# Patient Record
Sex: FEMALE | Race: BLACK OR AFRICAN AMERICAN | NOT HISPANIC OR LATINO | Employment: OTHER | URBAN - METROPOLITAN AREA
[De-identification: names, ages, dates, MRNs, and addresses within clinical notes are randomized per-mention and may not be internally consistent; named-entity substitution may affect disease eponyms.]

---

## 2017-01-03 ENCOUNTER — TELEPHONE (OUTPATIENT)
Dept: OTOLARYNGOLOGY | Facility: CLINIC | Age: 67
End: 2017-01-03

## 2017-01-04 ENCOUNTER — TELEPHONE (OUTPATIENT)
Dept: GASTROENTEROLOGY | Facility: CLINIC | Age: 67
End: 2017-01-04

## 2017-01-05 ENCOUNTER — TELEPHONE (OUTPATIENT)
Dept: GASTROENTEROLOGY | Facility: CLINIC | Age: 67
End: 2017-01-05

## 2017-01-10 ENCOUNTER — OFFICE VISIT (OUTPATIENT)
Dept: OTOLARYNGOLOGY | Facility: CLINIC | Age: 67
End: 2017-01-10
Payer: MEDICARE

## 2017-01-10 VITALS
WEIGHT: 222.69 LBS | HEIGHT: 63 IN | DIASTOLIC BLOOD PRESSURE: 80 MMHG | SYSTOLIC BLOOD PRESSURE: 139 MMHG | BODY MASS INDEX: 39.46 KG/M2 | HEART RATE: 76 BPM

## 2017-01-10 DIAGNOSIS — H61.23 IMPACTED CERUMEN OF BOTH EARS: ICD-10-CM

## 2017-01-10 DIAGNOSIS — H93.8X3 EAR FULLNESS, BILATERAL: Primary | ICD-10-CM

## 2017-01-10 PROCEDURE — 69210 REMOVE IMPACTED EAR WAX UNI: CPT | Mod: S$PBB,,, | Performed by: OTOLARYNGOLOGY

## 2017-01-10 PROCEDURE — 99213 OFFICE O/P EST LOW 20 MIN: CPT | Mod: PBBFAC,25 | Performed by: OTOLARYNGOLOGY

## 2017-01-10 PROCEDURE — 99213 OFFICE O/P EST LOW 20 MIN: CPT | Mod: 25,S$PBB,, | Performed by: OTOLARYNGOLOGY

## 2017-01-10 PROCEDURE — 69210 REMOVE IMPACTED EAR WAX UNI: CPT | Mod: PBBFAC | Performed by: OTOLARYNGOLOGY

## 2017-01-10 PROCEDURE — 99999 PR PBB SHADOW E&M-EST. PATIENT-LVL III: CPT | Mod: PBBFAC,,, | Performed by: OTOLARYNGOLOGY

## 2017-01-10 NOTE — MR AVS SNAPSHOT
[facility] Otorhinolaryngology  [address]  [facility] [address]  Phone: [phone]  Fax: [phone]                  Patsy Morris   1/10/2017 11:15 AM   Office Visit    Description:  Female : 1950   Provider:  Stuart Qiu MD   Department:  [facility] - Otorhinolaryngology           Reason for Visit     Ear Fullness           Diagnoses this Visit        Comments    Ear fullness, bilateral    -  Primary     Impacted cerumen of both ears                To Do List           Future Appointments        Provider Department Dept Phone    3/14/2017 11:00 AM MD Jonah Carnes [facility] Otorhinolaryngology [phone]    2017 11:15 AM Stuart Qiu MD [facility] Otorhinolaryngology [phone]      Goals (5 Years of Data)     None      Follow-Up and Disposition     Return in about 3 months (around 4/10/2017).      [facility] On Call     [facility] On Call Nurse [place] [facility] Line -  Assistance  Registered nurses in the [facility] On Call Center provide clinical advisement, health education, appointment booking, and other advisory services.  Call for this free service at [phone].             Medications           Message regarding Medications     Verify the changes and/or additions to your medication regime listed below are the same as discussed with your clinician today.  If any of these changes or additions are incorrect, please notify your healthcare provider.             Verify that the below list of medications is an accurate representation of the medications you are currently taking.  If none reported, the list may be blank. If incorrect, please contact your healthcare provider. Carry this list with you in case of emergency.           Current Medications     albuterol-ipratropium 2.5mg-0.5mg/3mL (DUO-NEB) 0.5 mg-3 mg(2.5 mg base)/3 mL nebulizer solution Take 3 mLs by nebulization every 6 (six) hours as needed for Wheezing or Shortness of Breath.    atorvastatin (LIPITOR)  "80 MG tablet TAKE 1 TABLET BY MOUTH EVERY EVENING    ELIQUIS 2.5 mg Tab TAKE 1 TABLET(2.5 MG) BY MOUTH TWICE DAILY    ferrous gluconate (FERGON) 325 MG Tab Take 1 tablet (325 mg total) by mouth daily with breakfast.    fluticasone (FLONASE) 50 mcg/actuation nasal spray 1 spray by Each Nare route once daily.    furosemide (LASIX) 40 MG tablet TAKE 1 TABLET(40 MG) BY MOUTH EVERY DAY           Clinical Reference Information           Vital Signs - Last Recorded  Most recent update: 1/10/2017 11:41 AM by Harper Garland MA    BP Pulse Ht Wt LMP BMI    139/80 (BP Location: Right arm, Patient Position: Sitting, BP Method: Automatic) 76 5' 3" (1.6 m) 101 kg (222 lb 10.6 oz) (LMP Unknown) 39.44 kg/m2      Blood Pressure          Most Recent Value    BP  139/80      Allergies as of 1/10/2017     Codeine      Immunizations Administered on Date of Encounter - 1/10/2017     None      [facility] Sign-Up     Activating your [facility] account is as easy as 1-2-3!     1) Visit [email or web address], select Sign Up Now, enter this activation code and your date of birth, then select Next.  [email or web address]  Expires: 2/24/2017 11:39 AM      2) Create a username and password to use when you visit [facility] in the future and select a security question in case you lose your password and select Next.    3) Enter your e-mail address and click Sign Up!    Additional Information  If you have questions, please e-mail [email or web address] or call [phone] to talk to our [facility] staff. Remember, [facility] is NOT to be used for urgent needs. For medical emergencies, dial 911.         Smoking Cessation     If you would like to quit smoking:   You may be eligible for free services if you are a [place] resident and started smoking cigarettes before September 1, 1988.  Call the Smoking Cessation Trust (SCT) toll free at (727) 234-1046 or (221) 358-2686.   Call -800-QUIT-NOW if you do not meet the above criteria.            "

## 2017-01-10 NOTE — PROGRESS NOTES
Subjective:       Patient ID: Patsy Morris is a 66 y.o. female.    Chief Complaint: Ear Fullness    Ear Fullness    There is pain in both ears. This is a recurrent problem. The current episode started 1 to 4 weeks ago. The problem occurs constantly. The problem has been unchanged. There has been no fever. The patient is experiencing no pain. Associated symptoms include hearing loss. Pertinent negatives include no coughing, diarrhea, ear discharge, headaches, neck pain, rash, rhinorrhea or vomiting. She has tried nothing for the symptoms. Her past medical history is significant for hearing loss. There is no history of a chronic ear infection or a tympanostomy tube.     Review of Systems   Constitutional: Negative for activity change, appetite change and fever.   HENT: Positive for hearing loss. Negative for congestion, ear discharge, ear pain, nosebleeds, postnasal drip, rhinorrhea and sneezing.         Recurrent bilateral ear fullness for about 3 weeks   Eyes: Negative for redness and visual disturbance.   Respiratory: Negative for apnea, cough, shortness of breath and wheezing.    Cardiovascular: Negative for chest pain and palpitations.   Gastrointestinal: Negative for diarrhea and vomiting.   Genitourinary: Negative for difficulty urinating and frequency.   Musculoskeletal: Negative for arthralgias, back pain, gait problem and neck pain.   Skin: Negative for color change and rash.   Neurological: Negative for dizziness, speech difficulty, weakness and headaches.   Hematological: Negative for adenopathy. Does not bruise/bleed easily.   Psychiatric/Behavioral: Negative for agitation and behavioral problems.       Objective:        Constitutional:   Vital signs are normal. She appears well-developed and well-nourished. She is active. Normal speech.      Head:  Normocephalic and atraumatic. Salivary glands normal.  Facial strength is normal.      Ears:    Right Ear: Decreased hearing is noted.   Left Ear:  Decreased hearing is noted.       PROCEDURE NOTE:  Ceruminosis is noted in both EACs.  Wax was removed by manual debridement and suctioning utilizing the assistance of binocular microscopy revealing EACs and TMs WNL. The patient tolerated this procedure without difficulty. The subjective decrease noted in hearing pre-cleaning was resolved post-cleaning.        Nose:  Nose normal including turbinates, nasal mucosa, sinuses and nasal septum.     Mouth/Throat  Oropharynx clear and moist without lesions or asymmetry and normal uvula midline.     Neck:  Neck normal without thyromegaly masses, asymmetry, normal tracheal structure, crepitus, and tenderness, thyroid normal, trachea normal, phonation normal and full range of motion with neck supple.     Psychiatric:   She has a normal mood and affect. Her speech is normal and behavior is normal.     Neurological:   She has neurological normal, alert and oriented.     Skin:   No abrasions, lacerations, lesions, or rashes.       Assessment:       1. Ear fullness, bilateral    2. Impacted cerumen of both ears        Plan:       1. Hearing conservation strongly recommended.  2. Trial of amplification bilaterally also recommended.  3. Re-check of hearing in 18-24 months or sooner if subjective change noted.  4. F/U with PCP as per schedule.

## 2017-01-11 ENCOUNTER — TELEPHONE (OUTPATIENT)
Dept: ENDOSCOPY | Facility: HOSPITAL | Age: 67
End: 2017-01-11

## 2017-01-13 ENCOUNTER — TELEPHONE (OUTPATIENT)
Dept: UROLOGY | Facility: CLINIC | Age: 67
End: 2017-01-13

## 2017-01-13 ENCOUNTER — TELEPHONE (OUTPATIENT)
Dept: GASTROENTEROLOGY | Facility: CLINIC | Age: 67
End: 2017-01-13

## 2017-01-13 NOTE — TELEPHONE ENCOUNTER
Due for her 3 month follow up with a chest xray, but she stated she just had one in October.Really does not want to do another one so soon after her last.

## 2017-01-13 NOTE — TELEPHONE ENCOUNTER
----- Message from Martin Rodriguez MA sent at 1/13/2017  1:07 PM CST -----  Contact: Pt  Pt missed a call from Aiyana   ----- Message -----     From: Vanessa Burris     Sent: 1/13/2017   1:04 PM       To: [facility] Staff    Pt is calling to schedule an appt with [facility] and can be reached at [phone].    Thank you

## 2017-01-13 NOTE — TELEPHONE ENCOUNTER
----- Message from Ernestina Jackson sent at 1/13/2017  8:50 AM CST -----  Contact: [phone]/ self  Patient requesting to speak with you regarding her scope. Please advise.

## 2017-01-16 ENCOUNTER — TELEPHONE (OUTPATIENT)
Dept: GASTROENTEROLOGY | Facility: CLINIC | Age: 67
End: 2017-01-16

## 2017-01-27 DIAGNOSIS — I48.3 TYPICAL ATRIAL FLUTTER: ICD-10-CM

## 2017-01-27 RX ORDER — APIXABAN 2.5 MG/1
TABLET, FILM COATED ORAL
Qty: 60 TABLET | Refills: 0 | Status: SHIPPED | OUTPATIENT
Start: 2017-01-27 | End: 2017-03-03 | Stop reason: SDUPTHER

## 2017-01-30 DIAGNOSIS — N18.9 CHRONIC KIDNEY DISEASE, UNSPECIFIED STAGE: ICD-10-CM

## 2017-02-09 DIAGNOSIS — I50.31 ACUTE DIASTOLIC CONGESTIVE HEART FAILURE: ICD-10-CM

## 2017-02-10 ENCOUNTER — ANESTHESIA EVENT (OUTPATIENT)
Dept: ENDOSCOPY | Facility: HOSPITAL | Age: 67
End: 2017-02-10
Payer: MEDICARE

## 2017-02-10 ENCOUNTER — PROCEDURE VISIT (OUTPATIENT)
Dept: UROLOGY | Facility: CLINIC | Age: 67
End: 2017-02-10
Payer: MEDICARE

## 2017-02-10 ENCOUNTER — HOSPITAL ENCOUNTER (OUTPATIENT)
Dept: PREADMISSION TESTING | Facility: HOSPITAL | Age: 67
Discharge: HOME | End: 2017-02-10
Attending: ANESTHESIOLOGY | Admitting: UROLOGY
Payer: MEDICARE

## 2017-02-10 ENCOUNTER — HOSPITAL ENCOUNTER (OUTPATIENT)
Dept: RADIOLOGY | Facility: HOSPITAL | Age: 67
Discharge: HOME | End: 2017-02-10
Attending: UROLOGY
Payer: MEDICARE

## 2017-02-10 VITALS
BODY MASS INDEX: 37.22 KG/M2 | WEIGHT: 218 LBS | TEMPERATURE: 98 F | HEIGHT: 64 IN | SYSTOLIC BLOOD PRESSURE: 137 MMHG | DIASTOLIC BLOOD PRESSURE: 70 MMHG | RESPIRATION RATE: 20 BRPM | HEART RATE: 75 BPM

## 2017-02-10 VITALS
OXYGEN SATURATION: 88 % | BODY MASS INDEX: 37.22 KG/M2 | TEMPERATURE: 99 F | HEIGHT: 64 IN | RESPIRATION RATE: 20 BRPM | HEART RATE: 70 BPM | DIASTOLIC BLOOD PRESSURE: 61 MMHG | WEIGHT: 218 LBS | SYSTOLIC BLOOD PRESSURE: 131 MMHG

## 2017-02-10 DIAGNOSIS — C64.1 RENAL CANCER, RIGHT: ICD-10-CM

## 2017-02-10 DIAGNOSIS — C67.9 MALIGNANT NEOPLASM OF URINARY BLADDER, UNSPECIFIED SITE: Primary | ICD-10-CM

## 2017-02-10 DIAGNOSIS — C67.9 MALIGNANT NEOPLASM OF URINARY BLADDER, UNSPECIFIED SITE: ICD-10-CM

## 2017-02-10 PROCEDURE — 74176 CT ABD & PELVIS W/O CONTRAST: CPT | Mod: 26,GC,, | Performed by: RADIOLOGY

## 2017-02-10 PROCEDURE — 52000 CYSTOURETHROSCOPY: CPT | Mod: PBBFAC | Performed by: UROLOGY

## 2017-02-10 PROCEDURE — 52000 CYSTOURETHROSCOPY: CPT | Mod: S$PBB,,, | Performed by: UROLOGY

## 2017-02-10 RX ORDER — LIDOCAINE HYDROCHLORIDE 20 MG/ML
JELLY TOPICAL
Status: COMPLETED | OUTPATIENT
Start: 2017-02-10 | End: 2017-02-10

## 2017-02-10 RX ORDER — FUROSEMIDE 40 MG/1
TABLET ORAL
Qty: 30 TABLET | Refills: 6 | Status: SHIPPED | OUTPATIENT
Start: 2017-02-10 | End: 2017-03-03 | Stop reason: SDUPTHER

## 2017-02-10 RX ADMIN — LIDOCAINE HYDROCHLORIDE: 20 JELLY TOPICAL at 01:02

## 2017-02-10 NOTE — MR AVS SNAPSHOT
[facility]  [address]  [facility] [address]  Phone: [phone]                  Patsy Morris   2/10/2017 1:00 PM   Procedure visit    Description:  Female : 1950   Provider:  PROCEDURES, UROLOGY   Department:  [facility]           Reason for Visit     Other           Diagnoses this Visit        Comments    Renal cancer, right                To Do List           Future Appointments        Provider Department Dept Phone    3/3/2017 2:00 PM Ajay Lutz MD [facility] - Cardiology [phone]    2017 11:15 AM Stuart Qiu MD [facility] - Otorhinolaryngology [phone]      Goals (5 Years of Data)     None      [facility]     [facility] On Call Nurse Care Line -  Assistance  Registered nurses in the [facility] On Call Center provide clinical advisement, health education, appointment booking, and other advisory services.  Call for this free service at [phone].             Medications           Message regarding Medications     Verify the changes and/or additions to your medication regime listed below are the same as discussed with your clinician today.  If any of these changes or additions are incorrect, please notify your healthcare provider.             Verify that the below list of medications is an accurate representation of the medications you are currently taking.  If none reported, the list may be blank. If incorrect, please contact your healthcare provider. Carry this list with you in case of emergency.           Current Medications     albuterol-ipratropium 2.5mg-0.5mg/3mL (DUO-NEB) 0.5 mg-3 mg(2.5 mg base)/3 mL nebulizer solution Take 3 mLs by nebulization every 6 (six) hours as needed for Wheezing or Shortness of Breath.    atorvastatin (LIPITOR) 80 MG tablet TAKE 1 TABLET BY MOUTH EVERY EVENING    ELIQUIS 2.5 mg Tab TAKE 1 TABLET(2.5 MG) BY MOUTH TWICE DAILY    ferrous gluconate (FERGON) 325 MG Tab Take 1 tablet (325 mg  "total) by mouth daily with breakfast.    fluticasone (FLONASE) 50 mcg/actuation nasal spray 1 spray by Each Nare route once daily.    furosemide (LASIX) 40 MG tablet TAKE 1 TABLET(40 MG) BY MOUTH EVERY DAY           Clinical Reference Information           Your Vitals Were     Pulse Temp Resp Height Weight Last Period    75 98.3 °F (36.8 °C) (Oral) 20 5' 3.5" (1.613 m) 98.9 kg (218 lb) (LMP Unknown)    BMI                38.01 kg/m2          Blood Pressure          Most Recent Value    BP  (P) 137/70      Allergies as of 2/10/2017     Codeine      Immunizations Administered on Date of Encounter - 2/10/2017     None      Orders Placed During Today's Visit      Normal Orders This Visit    Cystoscopy       [facility] Sign-Up     Activating your [facility] account is as easy as 1-2-3!     1) Visit [email or web address], select Sign Up Now, enter this activation code and your date of birth, then select Next.  [email or web address]  Expires: 2/24/2017 11:39 AM      2) Create a username and password to use when you visit [facility] in the future and select a security question in case you lose your password and select Next.    3) Enter your e-mail address and click Sign Up!    Additional Information  If you have questions, please e-mail [email or web address] or call [phone] to talk to our [facility] staff. Remember, [facility] is NOT to be used for urgent needs. For medical emergencies, dial 911.         Instructions    What to Expect After a Cystoscopy  For the next 24-48 hours, you may feel a mild burning when you urinate. This burning is normal and expected. Drink plenty of water to dilute the urine to help relieve the burning sensation. You may also see a small amount of blood in your urine after the procedure.    Unless you are already taking antibiotics, you may be given an antibiotic after the test to prevent infection.    Signs and Symptoms to Report  Call the [facility] at [phone] if you develop any of the " following:  · Fever of 101 degrees or higher  · Chills or persistent bleeding  · Inability to urinate       Smoking Cessation     If you would like to quit smoking:   You may be eligible for free services if you are a [place] resident and started smoking cigarettes before September 1, 1988.  Call the Smoking Cessation Trust (SCT) toll free at (332) 017-0469 or (143) 307-7755.   Call 1-800-QUIT-NOW if you do not meet the above criteria.            Language Assistance Services     ATTENTION: Language assistance services are available, free of charge. Please call [phone].      ATENCIÓN: Si habla español, tiene a velarde disposición servicios gratuitos de asistencia lingüística. Llame al [phone].     CHÚ Ý: N?u b?n nói Ti?ng Vi?t, có các d?ch v? h? tr? ngôn ng? mi?n phí dành cho b?n. G?i s? [phone].         [facility] complies with applicable Federal civil rights laws and does not discriminate on the basis of race, color, national origin, age, disability, or sex.

## 2017-02-10 NOTE — PROCEDURES
Procedures   Procedure: Flexible cysto-uretheroscopy    Pre Procedure Diagnosis:history of bladder cancer, no recurrence for >12 years    Post Procedure Diagnosis:Normal cystoscopy    Surgeon: Devang Ambriz MD    Anesthesia: 2% uro-jet lidocaine jelly for local analgesia    Flexible cysto-urethroscopy was performed after consent was obtained.  The risks and benefits were explained.    2% lidocaine urojet was used for local analgesia.  The genitalia was prepped and draped in the sterile fashion with betadine.    The flexible scope was advanced into the urethra and into the bladder.  Bilateral ureteral orifice were evaluated and noted to be normal with clear efflux.  The bladder was completely surveyed in a systematic fashion.   No bladder tumors or lesions were seen.  No strictures were noted.    The patient tolerated the procedure well without complication.    They will follow up in 1 year

## 2017-02-10 NOTE — ANESTHESIA PREPROCEDURE EVALUATION
02/10/2017  Pre-operative evaluation for Procedure(s) (LRB):  COLONOSCOPY (N/A)    Patsy Morris is a 66 y.o. female presents for preoperative evaluation for colonoscopy. Pmhx is significant for HFpEF, Afib on eliquis, RCC s/p partial kidney resection, CKD, HTN, Chronic respiratory failure on home O2, and BHAVANI. Pt most recently admitted to the hospital this past October for fatigue and dyspnea on exertion especially when removing her oxygen. She was treated for heart failure with IV diuresis. Pt last saw her cardiologist in December.         Patient Active Problem List   Diagnosis    Impacted cerumen of both ears    URI (upper respiratory infection)    Ear fullness    Renal insufficiency    HTN (hypertension), benign    Hypercholesterolemia    Renal carcinoma    Rhinitis, allergic    Acute kidney injury    Hypokalemia    Dyslipidemia    Tobacco abuse    Non morbid obesity due to excess calories    Chronic kidney disease (CKD) stage G4/A1, severely decreased glomerular filtration rate (GFR) between 15-29 mL/min/1.73 square meter and albuminuria creatinine ratio less than 30 mg/g    Morbid obesity    Hypoxia    Chronic hypercapnic respiratory failure    Typical atrial flutter    Chronic diastolic congestive heart failure    CRI (chronic renal insufficiency)    Acute on chronic diastolic heart failure    Chronic respiratory failure with hypoxia    Paroxysmal atrial fibrillation    Suspected sleep apnea    BHAVANI (obstructive sleep apnea)    Sleep-related hypoventilation    Gastrointestinal hemorrhage associated with gastritis    Acute on chronic respiratory failure with hypoxia and hypercapnia       Review of patient's allergies indicates:   Allergen Reactions    Codeine         Current Outpatient Prescriptions on File Prior to Encounter   Medication Sig Dispense Refill     albuterol-ipratropium 2.5mg-0.5mg/3mL (DUO-NEB) 0.5 mg-3 mg(2.5 mg base)/3 mL nebulizer solution Take 3 mLs by nebulization every 6 (six) hours as needed for Wheezing or Shortness of Breath. 3 mL 2    atorvastatin (LIPITOR) 80 MG tablet TAKE 1 TABLET BY MOUTH EVERY EVENING 30 tablet 6    ELIQUIS 2.5 mg Tab TAKE 1 TABLET(2.5 MG) BY MOUTH TWICE DAILY 60 tablet 0    ferrous gluconate (FERGON) 325 MG Tab Take 1 tablet (325 mg total) by mouth daily with breakfast. 30 tablet 11    fluticasone (FLONASE) 50 mcg/actuation nasal spray 1 spray by Each Nare route once daily. 1 Bottle 11    furosemide (LASIX) 40 MG tablet TAKE 1 TABLET(40 MG) BY MOUTH EVERY DAY 30 tablet 6     No current facility-administered medications on file prior to encounter.        Past Surgical History   Procedure Laterality Date    Kidney mass resection Right      renal carcinoma    Brain surgery      Eye surgery      Hysterectomy         Social History     Social History    Marital status: Single     Spouse name: N/A    Number of children: N/A    Years of education: N/A     Occupational History    Not on file.     Social History Main Topics    Smoking status: Current Some Day Smoker     Packs/day: 1.50     Years: 25.00     Types: Cigarettes     Last attempt to quit: 2/22/2016    Smokeless tobacco: Never Used      Comment: Still occasionally smokes a cigarette 1-2 daily    Alcohol use No    Drug use: No    Sexual activity: No     Other Topics Concern    Not on file     Social History Narrative         Vital Signs Range (Last 24H):  Temp:  [37.3 °C (99.2 °F)]   Pulse:  [70]   Resp:  [20]   BP: (131)/(61)   SpO2:  [88 %]       CBC: No results for input(s): WBC, RBC, HGB, HCT, PLT, MCV, MCH, MCHC in the last 72 hours.    CMP: No results for input(s): NA, K, CL, CO2, BUN, CREATININE, GLU, MG, PHOS, CALCIUM, ALBUMIN, PROT, ALKPHOS, ALT, AST, BILITOT in the last 72 hours.    INR  No results for input(s): INR, PROTIME, APTT in the last 72  hours.    Invalid input(s): PT        Diagnostic Studies:      EKG:  Sinus rhythm with sinus arrhythmia  Normal ECG    Confirmed by Diana Meredith MD ([id number]) on 10/10/2016 9:56:58 PM      2D Echo:  Aorta: The aortic root is normal in size, measuring 2.1 cm at sinotubular junction and 3.0 cm at Sinuses of Valsalva. The proximal ascending aorta is normal in size, measuring 2.5 cm across.     Left Atrium: The left atrial volume index is normal, measuring 30.58 cc/m2.     Left Ventricle: The left ventricle is mildly enlarged, with an end-diastolic diameter of 6.2 cm, and an end-systolic diameter of 4.6 cm. LV wall thickness is normal, with the septum and the posterior wall each measuring 0.8 cm across. Relative wall   thickness was normal at 0.26, and the LV mass index was increased at 114.3 g/m2 consistent with eccentric left ventricular hypertrophy. Global left ventricular systolic function appears normal. Visually estimated ejection fraction is 55-60%. The LV   Doppler derived stroke volume equals 68.0 ccs.   The E/e'(lat) is 13, consistent with significant diastolic dysfunction.     Right Atrium: The right atrium is normal in size, measuring 6.5 cm in length and 4.3 cm in width in the apical view.     Right Ventricle: The right ventricle is normal in size measuring 3.0 cm at the base in the apical right ventricle-focused view. Global right ventricular systolic function appears normal. The estimated PA systolic pressure is greater than 15 mmHg.     Aortic Valve:  The mean gradient obtained across the aortic valve is 7.52 mmHg.     Mitral Valve:  The pressure half time is 70 msec. The calculated mitral valve area is 3.14 cm2. There is mild mitral regurgitation.     Intracavitary: There is no evidence of pericardial effusion, intracavity mass, thrombi, or vegetation.         Anesthesia Evaluation    I have reviewed the Patient Summary Reports.    I have reviewed the Nursing Notes.   I have reviewed the Medications.      Review of Systems  Anesthesia Hx:  No problems with previous Anesthesia  History of prior surgery of interest to airway management or planning: Denies Family Hx of Anesthesia complications.   Denies Personal Hx of Anesthesia complications.   Social:  Smoker, No Alcohol Use    Hematology/Oncology:         -- Denies Anemia: --  Cancer in past history:  Oncology Comments: Renal Cell Cancer 2004 s/p Partial resection of the kidney  No chemo or raditation,  BUDDY now.     EENT/Dental:EENT/Dental Normal   Cardiovascular:   Hypertension, well controlled Denies Valvular problems/Murmurs.  Denies MI.  Denies CAD.    Denies CABG/stent. Dysrhythmias atrial fibrillation CHF adnitted 2 years ago with fluid overload and anemia when CHF was diagnosed.  Got transfusion and has been much better since then. Functional Capacity low / < 4 METS  Denies Valvular Heart Disease.    Congestive Heart Failure (CHF) , LV Diastolic HF, improved, therapy has been decreased Hypertension    Pulmonary:   COPD, severe Shortness of breath Sleep Apnea, CPAP Uses oxygen intermittently  Walks 2 hours in store without difficulty but needs oxygen to walks, but not for light work at home Obstructive Sleep Apnea (BHAVANI). Does not use CPAP although prescribed to her.  Respiratory Failure, Chronic, Hypercapneic   Renal/:   Chronic Renal Disease, CRI    Hepatic/GI:   GERD, well controlled Denies Liver Disease. Denies Hepatitis.    Neurological:   Denies TIA. Denies CVA.    Endocrine:   Denies Diabetes. Denies Hypothyroidism. Denies Hyperthyroidism.        Physical Exam  General:  Morbid Obesity    Airway/Jaw/Neck:  Airway Findings: Mouth Opening: Normal Tongue: Normal  General Airway Assessment: Adult  Mallampati: II  Improves to II with phonation.  TM Distance: Normal, at least 6 cm  Jaw/Neck Findings:  Neck ROM: Normal ROM     Eyes/Ears/Nose:  EYES/EARS/NOSE FINDINGS: Normal   Dental:  Dental Findings: In tact   Chest/Lungs:  Chest/Lungs Clear     Heart/Vascular:  Heart Findings: Normal       Mental Status:  Mental Status Findings:  Cooperative, Alert and Oriented         Anesthesia Plan  Type of Anesthesia, risks & benefits discussed:  Anesthesia Type:  general  Patient's Preference:   Intra-op Monitoring Plan: standard ASA monitors  Intra-op Monitoring Plan Comments:   Post Op Pain Control Plan:   Post Op Pain Control Plan Comments:   Induction:   IV  Beta Blocker:  Patient is not currently on a Beta-Blocker (No further documentation required).       Informed Consent:    ASA Score: 3     Day of Surgery Review of History & Physical:            Ready For Surgery From Anesthesia Perspective.       Ref Range & Units 8mo ago 1yr ago    EF 55 - 65 55 71    Mitral Valve Regurgitation  MILD     Diastolic Dysfunction  Yes  Yes     Est. PA Systolic Pressure  14.75 23.04   Resulting Agency  CVIS CVIS   Narrative        Vitals - 1 value per visit 10/17/2016 10/21/2016 10/25/2016 11/28/2016   SYSTOLIC 136  120 124   DIASTOLIC 56  68 80   PULSE 78  72 80     Vitals - 1 value per visit 12/2/2016 12/8/2016 1/10/2017 2/10/2017   SYSTOLIC 160 129 139 131   DIASTOLIC 60 71 80 61   PULSE 72 81 76 70     Vitals - 1 value per visit 2/10/2017 3/3/2017 3/28/2017 4/11/2017 6/15/2017   SYSTOLIC 137 138 139 115 142   DIASTOLIC 70 52 63 62 73   PULSE 75 82 72 75 84

## 2017-02-10 NOTE — PATIENT INSTRUCTIONS
What to Expect After a Cystoscopy  For the next 24-48 hours, you may feel a mild burning when you urinate. This burning is normal and expected. Drink plenty of water to dilute the urine to help relieve the burning sensation. You may also see a small amount of blood in your urine after the procedure.    Unless you are already taking antibiotics, you may be given an antibiotic after the test to prevent infection.    Signs and Symptoms to Report  Call the [facility] at [phone] if you develop any of the following:  · Fever of 101 degrees or higher  · Chills or persistent bleeding  · Inability to urinate

## 2017-03-03 ENCOUNTER — OFFICE VISIT (OUTPATIENT)
Dept: CARDIOLOGY | Facility: CLINIC | Age: 67
End: 2017-03-03
Payer: MEDICARE

## 2017-03-03 VITALS
HEART RATE: 82 BPM | DIASTOLIC BLOOD PRESSURE: 52 MMHG | SYSTOLIC BLOOD PRESSURE: 138 MMHG | WEIGHT: 222 LBS | HEIGHT: 63 IN | BODY MASS INDEX: 39.34 KG/M2

## 2017-03-03 DIAGNOSIS — I50.31 ACUTE DIASTOLIC CONGESTIVE HEART FAILURE: ICD-10-CM

## 2017-03-03 DIAGNOSIS — E78.00 PURE HYPERCHOLESTEROLEMIA: ICD-10-CM

## 2017-03-03 DIAGNOSIS — Z79.01 LONG TERM CURRENT USE OF ANTICOAGULANT: ICD-10-CM

## 2017-03-03 DIAGNOSIS — I50.33 ACUTE ON CHRONIC DIASTOLIC HEART FAILURE: ICD-10-CM

## 2017-03-03 DIAGNOSIS — I48.3 TYPICAL ATRIAL FLUTTER: ICD-10-CM

## 2017-03-03 DIAGNOSIS — E78.5 DYSLIPIDEMIA: ICD-10-CM

## 2017-03-03 DIAGNOSIS — I10 HTN (HYPERTENSION), BENIGN: Primary | ICD-10-CM

## 2017-03-03 DIAGNOSIS — G47.33 OSA (OBSTRUCTIVE SLEEP APNEA): ICD-10-CM

## 2017-03-03 DIAGNOSIS — I50.32 CHRONIC DIASTOLIC CONGESTIVE HEART FAILURE: ICD-10-CM

## 2017-03-03 PROCEDURE — 99214 OFFICE O/P EST MOD 30 MIN: CPT | Mod: S$PBB,,, | Performed by: INTERNAL MEDICINE

## 2017-03-03 PROCEDURE — 99999 PR PBB SHADOW E&M-EST. PATIENT-LVL III: CPT | Mod: PBBFAC,,, | Performed by: INTERNAL MEDICINE

## 2017-03-03 PROCEDURE — 99213 OFFICE O/P EST LOW 20 MIN: CPT | Mod: PBBFAC,PO | Performed by: INTERNAL MEDICINE

## 2017-03-03 RX ORDER — ATORVASTATIN CALCIUM 80 MG/1
80 TABLET, FILM COATED ORAL NIGHTLY
Qty: 30 TABLET | Refills: 6 | Status: SHIPPED | OUTPATIENT
Start: 2017-03-03 | End: 2017-09-09 | Stop reason: SDUPTHER

## 2017-03-03 RX ORDER — FUROSEMIDE 40 MG/1
40 TABLET ORAL DAILY
Qty: 30 TABLET | Refills: 6 | Status: SHIPPED | OUTPATIENT
Start: 2017-03-03 | End: 2017-09-09 | Stop reason: SDUPTHER

## 2017-03-03 NOTE — MR AVS SNAPSHOT
[facility]  [address] [place] [address], [address]  [facility] [address]  Phone: [phone]                  Patsy Morris   3/3/2017 2:00 PM   Office Visit    Description:  Female : 1950   Provider:  Ajay Lutz MD   Department:  [facility]           Reason for Visit     Hypertension           Diagnoses this Visit        Comments    HTN (hypertension), benign    -  Primary     Typical atrial flutter         Chronic diastolic congestive heart failure         Acute on chronic diastolic heart failure         BHAVANI (obstructive sleep apnea)         Long term current use of anticoagulant         Pure hypercholesterolemia                To Do List           Future Appointments        Provider Department Dept Phone    2017 11:15 AM Stuart Qiu MD [facility] Otorhinolaryngology [phone]      Goals (5 Years of Data)     None      Follow-Up and Disposition     Return in about 6 months (around 9/3/2017), or if symptoms worsen or fail to improve.    Follow-up and Disposition History      [facility]     [facility] On Call Nurse [facility] Line -  Assistance  Registered nurses in the [facility] On Call Center provide clinical advisement, health education, appointment booking, and other advisory services.  Call for this free service at [phone].             Medications           Message regarding Medications     Verify the changes and/or additions to your medication regime listed below are the same as discussed with your clinician today.  If any of these changes or additions are incorrect, please notify your healthcare provider.             Verify that the below list of medications is an accurate representation of the medications you are currently taking.  If none reported, the list may be blank. If incorrect, please contact your healthcare provider. Carry this list with you in case of emergency.           Current Medications     atorvastatin (LIPITOR) 80 MG tablet  "TAKE 1 TABLET BY MOUTH EVERY EVENING    ELIQUIS 2.5 mg Tab TAKE 1 TABLET(2.5 MG) BY MOUTH TWICE DAILY    ferrous gluconate (FERGON) 325 MG Tab Take 1 tablet (325 mg total) by mouth daily with breakfast.    fluticasone (FLONASE) 50 mcg/actuation nasal spray 1 spray by Each Nare route once daily.    furosemide (LASIX) 40 MG tablet TAKE 1 TABLET(40 MG) BY MOUTH EVERY DAY    albuterol-ipratropium 2.5mg-0.5mg/3mL (DUO-NEB) 0.5 mg-3 mg(2.5 mg base)/3 mL nebulizer solution Take 3 mLs by nebulization every 6 (six) hours as needed for Wheezing or Shortness of Breath.           Clinical Reference Information           Your Vitals Were     BP Pulse Height Weight Last Period BMI    138/52 82 5' 3" (1.6 m) 100.7 kg (222 lb) (LMP Unknown) 39.33 kg/m2      Blood Pressure          Most Recent Value    Right Arm BP - Sitting  140/50    Left Arm BP - Sitting  138/52    BP  (!)  138/52      Allergies as of 3/3/2017     Codeine      Immunizations Administered on Date of Encounter - 3/3/2017     None      Orders Placed During Today's Visit     Future Labs/Procedures Expected by Expires    CBC auto differential  9/2/2017 3/3/2018    Comprehensive metabolic panel  9/2/2017 (Approximate) 3/3/2018    Lipid panel  9/2/2017 (Approximate) 3/3/2018      [facility] Sign-Up     Activating your [facility] account is as easy as 1-2-3!     1) Visit [email or web address], select Sign Up Now, enter this activation code and your date of birth, then select Next.  [email or web address]  Expires: 4/17/2017  1:59 PM      2) Create a username and password to use when you visit [facility] in the future and select a security question in case you lose your password and select Next.    3) Enter your e-mail address and click Sign Up!    Additional Information  If you have questions, please e-mail [email or web address] or call [phone] to talk to our [facility] staff. Remember, [facility] is NOT to be used for urgent needs. For medical emergencies, dial 911.         Smoking " Cessation     If you would like to quit smoking:   You may be eligible for free services if you are a [place] resident and started smoking cigarettes before September 1, 1988.  Call the Smoking Cessation Trust (SCT) toll free at (229) 888-9928 or (069) 202-9596.   Call 1-800-QUIT-NOW if you do not meet the above criteria.            Language Assistance Services     ATTENTION: Language assistance services are available, free of charge. Please call [phone].      ATENCIÓN: Si habla lesli, tiene a velarde disposición servicios gratuitos de asistencia lingüística. Llame al [phone].     CHÚ Ý: N?u b?n nói Ti?ng Vi?t, có các d?ch v? h? tr? ngôn ng? mi?n phí dành cho b?n. G?i s? [phone].         [facility] complies with applicable Federal civil rights laws and does not discriminate on the basis of race, color, national origin, age, disability, or sex.

## 2017-03-03 NOTE — PROGRESS NOTES
Subjective:    Patient ID:  Patsy Morris is a 66 y.o. female who presents for follow-up of Hypertension      HPI  66 year old female followed with HFpEF, paroxsymal AFl, CRI and post nephrectomy of renal cell ca. She is upset today due to the recent death of her mother . Her bother  in January with prostate cancer.            Ref Range & Units 4mo ago   1yr ago      EF 55 - 65 55 71    Mitral Valve Regurgitation  MILD     Diastolic Dysfunction  Yes (A) Yes (A)    Est. PA Systolic Pressure  14.75 23.04   Resulting Agency  [facility]   Narrative   Date of Procedure: 10/11/2016            Lab pending  Past Medical History:   Diagnosis Date    Arthritis     Asthma     Atrial fibrillation     Atrial flutter     Cerumen impaction     CHF (congestive heart failure)     Encounter for blood transfusion     HEARING LOSS     Herpes genitalis     Hypertension     Renal carcinoma 3/10/2015     Current Outpatient Prescriptions   Medication Sig    atorvastatin (LIPITOR) 80 MG tablet TAKE 1 TABLET BY MOUTH EVERY EVENING    ELIQUIS 2.5 mg Tab TAKE 1 TABLET(2.5 MG) BY MOUTH TWICE DAILY    ferrous gluconate (FERGON) 325 MG Tab Take 1 tablet (325 mg total) by mouth daily with breakfast.    fluticasone (FLONASE) 50 mcg/actuation nasal spray 1 spray by Each Nare route once daily.    furosemide (LASIX) 40 MG tablet TAKE 1 TABLET(40 MG) BY MOUTH EVERY DAY    albuterol-ipratropium 2.5mg-0.5mg/3mL (DUO-NEB) 0.5 mg-3 mg(2.5 mg base)/3 mL nebulizer solution Take 3 mLs by nebulization every 6 (six) hours as needed for Wheezing or Shortness of Breath.     No current facility-administered medications for this visit.            Review of Systems   Constitution: Negative for decreased appetite, diaphoresis, fever, weakness, malaise/fatigue, weight gain and weight loss.   HENT: Negative for congestion, ear discharge, ear pain, headaches and nosebleeds.    Eyes: Negative for blurred vision, double vision and visual  "disturbance.   Cardiovascular: Negative for chest pain, claudication, cyanosis, dyspnea on exertion, irregular heartbeat, leg swelling, near-syncope, orthopnea, palpitations, paroxysmal nocturnal dyspnea and syncope.   Respiratory: Negative for cough, hemoptysis, shortness of breath, sleep disturbances due to breathing, snoring, sputum production and wheezing.    Endocrine: Negative for polydipsia, polyphagia and polyuria.   Hematologic/Lymphatic: Negative for adenopathy and bleeding problem. Does not bruise/bleed easily.   Skin: Negative for color change, nail changes, poor wound healing and rash.   Musculoskeletal: Negative for muscle cramps and muscle weakness.   Gastrointestinal: Negative for abdominal pain, anorexia, change in bowel habit, hematochezia, nausea and vomiting.   Genitourinary: Negative for dysuria, frequency and hematuria.   Neurological: Negative for brief paralysis, difficulty with concentration, excessive daytime sleepiness, dizziness, focal weakness, light-headedness, seizures and vertigo.   Psychiatric/Behavioral: Negative for altered mental status and depression.   Allergic/Immunologic: Negative for persistent infections.        Objective:BP (!) 138/52  Pulse 82  Ht 5' 3" (1.6 m)  Wt 100.7 kg (222 lb)  LMP  (LMP Unknown)  BMI 39.33 kg/m2    Physical Exam   Constitutional: She is oriented to person, place, and time. She appears well-developed and well-nourished.   Morbid obese   HENT:   Head: Normocephalic.   Right Ear: External ear normal.   Left Ear: External ear normal.   Nose: Nose normal.   Inspection of lips, teeth and gums normal   Eyes: Conjunctivae and EOM are normal. Pupils are equal, round, and reactive to light. No scleral icterus.   Neck: Normal range of motion. No JVD present. No tracheal deviation present. No thyromegaly present.   Cardiovascular: Normal rate, regular rhythm, normal heart sounds and intact distal pulses.  Exam reveals no gallop and no friction rub.    No " murmur heard.  Pulmonary/Chest: Effort normal and breath sounds normal. No respiratory distress. She has no wheezes. She has no rales. She exhibits no tenderness.   Abdominal: Soft. Bowel sounds are normal. She exhibits no distension. There is no hepatosplenomegaly. There is no tenderness. There is no guarding.   Musculoskeletal: Normal range of motion. She exhibits no edema or tenderness.   Lymphadenopathy:   Palpation of lymph nodes of neck and groin normal   Neurological: She is oriented to person, place, and time. No cranial nerve deficit. She exhibits normal muscle tone. Coordination normal.   Skin: Skin is warm and dry. No rash noted. No erythema. No pallor.   Palpation of skin normal   Psychiatric: She has a normal mood and affect. Her behavior is normal. Judgment and thought content normal.         Assessment:       1. HTN (hypertension), benign    2. Typical atrial flutter    3. Chronic diastolic congestive heart failure    4. Acute on chronic diastolic heart failure    5. BHAVANI (obstructive sleep apnea)    6. Long term current use of anticoagulant    7. Pure hypercholesterolemia         Plan:       Patsy was seen today for hypertension.    Diagnoses and all orders for this visit:    HTN (hypertension), benign  -     Comprehensive metabolic panel; Future; Expected date: 9/2/17    Typical atrial flutter    Chronic diastolic congestive heart failure    Acute on chronic diastolic heart failure    BHAVANI (obstructive sleep apnea)    Long term current use of anticoagulant  -     CBC auto differential; Future; Expected date: 9/2/17    Pure hypercholesterolemia  -     Lipid panel; Future; Expected date: 9/2/17

## 2017-03-07 DIAGNOSIS — R91.8 PULMONARY NODULES: Primary | ICD-10-CM

## 2017-03-23 ENCOUNTER — TELEPHONE (OUTPATIENT)
Dept: UROLOGY | Facility: CLINIC | Age: 67
End: 2017-03-23

## 2017-03-23 NOTE — TELEPHONE ENCOUNTER
----- Message from Yuridia Cabezas sent at 3/23/2017 10:51 AM CDT -----  Contact: [phone]  Patient is returning your call

## 2017-03-28 ENCOUNTER — HOSPITAL ENCOUNTER (EMERGENCY)
Facility: OTHER | Age: 67
Discharge: HOME | End: 2017-03-28
Attending: EMERGENCY MEDICINE
Payer: MEDICARE

## 2017-03-28 VITALS
OXYGEN SATURATION: 92 % | BODY MASS INDEX: 42.41 KG/M2 | HEIGHT: 61 IN | RESPIRATION RATE: 20 BRPM | WEIGHT: 224.63 LBS | HEART RATE: 72 BPM | DIASTOLIC BLOOD PRESSURE: 63 MMHG | TEMPERATURE: 98 F | SYSTOLIC BLOOD PRESSURE: 139 MMHG

## 2017-03-28 DIAGNOSIS — N28.9 RENAL INSUFFICIENCY: ICD-10-CM

## 2017-03-28 DIAGNOSIS — B34.9 VIRAL SYNDROME: Primary | ICD-10-CM

## 2017-03-28 DIAGNOSIS — R73.9 HYPERGLYCEMIA: ICD-10-CM

## 2017-03-28 LAB
ABO + RH BLD: NORMAL
ALBUMIN SERPL BCP-MCNC: 3.8 G/DL
ALP SERPL-CCNC: 66 U/L
ALT SERPL W/O P-5'-P-CCNC: 12 U/L
ANION GAP SERPL CALC-SCNC: 12 MMOL/L
AST SERPL-CCNC: 14 U/L
BASOPHILS # BLD AUTO: 0 K/UL
BASOPHILS NFR BLD: 0 %
BILIRUB SERPL-MCNC: 0.4 MG/DL
BILIRUB UR QL STRIP: NEGATIVE
BLD GP AB SCN CELLS X3 SERPL QL: NORMAL
BNP SERPL-MCNC: 58 PG/ML
BUN SERPL-MCNC: 14 MG/DL
CALCIUM SERPL-MCNC: 9.2 MG/DL
CHLORIDE SERPL-SCNC: 100 MMOL/L
CLARITY UR: CLEAR
CO2 SERPL-SCNC: 29 MMOL/L
COLOR UR: YELLOW
CREAT SERPL-MCNC: 1.6 MG/DL
DIFFERENTIAL METHOD: ABNORMAL
EOSINOPHIL # BLD AUTO: 0.1 K/UL
EOSINOPHIL NFR BLD: 1.2 %
ERYTHROCYTE [DISTWIDTH] IN BLOOD BY AUTOMATED COUNT: 15.3 %
EST. GFR  (AFRICAN AMERICAN): 38 ML/MIN/1.73 M^2
EST. GFR  (NON AFRICAN AMERICAN): 33 ML/MIN/1.73 M^2
FLUAV AG SPEC QL IA: NEGATIVE
FLUBV AG SPEC QL IA: NEGATIVE
GLUCOSE SERPL-MCNC: 162 MG/DL
GLUCOSE UR QL STRIP: NEGATIVE
HCT VFR BLD AUTO: 37.6 %
HGB BLD-MCNC: 12 G/DL
HGB UR QL STRIP: NEGATIVE
INR PPP: 0.9
KETONES UR QL STRIP: NEGATIVE
LEUKOCYTE ESTERASE UR QL STRIP: NEGATIVE
LYMPHOCYTES # BLD AUTO: 1 K/UL
LYMPHOCYTES NFR BLD: 12.8 %
MCH RBC QN AUTO: 28 PG
MCHC RBC AUTO-ENTMCNC: 31.9 %
MCV RBC AUTO: 88 FL
MONOCYTES # BLD AUTO: 0.5 K/UL
MONOCYTES NFR BLD: 6.4 %
NEUTROPHILS # BLD AUTO: 6.5 K/UL
NEUTROPHILS NFR BLD: 79.5 %
NITRITE UR QL STRIP: NEGATIVE
PH UR STRIP: 6 [PH] (ref 5–8)
PLATELET # BLD AUTO: 191 K/UL
PMV BLD AUTO: 10.3 FL
POTASSIUM SERPL-SCNC: 3.8 MMOL/L
PROT SERPL-MCNC: 7.6 G/DL
PROT UR QL STRIP: NEGATIVE
PROTHROMBIN TIME: 10.2 SEC
RBC # BLD AUTO: 4.29 M/UL
SODIUM SERPL-SCNC: 141 MMOL/L
SP GR UR STRIP: <=1.005 (ref 1–1.03)
SPECIMEN SOURCE: NORMAL
TROPONIN I SERPL DL<=0.01 NG/ML-MCNC: 0.01 NG/ML
URN SPEC COLLECT METH UR: ABNORMAL
UROBILINOGEN UR STRIP-ACNC: NEGATIVE EU/DL
WBC # BLD AUTO: 8.14 K/UL

## 2017-03-28 PROCEDURE — 93005 ELECTROCARDIOGRAM TRACING: CPT

## 2017-03-28 PROCEDURE — 83880 ASSAY OF NATRIURETIC PEPTIDE: CPT

## 2017-03-28 PROCEDURE — 99284 EMERGENCY DEPT VISIT MOD MDM: CPT | Mod: 25

## 2017-03-28 PROCEDURE — 85610 PROTHROMBIN TIME: CPT

## 2017-03-28 PROCEDURE — 85025 COMPLETE CBC W/AUTO DIFF WBC: CPT

## 2017-03-28 PROCEDURE — 87400 INFLUENZA A/B EACH AG IA: CPT | Mod: 59

## 2017-03-28 PROCEDURE — 93010 ELECTROCARDIOGRAM REPORT: CPT | Mod: ,,, | Performed by: INTERNAL MEDICINE

## 2017-03-28 PROCEDURE — 86900 BLOOD TYPING SEROLOGIC ABO: CPT

## 2017-03-28 PROCEDURE — 80053 COMPREHEN METABOLIC PANEL: CPT

## 2017-03-28 PROCEDURE — 81003 URINALYSIS AUTO W/O SCOPE: CPT

## 2017-03-28 PROCEDURE — 86850 RBC ANTIBODY SCREEN: CPT

## 2017-03-28 PROCEDURE — 84484 ASSAY OF TROPONIN QUANT: CPT

## 2017-03-28 RX ORDER — BENZONATATE 100 MG/1
100 CAPSULE ORAL 3 TIMES DAILY PRN
Qty: 12 CAPSULE | Refills: 0 | Status: SHIPPED | OUTPATIENT
Start: 2017-03-28 | End: 2017-04-01

## 2017-03-28 NOTE — ED AVS SNAPSHOT
[facility]  [address]  [facility] [address]               Patsy Morris   3/28/2017  7:13 PM   ED    Description:  Female : 1950   Department:  [facility]           Your Care was Coordinated By:     Provider Role From To    Ceasar Cotter MD Attending Provider [phone] --      Reason for Visit     Cough     Fatigue           Diagnoses this Visit        Comments    Viral syndrome    -  Primary     Renal insufficiency         Hyperglycemia           ED Disposition     None           To Do List           Follow-up Information     Follow up with Luisana Cartagena MD In 2 days.    Specialty:  Family Medicine    Contact information:    [address] [facility]  [address]  [facility] [address]  [phone]         These Medications        Disp Refills Start End    benzonatate (TESSALON) 100 MG capsule 12 capsule 0 3/28/2017 2017    Take 1 capsule (100 mg total) by mouth 3 (three) times daily as needed for Cough. - Oral    Pharmacy: [facility] [address] [place] [address] #: [phone]         [facility]     [facility] -  Assistance  Registered nurses in the [facility] Center provide clinical advisement, health education, appointment booking, and other advisory services.  Call for this free service at [phone].             Medications           Message regarding Medications     Verify the changes and/or additions to your medication regime listed below are the same as discussed with your clinician today.  If any of these changes or additions are incorrect, please notify your healthcare provider.        START taking these NEW medications        Refills    benzonatate (TESSALON) 100 MG capsule 0    Sig: Take 1 capsule (100 mg total) by mouth 3 (three) times daily as needed for Cough.    Class: Print    Route: Oral           Verify  "that the below list of medications is an accurate representation of the medications you are currently taking.  If none reported, the list may be blank. If incorrect, please contact your healthcare provider. Carry this list with you in case of emergency.           Current Medications     apixaban (ELIQUIS) 2.5 mg Tab TAKE 1 TABLET(2.5 MG) BY MOUTH TWICE DAILY    atorvastatin (LIPITOR) 80 MG tablet Take 1 tablet (80 mg total) by mouth every evening.    ferrous gluconate (FERGON) 325 MG Tab Take 1 tablet (325 mg total) by mouth daily with breakfast.    furosemide (LASIX) 40 MG tablet Take 1 tablet (40 mg total) by mouth once daily.    albuterol-ipratropium 2.5mg-0.5mg/3mL (DUO-NEB) 0.5 mg-3 mg(2.5 mg base)/3 mL nebulizer solution Take 3 mLs by nebulization every 6 (six) hours as needed for Wheezing or Shortness of Breath.    benzonatate (TESSALON) 100 MG capsule Take 1 capsule (100 mg total) by mouth 3 (three) times daily as needed for Cough.    fluticasone (FLONASE) 50 mcg/actuation nasal spray 1 spray by Each Nare route once daily.           Clinical Reference Information           Your Vitals Were     BP Pulse Temp Resp Height Weight    139/63 68 98.2 °F (36.8 °C) (Oral) 20 5' 0.5" (1.537 m) 101.9 kg (224 lb 10.4 oz)    Last Period SpO2 BMI          (LMP Unknown) 91% 43.15 kg/m2        Allergies as of 3/28/2017        Reactions    Codeine       Immunizations Administered on Date of Encounter - 3/28/2017     None      ED Micro, Lab, POCT     Start Ordered       Status Ordering Provider    03/28/17 1957 03/28/17 1957  B-Type natriuretic peptide (BNP)  STAT      Final result     03/28/17 1957 03/28/17 1957  Troponin I  STAT      Final result     03/28/17 1957 03/28/17 1957  Protime-INR  STAT      Final result     03/28/17 1956 03/28/17 1957  CBC auto differential  STAT      Final result     03/28/17 1956 03/28/17 1957  Comprehensive metabolic panel  STAT      Final result     03/28/17 1956 03/28/17 1957  Urinalysis - " Clean Catch  STAT      Final result     03/28/17 1956 03/28/17 1957  Influenza antigen Nasopharyngeal Swab  STAT      Final result       ED Imaging Orders     Start Ordered       Status Ordering Provider    03/28/17 1957 03/28/17 1957  X-Ray Chest PA And Lateral  1 time imaging      Final result       Discharge References/Attachments     HYPERGLYCEMIA (HIGH BLOOD SUGAR) (ENGLISH)    VIRAL SYNDROME (ADULT) (ENGLISH)    RENAL INSUFFICIENCY (ENGLISH)      Your Scheduled Appointments     Apr 11, 2017 11:15 AM CDT   Established Patient Visit with MD Jonah Carnes elsie - Otorhinolaryngology ([facility] )    [address]  [place] [address]   [phone]            May 26, 2017 11:00 AM CDT   CT CHEST W/OUT CONTRAST with [facility] CT OP LIMIT 450 LBS   [facility]    [address]  [facility] [id number] [phone]              [facility] Sign-Up     Activating your [facility] account is as easy as 1-2-3!     1) Visit [email or web address], select Sign Up Now, enter this activation code and your date of birth, then select Next.  [email or web address]  Expires: 4/17/2017  2:59 PM      2) Create a username and password to use when you visit [facility] in the future and select a security question in case you lose your password and select Next.    3) Enter your e-mail address and click Sign Up!    Additional Information  If you have questions, please e-mail [email or web address] or call [phone] to talk to our [facility] staff. Remember, [facility] is NOT to be used for urgent needs. For medical emergencies, dial 911.         Smoking Cessation     If you would like to quit smoking:   You may be eligible for free services if you are a [place] resident and started smoking cigarettes before September 1, 1988.  Call the Smoking Cessation Trust (SCT) toll free at (271) 883-1027 or (932) 403-5221.   Call 3-800-QUIT-NOW if you do not meet the above criteria.             [facility] complies with applicable Federal civil rights laws and does not discriminate on the basis of race, color, national origin, age, disability, or sex.        Language Assistance Services     ATTENTION: Language assistance services are available, free of charge. Please call [phone].      ATENCIÓN: Si habla español, tiene a velarde disposición servicios gratuitos de asistencia lingüística. Llame al [phone].     CHÚ Ý: N?u b?n nói Ti?ng Vi?t, có các d?ch v? h? tr? ngôn ng? mi?n phí dành cho b?n. G?i s? [phone].

## 2017-03-29 NOTE — ED TRIAGE NOTES
Pt reports to ED c/o generalized fatigue x 1 month; pt also reporting dry/productive cough x 5 days with brownish phlegm pt admits to taking old antibiotics that helped production of cough.  PMH of CHF, pt received blood transfusion due to anemia. Pt has colonoscopy scheduled for next month for history of blood in stool; denies any recent blood in stool. Denies chest pain, SOB, fevers, N/V/D. AAO x 3.

## 2017-03-29 NOTE — ED PROVIDER NOTES
Encounter Date: 3/28/2017    SCRIBE #1 NOTE: I, Travis García, am scribing for, and in the presence of, Dr. Ramesh.       History     Chief Complaint   Patient presents with    Cough     pt reports cough with brown phlegm that started Saturday with sore throat; pt is on home oxygen 2.5L/NC intermittently    Fatigue     pt reports symptoms started a few days ago; pt reports having to have iron and a blood transfusion last year; pt reports having the same symptoms as before     Review of patient's allergies indicates:   Allergen Reactions    Codeine      HPI Comments: Time seen by provider: 7:48 PM    This is a 66 y.o. female who presents to the ED with a chief complaint of fatigue. The patient reports that her symptoms have been have been ongoing for the past 3 days along with congestion and a productive cough. She reports a hx of HTN, a flutter/a fib, and CHF. She denies any hx of COPD or emphysema. She denies any worsening of her chronic SOB. She reports that she cannot lie flat to sleep. The patient denies any blood in her stools, black, tarry stools, chest pain, chest tightness, urinary frequency, urinary urgency, or dysuria. She states that she received 2 units of blood last year due to anemia and was started on iron. She notes that she takes daily Eliquis. The patient reports that she has been an intermittent smoker for the past 25-30 years.    The history is provided by the patient.     Past Medical History:   Diagnosis Date    Arthritis     Asthma     Atrial fibrillation     Atrial flutter     Cerumen impaction     CHF (congestive heart failure)     Encounter for blood transfusion     HEARING LOSS     Herpes genitalis     Hypertension     Renal carcinoma 3/10/2015     Past Surgical History:   Procedure Laterality Date    BRAIN SURGERY      EYE SURGERY      HYSTERECTOMY      kidney mass resection Right     renal carcinoma     Family History   Problem Relation Age of Onset    Hypertension  Mother     Thyroid disease Mother     Cancer Father     Asthma Neg Hx     Emphysema Neg Hx      Social History   Substance Use Topics    Smoking status: Current Some Day Smoker     Packs/day: 1.50     Years: 25.00     Types: Cigarettes     Last attempt to quit: 2/22/2016    Smokeless tobacco: Never Used      Comment: Still occasionally smokes a cigarette 1-2 daily    Alcohol use No     Review of Systems   Constitutional: Positive for fatigue. Negative for fever.   HENT: Positive for congestion. Negative for sore throat.    Respiratory: Positive for cough. Negative for chest tightness and shortness of breath.    Cardiovascular: Negative for chest pain.   Gastrointestinal: Negative for blood in stool (or black, tarry stools) and nausea.   Genitourinary: Negative for dysuria, frequency and urgency.   Musculoskeletal: Negative for back pain.   Skin: Negative for rash.   Neurological: Negative for weakness.   Hematological: Does not bruise/bleed easily.       Physical Exam   Initial Vitals   BP Pulse Resp Temp SpO2   03/28/17 1753 03/28/17 1753 03/28/17 1753 03/28/17 1753 03/28/17 1753   132/60 72 20 98.2 °F (36.8 °C) 89 %     Physical Exam    Nursing note and vitals reviewed.  Constitutional: She appears well-developed and well-nourished. She is not diaphoretic. No distress.   HENT:   Head: Normocephalic and atraumatic.   Right Ear: External ear normal.   Left Ear: External ear normal.   Mouth/Throat: Oropharynx is clear and moist.   Eyes: Conjunctivae and EOM are normal. Pupils are equal, round, and reactive to light. Right eye exhibits no discharge. Left eye exhibits no discharge.   Neck: Normal range of motion.   Cardiovascular: Normal rate, regular rhythm and normal heart sounds. Exam reveals no gallop and no friction rub.    No murmur heard.  Pulmonary/Chest: Breath sounds normal. No respiratory distress. She has no wheezes. She has no rhonchi. She has no rales.   Abdominal: Soft. There is no tenderness.  There is no rebound and no guarding.   Musculoskeletal: Normal range of motion. She exhibits no edema or tenderness.   Neurological: She is alert and oriented to person, place, and time. She has normal strength. No cranial nerve deficit or sensory deficit.   Skin: Skin is warm and dry. No rash and no abscess noted. No erythema. No pallor.   Psychiatric: She has a normal mood and affect. Her behavior is normal. Judgment and thought content normal.         ED Course   Procedures  Labs Reviewed   CBC W/ AUTO DIFFERENTIAL - Abnormal; Notable for the following:        Result Value    MCHC 31.9 (*)     RDW 15.3 (*)     Gran% 79.5 (*)     Lymph% 12.8 (*)     All other components within normal limits   COMPREHENSIVE METABOLIC PANEL - Abnormal; Notable for the following:     Glucose 162 (*)     Creatinine 1.6 (*)     eGFR if  38 (*)     eGFR if non  33 (*)     All other components within normal limits   URINALYSIS - Abnormal; Notable for the following:     Specific Gravity, UA <=1.005 (*)     All other components within normal limits   INFLUENZA A AND B ANTIGEN   B-TYPE NATRIURETIC PEPTIDE   TROPONIN I   PROTIME-INR   TYPE & SCREEN     EKG Readings: (Independently Interpreted)   Normal sinus rhythm. Rate of 74. No ischemic changes.        X-Rays:   Independently Interpreted Readings:   Chest X-Ray: No acute abnormalities.     Imaging Results         X-Ray Chest PA And Lateral (Final result) Result time:  03/28/17 20:35:51    Final result by Reid Diego MD (03/28/17 20:35:51)    Impression:       Cardiomegaly with left-sided pleural effusion and stable pulmonary vascular congestion.  The findings are suggestive of congestive heart failure.              Electronically signed by: REID DIEGO MD  Date:     03/28/17  Time:    20:35     Narrative:    Exam: [id number]  03/28/17  20:08:54 IMG36 ([facility]) : XR CHEST PA AND LATERAL    Technique:    Frontal and lateral chest x-ray.    Comparison:     10/9/16.    Findings:      The trachea is unremarkable.  There is stable appearance of the cardiomediastinal silhouette.  The hemidiaphragms are unremarkable.  There is no evidence of free air beneath the hemidiaphragms.  There is a small left-sided pleural effusion.  There is no evidence of a pneumothorax.    No additional airspace opacities are present.  There is pulmonary vascular congestion.  There are degenerative changes in the osseous structures.                         Scribe Attestation:   Scribe #1: I performed the above scribed service and the documentation accurately describes the services I performed. I attest to the accuracy of the note.    Attending Attestation:           Physician Attestation for Scribe:  Physician Attestation Statement for Scribe #1: I, Dr. Cotter, reviewed documentation, as scribed by Travis García in my presence, and it is both accurate and complete.         Attending ED Notes:   Emergent evaluation 66-year-old female with generalized weakness, fatigue and cough.  Patient concerned that she may have low blood count.  Patient is afebrile, nontoxic-appearing with stable vital signs except for low oxygen saturations.  Patient oxygen saturation increased to 94% on 3 L of oxygen by nasal cannula.  Patient is on home oxygen.  There is no elevation of white blood cell count.  H&H within normal limits.  Influenza screen is negative.  Creatinine is 1.6.  Glucose of 162.  Chest x-ray reveals cardiomegaly.  The patient is extensively counseled on her diagnosis and treatment including all diagnostic, laboratory and physical exam findings.  The patient discharged good condition and directed follow-up with her PCP in the next 24-48 hours.           ED Course     Clinical Impression:     1. Viral syndrome    2. Renal insufficiency    3. Hyperglycemia                Ceasar Cotter MD  03/28/17 4229

## 2017-04-03 DIAGNOSIS — E11.9 TYPE 2 DIABETES MELLITUS WITHOUT COMPLICATION: ICD-10-CM

## 2017-04-07 ENCOUNTER — TELEPHONE (OUTPATIENT)
Dept: UROLOGY | Facility: CLINIC | Age: 67
End: 2017-04-07

## 2017-04-07 NOTE — TELEPHONE ENCOUNTER
----- Message from Hien Gonzalez sent at 4/7/2017  9:01 AM CDT -----  Contact: Self/[phone]  Patient said she would like to speak with you about scheduling a test. Please advise

## 2017-04-11 ENCOUNTER — OFFICE VISIT (OUTPATIENT)
Dept: OTOLARYNGOLOGY | Facility: CLINIC | Age: 67
End: 2017-04-11
Payer: MEDICARE

## 2017-04-11 VITALS
HEART RATE: 75 BPM | BODY MASS INDEX: 42.77 KG/M2 | DIASTOLIC BLOOD PRESSURE: 62 MMHG | WEIGHT: 222.69 LBS | SYSTOLIC BLOOD PRESSURE: 115 MMHG

## 2017-04-11 DIAGNOSIS — H61.23 IMPACTED CERUMEN OF BOTH EARS: ICD-10-CM

## 2017-04-11 DIAGNOSIS — H93.8X3 EAR FULLNESS, BILATERAL: Primary | ICD-10-CM

## 2017-04-11 PROCEDURE — 99999 PR PBB SHADOW E&M-EST. PATIENT-LVL III: CPT | Mod: PBBFAC,,, | Performed by: OTOLARYNGOLOGY

## 2017-04-11 PROCEDURE — 99213 OFFICE O/P EST LOW 20 MIN: CPT | Mod: 25,S$PBB,, | Performed by: OTOLARYNGOLOGY

## 2017-04-11 PROCEDURE — 99213 OFFICE O/P EST LOW 20 MIN: CPT | Mod: PBBFAC | Performed by: OTOLARYNGOLOGY

## 2017-04-11 PROCEDURE — 69210 REMOVE IMPACTED EAR WAX UNI: CPT | Mod: PBBFAC | Performed by: OTOLARYNGOLOGY

## 2017-04-11 PROCEDURE — 69210 REMOVE IMPACTED EAR WAX UNI: CPT | Mod: S$PBB,,, | Performed by: OTOLARYNGOLOGY

## 2017-04-11 RX ORDER — FERROUS SULFATE 325(65) MG
TABLET ORAL
Refills: 6 | COMMUNITY
Start: 2017-03-30 | End: 2018-05-16 | Stop reason: SDUPTHER

## 2017-04-11 RX ORDER — FERROUS GLUCONATE 324(38)MG
TABLET ORAL
Refills: 11 | COMMUNITY
Start: 2017-02-09 | End: 2018-10-04

## 2017-04-11 RX ORDER — PROMETHAZINE HYDROCHLORIDE AND CODEINE PHOSPHATE 6.25; 1 MG/5ML; MG/5ML
SOLUTION ORAL
Refills: 1 | COMMUNITY
Start: 2017-04-04 | End: 2017-09-11

## 2017-04-11 NOTE — PROGRESS NOTES
Subjective:       Patient ID: Patsy Morris is a 66 y.o. female.    Chief Complaint: Ear Fullness    Ear Fullness    There is pain in both ears. This is a recurrent problem. The current episode started 1 to 4 weeks ago. The problem occurs constantly. The problem has been unchanged. There has been no fever. The patient is experiencing no pain. Associated symptoms include hearing loss. Pertinent negatives include no coughing, diarrhea, ear discharge, headaches, neck pain, rash, rhinorrhea or vomiting. She has tried nothing for the symptoms. Her past medical history is significant for hearing loss. There is no history of a chronic ear infection.     Review of Systems   Constitutional: Negative for activity change, appetite change and fever.   HENT: Positive for hearing loss. Negative for congestion, ear discharge, ear pain, nosebleeds, postnasal drip, rhinorrhea and sneezing.         Recurrent bilateral ear fullness similar in presentation on her last visit 1/10/2017.   Eyes: Negative for redness and visual disturbance.   Respiratory: Negative for apnea, cough, shortness of breath and wheezing.    Cardiovascular: Negative for chest pain and palpitations.   Gastrointestinal: Negative for diarrhea and vomiting.   Genitourinary: Negative for difficulty urinating and frequency.   Musculoskeletal: Negative for arthralgias, back pain, gait problem and neck pain.   Skin: Negative for color change and rash.   Neurological: Negative for dizziness, speech difficulty, weakness and headaches.   Hematological: Negative for adenopathy. Does not bruise/bleed easily.   Psychiatric/Behavioral: Negative for agitation and behavioral problems.       Objective:        Constitutional:   Vital signs are normal. She appears well-developed and well-nourished. She is active. Normal speech.      Head:  Normocephalic and atraumatic. Salivary glands normal.  Facial strength is normal.      Ears:    Right Ear: Decreased hearing is noted.    Left Ear: Decreased hearing is noted.       PROCEDURE NOTE:  Ceruminosis is noted in both EACs.  Wax was removed by manual debridement and suctioning utilizing the assistance of binocular microscopy revealing EACs and TMs WNL. The patient tolerated this procedure without difficulty. The subjective decrease noted in hearing pre-cleaning was resolved post-cleaning.        Nose:  Nose normal including turbinates, nasal mucosa, sinuses and nasal septum.     Mouth/Throat  Oropharynx clear and moist without lesions or asymmetry and normal uvula midline.     Neck:  Neck normal without thyromegaly masses, asymmetry, normal tracheal structure, crepitus, and tenderness, thyroid normal, trachea normal, phonation normal and full range of motion with neck supple.     Psychiatric:   She has a normal mood and affect. Her speech is normal and behavior is normal.     Neurological:   She has neurological normal, alert and oriented.     Skin:   No abrasions, lacerations, lesions, or rashes.       Assessment:       1. Ear fullness, bilateral    2. Impacted cerumen of both ears        Plan:       1. Hearing conservation strongly recommended.  2. Trial of amplification bilaterally also recommended.  3. Re-check of hearing in 18-24 months or sooner if subjective change noted.  4. F/U with PCP as per schedule.

## 2017-04-11 NOTE — MR AVS SNAPSHOT
[facility] - Otorhinolaryngology  [address]  [facility] [address]  Phone: [phone]  Fax: [phone]                  Patsy Morris   2017 11:15 AM   Office Visit    Description:  Female : 1950   Provider:  Stuart Qiu MD   Department:  [facility]           Reason for Visit     Ear Fullness           Diagnoses this Visit        Comments    Ear fullness, bilateral    -  Primary     Impacted cerumen of both ears                To Do List           Future Appointments        Provider Department Dept Phone    2017 11:00 AM [facility] CT OP LIMIT [address] [facility] [phone]    2017 11:15 AM MD [facility] Otorhinolaryngology [phone]      Goals (5 Years of Data)     None      Follow-Up and Disposition     Return if symptoms worsen or fail to improve.      [facility]     [facility] Nurse Care Line -  Assistance  Unless otherwise directed by your provider, please contact [facility], our nurse care line that is available for  assistance.     Registered nurses in the [facility] provide: appointment scheduling, clinical advisement, health education, and other advisory services.  Call: [phone] (toll free)               Medications           Message regarding Medications     Verify the changes and/or additions to your medication regime listed below are the same as discussed with your clinician today.  If any of these changes or additions are incorrect, please notify your healthcare provider.             Verify that the below list of medications is an accurate representation of the medications you are currently taking.  If none reported, the list may be blank. If incorrect, please contact your healthcare provider. Carry this list with you in case of emergency.           Current Medications     albuterol-ipratropium 2.5mg-0.5mg/3mL (DUO-NEB) 0.5 mg-3 mg(2.5 mg base)/3 mL  nebulizer solution Take 3 mLs by nebulization every 6 (six) hours as needed for Wheezing or Shortness of Breath.    apixaban (ELIQUIS) 2.5 mg Tab TAKE 1 TABLET(2.5 MG) BY MOUTH TWICE DAILY    atorvastatin (LIPITOR) 80 MG tablet Take 1 tablet (80 mg total) by mouth every evening.    ferrous gluconate (FERGON) 324 MG tablet TK 1 T PO QD WITH BREAKFAST    ferrous gluconate (FERGON) 325 MG Tab Take 1 tablet (325 mg total) by mouth daily with breakfast.    ferrous sulfate 325 mg (65 mg iron) Tab tablet     fluticasone (FLONASE) 50 mcg/actuation nasal spray 1 spray by Each Nare route once daily.    furosemide (LASIX) 40 MG tablet Take 1 tablet (40 mg total) by mouth once daily.    promethazine-codeine 6.25-10 mg/5 ml (PHENERGAN WITH CODEINE) 6.25-10 mg/5 mL syrup TK SS TO ONE TEA PO Q 4 HOURS PRF COUGH           Clinical Reference Information           Your Vitals Were     BP Pulse Weight Last Period BMI    115/62 (BP Location: Right arm, Patient Position: Sitting, BP Method: Automatic) 75 101 kg (222 lb 10.6 oz) (LMP Unknown) 42.77 kg/m2      Blood Pressure          Most Recent Value    BP  115/62      Allergies as of 4/11/2017     Codeine      Immunizations Administered on Date of Encounter - 4/11/2017     None      [facility] Sign-Up     Activating your [facility] account is as easy as 1-2-3!     1) Visit [email or web address], select Sign Up Now, enter this activation code and your date of birth, then select Next.  [email or web address]  Expires: 4/17/2017  2:59 PM      2) Create a username and password to use when you visit [facility] in the future and select a security question in case you lose your password and select Next.    3) Enter your e-mail address and click Sign Up!    Additional Information  If you have questions, please e-mail [email or web address] or call [phone] to talk to our [facility] staff. Remember, [facility] is NOT to be used for urgent needs. For medical emergencies, dial 911.         Smoking Cessation      If you would like to quit smoking:   You may be eligible for free services if you are a [place] resident and started smoking cigarettes before September 1, 1988.  Call the Smoking Cessation Trust (SCT) toll free at (603) 639-1727 or (395) 116-6650.   Call 1-800-QUIT-NOW if you do not meet the above criteria.   Contact us via email: [email or web address]   View our website for more information: [email or web address]        Language Assistance Services     ATTENTION: Language assistance services are available, free of charge. Please call [phone].      ATENCIÓN: Si habla español, tiene a velarde disposición servicios gratuitos de asistencia lingüística. Llame al [phone].     CHÚ Ý: N?u b?n nói Ti?ng Vi?t, có các d?ch v? h? tr? ngôn ng? mi?n phí dành cho b?n. G?i s? [phone].         [facility] complies with applicable Federal civil rights laws and does not discriminate on the basis of race, color, national origin, age, disability, or sex.

## 2017-04-27 ENCOUNTER — TELEPHONE (OUTPATIENT)
Dept: CARDIOLOGY | Facility: CLINIC | Age: 67
End: 2017-04-27

## 2017-04-27 NOTE — TELEPHONE ENCOUNTER
----- Message from Nalini Townsend sent at 4/27/2017 10:15 AM CDT -----  Contact: patient  Please call pt at [phone]. Patient is currently with [employer] and wants to switch to [employer] at [phone] because she needs to get the portable tank (light weight). Please send a new order to [employer] for oxygen care. Last seen Dr Lutz 03/03/17    Thank you

## 2017-04-28 ENCOUNTER — TELEPHONE (OUTPATIENT)
Dept: CARDIOLOGY | Facility: CLINIC | Age: 67
End: 2017-04-28

## 2017-04-28 NOTE — TELEPHONE ENCOUNTER
----- Message from Leslie Arias sent at 4/28/2017  8:52 AM CDT -----  Contact: pt  Patient said she called yesterday about switching oxygen companies and she would like another call in ref to this matter. Please call her at the number listed.    Thanks

## 2017-05-08 DIAGNOSIS — N18.9 CHRONIC KIDNEY DISEASE, UNSPECIFIED STAGE: ICD-10-CM

## 2017-05-22 ENCOUNTER — TELEPHONE (OUTPATIENT)
Dept: ENDOSCOPY | Facility: HOSPITAL | Age: 67
End: 2017-05-22

## 2017-05-22 DIAGNOSIS — Z12.11 SPECIAL SCREENING FOR MALIGNANT NEOPLASMS, COLON: Primary | ICD-10-CM

## 2017-05-22 RX ORDER — SODIUM, POTASSIUM,MAG SULFATES 17.5-3.13G
1 SOLUTION, RECONSTITUTED, ORAL ORAL ONCE
Qty: 1 BOTTLE | Refills: 0 | Status: SHIPPED | OUTPATIENT
Start: 2017-05-22 | End: 2017-05-22

## 2017-05-22 NOTE — TELEPHONE ENCOUNTER
Patient contacted to schedule colonoscopy.  Reviewed patient's medical history, allergies, and medications.   Scheduled for 6/19/17 at 8:30 am on 2nd floor with Dr Sharma with an arrival time of 7:30 am.  Went over prep instructions. Verbally stated understanding.  Instructions mailed to address in chart.  No further questions at this time.

## 2017-05-22 NOTE — TELEPHONE ENCOUNTER
Patient scheduled for a colonoscopy on 6/19/17.  Is it ok to hold Eliquis 2 days prior to procedure?  Please advise. Thank you.  Jewell

## 2017-05-22 NOTE — TELEPHONE ENCOUNTER
----- Message from Mariya Peck RN sent at 5/17/2017  8:56 AM CDT -----  Regarding: FW: Colonoscopy      ----- Message -----     From: Romina Beard RN     Sent: 5/16/2017   5:46 PM       To: [facility] Endo Schedulers  Subject: Colonoscopy                                      Good afternoon,     Ms. Morris's medical history was reviewed with Dr. Leopold (Anesthesia) and she saw the Anesthesia Resident in the Pre-Op Clinic 2-10-17.  Based on her history of:  Chronic respiratory failure with oxygen use, HDEZ, CHF, A-Fib, BHAVANI, it was determined that she should have her Colonoscopy scheduled on the 2nd Floor.                       Thanks, [facility]  Pre-Op Center  Ext. [phone]

## 2017-05-25 ENCOUNTER — TELEPHONE (OUTPATIENT)
Dept: ENDOSCOPY | Facility: HOSPITAL | Age: 67
End: 2017-05-25

## 2017-05-25 NOTE — TELEPHONE ENCOUNTER
Called patient to inform of receiving approval from Dr Lutz, to hold Eliquis 2 days prior to procedure.  Went over prep instructions again.  Stated understanding.  No further questions at this time.

## 2017-05-26 ENCOUNTER — HOSPITAL ENCOUNTER (OUTPATIENT)
Dept: RADIOLOGY | Facility: OTHER | Age: 67
Discharge: HOME | End: 2017-05-26
Attending: UROLOGY
Payer: MEDICARE

## 2017-05-26 DIAGNOSIS — R91.8 PULMONARY NODULES: ICD-10-CM

## 2017-05-26 PROCEDURE — 71250 CT THORAX DX C-: CPT | Mod: 26,,, | Performed by: RADIOLOGY

## 2017-05-26 PROCEDURE — 71250 CT THORAX DX C-: CPT | Mod: TC

## 2017-06-01 ENCOUNTER — TELEPHONE (OUTPATIENT)
Dept: OTOLARYNGOLOGY | Facility: CLINIC | Age: 67
End: 2017-06-01

## 2017-06-08 ENCOUNTER — TELEPHONE (OUTPATIENT)
Dept: UROLOGY | Facility: CLINIC | Age: 67
End: 2017-06-08

## 2017-06-08 DIAGNOSIS — E04.1 THYROID NODULE: ICD-10-CM

## 2017-06-08 NOTE — TELEPHONE ENCOUNTER
Called patient.  Questions answered.  I sent a message to both Dr. aldana and Dr. Cratagena regarding her thyroid nodule and pulmonary nodules respectively.

## 2017-06-08 NOTE — TELEPHONE ENCOUNTER
----- Message from Tamy Hernandez sent at 6/8/2017  9:34 AM CDT -----  No. [phone]   Patient has questions about the tests she needs to take.   She would like to speak to Dr. Ambriz.

## 2017-06-10 DIAGNOSIS — R91.1 PULMONARY NODULE: Primary | ICD-10-CM

## 2017-06-12 ENCOUNTER — TELEPHONE (OUTPATIENT)
Dept: CARDIOLOGY | Facility: CLINIC | Age: 67
End: 2017-06-12

## 2017-06-12 ENCOUNTER — TELEPHONE (OUTPATIENT)
Dept: FAMILY MEDICINE | Facility: CLINIC | Age: 67
End: 2017-06-12

## 2017-06-12 NOTE — TELEPHONE ENCOUNTER
----- Message from Luisana Cartagena MD sent at 6/10/2017  7:01 PM CDT -----  Please call pt to make pt an appt with pulmonary   ----- Message -----  From: Devang Ambriz MD  Sent: 6/8/2017   4:31 PM  To: Luisana Cartagena MD    Just an fyi,   I think I cc'd you on a prior communication but wanted to make sure.  Mrs Morris has pulmonary nodules.  The CT scan of the chest recommended future CT f/u.  I was hoping your office could head up this investigation as this is outside my area of expertise.  If not, perhaps you could discuss with her who you would recommend from a pulmonary standpoint for surveillance moving forward?    Thanks,  Devang Ambriz  Urology

## 2017-06-12 NOTE — TELEPHONE ENCOUNTER
----- Message from Leslie Arias sent at 2017  1:17 PM CDT -----  Contact: Michael [phone] office [phone] fax with [employer]  New order is needed for patient's oxygen the old one , he says you can call or just fax the order over.    Thanks

## 2017-06-15 ENCOUNTER — HOSPITAL ENCOUNTER (OUTPATIENT)
Dept: RADIOLOGY | Facility: HOSPITAL | Age: 67
Discharge: HOME | End: 2017-06-15
Attending: OTOLARYNGOLOGY
Payer: MEDICARE

## 2017-06-15 ENCOUNTER — OFFICE VISIT (OUTPATIENT)
Dept: OTOLARYNGOLOGY | Facility: CLINIC | Age: 67
End: 2017-06-15
Payer: MEDICARE

## 2017-06-15 VITALS
HEART RATE: 84 BPM | DIASTOLIC BLOOD PRESSURE: 73 MMHG | BODY MASS INDEX: 43.53 KG/M2 | SYSTOLIC BLOOD PRESSURE: 142 MMHG | TEMPERATURE: 98 F | WEIGHT: 226.63 LBS

## 2017-06-15 DIAGNOSIS — E04.1 THYROID NODULE: ICD-10-CM

## 2017-06-15 DIAGNOSIS — E04.1 THYROID NODULE: Primary | ICD-10-CM

## 2017-06-15 PROCEDURE — 76536 US EXAM OF HEAD AND NECK: CPT | Mod: 26,GC,, | Performed by: RADIOLOGY

## 2017-06-15 PROCEDURE — 1126F AMNT PAIN NOTED NONE PRSNT: CPT | Mod: ,,, | Performed by: OTOLARYNGOLOGY

## 2017-06-15 PROCEDURE — 1159F MED LIST DOCD IN RCRD: CPT | Mod: ,,, | Performed by: OTOLARYNGOLOGY

## 2017-06-15 PROCEDURE — 99213 OFFICE O/P EST LOW 20 MIN: CPT | Mod: PBBFAC,25 | Performed by: OTOLARYNGOLOGY

## 2017-06-15 PROCEDURE — 99999 PR PBB SHADOW E&M-EST. PATIENT-LVL III: CPT | Mod: PBBFAC,,, | Performed by: OTOLARYNGOLOGY

## 2017-06-15 PROCEDURE — 99213 OFFICE O/P EST LOW 20 MIN: CPT | Mod: S$PBB,,, | Performed by: OTOLARYNGOLOGY

## 2017-06-15 NOTE — ASSESSMENT & PLAN NOTE
R thyroid nodules, 1 meets FNA criteria.   Ms. Morris explained that she is not interested in surgery.  I explained to her that observation is a reasonable option for numerous reasons: 1) Statistically speaking, this nodule is likely benign. 2) She has an extensive list of comorbid conditions and is a poor surgical candidate. 3) If this lesion is malignant, it is likely papillary thyroid carcinoma which, in most instances, is indolent and not life-threatening.     She understands that without a biopsy, we are not certain of the nature of this lesion.  She also understands that by following a course of observation, we may be missing a malignancy.    With all of these considerations in mind, she is comfortable observing.  Will repeat US in 6 mos.

## 2017-06-15 NOTE — PROGRESS NOTES
Chief Complaint   Patient presents with    thyroid test results       HPI   67 y.o. female presents from Dr. Ambriz for evaluation of thyroid nodules.  She has a family history of benign thyroid disease.  She denies a family history of thyroid cancer.  She denies compressive symptoms.  She denies a history of radiation to the head and neck.     Review of Systems   Constitutional: Negative for fatigue and unexpected weight change.   HENT: Per HPI.  Eyes: Negative for visual disturbance.   Respiratory: Negative for shortness of breath, hemoptysis   Cardiovascular: Negative for chest pain and palpitations.   Musculoskeletal: Negative for decreased ROM, back pain.   Skin: Negative for rash, sunburn, itching.   Neurological: Negative for dizziness and seizures.   Hematological: Negative for adenopathy. Does not bruise/bleed easily.   Endocrine: Negative for rapid weight loss/weight gain, heat/cold intolerance.     Past Medical History   Patient Active Problem List   Diagnosis    Impacted cerumen of both ears    URI (upper respiratory infection)    Ear fullness    Renal insufficiency    HTN (hypertension), benign    Hypercholesterolemia    Renal carcinoma    Rhinitis, allergic    Acute kidney injury    Hypokalemia    Dyslipidemia    Tobacco abuse    Non morbid obesity due to excess calories    Chronic kidney disease (CKD) stage G4/A1, severely decreased glomerular filtration rate (GFR) between 15-29 mL/min/1.73 square meter and albuminuria creatinine ratio less than 30 mg/g    Morbid obesity    Hypoxia    Chronic hypercapnic respiratory failure    Typical atrial flutter    Chronic diastolic congestive heart failure    CRI (chronic renal insufficiency)    Acute on chronic diastolic heart failure    Chronic respiratory failure with hypoxia    Paroxysmal atrial fibrillation    Suspected sleep apnea    BHAVANI (obstructive sleep apnea)    Sleep-related hypoventilation    Gastrointestinal hemorrhage  associated with gastritis    Acute on chronic respiratory failure with hypoxia and hypercapnia    Thyroid nodule           Past Surgical History   Past Surgical History:   Procedure Laterality Date    BRAIN SURGERY      EYE SURGERY      HYSTERECTOMY      kidney mass resection Right     renal carcinoma         Family History   Family History   Problem Relation Age of Onset    Hypertension Mother     Thyroid disease Mother     Cancer Father     Asthma Neg Hx     Emphysema Neg Hx            Social History   .  Social History     Social History    Marital status: Single     Spouse name: N/A    Number of children: N/A    Years of education: N/A     Occupational History    Not on file.     Social History Main Topics    Smoking status: Current Some Day Smoker     Packs/day: 1.50     Years: 25.00     Types: Cigarettes     Last attempt to quit: 2/22/2016    Smokeless tobacco: Never Used      Comment: Still occasionally smokes a cigarette 1-2 daily    Alcohol use No    Drug use: No    Sexual activity: No     Other Topics Concern    Not on file     Social History Narrative    No narrative on file         Allergies   Review of patient's allergies indicates:   Allergen Reactions    Codeine            Physical Exam     Vitals:    06/15/17 1617   BP: (!) 142/73   Pulse: 84   Temp: 97.8 °F (36.6 °C)         Body mass index is 43.53 kg/m².      General: AOx3, NAD   Respiratory:  Symmetric chest rise, normal effort  Right Ear: External Auditory Canal WNL,TM w/o masses/lesions/perforations.  Middle ear without evidence of effusion.   Left Ear: External Auditory Canal WNL,TM w/o masses/lesions/perforations.  Middle ear without evidence of effusion.   Nose: No gross nasal septal deviation. Inferior Turbinates WNL bilaterally. No septal perforation. No masses/lesions.   Oral Cavity:  Oral Tongue mobile, no lesions noted. Hard Palate WNL. No buccal or FOM lesions.  Oropharynx:  No masses/lesions of the posterior  pharyngeal wall. Tonsillar fossa without lesions. Soft palate without masses. Midline uvula.   Neck: No scars.  No cervical lymphadenopathy, thyromegaly or thyroid nodules.  Normal range of motion.    Face: House Brackmann I bilaterally.     Assessment/Plan  Problem List Items Addressed This Visit        Endocrine    Thyroid nodule - Primary     R thyroid nodules, 1 meets FNA criteria.   Ms. Morris explained that she is not interested in surgery.  I explained to her that observation is a reasonable option for numerous reasons: 1) Statistically speaking, this nodule is likely benign. 2) She has an extensive list of comorbid conditions and is a poor surgical candidate. 3) If this lesion is malignant, it is likely papillary thyroid carcinoma which, in most instances, is indolent and not life-threatening.     She understands that without a biopsy, we are not certain of the nature of this lesion.  She also understands that by following a course of observation, we may be missing a malignancy.    With all of these considerations in mind, she is comfortable observing.  Will repeat US in 6 mos.         Relevant Orders    US Soft Tissue Head Neck Thyroid      Other Visit Diagnoses    None.

## 2017-06-15 NOTE — LETTER
Huma 15, 2017      Devang Ambriz MD  [address]  [address]  [place] [address]           [facility]  [address]  [facility] [address]  Phone: [phone]  Fax: [phone]          Patient: Patsy Morris   MR Number: [id number]   YOB: 1950   Date of Visit: 6/15/2017       Dear Dr. Devang Ambriz:    Thank you for referring Patsy Morris to me for evaluation. Attached you will find relevant portions of my assessment and plan of care.    If you have questions, please do not hesitate to call me. I look forward to following Patsy Morris along with you.    Sincerely,    Elmo Olvera MD    Enclosure  CC:  No Recipients    If you would like to receive this communication electronically, please contact [email or web address] or [phone] to request more information on [employer] Link access.    For providers and/or their staff who would like to refer a patient to [facility], please contact us through our one-stop-shop provider referral line, [facility] [place], [facility], at [phone].    If you feel you have received this communication in error or would no longer like to receive these types of communications, please e-mail [email or web address]

## 2017-06-23 ENCOUNTER — SURGERY (OUTPATIENT)
Age: 67
End: 2017-06-23

## 2017-06-23 ENCOUNTER — ANESTHESIA (OUTPATIENT)
Dept: ENDOSCOPY | Facility: HOSPITAL | Age: 67
End: 2017-06-23
Payer: MEDICARE

## 2017-06-23 PROCEDURE — 63600175 PHARM REV CODE 636 W HCPCS: Performed by: NURSE ANESTHETIST, CERTIFIED REGISTERED

## 2017-06-23 PROCEDURE — 25000003 PHARM REV CODE 250: Performed by: NURSE ANESTHETIST, CERTIFIED REGISTERED

## 2017-06-23 PROCEDURE — D9220A PRA ANESTHESIA: Mod: ANES,,, | Performed by: ANESTHESIOLOGY

## 2017-06-23 PROCEDURE — D9220A PRA ANESTHESIA: Mod: CRNA,,, | Performed by: NURSE ANESTHETIST, CERTIFIED REGISTERED

## 2017-06-23 RX ORDER — SODIUM CHLORIDE 9 MG/ML
INJECTION, SOLUTION INTRAVENOUS CONTINUOUS PRN
Status: DISCONTINUED | OUTPATIENT
Start: 2017-06-23 | End: 2017-06-23

## 2017-06-23 RX ORDER — KETAMINE HCL IN 0.9 % NACL 50 MG/5 ML
SYRINGE (ML) INTRAVENOUS
Status: DISCONTINUED | OUTPATIENT
Start: 2017-06-23 | End: 2017-06-23

## 2017-06-23 RX ORDER — GLYCOPYRROLATE 0.2 MG/ML
INJECTION INTRAMUSCULAR; INTRAVENOUS
Status: DISCONTINUED | OUTPATIENT
Start: 2017-06-23 | End: 2017-06-23

## 2017-06-23 RX ORDER — PROPOFOL 10 MG/ML
VIAL (ML) INTRAVENOUS CONTINUOUS PRN
Status: DISCONTINUED | OUTPATIENT
Start: 2017-06-23 | End: 2017-06-23

## 2017-06-23 RX ORDER — MIDAZOLAM HYDROCHLORIDE 1 MG/ML
INJECTION, SOLUTION INTRAMUSCULAR; INTRAVENOUS
Status: DISCONTINUED | OUTPATIENT
Start: 2017-06-23 | End: 2017-06-23

## 2017-06-23 RX ORDER — LIDOCAINE HCL/PF 100 MG/5ML
SYRINGE (ML) INTRAVENOUS
Status: DISCONTINUED | OUTPATIENT
Start: 2017-06-23 | End: 2017-06-23

## 2017-06-23 RX ADMIN — GLYCOPYRROLATE 100 MCG: 0.2 INJECTION, SOLUTION INTRAMUSCULAR; INTRAVENOUS at 08:06

## 2017-06-23 RX ADMIN — MIDAZOLAM HYDROCHLORIDE 0.5 MG: 1 INJECTION, SOLUTION INTRAMUSCULAR; INTRAVENOUS at 08:06

## 2017-06-23 RX ADMIN — Medication 10 MG: at 08:06

## 2017-06-23 RX ADMIN — LIDOCAINE HYDROCHLORIDE 50 MG: 20 INJECTION, SOLUTION INTRAVENOUS at 08:06

## 2017-06-23 RX ADMIN — SODIUM CHLORIDE: 0.9 INJECTION, SOLUTION INTRAVENOUS at 08:06

## 2017-06-23 RX ADMIN — PROPOFOL 100 MCG/KG/MIN: 10 INJECTION, EMULSION INTRAVENOUS at 08:06

## 2017-06-23 NOTE — ANESTHESIA POSTPROCEDURE EVALUATION
"Anesthesia Post Evaluation    Patient: Patsy Morris    Procedure(s) Performed: Procedure(s) (LRB):  COLONOSCOPY (N/A)    Final Anesthesia Type: general  Patient location during evaluation: PACU  Patient participation: Yes- Able to Participate  Level of consciousness: awake and alert and oriented  Post-procedure vital signs: reviewed and stable  Pain management: adequate  Airway patency: patent  PONV status at discharge: No PONV  Anesthetic complications: no      Cardiovascular status: stable  Respiratory status: unassisted, spontaneous ventilation and room air  Hydration status: euvolemic  Follow-up not needed.        Visit Vitals  /67   Pulse 95   Temp 36.5 °C (97.7 °F) (Skin)   Resp 18   Ht 5' 3" (1.6 m)   Wt 105.2 kg (232 lb)   LMP  (LMP Unknown)   SpO2 97%   Breastfeeding? No   BMI 41.10 kg/m²       Pain/Gopal Score: Pain Assessment Performed: Yes (6/23/2017  9:20 AM)  Presence of Pain: denies (6/23/2017  9:20 AM)  Gopal Score: 10 (6/23/2017  9:20 AM)      "

## 2017-06-23 NOTE — ANESTHESIA RELEASE NOTE
"Anesthesia Release from PACU Note    Patient: Patsy Morris    Procedure(s) Performed: Procedure(s) (LRB):  COLONOSCOPY (N/A)    Anesthesia type: GEN    Post pain: Adequate analgesia reported    Post assessment: no apparent anesthetic complications, tolerated procedure well and no evidence of recall    Post vital signs: /67   Pulse 95   Temp 36.5 °C (97.7 °F) (Skin)   Resp 18   Ht 5' 3" (1.6 m)   Wt 105.2 kg (232 lb)   LMP  (LMP Unknown)   SpO2 97%   Breastfeeding? No   BMI 41.10 kg/m²     Level of consciousness: awake, alert and oriented    Nausea/Vomiting: no nausea/no vomiting    Complications: none    Airway Patency: patent    Respiratory: unassisted, spontaneous ventilation, room air    Cardiovascular: stable and blood pressure at baseline    Hydration: euvolemic    "

## 2017-06-23 NOTE — TRANSFER OF CARE
"Anesthesia Transfer of Care Note    Patient: Patsy Morris    Procedure(s) Performed: Procedure(s) (LRB):  COLONOSCOPY (N/A)    Patient location: [facility]    Anesthesia Type: general    Transport from OR: Transported from OR on 6-10 L/min O2 by face mask with adequate spontaneous ventilation    Post pain: adequate analgesia    Post assessment: no apparent anesthetic complications    Post vital signs: stable    Level of consciousness: awake, alert and oriented    Nausea/Vomiting: no nausea/vomiting    Complications: none    Transfer of care protocol was followed      Last vitals:   Visit Vitals  /72 (BP Location: Left arm)   Pulse 72   Temp 37.1 °C (98.8 °F) (Oral)   Resp 16   Ht 5' 3" (1.6 m)   Wt 105.2 kg (232 lb)   LMP  (LMP Unknown)   SpO2 (!) 89%   Breastfeeding? No   BMI 41.10 kg/m²     "

## 2017-06-26 ENCOUNTER — TELEPHONE (OUTPATIENT)
Dept: OTOLARYNGOLOGY | Facility: CLINIC | Age: 67
End: 2017-06-26

## 2017-06-26 NOTE — TELEPHONE ENCOUNTER
----- Message from Lorna Bravo sent at 6/26/2017 10:39 AM CDT -----  Contact: patient herself  Please call above patient needs a refill on medication thanks

## 2017-06-29 ENCOUNTER — TELEPHONE (OUTPATIENT)
Dept: CARDIOLOGY | Facility: CLINIC | Age: 67
End: 2017-06-29

## 2017-06-29 NOTE — TELEPHONE ENCOUNTER
----- Message from Leslie Arias sent at 6/29/2017 12:46 PM CDT -----  Contact: pt  Pt would like a call from the nurse. She says she's been trying to switch oxygen companies, but company states they have not heard back from Dr. Lutz.    Thanks

## 2017-06-30 ENCOUNTER — TELEPHONE (OUTPATIENT)
Dept: OTOLARYNGOLOGY | Facility: CLINIC | Age: 67
End: 2017-06-30

## 2017-06-30 NOTE — TELEPHONE ENCOUNTER
----- Message from Irene Wen sent at 6/30/2017 11:05 AM CDT -----  Contact: Self  X 1st Request  _  2nd Request  _  3rd Request    Please refill the medication(s) listed below. Please call the patient when the prescription(s) is ready for  at the phone number (_[phone])([phone]) .    Medication #1 Mometasone      Preferred Pharmacy: [facility] at [phone] and fax [phone]

## 2017-07-05 ENCOUNTER — TELEPHONE (OUTPATIENT)
Dept: GASTROENTEROLOGY | Facility: CLINIC | Age: 67
End: 2017-07-05

## 2017-07-05 NOTE — TELEPHONE ENCOUNTER
----- Message from Nicola Huerta MD sent at 7/5/2017 10:35 AM CDT -----  Please inform patient.  Colon polyps were benign.  Keep previously scheduled appointment.    Thanks ANDREEA

## 2017-07-06 ENCOUNTER — TELEPHONE (OUTPATIENT)
Dept: GASTROENTEROLOGY | Facility: CLINIC | Age: 67
End: 2017-07-06

## 2017-07-06 NOTE — TELEPHONE ENCOUNTER
----- Message from Polina Garcia MA sent at 7/6/2017  8:15 AM CDT -----  Contact: [phone]  Ray  Returning a call re: her colonoscopy results    [phone]

## 2017-07-08 RX ORDER — MOMETASONE FUROATE 1 MG/G
CREAM TOPICAL DAILY
Qty: 45 G | Refills: 0 | Status: ON HOLD | OUTPATIENT
Start: 2017-07-08 | End: 2020-01-01 | Stop reason: HOSPADM

## 2017-07-31 ENCOUNTER — HOSPITAL ENCOUNTER (OUTPATIENT)
Dept: PULMONOLOGY | Facility: CLINIC | Age: 67
Discharge: HOME | End: 2017-07-31
Payer: MEDICARE

## 2017-07-31 ENCOUNTER — OFFICE VISIT (OUTPATIENT)
Dept: PULMONOLOGY | Facility: CLINIC | Age: 67
End: 2017-07-31
Payer: MEDICARE

## 2017-07-31 VITALS
OXYGEN SATURATION: 89 % | HEART RATE: 75 BPM | DIASTOLIC BLOOD PRESSURE: 60 MMHG | BODY MASS INDEX: 40.4 KG/M2 | HEIGHT: 63 IN | RESPIRATION RATE: 16 BRPM | WEIGHT: 228 LBS | SYSTOLIC BLOOD PRESSURE: 128 MMHG

## 2017-07-31 DIAGNOSIS — E66.01 MORBID OBESITY DUE TO EXCESS CALORIES: ICD-10-CM

## 2017-07-31 DIAGNOSIS — J96.12 CHRONIC HYPERCAPNIC RESPIRATORY FAILURE: ICD-10-CM

## 2017-07-31 DIAGNOSIS — Z72.0 TOBACCO ABUSE: ICD-10-CM

## 2017-07-31 DIAGNOSIS — J96.12 CHRONIC HYPERCAPNIC RESPIRATORY FAILURE: Primary | ICD-10-CM

## 2017-07-31 DIAGNOSIS — G47.33 OSA (OBSTRUCTIVE SLEEP APNEA): ICD-10-CM

## 2017-07-31 DIAGNOSIS — I50.32 CHRONIC DIASTOLIC CONGESTIVE HEART FAILURE: ICD-10-CM

## 2017-07-31 PROCEDURE — 1159F MED LIST DOCD IN RCRD: CPT | Mod: ,,, | Performed by: INTERNAL MEDICINE

## 2017-07-31 PROCEDURE — 82803 BLOOD GASES ANY COMBINATION: CPT | Mod: PBBFAC | Performed by: INTERNAL MEDICINE

## 2017-07-31 PROCEDURE — 1126F AMNT PAIN NOTED NONE PRSNT: CPT | Mod: ,,, | Performed by: INTERNAL MEDICINE

## 2017-07-31 PROCEDURE — 36415 COLL VENOUS BLD VENIPUNCTURE: CPT | Mod: PBBFAC | Performed by: INTERNAL MEDICINE

## 2017-07-31 PROCEDURE — 99999 PR PBB SHADOW E&M-EST. PATIENT-LVL III: CPT | Mod: PBBFAC,,, | Performed by: INTERNAL MEDICINE

## 2017-07-31 PROCEDURE — 99214 OFFICE O/P EST MOD 30 MIN: CPT | Mod: 25,S$PBB,, | Performed by: INTERNAL MEDICINE

## 2017-08-01 NOTE — PROGRESS NOTES
Subjective:       Patient ID: Patsy Morris is a 67 y.o. female.    Chief Complaint: Shortness of Breath    HPI PRESENT ILLNESS:  Mrs. Morris is a 67-year-old smoker who comes for an interval   assessment of shortness of breath.  Her most recent previous visit here was in   October.  She has had a difficult time during the past several months due to the   deaths of her brother and also her mother.  She has had difficulty recovering   from these losses.    Mrs. Morris is not having any acute respiratory symptoms.  She remains limited in   her daily activities due to exertional dyspnea.  She previously used DuoNeb   aerosol treatments, but discontinued these because they did not seem to provide   any beneficial effect.  She has the history of obstructive sleep apnea   and was previously prescribed CPAP.  She was unable to acclimate to the use of   this device.  She also has home oxygen, but remains somewhat reluctant to use   this continuously.    Mrs. Morris followed in the [facility] for management of hypertension and   diastolic heart failure.  She has not made any recent changes in medical   therapy for these conditions.  Unfortunately, the recent deaths of family   members prompted her to resume smoking.    DATA:  I have reviewed the images from a chest x-ray done in March and a CT scan   of the chest done in May.  The cardiac silhouette is enlarged.  There are mild   streaks in the left lung base.  Also noted in the CT images are multiple   subcentimeter nodules scattered within both lungs.  None of these are greater   than 5 mm in size.      Capillary blood gas done today shows pH 7.35, pCO2 56, base excess +3, and   bicarb 30.  These values are very similar to the values seen in her capillary   blood gas from January.  Today's sample does show the carboxyhemoglobin is 6.6%,   which reflects her recent smoking.      CB/IN  dd: 07/31/2017 20:26:55 (CDT)  td: 08/01/2017 00:33:40 (CDT)  Doc ID    #[id number]  Job ID #[id number]    CC:       Review of Systems   Constitutional: Positive for fatigue.   Respiratory: Positive for cough, shortness of breath and dyspnea on extertion.    Psychiatric/Behavioral: Positive for sleep disturbance.       Objective:      Physical Exam   Constitutional: She is oriented to person, place, and time. She appears well-developed and well-nourished. She is obese.   HENT:   Head: Normocephalic.   Nose: Nose normal.   Mouth/Throat: No oropharyngeal exudate.   Neck: Normal range of motion. No JVD present. No tracheal deviation present. No thyromegaly present.   Cardiovascular: Normal rate, regular rhythm and normal heart sounds.    No murmur heard.  Pulmonary/Chest: Symmetric chest wall expansion. No stridor. She has decreased breath sounds. She has no wheezes. She has no rhonchi. She has no rales. She exhibits no tenderness.   Abdominal: Soft. There is no tenderness.   Musculoskeletal: She exhibits edema (mild LE edema).   Lymphadenopathy:     She has no cervical adenopathy.   Neurological: She is alert and oriented to person, place, and time.   Skin: Skin is warm and dry. No rash noted. No erythema. Nails show no clubbing.   Psychiatric: She has a normal mood and affect.   Vitals reviewed.    Personal Diagnostic Review    No flowsheet data found.      Assessment:       1. Chronic hypercapnic respiratory failure    2. Morbid obesity due to excess calories    3. Tobacco abuse    4. BHAVANI (obstructive sleep apnea)    5. Chronic diastolic congestive heart failure        Outpatient Encounter Prescriptions as of 7/31/2017   Medication Sig Dispense Refill    albuterol-ipratropium 2.5mg-0.5mg/3mL (DUO-NEB) 0.5 mg-3 mg(2.5 mg base)/3 mL nebulizer solution Take 3 mLs by nebulization every 6 (six) hours as needed for Wheezing or Shortness of Breath. 3 mL 2    apixaban (ELIQUIS) 2.5 mg Tab TAKE 1 TABLET(2.5 MG) BY MOUTH TWICE DAILY 60 tablet 6    atorvastatin (LIPITOR) 80 MG tablet Take 1 tablet  (80 mg total) by mouth every evening. 30 tablet 6    ferrous gluconate (FERGON) 324 MG tablet TK 1 T PO QD WITH BREAKFAST  11    ferrous gluconate (FERGON) 325 MG Tab Take 1 tablet (325 mg total) by mouth daily with breakfast. 30 tablet 6    ferrous sulfate 325 mg (65 mg iron) Tab tablet   6    fluticasone (FLONASE) 50 mcg/actuation nasal spray 1 spray by Each Nare route once daily. 1 Bottle 11    furosemide (LASIX) 40 MG tablet Take 1 tablet (40 mg total) by mouth once daily. 30 tablet 6    mometasone 0.1% (ELOCON) 0.1 % cream Apply topically once daily. 45 g 0    promethazine-codeine 6.25-10 mg/5 ml (PHENERGAN WITH CODEINE) 6.25-10 mg/5 mL syrup TK SS TO ONE TEA PO Q 4 HOURS PRF COUGH  1     No facility-administered encounter medications on file as of 7/31/2017.      Orders Placed This Encounter   Procedures    CAPILLARY BLOOD GAS - PULM     Standing Status:   Future     Number of Occurrences:   1     Standing Expiration Date:   7/31/2018     Plan:     Urged to make regular use of O2 (Goal >18 hrs/24).  Patient needs smaller portable O2 system for use outside home.  Urged to not smoke.  Return visit 6 months.

## 2017-08-01 NOTE — PATIENT INSTRUCTIONS
Urged to make regular use of O2 (Goal >18 hrs/24).  Patient needs smaller portable O2 system for use outside home.  Urged to not smoke.  Return visit 6 months.

## 2017-08-11 DIAGNOSIS — R06.02 SHORTNESS OF BREATH: Primary | ICD-10-CM

## 2017-08-28 ENCOUNTER — TELEPHONE (OUTPATIENT)
Dept: OTOLARYNGOLOGY | Facility: CLINIC | Age: 67
End: 2017-08-28

## 2017-08-28 NOTE — TELEPHONE ENCOUNTER
----- Message from Pancho Mariee sent at 8/28/2017 12:09 PM CDT -----  Contact: Pt  Pt requesting to speak to staff, please follow up at soonest convenience

## 2017-08-31 ENCOUNTER — LAB VISIT (OUTPATIENT)
Dept: LAB | Facility: HOSPITAL | Age: 67
End: 2017-08-31
Attending: INTERNAL MEDICINE
Payer: MEDICARE

## 2017-08-31 DIAGNOSIS — I10 HTN (HYPERTENSION), BENIGN: ICD-10-CM

## 2017-08-31 DIAGNOSIS — E78.00 PURE HYPERCHOLESTEROLEMIA: ICD-10-CM

## 2017-08-31 DIAGNOSIS — Z79.01 LONG TERM CURRENT USE OF ANTICOAGULANT: ICD-10-CM

## 2017-08-31 LAB
ALBUMIN SERPL BCP-MCNC: 3.5 G/DL
ALP SERPL-CCNC: 57 U/L
ALT SERPL W/O P-5'-P-CCNC: 11 U/L
ANION GAP SERPL CALC-SCNC: 6 MMOL/L
AST SERPL-CCNC: 13 U/L
BASOPHILS # BLD AUTO: 0.01 K/UL
BASOPHILS NFR BLD: 0.1 %
BILIRUB SERPL-MCNC: 0.4 MG/DL
BUN SERPL-MCNC: 17 MG/DL
CALCIUM SERPL-MCNC: 9.5 MG/DL
CHLORIDE SERPL-SCNC: 100 MMOL/L
CHOLEST SERPL-MCNC: 132 MG/DL
CHOLEST/HDLC SERPL: 3.6 {RATIO}
CO2 SERPL-SCNC: 34 MMOL/L
CREAT SERPL-MCNC: 1.7 MG/DL
DIFFERENTIAL METHOD: ABNORMAL
EOSINOPHIL # BLD AUTO: 0.1 K/UL
EOSINOPHIL NFR BLD: 1.1 %
ERYTHROCYTE [DISTWIDTH] IN BLOOD BY AUTOMATED COUNT: 15.4 %
EST. GFR  (AFRICAN AMERICAN): 35 ML/MIN/1.73 M^2
EST. GFR  (NON AFRICAN AMERICAN): 30.3 ML/MIN/1.73 M^2
GLUCOSE SERPL-MCNC: 144 MG/DL
HCT VFR BLD AUTO: 38.6 %
HDLC SERPL-MCNC: 37 MG/DL
HDLC SERPL: 28 %
HGB BLD-MCNC: 12.7 G/DL
LDLC SERPL CALC-MCNC: 73.8 MG/DL
LYMPHOCYTES # BLD AUTO: 1.3 K/UL
LYMPHOCYTES NFR BLD: 16.3 %
MCH RBC QN AUTO: 28 PG
MCHC RBC AUTO-ENTMCNC: 32.9 G/DL
MCV RBC AUTO: 85 FL
MONOCYTES # BLD AUTO: 0.7 K/UL
MONOCYTES NFR BLD: 8.1 %
NEUTROPHILS # BLD AUTO: 6 K/UL
NEUTROPHILS NFR BLD: 74.2 %
NONHDLC SERPL-MCNC: 95 MG/DL
PLATELET # BLD AUTO: 189 K/UL
PMV BLD AUTO: 10.6 FL
POTASSIUM SERPL-SCNC: 4.8 MMOL/L
PROT SERPL-MCNC: 7.2 G/DL
RBC # BLD AUTO: 4.54 M/UL
SODIUM SERPL-SCNC: 140 MMOL/L
TRIGL SERPL-MCNC: 106 MG/DL
WBC # BLD AUTO: 8.04 K/UL

## 2017-08-31 PROCEDURE — 85025 COMPLETE CBC W/AUTO DIFF WBC: CPT

## 2017-08-31 PROCEDURE — 80053 COMPREHEN METABOLIC PANEL: CPT

## 2017-08-31 PROCEDURE — 36415 COLL VENOUS BLD VENIPUNCTURE: CPT | Mod: PO

## 2017-08-31 PROCEDURE — 80061 LIPID PANEL: CPT

## 2017-09-09 DIAGNOSIS — I50.31 ACUTE DIASTOLIC CONGESTIVE HEART FAILURE: ICD-10-CM

## 2017-09-09 DIAGNOSIS — I48.3 TYPICAL ATRIAL FLUTTER: ICD-10-CM

## 2017-09-09 DIAGNOSIS — E78.5 DYSLIPIDEMIA: ICD-10-CM

## 2017-09-11 ENCOUNTER — OFFICE VISIT (OUTPATIENT)
Dept: CARDIOLOGY | Facility: CLINIC | Age: 67
End: 2017-09-11
Payer: MEDICARE

## 2017-09-11 VITALS
BODY MASS INDEX: 39.33 KG/M2 | WEIGHT: 230.38 LBS | HEIGHT: 64 IN | HEART RATE: 75 BPM | SYSTOLIC BLOOD PRESSURE: 128 MMHG | DIASTOLIC BLOOD PRESSURE: 60 MMHG

## 2017-09-11 DIAGNOSIS — N28.9 RENAL INSUFFICIENCY: ICD-10-CM

## 2017-09-11 DIAGNOSIS — M10.9 GOUT OF RIGHT FOOT, UNSPECIFIED CAUSE, UNSPECIFIED CHRONICITY: Primary | ICD-10-CM

## 2017-09-11 DIAGNOSIS — I48.0 PAROXYSMAL ATRIAL FIBRILLATION: ICD-10-CM

## 2017-09-11 DIAGNOSIS — G47.33 OSA (OBSTRUCTIVE SLEEP APNEA): ICD-10-CM

## 2017-09-11 DIAGNOSIS — E78.5 DYSLIPIDEMIA: ICD-10-CM

## 2017-09-11 DIAGNOSIS — E78.00 HYPERCHOLESTEROLEMIA: ICD-10-CM

## 2017-09-11 DIAGNOSIS — I10 HTN (HYPERTENSION), BENIGN: ICD-10-CM

## 2017-09-11 DIAGNOSIS — Z79.01 LONG TERM CURRENT USE OF ANTICOAGULANT: ICD-10-CM

## 2017-09-11 DIAGNOSIS — I50.32 CHRONIC DIASTOLIC CONGESTIVE HEART FAILURE: ICD-10-CM

## 2017-09-11 PROCEDURE — 99999 PR PBB SHADOW E&M-EST. PATIENT-LVL III: CPT | Mod: PBBFAC,,, | Performed by: INTERNAL MEDICINE

## 2017-09-11 PROCEDURE — 99213 OFFICE O/P EST LOW 20 MIN: CPT | Mod: PBBFAC | Performed by: INTERNAL MEDICINE

## 2017-09-11 PROCEDURE — 3074F SYST BP LT 130 MM HG: CPT | Mod: ,,, | Performed by: INTERNAL MEDICINE

## 2017-09-11 PROCEDURE — 1126F AMNT PAIN NOTED NONE PRSNT: CPT | Mod: ,,, | Performed by: INTERNAL MEDICINE

## 2017-09-11 PROCEDURE — 99214 OFFICE O/P EST MOD 30 MIN: CPT | Mod: S$PBB,,, | Performed by: INTERNAL MEDICINE

## 2017-09-11 PROCEDURE — 1159F MED LIST DOCD IN RCRD: CPT | Mod: ,,, | Performed by: INTERNAL MEDICINE

## 2017-09-11 PROCEDURE — 3078F DIAST BP <80 MM HG: CPT | Mod: ,,, | Performed by: INTERNAL MEDICINE

## 2017-09-11 RX ORDER — ATORVASTATIN CALCIUM 80 MG/1
80 TABLET, FILM COATED ORAL NIGHTLY
Qty: 30 TABLET | Refills: 6 | Status: SHIPPED | OUTPATIENT
Start: 2017-09-11 | End: 2018-05-15 | Stop reason: SDUPTHER

## 2017-09-11 RX ORDER — FERROUS SULFATE 325(65) MG
325 TABLET ORAL
Qty: 30 TABLET | Refills: 6 | Status: SHIPPED | OUTPATIENT
Start: 2017-09-11 | End: 2018-03-23

## 2017-09-11 RX ORDER — APIXABAN 2.5 MG/1
TABLET, FILM COATED ORAL
Qty: 60 TABLET | Refills: 6 | Status: SHIPPED | OUTPATIENT
Start: 2017-09-11 | End: 2018-05-15 | Stop reason: SDUPTHER

## 2017-09-11 RX ORDER — METHYLPREDNISOLONE 4 MG/1
TABLET ORAL
Qty: 1 PACKAGE | Refills: 1 | Status: SHIPPED | OUTPATIENT
Start: 2017-09-11 | End: 2017-10-02

## 2017-09-11 RX ORDER — FUROSEMIDE 40 MG/1
40 TABLET ORAL DAILY
Qty: 30 TABLET | Refills: 6 | Status: SHIPPED | OUTPATIENT
Start: 2017-09-11 | End: 2018-05-15 | Stop reason: SDUPTHER

## 2017-09-11 NOTE — PROGRESS NOTES
Subjective:    Patient ID:  Patsy Morris is a 67 y.o. female who presents for follow-up of painful right foot    HPI      67 year old female followed with HFpEF, paroxsymal AFl, CRI and post nephrectomy of renal cell ca. She present s with 2-3 days of painful swelling later aspect right foot. She has no increase in SOB or HDEZ. No history of gout. She recently was eating liver with smothered onions and grits.       Lab Results   Component Value Date     08/31/2017    K 4.8 08/31/2017     08/31/2017    CO2 34 (H) 08/31/2017    BUN 17 08/31/2017    CREATININE 1.7 (H) 08/31/2017     (H) 08/31/2017    HGBA1C 7.0 (H) 09/20/2016    MG 2.2 03/02/2016    AST 13 08/31/2017    ALT 11 08/31/2017    ALBUMIN 3.5 08/31/2017    PROT 7.2 08/31/2017    BILITOT 0.4 08/31/2017    WBC 8.04 08/31/2017    HGB 12.7 08/31/2017    HCT 38.6 08/31/2017    MCV 85 08/31/2017     08/31/2017    INR 0.9 03/28/2017    TSH 0.631 02/25/2016         Lab Results   Component Value Date    CHOL 132 08/31/2017    HDL 37 (L) 08/31/2017    TRIG 106 08/31/2017       Lab Results   Component Value Date    LDLCALC 73.8 08/31/2017       Past Medical History:   Diagnosis Date    Arthritis     Asthma     Atrial fibrillation     Atrial flutter     Cerumen impaction     CHF (congestive heart failure)     Encounter for blood transfusion     HEARING LOSS     Herpes genitalis     Hypertension     Renal carcinoma 3/10/2015    Thyroid nodule 6/8/2017       Current Outpatient Prescriptions:     atorvastatin (LIPITOR) 80 MG tablet, TAKE 1 TABLET (80 MG TOTAL) BY MOUTH EVERY EVENING., Disp: 30 tablet, Rfl: 6    ELIQUIS 2.5 mg Tab, TAKE 1 TABLET(2.5 MG) BY MOUTH TWICE DAILY, Disp: 60 tablet, Rfl: 6    ferrous gluconate (FERGON) 324 MG tablet, TK 1 T PO QD WITH BREAKFAST, Disp: , Rfl: 11    ferrous sulfate 325 mg (65 mg iron) Tab tablet, , Disp: , Rfl: 6    ferrous sulfate 325 mg (65 mg iron) Tab tablet, TAKE 1 TABLET (325 MG  "TOTAL) BY MOUTH DAILY WITH BREAKFAST., Disp: 30 tablet, Rfl: 6    fluticasone (FLONASE) 50 mcg/actuation nasal spray, 1 spray by Each Nare route once daily., Disp: 1 Bottle, Rfl: 11    furosemide (LASIX) 40 MG tablet, TAKE 1 TABLET (40 MG TOTAL) BY MOUTH ONCE DAILY., Disp: 30 tablet, Rfl: 6    mometasone 0.1% (ELOCON) 0.1 % cream, Apply topically once daily., Disp: 45 g, Rfl: 0    albuterol-ipratropium 2.5mg-0.5mg/3mL (DUO-NEB) 0.5 mg-3 mg(2.5 mg base)/3 mL nebulizer solution, Take 3 mLs by nebulization every 6 (six) hours as needed for Wheezing or Shortness of Breath., Disp: 3 mL, Rfl: 2    methylPREDNISolone (MEDROL DOSEPACK) 4 mg tablet, use as directed, Disp: 1 Package, Rfl: 1        Review of Systems   Musculoskeletal: Positive for gout.        Objective:/60 (BP Location: Left arm, Patient Position: Sitting, BP Method: Medium (Automatic))   Pulse 75   Ht 5' 3.5" (1.613 m)   Wt 104.5 kg (230 lb 6.1 oz)   LMP  (LMP Unknown)   BMI 40.17 kg/m²           Physical Exam   Constitutional:   Morbid obese   Pulmonary/Chest:           Skin:              Assessment:       1. Gout of right foot, unspecified cause, unspecified chronicity    2. BHAVANI (obstructive sleep apnea)    3. HTN (hypertension), benign    4. Hypercholesterolemia    5. Dyslipidemia    6. Chronic diastolic congestive heart failure    7. Paroxysmal atrial fibrillation    8. Renal insufficiency    9. Long term current use of anticoagulant         Plan:       Patsy was seen today for edema.    Diagnoses and all orders for this visit:    Gout of right foot, unspecified cause, unspecified chronicity  -     Uric acid; Future; Expected date: 09/11/2017    BHAVANI (obstructive sleep apnea)    HTN (hypertension), benign  -     Comprehensive metabolic panel; Future; Expected date: 03/13/2018    Hypercholesterolemia    Dyslipidemia  -     Lipid panel; Future; Expected date: 03/13/2018    Chronic diastolic congestive heart failure    Paroxysmal atrial " fibrillation    Renal insufficiency  -     Uric acid; Future; Expected date: 09/11/2017  -     CBC auto differential; Future; Expected date: 03/13/2018  -     Comprehensive metabolic panel; Future; Expected date: 03/13/2018    Long term current use of anticoagulant  -     CBC auto differential; Future; Expected date: 03/13/2018    Other orders  -     methylPREDNISolone (MEDROL DOSEPACK) 4 mg tablet; use as directed

## 2017-09-13 ENCOUNTER — TELEPHONE (OUTPATIENT)
Dept: CARDIOLOGY | Facility: CLINIC | Age: 67
End: 2017-09-13

## 2017-09-13 DIAGNOSIS — M10.371 ACUTE GOUT DUE TO RENAL IMPAIRMENT INVOLVING RIGHT FOOT: Primary | ICD-10-CM

## 2017-09-28 ENCOUNTER — TELEPHONE (OUTPATIENT)
Dept: FAMILY MEDICINE | Facility: CLINIC | Age: 67
End: 2017-09-28

## 2017-09-28 DIAGNOSIS — Z12.31 ENCOUNTER FOR SCREENING MAMMOGRAM FOR BREAST CANCER: Primary | ICD-10-CM

## 2017-09-28 NOTE — TELEPHONE ENCOUNTER
----- Message from Cary Sparks sent at 9/28/2017  3:25 PM CDT -----  Contact: pt  X_  1st Request  _  2nd Request  _  3rd Request    Who:SARAH DIOP [[id number]]    Why: Patient states she is returning a call     What Number to Call Back: [phone]    When to Expect a call back: (Before the end of the day)   -- if call after 3:00 call back will be tomorrow.

## 2017-09-28 NOTE — TELEPHONE ENCOUNTER
----- Message from Leonard Mayen sent at 9/28/2017  9:34 AM CDT -----  Contact: Patsy Morris  X_  1st Request  _  2nd Request  _  3rd Request        Who: Patsy Morris    Why: Patient needs an order for a mammogram. Patient received letter stating she is due. Patient would like to have it done at [facility], [place], [facility].      Please return the call at earliest convenience. Thanks!    What Number to Call Back: [phone]    When to Expect a call back: (Within 24 hours)

## 2017-10-11 ENCOUNTER — HOSPITAL ENCOUNTER (OUTPATIENT)
Dept: RADIOLOGY | Facility: OTHER | Age: 67
Discharge: HOME | End: 2017-10-11
Attending: FAMILY MEDICINE
Payer: MEDICARE

## 2017-10-11 DIAGNOSIS — Z12.31 ENCOUNTER FOR SCREENING MAMMOGRAM FOR BREAST CANCER: ICD-10-CM

## 2017-10-11 PROCEDURE — 77063 BREAST TOMOSYNTHESIS BI: CPT | Mod: 26,,, | Performed by: RADIOLOGY

## 2017-10-11 PROCEDURE — 77067 SCR MAMMO BI INCL CAD: CPT | Mod: 26,,, | Performed by: RADIOLOGY

## 2017-10-11 PROCEDURE — 77067 SCR MAMMO BI INCL CAD: CPT | Mod: TC

## 2017-12-21 ENCOUNTER — TELEPHONE (OUTPATIENT)
Dept: OTOLARYNGOLOGY | Facility: CLINIC | Age: 67
End: 2017-12-21

## 2017-12-21 NOTE — TELEPHONE ENCOUNTER
----- Message from Lorna Bravo sent at 12/20/2017  1:38 PM CST -----  Contact: patient  Please call above patient need to speak with stef about a friend of hers I told her she would call on tomorrow is out of the office today said ok

## 2018-01-04 ENCOUNTER — TELEPHONE (OUTPATIENT)
Dept: OTOLARYNGOLOGY | Facility: CLINIC | Age: 68
End: 2018-01-04

## 2018-01-04 NOTE — TELEPHONE ENCOUNTER
----- Message from Lorna Bravo sent at 1/4/2018  2:09 PM CST -----  Contact: patient  Please call above patient need to speak with you about her appointments

## 2018-01-09 ENCOUNTER — OFFICE VISIT (OUTPATIENT)
Dept: OTOLARYNGOLOGY | Facility: CLINIC | Age: 68
End: 2018-01-09
Payer: MEDICARE

## 2018-01-09 VITALS
WEIGHT: 229.25 LBS | DIASTOLIC BLOOD PRESSURE: 65 MMHG | HEART RATE: 74 BPM | BODY MASS INDEX: 40.62 KG/M2 | HEIGHT: 63 IN | SYSTOLIC BLOOD PRESSURE: 129 MMHG

## 2018-01-09 DIAGNOSIS — H93.8X3 FULLNESS IN EAR, BILATERAL: ICD-10-CM

## 2018-01-09 DIAGNOSIS — H91.93 DECREASED HEARING OF BOTH EARS: ICD-10-CM

## 2018-01-09 DIAGNOSIS — H61.23 IMPACTED CERUMEN OF BOTH EARS: Primary | ICD-10-CM

## 2018-01-09 PROCEDURE — 69210 REMOVE IMPACTED EAR WAX UNI: CPT | Mod: S$GLB,,, | Performed by: OTOLARYNGOLOGY

## 2018-01-09 PROCEDURE — 99999 PR PBB SHADOW E&M-EST. PATIENT-LVL III: CPT | Mod: PBBFAC,,, | Performed by: OTOLARYNGOLOGY

## 2018-01-09 PROCEDURE — 99213 OFFICE O/P EST LOW 20 MIN: CPT | Mod: 25,S$GLB,, | Performed by: OTOLARYNGOLOGY

## 2018-01-09 PROCEDURE — 99213 OFFICE O/P EST LOW 20 MIN: CPT | Mod: PBBFAC,25 | Performed by: OTOLARYNGOLOGY

## 2018-01-09 PROCEDURE — 69210 REMOVE IMPACTED EAR WAX UNI: CPT | Mod: PBBFAC | Performed by: OTOLARYNGOLOGY

## 2018-01-09 RX ORDER — PENICILLIN V POTASSIUM 500 MG/1
TABLET, FILM COATED ORAL
Refills: 0 | COMMUNITY
Start: 2017-11-17 | End: 2018-04-11 | Stop reason: ALTCHOICE

## 2018-01-09 RX ORDER — DICLOFENAC SODIUM 10 MG/G
GEL TOPICAL
Refills: 2 | Status: ON HOLD | COMMUNITY
Start: 2017-12-01 | End: 2020-01-01 | Stop reason: HOSPADM

## 2018-01-09 RX ORDER — AZITHROMYCIN 250 MG/1
TABLET, FILM COATED ORAL
Refills: 2 | COMMUNITY
Start: 2017-12-22 | End: 2018-04-11 | Stop reason: ALTCHOICE

## 2018-01-09 NOTE — PROGRESS NOTES
Subjective:       Patient ID: Patsy Morris is a 67 y.o. female.    Chief Complaint: Ear Fullness    Decreased subjective hearing loss bilateral with associated ear fullness for about the past 9 months. No D/C or otalgia/otorhea. No tinnitus or dizziness.      Review of Systems   Constitutional: Negative.    HENT: Positive for congestion and hearing loss.    Eyes: Negative.    Respiratory: Positive for shortness of breath.    Cardiovascular: Negative.    Gastrointestinal: Negative.    Endocrine: Negative.    Genitourinary: Negative.    Musculoskeletal: Positive for back pain.   Skin: Negative.    Allergic/Immunologic: Negative.    Neurological: Negative.    Hematological: Negative.    Psychiatric/Behavioral: The patient is nervous/anxious.        Objective:        Constitutional:   Vital signs are normal. She appears well-developed and well-nourished. She appears alert. She is active and cooperative. Normal speech.      Head:  Normocephalic and atraumatic. Salivary glands normal.  Facial strength is normal.      Ears:    Right Ear: Decreased hearing is noted.   Left Ear: Decreased hearing is noted.     Nose:  Nose normal including turbinates, nasal mucosa, sinuses and nasal septum.     Mouth/Throat  Oropharynx clear and moist without lesions or asymmetry, normal uvula midline, mirror exam normal, lips, teeth, and gums normal and oropharynx normal. Mirror exam not performed due to patient tolerance.  Mirror exam not performed due to patient tolerance.      Neck:  Neck normal without thyromegaly masses, asymmetry, normal tracheal structure, crepitus, and tenderness, thyroid normal, trachea normal, phonation normal, full range of motion with neck supple and no adenopathy.     Psychiatric:   She has a normal mood and affect. Her speech is normal and behavior is normal.     Neurological:   She is alert. She has neurological normal, alert and oriented.     Skin:   No abrasions, lacerations, lesions, or rashes.          PROCEDURE NOTE:  Both ears were cleaned of impacted cerumen using suction and curettes to remove and reveal normal EAC's and TM's. Subjectively, hearing restored to baseline levels.  Assessment:       1. Impacted cerumen of both ears    2. Decreased hearing of both ears    3. Fullness in ear, bilateral        Plan:       1. Hearing conservation strongly recommended.  2. Trial of amplification is not currently indicated.  3. Re-check of hearing after 1-2 years or sooner if subjective decrease in hearing noted.  4. F/U with PCP as per schedule.

## 2018-01-19 ENCOUNTER — HOSPITAL ENCOUNTER (OUTPATIENT)
Dept: RADIOLOGY | Facility: HOSPITAL | Age: 68
Discharge: HOME | End: 2018-01-19
Attending: OTOLARYNGOLOGY
Payer: MEDICARE

## 2018-01-19 DIAGNOSIS — E04.1 THYROID NODULE: ICD-10-CM

## 2018-01-19 PROCEDURE — 76536 US EXAM OF HEAD AND NECK: CPT | Mod: TC

## 2018-01-19 PROCEDURE — 76536 US EXAM OF HEAD AND NECK: CPT | Mod: 26,GC,, | Performed by: RADIOLOGY

## 2018-01-22 ENCOUNTER — TELEPHONE (OUTPATIENT)
Dept: OTOLARYNGOLOGY | Facility: CLINIC | Age: 68
End: 2018-01-22

## 2018-01-22 NOTE — TELEPHONE ENCOUNTER
I spoke with Ms. Alexandra regarding her US.  She has elected to proceed with observation and US in 6 mos.  Again, she understands that by observing we may be missing a malignancy.

## 2018-01-26 ENCOUNTER — TELEPHONE (OUTPATIENT)
Dept: FAMILY MEDICINE | Facility: CLINIC | Age: 68
End: 2018-01-26

## 2018-01-26 DIAGNOSIS — G89.29 CHRONIC PAIN OF BOTH KNEES: Primary | ICD-10-CM

## 2018-01-26 DIAGNOSIS — M79.641 PAIN IN BOTH HANDS: ICD-10-CM

## 2018-01-26 DIAGNOSIS — M25.562 CHRONIC PAIN OF BOTH KNEES: Primary | ICD-10-CM

## 2018-01-26 DIAGNOSIS — M79.642 PAIN IN BOTH HANDS: ICD-10-CM

## 2018-01-26 DIAGNOSIS — M25.561 CHRONIC PAIN OF BOTH KNEES: Primary | ICD-10-CM

## 2018-01-26 NOTE — TELEPHONE ENCOUNTER
----- Message from Jewell Ferrer sent at 1/26/2018  3:20 PM CST -----  _  1st Request  _  2nd Request  _  3rd Request        Who: patient     Why: Requesting a call back in regards to a referral to an ortho doctor for her knees and fingers. Pt now has [facility]    What Number to Call Back:[phone]    When to Expect a call back: (Within 24 hours)    Please return the call at earliest convenience. Thanks!

## 2018-02-02 ENCOUNTER — TELEPHONE (OUTPATIENT)
Dept: FAMILY MEDICINE | Facility: CLINIC | Age: 68
End: 2018-02-02

## 2018-02-02 NOTE — TELEPHONE ENCOUNTER
----- Message from Swapna Oakes sent at 2/2/2018 11:20 AM CST -----  Contact: pt   x 1st Request  _ 2nd Request  _ 3rd Request    Who:pt     Why:pt is calling to speak to Suzette in regards to a referral for orthopedics.     What Number to Call Back:[phone]     When to Expect a call back: (Before the end of the day)  -- if call after 3:00 call back will be tomorrow.

## 2018-02-07 ENCOUNTER — OFFICE VISIT (OUTPATIENT)
Dept: ORTHOPEDICS | Facility: CLINIC | Age: 68
End: 2018-02-07
Payer: MEDICARE

## 2018-02-07 VITALS — WEIGHT: 229.25 LBS | HEIGHT: 63 IN | BODY MASS INDEX: 40.62 KG/M2

## 2018-02-07 DIAGNOSIS — M65.331 TRIGGER MIDDLE FINGER OF RIGHT HAND: Primary | ICD-10-CM

## 2018-02-07 PROCEDURE — 99999 PR PBB SHADOW E&M-EST. PATIENT-LVL III: CPT | Mod: PBBFAC,,, | Performed by: PHYSICIAN ASSISTANT

## 2018-02-07 PROCEDURE — 1125F AMNT PAIN NOTED PAIN PRSNT: CPT | Mod: S$GLB,,, | Performed by: PHYSICIAN ASSISTANT

## 2018-02-07 PROCEDURE — 20550 NJX 1 TENDON SHEATH/LIGAMENT: CPT | Mod: F7,S$GLB,, | Performed by: PHYSICIAN ASSISTANT

## 2018-02-07 PROCEDURE — 3008F BODY MASS INDEX DOCD: CPT | Mod: S$GLB,,, | Performed by: PHYSICIAN ASSISTANT

## 2018-02-07 PROCEDURE — 99203 OFFICE O/P NEW LOW 30 MIN: CPT | Mod: 25,S$GLB,, | Performed by: PHYSICIAN ASSISTANT

## 2018-02-07 PROCEDURE — 1159F MED LIST DOCD IN RCRD: CPT | Mod: S$GLB,,, | Performed by: PHYSICIAN ASSISTANT

## 2018-02-07 RX ORDER — BETAMETHASONE SODIUM PHOSPHATE AND BETAMETHASONE ACETATE 3; 3 MG/ML; MG/ML
6 INJECTION, SUSPENSION INTRA-ARTICULAR; INTRALESIONAL; INTRAMUSCULAR; SOFT TISSUE
Status: COMPLETED | OUTPATIENT
Start: 2018-02-07 | End: 2018-02-07

## 2018-02-07 RX ADMIN — BETAMETHASONE SODIUM PHOSPHATE AND BETAMETHASONE ACETATE 6 MG: 3; 3 INJECTION, SUSPENSION INTRA-ARTICULAR; INTRALESIONAL; INTRAMUSCULAR; SOFT TISSUE at 03:02

## 2018-02-07 NOTE — PROGRESS NOTES
SUBJECTIVE:     Chief Complaint & History of Present Illness:  Patsy Morris is a New patient 67 y.o. female who is seen here today with a complaint of    Chief Complaint   Patient presents with    Right Knee - Pain    Left Knee - Pain    Left Hand - Pain    Right Hand - Pain    .  She is here today for evaluation treatment of pain and triggering of the third digit of the right hand.  She states she has a history of a trigger finger of that hand was injected proximal a one year ago has begun to have return.  She is also wishing to establish care for her bilateral osteoarthritis of the knees.  She has had a severe arthritis in the knees for many years he is a poor surgical candidate secondary to multiple medical issues.  On a scale of 1-10, with 10 being worst pain imaginable, he rates this pain as 4 on good days and 8 on bad days.  she describes the pain as sore and aching.    Review of patient's allergies indicates:   Allergen Reactions    Codeine          Current Outpatient Prescriptions   Medication Sig Dispense Refill    atorvastatin (LIPITOR) 80 MG tablet TAKE 1 TABLET (80 MG TOTAL) BY MOUTH EVERY EVENING. 30 tablet 6    azithromycin (Z-BLAKE) 250 MG tablet   2    ELIQUIS 2.5 mg Tab TAKE 1 TABLET(2.5 MG) BY MOUTH TWICE DAILY 60 tablet 6    ferrous gluconate (FERGON) 324 MG tablet TK 1 T PO QD WITH BREAKFAST  11    ferrous sulfate 325 mg (65 mg iron) Tab tablet   6    ferrous sulfate 325 mg (65 mg iron) Tab tablet TAKE 1 TABLET (325 MG TOTAL) BY MOUTH DAILY WITH BREAKFAST. 30 tablet 6    fluticasone (FLONASE) 50 mcg/actuation nasal spray 1 spray by Each Nare route once daily. 1 Bottle 11    furosemide (LASIX) 40 MG tablet TAKE 1 TABLET (40 MG TOTAL) BY MOUTH ONCE DAILY. 30 tablet 6    mometasone 0.1% (ELOCON) 0.1 % cream Apply topically once daily. 45 g 0    penicillin v potassium (VEETID) 500 MG tablet TK 2 TS PO NOW THEN TK 1 T PO   Q 6 H UNTIL ALL  TAKEN  0    VOLTAREN 1 % Gel   2     albuterol-ipratropium 2.5mg-0.5mg/3mL (DUO-NEB) 0.5 mg-3 mg(2.5 mg base)/3 mL nebulizer solution Take 3 mLs by nebulization every 6 (six) hours as needed for Wheezing or Shortness of Breath. 3 mL 2     No current facility-administered medications for this visit.        Past Medical History:   Diagnosis Date    Arthritis     Asthma     Atrial fibrillation     Atrial flutter     Cerumen impaction     CHF (congestive heart failure)     Encounter for blood transfusion     HEARING LOSS     Herpes genitalis     Hypertension     Renal carcinoma 3/10/2015    Thyroid nodule 6/8/2017       Past Surgical History:   Procedure Laterality Date    BRAIN SURGERY      COLONOSCOPY N/A 6/23/2017    Procedure: COLONOSCOPY;  Surgeon: Shane Sharma MD;  Location: [facility] ([address] [place] [address]);  Service: Endoscopy;  Laterality: N/A;  2nd floor case ; on 3L home O2       per Dr Leopold (anesthesia)-Based on her history of:  Chronic respiratory failure with oxygen use, HDEZ, CHF, A-Fib, BHAVANI, it was determined that she should have her Colonoscopy scheduled on the 2nd Floor       ok to hold Eliquis 2 days prior to    EYE SURGERY      HYSTERECTOMY      kidney mass resection Right     renal carcinoma       Vital Signs (Most Recent)  There were no vitals filed for this visit.        Review of Systems:  ROS:  Constitutional: no fever or chills  Eyes: no visual changes  ENT: no nasal congestion or sore throat  Respiratory: no cough or shortness of breath, Positive for asthma  Cardiovascular: no chest pain or palpitations, Positive for A. fib, CHF  Gastrointestinal: no nausea or vomiting, tolerating diet, Positive for GERD, GI hemorrhage  Genitourinary: no hematuria or dysuria, Positive CK D stage IV  Integument/Breast: no rash or pruritis  Hematologic/Lymphatic: no easy bruising or lymphadenopathy, Positive history of gout  Musculoskeletal: no arthralgias or myalgias  Neurological: no seizures or tremors  Behavioral/Psych: no auditory or visual  "hallucinations  Endocrine: no heat or cold intolerance                OBJECTIVE:     PHYSICAL EXAM:  Height: 5' 3" (160 cm) Weight: 104 kg (229 lb 4.5 oz), General Appearance: Well nourished, well developed, in no acute distress.  Neurological: Mood & affect are normal.  left hand and middle finger   History of injury: repetitive injury  Pain: Description: moderate and intermittent  Night pain: yes and moderate  Rest pain: yes and moderate  Quality: stabbing  Location: middle finger, CMC joint and A1 pulley  Neurological complaints: none  Mass/Swelling: Palpable mass in the flexor tendon at the A1 pulley  Condition of skin:intact  Hand Exam: radial pulse normal, sensation normal, positive Phalen and negative Tinel  ROM:fingers flex to palm, thumb flexes to palm and as of triggering of the middle finger    Wrist Exam: palmar flexion 90/90, dorsiflexion 90/90, pronation 90/90, supination 90/90, flexion contracture 0/0, extension contracture 0/0, radial deviation 25/25, ulnar deviation 25/25            ASSESSMENT/PLAN:     Plan: We discussed with the patient at length all the different treatment options available for her middle finger, including anti-inflammatories, acetaminophen, rest, ice, physical therapy to include strengthening, range of motion exercise ultrasound, splinting,  occasional cortisone injections for temporary relief,  or possible surgical interventions.  We'll proceed with the cortisone injection of the A1 pulley of the middle finger    The injection site was identified and the skin was prepared with an ETOH  solution. The A-1 pulley was injected with 1 ml of Celestone and 0.5 ml Lidocaine under sterile technique. Patsy Morris tolerated the procedure well, she was advised to rest the finger today, ice and elevation. she did receive immediate relief of the pain and triggering in and about her finger he was told this would be short lived and is secondary to the lidocaine. she may have an " increase in her discomfort tonight followed by steady improvement over the next several days. I may take 1-3 weeks following the injection to get the full benefit of the medication.  I will see her back in 3-6 months. Sooner if she has any problems or concerns.

## 2018-02-07 NOTE — LETTER
February 7, 2018      Luisana Cartagena MD  [address]  [address]  [facility] [address]           [facility]  [address]  [facility] [address]  Phone: [phone]          Patient: Patsy Morris   MR Number: [id number]   YOB: 1950   Date of Visit: 2/7/2018       Dear Dr. Luisana Cartagena:    Thank you for referring Patsy Morris to me for evaluation. Attached you will find relevant portions of my assessment and plan of care.    If you have questions, please do not hesitate to call me. I look forward to following Patsy Morris along with you.    Sincerely,    Jer Lerma PA-C    Enclosure  CC:  No Recipients    If you would like to receive this communication electronically, please contact [email or web address] or [phone] to request more information on [employer] Link access.    For providers and/or their staff who would like to refer a patient to [facility], please contact us through our one-stop-shop provider referral line, [facility], [place], at [phone].    If you feel you have received this communication in error or would no longer like to receive these types of communications, please e-mail [email or web address]

## 2018-02-22 ENCOUNTER — OFFICE VISIT (OUTPATIENT)
Dept: PODIATRY | Facility: CLINIC | Age: 68
End: 2018-02-22
Payer: MEDICARE

## 2018-02-22 VITALS — BODY MASS INDEX: 40.57 KG/M2 | HEIGHT: 63 IN | WEIGHT: 229 LBS

## 2018-02-22 DIAGNOSIS — M79.674 PAIN OF TOE OF RIGHT FOOT: Primary | ICD-10-CM

## 2018-02-22 DIAGNOSIS — L60.3 DYSTROPHIC NAIL: ICD-10-CM

## 2018-02-22 PROCEDURE — 3008F BODY MASS INDEX DOCD: CPT | Mod: S$GLB,,, | Performed by: PODIATRIST

## 2018-02-22 PROCEDURE — 99999 PR PBB SHADOW E&M-EST. PATIENT-LVL II: CPT | Mod: PBBFAC,,, | Performed by: PODIATRIST

## 2018-02-22 PROCEDURE — 1126F AMNT PAIN NOTED NONE PRSNT: CPT | Mod: S$GLB,,, | Performed by: PODIATRIST

## 2018-02-22 PROCEDURE — 1159F MED LIST DOCD IN RCRD: CPT | Mod: S$GLB,,, | Performed by: PODIATRIST

## 2018-02-22 PROCEDURE — 99202 OFFICE O/P NEW SF 15 MIN: CPT | Mod: S$GLB,,, | Performed by: PODIATRIST

## 2018-02-22 NOTE — PROGRESS NOTES
Chief Complaint   Patient presents with    Ingrown Toenail     right great toenail           HPI:   Patsy Morris is a 67 y.o. female who presents to clinic with complaints of painful right great toenail.  Patient states nails have been abnormal since a while and gradually worsening over time. Pt has tried trimming without significant relief.           Past Medical History:   Diagnosis Date    Arthritis     Asthma     Atrial fibrillation     Atrial flutter     Cerumen impaction     CHF (congestive heart failure)     Encounter for blood transfusion     HEARING LOSS     Herpes genitalis     Hypertension     Renal carcinoma 3/10/2015    Thyroid nodule 6/8/2017             Current Outpatient Prescriptions on File Prior to Visit   Medication Sig Dispense Refill    albuterol-ipratropium 2.5mg-0.5mg/3mL (DUO-NEB) 0.5 mg-3 mg(2.5 mg base)/3 mL nebulizer solution Take 3 mLs by nebulization every 6 (six) hours as needed for Wheezing or Shortness of Breath. 3 mL 2    atorvastatin (LIPITOR) 80 MG tablet TAKE 1 TABLET (80 MG TOTAL) BY MOUTH EVERY EVENING. 30 tablet 6    azithromycin (Z-BLAKE) 250 MG tablet   2    ELIQUIS 2.5 mg Tab TAKE 1 TABLET(2.5 MG) BY MOUTH TWICE DAILY 60 tablet 6    ferrous gluconate (FERGON) 324 MG tablet TK 1 T PO QD WITH BREAKFAST  11    ferrous sulfate 325 mg (65 mg iron) Tab tablet   6    ferrous sulfate 325 mg (65 mg iron) Tab tablet TAKE 1 TABLET (325 MG TOTAL) BY MOUTH DAILY WITH BREAKFAST. 30 tablet 6    fluticasone (FLONASE) 50 mcg/actuation nasal spray 1 spray by Each Nare route once daily. 1 Bottle 11    furosemide (LASIX) 40 MG tablet TAKE 1 TABLET (40 MG TOTAL) BY MOUTH ONCE DAILY. 30 tablet 6    mometasone 0.1% (ELOCON) 0.1 % cream Apply topically once daily. 45 g 0    penicillin v potassium (VEETID) 500 MG tablet TK 2 TS PO NOW THEN TK 1 T PO   Q 6 H UNTIL ALL  TAKEN  0    VOLTAREN 1 % Gel   2     No current facility-administered medications on file prior to  "visit.               ALLG:  Review of patient's allergies indicates:   Allergen Reactions    Codeine            Social History     Social History    Marital status: Single     Spouse name: N/A    Number of children: N/A    Years of education: N/A     Occupational History    Not on file.     Social History Main Topics    Smoking status: Current Some Day Smoker     Packs/day: 1.00     Years: 25.00     Types: Cigarettes     Last attempt to quit: 2/22/2016    Smokeless tobacco: Never Used    Alcohol use No    Drug use: No    Sexual activity: No     Other Topics Concern    Not on file     Social History Narrative    No narrative on file             ROS:    General ROS: negative for - chills, fatigue or fever  Cardiovascular ROS: no chest pain or dyspnea on exertion  Musculoskeletal ROS: negative for - joint pain or joint stiffness   Skin: Negative for rash, Positive for nail or hair changes.  no itching.       EXAM:   Vitals:    02/22/18 1320   Weight: 103.9 kg (229 lb)   Height: 5' 3" (1.6 m)        General:  alert, cooperative    Vasc:  Pedal pulses present 2+  Neuro Motor:  Light touch and Sharp/dull sensation:  intact to light touch  Derm: right great toenail ingrown without paronychia   No presence of pigment involving the periungual skin.   Msk:  5/5 muscle strength.   Right foot: no gross deformity   Left foot: no gross deformity      ASSESSMENT / PLAN:     I counseled the patient on the patient's conditions, their implications and medical management.       Pain of toe of right foot    Dystrophic nail - Right Foot       Trim nails.     May also consider "Kerasal Nail" for toenails.  Available over the counter.       follow up prn     "

## 2018-02-26 NOTE — PROGRESS NOTES
Patient, Patsy Morris (MRN #[id number]), presented with a recorded BMI of 40.57 kg/m^2 consistent with the definition of morbid obesity (ICD-10 E66.01). The patient's morbid obesity was monitored, evaluated, addressed and/or treated. This addendum to the medical record is made on 02/26/2018.

## 2018-03-09 ENCOUNTER — TELEPHONE (OUTPATIENT)
Dept: CARDIOLOGY | Facility: CLINIC | Age: 68
End: 2018-03-09

## 2018-03-09 NOTE — TELEPHONE ENCOUNTER
----- Message from Debi Eduardo MA sent at 3/9/2018  2:15 PM CST -----  Contact: Brigitte  Please call Brigitte for [employer] she need to talk to you about order a Rollator Walker . Please call [phone].  Her [employer] Policy is [id number]. Thank you.

## 2018-03-16 ENCOUNTER — LAB VISIT (OUTPATIENT)
Dept: LAB | Facility: HOSPITAL | Age: 68
End: 2018-03-16
Attending: INTERNAL MEDICINE
Payer: MEDICARE

## 2018-03-16 DIAGNOSIS — I10 HTN (HYPERTENSION), BENIGN: ICD-10-CM

## 2018-03-16 DIAGNOSIS — Z79.01 LONG TERM CURRENT USE OF ANTICOAGULANT: ICD-10-CM

## 2018-03-16 DIAGNOSIS — N28.9 RENAL INSUFFICIENCY: ICD-10-CM

## 2018-03-16 DIAGNOSIS — E78.5 DYSLIPIDEMIA: ICD-10-CM

## 2018-03-16 LAB
ALBUMIN SERPL BCP-MCNC: 3.9 G/DL
ALP SERPL-CCNC: 57 U/L
ALT SERPL W/O P-5'-P-CCNC: 10 U/L
ANION GAP SERPL CALC-SCNC: 10 MMOL/L
AST SERPL-CCNC: 12 U/L
BASOPHILS # BLD AUTO: 0.01 K/UL
BASOPHILS NFR BLD: 0.2 %
BILIRUB SERPL-MCNC: 0.5 MG/DL
BUN SERPL-MCNC: 13 MG/DL
CALCIUM SERPL-MCNC: 9.8 MG/DL
CHLORIDE SERPL-SCNC: 101 MMOL/L
CHOLEST SERPL-MCNC: 131 MG/DL
CHOLEST/HDLC SERPL: 4 {RATIO}
CO2 SERPL-SCNC: 30 MMOL/L
CREAT SERPL-MCNC: 1.6 MG/DL
DIFFERENTIAL METHOD: ABNORMAL
EOSINOPHIL # BLD AUTO: 0.1 K/UL
EOSINOPHIL NFR BLD: 1.1 %
ERYTHROCYTE [DISTWIDTH] IN BLOOD BY AUTOMATED COUNT: 14.5 %
EST. GFR  (AFRICAN AMERICAN): 37.6 ML/MIN/1.73 M^2
EST. GFR  (NON AFRICAN AMERICAN): 32.6 ML/MIN/1.73 M^2
GLUCOSE SERPL-MCNC: 135 MG/DL
HCT VFR BLD AUTO: 39.2 %
HDLC SERPL-MCNC: 33 MG/DL
HDLC SERPL: 25.2 %
HGB BLD-MCNC: 12.2 G/DL
IMM GRANULOCYTES # BLD AUTO: 0.01 K/UL
IMM GRANULOCYTES NFR BLD AUTO: 0.2 %
LDLC SERPL CALC-MCNC: 72.4 MG/DL
LYMPHOCYTES # BLD AUTO: 1 K/UL
LYMPHOCYTES NFR BLD: 14.5 %
MCH RBC QN AUTO: 27.7 PG
MCHC RBC AUTO-ENTMCNC: 31.1 G/DL
MCV RBC AUTO: 89 FL
MONOCYTES # BLD AUTO: 0.5 K/UL
MONOCYTES NFR BLD: 7.2 %
NEUTROPHILS # BLD AUTO: 5.1 K/UL
NEUTROPHILS NFR BLD: 76.8 %
NONHDLC SERPL-MCNC: 98 MG/DL
NRBC BLD-RTO: 0 /100 WBC
PLATELET # BLD AUTO: 193 K/UL
PMV BLD AUTO: 10.8 FL
POTASSIUM SERPL-SCNC: 4.9 MMOL/L
PROT SERPL-MCNC: 7.1 G/DL
RBC # BLD AUTO: 4.41 M/UL
SODIUM SERPL-SCNC: 141 MMOL/L
TRIGL SERPL-MCNC: 128 MG/DL
WBC # BLD AUTO: 6.57 K/UL

## 2018-03-16 PROCEDURE — 80053 COMPREHEN METABOLIC PANEL: CPT

## 2018-03-16 PROCEDURE — 36415 COLL VENOUS BLD VENIPUNCTURE: CPT | Mod: PO

## 2018-03-16 PROCEDURE — 80061 LIPID PANEL: CPT

## 2018-03-16 PROCEDURE — 85025 COMPLETE CBC W/AUTO DIFF WBC: CPT

## 2018-03-23 ENCOUNTER — OFFICE VISIT (OUTPATIENT)
Dept: CARDIOLOGY | Facility: CLINIC | Age: 68
End: 2018-03-23
Payer: MEDICARE

## 2018-03-23 VITALS
DIASTOLIC BLOOD PRESSURE: 58 MMHG | BODY MASS INDEX: 40.13 KG/M2 | HEIGHT: 63 IN | HEART RATE: 70 BPM | SYSTOLIC BLOOD PRESSURE: 120 MMHG | WEIGHT: 226.5 LBS

## 2018-03-23 DIAGNOSIS — M17.10 KNEE ARTHROPATHY: ICD-10-CM

## 2018-03-23 DIAGNOSIS — I50.32 CHRONIC DIASTOLIC CONGESTIVE HEART FAILURE: ICD-10-CM

## 2018-03-23 DIAGNOSIS — I48.0 PAROXYSMAL ATRIAL FIBRILLATION: Primary | ICD-10-CM

## 2018-03-23 DIAGNOSIS — G47.33 OSA (OBSTRUCTIVE SLEEP APNEA): ICD-10-CM

## 2018-03-23 DIAGNOSIS — Z79.01 LONG TERM CURRENT USE OF ANTICOAGULANT: ICD-10-CM

## 2018-03-23 DIAGNOSIS — E78.00 HYPERCHOLESTEROLEMIA: ICD-10-CM

## 2018-03-23 DIAGNOSIS — I10 HTN (HYPERTENSION), BENIGN: ICD-10-CM

## 2018-03-23 PROCEDURE — 99999 PR PBB SHADOW E&M-EST. PATIENT-LVL III: CPT | Mod: PBBFAC,,, | Performed by: INTERNAL MEDICINE

## 2018-03-23 PROCEDURE — 3078F DIAST BP <80 MM HG: CPT | Mod: CPTII,S$GLB,, | Performed by: INTERNAL MEDICINE

## 2018-03-23 PROCEDURE — 99214 OFFICE O/P EST MOD 30 MIN: CPT | Mod: S$GLB,,, | Performed by: INTERNAL MEDICINE

## 2018-03-23 PROCEDURE — 3074F SYST BP LT 130 MM HG: CPT | Mod: CPTII,S$GLB,, | Performed by: INTERNAL MEDICINE

## 2018-03-23 NOTE — PROGRESS NOTES
Subjective:    Patient ID:  Patsy Morris is a 67 y.o. female who presents for follow-up of Hypertension      HPI     67 year old female followed with HFpEF, paroxsymal AFl, CRI and post nephrectomy of renal cell ca. She continues to be stable since last visit.  She has severe bilateral knee pain which limits you activity. Rhythm strip today reveals sinus with PACs.  Lab Results   Component Value Date     03/16/2018    K 4.9 03/16/2018     03/16/2018    CO2 30 (H) 03/16/2018    BUN 13 03/16/2018    CREATININE 1.6 (H) 03/16/2018     (H) 03/16/2018    HGBA1C 7.0 (H) 09/20/2016    MG 2.2 03/02/2016    AST 12 03/16/2018    ALT 10 03/16/2018    ALBUMIN 3.9 03/16/2018    PROT 7.1 03/16/2018    BILITOT 0.5 03/16/2018    WBC 6.57 03/16/2018    HGB 12.2 03/16/2018    HCT 39.2 03/16/2018    MCV 89 03/16/2018     03/16/2018    INR 0.9 03/28/2017    TSH 0.631 02/25/2016         Lab Results   Component Value Date    CHOL 131 03/16/2018    HDL 33 (L) 03/16/2018    TRIG 128 03/16/2018       Lab Results   Component Value Date    LDLCALC 72.4 03/16/2018       Past Medical History:   Diagnosis Date    Arthritis     Asthma     Atrial fibrillation     Atrial flutter     Cerumen impaction     CHF (congestive heart failure)     Encounter for blood transfusion     HEARING LOSS     Herpes genitalis     Hypertension     Renal carcinoma 3/10/2015    Thyroid nodule 6/8/2017       Current Outpatient Prescriptions:     atorvastatin (LIPITOR) 80 MG tablet, TAKE 1 TABLET (80 MG TOTAL) BY MOUTH EVERY EVENING., Disp: 30 tablet, Rfl: 6    ELIQUIS 2.5 mg Tab, TAKE 1 TABLET(2.5 MG) BY MOUTH TWICE DAILY, Disp: 60 tablet, Rfl: 6    ferrous sulfate 325 mg (65 mg iron) Tab tablet, , Disp: , Rfl: 6    fluticasone (FLONASE) 50 mcg/actuation nasal spray, 1 spray by Each Nare route once daily., Disp: 1 Bottle, Rfl: 11    furosemide (LASIX) 40 MG tablet, TAKE 1 TABLET (40 MG TOTAL) BY MOUTH ONCE DAILY., Disp:  30 tablet, Rfl: 6    mometasone 0.1% (ELOCON) 0.1 % cream, Apply topically once daily., Disp: 45 g, Rfl: 0    penicillin v potassium (VEETID) 500 MG tablet, TK 2 TS PO NOW THEN TK 1 T PO   Q 6 H UNTIL ALL  TAKEN, Disp: , Rfl: 0    VOLTAREN 1 % Gel, , Disp: , Rfl: 2    albuterol-ipratropium 2.5mg-0.5mg/3mL (DUO-NEB) 0.5 mg-3 mg(2.5 mg base)/3 mL nebulizer solution, Take 3 mLs by nebulization every 6 (six) hours as needed for Wheezing or Shortness of Breath., Disp: 3 mL, Rfl: 2    azithromycin (Z-BLAKE) 250 MG tablet, , Disp: , Rfl: 2    ferrous gluconate (FERGON) 324 MG tablet, TK 1 T PO QD WITH BREAKFAST, Disp: , Rfl: 11        Review of Systems   Constitution: Negative for decreased appetite, diaphoresis, fever, weakness, malaise/fatigue, weight gain and weight loss.   HENT: Negative for congestion, ear discharge, ear pain and nosebleeds.    Eyes: Negative for blurred vision, double vision and visual disturbance.   Cardiovascular: Positive for dyspnea on exertion. Negative for chest pain, claudication, cyanosis, irregular heartbeat, leg swelling, near-syncope, orthopnea, palpitations, paroxysmal nocturnal dyspnea and syncope.   Respiratory: Positive for shortness of breath. Negative for cough, hemoptysis, sleep disturbances due to breathing, snoring, sputum production and wheezing.    Endocrine: Negative for polydipsia, polyphagia and polyuria.   Hematologic/Lymphatic: Negative for adenopathy and bleeding problem. Does not bruise/bleed easily.   Skin: Negative for color change, nail changes, poor wound healing and rash.   Musculoskeletal: Positive for joint pain (knees). Negative for muscle cramps and muscle weakness.   Gastrointestinal: Negative for abdominal pain, anorexia, change in bowel habit, hematochezia, nausea and vomiting.   Genitourinary: Negative for dysuria, frequency and hematuria.   Neurological: Negative for brief paralysis, difficulty with concentration, excessive daytime sleepiness, dizziness,  "focal weakness, headaches, light-headedness, seizures and vertigo.   Psychiatric/Behavioral: Negative for altered mental status and depression.   Allergic/Immunologic: Negative for persistent infections.        Objective:BP (!) 120/58   Pulse 70   Ht 5' 3" (1.6 m)   Wt 102.7 kg (226 lb 8 oz)   LMP  (LMP Unknown)   BMI 40.12 kg/m²           Physical Exam   Constitutional: She is oriented to person, place, and time. She appears well-developed and well-nourished.   Morbid obese   HENT:   Head: Normocephalic.   Right Ear: External ear normal.   Left Ear: External ear normal.   Nose: Nose normal.   Inspection of lips, teeth and gums normal   Eyes: Conjunctivae and EOM are normal. Pupils are equal, round, and reactive to light. No scleral icterus.   Neck: Normal range of motion. No JVD present. No tracheal deviation present. No thyromegaly present.   Cardiovascular: Normal rate, normal heart sounds and intact distal pulses.  An irregularly irregular rhythm present. Exam reveals no gallop and no friction rub.    No murmur heard.  Pulmonary/Chest: Effort normal and breath sounds normal. No respiratory distress. She has no wheezes. She has no rales.         She exhibits no tenderness.   Abdominal: Soft. Bowel sounds are normal. She exhibits no distension. There is no hepatosplenomegaly. There is no tenderness. There is no guarding.   Musculoskeletal: Normal range of motion. She exhibits no edema or tenderness.   Lymphadenopathy:   Palpation of lymph nodes of neck and groin normal   Neurological: She is oriented to person, place, and time. No cranial nerve deficit. She exhibits normal muscle tone. Coordination normal.   Skin: Skin is warm and dry. No rash noted. No erythema. No pallor.   Palpation of skin normal   Psychiatric: She has a normal mood and affect. Her behavior is normal. Judgment and thought content normal.         Assessment:       1. Paroxysmal atrial fibrillation    2. HTN (hypertension), benign    3. BHAVANI " (obstructive sleep apnea)    4. Chronic diastolic congestive heart failure    5. Hypercholesterolemia    6. Knee arthropathy    7. Long term current use of anticoagulant         Plan:       Patsy was seen today for hypertension.    Diagnoses and all orders for this visit:    Paroxysmal atrial fibrillation    HTN (hypertension), benign  -     Comprehensive metabolic panel; Future; Expected date: 09/22/2018    BHAVANI (obstructive sleep apnea)    Chronic diastolic congestive heart failure    Hypercholesterolemia  -     Lipid panel; Future; Expected date: 09/22/2018    Knee arthropathy  -     Ambulatory consult to Orthopedics    Long term current use of anticoagulant  -     CBC auto differential; Future; Expected date: 09/22/2018

## 2018-04-12 ENCOUNTER — LAB VISIT (OUTPATIENT)
Dept: LAB | Facility: HOSPITAL | Age: 68
End: 2018-04-12
Attending: FAMILY MEDICINE
Payer: MEDICARE

## 2018-04-12 ENCOUNTER — OFFICE VISIT (OUTPATIENT)
Dept: FAMILY MEDICINE | Facility: CLINIC | Age: 68
End: 2018-04-12
Attending: FAMILY MEDICINE
Payer: MEDICARE

## 2018-04-12 VITALS
BODY MASS INDEX: 38.93 KG/M2 | WEIGHT: 228 LBS | OXYGEN SATURATION: 79 % | HEART RATE: 65 BPM | DIASTOLIC BLOOD PRESSURE: 74 MMHG | SYSTOLIC BLOOD PRESSURE: 119 MMHG | HEIGHT: 64 IN

## 2018-04-12 DIAGNOSIS — F32.1 CURRENT MODERATE EPISODE OF MAJOR DEPRESSIVE DISORDER WITHOUT PRIOR EPISODE: ICD-10-CM

## 2018-04-12 DIAGNOSIS — E11.9 DIABETIC EYE EXAM: ICD-10-CM

## 2018-04-12 DIAGNOSIS — Z01.00 DIABETIC EYE EXAM: ICD-10-CM

## 2018-04-12 DIAGNOSIS — I10 HTN (HYPERTENSION), BENIGN: Primary | ICD-10-CM

## 2018-04-12 DIAGNOSIS — E78.00 HYPERCHOLESTEROLEMIA: ICD-10-CM

## 2018-04-12 DIAGNOSIS — Z78.0 POSTMENOPAUSAL: ICD-10-CM

## 2018-04-12 DIAGNOSIS — E11.9 CONTROLLED TYPE 2 DIABETES MELLITUS WITHOUT COMPLICATION, WITHOUT LONG-TERM CURRENT USE OF INSULIN: ICD-10-CM

## 2018-04-12 DIAGNOSIS — Z00.00 ENCOUNTER FOR WELLNESS EXAMINATION IN ADULT: ICD-10-CM

## 2018-04-12 DIAGNOSIS — R73.9 HYPERGLYCEMIA: ICD-10-CM

## 2018-04-12 LAB
ALBUMIN SERPL BCP-MCNC: 3.9 G/DL
ALP SERPL-CCNC: 56 U/L
ALT SERPL W/O P-5'-P-CCNC: 8 U/L
ANION GAP SERPL CALC-SCNC: 8 MMOL/L
AST SERPL-CCNC: 11 U/L
BASOPHILS # BLD AUTO: 0.01 K/UL
BASOPHILS NFR BLD: 0.1 %
BILIRUB SERPL-MCNC: 0.4 MG/DL
BUN SERPL-MCNC: 12 MG/DL
CALCIUM SERPL-MCNC: 9.7 MG/DL
CHLORIDE SERPL-SCNC: 99 MMOL/L
CHOLEST SERPL-MCNC: 126 MG/DL
CHOLEST/HDLC SERPL: 3.7 {RATIO}
CO2 SERPL-SCNC: 34 MMOL/L
CREAT SERPL-MCNC: 1.6 MG/DL
CREAT UR-MCNC: 112 MG/DL
CREAT UR-MCNC: 112 MG/DL
DIFFERENTIAL METHOD: ABNORMAL
EOSINOPHIL # BLD AUTO: 0.1 K/UL
EOSINOPHIL NFR BLD: 1.1 %
ERYTHROCYTE [DISTWIDTH] IN BLOOD BY AUTOMATED COUNT: 14.6 %
EST. GFR  (AFRICAN AMERICAN): 37.6 ML/MIN/1.73 M^2
EST. GFR  (NON AFRICAN AMERICAN): 32.6 ML/MIN/1.73 M^2
ESTIMATED AVG GLUCOSE: 131 MG/DL
GLUCOSE SERPL-MCNC: 116 MG/DL
HBA1C MFR BLD HPLC: 6.2 %
HCT VFR BLD AUTO: 40.2 %
HDLC SERPL-MCNC: 34 MG/DL
HDLC SERPL: 27 %
HGB BLD-MCNC: 12.2 G/DL
IMM GRANULOCYTES # BLD AUTO: 0.03 K/UL
IMM GRANULOCYTES NFR BLD AUTO: 0.4 %
LDLC SERPL CALC-MCNC: 66.6 MG/DL
LYMPHOCYTES # BLD AUTO: 1.1 K/UL
LYMPHOCYTES NFR BLD: 14.8 %
MCH RBC QN AUTO: 27.5 PG
MCHC RBC AUTO-ENTMCNC: 30.3 G/DL
MCV RBC AUTO: 91 FL
MICROALBUMIN UR DL<=1MG/L-MCNC: 68 UG/ML
MICROALBUMIN UR DL<=1MG/L-MCNC: 68 UG/ML
MICROALBUMIN/CREATININE RATIO: 60.7 UG/MG
MICROALBUMIN/CREATININE RATIO: 60.7 UG/MG
MONOCYTES # BLD AUTO: 0.6 K/UL
MONOCYTES NFR BLD: 8.4 %
NEUTROPHILS # BLD AUTO: 5.5 K/UL
NEUTROPHILS NFR BLD: 75.2 %
NONHDLC SERPL-MCNC: 92 MG/DL
NRBC BLD-RTO: 0 /100 WBC
PLATELET # BLD AUTO: 206 K/UL
PMV BLD AUTO: 11.1 FL
POTASSIUM SERPL-SCNC: 5.1 MMOL/L
PROT SERPL-MCNC: 7 G/DL
RBC # BLD AUTO: 4.43 M/UL
SODIUM SERPL-SCNC: 141 MMOL/L
TRIGL SERPL-MCNC: 127 MG/DL
WBC # BLD AUTO: 7.3 K/UL

## 2018-04-12 PROCEDURE — 3044F HG A1C LEVEL LT 7.0%: CPT | Mod: CPTII,S$GLB,, | Performed by: FAMILY MEDICINE

## 2018-04-12 PROCEDURE — 99499 UNLISTED E&M SERVICE: CPT | Mod: S$GLB,,, | Performed by: FAMILY MEDICINE

## 2018-04-12 PROCEDURE — 99214 OFFICE O/P EST MOD 30 MIN: CPT | Mod: 25,S$GLB,, | Performed by: FAMILY MEDICINE

## 2018-04-12 PROCEDURE — 36415 COLL VENOUS BLD VENIPUNCTURE: CPT | Mod: PO

## 2018-04-12 PROCEDURE — 3074F SYST BP LT 130 MM HG: CPT | Mod: CPTII,S$GLB,, | Performed by: FAMILY MEDICINE

## 2018-04-12 PROCEDURE — 83036 HEMOGLOBIN GLYCOSYLATED A1C: CPT

## 2018-04-12 PROCEDURE — G0009 ADMIN PNEUMOCOCCAL VACCINE: HCPCS | Mod: S$GLB,,, | Performed by: FAMILY MEDICINE

## 2018-04-12 PROCEDURE — 90732 PPSV23 VACC 2 YRS+ SUBQ/IM: CPT | Mod: S$GLB,,, | Performed by: FAMILY MEDICINE

## 2018-04-12 PROCEDURE — 99999 PR PBB SHADOW E&M-EST. PATIENT-LVL IV: CPT | Mod: PBBFAC,,, | Performed by: FAMILY MEDICINE

## 2018-04-12 PROCEDURE — 82043 UR ALBUMIN QUANTITATIVE: CPT

## 2018-04-12 PROCEDURE — 3078F DIAST BP <80 MM HG: CPT | Mod: CPTII,S$GLB,, | Performed by: FAMILY MEDICINE

## 2018-04-12 PROCEDURE — 80061 LIPID PANEL: CPT

## 2018-04-12 PROCEDURE — 85025 COMPLETE CBC W/AUTO DIFF WBC: CPT

## 2018-04-12 PROCEDURE — 80053 COMPREHEN METABOLIC PANEL: CPT

## 2018-04-12 NOTE — PROGRESS NOTES
Two patient identifiers verified. Allergies reviewed.  Pneumococcal 23 IM administered to Right deltoid per MD order. Patient tolerated injection well: no redness, bleeding, or bruising noted to injection site. Patient instructed to remain in clinic setting for 15 minutes.

## 2018-04-13 RX ORDER — ESCITALOPRAM OXALATE 10 MG/1
10 TABLET ORAL NIGHTLY
Qty: 90 TABLET | Refills: 3 | Status: SHIPPED | OUTPATIENT
Start: 2018-04-13 | End: 2019-06-05 | Stop reason: SDUPTHER

## 2018-04-13 NOTE — PROGRESS NOTES
"Subjective:       Patient ID: Patsy Morris is a 67 y.o. female.    Chief Complaint: Hypertension; Hyperlipidemia; and Diabetes    HPI   Pt is here for follow up of htn stable on lasix no muscle cramps bp fine today   Pt has hypercholesterolemia stable on statin no muscle aches  Pt has diet controlled dm no polyuria/dipsia/phagia   Pt c/o feeling down most of the time mostly regarding her medication problems she denies si/hi no panic atatcks  Review of Systems   Constitutional: Negative for chills, fatigue and fever.   Respiratory: Negative for cough, chest tightness and shortness of breath.    Cardiovascular: Negative for chest pain and palpitations.   Gastrointestinal: Negative for abdominal distention and abdominal pain.   Endocrine: Negative for polydipsia, polyphagia and polyuria.   Psychiatric/Behavioral: Positive for dysphoric mood. Negative for behavioral problems and suicidal ideas. The patient is nervous/anxious.        Objective:      Physical Exam   Constitutional: She appears well-developed and well-nourished. No distress.   Cardiovascular: Normal rate and regular rhythm.  Exam reveals no gallop.    Pulmonary/Chest: Effort normal and breath sounds normal. No respiratory distress. She has no wheezes.   Abdominal: Soft. Bowel sounds are normal. She exhibits no distension and no mass. There is no tenderness.   Psychiatric: She has a normal mood and affect. Her behavior is normal. Judgment and thought content normal.     labs discussed with pt   Assessment:       1. HTN (hypertension), benign    2. Diabetic eye exam    3. Postmenopausal    4. Current moderate episode of major depressive disorder without prior episode    5. Controlled type 2 diabetes mellitus without complication, without long-term current use of insulin    6. Hypercholesterolemia        Plan:     start lexapro   orders cmp lipid hgb a1c  Cont meds  ada diet  Graded exercise  rtc quarterly     "This note will not be shared with the " "patient."   "

## 2018-05-03 ENCOUNTER — TELEPHONE (OUTPATIENT)
Dept: FAMILY MEDICINE | Facility: CLINIC | Age: 68
End: 2018-05-03

## 2018-05-03 NOTE — TELEPHONE ENCOUNTER
----- Message from Luisana Cartagena MD sent at 5/2/2018 10:32 AM CDT -----  Stable labs nothing acute

## 2018-05-15 DIAGNOSIS — E78.5 DYSLIPIDEMIA: ICD-10-CM

## 2018-05-15 DIAGNOSIS — I50.31 ACUTE DIASTOLIC CONGESTIVE HEART FAILURE: ICD-10-CM

## 2018-05-15 DIAGNOSIS — I48.3 TYPICAL ATRIAL FLUTTER: ICD-10-CM

## 2018-05-15 NOTE — TELEPHONE ENCOUNTER
----- Message from Bertha Schulte sent at 5/15/2018  1:48 PM CDT -----  Contact: pt  Pt states that she needs refills of all her meds from Dr. Lutz.  She did not have the names of the medication's.  She uses [facility] [phone].    Dr. Lutz  LOV 3/23/18    Thank you

## 2018-05-16 RX ORDER — FUROSEMIDE 40 MG/1
40 TABLET ORAL DAILY
Qty: 30 TABLET | Refills: 4 | Status: SHIPPED | OUTPATIENT
Start: 2018-05-16 | End: 2018-09-13 | Stop reason: SDUPTHER

## 2018-05-16 RX ORDER — FERROUS SULFATE 325(65) MG
TABLET ORAL
Qty: 30 TABLET | Refills: 6 | Status: SHIPPED | OUTPATIENT
Start: 2018-05-16 | End: 2018-09-21 | Stop reason: SDUPTHER

## 2018-05-16 RX ORDER — ATORVASTATIN CALCIUM 80 MG/1
80 TABLET, FILM COATED ORAL NIGHTLY
Qty: 30 TABLET | Refills: 4 | Status: SHIPPED | OUTPATIENT
Start: 2018-05-16 | End: 2018-09-13 | Stop reason: SDUPTHER

## 2018-05-23 ENCOUNTER — TELEPHONE (OUTPATIENT)
Dept: UROLOGY | Facility: CLINIC | Age: 68
End: 2018-05-23

## 2018-05-23 DIAGNOSIS — N28.89 RENAL MASS: Primary | ICD-10-CM

## 2018-05-23 DIAGNOSIS — C67.0 MALIGNANT NEOPLASM OF TRIGONE OF URINARY BLADDER: ICD-10-CM

## 2018-05-23 NOTE — TELEPHONE ENCOUNTER
"----- Message from Tucker MIKEMichele Pizarro LPN sent at 5/22/2018 11:07 AM CDT -----  Contact: Patient [phone]  Spoke to pt. States she is over due for annual cysto and kidney test.  Informed of family emergency and will contact once you return  mike pizarro bud  ----- Message -----  From: Ferny Beth  Sent: 5/22/2018  10:43 AM  To: [facility] Staff    Patient calling stating someone usually bradley to confirm a yearly appt. With the Doctor, pertaining to "Cystoscopy Proc" Visit Type, and Notes "Cysto", patient requesting for nurse to give patient a call please, last visit was on 02/02/2017 [phone] patient contact.    Please call and advise  Thank you    "

## 2018-06-04 ENCOUNTER — HOSPITAL ENCOUNTER (OUTPATIENT)
Dept: RADIOLOGY | Facility: HOSPITAL | Age: 68
Discharge: HOME | End: 2018-06-04
Attending: PHYSICIAN ASSISTANT
Payer: MEDICARE

## 2018-06-04 ENCOUNTER — OFFICE VISIT (OUTPATIENT)
Dept: ORTHOPEDICS | Facility: CLINIC | Age: 68
End: 2018-06-04
Payer: MEDICARE

## 2018-06-04 DIAGNOSIS — M25.562 PAIN IN BOTH KNEES, UNSPECIFIED CHRONICITY: ICD-10-CM

## 2018-06-04 DIAGNOSIS — M25.522 LEFT ELBOW PAIN: ICD-10-CM

## 2018-06-04 DIAGNOSIS — M25.561 PAIN IN BOTH KNEES, UNSPECIFIED CHRONICITY: ICD-10-CM

## 2018-06-04 DIAGNOSIS — M17.0 OSTEOARTHRITIS OF BOTH KNEES, UNSPECIFIED OSTEOARTHRITIS TYPE: Primary | ICD-10-CM

## 2018-06-04 PROCEDURE — 73080 X-RAY EXAM OF ELBOW: CPT | Mod: 26,LT,, | Performed by: RADIOLOGY

## 2018-06-04 PROCEDURE — 73080 X-RAY EXAM OF ELBOW: CPT | Mod: TC,LT

## 2018-06-04 PROCEDURE — 99999 PR PBB SHADOW E&M-EST. PATIENT-LVL III: CPT | Mod: PBBFAC,,, | Performed by: PHYSICIAN ASSISTANT

## 2018-06-04 PROCEDURE — 73562 X-RAY EXAM OF KNEE 3: CPT | Mod: 26,LT,, | Performed by: RADIOLOGY

## 2018-06-04 PROCEDURE — 99214 OFFICE O/P EST MOD 30 MIN: CPT | Mod: S$GLB,,, | Performed by: PHYSICIAN ASSISTANT

## 2018-06-04 PROCEDURE — 73562 X-RAY EXAM OF KNEE 3: CPT | Mod: TC,50

## 2018-06-04 PROCEDURE — 73562 X-RAY EXAM OF KNEE 3: CPT | Mod: 26,RT,, | Performed by: RADIOLOGY

## 2018-06-04 NOTE — LETTER
June 4, 2018      Ajay Lutz MD  [address]  [facility] [address]           [facility]  [address], [address]  [facility] [address]  Phone: [phone]          Patient: Patsy Morris   MR Number: [id number]   YOB: 1950   Date of Visit: 6/4/2018       Dear Dr. Ajay Lutz:    Thank you for referring Patsy Morris to me for evaluation. Attached you will find relevant portions of my assessment and plan of care.    If you have questions, please do not hesitate to call me. I look forward to following Patsy Morris along with you.    Sincerely,    Shae Degroot PA-C    Enclosure  CC:  No Recipients    If you would like to receive this communication electronically, please contact [email or web address] or [phone] to request more information on [employer] access.    For providers and/or their staff who would like to refer a patient to [facility], please contact us through our one-stop-shop provider referral line, [facility] , at [phone].    If you feel you have received this communication in error or would no longer like to receive these types of communications, please e-mail [email or web address]

## 2018-06-04 NOTE — PROGRESS NOTES
Subjective:      Patient ID: Patsy Morris is a 68 y.o. female.    Chief Complaint: No chief complaint on file.    HPI  68 year old female presents with chief complaint of bilateral knee pain L>R x 12 years. She fell and had left knee surgery about 14 years ago. Pain is worse with certain weather. She is on eliquis and has CKD. She received synvisc in the past with no relief. She had cortisone injections last year that gave relief for 3-4 months. She ambulates with a cane.  Patient reports intermittent left elbow pain x 1 month. She is RHD and denies trauma. Pain occurs when she rests the elbow on something. She denies pain with use. Voltaren gel gives some relief but she says she cannot afford it. She denies swelling and N/T.   Review of Systems   Constitution: Negative for chills, fever and night sweats.   Cardiovascular: Negative for chest pain.   Respiratory: Negative for cough and shortness of breath.    Skin: Negative for color change.   Gastrointestinal: Negative for heartburn.   Genitourinary: Negative for dysuria.   Neurological: Negative for numbness and paresthesias.   Psychiatric/Behavioral: Negative for altered mental status.   Allergic/Immunologic: Negative for persistent infections.         Objective:                    Left Hand/Wrist Exam     Tests   Cubital Tunnel Compression Test: positive (elicits pain. no n/t)        Left Elbow Exam     Inspection   Effusion: absent  Deformity: absent    Range of Motion   The patient has normal left elbow ROM.    Tests Tinel's Sign (cubital tunnel): negative    Other   Sensation: normal        Muscle Strength   Left Upper Extremity  Elbow Extension: 5/5  Elbow Flexion: 5/5    Vascular Exam       Left Pulses      Radial:                    2+        Right Knee Exam:  ROM 0-125 degrees  +crepitus  No effusion  No TTP    Left Knee Exam:  ROM 0-115 degrees  +crepitus  No effusion  TTP medial joint line    X-ray knee: ordered and reviewed by myself. There is  moderate DJD and a varus deformity.  No fracture dislocation bone destruction or OCD seen.  X-ray elbow: ordered and reviewed by myself. There is DJD.  No fracture dislocation bone destruction seen.       Assessment:       Encounter Diagnoses   Name Primary?    Osteoarthritis of both knees, unspecified osteoarthritis type Yes    Left elbow pain           Plan:       Discussed treatment options with patient. She should avoid leaning on elbow. Discussed ordering an EMG and referring her to Dr. Jacobs, but she declined. She wants to discuss TKA with a surgeon to see if she is a candidate. Will schedule a consult with a knee surgeon.

## 2018-06-11 ENCOUNTER — HOSPITAL ENCOUNTER (OUTPATIENT)
Dept: RADIOLOGY | Facility: HOSPITAL | Age: 68
Discharge: HOME | End: 2018-06-11
Attending: UROLOGY
Payer: MEDICARE

## 2018-06-11 DIAGNOSIS — N28.89 RENAL MASS: ICD-10-CM

## 2018-06-11 PROCEDURE — 74176 CT ABD & PELVIS W/O CONTRAST: CPT | Mod: 26,,, | Performed by: RADIOLOGY

## 2018-06-11 PROCEDURE — 74176 CT ABD & PELVIS W/O CONTRAST: CPT | Mod: TC

## 2018-06-11 PROCEDURE — 71250 CT THORAX DX C-: CPT | Mod: 26,,, | Performed by: RADIOLOGY

## 2018-06-11 PROCEDURE — 71250 CT THORAX DX C-: CPT | Mod: TC

## 2018-06-14 ENCOUNTER — LAB VISIT (OUTPATIENT)
Dept: LAB | Facility: HOSPITAL | Age: 68
End: 2018-06-14
Attending: FAMILY MEDICINE
Payer: MEDICARE

## 2018-06-14 ENCOUNTER — TELEPHONE (OUTPATIENT)
Dept: FAMILY MEDICINE | Facility: CLINIC | Age: 68
End: 2018-06-14

## 2018-06-14 ENCOUNTER — OFFICE VISIT (OUTPATIENT)
Dept: FAMILY MEDICINE | Facility: CLINIC | Age: 68
End: 2018-06-14
Attending: FAMILY MEDICINE
Payer: MEDICARE

## 2018-06-14 VITALS
SYSTOLIC BLOOD PRESSURE: 124 MMHG | WEIGHT: 223.63 LBS | HEART RATE: 75 BPM | HEIGHT: 64 IN | DIASTOLIC BLOOD PRESSURE: 60 MMHG | BODY MASS INDEX: 38.18 KG/M2 | OXYGEN SATURATION: 91 %

## 2018-06-14 DIAGNOSIS — E78.00 HYPERCHOLESTEROLEMIA: ICD-10-CM

## 2018-06-14 LAB
ALBUMIN SERPL BCP-MCNC: 3.8 G/DL
ALP SERPL-CCNC: 61 U/L
ALT SERPL W/O P-5'-P-CCNC: 8 U/L
ANION GAP SERPL CALC-SCNC: 9 MMOL/L
AST SERPL-CCNC: 13 U/L
BILIRUB SERPL-MCNC: 0.6 MG/DL
BUN SERPL-MCNC: 12 MG/DL
CALCIUM SERPL-MCNC: 9.5 MG/DL
CHLORIDE SERPL-SCNC: 101 MMOL/L
CO2 SERPL-SCNC: 30 MMOL/L
CREAT SERPL-MCNC: 1.7 MG/DL
EST. GFR  (AFRICAN AMERICAN): 34.7 ML/MIN/1.73 M^2
EST. GFR  (NON AFRICAN AMERICAN): 30.1 ML/MIN/1.73 M^2
ESTIMATED AVG GLUCOSE: 131 MG/DL
GLUCOSE SERPL-MCNC: 116 MG/DL
HBA1C MFR BLD HPLC: 6.2 %
POTASSIUM SERPL-SCNC: 4.7 MMOL/L
PROT SERPL-MCNC: 7 G/DL
SODIUM SERPL-SCNC: 140 MMOL/L

## 2018-06-14 PROCEDURE — 83036 HEMOGLOBIN GLYCOSYLATED A1C: CPT

## 2018-06-14 PROCEDURE — 3078F DIAST BP <80 MM HG: CPT | Mod: CPTII,S$GLB,, | Performed by: FAMILY MEDICINE

## 2018-06-14 PROCEDURE — 3074F SYST BP LT 130 MM HG: CPT | Mod: CPTII,S$GLB,, | Performed by: FAMILY MEDICINE

## 2018-06-14 PROCEDURE — 99214 OFFICE O/P EST MOD 30 MIN: CPT | Mod: S$GLB,,, | Performed by: FAMILY MEDICINE

## 2018-06-14 PROCEDURE — 99999 PR PBB SHADOW E&M-EST. PATIENT-LVL IV: CPT | Mod: PBBFAC,,, | Performed by: FAMILY MEDICINE

## 2018-06-14 PROCEDURE — 3044F HG A1C LEVEL LT 7.0%: CPT | Mod: CPTII,S$GLB,, | Performed by: FAMILY MEDICINE

## 2018-06-14 PROCEDURE — 36415 COLL VENOUS BLD VENIPUNCTURE: CPT | Mod: PO

## 2018-06-14 PROCEDURE — 99499 UNLISTED E&M SERVICE: CPT | Mod: S$GLB,,, | Performed by: FAMILY MEDICINE

## 2018-06-14 PROCEDURE — 80053 COMPREHEN METABOLIC PANEL: CPT

## 2018-06-14 NOTE — TELEPHONE ENCOUNTER
Patient's form was attached to her health maintenance form at her office visit.Please review.Thanks.

## 2018-06-14 NOTE — TELEPHONE ENCOUNTER
----- Message from Jewell Ferrer sent at 6/14/2018  9:08 AM CDT -----            Name of Who is Calling: Vero from [employer]       What is the request in detail: they are calling concerning a PA they fax over 6/13/18,           [facility]: [phone]

## 2018-06-18 ENCOUNTER — TELEPHONE (OUTPATIENT)
Dept: OTOLARYNGOLOGY | Facility: CLINIC | Age: 68
End: 2018-06-18

## 2018-06-18 ENCOUNTER — TELEPHONE (OUTPATIENT)
Dept: FAMILY MEDICINE | Facility: CLINIC | Age: 68
End: 2018-06-18

## 2018-06-18 NOTE — TELEPHONE ENCOUNTER
Patient call back to cancel her appointment she will call back when she gets transportation   Patient came in for xolair inj  365mg  1.2ml left arm  0.6ml left lower arm  1.2ml right arm  Lot number [id number]  Exp 11/20      Patients own meds

## 2018-06-18 NOTE — TELEPHONE ENCOUNTER
----- Message from Luisana Cartagena MD sent at 6/17/2018  2:01 PM CDT -----  Labs are fine nothing acute

## 2018-06-20 NOTE — PROGRESS NOTES
"Subjective:       Patient ID: Patsy Morris is a 68 y.o. female.    Chief Complaint: Diabetes    HPI   Pt is here for follow up of dm stable diet controlled no polyuria/dipsia/phagia  Pt has htn stable on lasix no muscle cramps not on ace/arb declines med change bp fine today  Pt has hypercholesterolemia stable on statin no muscle aches   Review of Systems   Constitutional: Negative for chills, fatigue and fever.   Respiratory: Negative for cough, chest tightness and shortness of breath.    Cardiovascular: Negative for chest pain and palpitations.   Gastrointestinal: Negative for abdominal distention, abdominal pain and blood in stool.   Endocrine: Negative for polydipsia, polyphagia and polyuria.       Objective:      Physical Exam   Constitutional: She appears well-developed and well-nourished. No distress.   Cardiovascular: Normal rate and regular rhythm.  Exam reveals no gallop.    Pulmonary/Chest: Effort normal and breath sounds normal. No respiratory distress. She has no rales.   Abdominal: Soft. Bowel sounds are normal. She exhibits no distension and no mass. There is no tenderness.     labs discussed with pt   Assessment:       1. Uncontrolled type 2 diabetes mellitus without complication, without long-term current use of insulin    2. Hypercholesterolemia        Plan:     orders cmp lipid hgb a1c  Cont meds  Ada diet  rtc quarterly       "This note will not be shared with the patient."   "

## 2018-06-25 ENCOUNTER — TELEPHONE (OUTPATIENT)
Dept: FAMILY MEDICINE | Facility: CLINIC | Age: 68
End: 2018-06-25

## 2018-06-25 NOTE — TELEPHONE ENCOUNTER
----- Message from Tiarra Dale sent at 6/25/2018 11:49 AM CDT -----  Contact: SARAH DIOP GAVIN [[id number]]      Name of Who is Calling: PORTIA DIOPHLGERMAN ALONSO [[id number]]    What is the request in detail: patient would like update on walker/wheelchair. Please call    Can the clinic reply by [facility]: no    What Number to Call Back if not in [facility]: [phone]

## 2018-06-26 ENCOUNTER — TELEPHONE (OUTPATIENT)
Dept: FAMILY MEDICINE | Facility: CLINIC | Age: 68
End: 2018-06-26

## 2018-06-26 NOTE — TELEPHONE ENCOUNTER
----- Message from Suzette Aliriosaray Wolf sent at 6/26/2018 11:04 AM CDT -----  Contact: Pt  Pt would like a rolling walker due to her severe knee pain. She says it has to be approved by [facility] and [facility].

## 2018-06-27 ENCOUNTER — HOSPITAL ENCOUNTER (OUTPATIENT)
Dept: UROLOGY | Facility: HOSPITAL | Age: 68
Discharge: HOME | End: 2018-06-27
Attending: UROLOGY
Payer: MEDICARE

## 2018-06-27 VITALS
DIASTOLIC BLOOD PRESSURE: 63 MMHG | SYSTOLIC BLOOD PRESSURE: 139 MMHG | HEART RATE: 78 BPM | HEIGHT: 64 IN | BODY MASS INDEX: 37.73 KG/M2 | TEMPERATURE: 98 F | RESPIRATION RATE: 18 BRPM | WEIGHT: 221 LBS

## 2018-06-27 DIAGNOSIS — C67.0 MALIGNANT NEOPLASM OF TRIGONE OF URINARY BLADDER: ICD-10-CM

## 2018-06-27 DIAGNOSIS — J96.11 CHRONIC RESPIRATORY FAILURE WITH HYPOXIA: Primary | ICD-10-CM

## 2018-06-27 DIAGNOSIS — C64.1 CARCINOMA OF RIGHT KIDNEY: ICD-10-CM

## 2018-06-27 PROCEDURE — 52000 CYSTOURETHROSCOPY: CPT | Mod: ,,, | Performed by: UROLOGY

## 2018-06-27 PROCEDURE — 52000 CYSTOURETHROSCOPY: CPT

## 2018-06-27 RX ORDER — LIDOCAINE HYDROCHLORIDE 20 MG/ML
JELLY TOPICAL
Status: COMPLETED | OUTPATIENT
Start: 2018-06-27 | End: 2018-06-27

## 2018-06-27 RX ADMIN — LIDOCAINE HYDROCHLORIDE: 20 JELLY TOPICAL at 01:06

## 2018-06-27 NOTE — PATIENT INSTRUCTIONS
What to Expect After a Cystoscopy  For the next 24-48 hours, you may feel a mild burning when you urinate. This burning is normal and expected. Drink plenty of water to dilute the urine to help relieve the burning sensation. You may also see a small amount of blood in your urine after the procedure.    Unless you are already taking antibiotics, you may be given an antibiotic after the test to prevent infection.    Signs and Symptoms to Report  Call the [facility] at [phone] if you develop any of the following:  · Fever of 101 degrees or higher  · Chills or persistent bleeding  · Inability to urinate

## 2018-06-27 NOTE — H&P
[facility]  History & Physical     Subjective:     Chief Complaint/Reason for Admission: h/o bladder cancer    History of Present Illness:   Patient 68 y.o. female presents with a h/o bladder ca.  Here for cystoscopy.    Patient Active Problem List    Diagnosis Date Noted    Knee arthropathy 03/23/2018    Gout of right foot 09/11/2017    Thyroid nodule 06/08/2017    Acute on chronic respiratory failure with hypoxia and hypercapnia 10/11/2016    Gastrointestinal hemorrhage associated with gastritis 08/26/2016    BHAVANI (obstructive sleep apnea)     Sleep-related hypoventilation     Suspected sleep apnea     Acute on chronic diastolic heart failure 08/03/2016    Chronic respiratory failure with hypoxia 08/03/2016    Paroxysmal atrial fibrillation 08/03/2016    Chronic diastolic congestive heart failure 05/20/2016    CRI (chronic renal insufficiency) 05/20/2016    Typical atrial flutter 04/08/2016    Chronic hypercapnic respiratory failure 03/11/2016    Hypoxia 02/27/2016    Morbid obesity 02/25/2016    Tobacco abuse 02/23/2016    Chronic kidney disease (CKD) stage G4/A1, severely decreased glomerular filtration rate (GFR) between 15-29 mL/min/1.73 square meter and albuminuria creatinine ratio less than 30 mg/g     Acute kidney injury 02/22/2016    Hypokalemia 02/22/2016    Dyslipidemia 02/22/2016    Rhinitis, allergic 03/31/2015    Renal carcinoma 03/10/2015    Renal insufficiency 10/15/2014    HTN (hypertension), benign 10/15/2014    Hypercholesterolemia 10/15/2014    Ear fullness 08/05/2014    URI (upper respiratory infection) 10/22/2013    Impacted cerumen of both ears 10/22/2012          (Not in a hospital admission)  Review of patient's allergies indicates:  No Known Allergies     Past Medical History:   Diagnosis Date    Arthritis     Asthma     Atrial fibrillation     Atrial flutter     Cerumen impaction     CHF (congestive heart failure)     Encounter for blood  transfusion     HEARING LOSS     Herpes genitalis     Hypertension     Renal carcinoma 3/10/2015    Thyroid nodule 6/8/2017      Past Surgical History:   Procedure Laterality Date    BRAIN SURGERY      COLONOSCOPY N/A 6/23/2017    Procedure: COLONOSCOPY;  Surgeon: Shane Sharma MD;  Location: [facility] ([address] [place] [address]);  Service: Endoscopy;  Laterality: N/A;  2nd floor case ; on 3L home O2       per Dr Leopold (anesthesia)-Based on her history of:  Chronic respiratory failure with oxygen use, HDEZ, CHF, A-Fib, BHAVANI, it was determined that she should have her Colonoscopy scheduled on the 2nd Floor       ok to hold Eliquis 2 days prior to    EYE SURGERY      HYSTERECTOMY      kidney mass resection Right     renal carcinoma      Family History   Problem Relation Age of Onset    Hypertension Mother     Thyroid disease Mother     Cancer Father     Asthma Neg Hx     Emphysema Neg Hx       Social History   Substance Use Topics    Smoking status: Current Every Day Smoker     Packs/day: 1.00     Years: 25.00     Types: Cigarettes    Smokeless tobacco: Never Used    Alcohol use No        Review of Systems  All other systems reviewed and negative except pertinent positives noted in HPI.      Physical Exam   Constitutional: She is oriented to person, place, and time. She appears well-developed and well-nourished.   HENT:   Head: Normocephalic.   Eyes: EOM are normal.   Cardiovascular: Normal rate, intact distal pulses and normal pulses.    Pulmonary/Chest: No accessory muscle usage. No tachypnea. No respiratory distress.   Abdominal: Soft. Normal appearance. She exhibits no distension, no fluid wave, no ascites and no mass. There is no tenderness. There is no rebound, no guarding and no CVA tenderness. No hernia.   Musculoskeletal: Normal range of motion.   Neurological: She is alert and oriented to person, place, and time.   Skin: No bruising and no rash noted.   Psychiatric: She has a normal mood and affect. Her speech  "is normal and behavior is normal. Thought content normal.         OBJECTIVE:     Patient Vitals for the past 8 hrs:   BP Temp Temp src Pulse Resp Height Weight   06/27/18 1254 (!) 142/67 98 °F (36.7 °C) Oral 68 18 5' 3.5" (1.613 m) 100.2 kg (221 lb)         Data Review:  Microbiology Results (last 7 days)     ** No results found for the last 168 hours. **        No results for input(s): COLORU, CLARITYU, SPECGRAV, PHUR, PROTEINUA, GLUCOSEU, BILIRUBINCON, BLOODU, WBCU, RBCU, BACTERIA, MUCUS, NITRITE, LEUKOCYTESUR, UROBILINOGEN, HYALINECASTS in the last 168 hours.    ASSESSMENT/PLAN:     There are no hospital problems to display for this patient.    Patient Active Problem List    Diagnosis Date Noted    Knee arthropathy 03/23/2018    Gout of right foot 09/11/2017    Thyroid nodule 06/08/2017    Acute on chronic respiratory failure with hypoxia and hypercapnia 10/11/2016    Gastrointestinal hemorrhage associated with gastritis 08/26/2016    BHAVANI (obstructive sleep apnea)     Sleep-related hypoventilation     Suspected sleep apnea     Acute on chronic diastolic heart failure 08/03/2016    Chronic respiratory failure with hypoxia 08/03/2016    Paroxysmal atrial fibrillation 08/03/2016    Chronic diastolic congestive heart failure 05/20/2016    CRI (chronic renal insufficiency) 05/20/2016    Typical atrial flutter 04/08/2016    Chronic hypercapnic respiratory failure 03/11/2016    Hypoxia 02/27/2016    Morbid obesity 02/25/2016    Tobacco abuse 02/23/2016    Chronic kidney disease (CKD) stage G4/A1, severely decreased glomerular filtration rate (GFR) between 15-29 mL/min/1.73 square meter and albuminuria creatinine ratio less than 30 mg/g     Acute kidney injury 02/22/2016    Hypokalemia 02/22/2016    Dyslipidemia 02/22/2016    Rhinitis, allergic 03/31/2015    Renal carcinoma 03/10/2015    Renal insufficiency 10/15/2014    HTN (hypertension), benign 10/15/2014    Hypercholesterolemia 10/15/2014 "    Ear fullness 08/05/2014    URI (upper respiratory infection) 10/22/2013    Impacted cerumen of both ears 10/22/2012         Plan:  I have explained the indication, risks, benefits, and alternatives of the procedure in detail.  The patient voices understanding and all questions have been answered.  The patient agrees to proceed as planned with cystoscopy.

## 2018-06-27 NOTE — PROCEDURES
Procedure: Flexible cysto-uretheroscopy    Pre Procedure Diagnosis: history of bladder cancer    Post Procedure Diagnosis:Normal cystoscopy    Surgeon: Devang Ambriz MD    Anesthesia: 2% uro-jet lidocaine jelly for local analgesia    Flexible cysto-urethroscopy was performed after consent was obtained.  The risks and benefits were explained.    2% lidocaine urojet was used for local analgesia.  The genitalia was prepped and draped in the sterile fashion with betadine.    The flexible scope was advanced into the urethra and into the bladder.  Bilateral ureteral orifice were evaluated and noted to be normal with clear efflux.  The bladder was completely surveyed in a systematic fashion.   No bladder tumors or lesions were seen.  No strictures were noted.    The patient tolerated the procedure well without complication.    They will follow up in 1 year(s) with a repeat cystoscopy as well as CT chest and abdomen.

## 2018-06-29 ENCOUNTER — TELEPHONE (OUTPATIENT)
Dept: FAMILY MEDICINE | Facility: CLINIC | Age: 68
End: 2018-06-29

## 2018-06-29 NOTE — TELEPHONE ENCOUNTER
Patient stated she received a roller walker today, but actually needed a rollator with a seat.She has returned roller walker.Please write out order and I will fax to [facility].Thanks.

## 2018-07-03 ENCOUNTER — TELEPHONE (OUTPATIENT)
Dept: FAMILY MEDICINE | Facility: CLINIC | Age: 68
End: 2018-07-03

## 2018-07-03 NOTE — TELEPHONE ENCOUNTER
Patient was informed her order for rollator with chair has been faxed for the second time. Patient stated she will contact [facility] to make sure they have received order.Thanks.

## 2018-07-03 NOTE — TELEPHONE ENCOUNTER
----- Message from Peg Gentile sent at 7/3/2018  1:11 PM CDT -----  Contact: self            Name of Who is Calling: SARAH DIOP GAVIN [[id number]]      What is the request in detail: pt calling in regards to wheel chair.... Please advise      Can the clinic reply by [facility]: no    [facility] [phone]

## 2018-07-06 DIAGNOSIS — N18.9 CHRONIC KIDNEY DISEASE, UNSPECIFIED CKD STAGE: ICD-10-CM

## 2018-07-12 ENCOUNTER — TELEPHONE (OUTPATIENT)
Dept: OTOLARYNGOLOGY | Facility: CLINIC | Age: 68
End: 2018-07-12

## 2018-07-12 NOTE — TELEPHONE ENCOUNTER
Patient rescheduled her appointment due to transportation , she will see DR FITZGERALD 7/19/18 at 11am

## 2018-07-19 ENCOUNTER — OFFICE VISIT (OUTPATIENT)
Dept: OTOLARYNGOLOGY | Facility: CLINIC | Age: 68
End: 2018-07-19
Payer: MEDICARE

## 2018-07-19 VITALS
WEIGHT: 220.44 LBS | SYSTOLIC BLOOD PRESSURE: 120 MMHG | HEART RATE: 73 BPM | BODY MASS INDEX: 38.44 KG/M2 | DIASTOLIC BLOOD PRESSURE: 59 MMHG

## 2018-07-19 DIAGNOSIS — H93.8X3 SENSATION OF FULLNESS IN BOTH EARS: Primary | ICD-10-CM

## 2018-07-19 PROCEDURE — 99213 OFFICE O/P EST LOW 20 MIN: CPT | Mod: 25,S$GLB,, | Performed by: OTOLARYNGOLOGY

## 2018-07-19 PROCEDURE — 3074F SYST BP LT 130 MM HG: CPT | Mod: CPTII,S$GLB,, | Performed by: OTOLARYNGOLOGY

## 2018-07-19 PROCEDURE — 99999 PR PBB SHADOW E&M-EST. PATIENT-LVL III: CPT | Mod: PBBFAC,,, | Performed by: OTOLARYNGOLOGY

## 2018-07-19 PROCEDURE — 69210 REMOVE IMPACTED EAR WAX UNI: CPT | Mod: S$GLB,,, | Performed by: OTOLARYNGOLOGY

## 2018-07-19 PROCEDURE — 3078F DIAST BP <80 MM HG: CPT | Mod: CPTII,S$GLB,, | Performed by: OTOLARYNGOLOGY

## 2018-07-19 RX ORDER — CEPHALEXIN 500 MG/1
CAPSULE ORAL
Refills: 0 | COMMUNITY
Start: 2018-06-23 | End: 2018-12-04

## 2018-07-19 RX ORDER — SILVER SULFADIAZINE 10 G/1000G
CREAM TOPICAL
Refills: 1 | Status: ON HOLD | COMMUNITY
Start: 2018-06-23 | End: 2020-01-01 | Stop reason: HOSPADM

## 2018-07-19 NOTE — PROGRESS NOTES
Subjective:       Patient ID: Patsy Morris is a 68 y.o. female.    Chief Complaint: Ear Fullness    Ear Fullness    There is pain in both ears. This is a chronic problem. The current episode started 1 to 4 weeks ago. The problem occurs constantly. The problem has been waxing and waning. There has been no fever. The patient is experiencing no pain. Associated symptoms include hearing loss. Pertinent negatives include no coughing, diarrhea, ear discharge, headaches, neck pain, rash, rhinorrhea or vomiting. She has tried nothing for the symptoms. Her past medical history is significant for hearing loss.     Review of Systems   Constitutional: Negative for activity change, appetite change and fever.   HENT: Positive for hearing loss. Negative for congestion, ear discharge, ear pain, nosebleeds, postnasal drip, rhinorrhea and sneezing.    Eyes: Negative for redness and visual disturbance.   Respiratory: Negative for apnea, cough, shortness of breath and wheezing.    Cardiovascular: Negative for chest pain and palpitations.   Gastrointestinal: Negative for diarrhea and vomiting.   Genitourinary: Negative for difficulty urinating and frequency.   Musculoskeletal: Negative for arthralgias, back pain, gait problem and neck pain.   Skin: Negative for color change and rash.   Neurological: Negative for dizziness, speech difficulty, weakness and headaches.   Hematological: Negative for adenopathy. Does not bruise/bleed easily.   Psychiatric/Behavioral: Negative for agitation and behavioral problems.       Objective:        Constitutional:   Vital signs are normal. She appears well-developed and well-nourished. She is active. Normal speech.      Head:  Normocephalic and atraumatic. Salivary glands normal.  Facial strength is normal.      Nose:  Nose normal including turbinates, nasal mucosa, sinuses and nasal septum.     Mouth/Throat  Oropharynx clear and moist without lesions or asymmetry and normal uvula midline.      Neck:  Neck normal without thyromegaly masses, asymmetry, normal tracheal structure, crepitus, and tenderness, thyroid normal, trachea normal, phonation normal and full range of motion with neck supple.     Psychiatric:   She has a normal mood and affect. Her speech is normal and behavior is normal.     Neurological:   She has neurological normal, alert and oriented.     Skin:   No abrasions, lacerations, lesions, or rashes.       PROCEDURE NOTE:  Ceruminosis is noted in both EACs.  Wax was removed by manual debridement and suctioning utilizing the assistance of binocular microscopy revealing EACs and TMs WNL. The patient tolerated this procedure without difficulty. The subjective decrease noted in hearing pre-cleaning was resolved post-cleaning.      Assessment:       1. Sensation of fullness in both ears        Plan:       1. Hearing conservation strongly recommended.  2. Trial of amplification bilaterally also recommended (patient not interested).  3. Re-check of hearing in 18-24 months or sooner if subjective change noted.  4. F/U with PCP as per schedule.

## 2018-07-31 ENCOUNTER — TELEPHONE (OUTPATIENT)
Dept: FAMILY MEDICINE | Facility: CLINIC | Age: 68
End: 2018-07-31

## 2018-07-31 NOTE — TELEPHONE ENCOUNTER
----- Message from Jewell Ferrer sent at 7/31/2018  1:01 PM CDT -----            Name of Who is Calling: SARAH DIOP [[id number]]    What is the request in detail: please call pt concerning recommending a Podiatry    Can the clinic reply by [facility]:no    What Number to Call Back if not in [facility]: [phone]

## 2018-08-01 NOTE — TELEPHONE ENCOUNTER
Called pt to schedule and appt with podiatry.  Pt stated she sees Dr Phillips outside of [facility].  She stated she will keep him as her podiatry.

## 2018-08-16 ENCOUNTER — TELEPHONE (OUTPATIENT)
Dept: INTERNAL MEDICINE | Facility: CLINIC | Age: 68
End: 2018-08-16

## 2018-08-16 ENCOUNTER — TELEPHONE (OUTPATIENT)
Dept: ORTHOPEDICS | Facility: CLINIC | Age: 68
End: 2018-08-16

## 2018-08-16 NOTE — TELEPHONE ENCOUNTER
----- Message from Vicky Lacy sent at 8/16/2018 10:21 AM CDT -----  Contact: self   Pt is calling to schedule an appointment,for pain in both knees needs a call back @Home#      We spoke  appt booked for August 23  2 pm  She was pleased

## 2018-08-20 DIAGNOSIS — E11.9 TYPE 2 DIABETES MELLITUS WITHOUT COMPLICATION, UNSPECIFIED WHETHER LONG TERM INSULIN USE: ICD-10-CM

## 2018-08-21 ENCOUNTER — TELEPHONE (OUTPATIENT)
Dept: ORTHOPEDICS | Facility: CLINIC | Age: 68
End: 2018-08-21

## 2018-08-21 NOTE — TELEPHONE ENCOUNTER
----- Message from Zackary Phillips MA sent at 8/21/2018 12:18 PM CDT -----  Contact: Pt  Pt called and would like a call back from Dr [facility] nurse.      Pt can be reached at [phone].    Thanks     We spoke    She had to move her appt to late Sept.   She is now on oxygen dur to the excessive heat

## 2018-09-05 ENCOUNTER — TELEPHONE (OUTPATIENT)
Dept: DERMATOLOGY | Facility: CLINIC | Age: 68
End: 2018-09-05

## 2018-09-07 ENCOUNTER — LAB VISIT (OUTPATIENT)
Dept: LAB | Facility: HOSPITAL | Age: 68
End: 2018-09-07
Attending: INTERNAL MEDICINE
Payer: MEDICARE

## 2018-09-07 ENCOUNTER — OFFICE VISIT (OUTPATIENT)
Dept: DERMATOLOGY | Facility: CLINIC | Age: 68
End: 2018-09-07
Payer: MEDICARE

## 2018-09-07 DIAGNOSIS — Z79.01 LONG TERM CURRENT USE OF ANTICOAGULANT: ICD-10-CM

## 2018-09-07 DIAGNOSIS — B37.2 CANDIDAL INTERTRIGO: ICD-10-CM

## 2018-09-07 DIAGNOSIS — I10 HTN (HYPERTENSION), BENIGN: ICD-10-CM

## 2018-09-07 DIAGNOSIS — L29.9 PRURITUS: ICD-10-CM

## 2018-09-07 DIAGNOSIS — E78.00 HYPERCHOLESTEROLEMIA: ICD-10-CM

## 2018-09-07 DIAGNOSIS — L82.0 INFLAMED SEBORRHEIC KERATOSIS: Primary | ICD-10-CM

## 2018-09-07 DIAGNOSIS — L82.1 SEBORRHEIC KERATOSIS: ICD-10-CM

## 2018-09-07 LAB
ALBUMIN SERPL BCP-MCNC: 3.9 G/DL
ALP SERPL-CCNC: 54 U/L
ALT SERPL W/O P-5'-P-CCNC: 11 U/L
ANION GAP SERPL CALC-SCNC: 6 MMOL/L
AST SERPL-CCNC: 12 U/L
BASOPHILS # BLD AUTO: 0.02 K/UL
BASOPHILS NFR BLD: 0.3 %
BILIRUB SERPL-MCNC: 0.5 MG/DL
BUN SERPL-MCNC: 16 MG/DL
CALCIUM SERPL-MCNC: 9.5 MG/DL
CHLORIDE SERPL-SCNC: 99 MMOL/L
CHOLEST SERPL-MCNC: 134 MG/DL
CHOLEST/HDLC SERPL: 3.8 {RATIO}
CO2 SERPL-SCNC: 33 MMOL/L
CREAT SERPL-MCNC: 1.8 MG/DL
DIFFERENTIAL METHOD: ABNORMAL
EOSINOPHIL # BLD AUTO: 0.1 K/UL
EOSINOPHIL NFR BLD: 1 %
ERYTHROCYTE [DISTWIDTH] IN BLOOD BY AUTOMATED COUNT: 14.7 %
EST. GFR  (AFRICAN AMERICAN): 32.4 ML/MIN/1.73 M^2
EST. GFR  (NON AFRICAN AMERICAN): 28.1 ML/MIN/1.73 M^2
GLUCOSE SERPL-MCNC: 113 MG/DL
HCT VFR BLD AUTO: 36.4 %
HDLC SERPL-MCNC: 35 MG/DL
HDLC SERPL: 26.1 %
HGB BLD-MCNC: 11.4 G/DL
IMM GRANULOCYTES # BLD AUTO: 0.01 K/UL
IMM GRANULOCYTES NFR BLD AUTO: 0.1 %
LDLC SERPL CALC-MCNC: 79.4 MG/DL
LYMPHOCYTES # BLD AUTO: 1.1 K/UL
LYMPHOCYTES NFR BLD: 15.3 %
MCH RBC QN AUTO: 28.4 PG
MCHC RBC AUTO-ENTMCNC: 31.3 G/DL
MCV RBC AUTO: 91 FL
MONOCYTES # BLD AUTO: 0.7 K/UL
MONOCYTES NFR BLD: 9.2 %
NEUTROPHILS # BLD AUTO: 5.2 K/UL
NEUTROPHILS NFR BLD: 74.1 %
NONHDLC SERPL-MCNC: 99 MG/DL
NRBC BLD-RTO: 0 /100 WBC
PLATELET # BLD AUTO: 182 K/UL
PMV BLD AUTO: 10.8 FL
POTASSIUM SERPL-SCNC: 5 MMOL/L
PROT SERPL-MCNC: 7 G/DL
RBC # BLD AUTO: 4.02 M/UL
SODIUM SERPL-SCNC: 138 MMOL/L
TRIGL SERPL-MCNC: 98 MG/DL
WBC # BLD AUTO: 7.04 K/UL

## 2018-09-07 PROCEDURE — 17110 DESTRUCTION B9 LES UP TO 14: CPT | Mod: S$GLB,,, | Performed by: DERMATOLOGY

## 2018-09-07 PROCEDURE — 80053 COMPREHEN METABOLIC PANEL: CPT

## 2018-09-07 PROCEDURE — 85025 COMPLETE CBC W/AUTO DIFF WBC: CPT

## 2018-09-07 PROCEDURE — 99203 OFFICE O/P NEW LOW 30 MIN: CPT | Mod: 25,S$GLB,, | Performed by: DERMATOLOGY

## 2018-09-07 PROCEDURE — 80061 LIPID PANEL: CPT

## 2018-09-07 PROCEDURE — 36415 COLL VENOUS BLD VENIPUNCTURE: CPT | Mod: PO

## 2018-09-07 PROCEDURE — 1101F PT FALLS ASSESS-DOCD LE1/YR: CPT | Mod: CPTII,S$GLB,, | Performed by: DERMATOLOGY

## 2018-09-07 RX ORDER — TRIAMCINOLONE ACETONIDE 0.25 MG/G
CREAM TOPICAL
Qty: 30 G | Refills: 3 | Status: SHIPPED | OUTPATIENT
Start: 2018-09-07 | End: 2019-08-13 | Stop reason: SDUPTHER

## 2018-09-07 RX ORDER — KETOCONAZOLE 20 MG/G
CREAM TOPICAL
Qty: 30 G | Refills: 3 | Status: ON HOLD | OUTPATIENT
Start: 2018-09-07 | End: 2020-01-01 | Stop reason: HOSPADM

## 2018-09-07 NOTE — LETTER
September 7, 2018      Luisana Cartagena MD  [address] [facility]  [address]  [facility] [address]           [facility] - Dermatology  [address] [place] [id number]  Phone: [phone]  Fax: [phone]          Patient: Patsy Morris   MR Number: [id number]   YOB: 1950   Date of Visit: 9/7/2018       Dear Dr. Luisana Cartagena:    Thank you for referring Patsy Morris to me for evaluation. Attached you will find relevant portions of my assessment and plan of care.    If you have questions, please do not hesitate to call me. I look forward to following Patsy Morris along with you.    Sincerely,    Naomy Allan MD    Enclosure  CC:  No Recipients    If you would like to receive this communication electronically, please contact [email or web address] or [phone] to request more information on [facility] access.    For providers and/or their staff who would like to refer a patient to [facility], please contact us through our one-stop-shop provider referral line, [facility], at [phone].    If you feel you have received this communication in error or would no longer like to receive these types of communications, please e-mail [email or web address]

## 2018-09-13 DIAGNOSIS — I50.31 ACUTE DIASTOLIC CONGESTIVE HEART FAILURE: ICD-10-CM

## 2018-09-13 DIAGNOSIS — E78.5 DYSLIPIDEMIA: ICD-10-CM

## 2018-09-13 RX ORDER — ATORVASTATIN CALCIUM 80 MG/1
80 TABLET, FILM COATED ORAL NIGHTLY
Qty: 90 TABLET | Refills: 2 | Status: SHIPPED | OUTPATIENT
Start: 2018-09-13 | End: 2018-09-21 | Stop reason: SDUPTHER

## 2018-09-13 RX ORDER — FUROSEMIDE 40 MG/1
40 TABLET ORAL DAILY
Qty: 90 TABLET | Refills: 2 | Status: SHIPPED | OUTPATIENT
Start: 2018-09-13 | End: 2018-09-21 | Stop reason: SDUPTHER

## 2018-09-14 ENCOUNTER — OFFICE VISIT (OUTPATIENT)
Dept: FAMILY MEDICINE | Facility: CLINIC | Age: 68
End: 2018-09-14
Attending: FAMILY MEDICINE
Payer: MEDICARE

## 2018-09-14 VITALS
DIASTOLIC BLOOD PRESSURE: 60 MMHG | HEART RATE: 63 BPM | WEIGHT: 225 LBS | SYSTOLIC BLOOD PRESSURE: 110 MMHG | BODY MASS INDEX: 38.41 KG/M2 | OXYGEN SATURATION: 92 % | HEIGHT: 64 IN

## 2018-09-14 DIAGNOSIS — I10 HTN (HYPERTENSION), BENIGN: ICD-10-CM

## 2018-09-14 DIAGNOSIS — E11.8 TYPE 2 DIABETES MELLITUS WITH COMPLICATION, WITHOUT LONG-TERM CURRENT USE OF INSULIN: Primary | ICD-10-CM

## 2018-09-14 DIAGNOSIS — Z01.00 DIABETIC EYE EXAM: ICD-10-CM

## 2018-09-14 DIAGNOSIS — E11.9 DIABETIC EYE EXAM: ICD-10-CM

## 2018-09-14 PROCEDURE — 99213 OFFICE O/P EST LOW 20 MIN: CPT | Mod: PBBFAC,PO | Performed by: FAMILY MEDICINE

## 2018-09-14 PROCEDURE — 1101F PT FALLS ASSESS-DOCD LE1/YR: CPT | Mod: CPTII,,, | Performed by: FAMILY MEDICINE

## 2018-09-14 PROCEDURE — 3044F HG A1C LEVEL LT 7.0%: CPT | Mod: CPTII,,, | Performed by: FAMILY MEDICINE

## 2018-09-14 PROCEDURE — 99999 PR PBB SHADOW E&M-EST. PATIENT-LVL III: CPT | Mod: PBBFAC,,, | Performed by: FAMILY MEDICINE

## 2018-09-14 PROCEDURE — 3078F DIAST BP <80 MM HG: CPT | Mod: CPTII,,, | Performed by: FAMILY MEDICINE

## 2018-09-14 PROCEDURE — 99214 OFFICE O/P EST MOD 30 MIN: CPT | Mod: S$PBB,,, | Performed by: FAMILY MEDICINE

## 2018-09-14 PROCEDURE — 3074F SYST BP LT 130 MM HG: CPT | Mod: CPTII,,, | Performed by: FAMILY MEDICINE

## 2018-09-14 RX ORDER — METHYLPREDNISOLONE 4 MG/1
TABLET ORAL
Qty: 1 PACKAGE | Refills: 0 | Status: SHIPPED | OUTPATIENT
Start: 2018-09-14 | End: 2018-10-05

## 2018-09-14 NOTE — PATIENT INSTRUCTIONS
Your test results will be communicated to you via : [facility], Telephone or Letter.   If you have not received your test results in one week, please contact the clinic at [phone].

## 2018-09-16 NOTE — PROGRESS NOTES
"Subjective:       Patient ID: Patsy Morris is a 68 y.o. female.    Chief Complaint: Diabetes    HPI   Pt is here for follow up of dm stable diet controlled with hgb a1c in the low 6's she has renal insfcy stable creatinine pt tries to drink plenty of water   Pt has htn she is on lasix for her bp no ace/arb declined bp fine today  Pt has hypercholesterolemia stable on statin no muscle aches   Review of Systems   Constitutional: Negative for chills, fatigue and fever.   Respiratory: Negative for cough, chest tightness and shortness of breath.    Cardiovascular: Negative for chest pain and palpitations.   Gastrointestinal: Negative for abdominal pain.   Endocrine: Negative for polydipsia, polyphagia and polyuria.       Objective:      Physical Exam   Constitutional: She appears well-developed and well-nourished. No distress.   Cardiovascular: Normal rate and regular rhythm. Exam reveals no gallop.   Pulmonary/Chest: Effort normal and breath sounds normal. No respiratory distress. She has no wheezes.   Abdominal: Soft. Bowel sounds are normal. She exhibits no distension. There is no tenderness.     labs discussed with pt   Assessment:       1. Type 2 diabetes mellitus with complication, without long-term current use of insulin    2. Diabetic eye exam    3. HTN (hypertension), benign      4. Hypercholesterolemia   Plan:     orders renal function panel in the future pt just had labs with cards declined today  Cont meds  Consider ace/arb in small dose  F/u nephrology declined   rtc quarterly and prn    "This note will not be shared with the patient."   "

## 2018-09-20 ENCOUNTER — TELEPHONE (OUTPATIENT)
Dept: FAMILY MEDICINE | Facility: CLINIC | Age: 68
End: 2018-09-20

## 2018-09-21 ENCOUNTER — LAB VISIT (OUTPATIENT)
Dept: LAB | Facility: HOSPITAL | Age: 68
End: 2018-09-21
Attending: INTERNAL MEDICINE
Payer: MEDICARE

## 2018-09-21 ENCOUNTER — OFFICE VISIT (OUTPATIENT)
Dept: CARDIOLOGY | Facility: CLINIC | Age: 68
End: 2018-09-21
Payer: MEDICARE

## 2018-09-21 VITALS
BODY MASS INDEX: 39.51 KG/M2 | DIASTOLIC BLOOD PRESSURE: 60 MMHG | SYSTOLIC BLOOD PRESSURE: 110 MMHG | HEIGHT: 63 IN | WEIGHT: 223 LBS | HEART RATE: 80 BPM

## 2018-09-21 DIAGNOSIS — J96.11 CHRONIC RESPIRATORY FAILURE WITH HYPOXIA: ICD-10-CM

## 2018-09-21 DIAGNOSIS — G47.33 OSA (OBSTRUCTIVE SLEEP APNEA): ICD-10-CM

## 2018-09-21 DIAGNOSIS — E78.00 HYPERCHOLESTEROLEMIA: ICD-10-CM

## 2018-09-21 DIAGNOSIS — I50.32 CHRONIC DIASTOLIC CONGESTIVE HEART FAILURE: ICD-10-CM

## 2018-09-21 DIAGNOSIS — E78.5 DYSLIPIDEMIA: ICD-10-CM

## 2018-09-21 DIAGNOSIS — I10 HTN (HYPERTENSION), BENIGN: ICD-10-CM

## 2018-09-21 DIAGNOSIS — I48.0 PAROXYSMAL ATRIAL FIBRILLATION: Primary | ICD-10-CM

## 2018-09-21 DIAGNOSIS — J96.12 CHRONIC HYPERCAPNIC RESPIRATORY FAILURE: ICD-10-CM

## 2018-09-21 DIAGNOSIS — D50.9 IRON DEFICIENCY ANEMIA, UNSPECIFIED IRON DEFICIENCY ANEMIA TYPE: ICD-10-CM

## 2018-09-21 DIAGNOSIS — I50.31 ACUTE DIASTOLIC CONGESTIVE HEART FAILURE: ICD-10-CM

## 2018-09-21 DIAGNOSIS — Z79.01 LONG TERM CURRENT USE OF ANTICOAGULANT: ICD-10-CM

## 2018-09-21 LAB
IRON SERPL-MCNC: 44 UG/DL
SATURATED IRON: 13 %
TOTAL IRON BINDING CAPACITY: 329 UG/DL
TRANSFERRIN SERPL-MCNC: 222 MG/DL

## 2018-09-21 PROCEDURE — 36415 COLL VENOUS BLD VENIPUNCTURE: CPT | Mod: PO

## 2018-09-21 PROCEDURE — 99213 OFFICE O/P EST LOW 20 MIN: CPT | Mod: PBBFAC,PO | Performed by: INTERNAL MEDICINE

## 2018-09-21 PROCEDURE — 3074F SYST BP LT 130 MM HG: CPT | Mod: CPTII,,, | Performed by: INTERNAL MEDICINE

## 2018-09-21 PROCEDURE — 99999 PR PBB SHADOW E&M-EST. PATIENT-LVL III: CPT | Mod: PBBFAC,,, | Performed by: INTERNAL MEDICINE

## 2018-09-21 PROCEDURE — 1101F PT FALLS ASSESS-DOCD LE1/YR: CPT | Mod: CPTII,,, | Performed by: INTERNAL MEDICINE

## 2018-09-21 PROCEDURE — 99214 OFFICE O/P EST MOD 30 MIN: CPT | Mod: S$PBB,,, | Performed by: INTERNAL MEDICINE

## 2018-09-21 PROCEDURE — 3078F DIAST BP <80 MM HG: CPT | Mod: CPTII,,, | Performed by: INTERNAL MEDICINE

## 2018-09-21 PROCEDURE — 83540 ASSAY OF IRON: CPT

## 2018-09-21 PROCEDURE — 99499 UNLISTED E&M SERVICE: CPT | Mod: S$GLB,,, | Performed by: INTERNAL MEDICINE

## 2018-09-21 RX ORDER — FUROSEMIDE 40 MG/1
40 TABLET ORAL DAILY
Qty: 90 TABLET | Refills: 2 | Status: SHIPPED | OUTPATIENT
Start: 2018-09-21 | End: 2019-08-05 | Stop reason: SDUPTHER

## 2018-09-21 RX ORDER — ATORVASTATIN CALCIUM 80 MG/1
80 TABLET, FILM COATED ORAL NIGHTLY
Qty: 90 TABLET | Refills: 2 | Status: SHIPPED | OUTPATIENT
Start: 2018-09-21 | End: 2019-07-09 | Stop reason: SDUPTHER

## 2018-09-21 RX ORDER — FERROUS SULFATE 325(65) MG
1 TABLET ORAL
Qty: 30 TABLET | Refills: 6 | Status: SHIPPED | OUTPATIENT
Start: 2018-09-21 | End: 2019-03-16 | Stop reason: SDUPTHER

## 2018-09-21 NOTE — PROGRESS NOTES
Patient, Patsy Morris (MRN #[id number]), presented with a recorded BMI of 39.5 kg/m^2 and a documented comorbidity(s):  - Obstructive Sleep Apnea  - Hypertension  - Hyperlipidemia  - Atrial Fibrillation  - Atrial Fibrillation  to which the severe obesity is a contributing factor. This is consistent with the definition of severe obesity (BMI 35.0-35.9) with comorbidity (ICD-10 E66.01, Z68.35). The patient's severe obesity was monitored, evaluated, addressed and/or treated. This addendum to the medical record is made on 09/21/2018.

## 2018-09-21 NOTE — PROGRESS NOTES
Subjective:    Patient ID:  Patsy Morris is a 68 y.o. female who presents for follow-up of Atrial Fibrillation      HPI     68 year old female followed with HFpEF, paroxsymal AFl, CRI, COPD on supplemental O2, and post nephrectomy of renal cell ca. She is doing well and denies palpitaions or increase in HDEZ.  She denies chest pain, HDEZ, or palpitations.    Lab Results   Component Value Date     09/07/2018    K 5.0 09/07/2018    CL 99 09/07/2018    CO2 33 (H) 09/07/2018    BUN 16 09/07/2018    CREATININE 1.8 (H) 09/07/2018     (H) 09/07/2018    HGBA1C 6.2 (H) 06/14/2018    MG 2.2 03/02/2016    AST 12 09/07/2018    ALT 11 09/07/2018    ALBUMIN 3.9 09/07/2018    PROT 7.0 09/07/2018    BILITOT 0.5 09/07/2018    WBC 7.04 09/07/2018    HGB 11.4 (L) 09/07/2018    HCT 36.4 (L) 09/07/2018    MCV 91 09/07/2018     09/07/2018    INR 0.9 03/28/2017    TSH 0.631 02/25/2016         Lab Results   Component Value Date    CHOL 134 09/07/2018    HDL 35 (L) 09/07/2018    TRIG 98 09/07/2018       Lab Results   Component Value Date    LDLCALC 79.4 09/07/2018       Past Medical History:   Diagnosis Date    Arthritis     Asthma     Atrial fibrillation     Atrial flutter     Cerumen impaction     CHF (congestive heart failure)     Encounter for blood transfusion     HEARING LOSS     Herpes genitalis     Hypertension     Renal carcinoma 3/10/2015    Thyroid nodule 6/8/2017       Current Outpatient Medications:     apixaban (ELIQUIS) 2.5 mg Tab, TAKE 1 TABLET(2.5 MG) BY MOUTH TWICE DAILY, Disp: 60 tablet, Rfl: 4    atorvastatin (LIPITOR) 80 MG tablet, Take 1 tablet (80 mg total) by mouth every evening., Disp: 90 tablet, Rfl: 2    cephALEXin (KEFLEX) 500 MG capsule, TK ONE C PO  TID FOR 8 DAYS, Disp: , Rfl: 0    escitalopram oxalate (LEXAPRO) 10 MG tablet, Take 1 tablet (10 mg total) by mouth every evening., Disp: 90 tablet, Rfl: 3    ferrous gluconate (FERGON) 324 MG tablet, TK 1 T PO QD WITH  BREAKFAST, Disp: , Rfl: 11    ferrous sulfate (FEOSOL) 325 mg (65 mg iron) Tab tablet, Take 1 tablet (325 mg total) by mouth daily with breakfast., Disp: 30 tablet, Rfl: 6    furosemide (LASIX) 40 MG tablet, Take 1 tablet (40 mg total) by mouth once daily., Disp: 90 tablet, Rfl: 2    ketoconazole (NIZORAL) 2 % cream, Apply to affected areas of body BID., Disp: 30 g, Rfl: 3    methylPREDNISolone (MEDROL DOSEPACK) 4 mg tablet, use as directed, Disp: 1 Package, Rfl: 0    mometasone 0.1% (ELOCON) 0.1 % cream, Apply topically once daily., Disp: 45 g, Rfl: 0    silver sulfADIAZINE 1% (SILVADENE) 1 % cream, LEOLA AA LIGHTLY WITH DRESSING BID, Disp: , Rfl: 1    triamcinolone acetonide 0.025% (KENALOG) 0.025 % cream, AAA of body bid, Disp: 30 g, Rfl: 3    valacyclovir HCl (VALACYCLOVIR ORAL), Take by mouth., Disp: , Rfl:     VOLTAREN 1 % Gel, , Disp: , Rfl: 2    albuterol-ipratropium 2.5mg-0.5mg/3mL (DUO-NEB) 0.5 mg-3 mg(2.5 mg base)/3 mL nebulizer solution, Take 3 mLs by nebulization every 6 (six) hours as needed for Wheezing or Shortness of Breath., Disp: 3 mL, Rfl: 2    fluticasone (FLONASE) 50 mcg/actuation nasal spray, 1 spray by Each Nare route once daily., Disp: 1 Bottle, Rfl: 11    walker Misc, Prn usage dx E66.01, Disp: 1 each, Rfl: 0        Review of Systems   Constitution: Negative for decreased appetite, diaphoresis, fever, weakness, malaise/fatigue, weight gain and weight loss.   HENT: Negative for congestion, ear discharge, ear pain and nosebleeds.    Eyes: Negative for blurred vision, double vision and visual disturbance.   Cardiovascular: Negative for chest pain, claudication, cyanosis, dyspnea on exertion, irregular heartbeat, leg swelling, near-syncope, orthopnea, palpitations, paroxysmal nocturnal dyspnea and syncope.   Respiratory: Positive for shortness of breath. Negative for cough, hemoptysis, sleep disturbances due to breathing, snoring, sputum production and wheezing.    Endocrine: Negative  "for polydipsia, polyphagia and polyuria.   Hematologic/Lymphatic: Negative for adenopathy and bleeding problem. Does not bruise/bleed easily.   Skin: Negative for color change, nail changes, poor wound healing and rash.   Musculoskeletal: Positive for joint pain (left elbow). Negative for muscle cramps and muscle weakness.   Gastrointestinal: Negative for abdominal pain, anorexia, change in bowel habit, hematochezia, nausea and vomiting.   Genitourinary: Negative for dysuria, frequency and hematuria.   Neurological: Negative for brief paralysis, difficulty with concentration, excessive daytime sleepiness, dizziness, focal weakness, headaches, light-headedness, seizures and vertigo.   Psychiatric/Behavioral: Negative for altered mental status and depression.   Allergic/Immunologic: Negative for persistent infections.        Objective:/60   Pulse 80   Ht 5' 3" (1.6 m)   Wt 101.2 kg (223 lb)   LMP  (LMP Unknown)   BMI 39.50 kg/m²             Physical Exam   Constitutional: She is oriented to person, place, and time. She appears well-developed and well-nourished.   morbid obese   HENT:   Head: Normocephalic.   Right Ear: External ear normal.   Left Ear: External ear normal.   Nose: Nose normal.   Inspection of lips, teeth and gums normal   Eyes: Conjunctivae and EOM are normal. Pupils are equal, round, and reactive to light. No scleral icterus.   Neck: Normal range of motion. No JVD present. No tracheal deviation present. No thyromegaly present.   Cardiovascular: Normal rate, regular rhythm, normal heart sounds and intact distal pulses. Exam reveals no gallop and no friction rub.   No murmur heard.  Pulmonary/Chest: Effort normal. No respiratory distress. She has decreased breath sounds. She has no wheezes. She has no rales. She exhibits no tenderness.   Abdominal: Soft. Bowel sounds are normal. She exhibits no distension. There is no hepatosplenomegaly. There is no tenderness. There is no guarding. "   Musculoskeletal: Normal range of motion. She exhibits no edema or tenderness.   Lymphadenopathy:   Palpation of lymph nodes of neck and groin normal   Neurological: She is oriented to person, place, and time. No cranial nerve deficit. She exhibits normal muscle tone. Coordination normal.   Skin: Skin is warm and dry. No rash noted. No erythema. No pallor.   Palpation of skin normal   Psychiatric: She has a normal mood and affect. Her behavior is normal. Judgment and thought content normal.         Assessment:       1. Paroxysmal atrial fibrillation    2. HTN (hypertension), benign    3. BHAVANI (obstructive sleep apnea)    4. Chronic diastolic congestive heart failure    5. Hypercholesterolemia    6. Long term current use of anticoagulant    7. Chronic hypercapnic respiratory failure    8. Chronic respiratory failure with hypoxia    9. Iron deficiency anemia, unspecified iron deficiency anemia type    10. Acute diastolic congestive heart failure    11. Dyslipidemia         Plan:       Patsy was seen today for atrial fibrillation.    Diagnoses and all orders for this visit:    Paroxysmal atrial fibrillation    HTN (hypertension), benign  -     Comprehensive metabolic panel; Future; Expected date: 03/23/2019    BHAVANI (obstructive sleep apnea)    Chronic diastolic congestive heart failure    Hypercholesterolemia  -     Lipid panel; Future; Expected date: 03/23/2019    Long term current use of anticoagulant  -     CBC auto differential; Future; Expected date: 03/23/2019    Chronic hypercapnic respiratory failure    Chronic respiratory failure with hypoxia    Iron deficiency anemia, unspecified iron deficiency anemia type  -     CBC auto differential; Future; Expected date: 03/23/2019  -     Iron and TIBC; Future; Expected date: 09/21/2018    Acute diastolic congestive heart failure  -     furosemide (LASIX) 40 MG tablet; Take 1 tablet (40 mg total) by mouth once daily.    Dyslipidemia  -     atorvastatin (LIPITOR) 80 MG  tablet; Take 1 tablet (80 mg total) by mouth every evening.    Other orders  -     ferrous sulfate (FEOSOL) 325 mg (65 mg iron) Tab tablet; Take 1 tablet (325 mg total) by mouth daily with breakfast.

## 2018-09-25 ENCOUNTER — TELEPHONE (OUTPATIENT)
Dept: ORTHOPEDICS | Facility: CLINIC | Age: 68
End: 2018-09-25

## 2018-09-25 NOTE — TELEPHONE ENCOUNTER
----- Message from Vincenzo Dorman sent at 9/25/2018 10:22 AM CDT -----  Contact: Self/ [phone]  Patient would like a call back from the doctor's nurse to ask some questions.                We spoke  Her oxygen tank was out  She had to cx  Wants Oct 4  Can't  Way overbooked  Will offer 10/16

## 2018-10-04 ENCOUNTER — OFFICE VISIT (OUTPATIENT)
Dept: ORTHOPEDICS | Facility: CLINIC | Age: 68
End: 2018-10-04
Payer: MEDICARE

## 2018-10-04 VITALS
WEIGHT: 223.13 LBS | DIASTOLIC BLOOD PRESSURE: 68 MMHG | HEART RATE: 89 BPM | HEIGHT: 63 IN | BODY MASS INDEX: 39.54 KG/M2 | SYSTOLIC BLOOD PRESSURE: 117 MMHG

## 2018-10-04 DIAGNOSIS — M17.0 PRIMARY OSTEOARTHRITIS OF BOTH KNEES: Primary | ICD-10-CM

## 2018-10-04 PROCEDURE — 99213 OFFICE O/P EST LOW 20 MIN: CPT | Mod: PBBFAC,25 | Performed by: ORTHOPAEDIC SURGERY

## 2018-10-04 PROCEDURE — 99214 OFFICE O/P EST MOD 30 MIN: CPT | Mod: 25,S$PBB,, | Performed by: ORTHOPAEDIC SURGERY

## 2018-10-04 PROCEDURE — 1101F PT FALLS ASSESS-DOCD LE1/YR: CPT | Mod: CPTII,,, | Performed by: ORTHOPAEDIC SURGERY

## 2018-10-04 PROCEDURE — 99999 PR PBB SHADOW E&M-EST. PATIENT-LVL III: CPT | Mod: PBBFAC,,, | Performed by: ORTHOPAEDIC SURGERY

## 2018-10-04 PROCEDURE — 3078F DIAST BP <80 MM HG: CPT | Mod: CPTII,,, | Performed by: ORTHOPAEDIC SURGERY

## 2018-10-04 PROCEDURE — 20610 DRAIN/INJ JOINT/BURSA W/O US: CPT | Mod: PBBFAC | Performed by: ORTHOPAEDIC SURGERY

## 2018-10-04 PROCEDURE — 3074F SYST BP LT 130 MM HG: CPT | Mod: CPTII,,, | Performed by: ORTHOPAEDIC SURGERY

## 2018-10-04 RX ORDER — TRIAMCINOLONE ACETONIDE 40 MG/ML
40 INJECTION, SUSPENSION INTRA-ARTICULAR; INTRAMUSCULAR
Status: DISCONTINUED | OUTPATIENT
Start: 2018-10-04 | End: 2018-10-04 | Stop reason: HOSPADM

## 2018-10-04 RX ADMIN — TRIAMCINOLONE ACETONIDE 40 MG: 40 INJECTION, SUSPENSION INTRA-ARTICULAR; INTRAMUSCULAR at 01:10

## 2018-10-04 NOTE — PROCEDURES
Large Joint Aspiration/Injection: L knee  Date/Time: 10/4/2018 1:18 PM  Performed by: [facility] MD  Authorized by: [facility] MD     Consent Done?:  Yes (Verbal)  Indications:  Pain  Procedure site marked: Yes    Timeout: Prior to procedure the correct patient, procedure, and site was verified      Location:  Knee  Site:  L knee  Prep: Patient was prepped and draped in usual sterile fashion    Ultrasonic Guidance for needle placement: No  Needle size:  20 G  Approach:  Anterolateral  Medications:  40 mg triamcinolone acetonide 40 mg/mL  Patient tolerance:  Patient tolerated the procedure well with no immediate complications

## 2018-10-04 NOTE — PROGRESS NOTES
HPI:    Patsy Morris is a 68 y.o. female who is here today for   Chief Complaint   Patient presents with    Follow-up   .  Patient is not a surgical candidate.    Duration: 12 months  Intensity: moderate  Character of pain: sharp  Location: She reports that the pain is predominately  medial    Past Medical History:   Diagnosis Date    Arthritis     Asthma     Atrial fibrillation     Atrial flutter     Cerumen impaction     CHF (congestive heart failure)     Encounter for blood transfusion     HEARING LOSS     Herpes genitalis     Hypertension     Renal carcinoma 3/10/2015    Thyroid nodule 6/8/2017          Current Outpatient Medications:     apixaban (ELIQUIS) 2.5 mg Tab, TAKE 1 TABLET(2.5 MG) BY MOUTH TWICE DAILY, Disp: 60 tablet, Rfl: 4    atorvastatin (LIPITOR) 80 MG tablet, Take 1 tablet (80 mg total) by mouth every evening., Disp: 90 tablet, Rfl: 2    escitalopram oxalate (LEXAPRO) 10 MG tablet, Take 1 tablet (10 mg total) by mouth every evening., Disp: 90 tablet, Rfl: 3    ferrous sulfate (FEOSOL) 325 mg (65 mg iron) Tab tablet, Take 1 tablet (325 mg total) by mouth daily with breakfast., Disp: 30 tablet, Rfl: 6    furosemide (LASIX) 40 MG tablet, Take 1 tablet (40 mg total) by mouth once daily., Disp: 90 tablet, Rfl: 2    methylPREDNISolone (MEDROL DOSEPACK) 4 mg tablet, use as directed, Disp: 1 Package, Rfl: 0    mometasone 0.1% (ELOCON) 0.1 % cream, Apply topically once daily., Disp: 45 g, Rfl: 0    silver sulfADIAZINE 1% (SILVADENE) 1 % cream, LEOLA AA LIGHTLY WITH DRESSING BID, Disp: , Rfl: 1    triamcinolone acetonide 0.025% (KENALOG) 0.025 % cream, AAA of body bid, Disp: 30 g, Rfl: 3    VOLTAREN 1 % Gel, , Disp: , Rfl: 2    albuterol-ipratropium 2.5mg-0.5mg/3mL (DUO-NEB) 0.5 mg-3 mg(2.5 mg base)/3 mL nebulizer solution, Take 3 mLs by nebulization every 6 (six) hours as needed for Wheezing or Shortness of Breath., Disp: 3 mL, Rfl: 2    cephALEXin (KEFLEX) 500 MG capsule,  "TK ONE C PO  TID FOR 8 DAYS, Disp: , Rfl: 0    fluticasone (FLONASE) 50 mcg/actuation nasal spray, 1 spray by Each Nare route once daily., Disp: 1 Bottle, Rfl: 11    ketoconazole (NIZORAL) 2 % cream, Apply to affected areas of body BID., Disp: 30 g, Rfl: 3    valacyclovir HCl (VALACYCLOVIR ORAL), Take by mouth., Disp: , Rfl:     walker Misc, Prn usage dx E66.01, Disp: 1 each, Rfl: 0     Review of patient's allergies indicates:  No Known Allergies     ROS  Constitutional: Negative for fever, Negative for weight loss  HENT Negative for congestion  Cardiovascular: Negative chest pain  Respiratory: Positive Shortness of breath  Heme: Negative excessive bleeding  Skin:NegativeItching, Negative breakdown  Musculoskeletal:Positive for back pain, Positive for joint pain, Negative muscle pain, Negative muscle weakness  Neurological: Negative for numbness and paresthesias   Psychiatric/Behavioral: Negative altered mental status, Negative for depression    Physical Exam:   /68   Pulse 89   Ht 5' 3" (1.6 m)   Wt 101.2 kg (223 lb 1.7 oz)   LMP  (LMP Unknown)   BMI 39.52 kg/m²   General appearance: This is a well-developed, Well nourished female No obvious acute distress.  Psychiatric: normal mood and affect;  pleasant and conversant; judgment and thought content normal  Gait is coordinated. Patient has antalgic gait to the bilateral  Cardiovascular: There are no swelling or varicosities present.   Respiratory: normal respiratory effort   Lymphatic: no adenopathy   Neurologic: alert and oriented to person, place, and time   Deep Tendon Reflexes are normal;  Coordination and Balance: Normal   Musculoskeletal   Neck    ROM shows normal flexion and extension and lateral rotation    Palpation: Non-tender    Stability is normal    Strength is normal    Skin is normal without masses and lesions    Sensation is intact to light touch   Back    ROM showsabnormal flexion, extension    and  rotation    Palpation shows no " masses    Stability is normal    Strength to flexion and extension well maintained    Core strength is diminished    Skin shows no rashes or cafe au lait spots;     Sensation is intact to light touch    Right Knee  Swelling Mild  TendernessMedial joint line  Range of Motion: 120    Left Knee: Swelling Mild  TendernessMedial joint line  Range of Motion:     Radiograph   Osteoarthritis: severe  Angle: mild varus    Assessment:  Osteoarthritis left knee.    Plan:  Inject left knee.

## 2018-10-13 DIAGNOSIS — I48.3 TYPICAL ATRIAL FLUTTER: ICD-10-CM

## 2018-10-13 RX ORDER — APIXABAN 2.5 MG/1
TABLET, FILM COATED ORAL
Qty: 60 TABLET | Refills: 4 | Status: SHIPPED | OUTPATIENT
Start: 2018-10-13 | End: 2019-03-07 | Stop reason: SDUPTHER

## 2018-10-16 ENCOUNTER — TELEPHONE (OUTPATIENT)
Dept: FAMILY MEDICINE | Facility: CLINIC | Age: 68
End: 2018-10-16

## 2018-10-16 DIAGNOSIS — I50.32 CHRONIC DIASTOLIC CHF (CONGESTIVE HEART FAILURE): ICD-10-CM

## 2018-10-16 DIAGNOSIS — E66.01 MORBID OBESITY: Primary | ICD-10-CM

## 2018-10-16 NOTE — TELEPHONE ENCOUNTER
----- Message from Michelle Rodriguez sent at 10/16/2018  1:24 PM CDT -----  Pt called and stated she's having a hard time getting in an out the bathtub.  Pt stated she would like an order for a horizontal chair

## 2018-10-25 ENCOUNTER — TELEPHONE (OUTPATIENT)
Dept: DERMATOLOGY | Facility: CLINIC | Age: 68
End: 2018-10-25

## 2018-10-25 NOTE — TELEPHONE ENCOUNTER
Spoke to pt., she wanted to know what cream to use for her dry skin, informed her any moisturizer or vaseline is fine. Notified her she was to come back for follow up at the end of Sept., she said she would call back and schedule.      ----- Message from Leonard Mayen sent at 10/25/2018  2:57 PM CDT -----  Contact: Patsy Morris            Name of Who is Calling: Patsy Morris      What is the request in detail: Patient called in regards to an ointment and her skin condition      Can the clinic reply by [facility]: No      [facility] [phone]

## 2018-11-30 ENCOUNTER — TELEPHONE (OUTPATIENT)
Dept: FAMILY MEDICINE | Facility: CLINIC | Age: 68
End: 2018-11-30

## 2018-11-30 NOTE — TELEPHONE ENCOUNTER
Patient stated she has a red bump on her leg, it has a little head on the tip, she also has spotted one on her arm.She wants to know if you can prescribe something for her or give her some advice.She tried to see her dermatologist today but there is no appointment until January.Please advise.Thanks.

## 2018-11-30 NOTE — TELEPHONE ENCOUNTER
----- Message from Ana Rosa Casillas sent at 11/30/2018 10:38 AM CST -----  Contact: pt  Name of Who is Calling:SARAH DIOP [[id number]]    What is the request in detail: patient would like a call back , patient has a medical question Please contact to further discuss and advise     Can the clinic reply by [facility]: No    [facility] [phone]

## 2018-12-04 ENCOUNTER — LAB VISIT (OUTPATIENT)
Dept: LAB | Facility: HOSPITAL | Age: 68
End: 2018-12-04
Attending: FAMILY MEDICINE
Payer: MEDICARE

## 2018-12-04 ENCOUNTER — OFFICE VISIT (OUTPATIENT)
Dept: FAMILY MEDICINE | Facility: CLINIC | Age: 68
End: 2018-12-04
Attending: FAMILY MEDICINE
Payer: MEDICARE

## 2018-12-04 VITALS
HEART RATE: 69 BPM | RESPIRATION RATE: 16 BRPM | WEIGHT: 224.88 LBS | BODY MASS INDEX: 39.84 KG/M2 | OXYGEN SATURATION: 91 % | SYSTOLIC BLOOD PRESSURE: 124 MMHG | DIASTOLIC BLOOD PRESSURE: 80 MMHG | HEIGHT: 63 IN

## 2018-12-04 DIAGNOSIS — D64.9 ANEMIA, NORMOCYTIC NORMOCHROMIC: ICD-10-CM

## 2018-12-04 DIAGNOSIS — D64.9 ANEMIA, NORMOCYTIC NORMOCHROMIC: Primary | ICD-10-CM

## 2018-12-04 DIAGNOSIS — Z12.31 ENCOUNTER FOR SCREENING MAMMOGRAM FOR BREAST CANCER: ICD-10-CM

## 2018-12-04 LAB
ALBUMIN SERPL BCP-MCNC: 3.5 G/DL
ALP SERPL-CCNC: 57 U/L
ALT SERPL W/O P-5'-P-CCNC: 12 U/L
ANION GAP SERPL CALC-SCNC: 6 MMOL/L
AST SERPL-CCNC: 16 U/L
BASOPHILS # BLD AUTO: 0.02 K/UL
BASOPHILS NFR BLD: 0.2 %
BILIRUB SERPL-MCNC: 0.4 MG/DL
BUN SERPL-MCNC: 14 MG/DL
CALCIUM SERPL-MCNC: 9.4 MG/DL
CHLORIDE SERPL-SCNC: 103 MMOL/L
CO2 SERPL-SCNC: 34 MMOL/L
CREAT SERPL-MCNC: 1.6 MG/DL
DIFFERENTIAL METHOD: ABNORMAL
EOSINOPHIL # BLD AUTO: 0 K/UL
EOSINOPHIL NFR BLD: 0.5 %
ERYTHROCYTE [DISTWIDTH] IN BLOOD BY AUTOMATED COUNT: 13.8 %
EST. GFR  (AFRICAN AMERICAN): 37.4 ML/MIN/1.73 M^2
EST. GFR  (NON AFRICAN AMERICAN): 32.4 ML/MIN/1.73 M^2
FERRITIN SERPL-MCNC: 25 NG/ML
GLUCOSE SERPL-MCNC: 112 MG/DL
HCT VFR BLD AUTO: 36.2 %
HGB BLD-MCNC: 11.1 G/DL
IMM GRANULOCYTES # BLD AUTO: 0.03 K/UL
IMM GRANULOCYTES NFR BLD AUTO: 0.4 %
IRON SERPL-MCNC: 43 UG/DL
LYMPHOCYTES # BLD AUTO: 1 K/UL
LYMPHOCYTES NFR BLD: 11.5 %
MCH RBC QN AUTO: 27.3 PG
MCHC RBC AUTO-ENTMCNC: 30.7 G/DL
MCV RBC AUTO: 89 FL
MONOCYTES # BLD AUTO: 0.7 K/UL
MONOCYTES NFR BLD: 8.2 %
NEUTROPHILS # BLD AUTO: 6.5 K/UL
NEUTROPHILS NFR BLD: 79.2 %
NRBC BLD-RTO: 0 /100 WBC
PLATELET # BLD AUTO: 184 K/UL
PMV BLD AUTO: 11.4 FL
POTASSIUM SERPL-SCNC: 5.4 MMOL/L
PROT SERPL-MCNC: 6.8 G/DL
RBC # BLD AUTO: 4.06 M/UL
SATURATED IRON: 14 %
SODIUM SERPL-SCNC: 143 MMOL/L
TOTAL IRON BINDING CAPACITY: 302 UG/DL
TRANSFERRIN SERPL-MCNC: 204 MG/DL
WBC # BLD AUTO: 8.25 K/UL

## 2018-12-04 PROCEDURE — 3074F SYST BP LT 130 MM HG: CPT | Mod: CPTII,S$GLB,, | Performed by: FAMILY MEDICINE

## 2018-12-04 PROCEDURE — 82728 ASSAY OF FERRITIN: CPT

## 2018-12-04 PROCEDURE — 83540 ASSAY OF IRON: CPT

## 2018-12-04 PROCEDURE — 85025 COMPLETE CBC W/AUTO DIFF WBC: CPT

## 2018-12-04 PROCEDURE — 99214 OFFICE O/P EST MOD 30 MIN: CPT | Mod: S$GLB,,, | Performed by: FAMILY MEDICINE

## 2018-12-04 PROCEDURE — 80053 COMPREHEN METABOLIC PANEL: CPT

## 2018-12-04 PROCEDURE — 1101F PT FALLS ASSESS-DOCD LE1/YR: CPT | Mod: CPTII,S$GLB,, | Performed by: FAMILY MEDICINE

## 2018-12-04 PROCEDURE — 36415 COLL VENOUS BLD VENIPUNCTURE: CPT | Mod: PO

## 2018-12-04 PROCEDURE — 99999 PR PBB SHADOW E&M-EST. PATIENT-LVL IV: CPT | Mod: PBBFAC,,, | Performed by: FAMILY MEDICINE

## 2018-12-04 PROCEDURE — 3079F DIAST BP 80-89 MM HG: CPT | Mod: CPTII,S$GLB,, | Performed by: FAMILY MEDICINE

## 2018-12-04 RX ORDER — TRIAMCINOLONE ACETONIDE 1 MG/G
CREAM TOPICAL 2 TIMES DAILY
Qty: 45 G | Refills: 0 | Status: SHIPPED | OUTPATIENT
Start: 2018-12-04 | End: 2019-03-16 | Stop reason: SDUPTHER

## 2018-12-04 RX ORDER — AMMONIUM LACTATE 12 G/100G
LOTION TOPICAL
Qty: 225 G | Refills: 1 | Status: ON HOLD | OUTPATIENT
Start: 2018-12-04 | End: 2020-01-01 | Stop reason: HOSPADM

## 2018-12-10 ENCOUNTER — TELEPHONE (OUTPATIENT)
Dept: FAMILY MEDICINE | Facility: CLINIC | Age: 68
End: 2018-12-10

## 2018-12-10 NOTE — PROGRESS NOTES
"Subjective:       Patient ID: Patsy Morris is a 68 y.o. female.    Chief Complaint: Anemia    HPI   Pt is here for follow up of anemia no vaginal bleeding no brbpr pt is not taking iron consistently no sob/cp no light headedness pos ftigue  Review of Systems   Constitutional: Negative for chills, fatigue and fever.   Respiratory: Negative for cough, chest tightness and shortness of breath.    Cardiovascular: Negative for chest pain and palpitations.   Gastrointestinal: Negative for abdominal distention.   Hematological: Negative for adenopathy. Does not bruise/bleed easily.       Objective:      Physical Exam   Constitutional: She appears well-developed and well-nourished. No distress.   Cardiovascular: Normal rate and regular rhythm. Exam reveals no gallop.   Pulmonary/Chest: Effort normal and breath sounds normal. No respiratory distress.   Abdominal: Soft. Bowel sounds are normal. She exhibits no distension. There is no tenderness.     labs discussed with pt   Assessment:       1. Anemia, normocytic normochromic    2. Encounter for screening mammogram for breast cancer        Plan:     orders cmp lcbc iron studies  Cont meds  High iron diet  Increase ater intake  Rtc quarterly        "This note will not be shared with the patient."   "

## 2018-12-13 RX ORDER — FERROUS SULFATE 325(65) MG
TABLET ORAL
Qty: 30 TABLET | Refills: 6 | Status: SHIPPED | OUTPATIENT
Start: 2018-12-13 | End: 2019-08-15 | Stop reason: SDUPTHER

## 2018-12-15 ENCOUNTER — OFFICE VISIT (OUTPATIENT)
Dept: URGENT CARE | Facility: CLINIC | Age: 68
End: 2018-12-15
Payer: MEDICARE

## 2018-12-15 VITALS
DIASTOLIC BLOOD PRESSURE: 50 MMHG | RESPIRATION RATE: 18 BRPM | HEIGHT: 63 IN | HEART RATE: 85 BPM | BODY MASS INDEX: 39.69 KG/M2 | WEIGHT: 224 LBS | SYSTOLIC BLOOD PRESSURE: 130 MMHG | TEMPERATURE: 99 F

## 2018-12-15 DIAGNOSIS — L01.00 IMPETIGO: Primary | ICD-10-CM

## 2018-12-15 PROCEDURE — 99214 OFFICE O/P EST MOD 30 MIN: CPT | Mod: S$GLB,,, | Performed by: EMERGENCY MEDICINE

## 2018-12-15 PROCEDURE — 3075F SYST BP GE 130 - 139MM HG: CPT | Mod: CPTII,S$GLB,, | Performed by: EMERGENCY MEDICINE

## 2018-12-15 PROCEDURE — 1101F PT FALLS ASSESS-DOCD LE1/YR: CPT | Mod: CPTII,S$GLB,, | Performed by: EMERGENCY MEDICINE

## 2018-12-15 PROCEDURE — 3078F DIAST BP <80 MM HG: CPT | Mod: CPTII,S$GLB,, | Performed by: EMERGENCY MEDICINE

## 2018-12-15 RX ORDER — MUPIROCIN 20 MG/G
OINTMENT TOPICAL
Qty: 22 G | Refills: 1 | Status: ON HOLD | OUTPATIENT
Start: 2018-12-15 | End: 2020-01-01 | Stop reason: HOSPADM

## 2018-12-15 NOTE — PROGRESS NOTES
"Subjective:       Patient ID: Patsy Morris is a 68 y.o. female.    Vitals:  height is 5' 3" (1.6 m) and weight is 101.6 kg (224 lb). Her temperature is 99.4 °F (37.4 °C). Her blood pressure is 130/50 (abnormal) and her pulse is 85. Her respiration is 18.     Chief Complaint: Insect Bite    Pt c/o poor wound healing on right lower leg . Pt was seen by primary for this, and given  Lac hytrin.  Tetanus UTD.      Insect Bite   This is a new problem. The current episode started in the past 7 days. The problem occurs constantly. The problem has been unchanged. Pertinent negatives include no arthralgias, chest pain, chills, congestion, coughing, fatigue, fever, headaches, joint swelling, myalgias, nausea, rash, sore throat, vertigo, vomiting or weakness. Nothing aggravates the symptoms. Treatments tried: Triamcinolone cream  The treatment provided no relief.       Constitution: Negative for chills, fatigue and fever.   HENT: Negative for congestion and sore throat.    Neck: Negative for painful lymph nodes.   Cardiovascular: Negative for chest pain and leg swelling.   Eyes: Negative for double vision and blurred vision.   Respiratory: Negative for cough and shortness of breath.    Gastrointestinal: Negative for nausea, vomiting and diarrhea.   Genitourinary: Negative for dysuria, frequency, urgency and history of kidney stones.   Musculoskeletal: Negative for joint pain, joint swelling, muscle cramps and muscle ache.   Skin: Positive for lesion and erythema. Negative for color change, pale, rash and bruising.   Allergic/Immunologic: Negative for seasonal allergies.   Neurological: Negative for dizziness, history of vertigo, light-headedness, passing out and headaches.   Hematologic/Lymphatic: Negative for swollen lymph nodes.   Psychiatric/Behavioral: Negative for nervous/anxious, sleep disturbance and depression. The patient is not nervous/anxious.        Objective:      Physical Exam   Constitutional: She is " oriented to person, place, and time. She appears well-developed and well-nourished. She does not appear ill. No distress.   HENT:   Head: Normocephalic.   Cardiovascular: Normal rate.   Pulses:       Dorsalis pedis pulses are 2+ on the right side.        Posterior tibial pulses are 2+ on the right side.   Pulmonary/Chest: Effort normal. No stridor. No respiratory distress.   Musculoskeletal: Normal range of motion. She exhibits no edema.        Right lower leg: She exhibits no tenderness.        Legs:  Neurological: She is alert and oriented to person, place, and time.   Skin: Skin is warm and dry. There is erythema.   Vitals reviewed.      Assessment:       1. Impetigo        Plan:         Impetigo  -     mupirocin (BACTROBAN) 2 % ointment; Apply to affected area 3 times daily  Dispense: 22 g; Refill: 1  No red flags, f/u with PCP if not improved    Follow up with primary care provider if not improved  Go to ER for new, worse or concerning symptoms

## 2018-12-15 NOTE — PATIENT INSTRUCTIONS
"Follow up with primary care provider if not improved  Go to ER for new, worse or concerning symptoms    Impetigo  Impetigo is a common bacterial infection of the skin that can appear on many parts of the body. It can happen to anyone, of any age, but is more common in children. For this reason, it used to be called "school sores."  Causes  Its normal to get scrapes on your body from activity or from scratching your skin. The skin normally has bacteria on it. Sometimes an impetigo infection can start on healthy skin. But it usually starts when there is an injury to the skin, or break in the skin. Although nothing usually happens, the bacteria normally on the skin can cause infection. This is the most common way people get impetigo.  Impetigo is very contagious. So once there is an infection, it needs to be treated so it doesn't get worse, spread to other areas, or to other people. Impetigo can easily be passed to other family members, friends, schoolmates, or co-workers, through scratching, rubbing, or touching an infected area. Common causes include:  · After a cold  · Bites  · From another infected person  · Injury to skin  · Insect bites  · Other skin problems that are infected, such as eczema  · Scratches  Symptoms  There is often a skin injury like a scratch, scrape, or insect bite that may have gone unnoticed or been ignored before the infection began. Symptoms of impetigo include:  · Red, inflamed area or rash  · One or many red bumps  · Bumps that turn into blisters filled with yellow fluid or pus  · Blisters break or leak causing honey-colored crusting or scabbing over the area  · Skin sores that spread to other surrounding areas  Home care  The following guidelines will help you care for your infection at home.  Wound care  · Trim fingernails and cover sores with an adhesive bandage, if needed, to prevent scratching. Picking at the sores may leave a scar.  · If the infection is on or around your lips, " don't lick or chew on the sores. This will make the infection worse.  · If a bandage or dressing is used, you can put a nonstick dressing over it.  · Wash your hands and your childs hands often. This will avoid spreading the infection to other parts of the body and to other people. Do not share the infected persons washcloths, towels, pillows, sheets, or clothes with others. Wash these items in hot water before using again.  · Clean the area several times a day. You dont want to scrub the area. The best way to do this is to soak the sores in warm, soapy water until they get soft enough to be wiped away. This will help remove the crust that forms from the dried liquid. In areas that you cant soak, like the mouth or face, you can put a clean, warm washcloth over the infected are for 5 to 10 minutes at a time, until the scabs soften enough to remove.  Medicines  · You can use over-the-counter medicine as directed based on age and weight for pain, fever, fussiness, or discomfort, unless another medicine was prescribed. In infants ages 6 months and older, you may use ibuprofen as well as acetaminophen. You can alternate them, or use both together. They work differently and are a different class of medicines, so taking them together is not an overdose. If you or your child has chronic liver or kidney disease or ever had a stomach ulcer or gastrointestinal bleeding, talk with your healthcare provider before using these medicines. Also talk with your healthcare provider if your child is taking blood-thinner medicines.  · Do not give aspirin to your child. Aspirin should never be used in children ages 18 and younger who is ill with a fever. It may cause severe disease or death.   · Impetigo can often be cured with topical creams. Apply these as directed by your healthcare provider.  · If you were given oral antibiotics, take them until they are used up. It is important to finish the antibiotics even if the wound looks  better to make sure the infection has cleared.  Follow-up care  Follow up with your healthcare provider if the sores continue to spread after 3 days of treatment. It will take about 7 to 10 days to heal completely.  Your child should stay out of school until completing 2 full days of antibiotic treatment.  When to seek medical advice  Call your healthcare provider right away if any of the following occur:  · Fever of 100.4°F (38°C) or higher, or as directed  · Increased amounts of fluid or pus coming from the sores  · Increasing number of sores or spreading areas of redness after 2 days of treatment with antibiotics  · Increasing swelling or pain  · Loss of appetite or vomiting  · Unusual drowsiness, weakness, or change in behavior  Date Last Reviewed: 8/1/2016 © 2000-2017 [employer]. [address] [place] [address], [place] [address]. All rights reserved. This information is not intended as a substitute for professional medical care. Always follow your healthcare professional's instructions.

## 2018-12-18 ENCOUNTER — OFFICE VISIT (OUTPATIENT)
Dept: OTOLARYNGOLOGY | Facility: CLINIC | Age: 68
End: 2018-12-18
Payer: MEDICARE

## 2018-12-18 VITALS
BODY MASS INDEX: 39.44 KG/M2 | WEIGHT: 222.69 LBS | DIASTOLIC BLOOD PRESSURE: 78 MMHG | SYSTOLIC BLOOD PRESSURE: 122 MMHG | HEART RATE: 68 BPM

## 2018-12-18 DIAGNOSIS — H93.8X3 SENSATION OF FULLNESS IN BOTH EARS: Primary | ICD-10-CM

## 2018-12-18 PROCEDURE — 69210 REMOVE IMPACTED EAR WAX UNI: CPT | Mod: S$GLB,,, | Performed by: OTOLARYNGOLOGY

## 2018-12-18 PROCEDURE — 1101F PT FALLS ASSESS-DOCD LE1/YR: CPT | Mod: CPTII,S$GLB,, | Performed by: OTOLARYNGOLOGY

## 2018-12-18 PROCEDURE — 99999 PR PBB SHADOW E&M-EST. PATIENT-LVL II: CPT | Mod: PBBFAC,,, | Performed by: OTOLARYNGOLOGY

## 2018-12-18 PROCEDURE — 99213 OFFICE O/P EST LOW 20 MIN: CPT | Mod: 25,S$GLB,, | Performed by: OTOLARYNGOLOGY

## 2018-12-18 NOTE — PROGRESS NOTES
Subjective:       Patient ID: Patsy Morris is a 68 y.o. female.    Chief Complaint: Ear Fullness    HPI  Review of Systems    Objective:      Physical Exam    Assessment:       1. Sensation of fullness in both ears        Plan:

## 2018-12-18 NOTE — PROGRESS NOTES
Subjective:       Patient ID: Patsy Morris is a 68 y.o. female.    Chief Complaint: Ear Fullness    Ear Fullness    There is pain in both ears. This is a recurrent problem. The current episode started 1 to 4 weeks ago. The problem occurs constantly. The problem has been unchanged. There has been no fever. The patient is experiencing no pain. Associated symptoms include hearing loss. Pertinent negatives include no coughing, diarrhea, ear discharge, headaches, neck pain, rash, rhinorrhea or vomiting. Her past medical history is significant for hearing loss.     Review of Systems   Constitutional: Negative for activity change, appetite change and fever.   HENT: Positive for hearing loss. Negative for congestion, ear discharge, ear pain, nosebleeds, postnasal drip, rhinorrhea and sneezing.    Eyes: Negative for redness and visual disturbance.   Respiratory: Negative for apnea, cough, shortness of breath and wheezing.    Cardiovascular: Negative for chest pain and palpitations.   Gastrointestinal: Negative for diarrhea and vomiting.   Genitourinary: Negative for difficulty urinating and frequency.   Musculoskeletal: Negative for arthralgias, back pain, gait problem and neck pain.   Skin: Negative for color change and rash.   Neurological: Negative for dizziness, speech difficulty, weakness and headaches.   Hematological: Negative for adenopathy. Does not bruise/bleed easily.   Psychiatric/Behavioral: Negative for agitation and behavioral problems.       Objective:        Constitutional:   Vital signs are normal. She appears well-developed and well-nourished. She is active. Normal speech.      Head:  Normocephalic and atraumatic. Salivary glands normal.  Facial strength is normal.      Nose:  Nose normal including turbinates, nasal mucosa, sinuses and nasal septum.     Mouth/Throat  Oropharynx clear and moist without lesions or asymmetry and normal uvula midline.     Neck:  Neck normal without thyromegaly masses,  asymmetry, normal tracheal structure, crepitus, and tenderness, thyroid normal, trachea normal, phonation normal and full range of motion with neck supple.     Psychiatric:   She has a normal mood and affect. Her speech is normal and behavior is normal.     Neurological:   She has neurological normal, alert and oriented.     Skin:   No abrasions, lacerations, lesions, or rashes.         PROCEDURE NOTE:  Ceruminosis is noted in both EACs.  Wax was removed by manual debridement and suctioning utilizing the assistance of binocular microscopy revealing EACs and TMs WNL. The patient tolerated this procedure without difficulty. The subjective decrease noted in hearing pre-cleaning was resolved post-cleaning.       Assessment:       1. Sensation of fullness in both ears        Plan:       1. Hearing conservation strongly recommended.  2. Trial of amplification is not currently indicated.  3. Re-check of hearing after 73 years of age.  4. F/U with PCP as per schedule.

## 2018-12-24 ENCOUNTER — TELEPHONE (OUTPATIENT)
Dept: DERMATOLOGY | Facility: CLINIC | Age: 68
End: 2018-12-24

## 2018-12-24 DIAGNOSIS — E11.9 TYPE 2 DIABETES MELLITUS WITHOUT COMPLICATION: ICD-10-CM

## 2018-12-24 NOTE — TELEPHONE ENCOUNTER
Called PT and informed her that Dr. CID was not in clinic till Jan 2nd. Offered to book PT in that day. Pt accepted and appointment was booked.      ----- Message from Patria Cabrera sent at 12/24/2018  8:55 AM CST -----  Contact: pt            Name of Who is Calling: giovanni      What is the request in detail: requesting a call back to be seen for a bite on right leg. Pt has gone to her PCP as well as urgent care. Has been treating it with an ointmtent  And its not helping at all the bite is getting bigger.Please call and advise      Can the clinic reply by [facility]: no      What Number to Call Back if not in [facility]: [phone]

## 2018-12-28 DIAGNOSIS — N18.9 CHRONIC KIDNEY DISEASE, UNSPECIFIED CKD STAGE: ICD-10-CM

## 2019-01-02 ENCOUNTER — OFFICE VISIT (OUTPATIENT)
Dept: DERMATOLOGY | Facility: CLINIC | Age: 69
End: 2019-01-02
Payer: MEDICARE

## 2019-01-02 DIAGNOSIS — L82.0 INFLAMED SEBORRHEIC KERATOSIS: Primary | ICD-10-CM

## 2019-01-02 DIAGNOSIS — I78.8 CAPILLARITIS: ICD-10-CM

## 2019-01-02 DIAGNOSIS — L82.1 SEBORRHEIC KERATOSIS: ICD-10-CM

## 2019-01-02 PROCEDURE — 1101F PT FALLS ASSESS-DOCD LE1/YR: CPT | Mod: CPTII,S$GLB,, | Performed by: DERMATOLOGY

## 2019-01-02 PROCEDURE — 99214 PR OFFICE/OUTPT VISIT, EST, LEVL IV, 30-39 MIN: ICD-10-PCS | Mod: S$GLB,,, | Performed by: DERMATOLOGY

## 2019-01-02 PROCEDURE — 99214 OFFICE O/P EST MOD 30 MIN: CPT | Mod: S$GLB,,, | Performed by: DERMATOLOGY

## 2019-01-02 PROCEDURE — 1101F PR PT FALLS ASSESS DOC 0-1 FALLS W/OUT INJ PAST YR: ICD-10-PCS | Mod: CPTII,S$GLB,, | Performed by: DERMATOLOGY

## 2019-01-02 NOTE — PROGRESS NOTES
"  Subjective:       Patient ID:  Patsy Morris is a 68 y.o. female who presents for   Chief Complaint   Patient presents with    Lesion     right lower leg, 6 weeks,      Pt is here today with a c/o of lesion to her right lower leg that has been present for about 6 weeks. Pt states that her PCP gave her silvadene cream and that did not help. Pt went to urgent care and was prescribed an antibiotic ointment. Pt states that she used antibiotic ointment for about 2 weeks. Pt states that redness around lesion appeared about 2 weeks ago. Patient denies any itching, bleeding, pain, or rapid growth.        Lesion  - Initial  Affected locations: right lower leg  Duration: 6 weeks  Signs / symptoms: scabs and redness (only symptomatic when legs are elevated--feels "stretched")  Severity: mild  Timing: constant  Aggravated by: nothing  Treatments tried: bactroban ointment, silvadene cream.  Improvement on treatment: no relief      Review of Systems   Constitutional: Negative for fever and chills.   Cardiovascular: Negative for pedal edema.   Skin: Negative for itching and rash.        Objective:    Physical Exam   Constitutional: She appears well-developed and well-nourished. No distress.   HENT:   Mouth/Throat: Lips normal.    Eyes: Lids are normal.  No conjunctival no injection.   Cardiovascular: There is dependent edema ( 2+ pitting edema bilaterally ).     Neurological: She is alert and oriented to person, place, and time. She is not disoriented.   Psychiatric: She has a normal mood and affect.   Skin:   Areas Examined (abnormalities noted in diagram):   Head / Face Inspection Performed  Neck Inspection Performed  Chest / Axilla Inspection Performed  Back Inspection Performed  RUE Inspected  LUE Inspection Performed  RLE Inspected  LLE Inspection Performed                       Diagram Legend     Erythematous scaling macule/papule c/w actinic keratosis       Vascular papule c/w angioma      Pigmented verrucoid " papule/plaque c/w seborrheic keratosis      Yellow umbilicated papule c/w sebaceous hyperplasia      Irregularly shaped tan macule c/w lentigo     1-2 mm smooth white papules consistent with Milia      Movable subcutaneous cyst with punctum c/w epidermal inclusion cyst      Subcutaneous movable cyst c/w pilar cyst      Firm pink to brown papule c/w dermatofibroma      Pedunculated fleshy papule(s) c/w skin tag(s)      Evenly pigmented macule c/w junctional nevus     Mildly variegated pigmented, slightly irregular-bordered macule c/w mildly atypical nevus      Flesh colored to evenly pigmented papule c/w intradermal nevus       Pink pearly papule/plaque c/w basal cell carcinoma      Erythematous hyperkeratotic cursted plaque c/w SCC      Surgical scar with no sign of skin cancer recurrence      Open and closed comedones      Inflammatory papules and pustules      Verrucoid papule consistent consistent with wart     Erythematous eczematous patches and plaques     Dystrophic onycholytic nail with subungual debris c/w onychomycosis     Umbilicated papule    Erythematous-base heme-crusted tan verrucoid plaque consistent with inflamed seborrheic keratosis     Erythematous Silvery Scaling Plaque c/w Psoriasis     See annotation      Assessment / Plan:        Inflamed seborrheic keratosis, suspected  Capillaritis  Likely purpura worsened due to underlying venous stasis/capillaritis.  Discussed biopsy vs. treatment with cryo vs. monitoring.  Prefer not to perform procedure on lower leg if not necessary, so will monitor to ensure that lesion is not changing/progressing.  Recommended compression socks/hose (at least 18 mm Hg).    Seborrheic keratosis  These are benign, inherited growths without a malignant potential. Reassurance given to patient. No treatment is necessary. Handout was provided.    Follow-up in about 2 weeks (around 1/16/2019) for follow up, or sooner if symptoms worsening or not improving.

## 2019-02-13 ENCOUNTER — OFFICE VISIT (OUTPATIENT)
Dept: SPORTS MEDICINE | Facility: CLINIC | Age: 69
End: 2019-02-13
Payer: MEDICARE

## 2019-02-13 VITALS — BODY MASS INDEX: 39.34 KG/M2 | WEIGHT: 222 LBS | TEMPERATURE: 98 F | HEIGHT: 63 IN

## 2019-02-13 DIAGNOSIS — R53.81 PHYSICAL DECONDITIONING: ICD-10-CM

## 2019-02-13 DIAGNOSIS — M17.0 PRIMARY OSTEOARTHRITIS OF KNEES, BILATERAL: Primary | ICD-10-CM

## 2019-02-13 PROCEDURE — 1101F PT FALLS ASSESS-DOCD LE1/YR: CPT | Mod: CPTII,S$GLB,, | Performed by: FAMILY MEDICINE

## 2019-02-13 PROCEDURE — 20611 LARGE JOINT ASPIRATION/INJECTION: R KNEE, L KNEE: ICD-10-PCS | Mod: 50,S$GLB,, | Performed by: FAMILY MEDICINE

## 2019-02-13 PROCEDURE — 99999 PR PBB SHADOW E&M-EST. PATIENT-LVL III: CPT | Mod: PBBFAC,,, | Performed by: FAMILY MEDICINE

## 2019-02-13 PROCEDURE — 99204 OFFICE O/P NEW MOD 45 MIN: CPT | Mod: 25,S$GLB,, | Performed by: FAMILY MEDICINE

## 2019-02-13 PROCEDURE — 99204 PR OFFICE/OUTPT VISIT, NEW, LEVL IV, 45-59 MIN: ICD-10-PCS | Mod: 25,S$GLB,, | Performed by: FAMILY MEDICINE

## 2019-02-13 PROCEDURE — 1101F PR PT FALLS ASSESS DOC 0-1 FALLS W/OUT INJ PAST YR: ICD-10-PCS | Mod: CPTII,S$GLB,, | Performed by: FAMILY MEDICINE

## 2019-02-13 PROCEDURE — 20611 DRAIN/INJ JOINT/BURSA W/US: CPT | Mod: 50,S$GLB,, | Performed by: FAMILY MEDICINE

## 2019-02-13 PROCEDURE — 99999 PR PBB SHADOW E&M-EST. PATIENT-LVL III: ICD-10-PCS | Mod: PBBFAC,,, | Performed by: FAMILY MEDICINE

## 2019-02-13 RX ORDER — TRIAMCINOLONE ACETONIDE 40 MG/ML
40 INJECTION, SUSPENSION INTRA-ARTICULAR; INTRAMUSCULAR
Status: DISCONTINUED | OUTPATIENT
Start: 2019-02-13 | End: 2019-02-13 | Stop reason: HOSPADM

## 2019-02-13 RX ADMIN — TRIAMCINOLONE ACETONIDE 40 MG: 40 INJECTION, SUSPENSION INTRA-ARTICULAR; INTRAMUSCULAR at 01:02

## 2019-02-13 NOTE — PROGRESS NOTES
Patsy Morris, a 68 y.o. female, presents today for evaluation of her left and right knee. Patient reports that she has previously received CSI per Dr. [facility] on 18.10.04 of both her left and right knee. Patient reports that she has had other injections in the past that last about 2 months and has 60% improvement.      History of Present Illness (HPI)  Location: anterior knee, L>R  Onset: Chronic, 8 years  Palliative:    Relative rest   Oral analgesics   CSI, walter, 18.10.04, 60% improvement for 2 months    Provocative:    ADLS   Ambulation    Prior: Saw Dr. [facility] for Cl knee  Progression: Worsening discomfort  Quality:    sharp pain  Radiation: none  Severity: per nursing documentation  Timing: intermittent w/ use  Trauma: none recently    Review of Systems (ROS)  A 10+ review of systems was performed with pertinent positives and negatives noted above in the history of present illness. Other systems were negative unless otherwise specified.    Physical Examination (PE)  General:  The patient is alert and oriented x 3. Mood is pleasant. Observation of ears, eyes and nose reveal no gross abnormalities. HEENT: NCAT, sclera anicteric.   Lungs: Respirations are equal and unlabored.  Gait is coordinated. Patient can toe walk and heel walk without difficulty.    CL KNEE EXAMINATION    Observation/Inspection  Gait:   antalgic, in wheelchair at visit  Alignment:  Neutral   Scars:   None   Muscle atrophy: Mild  Effusion:  None   Warmth:  None   Discoloration:   none     Tenderness / Crepitus (T / C):         T / C      T / C  Patella   - / -   Lateral joint line   - / -     Peripatellar medial  -  Medial joint line    + / -  Peripatellar lateral -  Medial plica   - / -  Patellar tendon -   Popliteal fossa   - / -  Quad tendon   -   Gastrocnemius   -  Prepatellar Bursa - / -   Quadricep   -  Tibial tubercle  -  Thigh/hamstring  -  Pes anserine/HS -  Fibula    -  ITB   - / -  Tibia     -  Tib/fib joint  - /  -  LCL    -    MFC   + / -   MCL: Proximal  -    LFC   - / -   Distal    -          ROM: (* = pain)  PASSIVE   ACTIVE    Left :   *5 / 0 / 120*   5 / 0 / 110*     Right :    *5 / 0 / 120*   5 / 0 / 110*    Patellofemoral examination:  See above noted areas of tenderness.   Patella position    Subluxation / dislocation: Centered        Sup. / Inf;   Normal   Crepitus (PF):    Absent   Patellar Mobility:       Medial-lateral:   Normal    Superior-inferior:  Normal    Inferior tilt   Normal    Patellar tendon:  Normal   Lateral tilt:    Normal   J-sign:     None   Patellofemoral grind:   No pain     Meniscal Signs:     Pain on terminal extension:  +  Pain on terminal flexion:  +  Areliss maneuver:  +*  Squat     NT    Ligament Examination:  ACL / Lachman:  WNL  PCL-Post.  drawer: normal 0 to 2mm  MCL- Valgus:  normal 0 to 2mm  LCL- Varus:    normal 0 to 2mm  Pivot shift:  guarding   Dial Test:   difference c/w other side   At 30° flexion: normal (< 5°)    At 90° flexion: normal (< 5°)   Reverse Pivot Shift:   normal (Equal)     Strength: (* = with pain) Painful Side  Quadriceps   4/5  Hamstrin/5    Extremity Neuro-vascular Examination:   Sensation:  Grossly intact to light touch all dermatomal regions.   Motor Function:  Fully intact motor function at hip, knee, foot and ankle    DTRs;  quadriceps and  achilles 2+.  No clonus and downgoing Babinski.    Vascular status:  DP and PT pulses 2+, brisk capillary refill, symmetric.     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, bilat    No evidence of neurologic pathology  No evidence of vascular pathology    Imaging studies reviewed:   X-ray knee, bilateral 18.06    Plan:    We discussed the importance of appropriate diet, weight, and regular exercise including quadriceps strengthening     We discussed options including:  #1 watchful waiting  #2 physical therapy aimed  at:   Core stability   RoM knee   Strengthening quadriceps   Gait training   #3 injection therapy:   CSI iaknee     Right,     Left,    VSI iaknee    Right,     Left,    Orthobiologics   #4 consultation re: TKA   Not healthy enough for surgery     The patient chooses #2 and #3 csi iaknee bilat    Pain management: handout given  Bracing:   Physical [facility]: [facility], begin as above   Activity (e.g. sports, work) restrictions: as tolerated   school/vocation:     Follow up in 12 w  [facility]  A/e fPT, a/e csi  Ineffective-->consider vsi iaknee  Should symptoms worsen or fail to resolve, consider:  Revisiting the above options

## 2019-02-13 NOTE — PROCEDURES
"Large Joint Aspiration/Injection: R knee, L knee  Date/Time: 2/13/2019 1:18 PM  Performed by: Arie Stewart MD  Authorized by: Arie Stewart MD     Consent Done?:  Yes (Verbal)  Indications:  Pain  Procedure site marked: Yes    Timeout: Prior to procedure the correct patient, procedure, and site was verified      Location:  Knee  Site:  R knee and L knee  Prep: Patient was prepped and draped in usual sterile fashion    Ultrasonic Guidance for needle placement: Yes  Images are saved and documented.  Needle size:  20 G  Approach:  Lateral  Medications:  40 mg triamcinolone acetonide 40 mg/mL; 40 mg triamcinolone acetonide 40 mg/mL  Patient tolerance:  Patient tolerated the procedure well with no immediate complications    Additional Comments: Description of ultrasound utilization for needle guidance:   Ultrasound guidance used for needle localization. Images saved and stored for documentation. The knee joint was visualized. Dynamic visualization of the 20g x 3.5" needle was continuous throughout the procedure.      "

## 2019-02-14 ENCOUNTER — TELEPHONE (OUTPATIENT)
Dept: SPORTS MEDICINE | Facility: CLINIC | Age: 69
End: 2019-02-14

## 2019-02-14 DIAGNOSIS — M17.0 PRIMARY OSTEOARTHRITIS OF KNEES, BILATERAL: Primary | ICD-10-CM

## 2019-02-14 NOTE — TELEPHONE ENCOUNTER
----- Message from Rommel Zamora MA sent at 2/13/2019  4:01 PM CST -----  Contact: Patsy Morris      ----- Message -----  From: Rcahael Mcqueen  Sent: 2/13/2019   3:50 PM  To: [facility] Staff    Good Afternoon,    This pt wants Home Hlth. She says its too difficult to find transportation for OP PT. Is there a way we can set her up for this?    Thanks,    Rachael

## 2019-03-07 DIAGNOSIS — I48.3 TYPICAL ATRIAL FLUTTER: ICD-10-CM

## 2019-03-16 ENCOUNTER — OFFICE VISIT (OUTPATIENT)
Dept: FAMILY MEDICINE | Facility: CLINIC | Age: 69
End: 2019-03-16
Attending: FAMILY MEDICINE
Payer: MEDICARE

## 2019-03-16 VITALS
DIASTOLIC BLOOD PRESSURE: 60 MMHG | HEIGHT: 63 IN | BODY MASS INDEX: 39.8 KG/M2 | WEIGHT: 224.63 LBS | HEART RATE: 59 BPM | SYSTOLIC BLOOD PRESSURE: 118 MMHG | OXYGEN SATURATION: 99 %

## 2019-03-16 DIAGNOSIS — Z53.21 PATIENT LEFT AFTER TRIAGE: Primary | ICD-10-CM

## 2019-03-16 PROCEDURE — 99499 UNLISTED E&M SERVICE: CPT | Mod: S$GLB,,, | Performed by: FAMILY MEDICINE

## 2019-03-16 PROCEDURE — 99999 PR PBB SHADOW E&M-EST. PATIENT-LVL III: CPT | Mod: PBBFAC,,, | Performed by: FAMILY MEDICINE

## 2019-03-16 PROCEDURE — 99999 PR PBB SHADOW E&M-EST. PATIENT-LVL III: ICD-10-PCS | Mod: PBBFAC,,, | Performed by: FAMILY MEDICINE

## 2019-03-16 PROCEDURE — 99499 NO LOS: ICD-10-PCS | Mod: S$GLB,,, | Performed by: FAMILY MEDICINE

## 2019-03-16 NOTE — PATIENT INSTRUCTIONS
Your test results will be communicated to you via : [facility], Telephone or Letter.   If you have not received your test results in one week, please contact the clinic at [phone].

## 2019-03-19 ENCOUNTER — LAB VISIT (OUTPATIENT)
Dept: LAB | Facility: HOSPITAL | Age: 69
End: 2019-03-19
Attending: FAMILY MEDICINE
Payer: MEDICARE

## 2019-03-19 DIAGNOSIS — E11.9 TYPE 2 DIABETES MELLITUS WITHOUT COMPLICATION: ICD-10-CM

## 2019-03-19 DIAGNOSIS — E78.00 HYPERCHOLESTEROLEMIA: ICD-10-CM

## 2019-03-19 DIAGNOSIS — D50.9 IRON DEFICIENCY ANEMIA, UNSPECIFIED IRON DEFICIENCY ANEMIA TYPE: ICD-10-CM

## 2019-03-19 DIAGNOSIS — Z79.01 LONG TERM CURRENT USE OF ANTICOAGULANT: ICD-10-CM

## 2019-03-19 DIAGNOSIS — I10 HTN (HYPERTENSION), BENIGN: ICD-10-CM

## 2019-03-19 LAB
ALBUMIN SERPL BCP-MCNC: 3.8 G/DL
ALBUMIN SERPL BCP-MCNC: 3.8 G/DL
ALP SERPL-CCNC: 55 U/L
ALT SERPL W/O P-5'-P-CCNC: 9 U/L
ANION GAP SERPL CALC-SCNC: 10 MMOL/L
ANION GAP SERPL CALC-SCNC: 10 MMOL/L
AST SERPL-CCNC: 12 U/L
BASOPHILS # BLD AUTO: 0.01 K/UL
BASOPHILS NFR BLD: 0.1 %
BILIRUB SERPL-MCNC: 0.6 MG/DL
BUN SERPL-MCNC: 14 MG/DL
BUN SERPL-MCNC: 14 MG/DL
CALCIUM SERPL-MCNC: 9.4 MG/DL
CALCIUM SERPL-MCNC: 9.4 MG/DL
CHLORIDE SERPL-SCNC: 97 MMOL/L
CHLORIDE SERPL-SCNC: 97 MMOL/L
CHOLEST SERPL-MCNC: 125 MG/DL
CHOLEST/HDLC SERPL: 3 {RATIO}
CO2 SERPL-SCNC: 33 MMOL/L
CO2 SERPL-SCNC: 33 MMOL/L
CREAT SERPL-MCNC: 1.6 MG/DL
CREAT SERPL-MCNC: 1.6 MG/DL
DIFFERENTIAL METHOD: ABNORMAL
EOSINOPHIL # BLD AUTO: 0.1 K/UL
EOSINOPHIL NFR BLD: 0.8 %
ERYTHROCYTE [DISTWIDTH] IN BLOOD BY AUTOMATED COUNT: 14.7 %
EST. GFR  (AFRICAN AMERICAN): 37.4 ML/MIN/1.73 M^2
EST. GFR  (AFRICAN AMERICAN): 37.4 ML/MIN/1.73 M^2
EST. GFR  (NON AFRICAN AMERICAN): 32.4 ML/MIN/1.73 M^2
EST. GFR  (NON AFRICAN AMERICAN): 32.4 ML/MIN/1.73 M^2
ESTIMATED AVG GLUCOSE: 120 MG/DL
GLUCOSE SERPL-MCNC: 100 MG/DL
GLUCOSE SERPL-MCNC: 100 MG/DL
HBA1C MFR BLD HPLC: 5.8 %
HCT VFR BLD AUTO: 37.6 %
HDLC SERPL-MCNC: 42 MG/DL
HDLC SERPL: 33.6 %
HGB BLD-MCNC: 11.7 G/DL
IMM GRANULOCYTES # BLD AUTO: 0.03 K/UL
IMM GRANULOCYTES NFR BLD AUTO: 0.4 %
LDLC SERPL CALC-MCNC: 65.6 MG/DL
LYMPHOCYTES # BLD AUTO: 0.9 K/UL
LYMPHOCYTES NFR BLD: 12.1 %
MCH RBC QN AUTO: 27.1 PG
MCHC RBC AUTO-ENTMCNC: 31.1 G/DL
MCV RBC AUTO: 87 FL
MONOCYTES # BLD AUTO: 0.6 K/UL
MONOCYTES NFR BLD: 7.3 %
NEUTROPHILS # BLD AUTO: 6.1 K/UL
NEUTROPHILS NFR BLD: 79.3 %
NONHDLC SERPL-MCNC: 83 MG/DL
NRBC BLD-RTO: 0 /100 WBC
PHOSPHATE SERPL-MCNC: 3.8 MG/DL
PLATELET # BLD AUTO: 187 K/UL
PMV BLD AUTO: 10.4 FL
POTASSIUM SERPL-SCNC: 4.5 MMOL/L
POTASSIUM SERPL-SCNC: 4.5 MMOL/L
PROT SERPL-MCNC: 7 G/DL
RBC # BLD AUTO: 4.32 M/UL
SODIUM SERPL-SCNC: 140 MMOL/L
SODIUM SERPL-SCNC: 140 MMOL/L
TRIGL SERPL-MCNC: 87 MG/DL
WBC # BLD AUTO: 7.68 K/UL

## 2019-03-19 PROCEDURE — 80069 RENAL FUNCTION PANEL: CPT

## 2019-03-19 PROCEDURE — 80053 COMPREHEN METABOLIC PANEL: CPT

## 2019-03-19 PROCEDURE — 85025 COMPLETE CBC W/AUTO DIFF WBC: CPT

## 2019-03-19 PROCEDURE — 80061 LIPID PANEL: CPT

## 2019-03-19 PROCEDURE — 36415 COLL VENOUS BLD VENIPUNCTURE: CPT | Mod: PO

## 2019-03-19 PROCEDURE — 83036 HEMOGLOBIN GLYCOSYLATED A1C: CPT

## 2019-03-20 ENCOUNTER — TELEPHONE (OUTPATIENT)
Dept: FAMILY MEDICINE | Facility: CLINIC | Age: 69
End: 2019-03-20

## 2019-03-20 NOTE — TELEPHONE ENCOUNTER
Called and spoken with patient.  Informed her per Dr Cartagena that Labs are fine nothing acute/threatning were shown.    Reminded patient that she needs to repeat labs in 6 months now.    Pt verbalized understanding.

## 2019-03-20 NOTE — TELEPHONE ENCOUNTER
----- Message from Luisana Cartagena MD sent at 3/20/2019  9:54 AM CDT -----  Labs are fine andrew   acute

## 2019-03-21 ENCOUNTER — PATIENT OUTREACH (OUTPATIENT)
Dept: ADMINISTRATIVE | Facility: HOSPITAL | Age: 69
End: 2019-03-21

## 2019-03-21 DIAGNOSIS — Z12.39 SCREENING FOR BREAST CANCER: Primary | ICD-10-CM

## 2019-03-21 NOTE — PROGRESS NOTES
[facility] is committed to your overall health.  To help you get the most out of each of your visits, we will review your information to make sure you are up to date on all of your recommended tests and/or procedures.       Your PCP  Luisana Cartagena MD   found that you may be due for:       Health Maintenance Due   Topic Date Due    Influenza Vaccine  08/01/2018    Mammogram  10/11/2018    Foot Exam  02/22/2019    Urine Microalbumin  04/12/2019             If you have had any of the above done at another facility, please bring the records or information with you so that your record at [facility] will be complete.  If you would like to schedule any of these, please contact me.     If you are currently taking medication, please bring it with you to your appointment for review.     Also, if you have any type of Advanced Directives, please bring them with you to your office visit so we may scan them into your chart.       Thank you for Choosing [facility] for your healthcare needs.        Additional Information  If you have questions, you can email [email or web address] or call [phone]  to talk to our [facility] staff. Remember, [facility] is NOT to be used for urgent needs. For medical emergencies, dial 911.

## 2019-03-22 ENCOUNTER — OFFICE VISIT (OUTPATIENT)
Dept: CARDIOLOGY | Facility: CLINIC | Age: 69
End: 2019-03-22
Payer: MEDICARE

## 2019-03-22 VITALS
SYSTOLIC BLOOD PRESSURE: 120 MMHG | WEIGHT: 225 LBS | DIASTOLIC BLOOD PRESSURE: 50 MMHG | BODY MASS INDEX: 39.87 KG/M2 | HEIGHT: 63 IN | OXYGEN SATURATION: 97 % | HEART RATE: 62 BPM

## 2019-03-22 DIAGNOSIS — G47.33 OSA (OBSTRUCTIVE SLEEP APNEA): ICD-10-CM

## 2019-03-22 DIAGNOSIS — Z72.0 TOBACCO ABUSE: ICD-10-CM

## 2019-03-22 DIAGNOSIS — I48.3 TYPICAL ATRIAL FLUTTER: ICD-10-CM

## 2019-03-22 DIAGNOSIS — Z79.01 LONG TERM CURRENT USE OF ANTICOAGULANT: ICD-10-CM

## 2019-03-22 DIAGNOSIS — N28.9 RENAL INSUFFICIENCY: ICD-10-CM

## 2019-03-22 DIAGNOSIS — R91.8 PULMONARY NODULES/LESIONS, MULTIPLE: ICD-10-CM

## 2019-03-22 DIAGNOSIS — I10 HTN (HYPERTENSION), BENIGN: ICD-10-CM

## 2019-03-22 DIAGNOSIS — E78.5 DYSLIPIDEMIA: ICD-10-CM

## 2019-03-22 DIAGNOSIS — I50.32 CHRONIC DIASTOLIC CONGESTIVE HEART FAILURE: Primary | ICD-10-CM

## 2019-03-22 PROCEDURE — 3074F SYST BP LT 130 MM HG: CPT | Mod: CPTII,S$GLB,, | Performed by: INTERNAL MEDICINE

## 2019-03-22 PROCEDURE — 99214 PR OFFICE/OUTPT VISIT, EST, LEVL IV, 30-39 MIN: ICD-10-PCS | Mod: S$GLB,,, | Performed by: INTERNAL MEDICINE

## 2019-03-22 PROCEDURE — 99999 PR PBB SHADOW E&M-EST. PATIENT-LVL IV: CPT | Mod: PBBFAC,,, | Performed by: INTERNAL MEDICINE

## 2019-03-22 PROCEDURE — 3078F PR MOST RECENT DIASTOLIC BLOOD PRESSURE < 80 MM HG: ICD-10-PCS | Mod: CPTII,S$GLB,, | Performed by: INTERNAL MEDICINE

## 2019-03-22 PROCEDURE — 99214 OFFICE O/P EST MOD 30 MIN: CPT | Mod: S$GLB,,, | Performed by: INTERNAL MEDICINE

## 2019-03-22 PROCEDURE — 1101F PT FALLS ASSESS-DOCD LE1/YR: CPT | Mod: CPTII,S$GLB,, | Performed by: INTERNAL MEDICINE

## 2019-03-22 PROCEDURE — 3074F PR MOST RECENT SYSTOLIC BLOOD PRESSURE < 130 MM HG: ICD-10-PCS | Mod: CPTII,S$GLB,, | Performed by: INTERNAL MEDICINE

## 2019-03-22 PROCEDURE — 3078F DIAST BP <80 MM HG: CPT | Mod: CPTII,S$GLB,, | Performed by: INTERNAL MEDICINE

## 2019-03-22 PROCEDURE — 99499 RISK ADDL DX/OHS AUDIT: ICD-10-PCS | Mod: S$GLB,,, | Performed by: INTERNAL MEDICINE

## 2019-03-22 PROCEDURE — 99499 UNLISTED E&M SERVICE: CPT | Mod: S$GLB,,, | Performed by: INTERNAL MEDICINE

## 2019-03-22 PROCEDURE — 99999 PR PBB SHADOW E&M-EST. PATIENT-LVL IV: ICD-10-PCS | Mod: PBBFAC,,, | Performed by: INTERNAL MEDICINE

## 2019-03-22 PROCEDURE — 1101F PR PT FALLS ASSESS DOC 0-1 FALLS W/OUT INJ PAST YR: ICD-10-PCS | Mod: CPTII,S$GLB,, | Performed by: INTERNAL MEDICINE

## 2019-03-22 NOTE — PROGRESS NOTES
Subjective:    Patient ID:  Patsy Morris is a 68 y.o. female who presents for follow-up of Atrial Fibrillation      HPI     68 year old female followed with HFpEF, paroxsymal AFl, CRI, COPD on supplemental O2, and post nephrectomy of renal cell ca. She has been stable. She is depress due to a brother recently  of chronic renal didease.      Lab Results   Component Value Date     2019     2019    K 4.5 2019    K 4.5 2019    CL 97 2019    CL 97 2019    CO2 33 (H) 2019    CO2 33 (H) 2019    BUN 14 2019    BUN 14 2019    CREATININE 1.6 (H) 2019    CREATININE 1.6 (H) 2019     2019     2019    HGBA1C 5.8 (H) 2019    MG 2.2 2016    AST 12 2019    ALT 9 (L) 2019    ALBUMIN 3.8 2019    ALBUMIN 3.8 2019    PROT 7.0 2019    BILITOT 0.6 2019    WBC 7.68 2019    HGB 11.7 (L) 2019    HCT 37.6 2019    MCV 87 2019     2019    INR 0.9 2017    TSH 0.631 2016         Lab Results   Component Value Date    CHOL 125 2019    HDL 42 2019    TRIG 87 2019       Lab Results   Component Value Date    LDLCALC 65.6 2019       Past Medical History:   Diagnosis Date    Arthritis     Asthma     Atrial fibrillation     Atrial flutter     Cerumen impaction     CHF (congestive heart failure)     Encounter for blood transfusion     HEARING LOSS     Herpes genitalis     Hypertension     Renal carcinoma 3/10/2015    Thyroid nodule 2017       Current Outpatient Medications:     ammonium lactate (LAC-HYDRIN) 12 % lotion, Apply topically as needed for Dry Skin., Disp: 225 g, Rfl: 1    apixaban (ELIQUIS) 2.5 mg Tab, TAKE 1 TABLET(2.5 MG) BY MOUTH TWICE DAILY, Disp: 60 tablet, Rfl: 6    atorvastatin (LIPITOR) 80 MG tablet, Take 1 tablet (80 mg total) by mouth every evening., Disp: 90 tablet, Rfl: 2     escitalopram oxalate (LEXAPRO) 10 MG tablet, Take 1 tablet (10 mg total) by mouth every evening., Disp: 90 tablet, Rfl: 3    ferrous sulfate (FEOSOL) 325 mg (65 mg iron) Tab tablet, TAKE ONE TABLET BY MOUTH DAILY WITH BREAKFAST, Disp: 30 tablet, Rfl: 6    fluticasone (FLONASE) 50 mcg/actuation nasal spray, 1 spray by Each Nare route once daily., Disp: 1 Bottle, Rfl: 11    furosemide (LASIX) 40 MG tablet, Take 1 tablet (40 mg total) by mouth once daily., Disp: 90 tablet, Rfl: 2    ketoconazole (NIZORAL) 2 % cream, Apply to affected areas of body BID., Disp: 30 g, Rfl: 3    mometasone 0.1% (ELOCON) 0.1 % cream, Apply topically once daily., Disp: 45 g, Rfl: 0    mupirocin (BACTROBAN) 2 % ointment, Apply to affected area 3 times daily, Disp: 22 g, Rfl: 1    silver sulfADIAZINE 1% (SILVADENE) 1 % cream, LEOLA AA LIGHTLY WITH DRESSING BID, Disp: , Rfl: 1    triamcinolone acetonide 0.025% (KENALOG) 0.025 % cream, AAA of body bid, Disp: 30 g, Rfl: 3    valacyclovir HCl (VALACYCLOVIR ORAL), Take by mouth., Disp: , Rfl:     VOLTAREN 1 % Gel, , Disp: , Rfl: 2    walker Misc, Prn usage dx E66.01, Disp: 1 each, Rfl: 0    albuterol-ipratropium 2.5mg-0.5mg/3mL (DUO-NEB) 0.5 mg-3 mg(2.5 mg base)/3 mL nebulizer solution, Take 3 mLs by nebulization every 6 (six) hours as needed for Wheezing or Shortness of Breath., Disp: 3 mL, Rfl: 2          Review of Systems   Constitution: Negative for decreased appetite, diaphoresis, fever, weakness, malaise/fatigue, weight gain and weight loss.   HENT: Negative for congestion, ear discharge, ear pain and nosebleeds.    Eyes: Negative for blurred vision, double vision and visual disturbance.   Cardiovascular: Positive for dyspnea on exertion. Negative for chest pain, claudication, cyanosis, irregular heartbeat, leg swelling, near-syncope, orthopnea, palpitations, paroxysmal nocturnal dyspnea and syncope.   Respiratory: Positive for shortness of breath. Negative for cough, hemoptysis,  "sleep disturbances due to breathing, snoring, sputum production and wheezing.    Endocrine: Negative for polydipsia, polyphagia and polyuria.   Hematologic/Lymphatic: Negative for adenopathy and bleeding problem. Does not bruise/bleed easily.   Skin: Negative for color change, nail changes, poor wound healing and rash.   Musculoskeletal: Negative for muscle cramps and muscle weakness.   Gastrointestinal: Negative for abdominal pain, anorexia, change in bowel habit, hematochezia, nausea and vomiting.   Genitourinary: Negative for dysuria, frequency and hematuria.   Neurological: Negative for brief paralysis, difficulty with concentration, excessive daytime sleepiness, dizziness, focal weakness, headaches, light-headedness, seizures and vertigo.   Psychiatric/Behavioral: Negative for altered mental status and depression.   Allergic/Immunologic: Negative for persistent infections.        Objective:BP (!) 120/50   Pulse 62   Ht 5' 3" (1.6 m)   Wt 102.1 kg (225 lb)   LMP  (LMP Unknown)   SpO2 97%   BMI 39.86 kg/m²             Physical Exam   Constitutional: She is oriented to person, place, and time. She appears well-developed and well-nourished.   Morbid obese   HENT:   Head: Normocephalic.   Right Ear: External ear normal.   Left Ear: External ear normal.   Nose: Nose normal.   Inspection of lips, teeth and gums normal   Eyes: Conjunctivae and EOM are normal. Pupils are equal, round, and reactive to light. No scleral icterus.   Neck: Normal range of motion. No JVD present. No tracheal deviation present. No thyromegaly present.   Cardiovascular: Normal rate, regular rhythm, normal heart sounds and intact distal pulses. Exam reveals no gallop and no friction rub.   No murmur heard.  Pulmonary/Chest: Effort normal and breath sounds normal. No respiratory distress. She has no wheezes. She has no rales.         She exhibits no tenderness.   Abdominal: Soft. Bowel sounds are normal. She exhibits no distension. There is " no hepatosplenomegaly. There is no tenderness. There is no guarding.   Musculoskeletal: Normal range of motion. She exhibits no edema or tenderness.   Lymphadenopathy:   Palpation of lymph nodes of neck and groin normal   Neurological: She is oriented to person, place, and time. No cranial nerve deficit. She exhibits normal muscle tone. Coordination normal.   Skin: Skin is warm and dry. No rash noted. No erythema. No pallor.   Palpation of skin normal   Psychiatric: She has a normal mood and affect. Her behavior is normal. Judgment and thought content normal.         Assessment:       No diagnosis found.     Plan:       There are no diagnoses linked to this encounter.

## 2019-03-26 NOTE — PROGRESS NOTES
Patient, Patsy Morris (MRN #[id number]), presented with a recorded BMI of 39.86 kg/m^2 and a documented comorbidity(s):  - Obstructive Sleep Apnea  - Hypertension  - Hyperlipidemia  - Atrial Fibrillation  to which the severe obesity is a contributing factor. This is consistent with the definition of severe obesity (BMI 35.0-39.9) with comorbidity (ICD-10 E66.01, Z68.35). The patient's severe obesity was monitored, evaluated, addressed and/or treated. This addendum to the medical record is made on 03/26/2019.

## 2019-03-29 DIAGNOSIS — N18.9 CHRONIC KIDNEY DISEASE, UNSPECIFIED CKD STAGE: ICD-10-CM

## 2019-04-06 ENCOUNTER — OFFICE VISIT (OUTPATIENT)
Dept: URGENT CARE | Facility: CLINIC | Age: 69
End: 2019-04-06
Payer: MEDICARE

## 2019-04-06 VITALS
WEIGHT: 225 LBS | HEART RATE: 75 BPM | RESPIRATION RATE: 18 BRPM | OXYGEN SATURATION: 94 % | TEMPERATURE: 100 F | DIASTOLIC BLOOD PRESSURE: 44 MMHG | HEIGHT: 63 IN | BODY MASS INDEX: 39.87 KG/M2 | SYSTOLIC BLOOD PRESSURE: 112 MMHG

## 2019-04-06 DIAGNOSIS — R05.9 COUGH: ICD-10-CM

## 2019-04-06 DIAGNOSIS — I50.33 ACUTE ON CHRONIC DIASTOLIC HEART FAILURE: ICD-10-CM

## 2019-04-06 DIAGNOSIS — J98.01 ACUTE BRONCHOSPASM: ICD-10-CM

## 2019-04-06 DIAGNOSIS — R50.9 FEVER, UNSPECIFIED FEVER CAUSE: Primary | ICD-10-CM

## 2019-04-06 DIAGNOSIS — J41.1 PURULENT BRONCHITIS: ICD-10-CM

## 2019-04-06 DIAGNOSIS — J96.11 CHRONIC RESPIRATORY FAILURE WITH HYPOXIA: ICD-10-CM

## 2019-04-06 DIAGNOSIS — J96.21 ACUTE ON CHRONIC RESPIRATORY FAILURE WITH HYPOXIA AND HYPERCAPNIA: ICD-10-CM

## 2019-04-06 DIAGNOSIS — R91.8 PULMONARY NODULES/LESIONS, MULTIPLE: ICD-10-CM

## 2019-04-06 DIAGNOSIS — J96.22 ACUTE ON CHRONIC RESPIRATORY FAILURE WITH HYPOXIA AND HYPERCAPNIA: ICD-10-CM

## 2019-04-06 DIAGNOSIS — R09.02 HYPOXIA: ICD-10-CM

## 2019-04-06 LAB
CTP QC/QA: YES
FLUAV AG NPH QL: NEGATIVE
FLUBV AG NPH QL: NEGATIVE

## 2019-04-06 PROCEDURE — 3074F PR MOST RECENT SYSTOLIC BLOOD PRESSURE < 130 MM HG: ICD-10-PCS | Mod: CPTII,S$GLB,, | Performed by: NURSE PRACTITIONER

## 2019-04-06 PROCEDURE — 99499 UNLISTED E&M SERVICE: CPT | Mod: S$GLB,,, | Performed by: NURSE PRACTITIONER

## 2019-04-06 PROCEDURE — 71046 X-RAY EXAM CHEST 2 VIEWS: CPT | Mod: S$GLB,,, | Performed by: RADIOLOGY

## 2019-04-06 PROCEDURE — 99214 OFFICE O/P EST MOD 30 MIN: CPT | Mod: S$GLB,,, | Performed by: NURSE PRACTITIONER

## 2019-04-06 PROCEDURE — 87804 INFLUENZA ASSAY W/OPTIC: CPT | Mod: QW,S$GLB,, | Performed by: NURSE PRACTITIONER

## 2019-04-06 PROCEDURE — 96372 PR INJECTION,THERAP/PROPH/DIAG2ST, IM OR SUBCUT: ICD-10-PCS | Mod: 59,S$GLB,, | Performed by: NURSE PRACTITIONER

## 2019-04-06 PROCEDURE — 3074F SYST BP LT 130 MM HG: CPT | Mod: CPTII,S$GLB,, | Performed by: NURSE PRACTITIONER

## 2019-04-06 PROCEDURE — 99214 PR OFFICE/OUTPT VISIT, EST, LEVL IV, 30-39 MIN: ICD-10-PCS | Mod: S$GLB,,, | Performed by: NURSE PRACTITIONER

## 2019-04-06 PROCEDURE — 71046 XR CHEST PA AND LATERAL: ICD-10-PCS | Mod: S$GLB,,, | Performed by: RADIOLOGY

## 2019-04-06 PROCEDURE — 99499 RISK ADDL DX/OHS AUDIT: ICD-10-PCS | Mod: S$GLB,,, | Performed by: NURSE PRACTITIONER

## 2019-04-06 PROCEDURE — 3078F PR MOST RECENT DIASTOLIC BLOOD PRESSURE < 80 MM HG: ICD-10-PCS | Mod: CPTII,S$GLB,, | Performed by: NURSE PRACTITIONER

## 2019-04-06 PROCEDURE — 87804 POCT INFLUENZA A/B: ICD-10-PCS | Mod: QW,S$GLB,, | Performed by: NURSE PRACTITIONER

## 2019-04-06 PROCEDURE — 1101F PR PT FALLS ASSESS DOC 0-1 FALLS W/OUT INJ PAST YR: ICD-10-PCS | Mod: CPTII,S$GLB,, | Performed by: NURSE PRACTITIONER

## 2019-04-06 PROCEDURE — 1101F PT FALLS ASSESS-DOCD LE1/YR: CPT | Mod: CPTII,S$GLB,, | Performed by: NURSE PRACTITIONER

## 2019-04-06 PROCEDURE — 96372 THER/PROPH/DIAG INJ SC/IM: CPT | Mod: 59,S$GLB,, | Performed by: NURSE PRACTITIONER

## 2019-04-06 PROCEDURE — 94640 AIRWAY INHALATION TREATMENT: CPT | Mod: S$GLB,,, | Performed by: NURSE PRACTITIONER

## 2019-04-06 PROCEDURE — 94640 PR INHAL RX, AIRWAY OBST/DX SPUTUM INDUCT: ICD-10-PCS | Mod: S$GLB,,, | Performed by: NURSE PRACTITIONER

## 2019-04-06 PROCEDURE — 3078F DIAST BP <80 MM HG: CPT | Mod: CPTII,S$GLB,, | Performed by: NURSE PRACTITIONER

## 2019-04-06 RX ORDER — BENZONATATE 200 MG/1
200 CAPSULE ORAL 3 TIMES DAILY PRN
Qty: 30 CAPSULE | Refills: 0 | Status: SHIPPED | OUTPATIENT
Start: 2019-04-06 | End: 2019-04-06

## 2019-04-06 RX ORDER — DOXYCYCLINE 100 MG/1
100 CAPSULE ORAL 2 TIMES DAILY
Qty: 20 CAPSULE | Refills: 0 | Status: SHIPPED | OUTPATIENT
Start: 2019-04-06 | End: 2019-04-16

## 2019-04-06 RX ORDER — BENZONATATE 200 MG/1
200 CAPSULE ORAL 3 TIMES DAILY PRN
Qty: 30 CAPSULE | Refills: 0 | Status: SHIPPED | OUTPATIENT
Start: 2019-04-06 | End: 2019-04-16

## 2019-04-06 RX ORDER — PREDNISONE 20 MG/1
40 TABLET ORAL DAILY
Qty: 6 TABLET | Refills: 0 | Status: SHIPPED | OUTPATIENT
Start: 2019-04-06 | End: 2019-04-09

## 2019-04-06 RX ORDER — IPRATROPIUM BROMIDE 0.5 MG/2.5ML
0.5 SOLUTION RESPIRATORY (INHALATION)
Status: COMPLETED | OUTPATIENT
Start: 2019-04-06 | End: 2019-04-06

## 2019-04-06 RX ORDER — BETAMETHASONE SODIUM PHOSPHATE AND BETAMETHASONE ACETATE 3; 3 MG/ML; MG/ML
6 INJECTION, SUSPENSION INTRA-ARTICULAR; INTRALESIONAL; INTRAMUSCULAR; SOFT TISSUE
Status: COMPLETED | OUTPATIENT
Start: 2019-04-06 | End: 2019-04-06

## 2019-04-06 RX ORDER — DOXYCYCLINE 100 MG/1
100 CAPSULE ORAL 2 TIMES DAILY
Qty: 20 CAPSULE | Refills: 0 | Status: SHIPPED | OUTPATIENT
Start: 2019-04-06 | End: 2019-04-06

## 2019-04-06 RX ORDER — PREDNISONE 20 MG/1
40 TABLET ORAL DAILY
Qty: 6 TABLET | Refills: 0 | Status: SHIPPED | OUTPATIENT
Start: 2019-04-06 | End: 2019-04-06

## 2019-04-06 RX ORDER — ALBUTEROL SULFATE 0.83 MG/ML
2.5 SOLUTION RESPIRATORY (INHALATION)
Status: COMPLETED | OUTPATIENT
Start: 2019-04-06 | End: 2019-04-06

## 2019-04-06 RX ADMIN — BETAMETHASONE SODIUM PHOSPHATE AND BETAMETHASONE ACETATE 6 MG: 3; 3 INJECTION, SUSPENSION INTRA-ARTICULAR; INTRALESIONAL; INTRAMUSCULAR; SOFT TISSUE at 02:04

## 2019-04-06 RX ADMIN — IPRATROPIUM BROMIDE 0.5 MG: 0.5 SOLUTION RESPIRATORY (INHALATION) at 01:04

## 2019-04-06 RX ADMIN — ALBUTEROL SULFATE 2.5 MG: 0.83 SOLUTION RESPIRATORY (INHALATION) at 01:04

## 2019-04-06 NOTE — PROGRESS NOTES
"Subjective:       Patient ID: Patsy Morris is a 68 y.o. female.    Vitals:  height is 5' 3" (1.6 m) and weight is 102.1 kg (225 lb). Her temperature is 99.8 °F (37.7 °C). Her blood pressure is 112/44 (abnormal) and her pulse is 75. Her respiration is 18 and oxygen saturation is 90% (abnormal).  Dictation #1  MRN:[id number]  [facility]:[id number]      Chief Complaint: URI (started wednesday)    Patient presents with a cough and sputum production of greenish/brownin color since Wednesday. She takes she just started the Mucinex yesterday. Presents with sitter and travel o2 tank-she has it set at "6" initially with o2 sats 76%, after adjustment to "4" on tank, reading 90%. She states her 02 varies with movement and is often low at home-disagreeing with findings of 97-99% 02 during recent doctor visits. She is not willing to go to hospital if necessary stating "I just want a steroid shot and then go back home".   68 year old female followed with HFpEF, paroxsymal AFl, CRI, COPD on supplemental O2, and post nephrectomy of renal cell c  Last xray-1year ago showing cardiomegaly with left sided effusion-suggestive of CHF. Recent visits to cardiology and primary care show o2 sats 97-99%.    URI    This is a new problem. The current episode started in the past 7 days. The problem has been unchanged. There has been no fever. Associated symptoms include coughing and wheezing. Pertinent negatives include no congestion, ear pain, nausea, rash, sinus pain, sore throat or vomiting. She has tried NSAIDs (mucinex and cough syrup ) for the symptoms. The treatment provided mild relief.       Constitution: Negative for chills, sweating, fatigue and fever.   HENT: Negative for ear pain, congestion, sinus pain, sinus pressure, sore throat and voice change.    Neck: Negative for painful lymph nodes.   Eyes: Negative for eye redness.   Respiratory: Positive for chest tightness, cough, sputum production, shortness of breath and wheezing. " Negative for bloody sputum, COPD, stridor and asthma.    Gastrointestinal: Negative for nausea and vomiting.   Musculoskeletal: Negative for muscle ache.   Skin: Negative for rash.   Allergic/Immunologic: Negative for seasonal allergies and asthma.   Hematologic/Lymphatic: Negative for swollen lymph nodes.       Objective:      Physical Exam   Constitutional: She is oriented to person, place, and time. She appears well-developed and well-nourished. She is cooperative.  Non-toxic appearance. She does not appear ill. No distress.   HENT:   Head: Normocephalic and atraumatic.   Right Ear: Hearing, tympanic membrane, external ear and ear canal normal.   Left Ear: Hearing, tympanic membrane, external ear and ear canal normal.   Nose: Nose normal. No mucosal edema, rhinorrhea or nasal deformity. No epistaxis. Right sinus exhibits no maxillary sinus tenderness and no frontal sinus tenderness. Left sinus exhibits no maxillary sinus tenderness and no frontal sinus tenderness.   Mouth/Throat: Uvula is midline, oropharynx is clear and moist and mucous membranes are normal. No trismus in the jaw. Normal dentition. No uvula swelling. No posterior oropharyngeal erythema.   Eyes: Conjunctivae and lids are normal. No scleral icterus.   Sclera clear bilat   Neck: Trachea normal, full passive range of motion without pain and phonation normal. Neck supple.   Cardiovascular: Normal rate, regular rhythm, normal heart sounds, intact distal pulses and normal pulses.   Pulses:       Radial pulses are 2+ on the right side, and 2+ on the left side.   Pulmonary/Chest: Effort normal. No respiratory distress. She has decreased breath sounds in the right upper field, the left upper field and the left lower field. She has wheezes in the right upper field and the left upper field. She has rhonchi in the right upper field and the left upper field. She has rales in the right lower field and the left lower field.   Abdominal: Soft. Normal appearance  and bowel sounds are normal. She exhibits no distension. There is no tenderness.   Musculoskeletal: Normal range of motion. She exhibits no edema or deformity.   Neurological: She is alert and oriented to person, place, and time. She exhibits normal muscle tone. Coordination normal.   Skin: Skin is warm, dry and intact. She is not diaphoretic. No pallor.   Psychiatric: She has a normal mood and affect. Her speech is normal and behavior is normal. Judgment and thought content normal. Cognition and memory are normal.   Nursing note and vitals reviewed.    X-ray Chest Pa And Lateral    Result Date: 4/6/2019  EXAMINATION: XR CHEST PA AND LATERAL CLINICAL HISTORY: cough, hypoxia; Cough TECHNIQUE: PA and lateral views of the chest were performed. COMPARISON: No 06/11/2018 CT scan of the chest ne FINDINGS: Cardiomegaly, pulmonary vascular congestion and bibasilar edema.  No significant pleural effusion identified.     See above Electronically signed by: Julien Davies MD Date:    04/06/2019 Time:    13:50    POST DUO NEB: IMPROVED AIR MOVEMENT, O2 SATS 94% ON 3l VIA NC.  MENTATION APPROPRIATE.     Assessment:       1. Fever, unspecified fever cause    2. Cough    3. Acute bronchospasm    4. Hypoxia    5. Chronic respiratory failure with hypoxia    6. Acute on chronic respiratory failure with hypoxia and hypercapnia    7. Pulmonary nodules/lesions, multiple    8. Acute on chronic diastolic heart failure        Plan:       ER PRECAUTIONS DISCUSSED, F/U WITH PRIMARY CARE ON Monday.    Fever, unspecified fever cause  -     POCT Influenza A/B    Cough  -     POCT Influenza A/B  -     X-Ray Chest PA And Lateral; Future; Expected date: 04/06/2019    Acute bronchospasm  -     albuterol nebulizer solution 2.5 mg  -     ipratropium 0.02 % nebulizer solution 0.5 mg  -     betamethasone acetate-betamethasone sodium phosphate injection 6 mg    Hypoxia  -     X-Ray Chest PA And Lateral; Future; Expected date: 04/06/2019    Chronic  respiratory failure with hypoxia    Acute on chronic respiratory failure with hypoxia and hypercapnia    Pulmonary nodules/lesions, multiple    Acute on chronic diastolic heart failure      Patient Instructions   Follow up with your doctor in a few days.  Return to the urgent care or go to the ER if symptoms get worse.    DOXYCYLINE ANTIBIOTIC AS DIRECTED  START ORAL STEROID TOMORROW AS DIRECTED.  DAYTIME COUGH: TESSALON  MUCINEX DM FOR CHEST CONGESTION.  FOLLOW UP WITH PRIMARY CARE ON Monday  ER PRECAUTIONS: SHORTNESS OF BREATH, LOW OXYGEN LEVELS          Bronchitis, Antibiotic Treatment (Adult)    Bronchitis is an infection of the air passages (bronchial tubes) in your lungs. It often occurs when you have a cold. This illness is contagious during the first few days and is spread through the air by coughing and sneezing, or by direct contact (touching the sick person and then touching your own eyes, nose, or mouth).  Symptoms of bronchitis include cough with mucus (phlegm) and low-grade fever. Bronchitis usually lasts 7 to 14 days. Mild cases can be treated with simple home remedies. More severe infection is treated with an antibiotic.  Home care  Follow these guidelines when caring for yourself at home:  · If your symptoms are severe, rest at home for the first 2 to 3 days. When you go back to your usual activities, don't let yourself get too tired.  · Do not smoke. Also avoid being exposed to secondhand smoke.  · You may use over-the-counter medicines to control fever or pain, unless another medicine was prescribed. (Note: If you have chronic liver or kidney disease or have ever had a stomach ulcer or gastrointestinal bleeding, talk with your healthcare provider before using these medicines. Also talk to your provider if you are taking medicine to prevent blood clots.) Aspirin should never be given to anyone younger than 18 years of age who is ill with a viral infection or fever. It may cause severe liver or  brain damage.  · Your appetite may be poor, so a light diet is fine. Avoid dehydration by drinking 6 to 8 glasses of fluids per day (such as water, soft drinks, sports drinks, juices, tea, or soup). Extra fluids will help loosen secretions in the nose and lungs.  · Over-the-counter cough, cold, and sore-throat medicines will not shorten the length of the illness, but they may be helpful to reduce symptoms. (Note: Do not use decongestants if you have high blood pressure.)  · Finish all antibiotic medicine. Do this even if you are feeling better after only a few days.  Follow-up care  Follow up with your healthcare provider, or as advised. If you had an X-ray or ECG (electrocardiogram), a specialist will review it. You will be notified of any new findings that may affect your care.  Note: If you are age 65 or older, or if you have a chronic lung disease or condition that affects your immune system, or you smoke, talk to your healthcare provider about having pneumococcal vaccinations and a yearly influenza vaccination (flu shot).  When to seek medical advice  Call your healthcare provider right away if any of these occur:  · Fever of 100.4°F (38°C) or higher  · Coughing up increased amounts of colored sputum  · Weakness, drowsiness, headache, facial pain, ear pain, or a stiff neck  Call 911, or get immediate medical care  Contact emergency services right away if any of these occur.  · Coughing up blood  · Worsening weakness, drowsiness, headache, or stiff neck  · Trouble breathing, wheezing, or pain with breathing  Date Last Reviewed: 9/13/2015  © 7245-3805 [employer]. [address] [place] [address], [place] [address]. All rights reserved. This information is not intended as a substitute for professional medical care. Always follow your healthcare professional's instructions.

## 2019-04-06 NOTE — PATIENT INSTRUCTIONS
Follow up with your doctor in a few days.  Return to the urgent care or go to the ER if symptoms get worse.    DOXYCYLINE ANTIBIOTIC AS DIRECTED  START ORAL STEROID TOMORROW AS DIRECTED.  DAYTIME COUGH: TESSALON  MUCINEX DM FOR CHEST CONGESTION.  FOLLOW UP WITH PRIMARY CARE ON Monday  ER PRECAUTIONS: SHORTNESS OF BREATH, LOW OXYGEN LEVELS          Bronchitis, Antibiotic Treatment (Adult)    Bronchitis is an infection of the air passages (bronchial tubes) in your lungs. It often occurs when you have a cold. This illness is contagious during the first few days and is spread through the air by coughing and sneezing, or by direct contact (touching the sick person and then touching your own eyes, nose, or mouth).  Symptoms of bronchitis include cough with mucus (phlegm) and low-grade fever. Bronchitis usually lasts 7 to 14 days. Mild cases can be treated with simple home remedies. More severe infection is treated with an antibiotic.  Home care  Follow these guidelines when caring for yourself at home:  · If your symptoms are severe, rest at home for the first 2 to 3 days. When you go back to your usual activities, don't let yourself get too tired.  · Do not smoke. Also avoid being exposed to secondhand smoke.  · You may use over-the-counter medicines to control fever or pain, unless another medicine was prescribed. (Note: If you have chronic liver or kidney disease or have ever had a stomach ulcer or gastrointestinal bleeding, talk with your healthcare provider before using these medicines. Also talk to your provider if you are taking medicine to prevent blood clots.) Aspirin should never be given to anyone younger than 18 years of age who is ill with a viral infection or fever. It may cause severe liver or brain damage.  · Your appetite may be poor, so a light diet is fine. Avoid dehydration by drinking 6 to 8 glasses of fluids per day (such as water, soft drinks, sports drinks, juices, tea, or soup). Extra fluids will  help loosen secretions in the nose and lungs.  · Over-the-counter cough, cold, and sore-throat medicines will not shorten the length of the illness, but they may be helpful to reduce symptoms. (Note: Do not use decongestants if you have high blood pressure.)  · Finish all antibiotic medicine. Do this even if you are feeling better after only a few days.  Follow-up care  Follow up with your healthcare provider, or as advised. If you had an X-ray or ECG (electrocardiogram), a specialist will review it. You will be notified of any new findings that may affect your care.  Note: If you are age 65 or older, or if you have a chronic lung disease or condition that affects your immune system, or you smoke, talk to your healthcare provider about having pneumococcal vaccinations and a yearly influenza vaccination (flu shot).  When to seek medical advice  Call your healthcare provider right away if any of these occur:  · Fever of 100.4°F (38°C) or higher  · Coughing up increased amounts of colored sputum  · Weakness, drowsiness, headache, facial pain, ear pain, or a stiff neck  Call 911, or get immediate medical care  Contact emergency services right away if any of these occur.  · Coughing up blood  · Worsening weakness, drowsiness, headache, or stiff neck  · Trouble breathing, wheezing, or pain with breathing  Date Last Reviewed: 9/13/2015  © 9616-3306 [employer]. [address] [place] [address]. All rights reserved. This information is not intended as a substitute for professional medical care. Always follow your healthcare professional's instructions.

## 2019-04-11 ENCOUNTER — PES CALL (OUTPATIENT)
Dept: ADMINISTRATIVE | Facility: CLINIC | Age: 69
End: 2019-04-11

## 2019-04-12 ENCOUNTER — TELEPHONE (OUTPATIENT)
Dept: FAMILY MEDICINE | Facility: CLINIC | Age: 69
End: 2019-04-12

## 2019-04-12 NOTE — TELEPHONE ENCOUNTER
----- Message from Ana Rosa Casillas sent at 4/12/2019  1:04 PM CDT -----  Contact: Pt  Name of Who is Calling:SARAH DIOP GAVIN [[id number]]    What is the request in detail: Patient would like a call back regarding her previous urgent care visit  Please contact to further discuss and advise    Can the clinic reply by [facility]: No    [facility] [phone]

## 2019-04-12 NOTE — TELEPHONE ENCOUNTER
Patient stated she was in urgent care on last week, she was told to come in to discuss with her PCP.Patient states that she is feeling fine and would like to know if it is even necessary that she comes in for an appointment.Please advise.Thanks.

## 2019-04-18 DIAGNOSIS — I48.0 PAROXYSMAL ATRIAL FIBRILLATION: Primary | ICD-10-CM

## 2019-04-22 ENCOUNTER — HOSPITAL ENCOUNTER (OUTPATIENT)
Dept: CARDIOLOGY | Facility: CLINIC | Age: 69
Discharge: HOME | End: 2019-04-22
Payer: MEDICARE

## 2019-04-22 ENCOUNTER — OFFICE VISIT (OUTPATIENT)
Dept: CARDIOLOGY | Facility: CLINIC | Age: 69
End: 2019-04-22
Payer: MEDICARE

## 2019-04-22 VITALS
BODY MASS INDEX: 38.39 KG/M2 | HEIGHT: 64 IN | WEIGHT: 224.88 LBS | HEART RATE: 60 BPM | SYSTOLIC BLOOD PRESSURE: 115 MMHG | DIASTOLIC BLOOD PRESSURE: 64 MMHG | OXYGEN SATURATION: 86 %

## 2019-04-22 DIAGNOSIS — R60.9 DEPENDENT EDEMA: Primary | ICD-10-CM

## 2019-04-22 DIAGNOSIS — I10 HTN (HYPERTENSION), BENIGN: ICD-10-CM

## 2019-04-22 DIAGNOSIS — G47.33 OSA (OBSTRUCTIVE SLEEP APNEA): ICD-10-CM

## 2019-04-22 DIAGNOSIS — E78.5 DYSLIPIDEMIA: ICD-10-CM

## 2019-04-22 DIAGNOSIS — I48.0 PAROXYSMAL ATRIAL FIBRILLATION: ICD-10-CM

## 2019-04-22 DIAGNOSIS — E66.01 MORBID OBESITY: ICD-10-CM

## 2019-04-22 DIAGNOSIS — Z72.0 TOBACCO ABUSE: ICD-10-CM

## 2019-04-22 DIAGNOSIS — Z79.01 LONG TERM CURRENT USE OF ANTICOAGULANT: ICD-10-CM

## 2019-04-22 DIAGNOSIS — I50.32 CHRONIC DIASTOLIC CONGESTIVE HEART FAILURE: ICD-10-CM

## 2019-04-22 DIAGNOSIS — N18.4 CHRONIC KIDNEY DISEASE (CKD) STAGE G4/A1, SEVERELY DECREASED GLOMERULAR FILTRATION RATE (GFR) BETWEEN 15-29 ML/MIN/1.73 SQUARE METER AND ALBUMINURIA CREATININE RATIO LESS THAN 30 MG/G: ICD-10-CM

## 2019-04-22 DIAGNOSIS — J96.11 CHRONIC RESPIRATORY FAILURE WITH HYPOXIA: ICD-10-CM

## 2019-04-22 PROCEDURE — 99499 UNLISTED E&M SERVICE: CPT | Mod: S$GLB,,, | Performed by: INTERNAL MEDICINE

## 2019-04-22 PROCEDURE — 99214 PR OFFICE/OUTPT VISIT, EST, LEVL IV, 30-39 MIN: ICD-10-PCS | Mod: S$GLB,,, | Performed by: INTERNAL MEDICINE

## 2019-04-22 PROCEDURE — 93000 EKG 12-LEAD: ICD-10-PCS | Mod: S$GLB,,, | Performed by: INTERNAL MEDICINE

## 2019-04-22 PROCEDURE — 99499 RISK ADDL DX/OHS AUDIT: ICD-10-PCS | Mod: S$GLB,,, | Performed by: INTERNAL MEDICINE

## 2019-04-22 PROCEDURE — 3074F SYST BP LT 130 MM HG: CPT | Mod: CPTII,S$GLB,, | Performed by: INTERNAL MEDICINE

## 2019-04-22 PROCEDURE — 3074F PR MOST RECENT SYSTOLIC BLOOD PRESSURE < 130 MM HG: ICD-10-PCS | Mod: CPTII,S$GLB,, | Performed by: INTERNAL MEDICINE

## 2019-04-22 PROCEDURE — 1101F PR PT FALLS ASSESS DOC 0-1 FALLS W/OUT INJ PAST YR: ICD-10-PCS | Mod: CPTII,S$GLB,, | Performed by: INTERNAL MEDICINE

## 2019-04-22 PROCEDURE — 3078F PR MOST RECENT DIASTOLIC BLOOD PRESSURE < 80 MM HG: ICD-10-PCS | Mod: CPTII,S$GLB,, | Performed by: INTERNAL MEDICINE

## 2019-04-22 PROCEDURE — 99214 OFFICE O/P EST MOD 30 MIN: CPT | Mod: S$GLB,,, | Performed by: INTERNAL MEDICINE

## 2019-04-22 PROCEDURE — 99999 PR PBB SHADOW E&M-EST. PATIENT-LVL III: CPT | Mod: PBBFAC,,, | Performed by: INTERNAL MEDICINE

## 2019-04-22 PROCEDURE — 3078F DIAST BP <80 MM HG: CPT | Mod: CPTII,S$GLB,, | Performed by: INTERNAL MEDICINE

## 2019-04-22 PROCEDURE — 99999 PR PBB SHADOW E&M-EST. PATIENT-LVL III: ICD-10-PCS | Mod: PBBFAC,,, | Performed by: INTERNAL MEDICINE

## 2019-04-22 PROCEDURE — 1101F PT FALLS ASSESS-DOCD LE1/YR: CPT | Mod: CPTII,S$GLB,, | Performed by: INTERNAL MEDICINE

## 2019-04-22 PROCEDURE — 93000 ELECTROCARDIOGRAM COMPLETE: CPT | Mod: S$GLB,,, | Performed by: INTERNAL MEDICINE

## 2019-04-22 NOTE — PROGRESS NOTES
Subjective:    Patient ID:  Patsy Morris is a 69 y.o. female who presents for follow-up of Palpitations; Shortness of Breath; and Joint Swelling      HPI   69 year old female followed with HFpEF, paroxsymal AFl, CRI, COPD on supplemental O2, and post nephrectomy of renal cell ca. She presents today with increased leg edema with out increase in SOB, and palpitations. She has been non compliant to low salt diet. EKG revealed PAC.      Lab Results   Component Value Date     03/19/2019     03/19/2019    K 4.5 03/19/2019    K 4.5 03/19/2019    CL 97 03/19/2019    CL 97 03/19/2019    CO2 33 (H) 03/19/2019    CO2 33 (H) 03/19/2019    BUN 14 03/19/2019    BUN 14 03/19/2019    CREATININE 1.6 (H) 03/19/2019    CREATININE 1.6 (H) 03/19/2019     03/19/2019     03/19/2019    HGBA1C 5.8 (H) 03/19/2019    MG 2.2 03/02/2016    AST 12 03/19/2019    ALT 9 (L) 03/19/2019    ALBUMIN 3.8 03/19/2019    ALBUMIN 3.8 03/19/2019    PROT 7.0 03/19/2019    BILITOT 0.6 03/19/2019    WBC 7.68 03/19/2019    HGB 11.7 (L) 03/19/2019    HCT 37.6 03/19/2019    MCV 87 03/19/2019     03/19/2019    INR 0.9 03/28/2017    TSH 0.631 02/25/2016         Lab Results   Component Value Date    CHOL 125 03/19/2019    HDL 42 03/19/2019    TRIG 87 03/19/2019       Lab Results   Component Value Date    LDLCALC 65.6 03/19/2019       Past Medical History:   Diagnosis Date    Arthritis     Asthma     Atrial fibrillation     Atrial flutter     Cerumen impaction     CHF (congestive heart failure)     Encounter for blood transfusion     HEARING LOSS     Herpes genitalis     Hypertension     Renal carcinoma 3/10/2015    Thyroid nodule 6/8/2017       Current Outpatient Medications:     ammonium lactate (LAC-HYDRIN) 12 % lotion, Apply topically as needed for Dry Skin., Disp: 225 g, Rfl: 1    apixaban (ELIQUIS) 2.5 mg Tab, TAKE 1 TABLET(2.5 MG) BY MOUTH TWICE DAILY, Disp: 60 tablet, Rfl: 6    atorvastatin (LIPITOR) 80  MG tablet, Take 1 tablet (80 mg total) by mouth every evening., Disp: 90 tablet, Rfl: 2    ferrous sulfate (FEOSOL) 325 mg (65 mg iron) Tab tablet, TAKE ONE TABLET BY MOUTH DAILY WITH BREAKFAST, Disp: 30 tablet, Rfl: 6    fluticasone (FLONASE) 50 mcg/actuation nasal spray, 1 spray by Each Nare route once daily., Disp: 1 Bottle, Rfl: 11    furosemide (LASIX) 40 MG tablet, Take 1 tablet (40 mg total) by mouth once daily., Disp: 90 tablet, Rfl: 2    ketoconazole (NIZORAL) 2 % cream, Apply to affected areas of body BID., Disp: 30 g, Rfl: 3    mometasone 0.1% (ELOCON) 0.1 % cream, Apply topically once daily., Disp: 45 g, Rfl: 0    mupirocin (BACTROBAN) 2 % ointment, Apply to affected area 3 times daily, Disp: 22 g, Rfl: 1    triamcinolone acetonide 0.025% (KENALOG) 0.025 % cream, AAA of body bid, Disp: 30 g, Rfl: 3    valacyclovir HCl (VALACYCLOVIR ORAL), Take by mouth., Disp: , Rfl:     VOLTAREN 1 % Gel, , Disp: , Rfl: 2    walker Misc, Prn usage dx E66.01, Disp: 1 each, Rfl: 0    albuterol-ipratropium 2.5mg-0.5mg/3mL (DUO-NEB) 0.5 mg-3 mg(2.5 mg base)/3 mL nebulizer solution, Take 3 mLs by nebulization every 6 (six) hours as needed for Wheezing or Shortness of Breath., Disp: 3 mL, Rfl: 2    escitalopram oxalate (LEXAPRO) 10 MG tablet, Take 1 tablet (10 mg total) by mouth every evening., Disp: 90 tablet, Rfl: 3    silver sulfADIAZINE 1% (SILVADENE) 1 % cream, LEOLA AA LIGHTLY WITH DRESSING BID, Disp: , Rfl: 1          Review of Systems   Constitution: Negative for decreased appetite, diaphoresis, fever, malaise/fatigue, weight gain and weight loss.   HENT: Negative for congestion, ear discharge, ear pain and nosebleeds.    Eyes: Negative for blurred vision, double vision and visual disturbance.   Cardiovascular: Positive for dyspnea on exertion, leg swelling and palpitations. Negative for chest pain, claudication, cyanosis, irregular heartbeat, near-syncope, orthopnea, paroxysmal nocturnal dyspnea and  "syncope.   Respiratory: Positive for shortness of breath. Negative for cough, hemoptysis, sleep disturbances due to breathing, snoring, sputum production and wheezing.    Endocrine: Negative for polydipsia, polyphagia and polyuria.   Hematologic/Lymphatic: Negative for adenopathy and bleeding problem. Does not bruise/bleed easily.   Skin: Negative for color change, nail changes, poor wound healing and rash.   Musculoskeletal: Negative for muscle cramps and muscle weakness.   Gastrointestinal: Negative for abdominal pain, anorexia, change in bowel habit, hematochezia, nausea and vomiting.   Genitourinary: Negative for dysuria, frequency and hematuria.   Neurological: Negative for brief paralysis, difficulty with concentration, excessive daytime sleepiness, dizziness, focal weakness, headaches, light-headedness, seizures, vertigo and weakness.   Psychiatric/Behavioral: Negative for altered mental status and depression.   Allergic/Immunologic: Negative for persistent infections.        Objective:/64   Pulse 60   Ht 5' 3.5" (1.613 m)   Wt 102 kg (224 lb 13.9 oz)   LMP  (LMP Unknown)   SpO2 (!) 86%   BMI 39.21 kg/m²             Physical Exam   Constitutional: She is oriented to person, place, and time. She appears well-developed and well-nourished.   HENT:   Head: Normocephalic.   Right Ear: External ear normal.   Left Ear: External ear normal.   Nose: Nose normal.   Inspection of lips, teeth and gums normal   Eyes: Pupils are equal, round, and reactive to light. Conjunctivae and EOM are normal. No scleral icterus.   Neck: Normal range of motion. No JVD present. No tracheal deviation present. No thyromegaly present.   Cardiovascular: Normal rate, regular rhythm, normal heart sounds and intact distal pulses. Exam reveals no gallop and no friction rub.   No murmur heard.  Pulmonary/Chest: Effort normal. No respiratory distress. She has decreased breath sounds. She has no wheezes. She has no rales.         She " exhibits no tenderness.   Abdominal: Soft. Bowel sounds are normal. She exhibits no distension. There is no hepatosplenomegaly. There is no tenderness. There is no guarding.   Musculoskeletal: Normal range of motion. She exhibits edema (plus one bilateral lower leg edema). She exhibits no tenderness.   Lymphadenopathy:   Palpation of lymph nodes of neck and groin normal   Neurological: She is oriented to person, place, and time. No cranial nerve deficit. She exhibits normal muscle tone. Coordination normal.   Skin: Skin is warm and dry. No rash noted. No erythema. No pallor.   Palpation of skin normal   Psychiatric: She has a normal mood and affect. Her behavior is normal. Judgment and thought content normal.         Assessment:       1. Dependent edema    2. HTN (hypertension), benign    3. Dyslipidemia    4. Paroxysmal atrial fibrillation    5. Chronic diastolic congestive heart failure    6. Chronic kidney disease (CKD) stage G4/A1, severely decreased glomerular filtration rate (GFR) between 15-29 mL/min/1.73 square meter and albuminuria creatinine ratio less than 30 mg/g    7. Long term current use of anticoagulant    8. Morbid obesity    9. BHAVANI (obstructive sleep apnea)    10. Tobacco abuse    11. Chronic respiratory failure with hypoxia         Plan:       Patsy was seen today for palpitations, shortness of breath and joint swelling.    Diagnoses and all orders for this visit:    Dependent edema  Increase lasix to twice daily until edema resolves the return to once a day  HTN (hypertension), benign    Dyslipidemia    Paroxysmal atrial fibrillation    Chronic diastolic congestive heart failure    Chronic kidney disease (CKD) stage G4/A1, severely decreased glomerular filtration rate (GFR) between 15-29 mL/min/1.73 square meter and albuminuria creatinine ratio less than 30 mg/g    Long term current use of anticoagulant    Morbid obesity    BHAVANI (obstructive sleep apnea)    Tobacco abuse    Chronic respiratory  failure with hypoxia

## 2019-04-26 ENCOUNTER — PES CALL (OUTPATIENT)
Dept: ADMINISTRATIVE | Facility: CLINIC | Age: 69
End: 2019-04-26

## 2019-06-04 ENCOUNTER — PATIENT OUTREACH (OUTPATIENT)
Dept: ADMINISTRATIVE | Facility: HOSPITAL | Age: 69
End: 2019-06-04

## 2019-06-04 NOTE — PROGRESS NOTES
"Patient was called to schedule her mammo, colonoscopy, foot exam and labs.   Mammo- patient states that she wants to decline due to a "heat rash on her breast"  Colonoscopy- patient wants to decline until further notice  Foot exam- patient states that she went to Podiatry for a ingrown toe nail and no longer has the need to see them anymore.   Labs- patient states that she will get her labs drawn at Dr. Cartagena office.   "

## 2019-06-05 RX ORDER — ESCITALOPRAM OXALATE 10 MG/1
TABLET ORAL
Qty: 90 TABLET | Refills: 2 | Status: SHIPPED | OUTPATIENT
Start: 2019-06-05 | End: 2020-01-01

## 2019-06-18 ENCOUNTER — OFFICE VISIT (OUTPATIENT)
Dept: FAMILY MEDICINE | Facility: CLINIC | Age: 69
End: 2019-06-18
Attending: FAMILY MEDICINE
Payer: MEDICARE

## 2019-06-18 ENCOUNTER — LAB VISIT (OUTPATIENT)
Dept: LAB | Facility: HOSPITAL | Age: 69
End: 2019-06-18
Attending: FAMILY MEDICINE
Payer: MEDICARE

## 2019-06-18 VITALS
SYSTOLIC BLOOD PRESSURE: 110 MMHG | HEIGHT: 64 IN | WEIGHT: 226.81 LBS | BODY MASS INDEX: 38.72 KG/M2 | OXYGEN SATURATION: 89 % | DIASTOLIC BLOOD PRESSURE: 56 MMHG | HEART RATE: 63 BPM

## 2019-06-18 DIAGNOSIS — E78.00 HYPERCHOLESTEROLEMIA: ICD-10-CM

## 2019-06-18 DIAGNOSIS — D50.9 HYPOCHROMIC MICROCYTIC ANEMIA: ICD-10-CM

## 2019-06-18 DIAGNOSIS — I10 HTN (HYPERTENSION), BENIGN: ICD-10-CM

## 2019-06-18 DIAGNOSIS — E78.00 HYPERCHOLESTEROLEMIA: Primary | ICD-10-CM

## 2019-06-18 LAB
ALBUMIN SERPL BCP-MCNC: 3.6 G/DL (ref 3.5–5.2)
ALP SERPL-CCNC: 51 U/L (ref 55–135)
ALT SERPL W/O P-5'-P-CCNC: 9 U/L (ref 10–44)
ANION GAP SERPL CALC-SCNC: 7 MMOL/L (ref 8–16)
AST SERPL-CCNC: 12 U/L (ref 10–40)
BASOPHILS # BLD AUTO: 0.01 K/UL (ref 0–0.2)
BASOPHILS NFR BLD: 0.1 % (ref 0–1.9)
BILIRUB SERPL-MCNC: 0.7 MG/DL (ref 0.1–1)
BUN SERPL-MCNC: 15 MG/DL (ref 8–23)
CALCIUM SERPL-MCNC: 9.7 MG/DL (ref 8.7–10.5)
CHLORIDE SERPL-SCNC: 100 MMOL/L (ref 95–110)
CHOLEST SERPL-MCNC: 120 MG/DL (ref 120–199)
CHOLEST/HDLC SERPL: 3.8 {RATIO} (ref 2–5)
CO2 SERPL-SCNC: 34 MMOL/L (ref 23–29)
CREAT SERPL-MCNC: 1.7 MG/DL (ref 0.5–1.4)
DIFFERENTIAL METHOD: ABNORMAL
EOSINOPHIL # BLD AUTO: 0.1 K/UL (ref 0–0.5)
EOSINOPHIL NFR BLD: 0.9 % (ref 0–8)
ERYTHROCYTE [DISTWIDTH] IN BLOOD BY AUTOMATED COUNT: 14.8 % (ref 11.5–14.5)
EST. GFR  (AFRICAN AMERICAN): 34.5 ML/MIN/1.73 M^2
EST. GFR  (NON AFRICAN AMERICAN): 29.9 ML/MIN/1.73 M^2
GLUCOSE SERPL-MCNC: 115 MG/DL (ref 70–110)
HCT VFR BLD AUTO: 37.6 % (ref 37–48.5)
HDLC SERPL-MCNC: 32 MG/DL (ref 40–75)
HDLC SERPL: 26.7 % (ref 20–50)
HGB BLD-MCNC: 11.5 G/DL (ref 12–16)
IMM GRANULOCYTES # BLD AUTO: 0.02 K/UL (ref 0–0.04)
IMM GRANULOCYTES NFR BLD AUTO: 0.3 % (ref 0–0.5)
LDLC SERPL CALC-MCNC: 66 MG/DL (ref 63–159)
LYMPHOCYTES # BLD AUTO: 0.8 K/UL (ref 1–4.8)
LYMPHOCYTES NFR BLD: 12.6 % (ref 18–48)
MCH RBC QN AUTO: 27.3 PG (ref 27–31)
MCHC RBC AUTO-ENTMCNC: 30.6 G/DL (ref 32–36)
MCV RBC AUTO: 89 FL (ref 82–98)
MONOCYTES # BLD AUTO: 0.4 K/UL (ref 0.3–1)
MONOCYTES NFR BLD: 6.6 % (ref 4–15)
NEUTROPHILS # BLD AUTO: 5.3 K/UL (ref 1.8–7.7)
NEUTROPHILS NFR BLD: 79.5 % (ref 38–73)
NONHDLC SERPL-MCNC: 88 MG/DL
NRBC BLD-RTO: 0 /100 WBC
PLATELET # BLD AUTO: 184 K/UL (ref 150–350)
PMV BLD AUTO: 11 FL (ref 9.2–12.9)
POTASSIUM SERPL-SCNC: 5.1 MMOL/L (ref 3.5–5.1)
PROT SERPL-MCNC: 6.7 G/DL (ref 6–8.4)
RBC # BLD AUTO: 4.22 M/UL (ref 4–5.4)
SODIUM SERPL-SCNC: 141 MMOL/L (ref 136–145)
TRIGL SERPL-MCNC: 110 MG/DL (ref 30–150)
WBC # BLD AUTO: 6.69 K/UL (ref 3.9–12.7)

## 2019-06-18 PROCEDURE — 99214 PR OFFICE/OUTPT VISIT, EST, LEVL IV, 30-39 MIN: ICD-10-PCS | Mod: S$GLB,,, | Performed by: FAMILY MEDICINE

## 2019-06-18 PROCEDURE — 99214 OFFICE O/P EST MOD 30 MIN: CPT | Mod: S$GLB,,, | Performed by: FAMILY MEDICINE

## 2019-06-18 PROCEDURE — 3074F SYST BP LT 130 MM HG: CPT | Mod: CPTII,S$GLB,, | Performed by: FAMILY MEDICINE

## 2019-06-18 PROCEDURE — 99999 PR PBB SHADOW E&M-EST. PATIENT-LVL III: ICD-10-PCS | Mod: PBBFAC,,, | Performed by: FAMILY MEDICINE

## 2019-06-18 PROCEDURE — 36415 COLL VENOUS BLD VENIPUNCTURE: CPT | Mod: PO

## 2019-06-18 PROCEDURE — 99999 PR PBB SHADOW E&M-EST. PATIENT-LVL III: CPT | Mod: PBBFAC,,, | Performed by: FAMILY MEDICINE

## 2019-06-18 PROCEDURE — 99499 UNLISTED E&M SERVICE: CPT | Mod: S$GLB,,, | Performed by: FAMILY MEDICINE

## 2019-06-18 PROCEDURE — 3078F PR MOST RECENT DIASTOLIC BLOOD PRESSURE < 80 MM HG: ICD-10-PCS | Mod: CPTII,S$GLB,, | Performed by: FAMILY MEDICINE

## 2019-06-18 PROCEDURE — 1101F PT FALLS ASSESS-DOCD LE1/YR: CPT | Mod: CPTII,S$GLB,, | Performed by: FAMILY MEDICINE

## 2019-06-18 PROCEDURE — 85025 COMPLETE CBC W/AUTO DIFF WBC: CPT

## 2019-06-18 PROCEDURE — 99499 RISK ADDL DX/OHS AUDIT: ICD-10-PCS | Mod: S$GLB,,, | Performed by: FAMILY MEDICINE

## 2019-06-18 PROCEDURE — 80053 COMPREHEN METABOLIC PANEL: CPT

## 2019-06-18 PROCEDURE — 3074F PR MOST RECENT SYSTOLIC BLOOD PRESSURE < 130 MM HG: ICD-10-PCS | Mod: CPTII,S$GLB,, | Performed by: FAMILY MEDICINE

## 2019-06-18 PROCEDURE — 80061 LIPID PANEL: CPT

## 2019-06-18 PROCEDURE — 1101F PR PT FALLS ASSESS DOC 0-1 FALLS W/OUT INJ PAST YR: ICD-10-PCS | Mod: CPTII,S$GLB,, | Performed by: FAMILY MEDICINE

## 2019-06-18 PROCEDURE — 3078F DIAST BP <80 MM HG: CPT | Mod: CPTII,S$GLB,, | Performed by: FAMILY MEDICINE

## 2019-06-18 RX ORDER — VALACYCLOVIR HYDROCHLORIDE 1 G/1
TABLET, FILM COATED ORAL
Status: CANCELLED | OUTPATIENT
Start: 2019-06-18 | End: 2020-01-01

## 2019-06-18 RX ORDER — VALACYCLOVIR HYDROCHLORIDE 500 MG/1
500 TABLET, FILM COATED ORAL DAILY
Qty: 90 TABLET | Refills: 3 | Status: ON HOLD | OUTPATIENT
Start: 2019-06-18 | End: 2020-01-01 | Stop reason: HOSPADM

## 2019-06-18 NOTE — PATIENT INSTRUCTIONS
Patsy,     We are always striving for excellence. Should you receive a patient experience survey in the mail, we would appreciate if you would take a few moments to give us your feedback. These surveys let us know our strengths as well as areas of opportunity for improvement to better serve you.    Thank you for your time,  Alberot Del Rio MA

## 2019-06-21 ENCOUNTER — TELEPHONE (OUTPATIENT)
Dept: FAMILY MEDICINE | Facility: CLINIC | Age: 69
End: 2019-06-21

## 2019-06-21 DIAGNOSIS — N39.42 URINARY INCONTINENCE WITHOUT SENSORY AWARENESS: ICD-10-CM

## 2019-06-21 NOTE — TELEPHONE ENCOUNTER
----- Message from Suzette Wolf sent at 6/21/2019  9:40 AM CDT -----  Contact: Pt  Please call pt with test results.

## 2019-06-24 NOTE — TELEPHONE ENCOUNTER
Discuss lab results and Dr. Cartagena recommendation. Patient is inquiring about paper work from [id number] in regards to her test strip.     Patient states she gave Dr. Cartagena staff the information that is needed and fax number. Patient would like to know if the form has been completed and faxed?

## 2019-06-25 NOTE — TELEPHONE ENCOUNTER
Please write out a prescription for the patient to receive disposable pads and also diapers and I will fax to [employer] [employer].Thanks.

## 2019-06-26 ENCOUNTER — HOSPITAL ENCOUNTER (OUTPATIENT)
Dept: UROLOGY | Facility: HOSPITAL | Age: 69
Discharge: HOME | End: 2019-06-26
Attending: UROLOGY
Payer: MEDICARE

## 2019-06-26 ENCOUNTER — HOSPITAL ENCOUNTER (OUTPATIENT)
Dept: RADIOLOGY | Facility: HOSPITAL | Age: 69
Discharge: HOME | End: 2019-06-26
Attending: UROLOGY
Payer: MEDICARE

## 2019-06-26 VITALS
HEIGHT: 63 IN | SYSTOLIC BLOOD PRESSURE: 153 MMHG | BODY MASS INDEX: 40.2 KG/M2 | HEART RATE: 66 BPM | RESPIRATION RATE: 18 BRPM | DIASTOLIC BLOOD PRESSURE: 71 MMHG | WEIGHT: 226.88 LBS | TEMPERATURE: 98 F

## 2019-06-26 DIAGNOSIS — C64.1 CARCINOMA OF RIGHT KIDNEY: ICD-10-CM

## 2019-06-26 DIAGNOSIS — C67.0 MALIGNANT NEOPLASM OF TRIGONE OF URINARY BLADDER: Primary | ICD-10-CM

## 2019-06-26 PROBLEM — N39.42 URINARY INCONTINENCE WITHOUT SENSORY AWARENESS: Status: ACTIVE | Noted: 2019-06-26

## 2019-06-26 PROCEDURE — 74176 CT ABD & PELVIS W/O CONTRAST: CPT | Mod: 26,,, | Performed by: RADIOLOGY

## 2019-06-26 PROCEDURE — 74176 CT RENAL STONE STUDY ABD PELVIS WO: ICD-10-PCS | Mod: 26,,, | Performed by: RADIOLOGY

## 2019-06-26 PROCEDURE — 52000 CYSTOURETHROSCOPY: CPT | Mod: ,,, | Performed by: UROLOGY

## 2019-06-26 PROCEDURE — 74176 CT ABD & PELVIS W/O CONTRAST: CPT | Mod: TC

## 2019-06-26 PROCEDURE — 52000 PR CYSTOURETHROSCOPY: ICD-10-PCS | Mod: ,,, | Performed by: UROLOGY

## 2019-06-26 PROCEDURE — 52000 CYSTOURETHROSCOPY: CPT

## 2019-06-26 RX ORDER — LIDOCAINE HYDROCHLORIDE 20 MG/ML
JELLY TOPICAL
Status: COMPLETED | OUTPATIENT
Start: 2019-06-26 | End: 2019-06-26

## 2019-06-26 RX ADMIN — LIDOCAINE HYDROCHLORIDE: 20 JELLY TOPICAL at 01:06

## 2019-06-26 NOTE — H&P
[facility]  History & Physical     Subjective:     Chief Complaint/Reason for Admission: h/o bladder cancer    History of Present Illness:   Patient 69 y.o. female presents with a h/o bladder ca.  Here for cystoscopy.    Patient Active Problem List    Diagnosis Date Noted    Pulmonary nodules/lesions, multiple 03/22/2019    Long term current use of anticoagulant 09/21/2018    Knee arthropathy 03/23/2018    Gout of right foot 09/11/2017    Thyroid nodule 06/08/2017    Acute on chronic respiratory failure with hypoxia and hypercapnia 10/11/2016    Gastrointestinal hemorrhage associated with gastritis 08/26/2016    BHAVANI (obstructive sleep apnea)     Sleep-related hypoventilation     Suspected sleep apnea     Acute on chronic diastolic heart failure 08/03/2016    Chronic respiratory failure with hypoxia 08/03/2016    Paroxysmal atrial fibrillation 08/03/2016    Chronic diastolic congestive heart failure 05/20/2016    CRI (chronic renal insufficiency) 05/20/2016    Typical atrial flutter 04/08/2016    Chronic hypercapnic respiratory failure 03/11/2016    Hypoxia 02/27/2016    Morbid obesity 02/25/2016    Tobacco abuse 02/23/2016    Chronic kidney disease (CKD) stage G4/A1, severely decreased glomerular filtration rate (GFR) between 15-29 mL/min/1.73 square meter and albuminuria creatinine ratio less than 30 mg/g     Acute kidney injury 02/22/2016    Hypokalemia 02/22/2016    Dyslipidemia 02/22/2016    Rhinitis, allergic 03/31/2015    Renal carcinoma 03/10/2015    Renal insufficiency 10/15/2014    HTN (hypertension), benign 10/15/2014    Hypercholesterolemia 10/15/2014    Ear fullness 08/05/2014    URI (upper respiratory infection) 10/22/2013    Impacted cerumen of both ears 10/22/2012          (Not in a hospital admission)  Review of patient's allergies indicates:  No Known Allergies     Past Medical History:   Diagnosis Date    Arthritis     Asthma     Atrial fibrillation      Atrial flutter     Cerumen impaction     CHF (congestive heart failure)     Encounter for blood transfusion     HEARING LOSS     Herpes genitalis     Hypertension     Renal carcinoma 3/10/2015    Thyroid nodule 6/8/2017      Past Surgical History:   Procedure Laterality Date    BRAIN SURGERY      COLONOSCOPY N/A 6/23/2017    Performed by [facility] at [facility]O (2ND FLR)    ESOPHAGOGASTRODUODENOSCOPY (EGD) N/A 8/5/2016    [facility]    EYE SURGERY      HYSTERECTOMY      kidney mass resection Right     renal carcinoma      Family History   Problem Relation Age of Onset    Hypertension Mother     Thyroid disease Mother     Cancer Father     Asthma Neg Hx     Emphysema Neg Hx     Melanoma Neg Hx       Social History     Tobacco Use    Smoking status: Current Every Day Smoker     Packs/day: 1.00     Years: 25.00     Pack years: 25.00     Types: Cigarettes    Smokeless tobacco: Never Used   Substance Use Topics    Alcohol use: No     Alcohol/week: 0.0 oz        Review of Systems  All other systems reviewed and negative except pertinent positives noted in HPI.      Physical Exam   Constitutional: She is oriented to person, place, and time. She appears well-developed and well-nourished.   HENT:   Head: Normocephalic.   Eyes: EOM are normal.   Cardiovascular: Normal rate, intact distal pulses and normal pulses.   Pulmonary/Chest: No accessory muscle usage. No tachypnea. No respiratory distress.   Abdominal: Soft. Normal appearance. She exhibits no distension, no fluid wave, no ascites and no mass. There is no tenderness. There is no rebound, no guarding and no CVA tenderness. No hernia.   Musculoskeletal: Normal range of motion.   Neurological: She is alert and oriented to person, place, and time.   Skin: No bruising and no rash noted.   Psychiatric: She has a normal mood and affect. Her speech is normal and behavior is normal. Thought content normal.  "        OBJECTIVE:     Patient Vitals for the past 8 hrs:   BP Temp Temp src Pulse Resp Height Weight   06/26/19 1303 138/63 98.1 °F (36.7 °C) Skin 63 18 5' 3.25" (1.607 m) 102.9 kg (226 lb 13.7 oz)         Data Review:  Microbiology Results (last 7 days)     ** No results found for the last 168 hours. **        No results for input(s): COLORU, CLARITYU, SPECGRAV, PHUR, PROTEINUA, GLUCOSEU, BILIRUBINCON, BLOODU, WBCU, RBCU, BACTERIA, MUCUS, NITRITE, LEUKOCYTESUR, UROBILINOGEN, HYALINECASTS in the last 168 hours.    ASSESSMENT/PLAN:     There are no hospital problems to display for this patient.    Patient Active Problem List    Diagnosis Date Noted    Pulmonary nodules/lesions, multiple 03/22/2019    Long term current use of anticoagulant 09/21/2018    Knee arthropathy 03/23/2018    Gout of right foot 09/11/2017    Thyroid nodule 06/08/2017    Acute on chronic respiratory failure with hypoxia and hypercapnia 10/11/2016    Gastrointestinal hemorrhage associated with gastritis 08/26/2016    BHAVANI (obstructive sleep apnea)     Sleep-related hypoventilation     Suspected sleep apnea     Acute on chronic diastolic heart failure 08/03/2016    Chronic respiratory failure with hypoxia 08/03/2016    Paroxysmal atrial fibrillation 08/03/2016    Chronic diastolic congestive heart failure 05/20/2016    CRI (chronic renal insufficiency) 05/20/2016    Typical atrial flutter 04/08/2016    Chronic hypercapnic respiratory failure 03/11/2016    Hypoxia 02/27/2016    Morbid obesity 02/25/2016    Tobacco abuse 02/23/2016    Chronic kidney disease (CKD) stage G4/A1, severely decreased glomerular filtration rate (GFR) between 15-29 mL/min/1.73 square meter and albuminuria creatinine ratio less than 30 mg/g     Acute kidney injury 02/22/2016    Hypokalemia 02/22/2016    Dyslipidemia 02/22/2016    Rhinitis, allergic 03/31/2015    Renal carcinoma 03/10/2015    Renal insufficiency 10/15/2014    HTN (hypertension), " benign 10/15/2014    Hypercholesterolemia 10/15/2014    Ear fullness 08/05/2014    URI (upper respiratory infection) 10/22/2013    Impacted cerumen of both ears 10/22/2012         Plan:  I have explained the indication, risks, benefits, and alternatives of the procedure in detail.  The patient voices understanding and all questions have been answered.  The patient agrees to proceed as planned with cystoscopy.

## 2019-06-26 NOTE — PATIENT INSTRUCTIONS
What to Expect After a Cystoscopy  For the next 24-48 hours, you may feel a mild burning when you urinate. This burning is normal and expected. Drink plenty of water to dilute the urine to help relieve the burning sensation. You may also see a small amount of blood in your urine after the procedure.    Unless you are already taking antibiotics, you may be given an antibiotic after the test to prevent infection.    Signs and Symptoms to Report  Call the [facility] at [phone] if you develop any of the following:  · Fever of 101 degrees or higher  · Chills or persistent bleeding  · Inability to urinate

## 2019-07-05 NOTE — PROGRESS NOTES
"Subjective:       Patient ID: Patsy Morris is a 69 y.o. female.    Chief Complaint: Hyperlipidemia    HPI   Pt is here for follow up of hypercholesterolemia stable on statin no muscle aches  Pt has htn stable on lasix no muscle cramps bp fine today  Pt has anemia no palpitations no light headedness last blood count fine   Review of Systems   Constitutional: Negative for chills, fatigue and fever.   Respiratory: Negative for cough, chest tightness and shortness of breath.    Cardiovascular: Negative for chest pain and palpitations.   Gastrointestinal: Negative for abdominal distention, abdominal pain and blood in stool.   Hematological: Negative for adenopathy. Does not bruise/bleed easily.       Objective:      Physical Exam   Constitutional: She appears well-developed and well-nourished. No distress.   Eyes:   No conjunctival palor   Cardiovascular: Normal rate and regular rhythm. Exam reveals no gallop.   Pulmonary/Chest: Effort normal and breath sounds normal. No respiratory distress. She has no rales.   Abdominal: Soft. Bowel sounds are normal. She exhibits no distension. There is no tenderness.     labs discussed with pt  Assessment:       1. Hypercholesterolemia    2. Hypochromic microcytic anemia    3. HTN (hypertension), benign        Plan:     orders cmp lipid cmp  Low fat low salt diet  Cont meds  Graded exercise  rtc 3-6 months       "This note will not be shared with the patient."   "

## 2019-07-09 DIAGNOSIS — E78.5 DYSLIPIDEMIA: ICD-10-CM

## 2019-07-09 RX ORDER — ATORVASTATIN CALCIUM 80 MG/1
TABLET, FILM COATED ORAL
Qty: 90 TABLET | Refills: 2 | Status: SHIPPED | OUTPATIENT
Start: 2019-07-09 | End: 2020-01-01

## 2019-07-22 ENCOUNTER — TELEPHONE (OUTPATIENT)
Dept: OTOLARYNGOLOGY | Facility: CLINIC | Age: 69
End: 2019-07-22

## 2019-07-22 NOTE — TELEPHONE ENCOUNTER
----- Message from Danuta Henry sent at 7/22/2019 11:28 AM CDT -----  Contact: pt  Pt called to speak to the nurse regarding her care and would like a call back today at [phone]

## 2019-07-31 ENCOUNTER — TELEPHONE (OUTPATIENT)
Dept: OTOLARYNGOLOGY | Facility: CLINIC | Age: 69
End: 2019-07-31

## 2019-07-31 NOTE — TELEPHONE ENCOUNTER
----- Message from Adiaclaudia Rojas sent at 7/31/2019  9:41 AM CDT -----  Contact: Patient   Needs Advice    Reason for call: Patient is calling to speak to nurse regarding her upcoming appt on 08/13. Please contact pt to advise.         Communication Preference: [phone]

## 2019-07-31 NOTE — TELEPHONE ENCOUNTER
Spoke with patient re guarding early apt patient cancel last appointment rebooked new appointment.

## 2019-08-05 DIAGNOSIS — I50.31 ACUTE DIASTOLIC CONGESTIVE HEART FAILURE: ICD-10-CM

## 2019-08-05 RX ORDER — FUROSEMIDE 40 MG/1
TABLET ORAL
Qty: 90 TABLET | Refills: 3 | Status: SHIPPED | OUTPATIENT
Start: 2019-08-05 | End: 2020-01-01

## 2019-08-13 ENCOUNTER — OFFICE VISIT (OUTPATIENT)
Dept: OTOLARYNGOLOGY | Facility: CLINIC | Age: 69
End: 2019-08-13
Payer: MEDICARE

## 2019-08-13 VITALS
SYSTOLIC BLOOD PRESSURE: 142 MMHG | BODY MASS INDEX: 39.52 KG/M2 | HEART RATE: 67 BPM | WEIGHT: 224.88 LBS | DIASTOLIC BLOOD PRESSURE: 65 MMHG

## 2019-08-13 DIAGNOSIS — H93.8X3 SENSATION OF FULLNESS IN BOTH EARS: Primary | ICD-10-CM

## 2019-08-13 DIAGNOSIS — B37.2 CANDIDAL INTERTRIGO: ICD-10-CM

## 2019-08-13 PROCEDURE — 1101F PR PT FALLS ASSESS DOC 0-1 FALLS W/OUT INJ PAST YR: ICD-10-PCS | Mod: CPTII,S$GLB,, | Performed by: OTOLARYNGOLOGY

## 2019-08-13 PROCEDURE — 99213 PR OFFICE/OUTPT VISIT, EST, LEVL III, 20-29 MIN: ICD-10-PCS | Mod: 25,S$GLB,, | Performed by: OTOLARYNGOLOGY

## 2019-08-13 PROCEDURE — 69210 REMOVE IMPACTED EAR WAX UNI: CPT | Mod: S$GLB,,, | Performed by: OTOLARYNGOLOGY

## 2019-08-13 PROCEDURE — 99999 PR PBB SHADOW E&M-EST. PATIENT-LVL II: CPT | Mod: PBBFAC,,, | Performed by: OTOLARYNGOLOGY

## 2019-08-13 PROCEDURE — 69210 PR REMOVAL IMPACTED CERUMEN REQUIRING INSTRUMENTATION, UNILATERAL: ICD-10-PCS | Mod: S$GLB,,, | Performed by: OTOLARYNGOLOGY

## 2019-08-13 PROCEDURE — 99213 OFFICE O/P EST LOW 20 MIN: CPT | Mod: 25,S$GLB,, | Performed by: OTOLARYNGOLOGY

## 2019-08-13 PROCEDURE — 99999 PR PBB SHADOW E&M-EST. PATIENT-LVL II: ICD-10-PCS | Mod: PBBFAC,,, | Performed by: OTOLARYNGOLOGY

## 2019-08-13 PROCEDURE — 1101F PT FALLS ASSESS-DOCD LE1/YR: CPT | Mod: CPTII,S$GLB,, | Performed by: OTOLARYNGOLOGY

## 2019-08-13 RX ORDER — TRIAMCINOLONE ACETONIDE 0.25 MG/G
CREAM TOPICAL
Qty: 30 G | Refills: 3 | Status: ON HOLD | OUTPATIENT
Start: 2019-08-13 | End: 2020-01-01 | Stop reason: HOSPADM

## 2019-08-13 NOTE — PROGRESS NOTES
Subjective:       Patient ID: Patsy Morris is a 69 y.o. female.    Chief Complaint: Ear Fullness    Ear Fullness    There is pain in both ears. This is a recurrent problem. The current episode started 1 to 4 weeks ago. The problem occurs constantly. The problem has been unchanged. There has been no fever. The patient is experiencing no pain. Associated symptoms include hearing loss. Pertinent negatives include no coughing, diarrhea, ear discharge, headaches, neck pain, rash, rhinorrhea or vomiting. She has tried nothing for the symptoms. Her past medical history is significant for hearing loss.     Review of Systems   Constitutional: Negative for activity change, appetite change and fever.   HENT: Positive for hearing loss. Negative for congestion, ear discharge, ear pain, nosebleeds, postnasal drip, rhinorrhea and sneezing.    Eyes: Negative for redness and visual disturbance.   Respiratory: Negative for apnea, cough, shortness of breath and wheezing.    Cardiovascular: Negative for chest pain and palpitations.   Gastrointestinal: Negative for diarrhea and vomiting.   Genitourinary: Negative for difficulty urinating and frequency.   Musculoskeletal: Negative for arthralgias, back pain, gait problem and neck pain.   Skin: Negative for color change and rash.   Neurological: Negative for dizziness, speech difficulty, weakness and headaches.   Hematological: Negative for adenopathy. Does not bruise/bleed easily.   Psychiatric/Behavioral: Negative for agitation and behavioral problems.       Objective:        Constitutional:   Vital signs are normal. She appears well-developed and well-nourished. She is active. Normal speech.      Head:  Normocephalic and atraumatic. Salivary glands normal.  Facial strength is normal.      Nose:  Nose normal including turbinates, nasal mucosa, sinuses and nasal septum.     Mouth/Throat  Oropharynx clear and moist without lesions or asymmetry and normal uvula midline.      Neck:  Neck normal without thyromegaly masses, asymmetry, normal tracheal structure, crepitus, and tenderness, thyroid normal, trachea normal, phonation normal and full range of motion with neck supple.     Psychiatric:   She has a normal mood and affect. Her speech is normal and behavior is normal.     Neurological:   She has neurological normal, alert and oriented.     Skin:   No abrasions, lacerations, lesions, or rashes.         PROCEDURE NOTE:  Ceruminosis is noted in both EACs.  Wax was removed by manual debridement and suctioning utilizing the assistance of binocular microscopy revealing EACs and TMs WNL. The patient tolerated this procedure without difficulty. The subjective decrease noted in hearing pre-cleaning was resolved post-cleaning.       Assessment:       1. Sensation of fullness in both ears        Plan:       1. Hearing conservation strongly recommended.  2. Trial of amplification bilaterally also recommended (patient not interested).  3. Re-check of hearing in 18-24 months or sooner if subjective change noted.  4. F/U with PCP as per schedule.

## 2019-08-16 RX ORDER — FERROUS SULFATE 325(65) MG
TABLET ORAL
Qty: 30 TABLET | Refills: 6 | Status: SHIPPED | OUTPATIENT
Start: 2019-08-16 | End: 2020-01-01

## 2019-09-09 ENCOUNTER — TELEPHONE (OUTPATIENT)
Dept: FAMILY MEDICINE | Facility: CLINIC | Age: 69
End: 2019-09-09

## 2019-09-09 DIAGNOSIS — Z91.81 AT RISK FOR FALLS: Primary | ICD-10-CM

## 2019-09-09 NOTE — TELEPHONE ENCOUNTER
----- Message from Suzette Wolf sent at 9/9/2019 12:14 PM CDT -----  Contact: Pt  Pt would like a commode to place in her bedroom to use at night. She said she almost fell twice trying to walk with her oxygen and tubing.

## 2019-09-10 NOTE — TELEPHONE ENCOUNTER
The patient she also needs a prescription for a walker, she was using her mother's walker and it is currently broke at the moment. Also she would like an order for a bench that goes in the tub with the legs that go outside of the tub. She is unaware of the name.

## 2019-09-12 ENCOUNTER — TELEPHONE (OUTPATIENT)
Dept: CARDIOLOGY | Facility: CLINIC | Age: 69
End: 2019-09-12

## 2019-09-12 NOTE — TELEPHONE ENCOUNTER
----- Message from Olivia Lynn sent at 9/12/2019  3:01 PM CDT -----  Contact: pt  Pt would like to be called back regarding  Lab orders     Pt can be reached at [phone]

## 2019-09-13 DIAGNOSIS — N18.9 CHRONIC KIDNEY DISEASE, UNSPECIFIED CKD STAGE: ICD-10-CM

## 2019-09-17 ENCOUNTER — TELEPHONE (OUTPATIENT)
Dept: CARDIOLOGY | Facility: CLINIC | Age: 69
End: 2019-09-17

## 2019-09-18 NOTE — TELEPHONE ENCOUNTER
Patient has been informed of both of her orders being faxed to [facility].

## 2019-09-25 ENCOUNTER — TELEPHONE (OUTPATIENT)
Dept: FAMILY MEDICINE | Facility: CLINIC | Age: 69
End: 2019-09-25

## 2019-09-25 NOTE — TELEPHONE ENCOUNTER
I have spoken to the patient and have informed her that her supplies have been faxed to [employer] [employer].Thanks.

## 2019-09-27 ENCOUNTER — LAB VISIT (OUTPATIENT)
Dept: LAB | Facility: HOSPITAL | Age: 69
End: 2019-09-27
Attending: INTERNAL MEDICINE
Payer: MEDICARE

## 2019-09-27 ENCOUNTER — TELEPHONE (OUTPATIENT)
Dept: FAMILY MEDICINE | Facility: CLINIC | Age: 69
End: 2019-09-27

## 2019-09-27 DIAGNOSIS — I10 HTN (HYPERTENSION), BENIGN: ICD-10-CM

## 2019-09-27 DIAGNOSIS — Z79.01 LONG TERM CURRENT USE OF ANTICOAGULANT: ICD-10-CM

## 2019-09-27 DIAGNOSIS — I50.32 CHRONIC DIASTOLIC CONGESTIVE HEART FAILURE: ICD-10-CM

## 2019-09-27 DIAGNOSIS — Z91.81 AT MODERATE RISK FOR FALL: Primary | ICD-10-CM

## 2019-09-27 LAB
ALBUMIN SERPL BCP-MCNC: 3.6 G/DL (ref 3.5–5.2)
ALP SERPL-CCNC: 58 U/L (ref 55–135)
ALT SERPL W/O P-5'-P-CCNC: 7 U/L (ref 10–44)
ANION GAP SERPL CALC-SCNC: 7 MMOL/L (ref 8–16)
AST SERPL-CCNC: 12 U/L (ref 10–40)
BASOPHILS # BLD AUTO: 0.01 K/UL (ref 0–0.2)
BASOPHILS NFR BLD: 0.1 % (ref 0–1.9)
BILIRUB SERPL-MCNC: 0.5 MG/DL (ref 0.1–1)
BUN SERPL-MCNC: 17 MG/DL (ref 8–23)
CALCIUM SERPL-MCNC: 9.2 MG/DL (ref 8.7–10.5)
CHLORIDE SERPL-SCNC: 98 MMOL/L (ref 95–110)
CHOLEST SERPL-MCNC: 129 MG/DL (ref 120–199)
CHOLEST/HDLC SERPL: 3.7 {RATIO} (ref 2–5)
CO2 SERPL-SCNC: 34 MMOL/L (ref 23–29)
CREAT SERPL-MCNC: 1.8 MG/DL (ref 0.5–1.4)
DIFFERENTIAL METHOD: ABNORMAL
EOSINOPHIL # BLD AUTO: 0.1 K/UL (ref 0–0.5)
EOSINOPHIL NFR BLD: 0.8 % (ref 0–8)
ERYTHROCYTE [DISTWIDTH] IN BLOOD BY AUTOMATED COUNT: 14.4 % (ref 11.5–14.5)
EST. GFR  (AFRICAN AMERICAN): 32.2 ML/MIN/1.73 M^2
EST. GFR  (NON AFRICAN AMERICAN): 27.9 ML/MIN/1.73 M^2
GLUCOSE SERPL-MCNC: 118 MG/DL (ref 70–110)
HCT VFR BLD AUTO: 36.2 % (ref 37–48.5)
HDLC SERPL-MCNC: 35 MG/DL (ref 40–75)
HDLC SERPL: 27.1 % (ref 20–50)
HGB BLD-MCNC: 11.3 G/DL (ref 12–16)
IMM GRANULOCYTES # BLD AUTO: 0.02 K/UL (ref 0–0.04)
IMM GRANULOCYTES NFR BLD AUTO: 0.3 % (ref 0–0.5)
LDLC SERPL CALC-MCNC: 75.8 MG/DL (ref 63–159)
LYMPHOCYTES # BLD AUTO: 1 K/UL (ref 1–4.8)
LYMPHOCYTES NFR BLD: 12.4 % (ref 18–48)
MCH RBC QN AUTO: 27.1 PG (ref 27–31)
MCHC RBC AUTO-ENTMCNC: 31.2 G/DL (ref 32–36)
MCV RBC AUTO: 87 FL (ref 82–98)
MONOCYTES # BLD AUTO: 0.4 K/UL (ref 0.3–1)
MONOCYTES NFR BLD: 5.8 % (ref 4–15)
NEUTROPHILS # BLD AUTO: 6.2 K/UL (ref 1.8–7.7)
NEUTROPHILS NFR BLD: 80.6 % (ref 38–73)
NONHDLC SERPL-MCNC: 94 MG/DL
NRBC BLD-RTO: 0 /100 WBC
PLATELET # BLD AUTO: 182 K/UL (ref 150–350)
PMV BLD AUTO: 10.4 FL (ref 9.2–12.9)
POTASSIUM SERPL-SCNC: 4.5 MMOL/L (ref 3.5–5.1)
PROT SERPL-MCNC: 7.1 G/DL (ref 6–8.4)
RBC # BLD AUTO: 4.17 M/UL (ref 4–5.4)
SODIUM SERPL-SCNC: 139 MMOL/L (ref 136–145)
TRIGL SERPL-MCNC: 91 MG/DL (ref 30–150)
WBC # BLD AUTO: 7.64 K/UL (ref 3.9–12.7)

## 2019-09-27 PROCEDURE — 80053 COMPREHEN METABOLIC PANEL: CPT

## 2019-09-27 PROCEDURE — 80061 LIPID PANEL: CPT

## 2019-09-27 PROCEDURE — 85025 COMPLETE CBC W/AUTO DIFF WBC: CPT

## 2019-09-27 PROCEDURE — 36415 COLL VENOUS BLD VENIPUNCTURE: CPT | Mod: PO

## 2019-09-27 NOTE — TELEPHONE ENCOUNTER
Patient came into office today to check the status of her DME supplies.   Patient states she also needs an order for a walker. Please enter and I will fax to the appropriate DME company.

## 2019-10-02 ENCOUNTER — PATIENT OUTREACH (OUTPATIENT)
Dept: ADMINISTRATIVE | Facility: OTHER | Age: 69
End: 2019-10-02

## 2019-10-02 DIAGNOSIS — E11.9 TYPE 2 DIABETES MELLITUS WITHOUT COMPLICATION, UNSPECIFIED WHETHER LONG TERM INSULIN USE: Primary | ICD-10-CM

## 2019-10-04 ENCOUNTER — OFFICE VISIT (OUTPATIENT)
Dept: CARDIOLOGY | Facility: CLINIC | Age: 69
End: 2019-10-04
Payer: MEDICARE

## 2019-10-04 VITALS
OXYGEN SATURATION: 95 % | DIASTOLIC BLOOD PRESSURE: 58 MMHG | WEIGHT: 230.38 LBS | BODY MASS INDEX: 40.82 KG/M2 | HEART RATE: 95 BPM | HEIGHT: 63 IN | SYSTOLIC BLOOD PRESSURE: 117 MMHG

## 2019-10-04 DIAGNOSIS — E78.5 DYSLIPIDEMIA: ICD-10-CM

## 2019-10-04 DIAGNOSIS — N39.42 URINARY INCONTINENCE WITHOUT SENSORY AWARENESS: ICD-10-CM

## 2019-10-04 DIAGNOSIS — I50.32 CHRONIC DIASTOLIC CONGESTIVE HEART FAILURE: ICD-10-CM

## 2019-10-04 DIAGNOSIS — Z72.0 TOBACCO ABUSE: ICD-10-CM

## 2019-10-04 DIAGNOSIS — J96.12 CHRONIC HYPERCAPNIC RESPIRATORY FAILURE: Primary | ICD-10-CM

## 2019-10-04 DIAGNOSIS — J96.11 CHRONIC RESPIRATORY FAILURE WITH HYPOXIA: ICD-10-CM

## 2019-10-04 DIAGNOSIS — G47.33 OSA (OBSTRUCTIVE SLEEP APNEA): ICD-10-CM

## 2019-10-04 DIAGNOSIS — I48.0 PAROXYSMAL ATRIAL FIBRILLATION: ICD-10-CM

## 2019-10-04 DIAGNOSIS — E11.9 TYPE 2 DIABETES MELLITUS WITHOUT COMPLICATION, WITHOUT LONG-TERM CURRENT USE OF INSULIN: ICD-10-CM

## 2019-10-04 DIAGNOSIS — N18.4 CHRONIC KIDNEY DISEASE (CKD) STAGE G4/A1, SEVERELY DECREASED GLOMERULAR FILTRATION RATE (GFR) BETWEEN 15-29 ML/MIN/1.73 SQUARE METER AND ALBUMINURIA CREATININE RATIO LESS THAN 30 MG/G: ICD-10-CM

## 2019-10-04 PROCEDURE — 1101F PT FALLS ASSESS-DOCD LE1/YR: CPT | Mod: CPTII,S$GLB,, | Performed by: INTERNAL MEDICINE

## 2019-10-04 PROCEDURE — 3074F SYST BP LT 130 MM HG: CPT | Mod: CPTII,S$GLB,, | Performed by: INTERNAL MEDICINE

## 2019-10-04 PROCEDURE — 99214 PR OFFICE/OUTPT VISIT, EST, LEVL IV, 30-39 MIN: ICD-10-PCS | Mod: S$GLB,,, | Performed by: INTERNAL MEDICINE

## 2019-10-04 PROCEDURE — 3044F HG A1C LEVEL LT 7.0%: CPT | Mod: CPTII,S$GLB,, | Performed by: INTERNAL MEDICINE

## 2019-10-04 PROCEDURE — 3078F DIAST BP <80 MM HG: CPT | Mod: CPTII,S$GLB,, | Performed by: INTERNAL MEDICINE

## 2019-10-04 PROCEDURE — 99999 PR PBB SHADOW E&M-EST. PATIENT-LVL V: ICD-10-PCS | Mod: PBBFAC,,, | Performed by: INTERNAL MEDICINE

## 2019-10-04 PROCEDURE — 99214 OFFICE O/P EST MOD 30 MIN: CPT | Mod: S$GLB,,, | Performed by: INTERNAL MEDICINE

## 2019-10-04 PROCEDURE — 99499 RISK ADDL DX/OHS AUDIT: ICD-10-PCS | Mod: S$GLB,,, | Performed by: INTERNAL MEDICINE

## 2019-10-04 PROCEDURE — 1101F PR PT FALLS ASSESS DOC 0-1 FALLS W/OUT INJ PAST YR: ICD-10-PCS | Mod: CPTII,S$GLB,, | Performed by: INTERNAL MEDICINE

## 2019-10-04 PROCEDURE — 3074F PR MOST RECENT SYSTOLIC BLOOD PRESSURE < 130 MM HG: ICD-10-PCS | Mod: CPTII,S$GLB,, | Performed by: INTERNAL MEDICINE

## 2019-10-04 PROCEDURE — 99499 UNLISTED E&M SERVICE: CPT | Mod: S$GLB,,, | Performed by: INTERNAL MEDICINE

## 2019-10-04 PROCEDURE — 3044F PR MOST RECENT HEMOGLOBIN A1C LEVEL <7.0%: ICD-10-PCS | Mod: CPTII,S$GLB,, | Performed by: INTERNAL MEDICINE

## 2019-10-04 PROCEDURE — 3078F PR MOST RECENT DIASTOLIC BLOOD PRESSURE < 80 MM HG: ICD-10-PCS | Mod: CPTII,S$GLB,, | Performed by: INTERNAL MEDICINE

## 2019-10-04 PROCEDURE — 99999 PR PBB SHADOW E&M-EST. PATIENT-LVL V: CPT | Mod: PBBFAC,,, | Performed by: INTERNAL MEDICINE

## 2019-10-04 NOTE — PROGRESS NOTES
Subjective:    Patient ID:  Patsy Morris is a 69 y.o. female who presents for follow-up of Congestive Heart Failure      HPI     69 year old female followed with HFpEF, paroxsymal AFl, CRI, COPD on supplemental O2, and post nephrectomy of renal cell ca. She presented to clinic 4/22/19 with increased leg edema with out increase in SOB, and palpitations. She had been non compliant to low salt diet. Her edema has improved and has been more compliant with low salt. She reports urinary frequency and nocturia while taking lasix in the morning.  Lab Results   Component Value Date     09/27/2019    K 4.5 09/27/2019    CL 98 09/27/2019    CO2 34 (H) 09/27/2019    BUN 17 09/27/2019    CREATININE 1.8 (H) 09/27/2019     (H) 09/27/2019    HGBA1C 5.8 (H) 03/19/2019    MG 2.2 03/02/2016    AST 12 09/27/2019    ALT 7 (L) 09/27/2019    ALBUMIN 3.6 09/27/2019    PROT 7.1 09/27/2019    BILITOT 0.5 09/27/2019    WBC 7.64 09/27/2019    HGB 11.3 (L) 09/27/2019    HCT 36.2 (L) 09/27/2019    MCV 87 09/27/2019     09/27/2019    INR 0.9 03/28/2017    TSH 0.631 02/25/2016         Lab Results   Component Value Date    CHOL 129 09/27/2019    HDL 35 (L) 09/27/2019    TRIG 91 09/27/2019       Lab Results   Component Value Date    LDLCALC 75.8 09/27/2019       Past Medical History:   Diagnosis Date    Arthritis     Asthma     Atrial fibrillation     Atrial flutter     Cerumen impaction     CHF (congestive heart failure)     Colon polyps 2017    Encounter for blood transfusion     HEARING LOSS     Herpes genitalis     Hypertension     Renal carcinoma 3/10/2015    Thyroid nodule 6/8/2017       Current Outpatient Medications:     ammonium lactate (LAC-HYDRIN) 12 % lotion, Apply topically as needed for Dry Skin., Disp: 225 g, Rfl: 1    apixaban (ELIQUIS) 2.5 mg Tab, TAKE 1 TABLET(2.5 MG) BY MOUTH TWICE DAILY, Disp: 60 tablet, Rfl: 6    atorvastatin (LIPITOR) 80 MG tablet, TAKE 1 TABLET BY MOUTH EVERY  "EVENING., Disp: 90 tablet, Rfl: 2    blood sugar diagnostic (BLOOD GLUCOSE TEST) Strp, Qd testing, Disp: 100 strip, Rfl: 3    escitalopram oxalate (LEXAPRO) 10 MG tablet, TAKE ONE TABLET BY MOUTH EVERY DAY IN THE EVENING, Disp: 90 tablet, Rfl: 2    ferrous sulfate (FEOSOL) 325 mg (65 mg iron) Tab tablet, TAKE ONE TABLET BY MOUTH DAILY WITH BREAKFAST, Disp: 30 tablet, Rfl: 6    furosemide (LASIX) 40 MG tablet, TAKE 1 TABLET BY MOUTH ONCE DAILY., Disp: 90 tablet, Rfl: 3    ketoconazole (NIZORAL) 2 % cream, Apply to affected areas of body BID., Disp: 30 g, Rfl: 3    mometasone 0.1% (ELOCON) 0.1 % cream, Apply topically once daily., Disp: 45 g, Rfl: 0    mupirocin (BACTROBAN) 2 % ointment, Apply to affected area 3 times daily, Disp: 22 g, Rfl: 1    silver sulfADIAZINE 1% (SILVADENE) 1 % cream, LEOLA AA LIGHTLY WITH DRESSING BID, Disp: , Rfl: 1    triamcinolone acetonide 0.025% (KENALOG) 0.025 % cream, AAA of body bid, Disp: 30 g, Rfl: 3    valACYclovir (VALTREX) 500 MG tablet, Take 1 tablet (500 mg total) by mouth once daily., Disp: 90 tablet, Rfl: 3    VOLTAREN 1 % Gel, , Disp: , Rfl: 2    walker Misc, Prn usage dx E66.01, Disp: 1 each, Rfl: 0    walker Misc, Prn use please fit pt, Disp: 1 each, Rfl: 0    albuterol-ipratropium 2.5mg-0.5mg/3mL (DUO-NEB) 0.5 mg-3 mg(2.5 mg base)/3 mL nebulizer solution, Take 3 mLs by nebulization every 6 (six) hours as needed for Wheezing or Shortness of Breath., Disp: 3 mL, Rfl: 2          Review of Systems   Cardiovascular: Positive for dyspnea on exertion.   Genitourinary: Positive for bladder incontinence, frequency, nocturia and urgency.        Objective:BP (!) 117/58   Pulse 95   Ht 5' 3" (1.6 m)   Wt 104.5 kg (230 lb 6.4 oz)   LMP  (LMP Unknown)   SpO2 95%   BMI 40.81 kg/m²             Physical Exam   Constitutional: She is oriented to person, place, and time. She appears well-developed and well-nourished.   Morbid obese   HENT:   Head: Normocephalic.   Right " Ear: External ear normal.   Left Ear: External ear normal.   Nose: Nose normal.   Inspection of lips, teeth and gums normal   Eyes: Pupils are equal, round, and reactive to light. Conjunctivae and EOM are normal. No scleral icterus.   Neck: Normal range of motion. No JVD present. No tracheal deviation present. No thyromegaly present.   Cardiovascular: Normal rate, regular rhythm, normal heart sounds and intact distal pulses. Exam reveals no gallop and no friction rub.   No murmur heard.  Pulmonary/Chest: Effort normal. No respiratory distress. She has decreased breath sounds. She has no wheezes. She has no rales. She exhibits no tenderness.   Abdominal: Soft. Bowel sounds are normal. She exhibits no distension. There is no hepatosplenomegaly. There is no tenderness. There is no guarding.   Musculoskeletal: Normal range of motion. She exhibits no edema or tenderness.   Lymphadenopathy:   Palpation of lymph nodes of neck and groin normal   Neurological: She is oriented to person, place, and time. No cranial nerve deficit. She exhibits normal muscle tone. Coordination normal.   Skin: Skin is warm and dry. No rash noted. No erythema. No pallor.   Palpation of skin normal   Psychiatric: She has a normal mood and affect. Her behavior is normal. Judgment and thought content normal.         Assessment:       1. Chronic hypercapnic respiratory failure    2. Chronic respiratory failure with hypoxia    3. Chronic diastolic congestive heart failure    4. Dyslipidemia    5. Paroxysmal atrial fibrillation    6. Chronic kidney disease (CKD) stage G4/A1, severely decreased glomerular filtration rate (GFR) between 15-29 mL/min/1.73 square meter and albuminuria creatinine ratio less than 30 mg/g    7. Urinary incontinence without sensory awareness    8. BHAVANI (obstructive sleep apnea)    9. Tobacco abuse    10. Type 2 diabetes mellitus without complication, without long-term current use of insulin         Plan:       Patsy was seen  today for congestive heart failure.    Diagnoses and all orders for this visit:    Chronic hypercapnic respiratory failure    Chronic respiratory failure with hypoxia    Chronic diastolic congestive heart failure    Dyslipidemia  -     Lipid panel; Future; Expected date: 11/15/2019    Paroxysmal atrial fibrillation    Chronic kidney disease (CKD) stage G4/A1, severely decreased glomerular filtration rate (GFR) between 15-29 mL/min/1.73 square meter and albuminuria creatinine ratio less than 30 mg/g  -     CBC auto differential; Future; Expected date: 04/04/2020  -     Comprehensive metabolic panel; Future; Expected date: 04/04/2020    Urinary incontinence without sensory awareness  -     Ambulatory consult to Urogynecology    BHAVANI (obstructive sleep apnea)    Tobacco abuse    Type 2 diabetes mellitus without complication, without long-term current use of insulin  -     Hemoglobin A1c; Future; Expected date: 04/04/2020

## 2019-10-18 DIAGNOSIS — I48.3 TYPICAL ATRIAL FLUTTER: ICD-10-CM

## 2019-10-18 RX ORDER — APIXABAN 2.5 MG/1
TABLET, FILM COATED ORAL
Qty: 60 TABLET | Refills: 6 | Status: SHIPPED | OUTPATIENT
Start: 2019-10-18 | End: 2020-01-01

## 2019-10-22 ENCOUNTER — TELEPHONE (OUTPATIENT)
Dept: UROGYNECOLOGY | Facility: CLINIC | Age: 69
End: 2019-10-22

## 2019-10-24 ENCOUNTER — TELEPHONE (OUTPATIENT)
Dept: UROGYNECOLOGY | Facility: CLINIC | Age: 69
End: 2019-10-24

## 2019-11-29 ENCOUNTER — PATIENT OUTREACH (OUTPATIENT)
Dept: ADMINISTRATIVE | Facility: OTHER | Age: 69
End: 2019-11-29

## 2019-12-03 ENCOUNTER — OFFICE VISIT (OUTPATIENT)
Dept: OTOLARYNGOLOGY | Facility: CLINIC | Age: 69
End: 2019-12-03
Payer: MEDICARE

## 2019-12-03 VITALS
DIASTOLIC BLOOD PRESSURE: 56 MMHG | BODY MASS INDEX: 40.61 KG/M2 | WEIGHT: 229.25 LBS | SYSTOLIC BLOOD PRESSURE: 111 MMHG | HEART RATE: 66 BPM

## 2019-12-03 DIAGNOSIS — H93.8X3 SENSATION OF FULLNESS IN BOTH EARS: Primary | ICD-10-CM

## 2019-12-03 PROCEDURE — 99999 PR PBB SHADOW E&M-EST. PATIENT-LVL II: CPT | Mod: PBBFAC,,, | Performed by: OTOLARYNGOLOGY

## 2019-12-03 PROCEDURE — 99999 PR PBB SHADOW E&M-EST. PATIENT-LVL II: ICD-10-PCS | Mod: PBBFAC,,, | Performed by: OTOLARYNGOLOGY

## 2019-12-03 PROCEDURE — 99213 PR OFFICE/OUTPT VISIT, EST, LEVL III, 20-29 MIN: ICD-10-PCS | Mod: 25,S$GLB,, | Performed by: OTOLARYNGOLOGY

## 2019-12-03 PROCEDURE — 69210 REMOVE IMPACTED EAR WAX UNI: CPT | Mod: S$GLB,,, | Performed by: OTOLARYNGOLOGY

## 2019-12-03 PROCEDURE — 69210 PR REMOVAL IMPACTED CERUMEN REQUIRING INSTRUMENTATION, UNILATERAL: ICD-10-PCS | Mod: S$GLB,,, | Performed by: OTOLARYNGOLOGY

## 2019-12-03 PROCEDURE — 1159F PR MEDICATION LIST DOCUMENTED IN MEDICAL RECORD: ICD-10-PCS | Mod: S$GLB,,, | Performed by: OTOLARYNGOLOGY

## 2019-12-03 PROCEDURE — 1101F PT FALLS ASSESS-DOCD LE1/YR: CPT | Mod: CPTII,S$GLB,, | Performed by: OTOLARYNGOLOGY

## 2019-12-03 PROCEDURE — 99213 OFFICE O/P EST LOW 20 MIN: CPT | Mod: 25,S$GLB,, | Performed by: OTOLARYNGOLOGY

## 2019-12-03 PROCEDURE — 1101F PR PT FALLS ASSESS DOC 0-1 FALLS W/OUT INJ PAST YR: ICD-10-PCS | Mod: CPTII,S$GLB,, | Performed by: OTOLARYNGOLOGY

## 2019-12-03 PROCEDURE — 1159F MED LIST DOCD IN RCRD: CPT | Mod: S$GLB,,, | Performed by: OTOLARYNGOLOGY

## 2019-12-27 NOTE — TELEPHONE ENCOUNTER
December 27, 2019      To Whom It May Concern:      Deborah Borden was seen in our Emergency Department today, 12/27/19.  She missed her flight due to a medical emergency, requiring treatment.  Please provide her with an accommodation to change her ticket due to this medical necessity.    Sincerely,        Chad A. Trierweiler, MD         Patient's order for a rolling walker has been faxed to [facility].Patient has been notified.Thanks.

## 2020-01-01 ENCOUNTER — TELEPHONE (OUTPATIENT)
Dept: OTOLARYNGOLOGY | Facility: CLINIC | Age: 70
End: 2020-01-01

## 2020-01-01 ENCOUNTER — PATIENT OUTREACH (OUTPATIENT)
Dept: ADMINISTRATIVE | Facility: OTHER | Age: 70
End: 2020-01-01

## 2020-01-01 ENCOUNTER — TELEPHONE (OUTPATIENT)
Dept: FAMILY MEDICINE | Facility: CLINIC | Age: 70
End: 2020-01-01

## 2020-01-01 ENCOUNTER — PATIENT MESSAGE (OUTPATIENT)
Dept: CARDIOLOGY | Facility: CLINIC | Age: 70
End: 2020-01-01

## 2020-01-01 ENCOUNTER — OFFICE VISIT (OUTPATIENT)
Dept: CARDIOLOGY | Facility: CLINIC | Age: 70
End: 2020-01-01
Payer: MEDICARE

## 2020-01-01 ENCOUNTER — TELEPHONE (OUTPATIENT)
Dept: INTERNAL MEDICINE | Facility: CLINIC | Age: 70
End: 2020-01-01

## 2020-01-01 ENCOUNTER — HOSPITAL ENCOUNTER (INPATIENT)
Facility: OTHER | Age: 70
LOS: 10 days | Discharge: HOME | DRG: 308 | End: 2020-09-11
Attending: EMERGENCY MEDICINE | Admitting: INTERNAL MEDICINE
Payer: MEDICARE

## 2020-01-01 ENCOUNTER — DOCUMENT SCAN (OUTPATIENT)
Dept: HOME HEALTH SERVICES | Facility: HOSPITAL | Age: 70
End: 2020-01-01
Payer: MEDICARE

## 2020-01-01 ENCOUNTER — SSC ENCOUNTER (OUTPATIENT)
Dept: ADMINISTRATIVE | Facility: OTHER | Age: 70
End: 2020-01-01

## 2020-01-01 ENCOUNTER — HOSPITAL ENCOUNTER (OUTPATIENT)
Dept: RADIOLOGY | Facility: HOSPITAL | Age: 70
Discharge: HOME | End: 2020-07-14
Attending: UROLOGY
Payer: MEDICARE

## 2020-01-01 ENCOUNTER — PATIENT OUTREACH (OUTPATIENT)
Dept: ADMINISTRATIVE | Facility: HOSPITAL | Age: 70
End: 2020-01-01

## 2020-01-01 ENCOUNTER — OFFICE VISIT (OUTPATIENT)
Dept: OTOLARYNGOLOGY | Facility: CLINIC | Age: 70
End: 2020-01-01
Payer: MEDICARE

## 2020-01-01 ENCOUNTER — EXTERNAL HOSPITAL ADMISSION (OUTPATIENT)
Dept: SKILLED NURSING FACILITY | Facility: HOSPITAL | Age: 70
End: 2020-01-01
Payer: MEDICARE

## 2020-01-01 ENCOUNTER — TELEPHONE (OUTPATIENT)
Dept: CARDIOLOGY | Facility: CLINIC | Age: 70
End: 2020-01-01

## 2020-01-01 ENCOUNTER — OFFICE VISIT (OUTPATIENT)
Dept: FAMILY MEDICINE | Facility: CLINIC | Age: 70
End: 2020-01-01
Attending: FAMILY MEDICINE
Payer: MEDICARE

## 2020-01-01 ENCOUNTER — PROCEDURE VISIT (OUTPATIENT)
Dept: UROLOGY | Facility: CLINIC | Age: 70
End: 2020-01-01
Payer: MEDICARE

## 2020-01-01 ENCOUNTER — EXTERNAL HOME HEALTH (OUTPATIENT)
Dept: HOME HEALTH SERVICES | Facility: HOSPITAL | Age: 70
End: 2020-01-01
Payer: MEDICARE

## 2020-01-01 ENCOUNTER — LAB VISIT (OUTPATIENT)
Dept: LAB | Facility: HOSPITAL | Age: 70
End: 2020-01-01
Attending: INTERNAL MEDICINE
Payer: MEDICARE

## 2020-01-01 ENCOUNTER — HOSPITAL ENCOUNTER (INPATIENT)
Facility: OTHER | Age: 70
LOS: 9 days | Discharge: NURSING, REHAB OR LONG-TERM CARE | DRG: 291 | End: 2020-08-31
Attending: EMERGENCY MEDICINE | Admitting: INTERNAL MEDICINE
Payer: MEDICARE

## 2020-01-01 ENCOUNTER — HOSPITAL ENCOUNTER (INPATIENT)
Facility: OTHER | Age: 70
LOS: 30 days | Discharge: HOSPICE | DRG: 207 | End: 2020-11-12
Attending: EMERGENCY MEDICINE | Admitting: EMERGENCY MEDICINE
Payer: MEDICARE

## 2020-01-01 ENCOUNTER — ANESTHESIA EVENT (OUTPATIENT)
Dept: INTENSIVE CARE | Facility: OTHER | Age: 70
DRG: 207 | End: 2020-01-01
Payer: MEDICARE

## 2020-01-01 ENCOUNTER — TELEPHONE (OUTPATIENT)
Dept: NEPHROLOGY | Facility: CLINIC | Age: 70
End: 2020-01-01

## 2020-01-01 ENCOUNTER — TELEPHONE (OUTPATIENT)
Dept: PULMONOLOGY | Facility: CLINIC | Age: 70
End: 2020-01-01

## 2020-01-01 ENCOUNTER — OFFICE VISIT (OUTPATIENT)
Dept: FAMILY MEDICINE | Facility: CLINIC | Age: 70
DRG: 291 | End: 2020-01-01
Attending: FAMILY MEDICINE
Payer: MEDICARE

## 2020-01-01 ENCOUNTER — OFFICE VISIT (OUTPATIENT)
Dept: URGENT CARE | Facility: CLINIC | Age: 70
End: 2020-01-01
Payer: MEDICARE

## 2020-01-01 ENCOUNTER — ANESTHESIA (OUTPATIENT)
Dept: INTENSIVE CARE | Facility: OTHER | Age: 70
DRG: 207 | End: 2020-01-01
Payer: MEDICARE

## 2020-01-01 VITALS
TEMPERATURE: 99 F | DIASTOLIC BLOOD PRESSURE: 62 MMHG | WEIGHT: 219 LBS | SYSTOLIC BLOOD PRESSURE: 127 MMHG | HEART RATE: 92 BPM | RESPIRATION RATE: 16 BRPM | OXYGEN SATURATION: 94 % | HEIGHT: 63 IN | BODY MASS INDEX: 38.8 KG/M2

## 2020-01-01 VITALS
HEIGHT: 65 IN | SYSTOLIC BLOOD PRESSURE: 120 MMHG | WEIGHT: 226 LBS | OXYGEN SATURATION: 92 % | BODY MASS INDEX: 37.65 KG/M2 | DIASTOLIC BLOOD PRESSURE: 64 MMHG | HEART RATE: 92 BPM

## 2020-01-01 VITALS
TEMPERATURE: 99 F | OXYGEN SATURATION: 97 % | BODY MASS INDEX: 40.46 KG/M2 | SYSTOLIC BLOOD PRESSURE: 120 MMHG | WEIGHT: 228.38 LBS | HEART RATE: 88 BPM | HEIGHT: 63 IN | DIASTOLIC BLOOD PRESSURE: 65 MMHG | RESPIRATION RATE: 20 BRPM

## 2020-01-01 VITALS
WEIGHT: 227.5 LBS | DIASTOLIC BLOOD PRESSURE: 61 MMHG | HEART RATE: 66 BPM | SYSTOLIC BLOOD PRESSURE: 124 MMHG | HEIGHT: 63 IN | BODY MASS INDEX: 40.31 KG/M2 | OXYGEN SATURATION: 94 %

## 2020-01-01 VITALS
RESPIRATION RATE: 18 BRPM | TEMPERATURE: 97 F | WEIGHT: 218.94 LBS | BODY MASS INDEX: 38.79 KG/M2 | DIASTOLIC BLOOD PRESSURE: 66 MMHG | HEART RATE: 64 BPM | HEIGHT: 63 IN | SYSTOLIC BLOOD PRESSURE: 142 MMHG

## 2020-01-01 VITALS
BODY MASS INDEX: 40.57 KG/M2 | OXYGEN SATURATION: 94 % | HEIGHT: 63 IN | HEART RATE: 86 BPM | WEIGHT: 229 LBS | SYSTOLIC BLOOD PRESSURE: 112 MMHG | DIASTOLIC BLOOD PRESSURE: 49 MMHG | TEMPERATURE: 98 F

## 2020-01-01 VITALS
DIASTOLIC BLOOD PRESSURE: 60 MMHG | OXYGEN SATURATION: 89 % | HEART RATE: 62 BPM | WEIGHT: 229.06 LBS | HEIGHT: 63 IN | SYSTOLIC BLOOD PRESSURE: 100 MMHG | BODY MASS INDEX: 40.59 KG/M2

## 2020-01-01 VITALS
RESPIRATION RATE: 20 BRPM | DIASTOLIC BLOOD PRESSURE: 58 MMHG | OXYGEN SATURATION: 94 % | HEART RATE: 69 BPM | TEMPERATURE: 98 F | SYSTOLIC BLOOD PRESSURE: 106 MMHG

## 2020-01-01 VITALS
DIASTOLIC BLOOD PRESSURE: 76 MMHG | OXYGEN SATURATION: 93 % | SYSTOLIC BLOOD PRESSURE: 120 MMHG | TEMPERATURE: 98 F | HEART RATE: 114 BPM | HEIGHT: 63 IN | BODY MASS INDEX: 33.6 KG/M2 | RESPIRATION RATE: 30 BRPM | WEIGHT: 189.63 LBS

## 2020-01-01 VITALS
SYSTOLIC BLOOD PRESSURE: 118 MMHG | HEART RATE: 96 BPM | WEIGHT: 222 LBS | BODY MASS INDEX: 39.34 KG/M2 | RESPIRATION RATE: 18 BRPM | OXYGEN SATURATION: 95 % | DIASTOLIC BLOOD PRESSURE: 60 MMHG | TEMPERATURE: 99 F | HEIGHT: 63 IN

## 2020-01-01 VITALS
BODY MASS INDEX: 38.79 KG/M2 | DIASTOLIC BLOOD PRESSURE: 68 MMHG | WEIGHT: 219 LBS | SYSTOLIC BLOOD PRESSURE: 141 MMHG | HEART RATE: 66 BPM

## 2020-01-01 DIAGNOSIS — Z51.5 ENCOUNTER FOR PALLIATIVE CARE: ICD-10-CM

## 2020-01-01 DIAGNOSIS — J96.12 CHRONIC HYPERCAPNIC RESPIRATORY FAILURE: ICD-10-CM

## 2020-01-01 DIAGNOSIS — E66.01 MORBID OBESITY: ICD-10-CM

## 2020-01-01 DIAGNOSIS — Z99.81 SUPPLEMENTAL OXYGEN DEPENDENT: ICD-10-CM

## 2020-01-01 DIAGNOSIS — I48.0 PAROXYSMAL ATRIAL FIBRILLATION: ICD-10-CM

## 2020-01-01 DIAGNOSIS — I50.9 ACUTE ON CHRONIC CONGESTIVE HEART FAILURE, UNSPECIFIED HEART FAILURE TYPE: ICD-10-CM

## 2020-01-01 DIAGNOSIS — J96.12 CHRONIC HYPERCAPNIC RESPIRATORY FAILURE: Primary | ICD-10-CM

## 2020-01-01 DIAGNOSIS — I49.9 ARRHYTHMIA: ICD-10-CM

## 2020-01-01 DIAGNOSIS — Z99.11 ON MECHANICALLY ASSISTED VENTILATION: ICD-10-CM

## 2020-01-01 DIAGNOSIS — J96.02 ACUTE RESPIRATORY FAILURE WITH HYPOXIA AND HYPERCAPNIA: Primary | ICD-10-CM

## 2020-01-01 DIAGNOSIS — J96.22 ACUTE ON CHRONIC RESPIRATORY FAILURE WITH HYPOXIA AND HYPERCAPNIA: Primary | ICD-10-CM

## 2020-01-01 DIAGNOSIS — I48.11 LONGSTANDING PERSISTENT ATRIAL FIBRILLATION: ICD-10-CM

## 2020-01-01 DIAGNOSIS — N18.4 CHRONIC KIDNEY DISEASE (CKD) STAGE G4/A1, SEVERELY DECREASED GLOMERULAR FILTRATION RATE (GFR) BETWEEN 15-29 ML/MIN/1.73 SQUARE METER AND ALBUMINURIA CREATININE RATIO LESS THAN 30 MG/G: ICD-10-CM

## 2020-01-01 DIAGNOSIS — J96.01 ACUTE RESPIRATORY FAILURE WITH HYPOXIA AND HYPERCAPNIA: Primary | ICD-10-CM

## 2020-01-01 DIAGNOSIS — R53.81 DEBILITY: ICD-10-CM

## 2020-01-01 DIAGNOSIS — E66.2 OBESITY HYPOVENTILATION SYNDROME: ICD-10-CM

## 2020-01-01 DIAGNOSIS — R09.02 HYPOXIA: ICD-10-CM

## 2020-01-01 DIAGNOSIS — R00.0 TACHYCARDIA: ICD-10-CM

## 2020-01-01 DIAGNOSIS — R73.03 PREDIABETES: ICD-10-CM

## 2020-01-01 DIAGNOSIS — I10 ESSENTIAL HYPERTENSION: ICD-10-CM

## 2020-01-01 DIAGNOSIS — J96.21 ACUTE ON CHRONIC RESPIRATORY FAILURE WITH HYPOXIA AND HYPERCAPNIA: Primary | ICD-10-CM

## 2020-01-01 DIAGNOSIS — J96.11 CHRONIC RESPIRATORY FAILURE WITH HYPOXIA, ON HOME O2 THERAPY: ICD-10-CM

## 2020-01-01 DIAGNOSIS — I48.91 ATRIAL FIBRILLATION: ICD-10-CM

## 2020-01-01 DIAGNOSIS — I50.32 CHRONIC DIASTOLIC CONGESTIVE HEART FAILURE: ICD-10-CM

## 2020-01-01 DIAGNOSIS — Z91.81 AT HIGH RISK FOR INJURY RELATED TO FALL: ICD-10-CM

## 2020-01-01 DIAGNOSIS — J96.22 ACUTE ON CHRONIC RESPIRATORY FAILURE WITH HYPOXIA AND HYPERCAPNIA: ICD-10-CM

## 2020-01-01 DIAGNOSIS — F32.1 CURRENT MODERATE EPISODE OF MAJOR DEPRESSIVE DISORDER WITHOUT PRIOR EPISODE: ICD-10-CM

## 2020-01-01 DIAGNOSIS — J90 PLEURAL EFFUSION: ICD-10-CM

## 2020-01-01 DIAGNOSIS — I50.32 CHRONIC DIASTOLIC CONGESTIVE HEART FAILURE: Primary | ICD-10-CM

## 2020-01-01 DIAGNOSIS — E66.01 CLASS 2 SEVERE OBESITY DUE TO EXCESS CALORIES WITH SERIOUS COMORBIDITY AND BODY MASS INDEX (BMI) OF 35.0 TO 35.9 IN ADULT: ICD-10-CM

## 2020-01-01 DIAGNOSIS — R06.02 SHORTNESS OF BREATH: ICD-10-CM

## 2020-01-01 DIAGNOSIS — H53.8 BLURRY VISION: ICD-10-CM

## 2020-01-01 DIAGNOSIS — I48.91 ATRIAL FIBRILLATION WITH RAPID VENTRICULAR RESPONSE: ICD-10-CM

## 2020-01-01 DIAGNOSIS — Z79.01 LONG TERM CURRENT USE OF ANTICOAGULANT: ICD-10-CM

## 2020-01-01 DIAGNOSIS — Z12.31 ENCOUNTER FOR SCREENING MAMMOGRAM FOR BREAST CANCER: Primary | ICD-10-CM

## 2020-01-01 DIAGNOSIS — C67.0 MALIGNANT NEOPLASM OF TRIGONE OF URINARY BLADDER: Primary | ICD-10-CM

## 2020-01-01 DIAGNOSIS — G47.33 ACUTE HYPERCAPNIC RESPIRATORY FAILURE DUE TO OBSTRUCTIVE SLEEP APNEA: ICD-10-CM

## 2020-01-01 DIAGNOSIS — J96.21 ACUTE ON CHRONIC RESPIRATORY FAILURE WITH HYPOXIA AND HYPERCAPNIA: ICD-10-CM

## 2020-01-01 DIAGNOSIS — I48.91 ATRIAL FIBRILLATION WITH RVR: ICD-10-CM

## 2020-01-01 DIAGNOSIS — I50.43 ACUTE ON CHRONIC COMBINED SYSTOLIC AND DIASTOLIC CONGESTIVE HEART FAILURE: ICD-10-CM

## 2020-01-01 DIAGNOSIS — G47.33 OSA (OBSTRUCTIVE SLEEP APNEA): ICD-10-CM

## 2020-01-01 DIAGNOSIS — I10 HTN (HYPERTENSION), BENIGN: ICD-10-CM

## 2020-01-01 DIAGNOSIS — I48.92 ATRIAL FLUTTER BY ELECTROCARDIOGRAM: ICD-10-CM

## 2020-01-01 DIAGNOSIS — J44.1 COPD EXACERBATION: ICD-10-CM

## 2020-01-01 DIAGNOSIS — I50.9 CONGESTIVE HEART FAILURE, UNSPECIFIED HF CHRONICITY, UNSPECIFIED HEART FAILURE TYPE: ICD-10-CM

## 2020-01-01 DIAGNOSIS — I50.33 ACUTE ON CHRONIC DIASTOLIC HEART FAILURE: ICD-10-CM

## 2020-01-01 DIAGNOSIS — Z12.12 SCREENING FOR COLORECTAL CANCER: ICD-10-CM

## 2020-01-01 DIAGNOSIS — R10.9 ABDOMINAL PAIN, UNSPECIFIED ABDOMINAL LOCATION: ICD-10-CM

## 2020-01-01 DIAGNOSIS — J98.4 CAVITARY LESION OF LUNG: ICD-10-CM

## 2020-01-01 DIAGNOSIS — C67.0 MALIGNANT NEOPLASM OF TRIGONE OF URINARY BLADDER: ICD-10-CM

## 2020-01-01 DIAGNOSIS — E78.5 DYSLIPIDEMIA: ICD-10-CM

## 2020-01-01 DIAGNOSIS — E11.9 TYPE 2 DIABETES MELLITUS WITHOUT COMPLICATION, WITHOUT LONG-TERM CURRENT USE OF INSULIN: ICD-10-CM

## 2020-01-01 DIAGNOSIS — E78.2 MIXED HYPERLIPIDEMIA: ICD-10-CM

## 2020-01-01 DIAGNOSIS — R52 PAIN: ICD-10-CM

## 2020-01-01 DIAGNOSIS — H93.8X3 SENSATION OF FULLNESS IN BOTH EARS: Primary | ICD-10-CM

## 2020-01-01 DIAGNOSIS — M54.50 ACUTE LEFT-SIDED LOW BACK PAIN WITHOUT SCIATICA: Primary | ICD-10-CM

## 2020-01-01 DIAGNOSIS — G47.33 OBSTRUCTIVE SLEEP APNEA: ICD-10-CM

## 2020-01-01 DIAGNOSIS — R06.02 SOB (SHORTNESS OF BREATH): ICD-10-CM

## 2020-01-01 DIAGNOSIS — I50.33 ACUTE ON CHRONIC DIASTOLIC CONGESTIVE HEART FAILURE: ICD-10-CM

## 2020-01-01 DIAGNOSIS — J96.02 ACUTE HYPERCAPNIC RESPIRATORY FAILURE DUE TO OBSTRUCTIVE SLEEP APNEA: ICD-10-CM

## 2020-01-01 DIAGNOSIS — Z12.11 SCREENING FOR COLORECTAL CANCER: ICD-10-CM

## 2020-01-01 DIAGNOSIS — R29.818 SUSPECTED SLEEP APNEA: ICD-10-CM

## 2020-01-01 DIAGNOSIS — I48.91 A-FIB: ICD-10-CM

## 2020-01-01 DIAGNOSIS — I48.3 TYPICAL ATRIAL FLUTTER: ICD-10-CM

## 2020-01-01 DIAGNOSIS — I48.91 ATRIAL FIBRILLATION WITH RAPID VENTRICULAR RESPONSE: Primary | ICD-10-CM

## 2020-01-01 DIAGNOSIS — J96.11 CHRONIC RESPIRATORY FAILURE WITH HYPOXIA: ICD-10-CM

## 2020-01-01 DIAGNOSIS — E11.9 TYPE 2 DIABETES MELLITUS WITHOUT COMPLICATION: ICD-10-CM

## 2020-01-01 DIAGNOSIS — Z99.81 CHRONIC RESPIRATORY FAILURE WITH HYPOXIA, ON HOME O2 THERAPY: ICD-10-CM

## 2020-01-01 LAB
ACID FAST MOD KINY STN SPEC: NORMAL
ALBUMIN FLD-MCNC: 1.9 G/DL
ALBUMIN SERPL BCP-MCNC: 2.6 G/DL (ref 3.5–5.2)
ALBUMIN SERPL BCP-MCNC: 3 G/DL (ref 3.5–5.2)
ALBUMIN SERPL BCP-MCNC: 3.1 G/DL (ref 3.5–5.2)
ALBUMIN SERPL BCP-MCNC: 3.2 G/DL (ref 3.5–5.2)
ALBUMIN SERPL BCP-MCNC: 3.3 G/DL (ref 3.5–5.2)
ALBUMIN SERPL BCP-MCNC: 3.4 G/DL (ref 3.5–5.2)
ALBUMIN SERPL BCP-MCNC: 3.5 G/DL (ref 3.5–5.2)
ALBUMIN SERPL BCP-MCNC: 3.7 G/DL (ref 3.5–5.2)
ALBUMIN SERPL BCP-MCNC: 3.8 G/DL (ref 3.5–5.2)
ALBUMIN SERPL BCP-MCNC: 3.9 G/DL (ref 3.5–5.2)
ALLENS TEST: ABNORMAL
ALP SERPL-CCNC: 32 U/L (ref 55–135)
ALP SERPL-CCNC: 36 U/L (ref 55–135)
ALP SERPL-CCNC: 36 U/L (ref 55–135)
ALP SERPL-CCNC: 37 U/L (ref 55–135)
ALP SERPL-CCNC: 37 U/L (ref 55–135)
ALP SERPL-CCNC: 43 U/L (ref 55–135)
ALP SERPL-CCNC: 43 U/L (ref 55–135)
ALP SERPL-CCNC: 44 U/L (ref 55–135)
ALP SERPL-CCNC: 49 U/L (ref 55–135)
ALT SERPL W/O P-5'-P-CCNC: 12 U/L (ref 10–44)
ALT SERPL W/O P-5'-P-CCNC: 12 U/L (ref 10–44)
ALT SERPL W/O P-5'-P-CCNC: 14 U/L (ref 10–44)
ALT SERPL W/O P-5'-P-CCNC: 14 U/L (ref 10–44)
ALT SERPL W/O P-5'-P-CCNC: 15 U/L (ref 10–44)
ALT SERPL W/O P-5'-P-CCNC: 16 U/L (ref 10–44)
ALT SERPL W/O P-5'-P-CCNC: 17 U/L (ref 10–44)
ALT SERPL W/O P-5'-P-CCNC: 19 U/L (ref 10–44)
ALT SERPL W/O P-5'-P-CCNC: 21 U/L (ref 10–44)
ANION GAP SERPL CALC-SCNC: 10 MMOL/L (ref 8–16)
ANION GAP SERPL CALC-SCNC: 11 MMOL/L (ref 8–16)
ANION GAP SERPL CALC-SCNC: 12 MMOL/L (ref 8–16)
ANION GAP SERPL CALC-SCNC: 13 MMOL/L (ref 8–16)
ANION GAP SERPL CALC-SCNC: 14 MMOL/L (ref 8–16)
ANION GAP SERPL CALC-SCNC: 15 MMOL/L (ref 8–16)
ANION GAP SERPL CALC-SCNC: 7 MMOL/L (ref 8–16)
ANION GAP SERPL CALC-SCNC: 9 MMOL/L (ref 8–16)
ANISOCYTOSIS BLD QL SMEAR: SLIGHT
APPEARANCE FLD: NORMAL
APTT BLDCRRT: 38.3 SEC (ref 21–32)
ASCENDING AORTA: 1.57 CM
AST SERPL-CCNC: 12 U/L (ref 10–40)
AST SERPL-CCNC: 13 U/L (ref 10–40)
AST SERPL-CCNC: 14 U/L (ref 10–40)
AST SERPL-CCNC: 16 U/L (ref 10–40)
AST SERPL-CCNC: 27 U/L (ref 10–40)
AV INDEX (PROSTH): 0.57
AV INDEX (PROSTH): 0.63
AV MEAN GRADIENT: 4 MMHG
AV MEAN GRADIENT: 5 MMHG
AV PEAK GRADIENT: 7 MMHG
AV PEAK GRADIENT: 9 MMHG
AV VALVE AREA: 1.73 CM2
AV VALVE AREA: 1.74 CM2
AV VELOCITY RATIO: 0.55
AV VELOCITY RATIO: 0.6
BACTERIA BLD CULT: NORMAL
BACTERIA BLD CULT: NORMAL
BACTERIA SPEC AEROBE CULT: NO GROWTH
BACTERIA SPEC AEROBE CULT: NORMAL
BASO STIPL BLD QL SMEAR: ABNORMAL
BASOPHILS # BLD AUTO: 0 K/UL (ref 0–0.2)
BASOPHILS # BLD AUTO: 0 K/UL (ref 0–0.2)
BASOPHILS # BLD AUTO: 0.01 K/UL (ref 0–0.2)
BASOPHILS # BLD AUTO: 0.02 K/UL (ref 0–0.2)
BASOPHILS # BLD AUTO: 0.03 K/UL (ref 0–0.2)
BASOPHILS # BLD AUTO: 0.03 K/UL (ref 0–0.2)
BASOPHILS # BLD AUTO: 0.04 K/UL (ref 0–0.2)
BASOPHILS # BLD AUTO: ABNORMAL K/UL (ref 0–0.2)
BASOPHILS NFR BLD: 0 % (ref 0–1.9)
BASOPHILS NFR BLD: 0.1 % (ref 0–1.9)
BASOPHILS NFR BLD: 0.2 % (ref 0–1.9)
BASOPHILS NFR BLD: 0.3 % (ref 0–1.9)
BASOPHILS NFR BLD: 0.4 % (ref 0–1.9)
BILIRUB DIRECT SERPL-MCNC: 0.4 MG/DL (ref 0.1–0.3)
BILIRUB SERPL-MCNC: 0.4 MG/DL (ref 0.1–1)
BILIRUB SERPL-MCNC: 0.4 MG/DL (ref 0.1–1)
BILIRUB SERPL-MCNC: 0.6 MG/DL (ref 0.1–1)
BILIRUB SERPL-MCNC: 0.7 MG/DL (ref 0.1–1)
BILIRUB SERPL-MCNC: 0.8 MG/DL (ref 0.1–1)
BILIRUB UR QL STRIP: NEGATIVE
BILIRUB UR QL STRIP: NEGATIVE
BNP SERPL-MCNC: 272 PG/ML (ref 0–99)
BNP SERPL-MCNC: 272 PG/ML (ref 0–99)
BNP SERPL-MCNC: 274 PG/ML (ref 0–99)
BNP SERPL-MCNC: 334 PG/ML (ref 0–99)
BNP SERPL-MCNC: 346 PG/ML (ref 0–99)
BNP SERPL-MCNC: 369 PG/ML (ref 0–99)
BNP SERPL-MCNC: 443 PG/ML (ref 0–99)
BNP SERPL-MCNC: 456 PG/ML (ref 0–99)
BNP SERPL-MCNC: 577 PG/ML (ref 0–99)
BNP SERPL-MCNC: 968 PG/ML (ref 0–99)
BODY FLD TYPE: NORMAL
BODY FLUID SOURCE, LDH: NORMAL
BSA FOR ECHO PROCEDURE: 2.12 M2
BSA FOR ECHO PROCEDURE: 2.14 M2
BUN SERPL-MCNC: 17 MG/DL (ref 8–23)
BUN SERPL-MCNC: 22 MG/DL (ref 8–23)
BUN SERPL-MCNC: 26 MG/DL (ref 8–23)
BUN SERPL-MCNC: 27 MG/DL (ref 8–23)
BUN SERPL-MCNC: 31 MG/DL (ref 8–23)
BUN SERPL-MCNC: 31 MG/DL (ref 8–23)
BUN SERPL-MCNC: 36 MG/DL (ref 8–23)
BUN SERPL-MCNC: 37 MG/DL (ref 8–23)
BUN SERPL-MCNC: 37 MG/DL (ref 8–23)
BUN SERPL-MCNC: 38 MG/DL (ref 8–23)
BUN SERPL-MCNC: 40 MG/DL (ref 8–23)
BUN SERPL-MCNC: 40 MG/DL (ref 8–23)
BUN SERPL-MCNC: 41 MG/DL (ref 8–23)
BUN SERPL-MCNC: 42 MG/DL (ref 8–23)
BUN SERPL-MCNC: 46 MG/DL (ref 8–23)
BUN SERPL-MCNC: 47 MG/DL (ref 8–23)
BUN SERPL-MCNC: 47 MG/DL (ref 8–23)
BUN SERPL-MCNC: 48 MG/DL (ref 8–23)
BUN SERPL-MCNC: 49 MG/DL (ref 8–23)
BUN SERPL-MCNC: 51 MG/DL (ref 8–23)
BUN SERPL-MCNC: 52 MG/DL (ref 8–23)
BUN SERPL-MCNC: 53 MG/DL (ref 8–23)
BUN SERPL-MCNC: 54 MG/DL (ref 8–23)
BUN SERPL-MCNC: 55 MG/DL (ref 8–23)
BUN SERPL-MCNC: 56 MG/DL (ref 8–23)
BUN SERPL-MCNC: 57 MG/DL (ref 8–23)
BUN SERPL-MCNC: 58 MG/DL (ref 8–23)
BUN SERPL-MCNC: 58 MG/DL (ref 8–23)
BUN SERPL-MCNC: 59 MG/DL (ref 8–23)
BUN SERPL-MCNC: 60 MG/DL (ref 8–23)
BUN SERPL-MCNC: 60 MG/DL (ref 8–23)
BUN SERPL-MCNC: 61 MG/DL (ref 8–23)
BUN SERPL-MCNC: 62 MG/DL (ref 8–23)
BUN SERPL-MCNC: 63 MG/DL (ref 8–23)
BUN SERPL-MCNC: 64 MG/DL (ref 8–23)
CALCIUM SERPL-MCNC: 8.2 MG/DL (ref 8.7–10.5)
CALCIUM SERPL-MCNC: 8.4 MG/DL (ref 8.7–10.5)
CALCIUM SERPL-MCNC: 8.4 MG/DL (ref 8.7–10.5)
CALCIUM SERPL-MCNC: 8.5 MG/DL (ref 8.7–10.5)
CALCIUM SERPL-MCNC: 8.5 MG/DL (ref 8.7–10.5)
CALCIUM SERPL-MCNC: 8.6 MG/DL (ref 8.7–10.5)
CALCIUM SERPL-MCNC: 8.7 MG/DL (ref 8.7–10.5)
CALCIUM SERPL-MCNC: 8.8 MG/DL (ref 8.7–10.5)
CALCIUM SERPL-MCNC: 8.9 MG/DL (ref 8.7–10.5)
CALCIUM SERPL-MCNC: 9 MG/DL (ref 8.7–10.5)
CALCIUM SERPL-MCNC: 9.1 MG/DL (ref 8.7–10.5)
CALCIUM SERPL-MCNC: 9.2 MG/DL (ref 8.7–10.5)
CALCIUM SERPL-MCNC: 9.3 MG/DL (ref 8.7–10.5)
CALCIUM SERPL-MCNC: 9.4 MG/DL (ref 8.7–10.5)
CALCIUM SERPL-MCNC: 9.5 MG/DL (ref 8.7–10.5)
CALCIUM SERPL-MCNC: 9.5 MG/DL (ref 8.7–10.5)
CALCIUM SERPL-MCNC: 9.6 MG/DL (ref 8.7–10.5)
CALCIUM SERPL-MCNC: 9.6 MG/DL (ref 8.7–10.5)
CALCIUM SERPL-MCNC: 9.7 MG/DL (ref 8.7–10.5)
CALCIUM SERPL-MCNC: 9.8 MG/DL (ref 8.7–10.5)
CHLORIDE SERPL-SCNC: 100 MMOL/L (ref 95–110)
CHLORIDE SERPL-SCNC: 101 MMOL/L (ref 95–110)
CHLORIDE SERPL-SCNC: 102 MMOL/L (ref 95–110)
CHLORIDE SERPL-SCNC: 102 MMOL/L (ref 95–110)
CHLORIDE SERPL-SCNC: 104 MMOL/L (ref 95–110)
CHLORIDE SERPL-SCNC: 85 MMOL/L (ref 95–110)
CHLORIDE SERPL-SCNC: 86 MMOL/L (ref 95–110)
CHLORIDE SERPL-SCNC: 87 MMOL/L (ref 95–110)
CHLORIDE SERPL-SCNC: 87 MMOL/L (ref 95–110)
CHLORIDE SERPL-SCNC: 88 MMOL/L (ref 95–110)
CHLORIDE SERPL-SCNC: 89 MMOL/L (ref 95–110)
CHLORIDE SERPL-SCNC: 90 MMOL/L (ref 95–110)
CHLORIDE SERPL-SCNC: 91 MMOL/L (ref 95–110)
CHLORIDE SERPL-SCNC: 92 MMOL/L (ref 95–110)
CHLORIDE SERPL-SCNC: 93 MMOL/L (ref 95–110)
CHLORIDE SERPL-SCNC: 94 MMOL/L (ref 95–110)
CHLORIDE SERPL-SCNC: 95 MMOL/L (ref 95–110)
CHLORIDE SERPL-SCNC: 96 MMOL/L (ref 95–110)
CHLORIDE SERPL-SCNC: 97 MMOL/L (ref 95–110)
CHLORIDE SERPL-SCNC: 98 MMOL/L (ref 95–110)
CHLORIDE SERPL-SCNC: 98 MMOL/L (ref 95–110)
CHLORIDE SERPL-SCNC: 99 MMOL/L (ref 95–110)
CHLORIDE SERPL-SCNC: 99 MMOL/L (ref 95–110)
CHOLEST SERPL-MCNC: 119 MG/DL (ref 120–199)
CHOLEST/HDLC SERPL: 3.1 {RATIO} (ref 2–5)
CLARITY UR: CLEAR
CO2 SERPL-SCNC: 28 MMOL/L (ref 23–29)
CO2 SERPL-SCNC: 28 MMOL/L (ref 23–29)
CO2 SERPL-SCNC: 29 MMOL/L (ref 23–29)
CO2 SERPL-SCNC: 29 MMOL/L (ref 23–29)
CO2 SERPL-SCNC: 30 MMOL/L (ref 23–29)
CO2 SERPL-SCNC: 31 MMOL/L (ref 23–29)
CO2 SERPL-SCNC: 33 MMOL/L (ref 23–29)
CO2 SERPL-SCNC: 34 MMOL/L (ref 23–29)
CO2 SERPL-SCNC: 35 MMOL/L (ref 23–29)
CO2 SERPL-SCNC: 36 MMOL/L (ref 23–29)
CO2 SERPL-SCNC: 37 MMOL/L (ref 23–29)
CO2 SERPL-SCNC: 38 MMOL/L (ref 23–29)
CO2 SERPL-SCNC: 39 MMOL/L (ref 23–29)
CO2 SERPL-SCNC: 40 MMOL/L (ref 23–29)
CO2 SERPL-SCNC: 41 MMOL/L (ref 23–29)
CO2 SERPL-SCNC: 41 MMOL/L (ref 23–29)
CO2 SERPL-SCNC: 42 MMOL/L (ref 23–29)
CO2 SERPL-SCNC: 43 MMOL/L (ref 23–29)
CO2 SERPL-SCNC: 44 MMOL/L (ref 23–29)
CO2 SERPL-SCNC: 44 MMOL/L (ref 23–29)
COLOR FLD: YELLOW
COLOR UR: YELLOW
CREAT SERPL-MCNC: 0.8 MG/DL (ref 0.5–1.4)
CREAT SERPL-MCNC: 1.5 MG/DL (ref 0.5–1.4)
CREAT SERPL-MCNC: 1.6 MG/DL (ref 0.5–1.4)
CREAT SERPL-MCNC: 1.7 MG/DL (ref 0.5–1.4)
CREAT SERPL-MCNC: 1.8 MG/DL (ref 0.5–1.4)
CREAT SERPL-MCNC: 1.9 MG/DL (ref 0.5–1.4)
CREAT SERPL-MCNC: 2 MG/DL (ref 0.5–1.4)
CREAT SERPL-MCNC: 2.1 MG/DL (ref 0.5–1.4)
CREAT SERPL-MCNC: 2.2 MG/DL (ref 0.5–1.4)
CREAT SERPL-MCNC: 2.2 MG/DL (ref 0.5–1.4)
CREAT SERPL-MCNC: 2.3 MG/DL (ref 0.5–1.4)
CREAT SERPL-MCNC: 2.4 MG/DL (ref 0.5–1.4)
CREAT SERPL-MCNC: 2.4 MG/DL (ref 0.5–1.4)
CREAT SERPL-MCNC: 2.5 MG/DL (ref 0.5–1.4)
CTP QC/QA: YES
CV ECHO LV RWT: 0.3 CM
CV ECHO LV RWT: 0.32 CM
DELSYS: ABNORMAL
DIFFERENTIAL METHOD: ABNORMAL
DOP CALC AO PEAK VEL: 1.29 M/S
DOP CALC AO PEAK VEL: 1.51 M/S
DOP CALC AO VTI: 19.09 CM
DOP CALC AO VTI: 25.85 CM
DOP CALC LVOT AREA: 2.8 CM2
DOP CALC LVOT AREA: 3 CM2
DOP CALC LVOT DIAMETER: 1.88 CM
DOP CALC LVOT DIAMETER: 1.96 CM
DOP CALC LVOT PEAK VEL: 0.77 M/S
DOP CALC LVOT PEAK VEL: 0.83 M/S
DOP CALC LVOT STROKE VOLUME: 33.13 CM3
DOP CALC LVOT STROKE VOLUME: 44.72 CM3
DOP CALCLVOT PEAK VEL VTI: 11.94 CM
DOP CALCLVOT PEAK VEL VTI: 14.83 CM
E WAVE DECELERATION TIME: 207.4 MSEC
E/A RATIO: 4.7
E/E' RATIO: 11.75 M/S
ECHO LV POSTERIOR WALL: 0.66 CM (ref 0.6–1.1)
ECHO LV POSTERIOR WALL: 0.76 CM (ref 0.6–1.1)
EOSINOPHIL # BLD AUTO: 0 K/UL (ref 0–0.5)
EOSINOPHIL # BLD AUTO: 0.1 K/UL (ref 0–0.5)
EOSINOPHIL # BLD AUTO: 0.2 K/UL (ref 0–0.5)
EOSINOPHIL # BLD AUTO: 0.3 K/UL (ref 0–0.5)
EOSINOPHIL # BLD AUTO: 0.3 K/UL (ref 0–0.5)
EOSINOPHIL # BLD AUTO: ABNORMAL K/UL (ref 0–0.5)
EOSINOPHIL NFR BLD: 0 % (ref 0–8)
EOSINOPHIL NFR BLD: 0.1 % (ref 0–8)
EOSINOPHIL NFR BLD: 0.2 % (ref 0–8)
EOSINOPHIL NFR BLD: 0.2 % (ref 0–8)
EOSINOPHIL NFR BLD: 0.3 % (ref 0–8)
EOSINOPHIL NFR BLD: 0.4 % (ref 0–8)
EOSINOPHIL NFR BLD: 0.4 % (ref 0–8)
EOSINOPHIL NFR BLD: 0.5 % (ref 0–8)
EOSINOPHIL NFR BLD: 0.6 % (ref 0–8)
EOSINOPHIL NFR BLD: 0.9 % (ref 0–8)
EOSINOPHIL NFR BLD: 1 % (ref 0–8)
EOSINOPHIL NFR BLD: 1.1 % (ref 0–8)
EOSINOPHIL NFR BLD: 1.1 % (ref 0–8)
EOSINOPHIL NFR BLD: 1.2 % (ref 0–8)
EOSINOPHIL NFR BLD: 1.2 % (ref 0–8)
EOSINOPHIL NFR BLD: 1.3 % (ref 0–8)
EOSINOPHIL NFR BLD: 1.3 % (ref 0–8)
EOSINOPHIL NFR BLD: 1.4 % (ref 0–8)
EOSINOPHIL NFR BLD: 1.5 % (ref 0–8)
EOSINOPHIL NFR BLD: 1.6 % (ref 0–8)
EOSINOPHIL NFR BLD: 1.8 % (ref 0–8)
EOSINOPHIL NFR BLD: 2 % (ref 0–8)
EOSINOPHIL NFR BLD: 2.1 % (ref 0–8)
EOSINOPHIL NFR BLD: 2.1 % (ref 0–8)
EOSINOPHIL NFR BLD: 2.2 % (ref 0–8)
EOSINOPHIL NFR BLD: 2.3 % (ref 0–8)
EOSINOPHIL NFR BLD: 2.4 % (ref 0–8)
EOSINOPHIL NFR BLD: 2.5 % (ref 0–8)
EOSINOPHIL NFR BLD: 2.6 % (ref 0–8)
EOSINOPHIL NFR BLD: 3.8 % (ref 0–8)
EP: 10
EP: 10
EP: 5
EP: 8
ERYTHROCYTE [DISTWIDTH] IN BLOOD BY AUTOMATED COUNT: 14.5 % (ref 11.5–14.5)
ERYTHROCYTE [DISTWIDTH] IN BLOOD BY AUTOMATED COUNT: 14.6 % (ref 11.5–14.5)
ERYTHROCYTE [DISTWIDTH] IN BLOOD BY AUTOMATED COUNT: 14.7 % (ref 11.5–14.5)
ERYTHROCYTE [DISTWIDTH] IN BLOOD BY AUTOMATED COUNT: 14.7 % (ref 11.5–14.5)
ERYTHROCYTE [DISTWIDTH] IN BLOOD BY AUTOMATED COUNT: 14.8 % (ref 11.5–14.5)
ERYTHROCYTE [DISTWIDTH] IN BLOOD BY AUTOMATED COUNT: 14.9 % (ref 11.5–14.5)
ERYTHROCYTE [DISTWIDTH] IN BLOOD BY AUTOMATED COUNT: 15 % (ref 11.5–14.5)
ERYTHROCYTE [DISTWIDTH] IN BLOOD BY AUTOMATED COUNT: 15.1 % (ref 11.5–14.5)
ERYTHROCYTE [DISTWIDTH] IN BLOOD BY AUTOMATED COUNT: 15.2 % (ref 11.5–14.5)
ERYTHROCYTE [DISTWIDTH] IN BLOOD BY AUTOMATED COUNT: 15.3 % (ref 11.5–14.5)
ERYTHROCYTE [DISTWIDTH] IN BLOOD BY AUTOMATED COUNT: 15.3 % (ref 11.5–14.5)
ERYTHROCYTE [DISTWIDTH] IN BLOOD BY AUTOMATED COUNT: 15.4 % (ref 11.5–14.5)
ERYTHROCYTE [DISTWIDTH] IN BLOOD BY AUTOMATED COUNT: 15.6 % (ref 11.5–14.5)
ERYTHROCYTE [DISTWIDTH] IN BLOOD BY AUTOMATED COUNT: 15.8 % (ref 11.5–14.5)
ERYTHROCYTE [DISTWIDTH] IN BLOOD BY AUTOMATED COUNT: 21.8 % (ref 11.5–14.5)
ERYTHROCYTE [SEDIMENTATION RATE] IN BLOOD BY WESTERGREN METHOD: 18 MM/H
ERYTHROCYTE [SEDIMENTATION RATE] IN BLOOD BY WESTERGREN METHOD: 20 MM/H
ERYTHROCYTE [SEDIMENTATION RATE] IN BLOOD BY WESTERGREN METHOD: 22 MM/H
ERYTHROCYTE [SEDIMENTATION RATE] IN BLOOD BY WESTERGREN METHOD: 24 MM/H
ERYTHROCYTE [SEDIMENTATION RATE] IN BLOOD BY WESTERGREN METHOD: 26 MM/H
ERYTHROCYTE [SEDIMENTATION RATE] IN BLOOD BY WESTERGREN METHOD: 26 MM/H
EST. GFR  (AFRICAN AMERICAN): 21 ML/MIN/1.73 M^2
EST. GFR  (AFRICAN AMERICAN): 23 ML/MIN/1.73 M^2
EST. GFR  (AFRICAN AMERICAN): 23 ML/MIN/1.73 M^2
EST. GFR  (AFRICAN AMERICAN): 24 ML/MIN/1.73 M^2
EST. GFR  (AFRICAN AMERICAN): 25 ML/MIN/1.73 M^2
EST. GFR  (AFRICAN AMERICAN): 25 ML/MIN/1.73 M^2
EST. GFR  (AFRICAN AMERICAN): 26.5 ML/MIN/1.73 M^2
EST. GFR  (AFRICAN AMERICAN): 26.5 ML/MIN/1.73 M^2
EST. GFR  (AFRICAN AMERICAN): 27 ML/MIN/1.73 M^2
EST. GFR  (AFRICAN AMERICAN): 28 ML/MIN/1.73 M^2
EST. GFR  (AFRICAN AMERICAN): 30 ML/MIN/1.73 M^2
EST. GFR  (AFRICAN AMERICAN): 32 ML/MIN/1.73 M^2
EST. GFR  (AFRICAN AMERICAN): 34 ML/MIN/1.73 M^2
EST. GFR  (AFRICAN AMERICAN): 37 ML/MIN/1.73 M^2
EST. GFR  (AFRICAN AMERICAN): 40 ML/MIN/1.73 M^2
EST. GFR  (AFRICAN AMERICAN): >60 ML/MIN/1.73 M^2
EST. GFR  (NON AFRICAN AMERICAN): 19 ML/MIN/1.73 M^2
EST. GFR  (NON AFRICAN AMERICAN): 20 ML/MIN/1.73 M^2
EST. GFR  (NON AFRICAN AMERICAN): 20 ML/MIN/1.73 M^2
EST. GFR  (NON AFRICAN AMERICAN): 21 ML/MIN/1.73 M^2
EST. GFR  (NON AFRICAN AMERICAN): 22 ML/MIN/1.73 M^2
EST. GFR  (NON AFRICAN AMERICAN): 22 ML/MIN/1.73 M^2
EST. GFR  (NON AFRICAN AMERICAN): 23 ML/MIN/1.73 M^2
EST. GFR  (NON AFRICAN AMERICAN): 24 ML/MIN/1.73 M^2
EST. GFR  (NON AFRICAN AMERICAN): 26 ML/MIN/1.73 M^2
EST. GFR  (NON AFRICAN AMERICAN): 27.7 ML/MIN/1.73 M^2
EST. GFR  (NON AFRICAN AMERICAN): 28 ML/MIN/1.73 M^2
EST. GFR  (NON AFRICAN AMERICAN): 30 ML/MIN/1.73 M^2
EST. GFR  (NON AFRICAN AMERICAN): 32 ML/MIN/1.73 M^2
EST. GFR  (NON AFRICAN AMERICAN): 35 ML/MIN/1.73 M^2
EST. GFR  (NON AFRICAN AMERICAN): >60 ML/MIN/1.73 M^2
ESTIMATED AVG GLUCOSE: 114 MG/DL (ref 68–131)
ESTIMATED AVG GLUCOSE: 114 MG/DL (ref 68–131)
FACT X PPP CHRO-ACNC: 0.35 IU/ML (ref 0.3–0.7)
FACT X PPP CHRO-ACNC: 0.52 IU/ML (ref 0.3–0.7)
FACT X PPP CHRO-ACNC: 0.72 IU/ML (ref 0.3–0.7)
FACT X PPP CHRO-ACNC: 1.04 IU/ML (ref 0.3–0.7)
FACT X PPP CHRO-ACNC: 1.47 IU/ML (ref 0.3–0.7)
FINAL PATHOLOGIC DIAGNOSIS: NORMAL
FINAL PATHOLOGIC DIAGNOSIS: NORMAL
FIO2: 30
FIO2: 35
FIO2: 40
FIO2: 45
FIO2: 45
FIO2: 50
FIO2: 60
FIO2: 98
FLOW: 4
FLOW: 5
FOLATE SERPL-MCNC: 4.6 NG/ML (ref 4–24)
FRACTIONAL SHORTENING: 20 % (ref 28–44)
FRACTIONAL SHORTENING: 31 % (ref 28–44)
FUNGUS SPEC CULT: NORMAL
FUNGUS SPEC CULT: NORMAL
GAMMA INTERFERON BACKGROUND BLD IA-ACNC: 0.02 IU/ML
GAMMA INTERFERON BACKGROUND BLD IA-ACNC: 0.03 IU/ML
GLUCOSE FLD-MCNC: 141 MG/DL
GLUCOSE SERPL-MCNC: 100 MG/DL (ref 70–110)
GLUCOSE SERPL-MCNC: 100 MG/DL (ref 70–110)
GLUCOSE SERPL-MCNC: 101 MG/DL (ref 70–110)
GLUCOSE SERPL-MCNC: 102 MG/DL (ref 70–110)
GLUCOSE SERPL-MCNC: 103 MG/DL (ref 70–110)
GLUCOSE SERPL-MCNC: 104 MG/DL (ref 70–110)
GLUCOSE SERPL-MCNC: 105 MG/DL (ref 70–110)
GLUCOSE SERPL-MCNC: 108 MG/DL (ref 70–110)
GLUCOSE SERPL-MCNC: 108 MG/DL (ref 70–110)
GLUCOSE SERPL-MCNC: 109 MG/DL (ref 70–110)
GLUCOSE SERPL-MCNC: 110 MG/DL (ref 70–110)
GLUCOSE SERPL-MCNC: 111 MG/DL (ref 70–110)
GLUCOSE SERPL-MCNC: 111 MG/DL (ref 70–110)
GLUCOSE SERPL-MCNC: 112 MG/DL (ref 70–110)
GLUCOSE SERPL-MCNC: 115 MG/DL (ref 70–110)
GLUCOSE SERPL-MCNC: 117 MG/DL (ref 70–110)
GLUCOSE SERPL-MCNC: 120 MG/DL (ref 70–110)
GLUCOSE SERPL-MCNC: 120 MG/DL (ref 70–110)
GLUCOSE SERPL-MCNC: 126 MG/DL (ref 70–110)
GLUCOSE SERPL-MCNC: 128 MG/DL (ref 70–110)
GLUCOSE SERPL-MCNC: 134 MG/DL (ref 70–110)
GLUCOSE SERPL-MCNC: 136 MG/DL (ref 70–110)
GLUCOSE SERPL-MCNC: 140 MG/DL (ref 70–110)
GLUCOSE SERPL-MCNC: 141 MG/DL (ref 70–110)
GLUCOSE SERPL-MCNC: 141 MG/DL (ref 70–110)
GLUCOSE SERPL-MCNC: 143 MG/DL (ref 70–110)
GLUCOSE SERPL-MCNC: 145 MG/DL (ref 70–110)
GLUCOSE SERPL-MCNC: 146 MG/DL (ref 70–110)
GLUCOSE SERPL-MCNC: 85 MG/DL (ref 70–110)
GLUCOSE SERPL-MCNC: 89 MG/DL (ref 70–110)
GLUCOSE SERPL-MCNC: 89 MG/DL (ref 70–110)
GLUCOSE SERPL-MCNC: 91 MG/DL (ref 70–110)
GLUCOSE SERPL-MCNC: 91 MG/DL (ref 70–110)
GLUCOSE SERPL-MCNC: 92 MG/DL (ref 70–110)
GLUCOSE SERPL-MCNC: 94 MG/DL (ref 70–110)
GLUCOSE SERPL-MCNC: 96 MG/DL (ref 70–110)
GLUCOSE SERPL-MCNC: 97 MG/DL (ref 70–110)
GLUCOSE SERPL-MCNC: 97 MG/DL (ref 70–110)
GLUCOSE SERPL-MCNC: 98 MG/DL (ref 70–110)
GLUCOSE SERPL-MCNC: 99 MG/DL (ref 70–110)
GLUCOSE UR QL STRIP: NEGATIVE
GLUCOSE UR QL STRIP: NEGATIVE
GRAM STN SPEC: NORMAL
HBA1C MFR BLD HPLC: 5.6 % (ref 4–5.6)
HBA1C MFR BLD HPLC: 5.6 % (ref 4–5.6)
HCO3 UR-SCNC: 30.9 MMOL/L (ref 24–28)
HCO3 UR-SCNC: 31.7 MMOL/L (ref 24–28)
HCO3 UR-SCNC: 31.9 MMOL/L (ref 24–28)
HCO3 UR-SCNC: 39.2 MMOL/L (ref 24–28)
HCO3 UR-SCNC: 40.2 MMOL/L (ref 24–28)
HCO3 UR-SCNC: 40.6 MMOL/L (ref 24–28)
HCO3 UR-SCNC: 40.9 MMOL/L (ref 24–28)
HCO3 UR-SCNC: 42 MMOL/L (ref 24–28)
HCO3 UR-SCNC: 42 MMOL/L (ref 24–28)
HCO3 UR-SCNC: 42.4 MMOL/L (ref 24–28)
HCO3 UR-SCNC: 42.9 MMOL/L (ref 24–28)
HCO3 UR-SCNC: 43.4 MMOL/L (ref 24–28)
HCO3 UR-SCNC: 44.2 MMOL/L (ref 24–28)
HCO3 UR-SCNC: 45.6 MMOL/L (ref 24–28)
HCO3 UR-SCNC: 45.7 MMOL/L (ref 24–28)
HCO3 UR-SCNC: 47 MMOL/L (ref 24–28)
HCO3 UR-SCNC: 49.4 MMOL/L (ref 24–28)
HCO3 UR-SCNC: 50.2 MMOL/L (ref 24–28)
HCO3 UR-SCNC: 53.4 MMOL/L (ref 24–28)
HCT VFR BLD AUTO: 23.2 % (ref 37–48.5)
HCT VFR BLD AUTO: 27.8 % (ref 37–48.5)
HCT VFR BLD AUTO: 28 % (ref 37–48.5)
HCT VFR BLD AUTO: 29 % (ref 37–48.5)
HCT VFR BLD AUTO: 29.1 % (ref 37–48.5)
HCT VFR BLD AUTO: 31.2 % (ref 37–48.5)
HCT VFR BLD AUTO: 31.5 % (ref 37–48.5)
HCT VFR BLD AUTO: 31.6 % (ref 37–48.5)
HCT VFR BLD AUTO: 31.7 % (ref 37–48.5)
HCT VFR BLD AUTO: 31.8 % (ref 37–48.5)
HCT VFR BLD AUTO: 31.9 % (ref 37–48.5)
HCT VFR BLD AUTO: 32.2 % (ref 37–48.5)
HCT VFR BLD AUTO: 32.3 % (ref 37–48.5)
HCT VFR BLD AUTO: 32.3 % (ref 37–48.5)
HCT VFR BLD AUTO: 32.5 % (ref 37–48.5)
HCT VFR BLD AUTO: 32.7 % (ref 37–48.5)
HCT VFR BLD AUTO: 32.9 % (ref 37–48.5)
HCT VFR BLD AUTO: 33.1 % (ref 37–48.5)
HCT VFR BLD AUTO: 33.1 % (ref 37–48.5)
HCT VFR BLD AUTO: 33.2 % (ref 37–48.5)
HCT VFR BLD AUTO: 33.2 % (ref 37–48.5)
HCT VFR BLD AUTO: 33.3 % (ref 37–48.5)
HCT VFR BLD AUTO: 33.3 % (ref 37–48.5)
HCT VFR BLD AUTO: 33.4 % (ref 37–48.5)
HCT VFR BLD AUTO: 33.5 % (ref 37–48.5)
HCT VFR BLD AUTO: 33.5 % (ref 37–48.5)
HCT VFR BLD AUTO: 33.7 % (ref 37–48.5)
HCT VFR BLD AUTO: 34.2 % (ref 37–48.5)
HCT VFR BLD AUTO: 34.3 % (ref 37–48.5)
HCT VFR BLD AUTO: 34.5 % (ref 37–48.5)
HCT VFR BLD AUTO: 34.5 % (ref 37–48.5)
HCT VFR BLD AUTO: 34.6 % (ref 37–48.5)
HCT VFR BLD AUTO: 34.7 % (ref 37–48.5)
HCT VFR BLD AUTO: 35 % (ref 37–48.5)
HCT VFR BLD AUTO: 35.3 % (ref 37–48.5)
HCT VFR BLD AUTO: 35.4 % (ref 37–48.5)
HCT VFR BLD AUTO: 36.2 % (ref 37–48.5)
HCT VFR BLD AUTO: 36.5 % (ref 37–48.5)
HCT VFR BLD AUTO: 38.1 % (ref 37–48.5)
HDLC SERPL-MCNC: 39 MG/DL (ref 40–75)
HDLC SERPL: 32.8 % (ref 20–50)
HGB BLD-MCNC: 10 G/DL (ref 12–16)
HGB BLD-MCNC: 10.1 G/DL (ref 12–16)
HGB BLD-MCNC: 10.1 G/DL (ref 12–16)
HGB BLD-MCNC: 10.2 G/DL (ref 12–16)
HGB BLD-MCNC: 10.3 G/DL (ref 12–16)
HGB BLD-MCNC: 10.3 G/DL (ref 12–16)
HGB BLD-MCNC: 10.4 G/DL (ref 12–16)
HGB BLD-MCNC: 10.4 G/DL (ref 12–16)
HGB BLD-MCNC: 10.5 G/DL (ref 12–16)
HGB BLD-MCNC: 10.5 G/DL (ref 12–16)
HGB BLD-MCNC: 10.6 G/DL (ref 12–16)
HGB BLD-MCNC: 10.7 G/DL (ref 12–16)
HGB BLD-MCNC: 10.8 G/DL (ref 12–16)
HGB BLD-MCNC: 11 G/DL (ref 12–16)
HGB BLD-MCNC: 11.2 G/DL (ref 12–16)
HGB BLD-MCNC: 11.3 G/DL (ref 12–16)
HGB BLD-MCNC: 8.1 G/DL (ref 12–16)
HGB BLD-MCNC: 8.6 G/DL (ref 12–16)
HGB BLD-MCNC: 8.8 G/DL (ref 12–16)
HGB BLD-MCNC: 9 G/DL (ref 12–16)
HGB BLD-MCNC: 9.4 G/DL (ref 12–16)
HGB BLD-MCNC: 9.5 G/DL (ref 12–16)
HGB BLD-MCNC: 9.6 G/DL (ref 12–16)
HGB BLD-MCNC: 9.7 G/DL (ref 12–16)
HGB BLD-MCNC: 9.7 G/DL (ref 12–16)
HGB BLD-MCNC: 9.8 G/DL (ref 12–16)
HGB BLD-MCNC: 9.9 G/DL (ref 12–16)
HGB UR QL STRIP: NEGATIVE
HIV 1+2 AB+HIV1 P24 AG SERPL QL IA: NEGATIVE
HYPOCHROMIA BLD QL SMEAR: ABNORMAL
IMM GRANULOCYTES # BLD AUTO: 0.01 K/UL (ref 0–0.04)
IMM GRANULOCYTES # BLD AUTO: 0.02 K/UL (ref 0–0.04)
IMM GRANULOCYTES # BLD AUTO: 0.03 K/UL (ref 0–0.04)
IMM GRANULOCYTES # BLD AUTO: 0.04 K/UL (ref 0–0.04)
IMM GRANULOCYTES # BLD AUTO: 0.05 K/UL (ref 0–0.04)
IMM GRANULOCYTES # BLD AUTO: 0.05 K/UL (ref 0–0.04)
IMM GRANULOCYTES # BLD AUTO: 0.06 K/UL (ref 0–0.04)
IMM GRANULOCYTES # BLD AUTO: 0.06 K/UL (ref 0–0.04)
IMM GRANULOCYTES # BLD AUTO: 0.19 K/UL (ref 0–0.04)
IMM GRANULOCYTES # BLD AUTO: ABNORMAL K/UL (ref 0–0.04)
IMM GRANULOCYTES NFR BLD AUTO: 0.1 % (ref 0–0.5)
IMM GRANULOCYTES NFR BLD AUTO: 0.2 % (ref 0–0.5)
IMM GRANULOCYTES NFR BLD AUTO: 0.3 % (ref 0–0.5)
IMM GRANULOCYTES NFR BLD AUTO: 0.4 % (ref 0–0.5)
IMM GRANULOCYTES NFR BLD AUTO: 0.5 % (ref 0–0.5)
IMM GRANULOCYTES NFR BLD AUTO: 0.5 % (ref 0–0.5)
IMM GRANULOCYTES NFR BLD AUTO: 2.1 % (ref 0–0.5)
IMM GRANULOCYTES NFR BLD AUTO: ABNORMAL % (ref 0–0.5)
INR PPP: 1 (ref 0.8–1.2)
INR PPP: 1.1 (ref 0.8–1.2)
INR PPP: 1.1 (ref 0.8–1.2)
INTERVENTRICULAR SEPTUM: 0.69 CM (ref 0.6–1.1)
INTERVENTRICULAR SEPTUM: 0.82 CM (ref 0.6–1.1)
IP: 10
IP: 12
IP: 12
IP: 14
IP: 14
IP: 15
IP: 16
IP: 20
IP: 25
IRON SERPL-MCNC: 44 UG/DL (ref 30–160)
IVRT: 49.48 MSEC
IVRT: 72.31 MSEC
KETONES UR QL STRIP: NEGATIVE
KETONES UR QL STRIP: NEGATIVE
LA MAJOR: 6.6 CM
LA MAJOR: 7.08 CM
LA MINOR: 6.23 CM
LA MINOR: 7.19 CM
LA WIDTH: 3.72 CM
LA WIDTH: 3.88 CM
LACTATE SERPL-SCNC: 0.6 MMOL/L (ref 0.5–2.2)
LDH FLD L TO P-CCNC: 175 U/L
LDH SERPL L TO P-CCNC: 428 U/L (ref 110–260)
LDLC SERPL CALC-MCNC: 61.2 MG/DL (ref 63–159)
LEFT ATRIUM SIZE: 2.92 CM
LEFT ATRIUM SIZE: 3.48 CM
LEFT ATRIUM VOLUME INDEX MOD: 40.1 ML/M2
LEFT ATRIUM VOLUME INDEX MOD: 51.5 ML/M2
LEFT ATRIUM VOLUME INDEX: 32.3 ML/M2
LEFT ATRIUM VOLUME INDEX: 36.4 ML/M2
LEFT ATRIUM VOLUME MOD: 105 CM3
LEFT ATRIUM VOLUME MOD: 81 CM3
LEFT ATRIUM VOLUME: 65.87 CM3
LEFT ATRIUM VOLUME: 73.56 CM3
LEFT INTERNAL DIMENSION IN SYSTOLE: 3.32 CM (ref 2.1–4)
LEFT INTERNAL DIMENSION IN SYSTOLE: 3.5 CM (ref 2.1–4)
LEFT VENTRICLE DIASTOLIC VOLUME INDEX: 42.51 ML/M2
LEFT VENTRICLE DIASTOLIC VOLUME INDEX: 53.25 ML/M2
LEFT VENTRICLE DIASTOLIC VOLUME: 107.65 ML
LEFT VENTRICLE DIASTOLIC VOLUME: 86.76 ML
LEFT VENTRICLE MASS INDEX: 43 G/M2
LEFT VENTRICLE MASS INDEX: 62 G/M2
LEFT VENTRICLE SYSTOLIC VOLUME INDEX: 22.2 ML/M2
LEFT VENTRICLE SYSTOLIC VOLUME INDEX: 25 ML/M2
LEFT VENTRICLE SYSTOLIC VOLUME: 44.84 ML
LEFT VENTRICLE SYSTOLIC VOLUME: 51.02 ML
LEFT VENTRICULAR INTERNAL DIMENSION IN DIASTOLE: 4.38 CM (ref 3.5–6)
LEFT VENTRICULAR INTERNAL DIMENSION IN DIASTOLE: 4.8 CM (ref 3.5–6)
LEFT VENTRICULAR MASS: 124.65 G
LEFT VENTRICULAR MASS: 87.22 G
LEUKOCYTE ESTERASE UR QL STRIP: NEGATIVE
LEUKOCYTE ESTERASE UR QL STRIP: POSITIVE
LV LATERAL E/E' RATIO: 8.55 M/S
LV SEPTAL E/E' RATIO: 18.8 M/S
LYMPHOCYTES # BLD AUTO: 0.4 K/UL (ref 1–4.8)
LYMPHOCYTES # BLD AUTO: 0.5 K/UL (ref 1–4.8)
LYMPHOCYTES # BLD AUTO: 0.6 K/UL (ref 1–4.8)
LYMPHOCYTES # BLD AUTO: 0.7 K/UL (ref 1–4.8)
LYMPHOCYTES # BLD AUTO: 0.8 K/UL (ref 1–4.8)
LYMPHOCYTES # BLD AUTO: 0.9 K/UL (ref 1–4.8)
LYMPHOCYTES # BLD AUTO: 1 K/UL (ref 1–4.8)
LYMPHOCYTES # BLD AUTO: 1 K/UL (ref 1–4.8)
LYMPHOCYTES # BLD AUTO: 1.1 K/UL (ref 1–4.8)
LYMPHOCYTES # BLD AUTO: 1.1 K/UL (ref 1–4.8)
LYMPHOCYTES # BLD AUTO: 1.5 K/UL (ref 1–4.8)
LYMPHOCYTES # BLD AUTO: ABNORMAL K/UL (ref 1–4.8)
LYMPHOCYTES NFR BLD: 10.1 % (ref 18–48)
LYMPHOCYTES NFR BLD: 10.4 % (ref 18–48)
LYMPHOCYTES NFR BLD: 10.6 % (ref 18–48)
LYMPHOCYTES NFR BLD: 10.9 % (ref 18–48)
LYMPHOCYTES NFR BLD: 11.1 % (ref 18–48)
LYMPHOCYTES NFR BLD: 11.3 % (ref 18–48)
LYMPHOCYTES NFR BLD: 11.5 % (ref 18–48)
LYMPHOCYTES NFR BLD: 11.7 % (ref 18–48)
LYMPHOCYTES NFR BLD: 11.8 % (ref 18–48)
LYMPHOCYTES NFR BLD: 12 % (ref 18–48)
LYMPHOCYTES NFR BLD: 12.8 % (ref 18–48)
LYMPHOCYTES NFR BLD: 13.1 % (ref 18–48)
LYMPHOCYTES NFR BLD: 14.9 % (ref 18–48)
LYMPHOCYTES NFR BLD: 4.3 % (ref 18–48)
LYMPHOCYTES NFR BLD: 4.5 % (ref 18–48)
LYMPHOCYTES NFR BLD: 4.6 % (ref 18–48)
LYMPHOCYTES NFR BLD: 4.7 % (ref 18–48)
LYMPHOCYTES NFR BLD: 4.7 % (ref 18–48)
LYMPHOCYTES NFR BLD: 5.1 % (ref 18–48)
LYMPHOCYTES NFR BLD: 5.6 % (ref 18–48)
LYMPHOCYTES NFR BLD: 6 % (ref 18–48)
LYMPHOCYTES NFR BLD: 6 % (ref 18–48)
LYMPHOCYTES NFR BLD: 6.1 % (ref 18–48)
LYMPHOCYTES NFR BLD: 6.7 % (ref 18–48)
LYMPHOCYTES NFR BLD: 6.9 % (ref 18–48)
LYMPHOCYTES NFR BLD: 7.1 % (ref 18–48)
LYMPHOCYTES NFR BLD: 7.1 % (ref 18–48)
LYMPHOCYTES NFR BLD: 7.4 % (ref 18–48)
LYMPHOCYTES NFR BLD: 7.6 % (ref 18–48)
LYMPHOCYTES NFR BLD: 7.7 % (ref 18–48)
LYMPHOCYTES NFR BLD: 7.8 % (ref 18–48)
LYMPHOCYTES NFR BLD: 8.2 % (ref 18–48)
LYMPHOCYTES NFR BLD: 8.3 % (ref 18–48)
LYMPHOCYTES NFR BLD: 8.9 % (ref 18–48)
LYMPHOCYTES NFR BLD: 8.9 % (ref 18–48)
LYMPHOCYTES NFR BLD: 9.2 % (ref 18–48)
LYMPHOCYTES NFR BLD: 9.5 % (ref 18–48)
LYMPHOCYTES NFR BLD: 9.5 % (ref 18–48)
LYMPHOCYTES NFR BLD: 9.8 % (ref 18–48)
LYMPHOCYTES NFR BLD: 9.8 % (ref 18–48)
LYMPHOCYTES NFR BLD: 9.9 % (ref 18–48)
LYMPHOCYTES NFR FLD MANUAL: 50 %
M TB IFN-G CD4+ BCKGRND COR BLD-ACNC: 0 IU/ML
M TB IFN-G CD4+ BCKGRND COR BLD-ACNC: 0 IU/ML
MAGNESIUM SERPL-MCNC: 1.9 MG/DL (ref 1.6–2.6)
MAGNESIUM SERPL-MCNC: 2 MG/DL (ref 1.6–2.6)
MAGNESIUM SERPL-MCNC: 2.1 MG/DL (ref 1.6–2.6)
MAGNESIUM SERPL-MCNC: 2.1 MG/DL (ref 1.6–2.6)
MAGNESIUM SERPL-MCNC: 2.2 MG/DL (ref 1.6–2.6)
MAGNESIUM SERPL-MCNC: 2.3 MG/DL (ref 1.6–2.6)
MAGNESIUM SERPL-MCNC: 2.4 MG/DL (ref 1.6–2.6)
MAGNESIUM SERPL-MCNC: 2.5 MG/DL (ref 1.6–2.6)
MAGNESIUM SERPL-MCNC: 2.6 MG/DL (ref 1.6–2.6)
MAGNESIUM SERPL-MCNC: 2.7 MG/DL (ref 1.6–2.6)
MAGNESIUM SERPL-MCNC: 2.7 MG/DL (ref 1.6–2.6)
MAGNESIUM SERPL-MCNC: 2.8 MG/DL (ref 1.6–2.6)
MAGNESIUM SERPL-MCNC: 2.9 MG/DL (ref 1.6–2.6)
MCH RBC QN AUTO: 26.8 PG (ref 27–31)
MCH RBC QN AUTO: 26.9 PG (ref 27–31)
MCH RBC QN AUTO: 26.9 PG (ref 27–31)
MCH RBC QN AUTO: 27 PG (ref 27–31)
MCH RBC QN AUTO: 27.1 PG (ref 27–31)
MCH RBC QN AUTO: 27.1 PG (ref 27–31)
MCH RBC QN AUTO: 27.2 PG (ref 27–31)
MCH RBC QN AUTO: 27.3 PG (ref 27–31)
MCH RBC QN AUTO: 27.4 PG (ref 27–31)
MCH RBC QN AUTO: 27.5 PG (ref 27–31)
MCH RBC QN AUTO: 27.6 PG (ref 27–31)
MCH RBC QN AUTO: 27.6 PG (ref 27–31)
MCH RBC QN AUTO: 27.7 PG (ref 27–31)
MCH RBC QN AUTO: 27.7 PG (ref 27–31)
MCH RBC QN AUTO: 27.9 PG (ref 27–31)
MCH RBC QN AUTO: 28 PG (ref 27–31)
MCH RBC QN AUTO: 28.1 PG (ref 27–31)
MCH RBC QN AUTO: 28.1 PG (ref 27–31)
MCH RBC QN AUTO: 28.2 PG (ref 27–31)
MCH RBC QN AUTO: 28.3 PG (ref 27–31)
MCH RBC QN AUTO: 28.3 PG (ref 27–31)
MCH RBC QN AUTO: 28.4 PG (ref 27–31)
MCH RBC QN AUTO: 33.5 PG (ref 27–31)
MCHC RBC AUTO-ENTMCNC: 29 G/DL (ref 32–36)
MCHC RBC AUTO-ENTMCNC: 29.3 G/DL (ref 32–36)
MCHC RBC AUTO-ENTMCNC: 29.4 G/DL (ref 32–36)
MCHC RBC AUTO-ENTMCNC: 29.5 G/DL (ref 32–36)
MCHC RBC AUTO-ENTMCNC: 29.5 G/DL (ref 32–36)
MCHC RBC AUTO-ENTMCNC: 29.7 G/DL (ref 32–36)
MCHC RBC AUTO-ENTMCNC: 29.7 G/DL (ref 32–36)
MCHC RBC AUTO-ENTMCNC: 29.8 G/DL (ref 32–36)
MCHC RBC AUTO-ENTMCNC: 29.9 G/DL (ref 32–36)
MCHC RBC AUTO-ENTMCNC: 29.9 G/DL (ref 32–36)
MCHC RBC AUTO-ENTMCNC: 30 G/DL (ref 32–36)
MCHC RBC AUTO-ENTMCNC: 30 G/DL (ref 32–36)
MCHC RBC AUTO-ENTMCNC: 30.1 G/DL (ref 32–36)
MCHC RBC AUTO-ENTMCNC: 30.2 G/DL (ref 32–36)
MCHC RBC AUTO-ENTMCNC: 30.3 G/DL (ref 32–36)
MCHC RBC AUTO-ENTMCNC: 30.3 G/DL (ref 32–36)
MCHC RBC AUTO-ENTMCNC: 30.4 G/DL (ref 32–36)
MCHC RBC AUTO-ENTMCNC: 30.5 G/DL (ref 32–36)
MCHC RBC AUTO-ENTMCNC: 30.6 G/DL (ref 32–36)
MCHC RBC AUTO-ENTMCNC: 30.7 G/DL (ref 32–36)
MCHC RBC AUTO-ENTMCNC: 30.8 G/DL (ref 32–36)
MCHC RBC AUTO-ENTMCNC: 30.9 G/DL (ref 32–36)
MCHC RBC AUTO-ENTMCNC: 31 G/DL (ref 32–36)
MCHC RBC AUTO-ENTMCNC: 31.1 G/DL (ref 32–36)
MCHC RBC AUTO-ENTMCNC: 31.4 G/DL (ref 32–36)
MCHC RBC AUTO-ENTMCNC: 31.5 G/DL (ref 32–36)
MCHC RBC AUTO-ENTMCNC: 31.6 G/DL (ref 32–36)
MCHC RBC AUTO-ENTMCNC: 31.6 G/DL (ref 32–36)
MCHC RBC AUTO-ENTMCNC: 31.8 G/DL (ref 32–36)
MCHC RBC AUTO-ENTMCNC: 32 G/DL (ref 32–36)
MCHC RBC AUTO-ENTMCNC: 32.4 G/DL (ref 32–36)
MCHC RBC AUTO-ENTMCNC: 32.4 G/DL (ref 32–36)
MCHC RBC AUTO-ENTMCNC: 34.9 G/DL (ref 32–36)
MCV RBC AUTO: 85 FL (ref 82–98)
MCV RBC AUTO: 85 FL (ref 82–98)
MCV RBC AUTO: 87 FL (ref 82–98)
MCV RBC AUTO: 88 FL (ref 82–98)
MCV RBC AUTO: 89 FL (ref 82–98)
MCV RBC AUTO: 90 FL (ref 82–98)
MCV RBC AUTO: 91 FL (ref 82–98)
MCV RBC AUTO: 92 FL (ref 82–98)
MCV RBC AUTO: 93 FL (ref 82–98)
MCV RBC AUTO: 94 FL (ref 82–98)
MCV RBC AUTO: 95 FL (ref 82–98)
MCV RBC AUTO: 96 FL (ref 82–98)
MESOTHL CELL NFR FLD MANUAL: 3 %
MIN VOL: 10.1
MIN VOL: 11
MIN VOL: 11.8
MIN VOL: 12
MIN VOL: 12.9
MIN VOL: 13.6
MIN VOL: 20
MIN VOL: 6.8
MIN VOL: 8.8
MIN VOL: 8.9
MIN VOL: 8.9
MIN VOL: 9.4
MITOGEN IGNF BCKGRD COR BLD-ACNC: 0.2 IU/ML
MITOGEN IGNF BCKGRD COR BLD-ACNC: 0.25 IU/ML
MODE: ABNORMAL
MONOCYTES # BLD AUTO: 0.6 K/UL (ref 0.3–1)
MONOCYTES # BLD AUTO: 0.7 K/UL (ref 0.3–1)
MONOCYTES # BLD AUTO: 0.7 K/UL (ref 0.3–1)
MONOCYTES # BLD AUTO: 0.8 K/UL (ref 0.3–1)
MONOCYTES # BLD AUTO: 0.9 K/UL (ref 0.3–1)
MONOCYTES # BLD AUTO: 1 K/UL (ref 0.3–1)
MONOCYTES # BLD AUTO: 1.1 K/UL (ref 0.3–1)
MONOCYTES # BLD AUTO: 1.3 K/UL (ref 0.3–1)
MONOCYTES # BLD AUTO: 1.5 K/UL (ref 0.3–1)
MONOCYTES # BLD AUTO: 1.6 K/UL (ref 0.3–1)
MONOCYTES # BLD AUTO: ABNORMAL K/UL (ref 0.3–1)
MONOCYTES NFR BLD: 10 % (ref 4–15)
MONOCYTES NFR BLD: 10.1 % (ref 4–15)
MONOCYTES NFR BLD: 10.1 % (ref 4–15)
MONOCYTES NFR BLD: 10.2 % (ref 4–15)
MONOCYTES NFR BLD: 10.4 % (ref 4–15)
MONOCYTES NFR BLD: 10.6 % (ref 4–15)
MONOCYTES NFR BLD: 10.7 % (ref 4–15)
MONOCYTES NFR BLD: 10.8 % (ref 4–15)
MONOCYTES NFR BLD: 10.8 % (ref 4–15)
MONOCYTES NFR BLD: 10.9 % (ref 4–15)
MONOCYTES NFR BLD: 11.5 % (ref 4–15)
MONOCYTES NFR BLD: 11.5 % (ref 4–15)
MONOCYTES NFR BLD: 11.8 % (ref 4–15)
MONOCYTES NFR BLD: 11.8 % (ref 4–15)
MONOCYTES NFR BLD: 11.9 % (ref 4–15)
MONOCYTES NFR BLD: 12.1 % (ref 4–15)
MONOCYTES NFR BLD: 13 % (ref 4–15)
MONOCYTES NFR BLD: 2 % (ref 4–15)
MONOCYTES NFR BLD: 6.1 % (ref 4–15)
MONOCYTES NFR BLD: 6.4 % (ref 4–15)
MONOCYTES NFR BLD: 6.6 % (ref 4–15)
MONOCYTES NFR BLD: 6.7 % (ref 4–15)
MONOCYTES NFR BLD: 7.1 % (ref 4–15)
MONOCYTES NFR BLD: 7.2 % (ref 4–15)
MONOCYTES NFR BLD: 8.1 % (ref 4–15)
MONOCYTES NFR BLD: 8.2 % (ref 4–15)
MONOCYTES NFR BLD: 8.5 % (ref 4–15)
MONOCYTES NFR BLD: 8.5 % (ref 4–15)
MONOCYTES NFR BLD: 8.6 % (ref 4–15)
MONOCYTES NFR BLD: 8.6 % (ref 4–15)
MONOCYTES NFR BLD: 9 % (ref 4–15)
MONOCYTES NFR BLD: 9.1 % (ref 4–15)
MONOCYTES NFR BLD: 9.2 % (ref 4–15)
MONOCYTES NFR BLD: 9.2 % (ref 4–15)
MONOCYTES NFR BLD: 9.5 % (ref 4–15)
MONOCYTES NFR BLD: 9.6 % (ref 4–15)
MONOCYTES NFR BLD: 9.7 % (ref 4–15)
MONOCYTES NFR BLD: 9.8 % (ref 4–15)
MONOCYTES NFR BLD: 9.9 % (ref 4–15)
MONOS+MACROS NFR FLD MANUAL: 25 %
MV PEAK A VEL: 0.2 M/S
MV PEAK E VEL: 0.94 M/S
MV STENOSIS PRESSURE HALF TIME: 60.15 MS
MV VALVE AREA P 1/2 METHOD: 3.66 CM2
NEUTROPHILS # BLD AUTO: 10.3 K/UL (ref 1.8–7.7)
NEUTROPHILS # BLD AUTO: 11.3 K/UL (ref 1.8–7.7)
NEUTROPHILS # BLD AUTO: 4.9 K/UL (ref 1.8–7.7)
NEUTROPHILS # BLD AUTO: 5.4 K/UL (ref 1.8–7.7)
NEUTROPHILS # BLD AUTO: 5.4 K/UL (ref 1.8–7.7)
NEUTROPHILS # BLD AUTO: 5.5 K/UL (ref 1.8–7.7)
NEUTROPHILS # BLD AUTO: 5.6 K/UL (ref 1.8–7.7)
NEUTROPHILS # BLD AUTO: 5.8 K/UL (ref 1.8–7.7)
NEUTROPHILS # BLD AUTO: 5.8 K/UL (ref 1.8–7.7)
NEUTROPHILS # BLD AUTO: 5.9 K/UL (ref 1.8–7.7)
NEUTROPHILS # BLD AUTO: 5.9 K/UL (ref 1.8–7.7)
NEUTROPHILS # BLD AUTO: 6 K/UL (ref 1.8–7.7)
NEUTROPHILS # BLD AUTO: 6 K/UL (ref 1.8–7.7)
NEUTROPHILS # BLD AUTO: 6.1 K/UL (ref 1.8–7.7)
NEUTROPHILS # BLD AUTO: 6.2 K/UL (ref 1.8–7.7)
NEUTROPHILS # BLD AUTO: 6.2 K/UL (ref 1.8–7.7)
NEUTROPHILS # BLD AUTO: 6.4 K/UL (ref 1.8–7.7)
NEUTROPHILS # BLD AUTO: 6.5 K/UL (ref 1.8–7.7)
NEUTROPHILS # BLD AUTO: 6.5 K/UL (ref 1.8–7.7)
NEUTROPHILS # BLD AUTO: 6.9 K/UL (ref 1.8–7.7)
NEUTROPHILS # BLD AUTO: 7.2 K/UL (ref 1.8–7.7)
NEUTROPHILS # BLD AUTO: 7.3 K/UL (ref 1.8–7.7)
NEUTROPHILS # BLD AUTO: 7.5 K/UL (ref 1.8–7.7)
NEUTROPHILS # BLD AUTO: 7.6 K/UL (ref 1.8–7.7)
NEUTROPHILS # BLD AUTO: 7.7 K/UL (ref 1.8–7.7)
NEUTROPHILS # BLD AUTO: 7.7 K/UL (ref 1.8–7.7)
NEUTROPHILS # BLD AUTO: 7.9 K/UL (ref 1.8–7.7)
NEUTROPHILS # BLD AUTO: 8.1 K/UL (ref 1.8–7.7)
NEUTROPHILS # BLD AUTO: 8.2 K/UL (ref 1.8–7.7)
NEUTROPHILS # BLD AUTO: 8.4 K/UL (ref 1.8–7.7)
NEUTROPHILS # BLD AUTO: 8.6 K/UL (ref 1.8–7.7)
NEUTROPHILS # BLD AUTO: 9.1 K/UL (ref 1.8–7.7)
NEUTROPHILS # BLD AUTO: 9.5 K/UL (ref 1.8–7.7)
NEUTROPHILS # BLD AUTO: 9.8 K/UL (ref 1.8–7.7)
NEUTROPHILS NFR BLD: 71.2 % (ref 38–73)
NEUTROPHILS NFR BLD: 72.3 % (ref 38–73)
NEUTROPHILS NFR BLD: 72.4 % (ref 38–73)
NEUTROPHILS NFR BLD: 74 % (ref 38–73)
NEUTROPHILS NFR BLD: 74.1 % (ref 38–73)
NEUTROPHILS NFR BLD: 75.1 % (ref 38–73)
NEUTROPHILS NFR BLD: 75.2 % (ref 38–73)
NEUTROPHILS NFR BLD: 76 % (ref 38–73)
NEUTROPHILS NFR BLD: 77 % (ref 38–73)
NEUTROPHILS NFR BLD: 77 % (ref 38–73)
NEUTROPHILS NFR BLD: 77.5 % (ref 38–73)
NEUTROPHILS NFR BLD: 77.9 % (ref 38–73)
NEUTROPHILS NFR BLD: 78 % (ref 38–73)
NEUTROPHILS NFR BLD: 78.1 % (ref 38–73)
NEUTROPHILS NFR BLD: 78.2 % (ref 38–73)
NEUTROPHILS NFR BLD: 78.4 % (ref 38–73)
NEUTROPHILS NFR BLD: 78.7 % (ref 38–73)
NEUTROPHILS NFR BLD: 79.1 % (ref 38–73)
NEUTROPHILS NFR BLD: 79.3 % (ref 38–73)
NEUTROPHILS NFR BLD: 79.9 % (ref 38–73)
NEUTROPHILS NFR BLD: 80.1 % (ref 38–73)
NEUTROPHILS NFR BLD: 80.1 % (ref 38–73)
NEUTROPHILS NFR BLD: 80.2 % (ref 38–73)
NEUTROPHILS NFR BLD: 80.2 % (ref 38–73)
NEUTROPHILS NFR BLD: 81.1 % (ref 38–73)
NEUTROPHILS NFR BLD: 81.9 % (ref 38–73)
NEUTROPHILS NFR BLD: 82 % (ref 38–73)
NEUTROPHILS NFR BLD: 82 % (ref 38–73)
NEUTROPHILS NFR BLD: 82.4 % (ref 38–73)
NEUTROPHILS NFR BLD: 82.4 % (ref 38–73)
NEUTROPHILS NFR BLD: 83.6 % (ref 38–73)
NEUTROPHILS NFR BLD: 83.7 % (ref 38–73)
NEUTROPHILS NFR BLD: 83.7 % (ref 38–73)
NEUTROPHILS NFR BLD: 84.2 % (ref 38–73)
NEUTROPHILS NFR BLD: 84.7 % (ref 38–73)
NEUTROPHILS NFR BLD: 84.7 % (ref 38–73)
NEUTROPHILS NFR BLD: 86.5 % (ref 38–73)
NEUTROPHILS NFR BLD: 87.7 % (ref 38–73)
NEUTROPHILS NFR BLD: 88.3 % (ref 38–73)
NEUTROPHILS NFR BLD: 88.4 % (ref 38–73)
NEUTROPHILS NFR BLD: 92 % (ref 38–73)
NEUTROPHILS NFR FLD MANUAL: 22 %
NITRITE UR QL STRIP: NEGATIVE
NONHDLC SERPL-MCNC: 80 MG/DL
NRBC BLD-RTO: 0 /100 WBC
PCO2 BLDA: 104.2 MMHG (ref 35–45)
PCO2 BLDA: 106.1 MMHG (ref 35–45)
PCO2 BLDA: 108.4 MMHG (ref 35–45)
PCO2 BLDA: 108.8 MMHG (ref 35–45)
PCO2 BLDA: 125.4 MMHG (ref 35–45)
PCO2 BLDA: 51.2 MMHG (ref 35–45)
PCO2 BLDA: 56.1 MMHG (ref 35–45)
PCO2 BLDA: 61.5 MMHG (ref 35–45)
PCO2 BLDA: 74.3 MMHG (ref 35–45)
PCO2 BLDA: 76.5 MMHG (ref 35–45)
PCO2 BLDA: 85.1 MMHG (ref 35–45)
PCO2 BLDA: 88.9 MMHG (ref 35–45)
PCO2 BLDA: 89 MMHG (ref 35–45)
PCO2 BLDA: 90.4 MMHG (ref 35–45)
PCO2 BLDA: 90.5 MMHG (ref 35–45)
PCO2 BLDA: 91.8 MMHG (ref 35–45)
PCO2 BLDA: 94.3 MMHG (ref 35–45)
PCO2 BLDA: 94.4 MMHG (ref 35–45)
PCO2 BLDA: 97.1 MMHG (ref 35–45)
PEEP: 5
PEEP: 7
PH SMN: 7.19 [PH] (ref 7.35–7.45)
PH SMN: 7.19 [PH] (ref 7.35–7.45)
PH SMN: 7.21 [PH] (ref 7.35–7.45)
PH SMN: 7.22 [PH] (ref 7.35–7.45)
PH SMN: 7.23 [PH] (ref 7.35–7.45)
PH SMN: 7.25 [PH] (ref 7.35–7.45)
PH SMN: 7.25 [PH] (ref 7.35–7.45)
PH SMN: 7.28 [PH] (ref 7.35–7.45)
PH SMN: 7.29 [PH] (ref 7.35–7.45)
PH SMN: 7.29 [PH] (ref 7.35–7.45)
PH SMN: 7.31 [PH] (ref 7.35–7.45)
PH SMN: 7.34 [PH] (ref 7.35–7.45)
PH SMN: 7.36 [PH] (ref 7.35–7.45)
PH SMN: 7.36 [PH] (ref 7.35–7.45)
PH SMN: 7.39 [PH] (ref 7.35–7.45)
PH SMN: 7.4 [PH] (ref 7.35–7.45)
PH SMN: 7.41 [PH] (ref 7.35–7.45)
PH UR STRIP: 6 [PH] (ref 5–8)
PH, POC UA: 6
PHOSPHATE SERPL-MCNC: 2.8 MG/DL (ref 2.7–4.5)
PHOSPHATE SERPL-MCNC: 3.6 MG/DL (ref 2.7–4.5)
PHOSPHATE SERPL-MCNC: 3.8 MG/DL (ref 2.7–4.5)
PHOSPHATE SERPL-MCNC: 3.9 MG/DL (ref 2.7–4.5)
PHOSPHATE SERPL-MCNC: 4 MG/DL (ref 2.7–4.5)
PHOSPHATE SERPL-MCNC: 4.1 MG/DL (ref 2.7–4.5)
PHOSPHATE SERPL-MCNC: 4.2 MG/DL (ref 2.7–4.5)
PHOSPHATE SERPL-MCNC: 4.5 MG/DL (ref 2.7–4.5)
PHOSPHATE SERPL-MCNC: 4.6 MG/DL (ref 2.7–4.5)
PHOSPHATE SERPL-MCNC: 4.7 MG/DL (ref 2.7–4.5)
PHOSPHATE SERPL-MCNC: 4.7 MG/DL (ref 2.7–4.5)
PHOSPHATE SERPL-MCNC: 4.8 MG/DL (ref 2.7–4.5)
PHOSPHATE SERPL-MCNC: 4.9 MG/DL (ref 2.7–4.5)
PHOSPHATE SERPL-MCNC: 5.3 MG/DL (ref 2.7–4.5)
PHOSPHATE SERPL-MCNC: 5.3 MG/DL (ref 2.7–4.5)
PHOSPHATE SERPL-MCNC: 5.7 MG/DL (ref 2.7–4.5)
PIP: 17
PIP: 23
PIP: 25
PIP: 25
PIP: 34
PISA MRMAX VEL: 0.02 M/S
PISA MRMAX VEL: 0.03 M/S
PISA TR MAX VEL: 2.35 M/S
PISA TR MAX VEL: 3.21 M/S
PLATELET # BLD AUTO: 120 K/UL (ref 150–350)
PLATELET # BLD AUTO: 125 K/UL (ref 150–350)
PLATELET # BLD AUTO: 126 K/UL (ref 150–350)
PLATELET # BLD AUTO: 133 K/UL (ref 150–350)
PLATELET # BLD AUTO: 136 K/UL (ref 150–350)
PLATELET # BLD AUTO: 137 K/UL (ref 150–350)
PLATELET # BLD AUTO: 143 K/UL (ref 150–350)
PLATELET # BLD AUTO: 145 K/UL (ref 150–350)
PLATELET # BLD AUTO: 146 K/UL (ref 150–350)
PLATELET # BLD AUTO: 147 K/UL (ref 150–350)
PLATELET # BLD AUTO: 148 K/UL (ref 150–350)
PLATELET # BLD AUTO: 151 K/UL (ref 150–350)
PLATELET # BLD AUTO: 151 K/UL (ref 150–350)
PLATELET # BLD AUTO: 152 K/UL (ref 150–350)
PLATELET # BLD AUTO: 152 K/UL (ref 150–350)
PLATELET # BLD AUTO: 153 K/UL (ref 150–350)
PLATELET # BLD AUTO: 154 K/UL (ref 150–350)
PLATELET # BLD AUTO: 156 K/UL (ref 150–350)
PLATELET # BLD AUTO: 156 K/UL (ref 150–350)
PLATELET # BLD AUTO: 157 K/UL (ref 150–350)
PLATELET # BLD AUTO: 160 K/UL (ref 150–350)
PLATELET # BLD AUTO: 161 K/UL (ref 150–350)
PLATELET # BLD AUTO: 163 K/UL (ref 150–350)
PLATELET # BLD AUTO: 163 K/UL (ref 150–350)
PLATELET # BLD AUTO: 168 K/UL (ref 150–350)
PLATELET # BLD AUTO: 169 K/UL (ref 150–350)
PLATELET # BLD AUTO: 171 K/UL (ref 150–350)
PLATELET # BLD AUTO: 173 K/UL (ref 150–350)
PLATELET # BLD AUTO: 173 K/UL (ref 150–350)
PLATELET # BLD AUTO: 174 K/UL (ref 150–350)
PLATELET # BLD AUTO: 175 K/UL (ref 150–350)
PLATELET # BLD AUTO: 175 K/UL (ref 150–350)
PLATELET # BLD AUTO: 177 K/UL (ref 150–350)
PLATELET # BLD AUTO: 180 K/UL (ref 150–350)
PLATELET # BLD AUTO: 182 K/UL (ref 150–350)
PLATELET # BLD AUTO: 184 K/UL (ref 150–350)
PLATELET # BLD AUTO: 188 K/UL (ref 150–350)
PLATELET # BLD AUTO: 188 K/UL (ref 150–350)
PLATELET # BLD AUTO: 191 K/UL (ref 150–350)
PLATELET # BLD AUTO: 197 K/UL (ref 150–350)
PLATELET # BLD AUTO: 199 K/UL (ref 150–350)
PLATELET # BLD AUTO: 217 K/UL (ref 150–350)
PLATELET # BLD AUTO: 418 K/UL (ref 150–350)
PLATELET BLD QL SMEAR: ABNORMAL
PLATELET BLD QL SMEAR: NORMAL
PMV BLD AUTO: 10 FL (ref 9.2–12.9)
PMV BLD AUTO: 10.2 FL (ref 9.2–12.9)
PMV BLD AUTO: 10.3 FL (ref 9.2–12.9)
PMV BLD AUTO: 10.5 FL (ref 9.2–12.9)
PMV BLD AUTO: 10.6 FL (ref 9.2–12.9)
PMV BLD AUTO: 10.7 FL (ref 9.2–12.9)
PMV BLD AUTO: 10.8 FL (ref 9.2–12.9)
PMV BLD AUTO: 10.8 FL (ref 9.2–12.9)
PMV BLD AUTO: 11 FL (ref 9.2–12.9)
PMV BLD AUTO: 11.1 FL (ref 9.2–12.9)
PMV BLD AUTO: 11.2 FL (ref 9.2–12.9)
PMV BLD AUTO: 11.2 FL (ref 9.2–12.9)
PMV BLD AUTO: 11.4 FL (ref 9.2–12.9)
PMV BLD AUTO: 11.4 FL (ref 9.2–12.9)
PMV BLD AUTO: 11.5 FL (ref 9.2–12.9)
PMV BLD AUTO: 11.6 FL (ref 9.2–12.9)
PMV BLD AUTO: 11.7 FL (ref 9.2–12.9)
PMV BLD AUTO: 11.7 FL (ref 9.2–12.9)
PMV BLD AUTO: 11.8 FL (ref 9.2–12.9)
PMV BLD AUTO: 11.9 FL (ref 9.2–12.9)
PMV BLD AUTO: 11.9 FL (ref 9.2–12.9)
PMV BLD AUTO: 12 FL (ref 9.2–12.9)
PMV BLD AUTO: 12.1 FL (ref 9.2–12.9)
PMV BLD AUTO: 12.2 FL (ref 9.2–12.9)
PMV BLD AUTO: 12.3 FL (ref 9.2–12.9)
PMV BLD AUTO: 12.3 FL (ref 9.2–12.9)
PMV BLD AUTO: 12.5 FL (ref 9.2–12.9)
PO2 BLDA: 19 MMHG (ref 80–100)
PO2 BLDA: 53 MMHG (ref 80–100)
PO2 BLDA: 59 MMHG (ref 40–60)
PO2 BLDA: 61 MMHG (ref 80–100)
PO2 BLDA: 67 MMHG (ref 80–100)
PO2 BLDA: 67 MMHG (ref 80–100)
PO2 BLDA: 68 MMHG (ref 80–100)
PO2 BLDA: 69 MMHG (ref 80–100)
PO2 BLDA: 71 MMHG (ref 80–100)
PO2 BLDA: 73 MMHG (ref 80–100)
PO2 BLDA: 76 MMHG (ref 80–100)
PO2 BLDA: 78 MMHG (ref 80–100)
PO2 BLDA: 84 MMHG (ref 80–100)
PO2 BLDA: 88 MMHG (ref 80–100)
PO2 BLDA: 91 MMHG (ref 80–100)
PO2 BLDA: 97 MMHG (ref 40–60)
POC BE: 12 MMOL/L
POC BE: 12 MMOL/L
POC BE: 13 MMOL/L
POC BE: 14 MMOL/L
POC BE: 14 MMOL/L
POC BE: 15 MMOL/L
POC BE: 16 MMOL/L
POC BE: 16 MMOL/L
POC BE: 17 MMOL/L
POC BE: 17 MMOL/L
POC BE: 19 MMOL/L
POC BE: 20 MMOL/L
POC BE: 22 MMOL/L
POC BE: 22 MMOL/L
POC BE: 23 MMOL/L
POC BE: 28 MMOL/L
POC BE: 5 MMOL/L
POC BE: 6 MMOL/L
POC BE: 7 MMOL/L
POC BLOOD, URINE: NEGATIVE
POC NITRATES, URINE: NEGATIVE
POC SATURATED O2: 19 % (ref 95–100)
POC SATURATED O2: 79 % (ref 95–100)
POC SATURATED O2: 81 % (ref 95–100)
POC SATURATED O2: 84 % (ref 95–100)
POC SATURATED O2: 88 % (ref 95–100)
POC SATURATED O2: 89 % (ref 95–100)
POC SATURATED O2: 90 % (ref 95–100)
POC SATURATED O2: 91 % (ref 95–100)
POC SATURATED O2: 92 % (ref 95–100)
POC SATURATED O2: 92 % (ref 95–100)
POC SATURATED O2: 95 % (ref 95–100)
POC SATURATED O2: 96 % (ref 95–100)
POCT GLUCOSE: 102 MG/DL (ref 70–110)
POCT GLUCOSE: 103 MG/DL (ref 70–110)
POCT GLUCOSE: 105 MG/DL (ref 70–110)
POCT GLUCOSE: 107 MG/DL (ref 70–110)
POCT GLUCOSE: 108 MG/DL (ref 70–110)
POCT GLUCOSE: 111 MG/DL (ref 70–110)
POCT GLUCOSE: 116 MG/DL (ref 70–110)
POCT GLUCOSE: 119 MG/DL (ref 70–110)
POCT GLUCOSE: 125 MG/DL (ref 70–110)
POCT GLUCOSE: 126 MG/DL (ref 70–110)
POCT GLUCOSE: 126 MG/DL (ref 70–110)
POCT GLUCOSE: 127 MG/DL (ref 70–110)
POCT GLUCOSE: 129 MG/DL (ref 70–110)
POCT GLUCOSE: 131 MG/DL (ref 70–110)
POCT GLUCOSE: 134 MG/DL (ref 70–110)
POCT GLUCOSE: 136 MG/DL (ref 70–110)
POCT GLUCOSE: 136 MG/DL (ref 70–110)
POCT GLUCOSE: 143 MG/DL (ref 70–110)
POCT GLUCOSE: 150 MG/DL (ref 70–110)
POCT GLUCOSE: 153 MG/DL (ref 70–110)
POCT GLUCOSE: 153 MG/DL (ref 70–110)
POCT GLUCOSE: 160 MG/DL (ref 70–110)
POCT GLUCOSE: 161 MG/DL (ref 70–110)
POCT GLUCOSE: 165 MG/DL (ref 70–110)
POCT GLUCOSE: 166 MG/DL (ref 70–110)
POCT GLUCOSE: 168 MG/DL (ref 70–110)
POCT GLUCOSE: 168 MG/DL (ref 70–110)
POCT GLUCOSE: 177 MG/DL (ref 70–110)
POCT GLUCOSE: 178 MG/DL (ref 70–110)
POCT GLUCOSE: 183 MG/DL (ref 70–110)
POCT GLUCOSE: 228 MG/DL (ref 70–110)
POCT GLUCOSE: 247 MG/DL (ref 70–110)
POCT GLUCOSE: 93 MG/DL (ref 70–110)
POCT GLUCOSE: 94 MG/DL (ref 70–110)
POLYCHROMASIA BLD QL SMEAR: ABNORMAL
POTASSIUM SERPL-SCNC: 3 MMOL/L (ref 3.5–5.1)
POTASSIUM SERPL-SCNC: 3.2 MMOL/L (ref 3.5–5.1)
POTASSIUM SERPL-SCNC: 3.4 MMOL/L (ref 3.5–5.1)
POTASSIUM SERPL-SCNC: 3.5 MMOL/L (ref 3.5–5.1)
POTASSIUM SERPL-SCNC: 3.5 MMOL/L (ref 3.5–5.1)
POTASSIUM SERPL-SCNC: 3.7 MMOL/L (ref 3.5–5.1)
POTASSIUM SERPL-SCNC: 4 MMOL/L (ref 3.5–5.1)
POTASSIUM SERPL-SCNC: 4.1 MMOL/L (ref 3.5–5.1)
POTASSIUM SERPL-SCNC: 4.2 MMOL/L (ref 3.5–5.1)
POTASSIUM SERPL-SCNC: 4.3 MMOL/L (ref 3.5–5.1)
POTASSIUM SERPL-SCNC: 4.4 MMOL/L (ref 3.5–5.1)
POTASSIUM SERPL-SCNC: 4.4 MMOL/L (ref 3.5–5.1)
POTASSIUM SERPL-SCNC: 4.5 MMOL/L (ref 3.5–5.1)
POTASSIUM SERPL-SCNC: 4.6 MMOL/L (ref 3.5–5.1)
POTASSIUM SERPL-SCNC: 4.7 MMOL/L (ref 3.5–5.1)
POTASSIUM SERPL-SCNC: 4.8 MMOL/L (ref 3.5–5.1)
POTASSIUM SERPL-SCNC: 4.9 MMOL/L (ref 3.5–5.1)
POTASSIUM SERPL-SCNC: 5 MMOL/L (ref 3.5–5.1)
POTASSIUM SERPL-SCNC: 5.1 MMOL/L (ref 3.5–5.1)
POTASSIUM SERPL-SCNC: 5.2 MMOL/L (ref 3.5–5.1)
POTASSIUM SERPL-SCNC: 5.3 MMOL/L (ref 3.5–5.1)
PROCALCITONIN SERPL IA-MCNC: 0.14 NG/ML
PROCALCITONIN SERPL IA-MCNC: 0.26 NG/ML
PROCALCITONIN SERPL IA-MCNC: 0.28 NG/ML
PROCALCITONIN SERPL IA-MCNC: 0.3 NG/ML
PROT FLD-MCNC: 2.8 G/DL
PROT SERPL-MCNC: 5.7 G/DL (ref 6–8.4)
PROT SERPL-MCNC: 6 G/DL (ref 6–8.4)
PROT SERPL-MCNC: 6.1 G/DL (ref 6–8.4)
PROT SERPL-MCNC: 6.1 G/DL (ref 6–8.4)
PROT SERPL-MCNC: 6.4 G/DL (ref 6–8.4)
PROT SERPL-MCNC: 6.5 G/DL (ref 6–8.4)
PROT SERPL-MCNC: 6.7 G/DL (ref 6–8.4)
PROT SERPL-MCNC: 7 G/DL (ref 6–8.4)
PROT SERPL-MCNC: 7.1 G/DL (ref 6–8.4)
PROT UR QL STRIP: NEGATIVE
PROT UR QL STRIP: NEGATIVE
PROTHROMBIN TIME: 11.3 SEC (ref 9–12.5)
PROTHROMBIN TIME: 11.6 SEC (ref 9–12.5)
PROTHROMBIN TIME: 11.8 SEC (ref 9–12.5)
PS: 12
PTH-INTACT SERPL-MCNC: 164.4 PG/ML (ref 9–77)
PV PEAK S VEL: 0.35 M/S
PV PEAK S VEL: 0.48 M/S
PV PEAK VELOCITY: 0.59 CM/S
PV PEAK VELOCITY: 0.88 CM/S
RA MAJOR: 6.52 CM
RA MAJOR: 6.75 CM
RA PRESSURE: 8 MMHG
RA WIDTH: 3.97 CM
RA WIDTH: 4.11 CM
RBC # BLD AUTO: 2.42 M/UL (ref 4–5.4)
RBC # BLD AUTO: 3.2 M/UL (ref 4–5.4)
RBC # BLD AUTO: 3.21 M/UL (ref 4–5.4)
RBC # BLD AUTO: 3.27 M/UL (ref 4–5.4)
RBC # BLD AUTO: 3.37 M/UL (ref 4–5.4)
RBC # BLD AUTO: 3.41 M/UL (ref 4–5.4)
RBC # BLD AUTO: 3.43 M/UL (ref 4–5.4)
RBC # BLD AUTO: 3.44 M/UL (ref 4–5.4)
RBC # BLD AUTO: 3.45 M/UL (ref 4–5.4)
RBC # BLD AUTO: 3.45 M/UL (ref 4–5.4)
RBC # BLD AUTO: 3.46 M/UL (ref 4–5.4)
RBC # BLD AUTO: 3.47 M/UL (ref 4–5.4)
RBC # BLD AUTO: 3.54 M/UL (ref 4–5.4)
RBC # BLD AUTO: 3.56 M/UL (ref 4–5.4)
RBC # BLD AUTO: 3.57 M/UL (ref 4–5.4)
RBC # BLD AUTO: 3.6 M/UL (ref 4–5.4)
RBC # BLD AUTO: 3.61 M/UL (ref 4–5.4)
RBC # BLD AUTO: 3.62 M/UL (ref 4–5.4)
RBC # BLD AUTO: 3.62 M/UL (ref 4–5.4)
RBC # BLD AUTO: 3.68 M/UL (ref 4–5.4)
RBC # BLD AUTO: 3.7 M/UL (ref 4–5.4)
RBC # BLD AUTO: 3.7 M/UL (ref 4–5.4)
RBC # BLD AUTO: 3.71 M/UL (ref 4–5.4)
RBC # BLD AUTO: 3.72 M/UL (ref 4–5.4)
RBC # BLD AUTO: 3.73 M/UL (ref 4–5.4)
RBC # BLD AUTO: 3.73 M/UL (ref 4–5.4)
RBC # BLD AUTO: 3.74 M/UL (ref 4–5.4)
RBC # BLD AUTO: 3.75 M/UL (ref 4–5.4)
RBC # BLD AUTO: 3.75 M/UL (ref 4–5.4)
RBC # BLD AUTO: 3.78 M/UL (ref 4–5.4)
RBC # BLD AUTO: 3.78 M/UL (ref 4–5.4)
RBC # BLD AUTO: 3.8 M/UL (ref 4–5.4)
RBC # BLD AUTO: 3.8 M/UL (ref 4–5.4)
RBC # BLD AUTO: 3.82 M/UL (ref 4–5.4)
RBC # BLD AUTO: 3.83 M/UL (ref 4–5.4)
RBC # BLD AUTO: 3.85 M/UL (ref 4–5.4)
RBC # BLD AUTO: 3.86 M/UL (ref 4–5.4)
RBC # BLD AUTO: 3.86 M/UL (ref 4–5.4)
RBC # BLD AUTO: 3.9 M/UL (ref 4–5.4)
RBC # BLD AUTO: 3.95 M/UL (ref 4–5.4)
RBC # BLD AUTO: 3.97 M/UL (ref 4–5.4)
RBC # BLD AUTO: 4.02 M/UL (ref 4–5.4)
RBC # BLD AUTO: 4.13 M/UL (ref 4–5.4)
SAMPLE: ABNORMAL
SARS-COV-2 RDRP RESP QL NAA+PROBE: NEGATIVE
SATURATED IRON: 18 % (ref 20–50)
SINUS: 2.86 CM
SINUS: 2.9 CM
SITE: ABNORMAL
SMUDGE CELLS BLD QL SMEAR: PRESENT
SODIUM SERPL-SCNC: 138 MMOL/L (ref 136–145)
SODIUM SERPL-SCNC: 139 MMOL/L (ref 136–145)
SODIUM SERPL-SCNC: 140 MMOL/L (ref 136–145)
SODIUM SERPL-SCNC: 141 MMOL/L (ref 136–145)
SODIUM SERPL-SCNC: 142 MMOL/L (ref 136–145)
SODIUM SERPL-SCNC: 143 MMOL/L (ref 136–145)
SODIUM SERPL-SCNC: 144 MMOL/L (ref 136–145)
SODIUM UR-SCNC: 46 MMOL/L (ref 20–250)
SP GR UR STRIP: 1.01 (ref 1–1.03)
SP GR UR STRIP: 1.02 (ref 1–1.03)
SP02: 100
SP02: 30
SP02: 88
SP02: 89
SP02: 90
SP02: 91
SP02: 92
SP02: 92
SP02: 93
SP02: 93
SP02: 95
SP02: 96
SP02: 96
SPECIMEN SOURCE: NORMAL
SPONT RATE: 21
SPONT RATE: 21
SPONT RATE: 22
SPONT RATE: 24
SPONT RATE: 25
SPONT RATE: 25
SPONT RATE: 26
SPONT RATE: 27
SPONT RATE: 27
SPONT RATE: 29
SPONT RATE: 30
STJ: 1.84 CM
STJ: 2.47 CM
TB GOLD PLUS: ABNORMAL
TB GOLD PLUS: ABNORMAL
TB2 - NIL: -0.01 IU/ML
TB2 - NIL: 0 IU/ML
TDI LATERAL: 0.03 M/S
TDI LATERAL: 0.11 M/S
TDI SEPTAL: 0.03 M/S
TDI SEPTAL: 0.05 M/S
TDI: 0.03 M/S
TDI: 0.08 M/S
TOTAL IRON BINDING CAPACITY: 246 UG/DL (ref 250–450)
TR MAX PG: 22 MMHG
TR MAX PG: 41 MMHG
TRANSFERRIN SERPL-MCNC: 166 MG/DL (ref 200–375)
TRICUSPID ANNULAR PLANE SYSTOLIC EXCURSION: 1.74 CM
TRIGL SERPL-MCNC: 94 MG/DL (ref 30–150)
TROPONIN I SERPL DL<=0.01 NG/ML-MCNC: 0.01 NG/ML (ref 0–0.03)
TROPONIN I SERPL DL<=0.01 NG/ML-MCNC: 0.02 NG/ML (ref 0–0.03)
TROPONIN I SERPL DL<=0.01 NG/ML-MCNC: <0.006 NG/ML (ref 0–0.03)
TROPONIN I SERPL DL<=0.01 NG/ML-MCNC: <0.006 NG/ML (ref 0–0.03)
TSH SERPL DL<=0.005 MIU/L-ACNC: 1.28 UIU/ML (ref 0.4–4)
TV REST PULMONARY ARTERY PRESSURE: 49 MMHG
URATE SERPL-MCNC: 9.3 MG/DL (ref 2.4–5.7)
URN SPEC COLLECT METH UR: NORMAL
UROBILINOGEN UR STRIP-ACNC: NEGATIVE EU/DL
UROBILINOGEN UR STRIP-ACNC: NORMAL (ref 0.1–1.1)
VANCOMYCIN SERPL-MCNC: 14.3 UG/ML
VANCOMYCIN SERPL-MCNC: 17.2 UG/ML
VANCOMYCIN SERPL-MCNC: 18.9 UG/ML
VANCOMYCIN SERPL-MCNC: 19.5 UG/ML
VANCOMYCIN SERPL-MCNC: 20.4 UG/ML
VANCOMYCIN SERPL-MCNC: 23.1 UG/ML
VANCOMYCIN SERPL-MCNC: 24.8 UG/ML
VANCOMYCIN SERPL-MCNC: 31.2 UG/ML
VIT B12 SERPL-MCNC: 301 PG/ML (ref 210–950)
VOL: 300
VT: 400
VT: 400
VT: 420
VT: 450
WBC # BLD AUTO: 10.17 K/UL (ref 3.9–12.7)
WBC # BLD AUTO: 11.08 K/UL (ref 3.9–12.7)
WBC # BLD AUTO: 11.25 K/UL (ref 3.9–12.7)
WBC # BLD AUTO: 11.31 K/UL (ref 3.9–12.7)
WBC # BLD AUTO: 11.66 K/UL (ref 3.9–12.7)
WBC # BLD AUTO: 13.48 K/UL (ref 3.9–12.7)
WBC # BLD AUTO: 13.7 K/UL (ref 3.9–12.7)
WBC # BLD AUTO: 16.43 K/UL (ref 3.9–12.7)
WBC # BLD AUTO: 6.85 K/UL (ref 3.9–12.7)
WBC # BLD AUTO: 7.14 K/UL (ref 3.9–12.7)
WBC # BLD AUTO: 7.16 K/UL (ref 3.9–12.7)
WBC # BLD AUTO: 7.29 K/UL (ref 3.9–12.7)
WBC # BLD AUTO: 7.3 K/UL (ref 3.9–12.7)
WBC # BLD AUTO: 7.31 K/UL (ref 3.9–12.7)
WBC # BLD AUTO: 7.46 K/UL (ref 3.9–12.7)
WBC # BLD AUTO: 7.62 K/UL (ref 3.9–12.7)
WBC # BLD AUTO: 7.63 K/UL (ref 3.9–12.7)
WBC # BLD AUTO: 7.63 K/UL (ref 3.9–12.7)
WBC # BLD AUTO: 7.68 K/UL (ref 3.9–12.7)
WBC # BLD AUTO: 7.73 K/UL (ref 3.9–12.7)
WBC # BLD AUTO: 7.97 K/UL (ref 3.9–12.7)
WBC # BLD AUTO: 7.97 K/UL (ref 3.9–12.7)
WBC # BLD AUTO: 8.1 K/UL (ref 3.9–12.7)
WBC # BLD AUTO: 8.1 K/UL (ref 3.9–12.7)
WBC # BLD AUTO: 8.13 K/UL (ref 3.9–12.7)
WBC # BLD AUTO: 8.22 K/UL (ref 3.9–12.7)
WBC # BLD AUTO: 8.71 K/UL (ref 3.9–12.7)
WBC # BLD AUTO: 8.79 K/UL (ref 3.9–12.7)
WBC # BLD AUTO: 8.9 K/UL (ref 3.9–12.7)
WBC # BLD AUTO: 8.95 K/UL (ref 3.9–12.7)
WBC # BLD AUTO: 8.96 K/UL (ref 3.9–12.7)
WBC # BLD AUTO: 8.97 K/UL (ref 3.9–12.7)
WBC # BLD AUTO: 9.04 K/UL (ref 3.9–12.7)
WBC # BLD AUTO: 9.14 K/UL (ref 3.9–12.7)
WBC # BLD AUTO: 9.21 K/UL (ref 3.9–12.7)
WBC # BLD AUTO: 9.22 K/UL (ref 3.9–12.7)
WBC # BLD AUTO: 9.36 K/UL (ref 3.9–12.7)
WBC # BLD AUTO: 9.39 K/UL (ref 3.9–12.7)
WBC # BLD AUTO: 9.52 K/UL (ref 3.9–12.7)
WBC # BLD AUTO: 9.59 K/UL (ref 3.9–12.7)
WBC # BLD AUTO: 9.78 K/UL (ref 3.9–12.7)
WBC # BLD AUTO: 9.88 K/UL (ref 3.9–12.7)
WBC # BLD AUTO: 9.91 K/UL (ref 3.9–12.7)
WBC # FLD: 349 /CU MM

## 2020-01-01 PROCEDURE — 99900035 HC TECH TIME PER 15 MIN (STAT)

## 2020-01-01 PROCEDURE — 1159F MED LIST DOCD IN RCRD: CPT | Mod: S$GLB,,, | Performed by: FAMILY MEDICINE

## 2020-01-01 PROCEDURE — 99233 SBSQ HOSP IP/OBS HIGH 50: CPT | Mod: ,,, | Performed by: INTERNAL MEDICINE

## 2020-01-01 PROCEDURE — 94640 AIRWAY INHALATION TREATMENT: CPT

## 2020-01-01 PROCEDURE — 99239 HOSP IP/OBS DSCHRG MGMT >30: CPT | Mod: ,,, | Performed by: HOSPITALIST

## 2020-01-01 PROCEDURE — 80048 BASIC METABOLIC PNL TOTAL CA: CPT

## 2020-01-01 PROCEDURE — 36415 COLL VENOUS BLD VENIPUNCTURE: CPT

## 2020-01-01 PROCEDURE — 20000000 HC ICU ROOM

## 2020-01-01 PROCEDURE — 97535 SELF CARE MNGMENT TRAINING: CPT

## 2020-01-01 PROCEDURE — 99499 UNLISTED E&M SERVICE: CPT | Mod: S$GLB,,, | Performed by: INTERNAL MEDICINE

## 2020-01-01 PROCEDURE — 99233 SBSQ HOSP IP/OBS HIGH 50: CPT | Mod: ,,, | Performed by: HOSPITALIST

## 2020-01-01 PROCEDURE — 94761 N-INVAS EAR/PLS OXIMETRY MLT: CPT

## 2020-01-01 PROCEDURE — 99233 PR SUBSEQUENT HOSPITAL CARE,LEVL III: ICD-10-PCS | Mod: ,,, | Performed by: INTERNAL MEDICINE

## 2020-01-01 PROCEDURE — 99223 1ST HOSP IP/OBS HIGH 75: CPT | Mod: ,,, | Performed by: INTERNAL MEDICINE

## 2020-01-01 PROCEDURE — 25000003 PHARM REV CODE 250: Performed by: HOSPITALIST

## 2020-01-01 PROCEDURE — 85025 COMPLETE CBC W/AUTO DIFF WBC: CPT

## 2020-01-01 PROCEDURE — 3074F SYST BP LT 130 MM HG: CPT | Mod: CPTII,95,, | Performed by: FAMILY MEDICINE

## 2020-01-01 PROCEDURE — 84100 ASSAY OF PHOSPHORUS: CPT

## 2020-01-01 PROCEDURE — 1159F MED LIST DOCD IN RCRD: CPT | Mod: S$GLB,,, | Performed by: INTERNAL MEDICINE

## 2020-01-01 PROCEDURE — 25000003 PHARM REV CODE 250: Performed by: INTERNAL MEDICINE

## 2020-01-01 PROCEDURE — 63600175 PHARM REV CODE 636 W HCPCS: Performed by: STUDENT IN AN ORGANIZED HEALTH CARE EDUCATION/TRAINING PROGRAM

## 2020-01-01 PROCEDURE — 94660 CPAP INITIATION&MGMT: CPT

## 2020-01-01 PROCEDURE — 82803 BLOOD GASES ANY COMBINATION: CPT

## 2020-01-01 PROCEDURE — 3074F PR MOST RECENT SYSTOLIC BLOOD PRESSURE < 130 MM HG: ICD-10-PCS | Mod: CPTII,S$GLB,, | Performed by: FAMILY MEDICINE

## 2020-01-01 PROCEDURE — 63600175 PHARM REV CODE 636 W HCPCS: Performed by: NURSE PRACTITIONER

## 2020-01-01 PROCEDURE — 27000221 HC OXYGEN, UP TO 24 HOURS

## 2020-01-01 PROCEDURE — G0180 PR HOME HEALTH MD CERTIFICATION: ICD-10-PCS | Mod: ,,, | Performed by: FAMILY MEDICINE

## 2020-01-01 PROCEDURE — 97116 GAIT TRAINING THERAPY: CPT

## 2020-01-01 PROCEDURE — 25000242 PHARM REV CODE 250 ALT 637 W/ HCPCS: Performed by: STUDENT IN AN ORGANIZED HEALTH CARE EDUCATION/TRAINING PROGRAM

## 2020-01-01 PROCEDURE — 83605 ASSAY OF LACTIC ACID: CPT

## 2020-01-01 PROCEDURE — 83880 ASSAY OF NATRIURETIC PEPTIDE: CPT

## 2020-01-01 PROCEDURE — 25000003 PHARM REV CODE 250: Performed by: STUDENT IN AN ORGANIZED HEALTH CARE EDUCATION/TRAINING PROGRAM

## 2020-01-01 PROCEDURE — 97110 THERAPEUTIC EXERCISES: CPT | Mod: CQ

## 2020-01-01 PROCEDURE — 11000001 HC ACUTE MED/SURG PRIVATE ROOM

## 2020-01-01 PROCEDURE — 36415 COLL VENOUS BLD VENIPUNCTURE: CPT | Mod: PO

## 2020-01-01 PROCEDURE — 85025 COMPLETE CBC W/AUTO DIFF WBC: CPT | Mod: 91

## 2020-01-01 PROCEDURE — 99233 PR SUBSEQUENT HOSPITAL CARE,LEVL III: ICD-10-PCS | Mod: ,,, | Performed by: HOSPITALIST

## 2020-01-01 PROCEDURE — 63600175 PHARM REV CODE 636 W HCPCS: Performed by: INTERNAL MEDICINE

## 2020-01-01 PROCEDURE — 36600 WITHDRAWAL OF ARTERIAL BLOOD: CPT

## 2020-01-01 PROCEDURE — 74176 CT RENAL STONE STUDY ABD PELVIS WO: ICD-10-PCS | Mod: 26,,, | Performed by: RADIOLOGY

## 2020-01-01 PROCEDURE — 80069 RENAL FUNCTION PANEL: CPT

## 2020-01-01 PROCEDURE — 1101F PT FALLS ASSESS-DOCD LE1/YR: CPT | Mod: CPTII,S$GLB,, | Performed by: OTOLARYNGOLOGY

## 2020-01-01 PROCEDURE — 99232 SBSQ HOSP IP/OBS MODERATE 35: CPT | Mod: GT,,, | Performed by: FAMILY MEDICINE

## 2020-01-01 PROCEDURE — 83735 ASSAY OF MAGNESIUM: CPT

## 2020-01-01 PROCEDURE — 99233 PR SUBSEQUENT HOSPITAL CARE,LEVL III: ICD-10-PCS | Mod: 25,GC,, | Performed by: INTERNAL MEDICINE

## 2020-01-01 PROCEDURE — 27200966 HC CLOSED SUCTION SYSTEM

## 2020-01-01 PROCEDURE — 80076 HEPATIC FUNCTION PANEL: CPT

## 2020-01-01 PROCEDURE — 81003 URINALYSIS AUTO W/O SCOPE: CPT | Mod: QW,S$GLB,, | Performed by: NURSE PRACTITIONER

## 2020-01-01 PROCEDURE — 93010 EKG 12-LEAD: ICD-10-PCS | Mod: ,,, | Performed by: INTERNAL MEDICINE

## 2020-01-01 PROCEDURE — 84484 ASSAY OF TROPONIN QUANT: CPT | Mod: 91

## 2020-01-01 PROCEDURE — A4216 STERILE WATER/SALINE, 10 ML: HCPCS | Performed by: EMERGENCY MEDICINE

## 2020-01-01 PROCEDURE — 1101F PR PT FALLS ASSESS DOC 0-1 FALLS W/OUT INJ PAST YR: ICD-10-PCS | Mod: CPTII,95,, | Performed by: FAMILY MEDICINE

## 2020-01-01 PROCEDURE — 97110 THERAPEUTIC EXERCISES: CPT

## 2020-01-01 PROCEDURE — 80053 COMPREHEN METABOLIC PANEL: CPT

## 2020-01-01 PROCEDURE — 80048 BASIC METABOLIC PNL TOTAL CA: CPT | Mod: 91

## 2020-01-01 PROCEDURE — 85520 HEPARIN ASSAY: CPT | Mod: 91

## 2020-01-01 PROCEDURE — 97530 THERAPEUTIC ACTIVITIES: CPT

## 2020-01-01 PROCEDURE — 85730 THROMBOPLASTIN TIME PARTIAL: CPT

## 2020-01-01 PROCEDURE — 93005 ELECTROCARDIOGRAM TRACING: CPT

## 2020-01-01 PROCEDURE — 87070 CULTURE OTHR SPECIMN AEROBIC: CPT

## 2020-01-01 PROCEDURE — 93010 ELECTROCARDIOGRAM REPORT: CPT | Mod: ,,, | Performed by: INTERNAL MEDICINE

## 2020-01-01 PROCEDURE — 99220 PR INITIAL OBSERVATION CARE,LEVL III: CPT | Mod: ,,, | Performed by: NURSE PRACTITIONER

## 2020-01-01 PROCEDURE — 1159F PR MEDICATION LIST DOCUMENTED IN MEDICAL RECORD: ICD-10-PCS | Mod: S$GLB,,, | Performed by: OTOLARYNGOLOGY

## 2020-01-01 PROCEDURE — 25000003 PHARM REV CODE 250: Performed by: EMERGENCY MEDICINE

## 2020-01-01 PROCEDURE — 3074F PR MOST RECENT SYSTOLIC BLOOD PRESSURE < 130 MM HG: ICD-10-PCS | Mod: CPTII,S$GLB,, | Performed by: INTERNAL MEDICINE

## 2020-01-01 PROCEDURE — 27000190 HC CPAP FULL FACE MASK W/VALVE

## 2020-01-01 PROCEDURE — 21400001 HC TELEMETRY ROOM

## 2020-01-01 PROCEDURE — 85007 BL SMEAR W/DIFF WBC COUNT: CPT

## 2020-01-01 PROCEDURE — 31624 DX BRONCHOSCOPE/LAVAGE: CPT | Mod: RT,GC,, | Performed by: INTERNAL MEDICINE

## 2020-01-01 PROCEDURE — 99214 PR OFFICE/OUTPT VISIT, EST, LEVL IV, 30-39 MIN: ICD-10-PCS | Mod: 95,,, | Performed by: FAMILY MEDICINE

## 2020-01-01 PROCEDURE — 25000242 PHARM REV CODE 250 ALT 637 W/ HCPCS: Performed by: INTERNAL MEDICINE

## 2020-01-01 PROCEDURE — 99233 PR SUBSEQUENT HOSPITAL CARE,LEVL III: ICD-10-PCS | Mod: GC,,, | Performed by: INTERNAL MEDICINE

## 2020-01-01 PROCEDURE — 3078F PR MOST RECENT DIASTOLIC BLOOD PRESSURE < 80 MM HG: ICD-10-PCS | Mod: CPTII,S$GLB,, | Performed by: NURSE PRACTITIONER

## 2020-01-01 PROCEDURE — 86480 TB TEST CELL IMMUN MEASURE: CPT

## 2020-01-01 PROCEDURE — 88112 CYTOPATH CELL ENHANCE TECH: CPT | Performed by: PATHOLOGY

## 2020-01-01 PROCEDURE — 99232 PR SUBSEQUENT HOSPITAL CARE,LEVL II: ICD-10-PCS | Mod: ,,, | Performed by: INTERNAL MEDICINE

## 2020-01-01 PROCEDURE — 25000003 PHARM REV CODE 250: Performed by: NURSE PRACTITIONER

## 2020-01-01 PROCEDURE — 1159F PR MEDICATION LIST DOCUMENTED IN MEDICAL RECORD: ICD-10-PCS | Mod: S$GLB,,, | Performed by: NURSE PRACTITIONER

## 2020-01-01 PROCEDURE — 88112 PR  CYTOPATH, CELL ENHANCE TECH: ICD-10-PCS | Mod: 26,,, | Performed by: PATHOLOGY

## 2020-01-01 PROCEDURE — 99222 1ST HOSP IP/OBS MODERATE 55: CPT | Mod: ,,, | Performed by: FAMILY MEDICINE

## 2020-01-01 PROCEDURE — 52000 CYSTOURETHROSCOPY: CPT | Mod: S$GLB,,, | Performed by: UROLOGY

## 2020-01-01 PROCEDURE — 94799 UNLISTED PULMONARY SVC/PX: CPT

## 2020-01-01 PROCEDURE — 36415 COLL VENOUS BLD VENIPUNCTURE: CPT | Mod: PN

## 2020-01-01 PROCEDURE — 3008F PR BODY MASS INDEX (BMI) DOCUMENTED: ICD-10-PCS | Mod: CPTII,S$GLB,, | Performed by: OTOLARYNGOLOGY

## 2020-01-01 PROCEDURE — G0378 HOSPITAL OBSERVATION PER HR: HCPCS

## 2020-01-01 PROCEDURE — 85027 COMPLETE CBC AUTOMATED: CPT

## 2020-01-01 PROCEDURE — 25000003 PHARM REV CODE 250

## 2020-01-01 PROCEDURE — 97163 PT EVAL HIGH COMPLEX 45 MIN: CPT

## 2020-01-01 PROCEDURE — U0002 COVID-19 LAB TEST NON-CDC: HCPCS

## 2020-01-01 PROCEDURE — G0180 MD CERTIFICATION HHA PATIENT: HCPCS | Mod: ,,, | Performed by: FAMILY MEDICINE

## 2020-01-01 PROCEDURE — 99239 PR HOSPITAL DISCHARGE DAY,>30 MIN: ICD-10-PCS | Mod: ,,, | Performed by: INTERNAL MEDICINE

## 2020-01-01 PROCEDURE — 99213 PR OFFICE/OUTPT VISIT, EST, LEVL III, 20-29 MIN: ICD-10-PCS | Mod: 25,S$GLB,, | Performed by: OTOLARYNGOLOGY

## 2020-01-01 PROCEDURE — 99239 PR HOSPITAL DISCHARGE DAY,>30 MIN: ICD-10-PCS | Mod: ,,, | Performed by: HOSPITALIST

## 2020-01-01 PROCEDURE — 1101F PT FALLS ASSESS-DOCD LE1/YR: CPT | Mod: CPTII,95,, | Performed by: FAMILY MEDICINE

## 2020-01-01 PROCEDURE — 99499 UNLISTED E&M SERVICE: CPT | Mod: S$GLB,,, | Performed by: NURSE PRACTITIONER

## 2020-01-01 PROCEDURE — 3074F SYST BP LT 130 MM HG: CPT | Mod: CPTII,S$GLB,, | Performed by: INTERNAL MEDICINE

## 2020-01-01 PROCEDURE — 99232 SBSQ HOSP IP/OBS MODERATE 35: CPT | Mod: GC,,, | Performed by: INTERNAL MEDICINE

## 2020-01-01 PROCEDURE — 99233 SBSQ HOSP IP/OBS HIGH 50: CPT | Mod: 25,GC,, | Performed by: INTERNAL MEDICINE

## 2020-01-01 PROCEDURE — 85610 PROTHROMBIN TIME: CPT

## 2020-01-01 PROCEDURE — 99239 HOSP IP/OBS DSCHRG MGMT >30: CPT | Mod: ,,, | Performed by: INTERNAL MEDICINE

## 2020-01-01 PROCEDURE — 99223 1ST HOSP IP/OBS HIGH 75: CPT | Mod: ,,, | Performed by: FAMILY MEDICINE

## 2020-01-01 PROCEDURE — 1159F PR MEDICATION LIST DOCUMENTED IN MEDICAL RECORD: ICD-10-PCS | Mod: S$GLB,,, | Performed by: FAMILY MEDICINE

## 2020-01-01 PROCEDURE — 99214 OFFICE O/P EST MOD 30 MIN: CPT | Mod: 95,,, | Performed by: FAMILY MEDICINE

## 2020-01-01 PROCEDURE — 94003 VENT MGMT INPAT SUBQ DAY: CPT

## 2020-01-01 PROCEDURE — 3078F PR MOST RECENT DIASTOLIC BLOOD PRESSURE < 80 MM HG: ICD-10-PCS | Mod: CPTII,S$GLB,, | Performed by: FAMILY MEDICINE

## 2020-01-01 PROCEDURE — 84157 ASSAY OF PROTEIN OTHER: CPT

## 2020-01-01 PROCEDURE — 3288F PR FALLS RISK ASSESSMENT DOCUMENTED: ICD-10-PCS | Mod: CPTII,S$GLB,, | Performed by: FAMILY MEDICINE

## 2020-01-01 PROCEDURE — 63600175 PHARM REV CODE 636 W HCPCS: Performed by: EMERGENCY MEDICINE

## 2020-01-01 PROCEDURE — 3288F FALL RISK ASSESSMENT DOCD: CPT | Mod: CPTII,S$GLB,, | Performed by: FAMILY MEDICINE

## 2020-01-01 PROCEDURE — 82042 OTHER SOURCE ALBUMIN QUAN EA: CPT

## 2020-01-01 PROCEDURE — 99232 PR SUBSEQUENT HOSPITAL CARE,LEVL II: ICD-10-PCS | Mod: GT,,, | Performed by: FAMILY MEDICINE

## 2020-01-01 PROCEDURE — 88305 TISSUE EXAM BY PATHOLOGIST: CPT | Performed by: PATHOLOGY

## 2020-01-01 PROCEDURE — 97166 OT EVAL MOD COMPLEX 45 MIN: CPT

## 2020-01-01 PROCEDURE — 99999 PR PBB SHADOW E&M-EST. PATIENT-LVL III: CPT | Mod: PBBFAC,,, | Performed by: INTERNAL MEDICINE

## 2020-01-01 PROCEDURE — 82746 ASSAY OF FOLIC ACID SERUM: CPT

## 2020-01-01 PROCEDURE — 99213 OFFICE O/P EST LOW 20 MIN: CPT | Mod: S$GLB,,, | Performed by: INTERNAL MEDICINE

## 2020-01-01 PROCEDURE — 99900026 HC AIRWAY MAINTENANCE (STAT)

## 2020-01-01 PROCEDURE — 99233 SBSQ HOSP IP/OBS HIGH 50: CPT | Mod: GC,,, | Performed by: INTERNAL MEDICINE

## 2020-01-01 PROCEDURE — 3008F BODY MASS INDEX DOCD: CPT | Mod: CPTII,S$GLB,, | Performed by: INTERNAL MEDICINE

## 2020-01-01 PROCEDURE — 97116 GAIT TRAINING THERAPY: CPT | Mod: CQ

## 2020-01-01 PROCEDURE — 3077F PR MOST RECENT SYSTOLIC BLOOD PRESSURE >= 140 MM HG: ICD-10-PCS | Mod: CPTII,S$GLB,, | Performed by: OTOLARYNGOLOGY

## 2020-01-01 PROCEDURE — 81003 URINALYSIS AUTO W/O SCOPE: CPT

## 2020-01-01 PROCEDURE — 84484 ASSAY OF TROPONIN QUANT: CPT

## 2020-01-01 PROCEDURE — 99999 PR PBB SHADOW E&M-EST. PATIENT-LVL V: ICD-10-PCS | Mod: PBBFAC,,, | Performed by: INTERNAL MEDICINE

## 2020-01-01 PROCEDURE — 1159F MED LIST DOCD IN RCRD: CPT | Mod: 95,,, | Performed by: FAMILY MEDICINE

## 2020-01-01 PROCEDURE — 1159F MED LIST DOCD IN RCRD: CPT | Mod: S$GLB,,, | Performed by: OTOLARYNGOLOGY

## 2020-01-01 PROCEDURE — 1159F PR MEDICATION LIST DOCUMENTED IN MEDICAL RECORD: ICD-10-PCS | Mod: 95,,, | Performed by: FAMILY MEDICINE

## 2020-01-01 PROCEDURE — 80202 ASSAY OF VANCOMYCIN: CPT

## 2020-01-01 PROCEDURE — 52000 PR CYSTOURETHROSCOPY: ICD-10-PCS | Mod: S$GLB,,, | Performed by: UROLOGY

## 2020-01-01 PROCEDURE — 99232 PR SUBSEQUENT HOSPITAL CARE,LEVL II: ICD-10-PCS | Mod: GC,,, | Performed by: INTERNAL MEDICINE

## 2020-01-01 PROCEDURE — 31624 DX BRONCHOSCOPE/LAVAGE: CPT

## 2020-01-01 PROCEDURE — 3077F SYST BP >= 140 MM HG: CPT | Mod: CPTII,S$GLB,, | Performed by: OTOLARYNGOLOGY

## 2020-01-01 PROCEDURE — 1101F PT FALLS ASSESS-DOCD LE1/YR: CPT | Mod: CPTII,S$GLB,, | Performed by: NURSE PRACTITIONER

## 2020-01-01 PROCEDURE — 87040 BLOOD CULTURE FOR BACTERIA: CPT | Mod: 59

## 2020-01-01 PROCEDURE — 99215 PR OFFICE/OUTPT VISIT, EST, LEVL V, 40-54 MIN: ICD-10-PCS | Mod: S$GLB,,, | Performed by: FAMILY MEDICINE

## 2020-01-01 PROCEDURE — 85520 HEPARIN ASSAY: CPT

## 2020-01-01 PROCEDURE — 3078F PR MOST RECENT DIASTOLIC BLOOD PRESSURE < 80 MM HG: ICD-10-PCS | Mod: CPTII,95,, | Performed by: FAMILY MEDICINE

## 2020-01-01 PROCEDURE — 25000003 PHARM REV CODE 250: Performed by: PHYSICIAN ASSISTANT

## 2020-01-01 PROCEDURE — 99499 RISK ADDL DX/OHS AUDIT: ICD-10-PCS | Mod: S$GLB,,, | Performed by: NURSE PRACTITIONER

## 2020-01-01 PROCEDURE — 84145 PROCALCITONIN (PCT): CPT

## 2020-01-01 PROCEDURE — 87206 SMEAR FLUORESCENT/ACID STAI: CPT

## 2020-01-01 PROCEDURE — 99232 SBSQ HOSP IP/OBS MODERATE 35: CPT | Mod: ,,, | Performed by: INTERNAL MEDICINE

## 2020-01-01 PROCEDURE — C1751 CATH, INF, PER/CENT/MIDLINE: HCPCS

## 2020-01-01 PROCEDURE — 99220 PR INITIAL OBSERVATION CARE,LEVL III: ICD-10-PCS | Mod: ,,, | Performed by: NURSE PRACTITIONER

## 2020-01-01 PROCEDURE — 76937 US GUIDE VASCULAR ACCESS: CPT

## 2020-01-01 PROCEDURE — 3074F SYST BP LT 130 MM HG: CPT | Mod: CPTII,S$GLB,, | Performed by: FAMILY MEDICINE

## 2020-01-01 PROCEDURE — 99222 PR INITIAL HOSPITAL CARE,LEVL II: ICD-10-PCS | Mod: ,,, | Performed by: FAMILY MEDICINE

## 2020-01-01 PROCEDURE — 97165 OT EVAL LOW COMPLEX 30 MIN: CPT

## 2020-01-01 PROCEDURE — 97802 MEDICAL NUTRITION INDIV IN: CPT

## 2020-01-01 PROCEDURE — 99233 SBSQ HOSP IP/OBS HIGH 50: CPT | Mod: GT,,, | Performed by: FAMILY MEDICINE

## 2020-01-01 PROCEDURE — 1159F PR MEDICATION LIST DOCUMENTED IN MEDICAL RECORD: ICD-10-PCS | Mod: S$GLB,,, | Performed by: INTERNAL MEDICINE

## 2020-01-01 PROCEDURE — 82945 GLUCOSE OTHER FLUID: CPT

## 2020-01-01 PROCEDURE — 99291 CRITICAL CARE FIRST HOUR: CPT | Mod: GC,,, | Performed by: INTERNAL MEDICINE

## 2020-01-01 PROCEDURE — S0030 INJECTION, METRONIDAZOLE: HCPCS | Performed by: INTERNAL MEDICINE

## 2020-01-01 PROCEDURE — 96375 TX/PRO/DX INJ NEW DRUG ADDON: CPT

## 2020-01-01 PROCEDURE — 99497 ADVNCD CARE PLAN 30 MIN: CPT | Mod: ,,, | Performed by: FAMILY MEDICINE

## 2020-01-01 PROCEDURE — 99213 OFFICE O/P EST LOW 20 MIN: CPT | Mod: 25,S$GLB,, | Performed by: OTOLARYNGOLOGY

## 2020-01-01 PROCEDURE — 87116 MYCOBACTERIA CULTURE: CPT

## 2020-01-01 PROCEDURE — 63600175 PHARM REV CODE 636 W HCPCS

## 2020-01-01 PROCEDURE — 63600175 PHARM REV CODE 636 W HCPCS: Performed by: HOSPITALIST

## 2020-01-01 PROCEDURE — 94760 N-INVAS EAR/PLS OXIMETRY 1: CPT

## 2020-01-01 PROCEDURE — 32556 CHEST TUBE INSERTION: ICD-10-PCS | Mod: 59,RT,GC, | Performed by: INTERNAL MEDICINE

## 2020-01-01 PROCEDURE — 99291 CRITICAL CARE FIRST HOUR: CPT | Mod: 25

## 2020-01-01 PROCEDURE — 99999 PR PBB SHADOW E&M-EST. PATIENT-LVL IV: CPT | Mod: PBBFAC,,, | Performed by: FAMILY MEDICINE

## 2020-01-01 PROCEDURE — 3074F SYST BP LT 130 MM HG: CPT | Mod: CPTII,S$GLB,, | Performed by: NURSE PRACTITIONER

## 2020-01-01 PROCEDURE — 88112 CYTOPATH CELL ENHANCE TECH: CPT | Mod: 26,,, | Performed by: PATHOLOGY

## 2020-01-01 PROCEDURE — 3074F PR MOST RECENT SYSTOLIC BLOOD PRESSURE < 130 MM HG: ICD-10-PCS | Mod: CPTII,S$GLB,, | Performed by: NURSE PRACTITIONER

## 2020-01-01 PROCEDURE — 89051 BODY FLUID CELL COUNT: CPT

## 2020-01-01 PROCEDURE — 3078F PR MOST RECENT DIASTOLIC BLOOD PRESSURE < 80 MM HG: ICD-10-PCS | Mod: CPTII,S$GLB,, | Performed by: INTERNAL MEDICINE

## 2020-01-01 PROCEDURE — 87102 FUNGUS ISOLATION CULTURE: CPT

## 2020-01-01 PROCEDURE — 83540 ASSAY OF IRON: CPT

## 2020-01-01 PROCEDURE — 99233 PR SUBSEQUENT HOSPITAL CARE,LEVL III: ICD-10-PCS | Mod: GT,,, | Performed by: FAMILY MEDICINE

## 2020-01-01 PROCEDURE — 74176 CT ABD & PELVIS W/O CONTRAST: CPT | Mod: TC

## 2020-01-01 PROCEDURE — 1159F MED LIST DOCD IN RCRD: CPT | Mod: S$GLB,,, | Performed by: NURSE PRACTITIONER

## 2020-01-01 PROCEDURE — 99999 PR PBB SHADOW E&M-EST. PATIENT-LVL III: ICD-10-PCS | Mod: PBBFAC,,, | Performed by: INTERNAL MEDICINE

## 2020-01-01 PROCEDURE — 3008F BODY MASS INDEX DOCD: CPT | Mod: CPTII,S$GLB,, | Performed by: FAMILY MEDICINE

## 2020-01-01 PROCEDURE — 99999 PR PBB SHADOW E&M-EST. PATIENT-LVL IV: ICD-10-PCS | Mod: PBBFAC,,, | Performed by: FAMILY MEDICINE

## 2020-01-01 PROCEDURE — 99291 CRITICAL CARE FIRST HOUR: CPT

## 2020-01-01 PROCEDURE — 3074F PR MOST RECENT SYSTOLIC BLOOD PRESSURE < 130 MM HG: ICD-10-PCS | Mod: CPTII,95,, | Performed by: FAMILY MEDICINE

## 2020-01-01 PROCEDURE — 99213 PR OFFICE/OUTPT VISIT, EST, LEVL III, 20-29 MIN: ICD-10-PCS | Mod: S$GLB,,, | Performed by: INTERNAL MEDICINE

## 2020-01-01 PROCEDURE — 36410 VNPNXR 3YR/> PHY/QHP DX/THER: CPT

## 2020-01-01 PROCEDURE — 87015 SPECIMEN INFECT AGNT CONCNTJ: CPT

## 2020-01-01 PROCEDURE — 1126F AMNT PAIN NOTED NONE PRSNT: CPT | Mod: S$GLB,,, | Performed by: NURSE PRACTITIONER

## 2020-01-01 PROCEDURE — 99291 PR CRITICAL CARE, E/M 30-74 MINUTES: ICD-10-PCS | Mod: GC,,, | Performed by: INTERNAL MEDICINE

## 2020-01-01 PROCEDURE — 99233 SBSQ HOSP IP/OBS HIGH 50: CPT | Mod: GC,,, | Performed by: EMERGENCY MEDICINE

## 2020-01-01 PROCEDURE — 99499 UNLISTED E&M SERVICE: CPT | Mod: 95,,, | Performed by: FAMILY MEDICINE

## 2020-01-01 PROCEDURE — 99223 PR INITIAL HOSPITAL CARE,LEVL III: ICD-10-PCS | Mod: ,,, | Performed by: INTERNAL MEDICINE

## 2020-01-01 PROCEDURE — 1126F PR PAIN SEVERITY QUANTIFIED, NO PAIN PRESENT: ICD-10-PCS | Mod: S$GLB,,, | Performed by: NURSE PRACTITIONER

## 2020-01-01 PROCEDURE — 1101F PT FALLS ASSESS-DOCD LE1/YR: CPT | Mod: CPTII,S$GLB,, | Performed by: INTERNAL MEDICINE

## 2020-01-01 PROCEDURE — 3008F BODY MASS INDEX DOCD: CPT | Mod: CPTII,S$GLB,, | Performed by: OTOLARYNGOLOGY

## 2020-01-01 PROCEDURE — 99497 ADVNCD CARE PLAN 30 MIN: CPT | Mod: 25,,, | Performed by: FAMILY MEDICINE

## 2020-01-01 PROCEDURE — 3078F DIAST BP <80 MM HG: CPT | Mod: CPTII,S$GLB,, | Performed by: FAMILY MEDICINE

## 2020-01-01 PROCEDURE — 96374 THER/PROPH/DIAG INJ IV PUSH: CPT

## 2020-01-01 PROCEDURE — 84300 ASSAY OF URINE SODIUM: CPT

## 2020-01-01 PROCEDURE — 97164 PT RE-EVAL EST PLAN CARE: CPT

## 2020-01-01 PROCEDURE — 1101F PR PT FALLS ASSESS DOC 0-1 FALLS W/OUT INJ PAST YR: ICD-10-PCS | Mod: CPTII,S$GLB,, | Performed by: INTERNAL MEDICINE

## 2020-01-01 PROCEDURE — 99232 SBSQ HOSP IP/OBS MODERATE 35: CPT | Mod: ,,, | Performed by: FAMILY MEDICINE

## 2020-01-01 PROCEDURE — 99499 RISK ADDL DX/OHS AUDIT: ICD-10-PCS | Mod: S$GLB,,, | Performed by: INTERNAL MEDICINE

## 2020-01-01 PROCEDURE — 97162 PT EVAL MOD COMPLEX 30 MIN: CPT

## 2020-01-01 PROCEDURE — 82607 VITAMIN B-12: CPT

## 2020-01-01 PROCEDURE — 3078F DIAST BP <80 MM HG: CPT | Mod: CPTII,95,, | Performed by: FAMILY MEDICINE

## 2020-01-01 PROCEDURE — 99214 OFFICE O/P EST MOD 30 MIN: CPT | Mod: S$GLB,,, | Performed by: INTERNAL MEDICINE

## 2020-01-01 PROCEDURE — 1100F PR PT FALLS ASSESS DOC 2+ FALLS/FALL W/INJURY/YR: ICD-10-PCS | Mod: CPTII,S$GLB,, | Performed by: FAMILY MEDICINE

## 2020-01-01 PROCEDURE — 25000242 PHARM REV CODE 250 ALT 637 W/ HCPCS: Performed by: EMERGENCY MEDICINE

## 2020-01-01 PROCEDURE — 1100F PTFALLS ASSESS-DOCD GE2>/YR: CPT | Mod: CPTII,S$GLB,, | Performed by: FAMILY MEDICINE

## 2020-01-01 PROCEDURE — 99214 PR OFFICE/OUTPT VISIT, EST, LEVL IV, 30-39 MIN: ICD-10-PCS | Mod: S$GLB,,, | Performed by: NURSE PRACTITIONER

## 2020-01-01 PROCEDURE — 94002 VENT MGMT INPAT INIT DAY: CPT

## 2020-01-01 PROCEDURE — 99999 PR PBB SHADOW E&M-EST. PATIENT-LVL III: ICD-10-PCS | Mod: PBBFAC,,, | Performed by: OTOLARYNGOLOGY

## 2020-01-01 PROCEDURE — 97168 OT RE-EVAL EST PLAN CARE: CPT

## 2020-01-01 PROCEDURE — 1126F AMNT PAIN NOTED NONE PRSNT: CPT | Mod: S$GLB,,, | Performed by: FAMILY MEDICINE

## 2020-01-01 PROCEDURE — 99999 PR PBB SHADOW E&M-EST. PATIENT-LVL V: CPT | Mod: PBBFAC,,, | Performed by: INTERNAL MEDICINE

## 2020-01-01 PROCEDURE — 87205 SMEAR GRAM STAIN: CPT

## 2020-01-01 PROCEDURE — 3008F PR BODY MASS INDEX (BMI) DOCUMENTED: ICD-10-PCS | Mod: CPTII,S$GLB,, | Performed by: FAMILY MEDICINE

## 2020-01-01 PROCEDURE — 99214 OFFICE O/P EST MOD 30 MIN: CPT | Mod: 25,S$GLB,, | Performed by: NURSE PRACTITIONER

## 2020-01-01 PROCEDURE — 80061 LIPID PANEL: CPT

## 2020-01-01 PROCEDURE — 1101F PR PT FALLS ASSESS DOC 0-1 FALLS W/OUT INJ PAST YR: ICD-10-PCS | Mod: CPTII,S$GLB,, | Performed by: NURSE PRACTITIONER

## 2020-01-01 PROCEDURE — 99900025 HC BRONCHOSCOPY-ASST (STAT)

## 2020-01-01 PROCEDURE — 99214 PR OFFICE/OUTPT VISIT, EST, LEVL IV, 30-39 MIN: ICD-10-PCS | Mod: S$GLB,,, | Performed by: INTERNAL MEDICINE

## 2020-01-01 PROCEDURE — 86703 HIV-1/HIV-2 1 RESULT ANTBDY: CPT

## 2020-01-01 PROCEDURE — 69210 REMOVE IMPACTED EAR WAX UNI: CPT | Mod: S$GLB,,, | Performed by: OTOLARYNGOLOGY

## 2020-01-01 PROCEDURE — 88305 TISSUE EXAM BY PATHOLOGIST: CPT | Mod: 26,,, | Performed by: PATHOLOGY

## 2020-01-01 PROCEDURE — 1126F PR PAIN SEVERITY QUANTIFIED, NO PAIN PRESENT: ICD-10-PCS | Mod: S$GLB,,, | Performed by: FAMILY MEDICINE

## 2020-01-01 PROCEDURE — 83970 ASSAY OF PARATHORMONE: CPT

## 2020-01-01 PROCEDURE — 99999 PR PBB SHADOW E&M-EST. PATIENT-LVL III: CPT | Mod: PBBFAC,,, | Performed by: OTOLARYNGOLOGY

## 2020-01-01 PROCEDURE — 99232 PR SUBSEQUENT HOSPITAL CARE,LEVL II: ICD-10-PCS | Mod: ,,, | Performed by: HOSPITALIST

## 2020-01-01 PROCEDURE — 99232 SBSQ HOSP IP/OBS MODERATE 35: CPT | Mod: ,,, | Performed by: HOSPITALIST

## 2020-01-01 PROCEDURE — 99497 PR ADVNCD CARE PLAN 30 MIN: ICD-10-PCS | Mod: ,,, | Performed by: FAMILY MEDICINE

## 2020-01-01 PROCEDURE — 88305 TISSUE EXAM BY PATHOLOGIST: ICD-10-PCS | Mod: 26,,, | Performed by: PATHOLOGY

## 2020-01-01 PROCEDURE — 84550 ASSAY OF BLOOD/URIC ACID: CPT

## 2020-01-01 PROCEDURE — 3078F DIAST BP <80 MM HG: CPT | Mod: CPTII,S$GLB,, | Performed by: OTOLARYNGOLOGY

## 2020-01-01 PROCEDURE — 74176 CT ABD & PELVIS W/O CONTRAST: CPT | Mod: 26,,, | Performed by: RADIOLOGY

## 2020-01-01 PROCEDURE — 99999 PR PBB SHADOW E&M-EST. PATIENT-LVL III: ICD-10-PCS | Mod: PBBFAC,,, | Performed by: NURSE PRACTITIONER

## 2020-01-01 PROCEDURE — 97112 NEUROMUSCULAR REEDUCATION: CPT

## 2020-01-01 PROCEDURE — 83036 HEMOGLOBIN GLYCOSYLATED A1C: CPT

## 2020-01-01 PROCEDURE — 99999 PR PBB SHADOW E&M-EST. PATIENT-LVL III: CPT | Mod: PBBFAC,,, | Performed by: NURSE PRACTITIONER

## 2020-01-01 PROCEDURE — 3078F PR MOST RECENT DIASTOLIC BLOOD PRESSURE < 80 MM HG: ICD-10-PCS | Mod: CPTII,S$GLB,, | Performed by: OTOLARYNGOLOGY

## 2020-01-01 PROCEDURE — 1125F PR PAIN SEVERITY QUANTIFIED, PAIN PRESENT: ICD-10-PCS | Mod: S$GLB,,, | Performed by: INTERNAL MEDICINE

## 2020-01-01 PROCEDURE — 99232 PR SUBSEQUENT HOSPITAL CARE,LEVL II: ICD-10-PCS | Mod: ,,, | Performed by: FAMILY MEDICINE

## 2020-01-01 PROCEDURE — 3008F PR BODY MASS INDEX (BMI) DOCUMENTED: ICD-10-PCS | Mod: CPTII,S$GLB,, | Performed by: INTERNAL MEDICINE

## 2020-01-01 PROCEDURE — 32556 INSERT CATH PLEURA W/O IMAGE: CPT | Mod: 59,RT,GC, | Performed by: INTERNAL MEDICINE

## 2020-01-01 PROCEDURE — 99214 PR OFFICE/OUTPT VISIT, EST, LEVL IV, 30-39 MIN: ICD-10-PCS | Mod: 25,S$GLB,, | Performed by: NURSE PRACTITIONER

## 2020-01-01 PROCEDURE — 83615 LACTATE (LD) (LDH) ENZYME: CPT

## 2020-01-01 PROCEDURE — 99214 OFFICE O/P EST MOD 30 MIN: CPT | Mod: S$GLB,,, | Performed by: NURSE PRACTITIONER

## 2020-01-01 PROCEDURE — 1101F PR PT FALLS ASSESS DOC 0-1 FALLS W/OUT INJ PAST YR: ICD-10-PCS | Mod: CPTII,S$GLB,, | Performed by: OTOLARYNGOLOGY

## 2020-01-01 PROCEDURE — 27202055 HC BRONCHOSCOPE, DISP

## 2020-01-01 PROCEDURE — 87206 SMEAR FLUORESCENT/ACID STAI: CPT | Mod: 91

## 2020-01-01 PROCEDURE — 69210 PR REMOVAL IMPACTED CERUMEN REQUIRING INSTRUMENTATION, UNILATERAL: ICD-10-PCS | Mod: S$GLB,,, | Performed by: OTOLARYNGOLOGY

## 2020-01-01 PROCEDURE — 3078F DIAST BP <80 MM HG: CPT | Mod: CPTII,S$GLB,, | Performed by: INTERNAL MEDICINE

## 2020-01-01 PROCEDURE — 1125F AMNT PAIN NOTED PAIN PRSNT: CPT | Mod: S$GLB,,, | Performed by: INTERNAL MEDICINE

## 2020-01-01 PROCEDURE — 37799 UNLISTED PX VASCULAR SURGERY: CPT

## 2020-01-01 PROCEDURE — 99233 PR SUBSEQUENT HOSPITAL CARE,LEVL III: ICD-10-PCS | Mod: GC,,, | Performed by: EMERGENCY MEDICINE

## 2020-01-01 PROCEDURE — 31624 PR BRONCHOSCOPY,DIAG2STIC W LAVAGE: ICD-10-PCS | Mod: RT,GC,, | Performed by: INTERNAL MEDICINE

## 2020-01-01 PROCEDURE — 99215 OFFICE O/P EST HI 40 MIN: CPT | Mod: S$GLB,,, | Performed by: FAMILY MEDICINE

## 2020-01-01 PROCEDURE — 99223 PR INITIAL HOSPITAL CARE,LEVL III: ICD-10-PCS | Mod: ,,, | Performed by: FAMILY MEDICINE

## 2020-01-01 PROCEDURE — 80202 ASSAY OF VANCOMYCIN: CPT | Mod: 91

## 2020-01-01 PROCEDURE — 99497 PR ADVNCD CARE PLAN 30 MIN: ICD-10-PCS | Mod: 25,,, | Performed by: FAMILY MEDICINE

## 2020-01-01 PROCEDURE — 99499 RISK ADDL DX/OHS AUDIT: ICD-10-PCS | Mod: 95,,, | Performed by: FAMILY MEDICINE

## 2020-01-01 PROCEDURE — U0002 COVID-19 LAB TEST NON-CDC: HCPCS | Performed by: EMERGENCY MEDICINE

## 2020-01-01 PROCEDURE — 84443 ASSAY THYROID STIM HORMONE: CPT

## 2020-01-01 PROCEDURE — 81003 POCT URINALYSIS, DIPSTICK, AUTOMATED, W/O SCOPE: ICD-10-PCS | Mod: QW,S$GLB,, | Performed by: NURSE PRACTITIONER

## 2020-01-01 PROCEDURE — 3078F DIAST BP <80 MM HG: CPT | Mod: CPTII,S$GLB,, | Performed by: NURSE PRACTITIONER

## 2020-01-01 RX ORDER — FENTANYL CITRATE 50 UG/ML
25 INJECTION, SOLUTION INTRAMUSCULAR; INTRAVENOUS
Status: DISCONTINUED | OUTPATIENT
Start: 2020-01-01 | End: 2020-01-01

## 2020-01-01 RX ORDER — FUROSEMIDE 40 MG/1
40 TABLET ORAL DAILY
Status: DISCONTINUED | OUTPATIENT
Start: 2020-01-01 | End: 2020-01-01

## 2020-01-01 RX ORDER — PROPOFOL 10 MG/ML
0-50 INJECTION, EMULSION INTRAVENOUS CONTINUOUS
Status: DISCONTINUED | OUTPATIENT
Start: 2020-01-01 | End: 2020-01-01

## 2020-01-01 RX ORDER — DOCUSATE SODIUM 100 MG/1
100 CAPSULE, LIQUID FILLED ORAL 2 TIMES DAILY
Status: DISCONTINUED | OUTPATIENT
Start: 2020-01-01 | End: 2020-01-01

## 2020-01-01 RX ORDER — FENTANYL CITRATE 50 UG/ML
INJECTION, SOLUTION INTRAMUSCULAR; INTRAVENOUS
Status: DISPENSED
Start: 2020-01-01 | End: 2020-01-01

## 2020-01-01 RX ORDER — GLUCAGON 1 MG
1 KIT INJECTION
Status: DISCONTINUED | OUTPATIENT
Start: 2020-01-01 | End: 2020-01-01 | Stop reason: HOSPADM

## 2020-01-01 RX ORDER — FUROSEMIDE 10 MG/ML
40 INJECTION INTRAMUSCULAR; INTRAVENOUS
Status: DISCONTINUED | OUTPATIENT
Start: 2020-01-01 | End: 2020-01-01

## 2020-01-01 RX ORDER — METOPROLOL TARTRATE 1 MG/ML
5 INJECTION, SOLUTION INTRAVENOUS EVERY 8 HOURS
Status: DISCONTINUED | OUTPATIENT
Start: 2020-01-01 | End: 2020-01-01

## 2020-01-01 RX ORDER — PREDNISONE 20 MG/1
40 TABLET ORAL DAILY
Status: DISCONTINUED | OUTPATIENT
Start: 2020-01-01 | End: 2020-01-01

## 2020-01-01 RX ORDER — METRONIDAZOLE 500 MG/100ML
500 INJECTION, SOLUTION INTRAVENOUS
Status: DISCONTINUED | OUTPATIENT
Start: 2020-01-01 | End: 2020-01-01

## 2020-01-01 RX ORDER — DOCUSATE SODIUM 100 MG/1
100 CAPSULE, LIQUID FILLED ORAL 2 TIMES DAILY
Qty: 60 CAPSULE | Refills: 1
Start: 2020-01-01

## 2020-01-01 RX ORDER — ESCITALOPRAM OXALATE 10 MG/1
10 TABLET ORAL NIGHTLY
Qty: 30 TABLET | Refills: 0 | Status: SHIPPED | OUTPATIENT
Start: 2020-01-01 | End: 2020-01-01 | Stop reason: SDUPTHER

## 2020-01-01 RX ORDER — METOPROLOL TARTRATE 25 MG/1
25 TABLET, FILM COATED ORAL 2 TIMES DAILY
Status: DISCONTINUED | OUTPATIENT
Start: 2020-01-01 | End: 2020-01-01

## 2020-01-01 RX ORDER — MICONAZOLE NITRATE 2 %
POWDER (GRAM) TOPICAL 2 TIMES DAILY
Refills: 0 | Status: ON HOLD
Start: 2020-01-01 | End: 2020-01-01 | Stop reason: HOSPADM

## 2020-01-01 RX ORDER — ACETAMINOPHEN 500 MG
1000 TABLET ORAL EVERY 8 HOURS PRN
Refills: 0 | COMMUNITY
Start: 2020-01-01

## 2020-01-01 RX ORDER — ESCITALOPRAM OXALATE 10 MG/1
10 TABLET ORAL NIGHTLY
Status: DISCONTINUED | OUTPATIENT
Start: 2020-01-01 | End: 2020-01-01 | Stop reason: HOSPADM

## 2020-01-01 RX ORDER — METOPROLOL TARTRATE 1 MG/ML
5 INJECTION, SOLUTION INTRAVENOUS EVERY 5 MIN PRN
Status: DISCONTINUED | OUTPATIENT
Start: 2020-01-01 | End: 2020-01-01

## 2020-01-01 RX ORDER — MEROPENEM AND SODIUM CHLORIDE 500 MG/50ML
500 INJECTION, SOLUTION INTRAVENOUS
Status: DISCONTINUED | OUTPATIENT
Start: 2020-01-01 | End: 2020-01-01

## 2020-01-01 RX ORDER — ERGOCALCIFEROL 1.25 MG/1
50000 CAPSULE ORAL
Status: ON HOLD
Start: 2020-01-01 | End: 2020-01-01 | Stop reason: SDUPTHER

## 2020-01-01 RX ORDER — ACETAMINOPHEN 325 MG/1
650 TABLET ORAL EVERY 4 HOURS PRN
Status: DISCONTINUED | OUTPATIENT
Start: 2020-01-01 | End: 2020-01-01

## 2020-01-01 RX ORDER — ACETAMINOPHEN 325 MG/1
650 TABLET ORAL EVERY 8 HOURS PRN
Status: DISCONTINUED | OUTPATIENT
Start: 2020-01-01 | End: 2020-01-01

## 2020-01-01 RX ORDER — METOPROLOL TARTRATE 1 MG/ML
5 INJECTION, SOLUTION INTRAVENOUS ONCE
Status: COMPLETED | OUTPATIENT
Start: 2020-01-01 | End: 2020-01-01

## 2020-01-01 RX ORDER — FUROSEMIDE 40 MG/1
40 TABLET ORAL 2 TIMES DAILY
Status: DISCONTINUED | OUTPATIENT
Start: 2020-01-01 | End: 2020-01-01 | Stop reason: HOSPADM

## 2020-01-01 RX ORDER — FERROUS SULFATE 325(65) MG
325 TABLET, DELAYED RELEASE (ENTERIC COATED) ORAL DAILY
Qty: 30 TABLET | Refills: 0 | Status: ON HOLD
Start: 2020-01-01 | End: 2020-01-01 | Stop reason: HOSPADM

## 2020-01-01 RX ORDER — FUROSEMIDE 10 MG/ML
20 INJECTION INTRAMUSCULAR; INTRAVENOUS
Status: DISCONTINUED | OUTPATIENT
Start: 2020-01-01 | End: 2020-01-01

## 2020-01-01 RX ORDER — INSULIN ASPART 100 [IU]/ML
0-5 INJECTION, SOLUTION INTRAVENOUS; SUBCUTANEOUS
Status: DISCONTINUED | OUTPATIENT
Start: 2020-01-01 | End: 2020-01-01

## 2020-01-01 RX ORDER — GABAPENTIN 100 MG/1
100 CAPSULE ORAL 2 TIMES DAILY
Qty: 90 CAPSULE | Refills: 11 | Status: ON HOLD
Start: 2020-01-01 | End: 2020-01-01 | Stop reason: SDUPTHER

## 2020-01-01 RX ORDER — SODIUM CHLORIDE FOR INHALATION 10 %
7 VIAL, NEBULIZER (ML) INHALATION EVERY 8 HOURS
Status: DISCONTINUED | OUTPATIENT
Start: 2020-01-01 | End: 2020-01-01

## 2020-01-01 RX ORDER — FUROSEMIDE 10 MG/ML
40 INJECTION INTRAMUSCULAR; INTRAVENOUS
Status: COMPLETED | OUTPATIENT
Start: 2020-01-01 | End: 2020-01-01

## 2020-01-01 RX ORDER — METOPROLOL TARTRATE 1 MG/ML
5 INJECTION, SOLUTION INTRAVENOUS EVERY 8 HOURS PRN
Status: DISCONTINUED | OUTPATIENT
Start: 2020-01-01 | End: 2020-01-01

## 2020-01-01 RX ORDER — FENTANYL CITRATE 50 UG/ML
50 INJECTION, SOLUTION INTRAMUSCULAR; INTRAVENOUS ONCE
Status: COMPLETED | OUTPATIENT
Start: 2020-01-01 | End: 2020-01-01

## 2020-01-01 RX ORDER — DILTIAZEM HYDROCHLORIDE 5 MG/ML
10 INJECTION INTRAVENOUS
Status: COMPLETED | OUTPATIENT
Start: 2020-01-01 | End: 2020-01-01

## 2020-01-01 RX ORDER — IPRATROPIUM BROMIDE AND ALBUTEROL SULFATE 2.5; .5 MG/3ML; MG/3ML
3 SOLUTION RESPIRATORY (INHALATION) EVERY 6 HOURS
Status: DISCONTINUED | OUTPATIENT
Start: 2020-01-01 | End: 2020-01-01

## 2020-01-01 RX ORDER — FUROSEMIDE 40 MG/1
40 TABLET ORAL 2 TIMES DAILY
Status: DISCONTINUED | OUTPATIENT
Start: 2020-01-01 | End: 2020-01-01

## 2020-01-01 RX ORDER — GABAPENTIN 100 MG/1
100 CAPSULE ORAL 2 TIMES DAILY
Qty: 60 CAPSULE | Refills: 1
Start: 2020-01-01

## 2020-01-01 RX ORDER — CEFEPIME HYDROCHLORIDE 1 G/50ML
1 INJECTION, SOLUTION INTRAVENOUS
Status: DISCONTINUED | OUTPATIENT
Start: 2020-01-01 | End: 2020-01-01

## 2020-01-01 RX ORDER — ACETAMINOPHEN 325 MG/1
650 TABLET ORAL EVERY 4 HOURS PRN
Status: DISCONTINUED | OUTPATIENT
Start: 2020-01-01 | End: 2020-01-01 | Stop reason: HOSPADM

## 2020-01-01 RX ORDER — MICONAZOLE NITRATE 2 %
POWDER (GRAM) TOPICAL 2 TIMES DAILY
Status: DISCONTINUED | OUTPATIENT
Start: 2020-01-01 | End: 2020-01-01 | Stop reason: HOSPADM

## 2020-01-01 RX ORDER — METHOCARBAMOL 750 MG/1
750 TABLET, FILM COATED ORAL 3 TIMES DAILY
Qty: 6 TABLET | Refills: 0 | Status: SHIPPED | OUTPATIENT
Start: 2020-01-01 | End: 2020-01-01

## 2020-01-01 RX ORDER — FUROSEMIDE 10 MG/ML
60 INJECTION INTRAMUSCULAR; INTRAVENOUS EVERY 8 HOURS
Status: DISCONTINUED | OUTPATIENT
Start: 2020-01-01 | End: 2020-01-01

## 2020-01-01 RX ORDER — ONDANSETRON 8 MG/1
8 TABLET, ORALLY DISINTEGRATING ORAL EVERY 8 HOURS PRN
Status: DISCONTINUED | OUTPATIENT
Start: 2020-01-01 | End: 2020-01-01 | Stop reason: HOSPADM

## 2020-01-01 RX ORDER — SODIUM CHLORIDE 0.9 % (FLUSH) 0.9 %
10 SYRINGE (ML) INJECTION
Status: DISCONTINUED | OUTPATIENT
Start: 2020-01-01 | End: 2020-01-01 | Stop reason: HOSPADM

## 2020-01-01 RX ORDER — GABAPENTIN 100 MG/1
100 CAPSULE ORAL 2 TIMES DAILY
Qty: 60 CAPSULE | Refills: 0 | Status: SHIPPED | OUTPATIENT
Start: 2020-01-01 | End: 2020-01-01 | Stop reason: SDUPTHER

## 2020-01-01 RX ORDER — FERROUS SULFATE 325(65) MG
325 TABLET, DELAYED RELEASE (ENTERIC COATED) ORAL DAILY
Qty: 30 TABLET | Refills: 0 | Status: SHIPPED | OUTPATIENT
Start: 2020-01-01

## 2020-01-01 RX ORDER — POLYETHYLENE GLYCOL 3350 17 G/17G
17 POWDER, FOR SOLUTION ORAL DAILY
Qty: 510 G | Refills: 0 | Status: SHIPPED | OUTPATIENT
Start: 2020-01-01

## 2020-01-01 RX ORDER — ONDANSETRON 2 MG/ML
4 INJECTION INTRAMUSCULAR; INTRAVENOUS EVERY 8 HOURS PRN
Status: DISCONTINUED | OUTPATIENT
Start: 2020-01-01 | End: 2020-01-01 | Stop reason: HOSPADM

## 2020-01-01 RX ORDER — ATORVASTATIN CALCIUM 80 MG/1
TABLET, FILM COATED ORAL
Qty: 90 TABLET | Refills: 3 | Status: SHIPPED | OUTPATIENT
Start: 2020-01-01 | End: 2020-01-01 | Stop reason: SDUPTHER

## 2020-01-01 RX ORDER — METOPROLOL TARTRATE 1 MG/ML
5 INJECTION, SOLUTION INTRAVENOUS EVERY 4 HOURS PRN
Status: DISCONTINUED | OUTPATIENT
Start: 2020-01-01 | End: 2020-01-01 | Stop reason: HOSPADM

## 2020-01-01 RX ORDER — GABAPENTIN 100 MG/1
100 CAPSULE ORAL 2 TIMES DAILY
Status: DISCONTINUED | OUTPATIENT
Start: 2020-01-01 | End: 2020-01-01 | Stop reason: HOSPADM

## 2020-01-01 RX ORDER — SENNOSIDES 8.6 MG/1
8.6 TABLET ORAL 2 TIMES DAILY
Status: DISCONTINUED | OUTPATIENT
Start: 2020-01-01 | End: 2020-01-01 | Stop reason: HOSPADM

## 2020-01-01 RX ORDER — ESCITALOPRAM OXALATE 10 MG/1
10 TABLET ORAL NIGHTLY
Qty: 30 TABLET | Refills: 1
Start: 2020-01-01

## 2020-01-01 RX ORDER — LEVOFLOXACIN 250 MG/1
250 TABLET ORAL DAILY
Status: COMPLETED | OUTPATIENT
Start: 2020-01-01 | End: 2020-01-01

## 2020-01-01 RX ORDER — IPRATROPIUM BROMIDE 0.5 MG/2.5ML
0.5 SOLUTION RESPIRATORY (INHALATION) EVERY 6 HOURS
Status: DISCONTINUED | OUTPATIENT
Start: 2020-01-01 | End: 2020-01-01 | Stop reason: HOSPADM

## 2020-01-01 RX ORDER — DEXMEDETOMIDINE HYDROCHLORIDE 4 UG/ML
0.2 INJECTION, SOLUTION INTRAVENOUS CONTINUOUS
Status: DISCONTINUED | OUTPATIENT
Start: 2020-01-01 | End: 2020-01-01

## 2020-01-01 RX ORDER — FUROSEMIDE 10 MG/ML
60 INJECTION INTRAMUSCULAR; INTRAVENOUS
Status: COMPLETED | OUTPATIENT
Start: 2020-01-01 | End: 2020-01-01

## 2020-01-01 RX ORDER — POLYETHYLENE GLYCOL 3350 17 G/17G
17 POWDER, FOR SOLUTION ORAL DAILY PRN
Status: DISCONTINUED | OUTPATIENT
Start: 2020-01-01 | End: 2020-01-01 | Stop reason: HOSPADM

## 2020-01-01 RX ORDER — METOPROLOL TARTRATE 50 MG/1
50 TABLET ORAL 2 TIMES DAILY
Qty: 60 TABLET | Refills: 0
Start: 2020-01-01 | End: 2020-01-01 | Stop reason: SDUPTHER

## 2020-01-01 RX ORDER — IBUPROFEN 200 MG
24 TABLET ORAL
Status: DISCONTINUED | OUTPATIENT
Start: 2020-01-01 | End: 2020-01-01 | Stop reason: HOSPADM

## 2020-01-01 RX ORDER — CYANOCOBALAMIN (VITAMIN B-12) 250 MCG
250 TABLET ORAL DAILY
Status: DISCONTINUED | OUTPATIENT
Start: 2020-01-01 | End: 2020-01-01

## 2020-01-01 RX ORDER — LIDOCAINE HYDROCHLORIDE 10 MG/ML
10 INJECTION INFILTRATION; PERINEURAL ONCE
Status: COMPLETED | OUTPATIENT
Start: 2020-01-01 | End: 2020-01-01

## 2020-01-01 RX ORDER — IPRATROPIUM BROMIDE AND ALBUTEROL SULFATE 2.5; .5 MG/3ML; MG/3ML
3 SOLUTION RESPIRATORY (INHALATION) EVERY 6 HOURS PRN
Status: DISCONTINUED | OUTPATIENT
Start: 2020-01-01 | End: 2020-01-01

## 2020-01-01 RX ORDER — ATORVASTATIN CALCIUM 80 MG/1
80 TABLET, FILM COATED ORAL NIGHTLY
Qty: 30 TABLET | Refills: 1 | Status: SHIPPED | OUTPATIENT
Start: 2020-01-01

## 2020-01-01 RX ORDER — SODIUM CHLORIDE FOR INHALATION 7 %
4 VIAL, NEBULIZER (ML) INHALATION EVERY 8 HOURS
Status: COMPLETED | OUTPATIENT
Start: 2020-01-01 | End: 2020-01-01

## 2020-01-01 RX ORDER — IPRATROPIUM BROMIDE 0.5 MG/2.5ML
500 SOLUTION RESPIRATORY (INHALATION) EVERY 6 HOURS
Qty: 1 BOX | Refills: 0 | Status: ON HOLD
Start: 2020-01-01 | End: 2020-01-01 | Stop reason: HOSPADM

## 2020-01-01 RX ORDER — DOCUSATE SODIUM 100 MG/1
100 CAPSULE, LIQUID FILLED ORAL 2 TIMES DAILY
Status: DISCONTINUED | OUTPATIENT
Start: 2020-01-01 | End: 2020-01-01 | Stop reason: HOSPADM

## 2020-01-01 RX ORDER — DIPHENHYDRAMINE HCL 25 MG
25 CAPSULE ORAL EVERY 6 HOURS PRN
Status: DISCONTINUED | OUTPATIENT
Start: 2020-01-01 | End: 2020-01-01 | Stop reason: HOSPADM

## 2020-01-01 RX ORDER — DILTIAZEM HYDROCHLORIDE 5 MG/ML
15 INJECTION INTRAVENOUS
Status: COMPLETED | OUTPATIENT
Start: 2020-01-01 | End: 2020-01-01

## 2020-01-01 RX ORDER — FUROSEMIDE 10 MG/ML
80 INJECTION INTRAMUSCULAR; INTRAVENOUS
Status: COMPLETED | OUTPATIENT
Start: 2020-01-01 | End: 2020-01-01

## 2020-01-01 RX ORDER — FERROUS SULFATE 325(65) MG
TABLET ORAL
Qty: 30 TABLET | Refills: 5 | Status: ON HOLD | OUTPATIENT
Start: 2020-01-01 | End: 2020-01-01 | Stop reason: HOSPADM

## 2020-01-01 RX ORDER — VALACYCLOVIR HYDROCHLORIDE 500 MG/1
500 TABLET, FILM COATED ORAL DAILY
Status: DISCONTINUED | OUTPATIENT
Start: 2020-01-01 | End: 2020-01-01 | Stop reason: HOSPADM

## 2020-01-01 RX ORDER — METOPROLOL TARTRATE 1 MG/ML
5 INJECTION, SOLUTION INTRAVENOUS
Status: DISCONTINUED | OUTPATIENT
Start: 2020-01-01 | End: 2020-01-01

## 2020-01-01 RX ORDER — TALC
6 POWDER (GRAM) TOPICAL NIGHTLY PRN
Status: DISCONTINUED | OUTPATIENT
Start: 2020-01-01 | End: 2020-01-01 | Stop reason: HOSPADM

## 2020-01-01 RX ORDER — VARENICLINE TARTRATE 0.5 MG/1
0.5 TABLET, FILM COATED ORAL DAILY
Status: DISCONTINUED | OUTPATIENT
Start: 2020-01-01 | End: 2020-01-01

## 2020-01-01 RX ORDER — ACETAMINOPHEN 325 MG/1
650 TABLET ORAL EVERY 8 HOURS PRN
Status: DISCONTINUED | OUTPATIENT
Start: 2020-01-01 | End: 2020-01-01 | Stop reason: HOSPADM

## 2020-01-01 RX ORDER — FUROSEMIDE 40 MG/1
40 TABLET ORAL DAILY
Qty: 60 TABLET | Refills: 0 | Status: ON HOLD
Start: 2020-01-01 | End: 2020-01-01 | Stop reason: HOSPADM

## 2020-01-01 RX ORDER — FERROUS SULFATE 325(65) MG
325 TABLET, DELAYED RELEASE (ENTERIC COATED) ORAL DAILY
Status: DISCONTINUED | OUTPATIENT
Start: 2020-01-01 | End: 2020-01-01 | Stop reason: HOSPADM

## 2020-01-01 RX ORDER — FUROSEMIDE 40 MG/1
40 TABLET ORAL 2 TIMES DAILY
Qty: 60 TABLET | Refills: 1 | Status: ON HOLD
Start: 2020-01-01 | End: 2020-01-01 | Stop reason: SDUPTHER

## 2020-01-01 RX ORDER — FOLIC ACID 1 MG/1
1 TABLET ORAL DAILY
Status: DISCONTINUED | OUTPATIENT
Start: 2020-01-01 | End: 2020-01-01

## 2020-01-01 RX ORDER — ERGOCALCIFEROL 1.25 MG/1
50000 CAPSULE ORAL
Qty: 4 CAPSULE | Refills: 1
Start: 2020-01-01

## 2020-01-01 RX ORDER — METOPROLOL TARTRATE 50 MG/1
50 TABLET ORAL 2 TIMES DAILY
Status: DISCONTINUED | OUTPATIENT
Start: 2020-01-01 | End: 2020-01-01

## 2020-01-01 RX ORDER — ATORVASTATIN CALCIUM 80 MG/1
80 TABLET, FILM COATED ORAL NIGHTLY
Qty: 30 TABLET | Refills: 0 | Status: SHIPPED | OUTPATIENT
Start: 2020-01-01 | End: 2020-01-01 | Stop reason: SDUPTHER

## 2020-01-01 RX ORDER — ERGOCALCIFEROL 1.25 MG/1
50000 CAPSULE ORAL
Qty: 4 CAPSULE | Refills: 0 | Status: SHIPPED | OUTPATIENT
Start: 2020-01-01 | End: 2020-01-01 | Stop reason: SDUPTHER

## 2020-01-01 RX ORDER — LEVOFLOXACIN 500 MG/1
500 TABLET, FILM COATED ORAL DAILY
Status: DISCONTINUED | OUTPATIENT
Start: 2020-01-01 | End: 2020-01-01

## 2020-01-01 RX ORDER — FUROSEMIDE 40 MG/1
40 TABLET ORAL 2 TIMES DAILY
Qty: 60 TABLET | Refills: 0 | Status: SHIPPED | OUTPATIENT
Start: 2020-01-01 | End: 2020-01-01 | Stop reason: SDUPTHER

## 2020-01-01 RX ORDER — HYDRALAZINE HYDROCHLORIDE 10 MG/1
10 TABLET, FILM COATED ORAL EVERY 8 HOURS PRN
Status: DISPENSED | OUTPATIENT
Start: 2020-01-01 | End: 2020-01-01

## 2020-01-01 RX ORDER — METOPROLOL SUCCINATE 50 MG/1
50 TABLET, EXTENDED RELEASE ORAL DAILY
Status: DISCONTINUED | OUTPATIENT
Start: 2020-01-01 | End: 2020-01-01 | Stop reason: HOSPADM

## 2020-01-01 RX ORDER — LIDOCAINE HYDROCHLORIDE 20 MG/ML
JELLY TOPICAL
Status: COMPLETED | OUTPATIENT
Start: 2020-01-01 | End: 2020-01-01

## 2020-01-01 RX ORDER — POTASSIUM CHLORIDE 20 MEQ/1
20 TABLET, EXTENDED RELEASE ORAL 2 TIMES DAILY
Status: ON HOLD
Start: 2020-01-01 | End: 2020-01-01 | Stop reason: HOSPADM

## 2020-01-01 RX ORDER — POLYETHYLENE GLYCOL 3350 17 G/17G
17 POWDER, FOR SOLUTION ORAL DAILY
Status: DISCONTINUED | OUTPATIENT
Start: 2020-01-01 | End: 2020-01-01 | Stop reason: HOSPADM

## 2020-01-01 RX ORDER — POTASSIUM CHLORIDE 7.45 MG/ML
10 INJECTION INTRAVENOUS
Status: COMPLETED | OUTPATIENT
Start: 2020-01-01 | End: 2020-01-01

## 2020-01-01 RX ORDER — HEPARIN SODIUM 5000 [USP'U]/ML
5000 INJECTION, SOLUTION INTRAVENOUS; SUBCUTANEOUS EVERY 12 HOURS
Status: COMPLETED | OUTPATIENT
Start: 2020-01-01 | End: 2020-01-01

## 2020-01-01 RX ORDER — ATORVASTATIN CALCIUM 20 MG/1
80 TABLET, FILM COATED ORAL NIGHTLY
Status: DISCONTINUED | OUTPATIENT
Start: 2020-01-01 | End: 2020-01-01 | Stop reason: HOSPADM

## 2020-01-01 RX ORDER — LEVALBUTEROL 1.25 MG/.5ML
1.25 SOLUTION, CONCENTRATE RESPIRATORY (INHALATION) EVERY 6 HOURS
Status: DISCONTINUED | OUTPATIENT
Start: 2020-01-01 | End: 2020-01-01 | Stop reason: HOSPADM

## 2020-01-01 RX ORDER — ONDANSETRON 2 MG/ML
4 INJECTION INTRAMUSCULAR; INTRAVENOUS EVERY 8 HOURS PRN
Status: DISCONTINUED | OUTPATIENT
Start: 2020-01-01 | End: 2020-01-01

## 2020-01-01 RX ORDER — ACETAMINOPHEN 500 MG
1000 TABLET ORAL EVERY 8 HOURS PRN
Status: DISCONTINUED | OUTPATIENT
Start: 2020-01-01 | End: 2020-01-01 | Stop reason: HOSPADM

## 2020-01-01 RX ORDER — IPRATROPIUM BROMIDE AND ALBUTEROL SULFATE 2.5; .5 MG/3ML; MG/3ML
3 SOLUTION RESPIRATORY (INHALATION) EVERY 6 HOURS
Status: DISPENSED | OUTPATIENT
Start: 2020-01-01 | End: 2020-01-01

## 2020-01-01 RX ORDER — CEFEPIME HYDROCHLORIDE 1 G/50ML
1 INJECTION, SOLUTION INTRAVENOUS
Status: COMPLETED | OUTPATIENT
Start: 2020-01-01 | End: 2020-01-01

## 2020-01-01 RX ORDER — FERROUS SULFATE 325(65) MG
TABLET ORAL
Qty: 30 TABLET | Refills: 4 | Status: ON HOLD | OUTPATIENT
Start: 2020-01-01 | End: 2020-01-01 | Stop reason: HOSPADM

## 2020-01-01 RX ORDER — DOCUSATE SODIUM 100 MG/1
100 CAPSULE, LIQUID FILLED ORAL 2 TIMES DAILY
Qty: 60 CAPSULE | Refills: 0 | Status: ON HOLD | OUTPATIENT
Start: 2020-01-01 | End: 2020-01-01 | Stop reason: SDUPTHER

## 2020-01-01 RX ORDER — ACETAMINOPHEN 500 MG
1000 TABLET ORAL EVERY 6 HOURS PRN
Status: ON HOLD
Start: 2020-01-01 | End: 2020-01-01 | Stop reason: HOSPADM

## 2020-01-01 RX ORDER — POLYETHYLENE GLYCOL 3350 17 G/17G
17 POWDER, FOR SOLUTION ORAL DAILY PRN
Refills: 0 | Status: ON HOLD
Start: 2020-01-01 | End: 2020-01-01 | Stop reason: SDUPTHER

## 2020-01-01 RX ORDER — TRAMADOL HYDROCHLORIDE 50 MG/1
TABLET ORAL
COMMUNITY
Start: 2020-01-01 | End: 2020-01-01

## 2020-01-01 RX ORDER — POTASSIUM CHLORIDE 20 MEQ/1
40 TABLET, EXTENDED RELEASE ORAL ONCE
Status: COMPLETED | OUTPATIENT
Start: 2020-01-01 | End: 2020-01-01

## 2020-01-01 RX ORDER — FUROSEMIDE 40 MG/1
40 TABLET ORAL 2 TIMES DAILY
Qty: 60 TABLET | Refills: 1
Start: 2020-01-01

## 2020-01-01 RX ORDER — METOPROLOL TARTRATE 50 MG/1
50 TABLET ORAL 2 TIMES DAILY
Status: DISCONTINUED | OUTPATIENT
Start: 2020-01-01 | End: 2020-01-01 | Stop reason: HOSPADM

## 2020-01-01 RX ORDER — FUROSEMIDE 10 MG/ML
80 INJECTION INTRAMUSCULAR; INTRAVENOUS
Status: DISCONTINUED | OUTPATIENT
Start: 2020-01-01 | End: 2020-01-01

## 2020-01-01 RX ORDER — METOPROLOL SUCCINATE 25 MG/1
25 TABLET, EXTENDED RELEASE ORAL 2 TIMES DAILY
Status: COMPLETED | OUTPATIENT
Start: 2020-01-01 | End: 2020-01-01

## 2020-01-01 RX ORDER — FAMOTIDINE 20 MG/1
20 TABLET, FILM COATED ORAL DAILY
Status: DISCONTINUED | OUTPATIENT
Start: 2020-01-01 | End: 2020-01-01

## 2020-01-01 RX ORDER — FENTANYL CITRATE-0.9 % NACL/PF 10 MCG/ML
PLASTIC BAG, INJECTION (ML) INTRAVENOUS CONTINUOUS
Status: DISCONTINUED | OUTPATIENT
Start: 2020-01-01 | End: 2020-01-01

## 2020-01-01 RX ORDER — SODIUM CHLORIDE 0.9 % (FLUSH) 0.9 %
10 SYRINGE (ML) INJECTION
Status: DISCONTINUED | OUTPATIENT
Start: 2020-01-01 | End: 2020-01-01

## 2020-01-01 RX ORDER — HEPARIN SODIUM,PORCINE/D5W 25000/250
12 INTRAVENOUS SOLUTION INTRAVENOUS CONTINUOUS
Status: DISCONTINUED | OUTPATIENT
Start: 2020-01-01 | End: 2020-01-01

## 2020-01-01 RX ORDER — FAMOTIDINE 40 MG/5ML
20 POWDER, FOR SUSPENSION ORAL DAILY
Status: DISCONTINUED | OUTPATIENT
Start: 2020-01-01 | End: 2020-01-01

## 2020-01-01 RX ORDER — BISACODYL 5 MG
10 TABLET, DELAYED RELEASE (ENTERIC COATED) ORAL ONCE
Status: DISCONTINUED | OUTPATIENT
Start: 2020-01-01 | End: 2020-01-01

## 2020-01-01 RX ORDER — MUPIROCIN 20 MG/G
OINTMENT TOPICAL 2 TIMES DAILY
Status: COMPLETED | OUTPATIENT
Start: 2020-01-01 | End: 2020-01-01

## 2020-01-01 RX ORDER — INSULIN ASPART 100 [IU]/ML
0-5 INJECTION, SOLUTION INTRAVENOUS; SUBCUTANEOUS
Status: DISCONTINUED | OUTPATIENT
Start: 2020-01-01 | End: 2020-01-01 | Stop reason: HOSPADM

## 2020-01-01 RX ORDER — ACETAZOLAMIDE 250 MG/1
250 TABLET ORAL 2 TIMES DAILY
Status: DISCONTINUED | OUTPATIENT
Start: 2020-01-01 | End: 2020-01-01

## 2020-01-01 RX ORDER — ATORVASTATIN CALCIUM 80 MG/1
TABLET, FILM COATED ORAL
Qty: 90 TABLET | Refills: 1 | Status: ON HOLD | OUTPATIENT
Start: 2020-01-01 | End: 2020-01-01

## 2020-01-01 RX ORDER — IPRATROPIUM BROMIDE AND ALBUTEROL SULFATE 2.5; .5 MG/3ML; MG/3ML
3 SOLUTION RESPIRATORY (INHALATION) EVERY 8 HOURS
Status: DISCONTINUED | OUTPATIENT
Start: 2020-01-01 | End: 2020-01-01 | Stop reason: HOSPADM

## 2020-01-01 RX ORDER — DOCUSATE SODIUM 100 MG/1
100 CAPSULE, LIQUID FILLED ORAL 2 TIMES DAILY
Qty: 60 CAPSULE | Refills: 0 | Status: SHIPPED | OUTPATIENT
Start: 2020-01-01 | End: 2020-01-01 | Stop reason: SDUPTHER

## 2020-01-01 RX ORDER — FUROSEMIDE 10 MG/ML
40 INJECTION INTRAMUSCULAR; INTRAVENOUS ONCE
Status: COMPLETED | OUTPATIENT
Start: 2020-01-01 | End: 2020-01-01

## 2020-01-01 RX ORDER — LEVALBUTEROL 1.25 MG/.5ML
1.25 SOLUTION, CONCENTRATE RESPIRATORY (INHALATION) EVERY 6 HOURS
Refills: 0 | Status: ON HOLD
Start: 2020-01-01 | End: 2020-01-01 | Stop reason: HOSPADM

## 2020-01-01 RX ORDER — IBUPROFEN 200 MG
16 TABLET ORAL
Status: DISCONTINUED | OUTPATIENT
Start: 2020-01-01 | End: 2020-01-01 | Stop reason: HOSPADM

## 2020-01-01 RX ORDER — IPRATROPIUM BROMIDE AND ALBUTEROL SULFATE 2.5; .5 MG/3ML; MG/3ML
3 SOLUTION RESPIRATORY (INHALATION) EVERY 6 HOURS PRN
Qty: 90 ML | Refills: 0 | Status: SHIPPED | OUTPATIENT
Start: 2020-01-01 | End: 2021-10-26

## 2020-01-01 RX ORDER — MICONAZOLE NITRATE 2 %
POWDER (GRAM) TOPICAL 2 TIMES DAILY
Status: DISCONTINUED | OUTPATIENT
Start: 2020-01-01 | End: 2020-01-01

## 2020-01-01 RX ORDER — ESCITALOPRAM OXALATE 10 MG/1
TABLET ORAL
Qty: 90 TABLET | Refills: 1 | Status: ON HOLD | OUTPATIENT
Start: 2020-01-01 | End: 2020-01-01

## 2020-01-01 RX ORDER — FUROSEMIDE 10 MG/ML
60 INJECTION INTRAMUSCULAR; INTRAVENOUS DAILY
Status: DISCONTINUED | OUTPATIENT
Start: 2020-01-01 | End: 2020-01-01

## 2020-01-01 RX ORDER — LORAZEPAM 2 MG/ML
INJECTION INTRAMUSCULAR
Status: COMPLETED
Start: 2020-01-01 | End: 2020-01-01

## 2020-01-01 RX ORDER — FUROSEMIDE 10 MG/ML
40 INJECTION INTRAMUSCULAR; INTRAVENOUS 2 TIMES DAILY WITH MEALS
Status: DISCONTINUED | OUTPATIENT
Start: 2020-01-01 | End: 2020-01-01

## 2020-01-01 RX ORDER — METOPROLOL TARTRATE 1 MG/ML
INJECTION, SOLUTION INTRAVENOUS
Status: COMPLETED
Start: 2020-01-01 | End: 2020-01-01

## 2020-01-01 RX ORDER — CEFEPIME HYDROCHLORIDE 1 G/50ML
2 INJECTION, SOLUTION INTRAVENOUS
Status: DISCONTINUED | OUTPATIENT
Start: 2020-01-01 | End: 2020-01-01 | Stop reason: DRUGHIGH

## 2020-01-01 RX ORDER — PREDNISONE 50 MG/1
50 TABLET ORAL DAILY
Status: COMPLETED | OUTPATIENT
Start: 2020-01-01 | End: 2020-01-01

## 2020-01-01 RX ORDER — ERGOCALCIFEROL 1.25 MG/1
50000 CAPSULE ORAL
Status: ON HOLD | COMMUNITY
End: 2020-01-01 | Stop reason: HOSPADM

## 2020-01-01 RX ORDER — SODIUM CHLORIDE FOR INHALATION 3 %
4 VIAL, NEBULIZER (ML) INHALATION EVERY 8 HOURS
Status: DISPENSED | OUTPATIENT
Start: 2020-01-01 | End: 2020-01-01

## 2020-01-01 RX ORDER — NITROGLYCERIN 0.4 MG/1
0.4 TABLET SUBLINGUAL EVERY 5 MIN PRN
Status: COMPLETED | OUTPATIENT
Start: 2020-01-01 | End: 2020-01-01

## 2020-01-01 RX ORDER — DOCUSATE SODIUM 50 MG/5ML
100 LIQUID ORAL 2 TIMES DAILY
Status: DISCONTINUED | OUTPATIENT
Start: 2020-01-01 | End: 2020-01-01

## 2020-01-01 RX ORDER — MICONAZOLE NITRATE 2 %
POWDER (GRAM) TOPICAL 2 TIMES DAILY
Qty: 43 G | Refills: 0 | Status: SHIPPED | OUTPATIENT
Start: 2020-01-01

## 2020-01-01 RX ORDER — METOPROLOL SUCCINATE 50 MG/1
50 TABLET, EXTENDED RELEASE ORAL DAILY
Qty: 30 TABLET | Refills: 1
Start: 2020-01-01 | End: 2021-11-11

## 2020-01-01 RX ORDER — METOPROLOL TARTRATE 50 MG/1
50 TABLET ORAL 2 TIMES DAILY
Qty: 60 TABLET | Refills: 0 | Status: SHIPPED | OUTPATIENT
Start: 2020-01-01 | End: 2020-01-01 | Stop reason: HOSPADM

## 2020-01-01 RX ADMIN — ATORVASTATIN CALCIUM 80 MG: 20 TABLET, FILM COATED ORAL at 08:09

## 2020-01-01 RX ADMIN — METOPROLOL TARTRATE 50 MG: 50 TABLET, FILM COATED ORAL at 09:10

## 2020-01-01 RX ADMIN — ESCITALOPRAM OXALATE 10 MG: 10 TABLET ORAL at 08:11

## 2020-01-01 RX ADMIN — DOCUSATE SODIUM 100 MG: 100 CAPSULE, LIQUID FILLED ORAL at 08:10

## 2020-01-01 RX ADMIN — ATORVASTATIN CALCIUM 80 MG: 20 TABLET, FILM COATED ORAL at 08:10

## 2020-01-01 RX ADMIN — MICONAZOLE NITRATE: 20 POWDER TOPICAL at 08:09

## 2020-01-01 RX ADMIN — IPRATROPIUM BROMIDE 0.5 MG: 0.5 SOLUTION RESPIRATORY (INHALATION) at 12:08

## 2020-01-01 RX ADMIN — ACETAMINOPHEN 650 MG: 325 TABLET, FILM COATED ORAL at 08:09

## 2020-01-01 RX ADMIN — LEVALBUTEROL 1.25 MG: 1.25 SOLUTION, CONCENTRATE RESPIRATORY (INHALATION) at 07:08

## 2020-01-01 RX ADMIN — GABAPENTIN 100 MG: 100 CAPSULE ORAL at 08:10

## 2020-01-01 RX ADMIN — GABAPENTIN 100 MG: 100 CAPSULE ORAL at 09:10

## 2020-01-01 RX ADMIN — APIXABAN 5 MG: 2.5 TABLET, FILM COATED ORAL at 09:10

## 2020-01-01 RX ADMIN — VANCOMYCIN HYDROCHLORIDE 750 MG: 750 INJECTION, POWDER, LYOPHILIZED, FOR SOLUTION INTRAVENOUS at 07:11

## 2020-01-01 RX ADMIN — IPRATROPIUM BROMIDE 0.5 MG: 0.5 SOLUTION RESPIRATORY (INHALATION) at 07:08

## 2020-01-01 RX ADMIN — DEXMEDETOMIDINE HYDROCHLORIDE 1.4 MCG/KG/HR: 4 INJECTION, SOLUTION INTRAVENOUS at 05:11

## 2020-01-01 RX ADMIN — PROPOFOL 30 MCG/KG/MIN: 10 INJECTION, EMULSION INTRAVENOUS at 04:11

## 2020-01-01 RX ADMIN — CEFEPIME HYDROCHLORIDE 1 G: 1 INJECTION, SOLUTION INTRAVENOUS at 03:10

## 2020-01-01 RX ADMIN — ACETAMINOPHEN 650 MG: 325 TABLET, FILM COATED ORAL at 09:10

## 2020-01-01 RX ADMIN — NEPHROCAP 1 CAPSULE: 1 CAP ORAL at 08:11

## 2020-01-01 RX ADMIN — HEPARIN SODIUM 5000 UNITS: 5000 INJECTION INTRAVENOUS; SUBCUTANEOUS at 09:11

## 2020-01-01 RX ADMIN — DOCUSATE SODIUM 100 MG: 100 CAPSULE, LIQUID FILLED ORAL at 08:08

## 2020-01-01 RX ADMIN — APIXABAN 5 MG: 2.5 TABLET, FILM COATED ORAL at 09:11

## 2020-01-01 RX ADMIN — ESCITALOPRAM OXALATE 10 MG: 10 TABLET ORAL at 07:08

## 2020-01-01 RX ADMIN — ACETAMINOPHEN 650 MG: 325 TABLET, FILM COATED ORAL at 10:09

## 2020-01-01 RX ADMIN — METRONIDAZOLE 500 MG: 500 INJECTION, SOLUTION INTRAVENOUS at 06:10

## 2020-01-01 RX ADMIN — METOPROLOL TARTRATE 50 MG: 50 TABLET, FILM COATED ORAL at 10:08

## 2020-01-01 RX ADMIN — ESCITALOPRAM OXALATE 10 MG: 10 TABLET ORAL at 08:10

## 2020-01-01 RX ADMIN — LEVALBUTEROL 1.25 MG: 1.25 SOLUTION, CONCENTRATE RESPIRATORY (INHALATION) at 12:08

## 2020-01-01 RX ADMIN — SODIUM CHLORIDE 250 ML: 0.9 INJECTION, SOLUTION INTRAVENOUS at 10:08

## 2020-01-01 RX ADMIN — Medication 6 MG: at 09:10

## 2020-01-01 RX ADMIN — DOCUSATE SODIUM 100 MG: 100 CAPSULE, LIQUID FILLED ORAL at 10:08

## 2020-01-01 RX ADMIN — METOPROLOL TARTRATE 5 MG: 5 INJECTION, SOLUTION INTRAVENOUS at 01:11

## 2020-01-01 RX ADMIN — APIXABAN 5 MG: 2.5 TABLET, FILM COATED ORAL at 08:10

## 2020-01-01 RX ADMIN — FERROUS SULFATE TAB EC 325 MG (65 MG FE EQUIVALENT) 325 MG: 325 (65 FE) TABLET DELAYED RESPONSE at 08:10

## 2020-01-01 RX ADMIN — IPRATROPIUM BROMIDE AND ALBUTEROL SULFATE 3 ML: .5; 3 SOLUTION RESPIRATORY (INHALATION) at 11:09

## 2020-01-01 RX ADMIN — FUROSEMIDE 60 MG: 10 INJECTION, SOLUTION INTRAMUSCULAR; INTRAVENOUS at 03:08

## 2020-01-01 RX ADMIN — ESCITALOPRAM OXALATE 10 MG: 10 TABLET ORAL at 10:10

## 2020-01-01 RX ADMIN — ACETAMINOPHEN 1000 MG: 500 TABLET, FILM COATED ORAL at 09:10

## 2020-01-01 RX ADMIN — LEVALBUTEROL 1.25 MG: 1.25 SOLUTION, CONCENTRATE RESPIRATORY (INHALATION) at 08:08

## 2020-01-01 RX ADMIN — DOCUSATE SODIUM 100 MG: 100 CAPSULE, LIQUID FILLED ORAL at 08:09

## 2020-01-01 RX ADMIN — MICONAZOLE NITRATE: 20 POWDER TOPICAL at 08:10

## 2020-01-01 RX ADMIN — MICONAZOLE NITRATE: 20 POWDER TOPICAL at 08:11

## 2020-01-01 RX ADMIN — MICONAZOLE NITRATE: 20 POWDER TOPICAL at 09:11

## 2020-01-01 RX ADMIN — IPRATROPIUM BROMIDE AND ALBUTEROL SULFATE 3 ML: .5; 3 SOLUTION RESPIRATORY (INHALATION) at 07:09

## 2020-01-01 RX ADMIN — PHENYLEPHRINE HYDROCHLORIDE 0.5 MCG/KG/MIN: 10 INJECTION INTRAVENOUS at 09:11

## 2020-01-01 RX ADMIN — MICONAZOLE NITRATE: 20 POWDER TOPICAL at 09:10

## 2020-01-01 RX ADMIN — FUROSEMIDE 40 MG: 10 INJECTION, SOLUTION INTRAMUSCULAR; INTRAVENOUS at 09:09

## 2020-01-01 RX ADMIN — DOCUSATE SODIUM 100 MG: 100 CAPSULE, LIQUID FILLED ORAL at 08:11

## 2020-01-01 RX ADMIN — SENNOSIDES 8.6 MG: 8.6 TABLET, FILM COATED ORAL at 08:09

## 2020-01-01 RX ADMIN — FUROSEMIDE 40 MG: 40 TABLET ORAL at 09:09

## 2020-01-01 RX ADMIN — ESCITALOPRAM OXALATE 10 MG: 10 TABLET ORAL at 09:10

## 2020-01-01 RX ADMIN — PROPOFOL 40 MCG/KG/MIN: 10 INJECTION, EMULSION INTRAVENOUS at 11:11

## 2020-01-01 RX ADMIN — ESCITALOPRAM OXALATE 10 MG: 10 TABLET ORAL at 08:09

## 2020-01-01 RX ADMIN — PREDNISONE 50 MG: 50 TABLET ORAL at 09:08

## 2020-01-01 RX ADMIN — ACETAMINOPHEN 1000 MG: 500 TABLET, FILM COATED ORAL at 08:10

## 2020-01-01 RX ADMIN — IPRATROPIUM BROMIDE 0.5 MG: 0.5 SOLUTION RESPIRATORY (INHALATION) at 01:08

## 2020-01-01 RX ADMIN — FUROSEMIDE 80 MG: 10 INJECTION, SOLUTION INTRAMUSCULAR; INTRAVENOUS at 01:10

## 2020-01-01 RX ADMIN — MEROPENEM AND SODIUM CHLORIDE 500 MG: 500 INJECTION, SOLUTION INTRAVENOUS at 02:11

## 2020-01-01 RX ADMIN — FUROSEMIDE 40 MG: 40 TABLET ORAL at 05:10

## 2020-01-01 RX ADMIN — IPRATROPIUM BROMIDE 0.5 MG: 0.5 SOLUTION RESPIRATORY (INHALATION) at 06:08

## 2020-01-01 RX ADMIN — Medication 6 MG: at 08:09

## 2020-01-01 RX ADMIN — DOCUSATE SODIUM 100 MG: 50 LIQUID ORAL at 10:11

## 2020-01-01 RX ADMIN — IPRATROPIUM BROMIDE AND ALBUTEROL SULFATE 3 ML: .5; 3 SOLUTION RESPIRATORY (INHALATION) at 08:09

## 2020-01-01 RX ADMIN — METOPROLOL TARTRATE 50 MG: 50 TABLET, FILM COATED ORAL at 08:08

## 2020-01-01 RX ADMIN — FUROSEMIDE 40 MG: 40 TABLET ORAL at 08:11

## 2020-01-01 RX ADMIN — CEFEPIME HYDROCHLORIDE 1 G: 1 INJECTION, SOLUTION INTRAVENOUS at 05:11

## 2020-01-01 RX ADMIN — MICONAZOLE NITRATE: 20 POWDER TOPICAL at 10:11

## 2020-01-01 RX ADMIN — FUROSEMIDE 40 MG: 40 TABLET ORAL at 08:10

## 2020-01-01 RX ADMIN — METOPROLOL TARTRATE 5 MG: 5 INJECTION, SOLUTION INTRAVENOUS at 06:08

## 2020-01-01 RX ADMIN — MICONAZOLE NITRATE: 20 POWDER TOPICAL at 09:08

## 2020-01-01 RX ADMIN — FUROSEMIDE 40 MG: 40 TABLET ORAL at 09:10

## 2020-01-01 RX ADMIN — ATORVASTATIN CALCIUM 80 MG: 20 TABLET, FILM COATED ORAL at 08:08

## 2020-01-01 RX ADMIN — METOPROLOL TARTRATE 50 MG: 50 TABLET, FILM COATED ORAL at 09:09

## 2020-01-01 RX ADMIN — Medication 10 ML: at 08:10

## 2020-01-01 RX ADMIN — Medication 6 MG: at 08:10

## 2020-01-01 RX ADMIN — DOCUSATE SODIUM 100 MG: 100 CAPSULE, LIQUID FILLED ORAL at 09:10

## 2020-01-01 RX ADMIN — NEPHROCAP 1 CAPSULE: 1 CAP ORAL at 08:10

## 2020-01-01 RX ADMIN — APIXABAN 5 MG: 2.5 TABLET, FILM COATED ORAL at 08:11

## 2020-01-01 RX ADMIN — NEPHROCAP 1 CAPSULE: 1 CAP ORAL at 10:09

## 2020-01-01 RX ADMIN — HEPARIN SODIUM AND DEXTROSE 12 UNITS/KG/HR: 10000; 5 INJECTION INTRAVENOUS at 01:10

## 2020-01-01 RX ADMIN — ATORVASTATIN CALCIUM 80 MG: 20 TABLET, FILM COATED ORAL at 07:08

## 2020-01-01 RX ADMIN — METOPROLOL TARTRATE 50 MG: 50 TABLET, FILM COATED ORAL at 10:09

## 2020-01-01 RX ADMIN — ESCITALOPRAM OXALATE 10 MG: 10 TABLET ORAL at 08:08

## 2020-01-01 RX ADMIN — METOPROLOL TARTRATE 25 MG: 25 TABLET ORAL at 02:08

## 2020-01-01 RX ADMIN — METOPROLOL TARTRATE 50 MG: 50 TABLET, FILM COATED ORAL at 08:10

## 2020-01-01 RX ADMIN — MICONAZOLE NITRATE: 20 POWDER TOPICAL at 10:08

## 2020-01-01 RX ADMIN — FUROSEMIDE 60 MG: 10 INJECTION, SOLUTION INTRAMUSCULAR; INTRAVENOUS at 09:08

## 2020-01-01 RX ADMIN — CEFEPIME HYDROCHLORIDE 1 G: 1 INJECTION, SOLUTION INTRAVENOUS at 06:11

## 2020-01-01 RX ADMIN — VANCOMYCIN HYDROCHLORIDE 1500 MG: 1.5 INJECTION, POWDER, LYOPHILIZED, FOR SOLUTION INTRAVENOUS at 06:10

## 2020-01-01 RX ADMIN — Medication 6 MG: at 08:11

## 2020-01-01 RX ADMIN — ESCITALOPRAM OXALATE 10 MG: 10 TABLET ORAL at 09:11

## 2020-01-01 RX ADMIN — GABAPENTIN 100 MG: 100 CAPSULE ORAL at 08:11

## 2020-01-01 RX ADMIN — IPRATROPIUM BROMIDE AND ALBUTEROL SULFATE 3 ML: .5; 3 SOLUTION RESPIRATORY (INHALATION) at 08:10

## 2020-01-01 RX ADMIN — ESCITALOPRAM OXALATE 10 MG: 10 TABLET ORAL at 09:09

## 2020-01-01 RX ADMIN — ATORVASTATIN CALCIUM 80 MG: 20 TABLET, FILM COATED ORAL at 09:10

## 2020-01-01 RX ADMIN — FENTANYL CITRATE 50 MCG: 50 INJECTION, SOLUTION INTRAMUSCULAR; INTRAVENOUS at 10:11

## 2020-01-01 RX ADMIN — APIXABAN 2.5 MG: 2.5 TABLET, FILM COATED ORAL at 09:09

## 2020-01-01 RX ADMIN — APIXABAN 2.5 MG: 2.5 TABLET, FILM COATED ORAL at 10:09

## 2020-01-01 RX ADMIN — ATORVASTATIN CALCIUM 80 MG: 20 TABLET, FILM COATED ORAL at 08:11

## 2020-01-01 RX ADMIN — NEPHROCAP 1 CAPSULE: 1 CAP ORAL at 09:09

## 2020-01-01 RX ADMIN — METOPROLOL TARTRATE 50 MG: 50 TABLET, FILM COATED ORAL at 10:10

## 2020-01-01 RX ADMIN — METOPROLOL SUCCINATE 25 MG: 25 TABLET, EXTENDED RELEASE ORAL at 08:11

## 2020-01-01 RX ADMIN — FAMOTIDINE 20 MG: 40 POWDER, FOR SUSPENSION ORAL at 08:11

## 2020-01-01 RX ADMIN — NITROGLYCERIN 0.4 MG: 0.4 TABLET, ORALLY DISINTEGRATING SUBLINGUAL at 09:08

## 2020-01-01 RX ADMIN — VALACYCLOVIR HYDROCHLORIDE 500 MG: 500 TABLET, FILM COATED ORAL at 08:08

## 2020-01-01 RX ADMIN — FERROUS SULFATE TAB EC 325 MG (65 MG FE EQUIVALENT) 325 MG: 325 (65 FE) TABLET DELAYED RESPONSE at 09:09

## 2020-01-01 RX ADMIN — VANCOMYCIN HYDROCHLORIDE 1500 MG: 1.5 INJECTION, POWDER, LYOPHILIZED, FOR SOLUTION INTRAVENOUS at 04:10

## 2020-01-01 RX ADMIN — FUROSEMIDE 40 MG: 10 INJECTION, SOLUTION INTRAMUSCULAR; INTRAVENOUS at 08:11

## 2020-01-01 RX ADMIN — FUROSEMIDE 40 MG: 10 INJECTION, SOLUTION INTRAMUSCULAR; INTRAVENOUS at 03:10

## 2020-01-01 RX ADMIN — CEFEPIME HYDROCHLORIDE 1 G: 1 INJECTION, SOLUTION INTRAVENOUS at 02:10

## 2020-01-01 RX ADMIN — SODIUM CHLORIDE SOLN NEBU 3% 4 ML: 3 NEBU SOLN at 11:10

## 2020-01-01 RX ADMIN — MICONAZOLE NITRATE: 20 POWDER TOPICAL at 08:08

## 2020-01-01 RX ADMIN — ACETAMINOPHEN 650 MG: 325 TABLET, FILM COATED ORAL at 09:09

## 2020-01-01 RX ADMIN — GABAPENTIN 100 MG: 100 CAPSULE ORAL at 09:11

## 2020-01-01 RX ADMIN — POLYETHYLENE GLYCOL 3350 17 G: 17 POWDER, FOR SOLUTION ORAL at 08:11

## 2020-01-01 RX ADMIN — FERROUS SULFATE TAB EC 325 MG (65 MG FE EQUIVALENT) 325 MG: 325 (65 FE) TABLET DELAYED RESPONSE at 09:10

## 2020-01-01 RX ADMIN — FUROSEMIDE 40 MG: 10 INJECTION, SOLUTION INTRAMUSCULAR; INTRAVENOUS at 08:09

## 2020-01-01 RX ADMIN — IPRATROPIUM BROMIDE 0.5 MG: 0.5 SOLUTION RESPIRATORY (INHALATION) at 11:08

## 2020-01-01 RX ADMIN — ACETAMINOPHEN 650 MG: 325 TABLET, FILM COATED ORAL at 12:08

## 2020-01-01 RX ADMIN — PROPOFOL 30 MCG/KG/MIN: 10 INJECTION, EMULSION INTRAVENOUS at 06:11

## 2020-01-01 RX ADMIN — METHYLPREDNISOLONE SODIUM SUCCINATE 80 MG: 40 INJECTION, POWDER, FOR SOLUTION INTRAMUSCULAR; INTRAVENOUS at 08:11

## 2020-01-01 RX ADMIN — MICONAZOLE NITRATE: 20 POWDER TOPICAL at 09:09

## 2020-01-01 RX ADMIN — APIXABAN 5 MG: 2.5 TABLET, FILM COATED ORAL at 10:10

## 2020-01-01 RX ADMIN — VARENICLINE TARTRATE 0.5 MG: 0.5 TABLET, FILM COATED ORAL at 03:08

## 2020-01-01 RX ADMIN — NEPHROCAP 1 CAPSULE: 1 CAP ORAL at 09:11

## 2020-01-01 RX ADMIN — PREDNISONE 50 MG: 50 TABLET ORAL at 05:08

## 2020-01-01 RX ADMIN — ACETAMINOPHEN 650 MG: 325 TABLET, FILM COATED ORAL at 08:10

## 2020-01-01 RX ADMIN — APIXABAN 2.5 MG: 2.5 TABLET, FILM COATED ORAL at 08:09

## 2020-01-01 RX ADMIN — FUROSEMIDE 80 MG: 10 INJECTION, SOLUTION INTRAMUSCULAR; INTRAVENOUS at 02:10

## 2020-01-01 RX ADMIN — METRONIDAZOLE 500 MG: 500 INJECTION, SOLUTION INTRAVENOUS at 02:10

## 2020-01-01 RX ADMIN — NEPHROCAP 1 CAPSULE: 1 CAP ORAL at 09:10

## 2020-01-01 RX ADMIN — ONDANSETRON 4 MG: 2 INJECTION INTRAMUSCULAR; INTRAVENOUS at 06:11

## 2020-01-01 RX ADMIN — ACETAMINOPHEN 1000 MG: 500 TABLET, FILM COATED ORAL at 08:11

## 2020-01-01 RX ADMIN — APIXABAN 5 MG: 2.5 TABLET, FILM COATED ORAL at 10:11

## 2020-01-01 RX ADMIN — FUROSEMIDE 80 MG: 40 TABLET ORAL at 05:11

## 2020-01-01 RX ADMIN — METRONIDAZOLE 500 MG: 500 INJECTION, SOLUTION INTRAVENOUS at 03:10

## 2020-01-01 RX ADMIN — METOROPROLOL TARTRATE 5 MG: 5 INJECTION, SOLUTION INTRAVENOUS at 11:11

## 2020-01-01 RX ADMIN — LEVALBUTEROL 1.25 MG: 1.25 SOLUTION, CONCENTRATE RESPIRATORY (INHALATION) at 06:08

## 2020-01-01 RX ADMIN — FUROSEMIDE 40 MG: 40 TABLET ORAL at 06:09

## 2020-01-01 RX ADMIN — IPRATROPIUM BROMIDE AND ALBUTEROL SULFATE 3 ML: .5; 3 SOLUTION RESPIRATORY (INHALATION) at 12:09

## 2020-01-01 RX ADMIN — APIXABAN 2.5 MG: 2.5 TABLET, FILM COATED ORAL at 09:08

## 2020-01-01 RX ADMIN — METOPROLOL TARTRATE 50 MG: 50 TABLET, FILM COATED ORAL at 09:08

## 2020-01-01 RX ADMIN — FERROUS SULFATE TAB EC 325 MG (65 MG FE EQUIVALENT) 325 MG: 325 (65 FE) TABLET DELAYED RESPONSE at 10:11

## 2020-01-01 RX ADMIN — APIXABAN 2.5 MG: 2.5 TABLET, FILM COATED ORAL at 08:08

## 2020-01-01 RX ADMIN — HEPARIN SODIUM 5000 UNITS: 5000 INJECTION INTRAVENOUS; SUBCUTANEOUS at 08:11

## 2020-01-01 RX ADMIN — ATORVASTATIN CALCIUM 80 MG: 20 TABLET, FILM COATED ORAL at 09:08

## 2020-01-01 RX ADMIN — METOPROLOL TARTRATE 25 MG: 25 TABLET, FILM COATED ORAL at 09:10

## 2020-01-01 RX ADMIN — Medication 6 MG: at 09:09

## 2020-01-01 RX ADMIN — POLYETHYLENE GLYCOL 3350 17 G: 17 POWDER, FOR SOLUTION ORAL at 09:08

## 2020-01-01 RX ADMIN — FERROUS SULFATE TAB EC 325 MG (65 MG FE EQUIVALENT) 325 MG: 325 (65 FE) TABLET DELAYED RESPONSE at 08:11

## 2020-01-01 RX ADMIN — LEVOFLOXACIN 250 MG: 250 TABLET, FILM COATED ORAL at 09:08

## 2020-01-01 RX ADMIN — LEVALBUTEROL 1.25 MG: 1.25 SOLUTION, CONCENTRATE RESPIRATORY (INHALATION) at 01:08

## 2020-01-01 RX ADMIN — ACETAMINOPHEN 1000 MG: 500 TABLET ORAL at 10:09

## 2020-01-01 RX ADMIN — METOPROLOL TARTRATE 25 MG: 25 TABLET, FILM COATED ORAL at 10:11

## 2020-01-01 RX ADMIN — DOCUSATE SODIUM 100 MG: 100 CAPSULE, LIQUID FILLED ORAL at 10:10

## 2020-01-01 RX ADMIN — LORAZEPAM 2 MG: 2 INJECTION INTRAMUSCULAR; INTRAVENOUS at 01:11

## 2020-01-01 RX ADMIN — PROPOFOL 10 MCG/KG/MIN: 10 INJECTION, EMULSION INTRAVENOUS at 09:11

## 2020-01-01 RX ADMIN — MUPIROCIN: 20 OINTMENT TOPICAL at 08:11

## 2020-01-01 RX ADMIN — MELATONIN TAB 3 MG 6 MG: 3 TAB at 08:08

## 2020-01-01 RX ADMIN — FUROSEMIDE 40 MG: 10 INJECTION, SOLUTION INTRAMUSCULAR; INTRAVENOUS at 02:10

## 2020-01-01 RX ADMIN — METOPROLOL TARTRATE 50 MG: 50 TABLET, FILM COATED ORAL at 08:09

## 2020-01-01 RX ADMIN — FUROSEMIDE 40 MG: 40 TABLET ORAL at 06:10

## 2020-01-01 RX ADMIN — DOCUSATE SODIUM 100 MG: 100 CAPSULE, LIQUID FILLED ORAL at 09:09

## 2020-01-01 RX ADMIN — NEPHROCAP 1 CAPSULE: 1 CAP ORAL at 10:11

## 2020-01-01 RX ADMIN — PHENYLEPHRINE HYDROCHLORIDE 1 MCG/KG/MIN: 10 INJECTION INTRAVENOUS at 02:11

## 2020-01-01 RX ADMIN — METOPROLOL TARTRATE 50 MG: 50 TABLET, FILM COATED ORAL at 12:08

## 2020-01-01 RX ADMIN — DOCUSATE SODIUM 100 MG: 100 CAPSULE, LIQUID FILLED ORAL at 09:08

## 2020-01-01 RX ADMIN — METOPROLOL SUCCINATE 50 MG: 50 TABLET, EXTENDED RELEASE ORAL at 08:11

## 2020-01-01 RX ADMIN — FERROUS SULFATE TAB EC 325 MG (65 MG FE EQUIVALENT) 325 MG: 325 (65 FE) TABLET DELAYED RESPONSE at 11:09

## 2020-01-01 RX ADMIN — NEPHROCAP 1 CAPSULE: 1 CAP ORAL at 10:10

## 2020-01-01 RX ADMIN — DEXMEDETOMIDINE HYDROCHLORIDE 0.2 MCG/KG/HR: 4 INJECTION, SOLUTION INTRAVENOUS at 10:11

## 2020-01-01 RX ADMIN — MICONAZOLE NITRATE: 20 POWDER TOPICAL at 10:09

## 2020-01-01 RX ADMIN — FUROSEMIDE 40 MG: 40 TABLET ORAL at 05:09

## 2020-01-01 RX ADMIN — LIDOCAINE HYDROCHLORIDE: 20 JELLY TOPICAL at 01:07

## 2020-01-01 RX ADMIN — DIPHENHYDRAMINE HYDROCHLORIDE 25 MG: 25 CAPSULE ORAL at 08:11

## 2020-01-01 RX ADMIN — FUROSEMIDE 40 MG: 40 TABLET ORAL at 05:11

## 2020-01-01 RX ADMIN — MICONAZOLE NITRATE: 20 POWDER TOPICAL at 01:09

## 2020-01-01 RX ADMIN — FUROSEMIDE 40 MG: 40 TABLET ORAL at 07:10

## 2020-01-01 RX ADMIN — VALACYCLOVIR HYDROCHLORIDE 500 MG: 500 TABLET, FILM COATED ORAL at 09:08

## 2020-01-01 RX ADMIN — ESCITALOPRAM OXALATE 10 MG: 10 TABLET ORAL at 09:08

## 2020-01-01 RX ADMIN — MICONAZOLE NITRATE: 20 POWDER TOPICAL at 02:08

## 2020-01-01 RX ADMIN — FUROSEMIDE 40 MG: 10 INJECTION, SOLUTION INTRAMUSCULAR; INTRAVENOUS at 02:09

## 2020-01-01 RX ADMIN — LEVALBUTEROL 1.25 MG: 1.25 SOLUTION, CONCENTRATE RESPIRATORY (INHALATION) at 11:08

## 2020-01-01 RX ADMIN — IPRATROPIUM BROMIDE AND ALBUTEROL SULFATE 3 ML: .5; 3 SOLUTION RESPIRATORY (INHALATION) at 02:09

## 2020-01-01 RX ADMIN — METOPROLOL TARTRATE 25 MG: 25 TABLET ORAL at 09:08

## 2020-01-01 RX ADMIN — ESCITALOPRAM OXALATE 10 MG: 10 TABLET ORAL at 04:09

## 2020-01-01 RX ADMIN — METOPROLOL TARTRATE 25 MG: 25 TABLET, FILM COATED ORAL at 08:11

## 2020-01-01 RX ADMIN — VALACYCLOVIR HYDROCHLORIDE 500 MG: 500 TABLET, FILM COATED ORAL at 05:08

## 2020-01-01 RX ADMIN — ESCITALOPRAM OXALATE 10 MG: 10 TABLET ORAL at 10:09

## 2020-01-01 RX ADMIN — Medication 100 MCG: at 03:11

## 2020-01-01 RX ADMIN — FAMOTIDINE 20 MG: 20 TABLET ORAL at 08:11

## 2020-01-01 RX ADMIN — FUROSEMIDE 40 MG: 10 INJECTION, SOLUTION INTRAMUSCULAR; INTRAVENOUS at 06:09

## 2020-01-01 RX ADMIN — FUROSEMIDE 40 MG: 10 INJECTION, SOLUTION INTRAMUSCULAR; INTRAVENOUS at 04:10

## 2020-01-01 RX ADMIN — ATORVASTATIN CALCIUM 80 MG: 20 TABLET, FILM COATED ORAL at 10:10

## 2020-01-01 RX ADMIN — IPRATROPIUM BROMIDE AND ALBUTEROL SULFATE 3 ML: .5; 3 SOLUTION RESPIRATORY (INHALATION) at 03:09

## 2020-01-01 RX ADMIN — VANCOMYCIN HYDROCHLORIDE 2000 MG: 100 INJECTION, POWDER, LYOPHILIZED, FOR SOLUTION INTRAVENOUS at 03:10

## 2020-01-01 RX ADMIN — POTASSIUM CHLORIDE 40 MEQ: 1500 TABLET, EXTENDED RELEASE ORAL at 08:11

## 2020-01-01 RX ADMIN — IPRATROPIUM BROMIDE AND ALBUTEROL SULFATE 3 ML: .5; 3 SOLUTION RESPIRATORY (INHALATION) at 07:11

## 2020-01-01 RX ADMIN — APIXABAN 2.5 MG: 2.5 TABLET, FILM COATED ORAL at 10:08

## 2020-01-01 RX ADMIN — FENTANYL CITRATE 25 MCG: 50 INJECTION, SOLUTION INTRAMUSCULAR; INTRAVENOUS at 08:11

## 2020-01-01 RX ADMIN — FENTANYL CITRATE 25 MCG: 50 INJECTION, SOLUTION INTRAMUSCULAR; INTRAVENOUS at 07:11

## 2020-01-01 RX ADMIN — Medication 6 MG: at 10:11

## 2020-01-01 RX ADMIN — MELATONIN TAB 3 MG 6 MG: 3 TAB at 09:08

## 2020-01-01 RX ADMIN — METRONIDAZOLE 500 MG: 500 INJECTION, SOLUTION INTRAVENOUS at 10:10

## 2020-01-01 RX ADMIN — MICONAZOLE NITRATE: 20 POWDER TOPICAL at 10:10

## 2020-01-01 RX ADMIN — NEPHROCAP 1 CAPSULE: 1 CAP ORAL at 11:10

## 2020-01-01 RX ADMIN — ACETAMINOPHEN 650 MG: 325 TABLET, FILM COATED ORAL at 10:08

## 2020-01-01 RX ADMIN — FUROSEMIDE 80 MG: 10 INJECTION, SOLUTION INTRAMUSCULAR; INTRAVENOUS at 09:10

## 2020-01-01 RX ADMIN — FUROSEMIDE 40 MG: 10 INJECTION, SOLUTION INTRAMUSCULAR; INTRAVENOUS at 10:09

## 2020-01-01 RX ADMIN — LEVOFLOXACIN 500 MG: 500 TABLET, FILM COATED ORAL at 05:08

## 2020-01-01 RX ADMIN — PROPOFOL 35 MCG/KG/MIN: 10 INJECTION, EMULSION INTRAVENOUS at 02:11

## 2020-01-01 RX ADMIN — MELATONIN TAB 3 MG 6 MG: 3 TAB at 10:08

## 2020-01-01 RX ADMIN — IPRATROPIUM BROMIDE 0.5 MG: 0.5 SOLUTION RESPIRATORY (INHALATION) at 08:08

## 2020-01-01 RX ADMIN — METOPROLOL TARTRATE 5 MG: 5 INJECTION, SOLUTION INTRAVENOUS at 05:11

## 2020-01-01 RX ADMIN — VANCOMYCIN HYDROCHLORIDE 2000 MG: 10 INJECTION, POWDER, LYOPHILIZED, FOR SOLUTION INTRAVENOUS at 08:11

## 2020-01-01 RX ADMIN — ACETAMINOPHEN 1000 MG: 500 TABLET, FILM COATED ORAL at 10:11

## 2020-01-01 RX ADMIN — DOCUSATE SODIUM 100 MG: 50 LIQUID ORAL at 08:11

## 2020-01-01 RX ADMIN — DILTIAZEM HYDROCHLORIDE 10 MG: 5 INJECTION INTRAVENOUS at 02:09

## 2020-01-01 RX ADMIN — PROPOFOL 35 MCG/KG/MIN: 10 INJECTION, EMULSION INTRAVENOUS at 09:11

## 2020-01-01 RX ADMIN — SODIUM CHLORIDE, SODIUM LACTATE, POTASSIUM CHLORIDE, AND CALCIUM CHLORIDE 500 ML: .6; .31; .03; .02 INJECTION, SOLUTION INTRAVENOUS at 01:11

## 2020-01-01 RX ADMIN — HEPARIN SODIUM AND DEXTROSE 12 UNITS/KG/HR: 10000; 5 INJECTION INTRAVENOUS at 04:10

## 2020-01-01 RX ADMIN — PROPOFOL 30 MCG/KG/MIN: 10 INJECTION, EMULSION INTRAVENOUS at 01:11

## 2020-01-01 RX ADMIN — FUROSEMIDE 80 MG: 10 INJECTION, SOLUTION INTRAMUSCULAR; INTRAVENOUS at 03:10

## 2020-01-01 RX ADMIN — MELATONIN TAB 3 MG 6 MG: 3 TAB at 12:08

## 2020-01-01 RX ADMIN — PROPOFOL 25 MCG/KG/MIN: 10 INJECTION, EMULSION INTRAVENOUS at 03:11

## 2020-01-01 RX ADMIN — INSULIN ASPART 2 UNITS: 100 INJECTION, SOLUTION INTRAVENOUS; SUBCUTANEOUS at 06:08

## 2020-01-01 RX ADMIN — MUPIROCIN: 20 OINTMENT TOPICAL at 09:11

## 2020-01-01 RX ADMIN — METHYLPREDNISOLONE SODIUM SUCCINATE 80 MG: 40 INJECTION, POWDER, FOR SOLUTION INTRAMUSCULAR; INTRAVENOUS at 01:11

## 2020-01-01 RX ADMIN — FENTANYL CITRATE 25 MCG: 50 INJECTION, SOLUTION INTRAMUSCULAR; INTRAVENOUS at 03:11

## 2020-01-01 RX ADMIN — IPRATROPIUM BROMIDE AND ALBUTEROL SULFATE 3 ML: .5; 3 SOLUTION RESPIRATORY (INHALATION) at 03:10

## 2020-01-01 RX ADMIN — GLYCOPYRROLATE 0.1 MG: 0.2 INJECTION, SOLUTION INTRAMUSCULAR; INTRAVITREAL at 11:11

## 2020-01-01 RX ADMIN — FERROUS SULFATE TAB EC 325 MG (65 MG FE EQUIVALENT) 325 MG: 325 (65 FE) TABLET DELAYED RESPONSE at 10:09

## 2020-01-01 RX ADMIN — VARENICLINE TARTRATE 0.5 MG: 0.5 TABLET, FILM COATED ORAL at 09:08

## 2020-01-01 RX ADMIN — ACETAMINOPHEN 650 MG: 325 TABLET, FILM COATED ORAL at 09:08

## 2020-01-01 RX ADMIN — SODIUM CHLORIDE SOLN NEBU 3% 4 ML: 3 NEBU SOLN at 03:10

## 2020-01-01 RX ADMIN — POTASSIUM CHLORIDE 10 MEQ: 7.46 INJECTION, SOLUTION INTRAVENOUS at 08:11

## 2020-01-01 RX ADMIN — IPRATROPIUM BROMIDE AND ALBUTEROL SULFATE 3 ML: .5; 3 SOLUTION RESPIRATORY (INHALATION) at 04:09

## 2020-01-01 RX ADMIN — LEVOFLOXACIN 500 MG: 500 TABLET, FILM COATED ORAL at 09:08

## 2020-01-01 RX ADMIN — APIXABAN 2.5 MG: 2.5 TABLET, FILM COATED ORAL at 07:08

## 2020-01-01 RX ADMIN — LIDOCAINE HYDROCHLORIDE 2 ML: 10 INJECTION, SOLUTION INFILTRATION; PERINEURAL at 10:11

## 2020-01-01 RX ADMIN — PROPOFOL 35 MCG/KG/MIN: 10 INJECTION, EMULSION INTRAVENOUS at 07:11

## 2020-01-01 RX ADMIN — DILTIAZEM HYDROCHLORIDE 15 MG: 5 INJECTION INTRAVENOUS at 08:08

## 2020-01-01 RX ADMIN — VALACYCLOVIR HYDROCHLORIDE 500 MG: 500 TABLET, FILM COATED ORAL at 10:08

## 2020-01-01 RX ADMIN — FUROSEMIDE 40 MG: 40 TABLET ORAL at 10:11

## 2020-01-01 RX ADMIN — PHENYLEPHRINE HYDROCHLORIDE 0.5 MCG/KG/MIN: 10 INJECTION INTRAVENOUS at 03:11

## 2020-01-01 RX ADMIN — FERROUS SULFATE TAB EC 325 MG (65 MG FE EQUIVALENT) 325 MG: 325 (65 FE) TABLET DELAYED RESPONSE at 09:11

## 2020-01-01 RX ADMIN — SODIUM CHLORIDE 4 ML: 7 NEBU SOLN,3 % NEBU at 03:10

## 2020-01-01 RX ADMIN — PHENYLEPHRINE HYDROCHLORIDE 0.5 MCG/KG/MIN: 10 INJECTION INTRAVENOUS at 08:11

## 2020-01-01 RX ADMIN — PROPOFOL 25 MCG/KG/MIN: 10 INJECTION, EMULSION INTRAVENOUS at 01:11

## 2020-01-01 RX ADMIN — APIXABAN 2.5 MG: 2.5 TABLET, FILM COATED ORAL at 08:11

## 2020-01-01 RX ADMIN — GABAPENTIN 100 MG: 100 CAPSULE ORAL at 10:10

## 2020-01-01 RX ADMIN — FUROSEMIDE 40 MG: 10 INJECTION, SOLUTION INTRAMUSCULAR; INTRAVENOUS at 10:11

## 2020-01-01 RX ADMIN — ATORVASTATIN CALCIUM 80 MG: 20 TABLET, FILM COATED ORAL at 04:09

## 2020-01-01 RX ADMIN — IPRATROPIUM BROMIDE AND ALBUTEROL SULFATE 3 ML: .5; 3 SOLUTION RESPIRATORY (INHALATION) at 02:11

## 2020-01-01 RX ADMIN — NEPHROCAP 1 CAPSULE: 1 CAP ORAL at 11:09

## 2020-01-01 RX ADMIN — ATORVASTATIN CALCIUM 80 MG: 20 TABLET, FILM COATED ORAL at 10:09

## 2020-01-01 RX ADMIN — APIXABAN 2.5 MG: 2.5 TABLET, FILM COATED ORAL at 03:08

## 2020-01-01 RX ADMIN — MUPIROCIN: 20 OINTMENT TOPICAL at 02:11

## 2020-01-01 RX ADMIN — IPRATROPIUM BROMIDE AND ALBUTEROL SULFATE 3 ML: .5; 3 SOLUTION RESPIRATORY (INHALATION) at 07:10

## 2020-01-01 RX ADMIN — FERROUS SULFATE TAB EC 325 MG (65 MG FE EQUIVALENT) 325 MG: 325 (65 FE) TABLET DELAYED RESPONSE at 10:10

## 2020-01-01 RX ADMIN — IPRATROPIUM BROMIDE AND ALBUTEROL SULFATE 3 ML: .5; 3 SOLUTION RESPIRATORY (INHALATION) at 01:08

## 2020-01-01 RX ADMIN — PROPOFOL 5 MCG/KG/MIN: 10 INJECTION, EMULSION INTRAVENOUS at 10:11

## 2020-01-01 RX ADMIN — DEXMEDETOMIDINE HYDROCHLORIDE 1.2 MCG/KG/HR: 4 INJECTION, SOLUTION INTRAVENOUS at 02:11

## 2020-01-01 RX ADMIN — IPRATROPIUM BROMIDE AND ALBUTEROL SULFATE 3 ML: .5; 3 SOLUTION RESPIRATORY (INHALATION) at 11:10

## 2020-01-01 RX ADMIN — HEPARIN SODIUM 5000 UNITS: 5000 INJECTION INTRAVENOUS; SUBCUTANEOUS at 02:11

## 2020-01-01 RX ADMIN — DOCUSATE SODIUM 100 MG: 100 CAPSULE, LIQUID FILLED ORAL at 10:09

## 2020-01-01 RX ADMIN — FENTANYL CITRATE 25 MCG: 50 INJECTION, SOLUTION INTRAMUSCULAR; INTRAVENOUS at 12:11

## 2020-01-01 RX ADMIN — FUROSEMIDE 40 MG: 10 INJECTION, SOLUTION INTRAMUSCULAR; INTRAVENOUS at 01:10

## 2020-01-01 RX ADMIN — Medication 25 MCG/HR: at 02:11

## 2020-01-01 RX ADMIN — PREDNISONE 50 MG: 50 TABLET ORAL at 08:08

## 2020-01-01 RX ADMIN — Medication 6 MG: at 10:10

## 2020-01-01 RX ADMIN — Medication 6 MG: at 10:09

## 2020-01-01 RX ADMIN — PROPOFOL 50 MCG/KG/MIN: 10 INJECTION, EMULSION INTRAVENOUS at 12:11

## 2020-01-01 RX ADMIN — MELATONIN TAB 3 MG 6 MG: 3 TAB at 07:08

## 2020-01-01 RX ADMIN — DOCUSATE SODIUM 100 MG: 100 CAPSULE, LIQUID FILLED ORAL at 09:11

## 2020-01-01 RX ADMIN — FUROSEMIDE 60 MG: 10 INJECTION, SOLUTION INTRAMUSCULAR; INTRAVENOUS at 06:08

## 2020-01-01 RX ADMIN — FAMOTIDINE 20 MG: 20 TABLET ORAL at 09:11

## 2020-01-01 RX ADMIN — FUROSEMIDE 60 MG: 10 INJECTION, SOLUTION INTRAMUSCULAR; INTRAVENOUS at 10:08

## 2020-01-01 RX ADMIN — METOPROLOL TARTRATE 5 MG: 5 INJECTION, SOLUTION INTRAVENOUS at 09:11

## 2020-01-01 RX ADMIN — GLYCOPYRROLATE 0.1 MG: 0.2 INJECTION, SOLUTION INTRAMUSCULAR; INTRAVITREAL at 09:11

## 2020-01-01 RX ADMIN — FUROSEMIDE 40 MG: 40 TABLET ORAL at 10:10

## 2020-01-01 RX ADMIN — GABAPENTIN 100 MG: 100 CAPSULE ORAL at 10:11

## 2020-01-01 RX ADMIN — FENTANYL CITRATE 25 MCG: 50 INJECTION, SOLUTION INTRAMUSCULAR; INTRAVENOUS at 06:11

## 2020-01-01 RX ADMIN — POTASSIUM CHLORIDE 10 MEQ: 7.46 INJECTION, SOLUTION INTRAVENOUS at 05:11

## 2020-01-01 RX ADMIN — METOROPROLOL TARTRATE 5 MG: 5 INJECTION, SOLUTION INTRAVENOUS at 09:11

## 2020-01-01 RX ADMIN — METOPROLOL TARTRATE 5 MG: 5 INJECTION, SOLUTION INTRAVENOUS at 08:08

## 2020-01-01 RX ADMIN — LEVOFLOXACIN 500 MG: 500 TABLET, FILM COATED ORAL at 08:08

## 2020-01-01 RX ADMIN — ATORVASTATIN CALCIUM 80 MG: 20 TABLET, FILM COATED ORAL at 09:11

## 2020-01-01 RX ADMIN — SODIUM CHLORIDE 4 ML: 7 NEBU SOLN,3 % NEBU at 09:10

## 2020-01-01 RX ADMIN — METRONIDAZOLE 500 MG: 500 INJECTION, SOLUTION INTRAVENOUS at 11:10

## 2020-01-01 RX ADMIN — ATORVASTATIN CALCIUM 80 MG: 20 TABLET, FILM COATED ORAL at 09:09

## 2020-01-01 RX ADMIN — PROPOFOL 50 MCG/KG/MIN: 10 INJECTION, EMULSION INTRAVENOUS at 02:11

## 2020-01-01 RX ADMIN — FERROUS SULFATE TAB EC 325 MG (65 MG FE EQUIVALENT) 325 MG: 325 (65 FE) TABLET DELAYED RESPONSE at 11:10

## 2020-01-01 RX ADMIN — FUROSEMIDE 40 MG: 10 INJECTION, SOLUTION INTRAMUSCULAR; INTRAVENOUS at 09:10

## 2020-01-01 RX ADMIN — INSULIN ASPART 1 UNITS: 100 INJECTION, SOLUTION INTRAVENOUS; SUBCUTANEOUS at 09:08

## 2020-01-01 RX ADMIN — ACETAMINOPHEN 1000 MG: 500 TABLET ORAL at 09:09

## 2020-01-01 RX ADMIN — METOPROLOL TARTRATE 25 MG: 25 TABLET, FILM COATED ORAL at 02:11

## 2020-01-01 RX ADMIN — ACETAMINOPHEN 1000 MG: 500 TABLET ORAL at 08:09

## 2020-01-01 RX ADMIN — METOPROLOL TARTRATE 25 MG: 25 TABLET, FILM COATED ORAL at 09:11

## 2020-01-01 RX ADMIN — NITROGLYCERIN 0.4 MG: 0.4 TABLET, ORALLY DISINTEGRATING SUBLINGUAL at 08:08

## 2020-01-01 RX ADMIN — METOROPROLOL TARTRATE 5 MG: 5 INJECTION, SOLUTION INTRAVENOUS at 10:11

## 2020-01-01 RX ADMIN — METOPROLOL TARTRATE 50 MG: 50 TABLET, FILM COATED ORAL at 07:08

## 2020-01-12 NOTE — PATIENT INSTRUCTIONS
Patient take Tylenol every 4-6 hours for pain.    Use over-the-counter lidocaine patches or heating pad as needed for comfort.    If symptoms persist or worsen patient follow-up with PCP or go to the emergency room if symptoms persist or worsen.          Back Sprain or Strain    Injury to the muscles (strain) or ligaments (sprain) around the spine can be troubling. Injury may occur after a sudden forceful twisting or bending force such as in a car accident, after a simple awkward movement, or after lifting something heavy with poor body positioning. In any case, muscle spasm is often present and adds to the pain.  Thankfully, most people feel better in 1 to 2 weeks, and most of the rest in 1 to 2 months. Most people can remain active. Unless you had a forceful or traumatic physical injury such as a car accident or fall, X-rays may not be ordered for the first evaluation of a back sprain or strain. If pain continues and does not respond to medical treatment, your healthcare provider may then order X-rays and other tests.  Home care  The following guidelines will help you care for your injury at home:  · When in bed, try to find a comfortable position. A firm mattress is best. Try lying flat on your back with pillows under your knees. You can also try lying on your side with your knees bent up toward your chest and a pillow between your knees.  · Don't sit for long periods. Try not to take long car rides or take other trips that have you sitting for a long time. This puts more stress on the lower back than standing or walking.  · During the first 24 to 72 hours after an injury or flare-up, apply an ice pack to the painful area for 20 minutes. Then remove it for 20 minutes. Do this for 60 to 90 minutes, or several times a day. This will reduce swelling and pain. Be sure to wrap the ice pack in a thin towel or plastic to protect your skin.  · You can start with ice, then switch to heat. Heat from a hot shower, hot bath,  or heating pad reduces pain and works well for muscle spasms. Put heat on the painful area for 20 minutes, then remove for 20 minutes. Do this for 60 to 90 minutes, or several times a day. Do not use a heating pad while sleeping. It can burn the skin.  · You can alternate the ice and heat. Talk with your healthcare provider to find out the best treatment or therapy for your back pain.  · Therapeutic massage will help relax the back muscles without stretching them.  · Be aware of safe lifting methods. Do not lift anything over 15 pounds until all of the pain is gone.  Medicines  Talk to your healthcare provider before using medicines, especially if you have other health problems or are taking other medicines.  · You may use acetaminophen or ibuprofen to control pain, unless another pain medicine was prescribed. If you have chronic conditions like diabetes, liver or kidney disease, stomach ulcers, or gastrointestinal bleeding, or are taking blood-thinner medicines, talk with your doctor before taking any medicines.  · Be careful if you are given prescription medicines, narcotics, or medicine for muscle spasm. They can cause drowsiness, and affect your coordination, reflexes, and judgment. Do not drive or operate heavy machinery when taking these types of medicines. Only take pain medicine as prescribed by your healthcare provider.  Follow-up care  Follow up with your healthcare provider, or as advised. You may need physical therapy or more tests if your symptoms get worse.  If you had X-rays your healthcare provider may be checking for any broken bones, breaks, or fractures. Bruises and sprains can sometimes hurt as much as a fracture. These injuries can take time to heal completely. If your symptoms dont improve or they get worse, talk with your healthcare provider. You may need a repeat X-ray or other tests.  Call 911  Call for emergency care if any of the following occur:  · Trouble breathing  · Confused  · Very  drowsy or trouble awakening  · Fainting or loss of consciousness  · Rapid or very slow heart rate  · Loss of bowel or bladder control  When to seek medical advice  Call your healthcare provider right away if any of the following occur:  · Pain gets worse or spreads to your arms or legs  · Weakness or numbness in one or both arms or legs  · Numbness in the groin or genital area  Date Last Reviewed: 6/1/2016  © 6385-7812 [employer]. [address] [place] [address], [place] [address]. All rights reserved. This information is not intended as a substitute for professional medical care. Always follow your healthcare professional's instructions.

## 2020-01-12 NOTE — PROGRESS NOTES
"Subjective:       Patient ID: Patsy Morris is a 69 y.o. female.    Vitals:  height is 5' 3" (1.6 m) and weight is 103.9 kg (229 lb). Her temperature is 98.3 °F (36.8 °C). Her blood pressure is 112/49 (abnormal) and her pulse is 86. Her oxygen saturation is 94% (abnormal).     Chief Complaint: Abdominal Pain (LLQ started Friday )    Abdominal Pain   This is a new problem. The current episode started in the past 7 days (Started Friday ). The onset quality is gradual. The problem occurs intermittently. The problem has been waxing and waning. The pain is located in the LLQ. The abdominal pain does not radiate. Pertinent negatives include no constipation, diarrhea, dysuria, fever, nausea or vomiting. The pain is relieved by sitting up and standing (Walking ). Treatments tried: Ibuprofen. There is no history of abdominal surgery.       Constitution: Negative for appetite change, chills, sweating and fever.   HENT: Negative for trouble swallowing.    Cardiovascular: Negative for chest pain.   Respiratory: Negative for shortness of breath.    Gastrointestinal: Positive for abdominal pain. Negative for abdominal trauma, abdominal bloating, history of abdominal surgery, nausea, vomiting, constipation, diarrhea, dark colored stools and heartburn.   Genitourinary: Negative for dysuria, missed menses and pelvic pain.   Musculoskeletal: Negative for back pain.       Objective:      Physical Exam   Constitutional: She is oriented to person, place, and time. She appears well-developed and well-nourished. She is cooperative.  Non-toxic appearance. She does not appear ill. No distress.   HENT:   Head: Normocephalic and atraumatic.   Right Ear: Hearing, tympanic membrane, external ear and ear canal normal.   Left Ear: Hearing, tympanic membrane, external ear and ear canal normal.   Nose: Nose normal. No mucosal edema, rhinorrhea or nasal deformity. No epistaxis. Right sinus exhibits no maxillary sinus tenderness and no frontal " sinus tenderness. Left sinus exhibits no maxillary sinus tenderness and no frontal sinus tenderness.   Mouth/Throat: Uvula is midline, oropharynx is clear and moist and mucous membranes are normal. No trismus in the jaw. Normal dentition. No uvula swelling. No posterior oropharyngeal erythema.   Eyes: Conjunctivae and lids are normal. Right eye exhibits no discharge. Left eye exhibits no discharge. No scleral icterus.   Neck: Trachea normal, normal range of motion, full passive range of motion without pain and phonation normal. Neck supple.   Cardiovascular: Normal rate, regular rhythm, normal heart sounds, intact distal pulses and normal pulses.   Pulmonary/Chest: Effort normal and breath sounds normal. No respiratory distress.   Abdominal: Soft. Normal appearance and bowel sounds are normal. She exhibits no distension, no pulsatile midline mass and no mass. There is no tenderness.   Musculoskeletal: She exhibits no edema or deformity.        Lumbar back: She exhibits tenderness, pain and spasm. She exhibits normal range of motion, no bony tenderness, no swelling, no edema, no deformity and no laceration.        Back:    Neurological: She is alert and oriented to person, place, and time. She exhibits normal muscle tone. Coordination normal.   Skin: Skin is warm, dry, intact, not diaphoretic and not pale.   Psychiatric: She has a normal mood and affect. Her speech is normal and behavior is normal. Judgment and thought content normal. Cognition and memory are normal.   Nursing note and vitals reviewed.        Results for orders placed or performed in visit on 01/12/20   POCT Urinalysis, Dipstick, Automated, W/O Scope   Result Value Ref Range    POC Blood, Urine Negative Negative    POC Bilirubin, Urine Negative Negative    POC Urobilinogen, Urine normal 0.1 - 1.1    POC Ketones, Urine Negative Negative    POC Protein, Urine Negative Negative    POC Nitrates, Urine Negative Negative    POC Glucose, Urine Negative  Negative    pH, UA 6.0     POC Specific Gravity, Urine 1.025 1.003 - 1.029    POC Leukocytes, Urine Positive (A) Negative     Assessment:       1. Acute left-sided low back pain without sciatica    2. Abdominal pain, unspecified abdominal location        Plan:       Patient denies UTI symptoms  Acute left-sided low back pain without sciatica  -     Discontinue: methocarbamol (ROBAXIN) 750 MG Tab; Take 1 tablet (750 mg total) by mouth 3 (three) times daily. for 2 days  Dispense: 6 tablet; Refill: 0    Abdominal pain, unspecified abdominal location  -     POCT Urinalysis, Dipstick, Automated, W/O Scope      Patient Instructions   Patient take Tylenol every 4-6 hours for pain.    Use over-the-counter lidocaine patches or heating pad as needed for comfort.    If symptoms persist or worsen patient follow-up with PCP or go to the emergency room if symptoms persist or worsen.          Back Sprain or Strain    Injury to the muscles (strain) or ligaments (sprain) around the spine can be troubling. Injury may occur after a sudden forceful twisting or bending force such as in a car accident, after a simple awkward movement, or after lifting something heavy with poor body positioning. In any case, muscle spasm is often present and adds to the pain.  Thankfully, most people feel better in 1 to 2 weeks, and most of the rest in 1 to 2 months. Most people can remain active. Unless you had a forceful or traumatic physical injury such as a car accident or fall, X-rays may not be ordered for the first evaluation of a back sprain or strain. If pain continues and does not respond to medical treatment, your healthcare provider may then order X-rays and other tests.  Home care  The following guidelines will help you care for your injury at home:  · When in bed, try to find a comfortable position. A firm mattress is best. Try lying flat on your back with pillows under your knees. You can also try lying on your side with your knees bent up  toward your chest and a pillow between your knees.  · Don't sit for long periods. Try not to take long car rides or take other trips that have you sitting for a long time. This puts more stress on the lower back than standing or walking.  · During the first 24 to 72 hours after an injury or flare-up, apply an ice pack to the painful area for 20 minutes. Then remove it for 20 minutes. Do this for 60 to 90 minutes, or several times a day. This will reduce swelling and pain. Be sure to wrap the ice pack in a thin towel or plastic to protect your skin.  · You can start with ice, then switch to heat. Heat from a hot shower, hot bath, or heating pad reduces pain and works well for muscle spasms. Put heat on the painful area for 20 minutes, then remove for 20 minutes. Do this for 60 to 90 minutes, or several times a day. Do not use a heating pad while sleeping. It can burn the skin.  · You can alternate the ice and heat. Talk with your healthcare provider to find out the best treatment or therapy for your back pain.  · Therapeutic massage will help relax the back muscles without stretching them.  · Be aware of safe lifting methods. Do not lift anything over 15 pounds until all of the pain is gone.  Medicines  Talk to your healthcare provider before using medicines, especially if you have other health problems or are taking other medicines.  · You may use acetaminophen or ibuprofen to control pain, unless another pain medicine was prescribed. If you have chronic conditions like diabetes, liver or kidney disease, stomach ulcers, or gastrointestinal bleeding, or are taking blood-thinner medicines, talk with your doctor before taking any medicines.  · Be careful if you are given prescription medicines, narcotics, or medicine for muscle spasm. They can cause drowsiness, and affect your coordination, reflexes, and judgment. Do not drive or operate heavy machinery when taking these types of medicines. Only take pain medicine as  prescribed by your healthcare provider.  Follow-up care  Follow up with your healthcare provider, or as advised. You may need physical therapy or more tests if your symptoms get worse.  If you had X-rays your healthcare provider may be checking for any broken bones, breaks, or fractures. Bruises and sprains can sometimes hurt as much as a fracture. These injuries can take time to heal completely. If your symptoms dont improve or they get worse, talk with your healthcare provider. You may need a repeat X-ray or other tests.  Call 911  Call for emergency care if any of the following occur:  · Trouble breathing  · Confused  · Very drowsy or trouble awakening  · Fainting or loss of consciousness  · Rapid or very slow heart rate  · Loss of bowel or bladder control  When to seek medical advice  Call your healthcare provider right away if any of the following occur:  · Pain gets worse or spreads to your arms or legs  · Weakness or numbness in one or both arms or legs  · Numbness in the groin or genital area  Date Last Reviewed: 6/1/2016 © 2000-2017 [employer]. [address] [place] [address], [place] [address]. All rights reserved. This information is not intended as a substitute for professional medical care. Always follow your healthcare professional's instructions.

## 2020-01-16 NOTE — TELEPHONE ENCOUNTER
Spoke with patient, increased lasix 40mg to twice daily per Dr Lutz. Appointment with Tania on Monday.

## 2020-01-20 PROBLEM — Z99.81 SUPPLEMENTAL OXYGEN DEPENDENT: Status: ACTIVE | Noted: 2020-01-01

## 2020-01-20 NOTE — PROGRESS NOTES
Ms. Morris is a patient of Dr. Lutz and was last seen in [facility] Cardiology Visit 4/22/19.      Subjective:   Patient ID:  Patsy Morris is a 69 y.o. female who presents for follow-up of Leg Swelling    Problems:  HFpEF, EF 55%  HTN  HLD  Paroxysmal atrial fibrillation  Typical atrial flutter  Current smoker    HPI  Ms. Morris is in clinic today for LE edema that started about a week to 10 days ago.  Reports increased periods of sitting d/t a musculoskeletal injury.  She spoke with Dr. Lutz on Monday last week and he had her double her furosemide to twice a day.  Since then her weight has not come down.  She has not noted a worsening of her SOB.  Patient denies chest pain with exertion or at rest, palpitations, dizziness, syncope,  orthopnea, PND, or claudication.  Reports no routine exercise.     Review of Systems   Constitution: Negative for decreased appetite, diaphoresis, malaise/fatigue, weight gain and weight loss.   Eyes: Negative for visual disturbance.   Cardiovascular: Positive for dyspnea on exertion. Negative for chest pain, claudication, irregular heartbeat, leg swelling, near-syncope, orthopnea, palpitations, paroxysmal nocturnal dyspnea and syncope.        Denies chest pressure   Respiratory: Positive for shortness of breath. Negative for cough, hemoptysis, sleep disturbances due to breathing and snoring.    Endocrine: Negative for cold intolerance and heat intolerance.   Hematologic/Lymphatic: Negative for bleeding problem. Does not bruise/bleed easily.   Musculoskeletal: Negative for myalgias.   Gastrointestinal: Negative for bloating, abdominal pain, anorexia, change in bowel habit, constipation, diarrhea, nausea and vomiting.   Neurological: Negative for difficulty with concentration, disturbances in coordination, excessive daytime sleepiness, dizziness, headaches, light-headedness, loss of balance, numbness and weakness.   Psychiatric/Behavioral: The patient does not have insomnia.         Allergies and current medications updated and reviewed:  Review of patient's allergies indicates:  No Known Allergies  Current Outpatient Medications   Medication Sig    albuterol-ipratropium 2.5mg-0.5mg/3mL (DUO-NEB) 0.5 mg-3 mg(2.5 mg base)/3 mL nebulizer solution Take 3 mLs by nebulization every 6 (six) hours as needed for Wheezing or Shortness of Breath.    ammonium lactate (LAC-HYDRIN) 12 % lotion Apply topically as needed for Dry Skin. (Patient not taking: Reported on 1/12/2020)    apixaban (ELIQUIS) 2.5 mg Tab TAKE 1 TABLET(2.5 MG) BY MOUTH TWICE DAILY    atorvastatin (LIPITOR) 80 MG tablet TAKE 1 TABLET BY MOUTH EVERY EVENING.    blood sugar diagnostic (BLOOD GLUCOSE TEST) Strp Qd testing    ELIQUIS 2.5 mg Tab TAKE 1 TABLET(2.5 MG) BY MOUTH TWICE DAILY    escitalopram oxalate (LEXAPRO) 10 MG tablet TAKE ONE TABLET BY MOUTH EVERY DAY IN THE EVENING    ferrous sulfate (FEOSOL) 325 mg (65 mg iron) Tab tablet TAKE ONE TABLET BY MOUTH DAILY WITH BREAKFAST    furosemide (LASIX) 40 MG tablet TAKE 1 TABLET BY MOUTH ONCE DAILY.    ketoconazole (NIZORAL) 2 % cream Apply to affected areas of body BID. (Patient not taking: Reported on 1/12/2020)    mometasone 0.1% (ELOCON) 0.1 % cream Apply topically once daily. (Patient not taking: Reported on 1/12/2020)    mupirocin (BACTROBAN) 2 % ointment Apply to affected area 3 times daily (Patient not taking: Reported on 1/12/2020)    silver sulfADIAZINE 1% (SILVADENE) 1 % cream LEOLA AA LIGHTLY WITH DRESSING BID    triamcinolone acetonide 0.025% (KENALOG) 0.025 % cream AAA of body bid (Patient not taking: Reported on 1/12/2020)    valACYclovir (VALTREX) 500 MG tablet Take 1 tablet (500 mg total) by mouth once daily. (Patient not taking: Reported on 1/12/2020)    VOLTAREN 1 % Gel     walker Misc Prn usage dx E66.01 (Patient not taking: Reported on 1/12/2020)    walker Misc Prn use please fit pt (Patient not taking: Reported on 1/12/2020)     No current  "facility-administered medications for this visit.        Objective:     Right Arm BP - Sittin/55 (20 1607)  Left Arm BP - Sittin/61 (20 1607)    /61 (BP Location: Left arm, Patient Position: Sitting, BP Method: Large (Automatic))   Pulse 66   Ht 5' 3" (1.6 m)   Wt 103.2 kg (227 lb 8.2 oz)   LMP  (LMP Unknown)   SpO2 (!) 94%   BMI 40.30 kg/m²       Physical Exam   Constitutional: She is oriented to person, place, and time. Vital signs are normal. She appears well-developed and well-nourished. She is active. No distress.   HENT:   Head: Normocephalic and atraumatic.   Eyes: Conjunctivae and lids are normal. No scleral icterus.   Neck: Neck supple. Normal carotid pulses, no hepatojugular reflux and no JVD present. Carotid bruit is not present.   Cardiovascular: Normal rate, regular rhythm, S1 normal, S2 normal and intact distal pulses. PMI is not displaced. Exam reveals no gallop and no friction rub.   No murmur heard.  Pulses:       Carotid pulses are 2+ on the right side, and 2+ on the left side.       Radial pulses are 2+ on the right side, and 2+ on the left side.        Dorsalis pedis pulses are 2+ on the right side, and 2+ on the left side.        Posterior tibial pulses are 1+ on the right side, and 1+ on the left side.   Pulmonary/Chest: Effort normal. No respiratory distress. She has decreased breath sounds (throughout). She has no wheezes. She has no rhonchi. She has no rales. She exhibits no tenderness.   Abdominal: Soft. Normal appearance and bowel sounds are normal. She exhibits no distension, no fluid wave, no abdominal bruit, no ascites and no pulsatile midline mass. There is no hepatosplenomegaly. There is no tenderness.   Musculoskeletal: She exhibits no edema.   Neurological: She is alert and oriented to person, place, and time. Gait normal.   Skin: Skin is warm, dry and intact. No rash noted. She is not diaphoretic. Nails show no clubbing.   Psychiatric: She has a " normal mood and affect. Her speech is normal and behavior is normal. Judgment and thought content normal. Cognition and memory are normal.   Nursing note and vitals reviewed.      Chemistry        Component Value Date/Time     09/27/2019 1007    K 4.5 09/27/2019 1007    CL 98 09/27/2019 1007    CO2 34 (H) 09/27/2019 1007    BUN 17 09/27/2019 1007    CREATININE 1.8 (H) 09/27/2019 1007     (H) 09/27/2019 1007        Component Value Date/Time    CALCIUM 9.2 09/27/2019 1007    ALKPHOS 58 09/27/2019 1007    AST 12 09/27/2019 1007    ALT 7 (L) 09/27/2019 1007    BILITOT 0.5 09/27/2019 1007    ESTGFRAFRICA 32.2 (A) 09/27/2019 1007    EGFRNONAA 27.9 (A) 09/27/2019 1007        Lab Results   Component Value Date    HGBA1C 5.8 (H) 03/19/2019     Recent Labs   Lab 03/28/17 2023 09/27/19  [facility]   WBC 8.14   < > 7.64   Hemoglobin 12.0   < > 11.3 L   Hematocrit 37.6   < > 36.2 L   Mean Corpuscular Volume 88   < > 87   Platelets 191   < > 182   BNP 58  --   --    Cholesterol  --    < > 129   HDL  --    < > 35 L   LDL Cholesterol  --    < > 75.8   Triglycerides  --    < > 91   Hdl/Cholesterol Ratio  --    < > 27.1    < > = values in this interval not displayed.       Recent Labs   Lab 03/28/17 2023   INR 0.9        Test(s) Reviewed  I have reviewed the following in detail:  [] Stress test   [] Angiography   [x] Echocardiogram   [x] Labs   [] Other:         Assessment/Plan:   1. Chronic diastolic congestive heart failure  Euvolemic on exam with only trace pitting edema in ankles. Weight is stable based on clinic weights. She would benefit from increased CV exercise and smoking cessation.  Continue current regimen.     2. HTN (hypertension), benign  BP at goal <130/80. Continue current regimen.      3. Dyslipidemia  LDL at goal <100. No changes      4. Paroxysmal atrial fibrillation      5. Typical atrial flutter      6. Chronic kidney disease (CKD) stage G4/A1, severely decreased glomerular filtration rate (GFR)  between 15-29 mL/min/1.73 square meter and albuminuria creatinine ratio less than 30 mg/g      7. Long term current use of anticoagulant  Denies bleeding.  Discussed when to seek immediate medical attention.       8. Prediabetes  A1C 5.8    9. Supplemental oxygen dependence       The patient was discussed and examined by Dr. nUger    Keep scheduled follow up with Dr. Lutz in April.

## 2020-01-20 NOTE — TELEPHONE ENCOUNTER
----- Message from Luisana Cartagena MD sent at 1/18/2020  2:40 PM CST -----  Contact: Pt  Please triage this for need to go to ED  ----- Message -----  From: Suzette Alirio Wolf  Sent: 1/16/2020  11:01 AM CST  To: Luisana Cartagena MD    Pt says she's retaining a lot of fluid in her feet and ankles. She's asking to see you but there's nothing available. She's asking how serious this is will she have to go to ED or UC. Please call to advise.

## 2020-01-22 NOTE — PROGRESS NOTES
Patient, Patsy Morris (MRN #[id number]), presented with a recorded BMI of 40.3 kg/m^2 consistent with the definition of morbid obesity (ICD-10 E66.01). The patient's morbid obesity was monitored, evaluated, addressed and/or treated. This addendum to the medical record is made on 01/22/2020.

## 2020-01-29 NOTE — TELEPHONE ENCOUNTER
----- Message from Padmini Rogers sent at 1/29/2020 12:23 PM CST -----  Contact: [phone]  Calling to speak with nurse regarding appointment. Please call

## 2020-04-21 NOTE — TELEPHONE ENCOUNTER
----- Message from Ana Rosa Casillas sent at 4/21/2020  1:05 PM CDT -----  Contact: pt    Name of Who is Calling:SARAH DIOP [[id number]]    What is the request in detail: Pt would like a call back regarding a recent fall . Pt would like to speak with the staff Please contact to further discuss and advise    Can the clinic reply by [facility]: No    What Number to [facility] Back [facility]: [phone]

## 2020-04-21 NOTE — TELEPHONE ENCOUNTER
Talk with she stated she is not able to come into the office she will get someone to help her download the [facility] appt so she can do a virtual visit. Inform pt to call me back I can walk her through it if she can't get anyone to help her. Pt is wanting Dr Cartagena to referral for home health.

## 2020-04-24 NOTE — PROGRESS NOTES
Please note the following patient's information was forwarded to [facility] (N) for case management and/or  on 4/24/2020.    Please contact Ext. [phone] with any questions.    Thank you,    Shelli Soto, [facility]  Outpatient Case Mgmnt  [phone]

## 2020-04-30 PROBLEM — F32.1 CURRENT MODERATE EPISODE OF MAJOR DEPRESSIVE DISORDER WITHOUT PRIOR EPISODE: Status: ACTIVE | Noted: 2020-01-01

## 2020-04-30 NOTE — PROGRESS NOTES
"Subjective:       Patient ID: Patsy Morris is a 70 y.o. female.    Chief Complaint: Diabetes    HPI   Pt in virtual visit for follow up of dm with obesity she is not on a weight loss ada diet   she is diet controlled had recent fall with concern for future falls not on meds for dm no hypoglycemia no diaphoresis no sob/cp   denies uri symptoms no n/v/f/c/d/c no cough chest congestion  Pt has htn renal / heart disease on lasix supplemental O2 declines ace/arb as her bp is fine no sob/cp  Pt has hypercholesterolemia on statin no muscle aches   Pt has depression anxiety oon lexapro no si/hi no panic attacks but concerned due to covid 19   Review of Systems   Constitutional: Positive for fatigue. Negative for chills and fever.   Respiratory: Negative for cough, chest tightness and shortness of breath.    Cardiovascular: Negative for chest pain and palpitations.   Gastrointestinal: Negative for abdominal distention, abdominal pain and blood in stool.   Psychiatric/Behavioral: Negative for behavioral problems, dysphoric mood and sleep disturbance. The patient is not nervous/anxious.        Objective:     /62   Pulse 92   Temp 98.5 °F (36.9 °C)   Resp 16   Ht 5' 3" (1.6 m)   Wt 99.3 kg (219 lb)   LMP  (LMP Unknown)   SpO2 (!) 94%   BMI 38.79 kg/m²   Physical Exam   Constitutional: She appears well-developed. No distress.   obese   HENT:   Head: Normocephalic and atraumatic.   Pt is able to breath through her nose   Pulmonary/Chest: Effort normal. No respiratory distress.   Pt is speaking effortlessly   Psychiatric: She has a normal mood and affect. Her behavior is normal. Judgment and thought content normal.     hgb a1c 5.8 3/2019  Bun/creat 17/1.8 9/2019   Assessment:       1. Diabetes mellitus type 2, uncontrolled, without complications    2. At high risk for injury related to fall    3. Essential hypertension    4. Mixed hyperlipidemia    5. Chronic respiratory failure with hypoxia, on home O2 " "therapy    6. Class 2 severe obesity due to excess calories with serious comorbidity and body mass index (BMI) of 35.0 to 35.9 in adult        Plan:     orders cmp lipid hgb a1c   Cont meds  Case management ordered  Low fat low salt low sugar weight loss diet  Graded exercise  rtc 3-6 months and prn    The patient location is: home  The chief complaint leading to consultation is: falls  Visit type: televisual  Total time spent with patient: 20 minutes   Each patient to whom he or she provides medical services by telemedicine is:  (1) informed of the relationship between the physician and patient and the respective role of any other health care provider with respect to management of the patient; and (2) notified that he or she may decline to receive medical services by telemedicine and may withdraw from such care at any time.         "This note will not be shared with the patient."   "

## 2020-05-15 NOTE — TELEPHONE ENCOUNTER
----- Message from Cary Bauer sent at 5/15/2020 10:15 AM CDT -----  Contact: pt   Pt is calling to speak with the nurse pt would like to discuss something with her can you please call pt at [phone].    BETSY

## 2020-06-12 NOTE — TELEPHONE ENCOUNTER
----- Message from Steffi Reyes sent at 6/12/2020  3:29 PM CDT -----  Contact: SARAH DIOP [[id number]]    Type:  Patient Returning Call    Who Called: SARAH DIOP [[id number]]    Who Left Message for Patient: unknown     Does the patient know what this is regarding?: N    Can the clinic reply in [facility]: No    Best Call Back Number: [phone]    Additional Information: N/A

## 2020-06-15 NOTE — TELEPHONE ENCOUNTER
Called and spoke with pt to schedule a mammogram appt.  Pt stated she's not having a mammogram at this time

## 2020-06-22 NOTE — TELEPHONE ENCOUNTER
----- Message from Sol Martinez sent at 6/22/2020 12:45 PM CDT -----  Regarding: Ears  Contact: pt at [phone]  Uyen  ptAmaury-Needs to talk to you about an appt.

## 2020-06-22 NOTE — PROGRESS NOTES
Patient will come in on the 14 th of July for ear cleaning only she has a upcoming appointment with cyto the same day

## 2020-06-25 NOTE — PROGRESS NOTES
Care Everywhere:   Immunization: updated   Health Maintenance: updated  Media Review: reviewed for outside mammogram report  Legacy Review:   Order placed:   Upcoming appts:colonoscopy 6.26.2020

## 2020-06-26 NOTE — PROGRESS NOTES
Subjective:    Patient ID:  Patsy Morris is a 70 y.o. female who presents for follow-up of HFpEF    HPI     70 year old female followed with HFpEF, paroxsymal AFl, CRI, COPD on supplemental O2, and post nephrectomy of renal cell ca. She is depressed of the COVID isolation from family. She has no increase in SOB or edema.  Lab Results   Component Value Date     09/11/2020    K 4.4 09/11/2020    CL 96 09/11/2020    CO2 34 (H) 09/11/2020    BUN 36 (H) 09/11/2020    CREATININE 1.9 (H) 09/11/2020     09/11/2020    HGBA1C 5.6 08/23/2020    MG 2.2 09/11/2020    AST 12 09/05/2020    ALT 14 09/05/2020    ALBUMIN 3.0 (L) 09/05/2020    PROT 5.7 (L) 09/05/2020    BILITOT 0.6 09/05/2020    WBC 7.29 09/11/2020    HGB 10.0 (L) 09/11/2020    HCT 33.3 (L) 09/11/2020    MCV 89 09/11/2020     (L) 09/11/2020    INR 1.0 08/22/2020    TSH 1.279 08/22/2020         Lab Results   Component Value Date    CHOL 119 (L) 06/19/2020    HDL 39 (L) 06/19/2020    TRIG 94 06/19/2020       Lab Results   Component Value Date    LDLCALC 61.2 (L) 06/19/2020       Past Medical History:   Diagnosis Date    Arthritis     Asthma     Atrial fibrillation     Atrial flutter     Cerumen impaction     CHF (congestive heart failure)     CKD (chronic kidney disease), stage III     Colon polyps 2017    COPD exacerbation     Encounter for blood transfusion     HEARING LOSS     Herpes genitalis     Hypertension     Renal carcinoma 3/10/2015    Thyroid nodule 6/8/2017       Current Outpatient Medications:     apixaban (ELIQUIS) 2.5 mg Tab, TAKE 1 TABLET(2.5 MG) BY MOUTH TWICE DAILY, Disp: 60 tablet, Rfl: 6    atorvastatin (LIPITOR) 80 MG tablet, TAKE 1 TABLET BY MOUTH EVERY EVENING., Disp: 90 tablet, Rfl: 1    blood sugar diagnostic (BLOOD GLUCOSE TEST) Strp, Qd testing, Disp: 100 strip, Rfl: 3    walker Misc, Prn use please fit pt, Disp: 1 each, Rfl: 0    acetaminophen (TYLENOL) 500 MG tablet, Take 2 tablets (1,000 mg  total) by mouth every 6 (six) hours as needed for Pain., Disp: , Rfl:     albuterol-ipratropium 2.5mg-0.5mg/3mL (DUO-NEB) 0.5 mg-3 mg(2.5 mg base)/3 mL nebulizer solution, Take 3 mLs by nebulization every 6 (six) hours as needed for Wheezing or Shortness of Breath., Disp: 3 mL, Rfl: 2    docusate sodium (COLACE) 100 MG capsule, Take 1 capsule (100 mg total) by mouth 2 (two) times daily., Disp: 60 capsule, Rfl: 0    ergocalciferol (ERGOCALCIFEROL) 50,000 unit Cap, Take 1 capsule (50,000 Units total) by mouth every 7 days., Disp:  , Rfl:     escitalopram oxalate (LEXAPRO) 10 MG tablet, TAKE ONE TABLET BY MOUTH EVERY DAY IN THE EVENING, Disp: 90 tablet, Rfl: 0    ferrous sulfate (FEOSOL) 325 mg (65 mg iron) Tab tablet, TAKE ONE TABLET BY MOUTH DAILY WITH BREAKFAST, Disp: 30 tablet, Rfl: 4    ferrous sulfate 325 (65 FE) MG EC tablet, Take 1 tablet (325 mg total) by mouth once daily., Disp: 30 tablet, Rfl: 0    furosemide (LASIX) 40 MG tablet, Take 1 tablet (40 mg total) by mouth 2 (two) times daily., Disp: 60 tablet, Rfl: 1    gabapentin (NEURONTIN) 100 MG capsule, Take 1 capsule (100 mg total) by mouth 2 (two) times daily., Disp: 90 capsule, Rfl: 11    metoprolol tartrate (LOPRESSOR) 50 MG tablet, Take 1 tablet (50 mg total) by mouth 2 (two) times daily., Disp: 60 tablet, Rfl: 0    miconazole NITRATE 2 % (MICOTIN) 2 % top powder, Apply topically 2 (two) times daily., Disp: , Rfl: 0    polyethylene glycol (GLYCOLAX) 17 gram PwPk, Take 17 g by mouth daily as needed., Disp:  , Rfl: 0    potassium chloride SA (K-DUR,KLOR-CON) 20 MEQ tablet, Take 1 tablet (20 mEq total) by mouth 2 (two) times daily., Disp:  , Rfl:     valACYclovir (VALTREX) 500 MG tablet, Take 1 tablet (500 mg total) by mouth once daily., Disp: 90 tablet, Rfl: 3    vitamin renal formula, B-complex-vitamin c-folic acid, (NEPHROCAP) 1 mg Cap, Take 1 capsule by mouth once daily., Disp: 30 each, Rfl: 0          Review of Systems   Constitution:  Negative for decreased appetite, diaphoresis, fever, malaise/fatigue, weight gain and weight loss.   HENT: Negative for congestion, ear discharge, ear pain and nosebleeds.    Eyes: Negative for blurred vision, double vision and visual disturbance.   Cardiovascular: Positive for dyspnea on exertion. Negative for chest pain, claudication, cyanosis, irregular heartbeat, leg swelling, near-syncope, orthopnea, palpitations, paroxysmal nocturnal dyspnea and syncope.   Respiratory: Positive for shortness of breath. Negative for cough, hemoptysis, sleep disturbances due to breathing, snoring, sputum production and wheezing.    Endocrine: Negative for polydipsia, polyphagia and polyuria.   Hematologic/Lymphatic: Negative for adenopathy and bleeding problem. Does not bruise/bleed easily.   Skin: Negative for color change, nail changes, poor wound healing and rash.   Musculoskeletal: Negative for muscle cramps and muscle weakness.   Gastrointestinal: Negative for abdominal pain, anorexia, change in bowel habit, hematochezia, nausea and vomiting.   Genitourinary: Negative for dysuria, frequency and hematuria.   Neurological: Negative for brief paralysis, difficulty with concentration, excessive daytime sleepiness, dizziness, focal weakness, headaches, light-headedness, seizures, vertigo and weakness.   Psychiatric/Behavioral: Negative for altered mental status and depression.   Allergic/Immunologic: Negative for persistent infections.        Objective:LMP  (LMP Unknown)             Physical Exam   Constitutional: She is oriented to person, place, and time. She appears well-developed and well-nourished.   Morbid obese   HENT:   Head: Normocephalic.   Right Ear: External ear normal.   Left Ear: External ear normal.   Nose: Nose normal.   Inspection of lips, teeth and gums normal   Eyes: Pupils are equal, round, and reactive to light. Conjunctivae and EOM are normal. No scleral icterus.   Neck: Normal range of motion. No JVD  present. No tracheal deviation present. No thyromegaly present.   Cardiovascular: Normal rate, regular rhythm, normal heart sounds and intact distal pulses. Exam reveals no gallop and no friction rub.   No murmur heard.  Pulmonary/Chest: Effort normal and breath sounds normal. No respiratory distress. She has no wheezes. She has no rales.         She exhibits no tenderness.   Abdominal: Soft. Bowel sounds are normal. She exhibits no distension. There is no hepatosplenomegaly. There is no abdominal tenderness. There is no guarding.   Musculoskeletal: Normal range of motion.         General: No tenderness or edema.   Lymphadenopathy:   Palpation of lymph nodes of neck and groin normal   Neurological: She is oriented to person, place, and time. No cranial nerve deficit. She exhibits normal muscle tone. Coordination normal.   Skin: Skin is warm and dry. No rash noted. No erythema. No pallor.   Palpation of skin normal   Psychiatric: She has a normal mood and affect. Her behavior is normal. Judgment and thought content normal.         Assessment:       1. Chronic diastolic congestive heart failure    2. HTN (hypertension), benign    3. Dyslipidemia    4. Paroxysmal atrial fibrillation    5. Chronic kidney disease (CKD) stage G4/A1, severely decreased glomerular filtration rate (GFR) between 15-29 mL/min/1.73 square meter and albuminuria creatinine ratio less than 30 mg/g    6. Chronic hypercapnic respiratory failure    7. Supplemental oxygen dependent    8. Chronic respiratory failure with hypoxia    9. Long term current use of anticoagulant         Plan:       Patsy was seen today for congestive heart failure.    Diagnoses and all orders for this visit:    Chronic diastolic congestive heart failure    HTN (hypertension), benign    Dyslipidemia  -     Lipid Panel; Future; Expected date: 12/26/2020    Paroxysmal atrial fibrillation    Chronic kidney disease (CKD) stage G4/A1, severely decreased glomerular filtration  rate (GFR) between 15-29 mL/min/1.73 square meter and albuminuria creatinine ratio less than 30 mg/g  -     Comprehensive metabolic panel; Future; Expected date: 12/26/2020    Chronic hypercapnic respiratory failure    Supplemental oxygen dependent    Chronic respiratory failure with hypoxia    Long term current use of anticoagulant  -     CBC auto differential; Future; Expected date: 12/26/2020    Other orders  -     Discontinue: ergocalciferol (VITAMIN D2) 50,000 unit Cap; Take 50,000 Units by mouth every 7 days.

## 2020-07-13 NOTE — TELEPHONE ENCOUNTER
----- Message from Sol Martinez sent at 7/13/2020  9:54 AM CDT -----  Regarding: Missed a call from [facility] a few minutes ago  Contact: [phone]  Harper pt just missed your call.  Please try again.

## 2020-07-13 NOTE — PROGRESS NOTES
LINKS immunization registry triggered  Care Everywhere updated  Health Maintenance updated  Chart reviewed for overdue Proactive [facility] Encounters health maintenance testing  Patient has open case request for colonoscopy.

## 2020-07-14 NOTE — PROGRESS NOTES
Subjective:       Patient ID: Patsy Morris is a 70 y.o. female.    Chief Complaint: Ear Fullness    Ear Fullness   There is pain in both ears. This is a recurrent problem. The current episode started more than 1 month ago. The problem occurs constantly. The problem has been waxing and waning. There has been no fever. The patient is experiencing no pain. Associated symptoms include hearing loss. Pertinent negatives include no coughing, diarrhea, ear discharge, headaches, neck pain, rash, rhinorrhea or vomiting. She has tried nothing for the symptoms. Her past medical history is significant for hearing loss.     Review of Systems   Constitutional: Negative for activity change, appetite change and fever.   HENT: Positive for hearing loss. Negative for congestion, ear discharge, ear pain, nosebleeds, postnasal drip, rhinorrhea and sneezing.    Eyes: Negative for redness and visual disturbance.   Respiratory: Negative for apnea, cough, shortness of breath and wheezing.    Cardiovascular: Negative for chest pain and palpitations.   Gastrointestinal: Negative for diarrhea and vomiting.   Genitourinary: Negative for difficulty urinating and frequency.   Musculoskeletal: Negative for arthralgias, back pain, gait problem and neck pain.   Skin: Negative for color change and rash.   Neurological: Negative for dizziness, speech difficulty, weakness and headaches.   Hematological: Negative for adenopathy. Does not bruise/bleed easily.   Psychiatric/Behavioral: Negative for agitation and behavioral problems.       Objective:        Constitutional:   Vital signs are normal. She appears well-developed and well-nourished. She is active. Normal speech.      Head:  Normocephalic and atraumatic. Salivary glands normal.  Facial strength is normal.      Nose:  Nose normal including turbinates, nasal mucosa, sinuses and nasal septum.     Mouth/Throat  Oropharynx clear and moist without lesions or asymmetry and normal uvula midline.      Neck:  Neck normal without thyromegaly masses, asymmetry, normal tracheal structure, crepitus, and tenderness, thyroid normal, trachea normal, phonation normal and full range of motion with neck supple.     Psychiatric:   She has a normal mood and affect. Her speech is normal and behavior is normal.     Neurological:   She has neurological normal, alert and oriented.     Skin:   No abrasions, lacerations, lesions, or rashes.         PROCEDURE NOTE:  Ceruminosis is noted in both EACs.  Wax was removed by manual debridement and suctioning utilizing the assistance of binocular microscopy revealing EACs and TMs WNL. The patient tolerated this procedure without difficulty. The subjective decrease noted in hearing pre-cleaning was resolved post-cleaning.       Assessment:       1. Sensation of fullness in both ears        Plan:       1. Hearing conservation strongly recommended.  2. Trial of amplification bilaterally also recommended (patient not interested).  3. Re-check of hearing in 18-24 months or sooner if subjective change noted.  4. F/U with PCP as per schedule.

## 2020-07-15 NOTE — PROCEDURES
ProceduresProcedure: Flexible cysto-uretheroscopy    Pre Procedure Diagnosis:h/o bladder CA    Post Procedure Diagnosis:Normal cystoscopy    Surgeon: Devang Ambriz MD    Anesthesia: 2% uro-jet lidocaine jelly for local analgesia    Flexible cysto-urethroscopy was performed after consent was obtained.  The risks and benefits were explained.    2% lidocaine urojet was used for local analgesia.  The genitalia was prepped and draped in the sterile fashion with betadine.    The flexible scope was advanced into the urethra and into the bladder.  Bilateral ureteral orifice were evaluated and noted to be normal with clear efflux.  The bladder was completely surveyed in a systematic fashion.   No bladder tumors or lesions were seen.  No strictures were noted.    The patient tolerated the procedure well without complication.    They will follow up in 1 year(s) with cystoscopy and CT RSS and CXR

## 2020-07-15 NOTE — PATIENT INSTRUCTIONS
What to Expect After a Cystoscopy  For the next 24-48 hours, you may feel a mild burning when you urinate. This burning is normal and expected. Drink plenty of water to dilute the urine to help relieve the burning sensation. You may also see a small amount of blood in your urine after the procedure.    Unless you are already taking antibiotics, you may be given an antibiotic after the test to prevent infection.    Signs and Symptoms to Report  Call the [facility] at [phone] if you develop any of the following:  · Fever of 101 degrees or higher  · Chills or persistent bleeding  · Inability to urinate

## 2020-08-19 NOTE — TELEPHONE ENCOUNTER
----- Message from Liz Alcala sent at 8/19/2020  1:36 PM CDT -----  Regarding: speak w/ Nurse  Contact: Pt  Type: Requesting to speak with nurse    Who Called: PT  Regarding: would like to speak w/ Jeanie   Would the patient rather a call back or a response via [facility]? Call back  Best Call Back Number: [phone]  Additional Information: n/a

## 2020-08-19 NOTE — PROGRESS NOTES
"Subjective:       Patient ID: Patsy Morris is a 70 y.o. female  patient of Dr. Cartagena, who was advised by her cardiologist (Dr. Lutz) to see her for complaints of diarrhea and fatigue. However, the patient was mistakenly scheduled with me instead of Dr. Cartagena.  She is very confused as to why she is seeing me today, and not one of her "regular physicians."    Chief Complaint: Diarrhea    Diarrhea   This is a chronic problem. The current episode started in the past 7 days (2 watery stools 3 days apart). The problem occurs less than 2 times per day (once each episode, 3 days apart). The problem has been waxing and waning. The stool consistency is described as watery. The patient states that diarrhea does not awaken her from sleep. Associated symptoms include increased flatus. Pertinent negatives include no bloating, chills, fever, myalgias or vomiting. Exacerbated by: caffeine. Risk factors: "lots of coffee I drank" She has tried nothing for the symptoms.       Patient Active Problem List   Diagnosis    Impacted cerumen of both ears    URI (upper respiratory infection)    Ear fullness    Renal insufficiency    HTN (hypertension), benign    Renal carcinoma    Rhinitis, allergic    Acute kidney injury    Hypokalemia    Dyslipidemia    Tobacco abuse    Chronic kidney disease (CKD) stage G4/A1, severely decreased glomerular filtration rate (GFR) between 15-29 mL/min/1.73 square meter and albuminuria creatinine ratio less than 30 mg/g    Morbid obesity    Hypoxia    Chronic hypercapnic respiratory failure    Typical atrial flutter    Chronic diastolic congestive heart failure    CRI (chronic renal insufficiency)    Acute on chronic diastolic heart failure    Chronic respiratory failure with hypoxia    Paroxysmal atrial fibrillation    Suspected sleep apnea    BHAVANI (obstructive sleep apnea)    Sleep-related hypoventilation    Gastrointestinal hemorrhage associated with gastritis    " Acute on chronic respiratory failure with hypoxia and hypercapnia    Thyroid nodule    Gout of right foot    Knee arthropathy    Long term current use of anticoagulant    Pulmonary nodules/lesions, multiple    Urinary incontinence without sensory awareness    Supplemental oxygen dependent    Current moderate episode of major depressive disorder without prior episode         Current Outpatient Medications:     albuterol-ipratropium 2.5mg-0.5mg/3mL (DUO-NEB) 0.5 mg-3 mg(2.5 mg base)/3 mL nebulizer solution, Take 3 mLs by nebulization every 6 (six) hours as needed for Wheezing or Shortness of Breath., Disp: 3 mL, Rfl: 2    apixaban (ELIQUIS) 2.5 mg Tab, TAKE 1 TABLET(2.5 MG) BY MOUTH TWICE DAILY, Disp: 60 tablet, Rfl: 6    atorvastatin (LIPITOR) 80 MG tablet, TAKE 1 TABLET BY MOUTH EVERY EVENING., Disp: 90 tablet, Rfl: 1    blood sugar diagnostic (BLOOD GLUCOSE TEST) Strp, Qd testing, Disp: 100 strip, Rfl: 3    ergocalciferol (VITAMIN D2) 50,000 unit Cap, Take 50,000 Units by mouth every 7 days., Disp: , Rfl:     escitalopram oxalate (LEXAPRO) 10 MG tablet, TAKE ONE TABLET BY MOUTH EVERY DAY IN THE EVENING, Disp: 90 tablet, Rfl: 1    ferrous sulfate (FEOSOL) 325 mg (65 mg iron) Tab tablet, TAKE ONE TABLET BY MOUTH DAILY WITH BREAKFAST, Disp: 30 tablet, Rfl: 5    furosemide (LASIX) 40 MG tablet, TAKE 1 TABLET BY MOUTH ONCE DAILY., Disp: 90 tablet, Rfl: 2    ketoconazole (NIZORAL) 2 % cream, Apply to affected areas of body BID., Disp: 30 g, Rfl: 3    mometasone 0.1% (ELOCON) 0.1 % cream, Apply topically once daily., Disp: 45 g, Rfl: 0    mupirocin (BACTROBAN) 2 % ointment, Apply to affected area 3 times daily, Disp: 22 g, Rfl: 1    silver sulfADIAZINE 1% (SILVADENE) 1 % cream, LEOLA AA LIGHTLY WITH DRESSING BID, Disp: , Rfl: 1    triamcinolone acetonide 0.025% (KENALOG) 0.025 % cream, AAA of body bid, Disp: 30 g, Rfl: 3    valACYclovir (VALTREX) 500 MG tablet, Take 1 tablet (500 mg total) by mouth  "once daily., Disp: 90 tablet, Rfl: 3    VOLTAREN 1 % Gel, , Disp: , Rfl: 2    walker Misc, Prn use please fit pt, Disp: 1 each, Rfl: 0    ammonium lactate (LAC-HYDRIN) 12 % lotion, Apply topically as needed for Dry Skin. (Patient not taking: Reported on 8/20/2020), Disp: 225 g, Rfl: 1    The following portions of the patient's history were reviewed and updated as appropriate: allergies, past family history, past medical history, past social history and past surgical history.    Review of Systems   Constitutional: Negative for chills and fever.   Gastrointestinal: Positive for diarrhea and flatus. Negative for bloating and vomiting.   Musculoskeletal: Negative for myalgias.       Objective:      /64   Pulse 92   Ht 5' 5" (1.651 m)   Wt 102.5 kg (226 lb)   LMP  (LMP Unknown)   SpO2 (!) 92%   BMI 37.61 kg/m²     Physical Exam  Vitals signs reviewed.   Constitutional:       Appearance: She is well-developed.   HENT:      Head: Normocephalic and atraumatic.      Nose: Nose normal.   Eyes:      General: No scleral icterus.     Conjunctiva/sclera: Conjunctivae normal.   Neck:      Musculoskeletal: Neck supple.      Thyroid: No thyromegaly.      Vascular: No carotid bruit or JVD.   Cardiovascular:      Rate and Rhythm: Normal rate and regular rhythm.      Pulses: Normal pulses.      Heart sounds: Normal heart sounds. No murmur. No friction rub. No gallop.    Pulmonary:      Breath sounds: Decreased air movement present. Examination of the right-middle field reveals rales. Examination of the left-middle field reveals rales. Examination of the right-lower field reveals rales. Examination of the left-lower field reveals rales. Decreased breath sounds and rales present. No wheezing or rhonchi.   Abdominal:      General: Bowel sounds are normal. There is no distension.      Palpations: Abdomen is soft. There is no hepatomegaly, splenomegaly or mass.      Tenderness: There is no abdominal tenderness. " "  Musculoskeletal: Normal range of motion.         General: No tenderness.      Right lower leg: Edema present.      Left lower leg: Edema present.   Lymphadenopathy:      Cervical: No cervical adenopathy.   Skin:     General: Skin is warm and dry.   Neurological:      Mental Status: She is alert and oriented to person, place, and time.      Cranial Nerves: No cranial nerve deficit.      Sensory: No sensory deficit.      Deep Tendon Reflexes: Reflexes are normal and symmetric.   Psychiatric:         Speech: Speech normal.         Behavior: Behavior is cooperative.         Assessment:       1. Chronic diastolic congestive heart failure    2. Chronic kidney disease (CKD) stage G4/A1, severely decreased glomerular filtration rate (GFR) between 15-29 mL/min/1.73 square meter and albuminuria creatinine ratio less than 30 mg/g    3. Chronic hypercapnic respiratory failure    4. Paroxysmal atrial fibrillation    5. Long term current use of anticoagulant    6. Morbid obesity    7. Supplemental oxygen dependent        Plan:       Patient most likely in heart failure.  Increase furosemide to 40mg BID.  BMP, BNP.  Will consult home health.  We will call the patient with results & make further recommendations at that time.      "This note will not be shared with the patient."    "

## 2020-08-21 NOTE — TELEPHONE ENCOUNTER
Pt inform that her kidney functions are stable and it is consistent with CHF.Pt agrees and verbalize and states that Lasix did help a lil she only had to take a extra one last night.Please advise.

## 2020-08-21 NOTE — TELEPHONE ENCOUNTER
Please inform the patient that kidney function is stable; however, these results are consistent with some congestive heart failure.  Did increasing her furosemide dosage improve her symptoms?

## 2020-08-21 NOTE — TELEPHONE ENCOUNTER
----- Message from Ligia Li sent at 8/21/2020  1:08 PM CDT -----  Name of Who is Calling: SARAH DIOP [[id number]]      What is the request in detail: Pt is calling to speak to staff in regards to a missed call.... Please call to further assist .       Can the clinic reply by [facility]: N      What [facility] Back [facility]: [phone]

## 2020-08-21 NOTE — TELEPHONE ENCOUNTER
----- Message from Cary Cornejo sent at 8/21/2020 10:22 AM CDT -----  Contact: SARAH DIOP [[id number]]  Type: Patient Call Back    Who called:SARAH DIOP [[id number]]    What is the request in detail: Patient is requesting a call back from Suzette. She states that she was here on yesterday and she has a few questions.   Please advise.    Can the clinic reply by [facility]? No    Best call back number: [phone]    Additional Information: N/A

## 2020-08-22 PROBLEM — R73.09 ELEVATED GLUCOSE: Status: ACTIVE | Noted: 2020-01-01

## 2020-08-22 PROBLEM — R06.02 SHORTNESS OF BREATH: Status: RESOLVED | Noted: 2020-01-01 | Resolved: 2020-01-01

## 2020-08-22 PROBLEM — R06.02 SHORTNESS OF BREATH: Status: ACTIVE | Noted: 2020-01-01

## 2020-08-22 NOTE — NURSING
Dr. Alan notified of ABG results, she came to pt bedside as pt is more lethargic than earlier. HR elevated, order given for ABG. Dr. Alan adjusted BIPAP settings. Will continue to monitor.

## 2020-08-22 NOTE — ED NOTES
Pt to ED with c/o worsening SOB x 3 days. Pt reports history of CHF. Pt reports 3L [place] home oxygen continuous. Pt reports increase need of home oxygen x 3 days. Pt lethargic. Pt responsive to verbal stimulation. Oriented x4. Pt reports unable to sleep lastnight d/t SOB. +2 edema BLE. Tachypnea noted. Tachycardia noted, pt in a.fib. Pt reports history of a.fib. Pt reports taking eliquis. Pt able to speak in clear, complete sentences. Pt denies CP, n/v/d, fever, chills. Pt attached to cardiac monitor, pulse ox and BP cycled. Will continue to monitor.

## 2020-08-22 NOTE — ED NOTES
Patient rounding complete. Comfort and positioning addressed. Toileting needs addressed. Pain (0/10). Pt remains attached to pulse ox, cardiac monitor, BP cycled. Bed rails up x2, bed locked and at lowest position, call remains at beside within reach of patient, instructed on use. Pt lethargic. responsive to verbal stimulation. Oriented x4. RR even and unlabored. Pt updated on plan of care. Pt verbalized understanding. Will continue to monitor.

## 2020-08-22 NOTE — ASSESSMENT & PLAN NOTE
Not on BP meds at home per Epic.  Will review more with patient when she is more lucid.  BP stable

## 2020-08-22 NOTE — H&P
"[facility] Medicine  History & Physical    Patient Name: Patsy Morris  MRN: [id number]  Admission Date: 8/22/2020  Attending Physician: Ravi Soria MD   Primary Care Provider: Luisana Cartagena MD         Patient information was obtained from past medical records and ER records.     Subjective:     Principal Problem:Acute on chronic respiratory failure with hypoxia and hypercapnia    Chief Complaint:   Chief Complaint   Patient presents with    Shortness of Breath     [facility], pt w/ hx of CHF, worsening shortness of breath over last week        HPI: Per ED:  "This is a 70 y.o. female with hx of HTN, asthma, A-fib, and CHF who presents via EMS with complaint of shortness of breath for the past week, worsening last night. She has orthopnea and sleeps in a recliner every night. She is on 3 L home Oxygen. She denies fever or chest pain. She denies hx of DVT or PE. She is on Eliquis".    Patient somnolent on BiPAP and unable to give much of a verbal history at this time.  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  Patient was seen by PCP on 8/20/2020 for increasing SOB and Lasix was increased to 40mg bid from qday.  Patient last seen by Cardiology (Dr. Lutz) on 6/26/2020 - not incomplete.  She arrived to ED on 100%NRB and in Afib with RVR.  She was placed on BiPAP and given Diltiazem x 1 with good response.  She was also given Lasix 60mg IV x 1 in ED.      Past Medical History:   Diagnosis Date    Arthritis     Asthma     Atrial fibrillation     Atrial flutter     Cerumen impaction     CHF (congestive heart failure)     Colon polyps 2017    Encounter for blood transfusion     HEARING LOSS     Herpes genitalis     Hypertension     Renal carcinoma 3/10/2015    Thyroid nodule 6/8/2017       Past Surgical History:   Procedure Laterality Date    BRAIN SURGERY      COLONOSCOPY " N/A 6/23/2017    Procedure: COLONOSCOPY;  Surgeon: Shane Sharma MD;  Location: [facility] ([address] [place] [address]);  Service: Endoscopy;  Laterality: N/A;  2nd floor case ; on 3L home O2       per Dr Leopold (anesthesia)-Based on her history of:  Chronic respiratory failure with oxygen use, HDEZ, CHF, A-Fib, BHAVANI, it was determined that she should have her Colonoscopy scheduled on the 2nd Floor       ok to hold Eliquis 2 days prior to    EYE SURGERY      HYSTERECTOMY      kidney mass resection Right     renal carcinoma       Review of patient's allergies indicates:  No Known Allergies    No current facility-administered medications on file prior to encounter.      Current Outpatient Medications on File Prior to Encounter   Medication Sig    apixaban (ELIQUIS) 2.5 mg Tab TAKE 1 TABLET(2.5 MG) BY MOUTH TWICE DAILY    atorvastatin (LIPITOR) 80 MG tablet TAKE 1 TABLET BY MOUTH EVERY EVENING.    escitalopram oxalate (LEXAPRO) 10 MG tablet TAKE ONE TABLET BY MOUTH EVERY DAY IN THE EVENING    ferrous sulfate (FEOSOL) 325 mg (65 mg iron) Tab tablet TAKE ONE TABLET BY MOUTH DAILY WITH BREAKFAST    furosemide (LASIX) 40 MG tablet TAKE 1 TABLET BY MOUTH ONCE DAILY.    albuterol-ipratropium 2.5mg-0.5mg/3mL (DUO-NEB) 0.5 mg-3 mg(2.5 mg base)/3 mL nebulizer solution Take 3 mLs by nebulization every 6 (six) hours as needed for Wheezing or Shortness of Breath.    ammonium lactate (LAC-HYDRIN) 12 % lotion Apply topically as needed for Dry Skin. (Patient not taking: Reported on 8/20/2020)    blood sugar diagnostic (BLOOD GLUCOSE TEST) Strp Qd testing    ergocalciferol (VITAMIN D2) 50,000 unit Cap Take 50,000 Units by mouth every 7 days.    ketoconazole (NIZORAL) 2 % cream Apply to affected areas of body BID.    mometasone 0.1% (ELOCON) 0.1 % cream Apply topically once daily.    mupirocin (BACTROBAN) 2 % ointment Apply to affected area 3 times daily    silver sulfADIAZINE 1% (SILVADENE) 1 % cream LEOLA AA LIGHTLY WITH DRESSING BID     triamcinolone acetonide 0.025% (KENALOG) 0.025 % cream AAA of body bid    valACYclovir (VALTREX) 500 MG tablet Take 1 tablet (500 mg total) by mouth once daily.    VOLTAREN 1 % Gel     walker Misc Prn use please fit pt     Family History     Problem Relation (Age of Onset)    Cancer Father    Hypertension Mother    Thyroid disease Mother        Tobacco Use    Smoking status: Current Every Day Smoker     Packs/day: 1.00     Years: 25.00     Pack years: 25.00     Types: Cigarettes    Smokeless tobacco: Never Used   Substance and Sexual Activity    Alcohol use: No     Alcohol/week: 0.0 standard drinks    Drug use: No    Sexual activity: Never     Birth control/protection: None     Review of Systems   Constitutional: Positive for activity change and fatigue. Negative for chills and fever.   HENT: Negative for ear pain.    Eyes: Negative for pain.   Respiratory: Positive for shortness of breath.    Cardiovascular: Positive for palpitations and leg swelling. Negative for chest pain.   Gastrointestinal: Negative for abdominal pain.   Endocrine: Negative for polydipsia, polyphagia and polyuria.   Genitourinary: Negative for difficulty urinating.   Musculoskeletal: Negative for neck pain.   Neurological: Negative for dizziness and headaches.   Hematological: Does not bruise/bleed easily.   Psychiatric/Behavioral: Negative for agitation and behavioral problems.     Objective:     Vital Signs (Most Recent):  Temp: 97.7 °F (36.5 °C) (08/22/20 0851)  Pulse: (!) 126 (08/22/20 1023)  Resp: (!) 22 (08/22/20 1026)  BP: 131/66 (08/22/20 1026)  SpO2: 96 % (08/22/20 1026) Vital Signs (24h Range):  Temp:  [97.7 °F (36.5 °C)] 97.7 °F (36.5 °C)  Pulse:  [] 126  Resp:  [17-23] 22  SpO2:  [87 %-98 %] 96 %  BP: (106-140)/(58-88) 131/66        There is no height or weight on file to calculate BMI.    Physical Exam  Constitutional:       Appearance: She is ill-appearing. She is not toxic-appearing.   HENT:      Head:  Normocephalic and atraumatic.      Right Ear: External ear normal.      Left Ear: External ear normal.      Nose: Nose normal.      Mouth/Throat:      Mouth: Mucous membranes are moist.      Pharynx: Oropharynx is clear.   Eyes:      General: Scleral icterus present.      Extraocular Movements: Extraocular movements intact.      Pupils: Pupils are equal, round, and reactive to light.   Neck:      Musculoskeletal: Normal range of motion and neck supple.      Vascular: No JVD.   Cardiovascular:      Rate and Rhythm: Tachycardia present. Rhythm irregular.      Heart sounds: No murmur.   Pulmonary:      Comments: Patient supine on BiPAP on evaluation.  Difficult to auscultate given body habitus and position.  No definite crackles appreciated anterior/lateral.   Abdominal:      General: There is no distension.      Palpations: Abdomen is soft.   Musculoskeletal:      Right lower leg: Edema present.      Left lower leg: Edema present.      Comments: 2+ to knees bilateral   Skin:     General: Skin is warm and dry.   Neurological:      Mental Status: She is lethargic.      Comments: Somnolent and unable to fully evaluate           CRANIAL NERVES     CN III, IV, VI   Pupils are equal, round, and reactive to light.       Significant Labs: All pertinent labs within the past 24 hours have been reviewed.    Significant Imaging: I have reviewed all pertinent imaging results/findings within the past 24 hours.    Assessment/Plan:     * Acute on chronic respiratory failure with hypoxia and hypercapnia  Patient with Chronic Respiratory Failure on 3L Home O2 and thought secondary to COPD.  Also with HFpEF (TTE in 10/2016 with EF 55% and DD).  Has had 1-2 weeks of progressive SOB.  CXR on Admission with evidence of pulmonary edema.  ABG (venous) initially with pH of 7.191 and pCO2 of 106.1.  Repeat on BiPAP (Arterial) with pH of 7.250 and pCO2 of 91.8.  Now on BiPAP 14/5 and FiO2 of 80%.  Treated with IV Lasix 60mg x 1 in ED and Afib  with RVR treated with IV Diltiazem 15mg x 1.    CXR on Admission - Cardiomegaly, increased pulmonary vascularity, bilateral pulmonary edema, and bilateral pleural effusions.  The findings are consistent with the provided clinical history of congestive heart failure.    Labs on Admission:    Trop 0.016  LFTs normal    Plan:  -Continue Bipap for now  -DuoNebs q6  -Lasix 60mg IV q6  -Fluid Restriction and Na restriction  -Cardiology and Pulmonary-CC consults  -Strict I&Os  -TTE  -Trend Trops  -EKG  -Procalcitonin      Paroxysmal atrial fibrillation  With RVR to 140s on admission.  S/p Diltiazem 15mg IV x 1 in ED  Home Meds:  Apixaban 2.5mg bid.  Not on a rate controlling agent.    Plan:  -Check TSH  -Monitor on Telemetry  -Cardiology consult  -Rate vs Rhythm controlling agent (Metoprolol vs Amiodarone)      Chronic kidney disease (CKD) stage G4/A1, severely decreased glomerular filtration rate (GFR) between 15-29 mL/min/1.73 square meter and albuminuria creatinine ratio less than 30 mg/g  History of Renal Cell Ca in 3/2016.  Creatinine baseline around 1.7-1.8 over past year  Stable on admission at 1.8.    Plan:  -Follow closely  -Avoid Nephrotic agents if possible  -Renal dose medications      Elevated glucose  No history of diabetes per records.  A1C in 6/2020 was 5.6  Glucose 145 on admission    Plan:  -Follow POCT Glucose qAC and HS for now and cover with SSI if needed      BHAVANI (obstructive sleep apnea)  Unclear if this was a formal work-up.    No mention of CPAP in chart.        Morbid obesity  noted      Dyslipidemia  Continue statin      HTN (hypertension), benign  Not on BP meds at home per Epic.  Will review more with patient when she is more lucid.  BP stable        VTE Risk Mitigation (From admission, onward)         Ordered     apixaban tablet 2.5 mg  2 times daily      08/22/20 1040     IP VTE HIGH RISK PATIENT  Once      08/22/20 1040     Place sequential compression device  Until discontinued       08/22/20 1040                   Ravi Soria MD  Department of Hospital Medicine   [facility]

## 2020-08-22 NOTE — CONSULTS
[facility]  Critical Care - Medicine  Consult Note    Inpatient consult to Pulmonary Critical Care  Consult performed by: Elizabeth Alan MD  Consult ordered by: Ravi Soria MD        Subjective:     HPI: Ms. Morris is a 69 yo F with PMH of HFpEF, AF on elequis, HTN, BHAVANI not on CPAP, CKD 3-4A, and COPD (40py smoking hx, current smoker) on home 02 at 3L who presented to the ED with 1 week of SOB associated with mildly productive cough, bilateral leg pain and swelling but no fever or sick contacts.    In the ED she was noted to be somnolent and in AF with RVR. She was placed on Bipap with good resolution of somnolence, received diltiazem for AF, and was given lasix for diuresis. CXR with bilateral airspace opacities and pleural effusions. Exam once in the ICU with crackles and wheezing bilaterally but good air movement on bipap.       Past Medical History:   Diagnosis Date    Arthritis     Asthma     Atrial fibrillation     Atrial flutter     Cerumen impaction     CHF (congestive heart failure)     Colon polyps 2017    Encounter for blood transfusion     HEARING LOSS     Herpes genitalis     Hypertension     Renal carcinoma 3/10/2015    Thyroid nodule 6/8/2017       Past Surgical History:   Procedure Laterality Date    BRAIN SURGERY      COLONOSCOPY N/A 6/23/2017    Procedure: COLONOSCOPY;  Surgeon: Shane Sharma MD;  Location: [facility] ([address] [place] [address]);  Service: Endoscopy;  Laterality: N/A;  2nd floor case ; on 3L home O2       per Dr Leopold (anesthesia)-Based on her history of:  Chronic respiratory failure with oxygen use, HDEZ, CHF, A-Fib, BHAVANI, it was determined that she should have her Colonoscopy scheduled on the 2nd Floor       ok to hold Eliquis 2 days prior to    EYE SURGERY      HYSTERECTOMY      kidney mass resection Right     renal carcinoma       Review of patient's allergies indicates:  No Known Allergies    Family History     Problem Relation (Age of Onset)     Cancer Father    Hypertension Mother    Thyroid disease Mother        Tobacco Use    Smoking status: Current Every Day Smoker     Packs/day: 1.00     Years: 25.00     Pack years: 25.00     Types: Cigarettes    Smokeless tobacco: Never Used   Substance and Sexual Activity    Alcohol use: No     Alcohol/week: 0.0 standard drinks    Drug use: No    Sexual activity: Never     Birth control/protection: None      Review of Systems   Constitutional: Positive for activity change. Negative for chills and fever.   HENT: Negative for congestion.    Eyes: Negative.    Respiratory: Positive for cough, shortness of breath and wheezing.    Cardiovascular: Positive for leg swelling.   Gastrointestinal: Negative.    Endocrine: Negative.    Genitourinary: Negative.    Musculoskeletal: Positive for arthralgias.   Skin: Negative.    Allergic/Immunologic: Negative.    Neurological: Negative.    Hematological: Negative.    Psychiatric/Behavioral: Positive for confusion.     Objective:     Vital Signs (Most Recent):  Temp: 97.7 °F (36.5 °C) (08/22/20 1220)  Pulse: 96 (08/22/20 1600)  Resp: (!) 24 (08/22/20 1600)  BP: 113/84 (08/22/20 1600)  SpO2: 98 % (08/22/20 1600) Vital Signs (24h Range):  Temp:  [97.7 °F (36.5 °C)] 97.7 °F (36.5 °C)  Pulse:  [] 96  Resp:  [16-28] 24  SpO2:  [87 %-100 %] 98 %  BP: (106-140)/(58-88) 113/84     Weight: 102 kg (224 lb 13.9 oz)  Body mass index is 39.83 kg/m².    No intake or output data in the 24 hours ending 08/22/20 1633    Physical Exam  Constitutional:       General: She is not in acute distress.     Appearance: She is obese.   HENT:      Head: Atraumatic.      Nose: Nose normal.   Eyes:      Extraocular Movements: Extraocular movements intact.      Conjunctiva/sclera: Conjunctivae normal.      Pupils: Pupils are equal, round, and reactive to light.   Neck:      Musculoskeletal: Normal range of motion.   Cardiovascular:      Rate and Rhythm: Tachycardia present. Rhythm irregular.       Pulses: Normal pulses.      Heart sounds: No murmur.   Pulmonary:      Effort: Respiratory distress present.      Breath sounds: Wheezing and rales present.   Abdominal:      General: Bowel sounds are normal. There is no distension.      Palpations: Abdomen is soft.   Musculoskeletal: Normal range of motion.         General: Swelling present.      Right lower leg: Edema present.      Left lower leg: Edema present.   Skin:     General: Skin is warm and dry.   Neurological:      General: No focal deficit present.      Mental Status: She is alert and oriented to person, place, and time.   Psychiatric:         Mood and Affect: Mood normal.         Behavior: Behavior normal.         Vents:  Oxygen Concentration (%): 60 (08/22/20 1338)    Lines/Drains/Airways     Drain            Female External Urinary Catheter 08/22/20 1220 less than 1 day          Peripheral Intravenous Line                 Peripheral IV - Single Lumen 08/22/20 0748 18 G Left;Posterior Wrist less than 1 day         Peripheral IV - Single Lumen 08/22/20 0818 20 G Right Antecubital less than 1 day                Significant Labs:    CBC/Anemia Profile:  Recent Labs   Lab 08/22/20  0820   WBC 9.78   HGB 11.0*   HCT 36.2*      MCV 91   RDW 15.2*        Chemistries:  Recent Labs   Lab 08/22/20  0820      K 4.7   CL 96   CO2 33*   BUN 22   CREATININE 1.8*   CALCIUM 9.0   ALBUMIN 3.8   PROT 7.1   BILITOT 0.4   ALKPHOS 44*   ALT 12   AST 12       Significant Imaging: I have reviewed all pertinent imaging results/findings within the past 24 hours.    Assessment/Plan:     Ms Morris is a 71 yo F with complex PMH who presents to the ED in multifactorial hypercapneic respiratory failure as well as AF with RVR. She is improved already on bipap and her AF with RVR seems to be resolving.     1) Hypercapneic resp failure- BHAVANI/OHS, not on bipap  2) HFpEF, possible exacerbation, appears overloaded on CXR  3) AF with RVR  4) COPD on home 02; presumed COPD  exacerbation  5) CKD 3-4 without CLIFFORD, RCC s/p nephrectomy  6) b/l leg pain and swelling, r/o DVT    Plan:  -continue Bipap until improved, then can use prn and while asleep; repeat ABG now  -ipratropium nebs q6 for wheezing, hold JANE until RVR resolves  -short PO steroid and abx course for COPD exacerbation  -metoprolol for rate control, continue home eliquis  -bilateral US doppler to r/o DVT, pt with limited mobility and symptomatic, low likelihood as on home AC  -diurese with lasix in addition to bipap, BNP mildly increased (with hx CKD, but also with obesity, BNP was lower 2 days ago)  -recommend sleep study to be done outpatient, pt will likely need Bipap/Cpap at home    Thank you for your consult. We will follow along with you     Elizabeth Alan MD   Pager [phone]  Critical Care - Medicine  [facility]

## 2020-08-22 NOTE — ASSESSMENT & PLAN NOTE
History of Renal Cell Ca in 3/2016.  Creatinine baseline around 1.7-1.8 over past year  Stable on admission at 1.8.    Plan:  -Follow closely  -Avoid Nephrotic agents if possible  -Renal dose medications

## 2020-08-22 NOTE — CONSULTS
[facility]  Consult    History of Present Illness: Mrs Morris is a 70-year-old lady who presents to the emergency room here with complaints of progressively increasing shortness of breath for the last 3 days.  She was somnolent in the emergency room, is now beginning to wake up.  She says she has had a history of high blood pressure, and heart failure, and she smokes a pack of cigarettes a day, having smoker last cigarette earlier this morning.  She has a longstanding history of hypertension, chronic obstructive lung disease, chronic atrial fibrillation at least for the last 6 years, has had a renal cell carcinoma and has had a nephrectomy, and chronic renal failure.  She has had heart failure with a preserved left ventricular ejection fraction, diastolic left ventricular dysfunction.  She is followed by Dr. Lutz, who she last saw on 06/26/2020.    PTA Medications   Medication Sig    apixaban (ELIQUIS) 2.5 mg Tab TAKE 1 TABLET(2.5 MG) BY MOUTH TWICE DAILY    atorvastatin (LIPITOR) 80 MG tablet TAKE 1 TABLET BY MOUTH EVERY EVENING.    escitalopram oxalate (LEXAPRO) 10 MG tablet TAKE ONE TABLET BY MOUTH EVERY DAY IN THE EVENING    ferrous sulfate (FEOSOL) 325 mg (65 mg iron) Tab tablet TAKE ONE TABLET BY MOUTH DAILY WITH BREAKFAST    furosemide (LASIX) 40 MG tablet TAKE 1 TABLET BY MOUTH ONCE DAILY.    albuterol-ipratropium 2.5mg-0.5mg/3mL (DUO-NEB) 0.5 mg-3 mg(2.5 mg base)/3 mL nebulizer solution Take 3 mLs by nebulization every 6 (six) hours as needed for Wheezing or Shortness of Breath.    ammonium lactate (LAC-HYDRIN) 12 % lotion Apply topically as needed for Dry Skin. (Patient not taking: Reported on 8/20/2020)    blood sugar diagnostic (BLOOD GLUCOSE TEST) Strp Qd testing    ergocalciferol (VITAMIN D2) 50,000 unit Cap Take 50,000 Units by mouth every 7 days.    ketoconazole (NIZORAL) 2 % cream Apply to affected areas of body BID.    mometasone 0.1% (ELOCON) 0.1 % cream Apply  topically once daily.    mupirocin (BACTROBAN) 2 % ointment Apply to affected area 3 times daily    silver sulfADIAZINE 1% (SILVADENE) 1 % cream LEOLA AA LIGHTLY WITH DRESSING BID    triamcinolone acetonide 0.025% (KENALOG) 0.025 % cream AAA of body bid    valACYclovir (VALTREX) 500 MG tablet Take 1 tablet (500 mg total) by mouth once daily.    VOLTAREN 1 % Gel     walker Misc Prn use please fit pt       Review of patient's allergies indicates:  No Known Allergies    Past Medical History:   Diagnosis Date    Arthritis     Asthma     Atrial fibrillation     Atrial flutter     Cerumen impaction     CHF (congestive heart failure)     Colon polyps 2017    Encounter for blood transfusion     HEARING LOSS     Herpes genitalis     Hypertension     Renal carcinoma 3/10/2015    Thyroid nodule 6/8/2017     Past Surgical History:   Procedure Laterality Date    BRAIN SURGERY      COLONOSCOPY N/A 6/23/2017    Procedure: COLONOSCOPY;  Surgeon: Shane Sharma MD;  Location: [address]);  Service: Endoscopy;  Laterality: N/A;  2nd floor case ; on 3L home O2       per Dr Leopold (anesthesia)-Based on her history of:  Chronic respiratory failure with oxygen use, HDEZ, CHF, A-Fib, BHAVANI, it was determined that she should have her Colonoscopy scheduled on the 2nd Floor       ok to hold Eliquis 2 days prior to    EYE SURGERY      HYSTERECTOMY      kidney mass resection Right     renal carcinoma     Family History   Problem Relation Age of Onset    Hypertension Mother     Thyroid disease Mother     Cancer Father     Asthma Neg Hx     Emphysema Neg Hx     Melanoma Neg Hx      Social History     Tobacco Use    Smoking status: Current Every Day Smoker     Packs/day: 1.00     Years: 25.00     Pack years: 25.00     Types: Cigarettes    Smokeless tobacco: Never Used   Substance Use Topics    Alcohol use: No     Alcohol/week: 0.0 standard drinks    Drug use: No        Physical Exam:  Obese.  Appear short of  breath.  Chest rare rhonchi.  Heart is irregularly irregular.  No murmur or gallop.  The abdomen is soft  And nontender.  There is 2+ ankle edema.  Neurologically intact.    Impression:  Hypercapnic and hypoxemic respiratory failure.  Advanced obstructive lung disease  Cigarette smoker, last cigarette this morning.  Chronic atrial fibrillation  Heart failure with preserved ejection fraction  Diastolic left ventricular dysfunction  Obstructive sleep apnea  Obesity  Chronic renal failure, status post nephrectomy for hypernephroma    Discussed with Pulmonary Critical Care Team.  Place on BiPAP, diuresis.  Will check an echocardiogram.      Thank you for the opportunity of seeing Patsy Morris in consultation

## 2020-08-22 NOTE — HOSPITAL COURSE
Patient is 70-year-old woman with history of hypertension, chronic hypoxic respiratory failure on home oxygen secondary to chronic obstructive pulmonary disease, chronic diastolic heart failure, chronic atrial fibrillation, and morbid obesity admitted to the hospital for treatment of acute on chronic hypercapnic and hypoxic respiratory failure due to acute exacerbation of chronic obstructive pulmonary disease and also due to decompensated heart failure.  Chest radiograph showed evidence of pulmonary edema.  Patient was admitted to the intensive care unit treated with noninvasive ventilation along with intravenous diuretics.  She was treated with steroids, bronchodilator breathing treatment, and antibiotics.  Patient clinically improved and wean off noninvasive ventilation was transferred to the floor.  Patient with significant upward trend of her serum creatinine.  Nephrology service consult recommending holding diuretic therapy for couple days until her serum creatinine stabilized.  Serum creatinine stabilized.  Physical and Occupational therapy consulted to treat her debility and recommended further therapy at a skilled nursing facility.  Patient with advanced lung disease but has been stable for multiple days now on 4 liters/minute of supplemental oxygen along with CPAP at night with 12 cm of water pressure at night.  Patient discharged to skilled nursing facility in stable condition for ongoing therapy.

## 2020-08-22 NOTE — ASSESSMENT & PLAN NOTE
"Acute bronchitis to COPD exacerbation to decompensated BHAVANI / OHS to RVR to pulmonary edema.  Patient with Chronic Respiratory Failure on 3L Home O2.  Also with HFpEF (TTE in 10/2016 with EF 55% and DD).  Has had 1-2 weeks of progressive SOB.    Placed on BiPAP.  Treated with IV Lasix 60mg x 1 in ED and Afib with RVR treated with IV Diltiazem 15mg x 1.  Now on Levaquin/Prednisone, Lasix, Nebs.    CXR on Admission - Cardiomegaly, increased pulmonary vascularity, bilateral pulmonary edema, and bilateral pleural effusions.  The findings are consistent with the provided clinical history of congestive heart failure.    Labs on Admission:  ABG (venous) initially with pH of 7.191 and pCO2 of 106.1.    Repeat on BiPAP (Arterial) with pH of 7.250 and pCO2 of 91.8.    Trop negative x 3  LFTs normal  TSH normal  Procalcitonin 0.26    Followed by Pulmonary-[facility] - "BHAVANI / OHS not using home PPV. Active smoker with 7 days acute bronchitis but no fever. A fub with RVR. Pulmonary infiltrates - look like edema. Wheezes on exam. Acute on Chronic hypercapnic / Hypoxic respiratory failure.  Improved with BiPAP & Ca++ Channel bockade.  Suspect pathogenesis follows the line from acute bronchitis to COPD exacerbation to decompensated BHAVANI / OHS to RVR to pulmonary edema.  Treat w ipratropium, prednisone, abx, BiPAP and maybe a shot of lasix".    Followed by Cardiology - "Place on BiPAP, diuresis.  Will check an echocardiogram".    Plan:  -Continue Bipap for now  -Continue Prednisone/Levaquin as per Pulmonary  -Ipratropium Nebs q6  -Lasix 60mg IV qday  -Fluid Restriction and Na restriction  -Strict I&Os      "

## 2020-08-22 NOTE — ED PROVIDER NOTES
Encounter Date: 8/22/2020    SCRIBE #1 NOTE: Mulu RAMOS am scribing for, and in the presence of, Dr. Major.       History     Chief Complaint   Patient presents with    Shortness of Breath     [facility], pt w/ hx of CHF, worsening shortness of breath over last week     Time seen by provider: 7:58 AM    This is a 70 y.o. female with hx of HTN, asthma, A-fib, and CHF who presents via EMS with complaint of shortness of breath for the past week, worsening last night. She has orthopnea and sleeps in a recliner every night. She is on 3 L home Oxygen. She denies fever or chest pain. She denies hx of DVT or PE. She is on Eliquis.    The history is provided by the patient.     Review of patient's allergies indicates:  No Known Allergies  Past Medical History:   Diagnosis Date    Arthritis     Asthma     Atrial fibrillation     Atrial flutter     Cerumen impaction     CHF (congestive heart failure)     Colon polyps 2017    Encounter for blood transfusion     HEARING LOSS     Herpes genitalis     Hypertension     Renal carcinoma 3/10/2015    Thyroid nodule 6/8/2017     Past Surgical History:   Procedure Laterality Date    BRAIN SURGERY      COLONOSCOPY N/A 6/23/2017    Procedure: COLONOSCOPY;  Surgeon: Shane Sharma MD;  Location: [facility] ([address] [place] [address]);  Service: Endoscopy;  Laterality: N/A;  2nd floor case ; on 3L home O2       per Dr Leopold (anesthesia)-Based on her history of:  Chronic respiratory failure with oxygen use, HDEZ, CHF, A-Fib, BHAVANI, it was determined that she should have her Colonoscopy scheduled on the 2nd Floor       ok to hold Eliquis 2 days prior to    EYE SURGERY      HYSTERECTOMY      kidney mass resection Right     renal carcinoma     Family History   Problem Relation Age of Onset    Hypertension Mother     Thyroid disease Mother     Cancer Father     Asthma Neg Hx     Emphysema Neg Hx     Melanoma Neg Hx      Social History     Tobacco Use    Smoking status: Current Every  Day Smoker     Packs/day: 1.00     Years: 25.00     Pack years: 25.00     Types: Cigarettes    Smokeless tobacco: Never Used   Substance Use Topics    Alcohol use: No     Alcohol/week: 0.0 standard drinks    Drug use: No     Review of Systems   Constitutional: Negative for chills and diaphoresis.   HENT: Negative for congestion.    Eyes: Negative for discharge.   Respiratory: Positive for shortness of breath.    Cardiovascular: Negative for chest pain.   Genitourinary: Negative for dysuria.   Musculoskeletal: Negative for back pain.   Skin: Negative for rash.   Neurological: Negative for dizziness.   Hematological: Does not bruise/bleed easily.   All other systems reviewed and are negative.      Physical Exam     Initial Vitals   BP Pulse Resp Temp SpO2   08/22/20 0821 08/22/20 0847 08/22/20 0836 08/22/20 0851 08/22/20 0929   (!) 140/88 (!) 143 (!) 23 97.7 °F (36.5 °C) (!) 87 %      MAP       --                Physical Exam    Nursing note and vitals reviewed.  Constitutional: She appears well-developed and well-nourished. She is not diaphoretic. No distress.   HENT:   Head: Normocephalic and atraumatic.   Eyes: EOM are normal. Pupils are equal, round, and reactive to light.   Neck: Normal range of motion. Neck supple.   Cardiovascular: Normal heart sounds. An irregularly irregular rhythm present.  Tachycardia present.  Exam reveals no gallop and no friction rub.    No murmur heard.  Pulmonary/Chest: Tachypnea noted. No respiratory distress. She has no wheezes. She has no rhonchi. She exhibits no tenderness.   Diffuse crackles bilaterally.   Abdominal: Soft. There is no abdominal tenderness.   Musculoskeletal: Normal range of motion. Edema (3+ bilateral lower extremity) present. No tenderness.   Neurological: She is alert and oriented to person, place, and time.   Skin: Skin is warm and dry.   Psychiatric: She has a normal mood and affect.         ED Course   Critical Care    Date/Time: 8/22/2020 8:10  AM  Performed by: Darius Major DO  Authorized by: Darius Major DO   Direct patient critical care time: 15 minutes  Additional history critical care time: 8 minutes  Ordering / reviewing critical care time: 8 minutes  Documentation critical care time: 7 minutes  Consulting other physicians critical care time: 7 minutes  Total critical care time (exclusive of procedural time) : 45 minutes  Critical care time was exclusive of separately billable procedures and treating other patients and teaching time.  Critical care was necessary to treat or prevent imminent or life-threatening deterioration of the following conditions: respiratory failure.  Critical care was time spent personally by me on the following activities: examination of patient, review of old charts, obtaining history from patient or surrogate, development of treatment plan with patient or surrogate, ordering and performing treatments and interventions, ordering and review of laboratory studies, ordering and review of radiographic studies, evaluation of patient's response to treatment, re-evaluation of patient's condition and interpretation of cardiac output measurements.        Labs Reviewed   CBC W/ AUTO DIFFERENTIAL - Abnormal; Notable for the following components:       Result Value    RBC 3.97 (*)     Hemoglobin 11.0 (*)     Hematocrit 36.2 (*)     Mean Corpuscular Hemoglobin Conc 30.4 (*)     RDW 15.2 (*)     Gran # (ANC) 8.2 (*)     Lymph # 0.7 (*)     Gran% 84.2 (*)     Lymph% 7.1 (*)     All other components within normal limits   COMPREHENSIVE METABOLIC PANEL - Abnormal; Notable for the following components:    CO2 33 (*)     Glucose 145 (*)     Creatinine 1.8 (*)     Alkaline Phosphatase 44 (*)     eGFR if  32 (*)     eGFR if non  28 (*)     All other components within normal limits   B-TYPE NATRIURETIC PEPTIDE - Abnormal; Notable for the following components:     (*)     All other components  within normal limits   ISTAT PROCEDURE - Abnormal; Notable for the following components:    POC PH 7.191 (*)     POC PCO2 106.1 (*)     POC PO2 97 (*)     POC HCO3 40.6 (*)     All other components within normal limits   ISTAT PROCEDURE - Abnormal; Notable for the following components:    POC PH 7.250 (*)     POC PCO2 91.8 (*)     POC PO2 53 (*)     POC HCO3 40.2 (*)     POC SATURATED O2 79 (*)     All other components within normal limits   TROPONIN I   PROTIME-INR   SARS-COV-2 RNA AMPLIFICATION, QUAL     EKG Readings: (Independently Interpreted)   Sinus tachycardia at rate of 131. Irregularly irregular. Consistent with A-fib. No ST/T wave abnormalities.       Imaging Results           X-Ray Chest AP Portable (Final result)  Result time 08/22/20 09:00:58    Final result by Cammy Zaldivar MD (08/22/20 09:00:58)                 Impression:      Cardiomegaly, increased pulmonary vascularity, bilateral pulmonary edema, and bilateral pleural effusions.  The findings are consistent with the provided clinical history of congestive heart failure.    This report was flagged in Epic as abnormal.      Electronically signed by: Cammy Zaldivar MD  Date:    08/22/2020  Time:    09:00             Narrative:    EXAMINATION:  XR CHEST AP PORTABLE    CLINICAL HISTORY:  CHF;    TECHNIQUE:  Single frontal view of the chest was performed.    COMPARISON:  April 6, 2019    FINDINGS:  Cardiac silhouette is enlarged.  Pulmonary vascularity is increased.  There is bilateral increased pulmonary parenchymal opacification with a mixed interstitial and alveolar pattern, suggesting edema; this has progressed since the previous examination..  There are bilateral pleural effusions.  No pneumothorax.  Visualized osseous structures are stable.                              X-Rays:   Independently Interpreted Readings:   Chest X-Ray: Cardiomegaly. Acute pulmonary edema. Mediastinum normal. No pneumothorax.     Medical Decision Making:    History:   Old Medical Records: I decided to obtain old medical records.  Independently Interpreted Test(s):   I have ordered and independently interpreted X-rays - see prior notes.  I have ordered and independently interpreted EKG Reading(s) - see prior notes  Clinical Tests:   Lab Tests: Ordered and Reviewed  Radiological Study: Ordered and Reviewed  Medical Tests: Ordered and Reviewed            Scribe Attestation:   Scribe #1: I performed the above scribed service and the documentation accurately describes the services I performed. I attest to the accuracy of the note.    Attending Attestation:           Physician Attestation for Scribe:  Physician Attestation Statement for Scribe #1: I, Dr. Major, reviewed documentation, as scribed by Mulu Jose in my presence, and it is both accurate and complete.                 ED Course as of Aug 22 0941   Sat Aug 22, 2020   0830 CBC shows no acute abnormalities.    [SM]   0836 A 70-year-old female with shortness of breath.  She is tachy but AFib and RVR.  She is in CHF exacerbation.  Her pH is 7.19.  Will start on BiPAP as well as give nitroglycerin and observed.  I have considered but do not suspect acute coronary syndrome, pulmonary embolus, pneumothorax or COPD.    [SM]   0836 POC PH(!): 7.191 [SM]   0836 POC PCO2(!): 106.1 [SM]   0846 On re-evaluationPatient is answering questions.  Currently on BiPAP.  O2 sats around 90%.  Heart rate is 141.  Blood pressure is stable 120/65.  Will give Cardizem to try to improve the heart rate which could improve CHF.    [SM]   0903 On re-evaluation patient states she is tired but is breathing easier.  States she did not sleep much last night.  Oxygen saturations around 90%.  Will get a blood gas on her to see if we are improving on her hypercapnia.    [SM]   0904 Troponin is negative.  COVID is negative.  Chest x-ray is consistent with acute pulmonary edema.  Will repeat blood gas and  plan for hospital admission to the  ICU.   BNP(!): 346 [SM]   0929 This is the patient's baseline.   Creatinine(!): 1.8 [SM]   0931 PH is 7.25 which is improving.  PCO2 is 92 which is decreasing.  O2 sat is 79.  Increased oxygenation from 35% to 100% and her O2 sats followed up to 92%.   POC PH(!!): 7.250 [SM]   0935 Patient states she is feeling better.  Will admit to the ICU with Hospital Medicine.    This is the extent to the patients complaints today here in the emergency department.    [SM]   0939 Discussed case with Dr. Valentine, and will admit patient to Dr. Soria.    [AC]      ED Course User Index  [AC] Mulu Jose  [SM] Darius Major DO                Clinical Impression:     1. Acute respiratory failure with hypoxia and hypercapnia    2. Shortness of breath    3. Acute on chronic congestive heart failure, unspecified heart failure type              ED Disposition Condition    Admit                           Darius Major DO  08/22/20 0923

## 2020-08-22 NOTE — ASSESSMENT & PLAN NOTE
Patient with Chronic Respiratory Failure on 3L Home O2 and thought secondary to COPD.  Also with HFpEF (TTE in 10/2016 with EF 55% and DD).  Has had 1-2 weeks of progressive SOB.  CXR on Admission with evidence of pulmonary edema.  ABG (venous) initially with pH of 7.191 and pCO2 of 106.1.  Repeat on BiPAP (Arterial) with pH of 7.250 and pCO2 of 91.8.  Now on BiPAP 14/5 and FiO2 of 80%.  Treated with IV Lasix 60mg x 1 in ED and Afib with RVR treated with IV Diltiazem 15mg x 1.    CXR on Admission - Cardiomegaly, increased pulmonary vascularity, bilateral pulmonary edema, and bilateral pleural effusions.  The findings are consistent with the provided clinical history of congestive heart failure.    Labs on Admission:    Trop 0.016  LFTs normal    Plan:  -Continue Bipap for now  -DuoNebs q6  -Lasix 60mg IV q6  -Fluid Restriction and Na restriction  -Cardiology and Pulmonary-CC consults  -Strict I&Os  -TTE  -Trend Trops  -EKG  -Procalcitonin

## 2020-08-22 NOTE — SUBJECTIVE & OBJECTIVE
Past Medical History:   Diagnosis Date    Arthritis     Asthma     Atrial fibrillation     Atrial flutter     Cerumen impaction     CHF (congestive heart failure)     Colon polyps 2017    Encounter for blood transfusion     HEARING LOSS     Herpes genitalis     Hypertension     Renal carcinoma 3/10/2015    Thyroid nodule 6/8/2017       Past Surgical History:   Procedure Laterality Date    BRAIN SURGERY      COLONOSCOPY N/A 6/23/2017    Procedure: COLONOSCOPY;  Surgeon: Shane Sharma MD;  Location: [facility] ([address] [place] [address]);  Service: Endoscopy;  Laterality: N/A;  2nd floor case ; on 3L home O2       per Dr Leopold (anesthesia)-Based on her history of:  Chronic respiratory failure with oxygen use, HDEZ, CHF, A-Fib, BHAVANI, it was determined that she should have her Colonoscopy scheduled on the 2nd Floor       ok to hold Eliquis 2 days prior to    EYE SURGERY      HYSTERECTOMY      kidney mass resection Right     renal carcinoma       Review of patient's allergies indicates:  No Known Allergies    No current facility-administered medications on file prior to encounter.      Current Outpatient Medications on File Prior to Encounter   Medication Sig    apixaban (ELIQUIS) 2.5 mg Tab TAKE 1 TABLET(2.5 MG) BY MOUTH TWICE DAILY    atorvastatin (LIPITOR) 80 MG tablet TAKE 1 TABLET BY MOUTH EVERY EVENING.    escitalopram oxalate (LEXAPRO) 10 MG tablet TAKE ONE TABLET BY MOUTH EVERY DAY IN THE EVENING    ferrous sulfate (FEOSOL) 325 mg (65 mg iron) Tab tablet TAKE ONE TABLET BY MOUTH DAILY WITH BREAKFAST    furosemide (LASIX) 40 MG tablet TAKE 1 TABLET BY MOUTH ONCE DAILY.    albuterol-ipratropium 2.5mg-0.5mg/3mL (DUO-NEB) 0.5 mg-3 mg(2.5 mg base)/3 mL nebulizer solution Take 3 mLs by nebulization every 6 (six) hours as needed for Wheezing or Shortness of Breath.    ammonium lactate (LAC-HYDRIN) 12 % lotion Apply topically as needed for Dry Skin. (Patient not taking: Reported on 8/20/2020)    blood sugar  diagnostic (BLOOD GLUCOSE TEST) Strp Qd testing    ergocalciferol (VITAMIN D2) 50,000 unit Cap Take 50,000 Units by mouth every 7 days.    ketoconazole (NIZORAL) 2 % cream Apply to affected areas of body BID.    mometasone 0.1% (ELOCON) 0.1 % cream Apply topically once daily.    mupirocin (BACTROBAN) 2 % ointment Apply to affected area 3 times daily    silver sulfADIAZINE 1% (SILVADENE) 1 % cream LEOLA AA LIGHTLY WITH DRESSING BID    triamcinolone acetonide 0.025% (KENALOG) 0.025 % cream AAA of body bid    valACYclovir (VALTREX) 500 MG tablet Take 1 tablet (500 mg total) by mouth once daily.    VOLTAREN 1 % Gel     walker Misc Prn use please fit pt     Family History     Problem Relation (Age of Onset)    Cancer Father    Hypertension Mother    Thyroid disease Mother        Tobacco Use    Smoking status: Current Every Day Smoker     Packs/day: 1.00     Years: 25.00     Pack years: 25.00     Types: Cigarettes    Smokeless tobacco: Never Used   Substance and Sexual Activity    Alcohol use: No     Alcohol/week: 0.0 standard drinks    Drug use: No    Sexual activity: Never     Birth control/protection: None     Review of Systems   Constitutional: Positive for activity change and fatigue. Negative for chills and fever.   HENT: Negative for ear pain.    Eyes: Negative for pain.   Respiratory: Positive for shortness of breath.    Cardiovascular: Positive for palpitations and leg swelling. Negative for chest pain.   Gastrointestinal: Negative for abdominal pain.   Endocrine: Negative for polydipsia, polyphagia and polyuria.   Genitourinary: Negative for difficulty urinating.   Musculoskeletal: Negative for neck pain.   Neurological: Negative for dizziness and headaches.   Hematological: Does not bruise/bleed easily.   Psychiatric/Behavioral: Negative for agitation and behavioral problems.     Objective:     Vital Signs (Most Recent):  Temp: 97.7 °F (36.5 °C) (08/22/20 0851)  Pulse: (!) 126 (08/22/20 1023)  Resp:  (!) 22 (08/22/20 1026)  BP: 131/66 (08/22/20 1026)  SpO2: 96 % (08/22/20 1026) Vital Signs (24h Range):  Temp:  [97.7 °F (36.5 °C)] 97.7 °F (36.5 °C)  Pulse:  [] 126  Resp:  [17-23] 22  SpO2:  [87 %-98 %] 96 %  BP: (106-140)/(58-88) 131/66        There is no height or weight on file to calculate BMI.    Physical Exam  Constitutional:       Appearance: She is ill-appearing. She is not toxic-appearing.   HENT:      Head: Normocephalic and atraumatic.      Right Ear: External ear normal.      Left Ear: External ear normal.      Nose: Nose normal.      Mouth/Throat:      Mouth: Mucous membranes are moist.      Pharynx: Oropharynx is clear.   Eyes:      General: Scleral icterus present.      Extraocular Movements: Extraocular movements intact.      Pupils: Pupils are equal, round, and reactive to light.   Neck:      Musculoskeletal: Normal range of motion and neck supple.      Vascular: No JVD.   Cardiovascular:      Rate and Rhythm: Tachycardia present. Rhythm irregular.      Heart sounds: No murmur.   Pulmonary:      Comments: Patient supine on BiPAP on evaluation.  Difficult to auscultate given body habitus and position.  No definite crackles appreciated anterior/lateral.   Abdominal:      General: There is no distension.      Palpations: Abdomen is soft.   Musculoskeletal:      Right lower leg: Edema present.      Left lower leg: Edema present.      Comments: 2+ to knees bilateral   Skin:     General: Skin is warm and dry.   Neurological:      Mental Status: She is lethargic.      Comments: Somnolent and unable to fully evaluate           CRANIAL NERVES     CN III, IV, VI   Pupils are equal, round, and reactive to light.       Significant Labs: All pertinent labs within the past 24 hours have been reviewed.    Significant Imaging: I have reviewed all pertinent imaging results/findings within the past 24 hours.

## 2020-08-22 NOTE — HPI
"Per Dr. Ravi Soria:    "Per ED:  "This is a 70 y.o. female with hx of HTN, asthma, A-fib, and CHF who presents via EMS with complaint of shortness of breath for the past week, worsening last night. She has orthopnea and sleeps in a recliner every night. She is on 3 L home Oxygen. She denies fever or chest pain. She denies hx of DVT or PE. She is on Eliquis".    Patient somnolent on BiPAP and unable to give much of a verbal history at this time.  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  Patient was seen by PCP on 8/20/2020 for increasing SOB and Lasix was increased to 40mg bid from qday.    She arrived to ED on 100%NRB and in Afib with RVR.  She was placed on BiPAP and given Diltiazem x 1 with good response.  She was also given Lasix 60mg IV x 1 in ED."  "

## 2020-08-22 NOTE — ASSESSMENT & PLAN NOTE
With RVR to 140s on admission.  S/p Diltiazem 15mg IV x 1 in ED  Home Meds:  Apixaban 2.5mg bid.  Not on a rate controlling agent.    Plan:  -Check TSH  -Monitor on Telemetry  -Cardiology consult  -Rate vs Rhythm controlling agent (Metoprolol vs Amiodarone)

## 2020-08-23 NOTE — ASSESSMENT & PLAN NOTE
No history of diabetes per records.  A1C in 6/2020 was 5.6  Glucose 145 on admission    Plan:  -Repeat A1C  -Follow POCT Glucose qAC and HS for now and cover with SSI if needed

## 2020-08-23 NOTE — ASSESSMENT & PLAN NOTE
"Acute bronchitis to COPD exacerbation to decompensated BHAVANI / OHS to RVR to pulmonary edema.  Patient with Chronic Respiratory Failure on 3L Home O2.  Also with HFpEF (TTE in 10/2016 with EF 55% and DD).    Placed on BiPAP.  Treated with IV Lasix 60mg x 1 in ED and Afib with RVR treated with IV Diltiazem 15mg x 1.  Now on Levaquin/Prednisone, Lasix, Nebs.  Urine output of 950ml    CXR on Admission - Cardiomegaly, increased pulmonary vascularity, bilateral pulmonary edema, and bilateral pleural effusions.  The findings are consistent with the provided clinical history of congestive heart failure.    Labs on Admission:  ABG (venous) initially with pH of 7.191 and pCO2 of 106.1.    Repeat on BiPAP (Arterial) with pH of 7.250 and pCO2 of 91.8.    Trop negative x 3  LFTs normal  TSH normal  Procalcitonin 0.26    Followed by Pulmonary-CC - "BHAVANI / OHS not using home PPV. Active smoker with 7 days acute bronchitis but no fever. A fub with RVR. Pulmonary infiltrates - look like edema. Wheezes on exam. Acute on Chronic hypercapnic / Hypoxic respiratory failure.  Improved with BiPAP & Ca++ Channel bockade.  Suspect pathogenesis follows the line from acute bronchitis to COPD exacerbation to decompensated BHAVANI / OHS to RVR to pulmonary edema.  Treat w ipratropium, prednisone, abx, BiPAP and maybe a shot of lasix".    Plan:  -Continue Bipap prn for now  -Continue Prednisone/Levaquin as per Pulmonary  -Ipratropium Nebs q6  -Lasix 60mg IV qday  -Fluid Restriction and Na restriction  -Strict I&Os  -TTE      "

## 2020-08-23 NOTE — PLAN OF CARE
No acute events. VSS, AOx4.  Tolerating normal home O2 requirements. POC d/w patient. Will continue to monitor.

## 2020-08-23 NOTE — PROGRESS NOTES
"[facility] Hospital Medicine  Progress Note    Patient Name: Patsy Morris  MRN: [id number]  Patient Class: IP- Inpatient   Admission Date: 8/22/2020  Length of Stay: 1 days  Attending Physician: Ravi Soria MD  Primary Care Provider: Luisana Cartagena MD        Subjective:     Principal Problem:Acute on chronic respiratory failure with hypoxia and hypercapnia        HPI:  Per ED:  "This is a 70 y.o. female with hx of HTN, asthma, A-fib, and CHF who presents via EMS with complaint of shortness of breath for the past week, worsening last night. She has orthopnea and sleeps in a recliner every night. She is on 3 L home Oxygen. She denies fever or chest pain. She denies hx of DVT or PE. She is on Eliquis".    Patient somnolent on BiPAP and unable to give much of a verbal history at this time.  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  Patient was seen by PCP on 8/20/2020 for increasing SOB and Lasix was increased to 40mg bid from qday.  Patient last seen by Cardiology (Dr. Lutz) on 6/26/2020 - not incomplete.  She arrived to ED on 100%NRB and in Afib with RVR.  She was placed on BiPAP and given Diltiazem x 1 with good response.  She was also given Lasix 60mg IV x 1 in ED.      Overview/Hospital Course:  Patient Admitted to ICU.  Patient with Chronic Respiratory Failure on 3L Home O2 and thought secondary to COPD.  Also with HFpEF (TTE in 10/2016 with EF 55% and DD).  Has had 1-2 weeks of progressive SOB.  CXR on Admission with evidence of pulmonary edema.  ABG (venous) initially with pH of 7.191 and pCO2 of 106.1.  Repeat on BiPAP (Arterial) with pH of 7.250 and pCO2 of 91.8.  Now on BiPAP 14/5 and FiO2 of 80%.  Treated with IV Lasix 60mg x 1 in ED and Afib with RVR treated with IV Diltiazem 15mg x 1.    Interval History: Patient on BiPAP this morning.  Sleeping, but easily aroused.  Feeling " better.  Able to answer questions.    Review of Systems   Constitutional: Positive for activity change and fatigue. Negative for chills and fever.   HENT: Negative for ear pain.    Eyes: Negative for pain.   Respiratory: Positive for shortness of breath.    Cardiovascular: Positive for leg swelling. Negative for chest pain and palpitations.   Gastrointestinal: Negative for abdominal pain.   Endocrine: Negative for polydipsia, polyphagia and polyuria.   Genitourinary: Negative for difficulty urinating.   Musculoskeletal: Negative for neck pain.   Neurological: Negative for dizziness and headaches.   Hematological: Does not bruise/bleed easily.   Psychiatric/Behavioral: Negative for agitation and behavioral problems.     Objective:     Vital Signs (Most Recent):  Temp: 98.6 °F (37 °C) (08/23/20 0705)  Pulse: (!) 126 (08/23/20 0945)  Resp: (!) 22 (08/23/20 0945)  BP: (!) 123/56 (08/23/20 0945)  SpO2: (!) 90 % (08/23/20 0945) Vital Signs (24h Range):  Temp:  [97.5 °F (36.4 °C)-98.6 °F (37 °C)] 98.6 °F (37 °C)  Pulse:  [] 126  Resp:  [16-34] 22  SpO2:  [88 %-100 %] 90 %  BP: (101-171)/(56-93) 123/56     Weight: 100.8 kg (222 lb 3.6 oz)  Body mass index is 39.37 kg/m².    Intake/Output Summary (Last 24 hours) at 8/23/2020 1038  Last data filed at 8/23/2020 0845  Gross per 24 hour   Intake 300 ml   Output 1350 ml   Net -1050 ml      Physical Exam  Constitutional:       Appearance: She is not ill-appearing or toxic-appearing.   HENT:      Head: Normocephalic and atraumatic.      Right Ear: External ear normal.      Left Ear: External ear normal.      Nose: Nose normal.      Mouth/Throat:      Mouth: Mucous membranes are moist.      Pharynx: Oropharynx is clear.   Eyes:      General: No scleral icterus.     Extraocular Movements: Extraocular movements intact.      Pupils: Pupils are equal, round, and reactive to light.   Neck:      Musculoskeletal: Normal range of motion and neck supple.      Vascular: No JVD.  "  Cardiovascular:      Rate and Rhythm: Normal rate. Rhythm irregular.      Heart sounds: No murmur.   Pulmonary:      Comments: Patient supine on BiPAP on evaluation.  Difficult to auscultate given body habitus and position.  No definite crackles appreciated anterior/lateral.   Abdominal:      General: There is no distension.      Palpations: Abdomen is soft.   Musculoskeletal:      Right lower leg: Edema present.      Left lower leg: Edema present.      Comments: 2+ to knees bilateral   Skin:     General: Skin is warm and dry.   Neurological:      General: No focal deficit present.      Mental Status: She is oriented to person, place, and time. She is lethargic.   Psychiatric:         Mood and Affect: Mood normal.         Behavior: Behavior normal.         Significant Labs: All pertinent labs within the past 24 hours have been reviewed.    Significant Imaging: I have reviewed all pertinent imaging results/findings within the past 24 hours.      Assessment/Plan:      * Acute on chronic respiratory failure with hypoxia and hypercapnia  Acute bronchitis to COPD exacerbation to decompensated BHAVANI / OHS to RVR to pulmonary edema.  Patient with Chronic Respiratory Failure on 3L Home O2.  Also with HFpEF (TTE in 10/2016 with EF 55% and DD).  Has had 1-2 weeks of progressive SOB.    Placed on BiPAP.  Treated with IV Lasix 60mg x 1 in ED and Afib with RVR treated with IV Diltiazem 15mg x 1.  Now on Levaquin/Prednisone, Lasix, Nebs.    CXR on Admission - Cardiomegaly, increased pulmonary vascularity, bilateral pulmonary edema, and bilateral pleural effusions.  The findings are consistent with the provided clinical history of congestive heart failure.    Labs on Admission:  ABG (venous) initially with pH of 7.191 and pCO2 of 106.1.    Repeat on BiPAP (Arterial) with pH of 7.250 and pCO2 of 91.8.    Trop negative x 3  LFTs normal  TSH normal  Procalcitonin 0.26    Followed by Pulmonary-[facility] - "BHAVANI / OHS not using home " "PPV. Active smoker with 7 days acute bronchitis but no fever. A fub with RVR. Pulmonary infiltrates - look like edema. Wheezes on exam. Acute on Chronic hypercapnic / Hypoxic respiratory failure.  Improved with BiPAP & Ca++ Channel bockade.  Suspect pathogenesis follows the line from acute bronchitis to COPD exacerbation to decompensated BHAVANI / OHS to RVR to pulmonary edema.  Treat w ipratropium, prednisone, abx, BiPAP and maybe a shot of lasix".    Followed by Cardiology - "Place on BiPAP, diuresis.  Will check an echocardiogram".    Plan:  -Continue Bipap for now  -Continue Prednisone/Levaquin as per Pulmonary  -Ipratropium Nebs q6  -Lasix 60mg IV qday  -Fluid Restriction and Na restriction  -Strict I&Os        Paroxysmal atrial fibrillation  With RVR to 140s on admission.  S/p Diltiazem 15mg IV x 1 in ED  Home Meds:  Apixaban 2.5mg bid.  Not on a rate controlling agent.  Followed by Cardiology.  TSH normal.    Plan:  -Continue IV Metoprolol for now      Chronic kidney disease (CKD) stage G4/A1, severely decreased glomerular filtration rate (GFR) between 15-29 mL/min/1.73 square meter and albuminuria creatinine ratio less than 30 mg/g  History of Renal Cell Ca in 3/2016 - had right kidney tumor resection only.  Creatinine baseline around 1.7-1.8 over past year  Stable on admission at 1.8.    Plan:  -Follow closely  -Avoid Nephrotic agents if possible  -Renal dose medications      Elevated glucose  No history of diabetes per records.  A1C in 6/2020 was 5.6  Glucose 145 on admission    Plan:  -Repeat A1C  -Follow POCT Glucose qAC and HS for now and cover with SSI if needed      BHAVANI (obstructive sleep apnea)  Worked up with sleep study 2 years ago.  She was unable to tolerate CPAP.  Needs re-evaluation on discharge        Morbid obesity  noted      Dyslipidemia  Continue statin      HTN (hypertension), benign  Not on BP meds at home per Epic.   Continue IV Metoprolol as above.        VTE Risk Mitigation (From " admission, onward)         Ordered     apixaban tablet 2.5 mg  2 times daily      08/22/20 1040     IP VTE HIGH RISK PATIENT  Once      08/22/20 1040     Place sequential compression device  Until discontinued      08/22/20 1040                Discharge Planning   YUDELKA:      Code Status: Full Code   Is the patient medically ready for discharge?:     Reason for patient still in hospital (select all that apply): Patient trending condition, Treatment and Consult recommendations                     Ravi Soria MD  Department of Hospital Medicine   [facility]

## 2020-08-23 NOTE — ASSESSMENT & PLAN NOTE
History of Renal Cell Ca in 3/2016 - had right kidney tumor resection only.  Creatinine baseline around 1.7-1.8 over past year  Stable on admission at 1.8.    Plan:  -Follow closely  -Avoid Nephrotic agents if possible  -Renal dose medications

## 2020-08-23 NOTE — PLAN OF CARE
Patient received on BIPAP with settings as documented. Dr. Alan at bedside. Settings changed to AVAPS. Will repeat ABG in 1 hour. 1 st blood draw was venous. Second attempt arterial. ABG done and reported to Dr. Alan. Will repeat ABG in AM. Aerosol treatments given Q 6. ABG done in AM. Results reported to Dr. Sibley, E-ICU. No changes at this time. Will continue to monitor.

## 2020-08-23 NOTE — ASSESSMENT & PLAN NOTE
Worked up with sleep study 2 years ago.  She was unable to tolerate CPAP.  Needs re-evaluation on discharge

## 2020-08-23 NOTE — PROGRESS NOTES
Cardiology  Progress Notes            Subjective:  Feels better, breathing is much improved.  Does not know details of events of yesterday.    Mrs Morris is a 70-year-old lady who presents to the emergency room here with complaints of progressively increasing shortness of breath for the last 3 days.  She was somnolent in the emergency room, is now beginning to wake up.  She says she has had a history of high blood pressure, and heart failure, and she smokes a pack of cigarettes a day, having smoker last cigarette earlier this morning.  She has a longstanding history of hypertension, chronic obstructive lung disease, chronic atrial fibrillation at least for the last 6 years, has had a renal cell carcinoma and has had a nephrectomy, and chronic renal failure.  She has had heart failure with a preserved left ventricular ejection fraction, diastolic left ventricular dysfunction.  She is followed by Dr. Lutz, who she last saw on 06/26/2020.     Vital Signs:  Vitals:    08/23/20 1124 08/23/20 1129 08/23/20 1145 08/23/20 1246   BP:  131/66 110/66 113/68   Pulse: 106 (!) 116 (!) 146 (!) 120   Resp: (!) 26 20 20 20   Temp:       TempSrc:       SpO2: (!) 90% (!) 90% (!) 83% (!) 90%   Weight:       Height:           Physical Exam:  Chest rare rhonchi.  Heart irregularly irregular.  No murmur or gallop.  No ankle edema.  Laboratory:  CBC:   Recent Labs   Lab 08/23/20  0355   WBC 9.04   RBC 3.82*   HGB 10.7*   HCT 33.7*      MCV 88   MCH 28.0   MCHC 31.8*     BMP:   Recent Labs   Lab 08/23/20  0355   GLU 98      K 5.0   CL 95   CO2 30*   BUN 26*   CREATININE 1.8*   CALCIUM 8.8   MG 2.1     CMP:   Recent Labs   Lab 08/22/20  0820 08/23/20  0355   * 98   CALCIUM 9.0 8.8   ALBUMIN 3.8  --    PROT 7.1  --     140   K 4.7 5.0   CO2 33* 30*   CL 96 95   BUN 22 26*   CREATININE 1.8* 1.8*   ALKPHOS 44*  --    ALT 12  --    AST 12  --    BILITOT 0.4  --      LFTs:   Recent Labs   Lab 08/22/20  0820   ALT 12    AST 12   ALKPHOS 44*   BILITOT 0.4   PROT 7.1   ALBUMIN 3.8     Coagulation:   Recent Labs   Lab 08/22/20  0820   INR 1.0     Cardiac markers:   Recent Labs   Lab 08/22/20  1938   TROPONINI 0.011       Imaging:  X-Ray Chest AP Portable   Final Result      1. Today's findings are compatible with volume overload with alveolar pulmonary edema and bilateral layering pleural effusions.   2. The findings in the right upper lobe are becoming more consolidative and concerning for pneumonia.  There is a small lucency within the center of this developing consolidation.  In this may simply represent intervening lung parenchyma however, follow-up is recommended to ensure this is not a cavitary process.         Electronically signed by: Sully Leslie MD   Date:    08/23/2020   Time:    09:49      US Lower Extremity Veins Bilateral   Final Result      No evidence of deep venous thrombosis in either lower extremity.         Electronically signed by: Adair John MD   Date:    08/22/2020   Time:    19:35      X-Ray Chest AP Portable   Final Result   Abnormal      Cardiomegaly, increased pulmonary vascularity, bilateral pulmonary edema, and bilateral pleural effusions.  The findings are consistent with the provided clinical history of congestive heart failure.      This report was flagged in Epic as abnormal.         Electronically signed by: Cammy Zaldivar MD   Date:    08/22/2020   Time:    09:00            Problems:   Hypercapnic and hypoxemic respiratory failure.  Advanced obstructive lung disease  Cigarette smoker, last cigarette this morning.  Chronic atrial fibrillation,  presently with rapid ventricular response  Heart failure with preserved ejection fraction  Diastolic left ventricular dysfunction  Obstructive sleep apnea  Obesity  Chronic renal failure, status post nephrectomy for hypernephroma           Plan of Care:  Add metoprolol tartrate for control of ventricular rate.  Await  echocardiogram.        Edis Ramirez

## 2020-08-23 NOTE — EICU
eICU Note :    Called by the [facility]:    Problem: ABG :7.35/70 6.5/60 7/17/40 2.9 on DAVE PS mode    Pertinent History and labs reviewed : 69 y/o     Acute on chronic respiratory failure with hypoxia and hypercapnia    HTN (hypertension), benign    Dyslipidemia    Chronic kidney disease (CKD) stage G4/A1, severely decreased glomerular filtration rate (GFR) between 15-29 mL/min/1.73 square meter and albuminuria creatinine ratio less than 30 mg/g    Morbid obesity    Paroxysmal atrial fibrillation    BHAVANI (obstructive sleep apnea)    Elevated glucose    Acute on chronic congestive heart failure    Longstanding persistent atrial fibrillation    COPD exacerbation    Atrial fibrillation with RVR        Treatment /Intervention given: Continue current treatment , AVAPS 22, , FiO2 35%        Marnie Morton M.D  eICU Physician

## 2020-08-23 NOTE — SUBJECTIVE & OBJECTIVE
Interval History: Patient on BiPAP this morning.  Sleeping, but easily aroused.  Feeling better.  Able to answer questions.    Review of Systems   Constitutional: Positive for activity change and fatigue. Negative for chills and fever.   HENT: Negative for ear pain.    Eyes: Negative for pain.   Respiratory: Positive for shortness of breath.    Cardiovascular: Positive for leg swelling. Negative for chest pain and palpitations.   Gastrointestinal: Negative for abdominal pain.   Endocrine: Negative for polydipsia, polyphagia and polyuria.   Genitourinary: Negative for difficulty urinating.   Musculoskeletal: Negative for neck pain.   Neurological: Negative for dizziness and headaches.   Hematological: Does not bruise/bleed easily.   Psychiatric/Behavioral: Negative for agitation and behavioral problems.     Objective:     Vital Signs (Most Recent):  Temp: 98.6 °F (37 °C) (08/23/20 0705)  Pulse: (!) 126 (08/23/20 0945)  Resp: (!) 22 (08/23/20 0945)  BP: (!) 123/56 (08/23/20 0945)  SpO2: (!) 90 % (08/23/20 0945) Vital Signs (24h Range):  Temp:  [97.5 °F (36.4 °C)-98.6 °F (37 °C)] 98.6 °F (37 °C)  Pulse:  [] 126  Resp:  [16-34] 22  SpO2:  [88 %-100 %] 90 %  BP: (101-171)/(56-93) 123/56     Weight: 100.8 kg (222 lb 3.6 oz)  Body mass index is 39.37 kg/m².    Intake/Output Summary (Last 24 hours) at 8/23/2020 1038  Last data filed at 8/23/2020 0845  Gross per 24 hour   Intake 300 ml   Output 1350 ml   Net -1050 ml      Physical Exam  Constitutional:       Appearance: She is not ill-appearing or toxic-appearing.   HENT:      Head: Normocephalic and atraumatic.      Right Ear: External ear normal.      Left Ear: External ear normal.      Nose: Nose normal.      Mouth/Throat:      Mouth: Mucous membranes are moist.      Pharynx: Oropharynx is clear.   Eyes:      General: No scleral icterus.     Extraocular Movements: Extraocular movements intact.      Pupils: Pupils are equal, round, and reactive to light.   Neck:       Musculoskeletal: Normal range of motion and neck supple.      Vascular: No JVD.   Cardiovascular:      Rate and Rhythm: Normal rate. Rhythm irregular.      Heart sounds: No murmur.   Pulmonary:      Comments: Patient supine on BiPAP on evaluation.  Difficult to auscultate given body habitus and position.  No definite crackles appreciated anterior/lateral.   Abdominal:      General: There is no distension.      Palpations: Abdomen is soft.   Musculoskeletal:      Right lower leg: Edema present.      Left lower leg: Edema present.      Comments: 2+ to knees bilateral   Skin:     General: Skin is warm and dry.   Neurological:      General: No focal deficit present.      Mental Status: She is oriented to person, place, and time. She is lethargic.   Psychiatric:         Mood and Affect: Mood normal.         Behavior: Behavior normal.         Significant Labs: All pertinent labs within the past 24 hours have been reviewed.    Significant Imaging: I have reviewed all pertinent imaging results/findings within the past 24 hours.

## 2020-08-23 NOTE — PROGRESS NOTES
[facility]  Critical Care - Medicine  Progress Note    Patient Name: Patsy Morris  MRN: [id number]  Admission Date: 8/22/2020  Hospital Length of Stay: 1 days  Code Status: Full Code  Attending Provider: Ravi Soria MD  Primary Care Provider: Luisana Cartagena MD   Principal Problem: Acute on chronic respiratory failure with hypoxia and hypercapnia    Subjective:       Interval History/Significant Events: Much improved on BIPAP with AVAPS overnight, A+O x3. Still in RVR but lung exam improved.     Objective:     Vital Signs (Most Recent):  Temp: 99.1 °F (37.3 °C) (08/23/20 1105)  Pulse: (!) 120 (08/23/20 1246)  Resp: 20 (08/23/20 1246)  BP: 113/68 (08/23/20 1246)  SpO2: (!) 90 % (08/23/20 1246) Vital Signs (24h Range):  Temp:  [97.5 °F (36.4 °C)-99.1 °F (37.3 °C)] 99.1 °F (37.3 °C)  Pulse:  [] 120  Resp:  [17-34] 20  SpO2:  [83 %-100 %] 90 %  BP: (100-171)/(56-93) 113/68     Weight: 100.8 kg (222 lb 3.6 oz)  Body mass index is 39.37 kg/m².      Intake/Output Summary (Last 24 hours) at 8/23/2020 1349  Last data filed at 8/23/2020 0845  Gross per 24 hour   Intake 300 ml   Output 1350 ml   Net -1050 ml       Physical Exam  Constitutional:       Appearance: Well appearing, sitting up at the side of the bed eating lunch  HENT:      Head: Normocephalic and atraumatic.      Right Ear: External ear normal.      Left Ear: External ear normal.      Nose: Nose normal.      Mouth/Throat:      Mouth: Mucous membranes are moist.      Pharynx: Oropharynx is clear.   Eyes:      General: No scleral icterus.     Extraocular Movements: Extraocular movements intact.      Pupils: Pupils are equal, round, and reactive to light.   Neck:      Musculoskeletal: Normal range of motion and neck supple.      Vascular: No JVD.   Cardiovascular:      Rate and Rhythm: tachycardic. Rhythm irregular.      Heart sounds: No murmur.   Pulmonary:      Comments: On NC, still with scattered wheezes and crackles but  improved  Abdominal:      General: There is no distension.      Palpations: Abdomen is soft.   Musculoskeletal:      Right lower leg: Edema present.      Left lower leg: Edema present.      Comments: 2+ to knees bilateral   Skin:     General: Skin is warm and dry.   Neurological:      General: No focal deficit present.      Mental Status: She is oriented to person, place, and time. She is energetic  Psychiatric:         Mood and Affect: Mood normal.         Behavior: Behavior normal.   Vents:  Oxygen Concentration (%): 35 (08/23/20 0415)    Lines/Drains/Airways     Drain            Female External Urinary Catheter 08/22/20 1220 1 day          Peripheral Intravenous Line                 Peripheral IV - Single Lumen 08/22/20 0748 18 G Left;Posterior Wrist 1 day         Peripheral IV - Single Lumen 08/22/20 0818 20 G Right Antecubital 1 day                Significant Labs:    CBC/Anemia Profile:  Recent Labs   Lab 08/22/20  0820 08/23/20  0355   WBC 9.78 9.04   HGB 11.0* 10.7*   HCT 36.2* 33.7*    175   MCV 91 88   RDW 15.2* 15.1*        Chemistries:  Recent Labs   Lab 08/22/20  0820 08/23/20  0355    140   K 4.7 5.0   CL 96 95   CO2 33* 30*   BUN 22 26*   CREATININE 1.8* 1.8*   CALCIUM 9.0 8.8   ALBUMIN 3.8  --    PROT 7.1  --    BILITOT 0.4  --    ALKPHOS 44*  --    ALT 12  --    AST 12  --    MG  --  2.1   PHOS  --  4.2       Significant Imaging:  I have reviewed all pertinent imaging results/findings within the past 24 hours.    Assessment/Plan:     Ms Morris is a 69 yo F with complex PMH who presents to the ED in multifactorial hypercapneic respiratory failure as well as AF with RVR. She is improved already on bipap and her AF with RVR seems to be resolving. She is able to spend time off bipap today, and now stable for floor management, with Bipap at night and for naps during the day.      1) Hypercapneic resp failure- BHAVANI/OHS, not on bipap at home  2) HFpEF, possible exacerbation  3) AF with RVR  4)  COPD on home 02; presumed COPD exacerbation  5) CKD 3-4 without CLIFFORD, RCC s/p nephrectomy  6) b/l leg pain and swelling, r/o DVT  7) tobacco use disorder     Plan:  -continue Bipap while asleep and PRN, patient will need SW consult for assistance obtaining home CPAP, otherwise she is very high risk for morbidity and readmission  -ipratropium nebs q6 for wheezing, OK to give levalbuterol but watch HR  -short PO steroid and abx course for COPD exacerbation  -metoprolol for rate control, continue home eliquis  -diurese with lasix in addition to bipap, BNP mildly increased (with hx CKD, but also with obesity, BNP was lower 2 days ago)  -recommend sleep study to be done outpatient, pt will need Bipap/Cpap at home  -discussed tobacco cessation extensively, we will start patient on varenecline at this time (OK to use with current psychiatric issues) as she has been unsuccessful quitting in the past  -abnormal consolidation with questionable cavity/bullae on CXR, patient will need repeat CT for further evaluation once stable or as an outpatient.   -recommend referral to outpatient Pulm for continuity of care for her multiple pulmonary issues     Thank you for your consult. We will sign off at this time as patient is stable for floor management. Please reconsult with any additional questions/concerns.         Elizabeth Alan MD  Critical Care - Medicine  [facility]

## 2020-08-23 NOTE — ASSESSMENT & PLAN NOTE
With RVR to 140s on admission.  S/p Diltiazem 15mg IV x 1 in ED  Home Meds:  Apixaban 2.5mg bid.  Not on a rate controlling agent.  Followed by Cardiology.  TSH normal.    Plan:  -Continue IV Metoprolol for now

## 2020-08-24 PROBLEM — J96.01 ACUTE RESPIRATORY FAILURE WITH HYPOXIA AND HYPERCAPNIA: Status: ACTIVE | Noted: 2020-01-01

## 2020-08-24 PROBLEM — J96.02 ACUTE RESPIRATORY FAILURE WITH HYPOXIA AND HYPERCAPNIA: Status: ACTIVE | Noted: 2020-01-01

## 2020-08-24 NOTE — PLAN OF CARE
Patient is AAOx4. VSS, O2 sats 90% on 4L oxygen. Patient tolerating diet well. Blood glucose monitoring in place while patient is taking steroid medications, no insulin coverage needed today. PIVs are patent and saline locked. Purwick in place. No complaints of pain. Palliative care consulted today. New order for PT put in today. New DNR signed today, MD to put in chart. Patient resting in bed with wheels locked, in lowest position, call light in reach. Will continue to monitor.

## 2020-08-24 NOTE — NURSING
Patient transferred from ICU. 02 sat 90% on 4L, and tachycardic. All other v/s WNL. No complaints of pain. Purwick in place. Patient resting in bed, will continue to monitor closely.

## 2020-08-24 NOTE — PLAN OF CARE
Pt received on BIPAP. No distress noted. Placed on 3L. Aerosol tx given. BIPAP on standby. Will continue to monitor.

## 2020-08-24 NOTE — TELEPHONE ENCOUNTER
Anne Marie Lynch Jr., MD; LINDA DIOR Jr. Staff   Phone Number: [phone]             Thank you for choosing [facility]. [facility] The referral will need to be sent to [facility] for pre-auth.      Referral, labs, and progress notes from last office visit [facility]

## 2020-08-24 NOTE — NURSING
Report given to the floor  All belongings sent with the patient  Nurse at the bedside to accept the patient

## 2020-08-24 NOTE — PLAN OF CARE
Pt received resting in bed, on 3L nasal cannula, maintaining appropriate SpO2. Pt AAO x4, NAD. Later as pt started falling asleep pt noted to desaturate in mid 80's. Pt placed back on Bipap at that time. Pt tolerated BiPap well over night while sleeping. HR controlled on PO metoprolol. VSS. Safety measures in place, will continue to monitor.

## 2020-08-24 NOTE — ASSESSMENT & PLAN NOTE
No history of diabetes per records.  A1C in 6/2020 was 5.6   Glucose 145 on admission  Repeat A1C this admission still 5.6.    Plan:  -Follow POCT Glucose qAC and HS for now and cover with SSI if needed, as she is now on prednisone

## 2020-08-24 NOTE — PLAN OF CARE
Patient in no apparent distress. Sat's 91-92  % on 3 lpm. Aerosol treatments given Q 6. Patient placed on BIPAP with settings as documented for night . Will continue to monitor.

## 2020-08-24 NOTE — ASSESSMENT & PLAN NOTE
Hgb 11 on admission and trended down to 10.2 today.  Baseline around 11.3 a year ago.  Follow for now.

## 2020-08-24 NOTE — SUBJECTIVE & OBJECTIVE
Interval History: Patient on BiPAP this morning.  Sleeping, but easily aroused.  Feeling better.  Able to answer questions.    Review of Systems   Constitutional: Positive for activity change and fatigue. Negative for chills and fever.   HENT: Negative for ear pain.    Eyes: Negative for pain.   Respiratory: Positive for shortness of breath.    Cardiovascular: Positive for leg swelling. Negative for chest pain and palpitations.   Gastrointestinal: Negative for abdominal pain.   Endocrine: Negative for polydipsia, polyphagia and polyuria.   Genitourinary: Negative for difficulty urinating.   Musculoskeletal: Negative for neck pain.   Neurological: Negative for dizziness and headaches.   Hematological: Does not bruise/bleed easily.   Psychiatric/Behavioral: Negative for agitation and behavioral problems.     Objective:     Vital Signs (Most Recent):  Temp: 98.1 °F (36.7 °C) (08/24/20 0705)  Pulse: 74 (08/24/20 0545)  Resp: 17 (08/24/20 0545)  BP: 110/61 (08/24/20 0545)  SpO2: (!) 92 % (08/24/20 0545) Vital Signs (24h Range):  Temp:  [98.1 °F (36.7 °C)-99.3 °F (37.4 °C)] 98.1 °F (36.7 °C)  Pulse:  [] 74  Resp:  [10-34] 17  SpO2:  [83 %-94 %] 92 %  BP: ()/(56-93) 110/61     Weight: 100.8 kg (222 lb 3.6 oz)  Body mass index is 39.37 kg/m².    Intake/Output Summary (Last 24 hours) at 8/24/2020 0746  Last data filed at 8/24/2020 0600  Gross per 24 hour   Intake --   Output 825 ml   Net -825 ml      Physical Exam  Constitutional:       Appearance: She is not ill-appearing or toxic-appearing.   HENT:      Head: Normocephalic and atraumatic.      Right Ear: External ear normal.      Left Ear: External ear normal.      Nose: Nose normal.      Mouth/Throat:      Mouth: Mucous membranes are moist.      Pharynx: Oropharynx is clear.   Eyes:      General: No scleral icterus.     Extraocular Movements: Extraocular movements intact.      Pupils: Pupils are equal, round, and reactive to light.   Neck:       Musculoskeletal: Normal range of motion and neck supple.      Vascular: No JVD.   Cardiovascular:      Rate and Rhythm: Normal rate. Rhythm irregular.      Heart sounds: No murmur.   Pulmonary:      Comments: Patient supine on BiPAP on evaluation.  Difficult to auscultate given body habitus and position.  No definite crackles appreciated anterior/lateral.   Abdominal:      General: There is no distension.      Palpations: Abdomen is soft.   Musculoskeletal:      Right lower leg: Edema present.      Left lower leg: Edema present.      Comments: 2+ to knees bilateral   Skin:     General: Skin is warm and dry.   Neurological:      General: No focal deficit present.      Mental Status: She is oriented to person, place, and time. She is lethargic.   Psychiatric:         Mood and Affect: Mood normal.         Behavior: Behavior normal.         Significant Labs: All pertinent labs within the past 24 hours have been reviewed.    Significant Imaging: I have reviewed all pertinent imaging results/findings within the past 24 hours.

## 2020-08-24 NOTE — ASSESSMENT & PLAN NOTE
Hgb 11 on admission and trended down to 10.7 today.  Baseline around 11.3 a year ago.  Follow for now.

## 2020-08-24 NOTE — NURSING
Dr Soria notified of heart rate being elevated and Lopressor increased from 25mg to 50 mg  Lopressor iv ordered for heart rate of greater than 180

## 2020-08-24 NOTE — ASSESSMENT & PLAN NOTE
With RVR to 140s on admission.  S/p Diltiazem 15mg IV x 1 in ED  Home Meds:  Apixaban 2.5mg bid.  Not on a rate controlling agent.  Followed by Cardiology.  TSH normal.  Now on Metoprolol 25mg bid and IV 5mg q8 prn HR >180    Plan:  -Continue PO/IV Metoprolol for now

## 2020-08-24 NOTE — PROGRESS NOTES
"[facility] Hospital Medicine  Progress Note    Patient Name: Patsy Morris  MRN: [id number]  Patient Class: IP- Inpatient   Admission Date: 8/22/2020  Length of Stay: 2 days  Attending Physician: Ravi Soria MD  Primary Care Provider: Lusiana Cartagena MD        Subjective:     Principal Problem:Acute on chronic respiratory failure with hypoxia and hypercapnia        HPI:  Per ED:  "This is a 70 y.o. female with hx of HTN, asthma, A-fib, and CHF who presents via EMS with complaint of shortness of breath for the past week, worsening last night. She has orthopnea and sleeps in a recliner every night. She is on 3 L home Oxygen. She denies fever or chest pain. She denies hx of DVT or PE. She is on Eliquis".    Patient somnolent on BiPAP and unable to give much of a verbal history at this time.  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  Patient was seen by PCP on 8/20/2020 for increasing SOB and Lasix was increased to 40mg bid from qday.  Patient last seen by Cardiology (Dr. Lutz) on 6/26/2020 - not incomplete.  She arrived to ED on 100%NRB and in Afib with RVR.  She was placed on BiPAP and given Diltiazem x 1 with good response.  She was also given Lasix 60mg IV x 1 in ED.      Overview/Hospital Course:  Patient Admitted to ICU.  Patient with Chronic Respiratory Failure on 3L Home O2 and thought secondary to COPD.  Also with HFpEF (TTE in 10/2016 with EF 55% and DD).  Has had 1-2 weeks of progressive SOB.  CXR on Admission with evidence of pulmonary edema.  ABG (venous) initially with pH of 7.191 and pCO2 of 106.1.  Repeat on BiPAP (Arterial) with pH of 7.250 and pCO2 of 91.8.  Now on BiPAP 14/5 and FiO2 of 80%.  Treated with IV Lasix 60mg x 1 in ED and Afib with RVR treated with IV Diltiazem 15mg x 1.  Admitted to ICU.  Suspect Acute bronchitis to COPD exacerbation to decompensated BHAVANI / OHS to " "RVR to pulmonary edema.  Consulted Pulmonary-CC and was started on Prednisone and Levaquin for 5 days.  Also gently diuresed with Lasix.  Started on Metoprolol for rate control.    No new subjective & objective note has been filed under this hospital service since the last note was generated.      Assessment/Plan:      * Acute on chronic respiratory failure with hypoxia and hypercapnia  Acute bronchitis to COPD exacerbation to decompensated BHAVANI / OHS to RVR to pulmonary edema.  Patient with Chronic Respiratory Failure on 3L Home O2.  Also with HFpEF (TTE in 10/2016 with EF 55% and DD).    Placed on BiPAP.  Treated with IV Lasix 60mg x 1 in ED and Afib with RVR treated with IV Diltiazem 15mg x 1.  Now on Levaquin/Prednisone, Lasix, Nebs.  Urine output of 950ml    CXR on Admission - Cardiomegaly, increased pulmonary vascularity, bilateral pulmonary edema, and bilateral pleural effusions.  The findings are consistent with the provided clinical history of congestive heart failure.    Labs on Admission:  ABG (venous) initially with pH of 7.191 and pCO2 of 106.1.    Repeat on BiPAP (Arterial) with pH of 7.250 and pCO2 of 91.8.    Trop negative x 3  LFTs normal  TSH normal  Procalcitonin 0.26    Followed by Pulmonary-CC - "BHAVANI / OHS not using home PPV. Active smoker with 7 days acute bronchitis but no fever. A fub with RVR. Pulmonary infiltrates - look like edema. Wheezes on exam. Acute on Chronic hypercapnic / Hypoxic respiratory failure.  Improved with BiPAP & Ca++ Channel bockade.  Suspect pathogenesis follows the line from acute bronchitis to COPD exacerbation to decompensated BHAVANI / OHS to RVR to pulmonary edema.  Treat w ipratropium, prednisone, abx, BiPAP and maybe a shot of lasix".    Plan:  -Continue Bipap prn for now  -Continue Prednisone/Levaquin as per Pulmonary  -Ipratropium Nebs q6  -Lasix 60mg IV qday  -Fluid Restriction and Na restriction  -Strict I&Os  -TTE        Paroxysmal atrial fibrillation  With " RVR to 140s on admission.  S/p Diltiazem 15mg IV x 1 in ED  Home Meds:  Apixaban 2.5mg bid.  Not on a rate controlling agent.  Followed by Cardiology.  TSH normal.  Now on Metoprolol 25mg bid and IV 5mg q8 prn HR >180    Plan:  -Continue PO/IV Metoprolol for now      Chronic kidney disease (CKD) stage G4/A1, severely decreased glomerular filtration rate (GFR) between 15-29 mL/min/1.73 square meter and albuminuria creatinine ratio less than 30 mg/g  History of Renal Cell Ca in 3/2016 - had right kidney tumor resection only.  Creatinine baseline around 1.7-1.8 over past year  Stable on admission at 1.8.  Increased to 2.2 this morning.  On Lasix.    Plan:  -Follow closely  -Avoid Nephrotic agents if possible  -Renal dose medications  -Consider Renal consult if worsens      Elevated glucose  No history of diabetes per records.  A1C in 6/2020 was 5.6   Glucose 145 on admission  Repeat A1C this admission still 5.6.    Plan:  -Follow POCT Glucose qAC and HS for now and cover with SSI if needed, as she is now on prednisone      BHAVANI (obstructive sleep apnea)  Worked up with sleep study 2 years ago.  She was unable to tolerate CPAP.  Needs re-evaluation on discharge        Normocytic anemia  Hgb 11 on admission and trended down to 10.2 today.  Baseline around 11.3 a year ago.  Follow for now.      Morbid obesity  noted      Dyslipidemia  Continue statin      HTN (hypertension), benign  Not on BP meds at home per Epic.   Continue Metoprolol as above.  BP stable      VTE Risk Mitigation (From admission, onward)         Ordered     apixaban tablet 2.5 mg  2 times daily      08/22/20 1040     IP VTE HIGH RISK PATIENT  Once      08/22/20 1040     Place sequential compression device  Until discontinued      08/22/20 1040                Discharge Planning   YUDELKA:      Code Status: Full Code   Is the patient medically ready for discharge?:     Reason for patient still in hospital (select all that apply): Patient trending condition and  Treatment                     Ravi Soria MD  Department of Hospital Medicine   [facility]

## 2020-08-24 NOTE — HPI
Ms. Morris is a 70 y.o. female with PMH of HTN, asthma, afib, CHF, obesity, BHAVANI, CKD 3-4, COPD, who presented to [facility] ED with SOB x 1 week.  Patient reports she is on oxygen at home (3L NC) and has to sleep in a recliner chair.  Patient reports she is SOB with exertion.  She reports worsening SOB with simple activities such as cleaning, showering, and cooking.  Patient denies fever, chest pain, or wheezing.     Patient does not have any children, she lives with her younger brother who has schizophrenia.  She is his primary caregiver.  Patient has 9 children (nieces and nephews) who she helped raise who she is close to.

## 2020-08-24 NOTE — PROGRESS NOTES
"Cardiology  Progress Notes            Subjective:  Complains of feeling tired.  Says her breathing is good.  Denies cough or sputum production.    Vital Signs:  Vitals:    08/24/20 0855 08/24/20 0902 08/24/20 0920 08/24/20 0949   BP: 110/65  107/79 101/84   Pulse: (!) 122 107 (!) 114 (!) 112   Resp: (!) 22  (!) 22 20   Temp:       TempSrc:       SpO2: (!) 89%  (!) 89% (!) 88%   Weight:    100.7 kg (222 lb)   Height:    5' 3" (1.6 m)       Physical Exam:  Chest clear.  Heart irregularly irregular.  No murmur or gallop.  No ankle edema.  Laboratory:  CBC:   Recent Labs   Lab 08/24/20 0307   WBC 11.66   RBC 3.72*   HGB 10.2*   HCT 32.5*      MCV 87   MCH 27.4   MCHC 31.4*     BMP:   Recent Labs   Lab 08/24/20 0307   GLU 96      K 4.2   CL 92*   CO2 33*   BUN 41*   CREATININE 2.2*   CALCIUM 8.5*   MG 2.1     CMP:   Recent Labs   Lab 08/22/20 0820 08/24/20 0307   *   < > 96   CALCIUM 9.0   < > 8.5*   ALBUMIN 3.8  --   --    PROT 7.1  --   --       < > 138   K 4.7   < > 4.2   CO2 33*   < > 33*   CL 96   < > 92*   BUN 22   < > 41*   CREATININE 1.8*   < > 2.2*   ALKPHOS 44*  --   --    ALT 12  --   --    AST 12  --   --    BILITOT 0.4  --   --     < > = values in this interval not displayed.     LFTs:   Recent Labs   Lab 08/22/20 0820   ALT 12   AST 12   ALKPHOS 44*   BILITOT 0.4   PROT 7.1   ALBUMIN 3.8     Coagulation:   Recent Labs   Lab 08/22/20 0820   INR 1.0     Cardiac markers:   Recent Labs   Lab 08/22/20 1938   TROPONINI 0.011       Imaging:  X-Ray Chest AP Portable   Final Result      1. Today's findings are compatible with volume overload with alveolar pulmonary edema and bilateral layering pleural effusions.   2. The findings in the right upper lobe are becoming more consolidative and concerning for pneumonia.  There is a small lucency within the center of this developing consolidation.  In this may simply represent intervening lung parenchyma however, follow-up is recommended " to ensure this is not a cavitary process.         Electronically signed by: Sully Leslie MD   Date:    08/23/2020   Time:    09:49      US Lower Extremity Veins Bilateral   Final Result      No evidence of deep venous thrombosis in either lower extremity.         Electronically signed by: Adair John MD   Date:    08/22/2020   Time:    19:35      X-Ray Chest AP Portable   Final Result   Abnormal      Cardiomegaly, increased pulmonary vascularity, bilateral pulmonary edema, and bilateral pleural effusions.  The findings are consistent with the provided clinical history of congestive heart failure.      This report was flagged in Epic as abnormal.         Electronically signed by: Cammy Zaldivar MD   Date:    08/22/2020   Time:    09:00            Problems:   Hypercapnic and hypoxemic respiratory failure.  Advanced obstructive lung disease  Cigarette smoker, last cigarette morning of admission..  Chronic atrial fibrillation,  presently with rapid ventricular response  Heart failure with preserved ejection fraction  Diastolic left ventricular dysfunction  Obstructive sleep apnea  Obesity  Chronic renal failure, status post nephrectomy for hypernephroma           Plan of Care: Will increase Metoprolol to control ventricular rate.        Edis Ramirez

## 2020-08-25 PROBLEM — R73.09 ELEVATED GLUCOSE: Status: RESOLVED | Noted: 2020-01-01 | Resolved: 2020-01-01

## 2020-08-25 PROBLEM — Z51.5 ENCOUNTER FOR PALLIATIVE CARE: Status: ACTIVE | Noted: 2020-01-01

## 2020-08-25 NOTE — PLAN OF CARE
Patient is AAOx4. DNR order placed today. O2 sat 92% on 4L. Patient worked with PT/OT today, patient was up to bedside commode today. Patient transports to recliner and bedside commode with minimal assist. Purwick remains in place. No BM today. 1500mL fluid restriction in place. Tolerating diet well. Patient on tele monitoring, afib remains rate controlled. Patient resting in recliner with wheels locked, call light in place. Will continue to monitor.

## 2020-08-25 NOTE — CONSULTS
Consult Note  Nephrology    Consult Requested By: Loi Quiles MD  Reason for Consult: CLIFFORD    SUBJECTIVE:     History of Present Illness:  Patient is a 70 y.o. female presents with worsening shortness of breath and found to have acute hypercapnic respiratory failure.  Placed on BiPAP and admitted to ICU for evaluation.  Extensive medical history as outlined below including CKD stage IIIB-IV with baseline creatinine of around 1.8 secondary to solitary kidney after renal carcinoma.  Followed by Urology and Cardiology at [facility].  Over last few days patient had worsening atrial fibrillation with rapid ventricular response leading to hypotension and pulmonary edema.  Was diuresed aggressively.  Creatinine trends noted with initial presentation of 1.7 and then mckenna to 2.2 and now this morning of 2.4.      Consulted for evaluation.  Patient seen and examined.  Discussed with treatment team.  Patient states that she is feeling somewhat better and less short of breath.  Epic reviewed in detail.  Currently denies any chest pain, nausea, vomiting, diarrhea, fever, chills.    Past Medical History:   Diagnosis Date    Arthritis     Asthma     Atrial fibrillation     Atrial flutter     Cerumen impaction     CHF (congestive heart failure)     CKD (chronic kidney disease), stage III     Colon polyps 2017    COPD exacerbation     Encounter for blood transfusion     HEARING LOSS     Herpes genitalis     Hypertension     Renal carcinoma 3/10/2015    Thyroid nodule 6/8/2017     Past Surgical History:   Procedure Laterality Date    BRAIN SURGERY      COLONOSCOPY N/A 6/23/2017    Procedure: COLONOSCOPY;  Surgeon: Shane Sharma MD;  Location: [address]);  Service: Endoscopy;  Laterality: N/A;  2nd floor case ; on 3L home O2       per Dr Leopold (anesthesia)-Based on her history of:  Chronic respiratory failure with oxygen use, HDEZ, CHF, A-Fib, BHAVANI, it was determined that she should have her  Colonoscopy scheduled on the 2nd Floor       ok to hold Eliquis 2 days prior to    EYE SURGERY      HYSTERECTOMY      kidney mass resection Right     renal carcinoma     Family History   Problem Relation Age of Onset    Hypertension Mother     Thyroid disease Mother     Cancer Father     Asthma Neg Hx     Emphysema Neg Hx     Melanoma Neg Hx      Social History     Tobacco Use    Smoking status: Current Every Day Smoker     Packs/day: 1.00     Years: 25.00     Pack years: 25.00     Types: Cigarettes    Smokeless tobacco: Never Used   Substance Use Topics    Alcohol use: No     Alcohol/week: 0.0 standard drinks    Drug use: No       Review of patient's allergies indicates:  No Known Allergies     Review of Systems:  Constitutional: No fever or chills  Respiratory:  shortness of breath  Cardiovascular: No chest pain or palpitations  Gastrointestinal: No nausea or vomiting  Neurological: No confusion or weakness    OBJECTIVE:     Vital Signs (Most Recent)  Temp: 97.6 °F (36.4 °C) (08/25/20 0749)  Pulse: 101 (08/25/20 0800)  Resp: 18 (08/25/20 0749)  BP: (!) 110/59 (08/25/20 0749)  SpO2: (!) 91 % (08/25/20 0749)    Vital Signs Range (Last 24H):  Temp:  [97.4 °F (36.3 °C)-98.2 °F (36.8 °C)]   Pulse:  []   Resp:  [18-33]   BP: (104-112)/(55-78)   SpO2:  [90 %-95 %]       Intake/Output Summary (Last 24 hours) at 8/25/2020 1027  Last data filed at 8/25/2020 0600  Gross per 24 hour   Intake 360 ml   Output 1150 ml   Net -790 ml       Physical Exam:  General appearance:  Chronically ill-appearing  Eyes:  Conjunctivae/corneas clear. PERRL.  Lungs:  Labored at times.  Mostly diminished.    Heart:  Tachy irregular  Abdomen: Soft, non-tender non-distended; bowel sounds normal; no masses,  no organomegaly, obese  Extremities: No cyanosis or clubbing.  Trace edema.    Skin: Skin color, texture, turgor normal. No rashes or lesions  Neurologic: Normal strength and tone. No focal numbness or weakness        Laboratory:  Recent Labs   Lab 08/25/20 0423   WBC 10.17   RBC 3.83*   HGB 10.5*   HCT 34.2*      MCV 89   MCH 27.4   MCHC 30.7*     BMP:   Recent Labs   Lab 08/25/20 0423         K 4.8   CL 91*   CO2 35*   BUN 52*   CREATININE 2.4*   CALCIUM 8.7   MG 2.4     Lab Results   Component Value Date    CALCIUM 8.7 08/25/2020    PHOS 5.3 (H) 08/25/2020     BNP  Recent Labs   Lab 08/22/20  0820   *     Lab Results   Component Value Date    URICACID 7.5 (H) 09/11/2017     Lab Results   Component Value Date    IRON 43 12/04/2018    TIBC 302 12/04/2018    FERRITIN 25 12/04/2018     Lab Results   Component Value Date    CALCIUM 8.7 08/25/2020    PHOS 5.3 (H) 08/25/2020       Diagnostic Results:  X-Ray Chest AP Portable   Final Result      1. Today's findings are compatible with volume overload with alveolar pulmonary edema and bilateral layering pleural effusions.   2. The findings in the right upper lobe are becoming more consolidative and concerning for pneumonia.  There is a small lucency within the center of this developing consolidation.  In this may simply represent intervening lung parenchyma however, follow-up is recommended to ensure this is not a cavitary process.         Electronically signed by: Sully Leslie MD   Date:    08/23/2020   Time:    09:49      US Lower Extremity Veins Bilateral   Final Result      No evidence of deep venous thrombosis in either lower extremity.         Electronically signed by: Adair John MD   Date:    08/22/2020   Time:    19:35      X-Ray Chest AP Portable   Final Result   Abnormal      Cardiomegaly, increased pulmonary vascularity, bilateral pulmonary edema, and bilateral pleural effusions.  The findings are consistent with the provided clinical history of congestive heart failure.      This report was flagged in Epic as abnormal.         Electronically signed by: Cammy Zaldivar MD   Date:    08/22/2020   Time:    09:00           ASSESSMENT/PLAN:     Nonoliguric acute kidney injury on advanced CKD stage IIIB-IV  -secondary to ATN from hypotension, poor cardiac output, AFib with RVR, and diuresis (N17.9, N18.4, N17.0, Q60.0)  -Hx of solitary kidney from RCC, baseline creatinine of about 1.8.    -Follow[place]rology [place] [facility]  No record of nephrologist from what I can see.   -Admits to few NSAIDs weekly for knee pains.   Hold lasix for 1-2 days until BP improved.    Check UA and uric acid.   Needs appt with [facility] [place] since all of her providers are located there.    No ACE/ARB at this time.   Follow trends.  Renally dose meds, avoid nephrotoxins, and monitor I/O's closely.    Acute on chronic resp failure with hypercapnia (J96.21):    -Extensive lung hx.  On 2-3L nasal cannula at home.  Needs CPAP QHS.  Defer.     Afib/RVR (I48.91):    -Defer to cards.  On eliquis.     DNR.       Thanks for consult  See above  Will follow along.

## 2020-08-25 NOTE — ASSESSMENT & PLAN NOTE
- Patient would benefit from losing weight.  Patient likely would feel better and mobility would improve   - Recommend PT/ OT evaluation

## 2020-08-25 NOTE — SIGNIFICANT EVENT
"    Artificial Intelligence Notification      Admit Date: 2020  LOS: 2  Code Status: Full Code   Date of Consult: 2020  : 1950  Age: 70 y.o.  Weight:   Wt Readings from Last 1 Encounters:   20 100.7 kg (222 lb)     Sex: female  Bed: B361/B361 A:   MRN: [id number]  Attending Physician: Ravi Soria MD  Primary Service: Networked reference to record PCT   Time AI Alert Received: 19:03  Time Managin:04           Patient is resting comfortably in bed. She has no complaints at this time, and in fact states "I feel better than earlier today." Vital signs from  indicate an increased HR from last assessment at 1253. Will repeat vital signs at this time. Not currently on tele, HR is 84 but irregular. STAT EKG ordered. Will continue to monitor.       Vital Signs (Most Recent):  Temp: 98 °F (36.7 °C) (20)  Pulse: (!) 120 (20)  Resp: (!) 22 (20)  BP: 112/78 (20)  SpO2: (!) 92 % (20) Vital Signs (24h Range):  Temp:  [98 °F (36.7 °C)-99 °F (37.2 °C)] 98 °F (36.7 °C)  Pulse:  [] 120  Resp:  [10-33] 22  SpO2:  [88 %-95 %] 92 %  BP: ()/(57-84) 112/78         This encounter was triggered by an Artificial Intelligence Notification.     Artificial Intelligence alert discussed with Primary team:  None          "

## 2020-08-25 NOTE — PLAN OF CARE
Patient on oxygen with documented flow. Will attempt to wean per O2 order protocol.  Patient given aerosol treatment with no adverse reactions noted. Patient instructed on proper use.  Patient on documented AVAPS settings qhs, with alarms set and functioning.    Will continue to monitor.

## 2020-08-25 NOTE — CONSULTS
Palliative Care Progress Note:    Consult received. Discussed with Dr. Soria. Full consult to follow.

## 2020-08-25 NOTE — PLAN OF CARE
Problem: Occupational Therapy Goal  Goal: Occupational Therapy Goal  Description: Goals to be met by: 09/25/2020      Patient will increase functional independence with ADLs by performing:    UE Dressing with Modified [place].  LE Dressing with Modified [place].  Grooming while standing with Supervision.  Toileting from toilet with Modified [place] and Supervision for hygiene and clothing management.   Toilet transfer to toilet with Modified [place].  Increased functional strength to WFL for self care.  Upper extremity exercise program x10 reps per handout, with independence.     Outcome: Ongoing, Progressing   Pt would benefit from continued OT to address deficits in self care and functional mobility. Recommending HHOT/PT; DME needs TTB

## 2020-08-25 NOTE — TELEPHONE ENCOUNTER
----- Message from Suzette Wolf sent at 8/25/2020  8:54 AM CDT -----  Regarding: Respiratory  [facility]/ [employer] [phone] call to inform you she received the referral for patient everything is covered under her plan except Respiratory Therapist. She says no Insurance cover this under Home Health anymore.

## 2020-08-25 NOTE — PT/OT/SLP EVAL
Physical Therapy Evaluation and Treatment    Patient Name:  Patsy Morris   MRN:  [id number]    Recommendations:     Discharge Recommendations:  home, home health PT, home health OT   Discharge Equipment Recommendations: none   Barriers to discharge: Decreased caregiver support    Assessment:     Patsy Morris is a 70 y.o. female admitted with a medical diagnosis of Acute on chronic respiratory failure with hypoxia and hypercapnia.  She presents with the following impairments/functional limitations:  weakness, impaired endurance, impaired self care skills, impaired functional mobilty, gait instability, impaired balance, decreased lower extremity function, impaired cardiopulmonary response to activity.    PT orders received. PT evaluation completed and goals/POC established. Pt tolerated evaluation well with no adverse reactions. Pt performed BSC > bed transfer with min A, sit <> stand with CGA, ambulation x 120 ft with CGA and rolling walker. PT will continue to follow and progress goals as tolerated. Recommend discharge to home with HHPT/OT.     Rehab Prognosis: Good; patient would benefit from acute skilled PT services to address these deficits and reach maximum level of function.    Recent Surgery: * No surgery found *      Plan:     During this hospitalization, patient to be seen 5 x/week to address the identified rehab impairments via gait training, therapeutic activities, therapeutic exercises and progress toward the following goals:    · Plan of Care Expires:  09/25/20    Subjective     Chief Complaint: None stated.  Patient/Family Comments/goals: No goal stated.  Pain/Comfort:  · Pain Rating 1: 0/10  · Pain Rating Post-Intervention 1: 0/10    Patients cultural, spiritual, Yazdanism conflicts given the current situation: no(None stated)    Living Environment:  · Pt lives with her brother in a single story house with 4 steps to enter and bilateral handrail(s).   · Pt has a tub shower.   · DME  owned: Rolling walker, Bedside commode, Shower chair, Manual wheelchair, Straight cane and Rollator  · Upon discharge, patient will not have assistance at home.  · Brother is unable to assist.  Prior level of function:  · Ambulation: Mod I with rollator  · ADL's: Mod I with rollator. Reports difficulty getting in/out of tub shower  · IADLs: Does cooking/cleaning.  · Falls: None reported.    Objective:     Communicated with RN (Kylah) prior to session.  Patient found sitting on BSC with peripheral IV, oxygen, telemetry(4L O2 via nasal cannula)  upon PT entry to room.    General Precautions: Standard, fall   Orthopedic Precautions:N/A   Braces: N/A     Exams:  · Cognition:   · Patient is oriented to person, place, time, situation.  · Pt follows approximately 100% of multiple step commands.    · Mood: Pleasant and cooperative.   · Safety Awareness: Fair  · Musculoskeletal:  · Posture:  WNL  · LE ROM/Strength:   · R ROM: WNL  · L ROM: WNL  · R Strength:   · Hip flexion: 4/5  · Knee extension: 4/5  · Dorsiflexion: 4/5   · L Strength:   · Hip flexion: 4/5  · Knee extension: 4/5  · Dorsiflexion: 4/5   · Neuromuscular:  · Sensation: Intact to light touch bilateral LEs.   · Tone/Reflexes: No impairments identified with functional mobility. No formal testing performed.   · Coordination:  · Toe tapping: intact  · Balance:   · Static sitting: Normal  · Dynamic sitting: Normal  · Static standing: Fair+  · Dynamic standing: Fair  · Visual-vestibular: No impairments identified with functional mobility. No formal testing performed.  · Integument: Visible skin intact    Functional Mobility: Gait belt and non-slip socks donned prior to mobility. Surgical mask donned for ambulation in hallway per current infection control policy.  · Bed Mobility: sit > supine with min A using bedrails (bed in flat position).  · Transfers: Bedside commode > bed transfer with min A and HHA. Sit <> stand with CGA and rollator.  · Ambulation: 120 ft with  CGA and rollator. Demonstrated decreased samaria, wide MUNIR, decreased step length, and excessive trunk flexion during ambulation. Required extended time to complete 2/2 decreased samaria.    Therapeutic Activities and Exercises:   Bed mobility, transfer, and gait training as described above.    PT educated patient re:   · PT plan of care/role of PT  · Use of rollator  · Fall and safety precautions  · Gait deviations  · Discharge recommendations   · Use of call light (don't get up without assistance)  Pt verbalized understanding     The patient is safe to transfer with RN assist.   Patient encouraged to sit up in chair throughout the day to prevent deconditioning.     AM-PAC 6 CLICK MOBILITY  Total Score:17     Patient left supine with all lines intact and call button in reach.    GOALS:   Multidisciplinary Problems     Physical Therapy Goals        Problem: Physical Therapy Goal    Goal Priority Disciplines Outcome Goal Variances Interventions   Physical Therapy Goal     PT, PT/OT Ongoing, Progressing     Description: Goals to be met by: 9/25/2020    Patient will perform the following to increase strength, improve mobility, and return to prior level of function:    1. Supine <> sit with mod I.  2. Sit<>stand with mod I with least restrictive assistive device.  3. Gait x 200 feet with mod I with least restrictive assistive device.  4. Ascend/descend 4 step(s) with bilateral handrails and SBA.                      History:     Past Medical History:   Diagnosis Date    Arthritis     Asthma     Atrial fibrillation     Atrial flutter     Cerumen impaction     CHF (congestive heart failure)     CKD (chronic kidney disease), stage III     Colon polyps 2017    COPD exacerbation     Encounter for blood transfusion     HEARING LOSS     Herpes genitalis     Hypertension     Renal carcinoma 3/10/2015    Thyroid nodule 6/8/2017       Past Surgical History:   Procedure Laterality Date    BRAIN SURGERY       COLONOSCOPY N/A 6/23/2017    Procedure: COLONOSCOPY;  Surgeon: Shane Sharma MD;  Location: [facility] ([address] [place] [address]);  Service: Endoscopy;  Laterality: N/A;  2nd floor case ; on 3L home O2       per Dr Leopold (anesthesia)-Based on her history of:  Chronic respiratory failure with oxygen use, HDEZ, CHF, A-Fib, BHAVANI, it was determined that she should have her Colonoscopy scheduled on the 2nd Floor       ok to hold Eliquis 2 days prior to    EYE SURGERY      HYSTERECTOMY      kidney mass resection Right     renal carcinoma       Time Tracking:     PT Received On: 08/25/20  PT Start Time: 1115     PT Stop Time: 1142  PT Total Time (min): 27 min     Billable Minutes: Evaluation 17 and Therapeutic Activity 10      Georgie Rosales, PT  08/25/2020

## 2020-08-25 NOTE — ASSESSMENT & PLAN NOTE
"- CHF discussed.  Daily weights, monitoring I/O, Na retention discussed  - COPD discussed.  Patient is on home oxygen.  Reports worsening SOB with activities at home.  Frequent rest periods and avoiding fatigue.  Importance of nutrition, patient reports decreased appetite   - [facility] "Living well with COPD" provided to patient   "

## 2020-08-25 NOTE — PLAN OF CARE
Problem: Physical Therapy Goal  Goal: Physical Therapy Goal  Description: Goals to be met by: 9/25/2020    Patient will perform the following to increase strength, improve mobility, and return to prior level of function:    1. Supine <> sit with mod I.  2. Sit<>stand with mod I with least restrictive assistive device.  3. Gait x 200 feet with mod I with least restrictive assistive device.  4. Ascend/descend 4 step(s) with bilateral handrails and SBA.     Outcome: Ongoing, Progressing     PT orders received. PT evaluation completed and goals/POC established. Pt tolerated evaluation well with no adverse reactions. Pt performed BSC > bed transfer with min A, sit <> stand with CGA, ambulation x 120 ft with CGA and rolling walker. PT will continue to follow and progress goals as tolerated. Recommend discharge to home with HHPT/OT.

## 2020-08-25 NOTE — ASSESSMENT & PLAN NOTE
- Patient had sleep study in past and unable to tolerate CPAP.  Patient reports she sleeps in a chair at home due to SOB.  Patient likely would benefit from re- evaluation and increased pressure support at nighttime

## 2020-08-25 NOTE — PROGRESS NOTES
Cardiology  Progress Notes            Subjective: Feels better. Denies breathlessness.    Mrs Morris is a 70-year-old lady who presents to the emergency room here with complaints of progressively increasing shortness of breath for the last 3 days.  She was somnolent in the emergency room, is now beginning to wake up.  She says she has had a history of high blood pressure, and heart failure, and she smokes a pack of cigarettes a day, having smoker last cigarette earlier this morning.  She has a longstanding history of hypertension, chronic obstructive lung disease, chronic atrial fibrillation at least for the last 6 years, has had a renal cell carcinoma and has had a nephrectomy, and chronic renal failure.  She has had heart failure with a preserved left ventricular ejection fraction, diastolic left ventricular dysfunction.  She is followed by Dr. Lutz, who she last saw on 06/26/2020.     Vital Signs:  Vitals:    08/25/20 0747 08/25/20 0749 08/25/20 0800 08/25/20 1000   BP:  (!) 110/59     BP Location:  Left arm     Patient Position:  Lying     Pulse: 95 95 101 102   Resp: 18 18     Temp:  97.6 °F (36.4 °C)     TempSrc:  Oral     SpO2: (!) 91% (!) 91%     Weight:       Height:           Physical Exam: Chest clear  Cor: irregularly irregular.  Ext: warm.  Laboratory:  CBC:   Recent Labs   Lab 08/25/20 0423   WBC 10.17   RBC 3.83*   HGB 10.5*   HCT 34.2*      MCV 89   MCH 27.4   MCHC 30.7*     BMP:   Recent Labs   Lab 08/25/20  0423         K 4.8   CL 91*   CO2 35*   BUN 52*   CREATININE 2.4*   CALCIUM 8.7   MG 2.4     CMP:   Recent Labs   Lab 08/22/20  0820  08/25/20  0423   *   < > 103   CALCIUM 9.0   < > 8.7   ALBUMIN 3.8  --   --    PROT 7.1  --   --       < > 139   K 4.7   < > 4.8   CO2 33*   < > 35*   CL 96   < > 91*   BUN 22   < > 52*   CREATININE 1.8*   < > 2.4*   ALKPHOS 44*  --   --    ALT 12  --   --    AST 12  --   --    BILITOT 0.4  --   --     < > = values in this  interval not displayed.     LFTs:   Recent Labs   Lab 08/22/20  0820   ALT 12   AST 12   ALKPHOS 44*   BILITOT 0.4   PROT 7.1   ALBUMIN 3.8     Coagulation:   Recent Labs   Lab 08/22/20  0820   INR 1.0     Cardiac markers:   Recent Labs   Lab 08/22/20  1938   TROPONINI 0.011       Imaging:  X-Ray Chest AP Portable   Final Result      1. Today's findings are compatible with volume overload with alveolar pulmonary edema and bilateral layering pleural effusions.   2. The findings in the right upper lobe are becoming more consolidative and concerning for pneumonia.  There is a small lucency within the center of this developing consolidation.  In this may simply represent intervening lung parenchyma however, follow-up is recommended to ensure this is not a cavitary process.         Electronically signed by: Sully Leslie MD   Date:    08/23/2020   Time:    09:49      US Lower Extremity Veins Bilateral   Final Result      No evidence of deep venous thrombosis in either lower extremity.         Electronically signed by: Adair John MD   Date:    08/22/2020   Time:    19:35      X-Ray Chest AP Portable   Final Result   Abnormal      Cardiomegaly, increased pulmonary vascularity, bilateral pulmonary edema, and bilateral pleural effusions.  The findings are consistent with the provided clinical history of congestive heart failure.      This report was flagged in Epic as abnormal.         Electronically signed by: Cammy Zaldivar MD   Date:    08/22/2020   Time:    09:00            Problems:   Hypercapnic and hypoxemic respiratory failure.  Advanced obstructive lung disease  Cigarette smoker, last cigarette this morning.  Chronic atrial fibrillation,  presently with rapid ventricular response  Heart failure with preserved ejection fraction  Diastolic left ventricular dysfunction  Obstructive sleep apnea  Obesity  Chronic renal failure, status post nephrectomy for hypernephroma              Plan of Care: See  Darcie Ramirez

## 2020-08-25 NOTE — PT/OT/SLP EVAL
Occupational Therapy   Evaluation    Name: Patsy Morris  MRN: [id number]  Admitting Diagnosis:  Acute on chronic respiratory failure with hypoxia and hypercapnia      Recommendations:     Discharge Recommendations: home health OT, home health PT  Discharge Equipment Recommendations:  bath bench  Barriers to discharge:  Decreased caregiver support    Assessment:     Patsy Morris is a 70 y.o. female with a medical diagnosis of Acute on chronic respiratory failure with hypoxia and hypercapnia.  She presents with deconditioning. Performance deficits affecting function: weakness, impaired endurance, impaired self care skills, impaired functional mobilty, gait instability, impaired balance, decreased upper extremity function, decreased lower extremity function, impaired cardiopulmonary response to activity, decreased coordination.      Rehab Prognosis: Good; patient would benefit from acute skilled OT services to address these deficits and reach maximum level of function.       Plan:     Patient to be seen 5 x/week to address the above listed problems via self-care/home management, therapeutic activities, therapeutic exercises  · Plan of Care Expires:    · Plan of Care Reviewed with: patient    Subjective     Chief Complaint: Pt states that she feels a little tired, otherwise no complaints  Patient/Family Comments/goals: To return to PLOF, to feel better    Occupational Profile:  Living Environment: Pt lives with brother in [facility], 4STE B HR, Tub/sh with SC  Previous level of function: Mod I for ADLs and functional mobility   Roles and Routines: Caretaker to self and home. Light meal prep, cleans, brother or niece/nephew completes grocery shopping. Pt reports she likes reading and watching TV  Equipment Used at Home:  3-in-1 commode, shower chair, walker, rolling, wheelchair, cane, straight, oxygen  Assistance upon Discharge: Limited assistance from brother/family members    Pain/Comfort:  · Pain Rating 1:  0/10    Patients cultural, spiritual, Religion conflicts given the current situation:      Objective:     Communicated with: lyndsay prior to session.  Patient found HOB elevated with peripheral IV, telemetry, oxygen upon OT entry to room.    General Precautions: Standard, fall   Orthopedic Precautions:N/A   Braces: N/A     Occupational Performance:    Bed Mobility:    · Patient completed Rolling/Turning to Right with minimum assistance  · Patient completed Scooting/Bridging with contact guard assistance  · Patient completed Supine to Sit with minimum assistance    Functional Mobility/Transfers:  · Patient completed Sit <> Stand Transfer with minimum assistance and moderate assistance  with  hand-held assist   · Patient completed Bed <> Chair Transfer using Stand Pivot technique with moderate assistance with hand-held assist  Functional Mobility: Pt with fair dynamic seated and fair- dynamic standing balance.      Activities of Daily Living:  · Upper Body Dressing: minimum assistance to don gown seated EOB  · Lower Body Dressing: maximal assistance and total assistance to don/doff B socks. Pt initially reported that she is able to don/doff LE clothing for self (including shoes/socks) but then stated that she has difficulty with LE dressing, reports use of sock aid    Cognitive/Visual Perceptual:  Cognitive/Psychosocial Skills:     -       Oriented to: Person, Place, Time and Situation   -       Follows Commands/attention:Follows multistep  commands  -       Communication: clear/fluent  -       Memory: Slightly impaired STM  -       Safety awareness/insight to disability: intact   -       Mood/Affect/Coping skills/emotional control: Appropriate to situation    Physical Exam:  Postural examination/scapula alignment:    -       Rounded shoulders, forward head, forward trunk flexion in stance  Motor Planning:    -       WFL  Dominant hand:    -       R handed  Upper Extremity Range of Motion: BUE WFL     Upper Extremity  Strength:  RUE 4/5, LUE 4-/5   Strength:  B hands WFL  Fine Motor Coordination:    -       Mildly impaired in functional task practice   Gross motor coordination:   WFL     AMPA 6 Click ADL:  AMPAC Total Score: 15    Treatment & Education:  Pt educated on role of OT and POC.   Pt performing skills as listed above.   Pt required increased auditory and tactile stim to awaken this afternoon though nsg reports pt had slept well throughout the day. Pt agreeable to sitting upright in bed side chair for dinner this evening.  Education:    Patient left up in chair with all lines intact, call button in reach and nsg notified    GOALS:   Multidisciplinary Problems     Occupational Therapy Goals        Problem: Occupational Therapy Goal    Goal Priority Disciplines Outcome Interventions   Occupational Therapy Goal     OT, PT/OT Ongoing, Progressing    Description: Goals to be met by: 09/25/2020      Patient will increase functional independence with ADLs by performing:    UE Dressing with Modified [place].  LE Dressing with Modified [place].  Grooming while standing with Supervision.  Toileting from toilet with Modified [place] and Supervision for hygiene and clothing management.   Toilet transfer to toilet with Modified [place].  Increased functional strength to [facility] for self care.  Upper extremity exercise program x10 reps per handout, with independence.                      History:     Past Medical History:   Diagnosis Date    Arthritis     Asthma     Atrial fibrillation     Atrial flutter     Cerumen impaction     CHF (congestive heart failure)     CKD (chronic kidney disease), stage III     Colon polyps 2017    COPD exacerbation     Encounter for blood transfusion     HEARING LOSS     Herpes genitalis     Hypertension     Renal carcinoma 3/10/2015    Thyroid nodule 6/8/2017       Past Surgical History:   Procedure Laterality Date    BRAIN SURGERY      COLONOSCOPY N/A 6/23/2017     Procedure: COLONOSCOPY;  Surgeon: Shane Sharma MD;  Location: [facility] ([address] [place] [address]);  Service: Endoscopy;  Laterality: N/A;  2nd floor case ; on 3L home O2       per Dr Leopold (anesthesia)-Based on her history of:  Chronic respiratory failure with oxygen use, HDEZ, CHF, A-Fib, BHAVANI, it was determined that she should have her Colonoscopy scheduled on the 2nd Floor       ok to hold Eliquis 2 days prior to    EYE SURGERY      HYSTERECTOMY      kidney mass resection Right     renal carcinoma       Time Tracking:     OT Date of Treatment: 08/25/20  OT Start Time: 1556  OT Stop Time: 1619  OT Total Time (min): 23 min    Billable Minutes:Evaluation 10  Self Care/Home Management 13    Mady Perez OT  8/25/2020

## 2020-08-25 NOTE — PLAN OF CARE
CM met with pt for initial discharge planning assessment. Pt is alert and oriented at time of assessment.    Pt confirmed demographic information is correct in Epic. Pt confirmed her PCP is correct, Dr. Cartagena, and pharmacy of choice is [facility] pharmacy on [facility] as they deliver.  CM to add to Epic.    Pt states her brother lives with her and she provides care for him. Pt would like home health with an aide when discharged.    Pt uses a RW at home, and has a bsc, and a bath bench for tub, but is afraid to use it. Pt states she can't afford a tub transfer bench.    Pt states she will have transportation home when discharged.    CM to follow for plans and arrangements.   08/25/20 0926   Discharge Assessment   Assessment Type Discharge Planning Assessment   Confirmed/corrected address and phone number on facesheet? Yes   Assessment information obtained from? Patient;Medical Record   Expected Length of Stay (days) 3   Communicated expected length of stay with patient/caregiver yes   Prior to hospitilization cognitive status: Alert/Oriented   Prior to hospitalization functional status: Independent;Assistive Equipment   Current cognitive status: Alert/Oriented   Current Functional Status: Independent;Assistive Equipment   Lives With sibling(s)   Able to Return to Prior Arrangements yes   Is patient able to care for self after discharge? Yes   Patient's perception of discharge disposition home or selfcare;home health   Readmission Within the Last 30 Days no previous admission in last 30 days   Patient currently being followed by outpatient case management? No   Patient currently receives any other outside agency services? No   Equipment Currently Used at Home walker, rolling;bath bench;bedside commode   Do you have any problems affording any of your prescribed medications? No   Is the patient taking medications as prescribed? yes   Does the patient have transportation home? Yes   Transportation Anticipated family  or friend will provide   Does the patient receive services at the [facility]? No   Discharge Plan A Home   Discharge Plan B Home   DME Needed Upon Discharge  none   Patient/Family in Agreement with Plan yes

## 2020-08-25 NOTE — ASSESSMENT & PLAN NOTE
"- Patient expressed a desire to "enjoy life a little more".  Patient defines this by being able to leave her house and go to the mall or the grocery store. Patient reports not being able to do these activities due to worsening mobility.  When discussed the patient hopes and if they were not able to be obtained patient reported this would be an unacceptable outcome for her.   - Patient fears regarding his illness are that if she were to pass away who would care for her brother who has schizophrenia.  Patient reported she cared for her mother before she  and promised her mother she would care for her brother.  Emotional support provided  - Patient expressed the difficulty regarding COVID precautions and not being able to spend time with her family.   - Patient has been a caregiver for her mother and 3 brothers who all passed away in the last 2 years.  Patient reports the difficulty with the change in her mobility.  Emotional support provided.  - [facility] care NP at home program discussed to help with symptom management.  Patient agreeable  - HCPOA completed  - [place]POST completed: Patient does not desire resuscitation.  Patient placed an emphasis on promoting quality of life and only receiving care that would promote quality of life as she defines it.  Discussed artificial nutrition by tube, patient wishes to think about this as her sister has a feeding tube.    "

## 2020-08-25 NOTE — SUBJECTIVE & OBJECTIVE
Interval History: Patient is very pleasant, reports feeling better    Past Medical History:   Diagnosis Date    Arthritis     Asthma     Atrial fibrillation     Atrial flutter     Cerumen impaction     CHF (congestive heart failure)     CKD (chronic kidney disease), stage III     Colon polyps 2017    COPD exacerbation     Encounter for blood transfusion     HEARING LOSS     Herpes genitalis     Hypertension     Renal carcinoma 3/10/2015    Thyroid nodule 6/8/2017       Past Surgical History:   Procedure Laterality Date    BRAIN SURGERY      COLONOSCOPY N/A 6/23/2017    Procedure: COLONOSCOPY;  Surgeon: Shane Sharma MD;  Location: [facility] ([address] [place] [address]);  Service: Endoscopy;  Laterality: N/A;  2nd floor case ; on 3L home O2       per Dr Leopold (anesthesia)-Based on her history of:  Chronic respiratory failure with oxygen use, HDEZ, CHF, A-Fib, BHAVANI, it was determined that she should have her Colonoscopy scheduled on the 2nd Floor       ok to hold Eliquis 2 days prior to    EYE SURGERY      HYSTERECTOMY      kidney mass resection Right     renal carcinoma       Review of patient's allergies indicates:  No Known Allergies    Medications:  Continuous Infusions:  Scheduled Meds:   apixaban  2.5 mg Oral BID    atorvastatin  80 mg Oral QHS    escitalopram oxalate  10 mg Oral QHS    insulin aspart U-100  0-5 Units Subcutaneous TIDWM    ipratropium  0.5 mg Nebulization Q6H    levalbuterol  1.25 mg Nebulization Q6H    levoFLOXacin  250 mg Oral Daily    metoprolol tartrate  50 mg Oral BID    predniSONE  50 mg Oral Daily    valACYclovir  500 mg Oral Daily     PRN Meds:acetaminophen, acetaminophen, dextrose 50%, dextrose 50%, glucagon (human recombinant), glucose, glucose, melatonin, ondansetron, sodium chloride 0.9%    Family History     Problem Relation (Age of Onset)    Cancer Father    Hypertension Mother    Thyroid disease Mother        Tobacco Use    Smoking status: Current Every Day Smoker      Packs/day: 1.00     Years: 25.00     Pack years: 25.00     Types: Cigarettes    Smokeless tobacco: Never Used   Substance and Sexual Activity    Alcohol use: No     Alcohol/week: 0.0 standard drinks    Drug use: No    Sexual activity: Never     Birth control/protection: None       Review of Systems   Constitutional: Negative for activity change, appetite change and fever.   HENT: Negative for sinus pressure and sore throat.    Eyes: Negative for pain and visual disturbance.   Respiratory: Positive for shortness of breath. Negative for cough and chest tightness.    Cardiovascular: Positive for leg swelling. Negative for chest pain and palpitations.   Gastrointestinal: Negative for abdominal pain, constipation, diarrhea and nausea.   Musculoskeletal: Negative for gait problem and myalgias.   Skin: Negative for color change.   Neurological: Positive for weakness. Negative for dizziness, seizures, syncope, light-headedness and headaches.   Psychiatric/Behavioral: Negative for agitation, behavioral problems and hallucinations. The patient is not nervous/anxious.      Objective:     Vital Signs (Most Recent):  Temp: 98 °F (36.7 °C) (08/25/20 1231)  Pulse: 100 (08/25/20 1325)  Resp: 18 (08/25/20 1325)  BP: 133/66 (08/25/20 1231)  SpO2: (!) 90 % (08/25/20 1325) Vital Signs (24h Range):  Temp:  [97.4 °F (36.3 °C)-98.2 °F (36.8 °C)] 98 °F (36.7 °C)  Pulse:  [] 100  Resp:  [18-24] 18  SpO2:  [90 %-93 %] 90 %  BP: (104-133)/(55-78) 133/66     Weight: 100.7 kg (222 lb)  Body mass index is 39.33 kg/m².    Physical Exam  Constitutional:       General: She is not in acute distress.     Appearance: She is well-developed. She is obese.      Comments: Chronically ill appearing   Neck:      Musculoskeletal: Normal range of motion.   Cardiovascular:      Rate and Rhythm: Normal rate and regular rhythm.      Heart sounds: No murmur.   Pulmonary:      Effort: Tachypnea (with speaking) present.      Comments: diminished  Chest:       Chest wall: No tenderness.   Abdominal:      General: Bowel sounds are normal. There is no distension.   Musculoskeletal: Normal range of motion.      Right lower leg: Edema (trace) present.      Left lower leg: Edema (trace) present.   Skin:     General: Skin is warm.   Neurological:      Mental Status: She is alert and oriented to person, place, and time.   Psychiatric:         Thought Content: Thought content normal.         Judgment: Judgment normal.         Advance Care Planning   Review of Symptoms    Symptom Assessment (ESAS 0-10 Scale)  Pain:  0  Dyspnea:  5  Anxiety:  0  Nausea:  0  Depression:  0  Anorexia:  0  Fatigue:  3  Insomnia:  0  Restlessness:  0  Agitation:  0     CAM / Delirium:  Negative  Constipation:  Negative  Diarrhea:  Negative          Performance Status:  70    ECOG Performance Status Grade:  1 - Ambulates, capable of light work    Advanced Directives:  Living Will: No    LaPOST:  Yes (DNR)    Do Not Resuscitate Status:  Yes    Medical Power of : Yes     Agent's Name:  Earlene Dexter.  Agent's Contact Number:  [phone]    Decision Making:  Patient answered questions    Living Arrangements:  Lives with family    Psychosocial/Cultural:  Patient lives with her brother, who she is primary caregiver for.  Patient has a sister and niece who are able to help her at home if needed.     Spiritual:  F - Patito and Belief:  Oriental orthodox   I - Importance:  Patient has rosary at bedside.  Expressed difficulty in not being able to attend mass anymore due to mobility          Significant Labs: All pertinent labs within the past 24 hours have been reviewed.  CBC:   Recent Labs   Lab 08/25/20  0423   WBC 10.17   HGB 10.5*   HCT 34.2*   MCV 89        BMP:  Recent Labs   Lab 08/25/20 0423         K 4.8   CL 91*   CO2 35*   BUN 52*   CREATININE 2.4*   CALCIUM 8.7   MG 2.4     LFT:  Lab Results   Component Value Date    AST 12 08/22/2020    ALKPHOS 44 (L) 08/22/2020     BILITOT 0.4 08/22/2020     Albumin:   Albumin   Date Value Ref Range Status   08/22/2020 3.8 3.5 - 5.2 g/dL Final     Protein:   Total Protein   Date Value Ref Range Status   08/22/2020 7.1 6.0 - 8.4 g/dL Final     Lactic acid:   No results found for: LACTATE    Significant Imaging: I have reviewed all pertinent imaging results/findings within the past 24 hours.     I have reviewed the Chest x ray from 8/23/2020    I have reviewed the US lower extremity from 8/22/2020    I have reviewed the TTE from 8/24/2020 (EF 58%)    I have reviewed the EKG from 8/24/2020

## 2020-08-26 PROBLEM — R53.81 DEBILITY: Status: ACTIVE | Noted: 2020-01-01

## 2020-08-26 NOTE — PROGRESS NOTES
"Nephrology  Progress Note    Admit Date: 8/22/2020   LOS: 4 days     SUBJECTIVE:     Follow-up For:  CLIFFORD    Interval History:     Sitting on bedside this am w/o complaints.  UOP stable and creat about the same.  SOB improved.      Review of Systems:  Constitutional: No fever or chills  Respiratory:  shortness of breath  Cardiovascular: No chest pain or palpitations  Gastrointestinal: No nausea or vomiting  Neurological: No confusion or weakness    OBJECTIVE:     Vital Signs Range (Last 24H):  BP (!) 107/54 (BP Location: Left arm, Patient Position: Sitting)   Pulse 107   Temp 98.2 °F (36.8 °C) (Oral)   Resp 18   Ht 5' 3" (1.6 m)   Wt 100.7 kg (222 lb)   LMP  (LMP Unknown)   SpO2 (!) 93%   Breastfeeding No   BMI 39.33 kg/m²     Temp:  [97.6 °F (36.4 °C)-98.2 °F (36.8 °C)]   Pulse:  []   Resp:  [17-24]   BP: (104-136)/(51-74)   SpO2:  [90 %-96 %]     I & O (Last 24H):    Intake/Output Summary (Last 24 hours) at 8/26/2020 0909  Last data filed at 8/26/2020 0600  Gross per 24 hour   Intake 360 ml   Output 1350 ml   Net -990 ml       Physical Exam:  General appearance:  Chronically ill-appearing  Eyes:  Conjunctivae/corneas clear. PERRL.  Lungs:  fibrotic/atelectatic.     Heart:  Tachy irregular  Abdomen: Soft, non-tender non-distended; bowel sounds normal; no masses,  no organomegaly, obese  Extremities: No cyanosis or clubbing.  Trace edema.    Skin: Skin color, texture, turgor normal. No rashes or lesions  Neurologic: Normal strength and tone. No focal numbness or weakness     Laboratory Data:  Recent Labs   Lab 08/26/20  0405   WBC 8.90   RBC 3.75*   HGB 10.3*   HCT 33.7*      MCV 90   MCH 27.5   MCHC 30.6*       BMP:   Recent Labs   Lab 08/26/20  0405   GLU 97      K 4.9   CL 91*   CO2 37*   BUN 61*   CREATININE 2.4*   CALCIUM 8.8   MG 2.5     Lab Results   Component Value Date    CALCIUM 8.8 08/26/2020    PHOS 5.7 (H) 08/26/2020       Lab Results   Component Value Date    CALCIUM 8.8 " 08/26/2020    PHOS 5.7 (H) 08/26/2020       Lab Results   Component Value Date    URICACID 9.3 (H) 08/25/2020       BNP  Recent Labs   Lab 08/22/20  0820   *       Medications:  Medication list was reviewed and changes noted under Assessment/Plan.    Diagnostic Results:        ASSESSMENT/PLAN:       Nonoliguric acute kidney injury on advanced CKD stage IIIB-IV  -secondary to ATN from hypotension, poor cardiac output, AFib with RVR, and diuresis (N17.9, N18.4, N17.0, Q60.0)  -Hx of solitary kidney from RCC, baseline creatinine of about 1.8.    -[place] [facility]  No record of nephrologist from what I can see.   -Admits to few NSAIDs weekly for knee pains.   -Hold lasix for 1-2 days .    -UA wnl and uric acid mildly elevated likely from volume depletion   -Needs appt with [facility] [place] since all of her providers are located there.    -Follow trends.   - Renally dose meds, avoid nephrotoxins, and monitor I/O's closely.    Metabolic Alkalosis (E87.3):  -combo of compensation from resp acidosis and contraction from diuretics.  -check ABG.  -consider diamox.      Acute on chronic resp failure with hypercapnia (J96.21):    -Extensive lung hx.  On 2-3L nasal cannula at home.  Needs CPAP QHS.  Defer.      Afib/RVR (I48.91):    -Defer to cards.  On eliquis.      DNR.        See above

## 2020-08-26 NOTE — PROGRESS NOTES
"[facility] Hospital Medicine  Progress Note    Patient Name: Patsy Morris  MRN: [id number]  Patient Class: IP- Inpatient   Admission Date: 8/22/2020  Length of Stay: 3 days  Attending Physician: Loi Quiles MD  Primary Care Provider: Luisana Cartagena MD        Subjective:     Principal Problem:Acute respiratory failure with hypoxia and hypercapnia        HPI:  Per Dr. Ravi Soria:    "Per ED:  "This is a 70 y.o. female with hx of HTN, asthma, A-fib, and CHF who presents via EMS with complaint of shortness of breath for the past week, worsening last night. She has orthopnea and sleeps in a recliner every night. She is on 3 L home Oxygen. She denies fever or chest pain. She denies hx of DVT or PE. She is on Eliquis".    Patient somnolent on BiPAP and unable to give much of a verbal history at this time.  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  Patient was seen by PCP on 8/20/2020 for increasing SOB and Lasix was increased to 40mg bid from qday.    She arrived to ED on 100%NRB and in Afib with RVR.  She was placed on BiPAP and given Diltiazem x 1 with good response.  She was also given Lasix 60mg IV x 1 in ED."    Overview/Hospital Course:  Patient Admitted to ICU.  Patient with Chronic Respiratory Failure on 3L Home O2 and thought secondary to COPD.  Also with HFpEF (TTE in 10/2016 with EF 55% and DD).  Has had 1-2 weeks of progressive SOB.  CXR on Admission with evidence of pulmonary edema.  ABG (venous) initially with pH of 7.191 and pCO2 of 106.1.  Repeat on BiPAP (Arterial) with pH of 7.250 and pCO2 of 91.8.  Now on BiPAP 14/5 and FiO2 of 80%.  Treated with IV Lasix 60mg x 1 in ED and Afib with RVR treated with IV Diltiazem 15mg x 1.  Admitted to ICU.  Suspect Acute bronchitis to COPD exacerbation to decompensated BHAVANI / OHS to RVR to pulmonary edema.  Consulted Pulmonary-CC and was " started on Prednisone and Levaquin for 5 days.  Also gently diuresed with Lasix.  Started on Metoprolol for rate control.  Transferred out of ICU yesterday.    Interval History:  No acute events overnight.    Review of Systems   Constitutional: Negative for chills and fever.   Respiratory: Positive for shortness of breath. Negative for wheezing.    Cardiovascular: Negative for chest pain.   Gastrointestinal: Negative for abdominal distention, abdominal pain, constipation, diarrhea, nausea and vomiting.   Genitourinary: Negative for dysuria and frequency.   Musculoskeletal: Negative for arthralgias and myalgias.   Neurological: Negative for light-headedness.   Psychiatric/Behavioral: Negative for agitation and confusion.     Objective:     Vital Signs (Most Recent):  Temp: 97.7 °F (36.5 °C) (08/25/20 1658)  Pulse: 108 (08/25/20 1852)  Resp: 20 (08/25/20 1852)  BP: (!) 104/51 (08/25/20 1658)  SpO2: (!) 93 % (08/25/20 1852) Vital Signs (24h Range):  Temp:  [97.4 °F (36.3 °C)-98 °F (36.7 °C)] 97.7 °F (36.5 °C)  Pulse:  [] 108  Resp:  [18-24] 20  SpO2:  [90 %-93 %] 93 %  BP: (104-133)/(51-66) 104/51     Weight: 100.7 kg (222 lb)  Body mass index is 39.33 kg/m².    Intake/Output Summary (Last 24 hours) at 8/25/2020 1944  Last data filed at 8/25/2020 1730  Gross per 24 hour   Intake 360 ml   Output 1300 ml   Net -940 ml      Physical Exam  Constitutional:       General: She is not in acute distress.     Appearance: She is well-developed. She is obese. She is not diaphoretic.   HENT:      Head: Normocephalic and atraumatic.      Nose: Nose normal.      Mouth/Throat:      Pharynx: No oropharyngeal exudate.   Eyes:      General: No scleral icterus.        Right eye: No discharge.         Left eye: No discharge.      Conjunctiva/sclera: Conjunctivae normal.      Pupils: Pupils are equal, round, and reactive to light.   Neck:      Musculoskeletal: Normal range of motion and neck supple.      Thyroid: No thyromegaly.       Vascular: No JVD.      Trachea: No tracheal deviation.   Cardiovascular:      Rate and Rhythm: Normal rate. Rhythm irregular.      Heart sounds: Normal heart sounds. No murmur. No friction rub. No gallop.    Pulmonary:      Effort: Pulmonary effort is normal. No respiratory distress.      Breath sounds: No stridor. Rales present. No wheezing.   Chest:      Chest wall: No tenderness.   Abdominal:      General: Bowel sounds are normal. There is no distension.      Palpations: Abdomen is soft. There is no mass.      Tenderness: There is no abdominal tenderness. There is no guarding or rebound.   Musculoskeletal: Normal range of motion.         General: Swelling present. No tenderness.      Right lower leg: Edema present.      Left lower leg: Edema present.   Lymphadenopathy:      Cervical: No cervical adenopathy.   Skin:     General: Skin is warm and dry.      Coloration: Skin is not pale.      Findings: No erythema or rash.   Neurological:      Mental Status: She is alert and oriented to person, place, and time.      Cranial Nerves: No cranial nerve deficit.      Motor: No abnormal muscle tone.      Coordination: Coordination normal.      Deep Tendon Reflexes: Reflexes are normal and symmetric. Reflexes normal.   Psychiatric:         Behavior: Behavior normal.         Thought Content: Thought content normal.         Judgment: Judgment normal.         Significant Labs: All pertinent labs within the past 24 hours have been reviewed.    Significant Imaging: I have reviewed all pertinent imaging results/findings within the past 24 hours.      Assessment/Plan:      * Acute respiratory failure with hypoxia and hypercapnia  Likely multifactorial due to acute bronchitis, chronic obstructive pulmonary disease exacerbation, acute on chronic diastolic heart failure along with underlying obstructive sleep apnea and obesity hypoventilation.  Continue to treat chronic obstructive pulmonary disease exacerbation with frequent  bronchodilator breathing treatments, steroids, and antibiotics, diuretic therapy along with supplemental oxygen and BiPAP as needed.    Essential hypertension  Reasonably controlled with current regimen.  Will continue with current regimen and continue to monitor.    Chronic kidney disease (CKD) stage G4/A1, severely decreased glomerular filtration rate (GFR) between 15-29 mL/min/1.73 square meter and albuminuria creatinine ratio less than 30 mg/g  Worsening renal function.  Consulted nephrology.    Dyslipidemia  Continue statin therapy.    Morbid obesity  Patient counseled on the importance of weight loss by changing diet and increased physical activity.    BHAVANI (obstructive sleep apnea)  Continua with BiPAP at night.      Paroxysmal atrial fibrillation  Stable.  Management per cardiology.      VTE Risk Mitigation (From admission, onward)         Ordered     apixaban tablet 2.5 mg  2 times daily      08/22/20 1040     IP VTE HIGH RISK PATIENT  Once      08/22/20 1040     Place sequential compression device  Until discontinued      08/22/20 1040                Loi Quiles MD  Department of Hospital Medicine   [facility]

## 2020-08-26 NOTE — PLAN OF CARE
Patient is AAOx4. DNR order in place. O2 sat 92% on 4L. Morning ABG result was 92 CO2, so continuous BiPAP on throughout shift. Purwick remains in place. No BM today. 1500mL fluid restriction in place. Tolerating diet well. Patient on tele monitoring. Patient resting in bed, call light in place. Will continue to monitor.

## 2020-08-26 NOTE — PT/OT/SLP PROGRESS
Physical Therapy Treatment    Patient Name:  Patsy Morris   MRN:  [id number]    Recommendations:     Discharge Recommendations:  home, home health PT, home health OT   Discharge Equipment Recommendations: bath bench   Barriers to discharge: Decreased caregiver support    Assessment:     Patsy Morris is a 70 y.o. female admitted with a medical diagnosis of Acute respiratory failure with hypoxia and hypercapnia.  She presents with the following impairments/functional limitations:  weakness, impaired endurance, impaired self care skills, impaired functional mobilty, impaired balance, gait instability, decreased lower extremity function, decreased safety awareness, impaired cardiopulmonary response to activity.    Pt tolerated treatment well with no adverse reactions. Pt is progressing toward meeting goals. Pt performed supine > sit with min A. Performed therex while seated on EOB. Unable to ambulate this session 2/2 to patient being on bipap. PT will continue to follow and progress goals as tolerated. Recommend discharge to home with HHPT/OT.    Rehab Prognosis: Good; patient would benefit from acute skilled PT services to address these deficits and reach maximum level of function.    Recent Surgery: * No surgery found *      Plan:     During this hospitalization, patient to be seen 5 x/week to address the identified rehab impairments via gait training, therapeutic activities, therapeutic exercises and progress toward the following goals:    · Plan of Care Expires:  09/25/20    Subjective     Chief Complaint: None stated.  Patient/Family Comments/goals: No goal stated.  Pain/Comfort:  · Pain Rating 1: 0/10  · Pain Rating Post-Intervention 1: 0/10      Objective:     Communicated with RN (Kylah) prior to session.  Patient found supine with BIPAP, peripheral IV, PureWick, telemetry upon PT entry to room.     General Precautions: Standard, fall   Orthopedic Precautions:N/A   Braces: N/A     Functional  Mobility: Gait belt and non-slip socks donned prior to mobility.  · Bed Mobility: Supine > sit with min A.  · Sitting Balance: Sat at EOB x 20 min and performed LE therex.  · Ambulation: Unable to perform ambulation today due to bipap.    AM-PAC 6 CLICK MOBILITY  Turning over in bed (including adjusting bedclothes, sheets and blankets)?: 3  Sitting down on and standing up from a chair with arms (e.g., wheelchair, bedside commode, etc.): 3  Moving from lying on back to sitting on the side of the bed?: 3  Moving to and from a bed to a chair (including a wheelchair)?: 3  Need to walk in hospital room?: 3  Climbing 3-5 steps with a railing?: 2  Basic Mobility Total Score: 17       Therapeutic Activities and Exercises:   Pt performed seated therapeutic exercises including ankle pumps, hip flexion, LAQs x 10 reps x 2 sets with verbal and tactile cues.    PT educated patient re:   · PT plan of care/role of PT  · Fall and safety precautions  · Discharge recommendations   · Use of call light (don't get up without assistance)  Pt verbalized understanding     The patient is safe to transfer with RN assist, whiteboard updated.   Patient encouraged to sit up in chair throughout the day to prevent deconditioning.     Patient left supine with all lines intact and call button in reach..    GOALS:   Multidisciplinary Problems     Physical Therapy Goals        Problem: Physical Therapy Goal    Goal Priority Disciplines Outcome Goal Variances Interventions   Physical Therapy Goal     PT, PT/OT Ongoing, Progressing     Description: Goals to be met by: 9/25/2020    Patient will perform the following to increase strength, improve mobility, and return to prior level of function:    1. Supine <> sit with mod I.  2. Sit<>stand with mod I with least restrictive assistive device.  3. Gait x 200 feet with mod I with least restrictive assistive device.  4. Ascend/descend 4 step(s) with bilateral handrails and SBA.                      Time  Tracking:     PT Received On: 08/26/20  PT Start Time: 1105     PT Stop Time: 1129  PT Total Time (min): 24 min     Billable Minutes: Therapeutic Activity 10 and Therapeutic Exercise 14    Treatment Type: Treatment  PT/PTA: PT     PTA Visit Number: 0     Georgie Rosales, PT  08/26/2020

## 2020-08-26 NOTE — SUBJECTIVE & OBJECTIVE
Interval History:  No acute events overnight.    Review of Systems   Constitutional: Negative for chills and fever.   Respiratory: Positive for shortness of breath. Negative for wheezing.    Cardiovascular: Negative for chest pain.   Gastrointestinal: Negative for abdominal distention, abdominal pain, constipation, diarrhea, nausea and vomiting.   Genitourinary: Negative for dysuria and frequency.   Musculoskeletal: Negative for arthralgias and myalgias.   Neurological: Negative for light-headedness.   Psychiatric/Behavioral: Negative for agitation and confusion.     Objective:     Vital Signs (Most Recent):  Temp: 97.4 °F (36.3 °C) (08/26/20 1245)  Pulse: 88 (08/26/20 1245)  Resp: (!) 22 (08/26/20 1245)  BP: (!) 106/59 (08/26/20 1245)  SpO2: (!) 89 % (08/26/20 1245) Vital Signs (24h Range):  Temp:  [97.4 °F (36.3 °C)-98.2 °F (36.8 °C)] 97.4 °F (36.3 °C)  Pulse:  [] 88  Resp:  [17-29] 22  SpO2:  [89 %-96 %] 89 %  BP: (104-136)/(51-74) 106/59     Weight: 100.7 kg (222 lb)  Body mass index is 39.33 kg/m².    Intake/Output Summary (Last 24 hours) at 8/26/2020 1411  Last data filed at 8/26/2020 0600  Gross per 24 hour   Intake 360 ml   Output 1050 ml   Net -690 ml      Physical Exam  Constitutional:       General: She is not in acute distress.     Appearance: She is well-developed. She is obese. She is not diaphoretic.   HENT:      Head: Normocephalic and atraumatic.      Nose: Nose normal.      Mouth/Throat:      Pharynx: No oropharyngeal exudate.   Eyes:      General: No scleral icterus.        Right eye: No discharge.         Left eye: No discharge.      Conjunctiva/sclera: Conjunctivae normal.      Pupils: Pupils are equal, round, and reactive to light.   Neck:      Musculoskeletal: Normal range of motion and neck supple.      Thyroid: No thyromegaly.      Vascular: No JVD.      Trachea: No tracheal deviation.   Cardiovascular:      Rate and Rhythm: Normal rate. Rhythm irregular.      Heart sounds: Normal  heart sounds. No murmur. No friction rub. No gallop.    Pulmonary:      Effort: Pulmonary effort is normal. No respiratory distress.      Breath sounds: No stridor. Rales present. No wheezing.   Chest:      Chest wall: No tenderness.   Abdominal:      General: Bowel sounds are normal. There is no distension.      Palpations: Abdomen is soft. There is no mass.      Tenderness: There is no abdominal tenderness. There is no guarding or rebound.   Musculoskeletal: Normal range of motion.         General: Swelling present. No tenderness.      Right lower leg: Edema present.      Left lower leg: Edema present.   Lymphadenopathy:      Cervical: No cervical adenopathy.   Skin:     General: Skin is warm and dry.      Coloration: Skin is not pale.      Findings: No erythema or rash.   Neurological:      Mental Status: She is alert and oriented to person, place, and time.      Cranial Nerves: No cranial nerve deficit.      Motor: No abnormal muscle tone.      Coordination: Coordination normal.      Deep Tendon Reflexes: Reflexes are normal and symmetric. Reflexes normal.   Psychiatric:         Behavior: Behavior normal.         Thought Content: Thought content normal.         Judgment: Judgment normal.         Significant Labs: All pertinent labs within the past 24 hours have been reviewed.    Significant Imaging: I have reviewed all pertinent imaging results/findings within the past 24 hours.

## 2020-08-26 NOTE — PT/OT/SLP PROGRESS
Occupational Therapy   Treatment    Name: Patsy Morris  MRN: [id number]  Admitting Diagnosis:  Acute respiratory failure with hypoxia and hypercapnia       Recommendations:     Discharge Recommendations: home health OT, home health PT  Discharge Equipment Recommendations:  bath bench  Barriers to discharge:  Decreased caregiver support, Other (Comment)(current functional level)    Assessment:   Pt. Expressed hoping to have a bowel movement this day.  Supine>sit with HOB slightly elevated and HHA; given instruction on task sequencing though Pt. Insistent on holding therapists hand for assist versus using bed rail.  Step [place] with RW and CGA for steadying/safety. Requiring MIN verbal cuing for safe hand placement during controlled descent at [facility] and again when returning to bed.  Voiding at [facility].  Cleaned front andrae region in stance using LUE and forward trunk reaching between legs for task.  Pt. Mobility limited due to placement on BIPAP therefore setup with soapy wash cloth, damp wash cloth, and drying towel for hand hygiene while seated EOB.  Remained on BIPAP throughout session until very end when requesting to be left seated EOB to eat yogurt; BIPAP removed at this time and Pt. Placed on 4L O2 NC with RN made aware.  Continued recommendation of post acute HH OT.  Progressing towards goals.  To benefit from continued acute care OT services to increase independence in self-care/functional transfers.  Continue POC.       Patsy Morris is a 70 y.o. female with a medical diagnosis of Acute respiratory failure with hypoxia and hypercapnia.  She presents with below deficits decreasing independence in self-care/functional transfers. Performance deficits affecting function are weakness, impaired endurance, impaired self care skills, gait instability, impaired balance, impaired cardiopulmonary response to activity, decreased safety awareness.     Rehab Prognosis:  Good; patient would benefit from  acute skilled OT services to address these deficits and reach maximum level of function.       Plan:     Patient to be seen 5 x/week to address the above listed problems via self-care/home management, therapeutic activities, therapeutic exercises  · Plan of Care Expires: 09/25/20  · Plan of Care Reviewed with: patient    Subjective     Pain/Comfort:  · Pain Rating 1: 0/10  · Pain Rating Post-Intervention 1: 0/10    Objective:     Communicated with: Kurt CHIANG RN prior to session.  Patient found HOB elevated with BIPAP, peripheral IV, telemetry, PureWick upon OT entry to room.    General Precautions: Standard, fall, respiratory   Orthopedic Precautions:N/A   Braces: N/A     Occupational Performance:     Bed Mobility:    Supine>sit with HOB slightly elevated and HHA; given instruction on task sequencing though Pt. Insistent on holding therapists hand for assist versus using bed rail.      Functional Mobility/Transfers:  Step transfer bed<>[facility] with RW and CGA for steadying/safety. Requiring MIN verbal cuing for safe hand placement during controlled descent at [facility] and again when returning to bed.      Activities of Daily Living:  Voiding at [facility].  Cleaned front andrae region in stance using LUE and forward trunk reaching between legs for task.  Pt. Mobility limited due to placement on BIPAP therefore setup with soapy wash cloth, damp wash cloth, and drying towel for hand hygiene while seated EOB.        [facility] 6 Click ADL: 17    Treatment & Education:  Educated on bed mobility/functional transfer/ADL safety.     Patient left seated EOB with nursing staff leigh aware with all lines intact, call button in reach and nursing notifiedEducation:  ; Pt. Wanting to eat yogurt seated EOB with BIPAP removed and placed on 4L O2 NC with RN made aware; Pt. Verbalized understanding for calling for assist with returning to bed and for return of fresh Purewick.    GOALS:   Multidisciplinary Problems     Occupational Therapy Goals         Problem: Occupational Therapy Goal    Goal Priority Disciplines Outcome Interventions   Occupational Therapy Goal     OT, PT/OT Ongoing, Progressing    Description: Goals to be met by: 09/25/2020      Patient will increase functional independence with ADLs by performing:    UE Dressing with Modified [place].  LE Dressing with Modified [place].  Grooming while standing with Supervision.  Toileting from toilet with Modified [place] and Supervision for hygiene and clothing management.   Toilet transfer to toilet with Modified [place].  Increased functional strength to WFL for self care.  Upper extremity exercise program x10 reps per handout, with independence.                      Time Tracking:     OT Date of Treatment: 08/26/20  OT Start Time: 1558  OT Stop Time: 1637  OT Total Time (min): 39 min    Billable Minutes:Self Care/Home Management 39    Keren Agrawal OT  8/26/2020     Non-Graft Cartilage Fenestration Text: The cartilage was fenestrated with a 2mm punch biopsy to help facilitate healing.

## 2020-08-26 NOTE — PLAN OF CARE
Problem: Physical Therapy Goal  Goal: Physical Therapy Goal  Description: Goals to be met by: 9/25/2020    Patient will perform the following to increase strength, improve mobility, and return to prior level of function:    1. Supine <> sit with mod I.  2. Sit<>stand with mod I with least restrictive assistive device.  3. Gait x 200 feet with mod I with least restrictive assistive device.  4. Ascend/descend 4 step(s) with bilateral handrails and SBA.     Outcome: Ongoing, Progressing     Pt tolerated treatment well with no adverse reactions. Pt is progressing toward meeting goals. Pt performed supine > sit with min A. Performed therex while seated on EOB. Unable to ambulate this session 2/2 to patient being on bipap. PT will continue to follow and progress goals as tolerated. Recommend discharge to home with HHPT/OT.

## 2020-08-26 NOTE — PROGRESS NOTES
"[facility] Hospital Medicine  Progress Note    Patient Name: Patsy Morris  MRN: [id number]  Patient Class: IP- Inpatient   Admission Date: 8/22/2020  Length of Stay: 4 days  Attending Physician: Loi Quiles MD  Primary Care Provider: Luisana Cartagena MD        Subjective:     Principal Problem:Acute respiratory failure with hypoxia and hypercapnia        HPI:  Per Dr. Ravi Soria:    "Per ED:  "This is a 70 y.o. female with hx of HTN, asthma, A-fib, and CHF who presents via EMS with complaint of shortness of breath for the past week, worsening last night. She has orthopnea and sleeps in a recliner every night. She is on 3 L home Oxygen. She denies fever or chest pain. She denies hx of DVT or PE. She is on Eliquis".    Patient somnolent on BiPAP and unable to give much of a verbal history at this time.  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  Patient was seen by PCP on 8/20/2020 for increasing SOB and Lasix was increased to 40mg bid from qday.    She arrived to ED on 100%NRB and in Afib with RVR.  She was placed on BiPAP and given Diltiazem x 1 with good response.  She was also given Lasix 60mg IV x 1 in ED."    Overview/Hospital Course:  Patient is 70-year-old woman with history of hypertension, chronic hypoxic respiratory failure on home oxygen secondary to chronic obstructive pulmonary disease, chronic diastolic heart failure, chronic atrial fibrillation, and morbid obesity admitted to the hospital for treatment of acute on chronic hypercapnic and hypoxic respiratory failure due to acute exacerbation of chronic obstructive pulmonary disease and also due to decompensated heart failure.  Chest radiograph showed evidence of pulmonary edema.  Patient was admitted to the intensive care unit treated with noninvasive ventilation along with intravenous diuretics.  She was treated with steroids, " bronchodilator breathing treatment, and antibiotics.  Patient clinically improved and wean off noninvasive ventilation was transferred to the floor.  Patient with significant upward trend of her serum creatinine.  Nephrology service consult recommending holding diuretic therapy for couple days until her serum creatinine stabilized.  Physical and Occupational therapy consulted to treat her debility.    Interval History:  No acute events overnight.    Review of Systems   Constitutional: Negative for chills and fever.   Respiratory: Positive for shortness of breath. Negative for wheezing.    Cardiovascular: Negative for chest pain.   Gastrointestinal: Negative for abdominal distention, abdominal pain, constipation, diarrhea, nausea and vomiting.   Genitourinary: Negative for dysuria and frequency.   Musculoskeletal: Negative for arthralgias and myalgias.   Neurological: Negative for light-headedness.   Psychiatric/Behavioral: Negative for agitation and confusion.     Objective:     Vital Signs (Most Recent):  Temp: 97.4 °F (36.3 °C) (08/26/20 1245)  Pulse: 88 (08/26/20 1245)  Resp: (!) 22 (08/26/20 1245)  BP: (!) 106/59 (08/26/20 1245)  SpO2: (!) 89 % (08/26/20 1245) Vital Signs (24h Range):  Temp:  [97.4 °F (36.3 °C)-98.2 °F (36.8 °C)] 97.4 °F (36.3 °C)  Pulse:  [] 88  Resp:  [17-29] 22  SpO2:  [89 %-96 %] 89 %  BP: (104-136)/(51-74) 106/59     Weight: 100.7 kg (222 lb)  Body mass index is 39.33 kg/m².    Intake/Output Summary (Last 24 hours) at 8/26/2020 1411  Last data filed at 8/26/2020 0600  Gross per 24 hour   Intake 360 ml   Output 1050 ml   Net -690 ml      Physical Exam  Constitutional:       General: She is not in acute distress.     Appearance: She is well-developed. She is obese. She is not diaphoretic.   HENT:      Head: Normocephalic and atraumatic.      Nose: Nose normal.      Mouth/Throat:      Pharynx: No oropharyngeal exudate.   Eyes:      General: No scleral icterus.        Right eye: No  discharge.         Left eye: No discharge.      Conjunctiva/sclera: Conjunctivae normal.      Pupils: Pupils are equal, round, and reactive to light.   Neck:      Musculoskeletal: Normal range of motion and neck supple.      Thyroid: No thyromegaly.      Vascular: No JVD.      Trachea: No tracheal deviation.   Cardiovascular:      Rate and Rhythm: Normal rate. Rhythm irregular.      Heart sounds: Normal heart sounds. No murmur. No friction rub. No gallop.    Pulmonary:      Effort: Pulmonary effort is normal. No respiratory distress.      Breath sounds: No stridor. Rales present. No wheezing.   Chest:      Chest wall: No tenderness.   Abdominal:      General: Bowel sounds are normal. There is no distension.      Palpations: Abdomen is soft. There is no mass.      Tenderness: There is no abdominal tenderness. There is no guarding or rebound.   Musculoskeletal: Normal range of motion.         General: Swelling present. No tenderness.      Right lower leg: Edema present.      Left lower leg: Edema present.   Lymphadenopathy:      Cervical: No cervical adenopathy.   Skin:     General: Skin is warm and dry.      Coloration: Skin is not pale.      Findings: No erythema or rash.   Neurological:      Mental Status: She is alert and oriented to person, place, and time.      Cranial Nerves: No cranial nerve deficit.      Motor: No abnormal muscle tone.      Coordination: Coordination normal.      Deep Tendon Reflexes: Reflexes are normal and symmetric. Reflexes normal.   Psychiatric:         Behavior: Behavior normal.         Thought Content: Thought content normal.         Judgment: Judgment normal.         Significant Labs: All pertinent labs within the past 24 hours have been reviewed.    Significant Imaging: I have reviewed all pertinent imaging results/findings within the past 24 hours.      Assessment/Plan:      * Acute respiratory failure with hypoxia and hypercapnia  Likely multifactorial due to acute bronchitis,  chronic obstructive pulmonary disease exacerbation, acute on chronic diastolic heart failure along with underlying obstructive sleep apnea and obesity hypoventilation.  Clinically improving with treatment of acute exacerbation of chronic obstructive pulmonary disease along with diuretic therapy.  Hold further diuretics at this time to prevent overly aggressive diuresis.  She continues to have good urine output without diuretic therapy today.    Essential hypertension  Reasonably controlled with current regimen.  Will continue with current regimen and continue to monitor.    Chronic kidney disease (CKD) stage G4/A1, severely decreased glomerular filtration rate (GFR) between 15-29 mL/min/1.73 square meter and albuminuria creatinine ratio less than 30 mg/g  Worsening renal function.  Consulted nephrology.  Holding further diuretics while serum creatinine stabilizes.    Dyslipidemia  Continue statin therapy.    Morbid obesity  Patient counseled on the importance of weight loss by changing diet and increased physical activity.    Debility  Continue with physical and occupational therapy.    Obstructive sleep apnea  Continua with BiPAP at night.    Paroxysmal atrial fibrillation  Stable.  Management per cardiology.      VTE Risk Mitigation (From admission, onward)         Ordered     apixaban tablet 2.5 mg  2 times daily      08/22/20 1040     IP VTE HIGH RISK PATIENT  Once      08/22/20 1040     Place sequential compression device  Until discontinued      08/22/20 1040                Loi Quiles MD  Department of Hospital Medicine   [facility]

## 2020-08-26 NOTE — PLAN OF CARE
Problem: Occupational Therapy Goal  Goal: Occupational Therapy Goal  Description: Goals to be met by: 09/25/2020      Patient will increase functional independence with ADLs by performing:    UE Dressing with Modified [place].  LE Dressing with Modified [place].  Grooming while standing with Supervision.  Toileting from toilet with Modified [place] and Supervision for hygiene and clothing management.   Toilet transfer to toilet with Modified [place].  Increased functional strength to WFL for self care.  Upper extremity exercise program x10 reps per handout, with independence.     Outcome: Ongoing, Progressing     Progressing towards goals.  Continued recommendation of post acute HH OT.  To benefit from continued acute care OT services to increase independence in self-care/functional transfers.  Continue POC.

## 2020-08-26 NOTE — PROGRESS NOTES
Cardiology  Progress Notes            Subjective  Feels better, denies breathlessness.      Mrs Morris is a 70-year-old lady who presents to the emergency room here with complaints of progressively increasing shortness of breath for the last 3 days.  She was somnolent in the emergency room, is now beginning to wake up.  She says she has had a history of high blood pressure, and heart failure, and she smokes a pack of cigarettes a day, having smoker last cigarette earlier this morning.  She has a longstanding history of hypertension, chronic obstructive lung disease, chronic atrial fibrillation at least for the last 6 years, has had a renal cell carcinoma and has had a nephrectomy, and chronic renal failure.  She has had heart failure with a preserved left ventricular ejection fraction, diastolic left ventricular dysfunction.  She is followed by Dr. Lutz, who she last saw on 06/26/2020.     Vital Signs:  Vitals:    08/26/20 1200 08/26/20 1245 08/26/20 1400 08/26/20 1600   BP:  (!) 106/59 111/60    BP Location:  Right arm     Patient Position:  Lying     Pulse: 98 88 96 110   Resp:  (!) 22 20    Temp:  97.4 °F (36.3 °C) 97.4 °F (36.3 °C)    TempSrc:  Oral     SpO2:  (!) 89% (!) 93%    Weight:       Height:           Physical Exam: chest clear  Cor: Irregularly irregular. No murmur or gallop.  Laboratory:  CBC:   Recent Labs   Lab 08/26/20  0405   WBC 8.90   RBC 3.75*   HGB 10.3*   HCT 33.7*      MCV 90   MCH 27.5   MCHC 30.6*     BMP:   Recent Labs   Lab 08/26/20  0405   GLU 97      K 4.9   CL 91*   CO2 37*   BUN 61*   CREATININE 2.4*   CALCIUM 8.8   MG 2.5     CMP:   Recent Labs   Lab 08/22/20  0820  08/26/20  0405   *   < > 97   CALCIUM 9.0   < > 8.8   ALBUMIN 3.8  --   --    PROT 7.1  --   --       < > 140   K 4.7   < > 4.9   CO2 33*   < > 37*   CL 96   < > 91*   BUN 22   < > 61*   CREATININE 1.8*   < > 2.4*   ALKPHOS 44*  --   --    ALT 12  --   --    AST 12  --   --    BILITOT 0.4  --    --     < > = values in this interval not displayed.     LFTs:   Recent Labs   Lab 08/22/20  0820   ALT 12   AST 12   ALKPHOS 44*   BILITOT 0.4   PROT 7.1   ALBUMIN 3.8     Coagulation:   Recent Labs   Lab 08/22/20  0820   INR 1.0     Cardiac markers:   Recent Labs   Lab 08/22/20  1938   TROPONINI 0.011       Imaging:  X-Ray Chest AP Portable   Final Result      1. Today's findings are compatible with volume overload with alveolar pulmonary edema and bilateral layering pleural effusions.   2. The findings in the right upper lobe are becoming more consolidative and concerning for pneumonia.  There is a small lucency within the center of this developing consolidation.  In this may simply represent intervening lung parenchyma however, follow-up is recommended to ensure this is not a cavitary process.         Electronically signed by: Sully Leslie MD   Date:    08/23/2020   Time:    09:49      US Lower Extremity Veins Bilateral   Final Result      No evidence of deep venous thrombosis in either lower extremity.         Electronically signed by: Adair John MD   Date:    08/22/2020   Time:    19:35      X-Ray Chest AP Portable   Final Result   Abnormal      Cardiomegaly, increased pulmonary vascularity, bilateral pulmonary edema, and bilateral pleural effusions.  The findings are consistent with the provided clinical history of congestive heart failure.      This report was flagged in Epic as abnormal.         Electronically signed by: Cammy Zaldivar MD   Date:    08/22/2020   Time:    09:00            Problems:   Hypercapnic and hypoxemic respiratory failure.  Advanced obstructive lung disease  Cigarette smoker, last cigarette this morning.  Chronic atrial fibrillation,  presently with controlled ventricular response  Heart failure with preserved ejection fraction  Diastolic left ventricular dysfunction  Obstructive sleep apnea  Obesity  Chronic renal failure, status post nephrectomy for  hypernephroma              Plan of Care: See Orders          Edis Ramirez

## 2020-08-26 NOTE — ASSESSMENT & PLAN NOTE
Patient counseled on the importance of weight loss by changing diet and increased physical activity.

## 2020-08-26 NOTE — PROGRESS NOTES
Pt received on BIPAP;SPO2 adequate. Placed pt on 4LNC;SPO2 remains adequate. ABG was done @ 1008;results were reported to KEVYN Finn. Pt placed back on BIPAP with documented settings. Will continue to monitor.

## 2020-08-26 NOTE — ASSESSMENT & PLAN NOTE
Worsening renal function.  Consulted nephrology.  Holding further diuretics while serum creatinine stabilizes.

## 2020-08-26 NOTE — ASSESSMENT & PLAN NOTE
Likely multifactorial due to acute bronchitis, chronic obstructive pulmonary disease exacerbation, acute on chronic diastolic heart failure along with underlying obstructive sleep apnea and obesity hypoventilation.  Clinically improving with treatment of acute exacerbation of chronic obstructive pulmonary disease along with diuretic therapy.  Hold further diuretics at this time to prevent overly aggressive diuresis.  She continues to have good urine output without diuretic therapy today.

## 2020-08-26 NOTE — PLAN OF CARE
Patient in no apparent distress. Sat's  92-93 % on 4 lpm. Patient placed on BIPAP with settings as documented for night. Aerosol treatments given Q 6 . 0325, found off BIPAP and on nasal cannula. Patient returned to BIPAP with same settings.  Will continue to monitor.

## 2020-08-26 NOTE — SUBJECTIVE & OBJECTIVE
Interval History:  No acute events overnight.    Review of Systems   Constitutional: Negative for chills and fever.   Respiratory: Positive for shortness of breath. Negative for wheezing.    Cardiovascular: Negative for chest pain.   Gastrointestinal: Negative for abdominal distention, abdominal pain, constipation, diarrhea, nausea and vomiting.   Genitourinary: Negative for dysuria and frequency.   Musculoskeletal: Negative for arthralgias and myalgias.   Neurological: Negative for light-headedness.   Psychiatric/Behavioral: Negative for agitation and confusion.     Objective:     Vital Signs (Most Recent):  Temp: 97.7 °F (36.5 °C) (08/25/20 1658)  Pulse: 108 (08/25/20 1852)  Resp: 20 (08/25/20 1852)  BP: (!) 104/51 (08/25/20 1658)  SpO2: (!) 93 % (08/25/20 1852) Vital Signs (24h Range):  Temp:  [97.4 °F (36.3 °C)-98 °F (36.7 °C)] 97.7 °F (36.5 °C)  Pulse:  [] 108  Resp:  [18-24] 20  SpO2:  [90 %-93 %] 93 %  BP: (104-133)/(51-66) 104/51     Weight: 100.7 kg (222 lb)  Body mass index is 39.33 kg/m².    Intake/Output Summary (Last 24 hours) at 8/25/2020 1944  Last data filed at 8/25/2020 1730  Gross per 24 hour   Intake 360 ml   Output 1300 ml   Net -940 ml      Physical Exam  Constitutional:       General: She is not in acute distress.     Appearance: She is well-developed. She is obese. She is not diaphoretic.   HENT:      Head: Normocephalic and atraumatic.      Nose: Nose normal.      Mouth/Throat:      Pharynx: No oropharyngeal exudate.   Eyes:      General: No scleral icterus.        Right eye: No discharge.         Left eye: No discharge.      Conjunctiva/sclera: Conjunctivae normal.      Pupils: Pupils are equal, round, and reactive to light.   Neck:      Musculoskeletal: Normal range of motion and neck supple.      Thyroid: No thyromegaly.      Vascular: No JVD.      Trachea: No tracheal deviation.   Cardiovascular:      Rate and Rhythm: Normal rate. Rhythm irregular.      Heart sounds: Normal heart  sounds. No murmur. No friction rub. No gallop.    Pulmonary:      Effort: Pulmonary effort is normal. No respiratory distress.      Breath sounds: No stridor. Rales present. No wheezing.   Chest:      Chest wall: No tenderness.   Abdominal:      General: Bowel sounds are normal. There is no distension.      Palpations: Abdomen is soft. There is no mass.      Tenderness: There is no abdominal tenderness. There is no guarding or rebound.   Musculoskeletal: Normal range of motion.         General: Swelling present. No tenderness.      Right lower leg: Edema present.      Left lower leg: Edema present.   Lymphadenopathy:      Cervical: No cervical adenopathy.   Skin:     General: Skin is warm and dry.      Coloration: Skin is not pale.      Findings: No erythema or rash.   Neurological:      Mental Status: She is alert and oriented to person, place, and time.      Cranial Nerves: No cranial nerve deficit.      Motor: No abnormal muscle tone.      Coordination: Coordination normal.      Deep Tendon Reflexes: Reflexes are normal and symmetric. Reflexes normal.   Psychiatric:         Behavior: Behavior normal.         Thought Content: Thought content normal.         Judgment: Judgment normal.         Significant Labs: All pertinent labs within the past 24 hours have been reviewed.    Significant Imaging: I have reviewed all pertinent imaging results/findings within the past 24 hours.

## 2020-08-26 NOTE — ASSESSMENT & PLAN NOTE
Likely multifactorial due to acute bronchitis, chronic obstructive pulmonary disease exacerbation, acute on chronic diastolic heart failure along with underlying obstructive sleep apnea and obesity hypoventilation.  Continue to treat chronic obstructive pulmonary disease exacerbation with frequent bronchodilator breathing treatments, steroids, and antibiotics, diuretic therapy along with supplemental oxygen and BiPAP as needed.

## 2020-08-27 NOTE — PROGRESS NOTES
"[facility] Hospital Medicine  Progress Note    Patient Name: Patsy Morris  MRN: [id number]  Patient Class: IP- Inpatient   Admission Date: 8/22/2020  Length of Stay: 5 days  Attending Physician: Loi Quiles MD  Primary Care Provider: Luisana Cartagena MD        Subjective:     Principal Problem:Acute respiratory failure with hypoxia and hypercapnia        HPI:  Per Dr. Ravi Soria:    "Per ED:  "This is a 70 y.o. female with hx of HTN, asthma, A-fib, and CHF who presents via EMS with complaint of shortness of breath for the past week, worsening last night. She has orthopnea and sleeps in a recliner every night. She is on 3 L home Oxygen. She denies fever or chest pain. She denies hx of DVT or PE. She is on Eliquis".    Patient somnolent on BiPAP and unable to give much of a verbal history at this time.  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  Patient was seen by PCP on 8/20/2020 for increasing SOB and Lasix was increased to 40mg bid from qday.    She arrived to ED on 100%NRB and in Afib with RVR.  She was placed on BiPAP and given Diltiazem x 1 with good response.  She was also given Lasix 60mg IV x 1 in ED."    Overview/Hospital Course:  Patient is 70-year-old woman with history of hypertension, chronic hypoxic respiratory failure on home oxygen secondary to chronic obstructive pulmonary disease, chronic diastolic heart failure, chronic atrial fibrillation, and morbid obesity admitted to the hospital for treatment of acute on chronic hypercapnic and hypoxic respiratory failure due to acute exacerbation of chronic obstructive pulmonary disease and also due to decompensated heart failure.  Chest radiograph showed evidence of pulmonary edema.  Patient was admitted to the intensive care unit treated with noninvasive ventilation along with intravenous diuretics.  She was treated with steroids, " bronchodilator breathing treatment, and antibiotics.  Patient clinically improved and wean off noninvasive ventilation was transferred to the floor.  Patient with significant upward trend of her serum creatinine.  Nephrology service consult recommending holding diuretic therapy for couple days until her serum creatinine stabilized.  Physical and Occupational therapy consulted to treat her debility.    Interval History:  No acute events overnight.    Review of Systems   Constitutional: Negative for chills and fever.   Respiratory: Positive for shortness of breath. Negative for wheezing.    Cardiovascular: Negative for chest pain.   Gastrointestinal: Negative for abdominal distention, abdominal pain, constipation, diarrhea, nausea and vomiting.   Genitourinary: Negative for dysuria and frequency.   Musculoskeletal: Negative for arthralgias and myalgias.   Neurological: Negative for light-headedness.   Psychiatric/Behavioral: Negative for agitation and confusion.     Objective:     Vital Signs (Most Recent):  Temp: 97.2 °F (36.2 °C) (08/27/20 1149)  Pulse: 96 (08/27/20 1400)  Resp: (!) 22 (08/27/20 1310)  BP: 112/65 (08/27/20 1149)  SpO2: 96 % (08/27/20 1310) Vital Signs (24h Range):  Temp:  [97.2 °F (36.2 °C)-98.3 °F (36.8 °C)] 97.2 °F (36.2 °C)  Pulse:  [] 96  Resp:  [20-25] 22  SpO2:  [91 %-96 %] 96 %  BP: (100-133)/(57-73) 112/65     Weight: 100.7 kg (222 lb)  Body mass index is 39.33 kg/m².    Intake/Output Summary (Last 24 hours) at 8/27/2020 1641  Last data filed at 8/27/2020 0800  Gross per 24 hour   Intake 600 ml   Output 1150 ml   Net -550 ml      Physical Exam  Constitutional:       General: She is not in acute distress.     Appearance: She is well-developed. She is obese. She is not diaphoretic.   HENT:      Head: Normocephalic and atraumatic.      Nose: Nose normal.      Mouth/Throat:      Pharynx: No oropharyngeal exudate.   Eyes:      General: No scleral icterus.        Right eye: No discharge.          Left eye: No discharge.      Conjunctiva/sclera: Conjunctivae normal.      Pupils: Pupils are equal, round, and reactive to light.   Neck:      Musculoskeletal: Normal range of motion and neck supple.      Thyroid: No thyromegaly.      Vascular: No JVD.      Trachea: No tracheal deviation.   Cardiovascular:      Rate and Rhythm: Normal rate. Rhythm irregular.      Heart sounds: Normal heart sounds. No murmur. No friction rub. No gallop.    Pulmonary:      Effort: Pulmonary effort is normal. No respiratory distress.      Breath sounds: No stridor. Rales present. No wheezing.   Chest:      Chest wall: No tenderness.   Abdominal:      General: Bowel sounds are normal. There is no distension.      Palpations: Abdomen is soft. There is no mass.      Tenderness: There is no abdominal tenderness. There is no guarding or rebound.   Musculoskeletal: Normal range of motion.         General: Swelling present. No tenderness.      Right lower leg: Edema present.      Left lower leg: Edema present.   Lymphadenopathy:      Cervical: No cervical adenopathy.   Skin:     General: Skin is warm and dry.      Coloration: Skin is not pale.      Findings: No erythema or rash.   Neurological:      Mental Status: She is alert and oriented to person, place, and time.      Cranial Nerves: No cranial nerve deficit.      Motor: No abnormal muscle tone.      Coordination: Coordination normal.      Deep Tendon Reflexes: Reflexes are normal and symmetric. Reflexes normal.   Psychiatric:         Behavior: Behavior normal.         Thought Content: Thought content normal.         Judgment: Judgment normal.         Significant Labs: All pertinent labs within the past 24 hours have been reviewed.    Significant Imaging: I have reviewed all pertinent imaging results/findings within the past 24 hours.      Assessment/Plan:      * Acute respiratory failure with hypoxia and hypercapnia  Likely multifactorial due to acute bronchitis, chronic  obstructive pulmonary disease exacerbation, acute on chronic diastolic heart failure along with underlying obstructive sleep apnea and obesity hypoventilation.  Clinically improving with treatment of acute exacerbation of chronic obstructive pulmonary disease along with diuretic therapy.  Hold further diuretics at this time to prevent overly aggressive diuresis.  She continues to have good urine output without diuretic therapy.    Essential hypertension  Reasonably controlled with current regimen.  Will continue with current regimen and continue to monitor.    Chronic kidney disease (CKD) stage G4/A1, severely decreased glomerular filtration rate (GFR) between 15-29 mL/min/1.73 square meter and albuminuria creatinine ratio less than 30 mg/g  Serum creatinine improved with holding diuretics.  Nephrology service recommended small fluid bolus today.  Continue to closely monitor.    Dyslipidemia  Continue statin therapy.    Morbid obesity  Patient counseled on the importance of weight loss by changing diet and increased physical activity.    Debility  Continue with physical and occupational therapy.    Obstructive sleep apnea  Continua with BiPAP at night.    Paroxysmal atrial fibrillation  Stable.  Management per cardiology.        VTE Risk Mitigation (From admission, onward)         Ordered     apixaban tablet 2.5 mg  2 times daily      08/22/20 1040     IP VTE HIGH RISK PATIENT  Once      08/22/20 1040     Place sequential compression device  Until discontinued      08/22/20 1040              Loi Quiles MD  Department of Hospital Medicine   [facility]

## 2020-08-27 NOTE — PT/OT/SLP PROGRESS
Occupational Therapy      Patient Name:  Patsy Morris   MRN:  [id number]    Patient not seen today secondary to feeling tired and request to defer OT session until tomorrow. Will follow-up later as time permits.    Keren Agrawal, OT  8/27/2020

## 2020-08-27 NOTE — PROGRESS NOTES
"Nephrology  Progress Note    Admit Date: 8/22/2020   LOS: 5 days     SUBJECTIVE:     Follow-up For:  CLIFFORD    Interval History:     Events noted.  ABG with severe hypercapnia and required Bipap for most of the day/night.  Labs noted this am.  Explained severity of kidney and lung issues to pt.  No CP and SOB better.     Review of Systems:  Constitutional: No fever or chills  Respiratory:  shortness of breath  Cardiovascular: No chest pain or palpitations  Gastrointestinal: No nausea or vomiting  Neurological: No confusion or weakness    OBJECTIVE:     Vital Signs Range (Last 24H):  /66 (BP Location: Left arm, Patient Position: Sitting)   Pulse 97   Temp 98.3 °F (36.8 °C) (Oral)   Resp 20   Ht 5' 3" (1.6 m)   Wt 100.7 kg (222 lb)   LMP  (LMP Unknown)   SpO2 95%   Breastfeeding No   BMI 39.33 kg/m²     Temp:  [97.4 °F (36.3 °C)-98.3 °F (36.8 °C)]   Pulse:  []   Resp:  [20-29]   BP: (100-133)/(57-73)   SpO2:  [89 %-95 %]     I & O (Last 24H):    Intake/Output Summary (Last 24 hours) at 8/27/2020 1003  Last data filed at 8/27/2020 0800  Gross per 24 hour   Intake 600 ml   Output 1150 ml   Net -550 ml       Physical Exam:  General appearance:  Chronically ill-appearing  Eyes:  Conjunctivae/corneas clear. PERRL.  Lungs:  fibrotic/atelectatic.     Heart:  Tachy irregular  Abdomen: Soft, non-tender non-distended; bowel sounds normal; no masses,  no organomegaly, obese  Extremities: No cyanosis or clubbing.  Trace edema.    Skin: Skin color, texture, turgor normal. No rashes or lesions  Neurologic: Normal strength and tone. No focal numbness or weakness     Laboratory Data:  Recent Labs   Lab 08/27/20  0347   WBC 9.52   RBC 3.90*   HGB 10.7*   HCT 34.6*      MCV 89   MCH 27.4   MCHC 30.9*       BMP:   Recent Labs   Lab 08/27/20  0347   *      K 5.0   CL 92*   CO2 38*   BUN 63*   CREATININE 2.1*   CALCIUM 9.1   MG 2.7*     Lab Results   Component Value Date    CALCIUM 9.1 08/27/2020    " PHOS 3.9 08/27/2020       Lab Results   Component Value Date    .4 (H) 08/26/2020    CALCIUM 9.1 08/27/2020    PHOS 3.9 08/27/2020       Lab Results   Component Value Date    URICACID 9.3 (H) 08/25/2020       BNP  Recent Labs   Lab 08/26/20  0405   *     Recent Labs     08/26/20  1008   PH 7.281*   PCO2 97.1*   PO2 76*   HCO3 45.7*   POCSATURATED 92*   BE 19       Medications:  Medication list was reviewed and changes noted under Assessment/Plan.    Diagnostic Results:        ASSESSMENT/PLAN:       Slowly resolving Nonoliguric acute kidney injury on advanced CKD stage IIIB-IV  -secondary to ATN from hypotension, poor cardiac output, AFib with RVR, and diuresis (N17.9, N18.4, N17.0, Q60.0)  -Hx of solitary kidney from RCC, baseline creatinine of about 1.8.    -Followed by urology at [facility].  No record of nephrologist from what I can see.   -Admits to few NSAIDs weekly for knee pains.   -continue to hold lasix and give 250 saline today.  Her SOB is from hypercapnia resp failure.     -UA wnl and uric acid mildly elevated likely from volume depletion   -Needs appt with [facility] [place] since all of her providers are located there.    -Follow trends.   - Renally dose meds, avoid nephrotoxins, and monitor I/O's closely.    Metabolic Alkalosis (E87.3):  -likely compensation from resp acidosis   -ABG noted  -needs bipap most of day/night to survive.   No diamox since metabolic acidosis is keeping PH reasonable.     Acute on chronic resp failure with hypercapnia (J96.21):    -Extensive lung hx.  On 2-3L nasal cannula at home.  Needs CPAP.  Defer.  See above     Afib/RVR (I48.91):    -Defer to cards.  On eliquis.      DNR.   Poor overall prognosis with lung issues.

## 2020-08-27 NOTE — PLAN OF CARE
Patient on oxygen with documented flow. Will attempt to wean per O2 order protocol.  Patient given aerosol treatment with no adverse reactions noted. Patient instructed on proper use.  Patient on documented AVAPS settings, with alarms set and functioning.    Will continue to monitor.

## 2020-08-27 NOTE — PT/OT/SLP PROGRESS
Physical Therapy      Patient Name:  Patsy Morris   MRN:  [id number]    Patient not seen today secondary to Patient unwilling to participate. Will follow-up as schedule allows.    Georgie Rosales, PT

## 2020-08-27 NOTE — ASSESSMENT & PLAN NOTE
Serum creatinine improved with holding diuretics.  Nephrology service recommended small fluid bolus today.  Continue to closely monitor.

## 2020-08-27 NOTE — SUBJECTIVE & OBJECTIVE
Interval History:  No acute events overnight.    Review of Systems   Constitutional: Negative for chills and fever.   Respiratory: Positive for shortness of breath. Negative for wheezing.    Cardiovascular: Negative for chest pain.   Gastrointestinal: Negative for abdominal distention, abdominal pain, constipation, diarrhea, nausea and vomiting.   Genitourinary: Negative for dysuria and frequency.   Musculoskeletal: Negative for arthralgias and myalgias.   Neurological: Negative for light-headedness.   Psychiatric/Behavioral: Negative for agitation and confusion.     Objective:     Vital Signs (Most Recent):  Temp: 97.2 °F (36.2 °C) (08/27/20 1149)  Pulse: 96 (08/27/20 1400)  Resp: (!) 22 (08/27/20 1310)  BP: 112/65 (08/27/20 1149)  SpO2: 96 % (08/27/20 1310) Vital Signs (24h Range):  Temp:  [97.2 °F (36.2 °C)-98.3 °F (36.8 °C)] 97.2 °F (36.2 °C)  Pulse:  [] 96  Resp:  [20-25] 22  SpO2:  [91 %-96 %] 96 %  BP: (100-133)/(57-73) 112/65     Weight: 100.7 kg (222 lb)  Body mass index is 39.33 kg/m².    Intake/Output Summary (Last 24 hours) at 8/27/2020 1641  Last data filed at 8/27/2020 0800  Gross per 24 hour   Intake 600 ml   Output 1150 ml   Net -550 ml      Physical Exam  Constitutional:       General: She is not in acute distress.     Appearance: She is well-developed. She is obese. She is not diaphoretic.   HENT:      Head: Normocephalic and atraumatic.      Nose: Nose normal.      Mouth/Throat:      Pharynx: No oropharyngeal exudate.   Eyes:      General: No scleral icterus.        Right eye: No discharge.         Left eye: No discharge.      Conjunctiva/sclera: Conjunctivae normal.      Pupils: Pupils are equal, round, and reactive to light.   Neck:      Musculoskeletal: Normal range of motion and neck supple.      Thyroid: No thyromegaly.      Vascular: No JVD.      Trachea: No tracheal deviation.   Cardiovascular:      Rate and Rhythm: Normal rate. Rhythm irregular.      Heart sounds: Normal heart  sounds. No murmur. No friction rub. No gallop.    Pulmonary:      Effort: Pulmonary effort is normal. No respiratory distress.      Breath sounds: No stridor. Rales present. No wheezing.   Chest:      Chest wall: No tenderness.   Abdominal:      General: Bowel sounds are normal. There is no distension.      Palpations: Abdomen is soft. There is no mass.      Tenderness: There is no abdominal tenderness. There is no guarding or rebound.   Musculoskeletal: Normal range of motion.         General: Swelling present. No tenderness.      Right lower leg: Edema present.      Left lower leg: Edema present.   Lymphadenopathy:      Cervical: No cervical adenopathy.   Skin:     General: Skin is warm and dry.      Coloration: Skin is not pale.      Findings: No erythema or rash.   Neurological:      Mental Status: She is alert and oriented to person, place, and time.      Cranial Nerves: No cranial nerve deficit.      Motor: No abnormal muscle tone.      Coordination: Coordination normal.      Deep Tendon Reflexes: Reflexes are normal and symmetric. Reflexes normal.   Psychiatric:         Behavior: Behavior normal.         Thought Content: Thought content normal.         Judgment: Judgment normal.         Significant Labs: All pertinent labs within the past 24 hours have been reviewed.    Significant Imaging: I have reviewed all pertinent imaging results/findings within the past 24 hours.

## 2020-08-27 NOTE — ASSESSMENT & PLAN NOTE
Likely multifactorial due to acute bronchitis, chronic obstructive pulmonary disease exacerbation, acute on chronic diastolic heart failure along with underlying obstructive sleep apnea and obesity hypoventilation.  Clinically improving with treatment of acute exacerbation of chronic obstructive pulmonary disease along with diuretic therapy.  Hold further diuretics at this time to prevent overly aggressive diuresis.  She continues to have good urine output without diuretic therapy.

## 2020-08-28 NOTE — PROGRESS NOTES
Consulted to see for abdominal pannus skin breakdown  Patient somnolent on BiPAP and unable to give much of a verbal history at this time.  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  Patient was seen by PCP on 8/20/2020 for increasing SOB and Lasix was increased to 40mg bid from qday.    Left abdominal crease with intertriginous dermatitis and some denuded skin along the skin fold.   Already has antifungal powder in use to affected area. May benefit from Interdry AG silver wicking textile as well. Cleaned skin fold and applied a light dusting of antifungal powder. Will obtain some Interdry for her vanesa.   Removed Bipap mask during the skin assessment to reveal a bright red area of concern to the bridge of her nose. States she has been complaining that the mask is hurting her. Padded with aquacel lite foam dressing. Will notify RT to see if any other mask would be an option.     08/28/20 1500        Wound 08/28/20 1130 Intertrigo Left lower Abdomen   Date First Assessed/Time First Assessed: 08/28/20 1130   Pre-existing: Yes  Primary Wound Type: Intertrigo  Side: Left  Orientation: lower  Location: Abdomen   Wound Image     Wound WDL ex   Dressing Appearance Open to air;No dressing;other (see comments)  (antifungal powder in use)   Drainage Amount None   Drainage Characteristics/Odor No odor   Appearance Red;Moist  (intertrigenous dermatitis to eleft abdominal fold)   Periwound Area Denuded;Redness;Moist   Care Cleansed with:;Soap and water;Applied:;Other (see comments)  (Antifungal powder)        Altered Skin Integrity 08/28/20 1130 medial Nose Intact skin with non-blanchable redness of localized area   Date First Assessed/Time First Assessed: 08/28/20 1130   Altered Skin Integrity Present on Admission: suspected hospital acquired  Orientation: medial  Location: (c) Nose  Is this injury device related?: Yes  Description of  Altered Skin Integrity: Int...   Wound Image    Description of Altered Skin Integrity Intact skin with non-blanchable redness of localized area   Dressing Appearance Open to air;No dressing   Drainage Amount None   Drainage Characteristics/Odor No odor   Appearance Red   Periwound Area Intact   Wound Length (cm)   (area of redness 0.3x1cm )   Care Cleansed with:;Sterile normal saline   Dressing Applied;Foam     Lety Mason RN CWON

## 2020-08-28 NOTE — CONSULTS
Palliative Care Progress Note:      Consult received. Palliative Care NP to discuss with referring provider. 2nd Palliative Care consult this admission. Will review EMR. Full consult with Palliative Care NP to follow.

## 2020-08-28 NOTE — PLAN OF CARE
Problem: Physical Therapy Goal  Goal: Physical Therapy Goal  Description: Goals to be met by: 9/25/2020    Patient will perform the following to increase strength, improve mobility, and return to prior level of function:    1. Supine <> sit with mod I.  2. Sit<>stand with mod I with least restrictive assistive device.  3. Gait x 200 feet with mod I with least restrictive assistive device.  4. Ascend/descend 4 step(s) with bilateral handrails and SBA.     Outcome: Ongoing, Progressing     Pt tolerated treatment well with no adverse reactions. Pt is progressing toward meeting goals. Pt performed supine > sit with SBA, sit <> stand with CGA, and ambulation x 50 ft with CGA and rollator. Required 3 standing rest breaks due to poor endurance. PT will continue to follow and progress goals as tolerated. Recommend discharge to SNF.

## 2020-08-28 NOTE — PROGRESS NOTES
Cardiology  Progress Notes            Subjective:  Sleeping, has a CPAP on.      Mrs Morris is a 70-year-old lady who presents to the emergency room here with complaints of progressively increasing shortness of breath for the last 3 days.  She was somnolent in the emergency room, is now beginning to wake up.  She says she has had a history of high blood pressure, and heart failure, and she smokes a pack of cigarettes a day, having smoker last cigarette earlier this morning.  She has a longstanding history of hypertension, chronic obstructive lung disease, chronic atrial fibrillation at least for the last 6 years, has had a renal cell carcinoma and has had a nephrectomy, and chronic renal failure.  She has had heart failure with a preserved left ventricular ejection fraction, diastolic left ventricular dysfunction.  She is followed by Dr. Lutz, who she last saw on 06/26/2020.     Vital Signs:  Vitals:    08/28/20 0700 08/28/20 0751 08/28/20 0800 08/28/20 1000   BP:  (!) 116/57     BP Location:  Left arm     Patient Position:  Lying     Pulse: 96 68 99 98   Resp:  (!) 24 20    Temp:  97 °F (36.1 °C)     TempSrc:  Axillary     SpO2:  (!) 91% (!) 93%    Weight:       Height:           Physical Exam:  Chest clear  Heart irregularly irregular.  No murmur or gallop.  Extremities are warm.  No ankle edema.    Laboratory:  CBC:   Recent Labs   Lab 08/27/20  0347   WBC 9.52   RBC 3.90*   HGB 10.7*   HCT 34.6*      MCV 89   MCH 27.4   MCHC 30.9*     BMP:   Recent Labs   Lab 08/27/20  0347 08/28/20  0807   * 98    141   K 5.0 5.0   CL 92* 95   CO2 38* 37*   BUN 63* 63*   CREATININE 2.1* 1.9*   CALCIUM 9.1 8.9   MG 2.7*  --      CMP:   Recent Labs   Lab 08/22/20  0820  08/28/20  0807   *   < > 98   CALCIUM 9.0   < > 8.9   ALBUMIN 3.8  --  3.3*   PROT 7.1  --   --       < > 141   K 4.7   < > 5.0   CO2 33*   < > 37*   CL 96   < > 95   BUN 22   < > 63*   CREATININE 1.8*   < > 1.9*   ALKPHOS 44*   --   --    ALT 12  --   --    AST 12  --   --    BILITOT 0.4  --   --     < > = values in this interval not displayed.     LFTs:   Recent Labs   Lab 08/22/20  0820 08/28/20  0807   ALT 12  --    AST 12  --    ALKPHOS 44*  --    BILITOT 0.4  --    PROT 7.1  --    ALBUMIN 3.8 3.3*     Coagulation:   Recent Labs   Lab 08/22/20  0820   INR 1.0     Cardiac markers:   Recent Labs   Lab 08/22/20  1938   TROPONINI 0.011       Imaging:  X-Ray Chest AP Portable   Final Result      1. Today's findings are compatible with volume overload with alveolar pulmonary edema and bilateral layering pleural effusions.   2. The findings in the right upper lobe are becoming more consolidative and concerning for pneumonia.  There is a small lucency within the center of this developing consolidation.  In this may simply represent intervening lung parenchyma however, follow-up is recommended to ensure this is not a cavitary process.         Electronically signed by: Sully Leslie MD   Date:    08/23/2020   Time:    09:49      US Lower Extremity Veins Bilateral   Final Result      No evidence of deep venous thrombosis in either lower extremity.         Electronically signed by: Adair John MD   Date:    08/22/2020   Time:    19:35      X-Ray Chest AP Portable   Final Result   Abnormal      Cardiomegaly, increased pulmonary vascularity, bilateral pulmonary edema, and bilateral pleural effusions.  The findings are consistent with the provided clinical history of congestive heart failure.      This report was flagged in Epic as abnormal.         Electronically signed by: Cammy Zaldivar MD   Date:    08/22/2020   Time:    09:00            Problems:   Hypercapnic and hypoxemic respiratory failure.  Advanced obstructive lung disease  Cigarette smoker, last cigarette this morning.  Chronic atrial fibrillation,  presently with controlled ventricular response  Heart failure with preserved ejection fraction  Diastolic left ventricular  dysfunction  Obstructive sleep apnea  Obesity  Chronic renal failure, status post nephrectomy for hypernephroma           Plan of Care: Discussed with Dr Quiles  Ready for discharge.        Edis Ramirez

## 2020-08-28 NOTE — PLAN OF CARE
Therapy recommends SNF, pt may agree or may want to go home with HH as she assists with care for her brother.    Pt has orders for [facility] for home if she declines SNF.    Referrals out to [facility] facilities and to [facility] for auth.    CM to follow for plans and arrangements.   08/28/20 7212   Discharge Reassessment   Assessment Type Discharge Planning Reassessment   Discharge Plan A Skilled Nursing Facility   Discharge Plan B Home Health   DME Needed Upon Discharge  other (see comments)  (pt pending trilolgy with hh vs SNF, referrals in progress)   Patient choice form signed by patient/caregiver Yes

## 2020-08-28 NOTE — PLAN OF CARE
Pt received on CPAP. No distress noted. Placed on 3L NC. Aerosol tx given. Will continue to monitor.

## 2020-08-28 NOTE — PT/OT/SLP PROGRESS
Physical Therapy Treatment    Patient Name:  Patsy Morris   MRN:  [id number]    Recommendations:     Discharge Recommendations:  nursing facility, skilled   Discharge Equipment Recommendations: bath bench   Barriers to discharge: Decreased caregiver support    Assessment:     Patsy Morirs is a 70 y.o. female admitted with a medical diagnosis of Acute respiratory failure with hypoxia and hypercapnia.  She presents with the following impairments/functional limitations:  weakness, impaired endurance, impaired self care skills, impaired functional mobilty, gait instability, impaired balance, decreased lower extremity function, impaired cardiopulmonary response to activity.    Pt tolerated treatment well with no adverse reactions. Pt is progressing toward meeting goals. Pt performed supine > sit with SBA, sit <> stand with CGA, and ambulation x 50 ft with CGA and rollator. Required 3 standing rest breaks due to poor endurance. PT will continue to follow and progress goals as tolerated. Recommend discharge to SNF.    Rehab Prognosis: Good; patient would benefit from acute skilled PT services to address these deficits and reach maximum level of function.    Recent Surgery: * No surgery found *      Plan:     During this hospitalization, patient to be seen 5 x/week to address the identified rehab impairments via gait training, therapeutic activities, therapeutic exercises and progress toward the following goals:    · Plan of Care Expires:  09/25/20    Subjective     Chief Complaint: Weakness  Patient/Family Comments/goals: No goal stated.  Pain/Comfort:  · Pain Rating 1: 0/10  · Pain Rating Post-Intervention 1: 0/10      Objective:     Communicated with RN (Can) prior to session.  Patient found supine with peripheral IV, oxygen, PureWick upon PT entry to room. Blue pads under patient found to be saturated with urine upon entering room.    General Precautions: Standard, fall, respiratory   Orthopedic  Precautions:N/A   Braces: N/A     Functional Mobility: Gait belt and non-slip socks donned prior to mobility. Surgical mask donned for ambulation in hallway per current infection control policy.  · Bed Mobility: Supine > sit with SBA with head of bed elevated and using bedrails.  · Transfers: Sit <> stand performed 2x during session with CGA. Bed <> BSC transfer with CGA.  · (+) void on BSC. Pt performed andrae-care with set-up assistance.  · Ambulation: 50 ft with rollator and contact guard assistance. Pt required 3 standing rest breaks due to poor endurance. Demonstrated wide MUNIR, decreased samaria, and excessive trunk flexion. Required frequent verbal cues for rollator proximity during ambulation.    AM-PAC 6 CLICK MOBILITY  Turning over in bed (including adjusting bedclothes, sheets and blankets)?: 3  Sitting down on and standing up from a chair with arms (e.g., wheelchair, bedside commode, etc.): 3  Moving from lying on back to sitting on the side of the bed?: 3  Moving to and from a bed to a chair (including a wheelchair)?: 3  Need to walk in hospital room?: 3  Climbing 3-5 steps with a railing?: 2  Basic Mobility Total Score: 17       Therapeutic Activities and Exercises:   Bed mobility, transfer, and gait training as described above.    PT educated patient re:   · PT plan of care/role of PT  · Use of rollator  · Fall and safety precautions  · Gait deviations  · Use of call light (don't get up without assistance)  Pt verbalized understanding     The patient is safe to transfer with RN assist.   Patient encouraged to sit up in chair throughout the day to prevent deconditioning.     Patient left sitting EOB with all lines intact, call button in reach and RN (Can) notified. Blue pads/linens under patient in positioned correctly, clean, and free of wrinkles at end of session. New purwick not place due to patient wanting to remain sitting on EOB. Place blue pad between patient's legs in case of incontinence. RN  notified that rebecca was not replaced.    GOALS:   Multidisciplinary Problems     Physical Therapy Goals        Problem: Physical Therapy Goal    Goal Priority Disciplines Outcome Goal Variances Interventions   Physical Therapy Goal     PT, PT/OT Ongoing, Progressing     Description: Goals to be met by: 9/25/2020    Patient will perform the following to increase strength, improve mobility, and return to prior level of function:    1. Supine <> sit with mod I.  2. Sit<>stand with mod I with least restrictive assistive device.  3. Gait x 200 feet with mod I with least restrictive assistive device.  4. Ascend/descend 4 step(s) with bilateral handrails and SBA.                      Time Tracking:     PT Received On: 08/28/20  PT Start Time: 1010     PT Stop Time: 1036  PT Total Time (min): 26 min     Billable Minutes: Gait Training 26    Treatment Type: Treatment  PT/PTA: PT     PTA Visit Number: 0     Georgiedonaldo Angelodor, PT  08/28/2020

## 2020-08-28 NOTE — PROGRESS NOTES
"Nephrology  Progress Note    Admit Date: 8/22/2020   LOS: 6 days     SUBJECTIVE:     Follow-up For:  CLIFFORD    Interval History:     Looks better today.  Sitting on bedside.  Renal fxn improved.  SOB better and wearing CPAP more.      Review of Systems:  Constitutional: No fever or chills  Respiratory:  shortness of breath  Cardiovascular: No chest pain or palpitations  Gastrointestinal: No nausea or vomiting  Neurological: No confusion or weakness    OBJECTIVE:     Vital Signs Range (Last 24H):  BP (!) 116/57 (BP Location: Left arm, Patient Position: Lying)   Pulse 99   Temp 97 °F (36.1 °C) (Axillary)   Resp 20   Ht 5' 3" (1.6 m)   Wt 100.7 kg (222 lb)   LMP  (LMP Unknown)   SpO2 (!) 93%   Breastfeeding No   BMI 39.33 kg/m²     Temp:  [97 °F (36.1 °C)-97.8 °F (36.6 °C)]   Pulse:  [48-99]   Resp:  [20-29]   BP: (109-122)/(57-65)   SpO2:  [91 %-96 %]     I & O (Last 24H):    Intake/Output Summary (Last 24 hours) at 8/28/2020 0941  Last data filed at 8/27/2020 1700  Gross per 24 hour   Intake 120 ml   Output --   Net 120 ml       Physical Exam:  General appearance:  Chronically ill-appearing  Eyes:  Conjunctivae/corneas clear. PERRL.  Lungs:  fibrotic/atelectatic.     Heart:  Tachy irregular  Abdomen: Soft, non-tender non-distended; bowel sounds normal; no masses,  no organomegaly, obese  Extremities: No cyanosis or clubbing.  Trace edema.    Skin: Skin color, texture, turgor normal. No rashes or lesions  Neurologic: Normal strength and tone. No focal numbness or weakness     Laboratory Data:  No results for input(s): WBC, RBC, HGB, HCT, PLT, MCV, MCH, MCHC in the last 24 hours.    BMP:   Recent Labs   Lab 08/27/20  0347 08/28/20  0807   * 98    141   K 5.0 5.0   CL 92* 95   CO2 38* 37*   BUN 63* 63*   CREATININE 2.1* 1.9*   CALCIUM 9.1 8.9   MG 2.7*  --      Lab Results   Component Value Date    CALCIUM 8.9 08/28/2020    PHOS 3.6 08/28/2020       Lab Results   Component Value Date    .4 (H) " 08/26/2020    CALCIUM 8.9 08/28/2020    PHOS 3.6 08/28/2020       Lab Results   Component Value Date    URICACID 9.3 (H) 08/25/2020       BNP  Recent Labs   Lab 08/26/20  0405   *     Recent Labs     08/26/20  1008   PH 7.281*   PCO2 97.1*   PO2 76*   HCO3 45.7*   POCSATURATED 92*   BE 19       Medications:  Medication list was reviewed and changes noted under Assessment/Plan.    Diagnostic Results:        ASSESSMENT/PLAN:       Slowly resolving Nonoliguric acute kidney injury on advanced CKD stage IIIB-IV  -secondary to ATN from hypotension, poor cardiac output, AFib with RVR, and diuresis (N17.9, N18.4, N17.0, Q60.0)  -Hx of solitary kidney from RCC, baseline creatinine of about 1.8 and essentially back there now after 250 saline.    -Followed by urology at [facility].  No record of nephrologist from what I can see.   -Admits to few NSAIDs weekly for knee pains.   -Her SOB is from hypercapnia resp failure.     -UA wnl and uric acid mildly elevated likely from volume depletion   -Needs appt with [facility] at [place] since all of her providers are located there.    -Follow trends.   - Renally dose meds, avoid nephrotoxins, and monitor I/O's closely.    Metabolic Alkalosis (E87.3):  -likely compensation from resp acidosis   -ABG noted  -needs bipap most of day/night to survive.        Acute on chronic resp failure with hypercapnia (J96.21):    -Extensive lung hx.  On 2-3L nasal cannula at home.  Needs CPAP.  Defer.  See above     Afib/RVR (I48.91):    -Defer to cards.  On eliquis.      DNR.   Poor overall prognosis with lung issues.        Renal fxn stable.  Ok to DC.  Needs appt with [facility]al team 1-2 weeks.

## 2020-08-28 NOTE — PLAN OF CARE
Referral for SNF sent to [facility] and also sent out to [facility] facilities   08/28/20 7789   Post-Acute Status   Post-Acute Authorization Placement   Post-Acute Placement Status Pending Payor Review

## 2020-08-28 NOTE — PLAN OF CARE
Pt lying in bed in supine position, HOB elevated, BiPap in place, skin warm and dry to touch, resp even and unlabored.  Pt free of trauma, falls, and injury. Pt VSS and afebrile throughout shift. Pt free of skin breakdown. Pt denies pain throughout shift.   Pt has been tolerating fluid restriction diet of 1500 mL.  Plan of care reviewed.  Questions answered.  Purposeful rounding performed.  Pt has call light in reach, bed alarm on, bed brakes on, side rails up x2, bed in low position, and nonskid socks on. Pt lying in bed in no distress. Will continue to monitor.

## 2020-08-29 NOTE — PROGRESS NOTES
"[facility] Hospital Medicine  Progress Note    Patient Name: Patsy Morris  MRN: [id number]  Patient Class: IP- Inpatient   Admission Date: 8/22/2020  Length of Stay: 6 days  Attending Physician: Loi Quiles MD  Primary Care Provider: Luisana Cartagena MD        Subjective:     Principal Problem:Acute respiratory failure with hypoxia and hypercapnia        HPI:  Per Dr. Ravi Soria:    "Per ED:  "This is a 70 y.o. female with hx of HTN, asthma, A-fib, and CHF who presents via EMS with complaint of shortness of breath for the past week, worsening last night. She has orthopnea and sleeps in a recliner every night. She is on 3 L home Oxygen. She denies fever or chest pain. She denies hx of DVT or PE. She is on Eliquis".    Patient somnolent on BiPAP and unable to give much of a verbal history at this time.  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  Patient was seen by PCP on 8/20/2020 for increasing SOB and Lasix was increased to 40mg bid from qday.    She arrived to ED on 100%NRB and in Afib with RVR.  She was placed on BiPAP and given Diltiazem x 1 with good response.  She was also given Lasix 60mg IV x 1 in ED."    Overview/Hospital Course:  Patient is 70-year-old woman with history of hypertension, chronic hypoxic respiratory failure on home oxygen secondary to chronic obstructive pulmonary disease, chronic diastolic heart failure, chronic atrial fibrillation, and morbid obesity admitted to the hospital for treatment of acute on chronic hypercapnic and hypoxic respiratory failure due to acute exacerbation of chronic obstructive pulmonary disease and also due to decompensated heart failure.  Chest radiograph showed evidence of pulmonary edema.  Patient was admitted to the intensive care unit treated with noninvasive ventilation along with intravenous diuretics.  She was treated with steroids, " bronchodilator breathing treatment, and antibiotics.  Patient clinically improved and wean off noninvasive ventilation was transferred to the floor.  Patient with significant upward trend of her serum creatinine.  Nephrology service consult recommending holding diuretic therapy for couple days until her serum creatinine stabilized.  Physical and Occupational therapy consulted to treat her debility.    Interval History:  No acute events overnight.    Review of Systems   Constitutional: Negative for chills and fever.   Respiratory: Positive for shortness of breath. Negative for wheezing.    Cardiovascular: Negative for chest pain.   Gastrointestinal: Negative for abdominal distention, abdominal pain, constipation, diarrhea, nausea and vomiting.   Genitourinary: Negative for dysuria and frequency.   Musculoskeletal: Negative for arthralgias and myalgias.   Neurological: Negative for light-headedness.   Psychiatric/Behavioral: Negative for agitation and confusion.     Objective:     Vital Signs (Most Recent):  Temp: 98.2 °F (36.8 °C) (08/28/20 1616)  Pulse: (!) 49 (08/28/20 2018)  Resp: (!) 27 (08/28/20 2018)  BP: 135/61 (08/28/20 1616)  SpO2: (!) 94 % (08/28/20 2018) Vital Signs (24h Range):  Temp:  [97 °F (36.1 °C)-99.4 °F (37.4 °C)] 98.2 °F (36.8 °C)  Pulse:  [] 49  Resp:  [18-27] 27  SpO2:  [91 %-98 %] 94 %  BP: (116-135)/(57-62) 135/61     Weight: 100.7 kg (222 lb)  Body mass index is 39.33 kg/m².    Intake/Output Summary (Last 24 hours) at 8/28/2020 2031  Last data filed at 8/28/2020 1900  Gross per 24 hour   Intake 600 ml   Output 1000 ml   Net -400 ml      Physical Exam  Constitutional:       General: She is not in acute distress.     Appearance: She is well-developed. She is obese. She is not diaphoretic.   HENT:      Head: Normocephalic and atraumatic.      Nose: Nose normal.      Mouth/Throat:      Pharynx: No oropharyngeal exudate.   Eyes:      General: No scleral icterus.        Right eye: No  discharge.         Left eye: No discharge.      Conjunctiva/sclera: Conjunctivae normal.      Pupils: Pupils are equal, round, and reactive to light.   Neck:      Musculoskeletal: Normal range of motion and neck supple.      Thyroid: No thyromegaly.      Vascular: No JVD.      Trachea: No tracheal deviation.   Cardiovascular:      Rate and Rhythm: Normal rate. Rhythm irregular.      Heart sounds: Normal heart sounds. No murmur. No friction rub. No gallop.    Pulmonary:      Effort: Pulmonary effort is normal. No respiratory distress.      Breath sounds: No stridor. Rales present. No wheezing.   Chest:      Chest wall: No tenderness.   Abdominal:      General: Bowel sounds are normal. There is no distension.      Palpations: Abdomen is soft. There is no mass.      Tenderness: There is no abdominal tenderness. There is no guarding or rebound.   Musculoskeletal: Normal range of motion.         General: Swelling present. No tenderness.      Right lower leg: Edema present.      Left lower leg: Edema present.   Lymphadenopathy:      Cervical: No cervical adenopathy.   Skin:     General: Skin is warm and dry.      Coloration: Skin is not pale.      Findings: No erythema or rash.   Neurological:      Mental Status: She is alert and oriented to person, place, and time.      Cranial Nerves: No cranial nerve deficit.      Motor: No abnormal muscle tone.      Coordination: Coordination normal.      Deep Tendon Reflexes: Reflexes are normal and symmetric. Reflexes normal.   Psychiatric:         Behavior: Behavior normal.         Thought Content: Thought content normal.         Judgment: Judgment normal.         Significant Labs: All pertinent labs within the past 24 hours have been reviewed.    Significant Imaging: I have reviewed all pertinent imaging results/findings within the past 24 hours.      Assessment/Plan:      * Acute respiratory failure with hypoxia and hypercapnia  Likely multifactorial due to acute bronchitis,  chronic obstructive pulmonary disease exacerbation, acute on chronic diastolic heart failure along with underlying obstructive sleep apnea and obesity hypoventilation.  Clinically improving with treatment of acute exacerbation of chronic obstructive pulmonary disease along with diuretic therapy.  Hold further diuretics at this time to prevent overly aggressive diuresis.  She continues to have good urine output without diuretic therapy.    Essential hypertension  Reasonably controlled with current regimen.  Will continue with current regimen and continue to monitor.    Chronic kidney disease (CKD) stage G4/A1, severely decreased glomerular filtration rate (GFR) between 15-29 mL/min/1.73 square meter and albuminuria creatinine ratio less than 30 mg/g  Serum creatinine improved with holding diuretics.  Nephrology service recommended small fluid bolus today.  Continue to closely monitor.    Dyslipidemia  Continue statin therapy.    Morbid obesity  Patient counseled on the importance of weight loss by changing diet and increased physical activity.    Debility  Continue with physical and occupational therapy.    Obstructive sleep apnea  Continua with BiPAP at night.    Paroxysmal atrial fibrillation  Stable.  Management per cardiology.        VTE Risk Mitigation (From admission, onward)         Ordered     apixaban tablet 2.5 mg  2 times daily      08/22/20 1040     IP VTE HIGH RISK PATIENT  Once      08/22/20 1040     Place sequential compression device  Until discontinued      08/22/20 1040                Loi Quiles MD  Department of Hospital Medicine   [facility]

## 2020-08-29 NOTE — SUBJECTIVE & OBJECTIVE
Interval History:  No acute events overnight.    Review of Systems   Constitutional: Negative for chills and fever.   Respiratory: Positive for shortness of breath. Negative for wheezing.    Cardiovascular: Negative for chest pain.   Gastrointestinal: Negative for abdominal distention, abdominal pain, constipation, diarrhea, nausea and vomiting.   Genitourinary: Negative for dysuria and frequency.   Musculoskeletal: Negative for arthralgias and myalgias.   Neurological: Negative for light-headedness.   Psychiatric/Behavioral: Negative for agitation and confusion.     Objective:     Vital Signs (Most Recent):  Temp: 97.8 °F (36.6 °C) (08/29/20 1451)  Pulse: (OFF ) (08/29/20 1800)  Resp: 18 (08/29/20 1451)  BP: (!) 120/59 (08/29/20 1451)  SpO2: (!) 93 % (08/29/20 1451) Vital Signs (24h Range):  Temp:  [97.5 °F (36.4 °C)-98.9 °F (37.2 °C)] 97.8 °F (36.6 °C)  Pulse:  [] 96  Resp:  [16-32] 18  SpO2:  [91 %-98 %] 93 %  BP: (110-122)/(56-63) 120/59     Weight: 100.7 kg (222 lb)  Body mass index is 39.33 kg/m².    Intake/Output Summary (Last 24 hours) at 8/29/2020 1846  Last data filed at 8/29/2020 1800  Gross per 24 hour   Intake 1560 ml   Output 1200 ml   Net 360 ml      Physical Exam  Constitutional:       General: She is not in acute distress.     Appearance: She is well-developed. She is obese. She is not diaphoretic.   HENT:      Head: Normocephalic and atraumatic.      Nose: Nose normal.      Mouth/Throat:      Pharynx: No oropharyngeal exudate.   Eyes:      General: No scleral icterus.        Right eye: No discharge.         Left eye: No discharge.      Conjunctiva/sclera: Conjunctivae normal.      Pupils: Pupils are equal, round, and reactive to light.   Neck:      Musculoskeletal: Normal range of motion and neck supple.      Thyroid: No thyromegaly.      Vascular: No JVD.      Trachea: No tracheal deviation.   Cardiovascular:      Rate and Rhythm: Normal rate. Rhythm irregular.      Heart sounds: Normal  heart sounds. No murmur. No friction rub. No gallop.    Pulmonary:      Effort: Pulmonary effort is normal. No respiratory distress.      Breath sounds: No stridor. Rales present. No wheezing.   Chest:      Chest wall: No tenderness.   Abdominal:      General: Bowel sounds are normal. There is no distension.      Palpations: Abdomen is soft. There is no mass.      Tenderness: There is no abdominal tenderness. There is no guarding or rebound.   Musculoskeletal: Normal range of motion.         General: Swelling present. No tenderness.      Right lower leg: Edema present.      Left lower leg: Edema present.   Lymphadenopathy:      Cervical: No cervical adenopathy.   Skin:     General: Skin is warm and dry.      Coloration: Skin is not pale.      Findings: No erythema or rash.   Neurological:      Mental Status: She is alert and oriented to person, place, and time.      Cranial Nerves: No cranial nerve deficit.      Motor: No abnormal muscle tone.      Coordination: Coordination normal.      Deep Tendon Reflexes: Reflexes are normal and symmetric. Reflexes normal.   Psychiatric:         Behavior: Behavior normal.         Thought Content: Thought content normal.         Judgment: Judgment normal.         Significant Labs: All pertinent labs within the past 24 hours have been reviewed.    Significant Imaging: I have reviewed all pertinent imaging results/findings within the past 24 hours.

## 2020-08-29 NOTE — PT/OT/SLP PROGRESS
Physical Therapy Treatment    Patient Name:  Patsy Morris   MRN:  [id number]    Recommendations:     Discharge Recommendations:  nursing facility, skilled   Discharge Equipment Recommendations: bath bench   Barriers to discharge: Decreased caregiver support    Assessment:     Patsy Morris is a 70 y.o. female admitted with a medical diagnosis of Acute respiratory failure with hypoxia and hypercapnia.  She presents with the following impairments/functional limitations:  weakness, impaired endurance, impaired self care skills, impaired functional mobilty, gait instability, impaired balance, decreased lower extremity function, impaired cardiopulmonary response to activity ,  Pt presented sitting EOB upon arrival of PT. Pt agreeable to PT. Pt sit <> stand w / Rollator and CGA. Pt amb'd 120' w/ Rollator and CGA. 4LO2 NC.    Rehab Prognosis: Good; patient would benefit from acute skilled PT services to address these deficits and reach maximum level of function.    Recent Surgery: * No surgery found *      Plan:     During this hospitalization, patient to be seen 5 x/week to address the identified rehab impairments via gait training, therapeutic activities, therapeutic exercises and progress toward the following goals:    · Plan of Care Expires:  09/25/20    Subjective     Chief Complaint: none stated  Patient/Family Comments/goals: go home  Pain/Comfort:  · Pain Rating 1: 0/10      Objective:     Communicated with ns(Can) prior to session.  Patient found sitting EOB with oxygen, PureWick, peripheral IV upon PT entry to room.     General Precautions: Standard, fall, respiratory   Orthopedic Precautions:N/A   Braces: N/A     Functional Mobility:  · Transfers:     · Sit to Stand:  contact guard assistance with 4 wheeled walker  · Gait: 120' w/ RW and CGA. 4LO2 NC      AM-PAC 6 CLICK MOBILITY  Turning over in bed (including adjusting bedclothes, sheets and blankets)?: 3  Sitting down on and standing up from a  chair with arms (e.g., wheelchair, bedside commode, etc.): 3  Moving from lying on back to sitting on the side of the bed?: 3  Moving to and from a bed to a chair (including a wheelchair)?: 3  Need to walk in hospital room?: 3  Climbing 3-5 steps with a railing?: 2  Basic Mobility Total Score: 17       Patient left sitting EOB with all lines intact, call button in reach and ns notified..    GOALS:   Multidisciplinary Problems     Physical Therapy Goals        Problem: Physical Therapy Goal    Goal Priority Disciplines Outcome Goal Variances Interventions   Physical Therapy Goal     PT, PT/OT Ongoing, Progressing     Description: Goals to be met by: 9/25/2020    Patient will perform the following to increase strength, improve mobility, and return to prior level of function:    1. Supine <> sit with mod I.  2. Sit<>stand with mod I with least restrictive assistive device.  3. Gait x 200 feet with mod I with least restrictive assistive device.  4. Ascend/descend 4 step(s) with bilateral handrails and SBA.                      Time Tracking:     PT Received On: 08/29/20  PT Start Time: 1249     PT Stop Time: 1318  PT Total Time (min): 29 min     Billable Minutes: Gait Training 29    Treatment Type: Treatment  PT/PTA: PTA     PTA Visit Number: 1     Aj Blackburn PTA  08/29/2020

## 2020-08-29 NOTE — SUBJECTIVE & OBJECTIVE
Interval History:  No acute events overnight.    Review of Systems   Constitutional: Negative for chills and fever.   Respiratory: Positive for shortness of breath. Negative for wheezing.    Cardiovascular: Negative for chest pain.   Gastrointestinal: Negative for abdominal distention, abdominal pain, constipation, diarrhea, nausea and vomiting.   Genitourinary: Negative for dysuria and frequency.   Musculoskeletal: Negative for arthralgias and myalgias.   Neurological: Negative for light-headedness.   Psychiatric/Behavioral: Negative for agitation and confusion.     Objective:     Vital Signs (Most Recent):  Temp: 98.2 °F (36.8 °C) (08/28/20 1616)  Pulse: (!) 49 (08/28/20 2018)  Resp: (!) 27 (08/28/20 2018)  BP: 135/61 (08/28/20 1616)  SpO2: (!) 94 % (08/28/20 2018) Vital Signs (24h Range):  Temp:  [97 °F (36.1 °C)-99.4 °F (37.4 °C)] 98.2 °F (36.8 °C)  Pulse:  [] 49  Resp:  [18-27] 27  SpO2:  [91 %-98 %] 94 %  BP: (116-135)/(57-62) 135/61     Weight: 100.7 kg (222 lb)  Body mass index is 39.33 kg/m².    Intake/Output Summary (Last 24 hours) at 8/28/2020 2031  Last data filed at 8/28/2020 1900  Gross per 24 hour   Intake 600 ml   Output 1000 ml   Net -400 ml      Physical Exam  Constitutional:       General: She is not in acute distress.     Appearance: She is well-developed. She is obese. She is not diaphoretic.   HENT:      Head: Normocephalic and atraumatic.      Nose: Nose normal.      Mouth/Throat:      Pharynx: No oropharyngeal exudate.   Eyes:      General: No scleral icterus.        Right eye: No discharge.         Left eye: No discharge.      Conjunctiva/sclera: Conjunctivae normal.      Pupils: Pupils are equal, round, and reactive to light.   Neck:      Musculoskeletal: Normal range of motion and neck supple.      Thyroid: No thyromegaly.      Vascular: No JVD.      Trachea: No tracheal deviation.   Cardiovascular:      Rate and Rhythm: Normal rate. Rhythm irregular.      Heart sounds: Normal heart  sounds. No murmur. No friction rub. No gallop.    Pulmonary:      Effort: Pulmonary effort is normal. No respiratory distress.      Breath sounds: No stridor. Rales present. No wheezing.   Chest:      Chest wall: No tenderness.   Abdominal:      General: Bowel sounds are normal. There is no distension.      Palpations: Abdomen is soft. There is no mass.      Tenderness: There is no abdominal tenderness. There is no guarding or rebound.   Musculoskeletal: Normal range of motion.         General: Swelling present. No tenderness.      Right lower leg: Edema present.      Left lower leg: Edema present.   Lymphadenopathy:      Cervical: No cervical adenopathy.   Skin:     General: Skin is warm and dry.      Coloration: Skin is not pale.      Findings: No erythema or rash.   Neurological:      Mental Status: She is alert and oriented to person, place, and time.      Cranial Nerves: No cranial nerve deficit.      Motor: No abnormal muscle tone.      Coordination: Coordination normal.      Deep Tendon Reflexes: Reflexes are normal and symmetric. Reflexes normal.   Psychiatric:         Behavior: Behavior normal.         Thought Content: Thought content normal.         Judgment: Judgment normal.         Significant Labs: All pertinent labs within the past 24 hours have been reviewed.    Significant Imaging: I have reviewed all pertinent imaging results/findings within the past 24 hours.

## 2020-08-29 NOTE — NURSING
Pt remained free from fall and injury throughout the night. Pt VSS; on BiPap at 35% O2. Pt is oxygen dependent and desats when takes BiPap off, even for a short period of time. POC reviewed with pt. Questions answered accordingly. Pt c/o itching in abdominal folds; anti-fungal powder administered, per order. Pt on purewick; perineal care provided and site assessed. No evidence of skin breakdown in perineal area noted. Bed locked and in lowest position. Call light within reach. Purposeful rounding maintained. Will continue to monitor.

## 2020-08-29 NOTE — PROGRESS NOTES
"Nephrology  Progress Note    Admit Date: 8/22/2020   LOS: 7 days     SUBJECTIVE:     Follow-up For:  CLIFFORD    Interval History:     No issues overnight    Review of Systems:  Constitutional: No fever or chills  Respiratory:  shortness of breath  Cardiovascular: No chest pain or palpitations  Gastrointestinal: No nausea or vomiting  Neurological: No confusion or weakness    OBJECTIVE:     Vital Signs Range (Last 24H):  /63 (BP Location: Left arm, Patient Position: Lying)   Pulse 93   Temp 98.1 °F (36.7 °C) (Oral)   Resp (!) 24   Ht 5' 3" (1.6 m)   Wt 100.7 kg (222 lb)   LMP  (LMP Unknown)   SpO2 (!) 94%   Breastfeeding No   BMI 39.33 kg/m²     Temp:  [97.5 °F (36.4 °C)-99.4 °F (37.4 °C)]   Pulse:  []   Resp:  [18-32]   BP: (110-135)/(56-63)   SpO2:  [91 %-98 %]     I & O (Last 24H):    Intake/Output Summary (Last 24 hours) at 8/29/2020 1218  Last data filed at 8/29/2020 0535  Gross per 24 hour   Intake 840 ml   Output 900 ml   Net -60 ml       Physical Exam:  General appearance:  Chronically ill-appearing  Eyes:  Conjunctivae/corneas clear. PERRL.  Lungs:  fibrotic/atelectatic.     Heart:  Tachy irregular  Abdomen: Soft, non-tender non-distended; bowel sounds normal; no masses,  no organomegaly, obese  Extremities: No cyanosis or clubbing.  Trace edema.    Skin: Skin color, texture, turgor normal. No rashes or lesions  Neurologic: Normal strength and tone. No focal numbness or weakness     Laboratory Data:  Reviewed    Medications:  Medication list was reviewed and changes noted under Assessment/Plan.    Diagnostic Results:        ASSESSMENT/PLAN:       Slowly resolving Nonoliguric acute kidney injury on advanced CKD stage IIIB-IV  -secondary to ATN from hypotension, poor cardiac output, AFib with RVR, and diuresis (N17.9, N18.4, N17.0, Q60.0)  -Hx of solitary kidney from RCC, baseline creatinine of about 1.8 and essentially back there now after 250 saline.    -[place] [facility]  No " record of nephrologist from what I can see.   -Admits to few NSAIDs weekly for knee pains.   -Her SOB is from hypercapnia resp failure.     -UA wnl and uric acid mildly elevated likely from volume depletion   -Needs appt with [facility] [place] since all of her providers are located there.    -OK for discharge.Hold diuretics until seen by outpatient PCP  - Renally dose meds, avoid nephrotoxins, and monitor I/O's closely.    Metabolic Alkalosis (E87.3):  -likely compensation from resp acidosis   -ABG noted  -needs bipap most of day/night to survive.        Acute on chronic resp failure with hypercapnia (J96.21):    -Extensive lung hx.  On 2-3L nasal cannula at home.  Needs CPAP.  Defer.  See above     Afib/RVR (I48.91):    -Defer to cards.  On eliquis.      DNR.   Poor overall prognosis with lung issues.

## 2020-08-29 NOTE — PLAN OF CARE
Patient on AVAPS, settings as documented.  Sats 93%. Txs given. Pt. in no distress, will continue to monitor.

## 2020-08-29 NOTE — PLAN OF CARE
Problem: Physical Therapy Goal  Goal: Physical Therapy Goal  Description: Goals to be met by: 9/25/2020    Patient will perform the following to increase strength, improve mobility, and return to prior level of function:    1. Supine <> sit with mod I.  2. Sit<>stand with mod I with least restrictive assistive device.  3. Gait x 200 feet with mod I with least restrictive assistive device.  4. Ascend/descend 4 step(s) with bilateral handrails and SBA.     Outcome: Ongoing, Progressing   Pt presented sitting EOB upon arrival of PT. Pt agreeable to PT. Pt sit <> stand w / Rollator and CGA. Pt amb'd 120' w/ Rollator and CGA. 4LO2 NC

## 2020-08-29 NOTE — PROGRESS NOTES
"[facility] Hospital Medicine  Progress Note    Patient Name: Patsy Morris  MRN: [id number]  Patient Class: IP- Inpatient   Admission Date: 8/22/2020  Length of Stay: 7 days  Attending Physician: Loi Qulies MD  Primary Care Provider: Luisana Cartagena MD        Subjective:     Principal Problem:Acute respiratory failure with hypoxia and hypercapnia        HPI:  Per Dr. Ravi Soria:    "Per ED:  "This is a 70 y.o. female with hx of HTN, asthma, A-fib, and CHF who presents via EMS with complaint of shortness of breath for the past week, worsening last night. She has orthopnea and sleeps in a recliner every night. She is on 3 L home Oxygen. She denies fever or chest pain. She denies hx of DVT or PE. She is on Eliquis".    Patient somnolent on BiPAP and unable to give much of a verbal history at this time.  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  Patient was seen by PCP on 8/20/2020 for increasing SOB and Lasix was increased to 40mg bid from qday.    She arrived to ED on 100%NRB and in Afib with RVR.  She was placed on BiPAP and given Diltiazem x 1 with good response.  She was also given Lasix 60mg IV x 1 in ED."    Overview/Hospital Course:  Patient is 70-year-old woman with history of hypertension, chronic hypoxic respiratory failure on home oxygen secondary to chronic obstructive pulmonary disease, chronic diastolic heart failure, chronic atrial fibrillation, and morbid obesity admitted to the hospital for treatment of acute on chronic hypercapnic and hypoxic respiratory failure due to acute exacerbation of chronic obstructive pulmonary disease and also due to decompensated heart failure.  Chest radiograph showed evidence of pulmonary edema.  Patient was admitted to the intensive care unit treated with noninvasive ventilation along with intravenous diuretics.  She was treated with steroids, " bronchodilator breathing treatment, and antibiotics.  Patient clinically improved and wean off noninvasive ventilation was transferred to the floor.  Patient with significant upward trend of her serum creatinine.  Nephrology service consult recommending holding diuretic therapy for couple days until her serum creatinine stabilized.  Physical and Occupational therapy consulted to treat her debility.    Interval History:  No acute events overnight.    Review of Systems   Constitutional: Negative for chills and fever.   Respiratory: Positive for shortness of breath. Negative for wheezing.    Cardiovascular: Negative for chest pain.   Gastrointestinal: Negative for abdominal distention, abdominal pain, constipation, diarrhea, nausea and vomiting.   Genitourinary: Negative for dysuria and frequency.   Musculoskeletal: Negative for arthralgias and myalgias.   Neurological: Negative for light-headedness.   Psychiatric/Behavioral: Negative for agitation and confusion.     Objective:     Vital Signs (Most Recent):  Temp: 97.8 °F (36.6 °C) (08/29/20 1451)  Pulse: (OFF ) (08/29/20 1800)  Resp: 18 (08/29/20 1451)  BP: (!) 120/59 (08/29/20 1451)  SpO2: (!) 93 % (08/29/20 1451) Vital Signs (24h Range):  Temp:  [97.5 °F (36.4 °C)-98.9 °F (37.2 °C)] 97.8 °F (36.6 °C)  Pulse:  [] 96  Resp:  [16-32] 18  SpO2:  [91 %-98 %] 93 %  BP: (110-122)/(56-63) 120/59     Weight: 100.7 kg (222 lb)  Body mass index is 39.33 kg/m².    Intake/Output Summary (Last 24 hours) at 8/29/2020 1846  Last data filed at 8/29/2020 1800  Gross per 24 hour   Intake 1560 ml   Output 1200 ml   Net 360 ml      Physical Exam  Constitutional:       General: She is not in acute distress.     Appearance: She is well-developed. She is obese. She is not diaphoretic.   HENT:      Head: Normocephalic and atraumatic.      Nose: Nose normal.      Mouth/Throat:      Pharynx: No oropharyngeal exudate.   Eyes:      General: No scleral icterus.        Right eye: No  discharge.         Left eye: No discharge.      Conjunctiva/sclera: Conjunctivae normal.      Pupils: Pupils are equal, round, and reactive to light.   Neck:      Musculoskeletal: Normal range of motion and neck supple.      Thyroid: No thyromegaly.      Vascular: No JVD.      Trachea: No tracheal deviation.   Cardiovascular:      Rate and Rhythm: Normal rate. Rhythm irregular.      Heart sounds: Normal heart sounds. No murmur. No friction rub. No gallop.    Pulmonary:      Effort: Pulmonary effort is normal. No respiratory distress.      Breath sounds: No stridor. Rales present. No wheezing.   Chest:      Chest wall: No tenderness.   Abdominal:      General: Bowel sounds are normal. There is no distension.      Palpations: Abdomen is soft. There is no mass.      Tenderness: There is no abdominal tenderness. There is no guarding or rebound.   Musculoskeletal: Normal range of motion.         General: Swelling present. No tenderness.      Right lower leg: Edema present.      Left lower leg: Edema present.   Lymphadenopathy:      Cervical: No cervical adenopathy.   Skin:     General: Skin is warm and dry.      Coloration: Skin is not pale.      Findings: No erythema or rash.   Neurological:      Mental Status: She is alert and oriented to person, place, and time.      Cranial Nerves: No cranial nerve deficit.      Motor: No abnormal muscle tone.      Coordination: Coordination normal.      Deep Tendon Reflexes: Reflexes are normal and symmetric. Reflexes normal.   Psychiatric:         Behavior: Behavior normal.         Thought Content: Thought content normal.         Judgment: Judgment normal.         Significant Labs: All pertinent labs within the past 24 hours have been reviewed.    Significant Imaging: I have reviewed all pertinent imaging results/findings within the past 24 hours.      Assessment/Plan:      * Acute respiratory failure with hypoxia and hypercapnia  Likely multifactorial due to acute bronchitis,  chronic obstructive pulmonary disease exacerbation, acute on chronic diastolic heart failure along with underlying obstructive sleep apnea and obesity hypoventilation.  Clinically improving with treatment of acute exacerbation of chronic obstructive pulmonary disease along with diuretic therapy.  Hold further diuretics at this time to prevent overly aggressive diuresis.  She continues to have good urine output without diuretic therapy.    Essential hypertension  Reasonably controlled with current regimen.  Will continue with current regimen and continue to monitor.    Chronic kidney disease (CKD) stage G4/A1, severely decreased glomerular filtration rate (GFR) between 15-29 mL/min/1.73 square meter and albuminuria creatinine ratio less than 30 mg/g  Serum creatinine improved with holding diuretics.  Nephrology service recommended small fluid bolus today.  Continue to closely monitor.    Dyslipidemia  Continue statin therapy.    Morbid obesity  Patient counseled on the importance of weight loss by changing diet and increased physical activity.    Debility  Continue with physical and occupational therapy.    Obstructive sleep apnea  Continua with BiPAP at night.    Paroxysmal atrial fibrillation  Stable.  Management per cardiology.        VTE Risk Mitigation (From admission, onward)         Ordered     apixaban tablet 2.5 mg  2 times daily      08/22/20 1040     IP VTE HIGH RISK PATIENT  Once      08/22/20 1040     Place sequential compression device  Until discontinued      08/22/20 1040                Loi Quiles MD  Department of Hospital Medicine   [facility]

## 2020-08-30 NOTE — PROGRESS NOTES
"Nephrology  Progress Note    Admit Date: 8/22/2020   LOS: 8 days     SUBJECTIVE:     Follow-up For:  CLIFFORD    Interval History:     No issues overnight    Review of Systems:  Constitutional: No fever or chills  Respiratory:  shortness of breath  Cardiovascular: No chest pain or palpitations  Gastrointestinal: No nausea or vomiting  Neurological: No confusion or weakness    OBJECTIVE:     Vital Signs Range (Last 24H):  BP (!) 112/57 (BP Location: Left arm, Patient Position: Sitting)   Pulse 90   Temp 98.3 °F (36.8 °C) (Oral)   Resp 18   Ht 5' 3" (1.6 m)   Wt 100.7 kg (222 lb)   LMP  (LMP Unknown)   SpO2 (!) 90%   Breastfeeding No   BMI 39.33 kg/m²     Temp:  [97.5 °F (36.4 °C)-98.9 °F (37.2 °C)]   Pulse:  []   Resp:  [16-30]   BP: (102-122)/(57-73)   SpO2:  [90 %-98 %]     I & O (Last 24H):    Intake/Output Summary (Last 24 hours) at 8/30/2020 1108  Last data filed at 8/30/2020 0400  Gross per 24 hour   Intake 720 ml   Output 700 ml   Net 20 ml       Physical Exam:  General appearance:  Chronically ill-appearing  Eyes:  Conjunctivae/corneas clear. PERRL.  Lungs:  fibrotic/atelectatic.     Heart:  Tachy irregular  Abdomen: Soft, non-tender non-distended; bowel sounds normal; no masses,  no organomegaly, obese  Extremities: No cyanosis or clubbing.  Trace edema.    Skin: Skin color, texture, turgor normal. No rashes or lesions  Neurologic: Normal strength and tone. No focal numbness or weakness     Laboratory Data:  Reviewed    Medications:  Medication list was reviewed and changes noted under Assessment/Plan.    Diagnostic Results:        ASSESSMENT/PLAN:       Slowly resolving Nonoliguric acute kidney injury on advanced CKD stage IIIB-IV  -secondary to ATN from hypotension, poor cardiac output, AFib with RVR, and diuresis (N17.9, N18.4, N17.0, Q60.0)  -Hx of solitary kidney from RCC, baseline creatinine of about 1.8 and essentially back there now after 250 saline.    -Followed by urology at [facility].  No " record of nephrologist from what I can see.   -Admits to few NSAIDs weekly for knee pains.   -Her SOB is from hypercapnia resp failure.     -UA wnl and uric acid mildly elevated likely from volume depletion   -Needs appt with [facility] [place] since all of her providers are located there.    -OK for discharge.Hold diuretics until seen by outpatient PCP  - Renally dose meds, avoid nephrotoxins, and monitor I/O's closely.    Metabolic Alkalosis (E87.3):  -likely compensation from resp acidosis   -ABG noted  -needs bipap most of day/night to survive.      Acute on chronic resp failure with hypercapnia (J96.21):    -Extensive lung hx.  On 2-3L nasal cannula at home.  Needs CPAP.  Defer.  See above     Afib/RVR (I48.91):    -Defer to cards.  On eliquis.      DNR.   Poor overall prognosis with lung issues.

## 2020-08-30 NOTE — PROGRESS NOTES
Pt received on 3LNC;SPO2 adequate. No distress noted nor does pt reports SOB. No changes were made. Will continue to monitor.

## 2020-08-30 NOTE — NURSING
Pt remained free from fall and injury throughout the night. Pt has remained tachypneic at around 24 resp/min, and spO2 has remained around 93% on BiPap at 35% O2. Pt has no complaints at this time. POC reviewed with pt with verbal acknowledgement of understanding. Pt continues to use external catheter with no difficulties. Perineal care provided. Bed locked and in lowest position. Call light within reach. Purposeful rounding maintained. Will continue to monitor.

## 2020-08-30 NOTE — PROGRESS NOTES
Cardiology  Progress Notes            Subjective:  Denies breathlessness, seems to be doing better.    Mrs Morris is a 70-year-old lady who presents to the emergency room here with complaints of progressively increasing shortness of breath for the last 3 days.  She was somnolent in the emergency room, is now beginning to wake up.  She says she has had a history of high blood pressure, and heart failure, and she smokes a pack of cigarettes a day, having smoker last cigarette earlier this morning.  She has a longstanding history of hypertension, chronic obstructive lung disease, chronic atrial fibrillation at least for the last 6 years, has had a renal cell carcinoma and has had a nephrectomy, and chronic renal failure.  She has had heart failure with a preserved left ventricular ejection fraction, diastolic left ventricular dysfunction.  She is followed by Dr. Lutz, who she last saw on 06/26/2020.     Vital Signs:  Vitals:    08/30/20 0838 08/30/20 1200 08/30/20 1221 08/30/20 1228   BP: (!) 112/57  (!) 108/56    BP Location: Left arm  Left arm    Patient Position: Sitting  Sitting    Pulse: 90 81 69 70   Resp: 18  18 18   Temp: 98.3 °F (36.8 °C)  98.6 °F (37 °C)    TempSrc: Oral  Oral    SpO2: (!) 90%  (!) 93% (!) 93%   Weight:       Height:           Physical Exam:  Chest clear  Heart irregularly irregular.  No murmur or gallop.  Extremities are warm.  Laboratory:  CBC:   Recent Labs   Lab 08/27/20 0347   WBC 9.52   RBC 3.90*   HGB 10.7*   HCT 34.6*      MCV 89   MCH 27.4   MCHC 30.9*     BMP:   Recent Labs   Lab 08/27/20  0347  08/30/20  0422   *   < > 117*      < > 140   K 5.0   < > 4.8   CL 92*   < > 95   CO2 38*   < > 36*   BUN 63*   < > 62*   CREATININE 2.1*   < > 1.8*   CALCIUM 9.1   < > 8.8   MG 2.7*  --   --     < > = values in this interval not displayed.     CMP:   Recent Labs   Lab 08/30/20  0422   *   CALCIUM 8.8   ALBUMIN 3.2*      K 4.8   CO2 36*   CL 95   BUN 62*    CREATININE 1.8*     LFTs:   Recent Labs   Lab 08/30/20  0422   ALBUMIN 3.2*     Coagulation: No results for input(s): PT, INR, APTT in the last 168 hours.  Cardiac markers: No results for input(s): CKMB, CPKMB, TROPONINT, TROPONINI, MYOGLOBIN in the last 168 hours.    Imaging:  X-Ray Chest AP Portable   Final Result      1. Today's findings are compatible with volume overload with alveolar pulmonary edema and bilateral layering pleural effusions.   2. The findings in the right upper lobe are becoming more consolidative and concerning for pneumonia.  There is a small lucency within the center of this developing consolidation.  In this may simply represent intervening lung parenchyma however, follow-up is recommended to ensure this is not a cavitary process.         Electronically signed by: Sully Leslie MD   Date:    08/23/2020   Time:    09:49      US Lower Extremity Veins Bilateral   Final Result      No evidence of deep venous thrombosis in either lower extremity.         Electronically signed by: Adair John MD   Date:    08/22/2020   Time:    19:35      X-Ray Chest AP Portable   Final Result   Abnormal      Cardiomegaly, increased pulmonary vascularity, bilateral pulmonary edema, and bilateral pleural effusions.  The findings are consistent with the provided clinical history of congestive heart failure.      This report was flagged in Epic as abnormal.         Electronically signed by: Cammy Zaldivra MD   Date:    08/22/2020   Time:    09:00            Problems:   Hypercapnic and hypoxemic respiratory failure.  Advanced obstructive lung disease  Cigarette smoker, last cigarette this morning.  Chronic atrial fibrillation,  presently with controlled ventricular response  Heart failure with preserved ejection fraction  Diastolic left ventricular dysfunction  Obstructive sleep apnea  Obesity  Chronic renal failure, status post nephrectomy for hypernephroma              Plan of Care: See  Darcie Ramirez

## 2020-08-30 NOTE — SUBJECTIVE & OBJECTIVE
Interval History:  No acute events overnight.    Review of Systems   Constitutional: Negative for chills and fever.   Respiratory: Positive for shortness of breath. Negative for wheezing.    Cardiovascular: Negative for chest pain.   Gastrointestinal: Negative for abdominal distention, abdominal pain, constipation, diarrhea, nausea and vomiting.   Genitourinary: Negative for dysuria and frequency.   Musculoskeletal: Negative for arthralgias and myalgias.   Neurological: Negative for light-headedness.   Psychiatric/Behavioral: Negative for agitation and confusion.     Objective:     Vital Signs (Most Recent):  Temp: 98.6 °F (37 °C) (08/30/20 1221)  Pulse: 78 (08/30/20 1400)  Resp: 18 (08/30/20 1228)  BP: (!) 108/56 (08/30/20 1221)  SpO2: (!) 93 % (08/30/20 1228) Vital Signs (24h Range):  Temp:  [97.5 °F (36.4 °C)-98.6 °F (37 °C)] 98.6 °F (37 °C)  Pulse:  [] 78  Resp:  [18-30] 18  SpO2:  [90 %-95 %] 93 %  BP: (102-113)/(56-73) 108/56     Weight: 100.7 kg (222 lb)  Body mass index is 39.33 kg/m².    Intake/Output Summary (Last 24 hours) at 8/30/2020 1504  Last data filed at 8/30/2020 1400  Gross per 24 hour   Intake 720 ml   Output 900 ml   Net -180 ml      Physical Exam  Constitutional:       General: She is not in acute distress.     Appearance: She is well-developed. She is obese. She is not diaphoretic.   HENT:      Head: Normocephalic and atraumatic.      Nose: Nose normal.      Mouth/Throat:      Pharynx: No oropharyngeal exudate.   Eyes:      General: No scleral icterus.        Right eye: No discharge.         Left eye: No discharge.      Conjunctiva/sclera: Conjunctivae normal.      Pupils: Pupils are equal, round, and reactive to light.   Neck:      Musculoskeletal: Normal range of motion and neck supple.      Thyroid: No thyromegaly.      Vascular: No JVD.      Trachea: No tracheal deviation.   Cardiovascular:      Rate and Rhythm: Normal rate. Rhythm irregular.      Heart sounds: Normal heart sounds.  No murmur. No friction rub. No gallop.    Pulmonary:      Effort: Pulmonary effort is normal. No respiratory distress.      Breath sounds: No stridor. Rales present. No wheezing.   Chest:      Chest wall: No tenderness.   Abdominal:      General: Bowel sounds are normal. There is no distension.      Palpations: Abdomen is soft. There is no mass.      Tenderness: There is no abdominal tenderness. There is no guarding or rebound.   Musculoskeletal: Normal range of motion.         General: Swelling present. No tenderness.      Right lower leg: Edema present.      Left lower leg: Edema present.   Lymphadenopathy:      Cervical: No cervical adenopathy.   Skin:     General: Skin is warm and dry.      Coloration: Skin is not pale.      Findings: No erythema or rash.   Neurological:      Mental Status: She is alert and oriented to person, place, and time.      Cranial Nerves: No cranial nerve deficit.      Motor: No abnormal muscle tone.      Coordination: Coordination normal.      Deep Tendon Reflexes: Reflexes are normal and symmetric. Reflexes normal.   Psychiatric:         Behavior: Behavior normal.         Thought Content: Thought content normal.         Judgment: Judgment normal.         Significant Labs: All pertinent labs within the past 24 hours have been reviewed.    Significant Imaging: I have reviewed all pertinent imaging results/findings within the past 24 hours.

## 2020-08-30 NOTE — NURSING
AAOx4 denies pain the present time; spO2 remain at 93% on 3L O2 nasal cannula.Patient up in the chair ambulated with walker from bed to recliner with a couple of steps, one person assist. Up to bedside commode with one person assist. External catheter in place and perineal care performed.  Plan of care reviewed with patient; patient verbalized understanding. Call light within reach bed in low position and locked, side rails up x2, and bed alarm set. Patient instructed  to call for assist when needing to get OOB.

## 2020-08-30 NOTE — PROGRESS NOTES
"[facility] Hospital Medicine  Progress Note    Patient Name: Patsy Morris  MRN: [id number]  Patient Class: IP- Inpatient   Admission Date: 8/22/2020  Length of Stay: 8 days  Attending Physician: Loi Quiles MD  Primary Care Provider: Luisana Cartagena MD        Subjective:     Principal Problem:Acute respiratory failure with hypoxia and hypercapnia        HPI:  Per Dr. Ravi Soria:    "Per ED:  "This is a 70 y.o. female with hx of HTN, asthma, A-fib, and CHF who presents via EMS with complaint of shortness of breath for the past week, worsening last night. She has orthopnea and sleeps in a recliner every night. She is on 3 L home Oxygen. She denies fever or chest pain. She denies hx of DVT or PE. She is on Eliquis".    Patient somnolent on BiPAP and unable to give much of a verbal history at this time.  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  Patient was seen by PCP on 8/20/2020 for increasing SOB and Lasix was increased to 40mg bid from qday.    She arrived to ED on 100%NRB and in Afib with RVR.  She was placed on BiPAP and given Diltiazem x 1 with good response.  She was also given Lasix 60mg IV x 1 in ED."    Overview/Hospital Course:  Patient is 70-year-old woman with history of hypertension, chronic hypoxic respiratory failure on home oxygen secondary to chronic obstructive pulmonary disease, chronic diastolic heart failure, chronic atrial fibrillation, and morbid obesity admitted to the hospital for treatment of acute on chronic hypercapnic and hypoxic respiratory failure due to acute exacerbation of chronic obstructive pulmonary disease and also due to decompensated heart failure.  Chest radiograph showed evidence of pulmonary edema.  Patient was admitted to the intensive care unit treated with noninvasive ventilation along with intravenous diuretics.  She was treated with steroids, " bronchodilator breathing treatment, and antibiotics.  Patient clinically improved and wean off noninvasive ventilation was transferred to the floor.  Patient with significant upward trend of her serum creatinine.  Nephrology service consult recommending holding diuretic therapy for couple days until her serum creatinine stabilized.  Physical and Occupational therapy consulted to treat her debility.    Interval History:  No acute events overnight.    Review of Systems   Constitutional: Negative for chills and fever.   Respiratory: Positive for shortness of breath. Negative for wheezing.    Cardiovascular: Negative for chest pain.   Gastrointestinal: Negative for abdominal distention, abdominal pain, constipation, diarrhea, nausea and vomiting.   Genitourinary: Negative for dysuria and frequency.   Musculoskeletal: Negative for arthralgias and myalgias.   Neurological: Negative for light-headedness.   Psychiatric/Behavioral: Negative for agitation and confusion.     Objective:     Vital Signs (Most Recent):  Temp: 98.6 °F (37 °C) (08/30/20 1221)  Pulse: 78 (08/30/20 1400)  Resp: 18 (08/30/20 1228)  BP: (!) 108/56 (08/30/20 1221)  SpO2: (!) 93 % (08/30/20 1228) Vital Signs (24h Range):  Temp:  [97.5 °F (36.4 °C)-98.6 °F (37 °C)] 98.6 °F (37 °C)  Pulse:  [] 78  Resp:  [18-30] 18  SpO2:  [90 %-95 %] 93 %  BP: (102-113)/(56-73) 108/56     Weight: 100.7 kg (222 lb)  Body mass index is 39.33 kg/m².    Intake/Output Summary (Last 24 hours) at 8/30/2020 1504  Last data filed at 8/30/2020 1400  Gross per 24 hour   Intake 720 ml   Output 900 ml   Net -180 ml      Physical Exam  Constitutional:       General: She is not in acute distress.     Appearance: She is well-developed. She is obese. She is not diaphoretic.   HENT:      Head: Normocephalic and atraumatic.      Nose: Nose normal.      Mouth/Throat:      Pharynx: No oropharyngeal exudate.   Eyes:      General: No scleral icterus.        Right eye: No discharge.          Left eye: No discharge.      Conjunctiva/sclera: Conjunctivae normal.      Pupils: Pupils are equal, round, and reactive to light.   Neck:      Musculoskeletal: Normal range of motion and neck supple.      Thyroid: No thyromegaly.      Vascular: No JVD.      Trachea: No tracheal deviation.   Cardiovascular:      Rate and Rhythm: Normal rate. Rhythm irregular.      Heart sounds: Normal heart sounds. No murmur. No friction rub. No gallop.    Pulmonary:      Effort: Pulmonary effort is normal. No respiratory distress.      Breath sounds: No stridor. Rales present. No wheezing.   Chest:      Chest wall: No tenderness.   Abdominal:      General: Bowel sounds are normal. There is no distension.      Palpations: Abdomen is soft. There is no mass.      Tenderness: There is no abdominal tenderness. There is no guarding or rebound.   Musculoskeletal: Normal range of motion.         General: Swelling present. No tenderness.      Right lower leg: Edema present.      Left lower leg: Edema present.   Lymphadenopathy:      Cervical: No cervical adenopathy.   Skin:     General: Skin is warm and dry.      Coloration: Skin is not pale.      Findings: No erythema or rash.   Neurological:      Mental Status: She is alert and oriented to person, place, and time.      Cranial Nerves: No cranial nerve deficit.      Motor: No abnormal muscle tone.      Coordination: Coordination normal.      Deep Tendon Reflexes: Reflexes are normal and symmetric. Reflexes normal.   Psychiatric:         Behavior: Behavior normal.         Thought Content: Thought content normal.         Judgment: Judgment normal.         Significant Labs: All pertinent labs within the past 24 hours have been reviewed.    Significant Imaging: I have reviewed all pertinent imaging results/findings within the past 24 hours.      Assessment/Plan:      * Acute respiratory failure with hypoxia and hypercapnia  Likely multifactorial due to acute bronchitis, chronic obstructive  pulmonary disease exacerbation, acute on chronic diastolic heart failure along with underlying obstructive sleep apnea and obesity hypoventilation.  Clinically improving with treatment of acute exacerbation of chronic obstructive pulmonary disease along with diuretic therapy.  Hold further diuretics at this time to prevent overly aggressive diuresis.  She continues to have good urine output without diuretic therapy.    Essential hypertension  Reasonably controlled with current regimen.  Will continue with current regimen and continue to monitor.    Chronic kidney disease (CKD) stage G4/A1, severely decreased glomerular filtration rate (GFR) between 15-29 mL/min/1.73 square meter and albuminuria creatinine ratio less than 30 mg/g  Serum creatinine improved with holding diuretics.  Nephrology service recommended small fluid bolus today.  Continue to closely monitor.    Dyslipidemia  Continue statin therapy.    Morbid obesity  Patient counseled on the importance of weight loss by changing diet and increased physical activity.    Debility  Continue with physical and occupational therapy.    Obstructive sleep apnea  Continua with BiPAP at night.    Paroxysmal atrial fibrillation  Stable.  Management per cardiology.        VTE Risk Mitigation (From admission, onward)         Ordered     apixaban tablet 2.5 mg  2 times daily      08/22/20 1040     IP VTE HIGH RISK PATIENT  Once      08/22/20 1040     Place sequential compression device  Until discontinued      08/22/20 1040                Loi Quiles MD  Department of Hospital Medicine   [facility]

## 2020-08-31 NOTE — PLAN OF CARE
CM met with pt for final discharge planning assessment.    Pt will transfer to [facility] today. Pt informed she would go pending covid resuste. Pt states she would rather wait instead of being rushed. CM informed her we would plan for discharge when accepted.    Transport arrangements complete in , for 1700.     RN informed to call report to [phone], pt going to room P 205.    No additional CM needs or barriers for discharge.   08/31/20 1621   Final Note   Assessment Type Final Discharge Note   Anticipated Discharge Disposition SNF   What phone number can be called within the next 1-3 days to see how you are doing after discharge? [phone]   Hospital Follow Up  Appt(s) scheduled? Yes   Discharge plans and expectations educations in teach back method with documentation complete? Yes   Right Care Referral Info   Post Acute Recommendation SNF / Sub-Acute Rehab

## 2020-08-31 NOTE — PROGRESS NOTES
Cardiology  Progress Notes            Subjective:  Feels better.  Had a good night.  Tolerated limited ambulation.    Mrs Morris is a 70-year-old lady who presents to the emergency room here with complaints of progressively increasing shortness of breath for the last 3 days.  She was somnolent in the emergency room, is now beginning to wake up.  She says she has had a history of high blood pressure, and heart failure, and she smokes a pack of cigarettes a day, having smoker last cigarette earlier this morning.  She has a longstanding history of hypertension, chronic obstructive lung disease, chronic atrial fibrillation at least for the last 6 years, has had a renal cell carcinoma and has had a nephrectomy, and chronic renal failure.  She has had heart failure with a preserved left ventricular ejection fraction, diastolic left ventricular dysfunction.  She is followed by Dr. Lutz, who she last saw on 06/26/2020.       Vital Signs:  Vitals:    08/31/20 0600 08/31/20 0700 08/31/20 0742 08/31/20 0849   BP:    (!) 112/59   BP Location:       Patient Position:       Pulse: 78 83 68 (!) 46   Resp:   18 18   Temp:    98.6 °F (37 °C)   TempSrc:       SpO2:   (!) 94% (!) 92%   Weight:       Height:           Physical Exam:  Chest clear  Heart irregularly irregular  No murmur or gallop  Extremities are warm.  Neurologically intact.    Laboratory:  CBC:   Recent Labs   Lab 08/31/20  0446   WBC 11.31   RBC 3.70*   HGB 10.4*   HCT 33.4*   *   MCV 90   MCH 28.1   MCHC 31.1*     BMP:   Recent Labs   Lab 08/27/20 0347  08/31/20  0446   *   < > 120*      < > 140   K 5.0   < > 4.7   CL 92*   < > 96   CO2 38*   < > 34*   BUN 63*   < > 57*   CREATININE 2.1*   < > 1.8*   CALCIUM 9.1   < > 8.7   MG 2.7*  --   --     < > = values in this interval not displayed.     CMP:   Recent Labs   Lab 08/31/20  0446   *   CALCIUM 8.7   ALBUMIN 3.2*      K 4.7   CO2 34*   CL 96   BUN 57*   CREATININE 1.8*     LFTs:    Recent Labs   Lab 08/31/20  0446   ALBUMIN 3.2*     Coagulation: No results for input(s): PT, INR, APTT in the last 168 hours.  Cardiac markers: No results for input(s): CKMB, CPKMB, TROPONINT, TROPONINI, MYOGLOBIN in the last 168 hours.    Imaging:  X-Ray Chest AP Portable   Final Result      1. Today's findings are compatible with volume overload with alveolar pulmonary edema and bilateral layering pleural effusions.   2. The findings in the right upper lobe are becoming more consolidative and concerning for pneumonia.  There is a small lucency within the center of this developing consolidation.  In this may simply represent intervening lung parenchyma however, follow-up is recommended to ensure this is not a cavitary process.         Electronically signed by: Sully Leslie MD   Date:    08/23/2020   Time:    09:49      US Lower Extremity Veins Bilateral   Final Result      No evidence of deep venous thrombosis in either lower extremity.         Electronically signed by: Adair John MD   Date:    08/22/2020   Time:    19:35      X-Ray Chest AP Portable   Final Result   Abnormal      Cardiomegaly, increased pulmonary vascularity, bilateral pulmonary edema, and bilateral pleural effusions.  The findings are consistent with the provided clinical history of congestive heart failure.      This report was flagged in Epic as abnormal.         Electronically signed by: Cammy Zaldivar MD   Date:    08/22/2020   Time:    09:00            Problems:   Hypercapnic and hypoxemic respiratory failure.  Advanced obstructive lung disease  Cigarette smoker, last cigarette this morning.  Chronic atrial fibrillation,  presently with controlled ventricular response  Heart failure with preserved ejection fraction  Diastolic left ventricular dysfunction  Obstructive sleep apnea  Obesity  Chronic renal failure, status post nephrectomy for hypernephroma              Plan of Care: See Orders          Edis Ramirez

## 2020-08-31 NOTE — PT/OT/SLP PROGRESS
Physical Therapy Treatment    Patient Name:  Patsy Morris   MRN:  [id number]    Recommendations:     Discharge Recommendations:  nursing facility, skilled   Discharge Equipment Recommendations: bath bench   Barriers to discharge: Decreased caregiver support    Assessment:     Patsy Morris is a 70 y.o. female admitted with a medical diagnosis of Acute respiratory failure with hypoxia and hypercapnia.  She presents with the following impairments/functional limitations:  weakness, impaired endurance, impaired self care skills, impaired functional mobilty, gait instability, impaired balance, decreased lower extremity function, impaired cardiopulmonary response to activity.    Pt tolerated treatment well with no adverse reactions. Pt is progressing toward meeting goals. Pt performed sit <> stand with CGA, BSC transfer with CGA, and gait x 50 ft with CGA and rollator. Required 3 standing rest breaks. Pt continues to be limited by poor endurance. PT will continue to follow and progress goals as tolerated. Recommend discharge to SNF.    Rehab Prognosis: Good; patient would benefit from acute skilled PT services to address these deficits and reach maximum level of function.    Recent Surgery: * No surgery found *      Plan:     During this hospitalization, patient to be seen 5 x/week to address the identified rehab impairments via gait training, therapeutic activities, therapeutic exercises and progress toward the following goals:    · Plan of Care Expires:  09/25/20    Subjective     Chief Complaint: None stated.  Patient/Family Comments/goals: No goal stated.  Pain/Comfort:  · Pain Rating 1: 0/10  · Pain Rating Post-Intervention 1: 0/10      Objective:     Communicated with RN (Maria Luisa) prior to session.  Patient found up in chair with peripheral IV, PureWick, oxygen upon PT entry to room.     General Precautions: Standard, fall, respiratory   Orthopedic Precautions:N/A   Braces: N/A     Functional Mobility:  Gait belt and non-slip socks donned prior to mobility. Surgical mask donned for ambulation in hallway per current infection control policy  · Transfers: sit <> stand with CGA and rollator. BSC transfer with CGA and rollator.  · (+) void. Performed clothing management and andrae-care with set-up assist.  · Ambulation: 50 ft with CGA and rollator. Required 3 standing rest breaks. Required frequent verbal cues for rolling walker proximity during ambulation.    AM-PAC 6 CLICK MOBILITY  Turning over in bed (including adjusting bedclothes, sheets and blankets)?: 3  Sitting down on and standing up from a chair with arms (e.g., wheelchair, bedside commode, etc.): 3  Moving from lying on back to sitting on the side of the bed?: 3  Moving to and from a bed to a chair (including a wheelchair)?: 3  Need to walk in hospital room?: 3  Climbing 3-5 steps with a railing?: 2  Basic Mobility Total Score: 17       Therapeutic Activities and Exercises:   Bed mobility, transfer, and gait training as described above.    Patient left up in chair with all lines intact and call button in reach. New purwick placed at end of session.    GOALS:   Multidisciplinary Problems     Physical Therapy Goals        Problem: Physical Therapy Goal    Goal Priority Disciplines Outcome Goal Variances Interventions   Physical Therapy Goal     PT, PT/OT Ongoing, Progressing     Description: Goals to be met by: 9/25/2020    Patient will perform the following to increase strength, improve mobility, and return to prior level of function:    1. Supine <> sit with mod I.  2. Sit<>stand with mod I with least restrictive assistive device.  3. Gait x 200 feet with mod I with least restrictive assistive device.  4. Ascend/descend 4 step(s) with bilateral handrails and SBA.                      Time Tracking:     PT Received On: 08/31/20  PT Start Time: 1100     PT Stop Time: 1123  PT Total Time (min): 23 min     Billable Minutes: Gait Training 23    Treatment Type:  Treatment  PT/PTA: PT     PTA Visit Number: 0     Georgie Rosales, PT  08/31/2020

## 2020-08-31 NOTE — PLAN OF CARE
[facility]     Department of Hospital Medicine     [address] [place] [address]     [phone] after hours  [phone] fax       NURSING HOME ORDERS    08/31/2020    Admit to Nursing Home:  Skilled Bed      Diagnoses:  Active Hospital Problems    Diagnosis  POA    *Acute respiratory failure with hypoxia and hypercapnia [J96.01, J96.02]  Yes     Priority: 1 - High    Essential hypertension [I10]  Yes     Priority: 10     Chronic kidney disease (CKD) stage G4/A1, severely decreased glomerular filtration rate (GFR) between 15-29 mL/min/1.73 square meter and albuminuria creatinine ratio less than 30 mg/g [N18.4]  Yes     Priority: 11     Dyslipidemia [E78.5]  Yes     Priority: 12     Morbid obesity [E66.01]  Yes     Priority: 20     Debility [R53.81]  Yes     Priority: 22     Encounter for palliative care [Z51.5]  Not Applicable    Obstructive sleep apnea [G47.33]  Yes    Paroxysmal atrial fibrillation [I48.0]  Yes      Resolved Hospital Problems    Diagnosis Date Resolved POA    Shortness of breath [R06.02] 08/22/2020 Yes    Elevated glucose [R73.09] 08/25/2020 Yes    Acute on chronic congestive heart failure [I50.9] 08/24/2020 Yes    Longstanding persistent atrial fibrillation [I48.11] 08/24/2020 Yes    COPD exacerbation [J44.1] 08/24/2020 Yes    Atrial fibrillation with RVR [I48.91] 08/24/2020 Yes    Acute on chronic respiratory failure with hypoxia and hypercapnia [J96.21, J96.22] 08/25/2020 Yes    Acute on chronic diastolic heart failure [I50.33] 08/22/2020 Yes    Normocytic anemia [D64.9] 08/25/2020 Yes       Patient is homebound due to:  Acute respiratory failure with hypoxia and hypercapnia    Allergies:Review of patient's allergies indicates:  No Known Allergies    Vitals:   Every shift (Skilled Nursing patients)    Diet: Cardiac diet, low potassium    Acitivities:    - as tolerated with therapy    LABS:  Per facility  protocol    Nursing Precautions:   - Aspiration precautions:             -  Upright 90 degrees befor during and after meals    - Fall precautions per nursing home protocol   - Decubitus precautions:        -  for positioning   - Pressure reducing foam mattress   - Turn patient every two hours. Use wedge pillows to anchor patient    CONSULTS:    Physical Therapy to evaluate and treat     Occupational Therapy to evaluate and treat     Routine Skin for Patients with Limited Mobility:  Apply moisture barrier cream to all    skin folds and wet areas in perineal area daily and after baths and                           all bowel movements.    MISCELLANEOUS CARE:  Home Oxygen:  Oxygen at 4 L/min nasal canula to be used:  Continuously.  CPAP Expiratory pressure 12 cmH20 at night and as needed.    WOUND CARE ORDERS  Apply aquacel lite foam dressing to bridge of the nose when BiPAP is in use for protection. Please notify wound care for any change in skin condition.    Apply Interdry AG silver wicking textile to affected abdominal folds- nursing to cleanse skin folds to abdomen with cleansing wipes and dry thoroughly.  Place Interdry to folds as a single layer leaving 2 inches of product exposed to air.  Change every 5 days.      Medications: Discontinue all previous medication orders, if any. See new list below.     Patsy Morris   Home Medication Instructions CHEO:[id number]    Printed on:08/31/20 9459   Medication Information                      acetaminophen (TYLENOL) 500 MG tablet  Take 2 tablets (1,000 mg total) by mouth every 6 (six) hours as needed for Pain.             albuterol-ipratropium 2.5mg-0.5mg/3mL (DUO-NEB) 0.5 mg-3 mg(2.5 mg base)/3 mL nebulizer solution  Take 3 mLs by nebulization every 6 (six) hours as needed for Wheezing or Shortness of Breath.             apixaban (ELIQUIS) 2.5 mg Tab  TAKE 1 TABLET(2.5 MG) BY MOUTH TWICE DAILY             atorvastatin (LIPITOR) 80 MG tablet  TAKE 1 TABLET  BY MOUTH EVERY EVENING.             blood sugar diagnostic (BLOOD GLUCOSE TEST) Strp  Qd testing             docusate sodium (COLACE) 100 MG capsule  Take 1 capsule (100 mg total) by mouth 2 (two) times daily.             escitalopram oxalate (LEXAPRO) 10 MG tablet  TAKE ONE TABLET BY MOUTH EVERY DAY IN THE EVENING             furosemide (LASIX) 40 MG tablet  Take 1 tablet (40 mg total) by mouth once daily.             ipratropium (ATROVENT) 0.02 % nebulizer solution  Take 2.5 mLs (500 mcg total) by nebulization every 6 (six) hours. Rescue             levalbuterol (XOPENEX) 1.25 mg/0.5 mL nebulizer solution  Take 0.5 mLs (1.25 mg total) by nebulization every 6 (six) hours. Rescue             metoprolol tartrate (LOPRESSOR) 50 MG tablet  Take 1 tablet (50 mg total) by mouth 2 (two) times daily.             miconazole NITRATE 2 % (MICOTIN) 2 % top powder  Apply topically 2 (two) times daily.             polyethylene glycol (GLYCOLAX) 17 gram PwPk  Take 17 g by mouth daily as needed.             valACYclovir (VALTREX) 500 MG tablet  Take 1 tablet (500 mg total) by mouth once daily.             walker Misc  Prn use please fit pt                       _________________________________  Loi Quiles MD

## 2020-08-31 NOTE — PLAN OF CARE
Patient remained free from falls/injury throughout shift.  No acute changes in status.  No complaints of pain.  Dressing change completed.  Bed locked in lowest position.  Side rails up x2.  Call bell within reach.  Purposeful rounding maintained throughout shift.

## 2020-08-31 NOTE — NURSING
Pt being transferred to rehab via wheelchair van per [facility]. Pt in stable condition with no complaints at this time. Pt connected to [facility]. Report given to receiving nurse at [facility].

## 2020-08-31 NOTE — PT/OT/SLP PROGRESS
Occupational Therapy   Treatment    Name: Patsy Morris  MRN: [id number]  Admitting Diagnosis:  Acute respiratory failure with hypoxia and hypercapnia       Recommendations:     Discharge Recommendations: nursing facility, skilled  Discharge Equipment Recommendations:  other (see comments)(currently needs TTB though will defer needs to next level of care)  Barriers to discharge:  Decreased caregiver support, Other (Comment)(current functional level)    Assessment:   Remained on 4L O2 NC throughout session.  Ambulating short household distance bedside chair<>sink with MIN A, forward flexed posture, and widened MUNIR; verbal cuing for proximity to RW as well as upright trunk with initial follow through though eventual return to forward flexed postuing.  Seated rest break required once seated at bedside chair prior to BSC transfer.  Step transfer to bedside chair with RW and increased cuing for proximity RW during turn completion.  Pt. Requesting to use BSC as she reports feeling the sensation to have a bowel movement.  Step transfer bedside chair>BSC with RW and CGA for steadying/safety.  Required verbal cues for RW positioning at sink as Pt. Initially attempting to discard RW at sink prior to grooming task.  Oral hygiene, hand hygiene, and washing face in stance at sink with CGA for steadying/safety; demos forward flexed posture and BUE forearm supported at sink during task. Progressing towards goals.  Recommend post acute OT in SNF.  To benefit from continued acute care OT services to increase independence in self-care/functional transfers.  Continue POC.       Patsy Morris is a 70 y.o. female with a medical diagnosis of Acute respiratory failure with hypoxia and hypercapnia.  She presents with below deficits decreasing independence in self-care/functional transfers. Performance deficits affecting function are weakness, impaired endurance, impaired self care skills, impaired functional mobilty, gait  instability, impaired balance, decreased upper extremity function, decreased lower extremity function, decreased safety awareness, impaired cardiopulmonary response to activity.     Rehab Prognosis:  Good; patient would benefit from acute skilled OT services to address these deficits and reach maximum level of function.       Plan:     Patient to be seen 5 x/week to address the above listed problems via self-care/home management, therapeutic activities, therapeutic exercises  · Plan of Care Expires: 09/25/20  · Plan of Care Reviewed with: patient    Subjective     Pain/Comfort:  · Pain Rating 1: 0/10  · Pain Rating Post-Intervention 1: 0/10    Objective:     Communicated with: Maria Luisa URRUTIA LPN prior to session.  Patient found up in chair with peripheral IV, oxygen, PureWick(4L O2 NC) upon OT entry to room.    General Precautions: Standard, fall, other (see comments), respiratory(cardiac diet)   Orthopedic Precautions:N/A   Braces: N/A     Occupational Performance:     Functional Mobility/Transfers:  Ambulating short household distance bedside chair<>sink with MIN A, forward flexed posture, and widened MUNIR; verbal cuing for proximity to RW as well as upright trunk with initial follow through though eventual return to forward flexed postuing.  Seated rest break required once seated at bedside chair prior to BSC transfer.  Step transfer to bedside chair with RW and increased cuing for proximity RW during turn completion.  Step transfer bedside chair>BSC with RW and CGA for steadying/safety.        Activities of Daily Living:  Required verbal cues for RW positioning at sink as Pt. Initially attempting to discard RW at sink prior to grooming task.  Oral hygiene, hand hygiene, and washing face in stance at sink with CGA for steadying/safety; demos forward flexed posture and BUE forearm supported at sink during task.        [facility] 6 Click ADL: 17    Treatment & Education:  Educated on functional transfer/ADL safety and energy  conservation including self-pacing.     Patient left seated at [facility] with all lines intact, call button in reach and nursing notifiedEducation:      GOALS:   Multidisciplinary Problems     Occupational Therapy Goals        Problem: Occupational Therapy Goal    Goal Priority Disciplines Outcome Interventions   Occupational Therapy Goal     OT, PT/OT Ongoing, Progressing    Description: Goals to be met by: 09/25/2020      Patient will increase functional independence with ADLs by performing:    UE Dressing with Modified [place].  LE Dressing with Modified [place].  Grooming while standing with Supervision.  Toileting from toilet with Modified [place] and Supervision for hygiene and clothing management.   Toilet transfer to toilet with Modified [place].  Increased functional strength to WFL for self care.  Upper extremity exercise program x10 reps per handout, with independence.                      Time Tracking:     OT Date of Treatment: 08/31/20  OT Start Time: 1147  OT Stop Time: 1214  OT Total Time (min): 27 min    Billable Minutes:Self Care/Home Management 17  Therapeutic Activity 10    Keren Agrawal OT  8/31/2020

## 2020-08-31 NOTE — PLAN OF CARE
Problem: Physical Therapy Goal  Goal: Physical Therapy Goal  Description: Goals to be met by: 9/25/2020    Patient will perform the following to increase strength, improve mobility, and return to prior level of function:    1. Supine <> sit with mod I.  2. Sit<>stand with mod I with least restrictive assistive device.  3. Gait x 200 feet with mod I with least restrictive assistive device.  4. Ascend/descend 4 step(s) with bilateral handrails and SBA.     Outcome: Ongoing, Progressing     Pt tolerated treatment well with no adverse reactions. Pt is progressing toward meeting goals. Pt performed sit <> stand with CGA, BSC transfer with CGA, and gait x 50 ft with CGA and rollator. Required 3 standing rest breaks. Pt continues to be limited by poor endurance. PT will continue to follow and progress goals as tolerated. Recommend discharge to SNF.

## 2020-08-31 NOTE — PLAN OF CARE
Patient on 4LPM NC sat's 94-95% .Q6 aerosol tx given tolerated well. BIPAP on standby for QHS, monitoring ongoing.

## 2020-08-31 NOTE — PROGRESS NOTES
"Nephrology  Progress Note    Admit Date: 8/22/2020   LOS: 9 days     SUBJECTIVE:     Follow-up For:  CLIFFORD    Interval History:     Awaiting SNF placement.  Labs and UOP stable.  No CP/SOB this am. Discussed with team.     Review of Systems:  Constitutional: No fever or chills  Respiratory:  shortness of breath better  Cardiovascular: No chest pain or palpitations  Gastrointestinal: No nausea or vomiting  Neurological: No confusion or weakness    OBJECTIVE:     Vital Signs Range (Last 24H):  BP (!) 112/59   Pulse (!) 46   Temp 98.6 °F (37 °C)   Resp 18   Ht 5' 3" (1.6 m)   Wt 100.7 kg (222 lb)   LMP  (LMP Unknown)   SpO2 (!) 92%   Breastfeeding No   BMI 39.33 kg/m²     Temp:  [96.4 °F (35.8 °C)-98.6 °F (37 °C)]   Pulse:  []   Resp:  [18-26]   BP: (108-124)/(56-73)   SpO2:  [88 %-94 %]     I & O (Last 24H):    Intake/Output Summary (Last 24 hours) at 8/31/2020 1007  Last data filed at 8/31/2020 0700  Gross per 24 hour   Intake 1200 ml   Output 1000 ml   Net 200 ml       Physical Exam:  General appearance:  Chronically ill-appearing  Eyes:  Conjunctivae/corneas clear. PERRL.  Lungs:  fibrotic/atelectatic.     Heart:  Tachy irregular  Abdomen: Soft, non-tender non-distended; bowel sounds normal; no masses,  no organomegaly, obese  Extremities: No cyanosis or clubbing.  Trace edema.    Skin: Skin color, texture, turgor normal. No rashes or lesions  Neurologic: Normal strength and tone. No focal numbness or weakness     Laboratory Data:  Reviewed    Medications:  Medication list was reviewed and changes noted under Assessment/Plan.    Diagnostic Results:        ASSESSMENT/PLAN:       Resolved Nonoliguric acute kidney injury on advanced CKD stage IIIB-IV  -secondary to ATN from hypotension, poor cardiac output, AFib with RVR, and diuresis (N17.9, N18.4, N17.0, Q60.0)  -Hx of solitary kidney from RCC, baseline creatinine of about 1.8 and essentially back there now.    -Followed by urology at [facility].  No record of " nephrologist from what I can see.   -Admits to few NSAIDs weekly for knee pains.   -Her SOB is from hypercapnia resp failure.     -UA wnl and uric acid mildly elevated likely from volume depletion   -Needs appt with [facility] [place] since all of her providers are located there.    - Renally dose meds, avoid nephrotoxins, and monitor I/O's closely.    Metabolic Alkalosis (E87.3):  -likely compensation from resp acidosis   -ABG noted  -needs bipap most of day/night to survive.      Acute on chronic resp failure with hypercapnia (J96.21):    -Extensive lung hx.  On 2-3L nasal cannula at home.  Needs CPAP.  Defer.  See above     Afib/RVR (I48.91):    -Defer to cards.  On eliquis.      DNR.   Poor overall prognosis with lung issues.      Ok to DC from [facility]  Will follow remotely.

## 2020-08-31 NOTE — PLAN OF CARE
Problem: Occupational Therapy Goal  Goal: Occupational Therapy Goal  Description: Goals to be met by: 09/25/2020      Patient will increase functional independence with ADLs by performing:    UE Dressing with Modified [place].  LE Dressing with Modified [place].  Grooming while standing with Supervision.  Toileting from toilet with Modified [place] and Supervision for hygiene and clothing management.   Toilet transfer to toilet with Modified [place].  Increased functional strength to WFL for self care.  Upper extremity exercise program x10 reps per handout, with independence.     Outcome: Ongoing, Progressing     Progressing towards goals.  Recommend post acute OT in SNF.  To benefit from continued acute care OT services to increase independence in self-care/functional transfers.  Continue POC.

## 2020-09-01 PROBLEM — I48.91 ATRIAL FIBRILLATION WITH RAPID VENTRICULAR RESPONSE: Status: ACTIVE | Noted: 2020-01-01

## 2020-09-01 NOTE — HPI
"Per Darwin Cunningham, KEVYN:    "The patient is a 70 y.o. female with history of HTN, CHF, and COPD who presents from nursing facility with complaint of shortness of breath. Patient reports experiencing SOB when trying to go to sleep.  Nursing home reported O2 sats in the 60s on room air. Patient states she is typically on 3 liters of O2 at home. She was seen here in the ED on 8/22, admitted to the hospital and was discharged 6 hours ago. She denies chest pain, palpitations, fever, chills, nausea or vomiting.  Of note, when the patient arrived, she was in atrial fibrillation with RVR.  She will be admitted for management of her atrial fibrillation with rapid ventricular response."  "

## 2020-09-01 NOTE — RESPIRATORY THERAPY
0645:  Patient placed on portable BIPAP at 15 LPM for transfer to floor.  RN made aware.  Patient's BIPAP set up and on standby in room 310 awaiting patient

## 2020-09-01 NOTE — PLAN OF CARE
Initial Discharge Planning Assessment:  Patient admitted on 8-31-20  Chart reviewed, Care plan discussed with treatment team,  attending Dr Soria   PCP updated in Epic: at [facility]  Pharmacy, updated in Epic: at [facility]    DME at home: n/a  Current dispo: pending    cardiology consulted and following  PT , OT ?  Will need SNF again when stable  Reports she wants to go to a different SNF, was not pleased with the care  Case management  to follow       09/01/20 1221   Discharge Assessment   Assessment Type Discharge Planning Assessment   Confirmed/corrected address and phone number on facesheet? Yes   Assessment information obtained from? Patient;Caregiver;Medical Record   Communicated expected length of stay with patient/caregiver yes   Prior to hospitilization cognitive status: Alert/Oriented   Prior to hospitalization functional status: Assistive Equipment   Current cognitive status: Alert/Oriented   Current Functional Status: Assistive Equipment   Lives With facility resident   Able to Return to Prior Arrangements other (see comments)  (CDND- does not want to return)   Is patient able to care for self after discharge? No   Patient currently being followed by outpatient case management? No   Patient currently receives any other outside agency services? No   Do you have any problems affording any of your prescribed medications? No   Is the patient taking medications as prescribed? yes   Does the patient have transportation home? Yes   Transportation Anticipated health plan transportation   Discharge Plan A Skilled Nursing Facility   DME Needed Upon Discharge  CPAP   Patient/Family in Agreement with Plan yes   Readmission Questionnaire   At the time of your discharge, did someone talk to you about what your health problems were? Yes   At the time of discharge, did someone talk to you about what to watch out for regarding worsening of your health problem? Yes   At the time of discharge, did someone talk to you about what  to do if you experienced worsening of your health problem? Yes   At the time of discharge, did someone talk to you about which medication to take when you left the hospital and which ones to stop taking? Yes   At the time of discharge, did someone talk to you about when and where to follow up with a doctor after you left the hospital? Yes

## 2020-09-01 NOTE — ASSESSMENT & PLAN NOTE
Patient converted in ER with IV Cardizem    Continue Lopressor  Continue Apixaban  Cardiac monitoring

## 2020-09-01 NOTE — ASSESSMENT & PLAN NOTE
Patient requires 3 liters of oxygen at home.  On 5 liters currently. Believe due to atrial fib with RVR. Patient is CO2 retainer, pH 7.2, CO2 94. Bipap for now

## 2020-09-01 NOTE — ED NOTES
Patient rounding complete. Pt resting in stretcher, chest rise and fall noted. Restroom and comfort needs addressed. Pt updated on POC. Call light in reach. Will continue to monitor.

## 2020-09-01 NOTE — ASSESSMENT & PLAN NOTE
BNP slightly elevated. Believe due to episode of atrial fib with RVR    Monitor for acute decompensation

## 2020-09-01 NOTE — PLAN OF CARE
09/01/20 1219   Readmission Questionnaire   At the time of your discharge, did someone talk to you about what your health problems were? Yes   At the time of discharge, did someone talk to you about what to watch out for regarding worsening of your health problem? Yes   At the time of discharge, did someone talk to you about what to do if you experienced worsening of your health problem? Yes   At the time of discharge, did someone talk to you about which medication to take when you left the hospital and which ones to stop taking? Yes   At the time of discharge, did someone talk to you about when and where to follow up with a doctor after you left the hospital? Yes

## 2020-09-01 NOTE — ED NOTES
Assumed care of pt, received report from Michelle VEE for change of shift, in room to see pt, resp leaving room w/ bipap items and will meet pt in room, advised to put pt on 15L for transport to floor. Lab called and advised on way to draw pt before sending to floor. Will cont to monitor and request transport once lab is finished.

## 2020-09-01 NOTE — SUBJECTIVE & OBJECTIVE
Past Medical History:   Diagnosis Date    Arthritis     Asthma     Atrial fibrillation     Atrial flutter     Cerumen impaction     CHF (congestive heart failure)     CKD (chronic kidney disease), stage III     Colon polyps 2017    COPD exacerbation     Encounter for blood transfusion     HEARING LOSS     Herpes genitalis     Hypertension     Renal carcinoma 3/10/2015    Thyroid nodule 6/8/2017       Past Surgical History:   Procedure Laterality Date    BRAIN SURGERY      COLONOSCOPY N/A 6/23/2017    Procedure: COLONOSCOPY;  Surgeon: Shane Sharma MD;  Location: [facility] ([address] [place] [address]);  Service: Endoscopy;  Laterality: N/A;  2nd floor case ; on 3L home O2       per Dr [place] (anesthesia)-Based on her history of:  Chronic respiratory failure with oxygen use, HDEZ, CHF, A-Fib, BHAVANI, it was determined that she should have her Colonoscopy scheduled on the 2nd Floor       ok to hold Eliquis 2 days prior to    EYE SURGERY      HYSTERECTOMY      kidney mass resection Right     renal carcinoma       Review of patient's allergies indicates:  No Known Allergies    Current Facility-Administered Medications on File Prior to Encounter   Medication    [DISCONTINUED] acetaminophen tablet 650 mg    [DISCONTINUED] acetaminophen tablet 650 mg    [DISCONTINUED] apixaban tablet 2.5 mg    [DISCONTINUED] atorvastatin tablet 80 mg    [DISCONTINUED] dextrose 50% injection 12.5 g    [DISCONTINUED] dextrose 50% injection 25 g    [DISCONTINUED] docusate sodium capsule 100 mg    [DISCONTINUED] escitalopram oxalate tablet 10 mg    [DISCONTINUED] glucagon (human recombinant) injection 1 mg    [DISCONTINUED] glucose chewable tablet 16 g    [DISCONTINUED] glucose chewable tablet 24 g    [DISCONTINUED] insulin aspart U-100 pen 0-5 Units    [DISCONTINUED] ipratropium 0.02 % nebulizer solution 0.5 mg    [DISCONTINUED] levalbuterol nebulizer solution 1.25 mg    [DISCONTINUED] melatonin tablet 6 mg    [DISCONTINUED]  metoprolol tartrate (LOPRESSOR) tablet 50 mg    [DISCONTINUED] miconazole NITRATE 2 % top powder    [DISCONTINUED] ondansetron injection 4 mg    [DISCONTINUED] polyethylene glycol packet 17 g    [DISCONTINUED] sodium chloride 0.9% flush 10 mL    [DISCONTINUED] valACYclovir tablet 500 mg     Current Outpatient Medications on File Prior to Encounter   Medication Sig    acetaminophen (TYLENOL) 500 MG tablet Take 2 tablets (1,000 mg total) by mouth every 6 (six) hours as needed for Pain.    apixaban (ELIQUIS) 2.5 mg Tab TAKE 1 TABLET(2.5 MG) BY MOUTH TWICE DAILY    atorvastatin (LIPITOR) 80 MG tablet TAKE 1 TABLET BY MOUTH EVERY EVENING.    docusate sodium (COLACE) 100 MG capsule Take 1 capsule (100 mg total) by mouth 2 (two) times daily.    escitalopram oxalate (LEXAPRO) 10 MG tablet TAKE ONE TABLET BY MOUTH EVERY DAY IN THE EVENING    furosemide (LASIX) 40 MG tablet Take 1 tablet (40 mg total) by mouth once daily.    miconazole NITRATE 2 % (MICOTIN) 2 % top powder Apply topically 2 (two) times daily.    albuterol-ipratropium 2.5mg-0.5mg/3mL (DUO-NEB) 0.5 mg-3 mg(2.5 mg base)/3 mL nebulizer solution Take 3 mLs by nebulization every 6 (six) hours as needed for Wheezing or Shortness of Breath.    blood sugar diagnostic (BLOOD GLUCOSE TEST) Strp Qd testing    ipratropium (ATROVENT) 0.02 % nebulizer solution Take 2.5 mLs (500 mcg total) by nebulization every 6 (six) hours. Rescue    levalbuterol (XOPENEX) 1.25 mg/0.5 mL nebulizer solution Take 0.5 mLs (1.25 mg total) by nebulization every 6 (six) hours. Rescue    metoprolol tartrate (LOPRESSOR) 50 MG tablet Take 1 tablet (50 mg total) by mouth 2 (two) times daily.    polyethylene glycol (GLYCOLAX) 17 gram PwPk Take 17 g by mouth daily as needed.    valACYclovir (VALTREX) 500 MG tablet Take 1 tablet (500 mg total) by mouth once daily.    walker Misc Prn use please fit pt     Family History     Problem Relation (Age of Onset)    Cancer Father     Hypertension Mother    Thyroid disease Mother        Tobacco Use    Smoking status: Current Every Day Smoker     Packs/day: 1.00     Years: 25.00     Pack years: 25.00     Types: Cigarettes    Smokeless tobacco: Never Used   Substance and Sexual Activity    Alcohol use: No     Alcohol/week: 0.0 standard drinks    Drug use: No    Sexual activity: Never     Birth control/protection: None     Review of Systems   Constitutional: Positive for activity change. Negative for appetite change and fever.   HENT: Negative for congestion, ear pain, rhinorrhea and sinus pressure.    Eyes: Negative for pain and discharge.   Respiratory: Positive for shortness of breath. Negative for cough, chest tightness and wheezing.    Cardiovascular: Negative for chest pain and leg swelling.   Gastrointestinal: Negative for abdominal distention, abdominal pain, diarrhea, nausea and vomiting.   Endocrine: Negative for cold intolerance and heat intolerance.   Genitourinary: Negative for difficulty urinating, flank pain, frequency, hematuria and urgency.   Musculoskeletal: Positive for myalgias. Negative for arthralgias and joint swelling.   Allergic/Immunologic: Negative for environmental allergies and food allergies.   Neurological: Negative for dizziness, weakness, light-headedness and headaches.   Hematological: Does not bruise/bleed easily.   Psychiatric/Behavioral: Negative for agitation, behavioral problems and decreased concentration.     Objective:     Vital Signs (Most Recent):  Temp: 98.2 °F (36.8 °C) (09/01/20 0039)  Pulse: 99 (09/01/20 0427)  Resp: (!) 23 (09/01/20 0039)  BP: 112/77 (09/01/20 0401)  SpO2: (!) 90 % (09/01/20 0427) Vital Signs (24h Range):  Temp:  [98.2 °F (36.8 °C)-98.6 °F (37 °C)] 98.2 °F (36.8 °C)  Pulse:  [] 99  Resp:  [17-23] 23  SpO2:  [88 %-95 %] 90 %  BP: (105-118)/(59-77) 112/77     Weight: 99.8 kg (220 lb)  Body mass index is 38.97 kg/m².    Physical Exam  Constitutional:       Appearance: She is  well-developed.   HENT:      Head: Normocephalic.   Eyes:      General:         Right eye: No discharge.         Left eye: No discharge.      Conjunctiva/sclera: Conjunctivae normal.   Neck:      Musculoskeletal: Normal range of motion and neck supple.   Cardiovascular:      Rate and Rhythm: Tachycardia present. Rhythm irregularly irregular.      Pulses:           Radial pulses are 1+ on the right side and 1+ on the left side.      Heart sounds: Normal heart sounds.   Pulmonary:      Effort: Pulmonary effort is normal. Tachypnea present. No respiratory distress.      Breath sounds: Examination of the right-lower field reveals decreased breath sounds. Examination of the left-lower field reveals decreased breath sounds. Decreased breath sounds present.   Abdominal:      General: Bowel sounds are normal. There is no distension.      Palpations: Abdomen is soft.      Tenderness: There is no abdominal tenderness.   Musculoskeletal: Normal range of motion.   Skin:     General: Skin is warm and dry.   Neurological:      Mental Status: She is alert and oriented to person, place, and time.      GCS: GCS eye subscore is 4. GCS verbal subscore is 5. GCS motor subscore is 6.   Psychiatric:         Mood and Affect: Mood normal.         Speech: Speech normal.         Behavior: Behavior normal.             Significant Labs:   CBC:   Recent Labs   Lab 08/31/20 0446 09/01/20 0214   WBC 11.31 13.48*   HGB 10.4* 10.8*   HCT 33.4* 34.3*   * 143*     CMP:   Recent Labs   Lab 08/31/20 0446 09/01/20 0214    140   K 4.7 5.0   CL 96 97   CO2 34* 30*   * 140*   BUN 57* 56*   CREATININE 1.8* 1.9*   CALCIUM 8.7 8.7   ALBUMIN 3.2*  --    ANIONGAP 10 13   EGFRNONAA 28* 26*       Significant Imaging: I have reviewed all pertinent imaging results/findings within the past 24 hours.

## 2020-09-01 NOTE — CONSULTS
[facility]  Consult    History of Present Illness: Ms Morris is a 70-year-old lady that was discharged from here to a skilled nursing facility yesterday.  She says that a CPAP was placed last evening.  She does not feel like this was helping her, she feels that her mask was uncomfortable.  She progressively became more short of breath, and sought further medical attention in the emergency room here.    She has a longstanding history of hypertension, chronic obstructive lung disease, chronic atrial fibrillation, history of renal cell carcinoma having had a nephrectomy, chronic renal failure.  She has been cigarette smoker, smoked as recently as 2 weeks ago.  She has had heart failure with a preserved left ventricular ejection fraction, diastolic left ventricular dysfunction.  She has a history of hypercapnic respiratory failure, obstructive sleep apnea.    PTA Medications   Medication Sig    acetaminophen (TYLENOL) 500 MG tablet Take 2 tablets (1,000 mg total) by mouth every 6 (six) hours as needed for Pain.    apixaban (ELIQUIS) 2.5 mg Tab TAKE 1 TABLET(2.5 MG) BY MOUTH TWICE DAILY    atorvastatin (LIPITOR) 80 MG tablet TAKE 1 TABLET BY MOUTH EVERY EVENING.    docusate sodium (COLACE) 100 MG capsule Take 1 capsule (100 mg total) by mouth 2 (two) times daily.    escitalopram oxalate (LEXAPRO) 10 MG tablet TAKE ONE TABLET BY MOUTH EVERY DAY IN THE EVENING    furosemide (LASIX) 40 MG tablet Take 1 tablet (40 mg total) by mouth once daily.    miconazole NITRATE 2 % (MICOTIN) 2 % top powder Apply topically 2 (two) times daily.    albuterol-ipratropium 2.5mg-0.5mg/3mL (DUO-NEB) 0.5 mg-3 mg(2.5 mg base)/3 mL nebulizer solution Take 3 mLs by nebulization every 6 (six) hours as needed for Wheezing or Shortness of Breath.    blood sugar diagnostic (BLOOD GLUCOSE TEST) Strp Qd testing    ipratropium (ATROVENT) 0.02 % nebulizer solution Take 2.5 mLs (500 mcg total) by nebulization every 6 (six)  hours. Rescue    levalbuterol (XOPENEX) 1.25 mg/0.5 mL nebulizer solution Take 0.5 mLs (1.25 mg total) by nebulization every 6 (six) hours. Rescue    metoprolol tartrate (LOPRESSOR) 50 MG tablet Take 1 tablet (50 mg total) by mouth 2 (two) times daily.    polyethylene glycol (GLYCOLAX) 17 gram PwPk Take 17 g by mouth daily as needed.    valACYclovir (VALTREX) 500 MG tablet Take 1 tablet (500 mg total) by mouth once daily.    walker Misc Prn use please fit pt       Review of patient's allergies indicates:  No Known Allergies    Past Medical History:   Diagnosis Date    Arthritis     Asthma     Atrial fibrillation     Atrial flutter     Cerumen impaction     CHF (congestive heart failure)     CKD (chronic kidney disease), stage III     Colon polyps 2017    COPD exacerbation     Encounter for blood transfusion     HEARING LOSS     Herpes genitalis     Hypertension     Renal carcinoma 3/10/2015    Thyroid nodule 6/8/2017     Past Surgical History:   Procedure Laterality Date    BRAIN SURGERY      COLONOSCOPY N/A 6/23/2017    Procedure: COLONOSCOPY;  Surgeon: Shane Sharma MD;  [facility]: [facility] ([address] [place] [address]);  Service: Endoscopy;  Laterality: N/A;  2nd floor case ; on 3L home O2       per Dr [place] (anesthesia)-Based on her history of:  Chronic respiratory failure with oxygen use, HDEZ, CHF, A-Fib, BHAVANI, it was determined that she should have her Colonoscopy scheduled on the 2nd Floor       ok to hold Eliquis 2 days prior to    EYE SURGERY      HYSTERECTOMY      kidney mass resection Right     renal carcinoma     Family History   Problem Relation Age of Onset    Hypertension Mother     Thyroid disease Mother     Cancer Father     Asthma Neg Hx     Emphysema Neg Hx     Melanoma Neg Hx      Social History     Tobacco Use    Smoking status: Current Every Day Smoker     Packs/day: 1.00     Years: 25.00     Pack years: 25.00     Types: Cigarettes    Smokeless tobacco: Never Used   Substance Use  Topics    Alcohol use: No     Alcohol/week: 0.0 standard drinks    Drug use: No        Physical Exam:  Awake and alert.  Chest clear  Heart irregularly irregular  Extremities are warm    Impression:   Hypercapnic respiratory failure  Advanced obstructive lung disease  Obstructive sleep apnea  Chronic atrial fibrillation  Hypertensive heart disease  Heart failure with preserved ejection fraction  Diastolic left ventricular dysfunction  Chronic renal failure, status post nephrectomy for hypernephroma    Continue metoprolol tartrate,  Continue CPAP      Thank you for the opportunity of seeing Patsy Morris in consultation

## 2020-09-01 NOTE — ED NOTES
Patient rounding complete. Pt reports pain 0/10. Restroom and comfort needs addressed. Pt updated on POC. Call light in reach. Will continue to monitor.    '

## 2020-09-01 NOTE — ED NOTES
Patient rounding complete. Pt sleeping in stretcher, chest rise and fall noted. Pt not c/o pain at this time. Restroom and comfort needs addressed. Pt updated on POC. Call light in reach. Will continue to monitor.

## 2020-09-01 NOTE — ED NOTES
Pt brought to room 310 via stretcher by transporter. Pt remains on 15L O2 for transfer. Pt stable.

## 2020-09-01 NOTE — PLAN OF CARE
Patient stable. VSS. BiPAP on. Mattress pump on. Rounding completed. Denied needs. Call bell in reach. Side rails up X2. Bed locked, lowered. Safety maintained.

## 2020-09-01 NOTE — ED NOTES
Patient rounding complete.Pt sleeping in stretcher, chest rise and fall noted. Pt remains on bi-pap Restroom and comfort needs addressed. Pt up to date on POC. Call light in reach. Will continue to monitor.

## 2020-09-01 NOTE — ED PROVIDER NOTES
Encounter Date: 9/1/2020    SCRIBE #1 NOTE: Olivia RAMOS am scribing for, and in the presence of, Dr. Mcneal.       History     Chief Complaint   Patient presents with    Shortness of Breath     recently DC, informed EMS pt was in the 60s on roomair. 94% at 15 non rebreather.      Time seen by provider: 12:19 AM    This is a 70 y.o. female with history of HTN, CHF, and COPD who presents from nursing facility via EMS with complaint of shortness of breath. Patient reports experiencing SOB when trying to go to sleep. Per EMS, nursing home reported O2 sats in the 60s on room air. Patient states she is typically on 3 liters of O2 at home. She was seen here in the ED on 8/22, admitted to the hospital and was discharged 6 hours ago. She denies chest pain, palpitations, fever, chills, nausea or vomiting. Patient is unsure if the nursing staff gave her all of her home medications. She has a history of afib. She has not smoked a cigarette since 9 days ago.     The history is provided by the patient.     Review of patient's allergies indicates:  No Known Allergies  Past Medical History:   Diagnosis Date    Arthritis     Asthma     Atrial fibrillation     Atrial flutter     Cerumen impaction     CHF (congestive heart failure)     CKD (chronic kidney disease), stage III     Colon polyps 2017    COPD exacerbation     Encounter for blood transfusion     HEARING LOSS     Herpes genitalis     Hypertension     Renal carcinoma 3/10/2015    Thyroid nodule 6/8/2017     Past Surgical History:   Procedure Laterality Date    BRAIN SURGERY      COLONOSCOPY N/A 6/23/2017    Procedure: COLONOSCOPY;  Surgeon: Shane Sharma MD;  Location: [facility] ([address] [place] [address]);  Service: Endoscopy;  Laterality: N/A;  2nd floor case ; on 3L home O2       per Dr Leopold (anesthesia)-Based on her history of:  Chronic respiratory failure with oxygen use, HDEZ, CHF, A-Fib, BHAVANI, it was determined that she should have her Colonoscopy scheduled on the  2nd Floor       ok to hold Eliquis 2 days prior to    EYE SURGERY      HYSTERECTOMY      kidney mass resection Right     renal carcinoma     Family History   Problem Relation Age of Onset    Hypertension Mother     Thyroid disease Mother     Cancer Father     Asthma Neg Hx     Emphysema Neg Hx     Melanoma Neg Hx      Social History     Tobacco Use    Smoking status: Current Every Day Smoker     Packs/day: 1.00     Years: 25.00     Pack years: 25.00     Types: Cigarettes    Smokeless tobacco: Never Used   Substance Use Topics    Alcohol use: No     Alcohol/week: 0.0 standard drinks    Drug use: No     Review of Systems   Constitutional: Negative for chills and fever.   HENT: Negative for congestion, sore throat and trouble swallowing.    Eyes: Negative for photophobia and visual disturbance.   Respiratory: Positive for shortness of breath. Negative for cough.    Cardiovascular: Negative for chest pain and palpitations.   Gastrointestinal: Negative for abdominal pain, nausea and vomiting.   Genitourinary: Negative for dysuria and hematuria.   Musculoskeletal: Negative for back pain and neck pain.   Skin: Negative for rash and wound.   Neurological: Negative for weakness and headaches.   Psychiatric/Behavioral: Negative.        Physical Exam     Initial Vitals   BP Pulse Resp Temp SpO2   09/01/20 0037 09/01/20 0039 09/01/20 0033 09/01/20 0039 09/01/20 0031   116/73 (!) 132 (!) 21 98.2 °F (36.8 °C) (!) 88 %      MAP       --                Physical Exam    Nursing note and vitals reviewed.  Constitutional: She appears well-developed and well-nourished. No distress.   Obese.   HENT:   Head: Normocephalic and atraumatic.   Mouth/Throat: Oropharynx is clear and moist.   Mucous membranes are moist.   Eyes: Conjunctivae and EOM are normal. Pupils are equal, round, and reactive to light.   No pallor or icterus.   Neck: Normal range of motion. Neck supple.   No JVD.   Cardiovascular: Normal heart sounds. An  irregularly irregular rhythm present.  Tachycardia present.  Exam reveals no gallop and no friction rub.    No murmur heard.  Pulmonary/Chest: No respiratory distress. She has no wheezes. She has no rhonchi. She has rales.   No accessory muscle use or retractions. Speaking in full sentences. No expiratory wheezing. Good air exchange. Bibasilar rales.   Abdominal: Soft. There is no abdominal tenderness. There is no rebound and no guarding.   Musculoskeletal: Normal range of motion. Edema present. No tenderness.      Comments: 1+ pitting edema bilateral pretibial area.   Neurological: She is alert and oriented to person, place, and time. She has normal strength. No sensory deficit.   Skin: Skin is warm and dry. No rash noted.   Psychiatric: She has a normal mood and affect. Her behavior is normal. Judgment and thought content normal.         ED Course   Critical Care    Date/Time: 9/1/2020 5:49 AM  Performed by: Boubacar Mcneal II, MD  Authorized by: Ravi Soria MD   Direct patient critical care time: 20 minutes  Additional history critical care time: 5 minutes  Ordering / reviewing critical care time: 10 minutes  Documentation critical care time: 10 minutes  Consulting other physicians critical care time: 6 minutes  Total critical care time (exclusive of procedural time) : 51 minutes  Critical care was necessary to treat or prevent imminent or life-threatening deterioration of the following conditions: cardiac failure, circulatory failure and respiratory failure.        Labs Reviewed   CBC W/ AUTO DIFFERENTIAL - Abnormal; Notable for the following components:       Result Value    WBC 13.48 (*)     RBC 3.86 (*)     Hemoglobin 10.8 (*)     Hematocrit 34.3 (*)     Mean Corpuscular Hemoglobin Conc 31.5 (*)     RDW 15.4 (*)     Platelets 143 (*)     Gran # (ANC) 11.3 (*)     Immature Grans (Abs) 0.05 (*)     Lymph # 0.6 (*)     Mono # 1.5 (*)     Gran% 83.7 (*)     Lymph% 4.6 (*)     All other components within  normal limits   BASIC METABOLIC PANEL - Abnormal; Notable for the following components:    CO2 30 (*)     Glucose 140 (*)     BUN, Bld 56 (*)     Creatinine 1.9 (*)     eGFR if  30 (*)     eGFR if non  26 (*)     All other components within normal limits   B-TYPE NATRIURETIC PEPTIDE - Abnormal; Notable for the following components:     (*)     All other components within normal limits   TROPONIN I     EKG Readings: (Independently Interpreted)   Initial Reading: No STEMI.   Atrial fibrillation. Ventricular rate 115 bpm. Non specific ST changes. No acute ischemia.        Imaging Results          X-Ray Chest AP Portable (Final result)  Result time 09/01/20 01:24:35    Final result by Mateus Rao MD (09/01/20 01:24:35)                 Impression:      Abnormal chest radiograph as above.      Electronically signed by: Mateus Rao MD  Date:    09/01/2020  Time:    01:24             Narrative:    EXAMINATION:  XR CHEST AP PORTABLE    CLINICAL HISTORY:  Chest Pain;    TECHNIQUE:  Single frontal view of the chest was performed.    COMPARISON:  08/23/2020.    FINDINGS:  Heart is enlarged but stable in size.  There is diffuse bilateral mixed airspace and interstitial opacification.  Consolidative changes are seen within the right mid lung zone and bilateral lower lung zones.  Questionable cavitation is seen involving region of consolidation within the right mid lung zone, similar to previous exam.  Overall findings may represent severe pulmonary edema versus diffuse infectious process/atypical pneumonia in the right clinical setting.  Similar findings were seen on most recent radiograph.  Possible small bilateral pleural effusions.  No evidence of pneumothorax or large pleural effusion.                              X-Rays:   Independently Interpreted Readings:   Chest X-Ray: Patchy bilateral infiltrates suggestive of CHF, improving from prior CXR.      Medical Decision Making:    History:   Old Medical Records: I decided to obtain old medical records.  Independently Interpreted Test(s):   I have ordered and independently interpreted X-rays - see prior notes.  I have ordered and independently interpreted EKG Reading(s) - see prior notes  Clinical Tests:   Lab Tests: Ordered and Reviewed  Radiological Study: Ordered and Reviewed  Medical Tests: Ordered and Reviewed            Scribe Attestation:   Scribe #1: I performed the above scribed service and the documentation accurately describes the services I performed. I attest to the accuracy of the note.    Attending Attestation:           Physician Attestation for Scribe:  Physician Attestation Statement for Scribe #1: I, Dr. Mcneal, reviewed documentation, as scribed by Oliviaerika Davies in my presence, and it is both accurate and complete.                 ED Course as of Sep 01 0320   Tue Sep 01, 2020   0247 Discussed case with Jonah Cunningham NP, will admit to Dr Soria.           [LT]      ED Course User Index  [LT] Olivia Davies          Patient presents by EMS from rehab facility, after being discharged from our facility earlier today.  She states that she began to feel very short winded as they were trying to place her on CPAP tonight felt like it was not working nursing home staff noted pulse ox to be in the 60s.  Patient was placed on 15 L of O2 by EMS with normalization of pulse ox and upon arrival she is feeling much better.  She does not appear dyspneic.  Does not have x-ray wheeze.  She was placed on nasal cannula, titrated down to 5 L maintaining oxygen saturation of approximately 90%.  Lab workup shows mild increase in her BNP from prior and chest x-ray does suggest pulmonary edema so she was given dose of Lasix.  Mild elevation of white count, hemoglobin stable.  Creatinine 1.9, essentially at baseline.  Patient will be admitted to the hospitalist service for further observation, diuresis as needed.  Subsequent ABG showed CO2 retention  patient will be placed on BiPAP and continued to be monitored.      Clinical Impression:     1. Atrial fibrillation with rapid ventricular response    2. SOB (shortness of breath)    3. Congestive heart failure, unspecified HF chronicity, unspecified heart failure type    4. Hypoxia              ED Disposition Condition    Observation                           Boubacar Mcneal II, MD  09/01/20 0551

## 2020-09-01 NOTE — ED TRIAGE NOTES
"Pt reports to ED via EMS from rehabilitation center with c/o SOB. Pt was discharged from our facility yesterday with acute respiratory failure. Pt states that she was on cpap and it felt like "hot air." Pt was 60% RA upon arrival and placed on 15 L nonrebreather, sat 94%. Pt has hx of CHF and COPD, HTN. Pt reports increased SOB when trying to sleep. Pt wears 3L NC at home. Pt has hx of AFIB, currently in AFIB. Pt denies chest pain, N,V,D, fever.   "

## 2020-09-01 NOTE — PROGRESS NOTES
Identified on Midnight batch report for pressure injury  70 y.o. female with history of HTN, CHF, and COPD who presents from nursing facility with complaint of shortness of breath. Patient reports experiencing SOB when trying to go to sleep.  Nursing home reported O2 sats in the 60s on room air. Patient states she is typically on 3 liters of O2 at home. She was seen here in the ED on 8/22, admitted to the hospital and was discharged 6 hours ago. She denies chest pain, palpitations, fever, chills, nausea or vomiting.  Of note, when the patient arrived, she was in atrial fibrillation with RVR.  She will be admitted for management of her atrial fibrillation with rapid ventricular response    Abdominal pannus intertriginous dermatitis has resolved. No rednes sor partial thickness skin breakdown noted. Will continue the antifungal powder as ordered before.   Unblanchable erythema to bridge of nose is now pink and blanches easily. Recommend use of foam during cpap/bipap application     09/01/20 1400        Wound 08/28/20 1130 Intertrigo Left lower Abdomen   Date First Assessed/Time First Assessed: 08/28/20 1130   Pre-existing: Yes  Primary Wound Type: Intertrigo  Side: Left  Orientation: lower  Location: Abdomen   Wound Image     Wound WDL WDL   Dressing Appearance Open to air;No dressing   Drainage Amount None   Drainage Characteristics/Odor No odor   Appearance Pink;Moist  (no skin breakdown or rash observed)   Periwound Area Intact   Care Cleansed with:;Soap and water  (will continue antifungal powder )        Altered Skin Integrity 08/28/20 1130 medial Nose Intact skin with non-blanchable redness of localized area   Date First Assessed/Time First Assessed: 08/28/20 1130   Altered Skin Integrity Present on Admission: suspected hospital acquired  Orientation: medial  Location: (c) Nose  Is this injury device related?: Yes  Description of Altered Skin Integrity: Int...   Wound Image    Description of Altered Skin  Integrity   (pink discoloration is now blanchable)   Dressing Appearance Open to air;No dressing   Drainage Amount None   Drainage Characteristics/Odor No odor   Appearance Pink   Tissue loss description Not applicable   Periwound Area Intact     Lety Mason RN CWON

## 2020-09-01 NOTE — H&P
[facility] Hospital Medicine  History & Physical    Patient Name: Patsy Morris  MRN: [id number]  Admission Date: 9/1/2020  Attending Physician: Ravi Soria MD   Primary Care Provider: Luisana Cartagena MD         Patient information was obtained from patient, past medical records and ER records.     Subjective:     Principal Problem:Atrial fibrillation with rapid ventricular response    Chief Complaint:   Chief Complaint   Patient presents with    Shortness of Breath     recently DC, informed EMS pt was in the 60s on roomair. 94% at 15 non rebreather.         HPI: The patient is a 70 y.o. female with history of HTN, CHF, and COPD who presents from nursing facility with complaint of shortness of breath. Patient reports experiencing SOB when trying to go to sleep.  Nursing home reported O2 sats in the 60s on room air. Patient states she is typically on 3 liters of O2 at home. She was seen here in the ED on 8/22, admitted to the hospital and was discharged 6 hours ago. She denies chest pain, palpitations, fever, chills, nausea or vomiting.  Of note, when the patient arrived, she was in atrial fibrillation with RVR.  She will be admitted for management of her atrial fibrillation with rapid ventricular response.    Past Medical History:   Diagnosis Date    Arthritis     Asthma     Atrial fibrillation     Atrial flutter     Cerumen impaction     CHF (congestive heart failure)     CKD (chronic kidney disease), stage III     Colon polyps 2017    COPD exacerbation     Encounter for blood transfusion     HEARING LOSS     Herpes genitalis     Hypertension     Renal carcinoma 3/10/2015    Thyroid nodule 6/8/2017       Past Surgical History:   Procedure Laterality Date    BRAIN SURGERY      COLONOSCOPY N/A 6/23/2017    Procedure: COLONOSCOPY;  Surgeon: Shane Sharma MD;  Location: [facility] ([address] [place] [address]);  Service: Endoscopy;  Laterality: N/A;  2nd floor case ; on 3L home O2       per  Dr Leopold (anesthesia)-Based on her history of:  Chronic respiratory failure with oxygen use, HDEZ, CHF, A-Fib, BHAVANI, it was determined that she should have her Colonoscopy scheduled on the 2nd Floor       ok to hold Eliquis 2 days prior to    EYE SURGERY      HYSTERECTOMY      kidney mass resection Right     renal carcinoma       Review of patient's allergies indicates:  No Known Allergies    Current Facility-Administered Medications on File Prior to Encounter   Medication    [DISCONTINUED] acetaminophen tablet 650 mg    [DISCONTINUED] acetaminophen tablet 650 mg    [DISCONTINUED] apixaban tablet 2.5 mg    [DISCONTINUED] atorvastatin tablet 80 mg    [DISCONTINUED] dextrose 50% injection 12.5 g    [DISCONTINUED] dextrose 50% injection 25 g    [DISCONTINUED] docusate sodium capsule 100 mg    [DISCONTINUED] escitalopram oxalate tablet 10 mg    [DISCONTINUED] glucagon (human recombinant) injection 1 mg    [DISCONTINUED] glucose chewable tablet 16 g    [DISCONTINUED] glucose chewable tablet 24 g    [DISCONTINUED] insulin aspart U-100 pen 0-5 Units    [DISCONTINUED] ipratropium 0.02 % nebulizer solution 0.5 mg    [DISCONTINUED] levalbuterol nebulizer solution 1.25 mg    [DISCONTINUED] melatonin tablet 6 mg    [DISCONTINUED] metoprolol tartrate (LOPRESSOR) tablet 50 mg    [DISCONTINUED] miconazole NITRATE 2 % top powder    [DISCONTINUED] ondansetron injection 4 mg    [DISCONTINUED] polyethylene glycol packet 17 g    [DISCONTINUED] sodium chloride 0.9% flush 10 mL    [DISCONTINUED] valACYclovir tablet 500 mg     Current Outpatient Medications on File Prior to Encounter   Medication Sig    acetaminophen (TYLENOL) 500 MG tablet Take 2 tablets (1,000 mg total) by mouth every 6 (six) hours as needed for Pain.    apixaban (ELIQUIS) 2.5 mg Tab TAKE 1 TABLET(2.5 MG) BY MOUTH TWICE DAILY    atorvastatin (LIPITOR) 80 MG tablet TAKE 1 TABLET BY MOUTH EVERY EVENING.    docusate sodium (COLACE) 100 MG  capsule Take 1 capsule (100 mg total) by mouth 2 (two) times daily.    escitalopram oxalate (LEXAPRO) 10 MG tablet TAKE ONE TABLET BY MOUTH EVERY DAY IN THE EVENING    furosemide (LASIX) 40 MG tablet Take 1 tablet (40 mg total) by mouth once daily.    miconazole NITRATE 2 % (MICOTIN) 2 % top powder Apply topically 2 (two) times daily.    albuterol-ipratropium 2.5mg-0.5mg/3mL (DUO-NEB) 0.5 mg-3 mg(2.5 mg base)/3 mL nebulizer solution Take 3 mLs by nebulization every 6 (six) hours as needed for Wheezing or Shortness of Breath.    blood sugar diagnostic (BLOOD GLUCOSE TEST) Strp Qd testing    ipratropium (ATROVENT) 0.02 % nebulizer solution Take 2.5 mLs (500 mcg total) by nebulization every 6 (six) hours. Rescue    levalbuterol (XOPENEX) 1.25 mg/0.5 mL nebulizer solution Take 0.5 mLs (1.25 mg total) by nebulization every 6 (six) hours. Rescue    metoprolol tartrate (LOPRESSOR) 50 MG tablet Take 1 tablet (50 mg total) by mouth 2 (two) times daily.    polyethylene glycol (GLYCOLAX) 17 gram PwPk Take 17 g by mouth daily as needed.    valACYclovir (VALTREX) 500 MG tablet Take 1 tablet (500 mg total) by mouth once daily.    walker Misc Prn use please fit pt     Family History     Problem Relation (Age of Onset)    Cancer Father    Hypertension Mother    Thyroid disease Mother        Tobacco Use    Smoking status: Current Every Day Smoker     Packs/day: 1.00     Years: 25.00     Pack years: 25.00     Types: Cigarettes    Smokeless tobacco: Never Used   Substance and Sexual Activity    Alcohol use: No     Alcohol/week: 0.0 standard drinks    Drug use: No    Sexual activity: Never     Birth control/protection: None     Review of Systems   Constitutional: Positive for activity change. Negative for appetite change and fever.   HENT: Negative for congestion, ear pain, rhinorrhea and sinus pressure.    Eyes: Negative for pain and discharge.   Respiratory: Positive for shortness of breath. Negative for cough, chest  tightness and wheezing.    Cardiovascular: Negative for chest pain and leg swelling.   Gastrointestinal: Negative for abdominal distention, abdominal pain, diarrhea, nausea and vomiting.   Endocrine: Negative for cold intolerance and heat intolerance.   Genitourinary: Negative for difficulty urinating, flank pain, frequency, hematuria and urgency.   Musculoskeletal: Positive for myalgias. Negative for arthralgias and joint swelling.   Allergic/Immunologic: Negative for environmental allergies and food allergies.   Neurological: Negative for dizziness, weakness, light-headedness and headaches.   Hematological: Does not bruise/bleed easily.   Psychiatric/Behavioral: Negative for agitation, behavioral problems and decreased concentration.     Objective:     Vital Signs (Most Recent):  Temp: 98.2 °F (36.8 °C) (09/01/20 0039)  Pulse: 99 (09/01/20 0427)  Resp: (!) 23 (09/01/20 0039)  BP: 112/77 (09/01/20 0401)  SpO2: (!) 90 % (09/01/20 0427) Vital Signs (24h Range):  Temp:  [98.2 °F (36.8 °C)-98.6 °F (37 °C)] 98.2 °F (36.8 °C)  Pulse:  [] 99  Resp:  [17-23] 23  SpO2:  [88 %-95 %] 90 %  BP: (105-118)/(59-77) 112/77     Weight: 99.8 kg (220 lb)  Body mass index is 38.97 kg/m².    Physical Exam  Constitutional:       Appearance: She is well-developed.   HENT:      Head: Normocephalic.   Eyes:      General:         Right eye: No discharge.         Left eye: No discharge.      Conjunctiva/sclera: Conjunctivae normal.   Neck:      Musculoskeletal: Normal range of motion and neck supple.   Cardiovascular:      Rate and Rhythm: Tachycardia present. Rhythm irregularly irregular.      Pulses:           Radial pulses are 1+ on the right side and 1+ on the left side.      Heart sounds: Normal heart sounds.   Pulmonary:      Effort: Pulmonary effort is normal. Tachypnea present. No respiratory distress.      Breath sounds: Examination of the right-lower field reveals decreased breath sounds. Examination of the left-lower field  reveals decreased breath sounds. Decreased breath sounds present.   Abdominal:      General: Bowel sounds are normal. There is no distension.      Palpations: Abdomen is soft.      Tenderness: There is no abdominal tenderness.   Musculoskeletal: Normal range of motion.   Skin:     General: Skin is warm and dry.   Neurological:      Mental Status: She is alert and oriented to person, place, and time.      GCS: GCS eye subscore is 4. GCS verbal subscore is 5. GCS motor subscore is 6.   Psychiatric:         Mood and Affect: Mood normal.         Speech: Speech normal.         Behavior: Behavior normal.             Significant Labs:   CBC:   Recent Labs   Lab 08/31/20 0446 09/01/20 0214   WBC 11.31 13.48*   HGB 10.4* 10.8*   HCT 33.4* 34.3*   * 143*     CMP:   Recent Labs   Lab 08/31/20 0446 09/01/20 0214    140   K 4.7 5.0   CL 96 97   CO2 34* 30*   * 140*   BUN 57* 56*   CREATININE 1.8* 1.9*   CALCIUM 8.7 8.7   ALBUMIN 3.2*  --    ANIONGAP 10 13   EGFRNONAA 28* 26*       Significant Imaging: I have reviewed all pertinent imaging results/findings within the past 24 hours.    Assessment/Plan:     * Atrial fibrillation with rapid ventricular response  Patient converted in ER with IV Cardizem    Continue Lopressor  Continue Apixaban  Cardiac monitoring    Acute on chronic respiratory failure with hypoxia and hypercapnia  Patient requires 3 liters of oxygen at home.  On 5 liters currently. Believe due to atrial fib with RVR. Patient is CO2 retainer, pH 7.2, CO2 94. Bipap for now            Chronic diastolic congestive heart failure  BNP slightly elevated. Believe due to episode of atrial fib with RVR    Monitor for acute decompensation      Dyslipidemia  Continue Lipitor      Essential hypertension  Hypotensive currently    Continue lopressor        VTE Risk Mitigation (From admission, onward)         Ordered     apixaban tablet 2.5 mg  2 times daily      09/01/20 0332     Reason for No  Pharmacological VTE Prophylaxis  Once     Question:  Reasons:  Answer:  Already adequately anticoagulated on oral Anticoagulants    09/01/20 0332     IP VTE HIGH RISK PATIENT  Once      09/01/20 0332     Place sequential compression device  Until discontinued      09/01/20 0332                   Darwin Cunningham NP  Department of Hospital Medicine   [facility]

## 2020-09-02 NOTE — ASSESSMENT & PLAN NOTE
Chronic Respiratory Failure on 3 liters of Oxygen at home - COPD, BHAVANI/ [facility].  Recent admission with re-admission - treated at that time with Afib-RVR with CHF and COPD exacerbation (s/p antibiotics and steroids).  Re-admitted with Afib and RVR.  pH 7.246, CO2 94.3, and O2 61 on admission.  WBC 13.48 on admission with LS.  COVID-19 rapid negative  Trop negative and   CXR on re-admission with Overall findings that may represent severe pulmonary edema versus diffuse infectious process/atypical pneumonia in the right clinical setting.    Placed on BiPAP on admission with slow improvement    Plan:  Continue BiPAP as needed  Wean O2 to keep Pox >90  Consult Pulmonary for evaluation and recommendations regarding need for repeat steroids/antibiotics  Check Procalcitonin

## 2020-09-02 NOTE — HOSPITAL COURSE
Patient is 70-year-old woman with history of hypertension, chronic hypoxic respiratory failure on home oxygen secondary to chronic obstructive pulmonary disease, chronic diastolic heart failure, chronic atrial fibrillation, and morbid obesity re-admitted to the hospital for treatment of acute on chronic hypoxic respiratory failure and atrial fibrillation with rapid ventricular response.  Patient was rate controlled with intravenous diltiazem and treated with noninvasive ventilation.  Pulmonary/critical care and cardiology services consulted and suspect her worsening respiratory failure due to combination of underlying lung disease and heart failure.  Patient clinically improved and she was transferred out of the intensive care unit on 9/6/2020.  Patient also met with the palliative care service and she decided to rescind her prior do not resuscitate order.  Patient clinically improved with better rate control of atrial fibrillation and also with further diuresis.  Patient with advanced kidney disease which remained stable with diuretic therapy.  Patient discharged to skilled nursing facility for further medical therapy, noninvasive ventilation at night and as needed, and for further physical and occupational therapy.  Close outpatient follow-up advised.

## 2020-09-02 NOTE — ASSESSMENT & PLAN NOTE
"- Patient previously expressed a desire to "enjoy life a little more".  Patient defines this by being able to leave her house and go to the mall or the grocery store. Patient reports not being able to do these activities due to worsening mobility.  When discussed the patient hopes and if they were not able to be obtained patient reported this would be an unacceptable outcome for her.   - Patient fears  if something were to happen to her who would care for her brother who has schizophrenia.  Patient reported she cared for her mother before she  and promised her mother she would care for her brother.  Emotional support provided.  Patient reports she has family/ friends who have been providing care for her brother  - Revisited goals of care.  Patient again reiterated her desire to regain her strength.  Spoke about patients increased shortness of breath.  Patient expressed the difficulty in being dependent on others.  Emotional support provided.  - Patient also expressed the difficulty in not being able to be the caregiver for others.  Patient reminisced about cooking for Thanksgiving dinners which she reports she will no longer be able to do.    - Patient previously completed LaPOST- DNR.  Will further discuss treatment desires and if she would desire artificial nutrition to complete LaPOST.    - Will follow along to further discus GOC and what patients wishes would be moving forward if she is unable to regain her strength due to SOB.  Patient appears profoundly more SOB from our encounter last week.     "

## 2020-09-02 NOTE — ASSESSMENT & PLAN NOTE
Had CLIFFORD on last admission and improved.  Now at baseline of 1.8.  K+ a little high at 5.2.  -Renally dose medication for CrCl of <30  -Avoid Nephrotoxic medications if able  -Monitor urine output  -Low Potassium diet  -Follow closely

## 2020-09-02 NOTE — HPI
Ms. Morris is a 70 y.o. female with PMH of HTN, asthma, afib, CHF, obesity, BHAVANI, CKD 3/4, COPD, who presented to [facility] ED after being discharged to SNF and was found to be SOB with sats in the 60s 6 hours after discharge.  Patient reports she is on oxygen at home (3L NC) and has to sleep in a recliner chair.  She reports worsening SOB with simple activities such as cleaning, showering, and cooking.       Patient does not have any children, she lives with her younger brother who has schizophrenia.  She is his primary caregiver.  Patient has 9 nieces and nephews who she helped raise who she is close to.     I saw the patient on 8/25/2020 prior to being discharged to SNF.

## 2020-09-02 NOTE — PLAN OF CARE
Received pt from the floor this afternoon. Currently on BIPAP, resting comfortably. Per Dr Dennison, OK to be on 5L NC when patient is eating or wide awake. Right after eating, pt reported SOB and asked to be placed back on BIPAP. Maintain O2 sats 88-92% per Dr Dennison. BP stable. AF and a flutter, rate controlled noted on tele. BP stable. Afebrile. Oriented x4. Purewick in place. Safety maintained throughout shift. Up to date with POC. Will continue to monitor.

## 2020-09-02 NOTE — CONSULTS
Consult Note  [facility]    Attending: Harjeet Casillas  Fellow: Tsering Dennison  Admit Date: 9/1/2020  Today's Date: 09/02/2020  Reason for Consult:  Acute Hypercapnic Respiratory Failure     SUBJECTIVE:     HPI:  Ms. Morris is a 71yo F with PMHx significant for HTN, HFpEF, Atrial Fibrillation, BHAVANI, COPD re-admitted to [facility] for shortness of breath. Pulmonary consulted for acute hypoxic and hypercapnic respiratory failure requiring NIVV. Of note Ms. Morris was admitted to [facility] 8/22/2020 for progressive shortness of breath. On admission she was somnolent and in atrial fibrillation with RVR. During her admission treated for COPD exacerbation with short course of steroids and bronchodilators, atrial fibrillation with metoprolol. Patient was discharged 8/31/2020 to SNF for rehab and was readmitted with above complaints. Patient has been on continuous BiPAP without significant improvement in her mentation or dyspnea.    Discussed baseline with niece -- patient lives at home brother and her sister and niece lives across. At baseline she toilets, bathes, and cooks for herself and administers her own medications. Niece reports limited mobility. She doesn't do a lot of standing can for only a limited amount. Uses a cane and a rolling walker at baseline with reported fall at home from standing. Has no HHA/HPT/HOT. Niece reports that she is supposed to wear oxygen and CPAP at night but she found it uncomfortable so does not wear it. Over the last few months she has been more lethargic and tired without complaints of shortness of breath. Oxygen at baseline 3L NC, current smoker 1pack/day. Denies A/D. No inhalers, injections.     Niece: Vicky Dexter [phone]     Review of patient's allergies indicates:  No Known Allergies    Past Medical History:   Diagnosis Date    Arthritis     Asthma     Atrial fibrillation     Atrial flutter     Cerumen impaction     CHF (congestive  heart failure)     CKD (chronic kidney disease), stage III     Colon polyps 2017    COPD exacerbation     Encounter for blood transfusion     HEARING LOSS     Herpes genitalis     Hypertension     Renal carcinoma 3/10/2015    Thyroid nodule 6/8/2017     Past Surgical History:   Procedure Laterality Date    BRAIN SURGERY      COLONOSCOPY N/A 6/23/2017    Procedure: COLONOSCOPY;  Surgeon: Shane Sharma MD;  Location: [facility] ([address] [place] [address]);  Service: Endoscopy;  Laterality: N/A;  2nd floor case ; on 3L home O2       per Dr Leopold (anesthesia)-Based on her history of:  Chronic respiratory failure with oxygen use, HDEZ, CHF, A-Fib, BHAVANI, it was determined that she should have her Colonoscopy scheduled on the 2nd Floor       ok to hold Eliquis 2 days prior to    EYE SURGERY      HYSTERECTOMY      kidney mass resection Right     renal carcinoma     Family History   Problem Relation Age of Onset    Hypertension Mother     Thyroid disease Mother     Cancer Father     Asthma Neg Hx     Emphysema Neg Hx     Melanoma Neg Hx      Social History     Tobacco Use    Smoking status: Current Every Day Smoker     Packs/day: 1.00     Years: 25.00     Pack years: 25.00     Types: Cigarettes    Smokeless tobacco: Never Used   Substance Use Topics    Alcohol use: No     Alcohol/week: 0.0 standard drinks    Drug use: No       All medications reviewed.    Review of Systems   Constitutional: Positive for malaise/fatigue. Negative for fever.   Respiratory: Positive for shortness of breath. Negative for wheezing.    Cardiovascular: Positive for orthopnea and leg swelling. Negative for chest pain and palpitations.   Gastrointestinal: Negative for nausea and vomiting.   All other systems reviewed and are negative.      OBJECTIVE:     Vital Signs Trends/Hx Reviewed  Vitals:    09/02/20 0749 09/02/20 0753 09/02/20 0800 09/02/20 1000   BP: 105/75      BP Location: Right arm      Patient Position: Lying      Pulse: (!) 112 110 (!)  116 90   Resp: 18 18     Temp: 98.1 °F (36.7 °C)      TempSrc: Oral      SpO2: (!) 93% (!) 92%     Weight:       Height:           Ventilator settings: BiPAP 10/5  Physical Exam:  General: Drowsy but easily rousable.  HEENT: AT/NC, PERRL, EOMI, oral and nasal mucosa moist.   Neck: Supple without JVD or palpable LAD.   Cardiac: normal rate, irregularly irregular rhythm, with no MRG with brisk cap refill and symmetric pulses in distal extremities.  Respiratory: Diminished breath sounds, no wheezing   Abdomen: Soft, NT/ND. +BS. No hepatosplenomegaly.   Extremities: +1 pitting edema  Neuro: Grossly intact to brief exam. Oriented x3 with appropriate mood/affect to situation.       Laboratory:  No results for input(s): PH, PCO2, PO2, HCO3, POCSATURATED, BE in the last 24 hours.  Recent Labs   Lab 09/02/20  0510   WBC 11.08   RBC 3.80*   HGB 10.5*   HCT 34.5*   *   MCV 91   MCH 27.6   MCHC 30.4*     Recent Labs   Lab 09/02/20  0510      K 5.2*   CL 96   CO2 35*   BUN 58*   CREATININE 1.8*   MG 2.9*       Microbiology Data:   Microbiology Results (last 7 days)     ** No results found for the last 168 hours. **           Chest Imaging:   No new imaging.     Infusions:        Scheduled Medications:    apixaban  2.5 mg Oral BID    atorvastatin  80 mg Oral QHS    docusate sodium  100 mg Oral BID    escitalopram oxalate  10 mg Oral QHS    furosemide  40 mg Oral Daily    metoprolol tartrate  50 mg Oral BID    miconazole NITRATE 2 %   Topical (Top) BID       PRN Medications:   acetaminophen, ondansetron, sodium chloride 0.9%    Assessment & Plan:   Patient Active Problem List   Diagnosis    Impacted cerumen of both ears    URI (upper respiratory infection)    Ear fullness    Renal insufficiency    Essential hypertension    Renal carcinoma    Rhinitis, allergic    Acute kidney injury    Hypokalemia    Dyslipidemia    Tobacco abuse    Chronic kidney disease (CKD) stage G4/A1, severely decreased  glomerular filtration rate (GFR) between 15-29 mL/min/1.73 square meter and albuminuria creatinine ratio less than 30 mg/g    Morbid obesity    Hypoxia    Chronic hypercapnic respiratory failure    Typical atrial flutter    Chronic diastolic congestive heart failure    CRI (chronic renal insufficiency)    Acute on chronic respiratory failure with hypoxia and hypercapnia    Paroxysmal atrial fibrillation    Suspected sleep apnea    Obstructive sleep apnea    Sleep-related hypoventilation    Gastrointestinal hemorrhage associated with gastritis    Thyroid nodule    Gout of right foot    Knee arthropathy    Long term current use of anticoagulant    Pulmonary nodules/lesions, multiple    Urinary incontinence without sensory awareness    Supplemental oxygen dependent    Current moderate episode of major depressive disorder without prior episode    Acute respiratory failure with hypoxia and hypercapnia    Encounter for palliative care    Debility    Atrial fibrillation with rapid ventricular response       ASSESSMENT & RECOMMENDATIONS     Neurologic   MDD? On escitalopram qHS. Would avoid benzodiazepines given increased mortality in elderly patients.    No indication for sedation however if dyssynchrony with NIVV can consider Precedex noting that it has the potential to potentate tachyarrhythmias given her underlying atrial fibrillation    No chronic opioid use, would use prn acetaminophen for pain control as indicated    Current smoker, would resume varenicline to assist with smoking cessation    Cardiovascular   Hx of HFpEF, Atrial Fibrillation on Eliquis. Admitted with dyspnea and Afib with RVR    Continue with metoprolol BID for rate control and apixaban for anticoagulation    BNP this admission 577 with normal troponins; EKG with Afib with RVR but no new ST changes concerning for ACS   Baseline BNP 200S-300s and was 369 during recent admission. Of note patient also had home Lasix  increased prior to 1st admission due to dyspnea, orthopnea and peripheral edema   Would diurese with furosemide 40gm IV BID with strict I&Os, daily weights, and fluid restriction to 1800cc/daily     Respiratory   Patient has a history of reported COPD and chronic hypoxic respiratory failure on 3L NC at baseline and BHAVANI non-compliant with home CPAP 2/2 intolerance of mask.    Although patient is a current smoker, most recent PFTs in EMR 2016 with no obstruction, restriction and decreased DLCO   Likely that patient has BHAVANI/OHS given her BMI 41 and noted apnea   Continue BiPAP as her current respiratory failure is likely due to both HFpEF and BHAVANI/OHS. Obtain ABG and change BiPAP to 15/10   CXR with increased interstitial and airspace opacities with probably small bilateral pleural effusions. Formal read with ?RLL cavitation, consider CT Chest once patient is no longer requiring continuous BiPAP     Gastrointestinal   NPO for now; will reassess for the need of enteral feeding if weaning from NIVV is not possible.    No indication for stress ulcer prophylaxis    Renal/Genitourinary   CKD III at baseline, Cr currently at baseline    Continue with strict I&Os, avoid mIVF and nephrotoxic agents    Hematologic   Normocytic anemia. H/H stable today at 10.5/34.5  Transfuse for Hgb <7 or <8 in setting of ACS.    DVT prophylaxis: Apixaban     Endocrine   Glucose control with blood glucose target of 140 to 180 mg/dL    Infectious Disease   No suspicion for active infection. Noted procal is 0.3 which is not elevated as compared to previous .26 during this admission    Defer antibiotics at this time      Thank you for allowing us to participate in the care of this patient. We will follow along. Please call with questions.    Tsering Dennison M.D., PGY-V  [facility]/Critical Care Fellow

## 2020-09-02 NOTE — PROGRESS NOTES
[facility] Hospital Medicine  Progress Note    Patient Name: Patsy Morris  MRN: [id number]  Patient Class: OP- Observation   Admission Date: 9/1/2020  Length of Stay: 0 days  Attending Physician: Ravi Soria MD  Primary Care Provider: Luisana Cartagena MD        Subjective:     Principal Problem:Atrial fibrillation with rapid ventricular response        HPI:  The patient is a 70 y.o. female with history of HTN, CHF, and COPD who presents from nursing facility with complaint of shortness of breath. Patient reports experiencing SOB when trying to go to sleep.  Nursing home reported O2 sats in the 60s on room air. Patient states she is typically on 3 liters of O2 at home. She was seen here in the ED on 8/22, admitted to the hospital and was discharged 6 hours ago. She denies chest pain, palpitations, fever, chills, nausea or vomiting.  Of note, when the patient arrived, she was in atrial fibrillation with RVR.  She will be admitted for management of her atrial fibrillation with rapid ventricular response.    Overview/Hospital Course:  See below    Interval History: Patient feeling a little better since admission.  Was able to come off BiPAP some yesterday, but feels her breathing is better on it.  No chest pain.  No significant cough.      Review of Systems   Constitutional: Positive for activity change. Negative for appetite change and fever.   HENT: Negative for congestion, ear pain, rhinorrhea and sinus pressure.    Eyes: Negative for pain and discharge.   Respiratory: Positive for shortness of breath. Negative for cough, chest tightness and wheezing.    Cardiovascular: Negative for chest pain and leg swelling.   Gastrointestinal: Negative for abdominal distention, abdominal pain, diarrhea, nausea and vomiting.   Endocrine: Negative for cold intolerance and heat intolerance.   Genitourinary: Negative for difficulty urinating, flank pain, frequency, hematuria and urgency.    Musculoskeletal: Negative for arthralgias, joint swelling and myalgias.   Allergic/Immunologic: Negative for environmental allergies and food allergies.   Neurological: Negative for dizziness, weakness, light-headedness and headaches.   Hematological: Does not bruise/bleed easily.   Psychiatric/Behavioral: Negative for agitation, behavioral problems and decreased concentration.     Objective:     Vital Signs (Most Recent):  Temp: 97.6 °F (36.4 °C) (09/02/20 0328)  Pulse: 103 (09/02/20 0600)  Resp: (!) 23 (09/02/20 0328)  BP: 123/64 (09/02/20 0328)  SpO2: (!) 94 % (09/02/20 0328) Vital Signs (24h Range):  Temp:  [96.9 °F (36.1 °C)-98.2 °F (36.8 °C)] 97.6 °F (36.4 °C)  Pulse:  [] 103  Resp:  [16-25] 23  SpO2:  [92 %-95 %] 94 %  BP: (110-129)/(62-70) 123/64     Weight: 105.5 kg (232 lb 9.4 oz)  Body mass index is 41.2 kg/m².    Intake/Output Summary (Last 24 hours) at 9/2/2020 0746  Last data filed at 9/2/2020 0200  Gross per 24 hour   Intake 180 ml   Output 100 ml   Net 80 ml      Physical Exam  Constitutional:       Appearance: She is well-developed.   HENT:      Head: Normocephalic.   Eyes:      General:         Right eye: No discharge.         Left eye: No discharge.      Conjunctiva/sclera: Conjunctivae normal.   Neck:      Musculoskeletal: Normal range of motion and neck supple.   Cardiovascular:      Rate and Rhythm: Normal rate. Rhythm irregularly irregular.      Pulses:           Radial pulses are 1+ on the right side and 1+ on the left side.      Heart sounds: Normal heart sounds.   Pulmonary:      Effort: Pulmonary effort is normal. Tachypnea present. No respiratory distress.      Breath sounds: Decreased breath sounds present.   Abdominal:      General: Bowel sounds are normal. There is no distension.      Palpations: Abdomen is soft.      Tenderness: There is no abdominal tenderness.   Musculoskeletal: Normal range of motion.   Skin:     General: Skin is warm and dry.   Neurological:      Mental  Status: She is alert and oriented to person, place, and time.      GCS: GCS eye subscore is 4. GCS verbal subscore is 5. GCS motor subscore is 6.   Psychiatric:         Mood and Affect: Mood normal.         Speech: Speech normal.         Behavior: Behavior normal.         Significant Labs: All pertinent labs within the past 24 hours have been reviewed.    Significant Imaging: I have reviewed all pertinent imaging results/findings within the past 24 hours.      Assessment/Plan:      * Atrial fibrillation with rapid ventricular response   in ED and rate controlled with IV Cardizem  Home Meds:  Metoprolol 50mg bid and Apixaban 2.5mg bid  Followed by Cardiology, Dr. Ramirez.  -Continue current medications  -Telemetry    Acute on chronic respiratory failure with hypoxia and hypercapnia  Chronic Respiratory Failure on 3 liters of Oxygen at home - COPD, BHAVANI/ OHS.  Recent admission with re-admission - treated at that time with Afib-RVR with CHF and COPD exacerbation (s/p antibiotics and steroids).  Re-admitted with Afib and RVR.  pH 7.246, CO2 94.3, and O2 61 on admission.  WBC 13.48 on admission with LS.  COVID-19 rapid negative  Trop negative and   CXR on re-admission with Overall findings that may represent severe pulmonary edema versus diffuse infectious process/atypical pneumonia in the right clinical setting.    Placed on BiPAP on admission with slow improvement    Plan:  Continue BiPAP as needed  Wean O2 to keep Pox >90  Consult Pulmonary for evaluation and recommendations regarding need for repeat steroids/antibiotics  Check Procalcitonin                Chronic kidney disease (CKD) stage G4/A1, severely decreased glomerular filtration rate (GFR) between 15-29 mL/min/1.73 square meter and albuminuria creatinine ratio less than 30 mg/g  Had CLIFFORD on last admission and improved.  Now at baseline of 1.8.  K+ a little high at 5.2.  -Renally dose medication for CrCl of <30  -Avoid Nephrotoxic medications if  able  -Monitor urine output  -Low Potassium diet  -Follow closely      Chronic diastolic congestive heart failure  Home Meds:  Lasix 40mg qday    TTE on 8/24/2020 - Mild left atrial enlargement.  · Diastolic pattern consistent with atrial fibrillation observed.  · Normal left ventricular systolic function. The estimated ejection fraction is 58%.  · No wall motion abnormalities.  · Normal right ventricular systolic function.  · Mild right atrial enlargement.  Small posterior pericardial effusion.    BNP elevated at 577 on admission most likely due to episode of Atrial fib with RVR  Followed by Cardiology  -Continue Lasix  -Continue Bipap as above        Morbid obesity  Patient counseled on the importance of weight loss by changing diet and increased physical activity.      Dyslipidemia  Continue Lipitor      Essential hypertension  Continue Metoprolol and Lasix.  BP stable.      VTE Risk Mitigation (From admission, onward)         Ordered     apixaban tablet 2.5 mg  2 times daily      09/01/20 0332     Reason for No Pharmacological VTE Prophylaxis  Once     Question:  Reasons:  Answer:  Already adequately anticoagulated on oral Anticoagulants    09/01/20 0332     IP VTE HIGH RISK PATIENT  Once      09/01/20 0332     Place sequential compression device  Until discontinued      09/01/20 0332                Discharge Planning   YUDELKA:      Code Status: DNR   Is the patient medically ready for discharge?:     Reason for patient still in hospital (select all that apply): Patient unstable, Patient trending condition and Treatment  Discharge Plan A: Skilled Nursing Facility                  Ravi Sorai MD  Department of Hospital Medicine   [facility]

## 2020-09-02 NOTE — ASSESSMENT & PLAN NOTE
- Patient was discharged to SNF.  PT/OT ordered  - Patient desires to regain independence and return to SNF

## 2020-09-02 NOTE — SUBJECTIVE & OBJECTIVE
Interval History: Patient feeling a little better since admission.  Was able to come off BiPAP some yesterday, but feels her breathing is better on it.  No chest pain.  No significant cough.      Review of Systems   Constitutional: Positive for activity change. Negative for appetite change and fever.   HENT: Negative for congestion, ear pain, rhinorrhea and sinus pressure.    Eyes: Negative for pain and discharge.   Respiratory: Positive for shortness of breath. Negative for cough, chest tightness and wheezing.    Cardiovascular: Negative for chest pain and leg swelling.   Gastrointestinal: Negative for abdominal distention, abdominal pain, diarrhea, nausea and vomiting.   Endocrine: Negative for cold intolerance and heat intolerance.   Genitourinary: Negative for difficulty urinating, flank pain, frequency, hematuria and urgency.   Musculoskeletal: Negative for arthralgias, joint swelling and myalgias.   Allergic/Immunologic: Negative for environmental allergies and food allergies.   Neurological: Negative for dizziness, weakness, light-headedness and headaches.   Hematological: Does not bruise/bleed easily.   Psychiatric/Behavioral: Negative for agitation, behavioral problems and decreased concentration.     Objective:     Vital Signs (Most Recent):  Temp: 97.6 °F (36.4 °C) (09/02/20 0328)  Pulse: 103 (09/02/20 0600)  Resp: (!) 23 (09/02/20 0328)  BP: 123/64 (09/02/20 0328)  SpO2: (!) 94 % (09/02/20 0328) Vital Signs (24h Range):  Temp:  [96.9 °F (36.1 °C)-98.2 °F (36.8 °C)] 97.6 °F (36.4 °C)  Pulse:  [] 103  Resp:  [16-25] 23  SpO2:  [92 %-95 %] 94 %  BP: (110-129)/(62-70) 123/64     Weight: 105.5 kg (232 lb 9.4 oz)  Body mass index is 41.2 kg/m².    Intake/Output Summary (Last 24 hours) at 9/2/2020 0746  Last data filed at 9/2/2020 0200  Gross per 24 hour   Intake 180 ml   Output 100 ml   Net 80 ml      Physical Exam  Constitutional:       Appearance: She is well-developed.   HENT:      Head:  Normocephalic.   Eyes:      General:         Right eye: No discharge.         Left eye: No discharge.      Conjunctiva/sclera: Conjunctivae normal.   Neck:      Musculoskeletal: Normal range of motion and neck supple.   Cardiovascular:      Rate and Rhythm: Normal rate. Rhythm irregularly irregular.      Pulses:           Radial pulses are 1+ on the right side and 1+ on the left side.      Heart sounds: Normal heart sounds.   Pulmonary:      Effort: Pulmonary effort is normal. Tachypnea present. No respiratory distress.      Breath sounds: Decreased breath sounds present.   Abdominal:      General: Bowel sounds are normal. There is no distension.      Palpations: Abdomen is soft.      Tenderness: There is no abdominal tenderness.   Musculoskeletal: Normal range of motion.   Skin:     General: Skin is warm and dry.   Neurological:      Mental Status: She is alert and oriented to person, place, and time.      GCS: GCS eye subscore is 4. GCS verbal subscore is 5. GCS motor subscore is 6.   Psychiatric:         Mood and Affect: Mood normal.         Speech: Speech normal.         Behavior: Behavior normal.         Significant Labs: All pertinent labs within the past 24 hours have been reviewed.    Significant Imaging: I have reviewed all pertinent imaging results/findings within the past 24 hours.

## 2020-09-02 NOTE — NURSING
"Palliative Care Daily Progress Note:  Chief Complaint   Patient presents with    Shortness of Breath       recently DC, informed EMS pt was in the 60s on roomair. 94% at 15 non rebreather.    This is a 70 y.o. female with history of HTN, CHF, and COPD who presents from nursing facility via EMS with complaint of shortness of breath. Patient reports experiencing SOB when trying to go to sleep. Per EMS, nursing home reported O2 sats in the 60s on room air. Patient states she is typically on 3 liters of O2 at home. She was seen here in the ED on 8/22, admitted to the hospital and was discharged 6 hours ago. She denies chest pain, palpitations, fever, chills, nausea or vomiting. Patient is unsure if the nursing staff gave her all of her home medications. She has a history of afib. She has not smoked a cigarette since 9 days ago.   The history is provided by the patient.   Initial Vitals   BP Pulse Resp Temp SpO2   09/01/20 0037 09/01/20 0039 09/01/20 0033 09/01/20 0039 09/01/20 0031   116/73 (!) 132 (!) 21 98.2 °F (36.8 °C) (!) 88 %        Admission Weight/BMI & Noted Weight/BMI in EMR:    105.5 kg (232 lb 9.4 oz)/ 41.20 kg/m²Abnormal      Date: Weight in kg (lbs): BMI: Height:   8/20/2020 102.5 kg (226 lb) 37.61 kg/m²    5' 3" (160 cm)   6/27/2018 100.2 kg (221 lb) 39.1 kg/m²    5' 3" (160 cm)   2/23/2016 108.665 kg (239 lb 9 oz)  43.43 kg/m²    5' 3" (160 cm)     Family History      Problem Relation (Age of Onset)     Cancer Father     Hypertension Mother     Thyroid disease Mother         Per Dr. Soria dated 9/2/20 at 0814:  HPI:  The patient is a 70 y.o. female with history of HTN, CHF, and COPD who presents from nursing facility with complaint of shortness of breath. Patient reports experiencing SOB when trying to go to sleep.  Nursing home reported O2 sats in the 60s on room air. Patient states she is typically on 3 liters of O2 at home. She was seen here in the ED on 8/22, admitted to the hospital and was " discharged 6 hours ago. She denies chest pain, palpitations, fever, chills, nausea or vomiting.  Of note, when the patient arrived, she was in atrial fibrillation with RVR.  She will be admitted for management of her atrial fibrillation with rapid ventricular response.     Overview/Hospital Course:  See below     Interval History: Patient feeling a little better since admission.  Was able to come off BiPAP some yesterday, but feels her breathing is better on it.  No chest pain.  No significant cough.        S:   Medical record reviewed, followed up with Dr. Soria regarding patient's condition. Physician's Plan of Care Goal is GOC and AD.     B:   Code Status: DNR   Advanced Directives:      Yes/No/Unknown Date Completed: In EMR:   HCPOA Yes 8/24/20 Yes   Living Will Unknown N/A No   LaPOST Yes 8/24/20 Yes   LaPOST completed on 8/24/20 states Pt is a DNR. Section B and C of original LaPOST not completed. Pt had requested more time to think about decisions.    Family/Support:  1st HCPOA: Earlene Sotelo ([phone])  2nd HCPOA: Vicky Sotelo ([phone])  Marital: Single  Spiritual Designation:  Yazidi    Labs:   Ref Range & Units 8/22/20 8/31/20   SARS-CoV-2 RNA, Amplification, Qual Negative Negative  Negative        CBC W/ AUTO DIFFERENTIAL - Abnormal; Notable for the following components:       Result Value      WBC 13.48 (*)       RBC 3.86 (*)       Hemoglobin 10.8 (*)       Hematocrit 34.3 (*)       Mean Corpuscular Hemoglobin Conc 31.5 (*)       RDW 15.4 (*)       Platelets 143 (*)       Gran # (ANC) 11.3 (*)       Immature Grans (Abs) 0.05 (*)       Lymph # 0.6 (*)       Mono # 1.5 (*)       Gran% 83.7 (*)       Lymph% 4.6 (*)       All other components within normal limits   BASIC METABOLIC PANEL - Abnormal; Notable for the following components:     CO2 30 (*)       Glucose 140 (*)       BUN, Bld 56 (*)       Creatinine 1.9 (*)       eGFR if  30 (*)       eGFR if non   26 (*)       All other components within normal limits   B-TYPE NATRIURETIC PEPTIDE - Abnormal; Notable for the following components:      (*)        Diagnostics:   20 XR CHEST AP PORTABLE:   Heart is enlarged but stable in size.  There is diffuse bilateral mixed airspace and interstitial opacification.  Consolidative changes are seen within the right mid lung zone and bilateral lower lung zones.  Questionable cavitation is seen involving region of consolidation within the right mid lung zone, similar to previous exam.  Overall findings may represent severe pulmonary edema versus diffuse infectious process/atypical pneumonia in the right clinical setting.  Similar findings were seen on most recent radiograph.  Possible small bilateral pleural effusions.  No evidence of pneumothorax or large pleural effusion.    20 EK bpm.Atrial fibrillation with rapid ventricular response with premature ventricular or aberrantly conducted complexes.Nonspecific ST and T wave abnormality. Abnormal ECG    20 TTE:   · Mild left atrial enlargement.  · Diastolic pattern consistent with atrial fibrillation observed.  · Normal left ventricular systolic function. The estimated ejection fraction is 58%.  · No wall motion abnormalities.  · Normal right ventricular systolic function.  · Mild right atrial enlargement.  · Small posterior pericardial effusion.     A:   Patient Symptom Assessment Flowsheet to be completed by Palliative Care NP.  Last noted Bm in EMR on 20.    Discharge Planning:   Per  note dated 20 at 1222  Discharge Plan A Skilled Nursing Facility     Current cognitive status: Alert/Oriented   Current Functional Status: Assistive Equipment   Lives With facility resident     DME Needed Upon Discharge  CPAP   Initial Discharge Planning Assessment:  Patient admitted on 20  Chart reviewed, Care plan discussed with treatment team,  attending Dr Soria   PCP updated in Epic: at  [facility]  Pharmacy, updated in Epic: at [facility]     DME at home: n/a  Current dispo: pending    cardiology consulted and following  PT , OT ?  Will need SNF again when stable  Reports she wants to go to a different SNF, was not pleased with the care  Case management  to follow    R:   Support offered at this time.   Palliative Care Team will continue to follow patient.     Cha Deluna

## 2020-09-02 NOTE — CONSULTS
"[facility]  Palliative Medicine  Consult Note    Patient Name: Patsy Morris  MRN: [id number]  Admission Date: 9/1/2020  Hospital Length of Stay: 0 days  Code Status: DNR   Attending Provider: Ravi Soria MD  Consulting Provider: Elsa Bran DNP  Primary Care Physician: Luisana Cartagena MD  Principal Problem:Atrial fibrillation with rapid ventricular response    Patient information was obtained from patient, past medical records and ER records.      Consults   Reason for consult: goals of care/ advance care planning    Assessment/Plan:     Debility  - Patient was discharged to SNF.  PT/OT ordered  - Patient desires to regain independence and return to SNF    Encounter for palliative care  - Patient previously expressed a desire to "enjoy life a little more".  Patient defines this by being able to leave her house and go to the mall or the grocery store. Patient reports not being able to do these activities due to weakness/ debility.  When discussing the patients hopes and if they were not able to be obtained patient reported this would be an unacceptable outcome for her. Patient reports being bed ridden and dependent on others would be unacceptable.   - Patient fears  if something were to happen to her who would care for her brother who has schizophrenia.  Emotional support provided. Patient expressed the difficulty in caring for her brother.  Patient reports she has family/ friends who have been providing care for her brother since she has been hospitalized.    - Revisited goals of care.  Patient again reiterated her desire to regain her strength.  Spoke about patients increased shortness of breath.  Patient expressed the difficulty in being dependent on others now.  Emotional support provided.  - Patient also expressed the difficulty in not being able to be the caregiver for her family.  Patient reminisced about cooking for Thanksgiving dinners.   - Patient previously " completed LaPOST- DNR.  Will further discuss treatment desires and if she would desire artificial nutrition   - Will follow along to further discus GOC and what patients wishes would be moving forward if she is unable to regain her strength due to SOB.  Patient appears profoundly more SOB with talking from my encounter last week.       Acute on chronic respiratory failure with hypoxia and hypercapnia  - Patient appears comfortable on bipap.  Very SOB with speaking on Nasal cannula        Thank you for your consult. I will follow-up with patient. Please contact us if you have any additional questions.    Subjective:     HPI:   Ms. Morris is a 70 y.o. female with PMH of HTN, asthma, afib, CHF, obesity, BHAVANI, CKD 3/4, COPD, who presented to [facility] ED after being discharged to SNF and was found to be SOB with sats in the 60s 6 hours after discharge.  Patient reports she is on oxygen at home (3L NC) and has to sleep in a recliner chair.  She reports worsening SOB with simple activities such as cleaning, showering, and cooking.       Patient does not have any children, she lives with her younger brother who has schizophrenia.  She is his primary caregiver.  Patient has 9 nieces and nephews who she helped raise who she is close to.     I saw the patient on 8/25/2020 prior to being discharged to SNF.      Hospital Course:  No notes on file    Interval History: Patient found on bipap, placed on nasal cannula.  Very SOB with talking.     Past Medical History:   Diagnosis Date    Arthritis     Asthma     Atrial fibrillation     Atrial flutter     Cerumen impaction     CHF (congestive heart failure)     CKD (chronic kidney disease), stage III     Colon polyps 2017    COPD exacerbation     Encounter for blood transfusion     HEARING LOSS     Herpes genitalis     Hypertension     Renal carcinoma 3/10/2015    Thyroid nodule 6/8/2017       Past Surgical History:   Procedure Laterality Date    BRAIN SURGERY       COLONOSCOPY N/A 6/23/2017    Procedure: COLONOSCOPY;  Surgeon: Shane Sharma MD;  Location: [facility] ([address] [place] [address]);  Service: Endoscopy;  Laterality: N/A;  2nd floor case ; on 3L home O2       per Dr Leopold (anesthesia)-Based on her history of:  Chronic respiratory failure with oxygen use, HDEZ, CHF, A-Fib, BHAVANI, it was determined that she should have her Colonoscopy scheduled on the 2nd Floor       ok to hold Eliquis 2 days prior to    EYE SURGERY      HYSTERECTOMY      kidney mass resection Right     renal carcinoma       Review of patient's allergies indicates:  No Known Allergies    Medications:  Continuous Infusions:  Scheduled Meds:   apixaban  2.5 mg Oral BID    atorvastatin  80 mg Oral QHS    docusate sodium  100 mg Oral BID    escitalopram oxalate  10 mg Oral QHS    furosemide (LASIX) IV  40 mg Intravenous Q12H    metoprolol tartrate  50 mg Oral BID    miconazole NITRATE 2 %   Topical (Top) BID     PRN Meds:acetaminophen, ondansetron, sodium chloride 0.9%    Family History     Problem Relation (Age of Onset)    Cancer Father    Hypertension Mother    Thyroid disease Mother        Tobacco Use    Smoking status: Current Every Day Smoker     Packs/day: 1.00     Years: 25.00     Pack years: 25.00     Types: Cigarettes    Smokeless tobacco: Never Used   Substance and Sexual Activity    Alcohol use: No     Alcohol/week: 0.0 standard drinks    Drug use: No    Sexual activity: Never     Birth control/protection: None       Review of Systems   Constitutional: Positive for activity change and fatigue. Negative for appetite change and fever.   HENT: Negative for sinus pressure and sore throat.    Eyes: Negative for pain and visual disturbance.   Respiratory: Positive for shortness of breath. Negative for cough and chest tightness.    Cardiovascular: Negative for chest pain and palpitations.   Gastrointestinal: Negative for abdominal pain, constipation, diarrhea and nausea.   Musculoskeletal: Negative for gait  "problem and myalgias.   Neurological: Negative for dizziness, seizures, syncope, weakness, light-headedness and headaches.   Psychiatric/Behavioral: Negative for agitation, behavioral problems and hallucinations. The patient is not nervous/anxious.      Objective:     Vital Signs (Most Recent):  Temp: 98.3 °F (36.8 °C) (09/02/20 1235)  Pulse: 96 (09/02/20 1450)  Resp: (!) 33 (09/02/20 1450)  BP: 114/66 (09/02/20 1440)  SpO2: (!) 88 % (09/02/20 1450) Vital Signs (24h Range):  Temp:  [97.4 °F (36.3 °C)-98.3 °F (36.8 °C)] 98.3 °F (36.8 °C)  Pulse:  [] 96  Resp:  [16-33] 33  SpO2:  [88 %-95 %] 88 %  BP: (105-129)/(62-79) 114/66     Weight: 105.5 kg (232 lb 9.4 oz)  Body mass index is 41.2 kg/m².    Admission Weight/BMI & Noted Weight/BMI in EMR:     105.5 kg (232 lb 9.4 oz)/ 41.20 kg/m²Abnormal       Date: Weight in kg (lbs): BMI: Height:   8/20/2020 102.5 kg (226 lb) 37.61 kg/m²    5' 3" (160 cm)   6/27/2018 100.2 kg (221 lb) 39.1 kg/m²    5' 3" (160 cm)   2/23/2016 108.665 kg (239 lb 9 oz)  43.43 kg/m²    5' 3" (160 cm)       Physical Exam  Constitutional:       General: She is not in acute distress.     Appearance: She is well-developed. She is ill-appearing (chronically ill appearing).   Neck:      Musculoskeletal: Normal range of motion.   Cardiovascular:      Rate and Rhythm: Tachycardia present. Rhythm irregular.      Heart sounds: No murmur.   Pulmonary:      Effort: Tachypnea present.      Breath sounds: Decreased breath sounds present.   Chest:      Chest wall: No tenderness.   Abdominal:      General: Bowel sounds are normal.   Musculoskeletal: Normal range of motion.      Right lower leg: No edema.      Left lower leg: No edema.   Skin:     General: Skin is warm.   Neurological:      Mental Status: She is alert and oriented to person, place, and time.   Psychiatric:         Thought Content: Thought content normal.         Judgment: Judgment normal.         Advance Care Planning   Review of " Symptoms    Symptom Assessment (ESAS 0-10 Scale)  Pain:  0  Dyspnea:  5  Anxiety:  2  Nausea:  0  Depression:  0  Anorexia:  0  Fatigue:  3  Insomnia:  0  Restlessness:  0  Agitation:  0     CAM / Delirium:  Negative  Constipation:  Negative  Diarrhea:  Negative          Performance Status:  70    ECOG Performance Status Grade:  1 - Ambulates, capable of light work    Advanced Directives:  LaPOST:  Yes (DNR)    Do Not Resuscitate Status:  Yes    Medical Power of : Yes     Agent's Name:  Earlene Dexter.  Agent's Contact Number:  [phone]    Decision Making:  Patient answered questions    Living Arrangements:  Lives with family    Psychosocial/Cultural:  Patient lives with her brother, who she is primary caregiver for.  Patient has a sister and niece who are able to help her at home if needed.     Family/Support:  1st HCPOA: Earlene Sotelo ([phone])  2nd HCPOA: Vicky Sotelo ([phone])       Significant Labs: All pertinent labs within the past 24 hours have been reviewed.  CBC:   Recent Labs   Lab 09/02/20  0510   WBC 11.08   HGB 10.5*   HCT 34.5*   MCV 91   *     BMP:  Recent Labs   Lab 09/02/20  0510   GLU 99      K 5.2*   CL 96   CO2 35*   BUN 58*   CREATININE 1.8*   CALCIUM 8.9   MG 2.9*     LFT:  Lab Results   Component Value Date    AST 14 09/02/2020    ALKPHOS 36 (L) 09/02/2020    BILITOT 0.8 09/02/2020     Albumin:   Albumin   Date Value Ref Range Status   09/02/2020 3.5 3.5 - 5.2 g/dL Final     Protein:   Total Protein   Date Value Ref Range Status   09/02/2020 6.5 6.0 - 8.4 g/dL Final     Lactic acid:   No results found for: LACTATE    Ref Range & Units 8/22/20 8/31/20    SARS-CoV-2 RNA, Amplification, Qual Negative Negative  Negative      Significant Imaging: I have reviewed all pertinent imaging results/findings within the past 24 hours.     I have reviewed the Chest x ray from 9/1/2020    I have reviewed the EKG from 9/1/2020    I have reviewed the TTE from  8/24/2020 (EF 58%)    Discussed with Dr. Soria and case management.  Will follow up with patient and further discuss GOC pending her status.     > 50% of 60 min visit spent in chart review, face to face discussion of goals of care,  symptom assessment, coordination of care and emotional support.    Elsa Bran, HOLLIE  Palliative Medicine  [facility]

## 2020-09-02 NOTE — SUBJECTIVE & OBJECTIVE
Interval History: Patient found on bipap, placed on nasal cannula.  Very SOB with talking.     Past Medical History:   Diagnosis Date    Arthritis     Asthma     Atrial fibrillation     Atrial flutter     Cerumen impaction     CHF (congestive heart failure)     CKD (chronic kidney disease), stage III     Colon polyps 2017    COPD exacerbation     Encounter for blood transfusion     HEARING LOSS     Herpes genitalis     Hypertension     Renal carcinoma 3/10/2015    Thyroid nodule 6/8/2017       Past Surgical History:   Procedure Laterality Date    BRAIN SURGERY      COLONOSCOPY N/A 6/23/2017    Procedure: COLONOSCOPY;  Surgeon: Shane Sharma MD;  Location: [facility] ([address] [place] [address]);  Service: Endoscopy;  Laterality: N/A;  2nd floor case ; on 3L home O2       per Dr Leopold (anesthesia)-Based on her history of:  Chronic respiratory failure with oxygen use, HDEZ, CHF, A-Fib, BHAVANI, it was determined that she should have her Colonoscopy scheduled on the 2nd Floor       ok to hold Eliquis 2 days prior to    EYE SURGERY      HYSTERECTOMY      kidney mass resection Right     renal carcinoma       Review of patient's allergies indicates:  No Known Allergies    Medications:  Continuous Infusions:  Scheduled Meds:   apixaban  2.5 mg Oral BID    atorvastatin  80 mg Oral QHS    docusate sodium  100 mg Oral BID    escitalopram oxalate  10 mg Oral QHS    furosemide (LASIX) IV  40 mg Intravenous Q12H    metoprolol tartrate  50 mg Oral BID    miconazole NITRATE 2 %   Topical (Top) BID     PRN Meds:acetaminophen, ondansetron, sodium chloride 0.9%    Family History     Problem Relation (Age of Onset)    Cancer Father    Hypertension Mother    Thyroid disease Mother        Tobacco Use    Smoking status: Current Every Day Smoker     Packs/day: 1.00     Years: 25.00     Pack years: 25.00     Types: Cigarettes    Smokeless tobacco: Never Used   Substance and Sexual Activity    Alcohol use: No     Alcohol/week: 0.0  "standard drinks    Drug use: No    Sexual activity: Never     Birth control/protection: None       Review of Systems   Constitutional: Positive for activity change and fatigue. Negative for appetite change and fever.   HENT: Negative for sinus pressure and sore throat.    Eyes: Negative for pain and visual disturbance.   Respiratory: Positive for shortness of breath. Negative for cough and chest tightness.    Cardiovascular: Negative for chest pain and palpitations.   Gastrointestinal: Negative for abdominal pain, constipation, diarrhea and nausea.   Musculoskeletal: Negative for gait problem and myalgias.   Neurological: Negative for dizziness, seizures, syncope, weakness, light-headedness and headaches.   Psychiatric/Behavioral: Negative for agitation, behavioral problems and hallucinations. The patient is not nervous/anxious.      Objective:     Vital Signs (Most Recent):  Temp: 98.3 °F (36.8 °C) (09/02/20 1235)  Pulse: 96 (09/02/20 1450)  Resp: (!) 33 (09/02/20 1450)  BP: 114/66 (09/02/20 1440)  SpO2: (!) 88 % (09/02/20 1450) Vital Signs (24h Range):  Temp:  [97.4 °F (36.3 °C)-98.3 °F (36.8 °C)] 98.3 °F (36.8 °C)  Pulse:  [] 96  Resp:  [16-33] 33  SpO2:  [88 %-95 %] 88 %  BP: (105-129)/(62-79) 114/66     Weight: 105.5 kg (232 lb 9.4 oz)  Body mass index is 41.2 kg/m².    Admission Weight/BMI & Noted Weight/BMI in EMR:     105.5 kg (232 lb 9.4 oz)/ 41.20 kg/m²Abnormal       Date: Weight in kg (lbs): BMI: Height:   8/20/2020 102.5 kg (226 lb) 37.61 kg/m²    5' 3" (160 cm)   6/27/2018 100.2 kg (221 lb) 39.1 kg/m²    5' 3" (160 cm)   2/23/2016 108.665 kg (239 lb 9 oz)  43.43 kg/m²    5' 3" (160 cm)       Physical Exam  Constitutional:       General: She is not in acute distress.     Appearance: She is well-developed. She is ill-appearing (chronically ill appearing).   Neck:      Musculoskeletal: Normal range of motion.   Cardiovascular:      Rate and Rhythm: Tachycardia present. Rhythm irregular.      Heart " sounds: No murmur.   Pulmonary:      Effort: Tachypnea present.      Breath sounds: Decreased breath sounds present.   Chest:      Chest wall: No tenderness.   Abdominal:      General: Bowel sounds are normal.   Musculoskeletal: Normal range of motion.      Right lower leg: No edema.      Left lower leg: No edema.   Skin:     General: Skin is warm.   Neurological:      Mental Status: She is alert and oriented to person, place, and time.   Psychiatric:         Thought Content: Thought content normal.         Judgment: Judgment normal.         Advance Care Planning   Review of Symptoms    Symptom Assessment (ESAS 0-10 Scale)  Pain:  0  Dyspnea:  5  Anxiety:  2  Nausea:  0  Depression:  0  Anorexia:  0  Fatigue:  3  Insomnia:  0  Restlessness:  0  Agitation:  0     CAM / Delirium:  Negative  Constipation:  Negative  Diarrhea:  Negative          Performance Status:  70    ECOG Performance Status Grade:  1 - Ambulates, capable of light work    Advanced Directives:  LaPOST:  Yes (DNR)    Do Not Resuscitate Status:  Yes    Medical Power of : Yes     Agent's Name:  Earlene Dexter.  Agent's Contact Number:  [phone]    Decision Making:  Patient answered questions    Living Arrangements:  Lives with family    Psychosocial/Cultural:  Patient lives with her brother, who she is primary caregiver for.  Patient has a sister and niece who are able to help her at home if needed.     Family/Support:  1st HCPOA: Earlene Sotelo ([phone])  2nd HCPOA: Vicky Hughesin ([phone])       Significant Labs: All pertinent labs within the past 24 hours have been reviewed.  CBC:   Recent Labs   Lab 09/02/20  0510   WBC 11.08   HGB 10.5*   HCT 34.5*   MCV 91   *     BMP:  Recent Labs   Lab 09/02/20  0510   GLU 99      K 5.2*   CL 96   CO2 35*   BUN 58*   CREATININE 1.8*   CALCIUM 8.9   MG 2.9*     LFT:  Lab Results   Component Value Date    AST 14 09/02/2020    ALKPHOS 36 (L) 09/02/2020    BILITOT 0.8  09/02/2020     Albumin:   Albumin   Date Value Ref Range Status   09/02/2020 3.5 3.5 - 5.2 g/dL Final     Protein:   Total Protein   Date Value Ref Range Status   09/02/2020 6.5 6.0 - 8.4 g/dL Final     Lactic acid:   No results found for: LACTATE    Ref Range & Units 8/22/20 8/31/20    SARS-CoV-2 RNA, Amplification, Qual Negative Negative  Negative      Significant Imaging: I have reviewed all pertinent imaging results/findings within the past 24 hours.     I have reviewed the Chest x ray from 9/1/2020    I have reviewed the EKG from 9/1/2020    I have reviewed the TTE from 8/24/2020 (EF 58%)

## 2020-09-02 NOTE — ASSESSMENT & PLAN NOTE
Home Meds:  Lasix 40mg qday    TTE on 8/24/2020 - Mild left atrial enlargement.  · Diastolic pattern consistent with atrial fibrillation observed.  · Normal left ventricular systolic function. The estimated ejection fraction is 58%.  · No wall motion abnormalities.  · Normal right ventricular systolic function.  · Mild right atrial enlargement.  Small posterior pericardial effusion.    BNP elevated at 577 on admission most likely due to episode of Atrial fib with RVR  Followed by Cardiology  -Continue Lasix  -Continue Bipap as above

## 2020-09-02 NOTE — PLAN OF CARE
Pt remained free of falls and injuries throughout shift. AAOx4. Pt calm and cooperative. Purposeful rounding performed. Pt swallows meds whole. IV flushed and saline locked. Managed c/o 6/10 headache pain with PRN Tylenol. Patient ambulates with x1 assistance to BSC. Pure wick in place to suction draining clear yellow urine. VSS on 4L O2 per NC. Bed low and locked, call light in reach. Side rails up x2. Will continue to monitor.

## 2020-09-02 NOTE — CONSULTS
Palliative Care Progress Note:      Consult received. Discussed with referring provider. Will review EMR. Full consult with Palliative Care NP to follow.

## 2020-09-02 NOTE — ASSESSMENT & PLAN NOTE
in ED and rate controlled with IV Cardizem  Home Meds:  Metoprolol 50mg bid and Apixaban 2.5mg bid  Followed by Cardiology, Dr. Ramirez.  -Continue current medications  -Telemetry

## 2020-09-02 NOTE — PLAN OF CARE
Discharge Planning Assessment:  Re assessment note:    Patient admitted on 8-31-20  Chart reviewed, Care plan discussed with treatment team,  attending Dr Soria   PCP updated in Epic: at [facility]  Pharmacy, updated in Epic: at [facility]     DME at home: n/a  Current dispo: moved to ICU bed 4  cardiology consulted and following  PT , OT to follow also  Will need SNF again when stable  Reports she wants to go to a different SNF, was not pleased with the care at [facility]  Case management  to follow       09/02/20 5938   Discharge Reassessment   Assessment Type Discharge Planning Reassessment   Provided patient/caregiver education on the expected discharge date and the discharge plan Yes   Do you have any problems affording any of your prescribed medications? No   Discharge Plan A Skilled Nursing Facility

## 2020-09-02 NOTE — NURSING
Report given to MARIUSZ Dumont. Transferred to ICU. Phone, phone , and glasses given to ICU nurse. Patient stable.

## 2020-09-03 PROBLEM — Z51.5 ENCOUNTER FOR PALLIATIVE CARE: Status: RESOLVED | Noted: 2020-01-01 | Resolved: 2020-01-01

## 2020-09-03 PROBLEM — R53.81 DEBILITY: Status: RESOLVED | Noted: 2020-01-01 | Resolved: 2020-01-01

## 2020-09-03 NOTE — PROGRESS NOTES
[facility] Hospital Medicine  Progress Note    Patient Name: Patsy Morris  MRN: [id number]  Patient Class: IP- Inpatient   Admission Date: 9/1/2020  Length of Stay: 1 days  Attending Physician: Ravi Soria MD  Primary Care Provider: Luisana Cartagena MD        Subjective:     Principal Problem:Atrial fibrillation with rapid ventricular response        HPI:  The patient is a 70 y.o. female with history of HTN, CHF, and COPD who presents from nursing facility with complaint of shortness of breath. Patient reports experiencing SOB when trying to go to sleep.  Nursing home reported O2 sats in the 60s on room air. Patient states she is typically on 3 liters of O2 at home. She was seen here in the ED on 8/22, admitted to the hospital and was discharged 6 hours ago. She denies chest pain, palpitations, fever, chills, nausea or vomiting.  Of note, when the patient arrived, she was in atrial fibrillation with RVR.  She will be admitted for management of her atrial fibrillation with rapid ventricular response.    Overview/Hospital Course:  The patient is a 70 y.o. female with history of HTN, CHF, and COPD who presents from nursing facility with complaint of shortness of breath. Patient reports experiencing SOB when trying to go to sleep.  Nursing home reported O2 sats in the 60s on room air. Patient states she is typically on 3 liters of O2 at home. She was seen here in the ED on 8/22, admitted to the hospital and treated for COPD exacerbation and CHF.  She was discharged 6 hours prior to re-admission. She denies chest pain, palpitations, fever, chills, nausea or vomiting.  Of note, when the patient arrived, she was in atrial fibrillation with RVR.  She will be admitted for management of her atrial fibrillation with rapid ventricular response.  She was rate controlled with IV Diltiazem in the ED and placed on BiPAP.  She was transferred to ICU on 9/3/2020 given inability to wean off BiPAP.   Pulmonary-CC and Cardiology consulted.      Interval History: Patient on BiPAP this morning in ICU.  Overall stable and feeling a little better.      Review of Systems   Constitutional: Positive for activity change. Negative for appetite change and fever.   HENT: Negative for congestion, ear pain, rhinorrhea and sinus pressure.    Eyes: Negative for pain and discharge.   Respiratory: Positive for shortness of breath. Negative for cough, chest tightness and wheezing.    Cardiovascular: Negative for chest pain and leg swelling.   Gastrointestinal: Negative for abdominal distention, abdominal pain, diarrhea, nausea and vomiting.   Endocrine: Negative for cold intolerance and heat intolerance.   Genitourinary: Negative for difficulty urinating, flank pain, frequency, hematuria and urgency.   Musculoskeletal: Negative for arthralgias, joint swelling and myalgias.   Allergic/Immunologic: Negative for environmental allergies and food allergies.   Neurological: Negative for dizziness, weakness, light-headedness and headaches.   Hematological: Does not bruise/bleed easily.   Psychiatric/Behavioral: Negative for agitation, behavioral problems and decreased concentration.     Objective:     Vital Signs (Most Recent):  Temp: 97.8 °F (36.6 °C) (09/03/20 1110)  Pulse: 72 (09/03/20 1110)  Resp: (!) 22 (09/03/20 1110)  BP: (!) 112/58 (09/03/20 1040)  SpO2: (!) 91 % (09/03/20 1110) Vital Signs (24h Range):  Temp:  [97.3 °F (36.3 °C)-98.4 °F (36.9 °C)] 97.8 °F (36.6 °C)  Pulse:  [] 72  Resp:  [22-34] 22  SpO2:  [88 %-98 %] 91 %  BP: (105-135)/(58-74) 112/58     Weight: 103.6 kg (228 lb 6.3 oz)  Body mass index is 40.46 kg/m².    Intake/Output Summary (Last 24 hours) at 9/3/2020 1150  Last data filed at 9/3/2020 0900  Gross per 24 hour   Intake 360 ml   Output 625 ml   Net -265 ml      Physical Exam  Constitutional:       Appearance: She is well-developed.   HENT:      Head: Normocephalic.   Eyes:      General:         Right  eye: No discharge.         Left eye: No discharge.      Conjunctiva/sclera: Conjunctivae normal.   Neck:      Musculoskeletal: Normal range of motion and neck supple.   Cardiovascular:      Rate and Rhythm: Normal rate. Rhythm irregularly irregular.      Pulses:           Radial pulses are 1+ on the right side and 1+ on the left side.      Heart sounds: Normal heart sounds.   Pulmonary:      Effort: Pulmonary effort is normal. Tachypnea present. No respiratory distress.      Breath sounds: Decreased breath sounds present.   Abdominal:      General: Bowel sounds are normal. There is no distension.      Palpations: Abdomen is soft.      Tenderness: There is no abdominal tenderness.   Musculoskeletal: Normal range of motion.   Skin:     General: Skin is warm and dry.   Neurological:      Mental Status: She is alert and oriented to person, place, and time.      GCS: GCS eye subscore is 4. GCS verbal subscore is 5. GCS motor subscore is 6.   Psychiatric:         Mood and Affect: Mood normal.         Speech: Speech normal.         Behavior: Behavior normal.         Significant Labs: All pertinent labs within the past 24 hours have been reviewed.    Significant Imaging: I have reviewed all pertinent imaging results/findings within the past 24 hours.      Assessment/Plan:      * Atrial fibrillation with rapid ventricular response   in ED and rate controlled with IV Cardizem  Home Meds:  Metoprolol 50mg bid and Apixaban 2.5mg bid  Followed by Cardiology, Dr. Ramirez.  -Continue current medications  -Telemetry    Acute on chronic respiratory failure with hypoxia and hypercapnia  Chronic Respiratory Failure on 3 liters of Oxygen at home - COPD, BHAVANI/ OHS.  Recent admission/discharge with - treated at that time with Afib-RVR with CHF and COPD exacerbation (s/p antibiotics and steroids).  Re-admitted with Afib and RVR and SOB.  Placed on BiPAP on admission with slow improvement and transferred to ICU on  "9/2/2020.  Appreciate Pulmonary-CC care.  Appreciate Palliative Care consult    CXR on re-admission with Overall findings that may represent severe pulmonary edema versus diffuse infectious process/atypical pneumonia in the right clinical setting.      Labs on Admission:  pH 7.246, CO2 94.3, and O2 61   WBC 13.48 on admission with LS.  COVID-19 rapid negative  Trop negative     Procalcitonin 0.30    Seen by Pulmonary-CC - "Ms. Morris was transferred to ICU today secondary to acute on chronic hypercapnic respiratory failure necessitating continuous NIPPV.  This is her second hospitalization for decompensated CHF (diastolic + likely cor pulmonale) with atrial fibrillation/rapid ventricular rate.  She has known chronic hypercapnia with baseline PaCO2 in the 70s.  Past work up has included PFTs showing severe restriction with normal DLCO; chest CT with mosaic pattern, multiple ground glass nodules, and no subpleural honeycombing or bronchiectasis; and sleep study with severe BHAVANI (AHI around 40) and severe desaturation (often below 80%) despite high CPAP/BiPAP.  It is not clear how compliant she is with her home positive pressure use.  I'm also not sure whether she has CPAP or BiPAP.  Given the degree of hypoventilation, I think that BiPAP (or even Trilogy) would be better for her.  She is Do Not Intubate code status based on prior conversations with other team members".     Plan:  Continue BiPAP as needed  Wean O2 to keep Pox >90  Lasix increased to 40mg IV q12  Erich                Chronic kidney disease (CKD) stage G4/A1, severely decreased glomerular filtration rate (GFR) between 15-29 mL/min/1.73 square meter and albuminuria creatinine ratio less than 30 mg/g  Had CLIFFORD on last admission and improved.  Now at baseline of 1.8.  K+ was a little high at 5.2, but better today at 4.9.  -Renally dose medication for CrCl of <30  -Avoid Nephrotoxic medications if able  -Monitor urine output  -Low Potassium " diet  -Follow closely      Chronic diastolic congestive heart failure  Home Meds:  Lasix 40mg qday    TTE on 8/24/2020 - Mild left atrial enlargement.  · Diastolic pattern consistent with atrial fibrillation observed.  · Normal left ventricular systolic function. The estimated ejection fraction is 58%.  · No wall motion abnormalities.  · Normal right ventricular systolic function.  · Mild right atrial enlargement.  Small posterior pericardial effusion.    BNP elevated at 577 on admission most likely due to episode of Atrial fib with RVR  Followed by Cardiology  -Continue Lasix - increased to 40mg q12   -Continue Bipap as above        Morbid obesity  Patient counseled on the importance of weight loss by changing diet and increased physical activity.      Dyslipidemia  Continue Lipitor      Essential hypertension  Continue Metoprolol and Lasix.  BP stable.        VTE Risk Mitigation (From admission, onward)         Ordered     apixaban tablet 2.5 mg  2 times daily      09/01/20 0332     Reason for No Pharmacological VTE Prophylaxis  Once     Question:  Reasons:  Answer:  Already adequately anticoagulated on oral Anticoagulants    09/01/20 0332     IP VTE HIGH RISK PATIENT  Once      09/01/20 0332     Place sequential compression device  Until discontinued      09/01/20 0332                Discharge Planning   YUDELKA:      Code Status: DNR   Is the patient medically ready for discharge?:     Reason for patient still in hospital (select all that apply): Patient unstable, Patient trending condition, Treatment and Consult recommendations  Discharge Plan A: Skilled Nursing Facility                  Ravi Soria MD  Department of Hospital Medicine   [facility]

## 2020-09-03 NOTE — ASSESSMENT & PLAN NOTE
"Chronic Respiratory Failure on 3 liters of Oxygen at home - COPD, BHAVANI/ OHS.  Recent admission/discharge with - treated at that time with Afib-RVR with CHF and COPD exacerbation (s/p antibiotics and steroids).  Re-admitted with Afib and RVR and SOB.  Placed on BiPAP on admission with slow improvement and transferred to ICU on 9/2/2020.  Appreciate Pulmonary-CC care.  Appreciate Palliative Care consult    CXR on re-admission with Overall findings that may represent severe pulmonary edema versus diffuse infectious process/atypical pneumonia in the right clinical setting.      Labs on Admission:  pH 7.246, CO2 94.3, and O2 61   WBC 13.48 on admission with LS.  COVID-19 rapid negative  Trop negative     Procalcitonin 0.30    Seen by Pulmonary-CC - "Ms. Morris was transferred to ICU today secondary to acute on chronic hypercapnic respiratory failure necessitating continuous NIPPV.  This is her second hospitalization for decompensated CHF (diastolic + likely cor pulmonale) with atrial fibrillation/rapid ventricular rate.  She has known chronic hypercapnia with baseline PaCO2 in the 70s.  Past work up has included PFTs showing severe restriction with normal DLCO; chest CT with mosaic pattern, multiple ground glass nodules, and no subpleural honeycombing or bronchiectasis; and sleep study with severe BHAVANI (AHI around 40) and severe desaturation (often below 80%) despite high CPAP/BiPAP.  It is not clear how compliant she is with her home positive pressure use.  I'm also not sure whether she has CPAP or BiPAP.  Given the degree of hypoventilation, I think that BiPAP (or even Trilogy) would be better for her.  She is Do Not Intubate code status based on prior conversations with other team members".     Plan:  Continue BiPAP as needed  Wean O2 to keep Pox >90  Lasix increased to 40mg IV q12  DuoNebs              "

## 2020-09-03 NOTE — SUBJECTIVE & OBJECTIVE
Interval History: Patient on BiPAP this morning in ICU.  Overall stable and feeling a little better.      Review of Systems   Constitutional: Positive for activity change. Negative for appetite change and fever.   HENT: Negative for congestion, ear pain, rhinorrhea and sinus pressure.    Eyes: Negative for pain and discharge.   Respiratory: Positive for shortness of breath. Negative for cough, chest tightness and wheezing.    Cardiovascular: Negative for chest pain and leg swelling.   Gastrointestinal: Negative for abdominal distention, abdominal pain, diarrhea, nausea and vomiting.   Endocrine: Negative for cold intolerance and heat intolerance.   Genitourinary: Negative for difficulty urinating, flank pain, frequency, hematuria and urgency.   Musculoskeletal: Negative for arthralgias, joint swelling and myalgias.   Allergic/Immunologic: Negative for environmental allergies and food allergies.   Neurological: Negative for dizziness, weakness, light-headedness and headaches.   Hematological: Does not bruise/bleed easily.   Psychiatric/Behavioral: Negative for agitation, behavioral problems and decreased concentration.     Objective:     Vital Signs (Most Recent):  Temp: 97.8 °F (36.6 °C) (09/03/20 1110)  Pulse: 72 (09/03/20 1110)  Resp: (!) 22 (09/03/20 1110)  BP: (!) 112/58 (09/03/20 1040)  SpO2: (!) 91 % (09/03/20 1110) Vital Signs (24h Range):  Temp:  [97.3 °F (36.3 °C)-98.4 °F (36.9 °C)] 97.8 °F (36.6 °C)  Pulse:  [] 72  Resp:  [22-34] 22  SpO2:  [88 %-98 %] 91 %  BP: (105-135)/(58-74) 112/58     Weight: 103.6 kg (228 lb 6.3 oz)  Body mass index is 40.46 kg/m².    Intake/Output Summary (Last 24 hours) at 9/3/2020 1150  Last data filed at 9/3/2020 0900  Gross per 24 hour   Intake 360 ml   Output 625 ml   Net -265 ml      Physical Exam  Constitutional:       Appearance: She is well-developed.   HENT:      Head: Normocephalic.   Eyes:      General:         Right eye: No discharge.         Left eye: No  discharge.      Conjunctiva/sclera: Conjunctivae normal.   Neck:      Musculoskeletal: Normal range of motion and neck supple.   Cardiovascular:      Rate and Rhythm: Normal rate. Rhythm irregularly irregular.      Pulses:           Radial pulses are 1+ on the right side and 1+ on the left side.      Heart sounds: Normal heart sounds.   Pulmonary:      Effort: Pulmonary effort is normal. Tachypnea present. No respiratory distress.      Breath sounds: Decreased breath sounds present.   Abdominal:      General: Bowel sounds are normal. There is no distension.      Palpations: Abdomen is soft.      Tenderness: There is no abdominal tenderness.   Musculoskeletal: Normal range of motion.   Skin:     General: Skin is warm and dry.   Neurological:      Mental Status: She is alert and oriented to person, place, and time.      GCS: GCS eye subscore is 4. GCS verbal subscore is 5. GCS motor subscore is 6.   Psychiatric:         Mood and Affect: Mood normal.         Speech: Speech normal.         Behavior: Behavior normal.         Significant Labs: All pertinent labs within the past 24 hours have been reviewed.    Significant Imaging: I have reviewed all pertinent imaging results/findings within the past 24 hours.

## 2020-09-03 NOTE — PROGRESS NOTES
Pt received on BIPAP 15/10 35%. 0728 ABG was done;results were shown to Pulm CC Dr. Dennison. POC discussed about getting CO2 down; informed Dr. Dennison that pt was previously on AVAPS 450 Vt, RR 22, Max P 28, Min P 10 and EPAP +5. Dr. Dennison informed RT to continue with AVAPS. Pt BIPAP settings were changed. Pt placed back on BIPAP with documented settings. Q8 treatments were ordered. No other changes were made. Will continue to monitor.

## 2020-09-03 NOTE — PROGRESS NOTES
Cardiology  Progress Notes            Subjective: Feels better.  Denies breathlessness or palpitations.      She has a longstanding history of hypertension, chronic obstructive lung disease, chronic atrial fibrillation, history of renal cell carcinoma having had a nephrectomy, chronic renal failure.  She has been cigarette smoker, smoked as recently as 2 weeks ago.  She has had heart failure with a preserved left ventricular ejection fraction, diastolic left ventricular dysfunction.  She has a history of hypercapnic respiratory failure, obstructive sleep apnea.     Vital Signs:  Vitals:    09/03/20 1110 09/03/20 1140 09/03/20 1226 09/03/20 1240   BP:  132/63  117/65   Pulse: 72 70 72 72   Resp: (!) 22 (!) 21 (!) 21 (!) 22   Temp: 97.8 °F (36.6 °C)      TempSrc: Axillary      SpO2: (!) 91% (!) 92% (!) 90% (!) 94%   Weight:       Height:           Physical Exam: Chest clear  Cor: Irregularly irregular.  No gallop  Ext: warm.    Laboratory:  CBC:   Recent Labs   Lab 09/03/20  0316   WBC 9.36   RBC 3.62*   HGB 10.2*   HCT 33.1*   *   MCV 91   MCH 28.2   MCHC 30.8*     BMP:   Recent Labs   Lab 09/03/20  0316         K 4.9   CL 97   CO2 36*   BUN 55*   CREATININE 1.8*   CALCIUM 8.7   MG 2.8*     CMP:   Recent Labs   Lab 09/03/20  0316      CALCIUM 8.7   ALBUMIN 3.2*   PROT 6.1      K 4.9   CO2 36*   CL 97   BUN 55*   CREATININE 1.8*   ALKPHOS 37*   ALT 14   AST 12   BILITOT 0.7     LFTs:   Recent Labs   Lab 09/03/20  0316   ALT 14   AST 12   ALKPHOS 37*   BILITOT 0.7   PROT 6.1   ALBUMIN 3.2*     Coagulation: No results for input(s): PT, INR, APTT in the last 168 hours.  Cardiac markers:   Recent Labs   Lab 09/01/20  1354   TROPONINI 0.021       Imaging:  X-Ray Chest AP Portable   Final Result      Abnormal chest radiograph as above.         Electronically signed by: Mateus Rao MD   Date:    09/01/2020   Time:    01:24            Problems:      Hypercapnic respiratory failure  Advanced  obstructive lung disease  Obstructive sleep apnea  Chronic atrial fibrillation  Hypertensive heart disease  Heart failure with preserved ejection fraction  Diastolic left ventricular dysfunction  Chronic renal failure, status post nephrectomy for hypernephroma         wonder if she is going to be able to take adequate care of herself at home, and that she would not benefit from going to a SNF?      Plan of Care: See Orders          Edis Ramirez

## 2020-09-03 NOTE — PLAN OF CARE
VSS. Mostly on BIPAP throughout the day, on 4L NC while eating and when sitting up in the chair wide awake. Sat in the chair for a few hours today, tolerated well. However, SOB noted with mild exertion. Oriented x4. Purewick in place, although does not catch all the urine. Pt adequately voiding. Up to date with POC. Safety maintained throughout shift. Will continue to monitor.

## 2020-09-03 NOTE — NURSING
Patient on BiPap during the night except to eat late dinner.  Becomes short of breath with mild exertion, turning over and repositioning. Incontinent of bowel and bladder, one brown creamy bowel movement this a.m. All VSS. Free from falls and injury.

## 2020-09-03 NOTE — PROGRESS NOTES
Asleep with Bi pap mask in place.   Bridge of nose is padded with Aquacel lite foam dressing to prevent medical device related skin injury.  Lety Mason RN CWON

## 2020-09-03 NOTE — ASSESSMENT & PLAN NOTE
Home Meds:  Lasix 40mg qday    TTE on 8/24/2020 - Mild left atrial enlargement.  · Diastolic pattern consistent with atrial fibrillation observed.  · Normal left ventricular systolic function. The estimated ejection fraction is 58%.  · No wall motion abnormalities.  · Normal right ventricular systolic function.  · Mild right atrial enlargement.  Small posterior pericardial effusion.    BNP elevated at 577 on admission most likely due to episode of Atrial fib with RVR  Followed by Cardiology  -Continue Lasix - increased to 40mg q12   -Continue Bipap as above

## 2020-09-03 NOTE — PLAN OF CARE
Patient in no apparent distress. Patient received on BIPAP with settings as documented. FIO2 weaned to 35%. Sat's  90-94 % on 35%. Patient given short break for food. On 4 lpm while eating. Returned to BIPAP . Will continue to monitor.

## 2020-09-03 NOTE — PROGRESS NOTES
Progress Note    Attending: Harjeet Casillas MD  Fellow: Sharyn Morejon  Admit Date: 9/1/2020  Today's Date: 09/03/2020      SUBJECTIVE:   Patient seen and examined. Alert and awake. Reports improvement of shortness of breath.      OBJECTIVE:     Vital Signs Trends/Hx Reviewed  Vitals:    09/03/20 0640 09/03/20 0705 09/03/20 0736 09/03/20 0740   BP: 118/72   123/67   Pulse: 70 70 70 70   Resp: (!) 22 (!) 22 (!) 23 (!) 23   Temp:  97.3 °F (36.3 °C)     TempSrc:  Axillary     SpO2: (!) 92% (!) 93% (!) 94% (!) 93%   Weight:       Height:             Physical Exam:  General: NAD  HEENT: AT/NC, PERRL, EOMI, oral and nasal mucosa moist.   Neck: Supple without JVD or palpable LAD.   Cardiac: normal rate, regular rhythm  Respiratory:decreased breath sounds, no wheezing. No increased work of breathing on Bipap  Abdomen: Soft, NT/ND. +BS. No hepatosplenomegaly.   Neuro: Grossly intact to brief exam. Oriented x3 with appropriate mood/affect to situation.       Laboratory:  Recent Labs   Lab 09/02/20  1246   PH 7.275*   PCO2 90.4*   PO2 67*   HCO3 42.0*   POCSATURATED 88*   BE 15     Recent Labs   Lab 09/03/20  0316   WBC 9.36   RBC 3.62*   HGB 10.2*   HCT 33.1*   *   MCV 91   MCH 28.2   MCHC 30.8*     Recent Labs   Lab 09/03/20  0316      K 4.9   CL 97   CO2 36*   BUN 55*   CREATININE 1.8*   MG 2.8*       Microbiology Data:   Microbiology Results (last 7 days)     ** No results found for the last 168 hours. **                   Scheduled Medications:    albuterol-ipratropium  3 mL Nebulization Q8H    apixaban  2.5 mg Oral BID    atorvastatin  80 mg Oral QHS    docusate sodium  100 mg Oral BID    escitalopram oxalate  10 mg Oral QHS    furosemide (LASIX) IV  40 mg Intravenous Q12H    metoprolol tartrate  50 mg Oral BID    miconazole NITRATE 2 %   Topical (Top) BID       PRN Medications:   acetaminophen, melatonin, ondansetron, sodium chloride 0.9%    Problem  List:   Patient Active Problem List   Diagnosis    Impacted cerumen of both ears    URI (upper respiratory infection)    Ear fullness    Renal insufficiency    Essential hypertension    Renal carcinoma    Rhinitis, allergic    Acute kidney injury    Hypokalemia    Dyslipidemia    Tobacco abuse    Chronic kidney disease (CKD) stage G4/A1, severely decreased glomerular filtration rate (GFR) between 15-29 mL/min/1.73 square meter and albuminuria creatinine ratio less than 30 mg/g    Morbid obesity    Hypoxia    Chronic hypercapnic respiratory failure    Typical atrial flutter    Chronic diastolic congestive heart failure    CRI (chronic renal insufficiency)    Acute on chronic respiratory failure with hypoxia and hypercapnia    Paroxysmal atrial fibrillation    Suspected sleep apnea    Obstructive sleep apnea    Sleep-related hypoventilation    Gastrointestinal hemorrhage associated with gastritis    Thyroid nodule    Gout of right foot    Knee arthropathy    Long term current use of anticoagulant    Pulmonary nodules/lesions, multiple    Urinary incontinence without sensory awareness    Supplemental oxygen dependent    Current moderate episode of major depressive disorder without prior episode    Acute respiratory failure with hypoxia and hypercapnia    Encounter for palliative care    Debility    Atrial fibrillation with rapid ventricular response    Acute hypercapnic respiratory failure due to obstructive sleep apnea       ASSESSMENT & RECOMMENDATIONS   Neurologic  ·  On escitalopram qHS for possible MDD      Cardiovascular  · Heart Failure with preserved ejection fraction. On Furosemide 40 BID  Atrial Fibrillation. Continue Metoprolol and Apixaban.  - on Eliquis at home.     Respiratory  - Chronic hypercapnic respiratory failure.  - AM ABG unchanged- Uncompensated respiratory acidosis  -  Previously on BIPAP 15/10. Will adjust to AVAPS   -  Continue Duonebs q8 hours  - plan to  consult case management and discuss home CPAP settings and arrange for equipment     Gastrointestinal  · Tolerating regular diet      Renal/Genitourinary  · Stage III CKD. Creatinine at baseline levels at baseline  · Continue with strict I&Os     Hematologic  · Normocytic anemia.  - DVT prophylaxis: Apixaban            Sharyn Morejon MD  PGY 5  [facility]

## 2020-09-03 NOTE — ASSESSMENT & PLAN NOTE
Had CLIFFORD on last admission and improved.  Now at baseline of 1.8.  K+ was a little high at 5.2, but better today at 4.9.  -Renally dose medication for CrCl of <30  -Avoid Nephrotoxic medications if able  -Monitor urine output  -Low Potassium diet  -Follow closely

## 2020-09-04 NOTE — SUBJECTIVE & OBJECTIVE
Interval History: Patient on BiPAP this morning in ICU.  Overall feeling better, but no change from yesterday.      Review of Systems   Constitutional: Positive for activity change. Negative for appetite change and fever.   HENT: Negative for congestion, ear pain, rhinorrhea and sinus pressure.    Eyes: Negative for pain and discharge.   Respiratory: Positive for shortness of breath. Negative for cough, chest tightness and wheezing.    Cardiovascular: Negative for chest pain and leg swelling.   Gastrointestinal: Negative for abdominal distention, abdominal pain, diarrhea, nausea and vomiting.   Endocrine: Negative for cold intolerance and heat intolerance.   Genitourinary: Negative for difficulty urinating, flank pain, frequency, hematuria and urgency.   Musculoskeletal: Negative for arthralgias, joint swelling and myalgias.   Allergic/Immunologic: Negative for environmental allergies and food allergies.   Neurological: Negative for dizziness, weakness, light-headedness and headaches.   Hematological: Does not bruise/bleed easily.   Psychiatric/Behavioral: Negative for agitation, behavioral problems and decreased concentration.     Objective:     Vital Signs (Most Recent):  Temp: 98.2 °F (36.8 °C) (09/04/20 1115)  Pulse: 70 (09/04/20 1115)  Resp: (!) 27 (09/04/20 1115)  BP: 123/62 (09/04/20 1115)  SpO2: (!) 93 % (09/04/20 1115) Vital Signs (24h Range):  Temp:  [98.1 °F (36.7 °C)-98.6 °F (37 °C)] 98.2 °F (36.8 °C)  Pulse:  [] 70  Resp:  [20-35] 27  SpO2:  [88 %-98 %] 93 %  BP: (104-163)/(58-83) 123/62     Weight: 103.6 kg (228 lb 6.3 oz)  Body mass index is 40.46 kg/m².    Intake/Output Summary (Last 24 hours) at 9/4/2020 1235  Last data filed at 9/4/2020 1134  Gross per 24 hour   Intake 480 ml   Output 3100 ml   Net -2620 ml      Physical Exam  Constitutional:       Appearance: She is well-developed.   HENT:      Head: Normocephalic.   Eyes:      General:         Right eye: No discharge.         Left eye: No  discharge.      Conjunctiva/sclera: Conjunctivae normal.   Neck:      Musculoskeletal: Normal range of motion and neck supple.   Cardiovascular:      Rate and Rhythm: Normal rate. Rhythm irregularly irregular.      Heart sounds: Normal heart sounds.   Pulmonary:      Effort: Pulmonary effort is normal. Tachypnea present. No respiratory distress.      Breath sounds: Decreased breath sounds present.   Abdominal:      General: Bowel sounds are normal. There is no distension.      Palpations: Abdomen is soft.      Tenderness: There is no abdominal tenderness.   Musculoskeletal: Normal range of motion.   Skin:     General: Skin is warm and dry.   Neurological:      Mental Status: She is alert and oriented to person, place, and time.   Psychiatric:         Mood and Affect: Mood normal.         Speech: Speech normal.         Behavior: Behavior normal.         Significant Labs: All pertinent labs within the past 24 hours have been reviewed.    Significant Imaging: I have reviewed all pertinent imaging results/findings within the past 24 hours.

## 2020-09-04 NOTE — PROGRESS NOTES
Pt received on 4LNC;SPO2 normal. Treatments were given and tolerated well. 0944 ABG was done;results were shown to Dr. Dennison. No changes were made at this time. Will continue to monitor.

## 2020-09-04 NOTE — PROGRESS NOTES
"[facility]  Palliative Medicine  Progress Note    Patient Name: Patsy Morris  MRN: [id number]  Admission Date: 9/1/2020  Hospital Length of Stay: 2 days  Code Status: DNR   Attending Provider: Ravi Soria MD  Consulting Provider: Elsa Bran DNP  Primary Care Physician: Luisana Cartagena MD  Principal Problem:Atrial fibrillation with rapid ventricular response    Patient information was obtained from patient, past medical records and ER records.      Assessment/Plan:     Encounter for palliative care  - Patient seen on 8/24 and expressed a desire "enjoy life a little more".  Patient defines this by being able to leave her house and go to the mall or the grocery store. Patient reports not being able to do these activities due to worsening mobility.  When discussed the patient hopes and if they were not able to be obtained patient reported this would be an unacceptable outcome for her. Patient expressed her desire to be able to cook again.  Patient expressed her love for cooking.  - Patient fears  if something were to happen to her who would care for her brother who has schizophrenia.    - Patient previously completed LaPOST- DNR.  Today discussed LaPOST again and she states she thought about it and does want to attempt resuscitation.  Patient reports she is feeling better and does want the chance to live longer if her heart/ breathing were to stop.  Discussed that CPR may restart her heart, also discussed adverse outcomes associated with CPR.  New LaPOST completed- PATIENT TO BE FULL CODE  - Plan is for patient to discharge to SNF.  Patient would benefit from pall care at home to assist in symptom management.        Acute on chronic respiratory failure with hypoxia and hypercapnia  - Patient appears less SOB today.  On nasal cannula, reports feeling less SOB.         I will sign off. Please contact us if you have any additional questions.    Subjective:     Chief Complaint: "   Chief Complaint   Patient presents with    Shortness of Breath     recently DC, informed EMS pt was in the 60s on roomair. 94% at 15 non rebreather.        HPI:   Ms. Morris is a 70 y.o. female with PMH of HTN, asthma, afib, CHF, obesity, BHAVANI, CKD 3/4, COPD, who presented to [facility] ED after being discharged to SNF and was found to be SOB with sats in the 60s 6 hours after discharge.  Patient reports she is on oxygen at home (3L NC) and has to sleep in a recliner chair.  She reports worsening SOB with simple activities such as cleaning, showering, and cooking.       Patient does not have any children, she lives with her younger brother who has schizophrenia.  She is his primary caregiver.  Patient has 9 nieces and nephews who she helped raise who she is close to.     I saw the patient on 8/25/2020 prior to being discharged to SNF.      Hospital Course:  No notes on file    Interval History: Appears less SOB today.  Sitting in bedside chair, on nasal cannula.     Medications:  Continuous Infusions:  Scheduled Meds:   albuterol-ipratropium  3 mL Nebulization Q8H    apixaban  2.5 mg Oral BID    atorvastatin  80 mg Oral QHS    docusate sodium  100 mg Oral BID    escitalopram oxalate  10 mg Oral QHS    furosemide (LASIX) IV  40 mg Intravenous Q12H    metoprolol tartrate  50 mg Oral BID    miconazole NITRATE 2 %   Topical (Top) BID     PRN Meds:acetaminophen, melatonin, ondansetron, sodium chloride 0.9%    Objective:     Vital Signs (Most Recent):  Temp: 98.2 °F (36.8 °C) (09/04/20 1115)  Pulse: 74 (09/04/20 1315)  Resp: (!) 27 (09/04/20 1315)  BP: 135/64 (09/04/20 1315)  SpO2: (!) 93 % (09/04/20 1315) Vital Signs (24h Range):  Temp:  [98.1 °F (36.7 °C)-98.6 °F (37 °C)] 98.2 °F (36.8 °C)  Pulse:  [] 74  Resp:  [20-35] 27  SpO2:  [90 %-98 %] 93 %  BP: (104-163)/(58-85) 135/64     Weight: 103.6 kg (228 lb 6.3 oz)  Body mass index is 40.46 kg/m².    Admission Weight/BMI & Noted Weight/BMI in EMR:     105.5 kg  "(232 lb 9.4 oz)/ 41.20 kg/m²Abnormal       Date: Weight in kg (lbs): BMI: Height:   8/20/2020 102.5 kg (226 lb) 37.61 kg/m²    5' 3" (160 cm)   6/27/2018 100.2 kg (221 lb) 39.1 kg/m²    5' 3" (160 cm)   2/23/2016 108.665 kg (239 lb 9 oz)  43.43 kg/m²    5' 3" (160 cm)       Physical Exam  Constitutional:       General: She is not in acute distress.     Appearance: She is well-developed. She is ill-appearing (Chronically).   Neck:      Musculoskeletal: Normal range of motion.   Cardiovascular:      Rate and Rhythm: Normal rate. Rhythm irregular.      Pulses: Normal pulses.      Heart sounds: Normal heart sounds. No murmur.   Pulmonary:      Effort: Tachypnea present.      Comments: Diminished  Chest:      Chest wall: No tenderness.   Abdominal:      General: Bowel sounds are normal.   Musculoskeletal: Normal range of motion.   Skin:     General: Skin is warm.   Neurological:      Mental Status: She is alert and oriented to person, place, and time.   Psychiatric:         Thought Content: Thought content normal.         Judgment: Judgment normal.         Review of Symptoms    Symptom Assessment (ESAS 0-10 Scale)  Pain:  0  Dyspnea:  3  Anorexia:  0  Fatigue:  3               Performance Status:  70    ECOG Performance Status Grade:  1 - Ambulates, capable of light work    Living Arrangements:  Lives with family    Psychosocial/Cultural: Patient lives with her brother, who she is primary caregiver for.  Patient has a sister and niece who are able to help her at home if needed.       Advance Care Planning   Advance Directives:   LaPOST: Yes (new one completed 9/4: Full code, full treatment)    Do Not Resuscitate Status: No    Medical Power of : Yes    Agent's Name:  Earlene Dexter   Agent's Contact Number:  [phone]    Decision Making:  Patient answered questions    Family/Support:  1st HCPOA: Earlene Sotelo ([phone])  2nd HCPOA: Vicky Sotelo ([phone])       Significant Labs: All pertinent " labs within the past 24 hours have been reviewed.  CBC:   Recent Labs   Lab 09/04/20 0318   WBC 9.59   HGB 10.3*   HCT 34.2*   MCV 91        BMP:  Recent Labs   Lab 09/04/20 0318         K 4.6   CL 94*   CO2 40*   BUN 56*   CREATININE 1.8*   CALCIUM 8.8   MG 2.5     LFT:  Lab Results   Component Value Date    AST 14 09/04/2020    ALKPHOS 36 (L) 09/04/2020    BILITOT 0.8 09/04/2020     Albumin:   Albumin   Date Value Ref Range Status   09/04/2020 3.2 (L) 3.5 - 5.2 g/dL Final     Protein:   Total Protein   Date Value Ref Range Status   09/04/2020 6.1 6.0 - 8.4 g/dL Final     Lactic acid:   No results found for: LACTATE    Ref Range & Units 8/22/20 8/31/20     SARS-CoV-2 RNA, Amplification, Qual Negative Negative  Negative        Significant Imaging: I have reviewed all pertinent imaging results/findings within the past 24 hours.     have reviewed the Chest x ray from 9/1/2020     I have reviewed the EKG from 9/1/2020     I have reviewed the TTE from 8/24/2020 (EF 58%)     Advance Care Planning       Code Status  In light of the patients advanced and life limiting illness,I engaged the the patient in a conversation about the patient's preferences for care  at the very end of life. Along those lines, the patient DOES wish to have CPR or other invasive treatments performed when her heart and/or breathing stops. I communicated to the patient that a LaPOST form was completed to reflect other EOL preferences of the patient such as Full code, full treatment, long term artificial nutrition by tube. . Patient will be changed to FULL code per her wishes.           Discussed with Dr. Soria, we will sign off.  Please contact us if you have any questions/ concerns or we can assist.     > 50% of 25 min visit spent in chart review, face to face discussion of goals of care,  symptom assessment, coordination of care and emotional support.  18 minutes spent in advance care planning    Elsa Bran,  HOLLIE  Palliative Medicine  [facility]

## 2020-09-04 NOTE — ASSESSMENT & PLAN NOTE
"- Patient previously expressed a desire to "enjoy life a little more".  Patient defines this by being able to leave her house and go to the mall or the grocery store. Patient reports not being able to do these activities due to worsening mobility.  When discussed the patient hopes and if they were not able to be obtained patient reported this would be an unacceptable outcome for her.   - Patient fears  if something were to happen to her who would care for her brother who has schizophrenia.  Patient reported she cared for her mother before she  and promised her mother she would care for her brother.  Emotional support provided.  Patient reports she has family/ friends who have been providing care for her brother   - Patient previously completed LaPOST- DNR.  Today discussed LaPOST again and she states she thought about it and does want to attempt resuscitation.  Patient reports she is feeling better and does want the chance to live longer.  New LaPOST completed- PATIENT TO BE FULL CODE  - Plan is for patient to discharge to SNF.  Patient would benefit from pall care at home to assist in symptom management.      "

## 2020-09-04 NOTE — PROGRESS NOTES
[facility] Hospital Medicine  Progress Note    Patient Name: Patsy Morris  MRN: [id number]  Patient Class: IP- Inpatient   Admission Date: 9/1/2020  Length of Stay: 2 days  Attending Physician: Ravi Soria MD  Primary Care Provider: Luisana Cartagena MD        Subjective:     Principal Problem:Atrial fibrillation with rapid ventricular response        HPI:  The patient is a 70 y.o. female with history of HTN, CHF, and COPD who presents from nursing facility with complaint of shortness of breath. Patient reports experiencing SOB when trying to go to sleep.  Nursing home reported O2 sats in the 60s on room air. Patient states she is typically on 3 liters of O2 at home. She was seen here in the ED on 8/22, admitted to the hospital and was discharged 6 hours ago. She denies chest pain, palpitations, fever, chills, nausea or vomiting.  Of note, when the patient arrived, she was in atrial fibrillation with RVR.  She will be admitted for management of her atrial fibrillation with rapid ventricular response.    Overview/Hospital Course:  The patient is a 70 y.o. female with history of HTN, CHF, and COPD who presents from nursing facility with complaint of shortness of breath. Patient reports experiencing SOB when trying to go to sleep.  Nursing home reported O2 sats in the 60s on room air. Patient states she is typically on 3 liters of O2 at home. She was seen here in the ED on 8/22, admitted to the hospital and treated for COPD exacerbation and CHF.  She was discharged 6 hours prior to re-admission. She denies chest pain, palpitations, fever, chills, nausea or vomiting.  Of note, when the patient arrived, she was in atrial fibrillation with RVR.  She will be admitted for management of her atrial fibrillation with rapid ventricular response.  She was rate controlled with IV Diltiazem in the ED and placed on BiPAP.  She was transferred to ICU on 9/3/2020 given inability to wean off BiPAP.   Pulmonary-CC and Cardiology consulted.      Interval History: Patient on BiPAP this morning in ICU.  Overall feeling better, but no change from yesterday.      Review of Systems   Constitutional: Positive for activity change. Negative for appetite change and fever.   HENT: Negative for congestion, ear pain, rhinorrhea and sinus pressure.    Eyes: Negative for pain and discharge.   Respiratory: Positive for shortness of breath. Negative for cough, chest tightness and wheezing.    Cardiovascular: Negative for chest pain and leg swelling.   Gastrointestinal: Negative for abdominal distention, abdominal pain, diarrhea, nausea and vomiting.   Endocrine: Negative for cold intolerance and heat intolerance.   Genitourinary: Negative for difficulty urinating, flank pain, frequency, hematuria and urgency.   Musculoskeletal: Negative for arthralgias, joint swelling and myalgias.   Allergic/Immunologic: Negative for environmental allergies and food allergies.   Neurological: Negative for dizziness, weakness, light-headedness and headaches.   Hematological: Does not bruise/bleed easily.   Psychiatric/Behavioral: Negative for agitation, behavioral problems and decreased concentration.     Objective:     Vital Signs (Most Recent):  Temp: 98.2 °F (36.8 °C) (09/04/20 1115)  Pulse: 70 (09/04/20 1115)  Resp: (!) 27 (09/04/20 1115)  BP: 123/62 (09/04/20 1115)  SpO2: (!) 93 % (09/04/20 1115) Vital Signs (24h Range):  Temp:  [98.1 °F (36.7 °C)-98.6 °F (37 °C)] 98.2 °F (36.8 °C)  Pulse:  [] 70  Resp:  [20-35] 27  SpO2:  [88 %-98 %] 93 %  BP: (104-163)/(58-83) 123/62     Weight: 103.6 kg (228 lb 6.3 oz)  Body mass index is 40.46 kg/m².    Intake/Output Summary (Last 24 hours) at 9/4/2020 1235  Last data filed at 9/4/2020 1134  Gross per 24 hour   Intake 480 ml   Output 3100 ml   Net -2620 ml      Physical Exam  Constitutional:       Appearance: She is well-developed.   HENT:      Head: Normocephalic.   Eyes:      General:          Right eye: No discharge.         Left eye: No discharge.      Conjunctiva/sclera: Conjunctivae normal.   Neck:      Musculoskeletal: Normal range of motion and neck supple.   Cardiovascular:      Rate and Rhythm: Normal rate. Rhythm irregularly irregular.      Heart sounds: Normal heart sounds.   Pulmonary:      Effort: Pulmonary effort is normal. Tachypnea present. No respiratory distress.      Breath sounds: Decreased breath sounds present.   Abdominal:      General: Bowel sounds are normal. There is no distension.      Palpations: Abdomen is soft.      Tenderness: There is no abdominal tenderness.   Musculoskeletal: Normal range of motion.   Skin:     General: Skin is warm and dry.   Neurological:      Mental Status: She is alert and oriented to person, place, and time.   Psychiatric:         Mood and Affect: Mood normal.         Speech: Speech normal.         Behavior: Behavior normal.         Significant Labs: All pertinent labs within the past 24 hours have been reviewed.    Significant Imaging: I have reviewed all pertinent imaging results/findings within the past 24 hours.      Assessment/Plan:      * Atrial fibrillation with rapid ventricular response   in ED and rate controlled with IV Cardizem  Home Meds:  Metoprolol 50mg bid and Apixaban 2.5mg bid  Followed by Cardiology, Dr. Ramirez.  -Continue current medications  -Telemetry    Acute on chronic respiratory failure with hypoxia and hypercapnia  Chronic Respiratory Failure on 3 liters of Oxygen at home - COPD, BHAVANI/ OHS.    Recent admission from 8/22-8/31 - treated at that time with Afib-RVR with CHF and COPD exacerbation (s/p antibiotics and steroids).  Discharged to SNF.  Re-admitted 9/1/2020 with Afib and RVR and SOB.  Placed on BiPAP on admission with slow improvement and transferred to ICU on 9/2/2020.  Appreciate Pulmonary-CC care.  Appreciate Palliative Care consult    CXR on re-admission with Overall findings that may represent severe  "pulmonary edema versus diffuse infectious process/atypical pneumonia in the right clinical setting.      Labs on Admission:  pH 7.246, CO2 94.3, and O2 61   WBC 13.48 on admission with LS.  COVID-19 rapid negative  Trop negative     Procalcitonin 0.30    Followed by Pulmonary-CC - "Ms. Morris remained on BiPAP as per our adjustments overnight.  POCT arterial blood gas is unchanged but she appears to be more alert and interactive.  We have switched her from BiPAP to AVAPS to see if that is more effective in clearing CO2.  During her previous hospitalization, it seemed to help.    From record review, it seems like she is only on CPAP 14 at home for sleep apnea.  This may be due to the fact that BiPAP and CPAP were equivalent in reversing obstructive sleep apnea.  However, given the presence of chronic hypercapnic respiratory failure => she needs ventilatory support rather than just CPAP.  We will ask Case Management to confirm her home respiratory equipment.  She will likely benefit from a Trilogy at hospital discharge. ".     Plan:  Follow up with Pulmonary recommendations  Continue BiPAP as needed  Wean O2 to keep Pox >90  Lasix increased to 40mg IV q12  Erich                    Chronic kidney disease (CKD) stage G4/A1, severely decreased glomerular filtration rate (GFR) between 15-29 mL/min/1.73 square meter and albuminuria creatinine ratio less than 30 mg/g  Had CLIFFORD on last admission and improved.  Now at baseline of 1.8.    -Renally dose medication for CrCl of <30  -Avoid Nephrotoxic medications if able  -Monitor urine output  -Low Potassium diet  -Follow closely      Chronic diastolic congestive heart failure  Home Meds:  Lasix 40mg qday  BNP elevated at 577 on admission most likely due to episode of Atrial fib with RVR  Followed by Cardiology    TTE on 8/24/2020 - Mild left atrial enlargement.  · Diastolic pattern consistent with atrial fibrillation observed.  · Normal left ventricular systolic " function. The estimated ejection fraction is 58%.  · No wall motion abnormalities.  · Normal right ventricular systolic function.  · Mild right atrial enlargement.  Small posterior pericardial effusion.    Plan:  -Continue Lasix  40mg q12   -Continue Bipap as above        Morbid obesity  Patient counseled on the importance of weight loss by changing diet and increased physical activity.      Dyslipidemia  Continue Lipitor      Essential hypertension  Continue Metoprolol and Lasix.  BP stable.        VTE Risk Mitigation (From admission, onward)         Ordered     apixaban tablet 2.5 mg  2 times daily      09/01/20 0332     Reason for No Pharmacological VTE Prophylaxis  Once     Question:  Reasons:  Answer:  Already adequately anticoagulated on oral Anticoagulants    09/01/20 0332     IP VTE HIGH RISK PATIENT  Once      09/01/20 0332     Place sequential compression device  Until discontinued      09/01/20 0332                Discharge Planning   YUDELKA:      Code Status: DNR   Is the patient medically ready for discharge?:     Reason for patient still in hospital (select all that apply): Patient trending condition, Treatment and Consult recommendations  Discharge Plan A: Skilled Nursing Facility                  Ravi Soria MD  Department of Hospital Medicine   [facility]

## 2020-09-04 NOTE — PLAN OF CARE
Patient on Bipap qhs, settings as documented.  Sats 93%. Tx given, no prn tx required. Pt. in no distress, will continue to monitor.

## 2020-09-04 NOTE — PROGRESS NOTES
Cardiology  Progress Notes            Subjective:  Moved to the intensive care unit when her pCO2 was in excess of 100.  She says she feels well, denies breathlessness.    She has a longstanding history of hypertension, chronic obstructive lung disease, chronic atrial fibrillation, history of renal cell carcinoma having had a nephrectomy, chronic renal failure.  She has been cigarette smoker, smoked as recently as 2 weeks ago.  She has had heart failure with a preserved left ventricular ejection fraction, diastolic left ventricular dysfunction.  She has a history of hypercapnic respiratory failure, obstructive sleep apnea.     Vital Signs:  Vitals:    09/04/20 0800 09/04/20 0915 09/04/20 0955 09/04/20 1015   BP:  (!) 127/58  129/83   Pulse: 74 82 76 76   Resp: (!) 26 (!) 24 (!) 23 (!) 23   Temp:       TempSrc:       SpO2: (!) 91% 95% (!) 94% (!) 94%   Weight:       Height:           Physical Exam:  Alert, oriented.  Chest clear  Heart irregularly irregular.  No murmur or gallop.  Extremities are warm.  Neurologically intact.  Laboratory:  CBC:   Recent Labs   Lab 09/04/20 0318   WBC 9.59   RBC 3.78*   HGB 10.3*   HCT 34.2*      MCV 91   MCH 27.2   MCHC 30.1*     BMP:   Recent Labs   Lab 09/04/20 0318         K 4.6   CL 94*   CO2 40*   BUN 56*   CREATININE 1.8*   CALCIUM 8.8   MG 2.5     CMP:   Recent Labs   Lab 09/04/20 0318      CALCIUM 8.8   ALBUMIN 3.2*   PROT 6.1      K 4.6   CO2 40*   CL 94*   BUN 56*   CREATININE 1.8*   ALKPHOS 36*   ALT 15   AST 14   BILITOT 0.8     LFTs:   Recent Labs   Lab 09/04/20 0318   ALT 15   AST 14   ALKPHOS 36*   BILITOT 0.8   PROT 6.1   ALBUMIN 3.2*     Coagulation: No results for input(s): PT, INR, APTT in the last 168 hours.  Cardiac markers:   Recent Labs   Lab 09/01/20  1354   TROPONINI 0.021       Imaging:  X-Ray Chest AP Portable   Final Result      Abnormal chest radiograph as above.         Electronically signed by: Mateus Rao MD    Date:    09/01/2020   Time:    01:24            Problems:    Hypercapnic respiratory failure  Advanced obstructive lung disease  Obstructive sleep apnea  Chronic atrial fibrillation  Hypertensive heart disease  Heart failure with preserved ejection fraction  Diastolic left ventricular dysfunction  Chronic renal failure, status post nephrectomy for hypernephroma           Plan of Care:  Would continue present medical regimen.  Will need to transfer to skilled nursing unit          Edis Ramirez

## 2020-09-04 NOTE — SUBJECTIVE & OBJECTIVE
"Interval History: Appears less SOB today.  Sitting in bedside chair, on nasal cannula.     Medications:  Continuous Infusions:  Scheduled Meds:   albuterol-ipratropium  3 mL Nebulization Q8H    apixaban  2.5 mg Oral BID    atorvastatin  80 mg Oral QHS    docusate sodium  100 mg Oral BID    escitalopram oxalate  10 mg Oral QHS    furosemide (LASIX) IV  40 mg Intravenous Q12H    metoprolol tartrate  50 mg Oral BID    miconazole NITRATE 2 %   Topical (Top) BID     PRN Meds:acetaminophen, melatonin, ondansetron, sodium chloride 0.9%    Objective:     Vital Signs (Most Recent):  Temp: 98.2 °F (36.8 °C) (09/04/20 1115)  Pulse: 74 (09/04/20 1315)  Resp: (!) 27 (09/04/20 1315)  BP: 135/64 (09/04/20 1315)  SpO2: (!) 93 % (09/04/20 1315) Vital Signs (24h Range):  Temp:  [98.1 °F (36.7 °C)-98.6 °F (37 °C)] 98.2 °F (36.8 °C)  Pulse:  [] 74  Resp:  [20-35] 27  SpO2:  [90 %-98 %] 93 %  BP: (104-163)/(58-85) 135/64     Weight: 103.6 kg (228 lb 6.3 oz)  Body mass index is 40.46 kg/m².    Admission Weight/BMI & Noted Weight/BMI in EMR:     105.5 kg (232 lb 9.4 oz)/ 41.20 kg/m²Abnormal       Date: Weight in kg (lbs): BMI: Height:   8/20/2020 102.5 kg (226 lb) 37.61 kg/m²    5' 3" (160 cm)   6/27/2018 100.2 kg (221 lb) 39.1 kg/m²    5' 3" (160 cm)   2/23/2016 108.665 kg (239 lb 9 oz)  43.43 kg/m²    5' 3" (160 cm)       Physical Exam  Constitutional:       General: She is not in acute distress.     Appearance: She is well-developed. She is ill-appearing (Chronically).   Neck:      Musculoskeletal: Normal range of motion.   Cardiovascular:      Rate and Rhythm: Normal rate. Rhythm irregular.      Pulses: Normal pulses.      Heart sounds: Normal heart sounds. No murmur.   Pulmonary:      Effort: Tachypnea present.      Comments: Diminished  Chest:      Chest wall: No tenderness.   Abdominal:      General: Bowel sounds are normal.   Musculoskeletal: Normal range of motion.   Skin:     General: Skin is warm.   Neurological: "      Mental Status: She is alert and oriented to person, place, and time.   Psychiatric:         Thought Content: Thought content normal.         Judgment: Judgment normal.         Review of Symptoms    Symptom Assessment (ESAS 0-10 Scale)  Pain:  0  Dyspnea:  3  Anorexia:  0  Fatigue:  3               Performance Status:  70    ECOG Performance Status Grade:  1 - Ambulates, capable of light work    Living Arrangements:  Lives with family    Psychosocial/Cultural: Patient lives with her brother, who she is primary caregiver for.  Patient has a sister and niece who are able to help her at home if needed.       Advance Care Planning   Advance Directives:   [place]: Yes (new one completed 9/4: Full code, full treatment)    Do Not Resuscitate Status: No    Medical Power of : Yes    Agent's Name:  Earlene Dexter   Agent's Contact Number:  [phone]    Decision Making:  Patient answered questions    Family/Support:  1st HCPOA: Earlene Sotelo ([phone])  2nd HCPOA: Vickykindra Sotelo ([phone])       Significant Labs: All pertinent labs within the past 24 hours have been reviewed.  CBC:   Recent Labs   Lab 09/04/20 0318   WBC 9.59   HGB 10.3*   HCT 34.2*   MCV 91        BMP:  Recent Labs   Lab 09/04/20 0318         K 4.6   CL 94*   CO2 40*   BUN 56*   CREATININE 1.8*   CALCIUM 8.8   MG 2.5     LFT:  Lab Results   Component Value Date    AST 14 09/04/2020    ALKPHOS 36 (L) 09/04/2020    BILITOT 0.8 09/04/2020     Albumin:   Albumin   Date Value Ref Range Status   09/04/2020 3.2 (L) 3.5 - 5.2 g/dL Final     Protein:   Total Protein   Date Value Ref Range Status   09/04/2020 6.1 6.0 - 8.4 g/dL Final     Lactic acid:   No results found for: LACTATE    Ref Range & Units 8/22/20 8/31/20     SARS-CoV-2 RNA, Amplification, Qual Negative Negative  Negative        Significant Imaging: I have reviewed all pertinent imaging results/findings within the past 24 hours.     have reviewed the Chest  x ray from 9/1/2020     I have reviewed the EKG from 9/1/2020     I have reviewed the TTE from 8/24/2020 (EF 58%)

## 2020-09-04 NOTE — PLAN OF CARE
Pt remains in ICU, since return from SNF. She was discharged to SNFand returned after 6 hours.    Pt is on Bipap in ICU, is able to tolerate NC 3L for periods thru day, max length so far is 1 1/2 hrs.    Pt is aware she needs to return to a SNF but is adamant about [facility]. She does not want to return there. CM discusses other facilities. Pt is in agreement to send to [facility] affiliated SNF's.    CM to send referrals out today in advance of discharge. Pt will need bipap at SNF and will need either trilogy or bipap  for home when she is discharged from facility.    Pulm CC called [facility] and inquired about Bipap settings on her home unit. Pt states she nolonger has a Bipap at home, it was picked up because she did not use it. She does have O2 at home. Additionally, Akhil ROAS wanted CM to prepare pt for home with bipap or trilogy.    CM informed Pulm CC that pt could not get bipap or trilogy and go to SNF, that SNF would arrange at time of discharge.   09/04/20 1147   Discharge Reassessment   Assessment Type Discharge Planning Reassessment   Provided patient/caregiver education on the expected discharge date and the discharge plan Yes   Do you have any problems affording any of your prescribed medications? No   Discharge Plan A Skilled Nursing Facility   Discharge Plan B Skilled Nursing Facility   DME Needed Upon Discharge  ventilator  (Pt will need trilogy or bipap for home when discharged from SNF. Pt was approved by [facility] for trilogy at time of last admission .)   Anticipated Discharge Disposition SNF   Can the patient/caregiver answer the patient profile reliably? Yes, cognitively intact   How does the patient rate their overall health at the present time? Fair   Describe the patient's ability to walk at the present time. Major restrictions/daily assistance from another person   Number of comorbid conditions (as recorded on the chart) Three       CM to follow for plans and arrangements.

## 2020-09-04 NOTE — ASSESSMENT & PLAN NOTE
"Chronic Respiratory Failure on 3 liters of Oxygen at home - COPD, BHAVANI/ OHS.    Recent admission from 8/22-8/31 - treated at that time with Afib-RVR with CHF and COPD exacerbation (s/p antibiotics and steroids).  Discharged to SNF.  Re-admitted 9/1/2020 with Afib and RVR and SOB.  Placed on BiPAP on admission with slow improvement and transferred to ICU on 9/2/2020.  Appreciate Pulmonary-CC care.  Appreciate Palliative Care consult    CXR on re-admission with Overall findings that may represent severe pulmonary edema versus diffuse infectious process/atypical pneumonia in the right clinical setting.      Labs on Admission:  pH 7.246, CO2 94.3, and O2 61   WBC 13.48 on admission with LS.  COVID-19 rapid negative  Trop negative     Procalcitonin 0.30    Followed by Pulmonary-CC - "Ms. Morris remained on BiPAP as per our adjustments overnight.  POCT arterial blood gas is unchanged but she appears to be more alert and interactive.  We have switched her from BiPAP to AVAPS to see if that is more effective in clearing CO2.  During her previous hospitalization, it seemed to help.    From record review, it seems like she is only on CPAP 14 at home for sleep apnea.  This may be due to the fact that BiPAP and CPAP were equivalent in reversing obstructive sleep apnea.  However, given the presence of chronic hypercapnic respiratory failure => she needs ventilatory support rather than just CPAP.  We will ask Case Management to confirm her home respiratory equipment.  She will likely benefit from a Trilogy at hospital discharge. ".     Plan:  Follow up with Pulmonary recommendations  Continue BiPAP as needed  Wean O2 to keep Pox >90  Lasix increased to 40mg IV q12  DuoNebs                  "

## 2020-09-04 NOTE — PROGRESS NOTES
Progress Note    Attending: Harjeet Casillas MD  Fellow: Sharyn Morejon  Admit Date: 9/1/2020  Today's Date: 09/04/2020      SUBJECTIVE:     Patient sitting up in bed eating. Awake, alert, and oriented. Overall feeling improved.    OBJECTIVE:     Vital Signs Trends/Hx Reviewed  Vitals:    09/04/20 0915 09/04/20 0955 09/04/20 1015 09/04/20 1115   BP: (!) 127/58  129/83 123/62   Pulse: 82 76 76 70   Resp: (!) 24 (!) 23 (!) 23 (!) 27   Temp:    98.2 °F (36.8 °C)   TempSrc:       SpO2: 95% (!) 94% (!) 94% (!) 93%   Weight:       Height:             Physical Exam:  General: NAD, on nasal cannula  HEENT: AT/NC, PERRL, EOMI, oral and nasal mucosa moist.   Neck: Supple without JVD or palpable LAD.   Cardiac: normal rate, regular rhythm  Respiratory:decreased breath sounds, no wheezing. No increased work of breathing on Bipap  Abdomen: Soft, NT/ND. +BS. No hepatosplenomegaly.   Neuro: Grossly intact to brief exam. Oriented x3 with appropriate mood/affect to situation.      Laboratory:  Recent Labs   Lab 09/04/20  0944   PH 7.337*   PCO2 85.1*   PO2 69*   HCO3 45.6*   POCSATURATED 91*   BE 20     Recent Labs   Lab 09/04/20  0318   WBC 9.59   RBC 3.78*   HGB 10.3*   HCT 34.2*      MCV 91   MCH 27.2   MCHC 30.1*     Recent Labs   Lab 09/04/20  0318      K 4.6   CL 94*   CO2 40*   BUN 56*   CREATININE 1.8*   MG 2.5       Microbiology Data:   Microbiology Results (last 7 days)     ** No results found for the last 168 hours. **                   Scheduled Medications:    albuterol-ipratropium  3 mL Nebulization Q8H    apixaban  2.5 mg Oral BID    atorvastatin  80 mg Oral QHS    docusate sodium  100 mg Oral BID    escitalopram oxalate  10 mg Oral QHS    furosemide (LASIX) IV  40 mg Intravenous Q12H    metoprolol tartrate  50 mg Oral BID    miconazole NITRATE 2 %   Topical (Top) BID       PRN Medications:   acetaminophen, melatonin, ondansetron, sodium chloride 0.9%    Problem List:   Patient Active Problem List    Diagnosis    Impacted cerumen of both ears    URI (upper respiratory infection)    Ear fullness    Renal insufficiency    Essential hypertension    Renal carcinoma    Rhinitis, allergic    Acute kidney injury    Hypokalemia    Dyslipidemia    Tobacco abuse    Chronic kidney disease (CKD) stage G4/A1, severely decreased glomerular filtration rate (GFR) between 15-29 mL/min/1.73 square meter and albuminuria creatinine ratio less than 30 mg/g    Morbid obesity    Hypoxia    Chronic hypercapnic respiratory failure    Typical atrial flutter    Chronic diastolic congestive heart failure    CRI (chronic renal insufficiency)    Acute on chronic respiratory failure with hypoxia and hypercapnia    Paroxysmal atrial fibrillation    Suspected sleep apnea    Obstructive sleep apnea    Sleep-related hypoventilation    Gastrointestinal hemorrhage associated with gastritis    Thyroid nodule    Gout of right foot    Knee arthropathy    Long term current use of anticoagulant    Pulmonary nodules/lesions, multiple    Urinary incontinence without sensory awareness    Supplemental oxygen dependent    Current moderate episode of major depressive disorder without prior episode    Acute respiratory failure with hypoxia and hypercapnia    Atrial fibrillation with rapid ventricular response       ASSESSMENT & RECOMMENDATIONS   Neurologic  ·  On escitalopram qHS for possible MDD      Cardiovascular  · Heart Failure with preserved ejection fraction.   · On Furosemide 40 IV BID, recommend transitioning to PO Furosemide.  · Atrial Fibrillation. Continue Metoprolol and Apixaban.  · on Eliquis at home.      Respiratory  - Chronic hypercapnic respiratory failure.  -  AM ABG improved on new BIPAP settings.   -  Case Management following for trilogy and SNF     Gastrointestinal  · Tolerating regular diet      Renal/Genitourinary  · Stage III CKD. Creatinine at baseline levels at baseline  · Continue with strict  I&Os     Hematologic  · Normocytic anemia.  · DVT prophylaxis: Apixaban        Dispo: okay to transfer out of ICU. As patient improved on current BIPAP settings, will likely need to be discharged to SNF on these settings        Sharyn Morejon MD  PGY 5  [facility]

## 2020-09-04 NOTE — ASSESSMENT & PLAN NOTE
Had CLIFFORD on last admission and improved.  Now at baseline of 1.8.    -Renally dose medication for CrCl of <30  -Avoid Nephrotoxic medications if able  -Monitor urine output  -Low Potassium diet  -Follow closely

## 2020-09-04 NOTE — PLAN OF CARE
Pt aaox4, gcs 15, VS stable currently sitting up in chair eating dinner tray w/ nasal cannula on 3L in NAD. Pt tolerated 3L N/C throughout majority of shift when eating or ambulating w/ assistance to chair or commode. Pt denies SOB nor chest pain. Adequate urine output noted during shift. For more information view MAR and flowsheet. Pt stable for shift change at this time.

## 2020-09-04 NOTE — ASSESSMENT & PLAN NOTE
Home Meds:  Lasix 40mg qday  BNP elevated at 577 on admission most likely due to episode of Atrial fib with RVR  Followed by Cardiology    TTE on 8/24/2020 - Mild left atrial enlargement.  · Diastolic pattern consistent with atrial fibrillation observed.  · Normal left ventricular systolic function. The estimated ejection fraction is 58%.  · No wall motion abnormalities.  · Normal right ventricular systolic function.  · Mild right atrial enlargement.  Small posterior pericardial effusion.    Plan:  -Continue Lasix  40mg q12   -Continue Bipap as above

## 2020-09-05 NOTE — SUBJECTIVE & OBJECTIVE
Interval History: Patient on BiPAP this morning in ICU.  Was on 4L most of the day yesterday and placed back on BiPAP in evening.    Review of Systems   Constitutional: Positive for activity change. Negative for appetite change and fever.   HENT: Negative for congestion, ear pain, rhinorrhea and sinus pressure.    Eyes: Negative for pain and discharge.   Respiratory: Positive for shortness of breath. Negative for cough, chest tightness and wheezing.    Cardiovascular: Negative for chest pain and leg swelling.   Gastrointestinal: Negative for abdominal distention, abdominal pain, diarrhea, nausea and vomiting.   Endocrine: Negative for cold intolerance and heat intolerance.   Genitourinary: Negative for difficulty urinating, flank pain, frequency, hematuria and urgency.   Musculoskeletal: Negative for arthralgias, joint swelling and myalgias.   Allergic/Immunologic: Negative for environmental allergies and food allergies.   Neurological: Negative for dizziness, weakness, light-headedness and headaches.   Hematological: Does not bruise/bleed easily.   Psychiatric/Behavioral: Negative for agitation, behavioral problems and decreased concentration.     Objective:     Vital Signs (Most Recent):  Temp: 98.5 °F (36.9 °C) (09/05/20 0315)  Pulse: 94 (09/05/20 1024)  Resp: (!) 30 (09/05/20 1024)  BP: (!) 118/56 (09/05/20 1024)  SpO2: (!) 94 % (09/05/20 1024) Vital Signs (24h Range):  Temp:  [98.2 °F (36.8 °C)-98.5 °F (36.9 °C)] 98.5 °F (36.9 °C)  Pulse:  [68-94] 94  Resp:  [0-35] 30  SpO2:  [91 %-100 %] 94 %  BP: (101-167)/(56-85) 118/56     Weight: 103.6 kg (228 lb 6.3 oz)  Body mass index is 40.46 kg/m².    Intake/Output Summary (Last 24 hours) at 9/5/2020 1042  Last data filed at 9/5/2020 0743  Gross per 24 hour   Intake 560 ml   Output 1750 ml   Net -1190 ml      Physical Exam  Constitutional:       Appearance: She is well-developed.   HENT:      Head: Normocephalic.   Eyes:      General:         Right eye: No discharge.          Left eye: No discharge.      Conjunctiva/sclera: Conjunctivae normal.   Neck:      Musculoskeletal: Normal range of motion and neck supple.   Cardiovascular:      Rate and Rhythm: Normal rate. Rhythm irregularly irregular.      Heart sounds: Normal heart sounds.   Pulmonary:      Effort: Pulmonary effort is normal. Tachypnea present. No respiratory distress.      Breath sounds: Decreased breath sounds present.   Abdominal:      General: Bowel sounds are normal. There is no distension.      Palpations: Abdomen is soft.      Tenderness: There is no abdominal tenderness.   Musculoskeletal: Normal range of motion.   Skin:     General: Skin is warm and dry.   Neurological:      Mental Status: She is alert and oriented to person, place, and time.   Psychiatric:         Mood and Affect: Mood normal.         Speech: Speech normal.         Behavior: Behavior normal.         Significant Labs: All pertinent labs within the past 24 hours have been reviewed.    Significant Imaging: I have reviewed all pertinent imaging results/findings within the past 24 hours.

## 2020-09-05 NOTE — PROGRESS NOTES
Pt received on 4LNC;SPO2 adequate. Treatments were given and tolerated well. No changes were made. Will continue to monitor.

## 2020-09-05 NOTE — ASSESSMENT & PLAN NOTE
Hgb 10.6 on admission and trended down to 10.2 today.  Baseline around 11.3 a year ago.  Ferritin 25 on 8/25/2020  Check Fe sat, B12/Folate  -Follow for now.

## 2020-09-05 NOTE — PLAN OF CARE
Pt aaox4, gcs 15, VS currently stable. Tolerated 3-4L nasal cannula well during shift. Pt denies SOB nor chest pain, Adequate urine output noted during shift. See Mar/flowsheetfor additional charting

## 2020-09-05 NOTE — PROGRESS NOTES
[facility]  Pulmonary/Critical Care - Medicine  History & Physical    Patient Name: Patsy Morris  MRN: [id number]  Admission Date: 9/1/2020  Hospital Length of Stay: 3 days  Code Status: Full Code  Attending Physician: Ravi Soria MD   Primary Care Provider: Luisana Cartagena MD   Principal Problem: Atrial fibrillation with rapid ventricular response    Subjective:     Hospital/ICU Course: Patient improved dramatically with overnight positive pressure ventilation. She is alert and appropriate this AM and seems to be near her baseline mental status.  She desires to have a vent at home as she feels this dramatically helped her.     Past Medical History:   Diagnosis Date    Arthritis     Asthma     Atrial fibrillation     Atrial flutter     Cerumen impaction     CHF (congestive heart failure)     CKD (chronic kidney disease), stage III     Colon polyps 2017    COPD exacerbation     Encounter for blood transfusion     HEARING LOSS     Herpes genitalis     Hypertension     Renal carcinoma 3/10/2015    Thyroid nodule 6/8/2017       Past Surgical History:   Procedure Laterality Date    BRAIN SURGERY      COLONOSCOPY N/A 6/23/2017    Procedure: COLONOSCOPY;  Surgeon: Shane Shamra MD;  Location: [facility] ([address] [place] [address]);  Service: Endoscopy;  Laterality: N/A;  2nd floor case ; on 3L home O2       per Dr Leopold (anesthesia)-Based on her history of:  Chronic respiratory failure with oxygen use, HDEZ, CHF, A-Fib, BHAVANI, it was determined that she should have her Colonoscopy scheduled on the 2nd Floor       ok to hold Eliquis 2 days prior to    EYE SURGERY      HYSTERECTOMY      kidney mass resection Right     renal carcinoma       Review of patient's allergies indicates:  No Known Allergies    Family History     Problem Relation (Age of Onset)    Cancer Father    Hypertension Mother    Thyroid disease Mother        Tobacco Use    Smoking status: Current Every Day Smoker     Packs/day:  1.00     Years: 25.00     Pack years: 25.00     Types: Cigarettes    Smokeless tobacco: Never Used   Substance and Sexual Activity    Alcohol use: No     Alcohol/week: 0.0 standard drinks    Drug use: No    Sexual activity: Never     Birth control/protection: None      Review of Systems   Constitutional: Positive for activity change and fatigue. Negative for appetite change, chills and diaphoresis.   HENT: Negative for postnasal drip.    Respiratory: Negative for cough, choking, chest tightness, wheezing and stridor.    Cardiovascular: Negative for chest pain, palpitations and leg swelling.   Gastrointestinal: Negative for abdominal distention and abdominal pain.   Skin: Negative for color change.   Psychiatric/Behavioral: Negative for behavioral problems and confusion.     Objective:     Vital Signs (Most Recent):  Temp: 98.5 °F (36.9 °C) (09/05/20 0315)  Pulse: 94 (09/05/20 1024)  Resp: (!) 30 (09/05/20 1024)  BP: (!) 118/56 (09/05/20 1024)  SpO2: (!) 94 % (09/05/20 1024) Vital Signs (24h Range):  Temp:  [98.2 °F (36.8 °C)-98.5 °F (36.9 °C)] 98.5 °F (36.9 °C)  Pulse:  [68-94] 94  Resp:  [0-35] 30  SpO2:  [91 %-100 %] 94 %  BP: (101-167)/(56-85) 118/56     Weight: 103.6 kg (228 lb 6.3 oz)  Body mass index is 40.46 kg/m².      Intake/Output Summary (Last 24 hours) at 9/5/2020 1043  Last data filed at 9/5/2020 0743  Gross per 24 hour   Intake 560 ml   Output 1750 ml   Net -1190 ml       Physical Exam  Constitutional:       General: She is not in acute distress.     Appearance: Normal appearance. She is obese. She is not ill-appearing or toxic-appearing.   HENT:      Head: Normocephalic and atraumatic.      Nose: Nose normal.      Mouth/Throat:      Mouth: Mucous membranes are moist.   Eyes:      Extraocular Movements: Extraocular movements intact.   Neck:      Musculoskeletal: Normal range of motion.   Cardiovascular:      Rate and Rhythm: Normal rate. Rhythm irregular.   Pulmonary:      Effort: Pulmonary effort  is normal. No respiratory distress.   Abdominal:      General: Abdomen is flat. There is no distension.      Palpations: Abdomen is soft.   Skin:     General: Skin is warm and dry.   Neurological:      General: No focal deficit present.      Mental Status: She is alert.   Psychiatric:         Mood and Affect: Mood normal.         Behavior: Behavior normal.         Thought Content: Thought content normal.         Judgment: Judgment normal.         Vents:  Oxygen Concentration (%): 35 (09/05/20 0228)    Lines/Drains/Airways     Drain            Female External Urinary Catheter 09/01/20 1158 3 days          Peripheral Intravenous Line                 Peripheral IV - Single Lumen 09/01/20 0206 20 G Left Hand 4 days         Peripheral IV - Single Lumen 09/02/20 1342 22 G Left Shoulder 2 days                Significant Labs:    CBC/Anemia Profile:  Recent Labs   Lab 09/04/20 0318 09/05/20 0310   WBC 9.59 8.13   HGB 10.3* 10.2*   HCT 34.2* 32.3*    153   MCV 91 90   RDW 15.2* 15.3*        Chemistries:  Recent Labs   Lab 09/04/20 0318 09/05/20 0310    144   K 4.6 4.3   CL 94* 95   CO2 40* 39*   BUN 56* 49*   CREATININE 1.8* 1.8*   CALCIUM 8.8 8.8   ALBUMIN 3.2* 3.0*   PROT 6.1 5.7*   BILITOT 0.8 0.6   ALKPHOS 36* 32*   ALT 15 14   AST 14 12   MG 2.5 2.3   PHOS 4.0 3.6       All pertinent labs within the past 24 hours have been reviewed.    Significant Imaging: I have reviewed all pertinent imaging results/findings within the past 24 hours.    Assessment/Plan:     Active Diagnoses:    Diagnosis Date Noted POA    PRINCIPAL PROBLEM:  Atrial fibrillation with rapid ventricular response [I48.91] 09/01/2020 Yes    Encounter for palliative care [Z51.5] 08/25/2020 Not Applicable    Acute on chronic respiratory failure with hypoxia and hypercapnia [J96.21, J96.22] 08/03/2016 Yes    Chronic diastolic congestive heart failure [I50.32] 05/20/2016 Yes    Morbid obesity [E66.01] 02/25/2016 Yes    Chronic kidney  disease (CKD) stage G4/A1, severely decreased glomerular filtration rate (GFR) between 15-29 mL/min/1.73 square meter and albuminuria creatinine ratio less than 30 mg/g [N18.4]  Yes    Dyslipidemia [E78.5] 02/22/2016 Yes    Essential hypertension [I10] 10/15/2014 Yes      Problems Resolved During this Admission:    Diagnosis Date Noted Date Resolved POA    Acute hypercapnic respiratory failure due to obstructive sleep apnea [J96.02, G47.33]  09/03/2020 Yes    Debility [R53.81] 08/26/2020 09/03/2020 Yes       1) acute on chronic hypercarbic/hypoxemic respiratory failure  2) BHAVANI/OHS  3) severe restrictive disease (per PFTs 2016), with mild small airways disease.  4) HFpEF   5) a. Fib    - continue NIV while sleeping (even during naps).  May have NIV QHS and PRN ordered.  - ensure she is able to obtain a trilogy vent before discharge.  - continue to pursue euvolemia, as excessive volume will increase her WOB and worsen her hypercarbia/hypoxemia.  - ensure rate control and anticoagulation for a. Fib   - continue nebs as PRN.  Would provide these at discharge.    Thanks for the consultation, we will sign off at this time.  Please re-consult with questions.    Ambrose Brown MD  Critical Care - Medicine  [facility]

## 2020-09-05 NOTE — PLAN OF CARE
Patient in no apparent distress. Sat's  91-96 % on 35% BIPAP. Patient received on AVAPS with settings as documented. Aerosol treatments given Q 8. At 2300 patient not reaching desired tidal volume. Max pressure increased to 30 . Will continue to monitor.

## 2020-09-05 NOTE — ASSESSMENT & PLAN NOTE
"Chronic Respiratory Failure on 3 liters of Oxygen at home - COPD, BHAVANI/OHS.    Recent admission from 8/22-8/31 - treated at that time with Afib-RVR with CHF and COPD exacerbation (s/p antibiotics and steroids).  Discharged to SNF.  Re-admitted 9/1/2020 with Afib and RVR and SOB.  Placed on BiPAP on admission with slow improvement and transferred to ICU on 9/2/2020.  Appreciate Pulmonary-CC care.  Appreciate Palliative Care consult    CXR on re-admission with Overall findings that may represent severe pulmonary edema versus diffuse infectious process/atypical pneumonia in the right clinical setting.      Labs on Admission:  pH 7.246, CO2 94.3, and O2 61   WBC 13.48 on admission with LS.  COVID-19 rapid negative  Trop negative     Procalcitonin 0.30    Followed by Pulmonary-CC - "From record review, it seems like she is only on CPAP 14 at home for sleep apnea. This may be due to the fact that BiPAP and CPAP were equivalent in reversing obstructive sleep apnea.  However, given the presence of chronic hypercapnic respiratory failure => she needs ventilatory support rather than just CPAP. Once we confirm her home respiratory equipment, I think she will benefit from a Trilogy for use at home".     Plan:  Follow up with Pulmonary recommendations  Continue BiPAP as needed  Wean O2 to keep Pox >90  Lasix 40mg IV q12  DuoNebs                  "

## 2020-09-05 NOTE — PROGRESS NOTES
[facility] Hospital Medicine  Progress Note    Patient Name: Patsy Morris  MRN: [id number]  Patient Class: IP- Inpatient   Admission Date: 9/1/2020  Length of Stay: 3 days  Attending Physician: Ravi Soria MD  Primary Care Provider: Luisana Cartagena MD        Subjective:     Principal Problem:Atrial fibrillation with rapid ventricular response        HPI:  The patient is a 70 y.o. female with history of HTN, CHF, and COPD who presents from nursing facility with complaint of shortness of breath. Patient reports experiencing SOB when trying to go to sleep.  Nursing home reported O2 sats in the 60s on room air. Patient states she is typically on 3 liters of O2 at home. She was seen here in the ED on 8/22, admitted to the hospital and was discharged 6 hours ago. She denies chest pain, palpitations, fever, chills, nausea or vomiting.  Of note, when the patient arrived, she was in atrial fibrillation with RVR.  She will be admitted for management of her atrial fibrillation with rapid ventricular response.    Overview/Hospital Course:  The patient is a 70 y.o. female with history of HTN, CHF, and COPD who presents from nursing facility with complaint of shortness of breath. Patient reports experiencing SOB when trying to go to sleep.  Nursing home reported O2 sats in the 60s on room air. Patient states she is typically on 3 liters of O2 at home. She was seen here in the ED on 8/22, admitted to the hospital and treated for COPD exacerbation and CHF.  She was discharged 6 hours prior to re-admission. She denies chest pain, palpitations, fever, chills, nausea or vomiting.  Of note, when the patient arrived, she was in atrial fibrillation with RVR.  She will be admitted for management of her atrial fibrillation with rapid ventricular response.  She was rate controlled with IV Diltiazem in the ED and placed on BiPAP.  She was transferred to ICU on 9/3/2020 given inability to wean off BiPAP.   Pulmonary-CC and Cardiology consulted.  Still felt mostly Cardiogenic, but slow response.  Given her underlying lung disease, Pulmonary feels she may benefit from Trilogy on discharge.  Patient previously DNR, but she rescinded this order this admission.      Interval History: Patient on BiPAP this morning in ICU.  Was on 4L most of the day yesterday and placed back on BiPAP in evening.    Review of Systems   Constitutional: Positive for activity change. Negative for appetite change and fever.   HENT: Negative for congestion, ear pain, rhinorrhea and sinus pressure.    Eyes: Negative for pain and discharge.   Respiratory: Positive for shortness of breath. Negative for cough, chest tightness and wheezing.    Cardiovascular: Negative for chest pain and leg swelling.   Gastrointestinal: Negative for abdominal distention, abdominal pain, diarrhea, nausea and vomiting.   Endocrine: Negative for cold intolerance and heat intolerance.   Genitourinary: Negative for difficulty urinating, flank pain, frequency, hematuria and urgency.   Musculoskeletal: Negative for arthralgias, joint swelling and myalgias.   Allergic/Immunologic: Negative for environmental allergies and food allergies.   Neurological: Negative for dizziness, weakness, light-headedness and headaches.   Hematological: Does not bruise/bleed easily.   Psychiatric/Behavioral: Negative for agitation, behavioral problems and decreased concentration.     Objective:     Vital Signs (Most Recent):  Temp: 98.5 °F (36.9 °C) (09/05/20 0315)  Pulse: 94 (09/05/20 1024)  Resp: (!) 30 (09/05/20 1024)  BP: (!) 118/56 (09/05/20 1024)  SpO2: (!) 94 % (09/05/20 1024) Vital Signs (24h Range):  Temp:  [98.2 °F (36.8 °C)-98.5 °F (36.9 °C)] 98.5 °F (36.9 °C)  Pulse:  [68-94] 94  Resp:  [0-35] 30  SpO2:  [91 %-100 %] 94 %  BP: (101-167)/(56-85) 118/56     Weight: 103.6 kg (228 lb 6.3 oz)  Body mass index is 40.46 kg/m².    Intake/Output Summary (Last 24 hours) at 9/5/2020 1042  Last  data filed at 9/5/2020 0743  Gross per 24 hour   Intake 560 ml   Output 1750 ml   Net -1190 ml      Physical Exam  Constitutional:       Appearance: She is well-developed.   HENT:      Head: Normocephalic.   Eyes:      General:         Right eye: No discharge.         Left eye: No discharge.      Conjunctiva/sclera: Conjunctivae normal.   Neck:      Musculoskeletal: Normal range of motion and neck supple.   Cardiovascular:      Rate and Rhythm: Normal rate. Rhythm irregularly irregular.      Heart sounds: Normal heart sounds.   Pulmonary:      Effort: Pulmonary effort is normal. Tachypnea present. No respiratory distress.      Breath sounds: Decreased breath sounds present.   Abdominal:      General: Bowel sounds are normal. There is no distension.      Palpations: Abdomen is soft.      Tenderness: There is no abdominal tenderness.   Musculoskeletal: Normal range of motion.   Skin:     General: Skin is warm and dry.   Neurological:      Mental Status: She is alert and oriented to person, place, and time.   Psychiatric:         Mood and Affect: Mood normal.         Speech: Speech normal.         Behavior: Behavior normal.         Significant Labs: All pertinent labs within the past 24 hours have been reviewed.    Significant Imaging: I have reviewed all pertinent imaging results/findings within the past 24 hours.      Assessment/Plan:      * Atrial fibrillation with rapid ventricular response   in ED and rate controlled with IV Cardizem  Home Meds:  Metoprolol 50mg bid and Apixaban 2.5mg bid  Followed by Cardiology, Dr. Ramirez.  -Continue current medications  -Telemetry    Acute on chronic respiratory failure with hypoxia and hypercapnia  Chronic Respiratory Failure on 3 liters of Oxygen at home - COPD, BHAVANI/OHS.    Recent admission from 8/22-8/31 - treated at that time with Afib-RVR with CHF and COPD exacerbation (s/p antibiotics and steroids).  Discharged to SNF.  Re-admitted 9/1/2020 with Afib and RVR and  "SOB.  Placed on BiPAP on admission with slow improvement and transferred to ICU on 9/2/2020.  Appreciate Pulmonary-CC care.  Appreciate Palliative Care consult    CXR on re-admission with Overall findings that may represent severe pulmonary edema versus diffuse infectious process/atypical pneumonia in the right clinical setting.      Labs on Admission:  pH 7.246, CO2 94.3, and O2 61   WBC 13.48 on admission with LS.  COVID-19 rapid negative  Trop negative     Procalcitonin 0.30    Followed by Pulmonary-CC - "From record review, it seems like she is only on CPAP 14 at home for sleep apnea. This may be due to the fact that BiPAP and CPAP were equivalent in reversing obstructive sleep apnea.  However, given the presence of chronic hypercapnic respiratory failure => she needs ventilatory support rather than just CPAP. Once we confirm her home respiratory equipment, I think she will benefit from a Trilogy for use at home".     Plan:  Follow up with Pulmonary recommendations  Continue BiPAP as needed  Wean O2 to keep Pox >90  Lasix 40mg IV q12  DuoNebs                    Chronic kidney disease (CKD) stage G4/A1, severely decreased glomerular filtration rate (GFR) between 15-29 mL/min/1.73 square meter and albuminuria creatinine ratio less than 30 mg/g  Had CLIFFORD on last admission and improved.  Now at baseline of 1.8.    -Renally dose medication for CrCl of <30  -Avoid Nephrotoxic medications if able  -Monitor urine output  -Low Potassium diet  -Follow closely      Chronic diastolic congestive heart failure  Home Meds:  Lasix 40mg qday  BNP elevated at 577 on admission most likely due to episode of Atrial fib with RVR  Followed by Cardiology    TTE on 8/24/2020 - Mild left atrial enlargement.  · Diastolic pattern consistent with atrial fibrillation observed.  · Normal left ventricular systolic function. The estimated ejection fraction is 58%.  · No wall motion abnormalities.  · Normal right ventricular systolic " function.  · Mild right atrial enlargement.  Small posterior pericardial effusion.    Plan:  -Continue Lasix  40mg q12   -Continue Bipap as above        Encounter for palliative care  Patient rescinded her previous DNR request and is now Full Code  Palliative Care following      Normocytic anemia  Hgb 10.6 on admission and trended down to 10.2 today.  Baseline around 11.3 a year ago.  Ferritin 25 on 8/25/2020  Check Fe sat, B12/Folate  -Follow for now.      Morbid obesity  Patient counseled on the importance of weight loss by changing diet and increased physical activity.      Dyslipidemia  Continue Lipitor      Essential hypertension  Continue Metoprolol and Lasix.  BP stable.        VTE Risk Mitigation (From admission, onward)         Ordered     apixaban tablet 2.5 mg  2 times daily      09/01/20 0332     Reason for No Pharmacological VTE Prophylaxis  Once     Question:  Reasons:  Answer:  Already adequately anticoagulated on oral Anticoagulants    09/01/20 0332     IP VTE HIGH RISK PATIENT  Once      09/01/20 0332     Place sequential compression device  Until discontinued      09/01/20 0332                Discharge Planning   YUDELKA:      Code Status: Full Code   Is the patient medically ready for discharge?:     Reason for patient still in hospital (select all that apply): Patient trending condition, Treatment and Consult recommendations  Discharge Plan A: Skilled Nursing Facility                  Ravi Soria MD  Department of Hospital Medicine   [facility]

## 2020-09-06 NOTE — ASSESSMENT & PLAN NOTE
Home Meds:  Lasix 40mg qday  BNP elevated at 577 on admission most likely due to episode of Atrial fib with RVR  Followed by Cardiology    TTE on 8/24/2020 - Mild left atrial enlargement.  · Diastolic pattern consistent with atrial fibrillation observed.  · Normal left ventricular systolic function. The estimated ejection fraction is 58%.  · No wall motion abnormalities.  · Normal right ventricular systolic function.  · Mild right atrial enlargement.  Small posterior pericardial effusion.    Plan:  -Change Lasix to 40mg po q12  -Continue Bipap as above

## 2020-09-06 NOTE — SUBJECTIVE & OBJECTIVE
Interval History: Patient on BiPAP this morning in ICU.  Was on 4L most of the day yesterday and placed back on BiPAP in evening.  Overall symptomatically improved.    Review of Systems   Constitutional: Positive for activity change. Negative for appetite change and fever.   HENT: Negative for congestion, ear pain, rhinorrhea and sinus pressure.    Eyes: Negative for pain and discharge.   Respiratory: Positive for shortness of breath. Negative for cough, chest tightness and wheezing.    Cardiovascular: Negative for chest pain and leg swelling.   Gastrointestinal: Negative for abdominal distention, abdominal pain, diarrhea, nausea and vomiting.   Endocrine: Negative for cold intolerance and heat intolerance.   Genitourinary: Negative for difficulty urinating, flank pain, frequency, hematuria and urgency.   Musculoskeletal: Negative for arthralgias, joint swelling and myalgias.   Allergic/Immunologic: Negative for environmental allergies and food allergies.   Neurological: Negative for dizziness, weakness, light-headedness and headaches.   Hematological: Does not bruise/bleed easily.   Psychiatric/Behavioral: Negative for agitation, behavioral problems and decreased concentration.     Objective:     Vital Signs (Most Recent):  Temp: 98.4 °F (36.9 °C) (09/06/20 0705)  Pulse: 72 (09/06/20 0900)  Resp: (!) 22 (09/06/20 0900)  BP: 132/68 (09/06/20 0900)  SpO2: (!) 91 % (09/06/20 0900) Vital Signs (24h Range):  Temp:  [97.2 °F (36.2 °C)-98.5 °F (36.9 °C)] 98.4 °F (36.9 °C)  Pulse:  [] 72  Resp:  [6-36] 22  SpO2:  [91 %-97 %] 91 %  BP: (107-159)/(56-90) 132/68     Weight: 103.6 kg (228 lb 6.3 oz)  Body mass index is 40.46 kg/m².    Intake/Output Summary (Last 24 hours) at 9/6/2020 0930  Last data filed at 9/6/2020 0300  Gross per 24 hour   Intake 1080 ml   Output 550 ml   Net 530 ml      Physical Exam  Constitutional:       Appearance: She is well-developed.   HENT:      Head: Normocephalic.   Eyes:      General:          Right eye: No discharge.         Left eye: No discharge.      Conjunctiva/sclera: Conjunctivae normal.   Neck:      Musculoskeletal: Normal range of motion and neck supple.   Cardiovascular:      Rate and Rhythm: Normal rate. Rhythm irregularly irregular.      Heart sounds: Normal heart sounds.   Pulmonary:      Effort: Pulmonary effort is normal. Tachypnea present. No respiratory distress.      Breath sounds: Decreased breath sounds present.   Abdominal:      General: Bowel sounds are normal. There is no distension.      Palpations: Abdomen is soft.      Tenderness: There is no abdominal tenderness.   Musculoskeletal: Normal range of motion.   Skin:     General: Skin is warm and dry.   Neurological:      Mental Status: She is alert and oriented to person, place, and time.   Psychiatric:         Mood and Affect: Mood normal.         Speech: Speech normal.         Behavior: Behavior normal.         Significant Labs: All pertinent labs within the past 24 hours have been reviewed.    Significant Imaging: I have reviewed all pertinent imaging results/findings within the past 24 hours.

## 2020-09-06 NOTE — PROGRESS NOTES
0820: Pt received on 5LNC  Will titrate liter flow per pt saturation and as tolerated. Bipap on S/B at bedside. Nebulizer treatment given per order. No adverse reactions noted at this time. Will continue to monitor.

## 2020-09-06 NOTE — PT/OT/SLP PROGRESS
Physical Therapy  Not Seen    Patient Name:  Patsy Morris   MRN:  [id number]    Patient not seen today secondary to Unavailable (Comment)(Pt eating dinner.). Offered to assist patient to bedside chair or to reposition in bed, patient defers at this time.     Discussed parts of patient's history including d/c to SNF ([facility]) with return to hospital within 2 hours pt stating due to nursing improperly donning O2 despite pt's attempts to correct it, patient now worried about d/c as [facility] was highly recommended to her.     Will follow-up tomorrow, 9/7. Left  RW in room.     Sanjana Burgos, PT, DPT

## 2020-09-06 NOTE — ASSESSMENT & PLAN NOTE
Hgb 10.6 on admission and trended down to 10.2 yesterday and stable.  Baseline around 11.3 a year ago.  Ferritin 25 on 8/25/2020 and Fe sat 18% on 9/5/2020 - start FeSO4 325mg daily  B12 301 and Folate 4.6 - both borderline low - start Nephrocaps daily

## 2020-09-06 NOTE — PROGRESS NOTES
Cardiology  Progress Notes            Subjective:  Feels better she says.  Sitting up in bed, daughter by her bedside.  She says she had a good night till 4:00 a.m. when she was awakened by nursing staff.  Had her BiPAP on till about 9:00 a.m.  Denies cough, wheezing, chest pains, or palpitations.         She has a longstanding history of hypertension, chronic obstructive lung disease, chronic atrial fibrillation, history of renal cell carcinoma having had a nephrectomy, chronic renal failure.  She has been cigarette smoker, smoked as recently as 2 weeks ago.  She has had heart failure with a preserved left ventricular ejection fraction, diastolic left ventricular dysfunction.  She has a history of hypercapnic respiratory failure, obstructive sleep apnea.     Vital Signs:  Vitals:    09/06/20 1200 09/06/20 1300 09/06/20 1457 09/06/20 1533   BP: 126/61 (!) 115/94  108/70   Pulse: 72 (!) 50 60 72   Resp: 20 (!) 22 (!) 22 20   Temp:    97.6 °F (36.4 °C)   TempSrc:       SpO2: (!) 93% (!) 94%  (!) 93%   Weight:       Height:           Physical Exam:  Chest clear  Heart irregularly irregular.  No murmur or gallop.  The extremities are warm.  Neurological exam is intact.  Laboratory:  CBC:   Recent Labs   Lab 09/05/20 0310   WBC 8.13   RBC 3.60*   HGB 10.2*   HCT 32.3*      MCV 90   MCH 28.3   MCHC 31.6*     BMP:   Recent Labs   Lab 09/05/20 0310         K 4.3   CL 95   CO2 39*   BUN 49*   CREATININE 1.8*   CALCIUM 8.8   MG 2.3     CMP:   Recent Labs   Lab 09/05/20 0310      CALCIUM 8.8   ALBUMIN 3.0*   PROT 5.7*      K 4.3   CO2 39*   CL 95   BUN 49*   CREATININE 1.8*   ALKPHOS 32*   ALT 14   AST 12   BILITOT 0.6     LFTs:   Recent Labs   Lab 09/05/20 0310   ALT 14   AST 12   ALKPHOS 32*   BILITOT 0.6   PROT 5.7*   ALBUMIN 3.0*     Coagulation: No results for input(s): PT, INR, APTT in the last 168 hours.  Cardiac markers:   Recent Labs   Lab 09/01/20  1354   TROPONINI 0.021        Imaging:  X-Ray Chest AP Portable   Final Result      Abnormal chest radiograph as above.         Electronically signed by: Mateus Rao MD   Date:    09/01/2020   Time:    01:24            Problems:   Hypercapnic respiratory failure  Advanced obstructive lung disease  Obstructive sleep apnea  Chronic atrial fibrillation  Hypertensive heart disease  Heart failure with preserved ejection fraction  Diastolic left ventricular dysfunction  Chronic renal failure, status post nephrectomy for hypernephroma      Discussed at length with patient and her daughter.        Plan of Care: See Orders          Edis Ramirez

## 2020-09-06 NOTE — NURSING
Patient had uneventful night.  Vital signs stable.  BIPAP on throughout night.  No acute distress noted.  Incontinent of urine.  Linen change and bed bath provided.  Pure wick changed.  Monitoring continues.

## 2020-09-06 NOTE — PROGRESS NOTES
[facility] Hospital Medicine  Progress Note    Patient Name: Patsy Morris  MRN: [id number]  Patient Class: IP- Inpatient   Admission Date: 9/1/2020  Length of Stay: 4 days  Attending Physician: Ravi Soria MD  Primary Care Provider: Luisana Cartagena MD        Subjective:     Principal Problem:Atrial fibrillation with rapid ventricular response        HPI:  The patient is a 70 y.o. female with history of HTN, CHF, and COPD who presents from nursing facility with complaint of shortness of breath. Patient reports experiencing SOB when trying to go to sleep.  Nursing home reported O2 sats in the 60s on room air. Patient states she is typically on 3 liters of O2 at home. She was seen here in the ED on 8/22, admitted to the hospital and was discharged 6 hours ago. She denies chest pain, palpitations, fever, chills, nausea or vomiting.  Of note, when the patient arrived, she was in atrial fibrillation with RVR.  She will be admitted for management of her atrial fibrillation with rapid ventricular response.    Overview/Hospital Course:  The patient is a 70 y.o. female with history of HTN, CHF, and COPD who presents from nursing facility with complaint of shortness of breath. Patient reports experiencing SOB when trying to go to sleep.  Nursing home reported O2 sats in the 60s on room air. Patient states she is typically on 3 liters of O2 at home. She was seen here in the ED on 8/22, admitted to the hospital and treated for COPD exacerbation and CHF.  She was discharged 6 hours prior to re-admission. She denies chest pain, palpitations, fever, chills, nausea or vomiting.  Of note, when the patient arrived, she was in atrial fibrillation with RVR.  She will be admitted for management of her atrial fibrillation with rapid ventricular response.  She was rate controlled with IV Diltiazem in the ED and placed on BiPAP.  She was transferred to ICU on 9/3/2020 given inability to wean off BiPAP.   Pulmonary-CC and Cardiology consulted.  [place] a combination of her underlying lung disease and CHF - Pulmonary feels she may benefit from Trilogy on discharge.  Patient previously DNR, but she rescinded this order this admission.  Transferred out of ICU on 9/6/2020.    Interval History: Patient on BiPAP this morning in ICU.  Was on 4L most of the day yesterday and placed back on BiPAP in evening.  Overall symptomatically improved.    Review of Systems   Constitutional: Positive for activity change. Negative for appetite change and fever.   HENT: Negative for congestion, ear pain, rhinorrhea and sinus pressure.    Eyes: Negative for pain and discharge.   Respiratory: Positive for shortness of breath. Negative for cough, chest tightness and wheezing.    Cardiovascular: Negative for chest pain and leg swelling.   Gastrointestinal: Negative for abdominal distention, abdominal pain, diarrhea, nausea and vomiting.   Endocrine: Negative for cold intolerance and heat intolerance.   Genitourinary: Negative for difficulty urinating, flank pain, frequency, hematuria and urgency.   Musculoskeletal: Negative for arthralgias, joint swelling and myalgias.   Allergic/Immunologic: Negative for environmental allergies and food allergies.   Neurological: Negative for dizziness, weakness, light-headedness and headaches.   Hematological: Does not bruise/bleed easily.   Psychiatric/Behavioral: Negative for agitation, behavioral problems and decreased concentration.     Objective:     Vital Signs (Most Recent):  Temp: 98.4 °F (36.9 °C) (09/06/20 0705)  Pulse: 72 (09/06/20 0900)  Resp: (!) 22 (09/06/20 0900)  BP: 132/68 (09/06/20 0900)  SpO2: (!) 91 % (09/06/20 0900) Vital Signs (24h Range):  Temp:  [97.2 °F (36.2 °C)-98.5 °F (36.9 °C)] 98.4 °F (36.9 °C)  Pulse:  [] 72  Resp:  [6-36] 22  SpO2:  [91 %-97 %] 91 %  BP: (107-159)/(56-90) 132/68     Weight: 103.6 kg (228 lb 6.3 oz)  Body mass index is 40.46 kg/m².    Intake/Output Summary  (Last 24 hours) at 9/6/2020 0930  Last data filed at 9/6/2020 0300  Gross per 24 hour   Intake 1080 ml   Output 550 ml   Net 530 ml      Physical Exam  Constitutional:       Appearance: She is well-developed.   HENT:      Head: Normocephalic.   Eyes:      General:         Right eye: No discharge.         Left eye: No discharge.      Conjunctiva/sclera: Conjunctivae normal.   Neck:      Musculoskeletal: Normal range of motion and neck supple.   Cardiovascular:      Rate and Rhythm: Normal rate. Rhythm irregularly irregular.      Heart sounds: Normal heart sounds.   Pulmonary:      Effort: Pulmonary effort is normal. Tachypnea present. No respiratory distress.      Breath sounds: Decreased breath sounds present.   Abdominal:      General: Bowel sounds are normal. There is no distension.      Palpations: Abdomen is soft.      Tenderness: There is no abdominal tenderness.   Musculoskeletal: Normal range of motion.   Skin:     General: Skin is warm and dry.   Neurological:      Mental Status: She is alert and oriented to person, place, and time.   Psychiatric:         Mood and Affect: Mood normal.         Speech: Speech normal.         Behavior: Behavior normal.         Significant Labs: All pertinent labs within the past 24 hours have been reviewed.    Significant Imaging: I have reviewed all pertinent imaging results/findings within the past 24 hours.      Assessment/Plan:      * Atrial fibrillation with rapid ventricular response   in ED and rate controlled with IV Cardizem  Home Meds:  Metoprolol 50mg bid and Apixaban 2.5mg bid  Followed by Cardiology, Dr. Ramirez.  -Continue current medications  -Telemetry    Acute on chronic respiratory failure with hypoxia and hypercapnia  Chronic Respiratory Failure on 3 liters of Oxygen at home - COPD, BHAVANI/OHS.    Recent admission from 8/22-8/31 - treated at that time with Afib-RVR with CHF and COPD exacerbation (s/p antibiotics and steroids).  Discharged to  "SNF.  Re-admitted 9/1/2020 with Afib and RVR and SOB.  Placed on BiPAP on admission and transferred to ICU on 9/2/2020.  Improved and transferred out of ICU on 9/6/2020 with continued BiPAP in evenings.    CXR on re-admission with Overall findings that may represent severe pulmonary edema versus diffuse infectious process/atypical pneumonia in the right clinical setting.      Labs on Admission:  pH 7.246, CO2 94.3, and O2 61   WBC 13.48 on admission with LS.  COVID-19 rapid negative  Trop negative     Procalcitonin 0.30    Followed by Pulmonary-CC - "Continues to improve clinically. Acute on chronic hypoxemic and hypercapnic respiratory failure multifactorial in nature related to BHAVANI/OHS + DD and chronotropic related decompensation in setting of RVR. Likely some small airway disease, but not clearly COPD by prior PFTs in 2016 (FEV1/FVC 81). No quick fix and optimization will focus on individual components.. Will need to obtain BIPAP (trilogy) for use at home as this will be key to long term management".     Plan:  Transfer to Floor  Continue BiPAP qHS  Trilogy for outpatient use  Lasix 40mg po q12  DuoNebs  PT evaluation                    Chronic kidney disease (CKD) stage G4/A1, severely decreased glomerular filtration rate (GFR) between 15-29 mL/min/1.73 square meter and albuminuria creatinine ratio less than 30 mg/g  Had CLIFFORD on last admission and improved.  Now at baseline of 1.8.    -Renally dose medication for CrCl of <30  -Avoid Nephrotoxic medications if able  -Monitor urine output  -Low Potassium diet  -Follow closely      Chronic diastolic congestive heart failure  Home Meds:  Lasix 40mg qday  BNP elevated at 577 on admission most likely due to episode of Atrial fib with RVR  Followed by Cardiology    TTE on 8/24/2020 - Mild left atrial enlargement.  · Diastolic pattern consistent with atrial fibrillation observed.  · Normal left ventricular systolic function. The estimated ejection fraction is " 58%.  · No wall motion abnormalities.  · Normal right ventricular systolic function.  · Mild right atrial enlargement.  Small posterior pericardial effusion.    Plan:  -Change Lasix to 40mg po q12  -Continue Bipap as above        Encounter for palliative care  Patient rescinded her previous DNR request and is now Full Code  Palliative Care following      Normocytic anemia  Hgb 10.6 on admission and trended down to 10.2 yesterday and stable.  Baseline around 11.3 a year ago.  Ferritin 25 on 8/25/2020 and Fe sat 18% on 9/5/2020 - start FeSO4 325mg daily  B12 301 and Folate 4.6 - both borderline low - start Nephrocaps daily        Morbid obesity  Patient counseled on the importance of weight loss by changing diet and increased physical activity.      Dyslipidemia  Continue Lipitor      Essential hypertension  Continue Metoprolol and Lasix.  BP stable.        VTE Risk Mitigation (From admission, onward)         Ordered     apixaban tablet 2.5 mg  2 times daily      09/01/20 0332     Reason for No Pharmacological VTE Prophylaxis  Once     Question:  Reasons:  Answer:  Already adequately anticoagulated on oral Anticoagulants    09/01/20 0332     IP VTE HIGH RISK PATIENT  Once      09/01/20 0332     Place sequential compression device  Until discontinued      09/01/20 0332                Discharge Planning   YUDELKA:      Code Status: Full Code   Is the patient medically ready for discharge?:     Reason for patient still in hospital (select all that apply): Patient trending condition and Treatment  Discharge Plan A: Skilled Nursing Facility                  Ravi Soria MD  Department of Hospital Medicine   [facility]

## 2020-09-06 NOTE — PLAN OF CARE
[facility]     Department of Hospital Medicine     [address] [place] [address]     [phone] after hours  [phone] fax       SNF ORDERS    09/11/2020    Admit to Skilled Nursing Home:   Skilled Bed                                                 Diagnoses:  Active Hospital Problems    Diagnosis  POA    *Atrial fibrillation with rapid ventricular response [I48.91]  Yes     Priority: 2     Acute on chronic respiratory failure with hypoxia and hypercapnia [J96.21, J96.22]  Yes     Priority: 1 - High    Chronic kidney disease (CKD) stage G4/A1, severely decreased glomerular filtration rate (GFR) between 15-29 mL/min/1.73 square meter and albuminuria creatinine ratio less than 30 mg/g [N18.4]  Yes     Priority: 3     Chronic diastolic congestive heart failure [I50.32]  Yes     Priority: 4     Encounter for palliative care [Z51.5]  Not Applicable    Normocytic anemia [D64.9]  Yes    Morbid obesity [E66.01]  Yes    Dyslipidemia [E78.5]  Yes    Essential hypertension [I10]  Yes      Resolved Hospital Problems    Diagnosis Date Resolved POA    Acute hypercapnic respiratory failure due to obstructive sleep apnea [J96.02, G47.33] 09/03/2020 Yes    Debility [R53.81] 09/03/2020 Yes     Allergies:Review of patient's allergies indicates:  No Known Allergies    Vitals:       Every shift (Skilled Nursing patients)    Diet: Renal and Cardiac    Acitivities:   As tolerated    LABS:  Per facility protocol    Nursing Precautions:     - Aspiration precautions:             -  Upright 90 degrees befor during and after meals    - Fall precautions per nursing home protocol   - Decubitus precautions:        -  for positioning   - Pressure reducing foam mattress   - Turn patient every two hours. Use wedge pillows to anchor patient    CONSULTS:        Physical Therapy to evaluate and treat     Occupational Therapy to evaluate and treat    MISCELLANEOUS CARE:    Nasal Cannula 4 liters/minute of supplemental oxygen.    BiPAP at night and while sleeping during the day with inspiratory pressure of 15 cm of water pressure and expiratory pressure of 10 cm of kay with FiO2 of 40 percent.  Please pad bridge of the nose with a Aquacel lite dressing or Mepilex lite prior to mask application.      Medications: Discontinue all previous medication orders, if any. See new list below.     Patsy Morris   Home Medication Instructions CHEO:[id number]    Printed on:09/06/20 5512   Medication Information                      acetaminophen (TYLENOL) 500 MG tablet  Take 2 tablets (1,000 mg total) by mouth every 6 (six) hours as needed for Pain.             albuterol-ipratropium 2.5mg-0.5mg/3mL (DUO-NEB) 0.5 mg-3 mg(2.5 mg base)/3 mL nebulizer solution  Take 3 mLs by nebulization every 6 (six) hours as needed for Wheezing or Shortness of Breath.             apixaban (ELIQUIS) 2.5 mg Tab  TAKE 1 TABLET(2.5 MG) BY MOUTH TWICE DAILY             atorvastatin (LIPITOR) 80 MG tablet  TAKE 1 TABLET BY MOUTH EVERY EVENING.             blood sugar diagnostic (BLOOD GLUCOSE TEST) Strp  Qd testing             docusate sodium (COLACE) 100 MG capsule  Take 1 capsule (100 mg total) by mouth 2 (two) times daily.             escitalopram oxalate (LEXAPRO) 10 MG tablet  TAKE ONE TABLET BY MOUTH EVERY DAY IN THE EVENING             ferrous sulfate 325 (65 FE) MG EC tablet  Take 1 tablet (325 mg total) by mouth once daily.             furosemide (LASIX) 40 MG tablet  Take 1 tablet (40 mg total) by mouth 2 (two) times daily.             metoprolol tartrate (LOPRESSOR) 50 MG tablet  Take 1 tablet (50 mg total) by mouth 2 (two) times daily.             miconazole NITRATE 2 % (MICOTIN) 2 % top powder  Apply topically 2 (two) times daily.             polyethylene glycol (GLYCOLAX) 17 gram PwPk  Take 17 g by mouth daily as needed.             valACYclovir (VALTREX) 500 MG tablet  Take 1 tablet (500 mg  total) by mouth once daily.             vitamin renal formula, B-complex-vitamin c-folic acid, (NEPHROCAP) 1 mg Cap  Take 1 capsule by mouth once daily.             walker Misc  Prn use please fit pt                       _________________________________  Loi Quiles MD  09/10/2020

## 2020-09-06 NOTE — ASSESSMENT & PLAN NOTE
"Chronic Respiratory Failure on 3 liters of Oxygen at home - COPD, BHAVANI/OHS.    Recent admission from 8/22-8/31 - treated at that time with Afib-RVR with CHF and COPD exacerbation (s/p antibiotics and steroids).  Discharged to SNF.  Re-admitted 9/1/2020 with Afib and RVR and SOB.  Placed on BiPAP on admission and transferred to ICU on 9/2/2020.  Improved and transferred out of ICU on 9/6/2020 with continued BiPAP in evenings.    CXR on re-admission with Overall findings that may represent severe pulmonary edema versus diffuse infectious process/atypical pneumonia in the right clinical setting.      Labs on Admission:  pH 7.246, CO2 94.3, and O2 61   WBC 13.48 on admission with LS.  COVID-19 rapid negative  Trop negative     Procalcitonin 0.30    Followed by Pulmonary-CC - "Continues to improve clinically. Acute on chronic hypoxemic and hypercapnic respiratory failure multifactorial in nature related to BHAVANI/OHS + DD and chronotropic related decompensation in setting of RVR. Likely some small airway disease, but not clearly COPD by prior PFTs in 2016 (FEV1/FVC 81). No quick fix and optimization will focus on individual components.. Will need to obtain BIPAP (trilogy) for use at home as this will be key to long term management".     Plan:  Transfer to Floor  Continue BiPAP qHS  Trilogy for outpatient use  Lasix 40mg po q12  DuoNebs  PT evaluation                  "

## 2020-09-06 NOTE — PLAN OF CARE
Pt is AAOx4.  PIV remains saline locked, no redness or swelling at site.  Telemetry monitored, aflutter.  PureWick in place.  Pt has no needs stated at this time.  Pt remains on oxygen at 4.5 LPM.  O2 sats remain above 92.  VSS, in NAD, pt remains afebrile.  Pt remains free from injury.  Bed in low position, wheels locked, call light within reach.  Pt verbalized understanding of POC.  Open communication facilitated.

## 2020-09-07 NOTE — SUBJECTIVE & OBJECTIVE
Interval History: Patient on BiPAP this morning.  She was transferred out of ICU yesterday.  Was on 4L most of the day yesterday and placed back on BiPAP in evening.  Overall symptomatically improved.  Discussed Dispo planning.    Review of Systems   Constitutional: Positive for activity change. Negative for appetite change and fever.   HENT: Negative for congestion, ear pain, rhinorrhea and sinus pressure.    Eyes: Negative for pain and discharge.   Respiratory: Positive for shortness of breath. Negative for cough, chest tightness and wheezing.    Cardiovascular: Negative for chest pain and leg swelling.   Gastrointestinal: Negative for abdominal distention, abdominal pain, diarrhea, nausea and vomiting.   Endocrine: Negative for cold intolerance and heat intolerance.   Genitourinary: Negative for difficulty urinating, flank pain, frequency, hematuria and urgency.   Musculoskeletal: Negative for arthralgias, joint swelling and myalgias.   Allergic/Immunologic: Negative for environmental allergies and food allergies.   Neurological: Negative for dizziness, weakness, light-headedness and headaches.   Hematological: Does not bruise/bleed easily.   Psychiatric/Behavioral: Negative for agitation, behavioral problems and decreased concentration.     Objective:     Vital Signs (Most Recent):  Temp: 99 °F (37.2 °C) (09/07/20 0408)  Pulse: (!) 46 (09/07/20 0727)  Resp: 20 (09/07/20 0727)  BP: (!) 107/56 (09/07/20 0408)  SpO2: (!) 94 % (09/07/20 0727) Vital Signs (24h Range):  Temp:  [97.5 °F (36.4 °C)-99 °F (37.2 °C)] 99 °F (37.2 °C)  Pulse:  [] 46  Resp:  [18-30] 20  SpO2:  [90 %-95 %] 94 %  BP: ()/(53-94) 107/56     Weight: 103.6 kg (228 lb 6.3 oz)  Body mass index is 40.46 kg/m².    Intake/Output Summary (Last 24 hours) at 9/7/2020 0749  Last data filed at 9/7/2020 0600  Gross per 24 hour   Intake 540 ml   Output 1100 ml   Net -560 ml      Physical Exam  Constitutional:       Appearance: She is  well-developed.   HENT:      Head: Normocephalic.   Eyes:      General:         Right eye: No discharge.         Left eye: No discharge.      Conjunctiva/sclera: Conjunctivae normal.   Neck:      Musculoskeletal: Normal range of motion and neck supple.   Cardiovascular:      Rate and Rhythm: Normal rate. Rhythm irregularly irregular.      Heart sounds: Normal heart sounds.   Pulmonary:      Effort: Pulmonary effort is normal. Tachypnea present. No respiratory distress.      Breath sounds: Decreased breath sounds present.   Abdominal:      General: Bowel sounds are normal. There is no distension.      Palpations: Abdomen is soft.      Tenderness: There is no abdominal tenderness.   Musculoskeletal: Normal range of motion.   Skin:     General: Skin is warm and dry.   Neurological:      Mental Status: She is alert and oriented to person, place, and time.   Psychiatric:         Mood and Affect: Mood normal.         Speech: Speech normal.         Behavior: Behavior normal.         Significant Labs: All pertinent labs within the past 24 hours have been reviewed.    Significant Imaging: I have reviewed all pertinent imaging results/findings within the past 24 hours.

## 2020-09-07 NOTE — PLAN OF CARE
Voicemail left for admit RN at [facility] to discuss possibility of patient returning today - awaiting response

## 2020-09-07 NOTE — ASSESSMENT & PLAN NOTE
"Chronic Respiratory Failure on 3 liters of Oxygen at home - COPD (?), BHAVANI/OHS with severe restrictive lung disease.    Recent admission from 8/22-8/31 - treated at that time with Afib-RVR with CHF (acute diastolic) and COPD exacerbation (s/p antibiotics and steroids).  Discharged to SNF.  Re-admitted 9/1/2020 with Afib and RVR and SOB.  Placed on BiPAP on admission and transferred to ICU on 9/2/2020.  Improved and transferred out of ICU on 9/6/2020 with continued BiPAP in evenings.    CXR on re-admission with Overall findings that may represent severe pulmonary edema versus diffuse infectious process/atypical pneumonia in the right clinical setting.      Labs on Admission:  pH 7.246, CO2 94.3, and O2 61   WBC 13.48 on admission with LS.  COVID-19 rapid negative  Trop negative     Procalcitonin 0.30    Followed by Pulmonary-CC - "Continues to improve clinically. Acute on chronic hypoxemic and hypercapnic respiratory failure multifactorial in nature related to BHAVANI/OHS + DD and chronotropic related decompensation in setting of RVR. Likely some small airway disease, but not clearly COPD by prior PFTs in 2016 (FEV1/FVC 81). No quick fix and optimization will focus on individual components.. Will need to obtain BIPAP (trilogy) for use at home as this will be key to long term management".     Plan:  Now on O2NC at 4L during day  Continue BiPAP qHS - 15/10 with FIO2 of 40%  Trilogy for outpatient use  Lasix 40mg po q12  DuoNebs  PT evaluation  Possible d/c today (if able given Holiday) or tomorrow                  "

## 2020-09-07 NOTE — PT/OT/SLP EVAL
Physical Therapy Evaluation    Patient Name:  Patsy Morris   MRN:  [id number]    Recommendations:     Discharge Recommendations:  nursing facility, skilled   Discharge Equipment Recommendations: (tub transfer bench)   Barriers to discharge: None    Assessment:     Patsy Morris is a 70 y.o. female admitted with a medical diagnosis of Atrial fibrillation with rapid ventricular response.  She presents with the following impairments/functional limitations:  weakness, impaired endurance, impaired self care skills, impaired functional mobilty, gait instability, impaired balance, decreased upper extremity function, decreased lower extremity function, impaired cardiopulmonary response to activity . Pt  with functional mobility deficits and should benefit from  PT  to maximize I and safety decrease risk of further decline of injury..    Rehab Prognosis: Good; patient would benefit from acute skilled PT services to address these deficits and reach maximum level of function.    Recent Surgery: * No surgery found *      Plan:     During this hospitalization, patient to be seen 5 x/week to address the identified rehab impairments via gait training, therapeutic activities, therapeutic exercises and progress toward the following goals:    · Plan of Care Expires:  10/07/20    Subjective     Chief Complaint: fear of showering  Patient/Family Comments/goals: return to PLOF  Pain/Comfort:  Pain Rating 1: 0/10  Pain Rating Post-Intervention 1: 0/10    Patients cultural, spiritual, Quaker conflicts given the current situation: no    Living Environment:  · Pt lives with her brother in a single story house with 4 steps to enter and bilateral handrail(s).   · Pt has a tub shower.   · DME owned: Rolling walker, Bedside commode, Shower chair, Manual wheelchair, Straight cane and Rollator  · Upon discharge, patient will not have assistance at home.  ? Brother is unable to assist.  Prior level of function:  · Ambulation: Mod I  "with rollator  · ADL's: Mod I with rollator. Reports difficulty getting in/out of tub shower  · IADLs: Does cooking/cleaning.  · Falls: None reported.       Objective:     Communicated with nurseSwapna prior to session.  Patient found up in chair with peripheral IV, oxygen  upon PT entry to room.    General Precautions: Standard, fall, respiratory   Orthopedic Precautions:N/A   Braces: N/A     Exams:  · Cognition:   ? Patient is oriented to person, place, time, situation.  ? Pt follows approximately 100% of multiple step commands.    ? Mood: Pleasant and cooperative.   · Musculoskeletal:  ? Posture:  WNL  ? LE ROM/Strength:   § R ROM: WNL  § L ROM: WNL  § R Strength:   § Hip flexion: 4/5  § Knee extension: 4/5  § Dorsiflexion: 4/5   § L Strength:   § Hip flexion: 4/5  § Knee extension: 4/5  § Dorsiflexion: 4/5   · Neuromuscular:  ? Sensation: Intact to light touch bilateral LEs.   ? Tone/Reflexes: No impairments identified with functional mobility. No formal testing performed.   ? Coordination: WFL  ? Balance:   § Static sitting: Normal  § Dynamic sitting: Normal  § Static standing: Fair+  § Dynamic standing: requires B UE support during gait.  Fair  ? Visual-vestibular: No impairments identified with functional mobility. No formal testing performed.  · Integument: Visible skin intact    Functional Mobility:  · Bed Mobility:     · Supine to Sit: nor observed, pt reports needing a "little", that she can not do it alone.  · Transfers:     · Sit to Stand:  stand by assistance with rolling walker  · Gait: x 120ft with RW and CGA, pt limited by SOB, slow samaria cueing for posture and breathing technique. Pt return to BSChair with SBA.        Therapeutic Activities and Exercises:  Pt presented sitting in BSC, agreeable to PT.  Pt reports supine>sit with requiring a little help .  Pt transfer sit>stand with SBA to RW.  Gait x 120ft with RW and CGA, pt limited by SOB,slow samaria, cueing for posture and breathing " technique. Pt return to BSChair with SBA.      AM-PAC 6 CLICK MOBILITY  Total Score:18     Patient left up in chair with all lines intact, call button in reach, nurse notified and visitor present.    GOALS:   Multidisciplinary Problems     Physical Therapy Goals        Problem: Physical Therapy Goal    Goal Priority Disciplines Outcome Goal Variances Interventions   Physical Therapy Goal     PT, PT/OT Ongoing, Progressing     Description: Goals to be met by: 2020    Patient will increase functional independence with mobility by performin. Sit<>stand with SPV with LRAD  2. Gait x 200 feet with RW with SBA.  3. Ascend/descend 3 step(s) with  HR least restrictive assistive device and SBA  4. Pt to transfer supine<>sit with SBA                      History:     Past Medical History:   Diagnosis Date    Arthritis     Asthma     Atrial fibrillation     Atrial flutter     Cerumen impaction     CHF (congestive heart failure)     CKD (chronic kidney disease), stage III     Colon polyps     COPD exacerbation     Encounter for blood transfusion     HEARING LOSS     Herpes genitalis     Hypertension     Renal carcinoma 3/10/2015    Thyroid nodule 2017       Past Surgical History:   Procedure Laterality Date    BRAIN SURGERY      COLONOSCOPY N/A 2017    Procedure: COLONOSCOPY;  Surgeon: Shane Sharma MD;  Location: [facility] ([address] [place] [address]);  Service: Endoscopy;  Laterality: N/A;  2nd floor case ; on 3L home O2       per Dr Leopold (anesthesia)-Based on her history of:  Chronic respiratory failure with oxygen use, HDEZ, CHF, A-Fib, BHAVANI, it was determined that she should have her Colonoscopy scheduled on the 2nd Floor       ok to hold Eliquis 2 days prior to    EYE SURGERY      HYSTERECTOMY      kidney mass resection Right     renal carcinoma       Time Tracking:     PT Received On: 20  PT Start Time: 1445     PT Stop Time: 1506  PT Total Time (min): 21 min     Billable Minutes: Evaluation  21      Ramon Buenrostro, PT  09/07/2020

## 2020-09-07 NOTE — PLAN OF CARE
Problem: Physical Therapy Goal  Goal: Physical Therapy Goal  Description: Goals to be met by: 2020    Patient will increase functional independence with mobility by performin. Sit<>stand with SPV with LRAD  2. Gait x 200 feet with RW with SBA.  3. Ascend/descend 3 step(s) with  HR least restrictive assistive device and SBA  4. Pt to transfer supine<>sit with SBA     Outcome: Ongoing, Progressing  Pt maintained on 5L O2 entire session  Pt presented sitting in BSC, agreeable to PT.  Pt reports supine>sit with requiring a little help .  Pt transfer sit>stand with SBA to RW.  Gait x 120ft with RW and CGA, pt limited by SOB, cueing for posture and breathing technique. Pt return to BSChair with SBA.

## 2020-09-07 NOTE — PROGRESS NOTES
Patient received on nasal cannula with documented settings. Bipap on S/B at bedside. Nebulizer treatment given, no adverse reactions noted. Will continue to monitor.

## 2020-09-07 NOTE — PROGRESS NOTES
[facility] Hospital Medicine  Progress Note    Patient Name: Patsy Morris  MRN: [id number]  Patient Class: IP- Inpatient   Admission Date: 9/1/2020  Length of Stay: 5 days  Attending Physician: Ravi Soria MD  Primary Care Provider: Luisana Cartagena MD        Subjective:     Principal Problem:Atrial fibrillation with rapid ventricular response        HPI:  The patient is a 70 y.o. female with history of HTN, CHF, and COPD who presents from nursing facility with complaint of shortness of breath. Patient reports experiencing SOB when trying to go to sleep.  Nursing home reported O2 sats in the 60s on room air. Patient states she is typically on 3 liters of O2 at home. She was seen here in the ED on 8/22, admitted to the hospital and was discharged 6 hours ago. She denies chest pain, palpitations, fever, chills, nausea or vomiting.  Of note, when the patient arrived, she was in atrial fibrillation with RVR.  She will be admitted for management of her atrial fibrillation with rapid ventricular response.    Overview/Hospital Course:  The patient is a 70 y.o. female with history of HTN, CHF, and COPD who presents from nursing facility with complaint of shortness of breath. Patient reports experiencing SOB when trying to go to sleep.  Nursing home reported O2 sats in the 60s on room air. Patient states she is typically on 3 liters of O2 at home. She was seen here in the ED on 8/22, admitted to the hospital and treated for COPD exacerbation and CHF.  She was discharged 6 hours prior to re-admission. She denies chest pain, palpitations, fever, chills, nausea or vomiting.  Of note, when the patient arrived, she was in atrial fibrillation with RVR.  She will be admitted for management of her atrial fibrillation with rapid ventricular response.  She was rate controlled with IV Diltiazem in the ED and placed on BiPAP.  She was transferred to ICU on 9/3/2020 given inability to wean off BiPAP.   Pulmonary-CC and Cardiology consulted.  [place] a combination of her underlying lung disease and CHF - Pulmonary feels she may benefit from Trilogy on discharge.  Patient previously DNR, but she rescinded this order this admission.  Transferred out of ICU on 9/6/2020.    Interval History: Patient on BiPAP this morning.  She was transferred out of ICU yesterday.  Was on 4L most of the day yesterday and placed back on BiPAP in evening.  Overall symptomatically improved.  Discussed Dispo planning.    Review of Systems   Constitutional: Positive for activity change. Negative for appetite change and fever.   HENT: Negative for congestion, ear pain, rhinorrhea and sinus pressure.    Eyes: Negative for pain and discharge.   Respiratory: Positive for shortness of breath. Negative for cough, chest tightness and wheezing.    Cardiovascular: Negative for chest pain and leg swelling.   Gastrointestinal: Negative for abdominal distention, abdominal pain, diarrhea, nausea and vomiting.   Endocrine: Negative for cold intolerance and heat intolerance.   Genitourinary: Negative for difficulty urinating, flank pain, frequency, hematuria and urgency.   Musculoskeletal: Negative for arthralgias, joint swelling and myalgias.   Allergic/Immunologic: Negative for environmental allergies and food allergies.   Neurological: Negative for dizziness, weakness, light-headedness and headaches.   Hematological: Does not bruise/bleed easily.   Psychiatric/Behavioral: Negative for agitation, behavioral problems and decreased concentration.     Objective:     Vital Signs (Most Recent):  Temp: 99 °F (37.2 °C) (09/07/20 0408)  Pulse: (!) 46 (09/07/20 0727)  Resp: 20 (09/07/20 0727)  BP: (!) 107/56 (09/07/20 0408)  SpO2: (!) 94 % (09/07/20 0727) Vital Signs (24h Range):  Temp:  [97.5 °F (36.4 °C)-99 °F (37.2 °C)] 99 °F (37.2 °C)  Pulse:  [] 46  Resp:  [18-30] 20  SpO2:  [90 %-95 %] 94 %  BP: ()/(53-94) 107/56     Weight: 103.6 kg (228 lb 6.3  oz)  Body mass index is 40.46 kg/m².    Intake/Output Summary (Last 24 hours) at 9/7/2020 0749  Last data filed at 9/7/2020 0600  Gross per 24 hour   Intake 540 ml   Output 1100 ml   Net -560 ml      Physical Exam  Constitutional:       Appearance: She is well-developed.   HENT:      Head: Normocephalic.   Eyes:      General:         Right eye: No discharge.         Left eye: No discharge.      Conjunctiva/sclera: Conjunctivae normal.   Neck:      Musculoskeletal: Normal range of motion and neck supple.   Cardiovascular:      Rate and Rhythm: Normal rate. Rhythm irregularly irregular.      Heart sounds: Normal heart sounds.   Pulmonary:      Effort: Pulmonary effort is normal. Tachypnea present. No respiratory distress.      Breath sounds: Decreased breath sounds present.   Abdominal:      General: Bowel sounds are normal. There is no distension.      Palpations: Abdomen is soft.      Tenderness: There is no abdominal tenderness.   Musculoskeletal: Normal range of motion.   Skin:     General: Skin is warm and dry.   Neurological:      Mental Status: She is alert and oriented to person, place, and time.   Psychiatric:         Mood and Affect: Mood normal.         Speech: Speech normal.         Behavior: Behavior normal.         Significant Labs: All pertinent labs within the past 24 hours have been reviewed.    Significant Imaging: I have reviewed all pertinent imaging results/findings within the past 24 hours.      Assessment/Plan:      * Atrial fibrillation with rapid ventricular response   in ED and rate controlled with IV Cardizem  Home Meds:  Metoprolol 50mg bid and Apixaban 2.5mg bid  Followed by Cardiology, Dr. Ramirez.  -Continue current medications  -Telemetry    Acute on chronic respiratory failure with hypoxia and hypercapnia  Chronic Respiratory Failure on 3 liters of Oxygen at home - COPD (?), BHAVANI/OHS with severe restrictive lung disease.    Recent admission from 8/22-8/31 - treated at that time  "with Afib-RVR with CHF (acute diastolic) and COPD exacerbation (s/p antibiotics and steroids).  Discharged to SNF.  Re-admitted 9/1/2020 with Afib and RVR and SOB.  Placed on BiPAP on admission and transferred to ICU on 9/2/2020.  Improved and transferred out of ICU on 9/6/2020 with continued BiPAP in evenings.    CXR on re-admission with Overall findings that may represent severe pulmonary edema versus diffuse infectious process/atypical pneumonia in the right clinical setting.      Labs on Admission:  pH 7.246, CO2 94.3, and O2 61   WBC 13.48 on admission with LS.  COVID-19 rapid negative  Trop negative     Procalcitonin 0.30    Followed by Pulmonary-CC - "Continues to improve clinically. Acute on chronic hypoxemic and hypercapnic respiratory failure multifactorial in nature related to BHAVANI/OHS + DD and chronotropic related decompensation in setting of RVR. Likely some small airway disease, but not clearly COPD by prior PFTs in 2016 (FEV1/FVC 81). No quick fix and optimization will focus on individual components.. Will need to obtain BIPAP (trilogy) for use at home as this will be key to long term management".     Plan:  Now on O2NC at 4L during day  Continue BiPAP qHS - 15/10 with FIO2 of 40%  Trilogy for outpatient use  Lasix 40mg po q12  DuoNebs  PT evaluation  Possible d/c today (if able given Holiday) or tomorrow                    Chronic kidney disease (CKD) stage G4/A1, severely decreased glomerular filtration rate (GFR) between 15-29 mL/min/1.73 square meter and albuminuria creatinine ratio less than 30 mg/g  Had CLIFFORD on last admission and improved.  Now at baseline of 1.8.    -Renally dose medication for CrCl of <30  -Avoid Nephrotoxic medications if able  -Monitor urine output  -Low Potassium diet  -Follow closely      Chronic diastolic congestive heart failure  Home Meds:  Lasix 40mg qday  BNP elevated at 577 on admission most likely due to episode of Atrial fib with RVR  Followed by " Cardiology    TTE on 8/24/2020 - Mild left atrial enlargement.  · Diastolic pattern consistent with atrial fibrillation observed.  · Normal left ventricular systolic function. The estimated ejection fraction is 58%.  · No wall motion abnormalities.  · Normal right ventricular systolic function.  · Mild right atrial enlargement.  Small posterior pericardial effusion.    Plan:  -Change Lasix to 40mg po q12  -Continue Bipap as above        Encounter for palliative care  Patient rescinded her previous DNR request and is now Full Code  Palliative Care following      Normocytic anemia  Hgb 10.6 on admission and trended down to 10.2 on 9/5 and stable.  Baseline around 11.3 a year ago.  Ferritin 25 on 8/25/2020 and Fe sat 18% on 9/5/2020 - start FeSO4 325mg daily  B12 301 and Folate 4.6 - both borderline low - started Nephrocaps daily        Morbid obesity  Patient counseled on the importance of weight loss by changing diet and increased physical activity.      Dyslipidemia  Continue Lipitor      Essential hypertension  Continue Metoprolol and Lasix.  BP stable.        VTE Risk Mitigation (From admission, onward)         Ordered     apixaban tablet 2.5 mg  2 times daily      09/01/20 0332     Reason for No Pharmacological VTE Prophylaxis  Once     Question:  Reasons:  Answer:  Already adequately anticoagulated on oral Anticoagulants    09/01/20 0332     IP VTE HIGH RISK PATIENT  Once      09/01/20 0332     Place sequential compression device  Until discontinued      09/01/20 0332                Discharge Planning   [place]:      Code Status: Full Code   Is the patient medically ready for discharge?:     Reason for patient still in hospital (select all that apply): Patient trending condition and Treatment  Discharge Plan A: Skilled Nursing Facility                  Raiv Soria MD  Department of Hospital Medicine   [facility]

## 2020-09-07 NOTE — DISCHARGE SUMMARY
"[facility]  Hospital Medicine  Discharge Summary      Patient Name: Patsy Morris  MRN: [id number]  Admission Date: 8/22/2020  Hospital Length of Stay: 9 days  Discharge Date and Time: 8/31/2020  6:29 PM  Attending Physician: Loi Quiles MD  Discharging Provider: Loi Quiles MD  Primary Care Provider: Luisana Cartagena MD      HPI:   Per Dr. Ravi Soria:    "Per ED:  "This is a 70 y.o. female with hx of HTN, asthma, A-fib, and CHF who presents via EMS with complaint of shortness of breath for the past week, worsening last night. She has orthopnea and sleeps in a recliner every night. She is on 3 L home Oxygen. She denies fever or chest pain. She denies hx of DVT or PE. She is on Eliquis".    Patient somnolent on BiPAP and unable to give much of a verbal history at this time.  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  Patient was seen by PCP on 8/20/2020 for increasing SOB and Lasix was increased to 40mg bid from qday.    She arrived to ED on 100%NRB and in Afib with RVR.  She was placed on BiPAP and given Diltiazem x 1 with good response.  She was also given Lasix 60mg IV x 1 in ED."      Hospital Course:   Patient is 70-year-old woman with history of hypertension, chronic hypoxic respiratory failure on home oxygen secondary to chronic obstructive pulmonary disease, chronic diastolic heart failure, chronic atrial fibrillation, and morbid obesity admitted to the hospital for treatment of acute on chronic hypercapnic and hypoxic respiratory failure due to acute exacerbation of chronic obstructive pulmonary disease and also due to decompensated heart failure.  Chest radiograph showed evidence of pulmonary edema.  Patient was admitted to the intensive care unit treated with noninvasive ventilation along with intravenous diuretics.  She was treated with steroids, bronchodilator breathing " treatment, and antibiotics.  Patient clinically improved and wean off noninvasive ventilation was transferred to the floor.  Patient with significant upward trend of her serum creatinine.  Nephrology service consult recommending holding diuretic therapy for couple days until her serum creatinine stabilized.  Serum creatinine stabilized.  Physical and Occupational therapy consulted to treat her debility and recommended further therapy at a skilled nursing facility.  Patient with advanced lung disease but has been stable for multiple days now on 4 liters/minute of supplemental oxygen along with CPAP at night with 12 cm of water pressure at night.  Patient discharged to skilled nursing facility in stable condition for ongoing therapy.     Consults:   Consults (From admission, onward)        Status Ordering Provider     Inpatient consult to Cardiology  Once     Provider:  Edis Ramirez MD    Completed JIA TAPIA     Inpatient consult to Nephrology-[facility]  Once     Provider:  Aakash Reyes NP    Completed BRODIE MERRITT     Inpatient consult to Palliative Care  Once     Provider:  Elsa Bran DNP    Completed JIA TAPIA     Inpatient consult to Palliative Care  Once     Provider:  Elsa Bran DNP    Completed BRODIE MERRITT     Inpatient consult to Pulmonary Critical Care  Once     Provider:  (Not yet assigned)    Completed JIA TAPIA     Inpatient consult to Social Work  Once     Provider:  (Not yet assigned)    Completed JIA TAPIA          Final Active Diagnoses:    Diagnosis Date Noted POA    PRINCIPAL PROBLEM:  Acute respiratory failure with hypoxia and hypercapnia [J96.01, J96.02] 08/24/2020 Yes    Essential hypertension [I10] 10/15/2014 Yes    Chronic kidney disease (CKD) stage G4/A1, severely decreased glomerular filtration rate (GFR) between 15-29 mL/min/1.73 square meter and albuminuria creatinine ratio less than 30 mg/g [N18.4]  Yes     Dyslipidemia [E78.5] 02/22/2016 Yes    Morbid obesity [E66.01] 02/25/2016 Yes    Encounter for palliative care [Z51.5] 08/25/2020 Not Applicable    Obstructive sleep apnea [G47.33]  Yes    Paroxysmal atrial fibrillation [I48.0] 08/03/2016 Yes    Normocytic anemia [D64.9] 08/03/2016 Yes      Problems Resolved During this Admission:    Diagnosis Date Noted Date Resolved POA    Debility [R53.81] 08/26/2020 09/03/2020 Yes    Shortness of breath [R06.02] 08/22/2020 08/22/2020 Yes    Elevated glucose [R73.09] 08/22/2020 08/25/2020 Yes    Acute on chronic congestive heart failure [I50.9]  08/24/2020 Yes    Longstanding persistent atrial fibrillation [I48.11]  08/24/2020 Yes    COPD exacerbation [J44.1]  08/24/2020 Yes    Atrial fibrillation with RVR [I48.91]  08/24/2020 Yes    Acute on chronic respiratory failure with hypoxia and hypercapnia [J96.21, J96.22] 10/11/2016 08/25/2020 Yes    Acute on chronic diastolic heart failure [I50.33] 08/03/2016 08/22/2020 Yes       Discharged Condition: Stable    Disposition: Skilled Nursing Facility    Follow Up:  Follow-up Information     Luisana Cartagena MD. Schedule an appointment as soon as possible for a visit in 1 week.    Specialty: Family Medicine  Why: Re-establish outpatient care for ongonig management of advanced disease  Contact information:  [address]  [address]  [facility] [address]  [phone]             [facility].    Specialties: Nursing Home Agency, SNF Agency  Why: SNF  Contact information:  [address] [facility] [address] [phone]                 Patient Instructions:      BIPAP FOR HOME USE     Order Specific Question Answer Comments   Type: Auto BiPap    Auto BiPap setting (cmH20) Inspiratory Range: 35    Auto BiPap setting (cmH20) Expiratory Range: 4    Length of need (1-99 months): 99    Humidification: Heated    Type of mask: FFM    BiPap supply refills: 12      VENTILATOR FOR HOME USE     Order Specific  "Question Answer Comments   Height: 5' 3" (1.6 m)    Weight: 100.7 kg (222 lb)    Does patient have medical equipment at home? 3-in-1 commode    Does patient have medical equipment at home? shower chair    Does patient have medical equipment at home? wheelchair    Does patient have medical equipment at home? rollator    Does patient have medical equipment at home? cane, straight    Does patient have medical equipment at home? oxygen    Length of need (1-99 months): 99    Interface needed: Full face mask    Mode: AVAPS    Rate: 22    Tidal Volume 450    PEEP - EPAP 14.0 CM/H2O    Pressure support - IPAP 25    Humidifier needed? No      Ambulatory referral/consult to Nephrology   Standing Status: Future   Referral Priority: Routine Referral Type: Consultation   Referral Reason: Specialty Services Required   Requested Specialty: Nephrology   Number of Visits Requested: 1     Notify your health care provider if you experience any of the following:  temperature >100.4     Notify your health care provider if you experience any of the following:  persistent nausea and vomiting or diarrhea     Notify your health care provider if you experience any of the following:  severe uncontrolled pain     Notify your health care provider if you experience any of the following:  difficulty breathing or increased cough     Notify your health care provider if you experience any of the following:  severe persistent headache     Notify your health care provider if you experience any of the following:  worsening rash     Notify your health care provider if you experience any of the following:  persistent dizziness, light-headedness, or visual disturbances     Notify your health care provider if you experience any of the following:  increased confusion or weakness     Polysomnogram (CPAP will be added if patient meets diagnostic criteria.)   Standing Status: Future Standing Exp. Date: 08/26/21     Activity as tolerated      "   Medications:  Reconciled Home Medications:      Medication List      START taking these medications    acetaminophen 500 MG tablet  Commonly known as: TYLENOL  Take 2 tablets (1,000 mg total) by mouth every 6 (six) hours as needed for Pain.     docusate sodium 100 MG capsule  Commonly known as: COLACE  Take 1 capsule (100 mg total) by mouth 2 (two) times daily.     metoprolol tartrate 50 MG tablet  Commonly known as: LOPRESSOR  Take 1 tablet (50 mg total) by mouth 2 (two) times daily.     miconazole NITRATE 2 % 2 % top powder  Commonly known as: MICOTIN  Apply topically 2 (two) times daily.     polyethylene glycol 17 gram Pwpk  Commonly known as: GLYCOLAX  Take 17 g by mouth daily as needed.        CONTINUE taking these medications    albuterol-ipratropium 2.5 mg-0.5 mg/3 mL nebulizer solution  Commonly known as: DUO-NEB  Take 3 mLs by nebulization every 6 (six) hours as needed for Wheezing or Shortness of Breath.     apixaban 2.5 mg Tab  Commonly known as: ELIQUIS  TAKE 1 TABLET(2.5 MG) BY MOUTH TWICE DAILY     atorvastatin 80 MG tablet  Commonly known as: LIPITOR  TAKE 1 TABLET BY MOUTH EVERY EVENING.     blood sugar diagnostic Strp  Commonly known as: BLOOD GLUCOSE TEST  Qd testing     escitalopram oxalate 10 MG tablet  Commonly known as: LEXAPRO  TAKE ONE TABLET BY MOUTH EVERY DAY IN THE EVENING     valACYclovir 500 MG tablet  Commonly known as: VALTREX  Take 1 tablet (500 mg total) by mouth once daily.     walker Misc  Prn use please fit pt        STOP taking these medications    ammonium lactate 12 % lotion  Commonly known as: LAC-HYDRIN     ferrous sulfate 325 mg (65 mg iron) Tab tablet  Commonly known as: FEOSOL     furosemide 40 MG tablet  Commonly known as: LASIX     ketoconazole 2 % cream  Commonly known as: NIZORAL     mometasone 0.1% 0.1 % cream  Commonly known as: ELOCON     mupirocin 2 % ointment  Commonly known as: BACTROBAN     silver sulfADIAZINE 1% 1 % cream  Commonly known as: SILVADENE      triamcinolone acetonide 0.025% 0.025 % cream  Commonly known as: KENALOG     VITAMIN D2 50,000 unit Cap  Generic drug: ergocalciferol     VOLTAREN 1 % Gel  Generic drug: diclofenac sodium            Indwelling Lines/Drains at time of discharge:   Lines/Drains/Airways     None                 Time spent on the discharge of patient: 35 minutes  Patient was seen and examined on the date of discharge and determined to be suitable for discharge.         Loi Quiles MD  Department of Hospital Medicine  [facility]

## 2020-09-07 NOTE — PLAN OF CARE
Patient resting in NAD. VSS on BiPAP. A-flutter rhythm on telemetry. Purewick in place to wall suction. Purposeful rounding performed. No needs expressed at this time. Safety measures maintained. Pt instructed to use call light for assistance. Will continue to monitor.

## 2020-09-07 NOTE — PROGRESS NOTES
Cardiology  Progress Notes            Subjective:  Sitting up out of bed, says she feels well.    She has a longstanding history of hypertension, chronic obstructive lung disease, chronic atrial fibrillation, history of renal cell carcinoma having had a nephrectomy, chronic renal failure.  She has been cigarette smoker, smoked as recently as 2 weeks ago.  She has had heart failure with a preserved left ventricular ejection fraction, diastolic left ventricular dysfunction.  She has a history of hypercapnic respiratory failure, obstructive sleep apnea.     Vital Signs:  Vitals:    09/07/20 0805 09/07/20 0911 09/07/20 1000 09/07/20 1150   BP: (!) 129/59   (!) 107/56   BP Location:    Right arm   Patient Position:    Lying   Pulse: (!) 47 75 82 75   Resp:    20   Temp: 96.5 °F (35.8 °C)   98.8 °F (37.1 °C)   TempSrc:    Oral   SpO2: (!) 92%   96%   Weight:       Height:           Physical Exam:  Chest clear  Heart irregularly irregular.  No murmur or gallop  No ankle edema  Neurologically intact  Laboratory:  CBC:   Recent Labs   Lab 09/07/20  0826   WBC 7.63   RBC 3.73*   HGB 10.6*   HCT 33.5*   *   MCV 90   MCH 28.4   MCHC 31.6*     BMP:   Recent Labs   Lab 09/07/20  0826   *      K 4.0   CL 94*   CO2 38*   BUN 38*   CREATININE 1.9*   CALCIUM 8.8   MG 2.3     CMP:   Recent Labs   Lab 09/05/20  0310 09/07/20  0826    136*   CALCIUM 8.8 8.8   ALBUMIN 3.0*  --    PROT 5.7*  --     143   K 4.3 4.0   CO2 39* 38*   CL 95 94*   BUN 49* 38*   CREATININE 1.8* 1.9*   ALKPHOS 32*  --    ALT 14  --    AST 12  --    BILITOT 0.6  --      LFTs:   Recent Labs   Lab 09/05/20  0310   ALT 14   AST 12   ALKPHOS 32*   BILITOT 0.6   PROT 5.7*   ALBUMIN 3.0*     Coagulation: No results for input(s): PT, INR, APTT in the last 168 hours.  Cardiac markers:   Recent Labs   Lab 09/01/20  1354   TROPONINI 0.021       Imaging:  X-Ray Chest AP Portable   Final Result      Abnormal chest radiograph as above.          Electronically signed by: Mateus Rao MD   Date:    09/01/2020   Time:    01:24            Problems:   Hypercapnic respiratory failure  Advanced obstructive lung disease  Obstructive sleep apnea  Chronic atrial fibrillation  Hypertensive heart disease  Heart failure with preserved ejection fraction  Diastolic left ventricular dysfunction  Chronic renal failure, status post nephrectomy for hypernephroma           Plan of Care: PT  Continue present meds.          Edis Ramirez

## 2020-09-07 NOTE — PLAN OF CARE
Pt is AAOx4.  PIV remains saline locked, no redness or swelling at site.  Pt sat in chair most of shift, no distress noted.  Ambulated in combs with PT.  Pt remains on O2 at 4.5 LPM, no acute deficits with oxygen saturation.  Telemetry monitored, aflutter.  Pt remains incontinent, changed as needed.  VSS, in NAD, pt remains afebrile.  Pt remains free from injury.  Bed in low position, wheels locked, call light within reach.  Pt verbalized understanding of POC.  Open communication facilitated.

## 2020-09-07 NOTE — ASSESSMENT & PLAN NOTE
Hgb 10.6 on admission and trended down to 10.2 on 9/5 and stable.  Baseline around 11.3 a year ago.  Ferritin 25 on 8/25/2020 and Fe sat 18% on 9/5/2020 - start FeSO4 325mg daily  B12 301 and Folate 4.6 - both borderline low - started Nephrocaps daily

## 2020-09-08 PROBLEM — Z51.5 ENCOUNTER FOR PALLIATIVE CARE: Status: RESOLVED | Noted: 2020-01-01 | Resolved: 2020-01-01

## 2020-09-08 NOTE — PLAN OF CARE
Pt on BIPAP this morning resting comfortably. Neb tx given with jeremiah.  Will continue to monitor pt.

## 2020-09-08 NOTE — PROGRESS NOTES
Cardiology  Progress Notes            Subjective:  Were short of breath earlier, now feels better.  Tolerated limited ambulation with physical therapy.    She has a longstanding history of hypertension, chronic obstructive lung disease, chronic atrial fibrillation, history of renal cell carcinoma having had a nephrectomy, chronic renal failure.  She has been cigarette smoker, smoked as recently as 2 weeks ago.  She has had heart failure with a preserved left ventricular ejection fraction, diastolic left ventricular dysfunction.  She has a history of hypercapnic respiratory failure, obstructive sleep apnea.     Vital Signs:  Vitals:    09/08/20 0724 09/08/20 0725 09/08/20 0742 09/08/20 0800   BP:   124/60    BP Location:   Right arm    Patient Position:   Lying    Pulse: 70  75 87   Resp: (!) 22  (!) 23    Temp:       TempSrc:       SpO2:  (!) 94% (!) 92%    Weight:       Height:           Physical Exam:  Chest clear  Heart irregularly irregular.  No murmur or gallop.  Extremities are warm.  Neurological intact  Laboratory:  CBC:   Recent Labs   Lab 09/07/20  0826   WBC 7.63   RBC 3.73*   HGB 10.6*   HCT 33.5*   *   MCV 90   MCH 28.4   MCHC 31.6*     BMP:   Recent Labs   Lab 09/07/20  0826   *      K 4.0   CL 94*   CO2 38*   BUN 38*   CREATININE 1.9*   CALCIUM 8.8   MG 2.3     CMP:   Recent Labs   Lab 09/05/20  0310 09/07/20  0826    136*   CALCIUM 8.8 8.8   ALBUMIN 3.0*  --    PROT 5.7*  --     143   K 4.3 4.0   CO2 39* 38*   CL 95 94*   BUN 49* 38*   CREATININE 1.8* 1.9*   ALKPHOS 32*  --    ALT 14  --    AST 12  --    BILITOT 0.6  --      LFTs:   Recent Labs   Lab 09/05/20  0310   ALT 14   AST 12   ALKPHOS 32*   BILITOT 0.6   PROT 5.7*   ALBUMIN 3.0*     Coagulation: No results for input(s): PT, INR, APTT in the last 168 hours.  Cardiac markers:   Recent Labs   Lab 09/01/20  1354   TROPONINI 0.021       Imaging:  X-Ray Chest AP Portable   Final Result      Abnormal chest radiograph  as above.         Electronically signed by: Mateus Rao MD   Date:    09/01/2020   Time:    01:24            Problems:   Hypercapnic respiratory failure  Advanced obstructive lung disease  Obstructive sleep apnea  Chronic atrial fibrillation  Hypertensive heart disease  Heart failure with preserved ejection fraction  Diastolic left ventricular dysfunction  Chronic renal failure, status post nephrectomy for hypernephroma           Plan of Care: See Orders          Edis Ramirez

## 2020-09-08 NOTE — PT/OT/SLP PROGRESS
Physical Therapy Treatment    Patient Name:  Patsy Morris   MRN:  [id number]    Recommendations:     Discharge Recommendations:  nursing facility, skilled   Discharge Equipment Recommendations: (tub transfer bench)   Barriers to discharge: None    Assessment:     Patsy Morris is a 70 y.o. female admitted with a medical diagnosis of Atrial fibrillation with rapid ventricular response.  She presents with the following impairments/functional limitations:  weakness, impaired endurance, impaired self care skills, impaired functional mobilty, gait instability, impaired balance, decreased lower extremity function, decreased upper extremity function, impaired cardiopulmonary response to activity ,  Pt presented sitting EOB upon arrival of PT. Pt agreeable to PT. Pt sit <> stand w/ RW and SBA. Pt amb'd 220' w/ rollator and SBA. 4LO2 NC.    Rehab Prognosis: Good; patient would benefit from acute skilled PT services to address these deficits and reach maximum level of function.    Recent Surgery: * No surgery found *      Plan:     During this hospitalization, patient to be seen 5 x/week to address the identified rehab impairments via gait training, therapeutic activities, therapeutic exercises and progress toward the following goals:    · Plan of Care Expires:  10/07/20    Subjective     Chief Complaint: none stated  Patient/Family Comments/goals: none stated  Pain/Comfort:  · Pain Rating 1: 0/10  · Pain Rating Post-Intervention 1: 0/10      Objective:     Communicated with Aren) prior to session.  Patient found sitting EOB with peripheral IV, PureWick, oxygen upon PT entry to room.     General Precautions: Standard, fall, respiratory   Orthopedic Precautions:N/A   Braces: N/A     Functional Mobility:  · Transfers:     · Sit to Stand:  stand by assistance with rolling walker  · Gait: 220' w/ rollator and SBA. 4LO2 NC. multiple 1 min rest breaks      AM-PAC 6 CLICK MOBILITY  Turning over in bed (including  adjusting bedclothes, sheets and blankets)?: 3  Sitting down on and standing up from a chair with arms (e.g., wheelchair, bedside commode, etc.): 3  Moving from lying on back to sitting on the side of the bed?: 3  Moving to and from a bed to a chair (including a wheelchair)?: 3  Need to walk in hospital room?: 3  Climbing 3-5 steps with a railing?: 3  Basic Mobility Total Score: 18       Therapeutic Activities and Exercises:   Pt performed seated therapeutic exercises including AP and LAQs x 10 reps with verbal and tactile cues.      Patient left sitting EOB with all lines intact, call button in reach and ns notified..    GOALS:   Multidisciplinary Problems     Physical Therapy Goals        Problem: Physical Therapy Goal    Goal Priority Disciplines Outcome Goal Variances Interventions   Physical Therapy Goal     PT, PT/OT Ongoing, Progressing     Description: Goals to be met by: 2020    Patient will increase functional independence with mobility by performin. Sit<>stand with SPV with LRAD  2. Gait x 200 feet with RW with SBA.  3. Ascend/descend 3 step(s) with  HR least restrictive assistive device and SBA  4. Pt to transfer supine<>sit with SBA                      Time Tracking:     PT Received On: 20  PT Start Time: 1150     PT Stop Time: 1230  PT Total Time (min): 40 min     Billable Minutes: Gait Training 25 and Therapeutic Exercise 15    Treatment Type: Treatment  PT/PTA: PTA     PTA Visit Number: Jake     Aj Blackburn, PTA  2020

## 2020-09-08 NOTE — PLAN OF CARE
Patient stable. VSS. Worked with therapy. BiPAP when sleeping. Rounding completed. Denied needs. Call bell in reach. Side rails up X2. Bed locked, lowered. Safety maintained.

## 2020-09-08 NOTE — PLAN OF CARE
POC reviewed w pt at the bedside. VSS on Bipap, AAOX4. Pt did not report pain during the shift. Pt had an incontinent episode, linens, gown, and pure wick catheter were changed. Safety precautions in place, pt instructed to call for assistance.

## 2020-09-08 NOTE — PLAN OF CARE
Problem: Physical Therapy Goal  Goal: Physical Therapy Goal  Description: Goals to be met by: 2020    Patient will increase functional independence with mobility by performin. Sit<>stand with SPV with LRAD  2. Gait x 200 feet with RW with SBA.  3. Ascend/descend 3 step(s) with  HR least restrictive assistive device and SBA  4. Pt to transfer supine<>sit with SBA     Outcome: Ongoing, Progressing   Pt presented sitting EOB upon arrival of PT. Pt agreeable to PT. Pt sit <> stand w/ RW and SBA. Pt amb'd 220' w/ rollator and SBA. 4LO2 NC

## 2020-09-09 NOTE — SUBJECTIVE & OBJECTIVE
Interval History:  Patient reports breathing worse today.    Review of Systems   Constitutional: Negative for chills and fever.   Respiratory: Positive for shortness of breath. Negative for wheezing.    Cardiovascular: Negative for chest pain.   Gastrointestinal: Negative for abdominal distention, abdominal pain, constipation, diarrhea, nausea and vomiting.   Genitourinary: Negative for dysuria and frequency.   Musculoskeletal: Negative for arthralgias and myalgias.   Neurological: Negative for light-headedness.   Psychiatric/Behavioral: Negative for agitation and confusion.     Objective:     Vital Signs (Most Recent):  Temp: 97.7 °F (36.5 °C) (09/08/20 1922)  Pulse: 62 (09/08/20 2000)  Resp: 20 (09/08/20 1922)  BP: 122/60 (09/08/20 1922)  SpO2: 95 % (09/08/20 1922) Vital Signs (24h Range):  Temp:  [97.7 °F (36.5 °C)-98.9 °F (37.2 °C)] 97.7 °F (36.5 °C)  Pulse:  [51-96] 62  Resp:  [17-25] 20  SpO2:  [92 %-97 %] 95 %  BP: ()/(53-65) 122/60     Weight: 103.6 kg (228 lb 6.3 oz)  Body mass index is 40.46 kg/m².    Intake/Output Summary (Last 24 hours) at 9/8/2020 2111  Last data filed at 9/8/2020 1824  Gross per 24 hour   Intake 180 ml   Output 800 ml   Net -620 ml      Physical Exam  Constitutional:       Appearance: She is well-developed.   HENT:      Head: Normocephalic.   Eyes:      General:         Right eye: No discharge.         Left eye: No discharge.      Conjunctiva/sclera: Conjunctivae normal.   Neck:      Musculoskeletal: Normal range of motion and neck supple.   Cardiovascular:      Rate and Rhythm: Normal rate. Rhythm irregularly irregular.      Heart sounds: Normal heart sounds.   Pulmonary:      Effort: Pulmonary effort is normal. Tachypnea present. No respiratory distress.      Breath sounds: Decreased breath sounds present.   Abdominal:      General: Bowel sounds are normal. There is no distension.      Palpations: Abdomen is soft.      Tenderness: There is no abdominal tenderness.    Musculoskeletal: Normal range of motion.   Skin:     General: Skin is warm and dry.   Neurological:      Mental Status: She is alert and oriented to person, place, and time.   Psychiatric:         Mood and Affect: Mood normal.         Speech: Speech normal.         Behavior: Behavior normal.         Significant Labs: All pertinent labs within the past 24 hours have been reviewed.    Significant Imaging: I have reviewed all pertinent imaging results/findings within the past 24 hours.

## 2020-09-09 NOTE — PT/OT/SLP PROGRESS
Physical Therapy Treatment    Patient Name:  Patsy Morris   MRN:  [id number]    Recommendations:     Discharge Recommendations:  nursing facility, skilled   Discharge Equipment Recommendations: (Defer to SNF)   Barriers to discharge: Decreased caregiver support and current functional level    Assessment:     Patsy Morris is a 70 y.o. female admitted with a medical diagnosis of Atrial fibrillation with rapid ventricular response.  She presents with the following impairments/functional limitations:  weakness, impaired endurance, impaired functional mobilty, gait instability, impaired balance, impaired self care skills, decreased lower extremity function, impaired cardiopulmonary response to activity.    Pt tolerated treatment well with no adverse reactions. Pt is progressing toward meeting goals. Pt performed supine > sit with SBA, sit <> stand with SBA, and ambulation x 120 ft x 2 trials with SBA and rollator. Demonstrated improved endurance. Pt continues to be limited by weakness. PT will continue to follow and progress goals as tolerated. Recommend discharge to SNF.     Rehab Prognosis: Good; patient would benefit from acute skilled PT services to address these deficits and reach maximum level of function.    Recent Surgery: * No surgery found *      Plan:     During this hospitalization, patient to be seen 5 x/week to address the identified rehab impairments via gait training, therapeutic activities, therapeutic exercises and progress toward the following goals:    · Plan of Care Expires:  10/07/20    Subjective     Chief Complaint: None stated. Pt waiting on breakfast.  Patient/Family Comments/goals: No goal stated.  Pain/Comfort:  · Pain Rating 1: 0/10  · Pain Rating Post-Intervention 1: 0/10      Objective:     Communicated with RN (Jayla) prior to session.  Patient found supine with peripheral IV, PureWick, oxygen(4L O2 via nasal cannula) upon PT entry to room.     General Precautions:  Standard, fall, respiratory   Orthopedic Precautions:N/A   Braces: N/A     Functional Mobility: Gait belt and non-slip socks donned prior to mobility. Surgical mask donned for ambulation in hallway per current infection control policy.  · Bed Mobility: Supine > sit with SBA with HOB elevated and using bedrails.  · Transfers: Sit <> stand with SBA. Performed 3x during session. Toilet transfer with SBA using grab bars.  · (+) void and BM. Required total A for andrae-care after toileting.  · Ambulation: 120 ft x 2 trials with rollator and CGA. Seated rest break between trials. Demonstrated increased trunk flexion, decreased samaria, and wide MUNIR.      AM-PAC 6 CLICK MOBILITY  Turning over in bed (including adjusting bedclothes, sheets and blankets)?: 3  Sitting down on and standing up from a chair with arms (e.g., wheelchair, bedside commode, etc.): 3  Moving from lying on back to sitting on the side of the bed?: 3  Moving to and from a bed to a chair (including a wheelchair)?: 3  Need to walk in hospital room?: 3  Climbing 3-5 steps with a railing?: 3  Basic Mobility Total Score: 18       Therapeutic Activities and Exercises:   Bed mobility, transfer, and gait training as described above.    PT educated patient re:   · PT plan of care/role of PT  · Use of rollator  · Fall and safety precautions  · Gait deviations  · Use of call light (don't get up without assistance)  Pt verbalized understanding     The patient is safe to transfer with RN assist.   Patient encouraged to sit up in chair throughout the day to prevent deconditioning.     Patient left sitting EOB with all lines intact and call button in reach. Purwick not replaced at end of session as patient wanted to remain sitting EOB to eat breakfast. Notified RN.    GOALS:   Multidisciplinary Problems     Physical Therapy Goals        Problem: Physical Therapy Goal    Goal Priority Disciplines Outcome Goal Variances Interventions   Physical Therapy Goal     PT, PT/OT  Ongoing, Progressing     Description: Goals to be met by: 2020    Patient will increase functional independence with mobility by performin. Sit<>stand with SPV with LRAD GOAL MET 2020  2. Gait x 200 feet with RW with SBA.  3. Ascend/descend 3 step(s) with  HR least restrictive assistive device and SBA  4. Pt to transfer supine<>sit with SBA GOAL MET 2020                      Time Tracking:     PT Received On: 20  PT Start Time: 940     PT Stop Time: 1018  PT Total Time (min): 38 min     Billable Minutes: Gait Training 23 and Therapeutic Activity 15    Treatment Type: Treatment  PT/PTA: PT     PTA Visit Number: 0     Georgie Rosales PT  2020

## 2020-09-09 NOTE — PROGRESS NOTES
"[facility] Hospital Medicine  Progress Note    Patient Name: Patsy Morris  MRN: [id number]  Patient Class: IP- Inpatient   Admission Date: 9/1/2020  Length of Stay: 7 days  Attending Physician: Loi Quiles MD  Primary Care Provider: Luisana Cartagena MD        Subjective:     Principal Problem:Atrial fibrillation with rapid ventricular response        HPI:  Per Darwin Cunningham, NP:    "The patient is a 70 y.o. female with history of HTN, CHF, and COPD who presents from nursing facility with complaint of shortness of breath. Patient reports experiencing SOB when trying to go to sleep.  Nursing home reported O2 sats in the 60s on room air. Patient states she is typically on 3 liters of O2 at home. She was seen here in the ED on 8/22, admitted to the hospital and was discharged 6 hours ago. She denies chest pain, palpitations, fever, chills, nausea or vomiting.  Of note, when the patient arrived, she was in atrial fibrillation with RVR.  She will be admitted for management of her atrial fibrillation with rapid ventricular response."    Overview/Hospital Course:  Patient is 70-year-old woman with history of hypertension, chronic hypoxic respiratory failure on home oxygen secondary to chronic obstructive pulmonary disease, chronic diastolic heart failure, chronic atrial fibrillation, and morbid obesity re-admitted to the hospital for treatment of acute on chronic hypoxic respiratory failure and atrial fibrillation with rapid ventricular response.  Patient was rate controlled with intravenous diltiazem and treated with noninvasive ventilation.  Pulmonary/critical care and cardiology services consulted and suspect her worsening respiratory failure due to combination of underlying lung disease and heart failure.  Patient clinically improved and she was transferred out of the intensive care unit on 9/6/2020.  Patient also met with palliative care service and decided to rescind her prior do " not resuscitate order.    Interval History:  Patient reports breathing improving today.    Review of Systems   Constitutional: Negative for chills and fever.   Respiratory: Positive for shortness of breath. Negative for wheezing.    Cardiovascular: Negative for chest pain.   Gastrointestinal: Negative for abdominal distention, abdominal pain, constipation, diarrhea, nausea and vomiting.   Genitourinary: Negative for dysuria and frequency.   Musculoskeletal: Negative for arthralgias and myalgias.   Neurological: Negative for light-headedness.   Psychiatric/Behavioral: Negative for agitation and confusion.     Objective:     Vital Signs (Most Recent):  Temp: 99.3 °F (37.4 °C) (09/09/20 1552)  Pulse: 90 (09/09/20 1605)  Resp: 18 (09/09/20 1605)  BP: (!) 110/55 (09/09/20 1552)  SpO2: 96 % (09/09/20 1605) Vital Signs (24h Range):  Temp:  [97.7 °F (36.5 °C)-99.3 °F (37.4 °C)] 99.3 °F (37.4 °C)  Pulse:  [] 90  Resp:  [17-25] 18  SpO2:  [92 %-97 %] 96 %  BP: (110-146)/(55-68) 110/55     Weight: 103.6 kg (228 lb 6.3 oz)  Body mass index is 40.46 kg/m².    Intake/Output Summary (Last 24 hours) at 9/9/2020 1709  Last data filed at 9/9/2020 1055  Gross per 24 hour   Intake 654 ml   Output 1100 ml   Net -446 ml      Physical Exam  Constitutional:       Appearance: She is well-developed.   HENT:      Head: Normocephalic.   Eyes:      General:         Right eye: No discharge.         Left eye: No discharge.      Conjunctiva/sclera: Conjunctivae normal.   Neck:      Musculoskeletal: Normal range of motion and neck supple.   Cardiovascular:      Rate and Rhythm: Normal rate. Rhythm irregularly irregular.      Heart sounds: Normal heart sounds.   Pulmonary:      Effort: Pulmonary effort is normal. Tachypnea present. No respiratory distress.      Breath sounds: Decreased breath sounds present.   Abdominal:      General: Bowel sounds are normal. There is no distension.      Palpations: Abdomen is soft.      Tenderness: There is  no abdominal tenderness.   Musculoskeletal: Normal range of motion.   Skin:     General: Skin is warm and dry.   Neurological:      Mental Status: She is alert and oriented to person, place, and time.   Psychiatric:         Mood and Affect: Mood normal.         Speech: Speech normal.         Behavior: Behavior normal.         Significant Labs: All pertinent labs within the past 24 hours have been reviewed.    Significant Imaging: I have reviewed all pertinent imaging results/findings within the past 24 hours.      Assessment/Plan:      * Atrial fibrillation with rapid ventricular response  Treated with intravenous diltiazem and now rate controlled with metoprolol 50 mg twice daily.  Patient on apixaban 2.5 mg twice daily for stroke prevention.    Acute on chronic respiratory failure with hypoxia and hypercapnia  Continue with current treatment.  Chest radiograph shows evidence of persistent pulmonary edema.  Patient's which intravenous furosemide for more aggressive diuresis.  Continue to monitor kidney function on diuretic therapy.    Chronic diastolic congestive heart failure  Decompensation due to poor rate control.  Improved overall.  Continue with medical therapy.    Essential hypertension  Reasonably controlled with current regimen.  Will continue with current regimen and continue to monitor.    Chronic kidney disease (CKD) stage G4/A1, severely decreased glomerular filtration rate (GFR) between 15-29 mL/min/1.73 square meter and albuminuria creatinine ratio less than 30 mg/g  Stable.  Continue to monitor.    Dyslipidemia  Continue statin therapy.    Morbid obesity  Patient counseled on the importance of weight loss by changing diet and increased physical activity.        VTE Risk Mitigation (From admission, onward)         Ordered     apixaban tablet 2.5 mg  2 times daily      09/01/20 0332     Reason for No Pharmacological VTE Prophylaxis  Once     Question:  Reasons:  Answer:  Already adequately  anticoagulated on oral Anticoagulants    09/01/20 0332     IP VTE HIGH RISK PATIENT  Once      09/01/20 0332     Place sequential compression device  Until discontinued      09/01/20 0332                Loi Quiles MD  Department of Hospital Medicine   [facility]

## 2020-09-09 NOTE — PROGRESS NOTES
"[facility] Hospital Medicine  Progress Note    Patient Name: Patsy Morris  MRN: [id number]  Patient Class: IP- Inpatient   Admission Date: 9/1/2020  Length of Stay: 6 days  Attending Physician: Loi Quiles MD  Primary Care Provider: Luisana Cartagena MD        Subjective:     Principal Problem:Atrial fibrillation with rapid ventricular response        HPI:  Per Darwin Cunningham, NP:    "The patient is a 70 y.o. female with history of HTN, CHF, and COPD who presents from nursing facility with complaint of shortness of breath. Patient reports experiencing SOB when trying to go to sleep.  Nursing home reported O2 sats in the 60s on room air. Patient states she is typically on 3 liters of O2 at home. She was seen here in the ED on 8/22, admitted to the hospital and was discharged 6 hours ago. She denies chest pain, palpitations, fever, chills, nausea or vomiting.  Of note, when the patient arrived, she was in atrial fibrillation with RVR.  She will be admitted for management of her atrial fibrillation with rapid ventricular response."    Overview/Hospital Course:  Patient is 70-year-old woman with history of hypertension, chronic hypoxic respiratory failure on home oxygen secondary to chronic obstructive pulmonary disease, chronic diastolic heart failure, chronic atrial fibrillation, and morbid obesity re-admitted to the hospital for treatment of acute on chronic hypoxic respiratory failure and atrial fibrillation with rapid ventricular response.  Patient was rate controlled with intravenous diltiazem and treated with noninvasive ventilation.  Pulmonary/critical care and cardiology services consulted and suspect her worsening respiratory failure due to combination of underlying lung disease and heart failure.  Patient clinically improved and she was transferred out of the intensive care unit on 9/6/2020.  Patient also met with palliative care service and decided to rescind her prior do " not resuscitate order.    Interval History:  Patient reports breathing worse today.    Review of Systems   Constitutional: Negative for chills and fever.   Respiratory: Positive for shortness of breath. Negative for wheezing.    Cardiovascular: Negative for chest pain.   Gastrointestinal: Negative for abdominal distention, abdominal pain, constipation, diarrhea, nausea and vomiting.   Genitourinary: Negative for dysuria and frequency.   Musculoskeletal: Negative for arthralgias and myalgias.   Neurological: Negative for light-headedness.   Psychiatric/Behavioral: Negative for agitation and confusion.     Objective:     Vital Signs (Most Recent):  Temp: 97.7 °F (36.5 °C) (09/08/20 1922)  Pulse: 62 (09/08/20 2000)  Resp: 20 (09/08/20 1922)  BP: 122/60 (09/08/20 1922)  SpO2: 95 % (09/08/20 1922) Vital Signs (24h Range):  Temp:  [97.7 °F (36.5 °C)-98.9 °F (37.2 °C)] 97.7 °F (36.5 °C)  Pulse:  [51-96] 62  Resp:  [17-25] 20  SpO2:  [92 %-97 %] 95 %  BP: ()/(53-65) 122/60     Weight: 103.6 kg (228 lb 6.3 oz)  Body mass index is 40.46 kg/m².    Intake/Output Summary (Last 24 hours) at 9/8/2020 2111  Last data filed at 9/8/2020 1824  Gross per 24 hour   Intake 180 ml   Output 800 ml   Net -620 ml      Physical Exam  Constitutional:       Appearance: She is well-developed.   HENT:      Head: Normocephalic.   Eyes:      General:         Right eye: No discharge.         Left eye: No discharge.      Conjunctiva/sclera: Conjunctivae normal.   Neck:      Musculoskeletal: Normal range of motion and neck supple.   Cardiovascular:      Rate and Rhythm: Normal rate. Rhythm irregularly irregular.      Heart sounds: Normal heart sounds.   Pulmonary:      Effort: Pulmonary effort is normal. Tachypnea present. No respiratory distress.      Breath sounds: Decreased breath sounds present.   Abdominal:      General: Bowel sounds are normal. There is no distension.      Palpations: Abdomen is soft.      Tenderness: There is no abdominal  tenderness.   Musculoskeletal: Normal range of motion.   Skin:     General: Skin is warm and dry.   Neurological:      Mental Status: She is alert and oriented to person, place, and time.   Psychiatric:         Mood and Affect: Mood normal.         Speech: Speech normal.         Behavior: Behavior normal.         Significant Labs: All pertinent labs within the past 24 hours have been reviewed.    Significant Imaging: I have reviewed all pertinent imaging results/findings within the past 24 hours.      Assessment/Plan:      * Atrial fibrillation with rapid ventricular response  Treated with intravenous diltiazem and now rate controlled with metoprolol 50 mg twice daily.  Patient on apixaban 2.5 mg twice daily for stroke prevention.    Acute on chronic respiratory failure with hypoxia and hypercapnia  Continue with current treatment.  Repeat chest radiograph if no improvement.    Chronic diastolic congestive heart failure  Decompensation due to poor rate control.  Improved overall.  Continue with medical therapy.    Essential hypertension  Reasonably controlled with current regimen.  Will continue with current regimen and continue to monitor.    Chronic kidney disease (CKD) stage G4/A1, severely decreased glomerular filtration rate (GFR) between 15-29 mL/min/1.73 square meter and albuminuria creatinine ratio less than 30 mg/g  Stable.  Continue to monitor.    Dyslipidemia  Continue statin therapy.    Morbid obesity  Patient counseled on the importance of weight loss by changing diet and increased physical activity.      VTE Risk Mitigation (From admission, onward)         Ordered     apixaban tablet 2.5 mg  2 times daily      09/01/20 0332     Reason for No Pharmacological VTE Prophylaxis  Once     Question:  Reasons:  Answer:  Already adequately anticoagulated on oral Anticoagulants    09/01/20 0332     IP VTE HIGH RISK PATIENT  Once      09/01/20 0332     Place sequential compression device  Until discontinued       09/01/20 0332                Loi Quiles MD  Department of Hospital Medicine   [facility]

## 2020-09-09 NOTE — ASSESSMENT & PLAN NOTE
Treated with intravenous diltiazem and now rate controlled with metoprolol 50 mg twice daily.  Patient on apixaban 2.5 mg twice daily for stroke prevention.

## 2020-09-09 NOTE — SUBJECTIVE & OBJECTIVE
Interval History:  Patient reports breathing improving today.    Review of Systems   Constitutional: Negative for chills and fever.   Respiratory: Positive for shortness of breath. Negative for wheezing.    Cardiovascular: Negative for chest pain.   Gastrointestinal: Negative for abdominal distention, abdominal pain, constipation, diarrhea, nausea and vomiting.   Genitourinary: Negative for dysuria and frequency.   Musculoskeletal: Negative for arthralgias and myalgias.   Neurological: Negative for light-headedness.   Psychiatric/Behavioral: Negative for agitation and confusion.     Objective:     Vital Signs (Most Recent):  Temp: 99.3 °F (37.4 °C) (09/09/20 1552)  Pulse: 90 (09/09/20 1605)  Resp: 18 (09/09/20 1605)  BP: (!) 110/55 (09/09/20 1552)  SpO2: 96 % (09/09/20 1605) Vital Signs (24h Range):  Temp:  [97.7 °F (36.5 °C)-99.3 °F (37.4 °C)] 99.3 °F (37.4 °C)  Pulse:  [] 90  Resp:  [17-25] 18  SpO2:  [92 %-97 %] 96 %  BP: (110-146)/(55-68) 110/55     Weight: 103.6 kg (228 lb 6.3 oz)  Body mass index is 40.46 kg/m².    Intake/Output Summary (Last 24 hours) at 9/9/2020 1709  Last data filed at 9/9/2020 1055  Gross per 24 hour   Intake 654 ml   Output 1100 ml   Net -446 ml      Physical Exam  Constitutional:       Appearance: She is well-developed.   HENT:      Head: Normocephalic.   Eyes:      General:         Right eye: No discharge.         Left eye: No discharge.      Conjunctiva/sclera: Conjunctivae normal.   Neck:      Musculoskeletal: Normal range of motion and neck supple.   Cardiovascular:      Rate and Rhythm: Normal rate. Rhythm irregularly irregular.      Heart sounds: Normal heart sounds.   Pulmonary:      Effort: Pulmonary effort is normal. Tachypnea present. No respiratory distress.      Breath sounds: Decreased breath sounds present.   Abdominal:      General: Bowel sounds are normal. There is no distension.      Palpations: Abdomen is soft.      Tenderness: There is no abdominal tenderness.    Musculoskeletal: Normal range of motion.   Skin:     General: Skin is warm and dry.   Neurological:      Mental Status: She is alert and oriented to person, place, and time.   Psychiatric:         Mood and Affect: Mood normal.         Speech: Speech normal.         Behavior: Behavior normal.         Significant Labs: All pertinent labs within the past 24 hours have been reviewed.    Significant Imaging: I have reviewed all pertinent imaging results/findings within the past 24 hours.

## 2020-09-09 NOTE — PLAN OF CARE
Problem: Physical Therapy Goal  Goal: Physical Therapy Goal  Description: Goals to be met by: 2020    Patient will increase functional independence with mobility by performin. Sit<>stand with SPV with LRAD GOAL MET 2020  2. Gait x 200 feet with RW with SBA.  3. Ascend/descend 3 step(s) with  HR least restrictive assistive device and SBA  4. Pt to transfer supine<>sit with SBA GOAL MET 2020     Outcome: Ongoing, Progressing     Pt tolerated treatment well with no adverse reactions. Pt is progressing toward meeting goals. Pt performed supine > sit with SBA, sit <> stand with SBA, and ambulation x 120 ft x 2 trials with SBA and rollator. Demonstrated improved endurance. Pt continues to be limited by weakness. PT will continue to follow and progress goals as tolerated. Recommend discharge to SNF.

## 2020-09-09 NOTE — PLAN OF CARE
Pt received on BIPAP. No distress noted. Placed pt on 4L NC. Aerosol tx given. Will continue to monitor.

## 2020-09-09 NOTE — PLAN OF CARE
Patient stable. VSS. Worked with therapy. Dressing changed to IV. Rounding completed. Denied needs. Call bell in reach. Side rails up X2. Bed locked, lowered. Safety maintained.

## 2020-09-09 NOTE — PLAN OF CARE
Patient resting in NAD. VSS on BiPAP. Purewick in place to wall suction. PRN medications (tylenol and melatonin) given effectively for sleep. No needs expressed at this time. Safety measures maintained. Pt using call light for assistance. Will continue to monitor.

## 2020-09-09 NOTE — ASSESSMENT & PLAN NOTE
Continue with current treatment.  Chest radiograph shows evidence of persistent pulmonary edema.  Patient's which intravenous furosemide for more aggressive diuresis.  Continue to monitor kidney function on diuretic therapy.

## 2020-09-10 NOTE — ASSESSMENT & PLAN NOTE
Improved.  Continue with current treatment.  Switch back to oral furosemide today.  Continue to monitor kidney function on diuretic therapy.

## 2020-09-10 NOTE — PLAN OF CARE
Pt remained free of falls and injuries throughout shift. AAOx4. Pt calm and cooperative. Purposeful rounding performed. Pt swallows meds whole. IV flushed and saline locked. Managed c/o BLE pain with PRN Tylenol and insomnia with PRN Melatonin. Pure wick in place to suction, urine clear yellow. Telemetry maintained, pt in A.Flutter with PVCs. VSS on BiPap. Pt resting comfortably in bed, denies SOB, denies pain, in no acute distress at this time. Bed low and locked, call light in reach. Side rails up x2. Will continue to monitor.

## 2020-09-10 NOTE — PROGRESS NOTES
Cardiology  Progress Notes            Subjective: Says her breathing feels better.    She has a longstanding history of hypertension, chronic obstructive lung disease, chronic atrial fibrillation, history of renal cell carcinoma having had a nephrectomy, chronic renal failure.  She has been cigarette smoker, smoked as recently as 2 weeks ago.  She has had heart failure with a preserved left ventricular ejection fraction, diastolic left ventricular dysfunction.  She has a history of hypercapnic respiratory failure, obstructive sleep apnea.     Vital Signs:  Vitals:    09/10/20 0700 09/10/20 0800 09/10/20 0827 09/10/20 0831   BP:   (!) 94/53    BP Location:   Right arm    Patient Position:   Lying    Pulse: 67 69 69 69   Resp:   20 18   Temp:   97.7 °F (36.5 °C)    TempSrc:   Oral    SpO2:   (!) 93% (!) 93%   Weight:       Height:           Physical Exam: chest clear  Cor: Irregularly irregular  No murmur or gallop.  Ext: warm    Laboratory:  CBC:   Recent Labs   Lab 09/10/20  0426   WBC 7.14   RBC 3.54*   HGB 9.7*   HCT 31.6*   *   MCV 89   MCH 27.4   MCHC 30.7*     BMP:   Recent Labs   Lab 09/10/20  0426   *      K 4.0   CL 95   CO2 35*   BUN 37*   CREATININE 2.0*   CALCIUM 8.8   MG 2.3     CMP:   Recent Labs   Lab 09/05/20 0310  09/10/20  0426      < > 141*   CALCIUM 8.8   < > 8.8   ALBUMIN 3.0*  --   --    PROT 5.7*  --   --       < > 142   K 4.3   < > 4.0   CO2 39*   < > 35*   CL 95   < > 95   BUN 49*   < > 37*   CREATININE 1.8*   < > 2.0*   ALKPHOS 32*  --   --    ALT 14  --   --    AST 12  --   --    BILITOT 0.6  --   --     < > = values in this interval not displayed.     LFTs:   Recent Labs   Lab 09/05/20 0310   ALT 14   AST 12   ALKPHOS 32*   BILITOT 0.6   PROT 5.7*   ALBUMIN 3.0*     Coagulation: No results for input(s): PT, INR, APTT in the last 168 hours.  Cardiac markers: No results for input(s): CKMB, CPKMB, TROPONINT, TROPONINI, MYOGLOBIN in the last 168  hours.    Imaging:  X-Ray Chest 1 View   Final Result      Pulmonary edema pneumonia aspiration or sepsis.         Electronically signed by: Valentino Godinez MD   Date:    09/09/2020   Time:    09:43      X-Ray Chest AP Portable   Final Result      Abnormal chest radiograph as above.         Electronically signed by: Mateus Rao MD   Date:    09/01/2020   Time:    01:24            Problems:     Hypercapnic respiratory failure  Advanced obstructive lung disease  Obstructive sleep apnea  Chronic atrial fibrillation  Hypertensive heart disease  Heart failure with preserved ejection fraction  Diastolic left ventricular dysfunction  Chronic renal failure, status post nephrectomy for hypernephroma         Plan of Care: See Orders          Edis Ramirez

## 2020-09-10 NOTE — PLAN OF CARE
Pt is now in agreement to go to [facility].     [facility] will accept her in the morning early, she needs to have a BM. MD notified.    CM to follow for plans and arrangment.s

## 2020-09-10 NOTE — PLAN OF CARE
Patient on oxygen with documented flow. Will attempt to wean per O2 order protocol.  Patient given aerosol treatment with no adverse reactions noted.   Patient on documented AVAPS settings, with alarms set and functioning.    Will continue to monitor.

## 2020-09-10 NOTE — PLAN OF CARE
IMM explained to pt and pt signed.   09/10/20 0933   Medicare Message   Important Message from Medicare regarding Discharge Appeal Rights Given to patient/caregiver;Explained to patient/caregiver;Signed/date by patient/caregiver   Date IMM was signed 09/10/20   Time IMM was signed 0800

## 2020-09-10 NOTE — PROGRESS NOTES
Cardiology  Progress Notes            Subjective:  My breathing feels better.    She has a longstanding history of hypertension, chronic obstructive lung disease, chronic atrial fibrillation, history of renal cell carcinoma having had a nephrectomy, chronic renal failure.  She has been cigarette smoker, smoked as recently as 2 weeks ago.  She has had heart failure with a preserved left ventricular ejection fraction, diastolic left ventricular dysfunction.  She has a history of hypercapnic respiratory failure, obstructive sleep apnea.     Vital Signs:  Vitals:    09/09/20 1600 09/09/20 1605 09/09/20 1936 09/09/20 1957   BP:   126/60    BP Location:   Right arm    Patient Position:   Lying    Pulse: 72 90 72 73   Resp:  18 16 (!) 22   Temp:   98.1 °F (36.7 °C)    TempSrc:   Oral    SpO2:  96% 95% 95%   Weight:       Height:           Physical Exam:  Chest clear  Heart irregularly irregular.  No murmur  Or gallop.  Extremities are warm.  Neurologically intact  Laboratory:  CBC:   Recent Labs   Lab 09/09/20 0422   WBC 7.63   RBC 3.74*   HGB 10.2*   HCT 33.3*      MCV 89   MCH 27.3   MCHC 30.6*     BMP:   Recent Labs   Lab 09/09/20 0421         K 4.3   CL 97   CO2 34*   BUN 36*   CREATININE 1.8*   CALCIUM 9.1   MG 2.3     CMP:   Recent Labs   Lab 09/05/20 0310 09/09/20 0421      < > 101   CALCIUM 8.8   < > 9.1   ALBUMIN 3.0*  --   --    PROT 5.7*  --   --       < > 142   K 4.3   < > 4.3   CO2 39*   < > 34*   CL 95   < > 97   BUN 49*   < > 36*   CREATININE 1.8*   < > 1.8*   ALKPHOS 32*  --   --    ALT 14  --   --    AST 12  --   --    BILITOT 0.6  --   --     < > = values in this interval not displayed.     LFTs:   Recent Labs   Lab 09/05/20 0310   ALT 14   AST 12   ALKPHOS 32*   BILITOT 0.6   PROT 5.7*   ALBUMIN 3.0*     Coagulation: No results for input(s): PT, INR, APTT in the last 168 hours.  Cardiac markers: No results for input(s): CKMB, CPKMB, TROPONINT, TROPONINI, MYOGLOBIN  in the last 168 hours.    Imaging:  X-Ray Chest 1 View   Final Result      Pulmonary edema pneumonia aspiration or sepsis.         Electronically signed by: Valentino Godinez MD   Date:    09/09/2020   Time:    09:43      X-Ray Chest AP Portable   Final Result      Abnormal chest radiograph as above.         Electronically signed by: Mateus Rao MD   Date:    09/01/2020   Time:    01:24            Problems:   Hypercapnic respiratory failure  Advanced obstructive lung disease  Obstructive sleep apnea  Chronic atrial fibrillation  Hypertensive heart disease  Heart failure with preserved ejection fraction  Diastolic left ventricular dysfunction  Chronic renal failure, status post nephrectomy for hypernephroma            Plan of Care: See Orders          Edis Ramirez

## 2020-09-10 NOTE — PROGRESS NOTES
"[facility] Hospital Medicine  Progress Note    Patient Name: Patsy Morris  MRN: [id number]  Patient Class: IP- Inpatient   Admission Date: 9/1/2020  Length of Stay: 8 days  Attending Physician: Loi Quiles MD  Primary Care Provider: Luisana Cartagena MD        Subjective:     Principal Problem:Atrial fibrillation with rapid ventricular response        HPI:  Per Darwin Cunningham, NP:    "The patient is a 70 y.o. female with history of HTN, CHF, and COPD who presents from nursing facility with complaint of shortness of breath. Patient reports experiencing SOB when trying to go to sleep.  Nursing home reported O2 sats in the 60s on room air. Patient states she is typically on 3 liters of O2 at home. She was seen here in the ED on 8/22, admitted to the hospital and was discharged 6 hours ago. She denies chest pain, palpitations, fever, chills, nausea or vomiting.  Of note, when the patient arrived, she was in atrial fibrillation with RVR.  She will be admitted for management of her atrial fibrillation with rapid ventricular response."    Overview/Hospital Course:  Patient is 70-year-old woman with history of hypertension, chronic hypoxic respiratory failure on home oxygen secondary to chronic obstructive pulmonary disease, chronic diastolic heart failure, chronic atrial fibrillation, and morbid obesity re-admitted to the hospital for treatment of acute on chronic hypoxic respiratory failure and atrial fibrillation with rapid ventricular response.  Patient was rate controlled with intravenous diltiazem and treated with noninvasive ventilation.  Pulmonary/critical care and cardiology services consulted and suspect her worsening respiratory failure due to combination of underlying lung disease and heart failure.  Patient clinically improved and she was transferred out of the intensive care unit on 9/6/2020.  Patient also met with palliative care service and decided to rescind her prior do " not resuscitate order.    Interval History:  No acute events overnight.    Review of Systems   Constitutional: Negative for chills and fever.   Respiratory: Positive for shortness of breath. Negative for wheezing.    Cardiovascular: Negative for chest pain.   Gastrointestinal: Negative for abdominal distention, abdominal pain, constipation, diarrhea, nausea and vomiting.   Genitourinary: Negative for dysuria and frequency.   Musculoskeletal: Negative for arthralgias and myalgias.   Neurological: Negative for light-headedness.   Psychiatric/Behavioral: Negative for agitation and confusion.     Objective:     Vital Signs (Most Recent):  Temp: 96.6 °F (35.9 °C) (09/10/20 1112)  Pulse: 86 (09/10/20 1112)  Resp: 20 (09/10/20 1112)  BP: (!) 105/59 (09/10/20 1112)  SpO2: 98 % (09/10/20 1112) Vital Signs (24h Range):  Temp:  [96.6 °F (35.9 °C)-99.3 °F (37.4 °C)] 96.6 °F (35.9 °C)  Pulse:  [67-90] 86  Resp:  [16-24] 20  SpO2:  [92 %-98 %] 98 %  BP: ()/(53-60) 105/59     Weight: 103.6 kg (228 lb 6.3 oz)  Body mass index is 40.46 kg/m².    Intake/Output Summary (Last 24 hours) at 9/10/2020 1353  Last data filed at 9/10/2020 0300  Gross per 24 hour   Intake 405 ml   Output 1800 ml   Net -1395 ml      Physical Exam  Constitutional:       Appearance: She is well-developed.   HENT:      Head: Normocephalic.   Eyes:      General:         Right eye: No discharge.         Left eye: No discharge.      Conjunctiva/sclera: Conjunctivae normal.   Neck:      Musculoskeletal: Normal range of motion and neck supple.   Cardiovascular:      Rate and Rhythm: Normal rate. Rhythm irregularly irregular.      Heart sounds: Normal heart sounds.   Pulmonary:      Effort: Pulmonary effort is normal. No tachypnea or respiratory distress.   Abdominal:      General: Bowel sounds are normal. There is no distension.      Palpations: Abdomen is soft.      Tenderness: There is no abdominal tenderness.   Musculoskeletal: Normal range of motion.    Skin:     General: Skin is warm and dry.   Neurological:      Mental Status: She is alert and oriented to person, place, and time.   Psychiatric:         Mood and Affect: Mood normal.         Speech: Speech normal.         Behavior: Behavior normal.         Significant Labs: All pertinent labs within the past 24 hours have been reviewed.    Significant Imaging: I have reviewed all pertinent imaging results/findings within the past 24 hours.      Assessment/Plan:      * Atrial fibrillation with rapid ventricular response  Treated with intravenous diltiazem and now rate controlled with metoprolol 50 mg twice daily.  Patient on apixaban 2.5 mg twice daily for stroke prevention.    Acute on chronic respiratory failure with hypoxia and hypercapnia  Improved.  Continue with current treatment.  Switch back to oral furosemide today.  Continue to monitor kidney function on diuretic therapy.    Chronic diastolic congestive heart failure  Decompensation due to poor rate control.  Improved overall.  Continue with medical therapy.    Essential hypertension  Reasonably controlled with current regimen.  Will continue with current regimen and continue to monitor.    Chronic kidney disease (CKD) stage G4/A1, severely decreased glomerular filtration rate (GFR) between 15-29 mL/min/1.73 square meter and albuminuria creatinine ratio less than 30 mg/g  Stable.  Continue to monitor.    Dyslipidemia  Continue statin therapy.    Morbid obesity  Patient counseled on the importance of weight loss by changing diet and increased physical activity.        VTE Risk Mitigation (From admission, onward)         Ordered     apixaban tablet 2.5 mg  2 times daily      09/01/20 0332     Reason for No Pharmacological VTE Prophylaxis  Once     Question:  Reasons:  Answer:  Already adequately anticoagulated on oral Anticoagulants    09/01/20 0332     IP VTE HIGH RISK PATIENT  Once      09/01/20 0332     Place sequential compression device  Until  discontinued      09/01/20 0332                Loi Quiles MD  Department of Hospital Medicine   [facility]

## 2020-09-10 NOTE — PT/OT/SLP PROGRESS
Physical Therapy      Patient Name:  Patsy Morris   MRN:  [id number]    Patient not seen today secondary to Patient unwilling to participate. Will follow-up as schedule allows.    Georgie Rosales, PT

## 2020-09-10 NOTE — PLAN OF CARE
Pt received on BIPAP. BIPAP QHS. Placed on 4L. No distress noted. Aerosol tx given. Will continue to monitor.

## 2020-09-10 NOTE — PLAN OF CARE
CM sent message thru [facility] to [facility], as pt now states she would like to go there. Mrs Morris can also return to [facility] if [facility] does not accept.

## 2020-09-10 NOTE — SUBJECTIVE & OBJECTIVE
Interval History:  No acute events overnight.    Review of Systems   Constitutional: Negative for chills and fever.   Respiratory: Positive for shortness of breath. Negative for wheezing.    Cardiovascular: Negative for chest pain.   Gastrointestinal: Negative for abdominal distention, abdominal pain, constipation, diarrhea, nausea and vomiting.   Genitourinary: Negative for dysuria and frequency.   Musculoskeletal: Negative for arthralgias and myalgias.   Neurological: Negative for light-headedness.   Psychiatric/Behavioral: Negative for agitation and confusion.     Objective:     Vital Signs (Most Recent):  Temp: 96.6 °F (35.9 °C) (09/10/20 1112)  Pulse: 86 (09/10/20 1112)  Resp: 20 (09/10/20 1112)  BP: (!) 105/59 (09/10/20 1112)  SpO2: 98 % (09/10/20 1112) Vital Signs (24h Range):  Temp:  [96.6 °F (35.9 °C)-99.3 °F (37.4 °C)] 96.6 °F (35.9 °C)  Pulse:  [67-90] 86  Resp:  [16-24] 20  SpO2:  [92 %-98 %] 98 %  BP: ()/(53-60) 105/59     Weight: 103.6 kg (228 lb 6.3 oz)  Body mass index is 40.46 kg/m².    Intake/Output Summary (Last 24 hours) at 9/10/2020 1353  Last data filed at 9/10/2020 0300  Gross per 24 hour   Intake 405 ml   Output 1800 ml   Net -1395 ml      Physical Exam  Constitutional:       Appearance: She is well-developed.   HENT:      Head: Normocephalic.   Eyes:      General:         Right eye: No discharge.         Left eye: No discharge.      Conjunctiva/sclera: Conjunctivae normal.   Neck:      Musculoskeletal: Normal range of motion and neck supple.   Cardiovascular:      Rate and Rhythm: Normal rate. Rhythm irregularly irregular.      Heart sounds: Normal heart sounds.   Pulmonary:      Effort: Pulmonary effort is normal. No tachypnea or respiratory distress.   Abdominal:      General: Bowel sounds are normal. There is no distension.      Palpations: Abdomen is soft.      Tenderness: There is no abdominal tenderness.   Musculoskeletal: Normal range of motion.   Skin:     General: Skin is warm  and dry.   Neurological:      Mental Status: She is alert and oriented to person, place, and time.   Psychiatric:         Mood and Affect: Mood normal.         Speech: Speech normal.         Behavior: Behavior normal.         Significant Labs: All pertinent labs within the past 24 hours have been reviewed.    Significant Imaging: I have reviewed all pertinent imaging results/findings within the past 24 hours.

## 2020-09-10 NOTE — PROGRESS NOTES
Follow up with Medical device related pressure injury to bridge of nose  Dressing was intact to bridge of nose upon arrival to the room. Removed dressing and cleansed the wound. Partial thickness skin loss noted measuring 0.5x0.5x0.1cm pink ulceration. Initially area was a s-DTI and appears not to be a full thickness skin injury.Foam applied to the bridge of the nose.  Fungal type rash to abdominal folds has totally resolved. Using microgaurd antifungal powder to area for prevention.      09/10/20 1100        Altered Skin Integrity 09/10/20 1000 medial Nose Partial thickness tissue loss. Shallow open ulcer with a red or pink wound bed, without slough. Intact or Open/Ruptured Serum-filled blister.   Date First Assessed/Time First Assessed: 09/10/20 1000   Altered Skin Integrity Present on Admission: suspected hospital acquired  Orientation: medial  Location: Nose  Is this injury device related?: (c) Yes  Description of Altered Skin Integrity: Par...   Description of Altered Skin Integrity Partial thickness tissue loss. Shallow open ulcer with a red or pink wound bed, without slough. Intact or Open/Ruptured Serum-filled blister.   Dressing Appearance Clean;Intact;Dried drainage   Drainage Amount Scant   Drainage Characteristics/Odor Serous;No odor   Appearance Pink;Moist   Tissue loss description Partial thickness   Periwound Area Intact   Wound Edges Open   Wound Length (cm) 0.5 cm   Wound Width (cm) 0.5 cm   Wound Depth (cm) 0.1 cm   Wound Volume (cm^3) 0.02 cm^3   Wound Surface Area (cm^2) 0.25 cm^2   Care Cleansed with:;Sterile normal saline   Dressing Applied;Foam     Lety Mason RN CWON

## 2020-09-11 NOTE — PLAN OF CARE
D/c today  Await for PHN auth and repeat Covid results.       09/11/20 1042   Discharge Reassessment   Assessment Type Discharge Planning Reassessment   Provided patient/caregiver education on the expected discharge date and the discharge plan Yes   Do you have any problems affording any of your prescribed medications? No   Discharge Plan A Skilled Nursing Facility   DME Needed Upon Discharge  none   Patient choice form signed by patient/caregiver Yes   Anticipated Discharge Disposition SNF   Can the patient/caregiver answer the patient profile reliably? Yes, cognitively intact   How does the patient rate their overall health at the present time? Good   Post-Acute Status   Discharge Delays None known at this time

## 2020-09-11 NOTE — PLAN OF CARE
Pt received on 4LNC SpO2 92-95% with no reported distress.Q8 aerosol tx given tolerated well. Wean FIO2 3L will continue to monitor.

## 2020-09-11 NOTE — PLAN OF CARE
Progress Note/Final Note     Patient Name: Patsy Morris  MRN: [id number]  Patient Class: IP- Inpatient     Admission Date: 9/1/2020  Length of Stay: 9 days  Attending Physician: Loi Quiles MD  Primary Care Provider: Luisana Cartagena MD        D/c Plan:  SNF today  PHN auth provided to [facility]   5 P today  Report- [phone] or [phone]  Bed,  P-[id number]       09/11/20 1543   Final Note   Assessment Type Final Discharge Note   Anticipated Discharge Disposition SNF   Hospital Follow Up  Appt(s) scheduled? Yes   Discharge plans and expectations educations in teach back method with documentation complete? Yes   Right Care Referral Info   Post Acute Recommendation SNF / Sub-Acute Rehab   Referral Type SNF   Facility Name [facility]   Post-Acute Status   Post-Acute Authorization Placement   Post-Acute Placement Status Set-up Complete   Discharge Delays None known at this time

## 2020-09-11 NOTE — PLAN OF CARE
Patient AOx4, VSS on 4L O2. She wears BiPap at night. Patient refused any laxatives or stool softeners saying that she had a BM yesterday and is not constipated. A PureWick remains in place. She was disappointed about her placement options and refused to work with PT this am. Wound Care did a follow up and noted that the rash to her abdominal folds was resolved. Patient sits up on the side of bed to feed herself for meals. She had no pain complaints and remained free of falls or injury during shift. Bed lowered and locked. Call light in reach. Purposeful rounding completed. Will continue to monitor.

## 2020-09-11 NOTE — PLAN OF CARE
09/11/20 1549   Post-Acute Status   Post-Acute Authorization Placement   Post-Acute Placement Status Set-up Complete   Discharge Delays None known at this time   Discharge Plan   Discharge Plan A Skilled Nursing Facility

## 2020-09-11 NOTE — PLAN OF CARE
Progress Note     Patient Name: Patsy Morris  MRN: [id number]  Patient Class: IP- Inpatient     Admission Date: 9/1/2020  Length of Stay: 9 days  Attending Physician: Loi Quiles MD  Primary Care Provider: Luisana Cartagena MD      D/c Plan:  Last B/M was documented on 9-9-20  SNF return today

## 2020-09-11 NOTE — PLAN OF CARE
Patient lying quietly in bed with television on. Currently wearing 4 liters humidified oxygen via nasal cannula. Patient wore BiPAP for 8 hours during shift. Unable to get accurate output due to patient movement of pure wick. Left hand 20 gauge IV access intake. Call light within reach. Encouraged patient to call for any and all needs. Patient verbalized understanding of instructions. Will continue to monitor patient.

## 2020-09-11 NOTE — PLAN OF CARE
This  Interim Director sent a message to [employer] via [employer] to check the status of the referral.  Awaiting response.

## 2020-09-11 NOTE — PROGRESS NOTES
Routine Covid for Facility placement taken and brought down to lab. Specimen given to Nicola in lab.

## 2020-09-11 NOTE — PROGRESS NOTES
[facility] not able to accept pt on today as facility had reached their maximum admissions this afternoon.   Pt assured bed on Fri am, 9/11/20.

## 2020-09-11 NOTE — PROGRESS NOTES
Cardiology  Progress Notes            Subjective:  Says she feels well.  Denies breathlessness.      She has a longstanding history of hypertension, chronic obstructive lung disease, chronic atrial fibrillation, history of renal cell carcinoma having had a nephrectomy, chronic renal failure.  She has been cigarette smoker, smoked as recently as 2 weeks ago.  She has had heart failure with a preserved left ventricular ejection fraction, diastolic left ventricular dysfunction.  She has a history of hypercapnic respiratory failure, obstructive sleep apnea.     Vital Signs:  Vitals:    09/10/20 2329 09/11/20 0411 09/11/20 0735 09/11/20 0842   BP: 108/64 112/66  (!) 112/58   BP Location: Right arm Right arm  Right arm   Patient Position: Lying Lying  Lying   Pulse: 68 65 72 72   Resp: 20 20 19 18   Temp: 98 °F (36.7 °C) 97.9 °F (36.6 °C)  97.7 °F (36.5 °C)   TempSrc: Oral Oral  Oral   SpO2: (!) 94% (!) 94% (!) 92% 96%   Weight:       Height:           Physical Exam: chest clear  Cor: Irregularly irregular  No murmur or gallop.  Ext: warm     Laboratory:  CBC:   Recent Labs   Lab 09/11/20 0322   WBC 7.29   RBC 3.73*   HGB 10.0*   HCT 33.3*   *   MCV 89   MCH 26.8*   MCHC 30.0*     BMP:   Recent Labs   Lab 09/11/20 0322         K 4.4   CL 96   CO2 34*   BUN 36*   CREATININE 1.9*   CALCIUM 8.9   MG 2.2     CMP:   Recent Labs   Lab 09/05/20 0310 09/11/20 0322      < > 103   CALCIUM 8.8   < > 8.9   ALBUMIN 3.0*  --   --    PROT 5.7*  --   --       < > 141   K 4.3   < > 4.4   CO2 39*   < > 34*   CL 95   < > 96   BUN 49*   < > 36*   CREATININE 1.8*   < > 1.9*   ALKPHOS 32*  --   --    ALT 14  --   --    AST 12  --   --    BILITOT 0.6  --   --     < > = values in this interval not displayed.     LFTs:   Recent Labs   Lab 09/05/20 0310   ALT 14   AST 12   ALKPHOS 32*   BILITOT 0.6   PROT 5.7*   ALBUMIN 3.0*     Coagulation: No results for input(s): PT, INR, APTT in the last 168  hours.  Cardiac markers: No results for input(s): CKMB, CPKMB, TROPONINT, TROPONINI, MYOGLOBIN in the last 168 hours.    Imaging:  X-Ray Chest 1 View   Final Result      Pulmonary edema pneumonia aspiration or sepsis.         Electronically signed by: Valentino Godinez MD   Date:    09/09/2020   Time:    09:43      X-Ray Chest AP Portable   Final Result      Abnormal chest radiograph as above.         Electronically signed by: Mateus Rao MD   Date:    09/01/2020   Time:    01:24            Problems:   Hypercapnic respiratory failure  Advanced obstructive lung disease  Obstructive sleep apnea  Chronic atrial fibrillation  Hypertensive heart disease  Heart failure with preserved ejection fraction  Diastolic left ventricular dysfunction  Chronic renal failure, status post nephrectomy for hypernephroma           Plan of Care:  Awaiting transfer to [facility] possibly this morning        Edis Ramirez

## 2020-09-12 NOTE — PROGRESS NOTES
Wheelchair Van here to transport patient to [place]. IV removed without complications. O2 set at 4L on portable tank. All belongings given to patient.

## 2020-09-17 NOTE — PROGRESS NOTES
[facility]   New Visit Progress Note   Recent Hospital Discharge  9/16/2020     PRESENTING HISTORY     Chief Complaint/Reason for Admission:  Follow up Hospital Discharge   PCP: Luisana Cartagena MD   Admission Date: 9/1/2020  Discharge Date: 9/11/2020    History of Present Illness:  Ms. Patsy Morris is a 70 y.o. female who was recently re-admitted to the hospital for treatment of acute on chronic hypoxic respiratory failure and atrial fibrillation with rapid ventricular response.  Patient was rate controlled with intravenous diltiazem and treated with noninvasive ventilation.  Pulmonary/critical care and cardiology services consulted and suspect her worsening respiratory failure due to combination of underlying lung disease and heart failure.  Patient clinically improved and she was transferred out of the intensive care unit on 9/6/2020.  Patient also met with palliative care service and decided to rescind her prior do not resuscitate order.  She was transferred to [facility]F for PT/OT.     ________________________________________________________    Today:  The resident was seen for initial hospital follow up. She is currently sitting up in the chair with no acute distress. AAOx4. Answering questions appropriately. No complaints.  Breathing even and unlabored on 4L NC. Rales to bilateral lower lobes with +2 edema to BLE. Irregular rhythm.   Progressing well with therapy. Ambulates 100 feet with rolling walker. Transfer with contact guard.       Review of Systems  General ROS: negative for chills, fever or weight loss  Psychological ROS: negative for hallucination, depression or suicidal ideation  Ophthalmic ROS: negative for blurry vision, photophobia or eye pain  ENT ROS: negative for epistaxis, sore throat or rhinorrhea  Respiratory ROS: + SOB on supplemental oxygen  Cardiovascular ROS: no chest pain or dyspnea on exertion  Gastrointestinal ROS: no abdominal pain, change  in bowel habits, or black/ bloody stools  Genito-Urinary ROS: no dysuria, trouble voiding, or hematuria  Musculoskeletal ROS: negative for gait disturbance or muscular weakness  Neurological ROS: no syncope or seizures; no ataxia  Dermatological ROS: negative for pruritis, rash and jaundice          PAST HISTORY:     Past Medical History:   Diagnosis Date    Arthritis     Asthma     Atrial fibrillation     Atrial flutter     Cerumen impaction     CHF (congestive heart failure)     CKD (chronic kidney disease), stage III     Colon polyps 2017    COPD exacerbation     Encounter for blood transfusion     HEARING LOSS     Herpes genitalis     Hypertension     Renal carcinoma 3/10/2015    Thyroid nodule 6/8/2017       Past Surgical History:   Procedure Laterality Date    BRAIN SURGERY      COLONOSCOPY N/A 6/23/2017    Procedure: COLONOSCOPY;  Surgeon: Shane Sharma MD;  Location: [facility] ([address] [place] [address]);  Service: Endoscopy;  Laterality: N/A;  2nd floor case ; on 3L home O2       per Dr Leopold (anesthesia)-Based on her history of:  Chronic respiratory failure with oxygen use, HDEZ, CHF, A-Fib, BHAVANI, it was determined that she should have her Colonoscopy scheduled on the 2nd Floor       ok to hold Eliquis 2 days prior to    EYE SURGERY      HYSTERECTOMY      kidney mass resection Right     renal carcinoma       Family History   Problem Relation Age of Onset    Hypertension Mother     Thyroid disease Mother     Cancer Father     Asthma Neg Hx     Emphysema Neg Hx     Melanoma Neg Hx          MEDICATIONS & ALLERGIES:     Current Outpatient Medications on File Prior to Visit   Medication Sig Dispense Refill    acetaminophen (TYLENOL) 500 MG tablet Take 2 tablets (1,000 mg total) by mouth every 6 (six) hours as needed for Pain.      albuterol-ipratropium 2.5mg-0.5mg/3mL (DUO-NEB) 0.5 mg-3 mg(2.5 mg base)/3 mL nebulizer solution Take 3 mLs by nebulization every 6 (six) hours as needed for Wheezing or  Shortness of Breath. 3 mL 2    apixaban (ELIQUIS) 2.5 mg Tab TAKE 1 TABLET(2.5 MG) BY MOUTH TWICE DAILY 60 tablet 6    atorvastatin (LIPITOR) 80 MG tablet TAKE 1 TABLET BY MOUTH EVERY EVENING. 90 tablet 1    blood sugar diagnostic (BLOOD GLUCOSE TEST) Strp Qd testing 100 strip 3    docusate sodium (COLACE) 100 MG capsule Take 1 capsule (100 mg total) by mouth 2 (two) times daily. 60 capsule 0    escitalopram oxalate (LEXAPRO) 10 MG tablet TAKE ONE TABLET BY MOUTH EVERY DAY IN THE EVENING 90 tablet 0    ferrous sulfate 325 (65 FE) MG EC tablet Take 1 tablet (325 mg total) by mouth once daily. 30 tablet 0    furosemide (LASIX) 40 MG tablet Take 1 tablet (40 mg total) by mouth 2 (two) times daily. 60 tablet 1    metoprolol tartrate (LOPRESSOR) 50 MG tablet Take 1 tablet (50 mg total) by mouth 2 (two) times daily. 60 tablet 0    miconazole NITRATE 2 % (MICOTIN) 2 % top powder Apply topically 2 (two) times daily.  0    polyethylene glycol (GLYCOLAX) 17 gram PwPk Take 17 g by mouth daily as needed.  0    valACYclovir (VALTREX) 500 MG tablet Take 1 tablet (500 mg total) by mouth once daily. 90 tablet 3    vitamin renal formula, B-complex-vitamin c-folic acid, (NEPHROCAP) 1 mg Cap Take 1 capsule by mouth once daily. 30 each 0    walker Misc Prn use please fit pt 1 each 0     No current facility-administered medications on file prior to visit.         Review of patient's allergies indicates:  No Known Allergies    OBJECTIVE:     Vital Signs:  /58, pulse 69, resp 20, sp02 94% 4L NC, temp 97.9  Wt Readings from Last 1 Encounters:   09/02/20 1235 103.6 kg (228 lb 6.3 oz)   09/01/20 1700 105.5 kg (232 lb 9.4 oz)   09/01/20 0020 99.8 kg (220 lb)     There is no height or weight on file to calculate BMI.        Physical Exam:  General appearance: alert, cooperative, no distress  Constitutional:Oriented to person, place, and time  +obese   HEENT: Normocephalic, atraumatic  Eyes: conjunctivae/corneas  clear,  Lungs: rales to bilateral lower lobes  Heart: irregular rhythm  Abdomen: soft, non-tender; bowel sounds normoactive; no organomegaly  Extremities: extremities symmetric; + 2 edema to BLE  Integument: Skin color, texture, turgor normal; no rashes; hair distrubution normal  Neurologic: Alert and oriented X 3, normal strength, normal coordination and gait  Psychiatric: no pressured speech; normal affect; no evidence of impaired cognition     Laboratory  Lab Results   Component Value Date    WBC 7.29 09/11/2020    HGB 10.0 (L) 09/11/2020    HCT 33.3 (L) 09/11/2020    MCV 89 09/11/2020     (L) 09/11/2020     BMP  Lab Results   Component Value Date     09/11/2020    K 4.4 09/11/2020    CL 96 09/11/2020    CO2 34 (H) 09/11/2020    BUN 36 (H) 09/11/2020    CREATININE 1.9 (H) 09/11/2020    CALCIUM 8.9 09/11/2020    ANIONGAP 11 09/11/2020    ESTGFRAFRICA 30 (A) 09/11/2020    EGFRNONAA 26 (A) 09/11/2020     Lab Results   Component Value Date    ALT 14 09/05/2020    AST 12 09/05/2020    ALKPHOS 32 (L) 09/05/2020    BILITOT 0.6 09/05/2020     Lab Results   Component Value Date    INR 1.0 08/22/2020    INR 0.9 03/28/2017    INR 1.0 10/09/2016     Lab Results   Component Value Date    HGBA1C 5.6 08/23/2020       ASSESSMENT & PLAN:     Atrial fibrillation with rapid ventricular response  - rate controlled with metoprolol 50 mg twice daily.  Patient on apixaban 2.5 mg twice daily for stroke prevention.     Acute on chronic respiratory failure with hypoxia and hypercapnia  BHAVANI  Improved.  Continue with current treatment.    - BiPAP at night     Vitamin D deficiency   - 9/16 start on Vitamin D 50,000 units weekly    Chronic diastolic congestive heart failure  Decompensation due to poor rate control.  Improved overall.  Continue with Lasix 40 mg BID and metoprolol 50 mg BID     Essential hypertension  - on Lasix, metoprolol     Chronic kidney disease (CKD) stage G4/A1, severely decreased glomerular filtration rate  (GFR) between 15-29 mL/min/1.73 square meter and albuminuria creatinine ratio less than 30 mg/g  Stable.  Continue to monitor.     Dyslipidemia  Continue statin therapy.     Morbid obesity  Patient counseled on the importance of weight loss by changing diet and increased physical activity    Recurrent major depression  -on Lexapro 10 mg daily    Anemia  - on Ferrous sulfate 325 mg daily    Scheduled Follow-up :  Future Appointments   Date Time Provider Department Center   9/22/2020  9:30 AM Allen Barajas MD [facility]       Post Visit Medication List:     Medication List          Accurate as of September 16, 2020  7:02 PM. If you have any questions, ask your nurse or doctor.            CONTINUE taking these medications    acetaminophen 500 MG tablet  Commonly known as: TYLENOL  Take 2 tablets (1,000 mg total) by mouth every 6 (six) hours as needed for Pain.     albuterol-ipratropium 2.5 mg-0.5 mg/3 mL nebulizer solution  Commonly known as: DUO-NEB  Take 3 mLs by nebulization every 6 (six) hours as needed for Wheezing or Shortness of Breath.     apixaban 2.5 mg Tab  Commonly known as: ELIQUIS  TAKE 1 TABLET(2.5 MG) BY MOUTH TWICE DAILY     atorvastatin 80 MG tablet  Commonly known as: LIPITOR  TAKE 1 TABLET BY MOUTH EVERY EVENING.     blood sugar diagnostic Strp  Commonly known as: BLOOD GLUCOSE TEST  Qd testing     docusate sodium 100 MG capsule  Commonly known as: COLACE  Take 1 capsule (100 mg total) by mouth 2 (two) times daily.     escitalopram oxalate 10 MG tablet  Commonly known as: LEXAPRO  TAKE ONE TABLET BY MOUTH EVERY DAY IN THE EVENING     ferrous sulfate 325 (65 FE) MG EC tablet  Take 1 tablet (325 mg total) by mouth once daily.     furosemide 40 MG tablet  Commonly known as: LASIX  Take 1 tablet (40 mg total) by mouth 2 (two) times daily.     metoprolol tartrate 50 MG tablet  Commonly known as: LOPRESSOR  Take 1 tablet (50 mg total) by mouth 2 (two) times daily.     miconazole NITRATE 2  % 2 % top powder  Commonly known as: MICOTIN  Apply topically 2 (two) times daily.     polyethylene glycol 17 gram Pwpk  Commonly known as: GLYCOLAX  Take 17 g by mouth daily as needed.     valACYclovir 500 MG tablet  Commonly known as: VALTREX  Take 1 tablet (500 mg total) by mouth once daily.     vitamin renal formula (B-complex-vitamin c-folic acid) 1 mg Cap  Commonly known as: NEPHROCAP  Take 1 capsule by mouth once daily.     walker Misc  Prn use please fit pt          Total time of the visit 67 min 10:10 am -11:17 am  Non physical exam/ non charting time: 50 minutes   Description of non physical exam/non charting time: Extensive chart review completed including all consultation notes.  All pertinent laboratory and radiographical images reviewed.    Signing Physician:  Jenni Mcguire NP

## 2020-09-18 NOTE — DISCHARGE SUMMARY
"[facility] Hospital Medicine  Discharge Summary      Patient Name: Patsy Morris  MRN: [id number]  Admission Date: 9/1/2020  Hospital Length of Stay: 10 days  Discharge Date and Time: 9/11/2020  6:06 PM  Attending Physician: Loi Quiles MD  Discharging Provider: Loi Quiles MD  Primary Care Provider: Luisana Cartagena MD      HPI:   Per Darwin Cunningham, NP:    "The patient is a 70 y.o. female with history of HTN, CHF, and COPD who presents from nursing facility with complaint of shortness of breath. Patient reports experiencing SOB when trying to go to sleep.  Nursing home reported O2 sats in the 60s on room air. Patient states she is typically on 3 liters of O2 at home. She was seen here in the ED on 8/22, admitted to the hospital and was discharged 6 hours ago. She denies chest pain, palpitations, fever, chills, nausea or vomiting.  Of note, when the patient arrived, she was in atrial fibrillation with RVR.  She will be admitted for management of her atrial fibrillation with rapid ventricular response."    Hospital Course:   Patient is 70-year-old woman with history of hypertension, chronic hypoxic respiratory failure on home oxygen secondary to chronic obstructive pulmonary disease, chronic diastolic heart failure, chronic atrial fibrillation, and morbid obesity re-admitted to the hospital for treatment of acute on chronic hypoxic respiratory failure and atrial fibrillation with rapid ventricular response.  Patient was rate controlled with intravenous diltiazem and treated with noninvasive ventilation.  Pulmonary/critical care and cardiology services consulted and suspect her worsening respiratory failure due to combination of underlying lung disease and heart failure.  Patient clinically improved and she was transferred out of the intensive care unit on 9/6/2020.  Patient also met with the palliative care service and she decided to rescind her prior do not resuscitate order. "  Patient clinically improved with better rate control of atrial fibrillation and also with further diuresis.  Patient with advanced kidney disease which remained stable with diuretic therapy.  Patient discharged to skilled nursing facility for further medical therapy, noninvasive ventilation at night and as needed, and for further physical and occupational therapy.  Close outpatient follow-up advised.     Consults:   Consults (From admission, onward)        Status Ordering Provider     Inpatient consult to Cardiology  Once     Provider:  Edis Ramirez MD    Completed JIA TAPIA.     Inpatient consult to Palliative Care  Once     Provider:  Elsa Bran DNP    Completed JIA TAPIA.     Inpatient consult to Pulmonary Critical Care  Once     Provider:  Tsering Dennison MD    Completed JIA TAPIA     Inpatient consult to Social Work  Once     Provider:  (Not yet assigned)    Completed TILA GONZALEZ          Final Active Diagnoses:    Diagnosis Date Noted POA    PRINCIPAL PROBLEM:  Atrial fibrillation with rapid ventricular response [I48.91] 09/01/2020 Yes    Acute on chronic respiratory failure with hypoxia and hypercapnia [J96.21, J96.22] 08/03/2016 Yes    Chronic diastolic congestive heart failure [I50.32] 05/20/2016 Yes    Essential hypertension [I10] 10/15/2014 Yes    Chronic kidney disease (CKD) stage G4/A1, severely decreased glomerular filtration rate (GFR) between 15-29 mL/min/1.73 square meter and albuminuria creatinine ratio less than 30 mg/g [N18.4]  Yes    Dyslipidemia [E78.5] 02/22/2016 Yes    Morbid obesity [E66.01] 02/25/2016 Yes      Problems Resolved During this Admission:    Diagnosis Date Noted Date Resolved POA    Debility [R53.81] 08/26/2020 09/03/2020 Yes    Acute hypercapnic respiratory failure due to obstructive sleep apnea [J96.02, G47.33]  09/03/2020 Yes    Encounter for palliative care [Z51.5] 08/25/2020 09/08/2020 Not Applicable    Normocytic anemia  "[D64.9] 08/03/2016 09/08/2020 Yes       Discharged Condition: Stable    Disposition: Skilled Nursing Facility    Follow Up:  Follow-up Information     Luisana Cartagena MD. Schedule an appointment as soon as possible for a visit in 2 weeks.    Specialty: Family Medicine  Why: Re-establish outpatient care for patient with advanced chronic lung disease after your SNF stay.  Contact information:  [address]  [address]  [facility] [address]  [phone]             [facility] [place]. Go today.    Specialties: Nursing Home Agency, SNF Agency  Why: SNF  Contact information:  [address] [facility] [address] [phone]                 Patient Instructions:     VENTILATOR FOR HOME USE   Order Comments: Trilogy - At Bedtime and prn     Order Specific Question Answer Comments   Height: 5' 3" (1.6 m)    Weight: 103.6 kg (228 lb 6.3 oz)    Length of need (1-99 months): 99    Interface needed: Full face mask    Mode: BIPAP Trilogy   PEEP - EPAP 10.0 CM/H20    Pressure support - IPAP 15    FiO2% 40    Humidifier needed? No      Diet Cardiac     Notify your health care provider if you experience any of the following:  temperature >100.4     Notify your health care provider if you experience any of the following:  persistent nausea and vomiting or diarrhea     Notify your health care provider if you experience any of the following:  severe uncontrolled pain     Notify your health care provider if you experience any of the following:  difficulty breathing or increased cough     Notify your health care provider if you experience any of the following:  severe persistent headache     Notify your health care provider if you experience any of the following:  worsening rash     Notify your health care provider if you experience any of the following:  persistent dizziness, light-headedness, or visual disturbances     Notify your health care provider if you experience any of the following:  increased confusion or " weakness     Activity as tolerated        Medications:  Reconciled Home Medications:      Medication List      START taking these medications    ferrous sulfate 325 (65 FE) MG EC tablet  Take 1 tablet (325 mg total) by mouth once daily.     vitamin renal formula (B-complex-vitamin c-folic acid) 1 mg Cap  Commonly known as: NEPHROCAP  Take 1 capsule by mouth once daily.        CHANGE how you take these medications    furosemide 40 MG tablet  Commonly known as: LASIX  Take 1 tablet (40 mg total) by mouth 2 (two) times daily.  What changed: when to take this        CONTINUE taking these medications    acetaminophen 500 MG tablet  Commonly known as: TYLENOL  Take 2 tablets (1,000 mg total) by mouth every 6 (six) hours as needed for Pain.     albuterol-ipratropium 2.5 mg-0.5 mg/3 mL nebulizer solution  Commonly known as: DUO-NEB  Take 3 mLs by nebulization every 6 (six) hours as needed for Wheezing or Shortness of Breath.     apixaban 2.5 mg Tab  Commonly known as: ELIQUIS  TAKE 1 TABLET(2.5 MG) BY MOUTH TWICE DAILY     atorvastatin 80 MG tablet  Commonly known as: LIPITOR  TAKE 1 TABLET BY MOUTH EVERY EVENING.     blood sugar diagnostic Strp  Commonly known as: BLOOD GLUCOSE TEST  Qd testing     docusate sodium 100 MG capsule  Commonly known as: COLACE  Take 1 capsule (100 mg total) by mouth 2 (two) times daily.     escitalopram oxalate 10 MG tablet  Commonly known as: LEXAPRO  TAKE ONE TABLET BY MOUTH EVERY DAY IN THE EVENING     metoprolol tartrate 50 MG tablet  Commonly known as: LOPRESSOR  Take 1 tablet (50 mg total) by mouth 2 (two) times daily.     miconazole NITRATE 2 % 2 % top powder  Commonly known as: MICOTIN  Apply topically 2 (two) times daily.     polyethylene glycol 17 gram Pwpk  Commonly known as: GLYCOLAX  Take 17 g by mouth daily as needed.     valACYclovir 500 MG tablet  Commonly known as: VALTREX  Take 1 tablet (500 mg total) by mouth once daily.     walker Misc  Prn use please fit pt        STOP  taking these medications    ipratropium 0.02 % nebulizer solution  Commonly known as: ATROVENT     levalbuterol 1.25 mg/0.5 mL nebulizer solution  Commonly known as: XOPENEX            Indwelling Lines/Drains at time of discharge:   Lines/Drains/Airways     Drain            Female External Urinary Catheter 09/01/20 1158 17 days                Time spent on the discharge of patient: 35 minutes  Patient was seen and examined on the date of discharge and determined to be suitable for discharge.         Loi Quiles MD  Department of Hospital Medicine  [facility]

## 2020-09-20 NOTE — PROGRESS NOTES
[facility]   Re-evaluate Visit  DOS 9/19/2020     PRESENTING HISTORY     Chief Complaint/Reason for Admission:  Evaluate for edema, neuropathy, respiratory failure and chronic diseaes    PCP: Luisana Cartagena MD   Admission Date: 9/1/2020  Discharge Date: 9/11/2020    History of Present Illness:  Ms. Patsy Morris is a 70 y.o. female who was recently re-admitted to the hospital for treatment of acute on chronic hypoxic respiratory failure and atrial fibrillation with rapid ventricular response.  Patient was rate controlled with intravenous diltiazem and treated with noninvasive ventilation.  Pulmonary/critical care and cardiology services consulted and suspect her worsening respiratory failure due to combination of underlying lung disease and heart failure.  Patient clinically improved and she was transferred out of the intensive care unit on 9/6/2020.  Patient also met with palliative care service and decided to rescind her prior do not resuscitate order.  She was transferred to [facility] for PT/OT.     ________________________________________________________    Today:  The resident is currently sitting up in the chair with no acute distress. Breathing even and unlabored on 4L NC. Rales to bilateral lower lobes with +4 edema to BLE. Irregular rhythm.   She is currently on Lasix 40 mg BID. Resident also reports of burning pain to bilateral feet.       Review of Systems  General ROS: negative for chills, fever or weight loss  Psychological ROS: negative for hallucination, depression or suicidal ideation  Ophthalmic ROS: negative for blurry vision, photophobia or eye pain  ENT ROS: negative for epistaxis, sore throat or rhinorrhea  Respiratory ROS: + SOB on supplemental oxygen  Cardiovascular ROS: no chest pain or dyspnea on exertion  Gastrointestinal ROS: no abdominal pain, change in bowel habits, or black/ bloody stools  Genito-Urinary ROS: no dysuria, trouble voiding, or  hematuria  Musculoskeletal ROS: +leg swelling  Neurological ROS: no syncope or seizures; + neuropathy  Dermatological ROS: negative for pruritis, rash and jaundice          PAST HISTORY:     Past Medical History:   Diagnosis Date    Arthritis     Asthma     Atrial fibrillation     Atrial flutter     Cerumen impaction     CHF (congestive heart failure)     CKD (chronic kidney disease), stage III     Colon polyps 2017    COPD exacerbation     Encounter for blood transfusion     HEARING LOSS     Herpes genitalis     Hypertension     Renal carcinoma 3/10/2015    Thyroid nodule 6/8/2017       Past Surgical History:   Procedure Laterality Date    BRAIN SURGERY      COLONOSCOPY N/A 6/23/2017    Procedure: COLONOSCOPY;  Surgeon: Shane Sharma MD;  [facility]  Service: Endoscopy;  Laterality: N/A;  2nd floor case ; on 3L home O2       per Dr Leopold (anesthesia)-Based on her history of:  Chronic respiratory failure with oxygen use, HDEZ, CHF, A-Fib, BHAVANI, it was determined that she should have her Colonoscopy scheduled on the 2nd Floor       ok to hold Eliquis 2 days prior to    EYE SURGERY      HYSTERECTOMY      kidney mass resection Right     renal carcinoma       Family History   Problem Relation Age of Onset    Hypertension Mother     Thyroid disease Mother     Cancer Father     Asthma Neg Hx     Emphysema Neg Hx     Melanoma Neg Hx          MEDICATIONS & ALLERGIES:     Current Outpatient Medications on File Prior to Visit   Medication Sig Dispense Refill    acetaminophen (TYLENOL) 500 MG tablet Take 2 tablets (1,000 mg total) by mouth every 6 (six) hours as needed for Pain.      albuterol-ipratropium 2.5mg-0.5mg/3mL (DUO-NEB) 0.5 mg-3 mg(2.5 mg base)/3 mL nebulizer solution Take 3 mLs by nebulization every 6 (six) hours as needed for Wheezing or Shortness of Breath. 3 mL 2    apixaban (ELIQUIS) 2.5 mg Tab TAKE 1 TABLET(2.5 MG) BY MOUTH TWICE DAILY 60 tablet 6    atorvastatin  (LIPITOR) 80 MG tablet TAKE 1 TABLET BY MOUTH EVERY EVENING. 90 tablet 1    blood sugar diagnostic (BLOOD GLUCOSE TEST) Strp Qd testing 100 strip 3    docusate sodium (COLACE) 100 MG capsule Take 1 capsule (100 mg total) by mouth 2 (two) times daily. 60 capsule 0    ergocalciferol (ERGOCALCIFEROL) 50,000 unit Cap Take 1 capsule (50,000 Units total) by mouth every 7 days.      escitalopram oxalate (LEXAPRO) 10 MG tablet TAKE ONE TABLET BY MOUTH EVERY DAY IN THE EVENING 90 tablet 0    ferrous sulfate (FEOSOL) 325 mg (65 mg iron) Tab tablet TAKE ONE TABLET BY MOUTH DAILY WITH BREAKFAST 30 tablet 4    ferrous sulfate 325 (65 FE) MG EC tablet Take 1 tablet (325 mg total) by mouth once daily. 30 tablet 0    furosemide (LASIX) 40 MG tablet Take 1 tablet (40 mg total) by mouth 2 (two) times daily. 60 tablet 1    metoprolol tartrate (LOPRESSOR) 50 MG tablet Take 1 tablet (50 mg total) by mouth 2 (two) times daily. 60 tablet 0    miconazole NITRATE 2 % (MICOTIN) 2 % top powder Apply topically 2 (two) times daily.  0    polyethylene glycol (GLYCOLAX) 17 gram PwPk Take 17 g by mouth daily as needed.  0    valACYclovir (VALTREX) 500 MG tablet Take 1 tablet (500 mg total) by mouth once daily. 90 tablet 3    vitamin renal formula, B-complex-vitamin c-folic acid, (NEPHROCAP) 1 mg Cap Take 1 capsule by mouth once daily. 30 each 0    walker Misc Prn use please fit pt 1 each 0     No current facility-administered medications on file prior to visit.         Review of patient's allergies indicates:  No Known Allergies    OBJECTIVE:     Vital Signs:  /46, pulse 55, resp 19, sp02 95% 4L NC, temp 98.5  Wt Readings from Last 1 Encounters:   09/02/20 1235 103.6 kg (228 lb 6.3 oz)   09/01/20 1700 105.5 kg (232 lb 9.4 oz)   09/01/20 0020 99.8 kg (220 lb)     There is no height or weight on file to calculate BMI.        Physical Exam:  General appearance: alert, cooperative, no distress  Constitutional:Oriented to person,  place, and time  +obese   HEENT: Normocephalic, atraumatic  Eyes: conjunctivae/corneas clear,  Lungs: rales to bilateral lower lobes  Heart: irregular rhythm  Abdomen: soft, non-tender; bowel sounds normoactive; no organomegaly  Extremities: extremities symmetric; + 4 edema to BLE  Integument: Skin color, texture, turgor normal; no rashes; hair distrubution normal  Neurologic: Alert and oriented X 3, normal strength, normal coordination and gait  Psychiatric: no pressured speech; normal affect; no evidence of impaired cognition       ASSESSMENT & PLAN:     Chronic diastolic congestive heart failure    Continue metoprolol 50 mg BID  - 9/19 increase Lasix from 40 mg BID to 80 mg BID x 3 days, then continue Lasix 40 mg BID.  - order BMP and CBC next lab draw    Hypokalemia   - 9/19 start on KCL 20 mEq BID    Neuropathy  - 9/19 start on gabapentin 100 mg BID     Atrial fibrillation with rapid ventricular response  - rate controlled with metoprolol 50 mg twice daily.  Patient on apixaban 2.5 mg twice daily for stroke prevention.     Acute on chronic respiratory failure with hypoxia and hypercapnia  BHAVANI  Improved.  Continue with current treatment.    - BiPAP at night     Vitamin D deficiency   - 9/16 start on Vitamin D 50,000 units weekly     Essential hypertension  - on Lasix, metoprolol     Chronic kidney disease (CKD) stage G4/A1, severely decreased glomerular filtration rate (GFR) between 15-29 mL/min/1.73 square meter and albuminuria creatinine ratio less than 30 mg/g  Stable.  Continue to monitor.     Dyslipidemia  Continue statin therapy.     Morbid obesity  Patient counseled on the importance of weight loss by changing diet and increased physical activity    Recurrent major depression  -on Lexapro 10 mg daily    Anemia  - on Ferrous sulfate 325 mg daily    Scheduled Follow-up :  Future Appointments   Date Time Provider Department Center   9/22/2020  9:30 AM Allen Barajas MD [facility] Jonah Mendez       Post  Visit Medication List:     Medication List          Accurate as of September 19, 2020  8:28 PM. If you have any questions, ask your nurse or doctor.            CONTINUE taking these medications    acetaminophen 500 MG tablet  Commonly known as: TYLENOL  Take 2 tablets (1,000 mg total) by mouth every 6 (six) hours as needed for Pain.     albuterol-ipratropium 2.5 mg-0.5 mg/3 mL nebulizer solution  Commonly known as: DUO-NEB  Take 3 mLs by nebulization every 6 (six) hours as needed for Wheezing or Shortness of Breath.     apixaban 2.5 mg Tab  Commonly known as: ELIQUIS  TAKE 1 TABLET(2.5 MG) BY MOUTH TWICE DAILY     atorvastatin 80 MG tablet  Commonly known as: LIPITOR  TAKE 1 TABLET BY MOUTH EVERY EVENING.     blood sugar diagnostic Strp  Commonly known as: BLOOD GLUCOSE TEST  Qd testing     docusate sodium 100 MG capsule  Commonly known as: COLACE  Take 1 capsule (100 mg total) by mouth 2 (two) times daily.     ergocalciferol 50,000 unit Cap  Commonly known as: ERGOCALCIFEROL  Take 1 capsule (50,000 Units total) by mouth every 7 days.     escitalopram oxalate 10 MG tablet  Commonly known as: LEXAPRO  TAKE ONE TABLET BY MOUTH EVERY DAY IN THE EVENING     * ferrous sulfate 325 (65 FE) MG EC tablet  Take 1 tablet (325 mg total) by mouth once daily.     * ferrous sulfate 325 mg (65 mg iron) Tab tablet  Commonly known as: FEOSOL  TAKE ONE TABLET BY MOUTH DAILY WITH BREAKFAST     furosemide 40 MG tablet  Commonly known as: LASIX  Take 1 tablet (40 mg total) by mouth 2 (two) times daily.     metoprolol tartrate 50 MG tablet  Commonly known as: LOPRESSOR  Take 1 tablet (50 mg total) by mouth 2 (two) times daily.     miconazole NITRATE 2 % 2 % top powder  Commonly known as: MICOTIN  Apply topically 2 (two) times daily.     polyethylene glycol 17 gram Pwpk  Commonly known as: GLYCOLAX  Take 17 g by mouth daily as needed.     valACYclovir 500 MG tablet  Commonly known as: VALTREX  Take 1 tablet (500 mg total) by mouth once  daily.     vitamin renal formula (B-complex-vitamin c-folic acid) 1 mg Cap  Commonly known as: NEPHROCAP  Take 1 capsule by mouth once daily.     walker Misc  Prn use please fit pt         * This list has 2 medication(s) that are the same as other medications prescribed for you. Read the directions carefully, and ask your doctor or other care provider to review them with you.                Signing Physician:  Jenni Mcguire NP

## 2020-09-23 NOTE — PROGRESS NOTES
[facility]   Re-evaluate Visit  DOS 9/23/2020     PRESENTING HISTORY     Chief Complaint/Reason for Admission:  Re-evaluate for edema, neuropathy, debility, respiratory failure and chronic diseaes    PCP: Luisana Cartagena MD   Admission Date: 9/1/2020  Discharge Date: 9/11/2020    History of Present Illness:  Ms. Patsy Morris is a 70 y.o. female who was recently re-admitted to the hospital for treatment of acute on chronic hypoxic respiratory failure and atrial fibrillation with rapid ventricular response.  Patient was rate controlled with intravenous diltiazem and treated with noninvasive ventilation.  Pulmonary/critical care and cardiology services consulted and suspect her worsening respiratory failure due to combination of underlying lung disease and heart failure.  Patient clinically improved and she was transferred out of the intensive care unit on 9/6/2020.  Patient also met with palliative care service and decided to rescind her prior do not resuscitate order.  She was transferred to [facility] for PT/OT.     ________________________________________________________    Today:  The resident is currently sitting up in the chair with no acute distress. Legs elevated on stool. Breathing even and unlabored on 4L NC. Rales to bilateral lower lobes with +3 edema to BLE. Irregular rhythm. On 9/19, Lasix increased from 40 mg BID to 80 mg BID. Edema slightly improved. No complaints today. Denies SOB, CP, pain. Report neuropathy better with gabapentin.   Per PT, resident is progression well. Ambulates 100 feet with rolling walker.     Review of Systems  General ROS: negative for chills, fever or weight loss  Psychological ROS: negative for hallucination, depression or suicidal ideation  Ophthalmic ROS: negative for blurry vision, photophobia or eye pain  ENT ROS: negative for epistaxis, sore throat or rhinorrhea  Respiratory ROS: + on supplemental oxygen  Cardiovascular ROS: no  chest pain or dyspnea on exertion  Gastrointestinal ROS: no abdominal pain, change in bowel habits, or black/ bloody stools  Genito-Urinary ROS: no dysuria, trouble voiding, or hematuria  Musculoskeletal ROS: +leg swelling  Neurological ROS: no syncope or seizures; + neuropathy improved  Dermatological ROS: negative for pruritis, rash and jaundice          PAST HISTORY:     Past Medical History:   Diagnosis Date    Arthritis     Asthma     Atrial fibrillation     Atrial flutter     Cerumen impaction     CHF (congestive heart failure)     CKD (chronic kidney disease), stage III     Colon polyps 2017    COPD exacerbation     Encounter for blood transfusion     HEARING LOSS     Herpes genitalis     Hypertension     Renal carcinoma 3/10/2015    Thyroid nodule 6/8/2017       Past Surgical History:   Procedure Laterality Date    BRAIN SURGERY      COLONOSCOPY N/A 6/23/2017    Procedure: COLONOSCOPY;  Surgeon: Shane Sharma MD;  Location: [facility] ([address] [place] [address]);  Service: Endoscopy;  Laterality: N/A;  2nd floor case ; on 3L home O2       per Dr Leopold (anesthesia)-Based on her history of:  Chronic respiratory failure with oxygen use, HDEZ, CHF, A-Fib, BHAVANI, it was determined that she should have her Colonoscopy scheduled on the 2nd Floor       ok to hold Eliquis 2 days prior to    EYE SURGERY      HYSTERECTOMY      kidney mass resection Right     renal carcinoma       Family History   Problem Relation Age of Onset    Hypertension Mother     Thyroid disease Mother     Cancer Father     Asthma Neg Hx     Emphysema Neg Hx     Melanoma Neg Hx          MEDICATIONS & ALLERGIES:     Current Outpatient Medications on File Prior to Visit   Medication Sig Dispense Refill    acetaminophen (TYLENOL) 500 MG tablet Take 2 tablets (1,000 mg total) by mouth every 6 (six) hours as needed for Pain.      albuterol-ipratropium 2.5mg-0.5mg/3mL (DUO-NEB) 0.5 mg-3 mg(2.5 mg base)/3 mL nebulizer solution Take 3 mLs by  nebulization every 6 (six) hours as needed for Wheezing or Shortness of Breath. 3 mL 2    apixaban (ELIQUIS) 2.5 mg Tab TAKE 1 TABLET(2.5 MG) BY MOUTH TWICE DAILY 60 tablet 6    atorvastatin (LIPITOR) 80 MG tablet TAKE 1 TABLET BY MOUTH EVERY EVENING. 90 tablet 1    blood sugar diagnostic (BLOOD GLUCOSE TEST) Strp Qd testing 100 strip 3    docusate sodium (COLACE) 100 MG capsule Take 1 capsule (100 mg total) by mouth 2 (two) times daily. 60 capsule 0    ergocalciferol (ERGOCALCIFEROL) 50,000 unit Cap Take 1 capsule (50,000 Units total) by mouth every 7 days.      escitalopram oxalate (LEXAPRO) 10 MG tablet TAKE ONE TABLET BY MOUTH EVERY DAY IN THE EVENING 90 tablet 0    ferrous sulfate (FEOSOL) 325 mg (65 mg iron) Tab tablet TAKE ONE TABLET BY MOUTH DAILY WITH BREAKFAST 30 tablet 4    ferrous sulfate 325 (65 FE) MG EC tablet Take 1 tablet (325 mg total) by mouth once daily. 30 tablet 0    furosemide (LASIX) 40 MG tablet Take 1 tablet (40 mg total) by mouth 2 (two) times daily. 60 tablet 1    gabapentin (NEURONTIN) 100 MG capsule Take 1 capsule (100 mg total) by mouth 2 (two) times daily. 90 capsule 11    metoprolol tartrate (LOPRESSOR) 50 MG tablet Take 1 tablet (50 mg total) by mouth 2 (two) times daily. 60 tablet 0    miconazole NITRATE 2 % (MICOTIN) 2 % top powder Apply topically 2 (two) times daily.  0    polyethylene glycol (GLYCOLAX) 17 gram PwPk Take 17 g by mouth daily as needed.  0    potassium chloride SA (K-DUR,KLOR-CON) 20 MEQ tablet Take 1 tablet (20 mEq total) by mouth 2 (two) times daily.      valACYclovir (VALTREX) 500 MG tablet Take 1 tablet (500 mg total) by mouth once daily. 90 tablet 3    vitamin renal formula, B-complex-vitamin c-folic acid, (NEPHROCAP) 1 mg Cap Take 1 capsule by mouth once daily. 30 each 0    walker Misc Prn use please fit pt 1 each 0     No current facility-administered medications on file prior to visit.         Review of patient's allergies indicates:  No  Known Allergies    OBJECTIVE:     Vital Signs:  /56, pulse 91, resp 20, sp02 95%, temp 97.2  Wt Readings from Last 1 Encounters:   09/02/20 1235 103.6 kg (228 lb 6.3 oz)   09/01/20 1700 105.5 kg (232 lb 9.4 oz)   09/01/20 0020 99.8 kg (220 lb)     There is no height or weight on file to calculate BMI.        Physical Exam:  General appearance: alert, cooperative, no distress  Constitutional:Oriented to person, place, and time  +obese   HEENT: Normocephalic, atraumatic  Eyes: conjunctivae/corneas clear,  Lungs: rales to bilateral lower lobes  Heart: irregular rhythm  Abdomen: soft, non-tender; bowel sounds normoactive; no organomegaly  Extremities: extremities symmetric; + 3 edema to BLE  Integument: Skin color, texture, turgor normal; no rashes; hair distrubution normal  Neurologic: Alert and oriented X 3, normal strength, normal coordination and gait  Psychiatric: no pressured speech; normal affect; no evidence of impaired cognition       ASSESSMENT & PLAN:     Chronic diastolic congestive heart failure    Continue metoprolol 50 mg BID  - 9/19 increase Lasix from 40 mg BID to 80 mg BID x 3 days, then continue Lasix 40 mg BID.  - 9/23 on Lasix 40 mg BID. Order BMP and CBC on 9/25    Hypokalemia   - on KCL 20 mEq BID    Debility  - continue with PT/OT SNF    Neuropathy  - 9/19 start on gabapentin 100 mg BID   - 9/23 improved. Continue on gabapentin    Atrial fibrillation with rapid ventricular response  - rate controlled with metoprolol 50 mg twice daily.  Patient on apixaban 2.5 mg twice daily for stroke prevention.     Acute on chronic respiratory failure with hypoxia and hypercapnia  BHAVANI  Improved.  Continue with current treatment.    - BiPAP at night     Vitamin D deficiency   - 9/16 start on Vitamin D 50,000 units weekly     Essential hypertension  - on Lasix, metoprolol     Chronic kidney disease (CKD) stage G4/A1, severely decreased glomerular filtration rate (GFR) between 15-29 mL/min/1.73 square meter  and albuminuria creatinine ratio less than 30 mg/g  Stable.  Continue to monitor.     Dyslipidemia  Continue statin therapy.     Morbid obesity  Patient counseled on the importance of weight loss by changing diet and increased physical activity    Recurrent major depression  -on Lexapro 10 mg daily    Anemia  - on Ferrous sulfate 325 mg daily    Scheduled Follow-up :  Future Appointments   Date Time Provider Department Center   10/19/2020 11:00 AM Rach Casillas MD [facility] Jonah Mendez       Post Visit Medication List:     Medication List          Accurate as of September 23, 2020 12:50 PM. If you have any questions, ask your nurse or doctor.            CONTINUE taking these medications    acetaminophen 500 MG tablet  Commonly known as: TYLENOL  Take 2 tablets (1,000 mg total) by mouth every 6 (six) hours as needed for Pain.     albuterol-ipratropium 2.5 mg-0.5 mg/3 mL nebulizer solution  Commonly known as: DUO-NEB  Take 3 mLs by nebulization every 6 (six) hours as needed for Wheezing or Shortness of Breath.     apixaban 2.5 mg Tab  Commonly known as: ELIQUIS  TAKE 1 TABLET(2.5 MG) BY MOUTH TWICE DAILY     atorvastatin 80 MG tablet  Commonly known as: LIPITOR  TAKE 1 TABLET BY MOUTH EVERY EVENING.     blood sugar diagnostic Strp  Commonly known as: BLOOD GLUCOSE TEST  Qd testing     docusate sodium 100 MG capsule  Commonly known as: COLACE  Take 1 capsule (100 mg total) by mouth 2 (two) times daily.     ergocalciferol 50,000 unit Cap  Commonly known as: ERGOCALCIFEROL  Take 1 capsule (50,000 Units total) by mouth every 7 days.     escitalopram oxalate 10 MG tablet  Commonly known as: LEXAPRO  TAKE ONE TABLET BY MOUTH EVERY DAY IN THE EVENING     * ferrous sulfate 325 (65 FE) MG EC tablet  Take 1 tablet (325 mg total) by mouth once daily.     * ferrous sulfate 325 mg (65 mg iron) Tab tablet  Commonly known as: FEOSOL  TAKE ONE TABLET BY MOUTH DAILY WITH BREAKFAST     furosemide 40 MG tablet  Commonly known as:  LASIX  Take 1 tablet (40 mg total) by mouth 2 (two) times daily.     gabapentin 100 MG capsule  Commonly known as: NEURONTIN  Take 1 capsule (100 mg total) by mouth 2 (two) times daily.     metoprolol tartrate 50 MG tablet  Commonly known as: LOPRESSOR  Take 1 tablet (50 mg total) by mouth 2 (two) times daily.     miconazole NITRATE 2 % 2 % top powder  Commonly known as: MICOTIN  Apply topically 2 (two) times daily.     polyethylene glycol 17 gram Pwpk  Commonly known as: GLYCOLAX  Take 17 g by mouth daily as needed.     potassium chloride SA 20 MEQ tablet  Commonly known as: K-DUR,KLOR-CON  Take 1 tablet (20 mEq total) by mouth 2 (two) times daily.     valACYclovir 500 MG tablet  Commonly known as: VALTREX  Take 1 tablet (500 mg total) by mouth once daily.     vitamin renal formula (B-complex-vitamin c-folic acid) 1 mg Cap  Commonly known as: NEPHROCAP  Take 1 capsule by mouth once daily.     walker Misc  Prn use please fit pt         * This list has 2 medication(s) that are the same as other medications prescribed for you. Read the directions carefully, and ask your doctor or other care provider to review them with you.                Signing Physician:  Jenni Mcguire NP

## 2020-09-23 NOTE — TELEPHONE ENCOUNTER
----- Message from Jelly Carlisle sent at 9/23/2020  1:10 PM CDT -----  Contact: PATSY DIOP [[id number]]  Type:  Patient Requesting Call    Who Called: Patsy Zavala Call Back Number: [phone]  Additional Information:  Pt was in the hospital and would like a call back

## 2020-09-24 NOTE — TELEPHONE ENCOUNTER
----- Message from Jennifer rBavo sent at 9/24/2020  3:46 PM CDT -----  Regarding: rt a missed call   Type:  Patient Returning Call    Who Called: SARAH DIOP [[id number]]    Who Left Message for Patient:kina    Does the patient know what this is regarding?yes    Best Call Back Number:[phone]    Additional Information:

## 2020-09-26 NOTE — PROGRESS NOTES
[facility]   Re-evaluate Visit  DOS 9/25/2020     PRESENTING HISTORY     Chief Complaint/Reason for Admission:  Labs reviewed and Re-evaluate for edema, respiratory failure and chronic diseaes    PCP: Luisana Cartagena MD   Admission Date: 9/1/2020  Discharge Date: 9/11/2020    History of Present Illness:  Ms. Patsy Morris is a 70 y.o. female who was recently re-admitted to the hospital for treatment of acute on chronic hypoxic respiratory failure and atrial fibrillation with rapid ventricular response.  Patient was rate controlled with intravenous diltiazem and treated with noninvasive ventilation.  Pulmonary/critical care and cardiology services consulted and suspect her worsening respiratory failure due to combination of underlying lung disease and heart failure.  Patient clinically improved and she was transferred out of the intensive care unit on 9/6/2020.  Patient also met with palliative care service and decided to rescind her prior do not resuscitate order.  She was transferred to [facility] for PT/OT.     ________________________________________________________    Today:  The resident is currently sitting up in the chair with no acute distress. She is working with PT/OT for ROM and muscle strengthening exercises.  Breathing even and unlabored on 4L NC. LCTAB +3 edema to BLE. Irregular rhythm.  Denies SOB, CP, pain.   Per PT, resident is progression well. Ambulates 100 feet with rolling walker.   Labs 9/25: glucose 129, BUN 38, creat 2.21, GFR 23, Na 139, K 4.3, H&H 10.4 & 31.3.    Review of Systems  General ROS: negative for chills, fever or weight loss  Psychological ROS: negative for hallucination, depression or suicidal ideation  Ophthalmic ROS: negative for blurry vision, photophobia or eye pain  ENT ROS: negative for epistaxis, sore throat or rhinorrhea  Respiratory ROS: + on supplemental oxygen  Cardiovascular ROS: no chest pain or dyspnea on  exertion  Gastrointestinal ROS: no abdominal pain, change in bowel habits, or black/ bloody stools  Genito-Urinary ROS: no dysuria, trouble voiding, or hematuria  Musculoskeletal ROS: +leg swelling  Neurological ROS: no syncope or seizures  Dermatological ROS: negative for pruritis, rash and jaundice          PAST HISTORY:     Past Medical History:   Diagnosis Date    Arthritis     Asthma     Atrial fibrillation     Atrial flutter     Cerumen impaction     CHF (congestive heart failure)     CKD (chronic kidney disease), stage III     Colon polyps 2017    COPD exacerbation     Encounter for blood transfusion     HEARING LOSS     Herpes genitalis     Hypertension     Renal carcinoma 3/10/2015    Thyroid nodule 6/8/2017       Past Surgical History:   Procedure Laterality Date    BRAIN SURGERY      COLONOSCOPY N/A 6/23/2017    Procedure: COLONOSCOPY;  Surgeon: Shane Sharma MD;  Location: [facility] ([address] [place] [address]);  Service: Endoscopy;  Laterality: N/A;  2nd floor case ; on 3L home O2       per Dr Leopold (anesthesia)-Based on her history of:  Chronic respiratory failure with oxygen use, HDEZ, CHF, A-Fib, BHAVANI, it was determined that she should have her Colonoscopy scheduled on the 2nd Floor       ok to hold Eliquis 2 days prior to    EYE SURGERY      HYSTERECTOMY      kidney mass resection Right     renal carcinoma       Family History   Problem Relation Age of Onset    Hypertension Mother     Thyroid disease Mother     Cancer Father     Asthma Neg Hx     Emphysema Neg Hx     Melanoma Neg Hx          MEDICATIONS & ALLERGIES:     Current Outpatient Medications on File Prior to Visit   Medication Sig Dispense Refill    acetaminophen (TYLENOL) 500 MG tablet Take 2 tablets (1,000 mg total) by mouth every 6 (six) hours as needed for Pain.      albuterol-ipratropium 2.5mg-0.5mg/3mL (DUO-NEB) 0.5 mg-3 mg(2.5 mg base)/3 mL nebulizer solution Take 3 mLs by nebulization every 6 (six) hours as needed for Wheezing  or Shortness of Breath. 3 mL 2    apixaban (ELIQUIS) 2.5 mg Tab TAKE 1 TABLET(2.5 MG) BY MOUTH TWICE DAILY 60 tablet 6    atorvastatin (LIPITOR) 80 MG tablet TAKE 1 TABLET BY MOUTH EVERY EVENING. 90 tablet 1    blood sugar diagnostic (BLOOD GLUCOSE TEST) Strp Qd testing 100 strip 3    docusate sodium (COLACE) 100 MG capsule Take 1 capsule (100 mg total) by mouth 2 (two) times daily. 60 capsule 0    ergocalciferol (ERGOCALCIFEROL) 50,000 unit Cap Take 1 capsule (50,000 Units total) by mouth every 7 days.      escitalopram oxalate (LEXAPRO) 10 MG tablet TAKE ONE TABLET BY MOUTH EVERY DAY IN THE EVENING 90 tablet 0    ferrous sulfate (FEOSOL) 325 mg (65 mg iron) Tab tablet TAKE ONE TABLET BY MOUTH DAILY WITH BREAKFAST 30 tablet 4    ferrous sulfate 325 (65 FE) MG EC tablet Take 1 tablet (325 mg total) by mouth once daily. 30 tablet 0    furosemide (LASIX) 40 MG tablet Take 1 tablet (40 mg total) by mouth 2 (two) times daily. 60 tablet 1    gabapentin (NEURONTIN) 100 MG capsule Take 1 capsule (100 mg total) by mouth 2 (two) times daily. 90 capsule 11    metoprolol tartrate (LOPRESSOR) 50 MG tablet Take 1 tablet (50 mg total) by mouth 2 (two) times daily. 60 tablet 0    miconazole NITRATE 2 % (MICOTIN) 2 % top powder Apply topically 2 (two) times daily.  0    polyethylene glycol (GLYCOLAX) 17 gram PwPk Take 17 g by mouth daily as needed.  0    potassium chloride SA (K-DUR,KLOR-CON) 20 MEQ tablet Take 1 tablet (20 mEq total) by mouth 2 (two) times daily.      valACYclovir (VALTREX) 500 MG tablet Take 1 tablet (500 mg total) by mouth once daily. 90 tablet 3    vitamin renal formula, B-complex-vitamin c-folic acid, (NEPHROCAP) 1 mg Cap Take 1 capsule by mouth once daily. 30 each 0    walker Misc Prn use please fit pt 1 each 0     No current facility-administered medications on file prior to visit.         Review of patient's allergies indicates:  No Known Allergies    OBJECTIVE:       Physical  Exam:  General appearance: alert, cooperative, no distress  Constitutional:Oriented to person, place, and time  +obese   HEENT: Normocephalic, atraumatic  Eyes: conjunctivae/corneas clear,  Lungs: LCTAB   Heart: irregular rhythm  Abdomen: soft, non-tender; bowel sounds normoactive; no organomegaly  Extremities: extremities symmetric; + 3 edema to BLE  Integument: Skin color, texture, turgor normal; no rashes; hair distrubution normal  Neurologic: Alert and oriented X 3, normal strength, normal coordination and gait  Psychiatric: no pressured speech; normal affect; no evidence of impaired cognition       ASSESSMENT & PLAN:     Chronic diastolic congestive heart failure    Continue metoprolol 50 mg BID  - 9/19 increase Lasix from 40 mg BID to 80 mg BID x 3 days, then continue Lasix 40 mg BID.  - 9/23 on Lasix 40 mg BID. Order BMP and CBC on 9/25  - 9/25 continue on Lasix 40 mg BID    Chronic kidney disease (CKD) stage G4/A1, severely decreased glomerular filtration rate (GFR) between 15-29 mL/min/1.73 square meter and albuminuria creatinine ratio less than 30 mg/g  Stable.  Continue to monitor.  - 9/25 BUN 38 and creat 2.21, Baseline. Continue to monitor    Hypokalemia   - on KCL 20 mEq BID  - 9/25 K 4.3 WNL    Debility  - continue with PT/OT SNF    Neuropathy  - 9/19 start on gabapentin 100 mg BID   - 9/23 improved. Continue on gabapentin    Atrial fibrillation with rapid ventricular response  - rate controlled with metoprolol 50 mg twice daily.  Patient on apixaban 2.5 mg twice daily for stroke prevention.     Acute on chronic respiratory failure with hypoxia and hypercapnia  BHAVANI  Improved.  Continue with current treatment.    - BiPAP at night     Vitamin D deficiency   - 9/16 start on Vitamin D 50,000 units weekly     Essential hypertension  - on Lasix, metoprolol        Dyslipidemia  Continue statin therapy.     Morbid obesity  Patient counseled on the importance of weight loss by changing diet and increased  physical activity    Recurrent major depression  -on Lexapro 10 mg daily    Anemia  - on Ferrous sulfate 325 mg daily    Scheduled Follow-up :  Future Appointments   Date Time Provider Department Center   10/20/2020 11:00 AM Rach Casillas MD [facility]       Post Visit Medication List:     Medication List          Accurate as of September 25, 2020 11:59 PM. If you have any questions, ask your nurse or doctor.            CONTINUE taking these medications    acetaminophen 500 MG tablet  Commonly known as: TYLENOL  Take 2 tablets (1,000 mg total) by mouth every 6 (six) hours as needed for Pain.     apixaban 2.5 mg Tab  Commonly known as: ELIQUIS  TAKE 1 TABLET(2.5 MG) BY MOUTH TWICE DAILY     atorvastatin 80 MG tablet  Commonly known as: LIPITOR  TAKE 1 TABLET BY MOUTH EVERY EVENING.     blood sugar diagnostic Strp  Commonly known as: BLOOD GLUCOSE TEST  Qd testing     docusate sodium 100 MG capsule  Commonly known as: COLACE  Take 1 capsule (100 mg total) by mouth 2 (two) times daily.     ergocalciferol 50,000 unit Cap  Commonly known as: ERGOCALCIFEROL  Take 1 capsule (50,000 Units total) by mouth every 7 days.     escitalopram oxalate 10 MG tablet  Commonly known as: LEXAPRO  TAKE ONE TABLET BY MOUTH EVERY DAY IN THE EVENING     * ferrous sulfate 325 (65 FE) MG EC tablet  Take 1 tablet (325 mg total) by mouth once daily.     * ferrous sulfate 325 mg (65 mg iron) Tab tablet  Commonly known as: FEOSOL  TAKE ONE TABLET BY MOUTH DAILY WITH BREAKFAST     furosemide 40 MG tablet  Commonly known as: LASIX  Take 1 tablet (40 mg total) by mouth 2 (two) times daily.     gabapentin 100 MG capsule  Commonly known as: NEURONTIN  Take 1 capsule (100 mg total) by mouth 2 (two) times daily.     metoprolol tartrate 50 MG tablet  Commonly known as: LOPRESSOR  Take 1 tablet (50 mg total) by mouth 2 (two) times daily.     miconazole NITRATE 2 % 2 % top powder  Commonly known as: MICOTIN  Apply topically 2 (two) times  daily.     polyethylene glycol 17 gram Pwpk  Commonly known as: GLYCOLAX  Take 17 g by mouth daily as needed.     potassium chloride SA 20 MEQ tablet  Commonly known as: K-DUR,KLOR-CON  Take 1 tablet (20 mEq total) by mouth 2 (two) times daily.     vitamin renal formula (B-complex-vitamin c-folic acid) 1 mg Cap  Commonly known as: NEPHROCAP  Take 1 capsule by mouth once daily.     walker Misc  Prn use please fit pt         * This list has 2 medication(s) that are the same as other medications prescribed for you. Read the directions carefully, and ask your doctor or other care provider to review them with you.                Signing Physician:  Jenni Mcguire NP

## 2020-09-30 NOTE — PROGRESS NOTES
[facility]   Discharge Summary  DOS 9/30/2020     PRESENTING HISTORY     Chief Complaint/Reason for Admission:  Discharge Note  PCP: Luisana Cartagena MD   Admission Date: 9/1/2020  Discharge Date: 9/11/2020    History of Present Illness:  Ms. Patsy Morris is a 70 y.o. female who was recently re-admitted to the hospital for treatment of acute on chronic hypoxic respiratory failure and atrial fibrillation with rapid ventricular response.  Patient was rate controlled with intravenous diltiazem and treated with noninvasive ventilation.  Pulmonary/critical care and cardiology services consulted and suspect her worsening respiratory failure due to combination of underlying lung disease and heart failure.  Patient clinically improved and she was transferred out of the intensive care unit on 9/6/2020.  Patient also met with palliative care service and decided to rescind her prior do not resuscitate order.  She was transferred to [facility] for PT/OT.     ________________________________________________________    Today:  The resident has been progressing well with therapy. She is discharging home today with home health for PT/OT, CNA, nurse, and DME: [employer].   Resident is ready to go home. States cousin is picking her up today. She will have people at home to help her. No complaints today.       Review of Systems  General ROS: negative for chills, fever or weight loss  Psychological ROS: negative for hallucination, depression or suicidal ideation  Ophthalmic ROS: negative for blurry vision, photophobia or eye pain  ENT ROS: negative for epistaxis, sore throat or rhinorrhea  Respiratory ROS: + on supplemental oxygen  Cardiovascular ROS: no chest pain or dyspnea on exertion  Gastrointestinal ROS: no abdominal pain, change in bowel habits, or black/ bloody stools  Genito-Urinary ROS: no dysuria, trouble voiding, or hematuria  Musculoskeletal ROS: +leg swelling  Neurological ROS:  no syncope or seizures  Dermatological ROS: negative for pruritis, rash and jaundice          PAST HISTORY:     Past Medical History:   Diagnosis Date    Arthritis     Asthma     Atrial fibrillation     Atrial flutter     Cerumen impaction     CHF (congestive heart failure)     CKD (chronic kidney disease), stage III     Colon polyps 2017    COPD exacerbation     Encounter for blood transfusion     HEARING LOSS     Herpes genitalis     Hypertension     Renal carcinoma 3/10/2015    Thyroid nodule 6/8/2017       Past Surgical History:   Procedure Laterality Date    BRAIN SURGERY      COLONOSCOPY N/A 6/23/2017    Procedure: COLONOSCOPY;  Surgeon: Shane Sharma MD;  Location: [facility] ([address] [place] [address]);  Service: Endoscopy;  Laterality: N/A;  2nd floor case ; on 3L home O2       per Dr Leopold (anesthesia)-Based on her history of:  Chronic respiratory failure with oxygen use, HDEZ, CHF, A-Fib, BHAVANI, it was determined that she should have her Colonoscopy scheduled on the 2nd Floor       ok to hold Eliquis 2 days prior to    EYE SURGERY      HYSTERECTOMY      kidney mass resection Right     renal carcinoma       Family History   Problem Relation Age of Onset    Hypertension Mother     Thyroid disease Mother     Cancer Father     Asthma Neg Hx     Emphysema Neg Hx     Melanoma Neg Hx          MEDICATIONS & ALLERGIES:     Current Outpatient Medications on File Prior to Visit   Medication Sig Dispense Refill    acetaminophen (TYLENOL) 500 MG tablet Take 2 tablets (1,000 mg total) by mouth every 6 (six) hours as needed for Pain.      albuterol-ipratropium 2.5mg-0.5mg/3mL (DUO-NEB) 0.5 mg-3 mg(2.5 mg base)/3 mL nebulizer solution Take 3 mLs by nebulization every 6 (six) hours as needed for Wheezing or Shortness of Breath. 3 mL 2    apixaban (ELIQUIS) 2.5 mg Tab TAKE 1 TABLET(2.5 MG) BY MOUTH TWICE DAILY 60 tablet 6    atorvastatin (LIPITOR) 80 MG tablet TAKE 1 TABLET BY MOUTH EVERY EVENING. 90 tablet 1     blood sugar diagnostic (BLOOD GLUCOSE TEST) Strp Qd testing 100 strip 3    docusate sodium (COLACE) 100 MG capsule Take 1 capsule (100 mg total) by mouth 2 (two) times daily. 60 capsule 0    ergocalciferol (ERGOCALCIFEROL) 50,000 unit Cap Take 1 capsule (50,000 Units total) by mouth every 7 days.      escitalopram oxalate (LEXAPRO) 10 MG tablet TAKE ONE TABLET BY MOUTH EVERY DAY IN THE EVENING 90 tablet 0    ferrous sulfate (FEOSOL) 325 mg (65 mg iron) Tab tablet TAKE ONE TABLET BY MOUTH DAILY WITH BREAKFAST 30 tablet 4    ferrous sulfate 325 (65 FE) MG EC tablet Take 1 tablet (325 mg total) by mouth once daily. 30 tablet 0    furosemide (LASIX) 40 MG tablet Take 1 tablet (40 mg total) by mouth 2 (two) times daily. 60 tablet 1    gabapentin (NEURONTIN) 100 MG capsule Take 1 capsule (100 mg total) by mouth 2 (two) times daily. 90 capsule 11    metoprolol tartrate (LOPRESSOR) 50 MG tablet Take 1 tablet (50 mg total) by mouth 2 (two) times daily. 60 tablet 0    miconazole NITRATE 2 % (MICOTIN) 2 % top powder Apply topically 2 (two) times daily.  0    polyethylene glycol (GLYCOLAX) 17 gram PwPk Take 17 g by mouth daily as needed.  0    potassium chloride SA (K-DUR,KLOR-CON) 20 MEQ tablet Take 1 tablet (20 mEq total) by mouth 2 (two) times daily.      valACYclovir (VALTREX) 500 MG tablet Take 1 tablet (500 mg total) by mouth once daily. 90 tablet 3    vitamin renal formula, B-complex-vitamin c-folic acid, (NEPHROCAP) 1 mg Cap Take 1 capsule by mouth once daily. 30 each 0    walker Misc Prn use please fit pt 1 each 0     No current facility-administered medications on file prior to visit.         Review of patient's allergies indicates:  No Known Allergies    OBJECTIVE:     Vital Signs:  /74, pulse 86, resp 20, sp02 94%, temp 98.4    Physical Exam:  General appearance: alert, cooperative, no distress  Constitutional:Oriented to person, place, and time  +obese   HEENT: Normocephalic, atraumatic  Eyes:  conjunctivae/corneas clear,  Lungs: LCTAB   Heart: irregular rhythm  Abdomen: soft, non-tender; bowel sounds normoactive; no organomegaly  Extremities: extremities symmetric; + 3 edema to BLE  Integument: Skin color, texture, turgor normal; no rashes; hair distrubution normal  Neurologic: Alert and oriented X 3, normal strength, normal coordination and gait  Psychiatric: no pressured speech; normal affect; no evidence of impaired cognition       ASSESSMENT & PLAN:     Debility  - discharging home today with home health    Chronic diastolic congestive heart failure    Continue metoprolol 50 mg BID  - 9/19 increase Lasix from 40 mg BID to 80 mg BID x 3 days, then continue Lasix 40 mg BID.  - 9/23 on Lasix 40 mg BID. Order BMP and CBC on 9/25  -  on Lasix 40 mg BID    Chronic kidney disease (CKD) stage G4/A1, severely decreased glomerular filtration rate (GFR) between 15-29 mL/min/1.73 square meter and albuminuria creatinine ratio less than 30 mg/g  Stable.  Continue to monitor.  - 9/25 BUN 38 and creat 2.21, Baseline. Continue to monitor    Hypokalemia   - on KCL 20 mEq BID  - 9/25 K 4.3 WNL    Debility  - continue with PT/OT SNF    Neuropathy  - 9/19 start on gabapentin 100 mg BID   - 9/23 improved. Continue on gabapentin    Atrial fibrillation with rapid ventricular response  - rate controlled with metoprolol 50 mg twice daily.  Patient on apixaban 2.5 mg twice daily for stroke prevention.     Acute on chronic respiratory failure with hypoxia and hypercapnia  BHAVANI  Improved.  Continue with current treatment.    - BiPAP at night     Vitamin D deficiency   - 9/16 start on Vitamin D 50,000 units weekly     Essential hypertension  - on Lasix, metoprolol        Dyslipidemia  Continue statin therapy.     Morbid obesity  Patient counseled on the importance of weight loss by changing diet and increased physical activity    Recurrent major depression  -on Lexapro 10 mg daily    Anemia  - on Ferrous sulfate 325 mg  daily    Scheduled Follow-up :  Future Appointments   Date Time Provider Department Center   10/20/2020 11:00 AM Rach Casillas MD [facility]       Post Visit Medication List:     Medication List          Accurate as of September 30, 2020  1:41 PM. If you have any questions, ask your nurse or doctor.            CONTINUE taking these medications    acetaminophen 500 MG tablet  Commonly known as: TYLENOL  Take 2 tablets (1,000 mg total) by mouth every 6 (six) hours as needed for Pain.     albuterol-ipratropium 2.5 mg-0.5 mg/3 mL nebulizer solution  Commonly known as: DUO-NEB  Take 3 mLs by nebulization every 6 (six) hours as needed for Wheezing or Shortness of Breath.     apixaban 2.5 mg Tab  Commonly known as: ELIQUIS  TAKE 1 TABLET(2.5 MG) BY MOUTH TWICE DAILY     atorvastatin 80 MG tablet  Commonly known as: LIPITOR  TAKE 1 TABLET BY MOUTH EVERY EVENING.     blood sugar diagnostic Strp  Commonly known as: BLOOD GLUCOSE TEST  Qd testing     docusate sodium 100 MG capsule  Commonly known as: COLACE  Take 1 capsule (100 mg total) by mouth 2 (two) times daily.     ergocalciferol 50,000 unit Cap  Commonly known as: ERGOCALCIFEROL  Take 1 capsule (50,000 Units total) by mouth every 7 days.     escitalopram oxalate 10 MG tablet  Commonly known as: LEXAPRO  TAKE ONE TABLET BY MOUTH EVERY DAY IN THE EVENING     * ferrous sulfate 325 (65 FE) MG EC tablet  Take 1 tablet (325 mg total) by mouth once daily.     * ferrous sulfate 325 mg (65 mg iron) Tab tablet  Commonly known as: FEOSOL  TAKE ONE TABLET BY MOUTH DAILY WITH BREAKFAST     furosemide 40 MG tablet  Commonly known as: LASIX  Take 1 tablet (40 mg total) by mouth 2 (two) times daily.     gabapentin 100 MG capsule  Commonly known as: NEURONTIN  Take 1 capsule (100 mg total) by mouth 2 (two) times daily.     metoprolol tartrate 50 MG tablet  Commonly known as: LOPRESSOR  Take 1 tablet (50 mg total) by mouth 2 (two) times daily.     miconazole NITRATE 2 %  2 % top powder  Commonly known as: MICOTIN  Apply topically 2 (two) times daily.     polyethylene glycol 17 gram Pwpk  Commonly known as: GLYCOLAX  Take 17 g by mouth daily as needed.     potassium chloride SA 20 MEQ tablet  Commonly known as: K-DUR,KLOR-CON  Take 1 tablet (20 mEq total) by mouth 2 (two) times daily.     valACYclovir 500 MG tablet  Commonly known as: VALTREX  Take 1 tablet (500 mg total) by mouth once daily.     vitamin renal formula (B-complex-vitamin c-folic acid) 1 mg Cap  Commonly known as: NEPHROCAP  Take 1 capsule by mouth once daily.     walker Misc  Prn use please fit pt         * This list has 2 medication(s) that are the same as other medications prescribed for you. Read the directions carefully, and ask your doctor or other care provider to review them with you.              Total time of the visit 37 min     Signing Physician:  Jenni Mcguire NP

## 2020-10-02 NOTE — TELEPHONE ENCOUNTER
----- Message from Suzette Wolf sent at 10/2/2020 12:54 PM CDT -----  Regarding: Hospital follow up  Pt need a hospital follow up visit.

## 2020-10-05 NOTE — TELEPHONE ENCOUNTER
----- Message from Francisco Hu sent at 10/5/2020  1:36 PM CDT -----  Name of Who is Calling: SARAH DIOP  What is the request in detail: Patient is calling to speak to the nurse Michelle, patient states she know why she is calling and was told to call and ask for her. Please advise Can the clinic reply by [facility]: No What Number to [facility] Back [facility] [phone]

## 2020-10-08 NOTE — PROGRESS NOTES
Subjective:    Patient ID:  Patsy Morris is a 70 y.o. female who presents for follow-up of HFPEF, atrial flutter    HPI   70 year old female followed with HFpEF, paroxsymal AFl, CRI, COPD on supplemental O2, and post nephrectomy for renal cell carcinoma.She was admitted to the [facility] 8/22/20 with increasing SOB and was not to be in hypercapnic respiratory failure with exacerbation of HFpEF. She was discharged to SNF to be readmitted 9/1/20 and gain with respiratory failure. She was discharged to rehab 9/1/20 and went home 9/30/20. Her rest SPO2 is 93-94 and ins 89% on today's office visit. She has lost 6# on her home scale since discharge with improving edema.  Lab Results   Component Value Date     09/11/2020    K 4.4 09/11/2020    CL 96 09/11/2020    CO2 34 (H) 09/11/2020    BUN 36 (H) 09/11/2020    CREATININE 1.9 (H) 09/11/2020     09/11/2020    HGBA1C 5.6 08/23/2020    MG 2.2 09/11/2020    AST 12 09/05/2020    ALT 14 09/05/2020    ALBUMIN 3.0 (L) 09/05/2020    PROT 5.7 (L) 09/05/2020    BILITOT 0.6 09/05/2020    WBC 7.29 09/11/2020    HGB 10.0 (L) 09/11/2020    HCT 33.3 (L) 09/11/2020    MCV 89 09/11/2020     (L) 09/11/2020    INR 1.0 08/22/2020    TSH 1.279 08/22/2020         Lab Results   Component Value Date    CHOL 119 (L) 06/19/2020    HDL 39 (L) 06/19/2020    TRIG 94 06/19/2020       Lab Results   Component Value Date    LDLCALC 61.2 (L) 06/19/2020       Past Medical History:   Diagnosis Date    Arthritis     Asthma     Atrial fibrillation     Atrial flutter     Cerumen impaction     CHF (congestive heart failure)     CKD (chronic kidney disease), stage III     Colon polyps 2017    COPD exacerbation     Encounter for blood transfusion     HEARING LOSS     Herpes genitalis     Hypertension     Renal carcinoma 3/10/2015    Thyroid nodule 6/8/2017       Current Outpatient Medications:     acetaminophen (TYLENOL) 500 MG tablet, Take 2 tablets (1,000 mg total)  by mouth every 6 (six) hours as needed for Pain., Disp: , Rfl:     atorvastatin (LIPITOR) 80 MG tablet, TAKE 1 TABLET BY MOUTH EVERY EVENING., Disp: 90 tablet, Rfl: 1    blood sugar diagnostic (BLOOD GLUCOSE TEST) Strp, Qd testing, Disp: 100 strip, Rfl: 3    docusate sodium (COLACE) 100 MG capsule, Take 1 capsule (100 mg total) by mouth 2 (two) times daily., Disp: 60 capsule, Rfl: 0    ergocalciferol (ERGOCALCIFEROL) 50,000 unit Cap, Take 1 capsule (50,000 Units total) by mouth every 7 days., Disp:  , Rfl:     escitalopram oxalate (LEXAPRO) 10 MG tablet, TAKE ONE TABLET BY MOUTH EVERY DAY IN THE EVENING, Disp: 90 tablet, Rfl: 0    ferrous sulfate (FEOSOL) 325 mg (65 mg iron) Tab tablet, TAKE ONE TABLET BY MOUTH DAILY WITH BREAKFAST, Disp: 30 tablet, Rfl: 4    ferrous sulfate 325 (65 FE) MG EC tablet, Take 1 tablet (325 mg total) by mouth once daily., Disp: 30 tablet, Rfl: 0    furosemide (LASIX) 40 MG tablet, Take 1 tablet (40 mg total) by mouth 2 (two) times daily., Disp: 60 tablet, Rfl: 1    gabapentin (NEURONTIN) 100 MG capsule, Take 1 capsule (100 mg total) by mouth 2 (two) times daily., Disp: 90 capsule, Rfl: 11    metoprolol tartrate (LOPRESSOR) 50 MG tablet, Take 1 tablet (50 mg total) by mouth 2 (two) times daily., Disp: 60 tablet, Rfl: 0    miconazole NITRATE 2 % (MICOTIN) 2 % top powder, Apply topically 2 (two) times daily., Disp: , Rfl: 0    polyethylene glycol (GLYCOLAX) 17 gram PwPk, Take 17 g by mouth daily as needed., Disp:  , Rfl: 0    potassium chloride SA (K-DUR,KLOR-CON) 20 MEQ tablet, Take 1 tablet (20 mEq total) by mouth 2 (two) times daily., Disp:  , Rfl:     vitamin renal formula, B-complex-vitamin c-folic acid, (NEPHROCAP) 1 mg Cap, Take 1 capsule by mouth once daily., Disp: 30 each, Rfl: 0    walker Misc, Prn use please fit pt, Disp: 1 each, Rfl: 0    albuterol-ipratropium 2.5mg-0.5mg/3mL (DUO-NEB) 0.5 mg-3 mg(2.5 mg base)/3 mL nebulizer solution, Take 3 mLs by nebulization  "every 6 (six) hours as needed for Wheezing or Shortness of Breath., Disp: 3 mL, Rfl: 2    apixaban (ELIQUIS) 5 mg Tab, Take 1 tablet (5 mg total) by mouth 2 (two) times daily., Disp: 60 tablet, Rfl: 11    valACYclovir (VALTREX) 500 MG tablet, Take 1 tablet (500 mg total) by mouth once daily., Disp: 90 tablet, Rfl: 3          Review of Systems   Constitution: Negative for decreased appetite, diaphoresis, fever, malaise/fatigue, weight gain and weight loss.   HENT: Negative for congestion, ear discharge, ear pain and nosebleeds.    Eyes: Negative for blurred vision, double vision and visual disturbance.   Cardiovascular: Positive for dyspnea on exertion and leg swelling. Negative for chest pain, claudication, cyanosis, irregular heartbeat, near-syncope, orthopnea, palpitations, paroxysmal nocturnal dyspnea and syncope.   Respiratory: Positive for shortness of breath. Negative for cough, hemoptysis, sleep disturbances due to breathing, snoring, sputum production and wheezing.    Endocrine: Negative for polydipsia, polyphagia and polyuria.   Hematologic/Lymphatic: Negative for adenopathy and bleeding problem. Does not bruise/bleed easily.   Skin: Negative for color change, nail changes, poor wound healing and rash.   Musculoskeletal: Negative for muscle cramps and muscle weakness.   Gastrointestinal: Negative for abdominal pain, anorexia, change in bowel habit, hematochezia, nausea and vomiting.   Genitourinary: Negative for dysuria, frequency and hematuria.   Neurological: Negative for brief paralysis, difficulty with concentration, excessive daytime sleepiness, dizziness, focal weakness, headaches, light-headedness, seizures, vertigo and weakness.   Psychiatric/Behavioral: Negative for altered mental status and depression.   Allergic/Immunologic: Negative for persistent infections.        Objective:/60   Pulse 62   Ht 5' 3" (1.6 m)   Wt 103.9 kg (229 lb 0.9 oz)   LMP  (LMP Unknown)   SpO2 (!) 89%   BMI " 40.58 kg/m²             Physical Exam   Constitutional: She is oriented to person, place, and time. She appears well-developed and well-nourished.   Morbid obese   HENT:   Head: Normocephalic.   Right Ear: External ear normal.   Left Ear: External ear normal.   Nose: Nose normal.   Inspection of lips, teeth and gums normal   Eyes: Pupils are equal, round, and reactive to light. Conjunctivae and EOM are normal. No scleral icterus.   Neck: Normal range of motion. No JVD present. No tracheal deviation present. No thyromegaly present.   Cardiovascular: Normal rate, S1 normal, S2 normal and intact distal pulses. An irregularly irregular rhythm present. Exam reveals no gallop and no friction rub.   No murmur heard.  Plus one bilateral leg eema   Pulmonary/Chest: Effort normal. No respiratory distress. She has decreased breath sounds. She has no wheezes. She has no rales.         She exhibits no tenderness.   Abdominal: Soft. Bowel sounds are normal. She exhibits no distension. There is no hepatosplenomegaly. There is no abdominal tenderness. There is no guarding.   Musculoskeletal: Normal range of motion.         General: No tenderness or edema.   Lymphadenopathy:   Palpation of lymph nodes of neck and groin normal   Neurological: She is oriented to person, place, and time. No cranial nerve deficit. She exhibits normal muscle tone. Coordination normal.   Skin: Skin is warm and dry. No rash noted. No erythema. No pallor.        No ankle nor pretibial edema   Psychiatric: She has a normal mood and affect. Her behavior is normal. Judgment and thought content normal.         Assessment:       1. Chronic hypercapnic respiratory failure    2. Chronic diastolic congestive heart failure    3. Atrial fibrillation with rapid ventricular response    4. Essential hypertension    5. Chronic kidney disease (CKD) stage G4/A1, severely decreased glomerular filtration rate (GFR) between 15-29 mL/min/1.73 square meter and albuminuria  creatinine ratio less than 30 mg/g    6. Dyslipidemia    7. HTN (hypertension), benign    8. Supplemental oxygen dependent    9. Prediabetes    10. Long term current use of anticoagulant         Plan:       Patsy was seen today for congestive heart failure.    Diagnoses and all orders for this visit:    Chronic hypercapnic respiratory failure  -     CBC auto differential; Future; Expected date: 10/08/2020  -     Ambulatory referral/consult to Pulmonology; Future; Expected date: 11/08/2020    Chronic diastolic congestive heart failure    Atrial fibrillation with rapid ventricular response  -     metoprolol tartrate (LOPRESSOR) 50 MG tablet; Take 1 tablet (50 mg total) by mouth 2 (two) times daily.  -     apixaban (ELIQUIS) 5 mg Tab; Take 1 tablet (5 mg total) by mouth 2 (two) times daily.    Essential hypertension    Chronic kidney disease (CKD) stage G4/A1, severely decreased glomerular filtration rate (GFR) between 15-29 mL/min/1.73 square meter and albuminuria creatinine ratio less than 30 mg/g  -     Basic Metabolic Panel; Future; Expected date: 11/08/2020  -     Basic metabolic panel; Future; Expected date: 10/08/2020    Dyslipidemia    HTN (hypertension), benign    Supplemental oxygen dependent    Prediabetes    Long term current use of anticoagulant  -     CBC auto differential; Future; Expected date: 10/08/2020

## 2020-10-13 PROBLEM — I50.9 ACUTE ON CHRONIC CONGESTIVE HEART FAILURE: Status: ACTIVE | Noted: 2020-01-01

## 2020-10-13 PROBLEM — I50.9 ACUTE ON CHRONIC CONGESTIVE HEART FAILURE: Status: RESOLVED | Noted: 2020-01-01 | Resolved: 2020-01-01

## 2020-10-13 NOTE — ASSESSMENT & PLAN NOTE
Chronic Respiratory Failure on 3 liters of Oxygen at home - COPD, BHAVANI/OHS with severe restrictive lung disease.    Recent admission from 8/22-8/31 - treated at that time with Afib-RVR with CHF (acute diastolic) and COPD exacerbation (s/p antibiotics and steroids) and transferred to SNF.  Readmitted from 9/1-9/11/2020 with Afib-RVR with CHF and d/c to SNF with Trilogy.  Presented to SOB over past 3 days.  Placed on BiPAP and given Lasix 80mg IV x 1 on admission.  Transferred to ICU.    CXR on re-admission Interval worsening of diffuse bilateral airspace disease and moderate bilateral pleural effusions when compared to 09/09/2020     Labs on Admission:  WBC 9.22  COVID-19 rapid negative  Trop negative         Plan:  Continue BiPAP and transfer to ICU  Lasix 40mg po q12  Strict I&Os  Fluid Restrict to 1.5L for now  ABG, Procalcitonin, Lactic Acid  DuoNebs  Pulmonary consult  Cardiology consult

## 2020-10-13 NOTE — ASSESSMENT & PLAN NOTE
Pt with recurrent hospitalization for decompensated heart failure, presenting with fluid overload resulting in hypercapnic respiratory failure. Component of restrictive lung disease decreasing ability to compensate Ventilation with worsened pulmonary edema.     - continue BiPAP, titrate to appropriate mental status and then can transition to NC  - aggressive diuresis goal of at least net negative 2L  - mentating appropriately; should be okay to eat off BiPAP and then replaced after break for the night.

## 2020-10-13 NOTE — ASSESSMENT & PLAN NOTE
in ED.  Home Meds:  Metoprolol 50mg bid and Apixaban 5mg bid  Followed by Cardiology, Dr. Lutz - last seen outpatient on 10/8/2020  -Continue current medications  -Telemetry in ICU

## 2020-10-13 NOTE — ASSESSMENT & PLAN NOTE
Home Meds:  Lasix 40mg qday (was bid and recently changed  BNP elevated at 577 on last admission and now 968     TTE on 8/24/2020 - Mild left atrial enlargement.  · Diastolic pattern consistent with atrial fibrillation observed.  · Normal left ventricular systolic function. The estimated ejection fraction is 58%.  · No wall motion abnormalities.  · Normal right ventricular systolic function.  · Mild right atrial enlargement.  Small posterior pericardial effusion.     Plan:  Lasix to 40mg po q12  Continue Bipap as above  Cardiology consult

## 2020-10-13 NOTE — PROGRESS NOTES
Pt received from ED on transport CPAP @ approximately 1345. Pt placed on BIPAP with documented settings. Changes were made to vent settings as tolerable. No PRN treatments were needed. Will continue to monitor.

## 2020-10-13 NOTE — HPI
"Per Ravi Soria:    "Per ED:  "This is a 70 y.o. female with history of CHF and COPD who presents via EMS with complaint of shortness of breath that began a few days ago. Per EMS patient had O2 saturation of 72 on 3 liters if home oxygen upon their arrival. Patient states she is on 3 liters of home oxygen at baseline. She denies fever, cough, chest pain, nausea, vomiting, diarrhea, or rhinorrhea. She reports she was recently discharged from the hospital for similar symptoms. She reports compliance with her home medications. She recently became resident of [facility] after last hospitalization. She is a former smoker. She denies undergoing any surgeries".    The patient is a 70 year old female with a PMH significant for of HTN, Chronic Respiratory Failure (COPD and BHAVANI/OHS), HFpEF, Obesity who presented to the ED with complaints of SOB that began about 3 days ago.  Patient is on 3L O2NC at home.  She was seen by her Cardiologist about 4 days prior to admission and told to decrease her Lasix from 40mg bid to qday.  She denies chest pain.  No Fevers.  No cough.  States she has been adherent with her medications.  Patient was recently admitted to [facility] from 9/1-9/11 for Acute on Chronic Respiratory Failure and discharged to SNF.  Patient was placed on BiPAP in ED and admitted to ICU.  Patient feeling better on BiPAP."  "

## 2020-10-13 NOTE — H&P
"[facility] Hospital Medicine  History & Physical    Patient Name: Patsy Morris  MRN: [id number]  Admission Date: 10/13/2020  Attending Physician: Ravi Soria MD   Primary Care Provider: Luisana Cartagena MD         Patient information was obtained from patient, past medical records and ER records.     Subjective:     Principal Problem:<principal problem not specified>    Chief Complaint:   Chief Complaint   Patient presents with    Shortness of Breath     SOB for several days, upon EMS arrival pt was 72 on 3L at home oxygen. Pt placed on CPAP per EMS.         HPI: Per ED:  "This is a 70 y.o. female with history of CHF and COPD who presents via EMS with complaint of shortness of breath that began a few days ago. Per EMS patient had O2 saturation of 72 on 3 liters if home oxygen upon their arrival. Patient states she is on 3 liters of home oxygen at baseline. She denies fever, cough, chest pain, nausea, vomiting, diarrhea, or rhinorrhea. She reports she was recently discharged from the hospital for similar symptoms. She reports compliance with her home medications. She recently became resident of [facility] after last hospitalization. She is a former smoker. She denies undergoing any surgeries".    The patient is a 70 year old female with a PMH significant for of HTN, Chronic Respiratory Failure (COPD and BHAVANI/OHS), HFpEF, Obesity who presented to the ED with complaints of SOB that began about 3 days ago.  Patient is on 3L O2NC at home.  She was seen by her Cardiologist about 4 days prior to admission and told to decrease her Lasix from 40mg bid to qday.  She denies chest pain.  No Fevers.  No cough.  States she has been adherent with her medications.  Patient was recently admitted to [facility] from 9/1-9/11 for Acute on Chronic Respiratory Failure and discharged to SNF.  Patient was placed on BiPAP in ED and admitted to ICU.  Patient feeling better on BiPAP.        Past " Medical History:   Diagnosis Date    Arthritis     Asthma     Atrial fibrillation     Atrial flutter     Cerumen impaction     CHF (congestive heart failure)     CKD (chronic kidney disease), stage III     Colon polyps 2017    COPD exacerbation     Encounter for blood transfusion     HEARING LOSS     Herpes genitalis     Hypertension     Renal carcinoma 3/10/2015    Thyroid nodule 6/8/2017       Past Surgical History:   Procedure Laterality Date    BRAIN SURGERY      COLONOSCOPY N/A 6/23/2017    Procedure: COLONOSCOPY;  Surgeon: Shane Sharma MD;  Location: [facility] ([address] [place] [address]);  Service: Endoscopy;  Laterality: N/A;  2nd floor case ; on 3L home O2       per Dr Leopold (anesthesia)-Based on her history of:  Chronic respiratory failure with oxygen use, HDEZ, CHF, A-Fib, BHAVANI, it was determined that she should have her Colonoscopy scheduled on the 2nd Floor       ok to hold Eliquis 2 days prior to    EYE SURGERY      HYSTERECTOMY      kidney mass resection Right     renal carcinoma       Review of patient's allergies indicates:  No Known Allergies    No current facility-administered medications on file prior to encounter.      Current Outpatient Medications on File Prior to Encounter   Medication Sig    apixaban (ELIQUIS) 5 mg Tab Take 1 tablet (5 mg total) by mouth 2 (two) times daily.    atorvastatin (LIPITOR) 80 MG tablet TAKE 1 TABLET BY MOUTH EVERY EVENING.    ergocalciferol (ERGOCALCIFEROL) 50,000 unit Cap Take 1 capsule (50,000 Units total) by mouth every 7 days.    escitalopram oxalate (LEXAPRO) 10 MG tablet TAKE ONE TABLET BY MOUTH EVERY DAY IN THE EVENING    ferrous sulfate (FEOSOL) 325 mg (65 mg iron) Tab tablet TAKE ONE TABLET BY MOUTH DAILY WITH BREAKFAST    furosemide (LASIX) 40 MG tablet Take 1 tablet (40 mg total) by mouth 2 (two) times daily.    gabapentin (NEURONTIN) 100 MG capsule Take 1 capsule (100 mg total) by mouth 2 (two) times daily.    metoprolol tartrate (LOPRESSOR) 50  MG tablet Take 1 tablet (50 mg total) by mouth 2 (two) times daily.    acetaminophen (TYLENOL) 500 MG tablet Take 2 tablets (1,000 mg total) by mouth every 6 (six) hours as needed for Pain.    albuterol-ipratropium 2.5mg-0.5mg/3mL (DUO-NEB) 0.5 mg-3 mg(2.5 mg base)/3 mL nebulizer solution Take 3 mLs by nebulization every 6 (six) hours as needed for Wheezing or Shortness of Breath.    blood sugar diagnostic (BLOOD GLUCOSE TEST) Strp Qd testing    docusate sodium (COLACE) 100 MG capsule Take 1 capsule (100 mg total) by mouth 2 (two) times daily.    ferrous sulfate 325 (65 FE) MG EC tablet Take 1 tablet (325 mg total) by mouth once daily.    miconazole NITRATE 2 % (MICOTIN) 2 % top powder Apply topically 2 (two) times daily.    polyethylene glycol (GLYCOLAX) 17 gram PwPk Take 17 g by mouth daily as needed.    potassium chloride SA (K-DUR,KLOR-CON) 20 MEQ tablet Take 1 tablet (20 mEq total) by mouth 2 (two) times daily.    valACYclovir (VALTREX) 500 MG tablet Take 1 tablet (500 mg total) by mouth once daily.    vitamin renal formula, B-complex-vitamin c-folic acid, (NEPHROCAP) 1 mg Cap Take 1 capsule by mouth once daily.    walker Misc Prn use please fit pt     Family History     Problem Relation (Age of Onset)    Cancer Father    Hypertension Mother    Thyroid disease Mother        Tobacco Use    Smoking status: Current Every Day Smoker     Packs/day: 1.00     Years: 25.00     Pack years: 25.00     Types: Cigarettes    Smokeless tobacco: Never Used   Substance and Sexual Activity    Alcohol use: No     Alcohol/week: 0.0 standard drinks    Drug use: No    Sexual activity: Never     Birth control/protection: None     Review of Systems   Constitutional: Positive for activity change. Negative for chills and fever.   HENT: Negative for ear pain.    Eyes: Negative for pain.   Respiratory: Positive for shortness of breath. Negative for cough.    Cardiovascular: Positive for leg swelling. Negative for chest  pain and palpitations.   Gastrointestinal: Negative for abdominal pain, constipation, diarrhea, nausea and vomiting.   Endocrine: Negative for polydipsia, polyphagia and polyuria.   Genitourinary: Negative for difficulty urinating and dysuria.   Musculoskeletal: Negative for neck pain.   Skin: Negative for rash.   Neurological: Negative for dizziness and headaches.   Hematological: Does not bruise/bleed easily.   Psychiatric/Behavioral: Negative for agitation and behavioral problems.     Objective:     Vital Signs (Most Recent):  Temp: 97.5 °F (36.4 °C) (10/13/20 1007)  Pulse: 109 (10/13/20 1101)  Resp: 20 (10/13/20 1031)  BP: 121/77 (10/13/20 1101)  SpO2: (!) 94 % (10/13/20 1056) Vital Signs (24h Range):  Temp:  [97.5 °F (36.4 °C)] 97.5 °F (36.4 °C)  Pulse:  [] 109  Resp:  [20-22] 20  SpO2:  [90 %-97 %] 94 %  BP: (108-133)/(59-78) 121/77        There is no height or weight on file to calculate BMI.    Physical Exam  Constitutional:       General: She is in acute distress.      Appearance: She is obese. She is not toxic-appearing.   HENT:      Head: Normocephalic and atraumatic.      Right Ear: External ear normal.      Left Ear: External ear normal.      Nose: Nose normal.      Mouth/Throat:      Mouth: Mucous membranes are moist.      Pharynx: Oropharynx is clear.   Eyes:      General: No scleral icterus.     Conjunctiva/sclera: Conjunctivae normal.   Neck:      Musculoskeletal: Normal range of motion and neck supple.   Cardiovascular:      Rate and Rhythm: Tachycardia present. Rhythm irregular.      Pulses: Normal pulses.      Heart sounds: No murmur.   Pulmonary:      Effort: Respiratory distress (on BiPAP) present.      Breath sounds: Rales present. No wheezing.   Abdominal:      General: Abdomen is flat. Bowel sounds are normal. There is no distension.      Palpations: Abdomen is soft.      Tenderness: There is no abdominal tenderness.   Musculoskeletal:      Right lower leg: Edema present.      Left  lower leg: Edema present.   Skin:     General: Skin is warm.      Coloration: Skin is not jaundiced.      Findings: No rash.   Neurological:      General: No focal deficit present.      Mental Status: She is alert. Mental status is at baseline.   Psychiatric:         Mood and Affect: Mood normal.         Behavior: Behavior normal.             Significant Labs: All pertinent labs within the past 24 hours have been reviewed.    Significant Imaging: I have reviewed all pertinent imaging results/findings within the past 24 hours.    Assessment/Plan:     Acute on chronic respiratory failure with hypoxia and hypercapnia  Chronic Respiratory Failure on 3 liters of Oxygen at home - COPD, BHAVANI/OHS with severe restrictive lung disease.    Recent admission from 8/22-8/31 - treated at that time with Afib-RVR with CHF (acute diastolic) and COPD exacerbation (s/p antibiotics and steroids) and transferred to SNF.  Readmitted from 9/1-9/11/2020 with Afib-RVR with CHF and d/c to SNF with Trilogy.  Presented to SOB over past 3 days.  Placed on BiPAP and given Lasix 80mg IV x 1 on admission.  Transferred to ICU.    CXR on re-admission Interval worsening of diffuse bilateral airspace disease and moderate bilateral pleural effusions when compared to 09/09/2020     Labs on Admission:  WBC 9.22  COVID-19 rapid negative  Trop negative         Plan:  Continue BiPAP and transfer to ICU  Lasix 40mg po q12  Strict I&Os  Fluid Restrict to 1.5L for now  ABG, Procalcitonin, Lactic Acid  DuoNebs  Pulmonary consult  Cardiology consult      Atrial fibrillation with rapid ventricular response   in ED.  Home Meds:  Metoprolol 50mg bid and Apixaban 5mg bid  Followed by Cardiology, Dr. Lutz - last seen outpatient on 10/8/2020  -Continue current medications  -Telemetry in ICU      Chronic kidney disease (CKD) stage G4/A1, severely decreased glomerular filtration rate (GFR) between 15-29 mL/min/1.73 square meter and albuminuria creatinine  ratio less than 30 mg/g  Creatinine of 2.0 on admission and was 1.9 on discharge 9/11/2020.  Her baseline is around 1.8.    -Renally dose medication for CrCl of <30  -Avoid Nephrotoxic medications if able  -Monitor urine output  -Follow closely      Chronic diastolic congestive heart failure  Home Meds:  Lasix 40mg qday (was bid and recently changed  BNP elevated at 577 on last admission and now 968     TTE on 8/24/2020 - Mild left atrial enlargement.  · Diastolic pattern consistent with atrial fibrillation observed.  · Normal left ventricular systolic function. The estimated ejection fraction is 58%.  · No wall motion abnormalities.  · Normal right ventricular systolic function.  · Mild right atrial enlargement.  Small posterior pericardial effusion.     Plan:  Lasix to 40mg po q12  Continue Bipap as above  Cardiology consult      Current moderate episode of major depressive disorder without prior episode  Continue Lexapro      Dyslipidemia  Continue Statin      Essential hypertension  Continue Metoprolol and Lasix.  BP stable.        VTE Risk Mitigation (From admission, onward)    None             Ravi Soria MD  Department of Hospital Medicine   [facility]

## 2020-10-13 NOTE — CONSULTS
[facility]  Pulmonology  Consult Note    Patient Name: Patsy Morris  MRN: [id number]  Admission Date: 10/13/2020  Hospital Length of Stay: 0 days  Code Status: Full Code  Attending Physician: Ravi Soria MD  Primary Care Provider: Luisana Cartagena MD   Principal Problem: <principal problem not specified>    Inpatient consult to Pulmonary Critical Care  Consult performed by: Willem Mortensen MD  Consult ordered by: Ravi Soria MD        Subjective:     HPI:  Pt is a 69 yo AAF pmh COPD/restrictive lung disease, BHAVANI unable to tolerate CPAP, HTN, HfpEF 08/2020, RCC s/p rt nephrectomy who presented to ED after 3 days of progressive shortness of breath. Pt has had 3-4 admissions for acute on chronic CHF since July. She has been compliant with her medications. Most recently seen by cardiology 10/8 where her lasix was decreased. Pt was found by EMS to have SaO2 of 73% on 3L. She was brought to ED and placed on BiPAP with improvement. AGB obtained showing hypercapnic respiratory  7.22/94/74/39.4.     Past Medical History:   Diagnosis Date    Arthritis     Asthma     Atrial fibrillation     Atrial flutter     Cerumen impaction     CHF (congestive heart failure)     CKD (chronic kidney disease), stage III     Colon polyps 2017    COPD exacerbation     Encounter for blood transfusion     HEARING LOSS     Herpes genitalis     Hypertension     Renal carcinoma 3/10/2015    Thyroid nodule 6/8/2017       Past Surgical History:   Procedure Laterality Date    BRAIN SURGERY      COLONOSCOPY N/A 6/23/2017    Procedure: COLONOSCOPY;  Surgeon: Shane Sharma MD;  Location: [facility] ([address] [place] [address]);  Service: Endoscopy;  Laterality: N/A;  2nd floor case ; on 3L home O2       per Dr Leopold (anesthesia)-Based on her history of:  Chronic respiratory failure with oxygen use, HDEZ, CHF, A-Fib, BHAVANI, it was determined that she should have her Colonoscopy scheduled on the 2nd Floor       ok to hold  Eliquis 2 days prior to    EYE SURGERY      HYSTERECTOMY      kidney mass resection Right     renal carcinoma       Review of patient's allergies indicates:  No Known Allergies    Family History     Problem Relation (Age of Onset)    Cancer Father    Hypertension Mother    Thyroid disease Mother        Tobacco Use    Smoking status: Current Every Day Smoker     Packs/day: 1.00     Years: 25.00     Pack years: 25.00     Types: Cigarettes    Smokeless tobacco: Never Used   Substance and Sexual Activity    Alcohol use: No     Alcohol/week: 0.0 standard drinks    Drug use: No    Sexual activity: Never     Birth control/protection: None         Review of Systems   Constitutional: Positive for activity change and unexpected weight change. Negative for chills, fatigue and fever.   HENT: Negative for postnasal drip and sinus pain.    Eyes: Negative for redness and visual disturbance.   Respiratory: Positive for apnea, shortness of breath and wheezing. Negative for cough, choking, chest tightness and stridor.    Cardiovascular: Positive for leg swelling. Negative for chest pain and palpitations.   Gastrointestinal: Negative for abdominal distention, abdominal pain, diarrhea, nausea and vomiting.   Genitourinary: Positive for decreased urine volume. Negative for difficulty urinating, dysuria and urgency.   Musculoskeletal: Negative for back pain and joint swelling.   Skin: Negative for color change and rash.   Neurological: Negative for dizziness, syncope, weakness, light-headedness and numbness.   Psychiatric/Behavioral: Negative for agitation, behavioral problems, confusion and decreased concentration.     Objective:     Vital Signs (Most Recent):  Temp: 97.5 °F (36.4 °C) (10/13/20 1007)  Pulse: 100 (10/13/20 1334)  Resp: 20 (10/13/20 1031)  BP: 138/88 (10/13/20 1231)  SpO2: 96 % (10/13/20 1334) Vital Signs (24h Range):  Temp:  [97.5 °F (36.4 °C)] 97.5 °F (36.4 °C)  Pulse:  [] 100  Resp:  [20-22] 20  SpO2:   [90 %-97 %] 96 %  BP: (108-138)/(59-88) 138/88        There is no height or weight on file to calculate BMI.    No intake or output data in the 24 hours ending 10/13/20 1339    Physical Exam  Constitutional:       General: She is not in acute distress.     Appearance: She is obese. She is ill-appearing. She is not toxic-appearing.   HENT:      Head: Normocephalic and atraumatic.      Nose: Nose normal.      Mouth/Throat:      Mouth: Mucous membranes are moist.   Eyes:      General: No scleral icterus.     Extraocular Movements: Extraocular movements intact.      Conjunctiva/sclera: Conjunctivae normal.      Pupils: Pupils are equal, round, and reactive to light.   Neck:      Musculoskeletal: Normal range of motion.      Comments: +JVD  Cardiovascular:      Rate and Rhythm: Normal rate and regular rhythm.      Pulses: Normal pulses.      Heart sounds: Murmur present. No friction rub. No gallop.    Pulmonary:      Comments: Decreased breath all lung fields, absent bilateral bases, crackles mid upper posterior lung fields, no wheezes, rhonchi.   Abdominal:      General: Bowel sounds are normal.      Tenderness: There is no abdominal tenderness. There is no guarding or rebound.      Comments: protuberant   Musculoskeletal:         General: Swelling present.      Right lower leg: Edema present.      Left lower leg: Edema present.   Skin:     General: Skin is warm.      Capillary Refill: Capillary refill takes less than 2 seconds.      Findings: No rash.   Neurological:      General: No focal deficit present.      Mental Status: She is oriented to person, place, and time.   Psychiatric:         Mood and Affect: Mood normal.         Behavior: Behavior normal.         Thought Content: Thought content normal.         Judgment: Judgment normal.         Vents:  Oxygen Concentration (%): 55 (10/13/20 0917)    Lines/Drains/Airways     Drain            Female External Urinary Catheter 09/01/20 1158 42 days          Peripheral  Intravenous Line                 Peripheral IV - Single Lumen 10/13/20 0920 20 G Right Antecubital less than 1 day         Peripheral IV - Single Lumen 10/13/20 1309 20 G Right Hand less than 1 day                Significant Labs:    CBC/Anemia Profile:  Recent Labs   Lab 10/13/20  0923   WBC 9.22   HGB 10.0*   HCT 32.9*      MCV 92   RDW 15.1*        Chemistries:  Recent Labs   Lab 10/13/20  0923      K 5.2*      CO2 33*   BUN 27*   CREATININE 2.0*   CALCIUM 8.9   ALBUMIN 3.9   PROT 7.0   BILITOT 0.8   ALKPHOS 43*   ALT 19   AST 16       ABGs:   Recent Labs   Lab 10/13/20  1238   PH 7.227*   PCO2 94.4*   HCO3 39.2*   POCSATURATED 90*   BE 12     Blood Culture: No results for input(s): LABBLOO in the last 48 hours.  Cardiac Markers: No results for input(s): CKMB, TROPONINT, MYOGLOBIN in the last 48 hours.  Coagulation:   Recent Labs   Lab 10/13/20  0923   INR 1.1     Lactic Acid: No results for input(s): LACTATE in the last 48 hours.  Urine Studies: No results for input(s): COLORU, APPEARANCEUA, PHUR, SPECGRAV, PROTEINUA, GLUCUA, KETONESU, BILIRUBINUA, OCCULTUA, NITRITE, UROBILINOGEN, LEUKOCYTESUR, RBCUA, WBCUA, BACTERIA, SQUAMEPITHEL, HYALINECASTS in the last 48 hours.    Invalid input(s): WRIGHTSUR    Significant Imaging:   CXR: I have reviewed all pertinent results/findings within the past 24 hours and my personal findings are:  bilateral pleural effusions, significant cephalization bilaterally indiciating significant pulmonary edema.     Assessment/Plan:     Atrial fibrillation with rapid ventricular response  Continue rate control as likely contributing to diastolic dysfunction  - continue anticoag    Pulmonary nodules/lesions, multiple  Multiple CT chest showing many diffuse bilateral sub centimeter pulmonary nodules. Most recent showing pulmonary nodule 1.2 in right upper lobe.   - Will need to follow up with Pulmonology and have repeat CT chest when fluid status more euvolemic to evaluate  right upper lobe nodule.     Acute on chronic respiratory failure with hypoxia and hypercapnia  Pt with recurrent hospitalization for decompensated heart failure, presenting with fluid overload resulting in hypercapnic respiratory failure. Component of restrictive lung disease decreasing ability to compensate Ventilation with worsened pulmonary edema.     - continue BiPAP, titrate to appropriate mental status and then can transition to NC  - aggressive diuresis goal of at least net negative 2L  - mentating appropriately; should be okay to eat off BiPAP and then replaced after break for the night.       Acute on chronic combined systolic and diastolic heart failure-resolved as of 10/13/2020  Recurrent admissions for decompensated heart failure.   - aggressive diuresis  - continue BiPAP for hypoxemia and hypercapnia  - consider Palliative care consult due to significant difficulty maintaining appropriate fluid status in setting of severe cardiac disease in addition to CKD and lung disease.           Thank you for your consult. I will follow-up with patient. Please contact us if you have any additional questions.     Willem Mortensen MD  Pulmonology  [facility]

## 2020-10-13 NOTE — ED PROVIDER NOTES
Encounter Date: 10/13/2020    SCRIBE #1 NOTE: Olivia RAMOS am scribing for, and in the presence of, Dr. Ballard.       History     Chief Complaint   Patient presents with    Shortness of Breath     SOB for several days, upon EMS arrival pt was 72 on 3L at home oxygen. Pt placed on CPAP per EMS.      Time seen by provider: 9:09 AM    This is a 70 y.o. female with history of CHF and COPD who presents via EMS with complaint of shortness of breath that began a few days ago. Per EMS patient had O2 saturation of 72% on 3 liters if home oxygen upon their arrival. Patient states she is on 3 liters of home oxygen at baseline. She denies fever, cough, chest pain, nausea, vomiting, diarrhea, or rhinorrhea. She reports she was recently discharged from the hospital for similar symptoms. She reports compliance with her home medications. She recently became resident of [facility] after last hospitalization. She is a former smoker. She denies undergoing any surgeries.    The history is provided by the patient and the EMS personnel.     Review of patient's allergies indicates:  No Known Allergies  Past Medical History:   Diagnosis Date    Arthritis     Asthma     Atrial fibrillation     Atrial flutter     Cerumen impaction     CHF (congestive heart failure)     CKD (chronic kidney disease), stage III     Colon polyps 2017    COPD exacerbation     Encounter for blood transfusion     HEARING LOSS     Herpes genitalis     Hypertension     Renal carcinoma 3/10/2015    Thyroid nodule 6/8/2017     Past Surgical History:   Procedure Laterality Date    BRAIN SURGERY      COLONOSCOPY N/A 6/23/2017    Procedure: COLONOSCOPY;  Surgeon: Shane Sharma MD;  Location: [facility] ([address] [place] [address]);  Service: Endoscopy;  Laterality: N/A;  2nd floor case ; on 3L home O2       per Dr Leopold (anesthesia)-Based on her history of:  Chronic respiratory failure with oxygen use, HDEZ, CHF, A-Fib, BHAVANI, it was determined that she should  have her Colonoscopy scheduled on the 2nd Floor       ok to hold Eliquis 2 days prior to    EYE SURGERY      HYSTERECTOMY      kidney mass resection Right     renal carcinoma     Family History   Problem Relation Age of Onset    Hypertension Mother     Thyroid disease Mother     Cancer Father     Asthma Neg Hx     Emphysema Neg Hx     Melanoma Neg Hx      Social History     Tobacco Use    Smoking status: Current Every Day Smoker     Packs/day: 1.00     Years: 25.00     Pack years: 25.00     Types: Cigarettes    Smokeless tobacco: Never Used   Substance Use Topics    Alcohol use: No     Alcohol/week: 0.0 standard drinks    Drug use: No     Review of Systems   Constitutional: Negative for chills and fever.   HENT: Negative for congestion, rhinorrhea and sore throat.    Eyes: Negative for visual disturbance.   Respiratory: Positive for shortness of breath. Negative for cough.    Cardiovascular: Negative for chest pain and palpitations.   Gastrointestinal: Negative for abdominal pain, diarrhea, nausea and vomiting.   Genitourinary: Negative for decreased urine volume, dysuria and vaginal discharge.   Musculoskeletal: Negative for joint swelling, neck pain and neck stiffness.   Skin: Negative for rash and wound.   Neurological: Negative for weakness, numbness and headaches.   Psychiatric/Behavioral: Negative for confusion.       Physical Exam     Initial Vitals   BP Pulse Resp Temp SpO2   10/13/20 0930 10/13/20 0940 10/13/20 0934 10/13/20 1007 10/13/20 0917   (!) 108/59 92 (!) 22 97.5 °F (36.4 °C) (!) 90 %      MAP       --                Physical Exam    Nursing note and vitals reviewed.  Constitutional: She appears well-developed and well-nourished. She is Obese . No distress.   BiPAP mask in place.   HENT:   Head: Normocephalic and atraumatic.   Mouth/Throat: Oropharynx is clear and moist.   Eyes: Conjunctivae and EOM are normal. Pupils are equal, round, and reactive to light.   Neck: Normal range of  motion. Neck supple.   Cardiovascular: Normal rate, regular rhythm and normal heart sounds. Exam reveals no gallop and no friction rub.    No murmur heard.  Pulmonary/Chest: She is in respiratory distress. She has no wheezes. She has no rhonchi. She has rales (Diffuse in all lung fields).   Abdominal: Soft. There is no abdominal tenderness. There is no rebound and no guarding.   Musculoskeletal: Normal range of motion. Edema (3+ pitting pretibial) present. No tenderness.   Neurological: She is alert and oriented to person, place, and time. She has normal strength.   Skin: Skin is warm and dry. No rash noted.   Psychiatric: She has a normal mood and affect. Her behavior is normal. Judgment and thought content normal.         ED Course   Critical Care    Date/Time: 10/13/2020 2:48 PM  Performed by: Susan Ballard MD  Authorized by: Susan Ballard MD   Direct patient critical care time: 12 minutes  Additional history critical care time: 6 minutes  Ordering / reviewing critical care time: 6 minutes  Documentation critical care time: 6 minutes  Consulting other physicians critical care time: 6 minutes  Total critical care time (exclusive of procedural time) : 36 minutes  Critical care time was exclusive of separately billable procedures and treating other patients.  Critical care was necessary to treat or prevent imminent or life-threatening deterioration of the following conditions: cardiac failure.  Critical care was time spent personally by me on the following activities: blood draw for specimens, development of treatment plan with patient or surrogate, discussions with consultants, interpretation of cardiac output measurements, evaluation of patient's response to treatment, examination of patient, obtaining history from patient or surrogate, ordering and performing treatments and interventions, ordering and review of laboratory studies, ordering and review of radiographic studies, pulse oximetry, re-evaluation of  patient's condition and review of old charts.        Labs Reviewed   CBC W/ AUTO DIFFERENTIAL - Abnormal; Notable for the following components:       Result Value    RBC 3.57 (*)     Hemoglobin 10.0 (*)     Hematocrit 32.9 (*)     Mean Corpuscular Hemoglobin Conc 30.4 (*)     RDW 15.1 (*)     Gran # (ANC) 8.1 (*)     Lymph # 0.5 (*)     Gran% 87.7 (*)     Lymph% 5.6 (*)     All other components within normal limits   COMPREHENSIVE METABOLIC PANEL - Abnormal; Notable for the following components:    Potassium 5.2 (*)     CO2 33 (*)     Glucose 134 (*)     BUN, Bld 27 (*)     Creatinine 2.0 (*)     Alkaline Phosphatase 43 (*)     Anion Gap 7 (*)     eGFR if  28 (*)     eGFR if non  24 (*)     All other components within normal limits   B-TYPE NATRIURETIC PEPTIDE - Abnormal; Notable for the following components:     (*)     All other components within normal limits   ISTAT PROCEDURE - Abnormal; Notable for the following components:    POC PH 7.227 (*)     POC PCO2 94.4 (*)     POC PO2 73 (*)     POC HCO3 39.2 (*)     POC SATURATED O2 90 (*)     All other components within normal limits   TROPONIN I   PROTIME-INR   LACTIC ACID, PLASMA   PROCALCITONIN   SARS-COV-2 RDRP GENE          Imaging Results          X-Ray Chest AP Portable (Final result)  Result time 10/13/20 09:40:58    Final result by Halina Plascencia MD (10/13/20 09:40:58)                 Impression:      Interval worsening of diffuse bilateral airspace disease and moderate bilateral pleural effusions when compared to 09/09/2020      Electronically signed by: Halina Plascencia  Date:    10/13/2020  Time:    09:40             Narrative:    EXAMINATION:  XR CHEST AP PORTABLE    CLINICAL HISTORY:  CHF;    TECHNIQUE:  Single frontal view of the chest was performed.    COMPARISON:  09/09/2020    FINDINGS:  There is diffuse bilateral airspace disease, which is slightly worsened when compared to 09/09/2020.  Moderate bilateral  pleural effusions have also worsened.  The visualized cardiomediastinal silhouette is unchanged.  The bones and soft tissues are unremarkable.                              X-Rays:   Independently Interpreted Readings:   Chest X-Ray: Diffuse bilateral interstitial infiltrates. Bilateral pleural effusion. No PTX.     Medical Decision Making:   History:   Old Medical Records: I decided to obtain old medical records.  Independently Interpreted Test(s):   I have ordered and independently interpreted X-rays - see prior notes.  I have ordered and independently interpreted EKG Reading(s) - see prior notes  Clinical Tests:   Lab Tests: Ordered and Reviewed  Radiological Study: Ordered and Reviewed  Medical Tests: Ordered and Reviewed  ED Management:  EMERGENT EVALUATION OF 70-YEAR-OLD FEMALE WHO PRESENTS IN RESPIRATORY DISTRESS WITH BIPAP IN PLACE, HYPOXIC ON HOME O2.  EXAM IS CONSISTENT WITH CHF EXACERBATION.  SHE IS TREATED WITH BIPAP WHICH WAS TITRATED, IV LASIX.  BLOOD PRESSURE IS NOT SIGNIFICANTLY ELEVATED TO ALLOW FOR NITROGLYCERIN.  SHE IS ADMITTED TO THE HOSPITALIST SERVICE FOR FURTHER CARE.            Scribe Attestation:   Scribe #1: I performed the above scribed service and the documentation accurately describes the services I performed. I attest to the accuracy of the note.    Attending Attestation:           Physician Attestation for Scribe:  Physician Attestation Statement for Scribe #1: I, Dr. Ballard, reviewed documentation, as scribed by Olivia Davies in my presence, and it is both accurate and complete.                 ED Course as of Oct 13 1254   Tue Oct 13, 2020   1117 Discussed case with Dr. Valentine, will admit to Dr. Soria.       [LT]      ED Course User Index  [LT] Olivia Davies            Clinical Impression:       ICD-10-CM ICD-9-CM   1. Acute on chronic congestive heart failure, unspecified heart failure type  I50.9 428.0   2. Shortness of breath  R06.02 786.05                          ED Disposition  Condition    Admit                             Susan Ballard MD  10/13/20 2080

## 2020-10-13 NOTE — SUBJECTIVE & OBJECTIVE
Past Medical History:   Diagnosis Date    Arthritis     Asthma     Atrial fibrillation     Atrial flutter     Cerumen impaction     CHF (congestive heart failure)     CKD (chronic kidney disease), stage III     Colon polyps 2017    COPD exacerbation     Encounter for blood transfusion     HEARING LOSS     Herpes genitalis     Hypertension     Renal carcinoma 3/10/2015    Thyroid nodule 6/8/2017       Past Surgical History:   Procedure Laterality Date    BRAIN SURGERY      COLONOSCOPY N/A 6/23/2017    Procedure: COLONOSCOPY;  Surgeon: Shane Sharma MD;  Location: [facility] ([address] [place] [address]);  Service: Endoscopy;  Laterality: N/A;  2nd floor case ; on 3L home O2       per Dr Leopold (anesthesia)-Based on her history of:  Chronic respiratory failure with oxygen use, HDEZ, CHF, A-Fib, BHAVANI, it was determined that she should have her Colonoscopy scheduled on the 2nd Floor       ok to hold Eliquis 2 days prior to    EYE SURGERY      HYSTERECTOMY      kidney mass resection Right     renal carcinoma       Review of patient's allergies indicates:  No Known Allergies    Family History     Problem Relation (Age of Onset)    Cancer Father    Hypertension Mother    Thyroid disease Mother        Tobacco Use    Smoking status: Current Every Day Smoker     Packs/day: 1.00     Years: 25.00     Pack years: 25.00     Types: Cigarettes    Smokeless tobacco: Never Used   Substance and Sexual Activity    Alcohol use: No     Alcohol/week: 0.0 standard drinks    Drug use: No    Sexual activity: Never     Birth control/protection: None         Review of Systems   Constitutional: Positive for activity change and unexpected weight change. Negative for chills, fatigue and fever.   HENT: Negative for postnasal drip and sinus pain.    Eyes: Negative for redness and visual disturbance.   Respiratory: Positive for apnea, shortness of breath and wheezing. Negative for cough, choking, chest tightness and stridor.    Cardiovascular:  Positive for leg swelling. Negative for chest pain and palpitations.   Gastrointestinal: Negative for abdominal distention, abdominal pain, diarrhea, nausea and vomiting.   Genitourinary: Positive for decreased urine volume. Negative for difficulty urinating, dysuria and urgency.   Musculoskeletal: Negative for back pain and joint swelling.   Skin: Negative for color change and rash.   Neurological: Negative for dizziness, syncope, weakness, light-headedness and numbness.   Psychiatric/Behavioral: Negative for agitation, behavioral problems, confusion and decreased concentration.     Objective:     Vital Signs (Most Recent):  Temp: 97.5 °F (36.4 °C) (10/13/20 1007)  Pulse: 100 (10/13/20 1334)  Resp: 20 (10/13/20 1031)  BP: 138/88 (10/13/20 1231)  SpO2: 96 % (10/13/20 1334) Vital Signs (24h Range):  Temp:  [97.5 °F (36.4 °C)] 97.5 °F (36.4 °C)  Pulse:  [] 100  Resp:  [20-22] 20  SpO2:  [90 %-97 %] 96 %  BP: (108-138)/(59-88) 138/88        There is no height or weight on file to calculate BMI.    No intake or output data in the 24 hours ending 10/13/20 1339    Physical Exam  Constitutional:       General: She is not in acute distress.     Appearance: She is obese. She is ill-appearing. She is not toxic-appearing.   HENT:      Head: Normocephalic and atraumatic.      Nose: Nose normal.      Mouth/Throat:      Mouth: Mucous membranes are moist.   Eyes:      General: No scleral icterus.     Extraocular Movements: Extraocular movements intact.      Conjunctiva/sclera: Conjunctivae normal.      Pupils: Pupils are equal, round, and reactive to light.   Neck:      Musculoskeletal: Normal range of motion.      Comments: +JVD  Cardiovascular:      Rate and Rhythm: Normal rate and regular rhythm.      Pulses: Normal pulses.      Heart sounds: Murmur present. No friction rub. No gallop.    Pulmonary:      Comments: Decreased breath all lung fields, absent bilateral bases, crackles mid upper posterior lung fields, no  wheezes, rhonchi.   Abdominal:      General: Bowel sounds are normal.      Tenderness: There is no abdominal tenderness. There is no guarding or rebound.      Comments: protuberant   Musculoskeletal:         General: Swelling present.      Right lower leg: Edema present.      Left lower leg: Edema present.   Skin:     General: Skin is warm.      Capillary Refill: Capillary refill takes less than 2 seconds.      Findings: No rash.   Neurological:      General: No focal deficit present.      Mental Status: She is oriented to person, place, and time.   Psychiatric:         Mood and Affect: Mood normal.         Behavior: Behavior normal.         Thought Content: Thought content normal.         Judgment: Judgment normal.         Vents:  Oxygen Concentration (%): 55 (10/13/20 0917)    Lines/Drains/Airways     Drain            Female External Urinary Catheter 09/01/20 1158 42 days          Peripheral Intravenous Line                 Peripheral IV - Single Lumen 10/13/20 0920 20 G Right Antecubital less than 1 day         Peripheral IV - Single Lumen 10/13/20 1309 20 G Right Hand less than 1 day                Significant Labs:    CBC/Anemia Profile:  Recent Labs   Lab 10/13/20  0923   WBC 9.22   HGB 10.0*   HCT 32.9*      MCV 92   RDW 15.1*        Chemistries:  Recent Labs   Lab 10/13/20  0923      K 5.2*      CO2 33*   BUN 27*   CREATININE 2.0*   CALCIUM 8.9   ALBUMIN 3.9   PROT 7.0   BILITOT 0.8   ALKPHOS 43*   ALT 19   AST 16       ABGs:   Recent Labs   Lab 10/13/20  1238   PH 7.227*   PCO2 94.4*   HCO3 39.2*   POCSATURATED 90*   BE 12     Blood Culture: No results for input(s): LABBLOO in the last 48 hours.  Cardiac Markers: No results for input(s): CKMB, TROPONINT, MYOGLOBIN in the last 48 hours.  Coagulation:   Recent Labs   Lab 10/13/20  0923   INR 1.1     Lactic Acid: No results for input(s): LACTATE in the last 48 hours.  Urine Studies: No results for input(s): COLORU, APPEARANCEUA, PHUR,  SPECGRAV, PROTEINUA, GLUCUA, KETONESU, BILIRUBINUA, OCCULTUA, NITRITE, UROBILINOGEN, LEUKOCYTESUR, RBCUA, WBCUA, BACTERIA, SQUAMEPITHEL, HYALINECASTS in the last 48 hours.    Invalid input(s): DAVID    Significant Imaging:   CXR: I have reviewed all pertinent results/findings within the past 24 hours and my personal findings are:  bilateral pleural effusions, significant cephalization bilaterally indiciating significant pulmonary edema.

## 2020-10-13 NOTE — ASSESSMENT & PLAN NOTE
Recurrent admissions for decompensated heart failure.   - aggressive diuresis  - continue BiPAP for hypoxemia and hypercapnia  - consider Palliative care consult due to significant difficulty maintaining appropriate fluid status in setting of severe cardiac disease in addition to CKD and lung disease.

## 2020-10-13 NOTE — SUBJECTIVE & OBJECTIVE
Past Medical History:   Diagnosis Date    Arthritis     Asthma     Atrial fibrillation     Atrial flutter     Cerumen impaction     CHF (congestive heart failure)     CKD (chronic kidney disease), stage III     Colon polyps 2017    COPD exacerbation     Encounter for blood transfusion     HEARING LOSS     Herpes genitalis     Hypertension     Renal carcinoma 3/10/2015    Thyroid nodule 6/8/2017       Past Surgical History:   Procedure Laterality Date    BRAIN SURGERY      COLONOSCOPY N/A 6/23/2017    Procedure: COLONOSCOPY;  Surgeon: Shane Sharma MD;  Location: [facility] ([address] [place] [address]);  Service: Endoscopy;  Laterality: N/A;  2nd floor case ; on 3L home O2       per Dr Leopold (anesthesia)-Based on her history of:  Chronic respiratory failure with oxygen use, HDEZ, CHF, A-Fib, BHAVANI, it was determined that she should have her Colonoscopy scheduled on the 2nd Floor       ok to hold Eliquis 2 days prior to    EYE SURGERY      HYSTERECTOMY      kidney mass resection Right     renal carcinoma       Review of patient's allergies indicates:  No Known Allergies    No current facility-administered medications on file prior to encounter.      Current Outpatient Medications on File Prior to Encounter   Medication Sig    apixaban (ELIQUIS) 5 mg Tab Take 1 tablet (5 mg total) by mouth 2 (two) times daily.    atorvastatin (LIPITOR) 80 MG tablet TAKE 1 TABLET BY MOUTH EVERY EVENING.    ergocalciferol (ERGOCALCIFEROL) 50,000 unit Cap Take 1 capsule (50,000 Units total) by mouth every 7 days.    escitalopram oxalate (LEXAPRO) 10 MG tablet TAKE ONE TABLET BY MOUTH EVERY DAY IN THE EVENING    ferrous sulfate (FEOSOL) 325 mg (65 mg iron) Tab tablet TAKE ONE TABLET BY MOUTH DAILY WITH BREAKFAST    furosemide (LASIX) 40 MG tablet Take 1 tablet (40 mg total) by mouth 2 (two) times daily.    gabapentin (NEURONTIN) 100 MG capsule Take 1 capsule (100 mg total) by mouth 2 (two) times daily.    metoprolol tartrate  (LOPRESSOR) 50 MG tablet Take 1 tablet (50 mg total) by mouth 2 (two) times daily.    acetaminophen (TYLENOL) 500 MG tablet Take 2 tablets (1,000 mg total) by mouth every 6 (six) hours as needed for Pain.    albuterol-ipratropium 2.5mg-0.5mg/3mL (DUO-NEB) 0.5 mg-3 mg(2.5 mg base)/3 mL nebulizer solution Take 3 mLs by nebulization every 6 (six) hours as needed for Wheezing or Shortness of Breath.    blood sugar diagnostic (BLOOD GLUCOSE TEST) Strp Qd testing    docusate sodium (COLACE) 100 MG capsule Take 1 capsule (100 mg total) by mouth 2 (two) times daily.    ferrous sulfate 325 (65 FE) MG EC tablet Take 1 tablet (325 mg total) by mouth once daily.    miconazole NITRATE 2 % (MICOTIN) 2 % top powder Apply topically 2 (two) times daily.    polyethylene glycol (GLYCOLAX) 17 gram PwPk Take 17 g by mouth daily as needed.    potassium chloride SA (K-DUR,KLOR-CON) 20 MEQ tablet Take 1 tablet (20 mEq total) by mouth 2 (two) times daily.    valACYclovir (VALTREX) 500 MG tablet Take 1 tablet (500 mg total) by mouth once daily.    vitamin renal formula, B-complex-vitamin c-folic acid, (NEPHROCAP) 1 mg Cap Take 1 capsule by mouth once daily.    walker Misc Prn use please fit pt     Family History     Problem Relation (Age of Onset)    Cancer Father    Hypertension Mother    Thyroid disease Mother        Tobacco Use    Smoking status: Current Every Day Smoker     Packs/day: 1.00     Years: 25.00     Pack years: 25.00     Types: Cigarettes    Smokeless tobacco: Never Used   Substance and Sexual Activity    Alcohol use: No     Alcohol/week: 0.0 standard drinks    Drug use: No    Sexual activity: Never     Birth control/protection: None     Review of Systems   Constitutional: Positive for activity change. Negative for chills and fever.   HENT: Negative for ear pain.    Eyes: Negative for pain.   Respiratory: Positive for shortness of breath. Negative for cough.    Cardiovascular: Positive for leg swelling.  Negative for chest pain and palpitations.   Gastrointestinal: Negative for abdominal pain, constipation, diarrhea, nausea and vomiting.   Endocrine: Negative for polydipsia, polyphagia and polyuria.   Genitourinary: Negative for difficulty urinating and dysuria.   Musculoskeletal: Negative for neck pain.   Skin: Negative for rash.   Neurological: Negative for dizziness and headaches.   Hematological: Does not bruise/bleed easily.   Psychiatric/Behavioral: Negative for agitation and behavioral problems.     Objective:     Vital Signs (Most Recent):  Temp: 97.5 °F (36.4 °C) (10/13/20 1007)  Pulse: 109 (10/13/20 1101)  Resp: 20 (10/13/20 1031)  BP: 121/77 (10/13/20 1101)  SpO2: (!) 94 % (10/13/20 1056) Vital Signs (24h Range):  Temp:  [97.5 °F (36.4 °C)] 97.5 °F (36.4 °C)  Pulse:  [] 109  Resp:  [20-22] 20  SpO2:  [90 %-97 %] 94 %  BP: (108-133)/(59-78) 121/77        There is no height or weight on file to calculate BMI.    Physical Exam  Constitutional:       General: She is in acute distress.      Appearance: She is obese. She is not toxic-appearing.   HENT:      Head: Normocephalic and atraumatic.      Right Ear: External ear normal.      Left Ear: External ear normal.      Nose: Nose normal.      Mouth/Throat:      Mouth: Mucous membranes are moist.      Pharynx: Oropharynx is clear.   Eyes:      General: No scleral icterus.     Conjunctiva/sclera: Conjunctivae normal.   Neck:      Musculoskeletal: Normal range of motion and neck supple.   Cardiovascular:      Rate and Rhythm: Tachycardia present. Rhythm irregular.      Pulses: Normal pulses.      Heart sounds: No murmur.   Pulmonary:      Effort: Respiratory distress (on BiPAP) present.      Breath sounds: Rales present. No wheezing.   Abdominal:      General: Abdomen is flat. Bowel sounds are normal. There is no distension.      Palpations: Abdomen is soft.      Tenderness: There is no abdominal tenderness.   Musculoskeletal:      Right lower leg: Edema  present.      Left lower leg: Edema present.   Skin:     General: Skin is warm.      Coloration: Skin is not jaundiced.      Findings: No rash.   Neurological:      General: No focal deficit present.      Mental Status: She is alert. Mental status is at baseline.   Psychiatric:         Mood and Affect: Mood normal.         Behavior: Behavior normal.             Significant Labs: All pertinent labs within the past 24 hours have been reviewed.    Significant Imaging: I have reviewed all pertinent imaging results/findings within the past 24 hours.

## 2020-10-13 NOTE — ASSESSMENT & PLAN NOTE
Multiple CT chest showing many diffuse bilateral sub centimeter pulmonary nodules. Most recent showing pulmonary nodule 1.2 in right upper lobe.   - Will need to follow up with Pulmonology and have repeat CT chest when fluid status more euvolemic to evaluate right upper lobe nodule.

## 2020-10-13 NOTE — HOSPITAL COURSE
Patient is 70-year-old woman with history of hypertension, chronic hypoxic respiratory failure on home oxygen secondary to chronic obstructive pulmonary disease, chronic diastolic heart failure, chronic atrial fibrillation, and morbid obesity re-admitted to the hospital on 10/13/2020 with decompensated heart failure after patient was home for about a week following recent hospitalization and stayed at skilled nursing facility for physical therapy.  Patient treated with non-invasive ventilation intravenous diuretics.  Patient was transferred out of the intensive care unit on 10/21/2020.      Patient continue to improve on the floor until she developed worsening hypoxic respiratory failure.  Patient was transferred to the intensive care unit on 11/1/2020 on placed on continuous BiPAP.  She decompensated further and was intubated on 11/1/2020.  Patient started on intravenous antibiotics on 11/2/2020 to treat hospital-acquired pneumonia.  Patient had chest tube placed to drain pleural effusion on the right side.  Patient underwent bronchoscopy 11/4/2020.  Patient successfully extubated on 11/6/2020.  Patient has been on BiPAP for most of the time since extubation.  See below.

## 2020-10-13 NOTE — ED NOTES
Pt to ED via EMS with c/o worsening SOB x 3 days. Pt reports history of CHF and COPD. Pt on bipap on arrival to ED. Respiratory notified. Per EMS pt o2 sat 78 % on RA. Pt currently 97% with bipap. Pt speaking in clear, complete sentences without pause. Pt using accessory muscles to breathe. Pt reports recently being in hospital for same s/s. Pt reports being compliant with medication. +2 edema noted to BLE. +1 generalized edema noted. Pt currently in A.fib. pt reports history. Pt reports taking blood thinners. Pt denies CP, n/v/d, fever, chills. AAOX4. Tachypnea noted. Pt attached to cardiac monitor, pulse ox and BP cycled. Will continue to monitor.

## 2020-10-13 NOTE — HPI
Ms. Patsy Morris is a 69 yo AAF with PMHx chronic diastolic heart failure, mixed obstructive/severe restrictive lung disease (FVC 0.86/31%), BHAVANI, OHS, CKD,  h/o RCC s/p rt nephrectomy in 2005 who initially presented to [facility] on 10/18 with acute on chronic hypoxic/hypercapnic respiratory failure due to acute on chronic diastolic heart failure exacerbation and not having home BIPAP. She had been complaint with her medications, most recently had seen cardiology 10/8 at which time her lasix had decreased. She had had 3-4 hospitalizations since July for acute on chronic HF/hypercapnic respiratory failure and had been discharged to SNF which she had stayed for ~ 20 days. Had been home about 1 week prior to this presentation.

## 2020-10-13 NOTE — PLAN OF CARE
CM met with pt for initial discharge planning assessment. Pt is on bipap but is alert and oriented.    Pt went to [facility] after last admin in Sept, but has since been discharged home with . Pt cannot remember the name of [employer] company at this time.    Pt confirmed demographic information is correct in [employer], PCP is  and pharmacy of choice is [facility] on [address].    Pt has home O2, 3L, and a cpap. Pt was supposed to get a trilogy after last admit, but could not get as she went to SNF. SNF was informed to arrange trilogy when discharged, CM to investigate.    CM to follow for plans and arrangements.   10/13/20 4067   Discharge Assessment   Assessment Type Discharge Planning Assessment   Confirmed/corrected address and phone number on facesheet? Yes   Assessment information obtained from? Patient;Medical Record   Expected Length of Stay (days) 4   Communicated expected length of stay with patient/caregiver yes   Prior to hospitilization cognitive status: Alert/Oriented   Prior to hospitalization functional status: Needs Assistance;Assistive Equipment   Current cognitive status: Alert/Oriented   Current Functional Status: Assistive Equipment;Needs Assistance   Lives With sibling(s)   Able to Return to Prior Arrangements yes   Is patient able to care for self after discharge? Unable to determine at this time (comments)   Who are your caregiver(s) and their phone number(s)? Earlene Dexter, sister, [phone]   Patient's perception of discharge disposition home or selfcare;home health   Patient currently being followed by outpatient case management? Unable to determine (comments)   Patient currently receives any other outside agency services? Yes   Equipment Currently Used at Home oxygen;CPAP   Do you have any problems affording any of your prescribed medications? No   Is the patient taking medications as prescribed? yes   Does the patient have transportation home? Yes    Transportation Anticipated family or friend will provide   Does the patient receive services at the [facility]? No   Discharge Plan A Home;Home Health   Discharge Plan B Home;Home Health   DME Needed Upon Discharge  other (see comments)

## 2020-10-13 NOTE — H&P (VIEW-ONLY)
[facility]  Pulmonology  Consult Note    Patient Name: Patsy Morris  MRN: [id number]  Admission Date: 10/13/2020  Hospital Length of Stay: 0 days  Code Status: Full Code  Attending Physician: Ravi Soria MD  Primary Care Provider: Luisana Cartagena MD   Principal Problem: <principal problem not specified>    Inpatient consult to Pulmonary Critical Care  Consult performed by: Willem Mortensen MD  Consult ordered by: Ravi Soria MD        Subjective:     HPI:  Pt is a 69 yo AAF pmh COPD/restrictive lung disease, BHAVANI unable to tolerate CPAP, HTN, HfpEF 08/2020, RCC s/p rt nephrectomy who presented to ED after 3 days of progressive shortness of breath. Pt has had 3-4 admissions for acute on chronic CHF since July. She has been compliant with her medications. Most recently seen by cardiology 10/8 where her lasix was decreased. Pt was found by EMS to have SaO2 of 73% on 3L. She was brought to ED and placed on BiPAP with improvement. AGB obtained showing hypercapnic respiratory  7.22/94/74/39.4.     Past Medical History:   Diagnosis Date    Arthritis     Asthma     Atrial fibrillation     Atrial flutter     Cerumen impaction     CHF (congestive heart failure)     CKD (chronic kidney disease), stage III     Colon polyps 2017    COPD exacerbation     Encounter for blood transfusion     HEARING LOSS     Herpes genitalis     Hypertension     Renal carcinoma 3/10/2015    Thyroid nodule 6/8/2017       Past Surgical History:   Procedure Laterality Date    BRAIN SURGERY      COLONOSCOPY N/A 6/23/2017    Procedure: COLONOSCOPY;  Surgeon: Shane Sharma MD;  Location: [facility] ([address] [place] [address]);  Service: Endoscopy;  Laterality: N/A;  2nd floor case ; on 3L home O2       per Dr Leopold (anesthesia)-Based on her history of:  Chronic respiratory failure with oxygen use, HDEZ, CHF, A-Fib, BHAVANI, it was determined that she should have her Colonoscopy scheduled on the 2nd Floor       ok to hold  Eliquis 2 days prior to    EYE SURGERY      HYSTERECTOMY      kidney mass resection Right     renal carcinoma       Review of patient's allergies indicates:  No Known Allergies    Family History     Problem Relation (Age of Onset)    Cancer Father    Hypertension Mother    Thyroid disease Mother        Tobacco Use    Smoking status: Current Every Day Smoker     Packs/day: 1.00     Years: 25.00     Pack years: 25.00     Types: Cigarettes    Smokeless tobacco: Never Used   Substance and Sexual Activity    Alcohol use: No     Alcohol/week: 0.0 standard drinks    Drug use: No    Sexual activity: Never     Birth control/protection: None         Review of Systems   Constitutional: Positive for activity change and unexpected weight change. Negative for chills, fatigue and fever.   HENT: Negative for postnasal drip and sinus pain.    Eyes: Negative for redness and visual disturbance.   Respiratory: Positive for apnea, shortness of breath and wheezing. Negative for cough, choking, chest tightness and stridor.    Cardiovascular: Positive for leg swelling. Negative for chest pain and palpitations.   Gastrointestinal: Negative for abdominal distention, abdominal pain, diarrhea, nausea and vomiting.   Genitourinary: Positive for decreased urine volume. Negative for difficulty urinating, dysuria and urgency.   Musculoskeletal: Negative for back pain and joint swelling.   Skin: Negative for color change and rash.   Neurological: Negative for dizziness, syncope, weakness, light-headedness and numbness.   Psychiatric/Behavioral: Negative for agitation, behavioral problems, confusion and decreased concentration.     Objective:     Vital Signs (Most Recent):  Temp: 97.5 °F (36.4 °C) (10/13/20 1007)  Pulse: 100 (10/13/20 1334)  Resp: 20 (10/13/20 1031)  BP: 138/88 (10/13/20 1231)  SpO2: 96 % (10/13/20 1334) Vital Signs (24h Range):  Temp:  [97.5 °F (36.4 °C)] 97.5 °F (36.4 °C)  Pulse:  [] 100  Resp:  [20-22] 20  SpO2:   [90 %-97 %] 96 %  BP: (108-138)/(59-88) 138/88        There is no height or weight on file to calculate BMI.    No intake or output data in the 24 hours ending 10/13/20 1339    Physical Exam  Constitutional:       General: She is not in acute distress.     Appearance: She is obese. She is ill-appearing. She is not toxic-appearing.   HENT:      Head: Normocephalic and atraumatic.      Nose: Nose normal.      Mouth/Throat:      Mouth: Mucous membranes are moist.   Eyes:      General: No scleral icterus.     Extraocular Movements: Extraocular movements intact.      Conjunctiva/sclera: Conjunctivae normal.      Pupils: Pupils are equal, round, and reactive to light.   Neck:      Musculoskeletal: Normal range of motion.      Comments: +JVD  Cardiovascular:      Rate and Rhythm: Normal rate and regular rhythm.      Pulses: Normal pulses.      Heart sounds: Murmur present. No friction rub. No gallop.    Pulmonary:      Comments: Decreased breath all lung fields, absent bilateral bases, crackles mid upper posterior lung fields, no wheezes, rhonchi.   Abdominal:      General: Bowel sounds are normal.      Tenderness: There is no abdominal tenderness. There is no guarding or rebound.      Comments: protuberant   Musculoskeletal:         General: Swelling present.      Right lower leg: Edema present.      Left lower leg: Edema present.   Skin:     General: Skin is warm.      Capillary Refill: Capillary refill takes less than 2 seconds.      Findings: No rash.   Neurological:      General: No focal deficit present.      Mental Status: She is oriented to person, place, and time.   Psychiatric:         Mood and Affect: Mood normal.         Behavior: Behavior normal.         Thought Content: Thought content normal.         Judgment: Judgment normal.         Vents:  Oxygen Concentration (%): 55 (10/13/20 0917)    Lines/Drains/Airways     Drain            Female External Urinary Catheter 09/01/20 1158 42 days          Peripheral  Intravenous Line                 Peripheral IV - Single Lumen 10/13/20 0920 20 G Right Antecubital less than 1 day         Peripheral IV - Single Lumen 10/13/20 1309 20 G Right Hand less than 1 day                Significant Labs:    CBC/Anemia Profile:  Recent Labs   Lab 10/13/20  0923   WBC 9.22   HGB 10.0*   HCT 32.9*      MCV 92   RDW 15.1*        Chemistries:  Recent Labs   Lab 10/13/20  0923      K 5.2*      CO2 33*   BUN 27*   CREATININE 2.0*   CALCIUM 8.9   ALBUMIN 3.9   PROT 7.0   BILITOT 0.8   ALKPHOS 43*   ALT 19   AST 16       ABGs:   Recent Labs   Lab 10/13/20  1238   PH 7.227*   PCO2 94.4*   HCO3 39.2*   POCSATURATED 90*   BE 12     Blood Culture: No results for input(s): LABBLOO in the last 48 hours.  Cardiac Markers: No results for input(s): CKMB, TROPONINT, MYOGLOBIN in the last 48 hours.  Coagulation:   Recent Labs   Lab 10/13/20  0923   INR 1.1     Lactic Acid: No results for input(s): LACTATE in the last 48 hours.  Urine Studies: No results for input(s): COLORU, APPEARANCEUA, PHUR, SPECGRAV, PROTEINUA, GLUCUA, KETONESU, BILIRUBINUA, OCCULTUA, NITRITE, UROBILINOGEN, LEUKOCYTESUR, RBCUA, WBCUA, BACTERIA, SQUAMEPITHEL, HYALINECASTS in the last 48 hours.    Invalid input(s): WRIGHTSUR    Significant Imaging:   CXR: I have reviewed all pertinent results/findings within the past 24 hours and my personal findings are:  bilateral pleural effusions, significant cephalization bilaterally indiciating significant pulmonary edema.     Assessment/Plan:     Atrial fibrillation with rapid ventricular response  Continue rate control as likely contributing to diastolic dysfunction  - continue anticoag    Pulmonary nodules/lesions, multiple  Multiple CT chest showing many diffuse bilateral sub centimeter pulmonary nodules. Most recent showing pulmonary nodule 1.2 in right upper lobe.   - Will need to follow up with Pulmonology and have repeat CT chest when fluid status more euvolemic to evaluate  right upper lobe nodule.     Acute on chronic respiratory failure with hypoxia and hypercapnia  Pt with recurrent hospitalization for decompensated heart failure, presenting with fluid overload resulting in hypercapnic respiratory failure. Component of restrictive lung disease decreasing ability to compensate Ventilation with worsened pulmonary edema.     - continue BiPAP, titrate to appropriate mental status and then can transition to NC  - aggressive diuresis goal of at least net negative 2L  - mentating appropriately; should be okay to eat off BiPAP and then replaced after break for the night.       Acute on chronic combined systolic and diastolic heart failure-resolved as of 10/13/2020  Recurrent admissions for decompensated heart failure.   - aggressive diuresis  - continue BiPAP for hypoxemia and hypercapnia  - consider Palliative care consult due to significant difficulty maintaining appropriate fluid status in setting of severe cardiac disease in addition to CKD and lung disease.           Thank you for your consult. I will follow-up with patient. Please contact us if you have any additional questions.     Willem Mortensen MD  Pulmonology  [facility]

## 2020-10-14 PROBLEM — R91.8 PULMONARY NODULES/LESIONS, MULTIPLE: Status: RESOLVED | Noted: 2019-03-22 | Resolved: 2020-01-01

## 2020-10-14 NOTE — PROGRESS NOTES
"[facility] Hospital Medicine  Progress Note    Patient Name: Patsy Morris  MRN: [id number]  Patient Class: IP- Inpatient   Admission Date: 10/13/2020  Length of Stay: 1 days  Attending Physician: Ravi Soria MD  Primary Care Provider: Luisana Cartagena MD        Subjective:     Principal Problem:<principal problem not specified>        HPI:  Per ED:  "This is a 70 y.o. female with history of CHF and COPD who presents via EMS with complaint of shortness of breath that began a few days ago. Per EMS patient had O2 saturation of 72 on 3 liters if home oxygen upon their arrival. Patient states she is on 3 liters of home oxygen at baseline. She denies fever, cough, chest pain, nausea, vomiting, diarrhea, or rhinorrhea. She reports she was recently discharged from the hospital for similar symptoms. She reports compliance with her home medications. She recently became resident of [facility] after last hospitalization. She is a former smoker. She denies undergoing any surgeries".    The patient is a 70 year old female with a PMH significant for of HTN, Chronic Respiratory Failure (COPD and BHAVANI/OHS), HFpEF, Obesity who presented to the ED with complaints of SOB that began about 3 days ago.  Patient is on 3L O2NC at home.  She was seen by her Cardiologist about 4 days prior to admission and told to decrease her Lasix from 40mg bid to qday.  She denies chest pain.  No Fevers.  No cough.  States she has been adherent with her medications.  Patient was recently admitted to [facility] from 9/1-9/11 for Acute on Chronic Respiratory Failure and discharged to SNF.  Patient was placed on BiPAP in ED and admitted to ICU.  Patient feeling better on BiPAP.        Overview/Hospital Course:  Patient admitted to Inpatient Status      Interval History: Patient overall feeling better this morning.  Currently on BiPAP now and able to come off for meals.  No adverse events overnight.    Review of Systems "   Constitutional: Positive for activity change. Negative for chills and fever.   HENT: Negative for ear pain.    Eyes: Negative for pain.   Respiratory: Positive for shortness of breath. Negative for cough.    Cardiovascular: Positive for leg swelling. Negative for chest pain and palpitations.   Gastrointestinal: Negative for abdominal pain, constipation, diarrhea, nausea and vomiting.   Endocrine: Negative for polydipsia, polyphagia and polyuria.   Genitourinary: Negative for difficulty urinating and dysuria.   Musculoskeletal: Negative for neck pain.   Skin: Negative for rash.   Neurological: Negative for dizziness and headaches.   Hematological: Does not bruise/bleed easily.   Psychiatric/Behavioral: Negative for agitation and behavioral problems.     Objective:     Vital Signs (Most Recent):  Temp: 97.9 °F (36.6 °C) (10/14/20 0705)  Pulse: 88 (10/14/20 0900)  Resp: (!) 21 (10/14/20 0900)  BP: 133/63 (10/14/20 0900)  SpO2: (!) 92 % (10/14/20 0900) Vital Signs (24h Range):  Temp:  [97.5 °F (36.4 °C)-98.2 °F (36.8 °C)] 97.9 °F (36.6 °C)  Pulse:  [] 88  Resp:  [13-38] 21  SpO2:  [90 %-98 %] 92 %  BP: ()/(50-91) 133/63     Weight: 103.4 kg (227 lb 15.3 oz)  Body mass index is 40.38 kg/m².    Intake/Output Summary (Last 24 hours) at 10/14/2020 1000  Last data filed at 10/14/2020 0400  Gross per 24 hour   Intake 300 ml   Output 1050 ml   Net -750 ml      Physical Exam  Constitutional:       General: She is not in acute distress.     Appearance: She is obese. She is not toxic-appearing.   HENT:      Head: Normocephalic and atraumatic.      Right Ear: External ear normal.      Left Ear: External ear normal.      Nose: Nose normal.      Mouth/Throat:      Mouth: Mucous membranes are moist.      Pharynx: Oropharynx is clear.   Eyes:      General: No scleral icterus.     Conjunctiva/sclera: Conjunctivae normal.   Neck:      Musculoskeletal: Normal range of motion and neck supple.   Cardiovascular:      Rate and  "Rhythm: Normal rate. Rhythm irregular.      Pulses: Normal pulses.      Heart sounds: No murmur.   Pulmonary:      Effort: No respiratory distress.      Breath sounds: Rales present. No wheezing.      Comments: On BiPAP and stable  SOB while talking  Abdominal:      General: Abdomen is flat. Bowel sounds are normal. There is no distension.      Palpations: Abdomen is soft.      Tenderness: There is no abdominal tenderness.   Musculoskeletal:      Right lower leg: Edema present.      Left lower leg: Edema present.   Skin:     General: Skin is warm.      Coloration: Skin is not jaundiced.      Findings: No rash.   Neurological:      General: No focal deficit present.      Mental Status: She is alert. Mental status is at baseline.   Psychiatric:         Mood and Affect: Mood normal.         Behavior: Behavior normal.         Significant Labs: All pertinent labs within the past 24 hours have been reviewed.    Significant Imaging: I have reviewed all pertinent imaging results/findings within the past 24 hours.      Assessment/Plan:      Acute on chronic respiratory failure with hypoxia and hypercapnia  Chronic Respiratory Failure on 3 liters of Oxygen at home - COPD, BHAVANI/OHS with severe restrictive lung disease.    Recent admission from 8/22-8/31 - treated at that time with Afib-RVR with CHF (acute diastolic) and COPD exacerbation (s/p antibiotics and steroids) and transferred to SNF.  Readmitted from 9/1-9/11/2020 with Afib-RVR with CHF and d/c to SNF with Trilogy.  Patient states she has been using her "CPAP" at home.  Presented to SOB over past 3 days PTA.  Placed on BiPAP and given Lasix 80mg IV x 1 in ED.  Transferred to ICU.  On BiPAP this morning.  Pox 94 (35% FiO2).  I/O of 300/1050.      CXR on re-admission Interval worsening of diffuse bilateral airspace disease and moderate bilateral pleural effusions when compared to 09/09/2020     Labs on Admission:  WBC 9.22  COVID-19 rapid negative  Trop negative x 3  BNP " "968   PH 7.227/pCO2 94.4/pO2 73  Procal normal  Lactic Acid normal    Seen by Pulmonary - "Pt with recurrent hospitalization for decompensated heart failure, presenting with fluid overload resulting in hypercapnic respiratory failure. Component of restrictive lung disease decreasing ability to compensate Ventilation with worsened pulmonary edema.   - continue BiPAP, titrate to appropriate mental status and then can transition to NC  - aggressive diuresis goal of at least net negative 2L  - mentating appropriately; should be okay to eat off BiPAP and then replaced after break for the night".     Seen by Cardiology - "Heart Failure, Diastolic, Acute on Chronic - 8/24/2020: Echo: Normal left ventricular size and systolic function. Mildly dilated LA.  At NH been on furosemide 40 mg Q24.  10/13/2020: Presents fluid overloaded in HF.  On furosemide iv Q12. Continue diuresis".     Plan:  Continue BiPAP   Follow up with Pulmonary recommendations  Lasix 40mg po q12  Strict I&Os  Fluid Restrict to 1.5L for now  DuoNebs        Atrial fibrillation with rapid ventricular response   in ED.  Home Meds:  Metoprolol 50mg bid and Apixaban 5mg bid  Followed by Cardiology, Dr. Lutz - last seen outpatient on 10/8/2020  HR much improved this morning  -Continue current medications      Chronic kidney disease (CKD) stage G4/A1, severely decreased glomerular filtration rate (GFR) between 15-29 mL/min/1.73 square meter and albuminuria creatinine ratio less than 30 mg/g  Creatinine of 2.0 on admission and was 1.9 on discharge 9/11/2020.  Her baseline is around 1.8.  Creatinine 1.9 this morning  -Renally dose medication for CrCl of <30  -Avoid Nephrotoxic medications if able  -Monitor urine output  -Follow closely      Chronic diastolic congestive heart failure  Home Meds:  Lasix 40mg qday (was bid and recently changed  BNP elevated at 577 on last admission and now 968  See above.     TTE on 8/24/2020 - Mild left atrial " enlargement.  · Diastolic pattern consistent with atrial fibrillation observed.  · Normal left ventricular systolic function. The estimated ejection fraction is 58%.  · No wall motion abnormalities.  · Normal right ventricular systolic function.  · Mild right atrial enlargement.  Small posterior pericardial effusion.     Plan:  Lasix to 40mg po q12  Continue Bipap as above        Current moderate episode of major depressive disorder without prior episode  Continue Lexapro      Normocytic anemia  Hgb 10.0 on admission and trended down to 9.5 today.  Stable since last admission.  Ferritin 25 on 8/25/2020 and Fe sat 18% on 9/5/2020 - continue outpatient FeSO4 325mg daily  B12 301 and Folate 4.6 - both borderline low - continue outpatient Nephrocaps daily      Dyslipidemia  Continue Statin      Essential hypertension  Continue Metoprolol and Lasix.  BP stable.        VTE Risk Mitigation (From admission, onward)         Ordered     apixaban tablet 5 mg  2 times daily      10/13/20 1404     Place sequential compression device  Until discontinued      10/13/20 1405     IP VTE HIGH RISK PATIENT  Once      10/13/20 1405                Discharge Planning   YUDELKA:      Code Status: Full Code   Is the patient medically ready for discharge?:     Reason for patient still in hospital (select all that apply): Patient trending condition, Treatment and Consult recommendations  Discharge Plan A: Home, Home Health                  Ravi Soria MD  Department of Hospital Medicine   [facility]

## 2020-10-14 NOTE — PLAN OF CARE
No change in respiratory status. Patient received and remained on the Bipap this shift, will continue to monitor.

## 2020-10-14 NOTE — CONSULTS
[facility]  Cardiology  Consult Note    Patient Name: Patsy Morris  MRN: [id number]  Admission Date: 10/13/2020  Hospital Length of Stay: 0 days  Code Status: Full Code   Attending Provider: Ravi Soria MD   Consulting Provider: Diana Meredith MD  Primary Care Physician: Luisana Cartagena MD  Principal Problem:<principal problem not specified>    Patient information was obtained from patient, past medical records and ER records.     Inpatient consult to Cardiology  Consult performed by: Diana Meredith MD  Consult ordered by: Ravi Soria MD  Reason for consult: Heart failure        Subjective:     Chief Complaint:  Shortness of breath.     HPI:    Patsy Morris is a 70 y.o.female with hypertension. She has chronic atrial fibrillation and heart failure with preserved ejection fraction. She has chronic obstructive pulmonary disease being on home oxygen with CO2 retention. She has undergone nephrectomy for renal cell carcinoma and has chronic kidney disease. She was admitted to [facility] in 8/2020 and 9/2020 with heart failure and exacerbations of her chronic obstructive pulmonary disease. She went to therapy at [facility].     She used to be on furosemide 40 mg Q24 however after her last hospitalization she had the does of furosemide increased to 80 mg Q24. The dose was reduced to 40 mg Q24 on 10/8/2020. She became increasingly short of breath and presents fluid overloaded in heart failure as well as an exacerbation of her COPD with high CO2. She was admitted to the ICU.    Past Medical History:   Diagnosis Date    Arthritis     Asthma     Atrial fibrillation     Atrial flutter     Cerumen impaction     CHF (congestive heart failure)     CKD (chronic kidney disease), stage III     Colon polyps 2017    COPD exacerbation     Encounter for blood transfusion     HEARING LOSS     Herpes genitalis     Hypertension     Renal carcinoma 3/10/2015    Thyroid  nodule 6/8/2017       Past Surgical History:   Procedure Laterality Date    BRAIN SURGERY      COLONOSCOPY N/A 6/23/2017    Procedure: COLONOSCOPY;  Surgeon: Shane Sharma MD;  Location: [facility] ([address] [place] [address]);  Service: Endoscopy;  Laterality: N/A;  2nd floor case ; on 3L home O2       per Dr Leopold (anesthesia)-Based on her history of:  Chronic respiratory failure with oxygen use, HDEZ, CHF, A-Fib, BHAVANI, it was determined that she should have her Colonoscopy scheduled on the 2nd Floor       ok to hold Eliquis 2 days prior to    EYE SURGERY      HYSTERECTOMY      kidney mass resection Right     renal carcinoma       Review of patient's allergies indicates:  No Known Allergies    No current facility-administered medications on file prior to encounter.      Current Outpatient Medications on File Prior to Encounter   Medication Sig    apixaban (ELIQUIS) 5 mg Tab Take 1 tablet (5 mg total) by mouth 2 (two) times daily.    atorvastatin (LIPITOR) 80 MG tablet TAKE 1 TABLET BY MOUTH EVERY EVENING.    ergocalciferol (ERGOCALCIFEROL) 50,000 unit Cap Take 1 capsule (50,000 Units total) by mouth every 7 days.    escitalopram oxalate (LEXAPRO) 10 MG tablet TAKE ONE TABLET BY MOUTH EVERY DAY IN THE EVENING    ferrous sulfate (FEOSOL) 325 mg (65 mg iron) Tab tablet TAKE ONE TABLET BY MOUTH DAILY WITH BREAKFAST    furosemide (LASIX) 40 MG tablet Take 1 tablet (40 mg total) by mouth 2 (two) times daily.    gabapentin (NEURONTIN) 100 MG capsule Take 1 capsule (100 mg total) by mouth 2 (two) times daily.    metoprolol tartrate (LOPRESSOR) 50 MG tablet Take 1 tablet (50 mg total) by mouth 2 (two) times daily.    acetaminophen (TYLENOL) 500 MG tablet Take 2 tablets (1,000 mg total) by mouth every 6 (six) hours as needed for Pain.    albuterol-ipratropium 2.5mg-0.5mg/3mL (DUO-NEB) 0.5 mg-3 mg(2.5 mg base)/3 mL nebulizer solution Take 3 mLs by nebulization every 6 (six) hours as needed for Wheezing or Shortness of Breath.     blood sugar diagnostic (BLOOD GLUCOSE TEST) Strp Qd testing    docusate sodium (COLACE) 100 MG capsule Take 1 capsule (100 mg total) by mouth 2 (two) times daily.    ferrous sulfate 325 (65 FE) MG EC tablet Take 1 tablet (325 mg total) by mouth once daily.    miconazole NITRATE 2 % (MICOTIN) 2 % top powder Apply topically 2 (two) times daily.    polyethylene glycol (GLYCOLAX) 17 gram PwPk Take 17 g by mouth daily as needed.    potassium chloride SA (K-DUR,KLOR-CON) 20 MEQ tablet Take 1 tablet (20 mEq total) by mouth 2 (two) times daily.    valACYclovir (VALTREX) 500 MG tablet Take 1 tablet (500 mg total) by mouth once daily.    vitamin renal formula, B-complex-vitamin c-folic acid, (NEPHROCAP) 1 mg Cap Take 1 capsule by mouth once daily.    walker Misc Prn use please fit pt     Family History     Problem Relation (Age of Onset)    Cancer Father    Hypertension Mother    Thyroid disease Mother        Tobacco Use    Smoking status: Current Every Day Smoker     Packs/day: 1.00     Years: 25.00     Pack years: 25.00     Types: Cigarettes    Smokeless tobacco: Never Used   Substance and Sexual Activity    Alcohol use: No     Alcohol/week: 0.0 standard drinks    Drug use: No    Sexual activity: Never     Birth control/protection: None     Review of Systems   Constitution: Negative for chills, fever and malaise/fatigue.   HENT: Negative for nosebleeds.    Eyes: Negative for double vision, vision loss in left eye and vision loss in right eye.   Cardiovascular: Positive for dyspnea on exertion, leg swelling, orthopnea and paroxysmal nocturnal dyspnea. Negative for chest pain, claudication, irregular heartbeat, near-syncope, palpitations and syncope.   Respiratory: Positive for shortness of breath, sputum production and wheezing. Negative for cough and hemoptysis.    Endocrine: Negative for cold intolerance and heat intolerance.   Hematologic/Lymphatic: Negative for bleeding problem. Does not bruise/bleed easily.    Skin: Negative for color change and rash.   Musculoskeletal: Negative for back pain, falls, muscle weakness and myalgias.   Gastrointestinal: Negative for heartburn, hematemesis, hematochezia, hemorrhoids, jaundice, melena, nausea and vomiting.   Genitourinary: Negative for dysuria and hematuria.   Neurological: Negative for dizziness, focal weakness, headaches, light-headedness, loss of balance, numbness, vertigo and weakness.   Psychiatric/Behavioral: Negative for altered mental status, depression and memory loss. The patient is not nervous/anxious.    Allergic/Immunologic: Negative for hives and persistent infections.     Objective:     Vital Signs (Most Recent):  Temp: 98 °F (36.7 °C) (10/13/20 1530)  Pulse: 106 (10/13/20 1820)  Resp: (!) 24 (10/13/20 1820)  BP: (!) 165/63 (10/13/20 1800)  SpO2: (!) 90 % (10/13/20 1820) Vital Signs (24h Range):  Temp:  [97.5 °F (36.4 °C)-98 °F (36.7 °C)] 98 °F (36.7 °C)  Pulse:  [] 106  Resp:  [13-36] 24  SpO2:  [90 %-98 %] 90 %  BP: (108-165)/(55-89) 165/63     Weight: 103.4 kg (227 lb 15.3 oz)  Body mass index is 40.38 kg/m².    SpO2: (!) 90 %  O2 Device (Oxygen Therapy): nasal cannula      Intake/Output Summary (Last 24 hours) at 10/13/2020 1904  Last data filed at 10/13/2020 1830  Gross per 24 hour   Intake 240 ml   Output 650 ml   Net -410 ml       Lines/Drains/Airways     Drain            Female External Urinary Catheter 09/01/20 1158 42 days    Female External Urinary Catheter 10/13/20 1359 less than 1 day          Peripheral Intravenous Line                 Peripheral IV - Single Lumen 10/13/20 0920 20 G Right Antecubital less than 1 day         Peripheral IV - Single Lumen 10/13/20 1309 20 G Right Hand less than 1 day                Physical Exam   Constitutional: She is oriented to person, place, and time. She appears well-developed and well-nourished. She appears ill. No distress.   HENT:   Head: Normocephalic and atraumatic.   Nose: Nose normal.   Eyes: Right  eye exhibits no discharge. Left eye exhibits no discharge. Right conjunctiva is not injected. Left conjunctiva is not injected. Right pupil is round. Left pupil is round. Pupils are equal.   Neck: Neck supple. No JVD present. Carotid bruit is not present. No thyromegaly present.   Cardiovascular: Normal rate, regular rhythm, S1 normal and S2 normal.  No extrasystoles are present. PMI is not displaced. Exam reveals no gallop.   Pulses:       Radial pulses are 2+ on the right side and 2+ on the left side.        Femoral pulses are 2+ on the right side and 2+ on the left side.       Dorsalis pedis pulses are 2+ on the right side and 2+ on the left side.        Posterior tibial pulses are 2+ on the right side and 2+ on the left side.   Pulmonary/Chest: Effort normal. She has decreased breath sounds in the right lower field and the left lower field. She has rhonchi in the right middle field and the left middle field.   Abdominal: Soft. Normal appearance. There is no hepatosplenomegaly. There is no abdominal tenderness.   Musculoskeletal:      Right ankle: She exhibits swelling. She exhibits no ecchymosis and no deformity.      Left ankle: She exhibits swelling. She exhibits no ecchymosis and no deformity.   Lymphadenopathy:        Head (right side): No submandibular adenopathy present.        Head (left side): No submandibular adenopathy present.     She has no cervical adenopathy.   Neurological: She is alert and oriented to person, place, and time. She is not disoriented. No cranial nerve deficit.   Skin: Skin is warm, dry and intact. She is not diaphoretic.   Psychiatric: She has a normal mood and affect. Her speech is normal and behavior is normal. Judgment and thought content normal. Cognition and memory are normal.       Current Medications:     apixaban  5 mg Oral BID    atorvastatin  80 mg Oral QHS    docusate sodium  100 mg Oral BID    escitalopram oxalate  10 mg Oral QHS    furosemide (LASIX) IV  40 mg  Intravenous Q12H    gabapentin  100 mg Oral BID    metoprolol tartrate  50 mg Oral BID     Current Laboratory Results:    Recent Results (from the past 24 hour(s))   CBC auto differential    Collection Time: 10/13/20  9:23 AM   Result Value Ref Range    WBC 9.22 3.90 - 12.70 K/uL    RBC 3.57 (L) 4.00 - 5.40 M/uL    Hemoglobin 10.0 (L) 12.0 - 16.0 g/dL    Hematocrit 32.9 (L) 37.0 - 48.5 %    Mean Corpuscular Volume 92 82 - 98 fL    Mean Corpuscular Hemoglobin 28.0 27.0 - 31.0 pg    Mean Corpuscular Hemoglobin Conc 30.4 (L) 32.0 - 36.0 g/dL    RDW 15.1 (H) 11.5 - 14.5 %    Platelets 188 150 - 350 K/uL    MPV 11.1 9.2 - 12.9 fL    Immature Granulocytes 0.4 0.0 - 0.5 %    Gran # (ANC) 8.1 (H) 1.8 - 7.7 K/uL    Immature Grans (Abs) 0.04 0.00 - 0.04 K/uL    Lymph # 0.5 (L) 1.0 - 4.8 K/uL    Mono # 0.6 0.3 - 1.0 K/uL    Eos # 0.0 0.0 - 0.5 K/uL    Baso # 0.01 0.00 - 0.20 K/uL    nRBC 0 0 /100 WBC    Gran% 87.7 (H) 38.0 - 73.0 %    Lymph% 5.6 (L) 18.0 - 48.0 %    Mono% 6.1 4.0 - 15.0 %    Eosinophil% 0.1 0.0 - 8.0 %    Basophil% 0.1 0.0 - 1.9 %    Differential Method Automated    Comprehensive metabolic panel    Collection Time: 10/13/20  9:23 AM   Result Value Ref Range    Sodium 140 136 - 145 mmol/L    Potassium 5.2 (H) 3.5 - 5.1 mmol/L    Chloride 100 95 - 110 mmol/L    CO2 33 (H) 23 - 29 mmol/L    Glucose 134 (H) 70 - 110 mg/dL    BUN, Bld 27 (H) 8 - 23 mg/dL    Creatinine 2.0 (H) 0.5 - 1.4 mg/dL    Calcium 8.9 8.7 - 10.5 mg/dL    Total Protein 7.0 6.0 - 8.4 g/dL    Albumin 3.9 3.5 - 5.2 g/dL    Total Bilirubin 0.8 0.1 - 1.0 mg/dL    Alkaline Phosphatase 43 (L) 55 - 135 U/L    AST 16 10 - 40 U/L    ALT 19 10 - 44 U/L    Anion Gap 7 (L) 8 - 16 mmol/L    eGFR if African American 28 (A) >60 mL/min/1.73 m^2    eGFR if non African American 24 (A) >60 mL/min/1.73 m^2   Troponin I    Collection Time: 10/13/20  9:23 AM   Result Value Ref Range    Troponin I <0.006 0.000 - 0.026 ng/mL   Brain natriuretic peptide    Collection  Time: 10/13/20  9:23 AM   Result Value Ref Range     (H) 0 - 99 pg/mL   Protime-INR    Collection Time: 10/13/20  9:23 AM   Result Value Ref Range    Prothrombin Time 11.8 9.0 - 12.5 sec    INR 1.1 0.8 - 1.2   POCT COVID-19 Rapid Screening    Collection Time: 10/13/20 11:02 AM   Result Value Ref Range    POC Rapid COVID Negative Negative     Acceptable Yes    ISTAT PROCEDURE    Collection Time: 10/13/20 12:38 PM   Result Value Ref Range    POC PH 7.227 (LL) 7.35 - 7.45    POC PCO2 94.4 (HH) 35 - 45 mmHg    POC PO2 73 (L) 80 - 100 mmHg    POC HCO3 39.2 (H) 24 - 28 mmol/L    POC BE 12 -2 to 2 mmol/L    POC SATURATED O2 90 (L) 95 - 100 %    Rate 20     Sample ARTERIAL     Site RR     Allens Test Pass     DelSys CPAP/BiPAP     Mode BiPAP     FiO2 40     Spont Rate 30     Min Vol 10.1     Sp02 88     IP 12     EP 8    Lactic acid, plasma    Collection Time: 10/13/20  1:17 PM   Result Value Ref Range    Lactate (Lactic Acid) 0.6 0.5 - 2.2 mmol/L   Procalcitonin    Collection Time: 10/13/20  2:32 PM   Result Value Ref Range    Procalcitonin 0.14 <0.25 ng/mL   Troponin I    Collection Time: 10/13/20  2:32 PM   Result Value Ref Range    Troponin I <0.006 0.000 - 0.026 ng/mL     Current Imaging Results:    X-Ray Chest AP Portable   Final Result      Interval worsening of diffuse bilateral airspace disease and moderate bilateral pleural effusions when compared to 09/09/2020         Electronically signed by: Halina Plascencia   Date:    10/13/2020   Time:    09:40          8/24/2020: Echo:     Mild left atrial enlargement.  Diastolic pattern consistent with atrial fibrillation observed.  Normal left ventricular systolic function. The estimated ejection fraction is 58%.  No wall motion abnormalities.  Normal right ventricular systolic function.  Mild right atrial enlargement.  Small posterior pericardial effusion.      Assessment and Plan:     Active Diagnoses:    Diagnosis Date Noted POA    Atrial  fibrillation with rapid ventricular response [I48.91] 09/01/2020 Yes    Current moderate episode of major depressive disorder without prior episode [F32.1] 04/30/2020 Yes    Pulmonary nodules/lesions, multiple [R91.8] 03/22/2019 Yes    Acute on chronic respiratory failure with hypoxia and hypercapnia [J96.21, J96.22] 08/03/2016 Yes    Chronic diastolic congestive heart failure [I50.32] 05/20/2016 Yes    Chronic kidney disease (CKD) stage G4/A1, severely decreased glomerular filtration rate (GFR) between 15-29 mL/min/1.73 square meter and albuminuria creatinine ratio less than 30 mg/g [N18.4]  Yes    Dyslipidemia [E78.5] 02/22/2016 Yes    Essential hypertension [I10] 10/15/2014 Yes      Problems Resolved During this Admission:    Diagnosis Date Noted Date Resolved POA    Acute on chronic congestive heart failure [I50.9] 10/13/2020 10/13/2020 Yes    Paroxysmal atrial fibrillation [I48.0] 08/03/2016 10/13/2020 Yes    Acute on chronic combined systolic and diastolic heart failure [I50.43] 08/03/2016 10/13/2020 Unknown    Chronic hypercapnic respiratory failure [J96.12] 03/11/2016 10/13/2020 Yes    Renal carcinoma [C64.9] 03/10/2015 10/13/2020 Yes     Assessment and Plan:    1. Heart Failure, Diastolic, Acute on Chronic   8/24/2020: Echo: Normal left ventricular size and systolic function. Mildly dilated LA.   At NH been on furosemide 40 mg Q24.   10/13/2020: Presents fluid overloaded in HF.   On furosemide iv Q12.   Continue diuresis.    2. Atrial Fibrillation   In chronic atrial fibrillation.   On apixiban.   On metoprolol 50 mg Q12.   Rate appears well controlled.    3. Chronic Anticoagulation   On apixiban 5 mg Q12.     4. Chronic Kidney Disease, Stage 4   History or renal carcinoma and nephrectomy.   10/13/2020: BUN/crea 27/2.0. CrCl 28.    5. Chronic Obstructive Pulmonary Disease   Chronic respiratory failure with CO2 retention.    10/13/2020: 7.23/94/73/39/90% on FiO2 of 40%.    VTE Risk Mitigation  (From admission, onward)         Ordered     apixaban tablet 5 mg  2 times daily      10/13/20 1404     Place sequential compression device  Until discontinued      10/13/20 1405     IP VTE HIGH RISK PATIENT  Once      10/13/20 1405                Thank you for your consult.    I will follow-up with patient. Please contact us if you have any additional questions.    Diana Meredith MD  Cardiology   [facility]

## 2020-10-14 NOTE — SUBJECTIVE & OBJECTIVE
Interval History: Feeling significantly improved this AM. Able to tolerate NC yesterday. Slept well with BiPAP overnight. Denies chest pain. Had episode of acute desaturation when removed from BiPAP this AM.     Objective:     Vital Signs (Most Recent):  Temp: 97.9 °F (36.6 °C) (10/14/20 0705)  Pulse: 94 (10/14/20 0705)  Resp: (!) 22 (10/14/20 0705)  BP: 125/60 (10/14/20 0705)  SpO2: (!) 92 % (10/14/20 0705) Vital Signs (24h Range):  Temp:  [97.5 °F (36.4 °C)-98.2 °F (36.8 °C)] 97.9 °F (36.6 °C)  Pulse:  [] 94  Resp:  [13-38] 22  SpO2:  [90 %-98 %] 92 %  BP: ()/(50-91) 125/60     Weight: 103.4 kg (227 lb 15.3 oz)  Body mass index is 40.38 kg/m².      Intake/Output Summary (Last 24 hours) at 10/14/2020 0800  Last data filed at 10/14/2020 0400  Gross per 24 hour   Intake 300 ml   Output 1050 ml   Net -750 ml       Physical Exam  Constitutional:       General: She is not in acute distress.     Appearance: She is obese. She is toxic-appearing. She is not ill-appearing.   HENT:      Head: Normocephalic and atraumatic.      Nose: Nose normal.      Mouth/Throat:      Mouth: Mucous membranes are moist.   Eyes:      General: No scleral icterus.     Extraocular Movements: Extraocular movements intact.      Conjunctiva/sclera: Conjunctivae normal.      Pupils: Pupils are equal, round, and reactive to light.   Neck:      Musculoskeletal: Normal range of motion.      Comments: +JVD  Cardiovascular:      Rate and Rhythm: Normal rate and regular rhythm.      Pulses: Normal pulses.      Heart sounds: No murmur. No friction rub. No gallop.    Pulmonary:      Comments: Decreased breath all lung fields, absent bilateral bases, crackles mid upper posterior lung fields, no wheezes, rhonchi.   Abdominal:      General: Bowel sounds are normal.      Tenderness: There is no abdominal tenderness. There is no guarding or rebound.      Comments: protuberant   Musculoskeletal:         General: Swelling present.      Right lower leg:  Edema present.      Left lower leg: Edema present.   Skin:     General: Skin is warm.      Capillary Refill: Capillary refill takes less than 2 seconds.      Findings: No rash.   Neurological:      General: No focal deficit present.      Mental Status: She is oriented to person, place, and time.   Psychiatric:         Mood and Affect: Mood normal.         Behavior: Behavior normal.         Thought Content: Thought content normal.         Judgment: Judgment normal.         Vents:  Oxygen Concentration (%): 35 (10/14/20 0705)    Lines/Drains/Airways     Drain            Female External Urinary Catheter 09/01/20 1158 42 days    Female External Urinary Catheter 10/13/20 1359 less than 1 day          Peripheral Intravenous Line                 Peripheral IV - Single Lumen 10/13/20 0920 20 G Right Antecubital less than 1 day         Peripheral IV - Single Lumen 10/13/20 1309 20 G Right Hand less than 1 day                Significant Labs:    CBC/Anemia Profile:  Recent Labs   Lab 10/13/20  0923 10/14/20  0341   WBC 9.22 9.21   HGB 10.0* 9.5*   HCT 32.9* 31.5*    182   MCV 92 91   RDW 15.1* 15.2*        Chemistries:  Recent Labs   Lab 10/13/20  0923 10/14/20  0341    142   K 5.2* 4.7    98   CO2 33* 33*   BUN 27* 31*   CREATININE 2.0* 1.9*   CALCIUM 8.9 8.8   ALBUMIN 3.9  --    PROT 7.0  --    BILITOT 0.8  --    ALKPHOS 43*  --    ALT 19  --    AST 16  --    MG  --  2.2   PHOS  --  4.6*       ABGs:   Recent Labs   Lab 10/13/20  1238   PH 7.227*   PCO2 94.4*   HCO3 39.2*   POCSATURATED 90*   BE 12     Cardiac Markers: No results for input(s): CKMB, TROPONINT, MYOGLOBIN in the last 48 hours.  Coagulation:   Recent Labs   Lab 10/13/20  0923   INR 1.1     Lactic Acid:   Recent Labs   Lab 10/13/20  1317   LACTATE 0.6     Urine Studies: No results for input(s): COLORU, APPEARANCEUA, PHUR, SPECGRAV, PROTEINUA, GLUCUA, KETONESU, BILIRUBINUA, OCCULTUA, NITRITE, UROBILINOGEN, LEUKOCYTESUR, RBCUA, WBCUA, BACTERIA,  ALISSA, HYALINECASTS in the last 48 hours.    Invalid input(s): DAVID    Significant Imaging:  I have reviewed all pertinent imaging results/findings within the past 24 hours.

## 2020-10-14 NOTE — ASSESSMENT & PLAN NOTE
Creatinine of 2.0 on admission and was 1.9 on discharge 9/11/2020.  Her baseline is around 1.8.  Creatinine 1.9 this morning  -Renally dose medication for CrCl of <30  -Avoid Nephrotoxic medications if able  -Monitor urine output  -Follow closely

## 2020-10-14 NOTE — ASSESSMENT & PLAN NOTE
in ED.  Home Meds:  Metoprolol 50mg bid and Apixaban 5mg bid  Followed by Cardiology, Dr. Lutz - last seen outpatient on 10/8/2020  HR much improved this morning  -Continue current medications

## 2020-10-14 NOTE — PROGRESS NOTES
Consulted for bridge of nose and sacral area   70 y.o. female with history of CHF and COPD who presents via EMS with complaint of shortness of breath that began a few days ago. Per EMS patient had O2 saturation of 72 on 3 liters if home oxygen upon their arrival. Patient states she is on 3 liters of home oxygen at baseline. She denies fever, cough, chest pain, nausea, vomiting, diarrhea, or rhinorrhea. She reports she was recently discharged from the hospital for similar symptoms. She reports compliance with her home medications. She recently became resident of [facility] after last hospitalization    Pt is well known to wound care from previous hospitalizations.         Sustained a HAPI on the bridge of her nose from the CPAP mask last admission. Will place prevention orders for padding bridge of nose prior to mask application.  Intertriginous folds are clear. Will place on miconazole powder as she gets recurrent intertrigo dermatitis. HAPI prevention in place.  Lety Mason RN CWON

## 2020-10-14 NOTE — ASSESSMENT & PLAN NOTE
- continue diuresis; fluid overload likely contributes to hypercapnia in setting of OHS/BHAVANI, small airway disease with severe restriction.   - would increased lasix to 80 mg BID in setting of CKD.

## 2020-10-14 NOTE — ASSESSMENT & PLAN NOTE
Hgb 10.0 on admission and trended down to 9.5 today.  Stable since last admission.  Ferritin 25 on 8/25/2020 and Fe sat 18% on 9/5/2020 - continue outpatient FeSO4 325mg daily  B12 301 and Folate 4.6 - both borderline low - continue outpatient Nephrocaps daily

## 2020-10-14 NOTE — PROGRESS NOTES
[facility]  Pulmonology  Progress Note    Patient Name: Patsy Morris  MRN: [id number]  Admission Date: 10/13/2020  Hospital Length of Stay: 1 days  Code Status: Full Code  Attending Provider: Ravi Soria MD  Primary Care Provider: Luisana Cartagena MD   Principal Problem: Acute on chronic respiratory failure with hypoxia and hypercapnia    Subjective:     Interval History: Feeling significantly improved this AM. Able to tolerate NC yesterday. Slept well with BiPAP overnight. Denies chest pain. Had episode of acute desaturation when removed from BiPAP this AM.     Objective:     Vital Signs (Most Recent):  Temp: 97.9 °F (36.6 °C) (10/14/20 0705)  Pulse: 94 (10/14/20 0705)  Resp: (!) 22 (10/14/20 0705)  BP: 125/60 (10/14/20 0705)  SpO2: (!) 92 % (10/14/20 0705) Vital Signs (24h Range):  Temp:  [97.5 °F (36.4 °C)-98.2 °F (36.8 °C)] 97.9 °F (36.6 °C)  Pulse:  [] 94  Resp:  [13-38] 22  SpO2:  [90 %-98 %] 92 %  BP: ()/(50-91) 125/60     Weight: 103.4 kg (227 lb 15.3 oz)  Body mass index is 40.38 kg/m².      Intake/Output Summary (Last 24 hours) at 10/14/2020 0800  Last data filed at 10/14/2020 0400  Gross per 24 hour   Intake 300 ml   Output 1050 ml   Net -750 ml       Physical Exam  Constitutional:       General: She is not in acute distress.     Appearance: She is obese. She is toxic-appearing. She is not ill-appearing.   HENT:      Head: Normocephalic and atraumatic.      Nose: Nose normal.      Mouth/Throat:      Mouth: Mucous membranes are moist.   Eyes:      General: No scleral icterus.     Extraocular Movements: Extraocular movements intact.      Conjunctiva/sclera: Conjunctivae normal.      Pupils: Pupils are equal, round, and reactive to light.   Neck:      Musculoskeletal: Normal range of motion.      Comments: +JVD  Cardiovascular:      Rate and Rhythm: Normal rate and regular rhythm.      Pulses: Normal pulses.      Heart sounds: No murmur. No friction rub. No gallop.     Pulmonary:      Comments: Decreased breath all lung fields, absent bilateral bases, crackles mid upper posterior lung fields, no wheezes, rhonchi.   Abdominal:      General: Bowel sounds are normal.      Tenderness: There is no abdominal tenderness. There is no guarding or rebound.      Comments: protuberant   Musculoskeletal:         General: Swelling present.      Right lower leg: Edema present.      Left lower leg: Edema present.   Skin:     General: Skin is warm.      Capillary Refill: Capillary refill takes less than 2 seconds.      Findings: No rash.   Neurological:      General: No focal deficit present.      Mental Status: She is oriented to person, place, and time.   Psychiatric:         Mood and Affect: Mood normal.         Behavior: Behavior normal.         Thought Content: Thought content normal.         Judgment: Judgment normal.         Vents:  Oxygen Concentration (%): 35 (10/14/20 0705)    Lines/Drains/Airways     Drain            Female External Urinary Catheter 09/01/20 1158 42 days    Female External Urinary Catheter 10/13/20 1359 less than 1 day          Peripheral Intravenous Line                 Peripheral IV - Single Lumen 10/13/20 0920 20 G Right Antecubital less than 1 day         Peripheral IV - Single Lumen 10/13/20 1309 20 G Right Hand less than 1 day                Significant Labs:    CBC/Anemia Profile:  Recent Labs   Lab 10/13/20  0923 10/14/20  0341   WBC 9.22 9.21   HGB 10.0* 9.5*   HCT 32.9* 31.5*    182   MCV 92 91   RDW 15.1* 15.2*        Chemistries:  Recent Labs   Lab 10/13/20  0923 10/14/20  0341    142   K 5.2* 4.7    98   CO2 33* 33*   BUN 27* 31*   CREATININE 2.0* 1.9*   CALCIUM 8.9 8.8   ALBUMIN 3.9  --    PROT 7.0  --    BILITOT 0.8  --    ALKPHOS 43*  --    ALT 19  --    AST 16  --    MG  --  2.2   PHOS  --  4.6*       ABGs:   Recent Labs   Lab 10/13/20  1238   PH 7.227*   PCO2 94.4*   HCO3 39.2*   POCSATURATED 90*   BE 12     Cardiac Markers: No  results for input(s): CKMB, TROPONINT, MYOGLOBIN in the last 48 hours.  Coagulation:   Recent Labs   Lab 10/13/20  0923   INR 1.1     Lactic Acid:   Recent Labs   Lab 10/13/20  1317   LACTATE 0.6     Urine Studies: No results for input(s): COLORU, APPEARANCEUA, PHUR, SPECGRAV, PROTEINUA, GLUCUA, KETONESU, BILIRUBINUA, OCCULTUA, NITRITE, UROBILINOGEN, LEUKOCYTESUR, RBCUA, WBCUA, BACTERIA, SQUAMEPITHEL, HYALINECASTS in the last 48 hours.    Invalid input(s): WRIGHTSUR    Significant Imaging:  I have reviewed all pertinent imaging results/findings within the past 24 hours.    Assessment/Plan:     * Acute on chronic respiratory failure with hypoxia and hypercapnia  Pt with recurrent hospitalization for decompensated heart failure, presenting with fluid overload resulting in hypercapnic respiratory failure. Component of restrictive lung disease decreasing ability to compensate Ventilation with worsened pulmonary edema.     - continue BiPAP, titrate to appropriate mental status and then can transition to NC  - aggressive diuresis goal of at least net negative 2L  - mentating appropriately; should be okay to eat off BiPAP and then replaced after break for the night.   - Will need to be set up with Trilogy prior to discharge as did not happen via NH after most recent DC      Atrial fibrillation with rapid ventricular response  Continue rate control as likely contributing to diastolic dysfunction  - continue anticoag    Chronic diastolic congestive heart failure  - continue diuresis; fluid overload likely contributes to hypercapnia in setting of OHS/BHAVANI, small airway disease with severe restriction.   - would increased lasix to 80 mg BID in setting of CKD.     Pulmonary nodules/lesions, multiple-resolved as of 10/14/2020  Multiple CT chest showing many diffuse bilateral sub centimeter pulmonary nodules. Most recent showing pulmonary nodule 1.2 in right upper lobe.   - Will need to follow up with Pulmonology and have repeat  CT chest when fluid status more euvolemic to evaluate right upper lobe nodule, while inpatient.     DVT ppx: apixaban for afib        Willem Mortensen MD  Pulmonology  [facility]

## 2020-10-14 NOTE — PLAN OF CARE
Problem: Fall Injury Risk  Goal: Absence of Fall and Fall-Related Injury  Outcome: Ongoing, Progressing     Problem: Adult Inpatient Plan of Care  Goal: Plan of Care Review  Outcome: Ongoing, Progressing     Problem: Oral Intake Inadequate (Acute Kidney Injury/Impairment)  Goal: Optimal Nutrition Intake  Outcome: Ongoing, Progressing     Problem: Skin Injury Risk Increased  Goal: Skin Health and Integrity  Outcome: Ongoing, Progressing     Patient AAO, VSS. A-flutter/A-fib on telemetry. On 6 Liters of O2 NC while awake and Bipap while sleeping. No complaints of pain or discomfort. On low sodium diet with 1 liter of fluid restriction per day, tolerated well. Free of falls or injuries.

## 2020-10-14 NOTE — PLAN OF CARE
CM spoke with pt's niece, Vicky Christine, [phone], to ask about Trilogy.    Vicky states she does have a new unit, delivered after discharge from SNF, she will go by the house and ck on brand, company and unit and let this CM know.

## 2020-10-14 NOTE — ASSESSMENT & PLAN NOTE
"Chronic Respiratory Failure on 3 liters of Oxygen at home - COPD, BHAVANI/OHS with severe restrictive lung disease.    Recent admission from 8/22-8/31 - treated at that time with Afib-RVR with CHF (acute diastolic) and COPD exacerbation (s/p antibiotics and steroids) and transferred to SNF.  Readmitted from 9/1-9/11/2020 with Afib-RVR with CHF and d/c to SNF with Trilogy.  Patient states she has been using her "CPAP" at home.  Presented to SOB over past 3 days PTA.  Placed on BiPAP and given Lasix 80mg IV x 1 in ED.  Transferred to ICU.  On BiPAP this morning.  Pox 94 (35% FiO2).  I/O of 300/1050.      CXR on re-admission Interval worsening of diffuse bilateral airspace disease and moderate bilateral pleural effusions when compared to 09/09/2020     Labs on Admission:  WBC 9.22  COVID-19 rapid negative  Trop negative x 3     PH 7.227/pCO2 94.4/pO2 73  Procal normal  Lactic Acid normal    Seen by Pulmonary - "Pt with recurrent hospitalization for decompensated heart failure, presenting with fluid overload resulting in hypercapnic respiratory failure. Component of restrictive lung disease decreasing ability to compensate Ventilation with worsened pulmonary edema.   - continue BiPAP, titrate to appropriate mental status and then can transition to NC  - aggressive diuresis goal of at least net negative 2L  - mentating appropriately; should be okay to eat off BiPAP and then replaced after break for the night".     Seen by Cardiology - "Heart Failure, Diastolic, Acute on Chronic - 8/24/2020: Echo: Normal left ventricular size and systolic function. Mildly dilated LA.  At NH been on furosemide 40 mg Q24.  10/13/2020: Presents fluid overloaded in HF.  On furosemide iv Q12. Continue diuresis".     Plan:  Continue BiPAP   Follow up with Pulmonary recommendations  Lasix 40mg po q12  Strict I&Os  Fluid Restrict to 1.5L for now  DuoNebs      "

## 2020-10-14 NOTE — PLAN OF CARE
Patient has a dx of CHF with COPD exacerbation. AAOx4, afebrile, in rate controlled A-Fib to A-flutter, on 6 L o2 n/c while awake, on Bipap qHS. Chronic lower back pain relieved with prn Tylenol 650 mg PO x 1. Patient has remained in bed during this shift; turns with 1-person assistance. Purewick utilized.    Patient states that she usually ambulates with a rolling walker and works with PT. Recommend PT to help patient regain strengthen and mobility.

## 2020-10-14 NOTE — ASSESSMENT & PLAN NOTE
Pt with recurrent hospitalization for decompensated heart failure, presenting with fluid overload resulting in hypercapnic respiratory failure. Component of restrictive lung disease decreasing ability to compensate Ventilation with worsened pulmonary edema.     - continue BiPAP, titrate to appropriate mental status and then can transition to NC  - aggressive diuresis goal of at least net negative 2L  - mentating appropriately; should be okay to eat off BiPAP and then replaced after break for the night.   - Will need to be set up with Trilogy prior to discharge as did not happen via NH after most recent DC

## 2020-10-14 NOTE — SUBJECTIVE & OBJECTIVE
Interval History: Patient overall feeling better this morning.  Currently on BiPAP now and able to come off for meals.  No adverse events overnight.    Review of Systems   Constitutional: Positive for activity change. Negative for chills and fever.   HENT: Negative for ear pain.    Eyes: Negative for pain.   Respiratory: Positive for shortness of breath. Negative for cough.    Cardiovascular: Positive for leg swelling. Negative for chest pain and palpitations.   Gastrointestinal: Negative for abdominal pain, constipation, diarrhea, nausea and vomiting.   Endocrine: Negative for polydipsia, polyphagia and polyuria.   Genitourinary: Negative for difficulty urinating and dysuria.   Musculoskeletal: Negative for neck pain.   Skin: Negative for rash.   Neurological: Negative for dizziness and headaches.   Hematological: Does not bruise/bleed easily.   Psychiatric/Behavioral: Negative for agitation and behavioral problems.     Objective:     Vital Signs (Most Recent):  Temp: 97.9 °F (36.6 °C) (10/14/20 0705)  Pulse: 88 (10/14/20 0900)  Resp: (!) 21 (10/14/20 0900)  BP: 133/63 (10/14/20 0900)  SpO2: (!) 92 % (10/14/20 0900) Vital Signs (24h Range):  Temp:  [97.5 °F (36.4 °C)-98.2 °F (36.8 °C)] 97.9 °F (36.6 °C)  Pulse:  [] 88  Resp:  [13-38] 21  SpO2:  [90 %-98 %] 92 %  BP: ()/(50-91) 133/63     Weight: 103.4 kg (227 lb 15.3 oz)  Body mass index is 40.38 kg/m².    Intake/Output Summary (Last 24 hours) at 10/14/2020 1000  Last data filed at 10/14/2020 0400  Gross per 24 hour   Intake 300 ml   Output 1050 ml   Net -750 ml      Physical Exam  Constitutional:       General: She is not in acute distress.     Appearance: She is obese. She is not toxic-appearing.   HENT:      Head: Normocephalic and atraumatic.      Right Ear: External ear normal.      Left Ear: External ear normal.      Nose: Nose normal.      Mouth/Throat:      Mouth: Mucous membranes are moist.      Pharynx: Oropharynx is clear.   Eyes:       General: No scleral icterus.     Conjunctiva/sclera: Conjunctivae normal.   Neck:      Musculoskeletal: Normal range of motion and neck supple.   Cardiovascular:      Rate and Rhythm: Normal rate. Rhythm irregular.      Pulses: Normal pulses.      Heart sounds: No murmur.   Pulmonary:      Effort: No respiratory distress.      Breath sounds: Rales present. No wheezing.      Comments: On BiPAP and stable  SOB while talking  Abdominal:      General: Abdomen is flat. Bowel sounds are normal. There is no distension.      Palpations: Abdomen is soft.      Tenderness: There is no abdominal tenderness.   Musculoskeletal:      Right lower leg: Edema present.      Left lower leg: Edema present.   Skin:     General: Skin is warm.      Coloration: Skin is not jaundiced.      Findings: No rash.   Neurological:      General: No focal deficit present.      Mental Status: She is alert. Mental status is at baseline.   Psychiatric:         Mood and Affect: Mood normal.         Behavior: Behavior normal.         Significant Labs: All pertinent labs within the past 24 hours have been reviewed.    Significant Imaging: I have reviewed all pertinent imaging results/findings within the past 24 hours.

## 2020-10-14 NOTE — ASSESSMENT & PLAN NOTE
Home Meds:  Lasix 40mg qday (was bid and recently changed  BNP elevated at 577 on last admission and now 968  See above.     TTE on 8/24/2020 - Mild left atrial enlargement.  · Diastolic pattern consistent with atrial fibrillation observed.  · Normal left ventricular systolic function. The estimated ejection fraction is 58%.  · No wall motion abnormalities.  · Normal right ventricular systolic function.  · Mild right atrial enlargement.  Small posterior pericardial effusion.     Plan:  Lasix to 40mg po q12  Continue Bipap as above

## 2020-10-15 NOTE — PLAN OF CARE
Problem: Physical Therapy Goal  Goal: Physical Therapy Goal  Description: Goals to be met by: 11/15/2020    Patient will perform the following to increase strength, improve mobility, and return to prior level of function:    1. Rolling to L and R with SBA  2. Supine <> sit with SBA.  3. Sit EOB x 15 min with SBA for balance.  4. Bed <> chair transfer with SBA and least restrictive assistive device.    Outcome: Ongoing, Progressing     PT orders received. PT evaluation completed and goals/POC established. Pt tolerated evaluation well with no adverse reactions. Pt performed supine <> sit with max A x 2 people. Sat EOB x 10 min with SBA. Declined attempting to stand due to shortness of breath. PT will continue to follow and progress goals as tolerated. Recommend discharge to SNF.

## 2020-10-15 NOTE — ASSESSMENT & PLAN NOTE
- continue diuresis; fluid overload likely contributes to hypercapnia in setting of OHS/BHAVANI, small airway disease with severe restriction.   - Pt showing signs of contraction alkalosis; would slow down diuresis   - potentially worsening due to atrial flutter; Discuss with cardiology need for intervention vs continued rate control as possible contribution to pulmonary edema +/- increased PA pressures causing PHTN.

## 2020-10-15 NOTE — ASSESSMENT & PLAN NOTE
Creatinine of 2.0 on admission and was 1.9 on discharge 9/11/2020.  Her baseline is around 1.8.  Creatinine 2.0 this morning  -Renally dose medication for CrCl of <30  -Avoid Nephrotoxic medications if able  -Monitor urine output  -Follow closely

## 2020-10-15 NOTE — PLAN OF CARE
ANNE sent order for trilogy to [facility] for auth and requested order be sent to [facility]     10/15/20 1316   Post-Acute Status   Post-Acute Authorization HME   E Status Referrals Sent

## 2020-10-15 NOTE — ASSESSMENT & PLAN NOTE
Pt with recurrent hospitalization for decompensated heart failure, presenting with fluid overload resulting in hypercapnic respiratory failure. Component of restrictive lung disease decreasing ability to compensate Ventilation with worsened pulmonary edema.     - Will transition to AVAPS at night and NC during the day, AVAPs settings , RR 20, PEEP 8, FIO2 40%, Max pressure 36, min pressure 18  - diuresis as above.   - Will need to be set up with Trilogy prior to discharge as did not happen via NH after most recent DC

## 2020-10-15 NOTE — PROGRESS NOTES
1100: Pt went to CT on bed from ICU with transporter and 1 ICU nurse. Pt went on monitoring and 6 L NC. Pt. Tolerated trip well. All VS and NAD. Pt. Back in room awaiting echo.    1215: Echo at bedside.

## 2020-10-15 NOTE — PT/OT/SLP EVAL
Physical Therapy Evaluation and Treatment    Patient Name:  Patsy Morris   MRN:  [id number]    Recommendations:     Discharge Recommendations:  nursing facility, skilled   Discharge Equipment Recommendations: (Defer to SNF)   Barriers to discharge: Decreased caregiver support and Current functional level    Assessment:     Patsy Morris is a 70 y.o. female admitted with a medical diagnosis of Acute on chronic respiratory failure with hypoxia and hypercapnia. Comorbidities: CHF and COPD. Pt is on 3L of O2 at home. She recently completed a SNF stay.  She presents with the following impairments/functional limitations:  weakness, impaired endurance, impaired self care skills, impaired functional mobilty, impaired balance, decreased lower extremity function, decreased safety awareness, edema, impaired cardiopulmonary response to activity.    PT orders received. PT evaluation completed and goals/POC established. Pt tolerated evaluation well with no adverse reactions. Pt performed supine <> sit with max A x 2 people. Sat EOB x 10 min with SBA. Declined attempting to stand due to shortness of breath. PT will continue to follow and progress goals as tolerated. Recommend discharge to SNF.     Rehab Prognosis: Good; patient would benefit from acute skilled PT services to address these deficits and reach maximum level of function.    Recent Surgery: * No surgery found *      Plan:     During this hospitalization, patient to be seen 5 x/week to address the identified rehab impairments via therapeutic activities, therapeutic exercises, wheelchair management/training and progress toward the following goals:    · Plan of Care Expires:  11/15/20    Subjective     Chief Complaint: Shortness of breath  Patient/Family Comments/goals: No goal stated.  Pain/Comfort:  · Pain Rating 1: 0/10  · Pain Rating Post-Intervention 1: 0/10    Patients cultural, spiritual, Yarsani conflicts given the current situation: no(None  stated)    Living Environment:  · Pt lives with her brother. Her sister and niece live across the street. Pt has a tub shower. Her home is a single story with 4 steps with right handrail to enter.  · DME owned: Bedside commode and Rollator  · Upon discharge, patient will have assistance from her family.  Prior level of function:  · Ambulation: Mod I with rollator  · ADL's: Mod I with rollator. Uses bedside commode by bed at night.  · IADLs: Sister and niece do cooking/cleaning.  · Falls: None reported.    Objective:     Communicated with RN (Martha) prior to session.  Patient found supine with peripheral IV, ovalles catheter, oxygen, telemetry, pulse ox (continuous)  upon PT entry to room.    General Precautions: Standard, fall   Orthopedic Precautions:N/A   Braces: N/A     Exams:  · Cognition:   · Patient is oriented to person, place, time, and situation.  · Pt follows approximately 100% of multiple step commands.    · Mood: Pleasant and cooperative.   · Safety Awareness: Fair  · Musculoskeletal:  · Posture:  Forward head, rounded shoulders  · LE ROM/Strength:   · R ROM: WNL  · L ROM: WNL  · R Strength:   · Hip flexion: 5/5  · Knee extension: 5/5  · Dorsiflexion: 5/5   · L Strength:   · Hip flexion: 5/5  · Knee extension: 5/5  · Dorsiflexion: 5/5   · Neuromuscular:  · Sensation: Intact to light touch bilateral LEs.   · Tone/Reflexes/Coordination: No impairments identified with functional mobility. No formal testing performed.   · Balance:   · Static sitting: SBA  · Dynamic sitting: CGA  · Visual-vestibular: No impairments identified with functional mobility. No formal testing performed.  · Integument: Visible skin intact    Functional Mobility:  · Bed Mobility:     · Rolling Right: contact guard assistance  · Supine to Sit: maximal assistance x 2 people  · Sit to Supine: maximal assistance x 2 people  · Balance: Sat EOB x 10 min with SBA for balance.    Therapeutic Activities and Exercises:   Bed mobility, transfer,  and gait training as described above.    AM-PAC 6 CLICK MOBILITY  Total Score:8     Patient left supine with all lines intact and call button in reach.    GOALS:   Multidisciplinary Problems     Physical Therapy Goals        Problem: Physical Therapy Goal    Goal Priority Disciplines Outcome Goal Variances Interventions   Physical Therapy Goal     PT, PT/OT Ongoing, Progressing     Description: Goals to be met by: 11/15/2020    Patient will perform the following to increase strength, improve mobility, and return to prior level of function:    1. Rolling to L and R with SBA  2. Supine <> sit with SBA.  3. Sit EOB x 15 min with SBA for balance.  4. Bed <> chair transfer with SBA and least restrictive assistive device.                     History:     Past Medical History:   Diagnosis Date    Arthritis     Asthma     Atrial fibrillation     Atrial flutter     Cerumen impaction     CHF (congestive heart failure)     CKD (chronic kidney disease), stage III     Colon polyps 2017    COPD exacerbation     Encounter for blood transfusion     HEARING LOSS     Herpes genitalis     Hypertension     Renal carcinoma 3/10/2015    Thyroid nodule 6/8/2017       Past Surgical History:   Procedure Laterality Date    BRAIN SURGERY      COLONOSCOPY N/A 6/23/2017    Procedure: COLONOSCOPY;  Surgeon: Shane Sharma MD;  Location: [facility] ([address] [place] [address]);  Service: Endoscopy;  Laterality: N/A;  2nd floor case ; on 3L home O2       per Dr Leopold (anesthesia)-Based on her history of:  Chronic respiratory failure with oxygen use, HDEZ, CHF, A-Fib, BHAVANI, it was determined that she should have her Colonoscopy scheduled on the 2nd Floor       ok to hold Eliquis 2 days prior to    EYE SURGERY      HYSTERECTOMY      kidney mass resection Right     renal carcinoma       Time Tracking:     PT Received On: 10/15/20  PT Start Time: 1048     PT Stop Time: 1111  PT Total Time (min): 23 min     Overlap with OT for portions of session due to  complex nature of patient and for safety with mobility to decrease fall risk for patient and caregiver injury requiring two skilled therapists to provide interventions.     Billable Minutes: Evaluation 15 and Therapeutic Activity 8      Georgie Rosales, PT  10/15/2020

## 2020-10-15 NOTE — SUBJECTIVE & OBJECTIVE
Interval History: Pt discouraged and feels like she is not improving as she normally does when being admitted to the hospital. States that she does not want to go to rehab because she wants to be home and did not feel as if she progressed there anymore than at home. Complaining of palpitations, EKD showing rate controlled a flutter.     Objective:     Vital Signs (Most Recent):  Temp: 97.9 °F (36.6 °C) (10/15/20 0745)  Pulse: 68 (10/15/20 0745)  Resp: (!) 21 (10/15/20 0745)  BP: (!) 113/57 (10/15/20 0600)  SpO2: (!) 93 % (10/15/20 0745) Vital Signs (24h Range):  Temp:  [97.6 °F (36.4 °C)-97.9 °F (36.6 °C)] 97.9 °F (36.6 °C)  Pulse:  [] 68  Resp:  [18-40] 21  SpO2:  [90 %-97 %] 93 %  BP: (108-167)/(56-89) 113/57     Weight: 103.4 kg (227 lb 15.3 oz)  Body mass index is 40.38 kg/m².      Intake/Output Summary (Last 24 hours) at 10/15/2020 0835  Last data filed at 10/15/2020 0800  Gross per 24 hour   Intake 540 ml   Output 2300 ml   Net -1760 ml       Physical Exam  Constitutional:       General: She is not in acute distress.     Appearance: She is obese. She is toxic-appearing. She is not ill-appearing.   HENT:      Head: Normocephalic and atraumatic.      Nose: Nose normal.      Mouth/Throat:      Mouth: Mucous membranes are moist.   Eyes:      General: No scleral icterus.     Extraocular Movements: Extraocular movements intact.      Conjunctiva/sclera: Conjunctivae normal.      Pupils: Pupils are equal, round, and reactive to light.   Neck:      Musculoskeletal: Normal range of motion.      Comments: +JVD  Cardiovascular:      Rate and Rhythm: Normal rate and regular rhythm.      Pulses: Normal pulses.      Heart sounds: No murmur. No friction rub. No gallop.    Pulmonary:      Comments: Decreased breath all lung fields, absent bilateral bases, crackles mid upper posterior lung fields, no wheezes, rhonchi.   Abdominal:      General: Bowel sounds are normal.      Tenderness: There is no abdominal tenderness.  There is no guarding or rebound.      Comments: protuberant   Musculoskeletal:         General: Swelling present.      Right lower leg: Edema present.      Left lower leg: Edema present.   Skin:     General: Skin is warm.      Capillary Refill: Capillary refill takes less than 2 seconds.      Findings: No rash.   Neurological:      General: No focal deficit present.      Mental Status: She is oriented to person, place, and time.   Psychiatric:         Mood and Affect: Mood normal.         Behavior: Behavior normal.         Thought Content: Thought content normal.         Judgment: Judgment normal.         Vents:  Oxygen Concentration (%): 40 (10/15/20 0301)    Lines/Drains/Airways     Drain            Female External Urinary Catheter 09/01/20 1158 43 days    Female External Urinary Catheter 10/13/20 1359 1 day          Peripheral Intravenous Line                 Peripheral IV - Single Lumen 10/13/20 0920 20 G Right Antecubital 1 day         Peripheral IV - Single Lumen 10/13/20 1309 20 G Right Hand 1 day                Significant Labs:    CBC/Anemia Profile:  Recent Labs   Lab 10/13/20  0923 10/14/20  0341 10/15/20  0352   WBC 9.22 9.21 9.39   HGB 10.0* 9.5* 9.8*   HCT 32.9* 31.5* 32.3*    182 188   MCV 92 91 91   RDW 15.1* 15.2* 15.2*        Chemistries:  Recent Labs   Lab 10/13/20  0923 10/14/20  0341 10/15/20  0352    142 142   K 5.2* 4.7 4.3    98 96   CO2 33* 33* 36*   BUN 27* 31* 36*   CREATININE 2.0* 1.9* 2.0*   CALCIUM 8.9 8.8 8.8   ALBUMIN 3.9  --   --    PROT 7.0  --   --    BILITOT 0.8  --   --    ALKPHOS 43*  --   --    ALT 19  --   --    AST 16  --   --    MG  --  2.2 2.0   PHOS  --  4.6* 4.5       ABGs:   Recent Labs   Lab 10/13/20  1238   PH 7.227*   PCO2 94.4*   HCO3 39.2*   POCSATURATED 90*   BE 12     Cardiac Markers: No results for input(s): CKMB, TROPONINT, MYOGLOBIN in the last 48 hours.  Coagulation:   Recent Labs   Lab 10/13/20  0923   INR 1.1     Lactic Acid:   Recent Labs    Lab 10/13/20  1317   LACTATE 0.6     Urine Studies: No results for input(s): COLORU, APPEARANCEUA, PHUR, SPECGRAV, PROTEINUA, GLUCUA, KETONESU, BILIRUBINUA, OCCULTUA, NITRITE, UROBILINOGEN, LEUKOCYTESUR, RBCUA, WBCUA, BACTERIA, SQUAMEPITHEL, HYALINECASTS in the last 48 hours.    Invalid input(s): DAVID    Significant Imaging:  I have reviewed all pertinent imaging results/findings within the past 24 hours.

## 2020-10-15 NOTE — SUBJECTIVE & OBJECTIVE
Interval History: Patient overall feeling better this morning.  Still on BiPAP and able to come off for meals.  No adverse events overnight.    Review of Systems   Constitutional: Positive for activity change. Negative for chills and fever.   HENT: Negative for ear pain.    Eyes: Negative for pain.   Respiratory: Positive for shortness of breath. Negative for cough.    Cardiovascular: Positive for leg swelling. Negative for chest pain and palpitations.   Gastrointestinal: Negative for abdominal pain, constipation, diarrhea, nausea and vomiting.   Endocrine: Negative for polydipsia, polyphagia and polyuria.   Genitourinary: Negative for difficulty urinating and dysuria.   Musculoskeletal: Negative for neck pain.   Skin: Negative for rash.   Neurological: Negative for dizziness and headaches.   Hematological: Does not bruise/bleed easily.   Psychiatric/Behavioral: Negative for agitation and behavioral problems.     Objective:     Vital Signs (Most Recent):  Temp: 97.9 °F (36.6 °C) (10/15/20 0745)  Pulse: 76 (10/15/20 1000)  Resp: (!) 25 (10/15/20 1000)  BP: (!) 130/58 (10/15/20 1000)  SpO2: (!) 91 % (10/15/20 1000) Vital Signs (24h Range):  Temp:  [97.6 °F (36.4 °C)-97.9 °F (36.6 °C)] 97.9 °F (36.6 °C)  Pulse:  [] 76  Resp:  [18-40] 25  SpO2:  [89 %-97 %] 91 %  BP: (108-167)/(56-89) 130/58     Weight: 103.4 kg (227 lb 15.3 oz)  Body mass index is 40.38 kg/m².    Intake/Output Summary (Last 24 hours) at 10/15/2020 1117  Last data filed at 10/15/2020 0800  Gross per 24 hour   Intake 540 ml   Output 2200 ml   Net -1660 ml      Physical Exam  Constitutional:       General: She is not in acute distress.     Appearance: She is obese. She is not toxic-appearing.   HENT:      Head: Normocephalic and atraumatic.      Right Ear: External ear normal.      Left Ear: External ear normal.      Nose: Nose normal.      Mouth/Throat:      Mouth: Mucous membranes are moist.      Pharynx: Oropharynx is clear.   Eyes:      General:  No scleral icterus.     Conjunctiva/sclera: Conjunctivae normal.   Neck:      Musculoskeletal: Normal range of motion and neck supple.   Cardiovascular:      Rate and Rhythm: Normal rate. Rhythm irregular.      Pulses: Normal pulses.      Heart sounds: No murmur.   Pulmonary:      Effort: No respiratory distress.      Breath sounds: Rales present. No wheezing.      Comments: On BiPAP and stable  SOB while talking  Abdominal:      General: Abdomen is flat. Bowel sounds are normal. There is no distension.      Palpations: Abdomen is soft.      Tenderness: There is no abdominal tenderness.   Musculoskeletal:      Right lower leg: Edema present.      Left lower leg: Edema present.   Skin:     General: Skin is warm.      Coloration: Skin is not jaundiced.      Findings: No rash.   Neurological:      General: No focal deficit present.      Mental Status: She is alert. Mental status is at baseline.   Psychiatric:         Mood and Affect: Mood normal.         Behavior: Behavior normal.         Significant Labs: All pertinent labs within the past 24 hours have been reviewed.    Significant Imaging: I have reviewed all pertinent imaging results/findings within the past 24 hours.

## 2020-10-15 NOTE — PLAN OF CARE
Patient in no apparent distress. Patient received on BIPAP with settings as documented. Patient given short break on 5 lpm nasal cannula for food. Placed on  BIPAP with same settings after. Sat's  90-93 % on 40% BIPAP. PRN treatments not needed . Will continue to monitor.

## 2020-10-15 NOTE — PT/OT/SLP PROGRESS
Physical Therapy      Patient Name:  Patsy Morris   MRN:  [id number]    Patient not seen today secondary to Patient unwilling to participate, Patient fatigue. Pt short of breath after eating breakfast and had just been placed back on bi-pap. Will follow-up as schedule allows.    Georgie Rosales, PT

## 2020-10-15 NOTE — ASSESSMENT & PLAN NOTE
Continue rate control as likely contributing to diastolic dysfunction; currently in a flutter  - continue anticoag  - discuss with cardiology need for intervention.

## 2020-10-15 NOTE — ASSESSMENT & PLAN NOTE
Hgb 10.0 on admission and trended down to 9.8 today.  Stable since last admission.  Ferritin 25 on 8/25/2020 and Fe sat 18% on 9/5/2020 - continue outpatient FeSO4 325mg daily  B12 301 and Folate 4.6 - both borderline low - continue outpatient Nephrocaps daily

## 2020-10-15 NOTE — PROGRESS NOTES
1400: Pt reports she is working harder to breathe. Pt RR increased on CPAP.  MD Festus aware and has given no new orders.   1420: Respiratory at bedside. No changes needed to CPAP settings.    Spoke with RT. Pt resting comfortable after mask adjusted. Believe that Pt had significant air leak during episode not allowing appropriate ventilation/oxygenation.

## 2020-10-15 NOTE — EICU
Hydralazine 10 mg prn for SBP > 160 ordered for a day, q8 hrly. Already on scheduled beta blocker. Cr 1.9.

## 2020-10-15 NOTE — PROGRESS NOTES
[facility]  Pulmonology  Progress Note    Patient Name: Patsy Morris  MRN: [id number]  Admission Date: 10/13/2020  Hospital Length of Stay: 2 days  Code Status: Full Code  Attending Provider: Ravi Soria MD  Primary Care Provider: Luisana Cartagena MD   Principal Problem: Acute on chronic respiratory failure with hypoxia and hypercapnia    Subjective:     Interval History: Pt discouraged and feels like she is not improving as she normally does when being admitted to the hospital. States that she does not want to go to rehab because she wants to be home and did not feel as if she progressed there anymore than at home. Complaining of palpitations, EKD showing rate controlled a flutter.     Objective:     Vital Signs (Most Recent):  Temp: 97.9 °F (36.6 °C) (10/15/20 0745)  Pulse: 68 (10/15/20 0745)  Resp: (!) 21 (10/15/20 0745)  BP: (!) 113/57 (10/15/20 0600)  SpO2: (!) 93 % (10/15/20 0745) Vital Signs (24h Range):  Temp:  [97.6 °F (36.4 °C)-97.9 °F (36.6 °C)] 97.9 °F (36.6 °C)  Pulse:  [] 68  Resp:  [18-40] 21  SpO2:  [90 %-97 %] 93 %  BP: (108-167)/(56-89) 113/57     Weight: 103.4 kg (227 lb 15.3 oz)  Body mass index is 40.38 kg/m².      Intake/Output Summary (Last 24 hours) at 10/15/2020 0835  Last data filed at 10/15/2020 0800  Gross per 24 hour   Intake 540 ml   Output 2300 ml   Net -1760 ml       Physical Exam  Constitutional:       General: She is not in acute distress.     Appearance: She is obese. She is toxic-appearing. She is not ill-appearing.   HENT:      Head: Normocephalic and atraumatic.      Nose: Nose normal.      Mouth/Throat:      Mouth: Mucous membranes are moist.   Eyes:      General: No scleral icterus.     Extraocular Movements: Extraocular movements intact.      Conjunctiva/sclera: Conjunctivae normal.      Pupils: Pupils are equal, round, and reactive to light.   Neck:      Musculoskeletal: Normal range of motion.      Comments: +JVD  Cardiovascular:       Rate and Rhythm: Normal rate and regular rhythm.      Pulses: Normal pulses.      Heart sounds: No murmur. No friction rub. No gallop.    Pulmonary:      Comments: Decreased breath all lung fields, absent bilateral bases, crackles mid upper posterior lung fields, no wheezes, rhonchi.   Abdominal:      General: Bowel sounds are normal.      Tenderness: There is no abdominal tenderness. There is no guarding or rebound.      Comments: protuberant   Musculoskeletal:         General: Swelling present.      Right lower leg: Edema present.      Left lower leg: Edema present.   Skin:     General: Skin is warm.      Capillary Refill: Capillary refill takes less than 2 seconds.      Findings: No rash.   Neurological:      General: No focal deficit present.      Mental Status: She is oriented to person, place, and time.   Psychiatric:         Mood and Affect: Mood normal.         Behavior: Behavior normal.         Thought Content: Thought content normal.         Judgment: Judgment normal.         Vents:  Oxygen Concentration (%): 40 (10/15/20 0301)    Lines/Drains/Airways     Drain            Female External Urinary Catheter 09/01/20 1158 43 days    Female External Urinary Catheter 10/13/20 1359 1 day          Peripheral Intravenous Line                 Peripheral IV - Single Lumen 10/13/20 0920 20 G Right Antecubital 1 day         Peripheral IV - Single Lumen 10/13/20 1309 20 G Right Hand 1 day                Significant Labs:    CBC/Anemia Profile:  Recent Labs   Lab 10/13/20  0923 10/14/20  0341 10/15/20  0352   WBC 9.22 9.21 9.39   HGB 10.0* 9.5* 9.8*   HCT 32.9* 31.5* 32.3*    182 188   MCV 92 91 91   RDW 15.1* 15.2* 15.2*        Chemistries:  Recent Labs   Lab 10/13/20  0923 10/14/20  0341 10/15/20  0352    142 142   K 5.2* 4.7 4.3    98 96   CO2 33* 33* 36*   BUN 27* 31* 36*   CREATININE 2.0* 1.9* 2.0*   CALCIUM 8.9 8.8 8.8   ALBUMIN 3.9  --   --    PROT 7.0  --   --    BILITOT 0.8  --   --    ALKPHOS  43*  --   --    ALT 19  --   --    AST 16  --   --    MG  --  2.2 2.0   PHOS  --  4.6* 4.5       ABGs:   Recent Labs   Lab 10/13/20  1238   PH 7.227*   PCO2 94.4*   HCO3 39.2*   POCSATURATED 90*   BE 12     Cardiac Markers: No results for input(s): CKMB, TROPONINT, MYOGLOBIN in the last 48 hours.  Coagulation:   Recent Labs   Lab 10/13/20  0923   INR 1.1     Lactic Acid:   Recent Labs   Lab 10/13/20  1317   LACTATE 0.6     Urine Studies: No results for input(s): COLORU, APPEARANCEUA, PHUR, SPECGRAV, PROTEINUA, GLUCUA, KETONESU, BILIRUBINUA, OCCULTUA, NITRITE, UROBILINOGEN, LEUKOCYTESUR, RBCUA, WBCUA, BACTERIA, SQUAMEPITHEL, HYALINECASTS in the last 48 hours.    Invalid input(s): WRIGHTSUR    Significant Imaging:  I have reviewed all pertinent imaging results/findings within the past 24 hours.    Assessment/Plan:     * Acute on chronic respiratory failure with hypoxia and hypercapnia  Pt with recurrent hospitalization for decompensated heart failure, presenting with fluid overload resulting in hypercapnic respiratory failure. Component of restrictive lung disease decreasing ability to compensate Ventilation with worsened pulmonary edema.     - Will transition to AVAPS at night and NC during the day, AVAPs settings , RR 20, PEEP 8, FIO2 40%, Max pressure 36, min pressure 18  - diuresis as above.   - Will need to be set up with Tr[facility] prior to discharge as did not happen via NH after most recent DC      Atrial fibrillation with rapid ventricular response  Continue rate control as likely contributing to diastolic dysfunction; currently in a flutter  - continue anticoag  - discuss with cardiology need for intervention.     Chronic diastolic congestive heart failure  - continue diuresis; fluid overload likely contributes to hypercapnia in setting of OHS/BHAVANI, small airway disease with severe restriction.   - Pt showing signs of contraction alkalosis; would slow down diuresis   - potentially worsening due to atrial  flujohner; Discuss with cardiology need for intervention vs continued rate control as possible contribution to pulmonary edema +/- increased PA pressures causing PHTN.     DVT ppx: apixaban    Will continue to follow.        Willem Mortensen MD  Pulmonology  [facility]

## 2020-10-15 NOTE — PROGRESS NOTES
"[facility] Hospital Medicine  Progress Note    Patient Name: Patsy Morris  MRN: [id number]  Patient Class: IP- Inpatient   Admission Date: 10/13/2020  Length of Stay: 2 days  Attending Physician: Ravi Soria MD  Primary Care Provider: Luisana Cartagena MD        Subjective:     Principal Problem:Acute on chronic respiratory failure with hypoxia and hypercapnia        HPI:  Per ED:  "This is a 70 y.o. female with history of CHF and COPD who presents via EMS with complaint of shortness of breath that began a few days ago. Per EMS patient had O2 saturation of 72 on 3 liters if home oxygen upon their arrival. Patient states she is on 3 liters of home oxygen at baseline. She denies fever, cough, chest pain, nausea, vomiting, diarrhea, or rhinorrhea. She reports she was recently discharged from the hospital for similar symptoms. She reports compliance with her home medications. She recently became resident of [facility] after last hospitalization. She is a former smoker. She denies undergoing any surgeries".    The patient is a 70 year old female with a PMH significant for of HTN, Chronic Respiratory Failure (COPD and BHAVANI/OHS), HFpEF, Obesity who presented to the ED with complaints of SOB that began about 3 days ago.  Patient is on 3L O2NC at home.  She was seen by her Cardiologist about 4 days prior to admission and told to decrease her Lasix from 40mg bid to qday.  She denies chest pain.  No Fevers.  No cough.  States she has been adherent with her medications.  Patient was recently admitted to [facility] from 9/1-9/11 for Acute on Chronic Respiratory Failure and discharged to SNF.  Patient was placed on BiPAP in ED and admitted to ICU.  Patient feeling better on BiPAP.        Overview/Hospital Course:  Patient admitted to Inpatient Status      Interval History: Patient overall feeling better this morning.  Still on BiPAP and able to come off for meals.  No adverse events " overnight.    Review of Systems   Constitutional: Positive for activity change. Negative for chills and fever.   HENT: Negative for ear pain.    Eyes: Negative for pain.   Respiratory: Positive for shortness of breath. Negative for cough.    Cardiovascular: Positive for leg swelling. Negative for chest pain and palpitations.   Gastrointestinal: Negative for abdominal pain, constipation, diarrhea, nausea and vomiting.   Endocrine: Negative for polydipsia, polyphagia and polyuria.   Genitourinary: Negative for difficulty urinating and dysuria.   Musculoskeletal: Negative for neck pain.   Skin: Negative for rash.   Neurological: Negative for dizziness and headaches.   Hematological: Does not bruise/bleed easily.   Psychiatric/Behavioral: Negative for agitation and behavioral problems.     Objective:     Vital Signs (Most Recent):  Temp: 97.9 °F (36.6 °C) (10/15/20 0745)  Pulse: 76 (10/15/20 1000)  Resp: (!) 25 (10/15/20 1000)  BP: (!) 130/58 (10/15/20 1000)  SpO2: (!) 91 % (10/15/20 1000) Vital Signs (24h Range):  Temp:  [97.6 °F (36.4 °C)-97.9 °F (36.6 °C)] 97.9 °F (36.6 °C)  Pulse:  [] 76  Resp:  [18-40] 25  SpO2:  [89 %-97 %] 91 %  BP: (108-167)/(56-89) 130/58     Weight: 103.4 kg (227 lb 15.3 oz)  Body mass index is 40.38 kg/m².    Intake/Output Summary (Last 24 hours) at 10/15/2020 1117  Last data filed at 10/15/2020 0800  Gross per 24 hour   Intake 540 ml   Output 2200 ml   Net -1660 ml      Physical Exam  Constitutional:       General: She is not in acute distress.     Appearance: She is obese. She is not toxic-appearing.   HENT:      Head: Normocephalic and atraumatic.      Right Ear: External ear normal.      Left Ear: External ear normal.      Nose: Nose normal.      Mouth/Throat:      Mouth: Mucous membranes are moist.      Pharynx: Oropharynx is clear.   Eyes:      General: No scleral icterus.     Conjunctiva/sclera: Conjunctivae normal.   Neck:      Musculoskeletal: Normal range of motion and neck  "supple.   Cardiovascular:      Rate and Rhythm: Normal rate. Rhythm irregular.      Pulses: Normal pulses.      Heart sounds: No murmur.   Pulmonary:      Effort: No respiratory distress.      Breath sounds: Rales present. No wheezing.      Comments: On BiPAP and stable  SOB while talking  Abdominal:      General: Abdomen is flat. Bowel sounds are normal. There is no distension.      Palpations: Abdomen is soft.      Tenderness: There is no abdominal tenderness.   Musculoskeletal:      Right lower leg: Edema present.      Left lower leg: Edema present.   Skin:     General: Skin is warm.      Coloration: Skin is not jaundiced.      Findings: No rash.   Neurological:      General: No focal deficit present.      Mental Status: She is alert. Mental status is at baseline.   Psychiatric:         Mood and Affect: Mood normal.         Behavior: Behavior normal.         Significant Labs: All pertinent labs within the past 24 hours have been reviewed.    Significant Imaging: I have reviewed all pertinent imaging results/findings within the past 24 hours.      Assessment/Plan:      * Acute on chronic respiratory failure with hypoxia and hypercapnia  Chronic Respiratory Failure on 3 liters of Oxygen at home - COPD, BHAVANI/OHS with severe restrictive lung disease.    Recent admission from 8/22-8/31 - treated at that time with Afib-RVR with CHF (acute diastolic) and COPD exacerbation (s/p antibiotics and steroids) and transferred to SNF.  Readmitted from 9/1-9/11/2020 with Afib-RVR with CHF and d/c to SNF with Trilogy.  Patient states she has been using her "CPAP" at home.  Presented to SOB over past 3 days PTA.  Placed on BiPAP and given Lasix 80mg IV x 1 in ED.  Transferred to ICU.  On BiPAP this morning.  Pox 93 (40% FiO2).  I/O of 480/1800 with net negative 2070ml.      CXR on re-admission Interval worsening of diffuse bilateral airspace disease and moderate bilateral pleural effusions when compared to 09/09/2020     Labs on " "Admission:  WBC 9.22  COVID-19 rapid negative  Trop negative x 3     PH 7.227/pCO2 94.4/pO2 73  Procal normal  Lactic Acid normal    Seen by Pulmonary - "Pt with recurrent hospitalization for decompensated heart failure, presenting with fluid overload resulting in hypercapnic respiratory failure. Component of restrictive lung disease decreasing ability to compensate Ventilation with worsened pulmonary edema.   - continue BiPAP, titrate to appropriate mental status and then can transition to NC  - aggressive diuresis goal of at least net negative 2L  - mentating appropriately; should be okay to eat off BiPAP and then replaced after break for the night".     Seen by Cardiology - "Heart Failure, Diastolic, Acute on Chronic - 8/24/2020: Echo: Normal left ventricular size and systolic function. Mildly dilated LA.  At NH been on furosemide 40 mg Q24.  10/13/2020: Presents fluid overloaded in HF.  On furosemide iv Q12. Continue diuresis".     Plan:  Continue BiPAP   Follow up with Pulmonary recommendations  Continue with Lasix 40mg po q12  Strict I&Os  Fluid Restrict to 1.5L for now  DuoNebs        Atrial fibrillation with rapid ventricular response   in ED.  Home Meds:  Metoprolol 50mg bid and Apixaban 5mg bid  Followed by Cardiology, Dr. Lutz - last seen outpatient on 10/8/2020  HR much improved this morning  -Continue current medications      Chronic kidney disease (CKD) stage G4/A1, severely decreased glomerular filtration rate (GFR) between 15-29 mL/min/1.73 square meter and albuminuria creatinine ratio less than 30 mg/g  Creatinine of 2.0 on admission and was 1.9 on discharge 9/11/2020.  Her baseline is around 1.8.  Creatinine 2.0 this morning  -Renally dose medication for CrCl of <30  -Avoid Nephrotoxic medications if able  -Monitor urine output  -Follow closely      Chronic diastolic congestive heart failure  Home Meds:  Lasix 40mg qday (was bid and recently changed  BNP elevated at 577 on last " admission and now 968  See above.     TTE on 8/24/2020 - Mild left atrial enlargement.  · Diastolic pattern consistent with atrial fibrillation observed.  · Normal left ventricular systolic function. The estimated ejection fraction is 58%.  · No wall motion abnormalities.  · Normal right ventricular systolic function.  · Mild right atrial enlargement.  Small posterior pericardial effusion.     Plan:  Lasix to 40mg po q12  Continue Bipap as above        Current moderate episode of major depressive disorder without prior episode  Continue Lexapro      Normocytic anemia  Hgb 10.0 on admission and trended down to 9.8 today.  Stable since last admission.  Ferritin 25 on 8/25/2020 and Fe sat 18% on 9/5/2020 - continue outpatient FeSO4 325mg daily  B12 301 and Folate 4.6 - both borderline low - continue outpatient Nephrocaps daily      Dyslipidemia  Continue Statin      Essential hypertension  Continue Metoprolol and Lasix.  BP stable.        VTE Risk Mitigation (From admission, onward)         Ordered     apixaban tablet 5 mg  2 times daily      10/13/20 1404     Place sequential compression device  Until discontinued      10/13/20 1405     IP VTE HIGH RISK PATIENT  Once      10/13/20 1405                Discharge Planning   YUDELKA:      Code Status: Full Code   Is the patient medically ready for discharge?:     Reason for patient still in hospital (select all that apply): Patient trending condition, Treatment and Consult recommendations  Discharge Plan A: Home, Home Health                  Ravi Soria MD  Department of Hospital Medicine   [facility]

## 2020-10-15 NOTE — PROGRESS NOTES
Pt. Put on airborne isolation for possible Tb rule out. Pt. Moved to negative pressure room, room 6. Pt. Alternating between bipap and NC 6L to keep sats greater than 88%. Dr. Mortensen informed pt of plan of care. US At bedside to do bilateral US of legs. Heparin drip will be started at 2030 tonight. Pt seems weak and alternated between periods of being coherent and seeming confused. VSS and pt in NAD.

## 2020-10-15 NOTE — PLAN OF CARE
Problem: Occupational Therapy Goal  Goal: Occupational Therapy Goal  Description: Goals to be met by: 10/22/2020    Patient will increase functional independence with ADLs by performing:    UE Dressing with Minimal Assistance.  LE Dressing with Maximum Assistance.  Grooming while seated with Stand-by Assistance.  Toileting from bedside commode with CGA for hygiene and clothing management.   Toilet transfer to bedside commode with Minimal Assistance.  Pt will tolerate sitting EOB for 10 minutes.        Outcome: Ongoing, Progressing     OT evaluation complete and POC established.  Pt tolerated sitting EOB for ~5 minutes this date with CGA required to maintain balance.  Pt's SpO2 remained at 88-90% while seated.  SNF (pending pt progress) is recommended upon d/c from acute care to further address deficits and help pt improve overall functional independence.     CARLOS Motta  10/15/2020

## 2020-10-15 NOTE — PT/OT/SLP EVAL
Occupational Therapy   Evaluation    Name: Patsy Morris  MRN: [id number]  Admitting Diagnosis:  Acute on chronic respiratory failure with hypoxia and hypercapnia      Recommendations:     Discharge Recommendations: nursing facility, skilled  Discharge Equipment Recommendations:  (TBD at next level of care)  Barriers to discharge:   None    Assessment:     Patsy Morris is a 70 y.o. female with a medical diagnosis of Acute on chronic respiratory failure with hypoxia and hypercapnia.  She presents with fatigue. Performance deficits affecting function: weakness, impaired endurance, impaired functional mobilty, impaired self care skills, decreased lower extremity function, impaired cardiopulmonary response to activity, edema, decreased safety awareness, impaired balance.  Pt anxious about moving and oxygen levels decreasing, but agreeable to participating in therapy upon arrival to room.  Pt demonstrates strength and ROM in (B) UE needed for ADLs that WFL and WNL respectively.  Pt tolerated sitting EOB for ~5 minutes with CGA required to maintain balance.  Pt declined performing sit <> stand transfer this date.    PTA pt reports being (I) with ADLs, and was utilizing a rollator for mobility.  Sister and niece assist with cooking and cleaning.  Pt would benefit from skilled OT services to address problems listed above and increase independence with ADLs.  SNF is recommended upon d/c from acute care to further address deficits and help pt improve overall functional independence.          Rehab Prognosis: Good; patient would benefit from acute skilled OT services to address these deficits and reach maximum level of function.       Plan:     Patient to be seen 5 x/week to address the above listed problems via self-care/home management, therapeutic activities, therapeutic exercises  · Plan of Care Expires: 11/14/20  · Plan of Care Reviewed with: patient    Subjective     Chief Complaint: Anxious about O2  desaturating; fatigued  Patient/Family Comments/goals: Be able to cook again    Occupational Profile:  Living Environment: Pt lives with brother in [facility], [address], bathroom has tub/shower.  Previous level of function:   -ADLs: Ind during day.  Mod I with use of BSC next to bed at night.  -iADLs:  Requires assist for cooking and cleaning 2* pt with decreased endurance and activity tolerance  -Mobility:  Mod I with rollator  Roles and Routines: Sister, aunt, enjoys cooking  Equipment Used at Home:  CPAP, oxygen  Assistance upon Discharge: Family able to provide assist    Pain/Comfort:  · Pain Rating 1: 0/10  · Pain Rating Post-Intervention 1: 0/10    Patients cultural, spiritual, Adventist conflicts given the current situation: no    Objective:     Communicated with: RN (Martha) prior to session.  Patient found HOB elevated with peripheral IV, ovalles catheter, oxygen, telemetry, pulse ox (continuous) upon OT entry to room.    General Precautions: Standard, fall   Orthopedic Precautions:N/A   Braces: N/A     Occupational Performance:    Bed Mobility:    · Supine <> Sit: Max A  · Sit <> Supine:  Max A  · Scooting:  CGA towards EOB while seated; Total A towards HOB while supine  · Rolling to right:  CGA    Functional Mobility/Transfers:  · Sit <> Stand:  Pt declined activity this date 2* feeling fatigued and worried about oxygen saturation levels decreasing  · Functional Mobility: To be assessed in upcoming sessions    Activities of Daily Living:  · Upper Body Dressing: supervision for doffing/donning glasses while seated at EOB.    Cognitive/Visual Perceptual:  Cognitive/Psychosocial Skills:    -       Oriented to: Person, Place, Time and Situation   -       Follows Commands/attention:Follows multistep  commands  -       Communication: clear/fluent  -       Memory: No Deficits noted  -       Safety awareness/insight to disability: intact   -       Mood/Affect/Coping skills/emotional control: Cooperative and  Pleasant    Physical Exam:  Postural examination/scapula alignment:    -       No postural abnormalities identified  Skin integrity: Visible skin intact  Edema:  None noted  Sensation: -       Intact  Motor Planning: WFL  Dominant hand: Right  Upper Extremity Range of Motion:    -       Right Upper Extremity: WNL  -       Left Upper Extremity: WNL  Upper Extremity Strength:   -       Right Upper Extremity: WFL; grossly 4/5 all muscle groups  -       Left Upper Extremity: WFL; grossly 4/5 all muscle groups   Strength: 4/5 both hands  Fine Motor Coordination: Intact  Gross motor coordination: WFL  Balance:  Sitting- Independent; Standing- To be assessed  Vision: Intact; wears glasses    AMPAC 6 Click ADL:  [facility] Total Score: 17    Treatment & Education:  *Pt educated on role of OT and POC discussed  Education:    Patient left HOB elevated with all lines intact and call button in reach; RN notified.    GOALS:   Multidisciplinary Problems     Occupational Therapy Goals        Problem: Occupational Therapy Goal    Goal Priority Disciplines Outcome Interventions   Occupational Therapy Goal     OT, PT/OT Ongoing, Progressing    Description: Goals to be met by: 10/22/2020    Patient will increase functional independence with ADLs by performing:    UE Dressing with Minimal Assistance.  LE Dressing with Maximum Assistance.  Grooming while seated with Stand-by Assistance.  Toileting from bedside commode with CGA for hygiene and clothing management.   Toilet transfer to bedside commode with Minimal Assistance.Pt will tolerate sitting EOB for 10 minutes.                         History:     Past Medical History:   Diagnosis Date    Arthritis     Asthma     Atrial fibrillation     Atrial flutter     Cerumen impaction     CHF (congestive heart failure)     CKD (chronic kidney disease), stage III     Colon polyps 2017    COPD exacerbation     Encounter for blood transfusion     HEARING LOSS     Herpes genitalis      Hypertension     Renal carcinoma 3/10/2015    Thyroid nodule 6/8/2017       Past Surgical History:   Procedure Laterality Date    BRAIN SURGERY      COLONOSCOPY N/A 6/23/2017    Procedure: COLONOSCOPY;  Surgeon: Shane Sharma MD;  Location: [facility] ([address] [place] [address]);  Service: Endoscopy;  Laterality: N/A;  2nd floor case ; on 3L home O2       per Dr Leopold (anesthesia)-Based on her history of:  Chronic respiratory failure with oxygen use, HDEZ, CHF, A-Fib, BHAVANI, it was determined that she should have her Colonoscopy scheduled on the 2nd Floor       ok to hold Eliquis 2 days prior to    EYE SURGERY      HYSTERECTOMY      kidney mass resection Right     renal carcinoma       Time Tracking:     OT Date of Treatment: 10/15/20  OT Start Time: 1131  OT Stop Time: 1150  OT Total Time (min): 19 min    Billable Minutes:Evaluation 19    CARLOS Motta  10/15/2020

## 2020-10-15 NOTE — PROGRESS NOTES
Pharmacist Renal Dose Adjustment Note    Patsy Morris is a 70 y.o. female being treated with the medication Cefepime.     Patient Data:    Vital Signs (Most Recent):  Temp: 98.5 °F (36.9 °C) (10/15/20 1115)  Pulse: 100 (10/15/20 1410)  Resp: (!) 32 (10/15/20 1410)  BP: 118/60 (10/15/20 1410)  SpO2: (!) 94 % (10/15/20 1410)   Vital Signs (72h Range):  Temp:  [97.5 °F (36.4 °C)-98.5 °F (36.9 °C)]   Pulse:  []   Resp:  [13-42]   BP: ()/(50-93)   SpO2:  [85 %-98 %]      Recent Labs   Lab 10/13/20  0923 10/14/20  0341 10/15/20  0352   CREATININE 2.0* 1.9* 2.0*     Serum creatinine: 2 mg/dL (H) 10/15/20 0352  Estimated creatinine clearance: 29.1 mL/min (A)    Medication:Cefepime dose: 1 g frequency q 12 hours will be changed to medication:Cefepime  dose:1g frequency:q24 hours.    Pharmacist's Name: Debra Ayoub  Pharmacist's Extension: [phone]

## 2020-10-15 NOTE — PROGRESS NOTES
Pharmacokinetic Initial Assessment: IV Vancomycin    Assessment/Plan:    Initiate intravenous vancomycin with loading dose of 2000 mg once with subsequent doses when random concentrations are less than 20 mcg/mL  Desired empiric serum trough concentration is 10 to 20 mcg/mL  Draw vancomycin random level on 10/16/2020 at 1530.  Pharmacy will continue to follow and monitor vancomycin.      Please contact pharmacy at extension [phone] with any questions regarding this assessment.     Thank you for the consult,   Debra Ayoub       Patient brief summary:  Patsy Morris is a 70 y.o. female initiated on antimicrobial therapy with IV Vancomycin for treatment of suspected lower respiratory infection. Plan for pulse dosing.     Drug Allergies:   Review of patient's allergies indicates:  No Known Allergies    Actual Body Weight:   103 kg    Renal Function:   Estimated Creatinine Clearance: 29.1 mL/min (A) (based on SCr of 2 mg/dL (H)).,     Dialysis Method (if applicable):  N/A    CBC (last 72 hours):  Recent Labs   Lab Result Units 10/13/20  0923 10/14/20  0341 10/15/20  0352   WBC K/uL 9.22 9.21 9.39   Hemoglobin g/dL 10.0* 9.5* 9.8*   Hematocrit % 32.9* 31.5* 32.3*   Platelets K/uL 188 182 188   Gran% % 87.7* 82.0* 79.9*   Lymph% % 5.6* 7.4* 8.2*   Mono% % 6.1 9.2 10.1   Eosinophil% % 0.1 1.0 1.2   Basophil% % 0.1 0.2 0.3   Differential Method  Automated Automated Automated       Metabolic Panel (last 72 hours):  Recent Labs   Lab Result Units 10/13/20  0923 10/14/20  0341 10/15/20  0352   Sodium mmol/L 140 142 142   Potassium mmol/L 5.2* 4.7 4.3   Chloride mmol/L 100 98 96   CO2 mmol/L 33* 33* 36*   Glucose mg/dL 134* 98 110   BUN, Bld mg/dL 27* 31* 36*   Creatinine mg/dL 2.0* 1.9* 2.0*   Albumin g/dL 3.9  --   --    Total Bilirubin mg/dL 0.8  --   --    Alkaline Phosphatase U/L 43*  --   --    AST U/L 16  --   --    ALT U/L 19  --   --    Magnesium mg/dL  --  2.2 2.0   Phosphorus mg/dL  --  4.6* 4.5       Drug levels  (last 3 results):  No results for input(s): VANCOMYCINRA, VANCOMYCINPE, VANCOMYCINTR in the last 72 hours.    Microbiologic Results:  Microbiology Results (last 7 days)       ** No results found for the last 168 hours. **

## 2020-10-15 NOTE — PLAN OF CARE
Patient has a dx of CHF with COPD exacerbation. AAOx4, afebrile, in rate controlled A-Fib to A-flutter, on 6 L o2 n/c while awake, on Bipap qHS. Chronic lower back pain relieved with prn Tylenol 650 mg PO x 1. Patient has remained in bed during this shift; turns with 1-person assistance. Purewick utilized.     Patient desats quickly when simply turning in bed with 1-person assistance.

## 2020-10-15 NOTE — PROGRESS NOTES
[facility]  Cardiology  Progress Note    Patient Name: Patsy Morris  MRN: [id number]  Admission Date: 10/13/2020  Hospital Length of Stay: 2 days  Code Status: Full Code   Attending Physician: Ravi Soria MD   Primary Care Physician: Luisana Cartagena MD  Expected Discharge Date:   Principal Problem:Acute on chronic respiratory failure with hypoxia and hypercapnia    Subjective:     Brief HPI:    Patsy Morris is a 70 y.o.female with hypertension. She has chronic atrial fibrillation and heart failure with preserved ejection fraction. She has chronic obstructive pulmonary disease being on home oxygen with CO2 retention. She has undergone nephrectomy for renal cell carcinoma and has chronic kidney disease. She was admitted to [facility] in 8/2020 and 9/2020 with heart failure and exacerbations of her chronic obstructive pulmonary disease. She went to therapy at [facility].      She used to be on furosemide 40 mg Q24 however after her last hospitalization she had the does of furosemide increased to 80 mg Q24. The dose was reduced to 40 mg Q24 on 10/8/2020. She became increasingly short of breath and presents fluid overloaded in heart failure as well as an exacerbation of her COPD with high CO2. She was admitted to the ICU.    Hospital Course:    Diuresis.    BIPAP.    Respiratory treatments.    Interval History:     Breathing slightly easier after diuresis. Using BIPAP.    Review of Systems   Constitution: Positive for malaise/fatigue. Negative for chills and fever.   HENT: Negative for nosebleeds.    Eyes: Negative for vision loss in left eye and vision loss in right eye.   Cardiovascular: Positive for leg swelling, orthopnea and paroxysmal nocturnal dyspnea. Negative for chest pain and palpitations.   Respiratory: Positive for shortness of breath. Negative for cough, hemoptysis, sputum production and wheezing.    Hematologic/Lymphatic: Negative for bleeding problem.   Skin:  Negative for rash.   Musculoskeletal: Negative for myalgias.   Gastrointestinal: Negative for abdominal pain, heartburn, hematemesis, hematochezia, jaundice, melena, nausea and vomiting.   Genitourinary: Negative for hematuria.   Neurological: Negative for dizziness, headaches, light-headedness, vertigo and weakness.   Psychiatric/Behavioral: Negative for altered mental status. The patient is not nervous/anxious.    Allergic/Immunologic: Negative for persistent infections.       Objective:     Vital Signs (Most Recent):  Temp: 97.9 °F (36.6 °C) (10/15/20 0745)  Pulse: 84 (10/15/20 0900)  Resp: (!) 26 (10/15/20 0900)  BP: 120/62 (10/15/20 0900)  SpO2: (!) 89 % (10/15/20 0900) Vital Signs (24h Range):  Temp:  [97.6 °F (36.4 °C)-97.9 °F (36.6 °C)] 97.9 °F (36.6 °C)  Pulse:  [] 84  Resp:  [18-40] 26  SpO2:  [89 %-97 %] 89 %  BP: (108-167)/(56-89) 120/62     Weight: 103.4 kg (227 lb 15.3 oz)  Body mass index is 40.38 kg/m².    SpO2: (!) 89 %  O2 Device (Oxygen Therapy): BiPAP      Intake/Output Summary (Last 24 hours) at 10/15/2020 0952  Last data filed at 10/15/2020 0800  Gross per 24 hour   Intake 540 ml   Output 2200 ml   Net -1660 ml       Lines/Drains/Airways     Drain            Female External Urinary Catheter 09/01/20 1158 43 days    Female External Urinary Catheter 10/13/20 1359 1 day          Peripheral Intravenous Line                 Peripheral IV - Single Lumen 10/13/20 0920 20 G Right Antecubital 2 days         Peripheral IV - Single Lumen 10/13/20 1309 20 G Right Hand 1 day                Physical Exam   Constitutional: She appears well-developed and well-nourished. She appears ill.   Neck: Carotid bruit is not present.   Cardiovascular: S1 normal and S2 normal. An irregularly irregular rhythm present. Tachycardia present.   Pulmonary/Chest: She has decreased breath sounds in the right lower field and the left lower field. She has rales in the right middle field and the left middle field.    Musculoskeletal:      Right ankle: She exhibits swelling.      Left ankle: She exhibits swelling.   Skin: Skin is warm.     Current Medications:     apixaban  5 mg Oral BID    atorvastatin  80 mg Oral QHS    docusate sodium  100 mg Oral BID    escitalopram oxalate  10 mg Oral QHS    ferrous sulfate  325 mg Oral Daily    furosemide (LASIX) IV  40 mg Intravenous Q12H    gabapentin  100 mg Oral BID    metoprolol tartrate  50 mg Oral BID    miconazole NITRATE 2 %   Topical (Top) BID    vitamin renal formula (B-complex-vitamin c-folic acid)  1 capsule Oral Daily     Current Laboratory Results:    Recent Results (from the past 24 hour(s))   Basic metabolic panel    Collection Time: 10/15/20  3:52 AM   Result Value Ref Range    Sodium 142 136 - 145 mmol/L    Potassium 4.3 3.5 - 5.1 mmol/L    Chloride 96 95 - 110 mmol/L    CO2 36 (H) 23 - 29 mmol/L    Glucose 110 70 - 110 mg/dL    BUN, Bld 36 (H) 8 - 23 mg/dL    Creatinine 2.0 (H) 0.5 - 1.4 mg/dL    Calcium 8.8 8.7 - 10.5 mg/dL    Anion Gap 10 8 - 16 mmol/L    eGFR if African American 28 (A) >60 mL/min/1.73 m^2    eGFR if non African American 24 (A) >60 mL/min/1.73 m^2   Magnesium    Collection Time: 10/15/20  3:52 AM   Result Value Ref Range    Magnesium 2.0 1.6 - 2.6 mg/dL   Phosphorus    Collection Time: 10/15/20  3:52 AM   Result Value Ref Range    Phosphorus 4.5 2.7 - 4.5 mg/dL   CBC auto differential    Collection Time: 10/15/20  3:52 AM   Result Value Ref Range    WBC 9.39 3.90 - 12.70 K/uL    RBC 3.56 (L) 4.00 - 5.40 M/uL    Hemoglobin 9.8 (L) 12.0 - 16.0 g/dL    Hematocrit 32.3 (L) 37.0 - 48.5 %    Mean Corpuscular Volume 91 82 - 98 fL    Mean Corpuscular Hemoglobin 27.5 27.0 - 31.0 pg    Mean Corpuscular Hemoglobin Conc 30.3 (L) 32.0 - 36.0 g/dL    RDW 15.2 (H) 11.5 - 14.5 %    Platelets 188 150 - 350 K/uL    MPV 10.8 9.2 - 12.9 fL    Immature Granulocytes 0.3 0.0 - 0.5 %    Gran # (ANC) 7.5 1.8 - 7.7 K/uL    Immature Grans (Abs) 0.03 0.00 - 0.04 K/uL     Lymph # 0.8 (L) 1.0 - 4.8 K/uL    Mono # 1.0 0.3 - 1.0 K/uL    Eos # 0.1 0.0 - 0.5 K/uL    Baso # 0.03 0.00 - 0.20 K/uL    nRBC 0 0 /100 WBC    Gran% 79.9 (H) 38.0 - 73.0 %    Lymph% 8.2 (L) 18.0 - 48.0 %    Mono% 10.1 4.0 - 15.0 %    Eosinophil% 1.2 0.0 - 8.0 %    Basophil% 0.3 0.0 - 1.9 %    Differential Method Automated      Current Imaging Results:    X-Ray Chest AP Portable   Final Result      Interval worsening of diffuse bilateral airspace disease and moderate bilateral pleural effusions when compared to 09/09/2020         Electronically signed by: Halina Plascencia   Date:    10/13/2020   Time:    09:40          Assessment and Plan:     Problem List:    Active Diagnoses:    Diagnosis Date Noted POA    PRINCIPAL PROBLEM:  Acute on chronic respiratory failure with hypoxia and hypercapnia [J96.21, J96.22] 08/03/2016 Yes    Atrial fibrillation with rapid ventricular response [I48.91] 09/01/2020 Yes    Current moderate episode of major depressive disorder without prior episode [F32.1] 04/30/2020 Yes    Normocytic anemia [D64.9] 08/03/2016 Yes    Chronic diastolic congestive heart failure [I50.32] 05/20/2016 Yes    Chronic kidney disease (CKD) stage G4/A1, severely decreased glomerular filtration rate (GFR) between 15-29 mL/min/1.73 square meter and albuminuria creatinine ratio less than 30 mg/g [N18.4]  Yes    Dyslipidemia [E78.5] 02/22/2016 Yes    Essential hypertension [I10] 10/15/2014 Yes      Problems Resolved During this Admission:    Diagnosis Date Noted Date Resolved POA    Acute on chronic congestive heart failure [I50.9] 10/13/2020 10/13/2020 Yes    Pulmonary nodules/lesions, multiple [R91.8] 03/22/2019 10/14/2020 Yes    Paroxysmal atrial fibrillation [I48.0] 08/03/2016 10/13/2020 Yes    Acute on chronic combined systolic and diastolic heart failure [I50.43] 08/03/2016 10/13/2020 Unknown    Chronic hypercapnic respiratory failure [J96.12] 03/11/2016 10/13/2020 Yes    Renal carcinoma  [C64.9] 03/10/2015 10/13/2020 Yes     Assessment and Plan:     1. Heart Failure, Diastolic, Acute on Chronic              8/24/2020: Echo: Normal left ventricular size and systolic function. Mildly dilated LA.              At NH been on furosemide 40 mg Q24.              10/13/2020: Presents fluid overloaded in HF.              On furosemide 40 mg iv Q12.   On fluid restriction.              Continue diuresis.     2. Atrial Fibrillation              In chronic atrial fibrillation.              On apixiban.              On metoprolol 50 mg Q12.              Rate well controlled.     3. Chronic Anticoagulation              On apixiban 5 mg Q12.    4. Hypertension   On metoprolol 50 mg Q12.   Expect blood pressure to come down with diuresis.                5. Chronic Kidney Disease, Stage 4              History or renal carcinoma and nephrectomy.              10/13/2020: BUN/crea 27/2.0. CrCl 28.     6. Chronic Obstructive Pulmonary Disease              Chronic respiratory failure with CO2 retention.               10/13/2020: 7.23/94/73/39/90% on FiO2 of 40%.    VTE Risk Mitigation (From admission, onward)         Ordered     apixaban tablet 5 mg  2 times daily      10/13/20 1404     Place sequential compression device  Until discontinued      10/13/20 1405     IP VTE HIGH RISK PATIENT  Once      10/13/20 1405                Diana Meredith MD  Cardiology  [facility]

## 2020-10-15 NOTE — ASSESSMENT & PLAN NOTE
"Chronic Respiratory Failure on 3 liters of Oxygen at home - COPD, BHAVANI/OHS with severe restrictive lung disease.    Recent admission from 8/22-8/31 - treated at that time with Afib-RVR with CHF (acute diastolic) and COPD exacerbation (s/p antibiotics and steroids) and transferred to SNF.  Readmitted from 9/1-9/11/2020 with Afib-RVR with CHF and d/c to SNF with Trilogy.  Patient states she has been using her "CPAP" at home.  Presented to [facility] over past 3 days PTA.  Placed on BiPAP and given Lasix 80mg IV x 1 in ED.  Transferred to ICU.  On BiPAP this morning.  Pox 93 (40% FiO2).  I/O of 480/1800 with net negative 2070ml.      CXR on re-admission Interval worsening of diffuse bilateral airspace disease and moderate bilateral pleural effusions when compared to 09/09/2020     Labs on Admission:  WBC 9.22  COVID-19 rapid negative  Trop negative x 3     PH 7.227/pCO2 94.4/pO2 73  Procal normal  Lactic Acid normal    Seen by Pulmonary - "Pt with recurrent hospitalization for decompensated heart failure, presenting with fluid overload resulting in hypercapnic respiratory failure. Component of restrictive lung disease decreasing ability to compensate Ventilation with worsened pulmonary edema.   - continue BiPAP, titrate to appropriate mental status and then can transition to NC  - aggressive diuresis goal of at least net negative 2L  - mentating appropriately; should be okay to eat off BiPAP and then replaced after break for the night".     Seen by Cardiology - "Heart Failure, Diastolic, Acute on Chronic - 8/24/2020: Echo: Normal left ventricular size and systolic function. Mildly dilated LA.  At NH been on furosemide 40 mg Q24.  10/13/2020: Presents fluid overloaded in HF.  On furosemide iv Q12. Continue diuresis".     Plan:  Continue BiPAP   Follow up with Pulmonary recommendations  Continue with Lasix 40mg po q12  Strict I&Os  Fluid Restrict to 1.5L for now  DuoNebs      "

## 2020-10-15 NOTE — RESPIRATORY THERAPY
Patient in no distress at this time.  Resting comfortably.  Sat 94% on BIPAP with settings as charted.

## 2020-10-16 PROBLEM — J18.9 CAVITARY PNEUMONIA: Status: ACTIVE | Noted: 2020-01-01

## 2020-10-16 PROBLEM — J98.4 CAVITARY PNEUMONIA: Status: ACTIVE | Noted: 2020-01-01

## 2020-10-16 NOTE — PT/OT/SLP PROGRESS
Occupational Therapy      Patient Name:  Patsy Morris   MRN:  [id number]    Attempted to see pt at 8:46 this morning; however, pt declined. Per RN pt displays O2 desaturation with minimal movement.  Will follow-up as schedule permits.    CARLOS Motta  10/16/2020

## 2020-10-16 NOTE — ASSESSMENT & PLAN NOTE
Creatinine of 2.0 on admission and was 1.9 on discharge 9/11/2020.  Her baseline is around 1.8.  Creatinine 1.8 this morning  -Renally dose medication for CrCl of <30  -Avoid Nephrotoxic medications if able  -Monitor urine output  -Follow closely

## 2020-10-16 NOTE — PLAN OF CARE
Pt. Remains on airborne isolation as a TB rule out. Alternated between BIPAP and 6L NC. Pt. Is currently on 6 L NC. Pt received breathing treatments from respiratory tolerated well. Pt. Was able to produce sputum samples that were sent to lab. Pt. Ate all meals. Pt. Remains on 1.5 L fluid restriction. Pt started on heparin drip. Next Anti XA to be pulled at 2000. VSS and NAD. Family up to date with plan of care.

## 2020-10-16 NOTE — PLAN OF CARE
Patient has a dx of CHF with COPD exacerbation. AAOx4, afebrile, in rate controlled A-Fib to A-flutter, on 6 L o2 n/c while awake, on Bipap qHS. Chronic lower back pain relieved with prn Tylenol 650 mg PO x 1. Patient has remained in bed during this shift; turns with 1-person assistance. Purewick utilized.     Patient desats quickly when simply turning in bed with 1-person assistance.     Unable to start Heparin dip since anti-Xa levels have been greater than 1 during this shift.

## 2020-10-16 NOTE — PROGRESS NOTES
Pt received on BIPAP this AM; Pt placed on NC and BIPAP throughout the day. PRN treatments were given. Sodium chloride was ordered and given;pt tolerated it well;NARN. Sputum sample was sent. No other changes were made.Will continue to monitor.

## 2020-10-16 NOTE — ASSESSMENT & PLAN NOTE
Hgb 10.0 on admission and stable at 9.9 today.  Stable since last admission.  Ferritin 25 on 8/25/2020 and Fe sat 18% on 9/5/2020 - continue outpatient FeSO4 325mg daily  B12 301 and Folate 4.6 - both borderline low - continue outpatient Nephrocaps daily

## 2020-10-16 NOTE — ASSESSMENT & PLAN NOTE
Home Meds:  Lasix 40mg qday (was bid and recently changed  BNP elevated at 577 on last admission and now 968 this admission  See above.     TTE on 8/24/2020 - Mild left atrial enlargement.  · Diastolic pattern consistent with atrial fibrillation observed.  · Normal left ventricular systolic function. The estimated ejection fraction is 58%.  · No wall motion abnormalities.  · Normal right ventricular systolic function.  · Mild right atrial enlargement.  Small posterior pericardial effusion.     Plan:  Lasix to 40mg po q12  Continue Bipap as above

## 2020-10-16 NOTE — PROGRESS NOTES
[facility]  Cardiology  Progress Note    Patient Name: Patsy Morris  MRN: [id number]  Admission Date: 10/13/2020  Hospital Length of Stay: 3 days  Code Status: Full Code   Attending Physician: Ravi Soria MD   Primary Care Physician: Luisana Cartagena MD  Expected Discharge Date:   Principal Problem:Acute on chronic respiratory failure with hypoxia and hypercapnia    Subjective:     Brief HPI:    Patsy Morris is a 70 y.o.female with hypertension. She has chronic atrial fibrillation and heart failure with preserved ejection fraction. She has chronic obstructive pulmonary disease being on home oxygen with CO2 retention. She has undergone nephrectomy for renal cell carcinoma and has chronic kidney disease. She was admitted to [facility] in 8/2020 and 9/2020 with heart failure and exacerbations of her chronic obstructive pulmonary disease. She went to therapy at [facility].      She used to be on furosemide 40 mg Q24 however after her last hospitalization she had the does of furosemide increased to 80 mg Q24. The dose was reduced to 40 mg Q24 on 10/8/2020. She became increasingly short of breath and presents fluid overloaded in heart failure as well as an exacerbation of her COPD with high CO2. She was admitted to the ICU.    Hospital Course:    Diuresis.    BIPAP.    Respiratory treatments.    10/15/2020: Echo: Normal left ventricular size and systolic function. EF 60%. Moderately dilated LA. Mildly dilated RV. SPAP 49 mmHg. Small pericardial effusion.    10/16/2020: Apixiban 5 mg Q12 was changed to heparin iv.    Interval History:     Remains SOB. Using BIPAP.    Review of Systems   Constitution: Positive for malaise/fatigue. Negative for chills and fever.   HENT: Negative for nosebleeds.    Eyes: Negative for vision loss in left eye and vision loss in right eye.   Cardiovascular: Positive for leg swelling, orthopnea and paroxysmal nocturnal dyspnea. Negative for chest pain  and palpitations.   Respiratory: Positive for shortness of breath. Negative for cough, hemoptysis, sputum production and wheezing.    Hematologic/Lymphatic: Negative for bleeding problem.   Skin: Negative for rash.   Musculoskeletal: Negative for myalgias.   Gastrointestinal: Negative for abdominal pain, heartburn, hematemesis, hematochezia, jaundice, melena, nausea and vomiting.   Genitourinary: Negative for hematuria.   Neurological: Negative for dizziness, headaches, light-headedness, vertigo and weakness.   Psychiatric/Behavioral: Negative for altered mental status. The patient is not nervous/anxious.    Allergic/Immunologic: Negative for persistent infections.       Objective:     Vital Signs (Most Recent):  Temp: 97.9 °F (36.6 °C) (10/16/20 1131)  Pulse: 94 (10/16/20 1151)  Resp: (!) 28 (10/16/20 1151)  BP: 139/64 (10/16/20 0600)  SpO2: (!) 94 % (10/16/20 1151) Vital Signs (24h Range):  Temp:  [97.1 °F (36.2 °C)-98 °F (36.7 °C)] 97.9 °F (36.6 °C)  Pulse:  [] 94  Resp:  [22-59] 28  SpO2:  [89 %-97 %] 94 %  BP: ()/(50-76) 139/64     Weight: 103 kg (227 lb)  Body mass index is 40.21 kg/m².    SpO2: (!) 94 %  O2 Device (Oxygen Therapy): BiPAP      Intake/Output Summary (Last 24 hours) at 10/16/2020 1506  Last data filed at 10/16/2020 1118  Gross per 24 hour   Intake 1030 ml   Output 1450 ml   Net -420 ml       Lines/Drains/Airways     Drain            Female External Urinary Catheter 09/01/20 1158 45 days    Female External Urinary Catheter 10/13/20 1359 3 days          Peripheral Intravenous Line                 Peripheral IV - Single Lumen 10/13/20 0920 20 G Right Antecubital 3 days         Peripheral IV - Single Lumen 10/13/20 1309 20 G Right Hand 3 days                Physical Exam   Constitutional: She appears well-developed and well-nourished. She appears ill.   Neck: Carotid bruit is not present.   Cardiovascular: Normal rate, S1 normal and S2 normal. An irregularly irregular rhythm present.    Pulmonary/Chest: She has decreased breath sounds in the right lower field and the left lower field. She has rales in the right middle field and the left middle field.   Musculoskeletal:      Right ankle: She exhibits swelling.      Left ankle: She exhibits swelling.   Skin: Skin is warm.     Current Medications:     atorvastatin  80 mg Oral QHS    ceFEPime (MAXIPIME) IVPB  1 g Intravenous Q24H    docusate sodium  100 mg Oral BID    escitalopram oxalate  10 mg Oral QHS    ferrous sulfate  325 mg Oral Daily    furosemide (LASIX) IV  80 mg Intravenous Q12H    gabapentin  100 mg Oral BID    metoprolol tartrate  50 mg Oral BID    metronidazole  500 mg Intravenous Q8H    miconazole NITRATE 2 %   Topical (Top) BID    sodium chloride 3%  4 mL Nebulization Q8H    vitamin renal formula (B-complex-vitamin c-folic acid)  1 capsule Oral Daily     Current Laboratory Results:    Recent Results (from the past 24 hour(s))   Anti-Xa Heparin Monitoring    Collection Time: 10/15/20  5:54 PM   Result Value Ref Range    Heparin Anti-Xa 1.47 (H) 0.30 - 0.70 IU/mL   Anti-Xa Heparin Monitoring    Collection Time: 10/15/20  9:21 PM   Result Value Ref Range    Heparin Anti-Xa 1.04 (H) 0.30 - 0.70 IU/mL   Basic metabolic panel    Collection Time: 10/16/20  3:29 AM   Result Value Ref Range    Sodium 142 136 - 145 mmol/L    Potassium 4.5 3.5 - 5.1 mmol/L    Chloride 95 95 - 110 mmol/L    CO2 36 (H) 23 - 29 mmol/L    Glucose 100 70 - 110 mg/dL    BUN, Bld 36 (H) 8 - 23 mg/dL    Creatinine 1.8 (H) 0.5 - 1.4 mg/dL    Calcium 8.7 8.7 - 10.5 mg/dL    Anion Gap 11 8 - 16 mmol/L    eGFR if African American 32 (A) >60 mL/min/1.73 m^2    eGFR if non African American 28 (A) >60 mL/min/1.73 m^2   Magnesium    Collection Time: 10/16/20  3:29 AM   Result Value Ref Range    Magnesium 2.0 1.6 - 2.6 mg/dL   Phosphorus    Collection Time: 10/16/20  3:29 AM   Result Value Ref Range    Phosphorus 4.1 2.7 - 4.5 mg/dL   CBC auto differential     Collection Time: 10/16/20  3:29 AM   Result Value Ref Range    WBC 8.96 3.90 - 12.70 K/uL    RBC 3.61 (L) 4.00 - 5.40 M/uL    Hemoglobin 9.9 (L) 12.0 - 16.0 g/dL    Hematocrit 33.2 (L) 37.0 - 48.5 %    Mean Corpuscular Volume 92 82 - 98 fL    Mean Corpuscular Hemoglobin 27.4 27.0 - 31.0 pg    Mean Corpuscular Hemoglobin Conc 29.8 (L) 32.0 - 36.0 g/dL    RDW 15.0 (H) 11.5 - 14.5 %    Platelets 180 150 - 350 K/uL    MPV 11.0 9.2 - 12.9 fL    Immature Granulocytes 0.3 0.0 - 0.5 %    Gran # (ANC) 7.2 1.8 - 7.7 K/uL    Immature Grans (Abs) 0.03 0.00 - 0.04 K/uL    Lymph # 0.7 (L) 1.0 - 4.8 K/uL    Mono # 0.9 0.3 - 1.0 K/uL    Eos # 0.1 0.0 - 0.5 K/uL    Baso # 0.02 0.00 - 0.20 K/uL    nRBC 0 0 /100 WBC    Gran% 80.2 (H) 38.0 - 73.0 %    Lymph% 8.3 (L) 18.0 - 48.0 %    Mono% 9.5 4.0 - 15.0 %    Eosinophil% 1.5 0.0 - 8.0 %    Basophil% 0.2 0.0 - 1.9 %    Differential Method Automated    Anti-Xa Heparin Monitoring    Collection Time: 10/16/20  3:29 AM   Result Value Ref Range    Heparin Anti-Xa 0.72 (H) 0.30 - 0.70 IU/mL   Culture, Respiratory with Gram Stain    Collection Time: 10/16/20  9:27 AM    Specimen: Sputum, Induced; Respiratory   Result Value Ref Range    Gram Stain (Respiratory) Rare WBC's     Gram Stain (Respiratory) Few Gram positive cocci    Vancomycin, Random    Collection Time: 10/16/20  2:08 PM   Result Value Ref Range    Vancomycin, Random 14.3 Not established ug/mL     Current Imaging Results:    US Lower Extremity Veins Bilateral   Final Result      No evidence of deep venous thrombosis in either lower extremity.         Electronically signed by: Ceasar Carreon MD   Date:    10/15/2020   Time:    21:53      CT Chest Without Contrast   Final Result      The findings highly concerning for extensive diffuse pulmonary infection with bilateral pleural fluid collections right greater than left.         Electronically signed by: Regulo Bassett MD   Date:    10/15/2020   Time:    12:01      X-Ray Chest AP Portable    Final Result      Interval worsening of diffuse bilateral airspace disease and moderate bilateral pleural effusions when compared to 09/09/2020         Electronically signed by: Halina Plascencia   Date:    10/13/2020   Time:    09:40          10/15/2020: Echo:    The left ventricle is normal in size with normal systolic function. The estimated ejection fraction is 60%.  A diastolic pattern consistent with atrial fibrillation observed.  Moderate left atrial enlargement.  Mild right ventricular enlargement.  Normal right ventricular systolic function.  Severe right atrial enlargement.  The aortic valve is mildly sclerotic.  The mitral valve is mildly sclerotic.  Mild mitral regurgitation.  Mild tricuspid regurgitation.  There is mild pulmonary hypertension.  The estimated PA systolic pressure is 49 mmHg.  Intermediate central venous pressure (8 mmHg).  Small circumferential pericardial effusion.  There is a left pleural effusion.      Assessment and Plan:     Problem List:    Active Diagnoses:    Diagnosis Date Noted POA    PRINCIPAL PROBLEM:  Acute on chronic respiratory failure with hypoxia and hypercapnia [J96.21, J96.22] 08/03/2016 Yes    Atrial fibrillation with rapid ventricular response [I48.91] 09/01/2020 Yes    Current moderate episode of major depressive disorder without prior episode [F32.1] 04/30/2020 Yes    Normocytic anemia [D64.9] 08/03/2016 Yes    Chronic diastolic congestive heart failure [I50.32] 05/20/2016 Yes    Chronic kidney disease (CKD) stage G4/A1, severely decreased glomerular filtration rate (GFR) between 15-29 mL/min/1.73 square meter and albuminuria creatinine ratio less than 30 mg/g [N18.4]  Yes    Dyslipidemia [E78.5] 02/22/2016 Yes    Essential hypertension [I10] 10/15/2014 Yes      Problems Resolved During this Admission:    Diagnosis Date Noted Date Resolved POA    Acute on chronic congestive heart failure [I50.9] 10/13/2020 10/13/2020 Yes    Pulmonary nodules/lesions,  multiple [R91.8] 03/22/2019 10/14/2020 Yes    Paroxysmal atrial fibrillation [I48.0] 08/03/2016 10/13/2020 Yes    Acute on chronic combined systolic and diastolic heart failure [I50.43] 08/03/2016 10/13/2020 Unknown    Chronic hypercapnic respiratory failure [J96.12] 03/11/2016 10/13/2020 Yes    Renal carcinoma [C64.9] 03/10/2015 10/13/2020 Yes     Assessment and Plan:     1. Heart Failure, Diastolic, Acute on Chronic              8/24/2020: Echo: Normal left ventricular size and systolic function. Mildly dilated LA.   10/15/2020: Echo: Normal left ventricular size and systolic function. EF 60%. Moderately dilated LA. Mildly dilated RV. SPAP 49 mmHg. Small pericardial effusion.              At NH been on furosemide 40 mg Q24.              10/13/2020: Presents fluid overloaded in HF.              On furosemide 80 mg iv Q12.   On fluid restriction.              Continue diuresis.     2. Atrial Fibrillation              In chronic atrial fibrillation.              On apixiban.              On metoprolol 50 mg Q12.              Rate well controlled.     3. Chronic Anticoagulation              Been on apixiban 5 mg Q12.   10/16/2020: Apixiban 5 mg Q12 was changed to heparin iv.    4. Hypertension   On metoprolol 50 mg Q12.   Expect blood pressure to come down with diuresis.                5. Chronic Kidney Disease, Stage 4              History or renal carcinoma and nephrectomy.              10/13/2020: BUN/crea 27/2.0. CrCl 28.     6. Chronic Obstructive Pulmonary Disease              Chronic respiratory failure with CO2 retention.               10/13/2020: 7.23/94/73/39/90% on FiO2 of 40%.      VTE Risk Mitigation (From admission, onward)         Ordered     heparin 25,000 units in dextrose 5% 250 mL (100 units/mL) infusion LOW INTENSITY nomogram  Continuous     Question:  Heparin Infusion Adjustment (DO NOT MODIFY ANSWER)  Answer:  [email or web address]\Images\Pharmacy\HeparinInfusions\heparin LOW INTENSITY nomogram  with ANTI-XA [id number]    10/15/20 1438     heparin 25,000 units in dextrose 5% (100 units/ml) IV bolus from bag - ADDITIONAL PRN BOLUS - 60 units/kg  As needed (PRN)     Question:  Heparin Infusion Adjustment (DO NOT MODIFY ANSWER)  Answer:  [email or web address]\Images\Pharmacy\HeparinInfusions\heparin LOW INTENSITY nomogram with ANTI-XA [id number]    10/15/20 1438     heparin 25,000 units in dextrose 5% (100 units/ml) IV bolus from bag - ADDITIONAL PRN BOLUS - 30 units/kg  As needed (PRN)     Question:  Heparin Infusion Adjustment (DO NOT MODIFY ANSWER)  Answer:  [email or web address]\epic\Images\Pharmacy\HeparinInfusions\heparin LOW INTENSITY nomogram with ANTI-XA [id number]    10/15/20 1438     Place sequential compression device  Until discontinued      10/13/20 1405     IP VTE HIGH RISK PATIENT  Once      10/13/20 1405                Diana Meredith MD  Cardiology  [facility]

## 2020-10-16 NOTE — PLAN OF CARE
Patient received on Bipap settings as documented.  Sats 93%. No prn tx required. Pt. in no distress, will continue to monitor.

## 2020-10-16 NOTE — PT/OT/SLP PROGRESS
Physical Therapy      Patient Name:  Patsy Morris   MRN:  [id number]    Patient not seen today secondary to Other (Comment)(Nurse reports pt O2 desaturation with minimal movement.  Nurse spoke to pt re; participating in PT, pt deferred.). Will follow-up 10/19/2020.    Ramon Buenrostro, PT

## 2020-10-16 NOTE — PROGRESS NOTES
Pharmacokinetic Assessment Follow Up: IV Vancomycin    Vancomycin serum concentration assessment(s):    The random level was drawn correctly and can be used to guide therapy at this time. The measurement is within the desired definitive target range of 10 to 20 mcg/mL.    Vancomycin Regimen Plan:    Give 15 mg/kg (1500 mg) dose x 1. Re-dose when the random level is less than 20 mcg/mL, next level to be drawn at 1530 on 10/17/2020    Drug levels (last 3 results):  Recent Labs   Lab Result Units 10/16/20  1408   Vancomycin, Random ug/mL 14.3       Pharmacy will continue to follow and monitor vancomycin.    Please contact pharmacy at [phone] for questions regarding this assessment.    Thank you for the consult,   Sara Yung       Patient brief summary:  Patsy Morris is a 70 y.o. female initiated on antimicrobial therapy with IV Vancomycin for treatment of  pneumonia    The patient's current regimen is pulse dosing    Drug Allergies:   Review of patient's allergies indicates:  No Known Allergies    Actual Body Weight:   103 kg    Renal Function:   Estimated Creatinine Clearance: 32.4 mL/min (A) (based on SCr of 1.8 mg/dL (H)).,     Dialysis Method (if applicable):  N/A    CBC (last 72 hours):  Recent Labs   Lab Result Units 10/14/20  0341 10/15/20  0352 10/16/20  0329   WBC K/uL 9.21 9.39 8.96   Hemoglobin g/dL 9.5* 9.8* 9.9*   Hematocrit % 31.5* 32.3* 33.2*   Platelets K/uL 182 188 180   Gran% % 82.0* 79.9* 80.2*   Lymph% % 7.4* 8.2* 8.3*   Mono% % 9.2 10.1 9.5   Eosinophil% % 1.0 1.2 1.5   Basophil% % 0.2 0.3 0.2   Differential Method  Automated Automated Automated       Metabolic Panel (last 72 hours):  Recent Labs   Lab Result Units 10/14/20  0341 10/15/20  0352 10/16/20  0329   Sodium mmol/L 142 142 142   Potassium mmol/L 4.7 4.3 4.5   Chloride mmol/L 98 96 95   CO2 mmol/L 33* 36* 36*   Glucose mg/dL 98 110 100   BUN, Bld mg/dL 31* 36* 36*   Creatinine mg/dL 1.9* 2.0* 1.8*   Magnesium mg/dL 2.2 2.0  2.0   Phosphorus mg/dL 4.6* 4.5 4.1       Vancomycin Administrations:  vancomycin given in the last 96 hours                     vancomycin 2 g in dextrose 5 % 500 mL IVPB (mg) 2,000 mg New Bag 10/15/20 1541                    Microbiologic Results:  Microbiology Results (last 7 days)       Procedure Component Value Units Date/Time    Culture, Respiratory with Gram Stain [[id number]] Collected: 10/16/20 0927    Order Status: Completed Specimen: Respiratory from Sputum, Induced Updated: 10/16/20 1354     Gram Stain (Respiratory) Rare WBC's     Gram Stain (Respiratory) Few Gram positive cocci    Narrative:      Obtain after induced with saline nebs.    AFB Culture & Smear [[id number]] Collected: 10/16/20 0928    Order Status: Sent Specimen: Respiratory from Sputum, Induced Updated: 10/16/20 1116    AFB Culture & Smear [[id number]]     Order Status: No result Specimen: Respiratory from Sputum, Induced

## 2020-10-16 NOTE — PROGRESS NOTES
"[facility] Hospital Medicine  Progress Note    Patient Name: Patsy Morris  MRN: [id number]  Patient Class: IP- Inpatient   Admission Date: 10/13/2020  Length of Stay: 3 days  Attending Physician: Ravi Soria MD  Primary Care Provider: Luisana Cartagena MD        Subjective:     Principal Problem:Acute on chronic respiratory failure with hypoxia and hypercapnia        HPI:  Per ED:  "This is a 70 y.o. female with history of CHF and COPD who presents via EMS with complaint of shortness of breath that began a few days ago. Per EMS patient had O2 saturation of 72 on 3 liters if home oxygen upon their arrival. Patient states she is on 3 liters of home oxygen at baseline. She denies fever, cough, chest pain, nausea, vomiting, diarrhea, or rhinorrhea. She reports she was recently discharged from the hospital for similar symptoms. She reports compliance with her home medications. She recently became resident of [facility] after last hospitalization. She is a former smoker. She denies undergoing any surgeries".    The patient is a 70 year old female with a PMH significant for of HTN, Chronic Respiratory Failure (COPD and BHAVANI/OHS), HFpEF, Obesity who presented to the ED with complaints of SOB that began about 3 days ago.  Patient is on 3L O2NC at home.  She was seen by her Cardiologist about 4 days prior to admission and told to decrease her Lasix from 40mg bid to qday.  She denies chest pain.  No Fevers.  No cough.  States she has been adherent with her medications.  Patient was recently admitted to [facility] from 9/1-9/11 for Acute on Chronic Respiratory Failure and discharged to SNF.  Patient was placed on BiPAP in ED and admitted to ICU.  Patient feeling better on BiPAP.        Overview/Hospital Course:  Patient admitted to Inpatient Status      Interval History: Patient overall stable overnight.  Still on BiPAP and able to come off for meals.  Slow to improve from a respiratory " standpoint.    Review of Systems   Constitutional: Positive for activity change. Negative for chills and fever.   HENT: Negative for ear pain.    Eyes: Negative for pain.   Respiratory: Positive for shortness of breath. Negative for cough.    Cardiovascular: Negative for chest pain, palpitations and leg swelling.   Gastrointestinal: Negative for abdominal pain, constipation, diarrhea, nausea and vomiting.   Endocrine: Negative for polydipsia, polyphagia and polyuria.   Genitourinary: Negative for difficulty urinating and dysuria.   Musculoskeletal: Negative for neck pain.   Skin: Negative for rash.   Neurological: Negative for dizziness and headaches.   Hematological: Does not bruise/bleed easily.   Psychiatric/Behavioral: Negative for agitation and behavioral problems.     Objective:     Vital Signs (Most Recent):  Temp: 97.9 °F (36.6 °C) (10/16/20 1131)  Pulse: 94 (10/16/20 1151)  Resp: (!) 28 (10/16/20 1151)  BP: 139/64 (10/16/20 0600)  SpO2: (!) 94 % (10/16/20 1151) Vital Signs (24h Range):  Temp:  [97.1 °F (36.2 °C)-98 °F (36.7 °C)] 97.9 °F (36.6 °C)  Pulse:  [] 94  Resp:  [22-59] 28  SpO2:  [89 %-97 %] 94 %  BP: ()/(50-76) 139/64     Weight: 103 kg (227 lb)  Body mass index is 40.21 kg/m².    Intake/Output Summary (Last 24 hours) at 10/16/2020 1314  Last data filed at 10/16/2020 1118  Gross per 24 hour   Intake 1030 ml   Output 2150 ml   Net -1120 ml      Physical Exam  Constitutional:       General: She is not in acute distress.     Appearance: She is obese. She is not toxic-appearing.   HENT:      Head: Normocephalic and atraumatic.      Right Ear: External ear normal.      Left Ear: External ear normal.      Nose: Nose normal.      Mouth/Throat:      Mouth: Mucous membranes are moist.      Pharynx: Oropharynx is clear.   Eyes:      General: No scleral icterus.     Conjunctiva/sclera: Conjunctivae normal.   Neck:      Musculoskeletal: Normal range of motion and neck supple.   Cardiovascular:       "Rate and Rhythm: Normal rate. Rhythm irregular.      Pulses: Normal pulses.      Heart sounds: No murmur.   Pulmonary:      Effort: No respiratory distress.      Breath sounds: Rales present. No wheezing.      Comments: On BiPAP and stable  SOB while talking  Abdominal:      General: Abdomen is flat. Bowel sounds are normal. There is no distension.      Palpations: Abdomen is soft.      Tenderness: There is no abdominal tenderness.   Musculoskeletal:      Right lower leg: No edema.      Left lower leg: No edema.   Skin:     General: Skin is warm.      Coloration: Skin is not jaundiced.      Findings: No rash.   Neurological:      General: No focal deficit present.      Mental Status: She is alert. Mental status is at baseline.   Psychiatric:         Mood and Affect: Mood normal.         Behavior: Behavior normal.         Significant Labs: All pertinent labs within the past 24 hours have been reviewed.    Significant Imaging: I have reviewed all pertinent imaging results/findings within the past 24 hours.      Assessment/Plan:      * Acute on chronic respiratory failure with hypoxia and hypercapnia  Chronic Respiratory Failure on 3 liters of Oxygen at home - COPD, BHAVANI/OHS with severe restrictive lung disease.    Recent admission from 8/22-8/31 - treated at that time with Afib-RVR with CHF (acute diastolic) and COPD exacerbation (s/p antibiotics and steroids) and transferred to SNF.  Readmitted from 9/1-9/11/2020 with Afib-RVR with CHF and d/c to SNF with Trilogy.  Patient states she has been using her "CPAP" at home.  Presented to SOB over past 3 days PTA.  Placed on BiPAP and given Lasix 80mg IV x 1 in ED.  Transferred to ICU.  Started on IV Vanc, Flagyl, and Cefepime on 10/15/2020 by Pulmonary-CC given CT Chest  On BiPAP this morning.  Pox 93 (40% FiO2).  I/O of 1230/2250 with net negative 3030ml.      CXR on re-admission Interval worsening of diffuse bilateral airspace disease and moderate bilateral pleural " "effusions when compared to 09/09/2020  CT Chest Without on 10/15/2020 - The findings highly concerning for extensive diffuse pulmonary infection with bilateral pleural fluid collections right greater than left.  US of LEs on 10/15/2020 - No evidence of deep venous thrombosis in either lower extremity.     Labs on Admission:  WBC 9.22  COVID-19 rapid negative  Trop negative x 3     PH 7.227/pCO2 94.4/pO2 73  Procal normal  Lactic Acid normal  Quantiferon pending    Seen by Pulmonary - "Acute on chronic hypercapnic and hypoxemic respiratory failure: alternating bipap and nasal cannula.  Patient should use bipap when napping and overnight. Plan for echo, US of LE, and CT Chest to further explore issues with oxygen delivery  Chronic diastolic heart failure: patient responded well to diuresis.  Appears to be volume contracted.  Would recommend maintaining a net even volume status going forward.  Pulmonary nodules: CT Chest to re-evaluate pulmonary nodules. ".     Seen by Cardiology - "Heart Failure, Diastolic, Acute on Chronic - 8/24/2020: Echo: Normal left ventricular size and systolic function. Mildly dilated LA.  At NH been on furosemide 40 mg Q24.  10/13/2020: Presents fluid overloaded in HF.  On furosemide iv Q12. Continue diuresis".     Plan:  Continue Antibiotics - Vanc, Flagyl, and Cefepime - Day#2  Continue with Lasix - increased to 80mg q12 by Pulmonary-CC  Continue BiPAP   Follow up with Pulmonary recommendations  Strict I&Os  Fluid Restrict to 1.5L for now  DuoNebs        Atrial fibrillation with rapid ventricular response   in ED.  Home Meds:  Metoprolol 50mg bid and Apixaban 5mg bid  Followed by Cardiology, Dr. Lutz - last seen outpatient on 10/8/2020  HR much improved this morning  -Continue current medications      Chronic kidney disease (CKD) stage G4/A1, severely decreased glomerular filtration rate (GFR) between 15-29 mL/min/1.73 square meter and albuminuria creatinine ratio less than 30 " mg/g  Creatinine of 2.0 on admission and was 1.9 on discharge 9/11/2020.  Her baseline is around 1.8.  Creatinine 1.8 this morning  -Renally dose medication for CrCl of <30  -Avoid Nephrotoxic medications if able  -Monitor urine output  -Follow closely      Chronic diastolic congestive heart failure  Home Meds:  Lasix 40mg qday (was bid and recently changed  BNP elevated at 577 on last admission and now 968 this admission  See above.     TTE on 8/24/2020 - Mild left atrial enlargement.  · Diastolic pattern consistent with atrial fibrillation observed.  · Normal left ventricular systolic function. The estimated ejection fraction is 58%.  · No wall motion abnormalities.  · Normal right ventricular systolic function.  · Mild right atrial enlargement.  Small posterior pericardial effusion.     Plan:  Lasix to 40mg po q12  Continue Bipap as above        Current moderate episode of major depressive disorder without prior episode  Continue Lexapro      Normocytic anemia  Hgb 10.0 on admission and stable at 9.9 today.  Stable since last admission.  Ferritin 25 on 8/25/2020 and Fe sat 18% on 9/5/2020 - continue outpatient FeSO4 325mg daily  B12 301 and Folate 4.6 - both borderline low - continue outpatient Nephrocaps daily      Dyslipidemia  Continue Statin      Essential hypertension  Continue Metoprolol and Lasix.  BP stable.        VTE Risk Mitigation (From admission, onward)         Ordered     heparin 25,000 units in dextrose 5% 250 mL (100 units/mL) infusion LOW INTENSITY nomogram  Continuous     Question:  Heparin Infusion Adjustment (DO NOT MODIFY ANSWER)  Answer:  [email or web address]\Images\Pharmacy\HeparinInfusions\heparin LOW INTENSITY nomogram with ANTI-XA [id number]    10/15/20 1438     heparin 25,000 units in dextrose 5% (100 units/ml) IV bolus from bag - ADDITIONAL PRN BOLUS - 60 units/kg  As needed (PRN)     Question:  Heparin Infusion Adjustment (DO NOT MODIFY ANSWER)  Answer:   \\[email or web address]\[email or web address]\Images\Pharmacy\HeparinInfusions\heparin LOW INTENSITY nomogram with ANTI-XA [id number]    10/15/20 1438     heparin 25,000 units in dextrose 5% (100 units/ml) IV bolus from bag - ADDITIONAL PRN BOLUS - 30 units/kg  As needed (PRN)     Question:  Heparin Infusion Adjustment (DO NOT MODIFY ANSWER)  Answer:  \[email or web address]\Images\Pharmacy\HeparinInfusions\heparin LOW INTENSITY nomogram with ANTI-XA [id number]    10/15/20 1438     heparin 25,000 units in dextrose 5% (100 units/ml) IV bolus from bag INITIAL BOLUS  Once     Question:  Heparin Infusion Adjustment (DO NOT MODIFY ANSWER)  Answer:  \[email or web address]\Images\Pharmacy\HeparinInfusions\heparin LOW INTENSITY nomogram with ANTI-XA [id number]    10/15/20 1438     Place sequential compression device  Until discontinued      10/13/20 1405     IP VTE HIGH RISK PATIENT  Once      10/13/20 1405                Discharge Planning   YUDELKA:      Code Status: Full Code   Is the patient medically ready for discharge?:     Reason for patient still in hospital (select all that apply): Patient trending condition, Treatment and Consult recommendations  Discharge Plan A: Home, Home Health                  Ravi Soria MD  Department of Hospital Medicine   [facility]

## 2020-10-16 NOTE — ASSESSMENT & PLAN NOTE
"Chronic Respiratory Failure on 3 liters of Oxygen at home - COPD, BHAVANI/OHS with severe restrictive lung disease.    Recent admission from 8/22-8/31 - treated at that time with Afib-RVR with CHF (acute diastolic) and COPD exacerbation (s/p antibiotics and steroids) and transferred to SNF.  Readmitted from 9/1-9/11/2020 with Afib-RVR with CHF and d/c to SNF with Trilogy.  Patient states she has been using her "CPAP" at home.  Presented to SOB over past 3 days PTA.  Placed on BiPAP and given Lasix 80mg IV x 1 in ED.  Transferred to ICU.  Started on IV Vanc, Flagyl, and Cefepime on 10/15/2020 by Pulmonary-CC given CT Chest  On BiPAP this morning.  Pox 93 (40% FiO2).  I/O of 1230/2250 with net negative 3030ml.      CXR on re-admission Interval worsening of diffuse bilateral airspace disease and moderate bilateral pleural effusions when compared to 09/09/2020  CT Chest Without on 10/15/2020 - The findings highly concerning for extensive diffuse pulmonary infection with bilateral pleural fluid collections right greater than left.  US of LEs on 10/15/2020 - No evidence of deep venous thrombosis in either lower extremity.     Labs on Admission:  WBC 9.22  COVID-19 rapid negative  Trop negative x 3     PH 7.227/pCO2 94.4/pO2 73  Procal normal  Lactic Acid normal  Quantiferon pending    Seen by Pulmonary - "Acute on chronic hypercapnic and hypoxemic respiratory failure: alternating bipap and nasal cannula.  Patient should use bipap when napping and overnight. Plan for echo, US of LE, and CT Chest to further explore issues with oxygen delivery  Chronic diastolic heart failure: patient responded well to diuresis.  Appears to be volume contracted.  Would recommend maintaining a net even volume status going forward.  Pulmonary nodules: CT Chest to re-evaluate pulmonary nodules. ".     Seen by Cardiology - "Heart Failure, Diastolic, Acute on Chronic - 8/24/2020: Echo: Normal left ventricular size and systolic function. Mildly " "dilated LA.  At NH been on furosemide 40 mg Q24.  10/13/2020: Presents fluid overloaded in HF.  On furosemide iv Q12. Continue diuresis".     Plan:  Continue Antibiotics - Vanc, Flagyl, and Cefepime - Day#2  Continue with Lasix - increased to 80mg q12 by Pulmonary-CC  Continue BiPAP   Follow up with Pulmonary recommendations  Strict I&Os  Fluid Restrict to 1.5L for now  DuoNebs      "

## 2020-10-16 NOTE — EICU
Rounding (Video Assessment):  No    Intervention Initiated From:  Bedside    Mazin Communicated with Bedside Nurse regarding:  Labs    Nurse Notified:  No    Doctor Notified:  Yes    Comments: bedside nurse called to report critical lab. Call transferred to Dr. Villalpando

## 2020-10-16 NOTE — SUBJECTIVE & OBJECTIVE
Interval History: Patient overall stable overnight.  Still on BiPAP and able to come off for meals.  Slow to improve from a respiratory standpoint.    Review of Systems   Constitutional: Positive for activity change. Negative for chills and fever.   HENT: Negative for ear pain.    Eyes: Negative for pain.   Respiratory: Positive for shortness of breath. Negative for cough.    Cardiovascular: Negative for chest pain, palpitations and leg swelling.   Gastrointestinal: Negative for abdominal pain, constipation, diarrhea, nausea and vomiting.   Endocrine: Negative for polydipsia, polyphagia and polyuria.   Genitourinary: Negative for difficulty urinating and dysuria.   Musculoskeletal: Negative for neck pain.   Skin: Negative for rash.   Neurological: Negative for dizziness and headaches.   Hematological: Does not bruise/bleed easily.   Psychiatric/Behavioral: Negative for agitation and behavioral problems.     Objective:     Vital Signs (Most Recent):  Temp: 97.9 °F (36.6 °C) (10/16/20 1131)  Pulse: 94 (10/16/20 1151)  Resp: (!) 28 (10/16/20 1151)  BP: 139/64 (10/16/20 0600)  SpO2: (!) 94 % (10/16/20 1151) Vital Signs (24h Range):  Temp:  [97.1 °F (36.2 °C)-98 °F (36.7 °C)] 97.9 °F (36.6 °C)  Pulse:  [] 94  Resp:  [22-59] 28  SpO2:  [89 %-97 %] 94 %  BP: ()/(50-76) 139/64     Weight: 103 kg (227 lb)  Body mass index is 40.21 kg/m².    Intake/Output Summary (Last 24 hours) at 10/16/2020 1314  Last data filed at 10/16/2020 1118  Gross per 24 hour   Intake 1030 ml   Output 2150 ml   Net -1120 ml      Physical Exam  Constitutional:       General: She is not in acute distress.     Appearance: She is obese. She is not toxic-appearing.   HENT:      Head: Normocephalic and atraumatic.      Right Ear: External ear normal.      Left Ear: External ear normal.      Nose: Nose normal.      Mouth/Throat:      Mouth: Mucous membranes are moist.      Pharynx: Oropharynx is clear.   Eyes:      General: No scleral icterus.      Conjunctiva/sclera: Conjunctivae normal.   Neck:      Musculoskeletal: Normal range of motion and neck supple.   Cardiovascular:      Rate and Rhythm: Normal rate. Rhythm irregular.      Pulses: Normal pulses.      Heart sounds: No murmur.   Pulmonary:      Effort: No respiratory distress.      Breath sounds: Rales present. No wheezing.      Comments: On BiPAP and stable  SOB while talking  Abdominal:      General: Abdomen is flat. Bowel sounds are normal. There is no distension.      Palpations: Abdomen is soft.      Tenderness: There is no abdominal tenderness.   Musculoskeletal:      Right lower leg: No edema.      Left lower leg: No edema.   Skin:     General: Skin is warm.      Coloration: Skin is not jaundiced.      Findings: No rash.   Neurological:      General: No focal deficit present.      Mental Status: She is alert. Mental status is at baseline.   Psychiatric:         Mood and Affect: Mood normal.         Behavior: Behavior normal.         Significant Labs: All pertinent labs within the past 24 hours have been reviewed.    Significant Imaging: I have reviewed all pertinent imaging results/findings within the past 24 hours.

## 2020-10-17 PROBLEM — J98.4 CAVITARY PNEUMONIA: Status: RESOLVED | Noted: 2020-01-01 | Resolved: 2020-01-01

## 2020-10-17 PROBLEM — J18.9 CAVITARY PNEUMONIA: Status: RESOLVED | Noted: 2020-01-01 | Resolved: 2020-01-01

## 2020-10-17 NOTE — PROGRESS NOTES
"[facility] Hospital Medicine  Progress Note    Patient Name: Patsy Morris  MRN: [id number]  Patient Class: IP- Inpatient   Admission Date: 10/13/2020  Length of Stay: 4 days  Attending Physician: Ravi Soria MD  Primary Care Provider: Luisana Cartagena MD        Subjective:     Principal Problem:Acute on chronic respiratory failure with hypoxia and hypercapnia        HPI:  Per ED:  "This is a 70 y.o. female with history of CHF and COPD who presents via EMS with complaint of shortness of breath that began a few days ago. Per EMS patient had O2 saturation of 72 on 3 liters if home oxygen upon their arrival. Patient states she is on 3 liters of home oxygen at baseline. She denies fever, cough, chest pain, nausea, vomiting, diarrhea, or rhinorrhea. She reports she was recently discharged from the hospital for similar symptoms. She reports compliance with her home medications. She recently became resident of [facility] after last hospitalization. She is a former smoker. She denies undergoing any surgeries".    The patient is a 70 year old female with a PMH significant for of HTN, Chronic Respiratory Failure (COPD and BHAVANI/OHS), HFpEF, Obesity who presented to the ED with complaints of SOB that began about 3 days ago.  Patient is on 3L O2NC at home.  She was seen by her Cardiologist about 4 days prior to admission and told to decrease her Lasix from 40mg bid to qday.  She denies chest pain.  No Fevers.  No cough.  States she has been adherent with her medications.  Patient was recently admitted to [facility] from 9/1-9/11 for Acute on Chronic Respiratory Failure and discharged to SNF.  Patient was placed on BiPAP in ED and admitted to ICU.  Patient feeling better on BiPAP.        Overview/Hospital Course:  The patient is a 70 year old female with a PMH significant for of HTN, Chronic Respiratory Failure (COPD and BHAVANI/OHS - on BiPAP at home), HFpEF, Obesity who presented to the ED " with complaints of SOB that began about 3 days ago.  Patient is on 3L O2NC at home. Patient presented to SOB over past 3 days PTA.  Placed on BiPAP and given Lasix 80mg IV x 1 in ED and Transferred to ICU. Continue diuresis, but patient slow to improved from a respiratory standpoint.  She was started on IV Vanc, Flagyl, and Cefepime on 10/15/2020 by Pulmonary-CC given CT Chest concerning for infection. Quantiferon sent and Sputum AFB ordered given cavitary appearance on imaging.    Interval History: Patient overall stable overnight.  Still on BiPAP and able to come off for meals.  Feels a little better than yesterday.    Review of Systems   Constitutional: Positive for activity change. Negative for chills and fever.   HENT: Negative for ear pain.    Eyes: Negative for pain.   Respiratory: Positive for shortness of breath. Negative for cough.    Cardiovascular: Negative for chest pain, palpitations and leg swelling.   Gastrointestinal: Negative for abdominal pain, constipation, diarrhea, nausea and vomiting.   Endocrine: Negative for polydipsia, polyphagia and polyuria.   Genitourinary: Negative for difficulty urinating and dysuria.   Musculoskeletal: Negative for neck pain.   Skin: Negative for rash.   Neurological: Negative for dizziness and headaches.   Hematological: Does not bruise/bleed easily.   Psychiatric/Behavioral: Negative for agitation and behavioral problems.     Objective:     Vital Signs (Most Recent):  Temp: 98.2 °F (36.8 °C) (10/17/20 0715)  Pulse: (!) 112 (10/17/20 0831)  Resp: (!) 26 (10/17/20 0831)  BP: (!) 137/104 (10/17/20 0800)  SpO2: (!) 90 % (10/17/20 0831) Vital Signs (24h Range):  Temp:  [97.9 °F (36.6 °C)-98.3 °F (36.8 °C)] 98.2 °F (36.8 °C)  Pulse:  [] 112  Resp:  [19-71] 26  SpO2:  [88 %-98 %] 90 %  BP: (110-178)/() 137/104     Weight: 103 kg (227 lb)  Body mass index is 40.21 kg/m².    Intake/Output Summary (Last 24 hours) at 10/17/2020 0946  Last data filed at 10/17/2020  0800  Gross per 24 hour   Intake 1646.75 ml   Output 2350 ml   Net -703.25 ml      Physical Exam  Constitutional:       General: She is not in acute distress.     Appearance: She is obese. She is not toxic-appearing.   HENT:      Head: Normocephalic and atraumatic.      Right Ear: External ear normal.      Left Ear: External ear normal.      Nose: Nose normal.      Mouth/Throat:      Mouth: Mucous membranes are moist.      Pharynx: Oropharynx is clear.   Eyes:      General: No scleral icterus.     Conjunctiva/sclera: Conjunctivae normal.   Neck:      Musculoskeletal: Normal range of motion and neck supple.   Cardiovascular:      Rate and Rhythm: Normal rate. Rhythm irregular.      Pulses: Normal pulses.      Heart sounds: No murmur.   Pulmonary:      Effort: No respiratory distress.      Breath sounds: Rales present. No wheezing.      Comments: On BiPAP and stable  SOB while talking  Abdominal:      General: Abdomen is flat. Bowel sounds are normal. There is no distension.      Palpations: Abdomen is soft.      Tenderness: There is no abdominal tenderness.   Musculoskeletal:      Right lower leg: No edema.      Left lower leg: No edema.   Skin:     General: Skin is warm.      Coloration: Skin is not jaundiced.      Findings: No rash.   Neurological:      General: No focal deficit present.      Mental Status: She is alert. Mental status is at baseline.   Psychiatric:         Mood and Affect: Mood normal.         Behavior: Behavior normal.         Significant Labs: All pertinent labs within the past 24 hours have been reviewed.    Significant Imaging: I have reviewed all pertinent imaging results/findings within the past 24 hours.      Assessment/Plan:      * Acute on chronic respiratory failure with hypoxia and hypercapnia  Chronic Respiratory Failure on 3 liters of Oxygen at home - COPD, BHAVANI/OHS with severe restrictive lung disease.      This Admission she presented to SOB over past 3 days PTA.  Placed on BiPAP and  "given Lasix 80mg IV x 1 in ED.  Transferred to ICU.  Started on IV Vanc, Flagyl, and Cefepime on 10/15/2020 by Pulmonary-CC given CT Chest concerning for infection. Quantiferon sent and Sputum AFB ordered given cavitary appearance on imaging.  On BiPAP this morning.  Pox 96 (40% FiO2).    I/O of 1603.7/2350 with net negative 3776.4ml.      Recent admission from 8/22-8/31 - treated at that time with Afib-RVR with CHF (acute diastolic) and COPD exacerbation (s/p antibiotics and steroids) and transferred to SNF.    Readmitted from 9/1-9/11/2020 with Afib-RVR with CHF and d/c to SNF with Trilogy.  Patient states she has been using her "CPAP" at home.    CXR on 10/13/2020 -  Interval worsening of diffuse bilateral airspace disease and moderate bilateral pleural effusions when compared to 09/09/2020  CT Chest Without on 10/15/2020 - The findings highly concerning for extensive diffuse pulmonary infection with bilateral pleural fluid collections right greater than left.  US of LEs on 10/15/2020 - No evidence of deep venous thrombosis in either lower extremity.    Labs on Admission:  WBC 9.22  COVID-19 rapid negative  Trop negative x 3     PH 7.227/pCO2 94.4/pO2 73  Procal normal  Lactic Acid normal  Quantiferon pending  Sputum AFB x 3 pending    Seen by Pulmonary - "Pt with recurrent hospitalization for decompensated heart failure, presenting with fluid overload resulting in hypercapnic respiratory failure. Component of restrictive lung disease decreasing ability to compensate Ventilation with worsened pulmonary edema.  CT chest showing cavitary lesion with surround consolidation/ggo concerning for infectious process; treating with abx and obtaining sputum for respiratory cultures and Mycobacterial culture/smear....CT showing cavitary lesion with surrounding consolidation and GGO concerning for infectious vs malignant process, with significant ggo throughout the lung likely secondary to continued pulmonary edema - " "increased lasix 80 mg BID ".     Seen by Cardiology - "Heart Failure, Diastolic, Acute on Chronic - 8/24/2020: Echo: Normal left ventricular size and systolic function. Mildly dilated LA.  At NH been on furosemide 40 mg Q24.  10/13/2020: Presents fluid overloaded in HF.  On furosemide iv Q12. Continue diuresis".     Plan:  Continue Antibiotics - Vanc, Flagyl, and Cefepime - Day#3  Continue with Lasix - increased to 80mg q12 by Pulmonary-CC on 10/6/2020  Continue BiPAP   Follow up with Pulmonary recommendations  Strict I&Os  Fluid Restrict to 1.5L for now  DuoNebs        Atrial fibrillation with rapid ventricular response   in ED. Has been well controlled since.  Home Meds:  Metoprolol 50mg bid and Apixaban 5mg bid  Followed by Cardiology, Dr. Lutz - last seen outpatient on 10/8/2020  HR stable.  -Continue current medications      Chronic kidney disease (CKD) stage G4/A1, severely decreased glomerular filtration rate (GFR) between 15-29 mL/min/1.73 square meter and albuminuria creatinine ratio less than 30 mg/g  Creatinine of 2.0 on admission and was 1.9 on discharge 9/11/2020.  Her baseline is around 1.8.  Creatinine 1.7 this morning  -Renally dose medication for CrCl of <30  -Avoid Nephrotoxic medications if able  -Monitor urine output  -Follow closely      Chronic diastolic congestive heart failure  Home Meds:  Lasix 40mg qday (was bid and recently changed as above)  BNP elevated at 577 on last admission and now 968 this admission  See above.     TTE on 8/24/2020 - Mild left atrial enlargement.  · Diastolic pattern consistent with atrial fibrillation observed.  · Normal left ventricular systolic function. The estimated ejection fraction is 58%.  · No wall motion abnormalities.  · Normal right ventricular systolic function.  · Mild right atrial enlargement.  Small posterior pericardial effusion.     Plan:  Lasix to 80mg po q12  Continue Bipap as above        Current moderate episode of major depressive " disorder without prior episode  Continue Lexapro      Normocytic anemia  Hgb 10.0 on admission and stable at 10.1 today.  Stable since last admission.  Ferritin 25 on 8/25/2020 and Fe sat 18% on 9/5/2020 - continue outpatient FeSO4 325mg daily  B12 301 and Folate 4.6 - both borderline low - continue outpatient Nephrocaps daily      Dyslipidemia  Continue Statin      Essential hypertension  Continue Metoprolol and Lasix.  BP stable.        VTE Risk Mitigation (From admission, onward)         Ordered     heparin 25,000 units in dextrose 5% 250 mL (100 units/mL) infusion LOW INTENSITY nomogram  Continuous     Question:  Heparin Infusion Adjustment (DO NOT MODIFY ANSWER)  Answer:  \\[email or web address]\epic\Images\Pharmacy\HeparinInfusions\heparin LOW INTENSITY nomogram with ANTI-XA [id number]    10/15/20 1438     heparin 25,000 units in dextrose 5% (100 units/ml) IV bolus from bag - ADDITIONAL PRN BOLUS - 60 units/kg  As needed (PRN)     Question:  Heparin Infusion Adjustment (DO NOT MODIFY ANSWER)  Answer:  \\[email or web address]\epic\Images\Pharmacy\HeparinInfusions\heparin LOW INTENSITY nomogram with ANTI-XA [id number]    10/15/20 1438     heparin 25,000 units in dextrose 5% (100 units/ml) IV bolus from bag - ADDITIONAL PRN BOLUS - 30 units/kg  As needed (PRN)     Question:  Heparin Infusion Adjustment (DO NOT MODIFY ANSWER)  Answer:  \\[email or web address]\epic\Images\Pharmacy\HeparinInfusions\heparin LOW INTENSITY nomogram with ANTI-XA [id number]    10/15/20 1438     Place sequential compression device  Until discontinued      10/13/20 1405     IP VTE HIGH RISK PATIENT  Once      10/13/20 1405                Discharge Planning   YUDELKA:      Code Status: Full Code   Is the patient medically ready for discharge?:     Reason for patient still in hospital (select all that apply): Patient unstable, Patient trending condition, Treatment and Consult recommendations  Discharge Plan A: Home, Home Health                  Ravi Soria,  MD  Department of Hospital Medicine   [facility]

## 2020-10-17 NOTE — PROGRESS NOTES
Pharmacokinetic Assessment Follow Up: IV Vancomycin    Vancomycin serum concentration assessment(s):    The trough level was drawn correctly and can be used to guide therapy at this time. The measurement is within the desired definitive target range of 10 to 20 mcg/mL.  Trough was drawn an hour earlier, and renal function slightly improved.     Vancomycin Regimen Plan:    Re-dose when the random level is less than 20 mcg/mL, next level to be drawn at 2000 on 10/18    Drug levels (last 3 results):  Recent Labs   Lab Result Units 10/16/20  1408 10/17/20  1631   Vancomycin, Random ug/mL 14.3 19.5       Pharmacy will continue to follow and monitor vancomycin.    Please contact pharmacy at extension [phone] for questions regarding this assessment.    Thank you for the consult,   Bertha Kemp       Patient brief summary:  Patsy Morris is a 70 y.o. female initiated on antimicrobial therapy with IV Vancomycin for treatment of  pneumonia    The patient's current regimen is pulse dosing.    Drug Allergies:   Review of patient's allergies indicates:  No Known Allergies    Actual Body Weight:   103kg    Renal Function:   Estimated Creatinine Clearance: 34.3 mL/min (A) (based on SCr of 1.7 mg/dL (H)).,     Dialysis Method (if applicable):  N/A    CBC (last 72 hours):  Recent Labs   Lab Result Units 10/15/20  0352 10/16/20  0329 10/17/20  0215   WBC K/uL 9.39 8.96 9.91   Hemoglobin g/dL 9.8* 9.9* 10.1*   Hematocrit % 32.3* 33.2* 33.1*   Platelets K/uL 188 180 177   Gran% % 79.9* 80.2* 82.0*   Lymph% % 8.2* 8.3* 6.9*   Mono% % 10.1 9.5 9.7   Eosinophil% % 1.2 1.5 1.1   Basophil% % 0.3 0.2 0.2   Differential Method  Automated Automated Automated       Metabolic Panel (last 72 hours):  Recent Labs   Lab Result Units 10/15/20  0352 10/16/20  0329 10/17/20  0215   Sodium mmol/L 142 142 142   Potassium mmol/L 4.3 4.5 4.5   Chloride mmol/L 96 95 92*   CO2 mmol/L 36* 36* 40*   Glucose mg/dL 110 100 102   BUN, Bld mg/dL 36* 36* 40*    Creatinine mg/dL 2.0* 1.8* 1.7*   Magnesium mg/dL 2.0 2.0 2.0   Phosphorus mg/dL 4.5 4.1 4.8*       Vancomycin Administrations:  vancomycin given in the last 96 hours                     vancomycin 1.5 g in dextrose 5 % 250 mL IVPB (ready to mix) (mg) 1,500 mg New Bag 10/16/20 1613    vancomycin 2 g in dextrose 5 % 500 mL IVPB (mg) 2,000 mg New Bag 10/15/20 1541                    Microbiologic Results:  Microbiology Results (last 7 days)       Procedure Component Value Units Date/Time    Culture, Respiratory with Gram Stain [[id number]] Collected: 10/16/20 0927    Order Status: Completed Specimen: Respiratory from Sputum, Induced Updated: 10/17/20 0911     Respiratory Culture Normal respiratory leonardo     Gram Stain (Respiratory) Rare WBC's     Gram Stain (Respiratory) Few Gram positive cocci    Narrative:      Obtain after induced with saline nebs.    AFB Culture & Smear [[id number]]     Order Status: No result Specimen: Respiratory from Sputum, Induced     AFB Culture & Smear [[id number]] Collected: 10/16/20 0928    Order Status: Sent Specimen: Respiratory from Sputum, Induced Updated: 10/16/20 1116    AFB Culture & Smear [[id number]]     Order Status: No result Specimen: Respiratory from Sputum, Induced

## 2020-10-17 NOTE — ASSESSMENT & PLAN NOTE
Creatinine of 2.0 on admission and was 1.9 on discharge 9/11/2020.  Her baseline is around 1.8.  Creatinine 1.7 this morning  -Renally dose medication for CrCl of <30  -Avoid Nephrotoxic medications if able  -Monitor urine output  -Follow closely

## 2020-10-17 NOTE — SUBJECTIVE & OBJECTIVE
Interval History: Patient overall stable overnight.  Still on BiPAP and able to come off for meals.  Feels a little better than yesterday.    Review of Systems   Constitutional: Positive for activity change. Negative for chills and fever.   HENT: Negative for ear pain.    Eyes: Negative for pain.   Respiratory: Positive for shortness of breath. Negative for cough.    Cardiovascular: Negative for chest pain, palpitations and leg swelling.   Gastrointestinal: Negative for abdominal pain, constipation, diarrhea, nausea and vomiting.   Endocrine: Negative for polydipsia, polyphagia and polyuria.   Genitourinary: Negative for difficulty urinating and dysuria.   Musculoskeletal: Negative for neck pain.   Skin: Negative for rash.   Neurological: Negative for dizziness and headaches.   Hematological: Does not bruise/bleed easily.   Psychiatric/Behavioral: Negative for agitation and behavioral problems.     Objective:     Vital Signs (Most Recent):  Temp: 98.2 °F (36.8 °C) (10/17/20 0715)  Pulse: (!) 112 (10/17/20 0831)  Resp: (!) 26 (10/17/20 0831)  BP: (!) 137/104 (10/17/20 0800)  SpO2: (!) 90 % (10/17/20 0831) Vital Signs (24h Range):  Temp:  [97.9 °F (36.6 °C)-98.3 °F (36.8 °C)] 98.2 °F (36.8 °C)  Pulse:  [] 112  Resp:  [19-71] 26  SpO2:  [88 %-98 %] 90 %  BP: (110-178)/() 137/104     Weight: 103 kg (227 lb)  Body mass index is 40.21 kg/m².    Intake/Output Summary (Last 24 hours) at 10/17/2020 0946  Last data filed at 10/17/2020 0800  Gross per 24 hour   Intake 1646.75 ml   Output 2350 ml   Net -703.25 ml      Physical Exam  Constitutional:       General: She is not in acute distress.     Appearance: She is obese. She is not toxic-appearing.   HENT:      Head: Normocephalic and atraumatic.      Right Ear: External ear normal.      Left Ear: External ear normal.      Nose: Nose normal.      Mouth/Throat:      Mouth: Mucous membranes are moist.      Pharynx: Oropharynx is clear.   Eyes:      General: No  scleral icterus.     Conjunctiva/sclera: Conjunctivae normal.   Neck:      Musculoskeletal: Normal range of motion and neck supple.   Cardiovascular:      Rate and Rhythm: Normal rate. Rhythm irregular.      Pulses: Normal pulses.      Heart sounds: No murmur.   Pulmonary:      Effort: No respiratory distress.      Breath sounds: Rales present. No wheezing.      Comments: On BiPAP and stable  SOB while talking  Abdominal:      General: Abdomen is flat. Bowel sounds are normal. There is no distension.      Palpations: Abdomen is soft.      Tenderness: There is no abdominal tenderness.   Musculoskeletal:      Right lower leg: No edema.      Left lower leg: No edema.   Skin:     General: Skin is warm.      Coloration: Skin is not jaundiced.      Findings: No rash.   Neurological:      General: No focal deficit present.      Mental Status: She is alert. Mental status is at baseline.   Psychiatric:         Mood and Affect: Mood normal.         Behavior: Behavior normal.         Significant Labs: All pertinent labs within the past 24 hours have been reviewed.    Significant Imaging: I have reviewed all pertinent imaging results/findings within the past 24 hours.

## 2020-10-17 NOTE — PLAN OF CARE
Safety maintained this shift. Tachypneic. Respirations unlabored. Denies any SOB. On 6L/O2 per NC. Bipap when asleep. A. Fib on monitor. Diuresing. Purwick in place. NAD noted. Will continue to monitor.

## 2020-10-17 NOTE — PROGRESS NOTES
[facility]  Pulmonology  Progress Note    Subjective:     Interval History: Continued diuresis overnight, now 600cc negative. Slightly improving edema but still volume overloaded. Trial off bipap while wide awake this morning. No signs of acute infection, but AFB smears pending in lab, continued on abx for possible post-obstructive PNA. Still pending delivery of trilogy vent. Was started on heparin drip for a flutter vs AF given possible bronchoscopy.     Objective:     Vital Signs (Most Recent):  Temp: 98 °F (36.7 °C) (10/17/20 1115)  Pulse: 98 (10/17/20 1145)  Resp: (!) 31 (10/17/20 1145)  BP: 125/85 (10/17/20 1145)  SpO2: 98 % (10/17/20 1145) Vital Signs (24h Range):  Temp:  [97.9 °F (36.6 °C)-98.3 °F (36.8 °C)] 98 °F (36.7 °C)  Pulse:  [] 98  Resp:  [19-71] 31  SpO2:  [88 %-98 %] 98 %  BP: (110-178)/() 125/85     Weight: 103 kg (227 lb)  Body mass index is 40.21 kg/m².      Intake/Output Summary (Last 24 hours) at 10/17/2020 1235  Last data filed at 10/17/2020 1158  Gross per 24 hour   Intake 1792.72 ml   Output 2900 ml   Net -1107.28 ml       Physical Exam  Constitutional:       General: She is not in acute distress.     Appearance: She is not ill-appearing.   HENT:      Head: Normocephalic and atraumatic.      Nose: Nose normal, bandage in place to bridge of nose     Mouth: Mucous membranes are moist.   Eyes:      General: No scleral icterus.     Extraocular Movements: Extraocular movements intact.      Conjunctiva/sclera: Conjunctivae normal.      Pupils: Pupils are equal, round, and reactive to light.   Neck:      Musculoskeletal: Normal range of motion.      Comments: +JVD  Cardiovascular:      Rate and Rhythm:irregular, tachycardic to 100     Pulses: Normal pulses.      Heart sounds: No murmur. No friction rub. No gallop.    Pulmonary:      Comments: Decreased breath all lung fields, absent bilateral bases, crackles mid upper posterior lung fields, no wheezes, rhonchi.    Abdominal:      General: Bowel sounds are normal.      Tenderness: There is no abdominal tenderness. There is no guarding or rebound.      Comments: protuberant   Musculoskeletal:         General: Swelling present, more noticeable in dependant areas     Right lower leg: Edema present, improving     Left lower leg: Edema present, improving   Skin:     General: Skin is warm.      Capillary Refill: Capillary refill takes less than 2 seconds.      Findings: No rash.   Neurological:      General: No focal deficit present, slightly weak     Mental Status: She is oriented to person, place, and time.   Psychiatric:         Mood and Affect: Mood normal.         Behavior: Behavior normal.         Thought Content: Thought content normal.         Judgment: Judgment normal.      Significant Labs:    CBC/Anemia Profile:  Recent Labs   Lab 10/16/20  0329 10/17/20  0215   WBC 8.96 9.91   HGB 9.9* 10.1*   HCT 33.2* 33.1*    177   MCV 92 91   RDW 15.0* 14.9*        Chemistries:  Recent Labs   Lab 10/16/20  0329 10/17/20  0215    142   K 4.5 4.5   CL 95 92*   CO2 36* 40*   BUN 36* 40*   CREATININE 1.8* 1.7*   CALCIUM 8.7 8.7   MG 2.0 2.0   PHOS 4.1 4.8*     Significant Imaging:  I have reviewed all pertinent imaging results/findings within the past 24 hours.    Assessment/Plan:     1 Acute on chronic respiratory failure with hypoxia and hypercapnia  Pt with recurrent hospitalization for decompensated heart failure, presenting with fluid overload resulting in hypercapnic respiratory failure. Component of restrictive lung disease decreasing ability to compensate Ventilation with worsened pulmonary edema.      - Continue AVAPS at night and NC during the day, AVAPs settings , RR 20, PEEP 8, FIO2 40%, Max pressure 36, min pressure 18  - diuresis increased to 80mg IV BID  - Will need to be set up with Trilogy prior to discharge as did not happen via NH after most recent DC  - CT chest showing cavitary lesion with  surround consolidation/ggo concerning for infectious process; treating with abx and obtaining sputum for respiratory cultures and Mycobacterial culture/smear      2 Cavitary pneumonia  CT concerning for slow process as lesion in same posterior upper lobe lung fields noted on previous CT in 2018. Pt without signs or symptoms of sepsis making acute bacterial cavitary PNA unlikely. Potential for malignancy possible. Pt showing signs of pulmonary htn on echo as well as engorged pulmonary artery and veins. This along with NOAC make bronch unsafe due to increased bleeding in patient with acute respiratory failure.      - CT chest showing cavitary lesion with surround consolidation/ggo concerning for infectious process; treating with abx and obtaining sputum for respiratory cultures and Mycobacterial culture/smear  - will follow cultures       3 Atrial fibrillation with rapid ventricular response  Continue rate control as likely contributing to diastolic dysfunction; currently in a flutter  - continue anticoag, OK to restart home NOAC, DC heparin drip     4 Chronic diastolic congestive heart failure  - continue diuresis; fluid overload likely contributing to hypercapnia in setting of OHS/BHAVANI, small airway disease with severe restriction.   -CT showing cavitary lesion with surrounding consolidation and GGO concerning for infectious vs malignant process, with significant ggo throughout the lung likely secondary to continued pulmonary edema.   - continue lasix 80 mg BID     dvt ppx: restart home NOAC        Elizabeth Alan MD  Pulmonology  [facility]

## 2020-10-17 NOTE — ASSESSMENT & PLAN NOTE
in ED. Has been well controlled since.  Home Meds:  Metoprolol 50mg bid and Apixaban 5mg bid  Followed by Cardiology, Dr. Lutz - last seen outpatient on 10/8/2020  HR stable.  -Continue current medications

## 2020-10-17 NOTE — PROGRESS NOTES
[facility]    Admission date:  10/13/2020     Assessment  1.  Acute on chronic diastolic heart failure  Continuing to diurese.  Still evidence of volume overload.    2.  Permanent atrial fibrillation  Rate is controlled.  Was changed to intravenous unfractionated heparin in anticipation of possible bronchoscopy.  Discussed with Pulmonary.  Will resume Eliquis.    3.  Essential hypertension  Adequate control    4.  Chronic kidney disease, stage IV  Stable    5.  Acute on chronic respiratory failure  Being evaluated for infectious etiologies high pulmonary.    Plan and Discussion  Will stop intravenous heparin and restart Eliquis.  Continue efforts at diuresis.    Subjective  Feels weak.  No problems reported overnight.    Medications  Current Facility-Administered Medications   Medication Dose Route Frequency Provider Last Rate Last Dose    acetaminophen tablet 650 mg  650 mg Oral Q4H PRN Ravi Soria MD   650 mg at 10/15/20 2011    acetaminophen tablet 650 mg  650 mg Oral Q8H PRN Ravi Soria MD        albuterol-ipratropium 2.5 mg-0.5 mg/3 mL nebulizer solution 3 mL  3 mL Nebulization Q6H PRN Ravi Soria MD   3 mL at 10/17/20 0831    atorvastatin tablet 80 mg  80 mg Oral QHS Ravi Soria MD   80 mg at 10/16/20 2028    cefepime in dextrose 5 % 1 gram/50 mL IVPB 1 g  1 g Intravenous Q24H Ravi Soria  mL/hr at 10/16/20 1511 1 g at 10/16/20 1511    dextrose 50% injection 12.5 g  12.5 g Intravenous PRN Ravi Soria MD        dextrose 50% injection 25 g  25 g Intravenous PRN Ravi Soria MD        docusate sodium capsule 100 mg  100 mg Oral BID Ravi Soria MD   100 mg at 10/17/20 0810    escitalopram oxalate tablet 10 mg  10 mg Oral QHS Ravi Soria MD   10 mg at 10/16/20 2027    ferrous sulfate EC tablet 325 mg  325 mg Oral Daily Ravi Soria MD   325 mg at 10/17/20 0810    furosemide injection 80 mg  80 mg Intravenous Q12H Willem  MD Festus   80 mg at 10/17/20 0208    gabapentin capsule 100 mg  100 mg Oral BID Ravi Soria MD   100 mg at 10/17/20 0810    glucagon (human recombinant) injection 1 mg  1 mg Intramuscular PRN Ravi Soria MD        glucose chewable tablet 16 g  16 g Oral PRN Ravi Soria MD        glucose chewable tablet 24 g  24 g Oral PRN Ravi Soria MD        heparin 25,000 units in dextrose 5% (100 units/ml) IV bolus from bag - ADDITIONAL PRN BOLUS - 30 units/kg  30 Units/kg (Adjusted) Intravenous PRN Willem Mortensen MD        heparin 25,000 units in dextrose 5% (100 units/ml) IV bolus from bag - ADDITIONAL PRN BOLUS - 60 units/kg  60 Units/kg (Adjusted) Intravenous PRN Willem Mortensen MD        heparin 25,000 units in dextrose 5% 250 mL (100 units/mL) infusion LOW INTENSITY nomogram  12 Units/kg/hr (Adjusted) Intravenous Continuous Willem Mortensen MD 8.7 mL/hr at 10/17/20 0439 12 Units/kg/hr at 10/17/20 0439    melatonin tablet 6 mg  6 mg Oral Nightly PRN Ravi Soria MD   6 mg at 10/15/20 2011    metoprolol tartrate (LOPRESSOR) tablet 50 mg  50 mg Oral BID Ravi Soria MD   50 mg at 10/17/20 0810    metronidazole IVPB 500 mg  500 mg Intravenous Q8H Willem Mortensen  mL/hr at 10/17/20 1056 500 mg at 10/17/20 1056    miconazole NITRATE 2 % top powder   Topical (Top) BID Ravi Soria MD        ondansetron injection 4 mg  4 mg Intravenous Q8H PRN Ravi Soria MD        polyethylene glycol packet 17 g  17 g Oral Daily PRN Ravi Soria MD        promethazine (PHENERGAN) 25 mg in dextrose 5 % 50 mL IVPB  25 mg Intravenous Q6H PRN Susan Ballard MD        sodium chloride 0.9% flush 10 mL  10 mL Intravenous PRN Susan Ballard MD        vancomycin - pharmacy to dose   Intravenous pharmacy to manage frequency Willem Mortensen MD        vitamin renal formula (B-complex-vitamin c-folic acid) 1 mg per capsule 1 capsule  1 capsule Oral Daily Ravi Soria MD    1 capsule at 10/17/20 0810        Physical Exam  Temp:  [97.9 °F (36.6 °C)-98.3 °F (36.8 °C)]   Pulse:  []   Resp:  [19-71]   BP: (110-178)/()   SpO2:  [88 %-98 %]    Wt Readings from Last 3 Encounters:   10/15/20 103 kg (227 lb)   10/08/20 103.9 kg (229 lb 0.9 oz)   09/02/20 103.6 kg (228 lb 6.3 oz)     Physical Exam   Constitutional: She appears ill.   Neck: JVD present. Carotid bruit is not present.   Cardiovascular: Normal rate, S1 normal and S2 normal. An irregularly irregular rhythm present. Exam reveals no S3.   Pulmonary/Chest: She has decreased breath sounds in the right lower field and the left lower field. She has rales in the right middle field and the left middle field.   Abdominal: Soft. Normal appearance and bowel sounds are normal.   Musculoskeletal:         General: No edema.   Skin: Skin is warm.       Telemetry  Controlled atrial fibrillation    Labs  Recent Results (from the past 24 hour(s))   Vancomycin, Random    Collection Time: 10/16/20  2:08 PM   Result Value Ref Range    Vancomycin, Random 14.3 Not established ug/mL   Anti-Xa Heparin Monitoring    Collection Time: 10/16/20  7:43 PM   Result Value Ref Range    Heparin Anti-Xa 0.52 0.30 - 0.70 IU/mL   Basic metabolic panel    Collection Time: 10/17/20  2:15 AM   Result Value Ref Range    Sodium 142 136 - 145 mmol/L    Potassium 4.5 3.5 - 5.1 mmol/L    Chloride 92 (L) 95 - 110 mmol/L    CO2 40 (H) 23 - 29 mmol/L    Glucose 102 70 - 110 mg/dL    BUN, Bld 40 (H) 8 - 23 mg/dL    Creatinine 1.7 (H) 0.5 - 1.4 mg/dL    Calcium 8.7 8.7 - 10.5 mg/dL    Anion Gap 10 8 - 16 mmol/L    eGFR if African American 34 (A) >60 mL/min/1.73 m^2    eGFR if non African American 30 (A) >60 mL/min/1.73 m^2   Magnesium    Collection Time: 10/17/20  2:15 AM   Result Value Ref Range    Magnesium 2.0 1.6 - 2.6 mg/dL   Phosphorus    Collection Time: 10/17/20  2:15 AM   Result Value Ref Range    Phosphorus 4.8 (H) 2.7 - 4.5 mg/dL   CBC auto differential     Collection Time: 10/17/20  2:15 AM   Result Value Ref Range    WBC 9.91 3.90 - 12.70 K/uL    RBC 3.62 (L) 4.00 - 5.40 M/uL    Hemoglobin 10.1 (L) 12.0 - 16.0 g/dL    Hematocrit 33.1 (L) 37.0 - 48.5 %    Mean Corpuscular Volume 91 82 - 98 fL    Mean Corpuscular Hemoglobin 27.9 27.0 - 31.0 pg    Mean Corpuscular Hemoglobin Conc 30.5 (L) 32.0 - 36.0 g/dL    RDW 14.9 (H) 11.5 - 14.5 %    Platelets 177 150 - 350 K/uL    MPV 10.3 9.2 - 12.9 fL    Immature Granulocytes 0.1 0.0 - 0.5 %    Gran # (ANC) 8.1 (H) 1.8 - 7.7 K/uL    Immature Grans (Abs) 0.01 0.00 - 0.04 K/uL    Lymph # 0.7 (L) 1.0 - 4.8 K/uL    Mono # 1.0 0.3 - 1.0 K/uL    Eos # 0.1 0.0 - 0.5 K/uL    Baso # 0.02 0.00 - 0.20 K/uL    nRBC 0 0 /100 WBC    Gran% 82.0 (H) 38.0 - 73.0 %    Lymph% 6.9 (L) 18.0 - 48.0 %    Mono% 9.7 4.0 - 15.0 %    Eosinophil% 1.1 0.0 - 8.0 %    Basophil% 0.2 0.0 - 1.9 %    Differential Method Automated    Anti-Xa Heparin Monitoring    Collection Time: 10/17/20  2:15 AM   Result Value Ref Range    Heparin Anti-Xa 0.35 0.30 - 0.70 IU/mL           Dat Jacobo MD

## 2020-10-17 NOTE — ASSESSMENT & PLAN NOTE
CT concerning for slow process as lesion in same posterior upper lobe lung fields noted on previous CT in 2018. Pt without signs or symptoms of sepsis making acute bacterial cavitary PNA unlikely. Potential for malignancy possible. Pt showing signs of pulmonary htn on echo as well as engorged pulmonary artery and veins. This along with NOAC make bronch unsafe due to increased bleeding in patient with acute respiratory failure.     - CT chest showing cavitary lesion with surround consolidation/ggo concerning for infectious process; treating with abx and obtaining sputum for respiratory cultures and Mycobacterial culture/smear

## 2020-10-17 NOTE — PROGRESS NOTES
[facility]  Pulmonology  Progress Note    Patient Name: Patsy Morris  MRN: [id number]  Admission Date: 10/13/2020  Hospital Length of Stay: 3 days  Code Status: Full Code  Attending Provider: Ravi Soria MD  Primary Care Provider: Luisana Cartagena MD   Principal Problem: Acute on chronic respiratory failure with hypoxia and hypercapnia    Subjective:     Interval History: Feeling improved from yesterday. Started abx in response to CT yesterday. Isolation for possible TB. Inducing sputum samples for mycobacteria culture and smears.     Objective:     Vital Signs (Most Recent):  Temp: 97.9 °F (36.6 °C) (10/16/20 1131)  Pulse: 108 (10/16/20 1806)  Resp: (!) 30 (10/16/20 1806)  BP: 133/60 (10/16/20 1806)  SpO2: (!) 93 % (10/16/20 1806) Vital Signs (24h Range):  Temp:  [97.1 °F (36.2 °C)-97.9 °F (36.6 °C)] 97.9 °F (36.6 °C)  Pulse:  [] 108  Resp:  [21-71] 30  SpO2:  [88 %-97 %] 93 %  BP: (104-178)/(56-92) 133/60     Weight: 103 kg (227 lb)  Body mass index is 40.21 kg/m².      Intake/Output Summary (Last 24 hours) at 10/16/2020 1909  Last data filed at 10/16/2020 1850  Gross per 24 hour   Intake 1603.65 ml   Output 1900 ml   Net -296.35 ml       Physical Exam  Constitutional:       General: She is not in acute distress.     Appearance: She is obese. She is toxic-appearing. She is not ill-appearing.   HENT:      Head: Normocephalic and atraumatic.      Nose: Nose normal.      Mouth/Throat:      Mouth: Mucous membranes are moist.   Eyes:      General: No scleral icterus.     Extraocular Movements: Extraocular movements intact.      Conjunctiva/sclera: Conjunctivae normal.      Pupils: Pupils are equal, round, and reactive to light.   Neck:      Musculoskeletal: Normal range of motion.      Comments: +JVD  Cardiovascular:      Rate and Rhythm: Normal rate and regular rhythm.      Pulses: Normal pulses.      Heart sounds: No murmur. No friction rub. No gallop.    Pulmonary:       Comments: Decreased breath all lung fields, absent bilateral bases, crackles mid upper posterior lung fields, no wheezes, rhonchi.   Abdominal:      General: Bowel sounds are normal.      Tenderness: There is no abdominal tenderness. There is no guarding or rebound.      Comments: protuberant   Musculoskeletal:         General: Swelling present.      Right lower leg: Edema present.      Left lower leg: Edema present.   Skin:     General: Skin is warm.      Capillary Refill: Capillary refill takes less than 2 seconds.      Findings: No rash.   Neurological:      General: No focal deficit present.      Mental Status: She is oriented to person, place, and time.   Psychiatric:         Mood and Affect: Mood normal.         Behavior: Behavior normal.         Thought Content: Thought content normal.         Judgment: Judgment normal.         Vents:  Oxygen Concentration (%): 40 (10/16/20 1115)    Lines/Drains/Airways     Drain            Female External Urinary Catheter 09/01/20 1158 45 days    Female External Urinary Catheter 10/13/20 1359 3 days          Peripheral Intravenous Line                 Peripheral IV - Single Lumen 10/13/20 0920 20 G Right Antecubital 3 days         Peripheral IV - Single Lumen 10/13/20 1309 20 G Right Hand 3 days                Significant Labs:    CBC/Anemia Profile:  Recent Labs   Lab 10/15/20  0352 10/16/20  0329   WBC 9.39 8.96   HGB 9.8* 9.9*   HCT 32.3* 33.2*    180   MCV 91 92   RDW 15.2* 15.0*        Chemistries:  Recent Labs   Lab 10/15/20  0352 10/16/20  0329    142   K 4.3 4.5   CL 96 95   CO2 36* 36*   BUN 36* 36*   CREATININE 2.0* 1.8*   CALCIUM 8.8 8.7   MG 2.0 2.0   PHOS 4.5 4.1       ABGs: No results for input(s): PH, PCO2, HCO3, POCSATURATED, BE in the last 48 hours.  Cardiac Markers: No results for input(s): CKMB, TROPONINT, MYOGLOBIN in the last 48 hours.  Coagulation:   Recent Labs   Lab 10/15/20  1505   INR 1.1   APTT 38.3*     Lactic Acid: No results for  input(s): LACTATE in the last 48 hours.  Urine Studies: No results for input(s): COLORU, APPEARANCEUA, PHUR, SPECGRAV, PROTEINUA, GLUCUA, KETONESU, BILIRUBINUA, OCCULTUA, NITRITE, UROBILINOGEN, LEUKOCYTESUR, RBCUA, WBCUA, BACTERIA, SQUAMEPITHEL, HYALINECASTS in the last 48 hours.    Invalid input(s): WRIGHTSUR    Significant Imaging:  I have reviewed all pertinent imaging results/findings within the past 24 hours.    Assessment/Plan:     * Acute on chronic respiratory failure with hypoxia and hypercapnia  Pt with recurrent hospitalization for decompensated heart failure, presenting with fluid overload resulting in hypercapnic respiratory failure. Component of restrictive lung disease decreasing ability to compensate Ventilation with worsened pulmonary edema.     - Will transition to AVAPS at night and NC during the day, AVAPs settings , RR 20, PEEP 8, FIO2 40%, Max pressure 36, min pressure 18  - diuresis as above.   - Will need to be set up with Trilogy prior to discharge as did not happen via NH after most recent DC  - CT chest showing cavitary lesion with surround consolidation/ggo concerning for infectious process; treating with abx and obtaining sputum for respiratory cultures and Mycobacterial culture/smear      Cavitary pneumonia  CT concerning for slow process as lesion in same posterior upper lobe lung fields noted on previous CT in 2018. Pt without signs or symptoms of sepsis making acute bacterial cavitary PNA unlikely. Potential for malignancy possible. Pt showing signs of pulmonary htn on echo as well as engorged pulmonary artery and veins. This along with NOAC make bronch unsafe due to increased bleeding in patient with acute respiratory failure.     - CT chest showing cavitary lesion with surround consolidation/ggo concerning for infectious process; treating with abx and obtaining sputum for respiratory cultures and Mycobacterial culture/smear        Atrial fibrillation with rapid ventricular  response  Continue rate control as likely contributing to diastolic dysfunction; currently in a flutter  - continue anticoag    Chronic diastolic congestive heart failure  - continue diuresis; fluid overload likely contributes to hypercapnia in setting of OHS/BHAVANI, small airway disease with severe restriction.   -CT showing cavitary lesion with surrounding consolidation and GGO concerning for infectious vs malignant process, with significant ggo throughout the lung likely secondary to continued pulmonary edema.   - increased lasix 80 mg BID    dvt ppx: heparin       Willem Mortensen MD  Pulmonology  [facility]

## 2020-10-17 NOTE — PLAN OF CARE
Patient in no apparent distress. Sat's  94-97 % on 6 lpm. Aerosol treatments given Q 8. Patient placed on BIPAP with settings as documented for night . Will continue to monitor.

## 2020-10-17 NOTE — ASSESSMENT & PLAN NOTE
"Chronic Respiratory Failure on 3 liters of Oxygen at home - COPD, BHAVANI/OHS with severe restrictive lung disease.      This Admission she presented to SOB over past 3 days PTA.  Placed on BiPAP and given Lasix 80mg IV x 1 in ED.  Transferred to ICU.  Started on IV Vanc, Flagyl, and Cefepime on 10/15/2020 by Pulmonary-CC given CT Chest concerning for infection. Quantiferon sent and Sputum AFB ordered given cavitary appearance on imaging.  On BiPAP this morning.  Pox 96 (40% FiO2).    I/O of 1603.7/2350 with net negative 3776.4ml.      Recent admission from 8/22-8/31 - treated at that time with Afib-RVR with CHF (acute diastolic) and COPD exacerbation (s/p antibiotics and steroids) and transferred to SNF.    Readmitted from 9/1-9/11/2020 with Afib-RVR with CHF and d/c to SNF with Trilogy.  Patient states she has been using her "CPAP" at home.    CXR on 10/13/2020 -  Interval worsening of diffuse bilateral airspace disease and moderate bilateral pleural effusions when compared to 09/09/2020  CT Chest Without on 10/15/2020 - The findings highly concerning for extensive diffuse pulmonary infection with bilateral pleural fluid collections right greater than left.  US of LEs on 10/15/2020 - No evidence of deep venous thrombosis in either lower extremity.    Labs on Admission:  WBC 9.22  COVID-19 rapid negative  Trop negative x 3     PH 7.227/pCO2 94.4/pO2 73  Procal normal  Lactic Acid normal  Quantiferon pending  Sputum AFB x 3 pending    Seen by Pulmonary - "Pt with recurrent hospitalization for decompensated heart failure, presenting with fluid overload resulting in hypercapnic respiratory failure. Component of restrictive lung disease decreasing ability to compensate Ventilation with worsened pulmonary edema.  CT chest showing cavitary lesion with surround consolidation/ggo concerning for infectious process; treating with abx and obtaining sputum for respiratory cultures and Mycobacterial culture/smear....CT " "showing cavitary lesion with surrounding consolidation and GGO concerning for infectious vs malignant process, with significant ggo throughout the lung likely secondary to continued pulmonary edema - increased lasix 80 mg BID ".     Seen by Cardiology - "Heart Failure, Diastolic, Acute on Chronic - 8/24/2020: Echo: Normal left ventricular size and systolic function. Mildly dilated LA.  At NH been on furosemide 40 mg Q24.  10/13/2020: Presents fluid overloaded in HF.  On furosemide iv Q12. Continue diuresis".     Plan:  Continue Antibiotics - Vanc, Flagyl, and Cefepime - Day#3  Continue with Lasix - increased to 80mg q12 by Pulmonary-CC on 10/6/2020  Continue BiPAP   Follow up with Pulmonary recommendations  Strict I&Os  Fluid Restrict to 1.5L for now  DuoNebs      "

## 2020-10-17 NOTE — PROGRESS NOTES
Pt received on NC;SPO2 93%. PRN treatments were given this AM;pt tolerated it well. No changes were made at this time. Will continue to monitor.

## 2020-10-17 NOTE — ASSESSMENT & PLAN NOTE
Pt with recurrent hospitalization for decompensated heart failure, presenting with fluid overload resulting in hypercapnic respiratory failure. Component of restrictive lung disease decreasing ability to compensate Ventilation with worsened pulmonary edema.     - Will transition to AVAPS at night and NC during the day, AVAPs settings , RR 20, PEEP 8, FIO2 40%, Max pressure 36, min pressure 18  - diuresis as above.   - Will need to be set up with Trilogy prior to discharge as did not happen via NH after most recent DC  - CT chest showing cavitary lesion with surround consolidation/ggo concerning for infectious process; treating with abx and obtaining sputum for respiratory cultures and Mycobacterial culture/smear

## 2020-10-17 NOTE — SUBJECTIVE & OBJECTIVE
Interval History: Feeling improved from yesterday. Started abx in response to CT yesterday. Isolation for possible TB. Inducing sputum samples for mycobacteria culture and smears.     Objective:     Vital Signs (Most Recent):  Temp: 97.9 °F (36.6 °C) (10/16/20 1131)  Pulse: 108 (10/16/20 1806)  Resp: (!) 30 (10/16/20 1806)  BP: 133/60 (10/16/20 1806)  SpO2: (!) 93 % (10/16/20 1806) Vital Signs (24h Range):  Temp:  [97.1 °F (36.2 °C)-97.9 °F (36.6 °C)] 97.9 °F (36.6 °C)  Pulse:  [] 108  Resp:  [21-71] 30  SpO2:  [88 %-97 %] 93 %  BP: (104-178)/(56-92) 133/60     Weight: 103 kg (227 lb)  Body mass index is 40.21 kg/m².      Intake/Output Summary (Last 24 hours) at 10/16/2020 1909  Last data filed at 10/16/2020 1850  Gross per 24 hour   Intake 1603.65 ml   Output 1900 ml   Net -296.35 ml       Physical Exam  Constitutional:       General: She is not in acute distress.     Appearance: She is obese. She is toxic-appearing. She is not ill-appearing.   HENT:      Head: Normocephalic and atraumatic.      Nose: Nose normal.      Mouth/Throat:      Mouth: Mucous membranes are moist.   Eyes:      General: No scleral icterus.     Extraocular Movements: Extraocular movements intact.      Conjunctiva/sclera: Conjunctivae normal.      Pupils: Pupils are equal, round, and reactive to light.   Neck:      Musculoskeletal: Normal range of motion.      Comments: +JVD  Cardiovascular:      Rate and Rhythm: Normal rate and regular rhythm.      Pulses: Normal pulses.      Heart sounds: No murmur. No friction rub. No gallop.    Pulmonary:      Comments: Decreased breath all lung fields, absent bilateral bases, crackles mid upper posterior lung fields, no wheezes, rhonchi.   Abdominal:      General: Bowel sounds are normal.      Tenderness: There is no abdominal tenderness. There is no guarding or rebound.      Comments: protuberant   Musculoskeletal:         General: Swelling present.      Right lower leg: Edema present.      Left  lower leg: Edema present.   Skin:     General: Skin is warm.      Capillary Refill: Capillary refill takes less than 2 seconds.      Findings: No rash.   Neurological:      General: No focal deficit present.      Mental Status: She is oriented to person, place, and time.   Psychiatric:         Mood and Affect: Mood normal.         Behavior: Behavior normal.         Thought Content: Thought content normal.         Judgment: Judgment normal.         Vents:  Oxygen Concentration (%): 40 (10/16/20 1115)    Lines/Drains/Airways     Drain            Female External Urinary Catheter 09/01/20 1158 45 days    Female External Urinary Catheter 10/13/20 1359 3 days          Peripheral Intravenous Line                 Peripheral IV - Single Lumen 10/13/20 0920 20 G Right Antecubital 3 days         Peripheral IV - Single Lumen 10/13/20 1309 20 G Right Hand 3 days                Significant Labs:    CBC/Anemia Profile:  Recent Labs   Lab 10/15/20  0352 10/16/20  0329   WBC 9.39 8.96   HGB 9.8* 9.9*   HCT 32.3* 33.2*    180   MCV 91 92   RDW 15.2* 15.0*        Chemistries:  Recent Labs   Lab 10/15/20  0352 10/16/20  0329    142   K 4.3 4.5   CL 96 95   CO2 36* 36*   BUN 36* 36*   CREATININE 2.0* 1.8*   CALCIUM 8.8 8.7   MG 2.0 2.0   PHOS 4.5 4.1       ABGs: No results for input(s): PH, PCO2, HCO3, POCSATURATED, BE in the last 48 hours.  Cardiac Markers: No results for input(s): CKMB, TROPONINT, MYOGLOBIN in the last 48 hours.  Coagulation:   Recent Labs   Lab 10/15/20  1505   INR 1.1   APTT 38.3*     Lactic Acid: No results for input(s): LACTATE in the last 48 hours.  Urine Studies: No results for input(s): COLORU, APPEARANCEUA, PHUR, SPECGRAV, PROTEINUA, GLUCUA, KETONESU, BILIRUBINUA, OCCULTUA, NITRITE, UROBILINOGEN, LEUKOCYTESUR, RBCUA, WBCUA, BACTERIA, SQUAMEPITHEL, HYALINECASTS in the last 48 hours.    Invalid input(s): WRIGHTSUR    Significant Imaging:  I have reviewed all pertinent imaging results/findings within  the past 24 hours.

## 2020-10-17 NOTE — NURSING
Pt remains on a. Flutter. With episodes of bradycardia (mid 40's) when asleep. aao x4. Vss. Will continue to monitor.

## 2020-10-17 NOTE — ASSESSMENT & PLAN NOTE
Hgb 10.0 on admission and stable at 10.1 today.  Stable since last admission.  Ferritin 25 on 8/25/2020 and Fe sat 18% on 9/5/2020 - continue outpatient FeSO4 325mg daily  B12 301 and Folate 4.6 - both borderline low - continue outpatient Nephrocaps daily

## 2020-10-17 NOTE — ASSESSMENT & PLAN NOTE
- continue diuresis; fluid overload likely contributes to hypercapnia in setting of OHS/BHAVANI, small airway disease with severe restriction.   -CT showing cavitary lesion with surrounding consolidation and GGO concerning for infectious vs malignant process, with significant ggo throughout the lung likely secondary to continued pulmonary edema.   - increased lasix 80 mg BID

## 2020-10-17 NOTE — ASSESSMENT & PLAN NOTE
Home Meds:  Lasix 40mg qday (was bid and recently changed as above)  BNP elevated at 577 on last admission and now 968 this admission  See above.     TTE on 8/24/2020 - Mild left atrial enlargement.  · Diastolic pattern consistent with atrial fibrillation observed.  · Normal left ventricular systolic function. The estimated ejection fraction is 58%.  · No wall motion abnormalities.  · Normal right ventricular systolic function.  · Mild right atrial enlargement.  Small posterior pericardial effusion.     Plan:  Lasix to 80mg po q12  Continue Bipap as above

## 2020-10-18 NOTE — PLAN OF CARE
Safety maintained this shift. Tachypneic. C/o SOB around noon when on 6L/O2 per NC. Placed back on Bipap.  A. Fib on monitor. Diuresing. Purwick in place. NAD noted. Will continue to monitor.

## 2020-10-18 NOTE — ASSESSMENT & PLAN NOTE
"Chronic Respiratory Failure on 3 liters of Oxygen at home - COPD, BHAVANI/OHS with severe restrictive lung disease.      This Admission she presented to SOB over past 3 days PTA.  Placed on BiPAP and given Lasix 80mg IV x 1 in ED.  Transferred to ICU.  Continued on Lasix 40mg IV q12 and increased to 80mg q12.  Started on IV Vanc, Flagyl, and Cefepime on 10/15/2020 by Pulmonary-CC given CT Chest concerning for infection. Quantiferon sent and Sputum AFB ordered given cavitary appearance on imaging.  Off BiPAP this morning - Pox 93% on 5L O2NC  I/O of 1396/2120 with net negative 4427ml.      Recent admission from 8/22-8/31 - treated at that time with Afib-RVR with CHF (acute diastolic) and COPD exacerbation (s/p antibiotics and steroids) and transferred to SNF.    Readmitted from 9/1-9/11/2020 with Afib-RVR with CHF and d/c to SNF with Trilogy.  Patient states she has been using her "CPAP" at home.    CXR on 10/13/2020 -  Interval worsening of diffuse bilateral airspace disease and moderate bilateral pleural effusions when compared to 09/09/2020  CT Chest Without on 10/15/2020 - The findings highly concerning for extensive diffuse pulmonary infection with bilateral pleural fluid collections right greater than left.  US of LEs on 10/15/2020 - No evidence of deep venous thrombosis in either lower extremity.    Labs on Admission:  WBC 9.22  COVID-19 rapid negative  Trop negative x 3     PH 7.227/pCO2 94.4/pO2 73  Procal normal  Lactic Acid normal  Quantiferon pending  Sputum AFB x 3 ordered with 1 pending    Followed by Pulmonary - "Briefly, it is noted that pt continues to silver NIV well.  Slow diuresis.  Afebrile  The patient's physical is significant for decreased BS bilat; 2+ edema in flank  The patient's working assessments include vol overload.  Cont diuresis.  NIV at night.  Do not suspect acute PNA.  Await AFB smears to exclude mycobacterial disease as cause of RUL cavitary lesion.  No plans for bronch while " "inpatient with decompensated HF. ".     Followed by Cardiology - "Acute on chronic diastolic heart failure.  Continuing to diurese.  Still evidence of volume overload".     Plan:  Continue Antibiotics - Vanc, Flagyl, and Cefepime - Day#4  Continue with Lasix - increased to 80mg q12 by Pulmonary-CC on 10/6/2020  Continue BiPAP as needed.  Follow up with Pulmonary recommendations  Strict I&Os  Fluid Restrict to 1.5L for now  DuoNebs  Needs Trilogy set up on discharge      "

## 2020-10-18 NOTE — SUBJECTIVE & OBJECTIVE
Interval History: Patient overall stable overnight.  Off Bipap early this morning. Feels a little better than yesterday.  Still in ICU.    Review of Systems   Constitutional: Positive for activity change. Negative for chills and fever.   HENT: Negative for ear pain.    Eyes: Negative for pain.   Respiratory: Positive for shortness of breath. Negative for cough.    Cardiovascular: Negative for chest pain, palpitations and leg swelling.   Gastrointestinal: Negative for abdominal pain, constipation, diarrhea, nausea and vomiting.   Endocrine: Negative for polydipsia, polyphagia and polyuria.   Genitourinary: Negative for difficulty urinating and dysuria.   Musculoskeletal: Negative for neck pain.   Skin: Negative for rash.   Neurological: Negative for dizziness and headaches.   Hematological: Does not bruise/bleed easily.   Psychiatric/Behavioral: Negative for agitation and behavioral problems.     Objective:     Vital Signs (Most Recent):  Temp: 98.1 °F (36.7 °C) (10/18/20 0715)  Pulse: 104 (10/18/20 0900)  Resp: (!) 35 (10/18/20 0900)  BP: 108/64 (10/18/20 0900)  SpO2: (!) 93 % (10/18/20 0900) Vital Signs (24h Range):  Temp:  [98 °F (36.7 °C)-98.4 °F (36.9 °C)] 98.1 °F (36.7 °C)  Pulse:  [] 104  Resp:  [19-40] 35  SpO2:  [90 %-100 %] 93 %  BP: ()/() 108/64     Weight: 103 kg (227 lb)  Body mass index is 40.21 kg/m².    Intake/Output Summary (Last 24 hours) at 10/18/2020 1013  Last data filed at 10/18/2020 0810  Gross per 24 hour   Intake 1395.97 ml   Output 3400 ml   Net -2004.03 ml      Physical Exam  Constitutional:       General: She is not in acute distress.     Appearance: She is obese. She is not toxic-appearing.   HENT:      Head: Normocephalic and atraumatic.      Right Ear: External ear normal.      Left Ear: External ear normal.      Nose: Nose normal.      Mouth/Throat:      Mouth: Mucous membranes are moist.      Pharynx: Oropharynx is clear.   Eyes:      General: No scleral icterus.      Conjunctiva/sclera: Conjunctivae normal.   Neck:      Musculoskeletal: Normal range of motion and neck supple.   Cardiovascular:      Rate and Rhythm: Normal rate. Rhythm irregular.      Pulses: Normal pulses.      Heart sounds: No murmur.   Pulmonary:      Effort: No respiratory distress.      Breath sounds: Rales present. No wheezing.      Comments:     Abdominal:      General: Abdomen is flat. Bowel sounds are normal. There is no distension.      Palpations: Abdomen is soft.      Tenderness: There is no abdominal tenderness.   Musculoskeletal:      Right lower leg: No edema.      Left lower leg: No edema.   Skin:     General: Skin is warm.      Coloration: Skin is not jaundiced.      Findings: No rash.   Neurological:      General: No focal deficit present.      Mental Status: She is alert. Mental status is at baseline.   Psychiatric:         Mood and Affect: Mood normal.         Behavior: Behavior normal.         Significant Labs: All pertinent labs within the past 24 hours have been reviewed.    Significant Imaging: I have reviewed all pertinent imaging results/findings within the past 24 hours.

## 2020-10-18 NOTE — PROGRESS NOTES
Pt received on NC with documented settings. At noon, Pt placed on BIPAP due to complaints of SOB. BBS crackles. Pt settings were changed to AVAPS mode. Pt is in A. Fib on monitor. Pt appears more comfortable on the BIPAP. No other changes were made. Will continue to monitor.

## 2020-10-18 NOTE — ASSESSMENT & PLAN NOTE
Hgb 10.0 on admission and stable at 10.7 today.  Stable since last admission.  Ferritin 25 on 8/25/2020 and Fe sat 18% on 9/5/2020 - continue outpatient FeSO4 325mg daily  B12 301 and Folate 4.6 - both borderline low - continue outpatient Nephrocaps daily

## 2020-10-18 NOTE — PROGRESS NOTES
[facility]    Admission date:  10/13/2020     Assessment  1.  Acute on chronic diastolic heart failure  Continuing to diurese.       2.  Permanent atrial fibrillation  Rate is controlled.  Back on Eliquis.     3.  Essential hypertension  Adequate control     4.  Chronic kidney disease, stage IV  Stable     5.  Acute on chronic respiratory failure  Antibiotics being de-escalated    Plan and Discussion  Continue current management of cardiac problems.    Subjective  No problems reported    Medications  Current Facility-Administered Medications   Medication Dose Route Frequency Provider Last Rate Last Dose    acetaminophen tablet 650 mg  650 mg Oral Q4H PRN Ravi Soria MD   650 mg at 10/17/20 2032    acetaminophen tablet 650 mg  650 mg Oral Q8H PRN Ravi Soria MD        albuterol-ipratropium 2.5 mg-0.5 mg/3 mL nebulizer solution 3 mL  3 mL Nebulization Q6H PRN Ravi Soria MD   3 mL at 10/17/20 0831    apixaban tablet 5 mg  5 mg Oral BID Dat Jacobo MD   5 mg at 10/18/20 0805    atorvastatin tablet 80 mg  80 mg Oral QHS Ravi Soria MD   80 mg at 10/17/20 2032    cefepime in dextrose 5 % 1 gram/50 mL IVPB 1 g  1 g Intravenous Q24H Ravi Soria  mL/hr at 10/17/20 1549 1 g at 10/17/20 1549    dextrose 50% injection 12.5 g  12.5 g Intravenous PRN Ravi Soria MD        dextrose 50% injection 25 g  25 g Intravenous PRN Ravi Soria MD        docusate sodium capsule 100 mg  100 mg Oral BID Ravi Soria MD   100 mg at 10/18/20 0805    escitalopram oxalate tablet 10 mg  10 mg Oral QHS Ravi Soria MD   10 mg at 10/17/20 2033    ferrous sulfate EC tablet 325 mg  325 mg Oral Daily Ravi Soria MD   325 mg at 10/18/20 0805    furosemide injection 80 mg  80 mg Intravenous Q12H Willem Mortensen MD   80 mg at 10/18/20 0227    gabapentin capsule 100 mg  100 mg Oral BID Ravi Soria MD   100 mg at 10/18/20 0805    glucagon (human  recombinant) injection 1 mg  1 mg Intramuscular PRN Ravi Soria MD        glucose chewable tablet 16 g  16 g Oral PRN Ravi Soria MD        glucose chewable tablet 24 g  24 g Oral PRN Ravi Soria MD        melatonin tablet 6 mg  6 mg Oral Nightly PRN Ravi Soria MD   6 mg at 10/17/20 2032    metoprolol tartrate (LOPRESSOR) tablet 50 mg  50 mg Oral BID Ravi Soria MD   50 mg at 10/18/20 0804    metronidazole IVPB 500 mg  500 mg Intravenous Q8H Willem Mortensen  mL/hr at 10/18/20 0227 500 mg at 10/18/20 0227    miconazole NITRATE 2 % top powder   Topical (Top) BID Ravi Soria MD        ondansetron injection 4 mg  4 mg Intravenous Q8H PRN Ravi Soria MD        polyethylene glycol packet 17 g  17 g Oral Daily PRN Ravi Soria MD        promethazine (PHENERGAN) 25 mg in dextrose 5 % 50 mL IVPB  25 mg Intravenous Q6H PRN Susan Ballard MD        sodium chloride 0.9% flush 10 mL  10 mL Intravenous PRN Susan Ballard MD        vancomycin - pharmacy to dose   Intravenous pharmacy to manage frequency Willem Mortensen MD        vitamin renal formula (B-complex-vitamin c-folic acid) 1 mg per capsule 1 capsule  1 capsule Oral Daily Ravi Soria MD   1 capsule at 10/18/20 0805        Physical Exam  Temp:  [98 °F (36.7 °C)-98.4 °F (36.9 °C)]   Pulse:  []   Resp:  [19-40]   BP: ()/()   SpO2:  [90 %-100 %]    Wt Readings from Last 3 Encounters:   10/15/20 103 kg (227 lb)   10/08/20 103.9 kg (229 lb 0.9 oz)   09/02/20 103.6 kg (228 lb 6.3 oz)     Physical Exam   Constitutional: She appears ill.   Neck: JVD present. Carotid bruit is not present.   Cardiovascular: Normal rate, S1 normal and S2 normal. An irregularly irregular rhythm present. Exam reveals no S3.   Pulmonary/Chest: She has decreased breath sounds in the right lower field and the left lower field. She has rales in the right middle field and the left middle field.   Abdominal: Soft.  Normal appearance and bowel sounds are normal.   Musculoskeletal:         General: No edema.   Skin: Skin is warm.       Telemetry  Atrial fibrillation     Labs  Recent Results (from the past 24 hour(s))   Vancomycin, Random    Collection Time: 10/17/20  4:31 PM   Result Value Ref Range    Vancomycin, Random 19.5 Not established ug/mL   Basic metabolic panel    Collection Time: 10/18/20  4:34 AM   Result Value Ref Range    Sodium 142 136 - 145 mmol/L    Potassium 4.6 3.5 - 5.1 mmol/L    Chloride 88 (L) 95 - 110 mmol/L    CO2 40 (H) 23 - 29 mmol/L    Glucose 120 (H) 70 - 110 mg/dL    BUN, Bld 42 (H) 8 - 23 mg/dL    Creatinine 1.9 (H) 0.5 - 1.4 mg/dL    Calcium 9.2 8.7 - 10.5 mg/dL    Anion Gap 14 8 - 16 mmol/L    eGFR if African American 30 (A) >60 mL/min/1.73 m^2    eGFR if non African American 26 (A) >60 mL/min/1.73 m^2   Magnesium    Collection Time: 10/18/20  4:34 AM   Result Value Ref Range    Magnesium 1.9 1.6 - 2.6 mg/dL   Phosphorus    Collection Time: 10/18/20  4:34 AM   Result Value Ref Range    Phosphorus 5.3 (H) 2.7 - 4.5 mg/dL   CBC auto differential    Collection Time: 10/18/20  4:34 AM   Result Value Ref Range    WBC 9.88 3.90 - 12.70 K/uL    RBC 3.78 (L) 4.00 - 5.40 M/uL    Hemoglobin 10.7 (L) 12.0 - 16.0 g/dL    Hematocrit 35.0 (L) 37.0 - 48.5 %    Mean Corpuscular Volume 93 82 - 98 fL    Mean Corpuscular Hemoglobin 28.3 27.0 - 31.0 pg    Mean Corpuscular Hemoglobin Conc 30.6 (L) 32.0 - 36.0 g/dL    RDW 14.8 (H) 11.5 - 14.5 %    Platelets 175 150 - 350 K/uL    MPV 10.7 9.2 - 12.9 fL    Immature Granulocytes 0.4 0.0 - 0.5 %    Gran # (ANC) 8.1 (H) 1.8 - 7.7 K/uL    Immature Grans (Abs) 0.04 0.00 - 0.04 K/uL    Lymph # 0.7 (L) 1.0 - 4.8 K/uL    Mono # 1.0 0.3 - 1.0 K/uL    Eos # 0.1 0.0 - 0.5 K/uL    Baso # 0.01 0.00 - 0.20 K/uL    nRBC 0 0 /100 WBC    Gran% 81.9 (H) 38.0 - 73.0 %    Lymph% 6.7 (L) 18.0 - 48.0 %    Mono% 9.6 4.0 - 15.0 %    Eosinophil% 1.3 0.0 - 8.0 %    Basophil% 0.1 0.0 - 1.9 %     Differential Method Automated              Dat Jacobo MD

## 2020-10-18 NOTE — PROGRESS NOTES
[facility]  Pulmonology  Progress Note    Subjective:     Interval History: Heparin drip transitioned back to apixiban. Still diuresing well, symptoms improving, still awaiting trilogy vent and results from AFB smears. Still on Bipap at night, tolerating NC during the day, however becomes very somnolent with any naps.      Objective:     Vital Signs (Most Recent):  Temp: 98.1 °F (36.7 °C) (10/18/20 0715)  Pulse: 104 (10/18/20 0900)  Resp: (!) 35 (10/18/20 0900)  BP: 108/64 (10/18/20 0900)  SpO2: (!) 93 % (10/18/20 0900) Vital Signs (24h Range):  Temp:  [98 °F (36.7 °C)-98.4 °F (36.9 °C)] 98.1 °F (36.7 °C)  Pulse:  [] 104  Resp:  [19-40] 35  SpO2:  [90 %-100 %] 93 %  BP: ()/() 108/64     Weight: 103 kg (227 lb)  Body mass index is 40.21 kg/m².      Intake/Output Summary (Last 24 hours) at 10/18/2020 1002  Last data filed at 10/18/2020 0810  Gross per 24 hour   Intake 1395.97 ml   Output 3400 ml   Net -2004.03 ml       Physical Exam  Constitutional:       General: She is not in acute distress.     Appearance: She is not ill-appearing.   HENT:      Head: Normocephalic and atraumatic.      Nose: Nose normal, bandage in place to bridge of nose     Mouth: Mucous membranes are moist.   Eyes:      General: No scleral icterus.     Extraocular Movements: Extraocular movements intact.      Conjunctiva/sclera: Conjunctivae normal.      Pupils: Pupils are equal, round, and reactive to light.   Neck:      Musculoskeletal: Normal range of motion.      Comments: +JVD  Cardiovascular:      Rate and Rhythm:irregular, tachycardic to 100     Pulses: Normal pulses.      Heart sounds: No murmur. No friction rub. No gallop.    Pulmonary:      Comments: Decreased breath all lung fields, absent bilateral bases, crackles mid upper posterior lung fields, + rhonchi to STEPHEN  Abdominal:      General: Bowel sounds are normal.      Tenderness: There is no abdominal tenderness. There is no guarding or rebound.       Comments: protuberant   Musculoskeletal:         General: Swelling present, more noticeable in dependant areas, improving to flank     Right lower leg: Edema present, improving     Left lower leg: Edema present, improving   Skin:     General: Skin is warm.      Capillary Refill: Capillary refill takes less than 2 seconds.      Findings: No rash.   Neurological:      General: No focal deficit present, slightly weak     Mental Status: She is oriented to person, place, and time.   Psychiatric:         Mood and Affect: Mood normal.         Behavior: Behavior normal.         Thought Content: Thought content normal.         Judgment: Judgment normal.      Vents:  Oxygen Concentration (%): 40 (10/18/20 0340)    Lines/Drains/Airways     Drain            Female External Urinary Catheter 09/01/20 1158 46 days    Female External Urinary Catheter 10/13/20 1359 4 days          Peripheral Intravenous Line                 Peripheral IV - Single Lumen 10/13/20 0920 20 G Right Antecubital 5 days         Peripheral IV - Single Lumen 10/13/20 1309 20 G Right Hand 4 days              Significant Labs:    CBC/Anemia Profile:  Recent Labs   Lab 10/17/20  0215 10/18/20  0434   WBC 9.91 9.88   HGB 10.1* 10.7*   HCT 33.1* 35.0*    175   MCV 91 93   RDW 14.9* 14.8*        Chemistries:  Recent Labs   Lab 10/17/20  0215 10/18/20  0434    142   K 4.5 4.6   CL 92* 88*   CO2 40* 40*   BUN 40* 42*   CREATININE 1.7* 1.9*   CALCIUM 8.7 9.2   MG 2.0 1.9   PHOS 4.8* 5.3*       Assessment/Plan:     1 Acute on chronic respiratory failure with hypoxia and hypercapnia  Pt with recurrent hospitalization for decompensated heart failure, presenting with fluid overload resulting in hypercapnic respiratory failure. Component of restrictive lung disease decreasing ability to compensate Ventilation with worsened pulmonary edema.   - Continue AVAPS at night and NC during the day, AVAPs settings , RR 20, PEEP 8, FIO2 40%, Max pressure 36,  min pressure 18  - diuresis increased to 80mg IV BID  - Will need to be set up with Trilogy prior to discharge as did not happen via NH after most recent DC  -use AVAPS with naps as well  - CT chest showing cavitary lesion with surround consolidation/ggo concerning for infectious process; treating with abx and obtaining sputum for respiratory cultures and Mycobacterial culture/smear      2 Cavitary pneumonia  CT concerning for slow process as lesion in same posterior upper lobe lung fields noted on previous CT in 2018. Pt without signs or symptoms of sepsis making acute bacterial cavitary PNA unlikely. Potential for malignancy possible. Pt showing signs of pulmonary htn on echo as well as engorged pulmonary artery and veins. This along with NOAC make bronch unsafe due to increased bleeding in patient with acute respiratory failure.   - CT chest showing cavitary lesion with surround consolidation/ggo concerning for infectious process; treating with abx and obtaining sputum for respiratory cultures and Mycobacterial culture/smear  - will follow cultures    - will DC abx today, no concern for acute PNA at this point     3 Atrial fibrillation with rapid ventricular response  Continue rate control as likely contributing to diastolic dysfunction; currently in a flutter  - continue anticoag with home apixiban     4 Chronic diastolic congestive heart failure  - continue diuresis; fluid overload likely contributing to hypercapnia in setting of OHS/BHAVANI, small airway disease with severe restriction.   -CT showing cavitary lesion with surrounding consolidation and GGO concerning for infectious vs malignant process, with significant ggo throughout the lung likely secondary to continued pulmonary edema.   - continue lasix 80 mg BID     dvt ppx: apixiban    We will follow along with you      Elizabeth Alan MD  Pulmonology  [facility]

## 2020-10-18 NOTE — PROGRESS NOTES
"[facility] Hospital Medicine  Progress Note    Patient Name: Patsy Morris  MRN: [id number]  Patient Class: IP- Inpatient   Admission Date: 10/13/2020  Length of Stay: 5 days  Attending Physician: Ravi Soria MD  Primary Care Provider: Luisana Cartagena MD        Subjective:     Principal Problem:Acute on chronic respiratory failure with hypoxia and hypercapnia        HPI:  Per ED:  "This is a 70 y.o. female with history of CHF and COPD who presents via EMS with complaint of shortness of breath that began a few days ago. Per EMS patient had O2 saturation of 72 on 3 liters if home oxygen upon their arrival. Patient states she is on 3 liters of home oxygen at baseline. She denies fever, cough, chest pain, nausea, vomiting, diarrhea, or rhinorrhea. She reports she was recently discharged from the hospital for similar symptoms. She reports compliance with her home medications. She recently became resident of [facility] after last hospitalization. She is a former smoker. She denies undergoing any surgeries".    The patient is a 70 year old female with a PMH significant for of HTN, Chronic Respiratory Failure (COPD and BHAVANI/OHS), HFpEF, Obesity who presented to the ED with complaints of SOB that began about 3 days ago.  Patient is on 3L O2NC at home.  She was seen by her Cardiologist about 4 days prior to admission and told to decrease her Lasix from 40mg bid to qday.  She denies chest pain.  No Fevers.  No cough.  States she has been adherent with her medications.  Patient was recently admitted to [facility] [place], operated by [facility] from 9/1-9/11 for Acute on Chronic Respiratory Failure and discharged to SNF.  Patient was placed on BiPAP in ED and admitted to ICU.  Patient feeling better on BiPAP.        Overview/Hospital Course:  The patient is a 70 year old female with a PMH significant for of HTN, Chronic Respiratory Failure (COPD and BHAVANI/OHS - on BiPAP at home.  Did not get Trilogy on discharge from " SNF), HFpEF, Obesity who presented to the ED with complaints of SOB that began about 3 days ago.  Patient is on 3L O2NC at home. Patient presented to SOB over past 3 days PTA.  Placed on BiPAP and given Lasix 80mg IV x 1 in ED and Transferred to ICU. Continue diuresis, but patient slow to improved from a respiratory standpoint.  She was started on IV Vanc, Flagyl, and Cefepime on 10/15/2020 by Pulmonary-CC given CT Chest concerning for infection. Quantiferon sent and Sputum AFB ordered given cavitary appearance on imaging.  Needs Trilogy set up on discharge.      Interval History: Patient overall stable overnight.  Off Bipap early this morning. Feels a little better than yesterday.  Still in ICU.    Review of Systems   Constitutional: Positive for activity change. Negative for chills and fever.   HENT: Negative for ear pain.    Eyes: Negative for pain.   Respiratory: Positive for shortness of breath. Negative for cough.    Cardiovascular: Negative for chest pain, palpitations and leg swelling.   Gastrointestinal: Negative for abdominal pain, constipation, diarrhea, nausea and vomiting.   Endocrine: Negative for polydipsia, polyphagia and polyuria.   Genitourinary: Negative for difficulty urinating and dysuria.   Musculoskeletal: Negative for neck pain.   Skin: Negative for rash.   Neurological: Negative for dizziness and headaches.   Hematological: Does not bruise/bleed easily.   Psychiatric/Behavioral: Negative for agitation and behavioral problems.     Objective:     Vital Signs (Most Recent):  Temp: 98.1 °F (36.7 °C) (10/18/20 0715)  Pulse: 104 (10/18/20 0900)  Resp: (!) 35 (10/18/20 0900)  BP: 108/64 (10/18/20 0900)  SpO2: (!) 93 % (10/18/20 0900) Vital Signs (24h Range):  Temp:  [98 °F (36.7 °C)-98.4 °F (36.9 °C)] 98.1 °F (36.7 °C)  Pulse:  [] 104  Resp:  [19-40] 35  SpO2:  [90 %-100 %] 93 %  BP: ()/() 108/64     Weight: 103 kg (227 lb)  Body mass index is 40.21 kg/m².    Intake/Output Summary  (Last 24 hours) at 10/18/2020 1013  Last data filed at 10/18/2020 0810  Gross per 24 hour   Intake 1395.97 ml   Output 3400 ml   Net -2004.03 ml      Physical Exam  Constitutional:       General: She is not in acute distress.     Appearance: She is obese. She is not toxic-appearing.   HENT:      Head: Normocephalic and atraumatic.      Right Ear: External ear normal.      Left Ear: External ear normal.      Nose: Nose normal.      Mouth/Throat:      Mouth: Mucous membranes are moist.      Pharynx: Oropharynx is clear.   Eyes:      General: No scleral icterus.     Conjunctiva/sclera: Conjunctivae normal.   Neck:      Musculoskeletal: Normal range of motion and neck supple.   Cardiovascular:      Rate and Rhythm: Normal rate. Rhythm irregular.      Pulses: Normal pulses.      Heart sounds: No murmur.   Pulmonary:      Effort: No respiratory distress.      Breath sounds: Rales present. No wheezing.      Comments:     Abdominal:      General: Abdomen is flat. Bowel sounds are normal. There is no distension.      Palpations: Abdomen is soft.      Tenderness: There is no abdominal tenderness.   Musculoskeletal:      Right lower leg: No edema.      Left lower leg: No edema.   Skin:     General: Skin is warm.      Coloration: Skin is not jaundiced.      Findings: No rash.   Neurological:      General: No focal deficit present.      Mental Status: She is alert. Mental status is at baseline.   Psychiatric:         Mood and Affect: Mood normal.         Behavior: Behavior normal.         Significant Labs: All pertinent labs within the past 24 hours have been reviewed.    Significant Imaging: I have reviewed all pertinent imaging results/findings within the past 24 hours.      Assessment/Plan:      * Acute on chronic respiratory failure with hypoxia and hypercapnia  Chronic Respiratory Failure on 3 liters of Oxygen at home - COPD, BHAVANI/OHS with severe restrictive lung disease.      This Admission she presented to [facility] over past 3  "days PTA.  Placed on BiPAP and given Lasix 80mg IV x 1 in ED.  Transferred to ICU.  Continued on Lasix 40mg IV q12 and increased to 80mg q12.  Started on IV Vanc, Flagyl, and Cefepime on 10/15/2020 by Pulmonary-CC given CT Chest concerning for infection. Quantiferon sent and Sputum AFB ordered given cavitary appearance on imaging.  Off BiPAP this morning - Pox 93% on 5L O2NC  I/O of 1396/2120 with net negative 4427ml.      Recent admission from 8/22-8/31 - treated at that time with Afib-RVR with CHF (acute diastolic) and COPD exacerbation (s/p antibiotics and steroids) and transferred to SNF.    Readmitted from 9/1-9/11/2020 with Afib-RVR with CHF and d/c to SNF with Trilogy.  Patient states she has been using her "CPAP" at home.    CXR on 10/13/2020 -  Interval worsening of diffuse bilateral airspace disease and moderate bilateral pleural effusions when compared to 09/09/2020  CT Chest Without on 10/15/2020 - The findings highly concerning for extensive diffuse pulmonary infection with bilateral pleural fluid collections right greater than left.  US of LEs on 10/15/2020 - No evidence of deep venous thrombosis in either lower extremity.    Labs on Admission:  WBC 9.22  COVID-19 rapid negative  Trop negative x 3     PH 7.227/pCO2 94.4/pO2 73  Procal normal  Lactic Acid normal  Quantiferon pending  Sputum AFB x 3 ordered with 1 pending    Followed by Pulmonary - "Briefly, it is noted that pt continues to silver NIV well.  Slow diuresis.  Afebrile  The patient's physical is significant for decreased BS bilat; 2+ edema in flank  The patient's working assessments include vol overload.  Cont diuresis.  NIV at night.  Do not suspect acute PNA.  Await AFB smears to exclude mycobacterial disease as cause of RUL cavitary lesion.  No plans for bronch while inpatient with decompensated HF. ".     Followed by Cardiology - "Acute on chronic diastolic heart failure.  Continuing to diurese.  Still evidence of volume " "overload".     Plan:  Continue Antibiotics - Vanc, Flagyl, and Cefepime - Day#4  Continue with Lasix - increased to 80mg q12 by Pulmonary-CC on 10/6/2020  Continue BiPAP as needed.  Follow up with Pulmonary recommendations  Strict I&Os  Fluid Restrict to 1.5L for now  DuoNebs  Needs Trilogy set up on discharge        Atrial fibrillation with rapid ventricular response   in ED. Has been well controlled since.  Home Meds:  Metoprolol 50mg bid and Apixaban 5mg bid  Followed by Cardiology, Dr. Lutz - last seen outpatient on 10/8/2020  HR stable.  -Continue current medications      Chronic kidney disease (CKD) stage G4/A1, severely decreased glomerular filtration rate (GFR) between 15-29 mL/min/1.73 square meter and albuminuria creatinine ratio less than 30 mg/g  Creatinine of 2.0 on admission and was 1.9 on discharge 9/11/2020.  Her baseline is around 1.8.  Creatinine 1.9 this morning  -Renally dose medication for CrCl of <30  -Avoid Nephrotoxic medications if able  -Monitor urine output  -Follow closely      Chronic diastolic congestive heart failure  Home Meds:  Lasix 40mg qday (was bid and recently changed as above)  BNP elevated at 577 on last admission and now 968 this admission  See above.     TTE on 8/24/2020 - Mild left atrial enlargement.  · Diastolic pattern consistent with atrial fibrillation observed.  · Normal left ventricular systolic function. The estimated ejection fraction is 58%.  · No wall motion abnormalities.  · Normal right ventricular systolic function.  · Mild right atrial enlargement.  Small posterior pericardial effusion.     Plan:  Lasix to 80mg po q12  Continue Bipap as above        Current moderate episode of major depressive disorder without prior episode  Continue Lexapro      Normocytic anemia  Hgb 10.0 on admission and stable at 10.7 today.  Stable since last admission.  Ferritin 25 on 8/25/2020 and Fe sat 18% on 9/5/2020 - continue outpatient FeSO4 325mg daily  B12 301 and " Folate 4.6 - both borderline low - continue outpatient Nephrocaps daily      Dyslipidemia  Continue Statin      Essential hypertension  Continue Metoprolol and Lasix.  BP stable.        VTE Risk Mitigation (From admission, onward)         Ordered     apixaban tablet 5 mg  2 times daily      10/17/20 1152     Place sequential compression device  Until discontinued      10/13/20 1405     IP VTE HIGH RISK PATIENT  Once      10/13/20 1405                Discharge Planning   YUDELKA:      Code Status: Full Code   Is the patient medically ready for discharge?:     Reason for patient still in hospital (select all that apply): Patient trending condition, Imaging and Consult recommendations  Discharge Plan A: Home, Home Health                  Ravi Soria MD  Department of Hospital Medicine   [facility]

## 2020-10-19 NOTE — PLAN OF CARE
Pt remained stable throughout shift- on bipap prn and transitioned to 6L NC while awake-sob noted prn. Purewick in place, pt voiding clear yellow. Critical Co2 reported to SANTO Christiansen, keep same bipap settings, obtain ABG. Will continue to monitor pt.

## 2020-10-19 NOTE — PLAN OF CARE
"During routine rounds, assessed pt for spiritual distress, and assured pt aware  of spiritual care available.  While providing reflective listening and compassionate presence, pt concerns were explored.  Mild distress (anger - "why is this happening to me?" was reported and presented, indicated by pt affect, feedback, and lament(s).  Spiritual care also included pt being provided a Rastafari article.   Gratitude for spiritual wellness check was reported by pt.   Pt reported and presented with relational and giacomo support.  Follow-up was offered upon request, and is indicated;   will provide further spiritual care after today, as soon as scheduling permits.    "

## 2020-10-19 NOTE — ASSESSMENT & PLAN NOTE
"Chronic Respiratory Failure on 3 liters of Oxygen at home - COPD, BHAVANI/OHS with severe restrictive lung disease.      This Admission she presented to SOB over past 3 days PTA.  Placed on BiPAP and given Lasix 80mg IV x 1 in ED.  Transferred to ICU.  Continued on Lasix 40mg IV q12 and increased to 80mg q12.  Started on IV Vanc, Flagyl, and Cefepime on 10/15/2020 by Pulmonary-CC given CT Chest concerning for infection - antibiotics stopped 10/18/2020.  Quantiferon sent and Sputum AFB ordered given cavitary appearance on imaging.  On BiPAP this morning - Pox 94% on 40% FiO2  I/O of 720/1950 with net negative 6557.3ml.      Recent admission from 8/22-8/31 - treated at that time with Afib-RVR with CHF (acute diastolic) and COPD exacerbation (s/p antibiotics and steroids) and transferred to SNF.    Readmitted from 9/1-9/11/2020 with Afib-RVR with CHF and d/c to SNF with Trilogy.  Patient states she has been using her "CPAP" at home.    CXR on 10/13/2020 -  Interval worsening of diffuse bilateral airspace disease and moderate bilateral pleural effusions when compared to 09/09/2020  CT Chest Without on 10/15/2020 - The findings highly concerning for extensive diffuse pulmonary infection with bilateral pleural fluid collections right greater than left.  US of LEs on 10/15/2020 - No evidence of deep venous thrombosis in either lower extremity.    Labs on Admission:  WBC 9.22  COVID-19 rapid negative  Trop negative x 3     PH 7.227/pCO2 94.4/pO2 73  Procal normal  Lactic Acid normal  Quantiferon pending  Sputum AFB x 3 ordered with 1 pending    Followed by Pulmonary - "Briefly, it is noted that continues to need NIV with naps; tired this AM  The patient's physical is significant for basilar crackles  The patient's working assessments include acute on chronic resp failure.  COnt NIV, diuresis.  Stop ABX. ".     Followed by Cardiology - "Acute on chronic diastolic heart failure.  Continuing to diurese.  Still evidence of " "volume overload".     Plan:  Continue with Lasix - increased to 80mg q12 by Pulmonary-CC on 10/6/2020  Continue BiPAP as needed.  Follow up with Pulmonary recommendations  Strict I&Os  Fluid Restrict to 1.5L for now  Katlinbs  Needs Trilogy set up on discharge      "

## 2020-10-19 NOTE — SUBJECTIVE & OBJECTIVE
Interval History: Patient overall stable overnight.  On Bipap early this morning. Getting a little frustrated about still being in the ICU.      Review of Systems   Constitutional: Positive for activity change. Negative for chills and fever.   HENT: Negative for ear pain.    Eyes: Negative for pain.   Respiratory: Positive for shortness of breath. Negative for cough.    Cardiovascular: Negative for chest pain, palpitations and leg swelling.   Gastrointestinal: Negative for abdominal pain, constipation, diarrhea, nausea and vomiting.   Endocrine: Negative for polydipsia, polyphagia and polyuria.   Genitourinary: Negative for difficulty urinating and dysuria.   Musculoskeletal: Negative for neck pain.   Skin: Negative for rash.   Neurological: Negative for dizziness and headaches.   Hematological: Does not bruise/bleed easily.   Psychiatric/Behavioral: Negative for agitation and behavioral problems.     Objective:     Vital Signs (Most Recent):  Temp: 97.8 °F (36.6 °C) (10/19/20 0715)  Pulse: (!) 114 (10/19/20 0900)  Resp: (!) 28 (10/19/20 0900)  BP: (!) 151/78 (10/19/20 0900)  SpO2: (!) 90 % (10/19/20 0900) Vital Signs (24h Range):  Temp:  [97.8 °F (36.6 °C)-98.4 °F (36.9 °C)] 97.8 °F (36.6 °C)  Pulse:  [] 114  Resp:  [17-41] 28  SpO2:  [88 %-95 %] 90 %  BP: ()/(54-87) 151/78     Weight: 99.4 kg (219 lb 2.2 oz)  Body mass index is 38.82 kg/m².    Intake/Output Summary (Last 24 hours) at 10/19/2020 0917  Last data filed at 10/19/2020 0530  Gross per 24 hour   Intake 540 ml   Output 1600 ml   Net -1060 ml      Physical Exam  Constitutional:       General: She is not in acute distress.     Appearance: She is obese. She is not toxic-appearing.   HENT:      Head: Normocephalic and atraumatic.      Right Ear: External ear normal.      Left Ear: External ear normal.      Nose: Nose normal.      Mouth/Throat:      Mouth: Mucous membranes are moist.      Pharynx: Oropharynx is clear.   Eyes:      General: No  scleral icterus.     Conjunctiva/sclera: Conjunctivae normal.   Neck:      Musculoskeletal: Normal range of motion and neck supple.   Cardiovascular:      Rate and Rhythm: Normal rate. Rhythm irregular.      Pulses: Normal pulses.      Heart sounds: No murmur.   Pulmonary:      Effort: No respiratory distress.      Breath sounds: Rales present. No wheezing.      Comments:     Abdominal:      General: Abdomen is flat. Bowel sounds are normal. There is no distension.      Palpations: Abdomen is soft.      Tenderness: There is no abdominal tenderness.   Musculoskeletal:      Right lower leg: No edema.      Left lower leg: No edema.   Skin:     General: Skin is warm.      Coloration: Skin is not jaundiced.      Findings: No rash.   Neurological:      General: No focal deficit present.      Mental Status: She is alert. Mental status is at baseline.   Psychiatric:         Mood and Affect: Mood normal.         Behavior: Behavior normal.         Significant Labs: All pertinent labs within the past 24 hours have been reviewed.    Significant Imaging: I have reviewed all pertinent imaging results/findings within the past 24 hours.

## 2020-10-19 NOTE — SUBJECTIVE & OBJECTIVE
Interval History: Pt slept well, complaining of slight stabbing pain in right ankle and discomfort in LLQ abdomen, not significantly worsened by palpation. Continues to make adequate urine. Net negative -6.5L.     Objective:     Vital Signs (Most Recent):  Temp: 97.8 °F (36.6 °C) (10/19/20 0715)  Pulse: 74 (10/19/20 0715)  Resp: 18 (10/19/20 0715)  BP: 120/67 (10/19/20 0715)  SpO2: (!) 90 % (10/19/20 0715) Vital Signs (24h Range):  Temp:  [97.8 °F (36.6 °C)-98.4 °F (36.9 °C)] 97.8 °F (36.6 °C)  Pulse:  [] 74  Resp:  [17-41] 18  SpO2:  [88 %-95 %] 90 %  BP: ()/(54-87) 120/67     Weight: 99.4 kg (219 lb 2.2 oz)  Body mass index is 38.82 kg/m².      Intake/Output Summary (Last 24 hours) at 10/19/2020 0755  Last data filed at 10/19/2020 0530  Gross per 24 hour   Intake 720 ml   Output 1950 ml   Net -1230 ml       Physical Exam  Constitutional:       General: She is not in acute distress.     Appearance: She is obese. She is not ill-appearing or toxic-appearing.   HENT:      Head: Normocephalic and atraumatic.      Nose: Nose normal.      Mouth/Throat:      Mouth: Mucous membranes are moist.   Eyes:      General: No scleral icterus.     Extraocular Movements: Extraocular movements intact.      Conjunctiva/sclera: Conjunctivae normal.      Pupils: Pupils are equal, round, and reactive to light.   Neck:      Musculoskeletal: Normal range of motion.      Comments: +JVD  Cardiovascular:      Rate and Rhythm: Normal rate and regular rhythm.      Pulses: Normal pulses.      Heart sounds: No murmur. No friction rub. No gallop.    Pulmonary:      Comments: Decreased breath all lung fields, absent bilateral bases, crackles mid upper posterior lung fields improved, no wheezes, rhonchi.   Abdominal:      General: Bowel sounds are normal.      Tenderness: There is no abdominal tenderness. There is no guarding or rebound.      Comments: protuberant   Musculoskeletal:         General: Swelling present.      Right lower  leg: Edema present.      Left lower leg: Edema present.   Skin:     General: Skin is warm.      Capillary Refill: Capillary refill takes less than 2 seconds.      Findings: No rash.   Neurological:      General: No focal deficit present.      Mental Status: She is oriented to person, place, and time.   Psychiatric:         Mood and Affect: Mood normal.         Behavior: Behavior normal.         Thought Content: Thought content normal.         Judgment: Judgment normal.         Vents:  Oxygen Concentration (%): 40 (10/19/20 0715)    Lines/Drains/Airways     Drain            Female External Urinary Catheter 09/01/20 1158 47 days    Female External Urinary Catheter 10/13/20 1359 5 days          Peripheral Intravenous Line                 Peripheral IV - Single Lumen 10/13/20 0920 20 G Right Antecubital 5 days         Peripheral IV - Single Lumen 10/13/20 1309 20 G Right Hand 5 days                Significant Labs:    CBC/Anemia Profile:  Recent Labs   Lab 10/18/20  0434 10/19/20  0425   WBC 9.88 9.14   HGB 10.7* 10.4*   HCT 35.0* 34.7*    169   MCV 93 90   RDW 14.8* 14.7*        Chemistries:  Recent Labs   Lab 10/18/20  0434 10/19/20  0424    140   K 4.6 4.2   CL 88* 86*   CO2 40* 43*   BUN 42* 48*   CREATININE 1.9* 1.9*   CALCIUM 9.2 9.5   MG 1.9 2.0   PHOS 5.3* 4.0       ABGs:   Recent Labs   Lab 10/19/20  0551   PH 7.386   PCO2 88.9*   HCO3 53.4*   POCSATURATED 91*   BE 28     Coagulation: No results for input(s): PT, INR, APTT in the last 48 hours.  Lactic Acid: No results for input(s): LACTATE in the last 48 hours.  Respiratory Culture: No results for input(s): GSRESP, RESPIRATORYC in the last 48 hours.  Urine Studies: No results for input(s): COLORU, APPEARANCEUA, PHUR, SPECGRAV, PROTEINUA, GLUCUA, KETONESU, BILIRUBINUA, OCCULTUA, NITRITE, UROBILINOGEN, LEUKOCYTESUR, RBCUA, WBCUA, BACTERIA, SQUAMEPITHEL, HYALINECASTS in the last 48 hours.    Invalid input(s): WRIGHTSUR    Significant Imaging:  I have  reviewed all pertinent imaging results/findings within the past 24 hours.

## 2020-10-19 NOTE — PROGRESS NOTES
"[facility] Hospital Medicine  Progress Note    Patient Name: Patsy Morris  MRN: [id number]  Patient Class: IP- Inpatient   Admission Date: 10/13/2020  Length of Stay: 6 days  Attending Physician: Ravi Soria MD  Primary Care Provider: Luisana Cartagena MD        Subjective:     Principal Problem:Acute on chronic respiratory failure with hypoxia and hypercapnia        HPI:  Per ED:  "This is a 70 y.o. female with history of CHF and COPD who presents via EMS with complaint of shortness of breath that began a few days ago. Per EMS patient had O2 saturation of 72 on 3 liters if home oxygen upon their arrival. Patient states she is on 3 liters of home oxygen at baseline. She denies fever, cough, chest pain, nausea, vomiting, diarrhea, or rhinorrhea. She reports she was recently discharged from the hospital for similar symptoms. She reports compliance with her home medications. She recently became resident of [facility] after last hospitalization. She is a former smoker. She denies undergoing any surgeries".    The patient is a 70 year old female with a PMH significant for of HTN, Chronic Respiratory Failure (COPD and BHAVANI/OHS), HFpEF, Obesity who presented to the ED with complaints of SOB that began about 3 days ago.  Patient is on 3L O2NC at home.  She was seen by her Cardiologist about 4 days prior to admission and told to decrease her Lasix from 40mg bid to qday.  She denies chest pain.  No Fevers.  No cough.  States she has been adherent with her medications.  Patient was recently admitted to [facility] from 9/1-9/11 for Acute on Chronic Respiratory Failure and discharged to SNF.  Patient was placed on BiPAP in ED and admitted to ICU.  Patient feeling better on BiPAP.        Overview/Hospital Course:  The patient is a 70 year old female with a PMH significant for of HTN, Chronic Respiratory Failure (COPD and BHAVANI/OHS - on BiPAP at home.  Did not get Trilogy on discharge from " SNF), HFpEF, Obesity who presented to the ED with complaints of SOB that began about 3 days ago.  Patient is on 3L O2NC at home. Patient presented to SOB over past 3 days PTA.  Placed on BiPAP and given Lasix 80mg IV x 1 in ED and Transferred to ICU. Continue diuresis, but patient slow to improved from a respiratory standpoint.  She was started on IV Vanc, Flagyl, and Cefepime on 10/15/2020 by Pulmonary-CC given CT Chest concerning for infection - antibiotics stopped on 10/18/2020. Quantiferon sent and Sputum AFB ordered given cavitary appearance on imaging.  Slow to improved.  Needs Trilogy set up on discharge.      Interval History: Patient overall stable overnight.  On Bipap early this morning. Getting a little frustrated about still being in the ICU.      Review of Systems   Constitutional: Positive for activity change. Negative for chills and fever.   HENT: Negative for ear pain.    Eyes: Negative for pain.   Respiratory: Positive for shortness of breath. Negative for cough.    Cardiovascular: Negative for chest pain, palpitations and leg swelling.   Gastrointestinal: Negative for abdominal pain, constipation, diarrhea, nausea and vomiting.   Endocrine: Negative for polydipsia, polyphagia and polyuria.   Genitourinary: Negative for difficulty urinating and dysuria.   Musculoskeletal: Negative for neck pain.   Skin: Negative for rash.   Neurological: Negative for dizziness and headaches.   Hematological: Does not bruise/bleed easily.   Psychiatric/Behavioral: Negative for agitation and behavioral problems.     Objective:     Vital Signs (Most Recent):  Temp: 97.8 °F (36.6 °C) (10/19/20 0715)  Pulse: (!) 114 (10/19/20 0900)  Resp: (!) 28 (10/19/20 0900)  BP: (!) 151/78 (10/19/20 0900)  SpO2: (!) 90 % (10/19/20 0900) Vital Signs (24h Range):  Temp:  [97.8 °F (36.6 °C)-98.4 °F (36.9 °C)] 97.8 °F (36.6 °C)  Pulse:  [] 114  Resp:  [17-41] 28  SpO2:  [88 %-95 %] 90 %  BP: ()/(54-87) 151/78     Weight: 99.4  kg (219 lb 2.2 oz)  Body mass index is 38.82 kg/m².    Intake/Output Summary (Last 24 hours) at 10/19/2020 0913  Last data filed at 10/19/2020 0530  Gross per 24 hour   Intake 540 ml   Output 1600 ml   Net -1060 ml      Physical Exam  Constitutional:       General: She is not in acute distress.     Appearance: She is obese. She is not toxic-appearing.   HENT:      Head: Normocephalic and atraumatic.      Right Ear: External ear normal.      Left Ear: External ear normal.      Nose: Nose normal.      Mouth/Throat:      Mouth: Mucous membranes are moist.      Pharynx: Oropharynx is clear.   Eyes:      General: No scleral icterus.     Conjunctiva/sclera: Conjunctivae normal.   Neck:      Musculoskeletal: Normal range of motion and neck supple.   Cardiovascular:      Rate and Rhythm: Normal rate. Rhythm irregular.      Pulses: Normal pulses.      Heart sounds: No murmur.   Pulmonary:      Effort: No respiratory distress.      Breath sounds: Rales present. No wheezing.      Comments:     Abdominal:      General: Abdomen is flat. Bowel sounds are normal. There is no distension.      Palpations: Abdomen is soft.      Tenderness: There is no abdominal tenderness.   Musculoskeletal:      Right lower leg: No edema.      Left lower leg: No edema.   Skin:     General: Skin is warm.      Coloration: Skin is not jaundiced.      Findings: No rash.   Neurological:      General: No focal deficit present.      Mental Status: She is alert. Mental status is at baseline.   Psychiatric:         Mood and Affect: Mood normal.         Behavior: Behavior normal.         Significant Labs: All pertinent labs within the past 24 hours have been reviewed.    Significant Imaging: I have reviewed all pertinent imaging results/findings within the past 24 hours.      Assessment/Plan:      * Acute on chronic respiratory failure with hypoxia and hypercapnia  Chronic Respiratory Failure on 3 liters of Oxygen at home - COPD, BHAVANI/OHS with severe  "restrictive lung disease.      This Admission she presented to SOB over past 3 days PTA.  Placed on BiPAP and given Lasix 80mg IV x 1 in ED.  Transferred to ICU.  Continued on Lasix 40mg IV q12 and increased to 80mg q12.  Started on IV Vanc, Flagyl, and Cefepime on 10/15/2020 by Pulmonary-CC given CT Chest concerning for infection - antibiotics stopped 10/18/2020.  Quantiferon sent and Sputum AFB ordered given cavitary appearance on imaging.  On BiPAP this morning - Pox 94% on 40% FiO2  I/O of 720/1950 with net negative 6557.3ml.      Recent admission from 8/22-8/31 - treated at that time with Afib-RVR with CHF (acute diastolic) and COPD exacerbation (s/p antibiotics and steroids) and transferred to SNF.    Readmitted from 9/1-9/11/2020 with Afib-RVR with CHF and d/c to SNF with Trilogy.  Patient states she has been using her "CPAP" at home.    CXR on 10/13/2020 -  Interval worsening of diffuse bilateral airspace disease and moderate bilateral pleural effusions when compared to 09/09/2020  CT Chest Without on 10/15/2020 - The findings highly concerning for extensive diffuse pulmonary infection with bilateral pleural fluid collections right greater than left.  US of LEs on 10/15/2020 - No evidence of deep venous thrombosis in either lower extremity.    Labs on Admission:  WBC 9.22  COVID-19 rapid negative  Trop negative x 3     PH 7.227/pCO2 94.4/pO2 73  Procal normal  Lactic Acid normal  Quantiferon pending  Sputum AFB x 3 ordered with 1 pending    Followed by Pulmonary - "Briefly, it is noted that continues to need NIV with naps; tired this AM  The patient's physical is significant for basilar crackles  The patient's working assessments include acute on chronic resp failure.  COnt NIV, diuresis.  Stop ABX. ".     Followed by Cardiology - "Acute on chronic diastolic heart failure.  Continuing to diurese.  Still evidence of volume overload".     Plan:  Continue with Lasix - increased to 80mg q12 by " Pulmonary-CC on 10/6/2020  Continue BiPAP as needed.  Follow up with Pulmonary recommendations  Strict I&Os  Fluid Restrict to 1.5L for now  DuoNebs  Needs Trilogy set up on discharge        Atrial fibrillation with rapid ventricular response   in ED. Has been well controlled since.  Home Meds:  Metoprolol 50mg bid and Apixaban 5mg bid  Followed by Cardiology, Dr. Lutz - last seen outpatient on 10/8/2020  HR stable.  -Continue current medications      Chronic kidney disease (CKD) stage G4/A1, severely decreased glomerular filtration rate (GFR) between 15-29 mL/min/1.73 square meter and albuminuria creatinine ratio less than 30 mg/g  Creatinine of 2.0 on admission and was 1.9 on discharge 9/11/2020.  Her baseline is around 1.8.  Creatinine 1.9 this morning  -Renally dose medication for CrCl of <30  -Avoid Nephrotoxic medications if able  -Monitor urine output  -Follow closely      Chronic diastolic congestive heart failure  Home Meds:  Lasix 40mg qday (was bid and recently changed as above)  BNP elevated at 577 on last admission and now 968 this admission  See above.     TTE on 8/24/2020 - Mild left atrial enlargement.  · Diastolic pattern consistent with atrial fibrillation observed.  · Normal left ventricular systolic function. The estimated ejection fraction is 58%.  · No wall motion abnormalities.  · Normal right ventricular systolic function.  · Mild right atrial enlargement.  Small posterior pericardial effusion.     Plan:  Lasix to 80mg po q12  Continue Bipap as above        Current moderate episode of major depressive disorder without prior episode  Continue Lexapro      Normocytic anemia  Hgb 10.0 on admission and stable at 10.4 today.  Stable since last admission.  Ferritin 25 on 8/25/2020 and Fe sat 18% on 9/5/2020 - continue outpatient FeSO4 325mg daily  B12 301 and Folate 4.6 - both borderline low - continue outpatient Nephrocaps daily      Dyslipidemia  Continue Statin      Essential  hypertension  Continue Metoprolol and Lasix.  BP stable.        VTE Risk Mitigation (From admission, onward)         Ordered     apixaban tablet 5 mg  2 times daily      10/17/20 1152     Place sequential compression device  Until discontinued      10/13/20 1405     IP VTE HIGH RISK PATIENT  Once      10/13/20 1405                Discharge Planning   YUDELKA:      Code Status: Full Code   Is the patient medically ready for discharge?:     Reason for patient still in hospital (select all that apply): Patient trending condition, Treatment and Consult recommendations  Discharge Plan A: Home, Home Health                  Ravi Soria MD  Department of Hospital Medicine   [facility]

## 2020-10-19 NOTE — PROGRESS NOTES
"Bicarb higher  Check ABG  Pt on NIV     0556 ABG resp acidosis,met alkalosis- partly compensatory and possibly partly from volume contraction  /62   Pulse 76   Temp 98.2 °F (36.8 °C) (Oral)   Resp (!) 22   Ht 5' 3" (1.6 m)   Wt 103 kg (227 lb)   LMP  (LMP Unknown)   SpO2 (!) 94%   BMI 40.21 kg/m²     "

## 2020-10-19 NOTE — PROGRESS NOTES
Difficulty with IV access for blood  If phlebotomy unable to draw for vanco level consider midline as suggested by RN

## 2020-10-19 NOTE — ASSESSMENT & PLAN NOTE
Hgb 10.0 on admission and stable at 10.4 today.  Stable since last admission.  Ferritin 25 on 8/25/2020 and Fe sat 18% on 9/5/2020 - continue outpatient FeSO4 325mg daily  B12 301 and Folate 4.6 - both borderline low - continue outpatient Nephrocaps daily

## 2020-10-19 NOTE — ASSESSMENT & PLAN NOTE
- continue diuresis; fluid overload likely contributes to hypercapnia in setting of OHS/BHAVANI, small airway disease with severe restriction.   -CT showing cavitary lesion with surrounding consolidation and GGO concerning for infectious vs malignant process, with significant ggo throughout the lung likely secondary to continued pulmonary edema.   - continue lasix 80 mg BID

## 2020-10-19 NOTE — ASSESSMENT & PLAN NOTE
Pt with recurrent hospitalization for decompensated heart failure, presenting with fluid overload resulting in hypercapnic respiratory failure. Component of restrictive lung disease decreasing ability to compensate Ventilation with worsened pulmonary edema.     - Continue AVAPS at night and NC during the day, AVAPs settings , RR 18, PEEP 8, FIO2 40%, Max pressure 36, min pressure 18  - diuresis as above.   - Will need to be set up with Trilogy prior to discharge as did not happen via NH after most recent DC  - CT chest showing cavitary lesion with surround consolidation/ggo concerning for infectious process; abx discontinued and following sputum for respiratory cultures and Mycobacterial culture/smear

## 2020-10-19 NOTE — NURSING
Pt has been on NC @ 6 Liters most of the day, tolerating well. Got up to chair for a few hours. Has no complaints, good appetite. Will continue to monitor.

## 2020-10-19 NOTE — PROGRESS NOTES
Pharmacokinetic Assessment Follow Up: IV Vancomycin    Vancomycin serum concentration assessment(s):    The random level was drawn correctly and can be used to guide therapy at this time. The measurement is above the desired definitive target range of 10 to 20 mcg/mL.    Vancomycin Regimen Plan:    Re-dose when the random level is less than 20 mcg/mL, next level to be drawn at 1130 on 10/19/20    Drug levels (last 3 results):  Recent Labs   Lab Result Units 10/16/20  1408 10/17/20  1631 10/18/20  2332   Vancomycin, Random ug/mL 14.3 19.5 24.8       Pharmacy will continue to follow and monitor vancomycin.    Please contact pharmacy at extension [phone] for questions regarding this assessment.    Thank you for the consult,   Ahsan Dunaway       Patient brief summary:  Patsy Morris is a 70 y.o. female initiated on antimicrobial therapy with IV Vancomycin for treatment of  Pneumonia    The patient's current regimen is pulse dosing    Drug Allergies:   Review of patient's allergies indicates:  No Known Allergies    Actual Body Weight:   103 kg    Renal Function:   Estimated Creatinine Clearance: 30.7 mL/min (A) (based on SCr of 1.9 mg/dL (H)).,     Dialysis Method (if applicable):  N/A    CBC (last 72 hours):  Recent Labs   Lab Result Units 10/16/20  0329 10/17/20  0215 10/18/20  0434   WBC K/uL 8.96 9.91 9.88   Hemoglobin g/dL 9.9* 10.1* 10.7*   Hematocrit % 33.2* 33.1* 35.0*   Platelets K/uL 180 177 175   Gran% % 80.2* 82.0* 81.9*   Lymph% % 8.3* 6.9* 6.7*   Mono% % 9.5 9.7 9.6   Eosinophil% % 1.5 1.1 1.3   Basophil% % 0.2 0.2 0.1   Differential Method  Automated Automated Automated       Metabolic Panel (last 72 hours):  Recent Labs   Lab Result Units 10/16/20  0329 10/17/20  0215 10/18/20  0434   Sodium mmol/L 142 142 142   Potassium mmol/L 4.5 4.5 4.6   Chloride mmol/L 95 92* 88*   CO2 mmol/L 36* 40* 40*   Glucose mg/dL 100 102 120*   BUN, Bld mg/dL 36* 40* 42*   Creatinine mg/dL 1.8* 1.7* 1.9*   Magnesium  mg/dL 2.0 2.0 1.9   Phosphorus mg/dL 4.1 4.8* 5.3*       Vancomycin Administrations:  vancomycin given in the last 96 hours                     vancomycin 1.5 g in dextrose 5 % 250 mL IVPB (ready to mix) (mg) 1,500 mg New Bag 10/17/20 1813    vancomycin 1.5 g in dextrose 5 % 250 mL IVPB (ready to mix) (mg) 1,500 mg New Bag 10/16/20 1613    vancomycin 2 g in dextrose 5 % 500 mL IVPB (mg) 2,000 mg New Bag 10/15/20 1541                    Microbiologic Results:  Microbiology Results (last 7 days)       Procedure Component Value Units Date/Time    AFB Culture & Smear [[id number]] Collected: 10/16/20 0928    Order Status: Completed Specimen: Respiratory from Sputum, Induced Updated: 10/17/20 2127     AFB Culture & Smear Culture in progress    Culture, Respiratory with Gram Stain [[id number]] Collected: 10/16/20 0927    Order Status: Completed Specimen: Respiratory from Sputum, Induced Updated: 10/17/20 0911     Respiratory Culture Normal respiratory leonardo     Gram Stain (Respiratory) Rare WBC's     Gram Stain (Respiratory) Few Gram positive cocci    Narrative:      Obtain after induced with saline nebs.    AFB Culture & Smear [[id number]]     Order Status: No result Specimen: Respiratory from Sputum, Induced     AFB Culture & Smear [[id number]]     Order Status: No result Specimen: Respiratory from Sputum, Induced

## 2020-10-19 NOTE — PROGRESS NOTES
[facility]  Pulmonology  Progress Note    Patient Name: Patsy Morris  MRN: [id number]  Admission Date: 10/13/2020  Hospital Length of Stay: 6 days  Code Status: Full Code  Attending Provider: Ravi Soria MD  Primary Care Provider: Luisana Cartagena MD   Principal Problem: Acute on chronic respiratory failure with hypoxia and hypercapnia    Subjective:     Interval History: Pt slept well, complaining of slight stabbing pain in right ankle and discomfort in LLQ abdomen, not significantly worsened by palpation. Continues to make adequate urine. Net negative -6.5L.     Objective:     Vital Signs (Most Recent):  Temp: 97.8 °F (36.6 °C) (10/19/20 0715)  Pulse: 74 (10/19/20 0715)  Resp: 18 (10/19/20 0715)  BP: 120/67 (10/19/20 0715)  SpO2: (!) 90 % (10/19/20 0715) Vital Signs (24h Range):  Temp:  [97.8 °F (36.6 °C)-98.4 °F (36.9 °C)] 97.8 °F (36.6 °C)  Pulse:  [] 74  Resp:  [17-41] 18  SpO2:  [88 %-95 %] 90 %  BP: ()/(54-87) 120/67     Weight: 99.4 kg (219 lb 2.2 oz)  Body mass index is 38.82 kg/m².      Intake/Output Summary (Last 24 hours) at 10/19/2020 0755  Last data filed at 10/19/2020 0530  Gross per 24 hour   Intake 720 ml   Output 1950 ml   Net -1230 ml       Physical Exam  Constitutional:       General: She is not in acute distress.     Appearance: She is obese. She is not ill-appearing or toxic-appearing.   HENT:      Head: Normocephalic and atraumatic.      Nose: Nose normal.      Mouth/Throat:      Mouth: Mucous membranes are moist.   Eyes:      General: No scleral icterus.     Extraocular Movements: Extraocular movements intact.      Conjunctiva/sclera: Conjunctivae normal.      Pupils: Pupils are equal, round, and reactive to light.   Neck:      Musculoskeletal: Normal range of motion.      Comments: +JVD  Cardiovascular:      Rate and Rhythm: Normal rate and regular rhythm.      Pulses: Normal pulses.      Heart sounds: No murmur. No friction rub. No gallop.     Pulmonary:      Comments: Decreased breath all lung fields, absent bilateral bases, crackles mid upper posterior lung fields improved, no wheezes, rhonchi.   Abdominal:      General: Bowel sounds are normal.      Tenderness: There is no abdominal tenderness. There is no guarding or rebound.      Comments: protuberant   Musculoskeletal:         General: Swelling present.      Right lower leg: Edema present.      Left lower leg: Edema present.   Skin:     General: Skin is warm.      Capillary Refill: Capillary refill takes less than 2 seconds.      Findings: No rash.   Neurological:      General: No focal deficit present.      Mental Status: She is oriented to person, place, and time.   Psychiatric:         Mood and Affect: Mood normal.         Behavior: Behavior normal.         Thought Content: Thought content normal.         Judgment: Judgment normal.         Vents:  Oxygen Concentration (%): 40 (10/19/20 0715)    Lines/Drains/Airways     Drain            Female External Urinary Catheter 09/01/20 1158 47 days    Female External Urinary Catheter 10/13/20 1359 5 days          Peripheral Intravenous Line                 Peripheral IV - Single Lumen 10/13/20 0920 20 G Right Antecubital 5 days         Peripheral IV - Single Lumen 10/13/20 1309 20 G Right Hand 5 days                Significant Labs:    CBC/Anemia Profile:  Recent Labs   Lab 10/18/20  0434 10/19/20  0425   WBC 9.88 9.14   HGB 10.7* 10.4*   HCT 35.0* 34.7*    169   MCV 93 90   RDW 14.8* 14.7*        Chemistries:  Recent Labs   Lab 10/18/20  0434 10/19/20  0424    140   K 4.6 4.2   CL 88* 86*   CO2 40* 43*   BUN 42* 48*   CREATININE 1.9* 1.9*   CALCIUM 9.2 9.5   MG 1.9 2.0   PHOS 5.3* 4.0       ABGs:   Recent Labs   Lab 10/19/20  0551   PH 7.386   PCO2 88.9*   HCO3 53.4*   POCSATURATED 91*   BE 28     Coagulation: No results for input(s): PT, INR, APTT in the last 48 hours.  Lactic Acid: No results for input(s): LACTATE in the last 48  hours.  Respiratory Culture: No results for input(s): GSRESP, RESPIRATORYC in the last 48 hours.  Urine Studies: No results for input(s): COLORU, APPEARANCEUA, PHUR, SPECGRAV, PROTEINUA, GLUCUA, KETONESU, BILIRUBINUA, OCCULTUA, NITRITE, UROBILINOGEN, LEUKOCYTESUR, RBCUA, WBCUA, BACTERIA, SQUAMEPITHEL, HYALINECASTS in the last 48 hours.    Invalid input(s): WRIGHTSUR    Significant Imaging:  I have reviewed all pertinent imaging results/findings within the past 24 hours.    Assessment/Plan:     * Acute on chronic respiratory failure with hypoxia and hypercapnia  Pt with recurrent hospitalization for decompensated heart failure, presenting with fluid overload resulting in hypercapnic respiratory failure. Component of restrictive lung disease decreasing ability to compensate Ventilation with worsened pulmonary edema.     - Continue AVAPS at night and NC during the day, AVAPs settings , RR 18, PEEP 8, FIO2 40%, Max pressure 36, min pressure 18  - diuresis as above.   - Will need to be set up with Trilogy prior to discharge as did not happen via NH after most recent DC  - CT chest showing cavitary lesion with surround consolidation/ggo concerning for infectious process; abx discontinued and following sputum for respiratory cultures and Mycobacterial culture/smear      Atrial fibrillation with rapid ventricular response  Continue rate control as likely contributing to diastolic dysfunction; currently in a flutter  - continue anticoag    Chronic diastolic congestive heart failure  - continue diuresis; fluid overload likely contributes to hypercapnia in setting of OHS/BHAVANI, small airway disease with severe restriction.   -CT showing cavitary lesion with surrounding consolidation and GGO concerning for infectious vs malignant process, with significant ggo throughout the lung likely secondary to continued pulmonary edema.   - continue lasix 80 mg BID           Willem Mortensen MD  Pulmonology  [facility]

## 2020-10-19 NOTE — PT/OT/SLP PROGRESS
Occupational Therapy   Treatment    Name: Patsy Morris  MRN: [id number]  Admitting Diagnosis:  Acute on chronic respiratory failure with hypoxia and hypercapnia       Recommendations:     Discharge Recommendations: nursing facility, skilled  Discharge Equipment Recommendations:  (TBD at next level of care)  Barriers to discharge:  None    Assessment:     Patsy Morris is a 70 y.o. female with a medical diagnosis of Acute on chronic respiratory failure with hypoxia and hypercapnia.  She presents with fatigue. Performance deficits affecting function are weakness, impaired endurance, impaired functional mobilty, gait instability, impaired cardiopulmonary response to activity, impaired self care skills, impaired balance, decreased safety awareness.  Pt agreeable to participating in therapy with encouragement upon arrival to room.  Pt demonstrated improvement with sit <> stand transfer performing with CGA and assist of 2.  Once standing pt able to take steps with Min A and assist of 2 from bed <> chair.  While seated up in chair pt able to perform grooming task with SBA.         Overall, pt tolerated therapy well and displayed increased activity tolerance this date.  Pt is making progress towards goals and would continue to benefit from skilled OT services to address problems listed above and increase independence with ADLs.  SNF is recommended upon d/c from acute care to further address deficits and help pt improve overall functional independence.     Rehab Prognosis:  Good; patient would benefit from acute skilled OT services to address these deficits and reach maximum level of function.       Plan:     Patient to be seen 5 x/week to address the above listed problems via self-care/home management, therapeutic activities, therapeutic exercises  · Plan of Care Expires: 11/14/20  · Plan of Care Reviewed with: patient    Subjective     Pain/Comfort:  · Pain Rating 1: 0/10  · Pain Rating Post-Intervention 1:  0/10    Objective:     Communicated with: RN (Romina) prior to session.  Patient found in bed in chair position with peripheral IV, oxygen, telemetry, pulse ox (continuous) upon OT entry to room.    General Precautions: Standard, fall   Orthopedic Precautions:N/A   Braces: N/A     Occupational Performance:     Bed Mobility:    · Supine <> sit: Min A  · Scooting:  Mod A towards EOB while seated    Functional Mobility/Transfers:  · Sit <> Stand:  CGA with assist of 2 x 2 trials from EOB  · Step transfer:  Min A with assist of 2 from bed <> chair.  Pt took ~5 steps.    Activities of Daily Living:  · Grooming: SBA for washing face with cloth while seated up in chair.      [facility] 6 Click ADL: 17    Treatment & Education:  *Pt performed bed mobility with increased time.  Cues provided to promote adequate rate of breathing and SpO2 levels 90% or above.  *Pt performed sit <> stand transfer and step transfer from bed <> chair.  Actions completed to help prepare pt for further functional transfers to BSC and other surfaces.  *Pt performed grooming task seated up in chair.  *Pt performed UB exercise to promote increased endurance and ROM needed for ADLs: 1 set x 10 reps horizontal shoulder abduction/adduction  *POC reviewed with pt    Other:  *Pt's SpO2 fluctuated between 55%-90% during session.  No signs of distress noted; irregular wave form observed.     Patient left up in chair with all lines intact, call button in reach and RN (Romina) notifiedEducation:  .  Tray table set up for pt.    GOALS:   Multidisciplinary Problems     Occupational Therapy Goals        Problem: Occupational Therapy Goal    Goal Priority Disciplines Outcome Interventions   Occupational Therapy Goal     OT, PT/OT Ongoing, Progressing    Description: Goals to be met by: 10/22/2020    Patient will increase functional independence with ADLs by performing:    UE Dressing with Minimal Assistance.  LE Dressing with Maximum Assistance.  Grooming while seated  with Stand-by Assistance.  MET 10/19/2020  Toileting from bedside commode with CGA for hygiene and clothing management.   Toilet transfer to bedside commode with Minimal Assistance.Pt will tolerate sitting EOB for 10 minutes.                         Time Tracking:     OT Date of Treatment: 10/19/20  OT Start Time: 1111  OT Stop Time: 1151  OT Total Time (min): 40 min    Billable Minutes:Self Care/Home Management 20  Therapeutic Activity 13  *Co-Tx with PT    CARLOS Motta  10/19/2020

## 2020-10-19 NOTE — PLAN OF CARE
Patient on oxygen with documented flow. Will attempt to wean per O2 order protocol.  Pt on prn therapy and doesn't require respiratory tx at this time. Pt with no apparent distress noted.   Patient on documented AVAPS settings, with alarms set and functioning.    Will continue to monitor.

## 2020-10-19 NOTE — ASSESSMENT & PLAN NOTE
Continue rate control as likely contributing to diastolic dysfunction; currently in a flutter  - continue anticoag

## 2020-10-19 NOTE — PLAN OF CARE
discharge planning assessment.   Pt went to [facility] after last admin in Sept, but has since been discharged home with .   Pt cannot remember the name of  company at this time.  Pt confirmed demographic information is correct in [facility], PCP is    pharmacy of choice is [facility] on [address].  Pt has home O2, 3L, and a cpap.  CM to follow for plans and arrangements  Dispo: remains in the ICu bed 6.   Snf VS home health (with [facility])          10/19/20 3845   Discharge Reassessment   Assessment Type Discharge Planning Reassessment   Provided patient/caregiver education on the expected discharge date and the discharge plan Yes   Do you have any problems affording any of your prescribed medications? No   Discharge Plan A Skilled Nursing Facility

## 2020-10-19 NOTE — PROGRESS NOTES
[facility]  Cardiology  Progress Note    Patient Name: Patsy Morris  MRN: [id number]  Admission Date: 10/13/2020  Hospital Length of Stay: 6 days  Code Status: Full Code   Attending Physician: Ravi Soria MD   Primary Care Physician: Luisana Cartagena MD  Expected Discharge Date:   Principal Problem:Acute on chronic respiratory failure with hypoxia and hypercapnia    Subjective:     Brief HPI:    Patsy Morris is a 70 y.o.female with hypertension. She has chronic atrial fibrillation and heart failure with preserved ejection fraction. She has chronic obstructive pulmonary disease being on home oxygen with CO2 retention. She has undergone nephrectomy for renal cell carcinoma and has chronic kidney disease. She was admitted to [facility] in 8/2020 and 9/2020 with heart failure and exacerbations of her chronic obstructive pulmonary disease. She went to therapy at [facility].      She used to be on furosemide 40 mg Q24 however after her last hospitalization she had the does of furosemide increased to 80 mg Q24. The dose was reduced to 40 mg Q24 on 10/8/2020. She became increasingly short of breath and presents fluid overloaded in heart failure as well as an exacerbation of her COPD with high CO2. She was admitted to the ICU.    Hospital Course:    Diuresis.    BIPAP.    Respiratory treatments.    10/15/2020: Echo: Normal left ventricular size and systolic function. EF 60%. Moderately dilated LA. Mildly dilated RV. SPAP 49 mmHg. Small pericardial effusion.    10/16/2020: Apixiban 5 mg Q12 was changed to heparin iv for consideration of bronchoscopy.    Interval History:     Slowly breathing slightly easier. Using BIPAP.      Review of Systems   Constitution: Positive for malaise/fatigue. Negative for chills and fever.   HENT: Negative for nosebleeds.    Eyes: Negative for vision loss in left eye and vision loss in right eye.   Cardiovascular: Positive for orthopnea and paroxysmal  nocturnal dyspnea. Negative for chest pain, leg swelling and palpitations.   Respiratory: Positive for shortness of breath. Negative for cough, hemoptysis, sputum production and wheezing.    Hematologic/Lymphatic: Negative for bleeding problem.   Skin: Negative for rash.   Musculoskeletal: Negative for myalgias.   Gastrointestinal: Negative for abdominal pain, heartburn, hematemesis, hematochezia, jaundice, melena, nausea and vomiting.   Genitourinary: Negative for hematuria.   Neurological: Negative for dizziness, headaches, light-headedness, vertigo and weakness.   Psychiatric/Behavioral: Negative for altered mental status. The patient is not nervous/anxious.    Allergic/Immunologic: Negative for persistent infections.       Objective:     Vital Signs (Most Recent):  Temp: 97.8 °F (36.6 °C) (10/19/20 0715)  Pulse: (!) 114 (10/19/20 0900)  Resp: (!) 28 (10/19/20 0900)  BP: (!) 151/78 (10/19/20 0900)  SpO2: (!) 90 % (10/19/20 0900) Vital Signs (24h Range):  Temp:  [97.8 °F (36.6 °C)-98.4 °F (36.9 °C)] 97.8 °F (36.6 °C)  Pulse:  [] 114  Resp:  [17-41] 28  SpO2:  [88 %-95 %] 90 %  BP: ()/(54-87) 151/78     Weight: 99.4 kg (219 lb 2.2 oz)  Body mass index is 38.82 kg/m².    SpO2: (!) 90 %  O2 Device (Oxygen Therapy): BiPAP      Intake/Output Summary (Last 24 hours) at 10/19/2020 0927  Last data filed at 10/19/2020 0530  Gross per 24 hour   Intake 540 ml   Output 1600 ml   Net -1060 ml       Lines/Drains/Airways     Drain            Female External Urinary Catheter 09/01/20 1158 47 days    Female External Urinary Catheter 10/13/20 1359 5 days          Peripheral Intravenous Line                 Peripheral IV - Single Lumen 10/13/20 0920 20 G Right Antecubital 6 days         Peripheral IV - Single Lumen 10/13/20 1309 20 G Right Hand 5 days                Physical Exam   Constitutional: She appears well-developed and well-nourished. She appears ill.   Neck: Carotid bruit is not present.   Cardiovascular:  Normal rate, S1 normal and S2 normal. An irregularly irregular rhythm present.   Pulmonary/Chest: She has decreased breath sounds in the right lower field and the left lower field. She has rales in the right middle field and the left middle field.   Musculoskeletal:      Right ankle: She exhibits swelling.      Left ankle: She exhibits swelling.   Skin: Skin is warm.     Current Medications:     apixaban  5 mg Oral BID    atorvastatin  80 mg Oral QHS    docusate sodium  100 mg Oral BID    escitalopram oxalate  10 mg Oral QHS    ferrous sulfate  325 mg Oral Daily    furosemide (LASIX) IV  80 mg Intravenous Q12H    gabapentin  100 mg Oral BID    metoprolol tartrate  50 mg Oral BID    miconazole NITRATE 2 %   Topical (Top) BID    vitamin renal formula (B-complex-vitamin c-folic acid)  1 capsule Oral Daily     Current Laboratory Results:    Recent Results (from the past 24 hour(s))   Vancomycin, Random    Collection Time: 10/18/20 11:32 PM   Result Value Ref Range    Vancomycin, Random 24.8 Not established ug/mL   Basic metabolic panel    Collection Time: 10/19/20  4:24 AM   Result Value Ref Range    Sodium 140 136 - 145 mmol/L    Potassium 4.2 3.5 - 5.1 mmol/L    Chloride 86 (L) 95 - 110 mmol/L    CO2 43 (HH) 23 - 29 mmol/L    Glucose 143 (H) 70 - 110 mg/dL    BUN, Bld 48 (H) 8 - 23 mg/dL    Creatinine 1.9 (H) 0.5 - 1.4 mg/dL    Calcium 9.5 8.7 - 10.5 mg/dL    Anion Gap 11 8 - 16 mmol/L    eGFR if African American 30 (A) >60 mL/min/1.73 m^2    eGFR if non African American 26 (A) >60 mL/min/1.73 m^2   Magnesium    Collection Time: 10/19/20  4:24 AM   Result Value Ref Range    Magnesium 2.0 1.6 - 2.6 mg/dL   Phosphorus    Collection Time: 10/19/20  4:24 AM   Result Value Ref Range    Phosphorus 4.0 2.7 - 4.5 mg/dL   CBC auto differential    Collection Time: 10/19/20  4:25 AM   Result Value Ref Range    WBC 9.14 3.90 - 12.70 K/uL    RBC 3.85 (L) 4.00 - 5.40 M/uL    Hemoglobin 10.4 (L) 12.0 - 16.0 g/dL     Hematocrit 34.7 (L) 37.0 - 48.5 %    Mean Corpuscular Volume 90 82 - 98 fL    Mean Corpuscular Hemoglobin 27.0 27.0 - 31.0 pg    Mean Corpuscular Hemoglobin Conc 30.0 (L) 32.0 - 36.0 g/dL    RDW 14.7 (H) 11.5 - 14.5 %    Platelets 169 150 - 350 K/uL    MPV 11.1 9.2 - 12.9 fL    Immature Granulocytes 0.3 0.0 - 0.5 %    Gran # (ANC) 7.6 1.8 - 7.7 K/uL    Immature Grans (Abs) 0.03 0.00 - 0.04 K/uL    Lymph # 0.7 (L) 1.0 - 4.8 K/uL    Mono # 0.6 0.3 - 1.0 K/uL    Eos # 0.1 0.0 - 0.5 K/uL    Baso # 0.03 0.00 - 0.20 K/uL    nRBC 0 0 /100 WBC    Gran% 83.6 (H) 38.0 - 73.0 %    Lymph% 7.8 (L) 18.0 - 48.0 %    Mono% 6.7 4.0 - 15.0 %    Eosinophil% 1.3 0.0 - 8.0 %    Basophil% 0.3 0.0 - 1.9 %    Differential Method Automated    ISTAT PROCEDURE    Collection Time: 10/19/20  5:51 AM   Result Value Ref Range    POC PH 7.386 7.35 - 7.45    POC PCO2 88.9 (HH) 35 - 45 mmHg    POC PO2 68 (L) 80 - 100 mmHg    POC HCO3 53.4 (H) 24 - 28 mmol/L    POC BE 28 -2 to 2 mmol/L    POC SATURATED O2 91 (L) 95 - 100 %    Rate 18     Sample ARTERIAL     Site LR     Allens Test Pass     DelSys CPAP/BiPAP     Mode AVAPS     Vt 400     FiO2 40      Current Imaging Results:    US Lower Extremity Veins Bilateral   Final Result      No evidence of deep venous thrombosis in either lower extremity.         Electronically signed by: Ceasar Carreon MD   Date:    10/15/2020   Time:    21:53      CT Chest Without Contrast   Final Result      The findings highly concerning for extensive diffuse pulmonary infection with bilateral pleural fluid collections right greater than left.         Electronically signed by: Regulo Bassett MD   Date:    10/15/2020   Time:    12:01      X-Ray Chest AP Portable   Final Result      Interval worsening of diffuse bilateral airspace disease and moderate bilateral pleural effusions when compared to 09/09/2020         Electronically signed by: Halina Plascencia   Date:    10/13/2020   Time:    09:40          10/15/2020: Echo:    The  left ventricle is normal in size with normal systolic function. The estimated ejection fraction is 60%.  A diastolic pattern consistent with atrial fibrillation observed.  Moderate left atrial enlargement.  Mild right ventricular enlargement.  Normal right ventricular systolic function.  Severe right atrial enlargement.  The aortic valve is mildly sclerotic.  The mitral valve is mildly sclerotic.  Mild mitral regurgitation.  Mild tricuspid regurgitation.  There is mild pulmonary hypertension.  The estimated PA systolic pressure is 49 mmHg.  Intermediate central venous pressure (8 mmHg).  Small circumferential pericardial effusion.  There is a left pleural effusion.      Assessment and Plan:     Problem List:    Active Diagnoses:    Diagnosis Date Noted POA    PRINCIPAL PROBLEM:  Acute on chronic respiratory failure with hypoxia and hypercapnia [J96.21, J96.22] 08/03/2016 Yes    Atrial fibrillation with rapid ventricular response [I48.91] 09/01/2020 Yes    Current moderate episode of major depressive disorder without prior episode [F32.1] 04/30/2020 Yes    Normocytic anemia [D64.9] 08/03/2016 Yes    Chronic diastolic congestive heart failure [I50.32] 05/20/2016 Yes    Chronic kidney disease (CKD) stage G4/A1, severely decreased glomerular filtration rate (GFR) between 15-29 mL/min/1.73 square meter and albuminuria creatinine ratio less than 30 mg/g [N18.4]  Yes    Dyslipidemia [E78.5] 02/22/2016 Yes    Essential hypertension [I10] 10/15/2014 Yes      Problems Resolved During this Admission:    Diagnosis Date Noted Date Resolved POA    Cavitary pneumonia [J18.9, J98.4] 10/16/2020 10/17/2020 Yes    Acute on chronic congestive heart failure [I50.9] 10/13/2020 10/13/2020 Yes    Pulmonary nodules/lesions, multiple [R91.8] 03/22/2019 10/14/2020 Yes    Paroxysmal atrial fibrillation [I48.0] 08/03/2016 10/13/2020 Yes    Acute on chronic combined systolic and diastolic heart failure [I50.43] 08/03/2016 10/13/2020  Unknown    Chronic hypercapnic respiratory failure [J96.12] 03/11/2016 10/13/2020 Yes    Renal carcinoma [C64.9] 03/10/2015 10/13/2020 Yes     Assessment and Plan:     1. Heart Failure, Diastolic, Acute on Chronic              8/24/2020: Echo: Normal left ventricular size and systolic function. Mildly dilated LA.   10/15/2020: Echo: Normal left ventricular size and systolic function. EF 60%. Moderately dilated LA. Mildly dilated RV. SPAP 49 mmHg. Small pericardial effusion.              At NH was on furosemide 40 mg Q24.              10/13/2020: Presents fluid overloaded in HF.              On furosemide 80 mg iv Q12.   On fluid restriction.              Continue diuresis.   Follow up CXR and BNP.     2. Atrial Fibrillation              In chronic atrial fibrillation.              On apixiban.              On metoprolol 50 mg Q12.              Rate well controlled.     3. Chronic Anticoagulation              Been on apixiban 5 mg Q12.   10/16/2020: Apixiban 5 mg Q12 was changed to heparin iv.   On apixiban 5 mg Q12.    4. Hypertension   On metoprolol 50 mg Q12.   Expect blood pressure to come down with diuresis.                5. Chronic Kidney Disease, Stage 4              History or renal carcinoma and nephrectomy.              10/13/2020: BUN/crea 27/2.0. CrCl 28.     6. Chronic Obstructive Pulmonary Disease              Chronic respiratory failure with CO2 retention.              10/13/2020: 7.23/94/73/39/90% on FiO2 of 40%.   Pulmonary following.      VTE Risk Mitigation (From admission, onward)         Ordered     apixaban tablet 5 mg  2 times daily      10/17/20 1152     Place sequential compression device  Until discontinued      10/13/20 1405     IP VTE HIGH RISK PATIENT  Once      10/13/20 1405                Diana Meredith MD  Cardiology  [facility]

## 2020-10-19 NOTE — PT/OT/SLP PROGRESS
Physical Therapy Treatment    Patient Name:  Patsy Morris   MRN:  [id number]    Recommendations:     Discharge Recommendations:  nursing facility, skilled   Discharge Equipment Recommendations: (Defer to SNF)   Barriers to discharge: Decreased caregiver support and current functional level    Assessment:     Patsy Morris is a 70 y.o. female admitted with a medical diagnosis of Acute on chronic respiratory failure with hypoxia and hypercapnia. Comorbidities: CHF and COPD. Pt is on 3L of O2 at home. She recently completed a SNF stay.  She presents with the following impairments/functional limitations:  weakness, impaired endurance, impaired self care skills, impaired functional mobilty, gait instability, impaired balance, decreased coordination, decreased safety awareness, impaired cardiopulmonary response to activity.    Patient seen on airborne precautions with OT for co-treat to safely progress OOB mobility as patient continues to be most limited by cardiopulmonary response to activity. Initially pt reports too fatigued to mobilize, but with encouragement participates well in activity. Required CGA-Jacquie assistance of 2 people for bed mobility and stand step transfer to bedside chair with extended rest breaks between each change in position. Performed LE and thoracic exercises in seated position. Rec for dc to SNF.     Rehab Prognosis: Good; patient would benefit from acute skilled PT services to address these deficits and reach maximum level of function.    Recent Surgery: * No surgery found *      Plan:     During this hospitalization, patient to be seen 5 x/week to address the identified rehab impairments via therapeutic activities, therapeutic exercises, gait training, wheelchair management/training and progress toward the following goals:    · Plan of Care Expires:  11/15/20    Subjective     Chief Complaint: Feeling fatigued, poor night of sleep with constant inturruptions  Patient/Family  "Comments/goals: awaiting bed bath, initially reports too fatigued to participate but after discussion agreeable and reports feels better up in chair; Patient agreeable to PT treatment.  Pain/Comfort:  · Pain Rating 1: 0/10(slight discomfort in spine, does not rate pain)  · Pain Rating Post-Intervention 1: 0/10      Objective:     Communicated with MARIUSZ Vanegas prior to session.  Patient found with bed in chair position with peripheral IV, oxygen, telemetry, pulse ox (continuous), blood pressure cuff(o2 6 L NC) upon PT entry to room.     General Precautions: Standard, fall, respiratory, airborne   Orthopedic Precautions:N/A   Braces: N/A     Patient donned yellow socks and gait belt for OOB mobility.     Functional Mobility:  · Bed Mobility:     · Scooting anteriorly at EOB: moderate assistance  · Supine to Sit: minimum assistance and of 2 persons  · Transfers:     · Sit to Stand:  contact guard assistance and of 2 persons with hand-held assist  · From elevated ICU bed, pt mostly sliding to ground vs extending from seated position  · Bed to Chair: minimum assistance and of 2 persons with  hand-held assist  using  Step Transfer  · See gait for comments  · Gait: x4 steps laterally during bed>chair transfer.   · Decreased swing time and increased double limb support time.   · Pt reports feeling "shaky" after gait but no overt change in vitals.  · Balance: Static sitting x5 min EOB with CGA, pt maintains at least Unilateral UE support on EOB.    Vital: Pt on 6 L NC. Attempted to monitor SpO2 and HR, with SPO2 wild variations range from 55%-94% with changes occurring within 5 seconds with irregular wave form. HR range from 60s to 110 bpm, also with changes happening quickly that appears unassociated with level of effort.     AM-PAC 6 CLICK MOBILITY  Turning over in bed (including adjusting bedclothes, sheets and blankets)?: 3  Sitting down on and standing up from a chair with arms (e.g., wheelchair, bedside commode, etc.): " 2  Moving from lying on back to sitting on the side of the bed?: 3  Moving to and from a bed to a chair (including a wheelchair)?: 2  Need to walk in hospital room?: 1  Climbing 3-5 steps with a railing?: 1  Basic Mobility Total Score: 12       Therapeutic Activities and Exercises:  ·  Gait and tranfers as above  · Pt performed seated therapeutic exercises including LAQs, thoracic rotation, and thoracic extension x5-10 reps with verbal and tactile cues.  · Instructed pt to elevate legs in recliner once done with lunch to prevent dependent edema of BLE    Patient left up in chair with all lines intact, call button in reach and RN Romina present..    GOALS:   Multidisciplinary Problems     Physical Therapy Goals        Problem: Physical Therapy Goal    Goal Priority Disciplines Outcome Goal Variances Interventions   Physical Therapy Goal     PT, PT/OT Ongoing, Progressing     Description: Goals to be met by: 11/15/2020    Patient will perform the following to increase strength, improve mobility, and return to prior level of function:    1. Rolling to L and R with SBA  2. Supine <> sit with SBA.  3. Sit EOB x 15 min with SBA for balance.  4. Bed <> chair transfer with SBA and least restrictive assistive device.  5. Added 10/19: Gait x10 ft with CGA and LRAD.                     Time Tracking:     PT Received On: 10/19/20  PT Start Time: 1108     PT Stop Time: 1151  PT Total Time (min): 43 min     Overlap with OT for portions of session due to complex nature of patient and for safety with mobility to decrease fall risk for patient and caregiver injury requiring two skilled therapists to provide interventions.     Billable Minutes: Therapeutic Activity 25 and Therapeutic Exercise 13    Treatment Type: Treatment  PT/PTA: PT     PTA Visit Number: 0     Sanjana Burgos, PT  10/19/2020

## 2020-10-19 NOTE — PROGRESS NOTES
Therapy with Vancomycin complete and/or consult discontinued by provider.  Pharmacy will sign off, please re-consult as needed.    Thank you,     Debra Ayoub, Pharm.D.

## 2020-10-19 NOTE — PLAN OF CARE
Problem: Physical Therapy Goal  Goal: Physical Therapy Goal  Description: Goals to be met by: 11/15/2020    Patient will perform the following to increase strength, improve mobility, and return to prior level of function:    1. Rolling to L and R with SBA  2. Supine <> sit with SBA.  3. Sit EOB x 15 min with SBA for balance.  4. Bed <> chair transfer with SBA and least restrictive assistive device.  5. Added 10/19: Gait x10 ft with CGA and LRAD.    Outcome: Ongoing, Progressing     Patient seen on airborne precautions with OT for co-treat to safely progress OOB mobility as patient continues to be most limited by cardiopulmonary response to activity. Initially pt reports too fatigued to mobilize, but with encouragement participates well in activity. Required CGA-Jacquie assistance of 2 people for bed mobility and stand step transfer to bedside chair with extended rest breaks between each change in position. Performed LE and thoracic exercises in seated position. Rec for dc to SNF.

## 2020-10-19 NOTE — PLAN OF CARE
Problem: Occupational Therapy Goal  Goal: Occupational Therapy Goal  Description: Goals to be met by: 10/22/2020    Patient will increase functional independence with ADLs by performing:    UE Dressing with Minimal Assistance.  LE Dressing with Maximum Assistance.  Grooming while seated with Stand-by Assistance.  MET 10/19/2020  Toileting from bedside commode with CGA for hygiene and clothing management.   Toilet transfer to bedside commode with Minimal Assistance.  Pt will tolerate sitting EOB for 10 minutes.      Outcome: Ongoing, Progressing     Pt met one goal this date and is making progress towards others.  SNF is recommended upon d/c from acute care to further address deficits and help pt improve overall functional independence.     CARLOS Motta  10/19/2020

## 2020-10-20 NOTE — PT/OT/SLP PROGRESS
Physical Therapy Treatment    Patient Name:  Patsy Morris   MRN:  [id number]    Recommendations:     Discharge Recommendations:  nursing facility, skilled   Discharge Equipment Recommendations: (Defer to SNF)   Barriers to discharge: Decreased caregiver support and current functional level    Assessment:     Patsy Morris is a 70 y.o. female admitted with a medical diagnosis of Acute on chronic respiratory failure with hypoxia and hypercapnia. Comorbidities: CHF and COPD. Pt is on 3L of O2 at home. She recently completed a SNF stay.  She presents with the following impairments/functional limitations:  weakness, impaired endurance, impaired self care skills, impaired functional mobilty, gait instability, impaired balance, decreased coordination, decreased lower extremity function, decreased safety awareness, pain, impaired cardiopulmonary response to activity.    Patient with more c/o weakness of BLE than of SOB with OOB mobility this date. SpO2 values varied wildly during session without changes in work load with quick swings from low to high. Required increased assistance for bed moiblity due to back pain but progressed independence with transfers and distance ambulated to 4 ft with RW during session. Patient requesting more OOB mobility, discussed appropriateness to transition to separate OT and PT sessions starting tomorrow. Rec for dc to SNF.     Rehab Prognosis: Good; patient would benefit from acute skilled PT services to address these deficits and reach maximum level of function.    Recent Surgery: * No surgery found *      Plan:     During this hospitalization, patient to be seen 5 x/week to address the identified rehab impairments via gait training, therapeutic activities, therapeutic exercises, wheelchair management/training and progress toward the following goals:    · Plan of Care Expires:  11/15/20    Subjective     Chief Complaint: Food is cold, slow response of nursing to her  requests  Patient/Family Comments/goals: Goal to be up in chair more throughout day, discussed improved mobility and ability to transfer with nursing to chair; Patient agreeable to PT treatment.  Pain/Comfort:  Pain Rating 1: 0/10  Pain Rating Post-Intervention 1: 0/10(back pain with initial bed mobility that improved with time)      Objective:     Communicated with MARIUSZ Camargo prior to session.  Patient found supine with peripheral IV, oxygen, telemetry, pulse ox (continuous), blood pressure cuff, PICC line upon PT entry to room.     General Precautions: Standard, fall, respiratory, airborne   Orthopedic Precautions:N/A   Braces: N/A     Patient donned yellow socks and gait belt for OOB mobility.     Functional Mobility:  · Bed Mobility:     · Scooting anteriorly at EOB: minimal assistance  · Supine to Sit: moderate assistance and of 2 persons  · Transfers:     · Sit to Stand:  contact guard assistance-minimal assistance and of 1 persons with rolling walker  · 2x from elevated ICU bed, pt mostly sliding to ground vs extending from seated position  · 2x from bedside chair, pt demos awareness of preparatory movements including scooting forward and anterior weight shift  · Initial sit<>stand with Jacquie, progressing to CGA  · Bed to Chair: CGA and of 1-2 persons with  Rolling walker  using  Step Transfer  · See gait for comments  · Gait: x4 ft with RW and CGA  · Decreased swing time and increased double limb support time.   · Pt with BLE weakness with occasional knee buckling without LOB    Vital: Pt on 6 L NC. Attempted to monitor SpO2 and HR, with SPO2 wild variations range from 60%-94% with changes occurring within 5 seconds with irregular wave form. HR range from 70s to 110 bpm, also with changes happening quickly that appears unassociated with level of effort.     AM-PAC 6 CLICK MOBILITY  Turning over in bed (including adjusting bedclothes, sheets and blankets)?: 3  Sitting down on and standing up from a chair with arms  (e.g., wheelchair, bedside commode, etc.): 3  Moving from lying on back to sitting on the side of the bed?: 2  Moving to and from a bed to a chair (including a wheelchair)?: 3  Need to walk in hospital room?: 3  Climbing 3-5 steps with a railing?: 1  Basic Mobility Total Score: 15       Therapeutic Activities and Exercises:  · Gait and tranfers as above  · Attempted partial squats, pt with decreased eccentric control of descent  · White board updated with rec for [facility] in AM and PM and level of assistance requires    Patient left up in chair with all lines intact, call button in reach and RN Mary Jo present outside of room..    GOALS:   Multidisciplinary Problems     Physical Therapy Goals        Problem: Physical Therapy Goal    Goal Priority Disciplines Outcome Goal Variances Interventions   Physical Therapy Goal     PT, PT/OT Ongoing, Progressing     Description: Goals to be met by: 11/15/2020    Patient will perform the following to increase strength, improve mobility, and return to prior level of function:    1. Rolling to L and R with SBA  2. Supine <> sit with SBA.  3. Sit EOB x 15 min with SBA for balance.  4. Bed <> chair transfer with SBA and least restrictive assistive device.  5. Added 10/19: Gait x10 ft with CGA and LRAD.                     Time Tracking:     PT Received On: 10/20/20  PT Start Time: 1244     PT Stop Time: 1311  PT Total Time (min): 27 min     Overlap with OT for portions of session due to complex nature of patient and for safety with mobility to decrease fall risk for patient and caregiver injury requiring two skilled therapists to provide interventions.     Billable Minutes: Gait Training 15    Treatment Type: Treatment  PT/PTA: PT     PTA Visit Number: 0     Sanjana Burgos, TA  10/20/2020

## 2020-10-20 NOTE — PT/OT/SLP PROGRESS
Occupational Therapy   Treatment    Name: Patsy Morris  MRN: [id number]  Admitting Diagnosis:  Acute on chronic respiratory failure with hypoxia and hypercapnia       Recommendations:     Discharge Recommendations: nursing facility, skilled  Discharge Equipment Recommendations:  (TBD at next level of care)  Barriers to discharge:  None    Assessment:     Patsy Morris is a 70 y.o. female with a medical diagnosis of Acute on chronic respiratory failure with hypoxia and hypercapnia.  She presents with no pain. Performance deficits affecting function are weakness, impaired endurance, impaired self care skills, impaired functional mobilty, gait instability, impaired cardiopulmonary response to activity, impaired balance, decreased safety awareness.  Pt agreeable to participating in therapy upon arrival to room.  Pt required Mod A with assist of 2 to perform supine <> sit transfer and to scoot forward initially.  Pt displayed increased activity tolerance this date performing sit <> strand transfer and walking 4 ft with CGA and RW.  While standing with CGA and RW, Total A required to perform hygiene care.  In addition, pt tolerated exercises well while seated up in chair.    Pt is making progress towards goals and would continue to benefit from skilled OT services to address problems listed above and increase independence with ADLs.  SNF is recommended upon d/c from acute care to further address deficits and help pt improve overall functional independence.         Rehab Prognosis:  Good; patient would benefit from acute skilled OT services to address these deficits and reach maximum level of function.       Plan:     Patient to be seen 5 x/week to address the above listed problems via self-care/home management, therapeutic activities, therapeutic exercises  · Plan of Care Expires: 11/14/20  · Plan of Care Reviewed with: patient    Subjective     Pain/Comfort:  · Pain Rating 1: 0/10  · Pain Rating  Post-Intervention 1: (pt reported back pain during bed mobility, but no pain at rest)    Objective:     Communicated with: RN (Mary Jo) prior to session.  Patient found HOB elevated with peripheral IV, oxygen, telemetry, pulse ox (continuous) upon OT entry to room.    General Precautions: Standard, fall   Orthopedic Precautions:N/A   Braces: N/A     Occupational Performance:     Bed Mobility:    · Supine <> Sit: Mod A with assist of 2  · Scooting: Mod A towards EOB initially, then SBA    Functional Mobility/Transfers:  · Sit <> stand:   · CGA and RW x 1 trial from EOB  · Min A and RW x 1 trial from chair  · CGA and RW x 2 trials from chair  · Functional Mobility: Pt walked 4 ft from bed <> chair with CGA and RW.    · Pt reported that her legs felt weak    Activities of Daily Living:  · Toileting:  Total A for performing hygiene care in standing position.  Pt able to maintain balance during task with CGA and RW.  · Grooming & UB Dressing: Pt declined grooming and UB dressing       [facility] 6 Click ADL: 17    Treatment & Education:  *Pt performed sit <> stand transfer from EOB and walked 4 ft from bed <> chair.  *Pt performed UB and core exercises to promote increased stability and endurance needed for ADLs  -Seated marches touching R hand to elevated R knee, and L hand to elevated L knee: 1 set x 10 reps  -Seated marches touching R hand to elevated L knee, and R hand to elevated R hand: 1 set x 10 reps  -Forward circular rows: 2 sets x 10 reps  -Scapular squeezes with shoulder abduction/adduction; elbows in flexed position and fists touching at start, shoulders flexed to 90 degrees: 1 set x 10 reps  *POC reviewed with pt    Patient left up in chair with all lines intact, call button in reach and RN (Mary Jo) presentEducation:   outside of room.    GOALS:   Multidisciplinary Problems     Occupational Therapy Goals        Problem: Occupational Therapy Goal    Goal Priority Disciplines Outcome Interventions   Occupational Therapy  Goal     OT, PT/OT Ongoing, Progressing    Description: Goals to be met by: 10/22/2020    Patient will increase functional independence with ADLs by performing:    UE Dressing with Minimal Assistance.  LE Dressing with Maximum Assistance.  Grooming while seated with Stand-by Assistance.  MET 10/19/2020  Toileting from bedside commode with CGA for hygiene and clothing management.   Toilet transfer to bedside commode with Minimal Assistance.Pt will tolerate sitting EOB for 10 minutes.                         Time Tracking:     OT Date of Treatment: 10/20/20  OT Start Time: 1244  OT Stop Time: 1312  OT Total Time (min): 28 min    Billable Minutes:Therapeutic Exercise 13   *Completed with PT    CARLOS Motta  10/20/2020

## 2020-10-20 NOTE — PLAN OF CARE
Patient received on 6L nc. Sats 91%. Bipap qhs, settings as documented. Pt. in no distress, will continue to monitor.

## 2020-10-20 NOTE — ASSESSMENT & PLAN NOTE
Continue medical therapy including intravenous diuretic therapy to maintain negative fluid balance.

## 2020-10-20 NOTE — SUBJECTIVE & OBJECTIVE
Interval History:  No acute events overnight.    Review of Systems   Constitutional: Positive for activity change. Negative for chills and fever.   Respiratory: Positive for shortness of breath. Negative for wheezing.    Cardiovascular: Negative for chest pain.   Gastrointestinal: Negative for abdominal distention, abdominal pain, constipation, diarrhea, nausea and vomiting.   Genitourinary: Negative for dysuria and frequency.   Musculoskeletal: Negative for arthralgias and myalgias.   Neurological: Negative for light-headedness.   Psychiatric/Behavioral: Negative for agitation and confusion.     Objective:     Vital Signs (Most Recent):  Temp: 99.7 °F (37.6 °C) (10/20/20 1105)  Pulse: 68 (10/20/20 1520)  Resp: (!) 40 (10/20/20 1520)  BP: 135/64 (10/20/20 1322)  SpO2: (!) 93 % (10/20/20 1520) Vital Signs (24h Range):  Temp:  [97.8 °F (36.6 °C)-99.7 °F (37.6 °C)] 99.7 °F (37.6 °C)  Pulse:  [] 68  Resp:  [16-55] 40  SpO2:  [85 %-96 %] 93 %  BP: ()/() 135/64     Weight: 99.4 kg (219 lb 2.2 oz)  Body mass index is 38.82 kg/m².    Intake/Output Summary (Last 24 hours) at 10/20/2020 1541  Last data filed at 10/20/2020 0852  Gross per 24 hour   Intake 240 ml   Output 1750 ml   Net -1510 ml      Physical Exam  Constitutional:       General: She is not in acute distress.     Appearance: She is well-developed. She is obese.   HENT:      Head: Atraumatic.   Eyes:      Conjunctiva/sclera: Conjunctivae normal.   Neck:      Musculoskeletal: Neck supple.   Cardiovascular:      Rate and Rhythm: Normal rate and regular rhythm.      Heart sounds: Normal heart sounds. No murmur.   Pulmonary:      Breath sounds: No wheezing.      Comments: Distant breath sounds.  Abdominal:      General: Bowel sounds are normal. There is no distension.      Palpations: Abdomen is soft.      Tenderness: There is no abdominal tenderness.   Musculoskeletal: Normal range of motion.         General: No deformity.      Right lower leg:  Edema present.      Left lower leg: Edema present.   Neurological:      Mental Status: She is alert and oriented to person, place, and time.         Significant Labs: All pertinent labs within the past 24 hours have been reviewed.    Significant Imaging: I have reviewed all pertinent imaging results/findings within the past 24 hours.

## 2020-10-20 NOTE — PLAN OF CARE
Pt alternating between BIPAP and nasal cannula this shift for pt needs.  Pt silver tx with jeremiah.  No sputum production.  Will continue to monitor pt.

## 2020-10-20 NOTE — ASSESSMENT & PLAN NOTE
Active tuberculosis seems unlikely but will continue ruling out active pulmonary tuberculosis with acid-fast bacillus smears per pulmonary/critical care's recommendations.   Double O-Z Plasty Text: The defect edges were debeveled with a #15 scalpel blade.  Given the location of the defect, shape of the defect and the proximity to free margins a Double O-Z plasty (double transposition flap) was deemed most appropriate.  Using a sterile surgical marker, the appropriate transposition flaps were drawn incorporating the defect and placing the expected incisions within the relaxed skin tension lines where possible. The area thus outlined was incised deep to adipose tissue with a #15 scalpel blade.  The skin margins were undermined to an appropriate distance in all directions utilizing iris scissors.  Hemostasis was achieved with electrocautery.  The flaps were then transposed into place, one clockwise and the other counterclockwise, and anchored with interrupted buried subcutaneous sutures.

## 2020-10-20 NOTE — ASSESSMENT & PLAN NOTE
Pt with recurrent hospitalization for decompensated heart failure, presenting with fluid overload resulting in hypercapnic respiratory failure. Component of restrictive lung disease decreasing ability to compensate Ventilation with worsened pulmonary edema.     - Continue AVAPS at night and NC during the day, AVAPs settings , RR 18, PEEP 8, FIO2 40%, Max pressure 36, min pressure 18  - diuresis as above.   - Will need to be set up with [facility] prior to discharge as did not happen via NH after most recent DC  - CT chest showing cavitary lesion with surround consolidation/ggo concerning for infectious process; abx discontinued and following sputum for respiratory cultures and Mycobacterial culture/smear  - continue saline nebs for sputum inducement

## 2020-10-20 NOTE — PROGRESS NOTES
"[facility] Medicine  Progress Note    Patient Name: Patsy Morris  MRN: [id number]  Patient Class: IP- Inpatient   Admission Date: 10/13/2020  Length of Stay: 7 days  Attending Physician: Loi Quiles MD  Primary Care Provider: Luisana Cartagena MD        Subjective:     Principal Problem:Acute on chronic respiratory failure with hypoxia and hypercapnia         HPI:  Per Ravi Soria:    "Per ED:  "This is a 70 y.o. female with history of CHF and COPD who presents via EMS with complaint of shortness of breath that began a few days ago. Per EMS patient had O2 saturation of 72 on 3 liters if home oxygen upon their arrival. Patient states she is on 3 liters of home oxygen at baseline. She denies fever, cough, chest pain, nausea, vomiting, diarrhea, or rhinorrhea. She reports she was recently discharged from the hospital for similar symptoms. She reports compliance with her home medications. She recently became resident of [facility] after last hospitalization. She is a former smoker. She denies undergoing any surgeries".    The patient is a 70 year old female with a PMH significant for of HTN, Chronic Respiratory Failure (COPD and BHAVANI/OHS), HFpEF, Obesity who presented to the ED with complaints of SOB that began about 3 days ago.  Patient is on 3L O2NC at home.  She was seen by her Cardiologist about 4 days prior to admission and told to decrease her Lasix from 40mg bid to qday.  She denies chest pain.  No Fevers.  No cough.  States she has been adherent with her medications.  Patient was recently admitted to [facility] from 9/1-9/11 for Acute on Chronic Respiratory Failure and discharged to SNF.  Patient was placed on BiPAP in ED and admitted to ICU.  Patient feeling better on BiPAP."    Overview/Hospital Course:  Patient is 70-year-old woman with history of hypertension, chronic hypoxic respiratory failure on home oxygen secondary to chronic obstructive pulmonary " disease, chronic diastolic heart failure, chronic atrial fibrillation, and morbid obesity re-admitted to the hospital with decompensated heart failure after patient was home for about a week following recent hospitalization and stayed at skilled nursing facility for physical therapy.  Patient treated with non invasive ventilation intravenous diuretics.  Slowly clinically improving.  Patient also initially treated with antibiotic therapy which has since been discontinued as bacterial pneumonia ruled out on clinical grounds.  Patient does have a cavitary lesion on her chest CT scan but otherwise without signs or symptoms of active pulmonary tuberculosis.  Acid-fast bacillus smears and cultures ordered to rule out active pulmonary tuberculosis.      Interval History:  No acute events overnight.    Review of Systems   Constitutional: Positive for activity change. Negative for chills and fever.   Respiratory: Positive for shortness of breath. Negative for wheezing.    Cardiovascular: Negative for chest pain.   Gastrointestinal: Negative for abdominal distention, abdominal pain, constipation, diarrhea, nausea and vomiting.   Genitourinary: Negative for dysuria and frequency.   Musculoskeletal: Negative for arthralgias and myalgias.   Neurological: Negative for light-headedness.   Psychiatric/Behavioral: Negative for agitation and confusion.     Objective:     Vital Signs (Most Recent):  Temp: 99.7 °F (37.6 °C) (10/20/20 1105)  Pulse: 68 (10/20/20 1520)  Resp: (!) 40 (10/20/20 1520)  BP: 135/64 (10/20/20 1322)  SpO2: (!) 93 % (10/20/20 1520) Vital Signs (24h Range):  Temp:  [97.8 °F (36.6 °C)-99.7 °F (37.6 °C)] 99.7 °F (37.6 °C)  Pulse:  [] 68  Resp:  [16-55] 40  SpO2:  [85 %-96 %] 93 %  BP: ()/() 135/64     Weight: 99.4 kg (219 lb 2.2 oz)  Body mass index is 38.82 kg/m².    Intake/Output Summary (Last 24 hours) at 10/20/2020 4820  Last data filed at 10/20/2020 0895  Gross per 24 hour   Intake 240 ml    Output 1750 ml   Net -1510 ml      Physical Exam  Constitutional:       General: She is not in acute distress.     Appearance: She is well-developed. She is obese.   HENT:      Head: Atraumatic.   Eyes:      Conjunctiva/sclera: Conjunctivae normal.   Neck:      Musculoskeletal: Neck supple.   Cardiovascular:      Rate and Rhythm: Normal rate and regular rhythm.      Heart sounds: Normal heart sounds. No murmur.   Pulmonary:      Breath sounds: No wheezing.      Comments: Distant breath sounds.  Abdominal:      General: Bowel sounds are normal. There is no distension.      Palpations: Abdomen is soft.      Tenderness: There is no abdominal tenderness.   Musculoskeletal: Normal range of motion.         General: No deformity.      Right lower leg: Edema present.      Left lower leg: Edema present.   Neurological:      Mental Status: She is alert and oriented to person, place, and time.         Significant Labs: All pertinent labs within the past 24 hours have been reviewed.    Significant Imaging: I have reviewed all pertinent imaging results/findings within the past 24 hours.      Assessment/Plan:      * Acute on chronic respiratory failure with hypoxia and hypercapnia  Secondary to underlying lung disease, obesity, and decompensated heart failure.  Clinically improving with intravenous diuresis and noninvasive ventilation.  Will continue with both.    Cavitary lesion of lung  Active tuberculosis seems unlikely but will continue ruling out active pulmonary tuberculosis with acid-fast bacillus smears per pulmonary/critical care's recommendations.    Acute on chronic diastolic congestive heart failure  Continue medical therapy including intravenous diuretic therapy to maintain negative fluid balance.    Atrial fibrillation with rapid ventricular response  Stable.  Continue with beta-blocker therapy for rate control and apixaban for stroke risk reduction.    Essential hypertension  Reasonably controlled with current  regimen.  Will continue with current regimen and continue to monitor.    Chronic kidney disease (CKD) stage G4/A1, severely decreased glomerular filtration rate (GFR) between 15-29 mL/min/1.73 square meter and albuminuria creatinine ratio less than 30 mg/g  Stable.  Continue to closely monitor.    Dyslipidemia  Continue with statin therapy.    Current moderate episode of major depressive disorder without prior episode  Continue Lexapro      VTE Risk Mitigation (From admission, onward)         Ordered     apixaban tablet 5 mg  2 times daily      10/17/20 1152     Place sequential compression device  Until discontinued      10/13/20 1405     IP VTE HIGH RISK PATIENT  Once      10/13/20 1405                Loi Quiles MD  Department of Hospital Medicine   [facility]

## 2020-10-20 NOTE — PLAN OF CARE
Problem: Fall Injury Risk  Goal: Absence of Fall and Fall-Related Injury  10/20/2020 1636 by Mary Jo Branch RN  Outcome: Ongoing, Progressing  10/20/2020 1635 by Mary Jo Branch RN  Outcome: Ongoing, Progressing     Problem: Adult Inpatient Plan of Care  Goal: Plan of Care Review  10/20/2020 1636 by Mary Jo Branch RN  Outcome: Ongoing, Progressing  10/20/2020 1635 by Mary Jo Branch RN  Outcome: Ongoing, Progressing  Goal: Absence of Hospital-Acquired Illness or Injury  Outcome: Ongoing, Progressing  Goal: Optimal Comfort and Wellbeing  Outcome: Ongoing, Progressing     Problem: Fluid Imbalance (Acute Kidney Injury/Impairment)  Goal: Optimal Fluid Balance  Outcome: Ongoing, Progressing     Problem: Wound  Goal: Optimal Wound Healing  10/20/2020 1636 by Mary Jo Branch RN  Outcome: Ongoing, Progressing  10/20/2020 1635 by Mary Jo Branch RN  Outcome: Ongoing, Progressing

## 2020-10-20 NOTE — SUBJECTIVE & OBJECTIVE
Interval History: Tolerated NC through out the day yesterday. Up in the chair. No increased sputum production. No fevers, increased white count. No chest pain.     Objective:     Vital Signs (Most Recent):  Temp: 98.3 °F (36.8 °C) (10/20/20 0705)  Pulse: 72 (10/20/20 0705)  Resp: (!) 22 (10/20/20 0705)  BP: 114/63 (10/20/20 0705)  SpO2: 96 % (10/20/20 0705) Vital Signs (24h Range):  Temp:  [97.8 °F (36.6 °C)-98.4 °F (36.9 °C)] 98.3 °F (36.8 °C)  Pulse:  [] 72  Resp:  [16-55] 22  SpO2:  [85 %-96 %] 96 %  BP: (103-151)/() 114/63     Weight: 99.4 kg (219 lb 2.2 oz)  Body mass index is 38.82 kg/m².      Intake/Output Summary (Last 24 hours) at 10/20/2020 0836  Last data filed at 10/20/2020 0705  Gross per 24 hour   Intake 720 ml   Output 1750 ml   Net -1030 ml       Physical Exam  Constitutional:       General: She is not in acute distress.     Appearance: She is obese. She is not ill-appearing or toxic-appearing.   HENT:      Head: Normocephalic and atraumatic.      Nose: Nose normal.      Mouth/Throat:      Mouth: Mucous membranes are moist.   Eyes:      General: No scleral icterus.     Extraocular Movements: Extraocular movements intact.      Conjunctiva/sclera: Conjunctivae normal.      Pupils: Pupils are equal, round, and reactive to light.   Neck:      Musculoskeletal: Normal range of motion.      Comments: +JVD  Cardiovascular:      Rate and Rhythm: Normal rate and regular rhythm.      Pulses: Normal pulses.      Heart sounds: No murmur. No friction rub. No gallop.    Pulmonary:      Comments: Decreased breath all lung fields, absent bilateral bases, crackles mid upper posterior lung fields improved, no wheezes, rhonchi.   Abdominal:      General: Bowel sounds are normal.      Tenderness: There is no abdominal tenderness. There is no guarding or rebound.      Comments: protuberant   Musculoskeletal:         General: Swelling present.      Right lower leg: Edema present.      Left lower leg: Edema  present.      Comments: Swelling improved.    Skin:     General: Skin is warm.      Capillary Refill: Capillary refill takes less than 2 seconds.      Findings: No rash.   Neurological:      General: No focal deficit present.      Mental Status: She is oriented to person, place, and time.   Psychiatric:         Mood and Affect: Mood normal.         Behavior: Behavior normal.         Thought Content: Thought content normal.         Judgment: Judgment normal.         Vents:  Oxygen Concentration (%): 40 (10/20/20 0346)    Lines/Drains/Airways     Drain            Female External Urinary Catheter 10/13/20 1359 6 days          Peripheral Intravenous Line                 Peripheral IV - Single Lumen 10/13/20 0920 20 G Right Antecubital 6 days                Significant Labs:    CBC/Anemia Profile:  Recent Labs   Lab 10/19/20  0425 10/20/20  0435   WBC 9.14 8.95   HGB 10.4* 11.3*   HCT 34.7* 35.3*    160   MCV 90 88   RDW 14.7* 14.6*        Chemistries:  Recent Labs   Lab 10/19/20  0424 10/20/20  0435    142   K 4.2 5.1   CL 86* 86*   CO2 43* 42*   BUN 48* 56*   CREATININE 1.9* 1.8*   CALCIUM 9.5 9.4   MG 2.0 2.2   PHOS 4.0 4.6*       ABGs:   Recent Labs   Lab 10/19/20  0551   PH 7.386   PCO2 88.9*   HCO3 53.4*   POCSATURATED 91*   BE 28     Coagulation: No results for input(s): PT, INR, APTT in the last 48 hours.  Lactic Acid: No results for input(s): LACTATE in the last 48 hours.  Lipid Panel: No results for input(s): CHOL, HDL, LDLCALC, TRIG, CHOLHDL in the last 48 hours.  Respiratory Culture: No results for input(s): GSRESP, RESPIRATORYC in the last 48 hours.  Troponin: No results for input(s): TROPONINI in the last 48 hours.  Urine Studies: No results for input(s): COLORU, APPEARANCEUA, PHUR, SPECGRAV, PROTEINUA, GLUCUA, KETONESU, BILIRUBINUA, OCCULTUA, NITRITE, UROBILINOGEN, LEUKOCYTESUR, RBCUA, WBCUA, BACTERIA, SQUAMEPITHEL, HYALINECASTS in the last 48 hours.    Invalid input(s):  DAVID    Significant Imaging:  I have reviewed all pertinent imaging results/findings within the past 24 hours.

## 2020-10-20 NOTE — PROGRESS NOTES
[facility]  Cardiology  Progress Note    Patient Name: Patsy Morris  MRN: [id number]  Admission Date: 10/13/2020  Hospital Length of Stay: 7 days  Code Status: Full Code   Attending Physician: Loi Quiles MD   Primary Care Physician: Luisana Cartagena MD  Expected Discharge Date:   Principal Problem:Acute on chronic respiratory failure with hypoxia and hypercapnia    Subjective:     Brief HPI:    Patsy Morris is a 70 y.o.female with hypertension. She has chronic atrial fibrillation and heart failure with preserved ejection fraction. She has chronic obstructive pulmonary disease being on home oxygen with CO2 retention. She has undergone nephrectomy for renal cell carcinoma and has chronic kidney disease. She was admitted to [facility] in 8/2020 and 9/2020 with heart failure and exacerbations of her chronic obstructive pulmonary disease. She went to therapy at [facility].      She used to be on furosemide 40 mg Q24 however after her last hospitalization she had the does of furosemide increased to 80 mg Q24. The dose was reduced to 40 mg Q24 on 10/8/2020. She became increasingly short of breath and presents fluid overloaded in heart failure as well as an exacerbation of her COPD with high CO2. She was admitted to the ICU.    Hospital Course:    Diuresis.    BIPAP.    Respiratory treatments.    10/15/2020: Echo: Normal left ventricular size and systolic function. EF 60%. Moderately dilated LA. Mildly dilated RV. SPAP 49 mmHg. Small pericardial effusion.    10/16/2020: Apixiban 5 mg Q12 was changed to heparin iv for consideration of bronchoscopy.    Interval History:     Slowly breathing slightly easier from day to day. Using BIPAP.      Review of Systems   Constitution: Positive for malaise/fatigue. Negative for chills and fever.   HENT: Negative for nosebleeds.    Eyes: Negative for vision loss in left eye and vision loss in right eye.   Cardiovascular: Positive for  orthopnea and paroxysmal nocturnal dyspnea. Negative for chest pain, leg swelling and palpitations.   Respiratory: Positive for shortness of breath. Negative for cough, hemoptysis, sputum production and wheezing.    Hematologic/Lymphatic: Negative for bleeding problem.   Skin: Negative for rash.   Musculoskeletal: Negative for myalgias.   Gastrointestinal: Negative for abdominal pain, heartburn, hematemesis, hematochezia, jaundice, melena, nausea and vomiting.   Genitourinary: Negative for hematuria.   Neurological: Negative for dizziness, headaches, light-headedness, vertigo and weakness.   Psychiatric/Behavioral: Negative for altered mental status. The patient is not nervous/anxious.    Allergic/Immunologic: Negative for persistent infections.       Objective:     Vital Signs (Most Recent):  Temp: 98.3 °F (36.8 °C) (10/20/20 0705)  Pulse: 72 (10/20/20 0705)  Resp: (!) 22 (10/20/20 0705)  BP: 114/63 (10/20/20 0705)  SpO2: 96 % (10/20/20 0705) Vital Signs (24h Range):  Temp:  [97.8 °F (36.6 °C)-98.4 °F (36.9 °C)] 98.3 °F (36.8 °C)  Pulse:  [] 72  Resp:  [16-55] 22  SpO2:  [85 %-96 %] 96 %  BP: (103-148)/() 114/63     Weight: 99.4 kg (219 lb 2.2 oz)  Body mass index is 38.82 kg/m².    SpO2: 96 %  O2 Device (Oxygen Therapy): nasal cannula w/ humidification      Intake/Output Summary (Last 24 hours) at 10/20/2020 0945  Last data filed at 10/20/2020 0852  Gross per 24 hour   Intake 480 ml   Output 2250 ml   Net -1770 ml       Lines/Drains/Airways     Drain            Female External Urinary Catheter 10/13/20 1359 6 days          Peripheral Intravenous Line                 Peripheral IV - Single Lumen 10/13/20 0920 20 G Right Antecubital 7 days                Physical Exam   Constitutional: She appears well-developed and well-nourished. She appears ill.   Neck: Carotid bruit is not present.   Cardiovascular: Normal rate, S1 normal and S2 normal. An irregularly irregular rhythm present.   Pulmonary/Chest: She  has decreased breath sounds in the right lower field and the left lower field. She has rhonchi in the right middle field and the left middle field.   Musculoskeletal:      Right ankle: She exhibits no swelling.      Left ankle: She exhibits no swelling.   Skin: Skin is warm.     Current Medications:     apixaban  5 mg Oral BID    atorvastatin  80 mg Oral QHS    docusate sodium  100 mg Oral BID    escitalopram oxalate  10 mg Oral QHS    ferrous sulfate  325 mg Oral Daily    furosemide (LASIX) IV  80 mg Intravenous Q12H    gabapentin  100 mg Oral BID    metoprolol tartrate  50 mg Oral BID    miconazole NITRATE 2 %   Topical (Top) BID    sodium chloride 7%  4 mL Nebulization Q8H    vitamin renal formula (B-complex-vitamin c-folic acid)  1 capsule Oral Daily     Current Laboratory Results:    Recent Results (from the past 24 hour(s))   Basic metabolic panel    Collection Time: 10/20/20  4:35 AM   Result Value Ref Range    Sodium 142 136 - 145 mmol/L    Potassium 5.1 3.5 - 5.1 mmol/L    Chloride 86 (L) 95 - 110 mmol/L    CO2 42 (HH) 23 - 29 mmol/L    Glucose 102 70 - 110 mg/dL    BUN, Bld 56 (H) 8 - 23 mg/dL    Creatinine 1.8 (H) 0.5 - 1.4 mg/dL    Calcium 9.4 8.7 - 10.5 mg/dL    Anion Gap 14 8 - 16 mmol/L    eGFR if African American 32 (A) >60 mL/min/1.73 m^2    eGFR if non African American 28 (A) >60 mL/min/1.73 m^2   Magnesium    Collection Time: 10/20/20  4:35 AM   Result Value Ref Range    Magnesium 2.2 1.6 - 2.6 mg/dL   Phosphorus    Collection Time: 10/20/20  4:35 AM   Result Value Ref Range    Phosphorus 4.6 (H) 2.7 - 4.5 mg/dL   CBC auto differential    Collection Time: 10/20/20  4:35 AM   Result Value Ref Range    WBC 8.95 3.90 - 12.70 K/uL    RBC 4.02 4.00 - 5.40 M/uL    Hemoglobin 11.3 (L) 12.0 - 16.0 g/dL    Hematocrit 35.3 (L) 37.0 - 48.5 %    Mean Corpuscular Volume 88 82 - 98 fL    Mean Corpuscular Hemoglobin 28.1 27.0 - 31.0 pg    Mean Corpuscular Hemoglobin Conc 32.0 32.0 - 36.0 g/dL    RDW  14.6 (H) 11.5 - 14.5 %    Platelets 160 150 - 350 K/uL    MPV 11.8 9.2 - 12.9 fL    Immature Granulocytes 2.1 (H) 0.0 - 0.5 %    Gran # (ANC) 6.5 1.8 - 7.7 K/uL    Immature Grans (Abs) 0.19 (H) 0.00 - 0.04 K/uL    Lymph # 1.1 1.0 - 4.8 K/uL    Mono # 1.0 0.3 - 1.0 K/uL    Eos # 0.2 0.0 - 0.5 K/uL    Baso # 0.04 0.00 - 0.20 K/uL    nRBC 0 0 /100 WBC    Gran% 72.3 38.0 - 73.0 %    Lymph% 11.7 (L) 18.0 - 48.0 %    Mono% 11.5 4.0 - 15.0 %    Eosinophil% 2.0 0.0 - 8.0 %    Basophil% 0.4 0.0 - 1.9 %    Differential Method Automated    BNP    Collection Time: 10/20/20  4:35 AM   Result Value Ref Range     (H) 0 - 99 pg/mL   Brain natriuretic peptide    Collection Time: 10/20/20  4:35 AM   Result Value Ref Range     (H) 0 - 99 pg/mL     Current Imaging Results:    X-Ray Chest AP Portable   Final Result      No significant change.  There is cardiomegaly, multifocal airspace opacities and bilateral effusions.         Electronically signed by: Ajay Camara MD   Date:    10/20/2020   Time:    08:48      US Lower Extremity Veins Bilateral   Final Result      No evidence of deep venous thrombosis in either lower extremity.         Electronically signed by: Ceasar Carreon MD   Date:    10/15/2020   Time:    21:53      CT Chest Without Contrast   Final Result      The findings highly concerning for extensive diffuse pulmonary infection with bilateral pleural fluid collections right greater than left.         Electronically signed by: Regulo Bassett MD   Date:    10/15/2020   Time:    12:01      X-Ray Chest AP Portable   Final Result      Interval worsening of diffuse bilateral airspace disease and moderate bilateral pleural effusions when compared to 09/09/2020         Electronically signed by: Halina Plascencia   Date:    10/13/2020   Time:    09:40          10/15/2020: Echo:    The left ventricle is normal in size with normal systolic function. The estimated ejection fraction is 60%.  A diastolic pattern consistent  with atrial fibrillation observed.  Moderate left atrial enlargement.  Mild right ventricular enlargement.  Normal right ventricular systolic function.  Severe right atrial enlargement.  The aortic valve is mildly sclerotic.  The mitral valve is mildly sclerotic.  Mild mitral regurgitation.  Mild tricuspid regurgitation.  There is mild pulmonary hypertension.  The estimated PA systolic pressure is 49 mmHg.  Intermediate central venous pressure (8 mmHg).  Small circumferential pericardial effusion.  There is a left pleural effusion.      Assessment and Plan:     Problem List:    Active Diagnoses:    Diagnosis Date Noted POA    PRINCIPAL PROBLEM:  Acute on chronic respiratory failure with hypoxia and hypercapnia [J96.21, J96.22] 08/03/2016 Yes    Atrial fibrillation with rapid ventricular response [I48.91] 09/01/2020 Yes    Current moderate episode of major depressive disorder without prior episode [F32.1] 04/30/2020 Yes    Normocytic anemia [D64.9] 08/03/2016 Yes    Chronic diastolic congestive heart failure [I50.32] 05/20/2016 Yes    Chronic kidney disease (CKD) stage G4/A1, severely decreased glomerular filtration rate (GFR) between 15-29 mL/min/1.73 square meter and albuminuria creatinine ratio less than 30 mg/g [N18.4]  Yes    Dyslipidemia [E78.5] 02/22/2016 Yes    Essential hypertension [I10] 10/15/2014 Yes      Problems Resolved During this Admission:    Diagnosis Date Noted Date Resolved POA    Cavitary pneumonia [J18.9, J98.4] 10/16/2020 10/17/2020 Yes    Acute on chronic congestive heart failure [I50.9] 10/13/2020 10/13/2020 Yes    Pulmonary nodules/lesions, multiple [R91.8] 03/22/2019 10/14/2020 Yes    Paroxysmal atrial fibrillation [I48.0] 08/03/2016 10/13/2020 Yes    Acute on chronic combined systolic and diastolic heart failure [I50.43] 08/03/2016 10/13/2020 Unknown    Chronic hypercapnic respiratory failure [J96.12] 03/11/2016 10/13/2020 Yes    Renal carcinoma [C64.9] 03/10/2015  10/13/2020 Yes     Assessment and Plan:     1. Heart Failure, Diastolic, Acute on Chronic              8/24/2020: Echo: Normal left ventricular size and systolic function. Mildly dilated LA.   10/15/2020: Echo: Normal left ventricular size and systolic function. EF 60%. Moderately dilated LA. Mildly dilated RV. SPAP 49 mmHg. Small pericardial effusion.              At NH was on furosemide 40 mg Q24.              10/13/2020: Presents fluid overloaded in HF.              On furosemide 80 mg iv Q12.   On fluid restriction.   10/20/2020: CXR with extensive infiltrates and pleural effusions. .               Does not appear fluid overloaded at present.   Will reduce furosemide to 40 mg iv Q12.      2. Atrial Fibrillation              In chronic atrial fibrillation.              On apixiban.              On metoprolol 50 mg Q12.              Rate well controlled.     3. Chronic Anticoagulation              Been on apixiban 5 mg Q12.   10/16/2020: Apixiban 5 mg Q12 was changed to heparin iv.   On apixiban 5 mg Q12.    4. Hypertension   On metoprolol 50 mg Q12.   Blood pressure has come down with diuresis.                5. Chronic Kidney Disease, Stage 4              History or renal carcinoma and nephrectomy.              10/13/2020: BUN/crea 27/2.0. CrCl 28.   10/20/2020: BUN/crea 56/1.8. CrCl 32.      6. Chronic Obstructive Pulmonary Disease              Chronic respiratory failure with CO2 retention.              10/13/2020: 7.23/94/73/39/90% on FiO2 of 40%.   Pulmonary following.      VTE Risk Mitigation (From admission, onward)         Ordered     apixaban tablet 5 mg  2 times daily      10/17/20 1152     Place sequential compression device  Until discontinued      10/13/20 1405     IP VTE HIGH RISK PATIENT  Once      10/13/20 1405                Diana Meredith MD  Cardiology  [facility]

## 2020-10-20 NOTE — PROGRESS NOTES
[facility]  Pulmonology  Progress Note    Patient Name: Patsy Morris  MRN: [id number]  Admission Date: 10/13/2020  Hospital Length of Stay: 7 days  Code Status: Full Code  Attending Provider: Loi Quiles MD  Primary Care Provider: Luisana Cartagena MD   Principal Problem: Acute on chronic respiratory failure with hypoxia and hypercapnia    Subjective:     Interval History: Tolerated NC through out the day yesterday. Up in the chair. No increased sputum production. No fevers, increased white count. No chest pain.     Objective:     Vital Signs (Most Recent):  Temp: 98.3 °F (36.8 °C) (10/20/20 0705)  Pulse: 72 (10/20/20 0705)  Resp: (!) 22 (10/20/20 0705)  BP: 114/63 (10/20/20 0705)  SpO2: 96 % (10/20/20 0705) Vital Signs (24h Range):  Temp:  [97.8 °F (36.6 °C)-98.4 °F (36.9 °C)] 98.3 °F (36.8 °C)  Pulse:  [] 72  Resp:  [16-55] 22  SpO2:  [85 %-96 %] 96 %  BP: (103-151)/() 114/63     Weight: 99.4 kg (219 lb 2.2 oz)  Body mass index is 38.82 kg/m².      Intake/Output Summary (Last 24 hours) at 10/20/2020 0836  Last data filed at 10/20/2020 0705  Gross per 24 hour   Intake 720 ml   Output 1750 ml   Net -1030 ml       Physical Exam  Constitutional:       General: She is not in acute distress.     Appearance: She is obese. She is not ill-appearing or toxic-appearing.   HENT:      Head: Normocephalic and atraumatic.      Nose: Nose normal.      Mouth/Throat:      Mouth: Mucous membranes are moist.   Eyes:      General: No scleral icterus.     Extraocular Movements: Extraocular movements intact.      Conjunctiva/sclera: Conjunctivae normal.      Pupils: Pupils are equal, round, and reactive to light.   Neck:      Musculoskeletal: Normal range of motion.      Comments: +JVD  Cardiovascular:      Rate and Rhythm: Normal rate and regular rhythm.      Pulses: Normal pulses.      Heart sounds: No murmur. No friction rub. No gallop.    Pulmonary:      Comments: Decreased breath all lung  fields, absent bilateral bases, crackles mid upper posterior lung fields improved, no wheezes, rhonchi.   Abdominal:      General: Bowel sounds are normal.      Tenderness: There is no abdominal tenderness. There is no guarding or rebound.      Comments: protuberant   Musculoskeletal:         General: Swelling present.      Right lower leg: Edema present.      Left lower leg: Edema present.      Comments: Swelling improved.    Skin:     General: Skin is warm.      Capillary Refill: Capillary refill takes less than 2 seconds.      Findings: No rash.   Neurological:      General: No focal deficit present.      Mental Status: She is oriented to person, place, and time.   Psychiatric:         Mood and Affect: Mood normal.         Behavior: Behavior normal.         Thought Content: Thought content normal.         Judgment: Judgment normal.         Vents:  Oxygen Concentration (%): 40 (10/20/20 0346)    Lines/Drains/Airways     Drain            Female External Urinary Catheter 10/13/20 1359 6 days          Peripheral Intravenous Line                 Peripheral IV - Single Lumen 10/13/20 0920 20 G Right Antecubital 6 days                Significant Labs:    CBC/Anemia Profile:  Recent Labs   Lab 10/19/20  0425 10/20/20  0435   WBC 9.14 8.95   HGB 10.4* 11.3*   HCT 34.7* 35.3*    160   MCV 90 88   RDW 14.7* 14.6*        Chemistries:  Recent Labs   Lab 10/19/20  0424 10/20/20  0435    142   K 4.2 5.1   CL 86* 86*   CO2 43* 42*   BUN 48* 56*   CREATININE 1.9* 1.8*   CALCIUM 9.5 9.4   MG 2.0 2.2   PHOS 4.0 4.6*       ABGs:   Recent Labs   Lab 10/19/20  0551   PH 7.386   PCO2 88.9*   HCO3 53.4*   POCSATURATED 91*   BE 28     Coagulation: No results for input(s): PT, INR, APTT in the last 48 hours.  Lactic Acid: No results for input(s): LACTATE in the last 48 hours.  Lipid Panel: No results for input(s): CHOL, HDL, LDLCALC, TRIG, CHOLHDL in the last 48 hours.  Respiratory Culture: No results for input(s): GSRESP,  RESPIRATORYC in the last 48 hours.  Troponin: No results for input(s): TROPONINI in the last 48 hours.  Urine Studies: No results for input(s): COLORU, APPEARANCEUA, PHUR, SPECGRAV, PROTEINUA, GLUCUA, KETONESU, BILIRUBINUA, OCCULTUA, NITRITE, UROBILINOGEN, LEUKOCYTESUR, RBCUA, WBCUA, BACTERIA, SQUAMEPITHEL, HYALINECASTS in the last 48 hours.    Invalid input(s): WRIGHTSUR    Significant Imaging:  I have reviewed all pertinent imaging results/findings within the past 24 hours.    Assessment/Plan:     * Acute on chronic respiratory failure with hypoxia and hypercapnia  Pt with recurrent hospitalization for decompensated heart failure, presenting with fluid overload resulting in hypercapnic respiratory failure. Component of restrictive lung disease decreasing ability to compensate Ventilation with worsened pulmonary edema.     - Continue AVAPS at night and NC during the day, AVAPs settings , RR 18, PEEP 8, FIO2 40%, Max pressure 36, min pressure 18  - diuresis as above.   - Will need to be set up with [facility] prior to discharge as did not happen via NH after most recent DC  - CT chest showing cavitary lesion with surround consolidation/ggo concerning for infectious process; abx discontinued and following sputum for respiratory cultures and Mycobacterial culture/smear  - continue saline nebs for sputum inducement      Cavitary lesion of lung  CT concerning for slow process as lesion in same posterior upper lobe lung fields noted on previous CT in 2018. Pt without signs or symptoms of sepsis making acute bacterial cavitary PNA unlikely. Potential for malignancy possible. Pt showing signs of pulmonary htn on echo as well as engorged pulmonary artery and veins. This along with NOAC make bronch unsafe due to increased bleeding in patient with acute respiratory failure.     - CT chest showing cavitary lesion with surround consolidation/ggo concerning for infectious process; obtaining sputum for respiratory cultures and  Mycobacterial culture/smear  - repeating quant gold as indeterminate due to poor test as opposed to difficult to determine results.  - Obtain HIV.         Atrial fibrillation with rapid ventricular response  Continue rate control as likely contributing to diastolic dysfunction; currently in a flutter  - continue anticoag    Chronic diastolic congestive heart failure  - continue diuresis; fluid overload likely contributes to hypercapnia in setting of OHS/BHAVANI, small airway disease with severe restriction.   -CT showing cavitary lesion with surrounding consolidation and GGO concerning for infectious vs malignant process, with significant ggo throughout the lung likely secondary to continued pulmonary edema.   - continue lasix 80 mg BID  DVT ppx: apixaban       Willem Mortensen MD  Pulmonology  [facility]

## 2020-10-20 NOTE — ASSESSMENT & PLAN NOTE
Stable.  Continue with beta-blocker therapy for rate control and apixaban for stroke risk reduction.

## 2020-10-20 NOTE — PLAN OF CARE
Problem: Occupational Therapy Goal  Goal: Occupational Therapy Goal  Description: Goals to be met by: 10/22/2020    Patient will increase functional independence with ADLs by performing:    UE Dressing with Minimal Assistance.  LE Dressing with Maximum Assistance.  Grooming while seated with Stand-by Assistance.  MET 10/19/2020  Toileting from bedside commode with CGA for hygiene and clothing management.   Toilet transfer to bedside commode with Minimal Assistance.  Pt will tolerate sitting EOB for 10 minutes.        Outcome: Ongoing, Progressing     Pt is making progress towards goals.  SNF is recommended upon d/c from acute care to further address deficits and help pt improve overall functional independence.     CARLOS Motta  10/20/2020

## 2020-10-20 NOTE — ASSESSMENT & PLAN NOTE
Secondary to underlying lung disease, obesity, and decompensated heart failure.  Clinically improving with intravenous diuresis and noninvasive ventilation.  Will continue with both.

## 2020-10-20 NOTE — PLAN OF CARE
Problem: Physical Therapy Goal  Goal: Physical Therapy Goal  Description: Goals to be met by: 11/15/2020    Patient will perform the following to increase strength, improve mobility, and return to prior level of function:    1. Rolling to L and R with SBA  2. Supine <> sit with SBA.  3. Sit EOB x 15 min with SBA for balance.  4. Bed <> chair transfer with SBA and least restrictive assistive device.  5. Added 10/19: Gait x10 ft with CGA and LRAD.    Outcome: Ongoing, Progressing     Patient with more c/o weakness of BLE than of SOB with OOB mobility. SpO2 values varied wildly during session without changes in work load with quick swings from low to high. Required increased assistance for bed moiblity due to back pain but progressed independence with transfers and distance ambulated to 4 ft with RW. Patient requesting more OOB mobility, discussed appropriateness to transition to separate OT and PT sessions starting tomorrow. Rec for dc to SNF.

## 2020-10-21 PROBLEM — I50.9 ACUTE ON CHRONIC CONGESTIVE HEART FAILURE: Status: ACTIVE | Noted: 2020-01-01

## 2020-10-21 NOTE — PLAN OF CARE
CM met with pt to discuss discharge planning.    Pt states she does not want to go to SNF again, she wants to go home with HH.    Trilogy plan is in progress with [facility] and [facility]. CM sent new order with her correct diagnosis that is acceptable for home ventilation. ABG's sent along with order.    CM to follow for plans and arrangemetns.

## 2020-10-21 NOTE — ASSESSMENT & PLAN NOTE
- continue diuresis; fluid overload likely contributes to hypercapnia in setting of OHS/BHAVANI, small airway disease with severe restriction.   -CT showing cavitary lesion with surrounding consolidation and GGO concerning for infectious vs malignant process, with significant ggo throughout the lung likely secondary to continued pulmonary edema.   - would continue lasix 80 mg BID

## 2020-10-21 NOTE — NURSING TRANSFER
Nursing Transfer Note      10/21/2020     Transfer To: 306    Transfer via wheelchair    Transfer with  O2, cardiac monitoring    Transported by RN    Medicines sent: yes    Chart send with patient: Yes    Notified: pt notified sister    Patient reassessed at: 10/21/20 @ 0560 (date, time)    Upon arrival to floor: cardiac monitor applied, patient oriented to room, call bell in reach and bed in lowest position    Report given to MARIUSZ Stout. VSS on 5L/O2 per NC. A. Fib on monitor. TB precautions d/c in the AM. Purwick in place with clear yellow output. Floor RN to bedside. NAD noted.

## 2020-10-21 NOTE — PT/OT/SLP PROGRESS
Physical Therapy  Not Seen    Patient Name:  Patsy Morris   MRN:  [id number]    Patient not seen today secondary to Patient fatigue  - pt reports just falling asleep, eager for PT but not at this time. Will follow-up in PM as schedule allows.    Sanjana Burgos, PT

## 2020-10-21 NOTE — ASSESSMENT & PLAN NOTE
Patient with BMI >35 with PaCO2 consistently >52 with restriction on PFTs.     - will need to be discharged on trilogy to reduce hospital readmissions to be worn nightly and with naps during the day.

## 2020-10-21 NOTE — PT/OT/SLP PROGRESS
Occupational Therapy   Treatment    Name: Patsy Morris  MRN: [id number]  Admitting Diagnosis:  Acute on chronic respiratory failure with hypoxia and hypercapnia       Recommendations:     Discharge Recommendations: other (see comments)(SNF versus HH OT/PT/aide with 24/7 supervision/assistance)  Discharge Equipment Recommendations:  other (see comments)(recommend use of DME at home)  Barriers to discharge:  Other (Comment)(current functional level)    Assessment:   Ambulating short household distance bedside chair>sink and returning from sink to bedside chair with RW and CGA for steadying/safety with forward flexed posture and verbal cue for upright trunk with initial follow through though with return to flexed posture.  Prolonged seated rest break after static stance X 5-MINUTES with BUE and unilateral UE support at RW during ADL task.Oral hygiene in stance with CGA for steadying/safety with forward flexed posture noted and tolerating stance X 5-minutes; seated rest break after task.Progressing towards goals.  Recommend post acute OT in SNF versus HH OT/PT/aide pending Pt. Preference.  Has RW, 3-in-1 commode, shower chair, and W/C at home.  To benefit from continued acute care OT services to increase independence in self-care/functional transfers.  Continue POC.     Patsy Morris is a 70 y.o. female with a medical diagnosis of Acute on chronic respiratory failure with hypoxia and hypercapnia.  She presents with below deficits decreasing independence in self-care/functional transfers. Performance deficits affecting function are weakness, impaired endurance, impaired self care skills, impaired functional mobilty, gait instability, impaired balance, impaired cardiopulmonary response to activity, decreased upper extremity function, decreased lower extremity function, decreased safety awareness.     Rehab Prognosis:  Fair plus; patient would benefit from acute skilled OT services to address these deficits and  reach maximum level of function.       Plan:     Patient to be seen 5 x/week to address the above listed problems via self-care/home management, therapeutic activities, therapeutic exercises  · Plan of Care Expires: 11/14/20  · Plan of Care Reviewed with: patient    Subjective     Pain/Comfort:  · Pain Rating 1: 0/10  · Pain Rating Post-Intervention 1: 0/10    Objective:     Communicated with: Mary Jo FERNANDEZ RN prior to session.  Patient found up in chair with PT present and with peripheral IV, telemetry, oxygen(6L O2 NC) upon OT entry to room.    General Precautions: Standard, fall, respiratory   Orthopedic Precautions:N/A   Braces: N/A     Occupational Performance:     Functional Mobility/Transfers:  Ambulating short household distance bedside chair>sink and returning from sink to bedside chair with RW and CGA for steadying/safety with forward flexed posture and verbal cue for upright trunk with initial follow through though with return to flexed posture.  Prolonged seated rest break after static stance X 5-MINUTES with BUE and unilateral UE support at RW during ADL task.       Activities of Daily Living:  Oral hygiene in stance with CGA for steadying/safety with forward flexed posture noted and tolerating stance X 5-minutes; seated rest break after task.      [facility] 6 Click ADL: 17    Treatment & Education:  Educated on functional transfer/ADL safety and energy conservation strategies during ADL tasks along with deep breathing and self-pacing.     Patient left up in chair with all lines intact, call button in reach and nursing notifiedEducation:      GOALS:   Multidisciplinary Problems     Occupational Therapy Goals        Problem: Occupational Therapy Goal    Goal Priority Disciplines Outcome Interventions   Occupational Therapy Goal     OT, PT/OT Ongoing, Progressing    Description: Goals to be met by: 10/22/2020    Patient will increase functional independence with ADLs by performing:    UE Dressing with Minimal  Assistance.  LE Dressing with Maximum Assistance.  Grooming while seated with Stand-by Assistance.  MET 10/19/2020  Toileting from bedside commode with CGA for hygiene and clothing management.   Toilet transfer to bedside commode with Minimal Assistance.Pt will tolerate sitting EOB for 10 minutes.                         Time Tracking:     OT Date of Treatment: 10/21/20  OT Start Time: 1627  OT Stop Time: 1706  OT Total Time (min): 39 min (Nursing staff interjecting X 5-minutes)    Billable Minutes:Self Care/Home Management 17  Therapeutic Activity 17    Keren Agrawal, OT  10/21/2020

## 2020-10-21 NOTE — PROGRESS NOTES
"[facility] Medicine  Progress Note    Patient Name: Patsy Morris  MRN: [id number]  Patient Class: IP- Inpatient   Admission Date: 10/13/2020  Length of Stay: 8 days  Attending Physician: Loi Quiles MD  Primary Care Provider: Luisana Cartagena MD        Subjective:     Principal Problem:Acute on chronic respiratory failure with hypoxia and hypercapnia        HPI:  Per Ravi Soria:    "Per ED:  "This is a 70 y.o. female with history of CHF and COPD who presents via EMS with complaint of shortness of breath that began a few days ago. Per EMS patient had O2 saturation of 72 on 3 liters if home oxygen upon their arrival. Patient states she is on 3 liters of home oxygen at baseline. She denies fever, cough, chest pain, nausea, vomiting, diarrhea, or rhinorrhea. She reports she was recently discharged from the hospital for similar symptoms. She reports compliance with her home medications. She recently became resident of [facility] after last hospitalization. She is a former smoker. She denies undergoing any surgeries".    The patient is a 70 year old female with a PMH significant for of HTN, Chronic Respiratory Failure (COPD and BHAVANI/OHS), HFpEF, Obesity who presented to the ED with complaints of SOB that began about 3 days ago.  Patient is on 3L O2NC at home.  She was seen by her Cardiologist about 4 days prior to admission and told to decrease her Lasix from 40mg bid to qday.  She denies chest pain.  No Fevers.  No cough.  States she has been adherent with her medications.  Patient was recently admitted to [facility] from 9/1-9/11 for Acute on Chronic Respiratory Failure and discharged to SNF.  Patient was placed on BiPAP in ED and admitted to ICU.  Patient feeling better on BiPAP."    Overview/Hospital Course:  Patient is 70-year-old woman with history of hypertension, chronic hypoxic respiratory failure on home oxygen secondary to chronic obstructive pulmonary " disease, chronic diastolic heart failure, chronic atrial fibrillation, and morbid obesity re-admitted to the hospital with decompensated heart failure after patient was home for about a week following recent hospitalization and stayed at skilled nursing facility for physical therapy.  Patient treated with non invasive ventilation intravenous diuretics.  Slowly clinically improving.  Patient also initially treated with antibiotic therapy which has since been discontinued as bacterial pneumonia ruled out on clinical grounds.  Patient does have a cavitary lesion on her chest CT scan but otherwise without signs or symptoms of active pulmonary tuberculosis.  Acid-fast bacillus smears and cultures ordered to rule out active pulmonary tuberculosis.  Patient with one negative acid-fast bacillus smear thus far.  Efforts at obtaining additional smears even with sputum induction with hypertonic saline has not resulted additional sputum.  Discussed with Pulmonary Critical Care service and now recommend discontinuing further smears and respiratory isolation.    Interval History:  Reports breathing better this morning.  Tolerated BiPAP overnight.    Review of Systems   Constitutional: Negative for chills and fever.   Respiratory: Negative for shortness of breath and wheezing.    Cardiovascular: Negative for chest pain.   Gastrointestinal: Negative for abdominal distention, abdominal pain, constipation, diarrhea, nausea and vomiting.   Genitourinary: Negative for dysuria and frequency.   Musculoskeletal: Negative for arthralgias and myalgias.   Neurological: Negative for light-headedness.   Psychiatric/Behavioral: Negative for agitation and confusion.     Objective:     Vital Signs (Most Recent):  Temp: 98.4 °F (36.9 °C) (10/21/20 0800)  Pulse: 94 (10/21/20 1100)  Resp: (!) 24 (10/21/20 1100)  BP: 112/80 (10/21/20 1100)  SpO2: (!) 93 % (10/21/20 1100) Vital Signs (24h Range):  Temp:  [97.8 °F (36.6 °C)-99 °F (37.2 °C)] 98.4 °F  (36.9 °C)  Pulse:  [] 94  Resp:  [17-44] 24  SpO2:  [89 %-96 %] 93 %  BP: ()/(55-86) 112/80     Weight: 99.4 kg (219 lb 2.2 oz)  Body mass index is 38.82 kg/m².    Intake/Output Summary (Last 24 hours) at 10/21/2020 1150  Last data filed at 10/21/2020 1140  Gross per 24 hour   Intake 1340 ml   Output 2150 ml   Net -810 ml      Physical Exam  Constitutional:       General: She is not in acute distress.     Appearance: She is well-developed. She is obese.   HENT:      Head: Atraumatic.   Eyes:      Conjunctiva/sclera: Conjunctivae normal.   Neck:      Musculoskeletal: Neck supple.   Cardiovascular:      Rate and Rhythm: Normal rate and regular rhythm.      Heart sounds: Normal heart sounds. No murmur.   Pulmonary:      Breath sounds: No wheezing.      Comments: Distant breath sounds.  Abdominal:      General: Bowel sounds are normal. There is no distension.      Palpations: Abdomen is soft.      Tenderness: There is no abdominal tenderness.   Musculoskeletal: Normal range of motion.         General: No deformity.      Right lower leg: No edema.      Left lower leg: No edema.   Neurological:      Mental Status: She is alert and oriented to person, place, and time.         Significant Labs: All pertinent labs within the past 24 hours have been reviewed.    Significant Imaging: I have reviewed all pertinent imaging results/findings within the past 24 hours.      Assessment/Plan:      * Acute on chronic respiratory failure with hypoxia and hypercapnia  Secondary to underlying lung disease, obesity, and decompensated heart failure.  Clinically improving with intravenous diuresis and noninvasive ventilation.  Patient continues to have negative fluid balance.  Discussed with Dr. Diana Meredith (Cardiology) who recommends decreasing to oral furosemide tomorrow morning.  Stable to transfer to the floor.  Efforts to arrange for noninvasive ventilation for the home setting ongoing.    Cavitary lesion of lung  Active  pulmonary tuberculosis unlikely.  One acid-fast bacillus smear negative.  Additional efforts at obtaining sputum for additional smears not successful despite using hypertonic saline.  Discussed with Pulmonary Critical Care and recommending to discontinue further smears and respiratory isolation.    Acute on chronic diastolic congestive heart failure  Improving.  Continue medical therapy including diuretic therapy to maintain negative fluid balance.    Atrial fibrillation with rapid ventricular response  Stable.  Continue with beta-blocker therapy for rate control and apixaban for stroke risk reduction.    Essential hypertension  Reasonably controlled with current regimen.  Will continue with current regimen and continue to monitor.    Chronic kidney disease (CKD) stage G4/A1, severely decreased glomerular filtration rate (GFR) between 15-29 mL/min/1.73 square meter and albuminuria creatinine ratio less than 30 mg/g  Stable.  Continue to closely monitor.    Dyslipidemia  Continue with statin therapy.    Debility  Consult physical and occupational therapy.    Current moderate episode of major depressive disorder without prior episode  Stable.  Continue SSRI therapy.    VTE Risk Mitigation (From admission, onward)         Ordered     apixaban tablet 5 mg  2 times daily      10/17/20 1152     Place sequential compression device  Until discontinued      10/13/20 1405     IP VTE HIGH RISK PATIENT  Once      10/13/20 1405                Loi uQiles MD  Department of Hospital Medicine   [facility]

## 2020-10-21 NOTE — PROGRESS NOTES
[facility]  Pulmonology  Progress Note    Patient Name: Patsy Morris  MRN: [id number]  Admission Date: 10/13/2020  Hospital Length of Stay: 8 days  Code Status: Full Code  Attending Provider: Loi Quiles MD  Primary Care Provider: Luisana Cartagena MD   Principal Problem: Acute on chronic respiratory failure with hypoxia and hypercapnia    Subjective:     Interval History: Pt tolerating AVAPS at night and 5-6L NC during the day. Encouraging to get in chair daily and bed at night, attempt to sleep during the night and stay up during the day.     Objective:     Vital Signs (Most Recent):  Temp: 98.4 °F (36.9 °C) (10/21/20 0800)  Pulse: (!) 59 (10/21/20 1000)  Resp: (!) 30 (10/21/20 1000)  BP: 109/75 (10/21/20 1000)  SpO2: (!) 94 % (10/21/20 1000) Vital Signs (24h Range):  Temp:  [97.8 °F (36.6 °C)-99.7 °F (37.6 °C)] 98.4 °F (36.9 °C)  Pulse:  [] 59  Resp:  [17-44] 30  SpO2:  [89 %-96 %] 94 %  BP: ()/(52-86) 109/75     Weight: 99.4 kg (219 lb 2.2 oz)  Body mass index is 38.82 kg/m².      Intake/Output Summary (Last 24 hours) at 10/21/2020 1045  Last data filed at 10/21/2020 0900  Gross per 24 hour   Intake 1340 ml   Output 1600 ml   Net -260 ml       Physical Exam  Constitutional:       General: She is not in acute distress.     Appearance: She is obese. She is not ill-appearing or toxic-appearing.   HENT:      Head: Normocephalic and atraumatic.      Nose: Nose normal.      Mouth/Throat:      Mouth: Mucous membranes are moist.   Eyes:      General: No scleral icterus.     Extraocular Movements: Extraocular movements intact.      Conjunctiva/sclera: Conjunctivae normal.      Pupils: Pupils are equal, round, and reactive to light.   Neck:      Musculoskeletal: Normal range of motion.      Comments: +JVD  Cardiovascular:      Rate and Rhythm: Normal rate and regular rhythm.      Pulses: Normal pulses.      Heart sounds: No murmur. No friction rub. No gallop.    Pulmonary:       Comments: Decreased breath all lung fields, absent bilateral bases, crackles mid upper posterior lung fields improved, no wheezes, rhonchi.   Abdominal:      General: Bowel sounds are normal.      Tenderness: There is no abdominal tenderness. There is no guarding or rebound.      Comments: protuberant   Musculoskeletal:         General: Swelling present.      Comments: Swelling improved. Trace pitting edema b/l lower extremity. +1 dependent hip edema bilaterally.    Skin:     General: Skin is warm.      Capillary Refill: Capillary refill takes less than 2 seconds.      Findings: No rash.   Neurological:      General: No focal deficit present.      Mental Status: She is oriented to person, place, and time.   Psychiatric:         Mood and Affect: Mood normal.         Behavior: Behavior normal.         Thought Content: Thought content normal.         Judgment: Judgment normal.         Vents:  Oxygen Concentration (%): 45 (10/20/20 2202)    Lines/Drains/Airways     Drain            Female External Urinary Catheter 10/13/20 1359 7 days          Peripheral Intravenous Line                 Midline Catheter Insertion/Assessment  - Double Lumen 10/20/20 1220 Right median cubital vein (antecubital fossa)  less than 1 day                Significant Labs:    CBC/Anemia Profile:  Recent Labs   Lab 10/20/20  0435 10/21/20  0333   WBC 8.95 8.22   HGB 11.3* 10.0*   HCT 35.3* 33.2*    171   MCV 88 90   RDW 14.6* 14.7*        Chemistries:  Recent Labs   Lab 10/20/20  0435 10/21/20  0333    140   K 5.1 4.3   CL 86* 85*   CO2 42* 44*   BUN 56* 60*   CREATININE 1.8* 1.9*   CALCIUM 9.4 9.2   MG 2.2 2.2   PHOS 4.6* 4.8*       ABGs: No results for input(s): PH, PCO2, HCO3, POCSATURATED, BE in the last 48 hours.  Cardiac Markers: No results for input(s): CKMB, TROPONINT, MYOGLOBIN in the last 48 hours.  Respiratory Culture: No results for input(s): GSRESP, RESPIRATORYC in the last 48 hours.  Urine Studies: No results for  input(s): COLORU, APPEARANCEUA, PHUR, SPECGRAV, PROTEINUA, GLUCUA, KETONESU, BILIRUBINUA, OCCULTUA, NITRITE, UROBILINOGEN, LEUKOCYTESUR, RBCUA, WBCUA, BACTERIA, SQUAMEPITHEL, HYALINECASTS in the last 48 hours.    Invalid input(s): DAVID    Significant Imaging:  I have reviewed all pertinent imaging results/findings within the past 24 hours.    Assessment/Plan:     * Acute on chronic respiratory failure with hypoxia and hypercapnia  Pt with recurrent hospitalization for decompensated heart failure, presenting with fluid overload resulting in hypercapnic respiratory failure. Component of restrictive lung disease decreasing ability to compensate Ventilation with worsened pulmonary edema.     - Continue AVAPS at night and NC during the day, AVAPs settings , RR 18, PEEP 8, FIO2 40%, Max pressure 36, min pressure 18  - diuresis as above.   - Will need to be set up with Trilogy prior to discharge as did not happen via NH after most recent DC  - CT chest showing cavitary lesion with surround consolidation/ggo concerning for infectious process; abx discontinued and following sputum for respiratory cultures and Mycobacterial culture/smear    Cavitary lesion of lung  CT concerning for slow process as lesion in same posterior upper lobe lung fields noted on previous CT in 2018. Pt without signs or symptoms of sepsis making acute bacterial cavitary PNA unlikely. Potential for malignancy possible. Pt showing signs of pulmonary htn on echo as well as engorged pulmonary artery and veins. This along with NOAC make bronch unsafe due to increased bleeding in patient with acute respiratory failure.     - CT chest showing cavitary lesion with surround consolidation/ggo concerning for infectious process; obtaining sputum for respiratory cultures and Mycobacterial culture/smear  - repeating quant gold as indeterminate due to poor test as opposed to difficult to determine results; pending  - HIV negative        Atrial  fibrillation with rapid ventricular response  Continue rate control as likely contributing to diastolic dysfunction; currently in a flutter  - continue anticoag    Acute on chronic diastolic congestive heart failure  - continue diuresis; fluid overload likely contributes to hypercapnia in setting of OHS/BHAVANI, small airway disease with severe restriction.   -CT showing cavitary lesion with surrounding consolidation and GGO concerning for infectious vs malignant process, with significant ggo throughout the lung likely secondary to continued pulmonary edema.   - would continue lasix 80 mg BID      DVT ppx: apixaban     Willem Mortensen MD  Pulmonology  [facility], [place], operated by [facility]

## 2020-10-21 NOTE — PLAN OF CARE
Patient received from ICU, oriented to room, phone replaced, dinner ordered. Patient placed on 5L humidified oxygen, resting comfortably. Will monitor for any needs.

## 2020-10-21 NOTE — ASSESSMENT & PLAN NOTE
CT concerning for slow process as lesion in same posterior upper lobe lung fields noted on previous CT in 2018. Pt without signs or symptoms of sepsis making acute bacterial cavitary PNA unlikely. Potential for malignancy possible. Pt showing signs of pulmonary htn on echo as well as engorged pulmonary artery and veins. This along with NOAC make bronch unsafe due to increased bleeding in patient with acute respiratory failure.     - CT chest showing cavitary lesion with surround consolidation/ggo concerning for infectious process; obtaining sputum for respiratory cultures and Mycobacterial culture/smear  - repeating quant gold as indeterminate due to poor test as opposed to difficult to determine results; pending  - HIV negative

## 2020-10-21 NOTE — PT/OT/SLP PROGRESS
Physical Therapy Treatment    Patient Name:  Patsy Morris   MRN:  [id number]    Recommendations:     Discharge Recommendations:  nursing facility, skilled(if home with HH, would rec initial 24/7 assistance)   Discharge Equipment Recommendations: (Needs to use her wc, RW, and bedside commode)   Barriers to discharge: Decreased caregiver support and current functional level    Assessment:     Patsy Morris is a 70 y.o. female admitted with a medical diagnosis of Acute on chronic respiratory failure with hypoxia and hypercapnia. Comorbidities: CHF and COPD. Pt is on 3L of O2 at home. She recently completed a SNF stay.  She presents with the following impairments/functional limitations:  weakness, impaired endurance, impaired self care skills, impaired functional mobilty, gait instability, impaired balance, decreased upper extremity function, decreased lower extremity function, decreased safety awareness, impaired cardiopulmonary response to activity.    Patient seen after transfer to floor - eager for PT. Decreased level of assistance required for bed mobility with use of hospital bed features and increased distances able to ambulate before onset of SOB. Amb 1x4 ft, 1x1 ft, and 1x12 ft with RW and Jacquie. Rec for dc to SNF - note patient prefers to dc to home with HH and discussed use of wc until able to ambulate household distances without SOB/hypoxia and need for family assistance for ADLs/IADLs.     Rehab Prognosis: Good; patient would benefit from acute skilled PT services to address these deficits and reach maximum level of function.    Recent Surgery: * No surgery found *      Plan:     During this hospitalization, patient to be seen 5 x/week to address the identified rehab impairments via gait training, therapeutic activities, therapeutic exercises, wheelchair management/training and progress toward the following goals:    · Plan of Care Expires:  11/15/20    Subjective     Chief Complaint:  Was on  her case about refusing therapy  Patient/Family Comments/goals: Goal to not go to SNF and instead go home, wants to cook her okra gumbo; Patient agreeable to PT treatment.  Pain/Comfort:  Pain Rating 1: 0/10  Pain Rating Post-Intervention 1: 0/10      Objective:     Communicated with MARIUSZ Stout prior to session.  Patient found HOB elevated with peripheral IV, oxygen, telemetry upon PT entry to room.     General Precautions: Standard, fall, respiratory   Orthopedic Precautions:N/A   Braces: N/A     Patient donned yellow socks and gait belt for OOB mobility.     Functional Mobility:  · Bed Mobility:     · Scooting anteriorly at EOB: CGA  · Supine to Sit: minimal assistance with use of hospital bed features  · Transfers:     · Sit to Stand:  contact guard assistance-minimal assistance and of 1 persons with rolling walker  · 1x from EOB, 2x from bedside chair  · Toilet transferr: CGA and with no AD using  Step Transfer  · Chair>bedside commode, uses UE support on armrest  · Gait: 1x4 ft, 1x1 ft, and 1x12 ft with RW and Jacquie  · Decreased swing time and increased double limb support time.   · Maintains forward flexion of trunk with RW anterior to COG - cued for RW management without minimal change    Vitals: Initially on 5 L, after t/f to chair SpO2 87-88% and increased to 6 L NC for further gait    AM-PAC 6 CLICK MOBILITY  Turning over in bed (including adjusting bedclothes, sheets and blankets)?: 3  Sitting down on and standing up from a chair with arms (e.g., wheelchair, bedside commode, etc.): 3  Moving from lying on back to sitting on the side of the bed?: 3  Moving to and from a bed to a chair (including a wheelchair)?: 3  Need to walk in hospital room?: 3  Climbing 3-5 steps with a railing?: 2  Basic Mobility Total Score: 17       Therapeutic Activities and Exercises:  · Gait and tranfers as above  · White board updated with PT mobility recs, gabrielan safe to transfer to bedside chair or commode with RW and nursing  assistance    Patient left up in chair with all lines intact, call button in reach and OT  present .    GOALS:   Multidisciplinary Problems     Physical Therapy Goals        Problem: Physical Therapy Goal    Goal Priority Disciplines Outcome Goal Variances Interventions   Physical Therapy Goal     PT, PT/OT Ongoing, Progressing     Description: Goals to be met by: 11/15/2020    Patient will perform the following to increase strength, improve mobility, and return to prior level of function:    1. Rolling to L and R with SBA  2. Supine <> sit with SBA.  3. Sit EOB x 15 min with SBA for balance.  4. Bed <> chair transfer with SBA and least restrictive assistive device.  5. Added 10/19: Gait x10 ft with CGA and LRAD.                     Time Tracking:     PT Received On: 10/21/20  PT Start Time: 1556     PT Stop Time: 1625  PT Total Time (min): 29 min     Billable Minutes: Gait Training 29    Treatment Type: Treatment  PT/PTA: PT     PTA Visit Number: 0     Sanjana Burgos, PT  10/21/2020

## 2020-10-21 NOTE — SUBJECTIVE & OBJECTIVE
Interval History:  Reports breathing better this morning.  Tolerated BiPAP overnight.    Review of Systems   Constitutional: Negative for chills and fever.   Respiratory: Negative for shortness of breath and wheezing.    Cardiovascular: Negative for chest pain.   Gastrointestinal: Negative for abdominal distention, abdominal pain, constipation, diarrhea, nausea and vomiting.   Genitourinary: Negative for dysuria and frequency.   Musculoskeletal: Negative for arthralgias and myalgias.   Neurological: Negative for light-headedness.   Psychiatric/Behavioral: Negative for agitation and confusion.     Objective:     Vital Signs (Most Recent):  Temp: 98.4 °F (36.9 °C) (10/21/20 0800)  Pulse: 94 (10/21/20 1100)  Resp: (!) 24 (10/21/20 1100)  BP: 112/80 (10/21/20 1100)  SpO2: (!) 93 % (10/21/20 1100) Vital Signs (24h Range):  Temp:  [97.8 °F (36.6 °C)-99 °F (37.2 °C)] 98.4 °F (36.9 °C)  Pulse:  [] 94  Resp:  [17-44] 24  SpO2:  [89 %-96 %] 93 %  BP: ()/(55-86) 112/80     Weight: 99.4 kg (219 lb 2.2 oz)  Body mass index is 38.82 kg/m².    Intake/Output Summary (Last 24 hours) at 10/21/2020 1150  Last data filed at 10/21/2020 1140  Gross per 24 hour   Intake 1340 ml   Output 2150 ml   Net -810 ml      Physical Exam  Constitutional:       General: She is not in acute distress.     Appearance: She is well-developed. She is obese.   HENT:      Head: Atraumatic.   Eyes:      Conjunctiva/sclera: Conjunctivae normal.   Neck:      Musculoskeletal: Neck supple.   Cardiovascular:      Rate and Rhythm: Normal rate and regular rhythm.      Heart sounds: Normal heart sounds. No murmur.   Pulmonary:      Breath sounds: No wheezing.      Comments: Distant breath sounds.  Abdominal:      General: Bowel sounds are normal. There is no distension.      Palpations: Abdomen is soft.      Tenderness: There is no abdominal tenderness.   Musculoskeletal: Normal range of motion.         General: No deformity.      Right lower leg: No  edema.      Left lower leg: No edema.   Neurological:      Mental Status: She is alert and oriented to person, place, and time.         Significant Labs: All pertinent labs within the past 24 hours have been reviewed.    Significant Imaging: I have reviewed all pertinent imaging results/findings within the past 24 hours.

## 2020-10-21 NOTE — PROGRESS NOTES
[facility]  Cardiology  Progress Note    Patient Name: Patsy Morris  MRN: [id number]  Admission Date: 10/13/2020  Hospital Length of Stay: 8 days  Code Status: Full Code   Attending Physician: Loi Quiles MD   Primary Care Physician: Luisana Cartagena MD  Expected Discharge Date:   Principal Problem:Acute on chronic respiratory failure with hypoxia and hypercapnia    Subjective:     Brief HPI:    Patsy Morris is a 70 y.o.female with hypertension. She has chronic atrial fibrillation and heart failure with preserved ejection fraction. She has chronic obstructive pulmonary disease being on home oxygen with CO2 retention. She has undergone nephrectomy for renal cell carcinoma and has chronic kidney disease. She was admitted to [facility] in 8/2020 and 9/2020 with heart failure and exacerbations of her chronic obstructive pulmonary disease. She went to therapy at [facility].      She used to be on furosemide 40 mg Q24 however after her last hospitalization she had the does of furosemide increased to 80 mg Q24. The dose was reduced to 40 mg Q24 on 10/8/2020. She became increasingly short of breath and presents fluid overloaded in heart failure as well as an exacerbation of her COPD with high CO2. She was admitted to the ICU.    Hospital Course:    Diuresis.    BIPAP.    Respiratory treatments.    10/15/2020: Echo: Normal left ventricular size and systolic function. EF 60%. Moderately dilated LA. Mildly dilated RV. SPAP 49 mmHg. Small pericardial effusion.    10/16/2020: Apixiban 5 mg Q12 was changed to heparin iv for consideration of bronchoscopy.    Interval History:     Slowly breathing slightly easier from day to day.    Comfortable at rest.    Review of Systems   Constitution: Positive for malaise/fatigue. Negative for chills and fever.   HENT: Negative for nosebleeds.    Eyes: Negative for vision loss in left eye and vision loss in right eye.   Cardiovascular: Negative for  chest pain, leg swelling, orthopnea, palpitations and paroxysmal nocturnal dyspnea.   Respiratory: Positive for shortness of breath. Negative for cough, hemoptysis, sputum production and wheezing.    Hematologic/Lymphatic: Negative for bleeding problem.   Skin: Negative for rash.   Musculoskeletal: Negative for myalgias.   Gastrointestinal: Negative for abdominal pain, heartburn, hematemesis, hematochezia, jaundice, melena, nausea and vomiting.   Genitourinary: Negative for hematuria.   Neurological: Negative for dizziness, headaches, light-headedness, vertigo and weakness.   Psychiatric/Behavioral: Negative for altered mental status. The patient is not nervous/anxious.    Allergic/Immunologic: Negative for persistent infections.       Objective:     Vital Signs (Most Recent):  Temp: 97.9 °F (36.6 °C) (10/21/20 0305)  Pulse: 70 (10/21/20 0700)  Resp: (!) 24 (10/21/20 0700)  BP: (!) 120/58 (10/21/20 0700)  SpO2: (!) 93 % (10/21/20 0700) Vital Signs (24h Range):  Temp:  [97.8 °F (36.6 °C)-99.7 °F (37.6 °C)] 97.9 °F (36.6 °C)  Pulse:  [] 70  Resp:  [17-44] 24  SpO2:  [89 %-96 %] 93 %  BP: ()/(52-86) 120/58     Weight: 99.4 kg (219 lb 2.2 oz)  Body mass index is 38.82 kg/m².    SpO2: (!) 93 %  O2 Device (Oxygen Therapy): BiPAP      Intake/Output Summary (Last 24 hours) at 10/21/2020 0754  Last data filed at 10/21/2020 0600  Gross per 24 hour   Intake 1100 ml   Output 2100 ml   Net -1000 ml       Lines/Drains/Airways     Drain            Female External Urinary Catheter 10/13/20 1359 7 days          Peripheral Intravenous Line                 Midline Catheter Insertion/Assessment  - Double Lumen 10/20/20 1220 Right median cubital vein (antecubital fossa)  less than 1 day                Physical Exam   Constitutional: She is oriented to person, place, and time. She appears well-developed and well-nourished. She appears ill.   Neck: Carotid bruit is not present.   Cardiovascular: Normal rate, S1 normal and S2  normal. An irregularly irregular rhythm present.   Pulmonary/Chest: She has decreased breath sounds in the right lower field and the left lower field. She has rhonchi in the right middle field and the left middle field.   Musculoskeletal:      Right ankle: She exhibits no swelling.      Left ankle: She exhibits no swelling.   Neurological: She is alert and oriented to person, place, and time.   Skin: Skin is warm and dry.   Psychiatric: She has a normal mood and affect. Her speech is normal and behavior is normal. Cognition and memory are normal.     Current Medications:     apixaban  5 mg Oral BID    atorvastatin  80 mg Oral QHS    docusate sodium  100 mg Oral BID    escitalopram oxalate  10 mg Oral QHS    ferrous sulfate  325 mg Oral Daily    furosemide (LASIX) IV  40 mg Intravenous Q12H    gabapentin  100 mg Oral BID    metoprolol tartrate  50 mg Oral BID    miconazole NITRATE 2 %   Topical (Top) BID    vitamin renal formula (B-complex-vitamin c-folic acid)  1 capsule Oral Daily     Current Laboratory Results:    Recent Results (from the past 24 hour(s))   HIV 1/2 Ag/Ab (4th Gen)    Collection Time: 10/20/20  4:15 PM   Result Value Ref Range    HIV 1/2 Ag/Ab Negative Negative   Basic metabolic panel    Collection Time: 10/21/20  3:33 AM   Result Value Ref Range    Sodium 140 136 - 145 mmol/L    Potassium 4.3 3.5 - 5.1 mmol/L    Chloride 85 (L) 95 - 110 mmol/L    CO2 44 (HH) 23 - 29 mmol/L    Glucose 101 70 - 110 mg/dL    BUN, Bld 60 (H) 8 - 23 mg/dL    Creatinine 1.9 (H) 0.5 - 1.4 mg/dL    Calcium 9.2 8.7 - 10.5 mg/dL    Anion Gap 11 8 - 16 mmol/L    eGFR if African American 30 (A) >60 mL/min/1.73 m^2    eGFR if non African American 26 (A) >60 mL/min/1.73 m^2   Magnesium    Collection Time: 10/21/20  3:33 AM   Result Value Ref Range    Magnesium 2.2 1.6 - 2.6 mg/dL   Phosphorus    Collection Time: 10/21/20  3:33 AM   Result Value Ref Range    Phosphorus 4.8 (H) 2.7 - 4.5 mg/dL   CBC auto differential     Collection Time: 10/21/20  3:33 AM   Result Value Ref Range    WBC 8.22 3.90 - 12.70 K/uL    RBC 3.70 (L) 4.00 - 5.40 M/uL    Hemoglobin 10.0 (L) 12.0 - 16.0 g/dL    Hematocrit 33.2 (L) 37.0 - 48.5 %    Mean Corpuscular Volume 90 82 - 98 fL    Mean Corpuscular Hemoglobin 27.0 27.0 - 31.0 pg    Mean Corpuscular Hemoglobin Conc 30.1 (L) 32.0 - 36.0 g/dL    RDW 14.7 (H) 11.5 - 14.5 %    Platelets 171 150 - 350 K/uL    MPV 11.0 9.2 - 12.9 fL    Immature Granulocytes 0.2 0.0 - 0.5 %    Gran # (ANC) 6.2 1.8 - 7.7 K/uL    Immature Grans (Abs) 0.02 0.00 - 0.04 K/uL    Lymph # 1.0 1.0 - 4.8 K/uL    Mono # 0.8 0.3 - 1.0 K/uL    Eos # 0.2 0.0 - 0.5 K/uL    Baso # 0.01 0.00 - 0.20 K/uL    nRBC 0 0 /100 WBC    Gran% 76.0 (H) 38.0 - 73.0 %    Lymph% 11.8 (L) 18.0 - 48.0 %    Mono% 10.1 4.0 - 15.0 %    Eosinophil% 1.8 0.0 - 8.0 %    Basophil% 0.1 0.0 - 1.9 %    Differential Method Automated      Current Imaging Results:    X-Ray Chest AP Portable   Final Result      No significant change.  There is cardiomegaly, multifocal airspace opacities and bilateral effusions.         Electronically signed by: Ajay Camara MD   Date:    10/20/2020   Time:    08:48      US Lower Extremity Veins Bilateral   Final Result      No evidence of deep venous thrombosis in either lower extremity.         Electronically signed by: Ceasar Carreon MD   Date:    10/15/2020   Time:    21:53      CT Chest Without Contrast   Final Result      The findings highly concerning for extensive diffuse pulmonary infection with bilateral pleural fluid collections right greater than left.         Electronically signed by: Regulo Bassett MD   Date:    10/15/2020   Time:    12:01      X-Ray Chest AP Portable   Final Result      Interval worsening of diffuse bilateral airspace disease and moderate bilateral pleural effusions when compared to 09/09/2020         Electronically signed by: Halina Plascencia   Date:    10/13/2020   Time:    09:40          10/15/2020:  Echo:    The left ventricle is normal in size with normal systolic function. The estimated ejection fraction is 60%.  A diastolic pattern consistent with atrial fibrillation observed.  Moderate left atrial enlargement.  Mild right ventricular enlargement.  Normal right ventricular systolic function.  Severe right atrial enlargement.  The aortic valve is mildly sclerotic.  The mitral valve is mildly sclerotic.  Mild mitral regurgitation.  Mild tricuspid regurgitation.  There is mild pulmonary hypertension.  The estimated PA systolic pressure is 49 mmHg.  Intermediate central venous pressure (8 mmHg).  Small circumferential pericardial effusion.  There is a left pleural effusion.      Assessment and Plan:     Problem List:    Active Diagnoses:    Diagnosis Date Noted POA    PRINCIPAL PROBLEM:  Acute on chronic respiratory failure with hypoxia and hypercapnia [J96.21, J96.22] 08/03/2016 Yes    Cavitary lesion of lung [J98.4] 10/16/2020 Yes    Atrial fibrillation with rapid ventricular response [I48.91] 09/01/2020 Yes    Current moderate episode of major depressive disorder without prior episode [F32.1] 04/30/2020 Yes    Acute on chronic diastolic congestive heart failure [I50.33] 05/20/2016 Yes    Chronic kidney disease (CKD) stage G4/A1, severely decreased glomerular filtration rate (GFR) between 15-29 mL/min/1.73 square meter and albuminuria creatinine ratio less than 30 mg/g [N18.4]  Yes    Dyslipidemia [E78.5] 02/22/2016 Yes    Essential hypertension [I10] 10/15/2014 Yes      Problems Resolved During this Admission:    Diagnosis Date Noted Date Resolved POA    Acute on chronic congestive heart failure [I50.9] 10/13/2020 10/13/2020 Yes    Pulmonary nodules/lesions, multiple [R91.8] 03/22/2019 10/14/2020 Yes    Paroxysmal atrial fibrillation [I48.0] 08/03/2016 10/13/2020 Yes    Chronic hypercapnic respiratory failure [J96.12] 03/11/2016 10/13/2020 Yes    Renal carcinoma [C64.9] 03/10/2015 10/13/2020 Yes      Assessment and Plan:     1. Heart Failure, Diastolic, Acute on Chronic              8/24/2020: Echo: Normal left ventricular size and systolic function. Mildly dilated LA.   10/15/2020: Echo: Normal left ventricular size and systolic function. EF 60%. Moderately dilated LA. Mildly dilated RV. SPAP 49 mmHg. Small pericardial effusion.              At NH was on furosemide 40 mg Q24.              10/13/2020: Presented fluid overloaded in HF. Received furosemide 80 mg iv Q12.   10/20/2020: CXR with extensive infiltrates and pleural effusions. .               Does not appear fluid overloaded at present.   Will change furosemide 40 mg iv Q12 to furosemide 40 mg po Q12.      2. Atrial Fibrillation              In chronic atrial fibrillation.              On apixiban.              On metoprolol 50 mg Q12.              Rate well controlled.     3. Chronic Anticoagulation              Been on apixiban 5 mg Q12.   10/16/2020: Apixiban 5 mg Q12 was changed to heparin iv.   On apixiban 5 mg Q12.    4. Hypertension   On metoprolol 50 mg Q12.   Blood pressure has come down with diuresis.                5. Chronic Kidney Disease, Stage 4              History or renal carcinoma and nephrectomy.              10/13/2020: BUN/crea 27/2.0. CrCl 28.   10/20/2020: BUN/crea 56/1.8. CrCl 32.      6. Chronic Obstructive Pulmonary Disease              Chronic respiratory failure with CO2 retention.              10/13/2020: 7.23/94/73/39/90% on FiO2 of 40%.   Pulmonary following.      VTE Risk Mitigation (From admission, onward)         Ordered     apixaban tablet 5 mg  2 times daily      10/17/20 1152     Place sequential compression device  Until discontinued      10/13/20 1405     IP VTE HIGH RISK PATIENT  Once      10/13/20 1405                Diana Meredith MD  Cardiology  [facility]

## 2020-10-21 NOTE — ASSESSMENT & PLAN NOTE
Improving.  Continue medical therapy including diuretic therapy to maintain negative fluid balance.

## 2020-10-21 NOTE — PLAN OF CARE
Problem: Occupational Therapy Goal  Goal: Occupational Therapy Goal  Description: Goals to be met by: 10/22/2020    Patient will increase functional independence with ADLs by performing:    UE Dressing with Minimal Assistance.  LE Dressing with Maximum Assistance and use of sock aide.  Grooming while seated with Stand-by Assistance.  MET 10/19/2020  Toileting from bedside commode with CGA for hygiene and clothing management.   Toilet transfer to bedside commode with Minimal Assistance.  Pt will tolerate sitting EOB for 10 minutes.        Outcome: Ongoing, Progressing     Progressing towards goals.  Recommend post acute OT in SNF versus HH OT/PT/aide pending Pt. Preference.  Has RW, 3-in-1 commode, shower chair, and W/C at home.  To benefit from continued acute care OT services to increase independence in self-care/functional transfers.  Continue POC.

## 2020-10-21 NOTE — ASSESSMENT & PLAN NOTE
Active pulmonary tuberculosis unlikely.  One acid-fast bacillus smear negative.  Additional efforts at obtaining sputum for additional smears not successful despite using hypertonic saline.  Discussed with Pulmonary Critical Care and recommending to discontinue further smears and respiratory isolation.

## 2020-10-21 NOTE — SUBJECTIVE & OBJECTIVE
Interval History: Pt tolerating AVAPS at night and 5-6L NC during the day. Encouraging to get in chair daily and bed at night, attempt to sleep during the night and stay up during the day.     Objective:     Vital Signs (Most Recent):  Temp: 98.4 °F (36.9 °C) (10/21/20 0800)  Pulse: (!) 59 (10/21/20 1000)  Resp: (!) 30 (10/21/20 1000)  BP: 109/75 (10/21/20 1000)  SpO2: (!) 94 % (10/21/20 1000) Vital Signs (24h Range):  Temp:  [97.8 °F (36.6 °C)-99.7 °F (37.6 °C)] 98.4 °F (36.9 °C)  Pulse:  [] 59  Resp:  [17-44] 30  SpO2:  [89 %-96 %] 94 %  BP: ()/(52-86) 109/75     Weight: 99.4 kg (219 lb 2.2 oz)  Body mass index is 38.82 kg/m².      Intake/Output Summary (Last 24 hours) at 10/21/2020 1045  Last data filed at 10/21/2020 0900  Gross per 24 hour   Intake 1340 ml   Output 1600 ml   Net -260 ml       Physical Exam  Constitutional:       General: She is not in acute distress.     Appearance: She is obese. She is not ill-appearing or toxic-appearing.   HENT:      Head: Normocephalic and atraumatic.      Nose: Nose normal.      Mouth/Throat:      Mouth: Mucous membranes are moist.   Eyes:      General: No scleral icterus.     Extraocular Movements: Extraocular movements intact.      Conjunctiva/sclera: Conjunctivae normal.      Pupils: Pupils are equal, round, and reactive to light.   Neck:      Musculoskeletal: Normal range of motion.      Comments: +JVD  Cardiovascular:      Rate and Rhythm: Normal rate and regular rhythm.      Pulses: Normal pulses.      Heart sounds: No murmur. No friction rub. No gallop.    Pulmonary:      Comments: Decreased breath all lung fields, absent bilateral bases, crackles mid upper posterior lung fields improved, no wheezes, rhonchi.   Abdominal:      General: Bowel sounds are normal.      Tenderness: There is no abdominal tenderness. There is no guarding or rebound.      Comments: protuberant   Musculoskeletal:         General: Swelling present.      Comments: Swelling improved.  Trace pitting edema b/l lower extremity. +1 dependent hip edema bilaterally.    Skin:     General: Skin is warm.      Capillary Refill: Capillary refill takes less than 2 seconds.      Findings: No rash.   Neurological:      General: No focal deficit present.      Mental Status: She is oriented to person, place, and time.   Psychiatric:         Mood and Affect: Mood normal.         Behavior: Behavior normal.         Thought Content: Thought content normal.         Judgment: Judgment normal.         Vents:  Oxygen Concentration (%): 45 (10/20/20 2202)    Lines/Drains/Airways     Drain            Female External Urinary Catheter 10/13/20 1359 7 days          Peripheral Intravenous Line                 Midline Catheter Insertion/Assessment  - Double Lumen 10/20/20 1220 Right median cubital vein (antecubital fossa)  less than 1 day                Significant Labs:    CBC/Anemia Profile:  Recent Labs   Lab 10/20/20  0435 10/21/20  0333   WBC 8.95 8.22   HGB 11.3* 10.0*   HCT 35.3* 33.2*    171   MCV 88 90   RDW 14.6* 14.7*        Chemistries:  Recent Labs   Lab 10/20/20  0435 10/21/20  0333    140   K 5.1 4.3   CL 86* 85*   CO2 42* 44*   BUN 56* 60*   CREATININE 1.8* 1.9*   CALCIUM 9.4 9.2   MG 2.2 2.2   PHOS 4.6* 4.8*       ABGs: No results for input(s): PH, PCO2, HCO3, POCSATURATED, BE in the last 48 hours.  Cardiac Markers: No results for input(s): CKMB, TROPONINT, MYOGLOBIN in the last 48 hours.  Respiratory Culture: No results for input(s): GSRESP, RESPIRATORYC in the last 48 hours.  Urine Studies: No results for input(s): COLORU, APPEARANCEUA, PHUR, SPECGRAV, PROTEINUA, GLUCUA, KETONESU, BILIRUBINUA, OCCULTUA, NITRITE, UROBILINOGEN, LEUKOCYTESUR, RBCUA, WBCUA, BACTERIA, SQUAMEPITHEL, HYALINECASTS in the last 48 hours.    Invalid input(s): WRIGHTSUR    Significant Imaging:  I have reviewed all pertinent imaging results/findings within the past 24 hours.

## 2020-10-21 NOTE — ASSESSMENT & PLAN NOTE
Secondary to underlying lung disease, obesity, and decompensated heart failure.  Clinically improving with intravenous diuresis and noninvasive ventilation.  Patient continues to have negative fluid balance.  Discussed with Dr. Diana Meredith (Cardiology) who recommends decreasing to oral furosemide tomorrow morning.  Stable to transfer to the floor.  Efforts to arrange for noninvasive ventilation for the home setting ongoing.

## 2020-10-21 NOTE — CARE UPDATE
Artificial Intelligence Notification      Admit Date: 10/13/2020  LOS: 8  Code Status: Full Code   Date of Consult: 10/21/2020  : 1950  Age: 70 y.o.  Weight:   Wt Readings from Last 1 Encounters:   10/19/20 99.4 kg (219 lb 2.2 oz)     Sex: female  Bed: [place] [address] A:   MRN: [id number]  Attending Physician: Loi Quiles MD  Primary Service: Networked reference to record PCT   Time AI Alert Received: 3:02 PM  Time at Bedside: 3:05 PM           Patient just transferred to the floor this afternoon.  Patient re-evaluated.  Patient continues to improve clinically.  Patient stable to continue on the floor at this time.  Continue with current medical management.      Vital Signs (Most Recent):  Temp: 98.4 °F (36.9 °C) (10/21/20 0800)  Pulse: 110 (10/21/20 1405)  Resp: (!) 33 (10/21/20 1405)  BP: 109/68 (10/21/20 1405)  SpO2: (!) 92 % (10/21/20 1405) Vital Signs (24h Range):  Temp:  [97.8 °F (36.6 °C)-98.4 °F (36.9 °C)] 98.4 °F (36.9 °C)  Pulse:  [] 110  Resp:  [17-44] 33  SpO2:  [90 %-96 %] 92 %  BP: ()/(55-86) 109/68         This encounter was triggered by an Artificial Intelligence Notification.

## 2020-10-21 NOTE — PLAN OF CARE
Problem: Physical Therapy Goal  Goal: Physical Therapy Goal  Description: Goals to be met by: 11/15/2020    Patient will perform the following to increase strength, improve mobility, and return to prior level of function:    1. Rolling to L and R with SBA  2. Supine <> sit with SBA.  3. Sit EOB x 15 min with SBA for balance.  4. Bed <> chair transfer with SBA and least restrictive assistive device.  5. Added 10/19: Gait x10 ft with CGA and LRAD.    Outcome: Ongoing, Progressing     Patient seen after transfer to floor - eager for PT. Decreased level of assistance required for bed mobility with use of hospital bed features and increased distances able to ambulate before onset of SOB. Amb 1x4 ft, 1x1 ft, and 1x12 ft with RW and Jacquie. Rec for dc to SNF - note patient prefers to dc to home with HH and discussed use of wc until able to ambulate household distances without SOB/hypoxia and need for family assistance for ADLs/IADLs.

## 2020-10-22 NOTE — SUBJECTIVE & OBJECTIVE
Interval History:  No acute events overnight.  Breathing improving further.    Review of Systems   Constitutional: Negative for chills and fever.   Respiratory: Negative for shortness of breath and wheezing.    Cardiovascular: Negative for chest pain.   Gastrointestinal: Negative for abdominal distention, abdominal pain, constipation, diarrhea, nausea and vomiting.   Genitourinary: Negative for dysuria and frequency.   Musculoskeletal: Negative for arthralgias and myalgias.   Neurological: Negative for light-headedness.   Psychiatric/Behavioral: Negative for agitation and confusion.     Objective:     Vital Signs (Most Recent):  Temp: 98.5 °F (36.9 °C) (10/22/20 1615)  Pulse: 81 (10/22/20 1615)  Resp: 12 (10/22/20 1200)  BP: (!) 135/29 (10/22/20 1615)  SpO2: (!) 93 % (10/22/20 1615) Vital Signs (24h Range):  Temp:  [96.9 °F (36.1 °C)-99 °F (37.2 °C)] 98.5 °F (36.9 °C)  Pulse:  [] 81  Resp:  [12-20] 12  SpO2:  [90 %-94 %] 93 %  BP: (106-135)/(29-70) 135/29     Weight: 99.4 kg (219 lb 2.2 oz)  Body mass index is 38.82 kg/m².  No intake or output data in the 24 hours ending 10/22/20 1746   Physical Exam  Constitutional:       General: She is not in acute distress.     Appearance: She is well-developed. She is obese.   HENT:      Head: Atraumatic.   Eyes:      Conjunctiva/sclera: Conjunctivae normal.   Neck:      Musculoskeletal: Neck supple.   Cardiovascular:      Rate and Rhythm: Normal rate and regular rhythm.      Heart sounds: Normal heart sounds. No murmur.   Pulmonary:      Breath sounds: No wheezing.      Comments: Distant breath sounds.  Abdominal:      General: Bowel sounds are normal. There is no distension.      Palpations: Abdomen is soft.      Tenderness: There is no abdominal tenderness.   Musculoskeletal: Normal range of motion.         General: No deformity.      Right lower leg: No edema.      Left lower leg: No edema.   Neurological:      Mental Status: She is alert and oriented to person,  place, and time.         Significant Labs: All pertinent labs within the past 24 hours have been reviewed.    Significant Imaging: I have reviewed all pertinent imaging results/findings within the past 24 hours.

## 2020-10-22 NOTE — PLAN OF CARE
Participated in therapy, good appetite, no requests for PRN medications, good output on lasix therapy.Will continue to monitor for any needs.

## 2020-10-22 NOTE — PROGRESS NOTES
[facility]  Pulmonology  Progress Note    Patient Name: Patsy Morris  MRN: [id number]  Admission Date: 10/13/2020  Hospital Length of Stay: 9 days  Code Status: Full Code  Attending Provider: Loi Quiles MD  Primary Care Provider: Luisana Cartagena MD   Principal Problem: Acute on chronic respiratory failure with hypoxia and hypercapnia    Subjective:     Interval History: In better spirits this morning. Up to chair eating breakfast. Worked with Physical therapy. CM working on trilogy, resent with diagnosis of OHS with ABG showing consistent PaCO2 >52.     Objective:     Vital Signs (Most Recent):  Temp: 97.9 °F (36.6 °C) (10/22/20 0730)  Pulse: 75 (10/22/20 0802)  Resp: 20 (10/22/20 0730)  BP: 112/65 (10/22/20 0730)  SpO2: (!) 90 % (10/22/20 0802) Vital Signs (24h Range):  Temp:  [96.9 °F (36.1 °C)-98.8 °F (37.1 °C)] 97.9 °F (36.6 °C)  Pulse:  [] 75  Resp:  [18-33] 20  SpO2:  [88 %-94 %] 90 %  BP: (101-119)/(64-80) 112/65     Weight: 99.4 kg (219 lb 2.2 oz)  Body mass index is 38.82 kg/m².      Intake/Output Summary (Last 24 hours) at 10/22/2020 0950  Last data filed at 10/21/2020 1400  Gross per 24 hour   Intake 180 ml   Output 650 ml   Net -470 ml       Physical Exam  Constitutional:       General: She is not in acute distress.     Appearance: She is obese. She is not ill-appearing or toxic-appearing.   HENT:      Head: Normocephalic and atraumatic.      Nose: Nose normal.      Mouth/Throat:      Mouth: Mucous membranes are moist.   Eyes:      General: No scleral icterus.     Extraocular Movements: Extraocular movements intact.      Conjunctiva/sclera: Conjunctivae normal.      Pupils: Pupils are equal, round, and reactive to light.   Neck:      Musculoskeletal: Normal range of motion.      Comments: +JVD  Cardiovascular:      Rate and Rhythm: Normal rate and regular rhythm.      Pulses: Normal pulses.      Heart sounds: No murmur. No friction rub. No gallop.    Pulmonary:       Comments: Decreased breath all lung fields, crackles mid upper posterior lung fields improved, no wheezes, rhonchi.   Abdominal:      General: Bowel sounds are normal.      Tenderness: There is no abdominal tenderness. There is no guarding or rebound.      Comments: protuberant   Musculoskeletal:         General: Swelling present.      Comments: Swelling improved. Trace pitting edema b/l lower extremity. +1 dependent hip edema bilaterally.    Skin:     General: Skin is warm.      Capillary Refill: Capillary refill takes less than 2 seconds.      Findings: No rash.   Neurological:      General: No focal deficit present.      Mental Status: She is oriented to person, place, and time.   Psychiatric:         Mood and Affect: Mood normal.         Behavior: Behavior normal.         Thought Content: Thought content normal.         Judgment: Judgment normal.         Vents:  Oxygen Concentration (%): 45 (10/21/20 2218)    Lines/Drains/Airways     Drain            Female External Urinary Catheter 10/13/20 1359 8 days          Peripheral Intravenous Line                 Midline Catheter Insertion/Assessment  - Double Lumen 10/20/20 1220 Right median cubital vein (antecubital fossa)  1 day                Significant Labs:    CBC/Anemia Profile:  Recent Labs   Lab 10/21/20  0333 10/22/20  0431   WBC 8.22 7.68   HGB 10.0* 10.7*   HCT 33.2* 35.4*    156   MCV 90 90   RDW 14.7* 14.6*        Chemistries:  Recent Labs   Lab 10/21/20  0333 10/22/20  0431    140   K 4.3 4.2   CL 85* 86*   CO2 44* 42*   BUN 60* 63*   CREATININE 1.9* 1.7*   CALCIUM 9.2 9.3   MG 2.2 2.3   PHOS 4.8* 4.1       ABGs: No results for input(s): PH, PCO2, HCO3, POCSATURATED, BE in the last 48 hours.  Coagulation: No results for input(s): PT, INR, APTT in the last 48 hours.  Lactic Acid: No results for input(s): LACTATE in the last 48 hours.  Lipid Panel: No results for input(s): CHOL, HDL, LDLCALC, TRIG, CHOLHDL in the last 48 hours.  Respiratory  Culture: No results for input(s): GSRESP, RESPIRATORYC in the last 48 hours.  Urine Studies: No results for input(s): COLORU, APPEARANCEUA, PHUR, SPECGRAV, PROTEINUA, GLUCUA, KETONESU, BILIRUBINUA, OCCULTUA, NITRITE, UROBILINOGEN, LEUKOCYTESUR, RBCUA, WBCUA, BACTERIA, SQUAMEPITHEL, HYALINECASTS in the last 48 hours.    Invalid input(s): WRIGHTSUR    Significant Imaging:  I have reviewed all pertinent imaging results/findings within the past 24 hours.    Assessment/Plan:     * Acute on chronic respiratory failure with hypoxia and hypercapnia  Pt with recurrent hospitalization for decompensated heart failure, presenting with fluid overload resulting in hypercapnic respiratory failure. Component of restrictive lung disease decreasing ability to compensate Ventilation with worsened pulmonary edema.     - Continue AVAPS at night and NC during the day, AVAPs settings , RR 18, PEEP 8, FIO2 40%, Max pressure 36, min pressure 18  - diuresis as above.   - Will need to be set up with Trilogy prior to discharge as did not happen via NH after most recent DC  - CT chest showing cavitary lesion with surround consolidation/ggo concerning for infectious process; abx discontinued and following sputum for respiratory cultures and Mycobacterial culture/smear    Cavitary lesion of lung  CT concerning for slow process as lesion in same posterior upper lobe lung fields noted on previous CT in 2018. Pt without signs or symptoms of sepsis making acute bacterial cavitary PNA unlikely. Potential for malignancy possible. Pt showing signs of pulmonary htn on echo as well as engorged pulmonary artery and veins. This along with NOAC make bronch unsafe due to increased bleeding in patient with acute respiratory failure.     - CT chest showing cavitary lesion with surround consolidation/ggo concerning for infectious process; obtaining sputum for respiratory cultures and Mycobacterial culture/smear  - repeating quant gold as indeterminate due  to poor test as opposed to difficult to determine results; pending  - HIV negative  - will need to follow up with Pulmonology; discussed likely needing bronchoscopy outpatient when respiratory status has improved.         Atrial fibrillation with rapid ventricular response  Continue rate control as likely contributing to diastolic dysfunction; currently in a flutter  - continue anticoag    Acute on chronic diastolic congestive heart failure  - continue diuresis; fluid overload likely contributes to hypercapnia in setting of OHS/BHAVANI, small airway disease with severe restriction.   -CT showing cavitary lesion with surrounding consolidation and GGO concerning for infectious vs malignant process, with significant ggo throughout the lung likely secondary to continued pulmonary edema.     - would continue lasix 80 mg BID until bump in creatinine    Obesity hypoventilation syndrome  Patient with BMI >35 with PaCO2 consistently >52 with restriction on PFTs.     - will need to be discharged on trilogy to reduce hospital readmissions to be worn nightly and with naps during the day.       DVT ppx: apixaban     Willem Mortensen MD  Pulmonology  [facility]

## 2020-10-22 NOTE — PROGRESS NOTES
"[facility] Medicine  Progress Note    Patient Name: Patsy Morris  MRN: [id number]  Patient Class: IP- Inpatient   Admission Date: 10/13/2020  Length of Stay: 9 days  Attending Physician: Loi Quiles MD  Primary Care Provider: Luisana Cartagena MD        Subjective:     Principal Problem:Acute on chronic respiratory failure with hypoxia and hypercapnia        HPI:  Per Ravi Soria:    "Per ED:  "This is a 70 y.o. female with history of CHF and COPD who presents via EMS with complaint of shortness of breath that began a few days ago. Per EMS patient had O2 saturation of 72 on 3 liters if home oxygen upon their arrival. Patient states she is on 3 liters of home oxygen at baseline. She denies fever, cough, chest pain, nausea, vomiting, diarrhea, or rhinorrhea. She reports she was recently discharged from the hospital for similar symptoms. She reports compliance with her home medications. She recently became resident of [facility] after last hospitalization. She is a former smoker. She denies undergoing any surgeries".    The patient is a 70 year old female with a PMH significant for of HTN, Chronic Respiratory Failure (COPD and BHAVANI/OHS), HFpEF, Obesity who presented to the ED with complaints of SOB that began about 3 days ago.  Patient is on 3L O2NC at home.  She was seen by her Cardiologist about 4 days prior to admission and told to decrease her Lasix from 40mg bid to qday.  She denies chest pain.  No Fevers.  No cough.  States she has been adherent with her medications.  Patient was recently admitted to [facility] from 9/1-9/11 for Acute on Chronic Respiratory Failure and discharged to SNF.  Patient was placed on BiPAP in ED and admitted to ICU.  Patient feeling better on BiPAP."    Overview/Hospital Course:  Patient is 70-year-old woman with history of hypertension, chronic hypoxic respiratory failure on home oxygen secondary to chronic obstructive pulmonary " disease, chronic diastolic heart failure, chronic atrial fibrillation, and morbid obesity re-admitted to the hospital with decompensated heart failure after patient was home for about a week following recent hospitalization and stayed at skilled nursing facility for physical therapy.  Patient treated with non invasive ventilation intravenous diuretics.  Slowly clinically improving.  Patient also initially treated with antibiotic therapy which has since been discontinued as bacterial pneumonia ruled out on clinical grounds.  Patient does have a cavitary lesion on her chest CT scan but otherwise without signs or symptoms of active pulmonary tuberculosis.  Acid-fast bacillus smears and cultures ordered to rule out active pulmonary tuberculosis.  Patient with one negative acid-fast bacillus smear thus far.  Efforts at obtaining additional smears even with sputum induction with hypertonic saline has not resulted additional sputum.  Discussed with Pulmonary Critical Care service and now recommend discontinuing further smears and respiratory isolation.    Interval History:  No acute events overnight.  Breathing improving further.    Review of Systems   Constitutional: Negative for chills and fever.   Respiratory: Negative for shortness of breath and wheezing.    Cardiovascular: Negative for chest pain.   Gastrointestinal: Negative for abdominal distention, abdominal pain, constipation, diarrhea, nausea and vomiting.   Genitourinary: Negative for dysuria and frequency.   Musculoskeletal: Negative for arthralgias and myalgias.   Neurological: Negative for light-headedness.   Psychiatric/Behavioral: Negative for agitation and confusion.     Objective:     Vital Signs (Most Recent):  Temp: 98.5 °F (36.9 °C) (10/22/20 1615)  Pulse: 81 (10/22/20 1615)  Resp: 12 (10/22/20 1200)  BP: (!) 135/29 (10/22/20 1615)  SpO2: (!) 93 % (10/22/20 1615) Vital Signs (24h Range):  Temp:  [96.9 °F (36.1 °C)-99 °F (37.2 °C)] 98.5 °F (36.9  °C)  Pulse:  [] 81  Resp:  [12-20] 12  SpO2:  [90 %-94 %] 93 %  BP: (106-135)/(29-70) 135/29     Weight: 99.4 kg (219 lb 2.2 oz)  Body mass index is 38.82 kg/m².  No intake or output data in the 24 hours ending 10/22/20 1746   Physical Exam  Constitutional:       General: She is not in acute distress.     Appearance: She is well-developed. She is obese.   HENT:      Head: Atraumatic.   Eyes:      Conjunctiva/sclera: Conjunctivae normal.   Neck:      Musculoskeletal: Neck supple.   Cardiovascular:      Rate and Rhythm: Normal rate and regular rhythm.      Heart sounds: Normal heart sounds. No murmur.   Pulmonary:      Breath sounds: No wheezing.      Comments: Distant breath sounds.  Abdominal:      General: Bowel sounds are normal. There is no distension.      Palpations: Abdomen is soft.      Tenderness: There is no abdominal tenderness.   Musculoskeletal: Normal range of motion.         General: No deformity.      Right lower leg: No edema.      Left lower leg: No edema.   Neurological:      Mental Status: She is alert and oriented to person, place, and time.         Significant Labs: All pertinent labs within the past 24 hours have been reviewed.    Significant Imaging: I have reviewed all pertinent imaging results/findings within the past 24 hours.      Assessment/Plan:      * Acute on chronic respiratory failure with hypoxia and hypercapnia  Secondary to underlying lung disease, obesity, and decompensated heart failure.  Clinically improving with intravenous diuresis and noninvasive ventilation.  Patient continues to have negative fluid balance.  Efforts to arrange for noninvasive ventilation for the home setting ongoing.  Waiting insurance authorization.  Discussed with Pulmonary Critical Care recommends continue intravenous diuretics while in house along as her kidney function can tolerate.  Serum creatinine trending down intravenous diuretics.  Switched back to intravenous diuretics this morning.   Continue to monitor volume status and kidney function.    Cavitary lesion of lung  Active pulmonary tuberculosis unlikely.  One acid-fast bacillus smear negative.  Additional efforts at obtaining sputum for additional smears not successful despite using hypertonic saline.  Discussed with Pulmonary Critical Care who recommended discontinuing further smears and respiratory isolation.    Acute on chronic diastolic congestive heart failure  Improving.  Continue medical therapy including diuretic therapy to maintain negative fluid balance.    Atrial fibrillation with rapid ventricular response  Stable.  Continue with beta-blocker therapy for rate control and apixaban for stroke risk reduction.    Essential hypertension  Reasonably controlled with current regimen.  Will continue with current regimen and continue to monitor.    Chronic kidney disease (CKD) stage G4/A1, severely decreased glomerular filtration rate (GFR) between 15-29 mL/min/1.73 square meter and albuminuria creatinine ratio less than 30 mg/g  Stable.  Continue to closely monitor.    Dyslipidemia  Continue with statin therapy.    Debility  Consult physical and occupational therapy.    Current moderate episode of major depressive disorder without prior episode  Stable.  Continue SSRI therapy.      VTE Risk Mitigation (From admission, onward)         Ordered     apixaban tablet 5 mg  2 times daily      10/17/20 1152     Place sequential compression device  Until discontinued      10/13/20 1405     IP VTE HIGH RISK PATIENT  Once      10/13/20 1405                Loi Quiles MD  Department of Hospital Medicine   [facility]

## 2020-10-22 NOTE — ASSESSMENT & PLAN NOTE
Active pulmonary tuberculosis unlikely.  One acid-fast bacillus smear negative.  Additional efforts at obtaining sputum for additional smears not successful despite using hypertonic saline.  Discussed with Pulmonary Critical Care who recommended discontinuing further smears and respiratory isolation.

## 2020-10-22 NOTE — SUBJECTIVE & OBJECTIVE
Interval History: In better spirits this morning. Up to chair eating breakfast. Worked with Physical therapy. CM working on trilogy, resent with diagnosis of OHS with ABG showing consistent PaCO2 >52.     Objective:     Vital Signs (Most Recent):  Temp: 97.9 °F (36.6 °C) (10/22/20 0730)  Pulse: 75 (10/22/20 0802)  Resp: 20 (10/22/20 0730)  BP: 112/65 (10/22/20 0730)  SpO2: (!) 90 % (10/22/20 0802) Vital Signs (24h Range):  Temp:  [96.9 °F (36.1 °C)-98.8 °F (37.1 °C)] 97.9 °F (36.6 °C)  Pulse:  [] 75  Resp:  [18-33] 20  SpO2:  [88 %-94 %] 90 %  BP: (101-119)/(64-80) 112/65     Weight: 99.4 kg (219 lb 2.2 oz)  Body mass index is 38.82 kg/m².      Intake/Output Summary (Last 24 hours) at 10/22/2020 0950  Last data filed at 10/21/2020 1400  Gross per 24 hour   Intake 180 ml   Output 650 ml   Net -470 ml       Physical Exam  Constitutional:       General: She is not in acute distress.     Appearance: She is obese. She is not ill-appearing or toxic-appearing.   HENT:      Head: Normocephalic and atraumatic.      Nose: Nose normal.      Mouth/Throat:      Mouth: Mucous membranes are moist.   Eyes:      General: No scleral icterus.     Extraocular Movements: Extraocular movements intact.      Conjunctiva/sclera: Conjunctivae normal.      Pupils: Pupils are equal, round, and reactive to light.   Neck:      Musculoskeletal: Normal range of motion.      Comments: +JVD  Cardiovascular:      Rate and Rhythm: Normal rate and regular rhythm.      Pulses: Normal pulses.      Heart sounds: No murmur. No friction rub. No gallop.    Pulmonary:      Comments: Decreased breath all lung fields, crackles mid upper posterior lung fields improved, no wheezes, rhonchi.   Abdominal:      General: Bowel sounds are normal.      Tenderness: There is no abdominal tenderness. There is no guarding or rebound.      Comments: protuberant   Musculoskeletal:         General: Swelling present.      Comments: Swelling improved. Trace pitting edema  b/l lower extremity. +1 dependent hip edema bilaterally.    Skin:     General: Skin is warm.      Capillary Refill: Capillary refill takes less than 2 seconds.      Findings: No rash.   Neurological:      General: No focal deficit present.      Mental Status: She is oriented to person, place, and time.   Psychiatric:         Mood and Affect: Mood normal.         Behavior: Behavior normal.         Thought Content: Thought content normal.         Judgment: Judgment normal.         Vents:  Oxygen Concentration (%): 45 (10/21/20 2218)    Lines/Drains/Airways     Drain            Female External Urinary Catheter 10/13/20 1359 8 days          Peripheral Intravenous Line                 Midline Catheter Insertion/Assessment  - Double Lumen 10/20/20 1220 Right median cubital vein (antecubital fossa)  1 day                Significant Labs:    CBC/Anemia Profile:  Recent Labs   Lab 10/21/20  0333 10/22/20  0431   WBC 8.22 7.68   HGB 10.0* 10.7*   HCT 33.2* 35.4*    156   MCV 90 90   RDW 14.7* 14.6*        Chemistries:  Recent Labs   Lab 10/21/20  0333 10/22/20  0431    140   K 4.3 4.2   CL 85* 86*   CO2 44* 42*   BUN 60* 63*   CREATININE 1.9* 1.7*   CALCIUM 9.2 9.3   MG 2.2 2.3   PHOS 4.8* 4.1       ABGs: No results for input(s): PH, PCO2, HCO3, POCSATURATED, BE in the last 48 hours.  Coagulation: No results for input(s): PT, INR, APTT in the last 48 hours.  Lactic Acid: No results for input(s): LACTATE in the last 48 hours.  Lipid Panel: No results for input(s): CHOL, HDL, LDLCALC, TRIG, CHOLHDL in the last 48 hours.  Respiratory Culture: No results for input(s): GSRESP, RESPIRATORYC in the last 48 hours.  Urine Studies: No results for input(s): COLORU, APPEARANCEUA, PHUR, SPECGRAV, PROTEINUA, GLUCUA, KETONESU, BILIRUBINUA, OCCULTUA, NITRITE, UROBILINOGEN, LEUKOCYTESUR, RBCUA, WBCUA, BACTERIA, SQUAMEPITHEL, HYALINECASTS in the last 48 hours.    Invalid input(s): WRIGHTSUR    Significant Imaging:  I have reviewed  all pertinent imaging results/findings within the past 24 hours.

## 2020-10-22 NOTE — NURSING
"Palliative Care Daily Progress Note:  Chief Complaint   Patient presents with    Shortness of Breath       SOB for several days, upon EMS arrival pt was 72 on 3L at home oxygen. Pt placed on CPAP per EMS.    This is a 70 y.o. female with history of CHF and COPD who presents via EMS with complaint of shortness of breath that began a few days ago. Per EMS patient had O2 saturation of 72% on 3 liters if home oxygen upon their arrival. Patient states she is on 3 liters of home oxygen at baseline. She denies fever, cough, chest pain, nausea, vomiting, diarrhea, or rhinorrhea. She reports she was recently discharged from the hospital for similar symptoms. She reports compliance with her home medications. She recently became resident of [facility] after last hospitalization. She is a former smoker. She denies undergoing any surgeries.The history is provided by the patient and the EMS personnel.   Initial Vitals   BP Pulse Resp Temp SpO2   10/13/20 0930 10/13/20 0940 10/13/20 0934 10/13/20 1007 10/13/20 0917   (!) 108/59 92 (!) 22 97.5 °F (36.4 °C) (!) 90 %     Admission Weight/BMI & Noted Weight/BMI in EMR:     105.5 kg (232 lb 9.4 oz)/ 41.20 kg/m²Abnormal       Date: Weight in kg (lbs): BMI: Height:   8/20/2020 102.5 kg (226 lb) 37.61 kg/m²    5' 3" (160 cm)   6/27/2018 100.2 kg (221 lb) 39.1 kg/m²    5' 3" (160 cm)   2/23/2016 108.665 kg (239 lb 9 oz)  43.43 kg/m²    5' 3" (160 cm)     Family History   Problem Relation Age of Onset    Hypertension Mother      Thyroid disease Mother      Cancer Father      Asthma Neg Hx      Emphysema Neg Hx      Melanoma Neg Hx        Social History            Tobacco Use    Smoking status: Current Every Day Smoker       Packs/day: 1.00       Years: 25.00       Pack years: 25.00       Types: Cigarettes    Smokeless tobacco: Never Used   Substance Use Topics    Alcohol use: No       Alcohol/week: 0.0 standard drinks    Drug use: No      Per Dr. Quiles dated " "10/21/20 at 1154:  HPI:  Per Ravi Soria:     "Per ED:  "This is a 70 y.o. female with history of CHF and COPD who presents via EMS with complaint of shortness of breath that began a few days ago. Per EMS patient had O2 saturation of 72 on 3 liters if home oxygen upon their arrival. Patient states she is on 3 liters of home oxygen at baseline. She denies fever, cough, chest pain, nausea, vomiting, diarrhea, or rhinorrhea. She reports she was recently discharged from the hospital for similar symptoms. She reports compliance with her home medications. She recently became resident of [facility] after last hospitalization. She is a former smoker. She denies undergoing any surgeries".     The patient is a 70 year old female with a PMH significant for of HTN, Chronic Respiratory Failure (COPD and BHAVANI/OHS), HFpEF, Obesity who presented to the ED with complaints of SOB that began about 3 days ago.  Patient is on 3L O2NC at home.  She was seen by her Cardiologist about 4 days prior to admission and told to decrease her Lasix from 40mg bid to qday.  She denies chest pain.  No Fevers.  No cough.  States she has been adherent with her medications.  Patient was recently admitted to [facility] from 9/1-9/11 for Acute on Chronic Respiratory Failure and discharged to SNF.  Patient was placed on BiPAP in ED and admitted to ICU.  Patient feeling better on BiPAP."     Overview/Hospital Course:  Patient is 70-year-old woman with history of hypertension, chronic hypoxic respiratory failure on home oxygen secondary to chronic obstructive pulmonary disease, chronic diastolic heart failure, chronic atrial fibrillation, and morbid obesity re-admitted to the hospital with decompensated heart failure after patient was home for about a week following recent hospitalization and stayed at skilled nursing facility for physical therapy.  Patient treated with non invasive ventilation intravenous diuretics.  Slowly clinically improving. "  Patient also initially treated with antibiotic therapy which has since been discontinued as bacterial pneumonia ruled out on clinical grounds.  Patient does have a cavitary lesion on her chest CT scan but otherwise without signs or symptoms of active pulmonary tuberculosis.  Acid-fast bacillus smears and cultures ordered to rule out active pulmonary tuberculosis.  Patient with one negative acid-fast bacillus smear thus far.  Efforts at obtaining additional smears even with sputum induction with hypertonic saline has not resulted additional sputum.  Discussed with Pulmonary Critical Care service and now recommend discontinuing further smears and respiratory isolation.         S:   Medical record reviewed, followed up with Dr. Quiles regarding patient's condition. Physician's Plan of Care Goal is GOC and AD.         B:   Code Status:  Full  Advanced Directives:      Yes/No/Unknown Date Completed: In EMR:   HCPOA Yes 8/24/20 Yes   Living Will Unknown N/A No   LaPOST Yes 9/2/20 Yes      Family/Support:   1st HCPOA: Earlene Hughesin ([phone])  2nd HCPOA: Vicky Hughesin ([phone])  Marital Status:  Single  Spiritual Designation:  [facility]      Labs:    Ref Range & Units 10/13/20 1mo ago   POC Rapid COVID Negative Negative  Negative      CBC W/ AUTO DIFFERENTIAL - Abnormal; Notable for the following components:       Result Value      RBC 3.57 (*)       Hemoglobin 10.0 (*)       Hematocrit 32.9 (*)       Mean Corpuscular Hemoglobin Conc 30.4 (*)       RDW 15.1 (*)       Gran # (ANC) 8.1 (*)       Lymph # 0.5 (*)       Gran% 87.7 (*)       Lymph% 5.6 (*)       All other components within normal limits   COMPREHENSIVE METABOLIC PANEL - Abnormal; Notable for the following components:     Potassium 5.2 (*)       CO2 33 (*)       Glucose 134 (*)       BUN, Bld 27 (*)       Creatinine 2.0 (*)       Alkaline Phosphatase 43 (*)       Anion Gap 7 (*)       eGFR if  28 (*)       eGFR if non   American 24 (*)       All other components within normal limits   B-TYPE NATRIURETIC PEPTIDE - Abnormal; Notable for the following components:      (*)       All other components within normal limits   ISTAT PROCEDURE - Abnormal; Notable for the following components:     POC PH 7.227 (*)       POC PCO2 94.4 (*)       POC PO2 73 (*)       POC HCO3 39.2 (*)       POC SATURATED O2 90 (*)            Diagnostics:   10/13/20 XR CHEST AP PORTABLE:  There is diffuse bilateral airspace disease, which is slightly worsened when compared to 09/09/2020.  Moderate bilateral pleural effusions have also worsened.  The visualized cardiomediastinal silhouette is unchanged.  The bones and soft tissues are unremarkable.     10/15/20 CT CHEST WITHOUT CONTRAST:  The findings highly concerning for extensive diffuse pulmonary infection with bilateral pleural fluid collections right greater than left.    10/15/20 US LOWER EXTREMITY VEINS BILATERAL:  No evidence of deep venous thrombosis in either lower extremity.    10/20/20 XR CHEST AP PORTABLE:  No significant change.  There is cardiomegaly, multifocal airspace opacities and bilateral effusions           A:   ESAS to be completed by Palliative Care NP during full consult.       Bowel Management Plan (BMP): Yes, docusate sodium capsule 100 mg, by mouth, two times daily.  Last BM noted on 10/21/20.    Discharge Planning:   Per CM note dated 10/13/20 at 1426:  Discharge Plan A Home;Home Health   Discharge Plan B Home;Home Health     Prior to hospitilization cognitive status: Alert/Oriented   Prior to hospitalization functional status: Needs Assistance;Assistive Equipment   Current cognitive status: Alert/Oriented   Current Functional Status: Assistive Equipment;Needs Assistance   Lives With sibling(s)     Equipment Currently Used at Home oxygen;CPAP     DME Needed Upon Discharge  other (see comments)   CM met with pt for initial discharge planning assessment. Pt is on bipap but is  alert and oriented.     Pt went to [facility] after last admin in Sept, but has since been discharged home with . Pt cannot remember the name of HH company at this time.     Pt confirmed demographic information is correct in [facility], PCP is  and pharmacy of choice is [facility] [address] and [address].     Pt has home O2, 3L, and a cpap. Pt was supposed to get a trilogy after last admit, but could not get as she went to SNF. SNF was informed to arrange trilogy when discharged, CM to investigate.         R:  Support offered at this time.   Palliative Care Team will continue to follow patient.       Thank you for allowing Palliative Care to participate in the care of this patient.      Cha Deluna RN

## 2020-10-22 NOTE — PLAN OF CARE
POC reviewed w/ pt and purposeful rounding complete. 1.5L fluid restriction maintained. VSS and pt on 5L humidified NC. Pt on Bipap at night and during naps. Pt AAOx4 and 1 person assist. Meds administered per MAR. PRN meds administered for sleep and pain w/ full relief obtained. Pt sleeping at this time, will continue to monitor.

## 2020-10-22 NOTE — PLAN OF CARE
Problem: Physical Therapy Goal  Goal: Physical Therapy Goal  Description: Goals to be met by: 11/15/2020    Patient will perform the following to increase strength, improve mobility, and return to prior level of function:    1. Rolling to L and R with SBA  2. Supine <> sit with SBA.  3. Sit EOB x 15 min with SBA for balance.  4. Bed <> chair transfer with SBA and least restrictive assistive device.  5. Added 10/19: Gait x10 ft with CGA and LRAD.    Outcome: Ongoing, Progressing   The Pt presented with HOB elevated using O2 5L via NC and consents to therapy today. SPO2 checked prior to standing and was at 92%. Pt performed the following therapy activities:   Supine<>Sit using hand rail with SBA.   Sit<>Stand using rollator and Min A.   Gait 56 ft using rollator, O2 5L via NC and CGA. Pt required a rest break [facility]. SPO2 was 74% but soon increased to 91% after resting.

## 2020-10-22 NOTE — PT/OT/SLP PROGRESS
Physical Therapy Treatment    Patient Name:  Patsy Morris   MRN:  [id number]    Recommendations:     Discharge Recommendations:  other (see comments)(SNF vs HH with PT/OT/ Aide with 24 hr supervision)   Discharge Equipment Recommendations: other (see comments)   Barriers to discharge: Decreased caregiver support and current functional status    Assessment:     Patsy Morris is a 70 y.o. female admitted with a medical diagnosis of Acute on chronic respiratory failure with hypoxia and hypercapnia.  She presents with the following impairments/functional limitations:  decreased upper extremity function, decreased lower extremity function, decreased safety awareness, gait instability, impaired balance, impaired endurance, impaired self care skills, impaired functional mobilty, impaired cardiopulmonary response to activity, weakness Pt able to tolerate treatment today. The patient is showing improvement with her endurance and strength while performing activities but does still exhibit SOB and require rest breaks duration ambulation training.     Rehab Prognosis: Good; patient would benefit from acute skilled PT services to address these deficits and reach maximum level of function.    Recent Surgery: * No surgery found *      Plan:     During this hospitalization, patient to be seen 5 x/week to address the identified rehab impairments via gait training, therapeutic activities, therapeutic exercises, wheelchair management/training and progress toward the following goals:    · Plan of Care Expires:  11/15/20    Subjective     Chief Complaint: Complains of (rude nurses)   Patient/Family Comments/goals: None stated   Pain/Comfort:  · Pain Rating 1: 0/10      Objective:     Communicated with NS prior to session.  Patient found HOB elevated with oxygen, telemetry, peripheral IV upon PT entry to room.     General Precautions: Standard, fall, respiratory   Orthopedic Precautions:N/A   Braces: N/A     Functional  Mobility:  · Transfers:     · Sit to Stand:  minimum assistance with 4 wheeled walker  · Gait: 56 Ft using rollator and CGA using 5L O2 via NC      AM-PAC 6 CLICK MOBILITY  Turning over in bed (including adjusting bedclothes, sheets and blankets)?: 3  Sitting down on and standing up from a chair with arms (e.g., wheelchair, bedside commode, etc.): 3  Moving from lying on back to sitting on the side of the bed?: 3  Moving to and from a bed to a chair (including a wheelchair)?: 3  Need to walk in hospital room?: 3  Climbing 3-5 steps with a railing?: 2  Basic Mobility Total Score: 17       Therapeutic Activities and Exercises:   The Pt performed the following exercises while in bedside chair:  Seated marches, LAQ, Heel Slides, AP x 20 ea     Patient left up in chair with all lines intact, call button in reach and NS notified..    GOALS:   Multidisciplinary Problems     Physical Therapy Goals        Problem: Physical Therapy Goal    Goal Priority Disciplines Outcome Goal Variances Interventions   Physical Therapy Goal     PT, PT/OT Ongoing, Progressing     Description: Goals to be met by: 11/15/2020    Patient will perform the following to increase strength, improve mobility, and return to prior level of function:    1. Rolling to L and R with SBA  2. Supine <> sit with SBA.  3. Sit EOB x 15 min with SBA for balance.  4. Bed <> chair transfer with SBA and least restrictive assistive device.  5. Added 10/19: Gait x10 ft with CGA and LRAD.                     Time Tracking:     PT Received On: 10/22/20  PT Start Time: 0801     PT Stop Time: 0842  PT Total Time (min): 41 min     Billable Minutes: Gait Training 23 and Therapeutic Exercise 18    Treatment Type: Treatment  PT/PTA: PTA     PTA Visit Number: 1     EMILIA Mathis  10/22/2020

## 2020-10-22 NOTE — PT/OT/SLP PROGRESS
Occupational Therapy   Treatment    Name: Patsy Morris  MRN: [id number]  Admitting Diagnosis:  Acute on chronic respiratory failure with hypoxia and hypercapnia       Recommendations:     Discharge Recommendations: nursing facility, skilled(vs. HH with assist and supervision as needed)  Discharge Equipment Recommendations:  (TBD at next level of care)  Barriers to discharge:  (current functional level)    Assessment:     Patsy Morris is a 70 y.o. female with a medical diagnosis of Acute on chronic respiratory failure with hypoxia and hypercapnia.  She presents with no pain. Performance deficits affecting function are decreased upper extremity function, decreased lower extremity function, decreased safety awareness, gait instability, impaired balance, impaired endurance, impaired self care skills, impaired functional mobilty, impaired cardiopulmonary response to activity, weakness.  Pt agreeable to participating in therapy upon arrival to room.  Pt tolerated UB exercises well with no signs of distress noted.  Pt agreeable to performing sit <> stand transfer, but declined further activity once standing.    Pt is making progress towards goals and would continue to benefit from skilled OT services to address problems listed above and increase independence with ADLs.  SNF (vs. Home Health pending pt and family desire) is recommended upon d/c from acute care to further address deficits and help pt improve overall functional independence.          Rehab Prognosis:  Good; patient would benefit from acute skilled OT services to address these deficits and reach maximum level of function.       Plan:     Patient to be seen 5 x/week to address the above listed problems via self-care/home management, therapeutic activities, therapeutic exercises  · Plan of Care Expires: 11/14/20  · Plan of Care Reviewed with: patient    Subjective     Pain/Comfort:  · Pain Rating 1: 0/10  · Pain Rating Post-Intervention 1:  0/10    Objective:     Communicated with: MARIUSZ Rosales) prior to session.  Patient found up in chair with oxygen, telemetry, Pure Wick, peripheral IV upon OT entry to room.    General Precautions: Standard, fall, respiratory   Orthopedic Precautions:N/A   Braces: N/A     Occupational Performance:     Bed Mobility:    · Not assessed 2* pt up in chair upon arrival    Functional Mobility/Transfers:  · Sit <> Stand: CGA and RW x 1 trial from chair  · Functional Mobility: Pt declined taking steps this date 2* fatigued from earlier therapy session    Activities of Daily Living:  · Lower Body Dressing: total assistance for reaching down and adjusting socks while seated up in chair.  Pt reports she normally uses sock aide.      [facility] 6 Click ADL: 17    Treatment & Education:  *Pt performed UB exercises to provide stretch and promote increased endurance needed for ADLs: 2 sets x 10 reps per exercise; seated up in chair  -Shoulder flexion  -Chest press  -Forward circular rows  -Horizontal shoulder abduction/adduction  *Pt performed sit <> stand transfer from chair  *POC reviewed with pt and purpose of therapy in acute care setting discussed    Patient left up in chair with all lines intact and call button in reachEducation:  .  RN (Tanya) notified.    GOALS:   Multidisciplinary Problems     Occupational Therapy Goals        Problem: Occupational Therapy Goal    Goal Priority Disciplines Outcome Interventions   Occupational Therapy Goal     OT, PT/OT Ongoing, Progressing    Description: Goals to be met by: 10/29/2020    Patient will increase functional independence with ADLs by performing:    UE Dressing with Minimal Assistance.  LE Dressing with Maximum Assistance using adaptive equipment as needed.  Grooming while standing with SBA.  Toileting from bedside commode with CGA for hygiene and clothing management.   Toilet transfer to bedside commode with Minimal Assistance.                         Time Tracking:     OT Date of  Treatment: 10/22/20  OT Start Time: 1013  OT Stop Time: 1031  OT Total Time (min): 18 min    Billable Minutes:Therapeutic Activity 18    CARLOS Motta  10/22/2020

## 2020-10-22 NOTE — ASSESSMENT & PLAN NOTE
Secondary to underlying lung disease, obesity, and decompensated heart failure.  Clinically improving with intravenous diuresis and noninvasive ventilation.  Patient continues to have negative fluid balance.  Efforts to arrange for noninvasive ventilation for the home setting ongoing.  Waiting insurance authorization.  Discussed with Pulmonary Critical Care recommends continue intravenous diuretics while in house along as her kidney function can tolerate.  Serum creatinine trending down intravenous diuretics.  Switched back to intravenous diuretics this morning.  Continue to monitor volume status and kidney function.

## 2020-10-22 NOTE — PLAN OF CARE
Problem: Occupational Therapy Goal  Goal: Occupational Therapy Goal  Description: Goals to be met by: 10/29/2020    Patient will increase functional independence with ADLs by performing:    UE Dressing with Minimal Assistance.  LE Dressing with Maximum Assistance using adaptive equipment as needed.  Grooming while standing with SBA.  Toileting from bedside commode with CGA for hygiene and clothing management.   Toilet transfer to bedside commode with Minimal Assistance.        10/22/2020 1124 by CARLOS Serrano  Outcome: Ongoing, Progressing     POC updated to reflect pt's current functional status.  SNF is recommended upon d/c from acute care to further address deficits and help pt improve overall functional independence.     CARLOS Motta  10/22/2020

## 2020-10-22 NOTE — ASSESSMENT & PLAN NOTE
- continue diuresis; fluid overload likely contributes to hypercapnia in setting of OHS/BHAVANI, small airway disease with severe restriction.   -CT showing cavitary lesion with surrounding consolidation and GGO concerning for infectious vs malignant process, with significant ggo throughout the lung likely secondary to continued pulmonary edema.     - would continue lasix 80 mg BID until bump in creatinine

## 2020-10-22 NOTE — ASSESSMENT & PLAN NOTE
CT concerning for slow process as lesion in same posterior upper lobe lung fields noted on previous CT in 2018. Pt without signs or symptoms of sepsis making acute bacterial cavitary PNA unlikely. Potential for malignancy possible. Pt showing signs of pulmonary htn on echo as well as engorged pulmonary artery and veins. This along with NOAC make bronch unsafe due to increased bleeding in patient with acute respiratory failure.     - CT chest showing cavitary lesion with surround consolidation/ggo concerning for infectious process; obtaining sputum for respiratory cultures and Mycobacterial culture/smear  - repeating quant gold as indeterminate due to poor test as opposed to difficult to determine results; pending  - HIV negative  - will need to follow up with Pulmonology; discussed likely needing bronchoscopy outpatient when respiratory status has improved.

## 2020-10-22 NOTE — PROGRESS NOTES
[facility]  Cardiology  Progress Note    Patient Name: Patsy Morris  MRN: [id number]  Admission Date: 10/13/2020  Hospital Length of Stay: 9 days  Code Status: Full Code   Attending Physician: Loi Quiles MD   Primary Care Physician: Luisana Cartagena MD  Expected Discharge Date:   Principal Problem:Acute on chronic respiratory failure with hypoxia and hypercapnia    Subjective:     Brief HPI:    Patsy Morris is a 70 y.o.female with hypertension. She has chronic atrial fibrillation and heart failure with preserved ejection fraction. She has chronic obstructive pulmonary disease being on home oxygen with CO2 retention. She has undergone nephrectomy for renal cell carcinoma and has chronic kidney disease. She was admitted to [facility] in 8/2020 and 9/2020 with heart failure and exacerbations of her chronic obstructive pulmonary disease. She went to therapy at [facility].      She used to be on furosemide 40 mg Q24 however after her last hospitalization she had the does of furosemide increased to 80 mg Q24. The dose was reduced to 40 mg Q24 on 10/8/2020. She became increasingly short of breath and presents fluid overloaded in heart failure as well as an exacerbation of her COPD with high CO2. She was admitted to the ICU.    Hospital Course:    Diuresis.    BIPAP.    Respiratory treatments.    10/15/2020: Echo: Normal left ventricular size and systolic function. EF 60%. Moderately dilated LA. Mildly dilated RV. SPAP 49 mmHg. Small pericardial effusion.    10/16/2020: Apixiban 5 mg Q12 was changed to heparin iv for consideration of bronchoscopy.    10/21/2020: Transferred from ICU to telemetry.    Interval History:     Slowly breathing slightly easier from day to day.    Comfortable at rest.    Happy to be out of ICU.    Review of Systems   Constitution: Negative for chills, fever and malaise/fatigue.   HENT: Negative for nosebleeds.    Eyes: Negative for vision loss in left  eye and vision loss in right eye.   Cardiovascular: Negative for chest pain, leg swelling, orthopnea, palpitations and paroxysmal nocturnal dyspnea.   Respiratory: Positive for shortness of breath. Negative for cough, hemoptysis, sputum production and wheezing.    Hematologic/Lymphatic: Negative for bleeding problem.   Skin: Negative for rash.   Musculoskeletal: Negative for myalgias.   Gastrointestinal: Negative for abdominal pain, heartburn, hematemesis, hematochezia, jaundice, melena, nausea and vomiting.   Genitourinary: Negative for hematuria.   Neurological: Negative for dizziness, headaches, light-headedness, vertigo and weakness.   Psychiatric/Behavioral: Negative for altered mental status. The patient is not nervous/anxious.    Allergic/Immunologic: Negative for persistent infections.       Objective:     Vital Signs (Most Recent):  Temp: 97.9 °F (36.6 °C) (10/22/20 0730)  Pulse: 75 (10/22/20 0802)  Resp: 20 (10/22/20 0730)  BP: 112/65 (10/22/20 0730)  SpO2: (!) 90 % (10/22/20 0802) Vital Signs (24h Range):  Temp:  [96.9 °F (36.1 °C)-98.8 °F (37.1 °C)] 97.9 °F (36.6 °C)  Pulse:  [] 75  Resp:  [18-33] 20  SpO2:  [88 %-94 %] 90 %  BP: (101-119)/(64-80) 112/65     Weight: 99.4 kg (219 lb 2.2 oz)  Body mass index is 38.82 kg/m².    SpO2: (!) 90 %  O2 Device (Oxygen Therapy): nasal cannula      Intake/Output Summary (Last 24 hours) at 10/22/2020 0947  Last data filed at 10/21/2020 1400  Gross per 24 hour   Intake 180 ml   Output 650 ml   Net -470 ml       Lines/Drains/Airways     Drain            Female External Urinary Catheter 10/13/20 1359 8 days          Peripheral Intravenous Line                 Midline Catheter Insertion/Assessment  - Double Lumen 10/20/20 1220 Right median cubital vein (antecubital fossa)  1 day                Physical Exam   Constitutional: She is oriented to person, place, and time. She appears well-developed and well-nourished. She appears ill.   Neck: Carotid bruit is not  present.   Cardiovascular: Normal rate, S1 normal and S2 normal. An irregularly irregular rhythm present.   Pulmonary/Chest: She has rhonchi in the right lower field and the left lower field.   Musculoskeletal:      Right ankle: She exhibits no swelling.      Left ankle: She exhibits no swelling.   Neurological: She is alert and oriented to person, place, and time.   Skin: Skin is warm and dry.   Psychiatric: She has a normal mood and affect. Her speech is normal and behavior is normal. Cognition and memory are normal.     Current Medications:     apixaban  5 mg Oral BID    atorvastatin  80 mg Oral QHS    docusate sodium  100 mg Oral BID    escitalopram oxalate  10 mg Oral QHS    ferrous sulfate  325 mg Oral Daily    furosemide (LASIX) IV  40 mg Intravenous Q12H    gabapentin  100 mg Oral BID    metoprolol tartrate  50 mg Oral BID    miconazole NITRATE 2 %   Topical (Top) BID    vitamin renal formula (B-complex-vitamin c-folic acid)  1 capsule Oral Daily     Current Laboratory Results:    Recent Results (from the past 24 hour(s))   Basic metabolic panel    Collection Time: 10/22/20  4:31 AM   Result Value Ref Range    Sodium 140 136 - 145 mmol/L    Potassium 4.2 3.5 - 5.1 mmol/L    Chloride 86 (L) 95 - 110 mmol/L    CO2 42 (HH) 23 - 29 mmol/L    Glucose 105 70 - 110 mg/dL    BUN, Bld 63 (H) 8 - 23 mg/dL    Creatinine 1.7 (H) 0.5 - 1.4 mg/dL    Calcium 9.3 8.7 - 10.5 mg/dL    Anion Gap 12 8 - 16 mmol/L    eGFR if African American 34 (A) >60 mL/min/1.73 m^2    eGFR if non African American 30 (A) >60 mL/min/1.73 m^2   Magnesium    Collection Time: 10/22/20  4:31 AM   Result Value Ref Range    Magnesium 2.3 1.6 - 2.6 mg/dL   Phosphorus    Collection Time: 10/22/20  4:31 AM   Result Value Ref Range    Phosphorus 4.1 2.7 - 4.5 mg/dL   CBC auto differential    Collection Time: 10/22/20  4:31 AM   Result Value Ref Range    WBC 7.68 3.90 - 12.70 K/uL    RBC 3.95 (L) 4.00 - 5.40 M/uL    Hemoglobin 10.7 (L) 12.0 -  16.0 g/dL    Hematocrit 35.4 (L) 37.0 - 48.5 %    Mean Corpuscular Volume 90 82 - 98 fL    Mean Corpuscular Hemoglobin 27.1 27.0 - 31.0 pg    Mean Corpuscular Hemoglobin Conc 30.2 (L) 32.0 - 36.0 g/dL    RDW 14.6 (H) 11.5 - 14.5 %    Platelets 156 150 - 350 K/uL    MPV 11.6 9.2 - 12.9 fL    Immature Granulocytes 0.3 0.0 - 0.5 %    Gran # (ANC) 5.8 1.8 - 7.7 K/uL    Immature Grans (Abs) 0.02 0.00 - 0.04 K/uL    Lymph # 0.9 (L) 1.0 - 4.8 K/uL    Mono # 0.8 0.3 - 1.0 K/uL    Eos # 0.2 0.0 - 0.5 K/uL    Baso # 0.02 0.00 - 0.20 K/uL    nRBC 0 0 /100 WBC    Gran% 75.2 (H) 38.0 - 73.0 %    Lymph% 11.1 (L) 18.0 - 48.0 %    Mono% 10.8 4.0 - 15.0 %    Eosinophil% 2.3 0.0 - 8.0 %    Basophil% 0.3 0.0 - 1.9 %    Differential Method Automated      Current Imaging Results:    X-Ray Chest AP Portable   Final Result      No significant change.  There is cardiomegaly, multifocal airspace opacities and bilateral effusions.         Electronically signed by: Ajay Camara MD   Date:    10/20/2020   Time:    08:48      US Lower Extremity Veins Bilateral   Final Result      No evidence of deep venous thrombosis in either lower extremity.         Electronically signed by: Ceasar Carreon MD   Date:    10/15/2020   Time:    21:53      CT Chest Without Contrast   Final Result      The findings highly concerning for extensive diffuse pulmonary infection with bilateral pleural fluid collections right greater than left.         Electronically signed by: Regulo Bassett MD   Date:    10/15/2020   Time:    12:01      X-Ray Chest AP Portable   Final Result      Interval worsening of diffuse bilateral airspace disease and moderate bilateral pleural effusions when compared to 09/09/2020         Electronically signed by: Halina Plascencia   Date:    10/13/2020   Time:    09:40          10/15/2020: Echo:    The left ventricle is normal in size with normal systolic function. The estimated ejection fraction is 60%.  A diastolic pattern consistent with  atrial fibrillation observed.  Moderate left atrial enlargement.  Mild right ventricular enlargement.  Normal right ventricular systolic function.  Severe right atrial enlargement.  The aortic valve is mildly sclerotic.  The mitral valve is mildly sclerotic.  Mild mitral regurgitation.  Mild tricuspid regurgitation.  There is mild pulmonary hypertension.  The estimated PA systolic pressure is 49 mmHg.  Intermediate central venous pressure (8 mmHg).  Small circumferential pericardial effusion.  There is a left pleural effusion.      Assessment and Plan:     Problem List:    Active Diagnoses:    Diagnosis Date Noted POA    PRINCIPAL PROBLEM:  Acute on chronic respiratory failure with hypoxia and hypercapnia [J96.21, J96.22] 08/03/2016 Yes    Acute on chronic congestive heart failure [I50.9] 10/21/2020 Yes    Cavitary lesion of lung [J98.4] 10/16/2020 Yes    Atrial fibrillation with rapid ventricular response [I48.91] 09/01/2020 Yes    Debility [R53.81] 08/26/2020 Yes    Current moderate episode of major depressive disorder without prior episode [F32.1] 04/30/2020 Yes    Acute on chronic diastolic congestive heart failure [I50.33] 05/20/2016 Yes    Obesity hypoventilation syndrome [E66.2] 02/25/2016 Yes    Chronic kidney disease (CKD) stage G4/A1, severely decreased glomerular filtration rate (GFR) between 15-29 mL/min/1.73 square meter and albuminuria creatinine ratio less than 30 mg/g [N18.4]  Yes    Dyslipidemia [E78.5] 02/22/2016 Yes    Essential hypertension [I10] 10/15/2014 Yes      Problems Resolved During this Admission:    Diagnosis Date Noted Date Resolved POA    Acute on chronic congestive heart failure [I50.9] 10/13/2020 10/13/2020 Yes    Pulmonary nodules/lesions, multiple [R91.8] 03/22/2019 10/14/2020 Yes    Paroxysmal atrial fibrillation [I48.0] 08/03/2016 10/13/2020 Yes    Chronic hypercapnic respiratory failure [J96.12] 03/11/2016 10/13/2020 Yes    Renal carcinoma [C64.9] 03/10/2015  10/13/2020 Yes     Assessment and Plan:     1. Heart Failure, Diastolic, Acute on Chronic              8/24/2020: Echo: Normal left ventricular size and systolic function. Mildly dilated LA.   10/15/2020: Echo: Normal left ventricular size and systolic function. EF 60%. Moderately dilated LA. Mildly dilated RV. SPAP 49 mmHg. Small pericardial effusion.              At NH was on furosemide 40 mg Q24.              10/13/2020: Presented fluid overloaded in HF. Received furosemide 80 mg iv Q12.   10/20/2020: CXR with extensive infiltrates and pleural effusions. .               Does not appear fluid overloaded at present.   On furosemide 40 mg po Q12.      2. Atrial Fibrillation              In chronic atrial fibrillation.              On apixiban.              On metoprolol 50 mg Q12.              Rate well controlled.     3. Chronic Anticoagulation              Been on apixiban 5 mg Q12.   10/16/2020: Apixiban 5 mg Q12 was changed to heparin iv.   On apixiban 5 mg Q12.    4. Hypertension   On metoprolol 50 mg Q12.   Blood pressure has come down with diuresis.                5. Chronic Kidney Disease, Stage 4              History or renal carcinoma and nephrectomy.              10/13/2020: BUN/crea 27/2.0. CrCl 28.   10/20/2020: BUN/crea 56/1.8. CrCl 32.      6. Chronic Obstructive Pulmonary Disease              Chronic respiratory failure with CO2 retention.              10/13/2020: 7.23/94/73/39/90% on FiO2 of 40%.   Pulmonary following.      VTE Risk Mitigation (From admission, onward)         Ordered     apixaban tablet 5 mg  2 times daily      10/17/20 1152     Place sequential compression device  Until discontinued      10/13/20 1405     IP VTE HIGH RISK PATIENT  Once      10/13/20 1405                Diana Meredith MD  Cardiology  [facility]

## 2020-10-22 NOTE — PLAN OF CARE
Pt remains on 5LNC with sats 90%. Found pt on nasal cannula with no O2 turned on and sats 50%. Pt. was alert and oriented. Pt remains stable.

## 2020-10-23 NOTE — PROGRESS NOTES
[facility]  Cardiology  Progress Note    Patient Name: Patsy Morris  MRN: [id number]  Admission Date: 10/13/2020  Hospital Length of Stay: 10 days  Code Status: Full Code   Attending Physician: Loi Quiles MD   Primary Care Physician: Luisana Cartagena MD  Expected Discharge Date:   Principal Problem:Acute on chronic respiratory failure with hypoxia and hypercapnia    Subjective:     Brief HPI:    Patsy Morris is a 70 y.o.female with hypertension. She has chronic atrial fibrillation and heart failure with preserved ejection fraction. She has chronic obstructive pulmonary disease being on home oxygen with CO2 retention. She has undergone nephrectomy for renal cell carcinoma and has chronic kidney disease. She was admitted to [facility] in 8/2020 and 9/2020 with heart failure and exacerbations of her chronic obstructive pulmonary disease. She went to therapy at [facility].      She used to be on furosemide 40 mg Q24 however after her last hospitalization she had the does of furosemide increased to 80 mg Q24. The dose was reduced to 40 mg Q24 on 10/8/2020. She became increasingly short of breath and presents fluid overloaded in heart failure as well as an exacerbation of her COPD with high CO2. She was admitted to the ICU.    Hospital Course:    Diuresis.    BIPAP.    Respiratory treatments.    10/15/2020: Echo: Normal left ventricular size and systolic function. EF 60%. Moderately dilated LA. Mildly dilated RV. SPAP 49 mmHg. Small pericardial effusion.    10/16/2020: Apixiban 5 mg Q12 was changed to heparin iv for consideration of bronchoscopy.    10/21/2020: Transferred from ICU to telemetry.    Interval History:     Slowly breathing slightly easier from day to day.    Been out of bed.      Review of Systems   Constitution: Negative for chills, fever and malaise/fatigue.   HENT: Negative for nosebleeds.    Eyes: Negative for vision loss in left eye and vision loss in right  eye.   Cardiovascular: Negative for chest pain, leg swelling, orthopnea, palpitations and paroxysmal nocturnal dyspnea.   Respiratory: Positive for shortness of breath. Negative for cough, hemoptysis, sputum production and wheezing.    Hematologic/Lymphatic: Negative for bleeding problem.   Skin: Negative for rash.   Musculoskeletal: Negative for myalgias.   Gastrointestinal: Negative for abdominal pain, heartburn, hematemesis, hematochezia, jaundice, melena, nausea and vomiting.   Genitourinary: Negative for hematuria.   Neurological: Negative for dizziness, headaches, light-headedness, vertigo and weakness.   Psychiatric/Behavioral: Negative for altered mental status. The patient is not nervous/anxious.    Allergic/Immunologic: Negative for persistent infections.       Objective:     Vital Signs (Most Recent):  Temp: 98 °F (36.7 °C) (10/23/20 1600)  Pulse: 66 (10/23/20 1600)  Resp: 20 (10/23/20 1600)  BP: (!) 110/54 (10/23/20 1600)  SpO2: (!) 94 % (10/23/20 1600) Vital Signs (24h Range):  Temp:  [97.5 °F (36.4 °C)-98.3 °F (36.8 °C)] 98 °F (36.7 °C)  Pulse:  [] 66  Resp:  [18-23] 20  SpO2:  [93 %-96 %] 94 %  BP: (109-129)/(54-73) 110/54     Weight: 99.4 kg (219 lb 2.2 oz)  Body mass index is 38.82 kg/m².    SpO2: (!) 94 %  O2 Device (Oxygen Therapy): nasal cannula      Intake/Output Summary (Last 24 hours) at 10/23/2020 1750  Last data filed at 10/22/2020 1800  Gross per 24 hour   Intake 800 ml   Output 500 ml   Net 300 ml       Lines/Drains/Airways     Drain            Female External Urinary Catheter 10/13/20 1359 10 days          Peripheral Intravenous Line                 Midline Catheter Insertion/Assessment  - Double Lumen 10/20/20 1220 Right median cubital vein (antecubital fossa)  3 days                Physical Exam   Constitutional: She is oriented to person, place, and time. She appears well-developed and well-nourished. She appears ill.   Neck: Carotid bruit is not present.   Cardiovascular: Normal  rate, S1 normal and S2 normal. An irregularly irregular rhythm present.   Pulmonary/Chest: She has rhonchi in the right lower field and the left lower field.   Musculoskeletal:      Right ankle: She exhibits no swelling.      Left ankle: She exhibits no swelling.   Neurological: She is alert and oriented to person, place, and time.   Skin: Skin is warm and dry.   Psychiatric: She has a normal mood and affect. Her speech is normal and behavior is normal. Cognition and memory are normal.     Current Medications:     apixaban  5 mg Oral BID    atorvastatin  80 mg Oral QHS    docusate sodium  100 mg Oral BID    escitalopram oxalate  10 mg Oral QHS    ferrous sulfate  325 mg Oral Daily    furosemide  40 mg Oral BID    gabapentin  100 mg Oral BID    metoprolol tartrate  50 mg Oral BID    miconazole NITRATE 2 %   Topical (Top) BID    vitamin renal formula (B-complex-vitamin c-folic acid)  1 capsule Oral Daily     Current Laboratory Results:    Recent Results (from the past 24 hour(s))   Basic metabolic panel    Collection Time: 10/23/20  4:51 AM   Result Value Ref Range    Sodium 142 136 - 145 mmol/L    Potassium 4.4 3.5 - 5.1 mmol/L    Chloride 88 (L) 95 - 110 mmol/L    CO2 44 (HH) 23 - 29 mmol/L    Glucose 100 70 - 110 mg/dL    BUN, Bld 55 (H) 8 - 23 mg/dL    Creatinine 1.6 (H) 0.5 - 1.4 mg/dL    Calcium 9.4 8.7 - 10.5 mg/dL    Anion Gap 10 8 - 16 mmol/L    eGFR if African American 37 (A) >60 mL/min/1.73 m^2    eGFR if non African American 32 (A) >60 mL/min/1.73 m^2   Magnesium    Collection Time: 10/23/20  4:51 AM   Result Value Ref Range    Magnesium 2.4 1.6 - 2.6 mg/dL   Phosphorus    Collection Time: 10/23/20  4:51 AM   Result Value Ref Range    Phosphorus 4.2 2.7 - 4.5 mg/dL   CBC auto differential    Collection Time: 10/23/20  4:51 AM   Result Value Ref Range    WBC 7.97 3.90 - 12.70 K/uL    RBC 3.71 (L) 4.00 - 5.40 M/uL    Hemoglobin 10.0 (L) 12.0 - 16.0 g/dL    Hematocrit 33.7 (L) 37.0 - 48.5 %    Mean  Corpuscular Volume 91 82 - 98 fL    Mean Corpuscular Hemoglobin 27.0 27.0 - 31.0 pg    Mean Corpuscular Hemoglobin Conc 29.7 (L) 32.0 - 36.0 g/dL    RDW 14.6 (H) 11.5 - 14.5 %    Platelets 148 (L) 150 - 350 K/uL    MPV 11.7 9.2 - 12.9 fL    Immature Granulocytes 0.3 0.0 - 0.5 %    Gran # (ANC) 6.1 1.8 - 7.7 K/uL    Immature Grans (Abs) 0.02 0.00 - 0.04 K/uL    Lymph # 0.8 (L) 1.0 - 4.8 K/uL    Mono # 0.9 0.3 - 1.0 K/uL    Eos # 0.2 0.0 - 0.5 K/uL    Baso # 0.01 0.00 - 0.20 K/uL    nRBC 0 0 /100 WBC    Gran% 77.0 (H) 38.0 - 73.0 %    Lymph% 9.5 (L) 18.0 - 48.0 %    Mono% 10.7 4.0 - 15.0 %    Eosinophil% 2.4 0.0 - 8.0 %    Basophil% 0.1 0.0 - 1.9 %    Differential Method Automated      Current Imaging Results:    X-Ray Chest AP Portable   Final Result      No significant change.  There is cardiomegaly, multifocal airspace opacities and bilateral effusions.         Electronically signed by: Ajay Camara MD   Date:    10/20/2020   Time:    08:48      US Lower Extremity Veins Bilateral   Final Result      No evidence of deep venous thrombosis in either lower extremity.         Electronically signed by: Ceasar Carreon MD   Date:    10/15/2020   Time:    21:53      CT Chest Without Contrast   Final Result      The findings highly concerning for extensive diffuse pulmonary infection with bilateral pleural fluid collections right greater than left.         Electronically signed by: Regulo Bassett MD   Date:    10/15/2020   Time:    12:01      X-Ray Chest AP Portable   Final Result      Interval worsening of diffuse bilateral airspace disease and moderate bilateral pleural effusions when compared to 09/09/2020         Electronically signed by: Halina Plascencia   Date:    10/13/2020   Time:    09:40          10/15/2020: Echo:    The left ventricle is normal in size with normal systolic function. The estimated ejection fraction is 60%.  A diastolic pattern consistent with atrial fibrillation observed.  Moderate left atrial  enlargement.  Mild right ventricular enlargement.  Normal right ventricular systolic function.  Severe right atrial enlargement.  The aortic valve is mildly sclerotic.  The mitral valve is mildly sclerotic.  Mild mitral regurgitation.  Mild tricuspid regurgitation.  There is mild pulmonary hypertension.  The estimated PA systolic pressure is 49 mmHg.  Intermediate central venous pressure (8 mmHg).  Small circumferential pericardial effusion.  There is a left pleural effusion.      Assessment and Plan:     Problem List:    Active Diagnoses:    Diagnosis Date Noted POA    PRINCIPAL PROBLEM:  Acute on chronic respiratory failure with hypoxia and hypercapnia [J96.21, J96.22] 08/03/2016 Yes    Acute on chronic congestive heart failure [I50.9] 10/21/2020 Yes    Cavitary lesion of lung [J98.4] 10/16/2020 Yes    Atrial fibrillation with rapid ventricular response [I48.91] 09/01/2020 Yes    Debility [R53.81] 08/26/2020 Yes    Encounter for palliative care [Z51.5] 08/25/2020 Not Applicable    Current moderate episode of major depressive disorder without prior episode [F32.1] 04/30/2020 Yes    Acute on chronic diastolic congestive heart failure [I50.33] 05/20/2016 Yes    Obesity hypoventilation syndrome [E66.2] 02/25/2016 Yes    Chronic kidney disease (CKD) stage G4/A1, severely decreased glomerular filtration rate (GFR) between 15-29 mL/min/1.73 square meter and albuminuria creatinine ratio less than 30 mg/g [N18.4]  Yes    Dyslipidemia [E78.5] 02/22/2016 Yes    Essential hypertension [I10] 10/15/2014 Yes      Problems Resolved During this Admission:    Diagnosis Date Noted Date Resolved POA    Acute on chronic congestive heart failure [I50.9] 10/13/2020 10/13/2020 Yes    Pulmonary nodules/lesions, multiple [R91.8] 03/22/2019 10/14/2020 Yes    Paroxysmal atrial fibrillation [I48.0] 08/03/2016 10/13/2020 Yes    Chronic hypercapnic respiratory failure [J96.12] 03/11/2016 10/13/2020 Yes    Renal carcinoma  [C64.9] 03/10/2015 10/13/2020 Yes       Assessment and Plan:     1. Heart Failure, Diastolic, Acute on Chronic              8/24/2020: Echo: Normal left ventricular size and systolic function. Mildly dilated LA.   10/15/2020: Echo: Normal left ventricular size and systolic function. EF 60%. Moderately dilated LA. Mildly dilated RV. SPAP 49 mmHg. Small pericardial effusion.              At NH was on furosemide 40 mg Q24.              10/13/2020: Presented fluid overloaded in HF. Received furosemide 80 mg iv Q12.   10/20/2020: CXR with extensive infiltrates and pleural effusions. .               Does not appear fluid overloaded at present.   On furosemide 40 mg po Q12.   Watch weight and increase dose as needed for weight gain.      2. Atrial Fibrillation              In chronic atrial fibrillation.              On apixiban.              On metoprolol 50 mg Q12.              Rate well controlled.     3. Chronic Anticoagulation              Been on apixiban 5 mg Q12.   10/16/2020: Apixiban 5 mg Q12 was changed to heparin iv.   On apixiban 5 mg Q12.    4. Hypertension   On metoprolol 50 mg Q12.   Blood pressure has come down with diuresis.                5. Chronic Kidney Disease, Stage 4              History or renal carcinoma and nephrectomy.              10/13/2020: BUN/crea 27/2.0. CrCl 28.   10/20/2020: BUN/crea 56/1.8. CrCl 32.      6. Chronic Obstructive Pulmonary Disease              Chronic respiratory failure with CO2 retention.              10/13/2020: 7.23/94/73/39/90% on FiO2 of 40%.   Pulmonary following.      VTE Risk Mitigation (From admission, onward)         Ordered     apixaban tablet 5 mg  2 times daily      10/17/20 1152     Place sequential compression device  Until discontinued      10/13/20 1405     IP VTE HIGH RISK PATIENT  Once      10/13/20 1405                Diana Meredith MD  Cardiology  [facility]

## 2020-10-23 NOTE — HPI
Ms. Morris is a 70 y.o. female with PMH of HTN, asthma, afib, CHF, obesity, BHAVANI, CKD 3/4, COPD, who presented to [facility] ED due to worsening SOB for a few days.  Patients oxygen saturation was 72 on 3 L of oxygen.  Patient reports she is on oxygen at home (3L NC) and has to sleep in a recliner chair.  She reports worsening SOB with simple activities such as cleaning, showering, and cooking.       Patient does not have any children, she lives with her younger brother who has schizophrenia.  She is his primary caregiver.  Patient has 9 nieces and nephews who she helped raise who she is close to.       I saw the patient on 8/25/2020 prior to being discharged to SNF and then again on 9/4/2020.

## 2020-10-23 NOTE — PLAN OF CARE
POC reviewed w/ pt and purposeful rounding complete. Meds administered per MAR. PRN meds administered for pain and sleep w/ full relief obtained.  Barrier cream applied to perianal area. Purewick in place. Bipap used during the night while pt was sleeping.VSS and pt on 2L NC when not on Bipap. Pt AAOx4 and 1 person assist. Pt resting at this time, will continue to monitor.

## 2020-10-23 NOTE — CONSULTS
"[facility]  Palliative Medicine  Consult Note    Patient Name: Patsy Morris  MRN: [id number]  Admission Date: 10/13/2020  Hospital Length of Stay: 10 days  Code Status: Full Code   Attending Provider: Loi Quiles MD  Consulting Provider: Elsa Bran DNP  Primary Care Physician: Luisana Cartagena MD  Principal Problem:Acute on chronic respiratory failure with hypoxia and hypercapnia    Patient information was obtained from patient and ER records.      Consults   Reason for consult: symptom management/ goals of care    Assessment/Plan:     * Acute on chronic respiratory failure with hypoxia and hypercapnia  - Pulmonary following  - Awaiting home trilogy       Debility  - Pt/OT recommending SNF: patient does not want to go to SNF, wants to return home     Encounter for palliative care  -Patient seen on 8/24 and expressed a desire "enjoy life a little more".  Patient defines this by being able to leave her house and go to the mall or the grocery store. Patient reports not being able to do these activities due to worsening mobility.  When discussing the patient hopes and if they were not able to be obtained patient reported this would be an unacceptable outcome for her. Patient expressed her desire to be able to cook again.  Patient expressed her love for cooking.  - Patient fears  if something were to happen to her who would care for her brother who has schizophrenia.    - Patient previously completed LaPOST- DNR.  Then patient voided the LaPOST and completed new LaPOST on 9/4, changing her wishes to Full code  - Patient is very quiet today, remembers meeting me in past.  Patient reports she was at home for 2 weeks after SNF discharge with progressing SOB.  Discussed goals moving forward: to return home.  Patient does not want to return to SNF, reports "it is too hard on me".  Patient wants to regain strength with PT/OT at home.  Patient is followed by [facility] outpt. If symptoms " "progress, patient may benefit from low dose oral morphine to aid in SOB (liquid morphine 1 mg Q 2 hours PRN). However, I worry about patients compliance with this   - When discussing EOL wishes today, patient reports " I really need to think about that again". Patients brother was in the room and it appeared she did not want to discuss her EOL wishes.  Will re address with patient.   - Recommend patient to continue with pall care at home and re introduce hospice. I previously introduced hospice in August but due to several re admissions, I will speak to her next week to re discuss goals and hospice moving forward.         Thank you for your consult. I will follow-up with patient. Please contact us if you have any additional questions.    Subjective:     HPI:   Ms. Morris is a 70 y.o. female with PMH of HTN, asthma, afib, CHF, obesity, BHAVANI, CKD 3/4, COPD, who [facility] due to worsening SOB for a few days.  Patients oxygen saturation was 72 on 3 L of oxygen.  Patient reports she is on oxygen at home (3L NC) and has to sleep in a recliner chair.  She reports worsening SOB with simple activities such as cleaning, showering, and cooking.       Patient does not have any children, she lives with her younger brother who has schizophrenia.  She is his primary caregiver.  Patient has 9 nieces and nephews who she helped raise who she is close to.       I saw the patient on 8/25/2020 prior to being discharged to SNF and then again on 9/4/2020.       Hospital Course:  No notes on file    Interval History: Patient sitting up in bed, reports improvement in SOB.  Brother at bedside.     Past Medical History:   Diagnosis Date    Arthritis     Asthma     Atrial fibrillation     Atrial flutter     Cerumen impaction     CHF (congestive heart failure)     CKD (chronic kidney disease), stage III     Colon polyps 2017    COPD exacerbation     Encounter for blood transfusion     HEARING LOSS     Herpes genitalis     " Hypertension     Renal carcinoma 3/10/2015    Thyroid nodule 6/8/2017       Past Surgical History:   Procedure Laterality Date    BRAIN SURGERY      COLONOSCOPY N/A 6/23/2017    Procedure: COLONOSCOPY;  Surgeon: Shane Sharma MD;  Location: [facility] ([address] [place] [address]);  Service: Endoscopy;  Laterality: N/A;  2nd floor case ; on 3L home O2       per Dr Leopold (anesthesia)-Based on her history of:  Chronic respiratory failure with oxygen use, HDEZ, CHF, A-Fib, BHAVANI, it was determined that she should have her Colonoscopy scheduled on the 2nd Floor       ok to hold Eliquis 2 days prior to    EYE SURGERY      HYSTERECTOMY      kidney mass resection Right     renal carcinoma       Review of patient's allergies indicates:  No Known Allergies    Medications:  Continuous Infusions:  Scheduled Meds:   apixaban  5 mg Oral BID    atorvastatin  80 mg Oral QHS    docusate sodium  100 mg Oral BID    escitalopram oxalate  10 mg Oral QHS    ferrous sulfate  325 mg Oral Daily    furosemide  40 mg Oral BID    gabapentin  100 mg Oral BID    metoprolol tartrate  50 mg Oral BID    miconazole NITRATE 2 %   Topical (Top) BID    vitamin renal formula (B-complex-vitamin c-folic acid)  1 capsule Oral Daily     PRN Meds:acetaminophen, albuterol-ipratropium, dextrose 50%, dextrose 50%, glucagon (human recombinant), glucose, glucose, melatonin, ondansetron, polyethylene glycol, promethazine (PHENERGAN) IVPB, sodium chloride 0.9%    Family History     Problem Relation (Age of Onset)    Cancer Father    Hypertension Mother    Thyroid disease Mother        Tobacco Use    Smoking status: Current Every Day Smoker     Packs/day: 1.00     Years: 25.00     Pack years: 25.00     Types: Cigarettes    Smokeless tobacco: Never Used   Substance and Sexual Activity    Alcohol use: No     Alcohol/week: 0.0 standard drinks    Drug use: No    Sexual activity: Never     Birth control/protection: None       Review of Systems   Constitutional: Positive  "for fatigue. Negative for activity change, appetite change and fever.   HENT: Negative for sinus pressure and sore throat.    Eyes: Negative for pain and visual disturbance.   Respiratory: Positive for shortness of breath. Negative for cough and chest tightness.    Cardiovascular: Negative for chest pain and palpitations.   Gastrointestinal: Negative for abdominal pain, constipation, diarrhea and nausea.   Musculoskeletal: Negative for gait problem and myalgias.   Neurological: Negative for dizziness, seizures, syncope, weakness, light-headedness and headaches.   Psychiatric/Behavioral: Negative for agitation, behavioral problems and hallucinations. The patient is not nervous/anxious.      Objective:     Vital Signs (Most Recent):  Temp: 98 °F (36.7 °C) (10/23/20 1600)  Pulse: 66 (10/23/20 1600)  Resp: 20 (10/23/20 1600)  BP: (!) 110/54 (10/23/20 1600)  SpO2: (!) 94 % (10/23/20 1600) Vital Signs (24h Range):  Temp:  [97.5 °F (36.4 °C)-98.3 °F (36.8 °C)] 98 °F (36.7 °C)  Pulse:  [] 66  Resp:  [18-23] 20  SpO2:  [93 %-96 %] 94 %  BP: (109-129)/(54-73) 110/54     Weight: 99.4 kg (219 lb 2.2 oz)  Body mass index is 38.82 kg/m².    Admission Weight/BMI & Noted Weight/BMI in EMR:     105.5 kg (232 lb 9.4 oz)/ 41.20 kg/m²Abnormal       Date: Weight in kg (lbs): BMI: Height:   8/20/2020 102.5 kg (226 lb) 37.61 kg/m²    5' 3" (160 cm)   6/27/2018 100.2 kg (221 lb) 39.1 kg/m²    5' 3" (160 cm)   2/23/2016 108.665 kg (239 lb 9 oz)  43.43 kg/m²    5' 3" (160 cm)       Physical Exam  Constitutional:       General: She is not in acute distress.     Appearance: She is well-developed. She is obese. She is ill-appearing.      Comments: Chronically ill appearing    Neck:      Musculoskeletal: Normal range of motion.   Cardiovascular:      Rate and Rhythm: Normal rate and regular rhythm.      Heart sounds: No murmur.   Pulmonary:      Breath sounds: Normal breath sounds.      Comments: Diminished  Chest:      Chest wall: No " tenderness.   Abdominal:      General: Bowel sounds are normal.   Musculoskeletal: Normal range of motion.      Right lower leg: No edema.      Left lower leg: No edema.   Skin:     General: Skin is warm.   Neurological:      Mental Status: She is alert and oriented to person, place, and time.   Psychiatric:         Thought Content: Thought content normal.         Judgment: Judgment normal.         Review of Symptoms    Symptom Assessment (ESAS 0-10 Scale)  Pain:  0  Dyspnea:  4  Anorexia:  4               Performance Status:  60    ECOG Performance Status Grade:  2 - Ambulates, capable of self care only    Living Arrangements:  Lives with family    Psychosocial/Cultural: Patient lives with her brother, who she is primary caregiver for.  Patient has a sister and niece who are able to help her at home if needed.          Advance Care Planning   Advance Directives:   Living Will: No    LaPOST: Yes (Full code )    Do Not Resuscitate Status: No    Medical Power of : Yes    Agent's Name:  Earlene Dexter   Agent's Contact Number:  [phone]    Decision Making:  Patient answered questions         Significant Labs: All pertinent labs within the past 24 hours have been reviewed.  CBC:   Recent Labs   Lab 10/23/20  0451   WBC 7.97   HGB 10.0*   HCT 33.7*   MCV 91   *     BMP:  Recent Labs   Lab 10/23/20  0451         K 4.4   CL 88*   CO2 44*   BUN 55*   CREATININE 1.6*   CALCIUM 9.4   MG 2.4     LFT:  Lab Results   Component Value Date    AST 16 10/13/2020    ALKPHOS 43 (L) 10/13/2020    BILITOT 0.8 10/13/2020     Albumin:   Albumin   Date Value Ref Range Status   10/13/2020 3.9 3.5 - 5.2 g/dL Final     Protein:   Total Protein   Date Value Ref Range Status   10/13/2020 7.0 6.0 - 8.4 g/dL Final     Lactic acid:   Lab Results   Component Value Date    LACTATE 0.6 10/13/2020      Ref Range & Units 10/13/20 1mo ago   POC Rapid COVID Negative Negative  Negative       Significant Imaging: I have  reviewed all pertinent imaging results/findings within the past 24 hours.     I have reviewed the Chest x ray from 10/20/2020    I have reviewed the CT chest from 10/15/2020      Discussed with Dr. Quiles    > 50% of 50 min visit spent in chart review, face to face discussion of goals of care,  symptom assessment, coordination of care and emotional support.    Elsa Bran, HOLLIE  Palliative Medicine  [facility]

## 2020-10-23 NOTE — PT/OT/SLP PROGRESS
Physical Therapy Treatment    Patient Name:  Patsy Morris   MRN:  [id number]    Recommendations:     Discharge Recommendations:  nursing facility, skilled   Discharge Equipment Recommendations: none   Barriers to discharge: Decreased caregiver support and current functional status     Assessment:     Patsy Morris is a 70 y.o. female admitted with a medical diagnosis of Acute on chronic respiratory failure with hypoxia and hypercapnia.  She presents with the following impairments/functional limitations:  weakness, gait instability, impaired balance, impaired endurance, impaired self care skills, impaired functional mobilty, impaired cardiopulmonary response to activity, decreased upper extremity function, decreased lower extremity function, decreased safety awareness Pt showed improvement today increasing her distance ambulated but still requires a rest break to improve her SOB.    Rehab Prognosis: Good; patient would benefit from acute skilled PT services to address these deficits and reach maximum level of function.    Recent Surgery: * No surgery found *      Plan:     During this hospitalization, patient to be seen 5 x/week to address the identified rehab impairments via gait training, therapeutic activities, therapeutic exercises, wheelchair management/training and progress toward the following goals:    · Plan of Care Expires:  11/15/20    Subjective     Chief Complaint: None reported  Patient/Family Comments/goals: None stated  Pain/Comfort:  ·        Objective:     Communicated with NS prior to session.  Patient found HOB elevated with peripheral IV, telemetry, oxygen upon PT entry to room.     General Precautions: Standard, fall, respiratory   Orthopedic Precautions:N/A   Braces: N/A     Functional Mobility:  · Transfers:     · Sit to Stand:  contact guard assistance with 4 wheeled walker  · Gait: 90' w/ rollator and CGA. Sitted rest break at half way point 4-6 LO2 NC      AM-PAC 6 CLICK  MOBILITY  Turning over in bed (including adjusting bedclothes, sheets and blankets)?: 3  Sitting down on and standing up from a chair with arms (e.g., wheelchair, bedside commode, etc.): 3  Moving from lying on back to sitting on the side of the bed?: 3  Moving to and from a bed to a chair (including a wheelchair)?: 3  Need to walk in hospital room?: 3  Climbing 3-5 steps with a railing?: 2  Basic Mobility Total Score: 17       Therapeutic Activities and Exercise  Pt performed sitted therapeutic exercises.  Laq, AAp, HF , bilat x 10    Patient left up in chair with all lines intact, call button in reach, ns notified and brother present..    GOALS:   Multidisciplinary Problems     Physical Therapy Goals        Problem: Physical Therapy Goal    Goal Priority Disciplines Outcome Goal Variances Interventions   Physical Therapy Goal     PT, PT/OT Ongoing, Progressing     Description: Goals to be met by: 11/15/2020    Patient will perform the following to increase strength, improve mobility, and return to prior level of function:    1. Rolling to L and R with SBA  2. Supine <> sit with SBA.  3. Sit EOB x 15 min with SBA for balance.  4. Bed <> chair transfer with SBA and least restrictive assistive device.  5. Added 10/19: Gait x10 ft with CGA and LRAD.                     Time Tracking:     PT Received On: 10/23/20  PT Start Time: 0905     PT Stop Time: 0955  PT Total Time (min): 50 min     Billable Minutes: Gait Training 30 and Therapeutic Exercise 20    Treatment Type: Treatment  PT/PTA: PTA     PTA Visit Number: 2     EMILIA Mathis  10/23/2020

## 2020-10-23 NOTE — SUBJECTIVE & OBJECTIVE
Interval History: Patient sitting up in bed, reports improvement in SOB.  Brother at bedside.     Past Medical History:   Diagnosis Date    Arthritis     Asthma     Atrial fibrillation     Atrial flutter     Cerumen impaction     CHF (congestive heart failure)     CKD (chronic kidney disease), stage III     Colon polyps 2017    COPD exacerbation     Encounter for blood transfusion     HEARING LOSS     Herpes genitalis     Hypertension     Renal carcinoma 3/10/2015    Thyroid nodule 6/8/2017       Past Surgical History:   Procedure Laterality Date    BRAIN SURGERY      COLONOSCOPY N/A 6/23/2017    Procedure: COLONOSCOPY;  Surgeon: Shane Sharma MD;  Location: [facility] ([address] [place] [address]);  Service: Endoscopy;  Laterality: N/A;  2nd floor case ; on 3L home O2       per Dr Leopold (anesthesia)-Based on her history of:  Chronic respiratory failure with oxygen use, HDEZ, CHF, A-Fib, BHAVANI, it was determined that she should have her Colonoscopy scheduled on the 2nd Floor       ok to hold Eliquis 2 days prior to    EYE SURGERY      HYSTERECTOMY      kidney mass resection Right     renal carcinoma       Review of patient's allergies indicates:  No Known Allergies    Medications:  Continuous Infusions:  Scheduled Meds:   apixaban  5 mg Oral BID    atorvastatin  80 mg Oral QHS    docusate sodium  100 mg Oral BID    escitalopram oxalate  10 mg Oral QHS    ferrous sulfate  325 mg Oral Daily    furosemide  40 mg Oral BID    gabapentin  100 mg Oral BID    metoprolol tartrate  50 mg Oral BID    miconazole NITRATE 2 %   Topical (Top) BID    vitamin renal formula (B-complex-vitamin c-folic acid)  1 capsule Oral Daily     PRN Meds:acetaminophen, albuterol-ipratropium, dextrose 50%, dextrose 50%, glucagon (human recombinant), glucose, glucose, melatonin, ondansetron, polyethylene glycol, promethazine (PHENERGAN) IVPB, sodium chloride 0.9%    Family History     Problem Relation (Age of Onset)    Cancer Father     "Hypertension Mother    Thyroid disease Mother        Tobacco Use    Smoking status: Current Every Day Smoker     Packs/day: 1.00     Years: 25.00     Pack years: 25.00     Types: Cigarettes    Smokeless tobacco: Never Used   Substance and Sexual Activity    Alcohol use: No     Alcohol/week: 0.0 standard drinks    Drug use: No    Sexual activity: Never     Birth control/protection: None       Review of Systems   Constitutional: Positive for fatigue. Negative for activity change, appetite change and fever.   HENT: Negative for sinus pressure and sore throat.    Eyes: Negative for pain and visual disturbance.   Respiratory: Positive for shortness of breath. Negative for cough and chest tightness.    Cardiovascular: Negative for chest pain and palpitations.   Gastrointestinal: Negative for abdominal pain, constipation, diarrhea and nausea.   Musculoskeletal: Negative for gait problem and myalgias.   Neurological: Negative for dizziness, seizures, syncope, weakness, light-headedness and headaches.   Psychiatric/Behavioral: Negative for agitation, behavioral problems and hallucinations. The patient is not nervous/anxious.      Objective:     Vital Signs (Most Recent):  Temp: 98 °F (36.7 °C) (10/23/20 1600)  Pulse: 66 (10/23/20 1600)  Resp: 20 (10/23/20 1600)  BP: (!) 110/54 (10/23/20 1600)  SpO2: (!) 94 % (10/23/20 1600) Vital Signs (24h Range):  Temp:  [97.5 °F (36.4 °C)-98.3 °F (36.8 °C)] 98 °F (36.7 °C)  Pulse:  [] 66  Resp:  [18-23] 20  SpO2:  [93 %-96 %] 94 %  BP: (109-129)/(54-73) 110/54     Weight: 99.4 kg (219 lb 2.2 oz)  Body mass index is 38.82 kg/m².    Admission Weight/BMI & Noted Weight/BMI in EMR:     105.5 kg (232 lb 9.4 oz)/ 41.20 kg/m²Abnormal       Date: Weight in kg (lbs): BMI: Height:   8/20/2020 102.5 kg (226 lb) 37.61 kg/m²    5' 3" (160 cm)   6/27/2018 100.2 kg (221 lb) 39.1 kg/m²    5' 3" (160 cm)   2/23/2016 108.665 kg (239 lb 9 oz)  43.43 kg/m²    5' 3" (160 cm)       Physical " Exam  Constitutional:       General: She is not in acute distress.     Appearance: She is well-developed. She is obese. She is ill-appearing.      Comments: Chronically ill appearing    Neck:      Musculoskeletal: Normal range of motion.   Cardiovascular:      Rate and Rhythm: Normal rate and regular rhythm.      Heart sounds: No murmur.   Pulmonary:      Breath sounds: Normal breath sounds.      Comments: Diminished  Chest:      Chest wall: No tenderness.   Abdominal:      General: Bowel sounds are normal.   Musculoskeletal: Normal range of motion.      Right lower leg: No edema.      Left lower leg: No edema.   Skin:     General: Skin is warm.   Neurological:      Mental Status: She is alert and oriented to person, place, and time.   Psychiatric:         Thought Content: Thought content normal.         Judgment: Judgment normal.         Review of Symptoms    Symptom Assessment (ESAS 0-10 Scale)  Pain:  0  Dyspnea:  4  Anorexia:  4               Performance Status:  60    ECOG Performance Status Grade:  2 - Ambulates, capable of self care only    Living Arrangements:  Lives with family    Psychosocial/Cultural: Patient lives with her brother, who she is primary caregiver for.  Patient has a sister and niece who are able to help her at home if needed.          Advance Care Planning   Advance Directives:   Living Will: No    LaPOST: Yes (Full code )    Do Not Resuscitate Status: No    Medical Power of : Yes    Agent's Name:  Earlene Dexter   Agent's Contact Number:  [phone]    Decision Making:  Patient answered questions         Significant Labs: All pertinent labs within the past 24 hours have been reviewed.  CBC:   Recent Labs   Lab 10/23/20  0451   WBC 7.97   HGB 10.0*   HCT 33.7*   MCV 91   *     BMP:  Recent Labs   Lab 10/23/20  0451         K 4.4   CL 88*   CO2 44*   BUN 55*   CREATININE 1.6*   CALCIUM 9.4   MG 2.4     LFT:  Lab Results   Component Value Date    AST 16  10/13/2020    ALKPHOS 43 (L) 10/13/2020    BILITOT 0.8 10/13/2020     Albumin:   Albumin   Date Value Ref Range Status   10/13/2020 3.9 3.5 - 5.2 g/dL Final     Protein:   Total Protein   Date Value Ref Range Status   10/13/2020 7.0 6.0 - 8.4 g/dL Final     Lactic acid:   Lab Results   Component Value Date    LACTATE 0.6 10/13/2020      Ref Range & Units 10/13/20 1mo ago   POC Rapid COVID Negative Negative  Negative       Significant Imaging: I have reviewed all pertinent imaging results/findings within the past 24 hours.     I have reviewed the Chest x ray from 10/20/2020    I have reviewed the CT chest from 10/15/2020

## 2020-10-23 NOTE — PLAN OF CARE
Pt continues to decline SNF. She states she will discharge with home health.    Pending appeal for [facility], Dr Quiles called in the appeal today.     Michelle is following for the auth.    ANNE to follow for plans and arrangemetns.

## 2020-10-23 NOTE — PLAN OF CARE
Problem: Physical Therapy Goal  Goal: Physical Therapy Goal  Description: Goals to be met by: 11/15/2020    Patient will perform the following to increase strength, improve mobility, and return to prior level of function:    1. Rolling to L and R with SBA  2. Supine <> sit with SBA.  3. Sit EOB x 15 min with SBA for balance.  4. Bed <> chair transfer with SBA and least restrictive assistive device.  5. Added 10/19: Gait x10 ft with CGA and LRAD.    Outcome: Ongoing, Progressing   Pt presents with HOB elevated using 5L oxy via NC and consents to treatment today pretreatment SPO2 92% post treatment SPO2 91%. The Pt performed the following activities:  Sit<>Stand using rollator and SBA with bed in lowered position  Gait- 90Ft using Rollator and CGA oxy- 6L via NC requiring rest break at [facility] point SPO2 85% but quickly improved to 92%.

## 2020-10-23 NOTE — ASSESSMENT & PLAN NOTE
"-Patient seen on 8/24 and expressed a desire "enjoy life a little more".  Patient defines this by being able to leave her house and go to the mall or the grocery store. Patient reports not being able to do these activities due to worsening mobility.  When discussed the patient hopes and if they were not able to be obtained patient reported this would be an unacceptable outcome for her. Patient expressed her desire to be able to cook again.  Patient expressed her love for cooking.  - Patient fears  if something were to happen to her who would care for her brother who has schizophrenia.    - Patient previously completed LaPOST- DNR.  Then patient voided the LaPOST and completed new LaPOST on 9/4, changing her wishes to Full code  - Patient is very quiet today, remembers meeting me in past.  Patient reports she was at home for 2 weeks after SNF discharge with progressing SOB.  Discussed goals moving forward: to return home.  Patient does not want to return to SNF, reports "it is too hard on me".  Patient wants to regain strength with PT/OT.  Patient is followed by pall care outpt. If symptoms progress, patient may benefit from low dose oral morphine to aid in SOB (liquid morphine 1 mg Q 2 hours PRN). However, I worry about patients compliance with this   - When discussing EOL wishes today, patient reports " I really need to think about that again". Patients brother was in the room and it appeared she did not want to discuss her EOL wishes.  Will re address with patient.   - Recommend patient to continue with pall care and re introduce hospice. I previously introduced hospice in August but due to several re admissions, I will speak to her next week to re discuss goals and hospice moving forward.   "

## 2020-10-23 NOTE — PT/OT/SLP PROGRESS
"Occupational Therapy   Treatment    Name: Patsy Morris  MRN: [id number]  Admitting Diagnosis:  Acute on chronic respiratory failure with hypoxia and hypercapnia       Recommendations:     Discharge Recommendations: nursing facility, skilled  Discharge Equipment Recommendations:  none  Barriers to discharge:  (current functional level)    Assessment:     Patsy Morris is a 70 y.o. female with a medical diagnosis of Acute on chronic respiratory failure with hypoxia and hypercapnia.  She presents with . Performance deficits affecting function are weakness, impaired endurance, gait instability, impaired self care skills, impaired functional mobilty, decreased lower extremity function, decreased upper extremity function, impaired balance, decreased safety awareness, impaired cardiopulmonary response to activity.  Pt agreeable to participating in therapy upon arrival to room.  Pt able to perform grooming tasks while seated up in chair with setup.  CGA required to perform sit <> stand transfer from chair with pt able to maintain balance during activity with SBA.     Pt is making progress towards goals and would continue to benefit from skilled OT services to address problems listed above and increase independence with ADLs.  SNF is recommended upon d/c from acute care to further address deficits and help pt improve overall functional independence.       Rehab Prognosis:  Good; patient would benefit from acute skilled OT services to address these deficits and reach maximum level of function.       Plan:     Patient to be seen 5 x/week to address the above listed problems via self-care/home management, therapeutic activities, therapeutic exercises  · Plan of Care Expires: 11/14/20  · Plan of Care Reviewed with: patient    Subjective     "My legs keep shaking.  I don't know what is causing that."    Pain/Comfort:  · Pain Rating 1: 0/10  · Pain Rating Post-Intervention 1: 0/10    Objective:     Communicated with: " RN (Amy) prior to session.  Patient found up in chair with peripheral IV, telemetry, oxygen, PureWick upon OT entry to room.    General Precautions: Standard, fall, respiratory   Orthopedic Precautions:N/A   Braces: N/A     Occupational Performance:     Bed Mobility:    · Not assessed 2* pt up in chair upon arrival.    Functional Mobility/Transfers:  · Sit <> Stand: CGA and RW x 1 trial from chair  · Functional Mobility: Steps not taken during session this date.    Activities of Daily Living:  · Grooming: Setup for washing face, brushing teeth, and applying chapstick while seated up in chair.       [facility] 6 Click ADL: 17    Treatment & Education:  *Pt performed multiple grooming tasks while seated up in chair.   *Per request pt performed LB exercises while seated up in chair to provide stretch and promote increased endurance needed for functional mobility:   -ankle pumps  -LAQ  -seated marches  *Pt performed activity in standing to address balance needed for ADLs: Pt held onto RW with one hand then reached upwards flexing shoulder with other: 1 set x 10 reps on each side; SBA, RW  *POC reviewed with pt and UB and LB exercises to perform daily up in chair or in bed discussed    Patient left up in chair with all lines intact, Pure Wick in place, call button in reach and RN (Amy) notifiedEducation:      GOALS:   Multidisciplinary Problems     Occupational Therapy Goals        Problem: Occupational Therapy Goal    Goal Priority Disciplines Outcome Interventions   Occupational Therapy Goal     OT, PT/OT Ongoing, Progressing    Description: Goals to be met by: 10/29/2020    Patient will increase functional independence with ADLs by performing:    UE Dressing with Minimal Assistance.  LE Dressing with Maximum Assistance using adaptive equipment as needed.  Grooming while standing with SBA.  Toileting from bedside commode with CGA for hygiene and clothing management.   Toilet transfer to bedside commode with Minimal  Assistance.                         Time Tracking:     OT Date of Treatment: 10/23/20  OT Start Time: 1118  OT Stop Time: 1142  OT Total Time (min): 24 min    Billable Minutes:Self Care/Home Management 10  Therapeutic Activity 14    CARLOS Motta  10/23/2020

## 2020-10-23 NOTE — PLAN OF CARE
ANNE called Michelle to inquire about [facility]. Michelle has all necessary paperwork, and are pending [facility] auth.    ANNE called Zee at [facility] to inquire since a new order was sent with correct diagnosis for [facility]. Zee states she will call a supervisor to say it was a new order, not a duplicate order to see if it still needed to be appealed.    Pending return call from Zee.

## 2020-10-23 NOTE — PLAN OF CARE
Problem: Occupational Therapy Goal  Goal: Occupational Therapy Goal  Description: Goals to be met by: 10/29/2020    Patient will increase functional independence with ADLs by performing:    UE Dressing with Minimal Assistance.  LE Dressing with Maximum Assistance using adaptive equipment as needed.  Grooming while standing with SBA.  Toileting from bedside commode with CGA for hygiene and clothing management.   Toilet transfer to bedside commode with Minimal Assistance.        Outcome: Ongoing, Progressing     Pt is making progress towards goals.  SNF is recommended upon d/c from acute care to further address deficits and help pt improve overall functional independence.     CARLOS Motta  10/23/2020

## 2020-10-24 NOTE — ASSESSMENT & PLAN NOTE
Secondary to underlying lung disease, obesity, and decompensated heart failure.  Clinically improving with intravenous diuresis and noninvasive ventilation.  Patient continues to have negative fluid balance.  Efforts to arrange for noninvasive ventilation for the home setting ongoing.  Waiting insurance authorization.  Discussed with Pulmonary Critical Care recommends continue intravenous diuretics while in house along as her kidney function can tolerate.  Serum creatinine trending down intravenous diuretics.  Switched back to intravenous diuretics.  Continue to monitor volume status and kidney function.

## 2020-10-24 NOTE — PLAN OF CARE
POC reviewed with patient. Patient AAOx4, VSS. Patient requires 5 L nasal cannula when awake and wears BIPAP at HS. Patient tolerated BIPAP well overnight. Aquacel dressing in place on bridge of patient's nose. PRN Tylenol and melatonin administered to patient at HS, with good relief. Midline in place to right arm, flushes without difficulty. Dressing CDI. Purewick in place putting out laci colored urine. Tele monitored, patient remains in A Flutter. Purposeful rounding completed. Patient instructed to call staff with any needs.

## 2020-10-24 NOTE — PROGRESS NOTES
[facility]  Cardiology  Progress Note    Patient Name: Patsy Morris  MRN: [id number]  Admission Date: 10/13/2020  Hospital Length of Stay: 11 days  Code Status: Full Code   Attending Physician: Loi Quiles MD   Primary Care Physician: Luisana Cartagena MD  Expected Discharge Date:   Principal Problem:Acute on chronic respiratory failure with hypoxia and hypercapnia    Subjective:     Brief HPI:    Patsy Morris is a 70 y.o.female with hypertension. She has chronic atrial fibrillation and heart failure with preserved ejection fraction. She has chronic obstructive pulmonary disease being on home oxygen with CO2 retention. She has undergone nephrectomy for renal cell carcinoma and has chronic kidney disease. She was admitted to [facility] in 8/2020 and 9/2020 with heart failure and exacerbations of her chronic obstructive pulmonary disease. She went to therapy at [facility].      She used to be on furosemide 40 mg Q24 however after her last hospitalization she had the does of furosemide increased to 80 mg Q24. The dose was reduced to 40 mg Q24 on 10/8/2020. She became increasingly short of breath and presents fluid overloaded in heart failure as well as an exacerbation of her COPD with high CO2. She was admitted to the ICU.    Hospital Course:    Diuresis.    BIPAP.    Respiratory treatments.    10/15/2020: Echo: Normal left ventricular size and systolic function. EF 60%. Moderately dilated LA. Mildly dilated RV. SPAP 49 mmHg. Small pericardial effusion.    10/16/2020: Apixiban 5 mg Q12 was changed to heparin iv for consideration of bronchoscopy.    10/21/2020: Transferred from ICU to telemetry.    Interval History:     Slowly breathing slightly easier from day to day.    Been out of bed. PT following.      Review of Systems   Constitution: Negative for chills, fever and malaise/fatigue.   HENT: Negative for nosebleeds.    Eyes: Negative for vision loss in left eye and vision  loss in right eye.   Cardiovascular: Negative for chest pain, leg swelling, orthopnea, palpitations and paroxysmal nocturnal dyspnea.   Respiratory: Positive for shortness of breath. Negative for cough, hemoptysis, sputum production and wheezing.    Hematologic/Lymphatic: Negative for bleeding problem.   Skin: Negative for rash.   Musculoskeletal: Negative for myalgias.   Gastrointestinal: Negative for abdominal pain, heartburn, hematemesis, hematochezia, jaundice, melena, nausea and vomiting.   Genitourinary: Negative for hematuria.   Neurological: Negative for dizziness, headaches, light-headedness, vertigo and weakness.   Psychiatric/Behavioral: Negative for altered mental status. The patient is not nervous/anxious.    Allergic/Immunologic: Negative for persistent infections.       Objective:     Vital Signs (Most Recent):  Temp: 98.2 °F (36.8 °C) (10/24/20 1103)  Pulse: 66 (10/24/20 1103)  Resp: 20 (10/24/20 1103)  BP: (!) 120/53 (10/24/20 1103)  SpO2: (!) 94 % (10/24/20 1103) Vital Signs (24h Range):  Temp:  [97.3 °F (36.3 °C)-98.6 °F (37 °C)] 98.2 °F (36.8 °C)  Pulse:  [] 66  Resp:  [18-27] 20  SpO2:  [91 %-95 %] 94 %  BP: (101-123)/(53-64) 120/53     Weight: 99.4 kg (219 lb 2.2 oz)  Body mass index is 38.82 kg/m².    SpO2: (!) 94 %  O2 Device (Oxygen Therapy): nasal cannula w/ humidification      Intake/Output Summary (Last 24 hours) at 10/24/2020 1126  Last data filed at 10/24/2020 0828  Gross per 24 hour   Intake 1470 ml   Output 950 ml   Net 520 ml       Lines/Drains/Airways     Drain            Female External Urinary Catheter 10/13/20 1359 10 days          Peripheral Intravenous Line                 Midline Catheter Insertion/Assessment  - Double Lumen 10/20/20 1220 Right median cubital vein (antecubital fossa)  3 days                Physical Exam   Constitutional: She is oriented to person, place, and time. She appears well-developed and well-nourished. She appears ill.   Neck: Carotid bruit is  not present.   Cardiovascular: Normal rate, S1 normal and S2 normal. An irregularly irregular rhythm present.   Pulmonary/Chest: She has rhonchi in the right lower field and the left lower field.   Musculoskeletal:      Right ankle: She exhibits no swelling.      Left ankle: She exhibits no swelling.   Neurological: She is alert and oriented to person, place, and time.   Skin: Skin is warm and dry.   Psychiatric: She has a normal mood and affect. Her speech is normal and behavior is normal. Cognition and memory are normal.     Current Medications:     apixaban  5 mg Oral BID    atorvastatin  80 mg Oral QHS    docusate sodium  100 mg Oral BID    escitalopram oxalate  10 mg Oral QHS    ferrous sulfate  325 mg Oral Daily    furosemide  40 mg Oral BID    gabapentin  100 mg Oral BID    metoprolol tartrate  50 mg Oral BID    miconazole NITRATE 2 %   Topical (Top) BID    vitamin renal formula (B-complex-vitamin c-folic acid)  1 capsule Oral Daily     Current Laboratory Results:    Recent Results (from the past 24 hour(s))   Basic metabolic panel    Collection Time: 10/24/20  5:19 AM   Result Value Ref Range    Sodium 139 136 - 145 mmol/L    Potassium 4.9 3.5 - 5.1 mmol/L    Chloride 87 (L) 95 - 110 mmol/L    CO2 40 (H) 23 - 29 mmol/L    Glucose 91 70 - 110 mg/dL    BUN, Bld 52 (H) 8 - 23 mg/dL    Creatinine 1.7 (H) 0.5 - 1.4 mg/dL    Calcium 9.3 8.7 - 10.5 mg/dL    Anion Gap 12 8 - 16 mmol/L    eGFR if African American 34 (A) >60 mL/min/1.73 m^2    eGFR if non African American 30 (A) >60 mL/min/1.73 m^2   Magnesium    Collection Time: 10/24/20  5:19 AM   Result Value Ref Range    Magnesium 2.5 1.6 - 2.6 mg/dL   Phosphorus    Collection Time: 10/24/20  5:19 AM   Result Value Ref Range    Phosphorus 4.2 2.7 - 4.5 mg/dL   CBC auto differential    Collection Time: 10/24/20  5:19 AM   Result Value Ref Range    WBC 8.10 3.90 - 12.70 K/uL    RBC 3.68 (L) 4.00 - 5.40 M/uL    Hemoglobin 10.0 (L) 12.0 - 16.0 g/dL     Hematocrit 33.5 (L) 37.0 - 48.5 %    Mean Corpuscular Volume 91 82 - 98 fL    Mean Corpuscular Hemoglobin 27.2 27.0 - 31.0 pg    Mean Corpuscular Hemoglobin Conc 29.9 (L) 32.0 - 36.0 g/dL    RDW 14.6 (H) 11.5 - 14.5 %    Platelets 146 (L) 150 - 350 K/uL    MPV 11.4 9.2 - 12.9 fL    Immature Granulocytes 0.4 0.0 - 0.5 %    Gran # (ANC) 5.9 1.8 - 7.7 K/uL    Immature Grans (Abs) 0.03 0.00 - 0.04 K/uL    Lymph # 0.9 (L) 1.0 - 4.8 K/uL    Mono # 1.1 (H) 0.3 - 1.0 K/uL    Eos # 0.2 0.0 - 0.5 K/uL    Baso # 0.02 0.00 - 0.20 K/uL    nRBC 0 0 /100 WBC    Gran% 72.4 38.0 - 73.0 %    Lymph% 11.5 (L) 18.0 - 48.0 %    Mono% 13.0 4.0 - 15.0 %    Eosinophil% 2.5 0.0 - 8.0 %    Basophil% 0.2 0.0 - 1.9 %    Differential Method Automated      Current Imaging Results:    X-Ray Chest AP Portable   Final Result      No significant change.  There is cardiomegaly, multifocal airspace opacities and bilateral effusions.         Electronically signed by: Ajay Camara MD   Date:    10/20/2020   Time:    08:48      US Lower Extremity Veins Bilateral   Final Result      No evidence of deep venous thrombosis in either lower extremity.         Electronically signed by: Ceasar Carreon MD   Date:    10/15/2020   Time:    21:53      CT Chest Without Contrast   Final Result      The findings highly concerning for extensive diffuse pulmonary infection with bilateral pleural fluid collections right greater than left.         Electronically signed by: Regulo Bassett MD   Date:    10/15/2020   Time:    12:01      X-Ray Chest AP Portable   Final Result      Interval worsening of diffuse bilateral airspace disease and moderate bilateral pleural effusions when compared to 09/09/2020         Electronically signed by: Halina Plascencia   Date:    10/13/2020   Time:    09:40          10/15/2020: Echo:    The left ventricle is normal in size with normal systolic function. The estimated ejection fraction is 60%.  A diastolic pattern consistent with atrial  fibrillation observed.  Moderate left atrial enlargement.  Mild right ventricular enlargement.  Normal right ventricular systolic function.  Severe right atrial enlargement.  The aortic valve is mildly sclerotic.  The mitral valve is mildly sclerotic.  Mild mitral regurgitation.  Mild tricuspid regurgitation.  There is mild pulmonary hypertension.  The estimated PA systolic pressure is 49 mmHg.  Intermediate central venous pressure (8 mmHg).  Small circumferential pericardial effusion.  There is a left pleural effusion.      Assessment and Plan:     Problem List:    Active Diagnoses:    Diagnosis Date Noted POA    PRINCIPAL PROBLEM:  Acute on chronic respiratory failure with hypoxia and hypercapnia [J96.21, J96.22] 08/03/2016 Yes    Acute on chronic congestive heart failure [I50.9] 10/21/2020 Yes    Cavitary lesion of lung [J98.4] 10/16/2020 Yes    Atrial fibrillation with rapid ventricular response [I48.91] 09/01/2020 Yes    Debility [R53.81] 08/26/2020 Yes    Encounter for palliative care [Z51.5] 08/25/2020 Not Applicable    Current moderate episode of major depressive disorder without prior episode [F32.1] 04/30/2020 Yes    Acute on chronic diastolic congestive heart failure [I50.33] 05/20/2016 Yes    Obesity hypoventilation syndrome [E66.2] 02/25/2016 Yes    Chronic kidney disease (CKD) stage G4/A1, severely decreased glomerular filtration rate (GFR) between 15-29 mL/min/1.73 square meter and albuminuria creatinine ratio less than 30 mg/g [N18.4]  Yes    Dyslipidemia [E78.5] 02/22/2016 Yes    Essential hypertension [I10] 10/15/2014 Yes      Problems Resolved During this Admission:    Diagnosis Date Noted Date Resolved POA    Acute on chronic congestive heart failure [I50.9] 10/13/2020 10/13/2020 Yes    Pulmonary nodules/lesions, multiple [R91.8] 03/22/2019 10/14/2020 Yes    Paroxysmal atrial fibrillation [I48.0] 08/03/2016 10/13/2020 Yes    Chronic hypercapnic respiratory failure [J96.12]  03/11/2016 10/13/2020 Yes    Renal carcinoma [C64.9] 03/10/2015 10/13/2020 Yes       Assessment and Plan:     1. Heart Failure, Diastolic, Acute on Chronic              8/24/2020: Echo: Normal left ventricular size and systolic function. Mildly dilated LA.   10/15/2020: Echo: Normal left ventricular size and systolic function. EF 60%. Moderately dilated LA. Mildly dilated RV. SPAP 49 mmHg. Small pericardial effusion.              At NH was on furosemide 40 mg Q24.              10/13/2020: Presented fluid overloaded in HF. Received furosemide 80 mg iv Q12.   10/20/2020: CXR with extensive infiltrates and pleural effusions. .               Does not appear fluid overloaded at present.   On furosemide 40 mg po Q12.   Watch weight and increase dose as needed for weight gain.      2. Atrial Fibrillation              In chronic atrial fibrillation.              On apixiban.              On metoprolol 50 mg Q12.              Rate well controlled to slow side.     3. Chronic Anticoagulation              Been on apixiban 5 mg Q12.   10/16/2020: Apixiban 5 mg Q12 was changed to heparin iv.   On apixiban 5 mg Q12.   No bleeding.    4. Hypertension   On metoprolol 50 mg Q12.   Blood pressure has come down with diuresis.                5. Chronic Kidney Disease, Stage 4              History or renal carcinoma and nephrectomy.              10/13/2020: BUN/crea 27/2.0. CrCl 28.   10/20/2020: BUN/crea 56/1.8. CrCl 32.      6. Chronic Obstructive Pulmonary Disease              Chronic respiratory failure with CO2 retention.              10/13/2020: 7.23/94/73/39/90% on FiO2 of 40%.   Pulmonary following.      VTE Risk Mitigation (From admission, onward)         Ordered     apixaban tablet 5 mg  2 times daily      10/17/20 1152     Place sequential compression device  Until discontinued      10/13/20 1405     IP VTE HIGH RISK PATIENT  Once      10/13/20 1405                Diana Meredith MD  Cardiology  [facility]

## 2020-10-24 NOTE — SUBJECTIVE & OBJECTIVE
Interval History:  No acute events overnight.    Review of Systems   Constitutional: Negative for chills and fever.   Respiratory: Negative for shortness of breath and wheezing.    Cardiovascular: Negative for chest pain.   Gastrointestinal: Negative for abdominal distention, abdominal pain, constipation, diarrhea, nausea and vomiting.   Genitourinary: Negative for dysuria and frequency.   Musculoskeletal: Negative for arthralgias and myalgias.   Neurological: Negative for light-headedness.   Psychiatric/Behavioral: Negative for agitation and confusion.     Objective:     Vital Signs (Most Recent):  Temp: 98.2 °F (36.8 °C) (10/24/20 1103)  Pulse: 66 (10/24/20 1103)  Resp: 20 (10/24/20 1103)  BP: (!) 120/53 (10/24/20 1103)  SpO2: (!) 94 % (10/24/20 1103) Vital Signs (24h Range):  Temp:  [97.3 °F (36.3 °C)-98.6 °F (37 °C)] 98.2 °F (36.8 °C)  Pulse:  [] 66  Resp:  [18-27] 20  SpO2:  [91 %-95 %] 94 %  BP: (101-123)/(53-64) 120/53     Weight: 99.4 kg (219 lb 2.2 oz)  Body mass index is 38.82 kg/m².    Intake/Output Summary (Last 24 hours) at 10/24/2020 1106  Last data filed at 10/24/2020 0828  Gross per 24 hour   Intake 1470 ml   Output 950 ml   Net 520 ml      Physical Exam  Constitutional:       General: She is not in acute distress.     Appearance: She is well-developed. She is obese.   HENT:      Head: Atraumatic.   Eyes:      Conjunctiva/sclera: Conjunctivae normal.   Neck:      Musculoskeletal: Neck supple.   Cardiovascular:      Rate and Rhythm: Normal rate and regular rhythm.      Heart sounds: Normal heart sounds. No murmur.   Pulmonary:      Breath sounds: No wheezing.      Comments: Distant breath sounds.  Abdominal:      General: Bowel sounds are normal. There is no distension.      Palpations: Abdomen is soft.      Tenderness: There is no abdominal tenderness.   Musculoskeletal: Normal range of motion.         General: No deformity.      Right lower leg: No edema.      Left lower leg: No edema.    Neurological:      Mental Status: She is alert and oriented to person, place, and time.         Significant Labs: All pertinent labs within the past 24 hours have been reviewed.    Significant Imaging: I have reviewed all pertinent imaging results/findings within the past 24 hours.

## 2020-10-24 NOTE — PLAN OF CARE
Patient on AVAPS qhs, settings as documented. Sats 91%. Pt. in no distress, will continue to monitor.

## 2020-10-24 NOTE — SUBJECTIVE & OBJECTIVE
Interval History:  No acute events overnight.    Review of Systems   Constitutional: Negative for chills and fever.   Respiratory: Negative for shortness of breath and wheezing.    Cardiovascular: Negative for chest pain.   Gastrointestinal: Negative for abdominal distention, abdominal pain, constipation, diarrhea, nausea and vomiting.   Genitourinary: Negative for dysuria and frequency.   Musculoskeletal: Negative for arthralgias and myalgias.   Neurological: Negative for light-headedness.   Psychiatric/Behavioral: Negative for agitation and confusion.     Objective:     Vital Signs (Most Recent):  Temp: 98.2 °F (36.8 °C) (10/23/20 1956)  Pulse: 82 (10/23/20 2000)  Resp: 20 (10/23/20 1956)  BP: 116/64 (10/23/20 1956)  SpO2: (!) 93 % (10/23/20 1956) Vital Signs (24h Range):  Temp:  [97.5 °F (36.4 °C)-98.3 °F (36.8 °C)] 98.2 °F (36.8 °C)  Pulse:  [] 82  Resp:  [18-23] 20  SpO2:  [93 %-96 %] 93 %  BP: (109-129)/(54-73) 116/64     Weight: 99.4 kg (219 lb 2.2 oz)  Body mass index is 38.82 kg/m².    Intake/Output Summary (Last 24 hours) at 10/23/2020 2015  Last data filed at 10/23/2020 1800  Gross per 24 hour   Intake 900 ml   Output 700 ml   Net 200 ml      Physical Exam  Constitutional:       General: She is not in acute distress.     Appearance: She is well-developed. She is obese.   HENT:      Head: Atraumatic.   Eyes:      Conjunctiva/sclera: Conjunctivae normal.   Neck:      Musculoskeletal: Neck supple.   Cardiovascular:      Rate and Rhythm: Normal rate and regular rhythm.      Heart sounds: Normal heart sounds. No murmur.   Pulmonary:      Breath sounds: No wheezing.      Comments: Distant breath sounds.  Abdominal:      General: Bowel sounds are normal. There is no distension.      Palpations: Abdomen is soft.      Tenderness: There is no abdominal tenderness.   Musculoskeletal: Normal range of motion.         General: No deformity.      Right lower leg: No edema.      Left lower leg: No edema.    Neurological:      Mental Status: She is alert and oriented to person, place, and time.         Significant Labs: All pertinent labs within the past 24 hours have been reviewed.    Significant Imaging: I have reviewed all pertinent imaging results/findings within the past 24 hours.

## 2020-10-24 NOTE — ASSESSMENT & PLAN NOTE
Secondary to underlying lung disease, obesity, and decompensated heart failure.  Clinically improving with intravenous diuresis and noninvasive ventilation.  Patient continues to have negative fluid balance.  Efforts to arrange for noninvasive ventilation for the home setting ongoing.  Waiting insurance authorization.  Discussed with Pulmonary Critical Care recommends continue intravenous diuretics while in house along as her kidney function can tolerate.  Serum creatinine trending down intravenous diuretics.  Switched back to intravenous diuretics.  Continue with intravenous diuretic as long as kidney function stable/improving.

## 2020-10-24 NOTE — ASSESSMENT & PLAN NOTE
Pt with recurrent hospitalization for decompensated heart failure, presenting with fluid overload resulting in hypercapnic respiratory failure. Component of restrictive lung disease decreasing ability to compensate Ventilation with worsened pulmonary edema.     - Continue AVAPS at night and NC during the day, AVAPs settings , RR 18, PEEP 8, FIO2 40%, Max pressure 36, min pressure 18  - diuresis as above.   - Will need to be set up with [facility] prior to discharge as did not happen via NH after most recent DC  - CT chest showing cavitary lesion with surround consolidation/ggo concerning for infectious process; abx discontinued and following sputum for respiratory cultures and Mycobacterial culture/smear    Her ABG shows hypercapnia that is life threatening and necessitates long term treatment with home NIV.

## 2020-10-24 NOTE — PROGRESS NOTES
"[facility], [place], operated by [facility] Medicine  Progress Note    Patient Name: Patsy Morris  MRN: [id number]  Patient Class: IP- Inpatient   Admission Date: 10/13/2020  Length of Stay: 11 days  Attending Physician: Loi Quiles MD  Primary Care Provider: Luisana Cartagena MD        Subjective:     Principal Problem:Acute on chronic respiratory failure with hypoxia and hypercapnia        HPI:  Per Ravi Soria:    "Per ED:  "This is a 70 y.o. female with history of CHF and COPD who presents via EMS with complaint of shortness of breath that began a few days ago. Per EMS patient had O2 saturation of 72 on 3 liters if home oxygen upon their arrival. Patient states she is on 3 liters of home oxygen at baseline. She denies fever, cough, chest pain, nausea, vomiting, diarrhea, or rhinorrhea. She reports she was recently discharged from the hospital for similar symptoms. She reports compliance with her home medications. She recently became resident of [facility] after last hospitalization. She is a former smoker. She denies undergoing any surgeries".    The patient is a 70 year old female with a PMH significant for of HTN, Chronic Respiratory Failure (COPD and BHAVANI/OHS), HFpEF, Obesity who presented to the ED with complaints of SOB that began about 3 days ago.  Patient is on 3L O2NC at home.  She was seen by her Cardiologist about 4 days prior to admission and told to decrease her Lasix from 40mg bid to qday.  She denies chest pain.  No Fevers.  No cough.  States she has been adherent with her medications.  Patient was recently admitted to [facility] from 9/1-9/11 for Acute on Chronic Respiratory Failure and discharged to SNF.  Patient was placed on BiPAP in ED and admitted to ICU.  Patient feeling better on BiPAP."    Overview/Hospital Course:  Patient is 70-year-old woman with history of hypertension, chronic hypoxic respiratory failure on home oxygen secondary to chronic obstructive pulmonary " disease, chronic diastolic heart failure, chronic atrial fibrillation, and morbid obesity re-admitted to the hospital with decompensated heart failure after patient was home for about a week following recent hospitalization and stayed at skilled nursing facility for physical therapy.  Patient treated with non invasive ventilation intravenous diuretics.  Slowly clinically improving.  Patient also initially treated with antibiotic therapy which has since been discontinued as bacterial pneumonia ruled out on clinical grounds.  Patient does have a cavitary lesion on her chest CT scan but otherwise without signs or symptoms of active pulmonary tuberculosis.  Acid-fast bacillus smears and cultures ordered to rule out active pulmonary tuberculosis.  Patient with one negative acid-fast bacillus smear thus far.  Efforts at obtaining additional smears even with sputum induction with hypertonic saline has not resulted additional sputum.  Discussed with Pulmonary Critical Care service who recommended discontinuing further smears and respiratory isolation.    Interval History:  No acute events overnight.    Review of Systems   Constitutional: Negative for chills and fever.   Respiratory: Negative for shortness of breath and wheezing.    Cardiovascular: Negative for chest pain.   Gastrointestinal: Negative for abdominal distention, abdominal pain, constipation, diarrhea, nausea and vomiting.   Genitourinary: Negative for dysuria and frequency.   Musculoskeletal: Negative for arthralgias and myalgias.   Neurological: Negative for light-headedness.   Psychiatric/Behavioral: Negative for agitation and confusion.     Objective:     Vital Signs (Most Recent):  Temp: 98.2 °F (36.8 °C) (10/24/20 1103)  Pulse: 66 (10/24/20 1103)  Resp: 20 (10/24/20 1103)  BP: (!) 120/53 (10/24/20 1103)  SpO2: (!) 94 % (10/24/20 1103) Vital Signs (24h Range):  Temp:  [97.3 °F (36.3 °C)-98.6 °F (37 °C)] 98.2 °F (36.8 °C)  Pulse:  [] 66  Resp:   [18-27] 20  SpO2:  [91 %-95 %] 94 %  BP: (101-123)/(53-64) 120/53     Weight: 99.4 kg (219 lb 2.2 oz)  Body mass index is 38.82 kg/m².    Intake/Output Summary (Last 24 hours) at 10/24/2020 1106  Last data filed at 10/24/2020 0828  Gross per 24 hour   Intake 1470 ml   Output 950 ml   Net 520 ml      Physical Exam  Constitutional:       General: She is not in acute distress.     Appearance: She is well-developed. She is obese.   HENT:      Head: Atraumatic.   Eyes:      Conjunctiva/sclera: Conjunctivae normal.   Neck:      Musculoskeletal: Neck supple.   Cardiovascular:      Rate and Rhythm: Normal rate and regular rhythm.      Heart sounds: Normal heart sounds. No murmur.   Pulmonary:      Breath sounds: No wheezing.      Comments: Distant breath sounds.  Abdominal:      General: Bowel sounds are normal. There is no distension.      Palpations: Abdomen is soft.      Tenderness: There is no abdominal tenderness.   Musculoskeletal: Normal range of motion.         General: No deformity.      Right lower leg: No edema.      Left lower leg: No edema.   Neurological:      Mental Status: She is alert and oriented to person, place, and time.         Significant Labs: All pertinent labs within the past 24 hours have been reviewed.    Significant Imaging: I have reviewed all pertinent imaging results/findings within the past 24 hours.      Assessment/Plan:      * Acute on chronic respiratory failure with hypoxia and hypercapnia  Secondary to underlying lung disease, obesity, and decompensated heart failure.  Clinically improving with intravenous diuresis and noninvasive ventilation.  Patient continues to have negative fluid balance.  Efforts to arrange for noninvasive ventilation for the home setting ongoing.  Waiting insurance authorization.  Discussed with Pulmonary Critical Care recommends continue intravenous diuretics while in house along as her kidney function can tolerate.  Serum creatinine trending down intravenous  diuretics.  Switched back to intravenous diuretics.  Continue with intravenous diuretic as long as kidney function stable/improving.    Cavitary lesion of lung  Active pulmonary tuberculosis unlikely.  One acid-fast bacillus smear negative.  Additional efforts at obtaining sputum for additional smears not successful despite using hypertonic saline.  Discussed with Pulmonary Critical Care who recommended discontinuing further smears and respiratory isolation.    Acute on chronic diastolic congestive heart failure  Improving.  Continue medical therapy including diuretic therapy to maintain negative fluid balance.    Atrial fibrillation with rapid ventricular response  Stable.  Continue with beta-blocker therapy for rate control and apixaban for stroke risk reduction.    Essential hypertension  Reasonably controlled with current regimen.  Will continue with current regimen and continue to monitor.    Chronic kidney disease (CKD) stage G4/A1, severely decreased glomerular filtration rate (GFR) between 15-29 mL/min/1.73 square meter and albuminuria creatinine ratio less than 30 mg/g  Stable.  Continue to closely monitor.    Dyslipidemia  Continue with statin therapy.    Debility  Consult physical and occupational therapy.    Current moderate episode of major depressive disorder without prior episode  Stable.  Continue SSRI therapy.      VTE Risk Mitigation (From admission, onward)         Ordered     apixaban tablet 5 mg  2 times daily      10/17/20 1152     Place sequential compression device  Until discontinued      10/13/20 1405     IP VTE HIGH RISK PATIENT  Once      10/13/20 1405              Loi Quiles MD  Department of Hospital Medicine   [facility]

## 2020-10-24 NOTE — PROGRESS NOTES
[facility]  Pulmonology  Progress Note    Patient Name: Patsy Morris  MRN: [id number]  Admission Date: 10/13/2020  Hospital Length of Stay: 10 days  Code Status: Full Code  Attending Provider: Loi Quiles MD  Primary Care Provider: Luisana Cartagena MD   Principal Problem: Acute on chronic respiratory failure with hypoxia and hypercapnia    Subjective:     Interval History: Ms. Morris is doing well with her Bipap and is much improved when she wears it all night. She also notices that she is feeling much better with it. She does feel some shakiness and weakness in her hands and legs but is other wise feeling ok.     Objective:     Vital Signs (Most Recent):  Temp: 98 °F (36.7 °C) (10/23/20 1600)  Pulse: 78 (10/23/20 1900)  Resp: 20 (10/23/20 1600)  BP: (!) 110/54 (10/23/20 1600)  SpO2: (!) 94 % (10/23/20 1600) Vital Signs (24h Range):  Temp:  [97.5 °F (36.4 °C)-98.3 °F (36.8 °C)] 98 °F (36.7 °C)  Pulse:  [] 78  Resp:  [18-23] 20  SpO2:  [93 %-96 %] 94 %  BP: (109-129)/(54-73) 110/54     Weight: 99.4 kg (219 lb 2.2 oz)  Body mass index is 38.82 kg/m².    No intake or output data in the 24 hours ending 10/23/20 1909    Physical Exam  Constitutional:       General: She is not in acute distress.     Appearance: She is obese. She is not ill-appearing or toxic-appearing.   HENT:      Head: Normocephalic and atraumatic.      Nose: Nose normal.      Mouth/Throat:      Mouth: Mucous membranes are moist.   Eyes:      General: No scleral icterus.     Extraocular Movements: Extraocular movements intact.      Conjunctiva/sclera: Conjunctivae normal.      Pupils: Pupils are equal, round, and reactive to light.   Neck:      Musculoskeletal: Normal range of motion.      Comments: +JVD  Cardiovascular:      Rate and Rhythm: Normal rate and regular rhythm.      Pulses: Normal pulses.      Heart sounds: No murmur. No friction rub. No gallop.    Pulmonary:      Comments: Decreased breath all lung  fields, crackles mid upper posterior lung fields improved, no wheezes, rhonchi.   Abdominal:      General: Bowel sounds are normal.      Tenderness: There is no abdominal tenderness. There is no guarding or rebound.      Comments: protuberant   Musculoskeletal:         General: Swelling present.      Comments: Swelling improved. Trace pitting edema b/l lower extremity. +1 dependent hip edema bilaterally.    Skin:     General: Skin is warm.      Capillary Refill: Capillary refill takes less than 2 seconds.      Findings: No rash.   Neurological:      General: No focal deficit present.      Mental Status: She is oriented to person, place, and time.   Psychiatric:         Mood and Affect: Mood normal.         Behavior: Behavior normal.         Thought Content: Thought content normal.         Judgment: Judgment normal.         Vents:  Oxygen Concentration (%): 45 (10/23/20 0810)    Lines/Drains/Airways     Drain            Female External Urinary Catheter 10/13/20 1359 10 days          Peripheral Intravenous Line                 Midline Catheter Insertion/Assessment  - Double Lumen 10/20/20 1220 Right median cubital vein (antecubital fossa)  3 days                Significant Labs:    CBC/Anemia Profile:  Recent Labs   Lab 10/22/20  0431 10/23/20  0451   WBC 7.68 7.97   HGB 10.7* 10.0*   HCT 35.4* 33.7*    148*   MCV 90 91   RDW 14.6* 14.6*        Chemistries:  Recent Labs   Lab 10/22/20  0431 10/23/20  0451    142   K 4.2 4.4   CL 86* 88*   CO2 42* 44*   BUN 63* 55*   CREATININE 1.7* 1.6*   CALCIUM 9.3 9.4   MG 2.3 2.4   PHOS 4.1 4.2       All pertinent labs within the past 24 hours have been reviewed.    Significant Imaging:  I have reviewed and interpreted all pertinent imaging results/findings within the past 24 hours.    Assessment/Plan:     * Acute on chronic respiratory failure with hypoxia and hypercapnia  Pt with recurrent hospitalization for decompensated heart failure, presenting with fluid  overload resulting in hypercapnic respiratory failure. Component of restrictive lung disease decreasing ability to compensate Ventilation with worsened pulmonary edema.     - Continue AVAPS at night and NC during the day, AVAPs settings , RR 18, PEEP 8, FIO2 40%, Max pressure 36, min pressure 18  - diuresis as above.   - Will need to be set up with Trilogy prior to discharge as did not happen via NH after most recent DC  - CT chest showing cavitary lesion with surround consolidation/ggo concerning for infectious process; abx discontinued and following sputum for respiratory cultures and Mycobacterial culture/smear    Her ABG shows hypercapnia that is life threatening and necessitates long term treatment with home NIV.     Cavitary lesion of lung  CT concerning for slow process as lesion in same posterior upper lobe lung fields noted on previous CT in 2018. Pt without signs or symptoms of sepsis making acute bacterial cavitary PNA unlikely. Potential for malignancy possible. Pt showing signs of pulmonary htn on echo as well as engorged pulmonary artery and veins. This along with NOAC make bronch unsafe due to increased bleeding in patient with acute respiratory failure.     - CT chest showing cavitary lesion with surround consolidation/ggo concerning for infectious process; obtaining sputum for respiratory cultures and Mycobacterial culture/smear  - repeating quant gold as indeterminate due to poor test as opposed to difficult to determine results; pending  - HIV negative  - will need to follow up with Pulmonology; discussed likely needing bronchoscopy outpatient when respiratory status has improved.         Acute on chronic diastolic congestive heart failure  - continue diuresis; fluid overload likely contributes to hypercapnia in setting of OHS/BHAVANI, small airway disease with severe restriction.   -CT showing cavitary lesion with surrounding consolidation and GGO concerning for infectious vs malignant process,  with significant ggo throughout the lung likely secondary to continued pulmonary edema.     - would continue lasix 80 mg BID until bump in creatinine    Obesity hypoventilation syndrome  Patient with BMI >35 with PaCO2 consistently >52 with restriction on PFTs.     - will need to be discharged on trilogy to reduce hospital readmissions to be worn nightly and with naps during the day.            Gloria Jenkins MD  Pulmonology  [facility]

## 2020-10-24 NOTE — PROGRESS NOTES
"[facility] Medicine  Progress Note    Patient Name: Patsy Morris  MRN: [id number]  Patient Class: IP- Inpatient   Admission Date: 10/13/2020  Length of Stay: 10 days  Attending Physician: Loi Quiles MD  Primary Care Provider: Luisana Cartagena MD        Subjective:     Principal Problem:Acute on chronic respiratory failure with hypoxia and hypercapnia        HPI:  Per Ravi Soria:    "Per ED:  "This is a 70 y.o. female with history of CHF and COPD who presents via EMS with complaint of shortness of breath that began a few days ago. Per EMS patient had O2 saturation of 72 on 3 liters if home oxygen upon their arrival. Patient states she is on 3 liters of home oxygen at baseline. She denies fever, cough, chest pain, nausea, vomiting, diarrhea, or rhinorrhea. She reports she was recently discharged from the hospital for similar symptoms. She reports compliance with her home medications. She recently became resident of [facility] after last hospitalization. She is a former smoker. She denies undergoing any surgeries".    The patient is a 70 year old female with a PMH significant for of HTN, Chronic Respiratory Failure (COPD and BHAVANI/OHS), HFpEF, Obesity who presented to the ED with complaints of SOB that began about 3 days ago.  Patient is on 3L O2NC at home.  She was seen by her Cardiologist about 4 days prior to admission and told to decrease her Lasix from 40mg bid to qday.  She denies chest pain.  No Fevers.  No cough.  States she has been adherent with her medications.  Patient was recently admitted to [facility] from 9/1-9/11 for Acute on Chronic Respiratory Failure and discharged to SNF.  Patient was placed on BiPAP in ED and admitted to ICU.  Patient feeling better on BiPAP."    Overview/Hospital Course:  Patient is 70-year-old woman with history of hypertension, chronic hypoxic respiratory failure on home oxygen secondary to chronic obstructive pulmonary " disease, chronic diastolic heart failure, chronic atrial fibrillation, and morbid obesity re-admitted to the hospital with decompensated heart failure after patient was home for about a week following recent hospitalization and stayed at skilled nursing facility for physical therapy.  Patient treated with non invasive ventilation intravenous diuretics.  Slowly clinically improving.  Patient also initially treated with antibiotic therapy which has since been discontinued as bacterial pneumonia ruled out on clinical grounds.  Patient does have a cavitary lesion on her chest CT scan but otherwise without signs or symptoms of active pulmonary tuberculosis.  Acid-fast bacillus smears and cultures ordered to rule out active pulmonary tuberculosis.  Patient with one negative acid-fast bacillus smear thus far.  Efforts at obtaining additional smears even with sputum induction with hypertonic saline has not resulted additional sputum.  Discussed with Pulmonary Critical Care service who recommended discontinuing further smears and respiratory isolation.    Interval History:  No acute events overnight.    Review of Systems   Constitutional: Negative for chills and fever.   Respiratory: Negative for shortness of breath and wheezing.    Cardiovascular: Negative for chest pain.   Gastrointestinal: Negative for abdominal distention, abdominal pain, constipation, diarrhea, nausea and vomiting.   Genitourinary: Negative for dysuria and frequency.   Musculoskeletal: Negative for arthralgias and myalgias.   Neurological: Negative for light-headedness.   Psychiatric/Behavioral: Negative for agitation and confusion.     Objective:     Vital Signs (Most Recent):  Temp: 98.2 °F (36.8 °C) (10/23/20 1956)  Pulse: 82 (10/23/20 2000)  Resp: 20 (10/23/20 1956)  BP: 116/64 (10/23/20 1956)  SpO2: (!) 93 % (10/23/20 1956) Vital Signs (24h Range):  Temp:  [97.5 °F (36.4 °C)-98.3 °F (36.8 °C)] 98.2 °F (36.8 °C)  Pulse:  [] 82  Resp:  [18-23]  20  SpO2:  [93 %-96 %] 93 %  BP: (109-129)/(54-73) 116/64     Weight: 99.4 kg (219 lb 2.2 oz)  Body mass index is 38.82 kg/m².    Intake/Output Summary (Last 24 hours) at 10/23/2020 2015  Last data filed at 10/23/2020 1800  Gross per 24 hour   Intake 900 ml   Output 700 ml   Net 200 ml      Physical Exam  Constitutional:       General: She is not in acute distress.     Appearance: She is well-developed. She is obese.   HENT:      Head: Atraumatic.   Eyes:      Conjunctiva/sclera: Conjunctivae normal.   Neck:      Musculoskeletal: Neck supple.   Cardiovascular:      Rate and Rhythm: Normal rate and regular rhythm.      Heart sounds: Normal heart sounds. No murmur.   Pulmonary:      Breath sounds: No wheezing.      Comments: Distant breath sounds.  Abdominal:      General: Bowel sounds are normal. There is no distension.      Palpations: Abdomen is soft.      Tenderness: There is no abdominal tenderness.   Musculoskeletal: Normal range of motion.         General: No deformity.      Right lower leg: No edema.      Left lower leg: No edema.   Neurological:      Mental Status: She is alert and oriented to person, place, and time.         Significant Labs: All pertinent labs within the past 24 hours have been reviewed.    Significant Imaging: I have reviewed all pertinent imaging results/findings within the past 24 hours.      Assessment/Plan:      * Acute on chronic respiratory failure with hypoxia and hypercapnia  Secondary to underlying lung disease, obesity, and decompensated heart failure.  Clinically improving with intravenous diuresis and noninvasive ventilation.  Patient continues to have negative fluid balance.  Efforts to arrange for noninvasive ventilation for the home setting ongoing.  Waiting insurance authorization.  Discussed with Pulmonary Critical Care recommends continue intravenous diuretics while in house along as her kidney function can tolerate.  Serum creatinine trending down intravenous diuretics.   Switched back to intravenous diuretics.  Continue to monitor volume status and kidney function.    Cavitary lesion of lung  Active pulmonary tuberculosis unlikely.  One acid-fast bacillus smear negative.  Additional efforts at obtaining sputum for additional smears not successful despite using hypertonic saline.  Discussed with Pulmonary Critical Care who recommended discontinuing further smears and respiratory isolation.    Acute on chronic diastolic congestive heart failure  Improving.  Continue medical therapy including diuretic therapy to maintain negative fluid balance.    Atrial fibrillation with rapid ventricular response  Stable.  Continue with beta-blocker therapy for rate control and apixaban for stroke risk reduction.    Essential hypertension  Reasonably controlled with current regimen.  Will continue with current regimen and continue to monitor.    Chronic kidney disease (CKD) stage G4/A1, severely decreased glomerular filtration rate (GFR) between 15-29 mL/min/1.73 square meter and albuminuria creatinine ratio less than 30 mg/g  Stable.  Continue to closely monitor.    Dyslipidemia  Continue with statin therapy.    Debility  Consult physical and occupational therapy.    Current moderate episode of major depressive disorder without prior episode  Stable.  Continue SSRI therapy.      VTE Risk Mitigation (From admission, onward)         Ordered     apixaban tablet 5 mg  2 times daily      10/17/20 1152     Place sequential compression device  Until discontinued      10/13/20 1405     IP VTE HIGH RISK PATIENT  Once      10/13/20 1405                  Loi Quiles MD  Department of Hospital Medicine   [facility]

## 2020-10-24 NOTE — SUBJECTIVE & OBJECTIVE
Interval History: Ms. Morris is doing well with her Bipap and is much improved when she wears it all night. She also notices that she is feeling much better with it. She does feel some shakiness and weakness in her hands and legs but is other wise feeling ok.     Objective:     Vital Signs (Most Recent):  Temp: 98 °F (36.7 °C) (10/23/20 1600)  Pulse: 78 (10/23/20 1900)  Resp: 20 (10/23/20 1600)  BP: (!) 110/54 (10/23/20 1600)  SpO2: (!) 94 % (10/23/20 1600) Vital Signs (24h Range):  Temp:  [97.5 °F (36.4 °C)-98.3 °F (36.8 °C)] 98 °F (36.7 °C)  Pulse:  [] 78  Resp:  [18-23] 20  SpO2:  [93 %-96 %] 94 %  BP: (109-129)/(54-73) 110/54     Weight: 99.4 kg (219 lb 2.2 oz)  Body mass index is 38.82 kg/m².    No intake or output data in the 24 hours ending 10/23/20 1909    Physical Exam  Constitutional:       General: She is not in acute distress.     Appearance: She is obese. She is not ill-appearing or toxic-appearing.   HENT:      Head: Normocephalic and atraumatic.      Nose: Nose normal.      Mouth/Throat:      Mouth: Mucous membranes are moist.   Eyes:      General: No scleral icterus.     Extraocular Movements: Extraocular movements intact.      Conjunctiva/sclera: Conjunctivae normal.      Pupils: Pupils are equal, round, and reactive to light.   Neck:      Musculoskeletal: Normal range of motion.      Comments: +JVD  Cardiovascular:      Rate and Rhythm: Normal rate and regular rhythm.      Pulses: Normal pulses.      Heart sounds: No murmur. No friction rub. No gallop.    Pulmonary:      Comments: Decreased breath all lung fields, crackles mid upper posterior lung fields improved, no wheezes, rhonchi.   Abdominal:      General: Bowel sounds are normal.      Tenderness: There is no abdominal tenderness. There is no guarding or rebound.      Comments: protuberant   Musculoskeletal:         General: Swelling present.      Comments: Swelling improved. Trace pitting edema b/l lower extremity. +1 dependent hip  edema bilaterally.    Skin:     General: Skin is warm.      Capillary Refill: Capillary refill takes less than 2 seconds.      Findings: No rash.   Neurological:      General: No focal deficit present.      Mental Status: She is oriented to person, place, and time.   Psychiatric:         Mood and Affect: Mood normal.         Behavior: Behavior normal.         Thought Content: Thought content normal.         Judgment: Judgment normal.         Vents:  Oxygen Concentration (%): 45 (10/23/20 0810)    Lines/Drains/Airways     Drain            Female External Urinary Catheter 10/13/20 1359 10 days          Peripheral Intravenous Line                 Midline Catheter Insertion/Assessment  - Double Lumen 10/20/20 1220 Right median cubital vein (antecubital fossa)  3 days                Significant Labs:    CBC/Anemia Profile:  Recent Labs   Lab 10/22/20  0431 10/23/20  0451   WBC 7.68 7.97   HGB 10.7* 10.0*   HCT 35.4* 33.7*    148*   MCV 90 91   RDW 14.6* 14.6*        Chemistries:  Recent Labs   Lab 10/22/20  0431 10/23/20  0451    142   K 4.2 4.4   CL 86* 88*   CO2 42* 44*   BUN 63* 55*   CREATININE 1.7* 1.6*   CALCIUM 9.3 9.4   MG 2.3 2.4   PHOS 4.1 4.2       All pertinent labs within the past 24 hours have been reviewed.    Significant Imaging:  I have reviewed and interpreted all pertinent imaging results/findings within the past 24 hours.

## 2020-10-25 NOTE — PROGRESS NOTES
"[facility] Hospital Medicine  Progress Note    Patient Name: Patsy Morris  MRN: [id number]  Patient Class: IP- Inpatient   Admission Date: 10/13/2020  Length of Stay: 12 days  Attending Physician: Loi Quiles MD  Primary Care Provider: Luisana Cartagena MD        Subjective:     Principal Problem:Acute on chronic respiratory failure with hypoxia and hypercapnia        HPI:  Per Ravi Soria:    "Per ED:  "This is a 70 y.o. female with history of CHF and COPD who presents via EMS with complaint of shortness of breath that began a few days ago. Per EMS patient had O2 saturation of 72 on 3 liters if home oxygen upon their arrival. Patient states she is on 3 liters of home oxygen at baseline. She denies fever, cough, chest pain, nausea, vomiting, diarrhea, or rhinorrhea. She reports she was recently discharged from the hospital for similar symptoms. She reports compliance with her home medications. She recently became resident of [facility] after last hospitalization. She is a former smoker. She denies undergoing any surgeries".    The patient is a 70 year old female with a PMH significant for of HTN, Chronic Respiratory Failure (COPD and BHAVANI/OHS), HFpEF, Obesity who presented to the ED with complaints of SOB that began about 3 days ago.  Patient is on 3L O2NC at home.  She was seen by her Cardiologist about 4 days prior to admission and told to decrease her Lasix from 40mg bid to qday.  She denies chest pain.  No Fevers.  No cough.  States she has been adherent with her medications.  Patient was recently admitted to [facility] from 9/1-9/11 for Acute on Chronic Respiratory Failure and discharged to SNF.  Patient was placed on BiPAP in ED and admitted to ICU.  Patient feeling better on BiPAP."    Overview/Hospital Course:  Patient is 70-year-old woman with history of hypertension, chronic hypoxic respiratory failure on home oxygen secondary to chronic obstructive pulmonary " disease, chronic diastolic heart failure, chronic atrial fibrillation, and morbid obesity re-admitted to the hospital with decompensated heart failure after patient was home for about a week following recent hospitalization and stayed at skilled nursing facility for physical therapy.  Patient treated with non invasive ventilation intravenous diuretics.  Slowly clinically improving.  Patient also initially treated with antibiotic therapy which has since been discontinued as bacterial pneumonia ruled out on clinical grounds.  Patient does have a cavitary lesion on her chest CT scan but otherwise without signs or symptoms of active pulmonary tuberculosis.  Acid-fast bacillus smears and cultures ordered to rule out active pulmonary tuberculosis.  Patient with one negative acid-fast bacillus smear thus far.  Efforts at obtaining additional smears even with sputum induction with hypertonic saline has not resulted additional sputum.  Discussed with Pulmonary Critical Care service who recommended discontinuing further smears and respiratory isolation.  She continue to slowly improve with diuresis.    Interval History:  No acute events overnight.    Review of Systems   Constitutional: Negative for chills and fever.   Respiratory: Negative for shortness of breath and wheezing.    Cardiovascular: Negative for chest pain.   Gastrointestinal: Negative for abdominal distention, abdominal pain, constipation, diarrhea, nausea and vomiting.   Genitourinary: Negative for dysuria and frequency.   Musculoskeletal: Negative for arthralgias and myalgias.   Neurological: Negative for light-headedness.   Psychiatric/Behavioral: Negative for agitation and confusion.     Objective:     Vital Signs (Most Recent):  Temp: 97.9 °F (36.6 °C) (10/25/20 0744)  Pulse: 81 (10/25/20 1000)  Resp: 18 (10/25/20 0744)  BP: 104/66 (10/25/20 0744)  SpO2: (!) 94 % (10/25/20 0800) Vital Signs (24h Range):  Temp:  [97.5 °F (36.4 °C)-98.2 °F (36.8 °C)] 97.9 °F  (36.6 °C)  Pulse:  [] 81  Resp:  [18-23] 18  SpO2:  [92 %-94 %] 94 %  BP: ()/(55-75) 104/66     Weight: 99.4 kg (219 lb 2.2 oz)  Body mass index is 38.82 kg/m².    Intake/Output Summary (Last 24 hours) at 10/25/2020 1140  Last data filed at 10/25/2020 0509  Gross per 24 hour   Intake 920 ml   Output 1350 ml   Net -430 ml      Physical Exam  Constitutional:       General: She is not in acute distress.     Appearance: She is well-developed. She is obese.   HENT:      Head: Atraumatic.   Eyes:      Conjunctiva/sclera: Conjunctivae normal.   Neck:      Musculoskeletal: Neck supple.   Cardiovascular:      Rate and Rhythm: Normal rate and regular rhythm.      Heart sounds: Normal heart sounds. No murmur.   Pulmonary:      Breath sounds: No wheezing.      Comments: Distant breath sounds.  Abdominal:      General: Bowel sounds are normal. There is no distension.      Palpations: Abdomen is soft.      Tenderness: There is no abdominal tenderness.   Musculoskeletal: Normal range of motion.         General: No deformity.      Right lower leg: No edema.      Left lower leg: No edema.   Neurological:      Mental Status: She is alert and oriented to person, place, and time.         Significant Labs: All pertinent labs within the past 24 hours have been reviewed.    Significant Imaging: I have reviewed all pertinent imaging results/findings within the past 24 hours.      Assessment/Plan:      * Acute on chronic respiratory failure with hypoxia and hypercapnia  Secondary to underlying lung disease, obesity, and decompensated heart failure.  Clinically improving with intravenous diuresis and noninvasive ventilation.  Patient continues to have negative fluid balance.  Efforts to arrange for noninvasive ventilation for the home setting ongoing.  Waiting insurance authorization.  Continue with diuretic therapy.  Currently on oral furosemide.    Cavitary lesion of lung  Active pulmonary tuberculosis unlikely.  One acid-fast  bacillus smear negative.  Additional efforts at obtaining sputum for additional smears not successful despite using hypertonic saline.  Discussed with Pulmonary Critical Care who recommended discontinuing further smears and respiratory isolation.    Acute on chronic diastolic congestive heart failure  Improving.  Continue medical therapy including diuretic therapy to maintain negative fluid balance.    Atrial fibrillation with rapid ventricular response  Stable.  Continue with beta-blocker therapy for rate control and apixaban for stroke risk reduction.    Essential hypertension  Reasonably controlled with current regimen.  Will continue with current regimen and continue to monitor.    Chronic kidney disease (CKD) stage G4/A1, severely decreased glomerular filtration rate (GFR) between 15-29 mL/min/1.73 square meter and albuminuria creatinine ratio less than 30 mg/g  Stable.  Continue to closely monitor.    Dyslipidemia  Continue with statin therapy.    Debility  Consult physical and occupational therapy.    Current moderate episode of major depressive disorder without prior episode  Stable.  Continue SSRI therapy.      VTE Risk Mitigation (From admission, onward)         Ordered     apixaban tablet 5 mg  2 times daily      10/17/20 1152     Place sequential compression device  Until discontinued      10/13/20 1405     IP VTE HIGH RISK PATIENT  Once      10/13/20 1405              Loi Quiles MD  Department of Hospital Medicine   [facility]

## 2020-10-25 NOTE — PLAN OF CARE
POC reviewed with patient. Patient AAOx4, VSS. Patient requires 5 L nasal cannula when awake and wears BIPAP at HS. Patient tolerated BIPAP well overnight. PRN Tylenol and melatonin administered to patient at HS, with good relief. Midline in place to right arm, flushes without difficulty. Dressing CDI. Purewick in place putting out yellow urine. Patient had a BM this shift. Tele monitored, patient remains in A Flutter. Purposeful rounding completed. Patient instructed to call staff with any needs.

## 2020-10-25 NOTE — PLAN OF CARE
Patient AAOx4, VSS on 5L humidified O2. Tele-monitor in place showing Atrial Flutter. Patient is able to turn and roll with little assistance, but she remained in bed for most of the shift. A Waffle overlay mattress was added. Pure Wick in place with good UOP. PO Lasix given as ordered. She is on a Low Na diet with 1500cc fluid restriction. She swallows pills whole and is able to feed herself. She had no pain complaints and was free of falls or injury during shift. Bed lowered and locked. Call light in reach. Purposeful rounding completed. Will continue to monitor.

## 2020-10-25 NOTE — SUBJECTIVE & OBJECTIVE
Interval History:  No acute events overnight.    Review of Systems   Constitutional: Negative for chills and fever.   Respiratory: Negative for shortness of breath and wheezing.    Cardiovascular: Negative for chest pain.   Gastrointestinal: Negative for abdominal distention, abdominal pain, constipation, diarrhea, nausea and vomiting.   Genitourinary: Negative for dysuria and frequency.   Musculoskeletal: Negative for arthralgias and myalgias.   Neurological: Negative for light-headedness.   Psychiatric/Behavioral: Negative for agitation and confusion.     Objective:     Vital Signs (Most Recent):  Temp: 97.9 °F (36.6 °C) (10/25/20 0744)  Pulse: 81 (10/25/20 1000)  Resp: 18 (10/25/20 0744)  BP: 104/66 (10/25/20 0744)  SpO2: (!) 94 % (10/25/20 0800) Vital Signs (24h Range):  Temp:  [97.5 °F (36.4 °C)-98.2 °F (36.8 °C)] 97.9 °F (36.6 °C)  Pulse:  [] 81  Resp:  [18-23] 18  SpO2:  [92 %-94 %] 94 %  BP: ()/(55-75) 104/66     Weight: 99.4 kg (219 lb 2.2 oz)  Body mass index is 38.82 kg/m².    Intake/Output Summary (Last 24 hours) at 10/25/2020 1140  Last data filed at 10/25/2020 0509  Gross per 24 hour   Intake 920 ml   Output 1350 ml   Net -430 ml      Physical Exam  Constitutional:       General: She is not in acute distress.     Appearance: She is well-developed. She is obese.   HENT:      Head: Atraumatic.   Eyes:      Conjunctiva/sclera: Conjunctivae normal.   Neck:      Musculoskeletal: Neck supple.   Cardiovascular:      Rate and Rhythm: Normal rate and regular rhythm.      Heart sounds: Normal heart sounds. No murmur.   Pulmonary:      Breath sounds: No wheezing.      Comments: Distant breath sounds.  Abdominal:      General: Bowel sounds are normal. There is no distension.      Palpations: Abdomen is soft.      Tenderness: There is no abdominal tenderness.   Musculoskeletal: Normal range of motion.         General: No deformity.      Right lower leg: No edema.      Left lower leg: No edema.    Neurological:      Mental Status: She is alert and oriented to person, place, and time.         Significant Labs: All pertinent labs within the past 24 hours have been reviewed.    Significant Imaging: I have reviewed all pertinent imaging results/findings within the past 24 hours.

## 2020-10-25 NOTE — PROGRESS NOTES
[facility]  Cardiology  Progress Note    Patient Name: Patsy Morris  MRN: [id number]  Admission Date: 10/13/2020  Hospital Length of Stay: 12 days  Code Status: Full Code   Attending Physician: Loi Quiles MD   Primary Care Physician: Luisana Cartagena MD  Expected Discharge Date:   Principal Problem:Acute on chronic respiratory failure with hypoxia and hypercapnia    Subjective:     Brief HPI:    Patsy Morris is a 70 y.o.female with hypertension. She has chronic atrial fibrillation and heart failure with preserved ejection fraction. She has chronic obstructive pulmonary disease being on home oxygen with CO2 retention. She has undergone nephrectomy for renal cell carcinoma and has chronic kidney disease. She was admitted to [facility] in 8/2020 and 9/2020 with heart failure and exacerbations of her chronic obstructive pulmonary disease. She went to therapy at [facility].      She used to be on furosemide 40 mg Q24 however after her last hospitalization she had the does of furosemide increased to 80 mg Q24. The dose was reduced to 40 mg Q24 on 10/8/2020. She became increasingly short of breath and presents fluid overloaded in heart failure as well as an exacerbation of her COPD with high CO2. She was admitted to the ICU.    Hospital Course:    Diuresis.    BIPAP.    Respiratory treatments.    10/15/2020: Echo: Normal left ventricular size and systolic function. EF 60%. Moderately dilated LA. Mildly dilated RV. SPAP 49 mmHg. Small pericardial effusion.    10/16/2020: Apixiban 5 mg Q12 was changed to heparin iv for consideration of bronchoscopy.    10/21/2020: Transferred from ICU to telemetry.    Interval History:     Slowly breathing slightly easier from day to day.    Out of bed. PT following.      Review of Systems   Constitution: Positive for malaise/fatigue. Negative for chills and fever.   HENT: Negative for nosebleeds.    Eyes: Negative for vision loss in left eye  and vision loss in right eye.   Cardiovascular: Negative for chest pain, leg swelling, orthopnea, palpitations and paroxysmal nocturnal dyspnea.   Respiratory: Negative for cough, hemoptysis, shortness of breath, sputum production and wheezing.    Hematologic/Lymphatic: Negative for bleeding problem.   Skin: Negative for rash.   Musculoskeletal: Negative for myalgias.   Gastrointestinal: Negative for abdominal pain, heartburn, hematemesis, hematochezia, jaundice, melena, nausea and vomiting.   Genitourinary: Negative for hematuria.   Neurological: Negative for dizziness, headaches, light-headedness, vertigo and weakness.   Psychiatric/Behavioral: Negative for altered mental status. The patient is not nervous/anxious.    Allergic/Immunologic: Negative for persistent infections.       Objective:     Vital Signs (Most Recent):  Temp: 97.9 °F (36.6 °C) (10/25/20 0744)  Pulse: 84 (10/25/20 0800)  Resp: 18 (10/25/20 0744)  BP: 104/66 (10/25/20 0744)  SpO2: (!) 94 % (10/25/20 0800) Vital Signs (24h Range):  Temp:  [97.5 °F (36.4 °C)-98.2 °F (36.8 °C)] 97.9 °F (36.6 °C)  Pulse:  [] 84  Resp:  [18-23] 18  SpO2:  [92 %-94 %] 94 %  BP: ()/(53-75) 104/66     Weight: 99.4 kg (219 lb 2.2 oz)  Body mass index is 38.82 kg/m².    SpO2: (!) 94 %  O2 Device (Oxygen Therapy): nasal cannula      Intake/Output Summary (Last 24 hours) at 10/25/2020 1009  Last data filed at 10/25/2020 0509  Gross per 24 hour   Intake 920 ml   Output 1350 ml   Net -430 ml       Lines/Drains/Airways     Drain            Female External Urinary Catheter 10/13/20 1359 11 days          Peripheral Intravenous Line                 Midline Catheter Insertion/Assessment  - Double Lumen 10/20/20 1220 Right median cubital vein (antecubital fossa)  4 days                Physical Exam   Constitutional: She is oriented to person, place, and time. She appears well-developed and well-nourished. She appears ill.   Neck: Carotid bruit is not present.    Cardiovascular: Normal rate, S1 normal and S2 normal. An irregularly irregular rhythm present.   Pulmonary/Chest: She has rhonchi in the right lower field and the left lower field.   Musculoskeletal:      Right ankle: She exhibits no swelling.      Left ankle: She exhibits no swelling.   Neurological: She is alert and oriented to person, place, and time.   Skin: Skin is warm and dry.   Psychiatric: She has a normal mood and affect. Her speech is normal and behavior is normal. Cognition and memory are normal.     Current Medications:     apixaban  5 mg Oral BID    atorvastatin  80 mg Oral QHS    docusate sodium  100 mg Oral BID    escitalopram oxalate  10 mg Oral QHS    ferrous sulfate  325 mg Oral Daily    furosemide  40 mg Oral BID    gabapentin  100 mg Oral BID    metoprolol tartrate  50 mg Oral BID    miconazole NITRATE 2 %   Topical (Top) BID    vitamin renal formula (B-complex-vitamin c-folic acid)  1 capsule Oral Daily     Current Laboratory Results:    Recent Results (from the past 24 hour(s))   Basic metabolic panel    Collection Time: 10/25/20  5:14 AM   Result Value Ref Range    Sodium 141 136 - 145 mmol/L    Potassium 4.8 3.5 - 5.1 mmol/L    Chloride 87 (L) 95 - 110 mmol/L    CO2 43 (HH) 23 - 29 mmol/L    Glucose 109 70 - 110 mg/dL    BUN, Bld 49 (H) 8 - 23 mg/dL    Creatinine 1.6 (H) 0.5 - 1.4 mg/dL    Calcium 9.7 8.7 - 10.5 mg/dL    Anion Gap 11 8 - 16 mmol/L    eGFR if African American 37 (A) >60 mL/min/1.73 m^2    eGFR if non African American 32 (A) >60 mL/min/1.73 m^2   Magnesium    Collection Time: 10/25/20  5:14 AM   Result Value Ref Range    Magnesium 2.6 1.6 - 2.6 mg/dL   Phosphorus    Collection Time: 10/25/20  5:14 AM   Result Value Ref Range    Phosphorus 4.2 2.7 - 4.5 mg/dL   CBC auto differential    Collection Time: 10/25/20  5:14 AM   Result Value Ref Range    WBC 7.97 3.90 - 12.70 K/uL    RBC 3.75 (L) 4.00 - 5.40 M/uL    Hemoglobin 10.1 (L) 12.0 - 16.0 g/dL    Hematocrit 34.5  (L) 37.0 - 48.5 %    Mean Corpuscular Volume 92 82 - 98 fL    Mean Corpuscular Hemoglobin 26.9 (L) 27.0 - 31.0 pg    Mean Corpuscular Hemoglobin Conc 29.3 (L) 32.0 - 36.0 g/dL    RDW 14.6 (H) 11.5 - 14.5 %    Platelets 157 150 - 350 K/uL    MPV 11.7 9.2 - 12.9 fL    Immature Granulocytes 0.4 0.0 - 0.5 %    Gran # (ANC) 6.2 1.8 - 7.7 K/uL    Immature Grans (Abs) 0.03 0.00 - 0.04 K/uL    Lymph # 0.8 (L) 1.0 - 4.8 K/uL    Mono # 0.8 0.3 - 1.0 K/uL    Eos # 0.1 0.0 - 0.5 K/uL    Baso # 0.02 0.00 - 0.20 K/uL    nRBC 0 0 /100 WBC    Gran% 77.9 (H) 38.0 - 73.0 %    Lymph% 9.8 (L) 18.0 - 48.0 %    Mono% 10.0 4.0 - 15.0 %    Eosinophil% 1.6 0.0 - 8.0 %    Basophil% 0.3 0.0 - 1.9 %    Differential Method Automated      Current Imaging Results:    X-Ray Chest AP Portable   Final Result      No significant change.  There is cardiomegaly, multifocal airspace opacities and bilateral effusions.         Electronically signed by: Ajay Camara MD   Date:    10/20/2020   Time:    08:48      US Lower Extremity Veins Bilateral   Final Result      No evidence of deep venous thrombosis in either lower extremity.         Electronically signed by: Ceasar Carreon MD   Date:    10/15/2020   Time:    21:53      CT Chest Without Contrast   Final Result      The findings highly concerning for extensive diffuse pulmonary infection with bilateral pleural fluid collections right greater than left.         Electronically signed by: Regulo Bassett MD   Date:    10/15/2020   Time:    12:01      X-Ray Chest AP Portable   Final Result      Interval worsening of diffuse bilateral airspace disease and moderate bilateral pleural effusions when compared to 09/09/2020         Electronically signed by: Halina Plascencia   Date:    10/13/2020   Time:    09:40          10/15/2020: Echo:    The left ventricle is normal in size with normal systolic function. The estimated ejection fraction is 60%.  A diastolic pattern consistent with atrial fibrillation  observed.  Moderate left atrial enlargement.  Mild right ventricular enlargement.  Normal right ventricular systolic function.  Severe right atrial enlargement.  The aortic valve is mildly sclerotic.  The mitral valve is mildly sclerotic.  Mild mitral regurgitation.  Mild tricuspid regurgitation.  There is mild pulmonary hypertension.  The estimated PA systolic pressure is 49 mmHg.  Intermediate central venous pressure (8 mmHg).  Small circumferential pericardial effusion.  There is a left pleural effusion.      Assessment and Plan:     Problem List:    Active Diagnoses:    Diagnosis Date Noted POA    PRINCIPAL PROBLEM:  Acute on chronic respiratory failure with hypoxia and hypercapnia [J96.21, J96.22] 08/03/2016 Yes    Acute on chronic congestive heart failure [I50.9] 10/21/2020 Yes    Cavitary lesion of lung [J98.4] 10/16/2020 Yes    Atrial fibrillation with rapid ventricular response [I48.91] 09/01/2020 Yes    Debility [R53.81] 08/26/2020 Yes    Encounter for palliative care [Z51.5] 08/25/2020 Not Applicable    Current moderate episode of major depressive disorder without prior episode [F32.1] 04/30/2020 Yes    Acute on chronic diastolic congestive heart failure [I50.33] 05/20/2016 Yes    Obesity hypoventilation syndrome [E66.2] 02/25/2016 Yes    Chronic kidney disease (CKD) stage G4/A1, severely decreased glomerular filtration rate (GFR) between 15-29 mL/min/1.73 square meter and albuminuria creatinine ratio less than 30 mg/g [N18.4]  Yes    Dyslipidemia [E78.5] 02/22/2016 Yes    Essential hypertension [I10] 10/15/2014 Yes      Problems Resolved During this Admission:    Diagnosis Date Noted Date Resolved POA    Acute on chronic congestive heart failure [I50.9] 10/13/2020 10/13/2020 Yes    Pulmonary nodules/lesions, multiple [R91.8] 03/22/2019 10/14/2020 Yes    Paroxysmal atrial fibrillation [I48.0] 08/03/2016 10/13/2020 Yes    Chronic hypercapnic respiratory failure [J96.12] 03/11/2016  10/13/2020 Yes    Renal carcinoma [C64.9] 03/10/2015 10/13/2020 Yes       Assessment and Plan:     1. Heart Failure, Diastolic, Acute on Chronic              8/24/2020: Echo: Normal left ventricular size and systolic function. Mildly dilated LA.   10/15/2020: Echo: Normal left ventricular size and systolic function. EF 60%. Moderately dilated LA. Mildly dilated RV. SPAP 49 mmHg. Small pericardial effusion.              At NH was on furosemide 40 mg Q24.              10/13/2020: Presented fluid overloaded in HF. Received furosemide 80 mg iv Q12.   10/20/2020: CXR with extensive infiltrates and pleural effusions. .               Does not appear fluid overloaded at present.   On furosemide 40 mg po Q12.   Watch weight and increase dose as needed for weight gain.   10/26/2020: Follow up CXR and BNP.      2. Atrial Fibrillation              In chronic atrial fibrillation.              On apixiban.              On metoprolol 50 mg Q12.              Rate well controlled to slow side.     3. Chronic Anticoagulation              Been on apixiban 5 mg Q12.   10/16/2020: Apixiban 5 mg Q12 was changed to heparin iv.   On apixiban 5 mg Q12.   No bleeding.    4. Hypertension   On metoprolol 50 mg Q12.   Blood pressure has come down with diuresis.                5. Chronic Kidney Disease, Stage 4              History or renal carcinoma and nephrectomy.              10/13/2020: BUN/crea 27/2.0. CrCl 28.   10/20/2020: BUN/crea 56/1.8. CrCl 32.   10/25/2020: BUN/crea 49/1.6. CrCl 32.      6. Chronic Obstructive Pulmonary Disease              Chronic respiratory failure with CO2 retention.              10/13/2020: 7.23/94/73/39/90% on FiO2 of 40%.   Appears to overall be slowly improving.    Pulmonary following.      VTE Risk Mitigation (From admission, onward)         Ordered     apixaban tablet 5 mg  2 times daily      10/17/20 1152     Place sequential compression device  Until discontinued      10/13/20 1405     IP VTE  HIGH RISK PATIENT  Once      10/13/20 3300                Diana Meredith MD  Cardiology  [facility]

## 2020-10-25 NOTE — ASSESSMENT & PLAN NOTE
Secondary to underlying lung disease, obesity, and decompensated heart failure.  Clinically improving with intravenous diuresis and noninvasive ventilation.  Patient continues to have negative fluid balance.  Efforts to arrange for noninvasive ventilation for the home setting ongoing.  Waiting insurance authorization.  Continue with diuretic therapy.  Currently on oral furosemide.

## 2020-10-26 NOTE — PLAN OF CARE
Patient AAOx4, VSS on 5L humidified O2. She had a critical CO2 reported by Lab this AM. Tele-monitor in place showing Atrial Flutter. Patient is able to turn and roll with assist. She was assisted to and sat up in the chair for breakfast and remained sitting up for about 3.5 hrs. She remains incontinent. Pure Wick in place with good UOP. PO Lasix given as ordered. She had one large BM on the BSC that was dark brown almost black. She is on a Low Na diet with 1500cc fluid restriction. She swallows pills whole and is able to feed herself. She had no pain complaints and was free of falls or injury during shift. Bed lowered and locked. Call light in reach. Purposeful rounding completed. Will continue to monitor.

## 2020-10-26 NOTE — PT/OT/SLP PROGRESS
Physical Therapy Treatment    Patient Name:  Patsy Morris   MRN:  [id number]    Recommendations:     Discharge Recommendations:  nursing facility, skilled   Discharge Equipment Recommendations: none   Barriers to discharge: Decreased caregiver support and Current functional status    Assessment:     Patsy Morris is a 70 y.o. female admitted with a medical diagnosis of Acute on chronic respiratory failure with hypoxia and hypercapnia.  She presents with the following impairments/functional limitations:  weakness, impaired endurance, impaired self care skills, impaired functional mobilty, gait instability, impaired balance, decreased lower extremity function, impaired cardiopulmonary response to activity, decreased upper extremity function, decreased safety awareness Pt limited to the amount of therapy she was able to perform due to poor cardiopulmonary conditions which caused SOB and low spO2 levels.     Rehab Prognosis: Good; patient would benefit from acute skilled PT services to address these deficits and reach maximum level of function.    Recent Surgery: * No surgery found *      Plan:     During this hospitalization, patient to be seen 5 x/week to address the identified rehab impairments via gait training, therapeutic activities, therapeutic exercises, wheelchair management/training and progress toward the following goals:    · Plan of Care Expires:  11/15/20    Subjective     Chief Complaint: Complains of SOB  Patient/Family Comments/goals: None stated  Pain/Comfort:  · Pain Rating 1: 0/10      Objective:     Communicated with NS prior to session.  Patient found HOB elevated with PureWick, oxygen, telemetry, peripheral IV upon PT entry to room.     General Precautions: Standard, fall, respiratory   Orthopedic Precautions:N/A   Braces: N/A     Functional Mobility:  · Transfers:     · Sit to Stand:  contact guard assistance with 4 wheeled walker  · Gait: 30 Ft using Rollator and CGA.       AM-PAC 6  CLICK MOBILITY  Turning over in bed (including adjusting bedclothes, sheets and blankets)?: 3  Sitting down on and standing up from a chair with arms (e.g., wheelchair, bedside commode, etc.): 3  Moving from lying on back to sitting on the side of the bed?: 3  Moving to and from a bed to a chair (including a wheelchair)?: 3  Need to walk in hospital room?: 3  Climbing 3-5 steps with a railing?: 2  Basic Mobility Total Score: 17       Therapeutic Activities and Exercises:  The following activities were performed via bedside chair.    LAQ x 10 ea  AP x 10 ea    Patient left up in chair with all lines intact, call button in reach and NS notified..    GOALS:   Multidisciplinary Problems     Physical Therapy Goals        Problem: Physical Therapy Goal    Goal Priority Disciplines Outcome Goal Variances Interventions   Physical Therapy Goal     PT, PT/OT Ongoing, Progressing     Description: Goals to be met by: 11/15/2020    Patient will perform the following to increase strength, improve mobility, and return to prior level of function:    1. Rolling to L and R with SBA  2. Supine <> sit with SBA.  3. Sit EOB x 15 min with SBA for balance.  4. Bed <> chair transfer with SBA and least restrictive assistive device.  5. Added 10/19: Gait x10 ft with CGA and LRAD.                     Time Tracking:     PT Received On: 10/26/20  PT Start Time: 0842     PT Stop Time: 0924  PT Total Time (min): 42 min     Billable Minutes: Gait Training 25 and Therapeutic Exercise 17    Treatment Type: Treatment  PT/PTA: PTA     PTA Visit Number: 3     EMILIA Mathis  10/26/2020

## 2020-10-26 NOTE — PLAN OF CARE
Order for home Trilogy is in appeal with [employer].Pt informed she would not discharge until this issue is resolved.    CM to follow for plans and arrangemetns.

## 2020-10-26 NOTE — ASSESSMENT & PLAN NOTE
Secondary to underlying lung disease, obesity, and decompensated heart failure.  Clinically improving with intravenous diuresis and noninvasive ventilation.  Patient continues to have negative fluid balance.  Efforts to arrange for noninvasive ventilation for the home setting ongoing. [employer] insurance denied non-invasive (Trilogy) home ventilator.  Discussed with Dr. Reinaldo Correa (pulmonologist/intensivist) who feels BiPAP inadequate as patient has a high risk of treatment failure with BiPAP.  Appeals process ongoing. Waiting insurance authorization for home non-invasive ventilator.  Continue with diuretic therapy.  Currently on oral furosemide.

## 2020-10-26 NOTE — PROGRESS NOTES
"[facility] Medicine  Progress Note    Patient Name: Patsy Morris  MRN: [id number]  Patient Class: IP- Inpatient   Admission Date: 10/13/2020  Length of Stay: 13 days  Attending Physician: Loi Quiles MD  Primary Care Provider: Luisana Cartagena MD        Subjective:     Principal Problem:Acute on chronic respiratory failure with hypoxia and hypercapnia        HPI:  Per Ravi Soria:    "Per ED:  "This is a 70 y.o. female with history of CHF and COPD who presents via EMS with complaint of shortness of breath that began a few days ago. Per EMS patient had O2 saturation of 72 on 3 liters if home oxygen upon their arrival. Patient states she is on 3 liters of home oxygen at baseline. She denies fever, cough, chest pain, nausea, vomiting, diarrhea, or rhinorrhea. She reports she was recently discharged from the hospital for similar symptoms. She reports compliance with her home medications. She recently became resident of [facility] after last hospitalization. She is a former smoker. She denies undergoing any surgeries".    The patient is a 70 year old female with a PMH significant for of HTN, Chronic Respiratory Failure (COPD and BHAVANI/OHS), HFpEF, Obesity who presented to the ED with complaints of SOB that began about 3 days ago.  Patient is on 3L O2NC at home.  She was seen by her Cardiologist about 4 days prior to admission and told to decrease her Lasix from 40mg bid to qday.  She denies chest pain.  No Fevers.  No cough.  States she has been adherent with her medications.  Patient was recently admitted to [facility] from 9/1-9/11 for Acute on Chronic Respiratory Failure and discharged to SNF.  Patient was placed on BiPAP in ED and admitted to ICU.  Patient feeling better on BiPAP."    Overview/Hospital Course:  Patient is 70-year-old woman with history of hypertension, chronic hypoxic respiratory failure on home oxygen secondary to chronic obstructive pulmonary " disease, chronic diastolic heart failure, chronic atrial fibrillation, and morbid obesity re-admitted to the hospital with decompensated heart failure after patient was home for about a week following recent hospitalization and stayed at skilled nursing facility for physical therapy.  Patient treated with non invasive ventilation intravenous diuretics.  Slowly clinically improving.  Patient also initially treated with antibiotic therapy which has since been discontinued as bacterial pneumonia ruled out on clinical grounds.  Patient does have a cavitary lesion on her chest CT scan but otherwise without signs or symptoms of active pulmonary tuberculosis.  Acid-fast bacillus smears and cultures ordered to rule out active pulmonary tuberculosis.  Patient with one negative acid-fast bacillus smear thus far.  Efforts at obtaining additional smears even with sputum induction with hypertonic saline has not resulted additional sputum.  Discussed with Pulmonary Critical Care service who recommended discontinuing further smears and respiratory isolation.  She continue to slowly improve with diuresis.    Interval History:  No acute events overnight.    Review of Systems   Constitutional: Negative for chills and fever.   Respiratory: Negative for shortness of breath and wheezing.    Cardiovascular: Negative for chest pain.   Gastrointestinal: Negative for abdominal distention, abdominal pain, constipation, diarrhea, nausea and vomiting.   Genitourinary: Negative for dysuria and frequency.   Musculoskeletal: Negative for arthralgias and myalgias.   Neurological: Negative for light-headedness.   Psychiatric/Behavioral: Negative for agitation and confusion.     Objective:     Vital Signs (Most Recent):  Temp: 98.1 °F (36.7 °C) (10/26/20 0733)  Pulse: 79 (10/26/20 0800)  Resp: 20 (10/26/20 0733)  BP: 106/72 (10/26/20 0733)  SpO2: (!) 93 % (10/26/20 0733) Vital Signs (24h Range):  Temp:  [96.8 °F (36 °C)-98.4 °F (36.9 °C)] 98.1 °F  (36.7 °C)  Pulse:  [] 79  Resp:  [16-22] 20  SpO2:  [90 %-94 %] 93 %  BP: (106-123)/(58-74) 106/72     Weight: 99.4 kg (219 lb 2.2 oz)  Body mass index is 38.82 kg/m².    Intake/Output Summary (Last 24 hours) at 10/26/2020 1049  Last data filed at 10/26/2020 0524  Gross per 24 hour   Intake 600 ml   Output 1400 ml   Net -800 ml      Physical Exam  Constitutional:       General: She is not in acute distress.     Appearance: She is well-developed. She is obese.   HENT:      Head: Atraumatic.   Eyes:      Conjunctiva/sclera: Conjunctivae normal.   Neck:      Musculoskeletal: Neck supple.   Cardiovascular:      Rate and Rhythm: Normal rate and regular rhythm.      Heart sounds: Normal heart sounds. No murmur.   Pulmonary:      Breath sounds: No wheezing.      Comments: Distant breath sounds.  Abdominal:      General: Bowel sounds are normal. There is no distension.      Palpations: Abdomen is soft.      Tenderness: There is no abdominal tenderness.   Musculoskeletal: Normal range of motion.         General: No deformity.      Right lower leg: No edema.      Left lower leg: No edema.   Neurological:      Mental Status: She is alert and oriented to person, place, and time.         Significant Labs: All pertinent labs within the past 24 hours have been reviewed.    Significant Imaging: I have reviewed all pertinent imaging results/findings within the past 24 hours.      Assessment/Plan:      * Acute on chronic respiratory failure with hypoxia and hypercapnia  Secondary to underlying lung disease, obesity, and decompensated heart failure.  Clinically improving with intravenous diuresis and noninvasive ventilation.  Patient continues to have negative fluid balance.  Efforts to arrange for noninvasive ventilation for the home setting ongoing. [employer] insurance denied non-invasive (Trilogy) home ventilator.  Discussed with Dr. Reinaldo Correa (pulmonologist/intensivist) who feels BiPAP inadequate as patient has a  high risk of treatment failure with BiPAP.  Appeals process ongoing. Waiting insurance authorization for home non-invasive ventilator.  Continue with diuretic therapy.  Currently on oral furosemide.    Cavitary lesion of lung  Active pulmonary tuberculosis unlikely.  One acid-fast bacillus smear negative.  Additional efforts at obtaining sputum for additional smears not successful despite using hypertonic saline.  Discussed with Pulmonary Critical Care who recommended discontinuing further smears and respiratory isolation.    Acute on chronic diastolic congestive heart failure  Improving.  Continue medical therapy including diuretic therapy to maintain negative fluid balance.    Atrial fibrillation with rapid ventricular response  Stable.  Continue with beta-blocker therapy for rate control and apixaban for stroke risk reduction.    Essential hypertension  Reasonably controlled with current regimen.  Will continue with current regimen and continue to monitor.    Chronic kidney disease (CKD) stage G4/A1, severely decreased glomerular filtration rate (GFR) between 15-29 mL/min/1.73 square meter and albuminuria creatinine ratio less than 30 mg/g  Stable.  Continue to closely monitor.    Dyslipidemia  Continue with statin therapy.    Debility  Consult physical and occupational therapy.    Current moderate episode of major depressive disorder without prior episode  Stable.  Continue SSRI therapy.      VTE Risk Mitigation (From admission, onward)         Ordered     apixaban tablet 5 mg  2 times daily      10/17/20 1152     Place sequential compression device  Until discontinued      10/13/20 1405     IP VTE HIGH RISK PATIENT  Once      10/13/20 1405                Loi Quiles MD  Department of Hospital Medicine   [facility]

## 2020-10-26 NOTE — SUBJECTIVE & OBJECTIVE
Interval History:  No acute events overnight.    Review of Systems   Constitutional: Negative for chills and fever.   Respiratory: Negative for shortness of breath and wheezing.    Cardiovascular: Negative for chest pain.   Gastrointestinal: Negative for abdominal distention, abdominal pain, constipation, diarrhea, nausea and vomiting.   Genitourinary: Negative for dysuria and frequency.   Musculoskeletal: Negative for arthralgias and myalgias.   Neurological: Negative for light-headedness.   Psychiatric/Behavioral: Negative for agitation and confusion.     Objective:     Vital Signs (Most Recent):  Temp: 98.1 °F (36.7 °C) (10/26/20 0733)  Pulse: 79 (10/26/20 0800)  Resp: 20 (10/26/20 0733)  BP: 106/72 (10/26/20 0733)  SpO2: (!) 93 % (10/26/20 0733) Vital Signs (24h Range):  Temp:  [96.8 °F (36 °C)-98.4 °F (36.9 °C)] 98.1 °F (36.7 °C)  Pulse:  [] 79  Resp:  [16-22] 20  SpO2:  [90 %-94 %] 93 %  BP: (106-123)/(58-74) 106/72     Weight: 99.4 kg (219 lb 2.2 oz)  Body mass index is 38.82 kg/m².    Intake/Output Summary (Last 24 hours) at 10/26/2020 1049  Last data filed at 10/26/2020 0524  Gross per 24 hour   Intake 600 ml   Output 1400 ml   Net -800 ml      Physical Exam  Constitutional:       General: She is not in acute distress.     Appearance: She is well-developed. She is obese.   HENT:      Head: Atraumatic.   Eyes:      Conjunctiva/sclera: Conjunctivae normal.   Neck:      Musculoskeletal: Neck supple.   Cardiovascular:      Rate and Rhythm: Normal rate and regular rhythm.      Heart sounds: Normal heart sounds. No murmur.   Pulmonary:      Breath sounds: No wheezing.      Comments: Distant breath sounds.  Abdominal:      General: Bowel sounds are normal. There is no distension.      Palpations: Abdomen is soft.      Tenderness: There is no abdominal tenderness.   Musculoskeletal: Normal range of motion.         General: No deformity.      Right lower leg: No edema.      Left lower leg: No edema.    Neurological:      Mental Status: She is alert and oriented to person, place, and time.         Significant Labs: All pertinent labs within the past 24 hours have been reviewed.    Significant Imaging: I have reviewed all pertinent imaging results/findings within the past 24 hours.

## 2020-10-26 NOTE — PLAN OF CARE
POC reviewed with patient. Patient AAOx4, VSS. Patient requires 5 L nasal cannula when awake and wears BIPAP at HS. Patient tolerated BIPAP well overnight. Patient placed back on 5 L nasal cannula with humidification this morning. PRN Tylenol and melatonin administered to patient at HS, with positive relief. Midline in place to right arm, flushes without difficulty. Dressing CDI. Purewick in place putting out yellow urine. 1500 ml fluid restriction in place. Tele monitored, patient remains in [facility]. Purposeful rounding completed. Patient instructed to call staff with any needs.

## 2020-10-26 NOTE — PLAN OF CARE
During routine rounds, assessed pt for spiritual distress, and reminded pt  of spiritual care available.  While providing reflective listening and compassionate presence, pt concerns were explored.  Prayer/wishes for peace and rapid healing were shared.  No  particular spiritual care needs were reported nor presented at this time, but pt was somewhat concerned that someone had mentioned she might be discharged soon, and she does not feel ready to do so.  Pt was encouraged to prepare questions and concerns for her next MD visit, and assured she would only be discharged when it was safe to do so (to either home or another IP facility).    Pt reported gratitude for the spiritual wellness check.   Follow-up was offered upon request, and will also be offered at staff's discretion, should pt's conditions change, and/or if hospital admission continues significantly.

## 2020-10-26 NOTE — PLAN OF CARE
[facility] HOME HEALTH ORDERS  FACE TO FACE ENCOUNTER    Patient Name: Patsy Morris  YOB: 1950    PCP: Luisana Cartagena MD   [address] [facility] [address]  PCP Phone Number: [phone]  PCP Fax: [phone]    Encounter Date: 10/26/2020    Admit to Home Health    Diagnoses:  Active Hospital Problems    Diagnosis  POA    *Acute on chronic respiratory failure with hypoxia and hypercapnia [J96.21, J96.22]  Yes     Priority: 1 - High    Cavitary lesion of lung [J98.4]  Yes     Priority: 3     Acute on chronic diastolic congestive heart failure [I50.33]  Yes     Priority: 3     Atrial fibrillation with rapid ventricular response [I48.91]  Yes     Priority: 6     Essential hypertension [I10]  Yes     Priority: 10     Chronic kidney disease (CKD) stage G4/A1, severely decreased glomerular filtration rate (GFR) between 15-29 mL/min/1.73 square meter and albuminuria creatinine ratio less than 30 mg/g [N18.4]  Yes     Priority: 11     Dyslipidemia [E78.5]  Yes     Priority: 12     Obesity hypoventilation syndrome [E66.2]  Yes     Priority: 20     Debility [R53.81]  Yes     Priority: 22     Acute on chronic congestive heart failure [I50.9]  Yes    Encounter for palliative care [Z51.5]  Not Applicable    Current moderate episode of major depressive disorder without prior episode [F32.1]  Yes      Resolved Hospital Problems    Diagnosis Date Resolved POA    Acute on chronic congestive heart failure [I50.9] 10/13/2020 Yes    Pulmonary nodules/lesions, multiple [R91.8] 10/14/2020 Yes    Paroxysmal atrial fibrillation [I48.0] 10/13/2020 Yes    Chronic hypercapnic respiratory failure [J96.12] 10/13/2020 Yes    Renal carcinoma [C64.9] 10/13/2020 Yes       Future Appointments   Date Time Provider Department Center   11/4/2020  2:00 PM Marc Rm MD [facility] [address]   11/5/2020 10:30 AM Ajay Lutz MD [facility]  CARDIO [facility]     Follow-up Information     Schedule an appointment as soon as possible for a visit with Luisana Cartagena MD.    Specialty: Family Medicine  Why: Re-establish outpatient care management of multiple medical comorbidities.  Contact information:  [address]  [address]  [facility] [id number] [phone]                     I have seen and examined this patient face to face today. My clinical findings that support the need for the home health skilled services and home bound status are the following:  Weakness/numbness causing balance and gait disturbance due to Heart Failure and Weakness/Debility making it taxing to leave home.    Allergies:Review of patient's allergies indicates:  No Known Allergies    Diet: cardiac diet and diabetic diet: 1800 calorie    Activities: activity as tolerated    Nursing:   SN to complete comprehensive assessment including routine vital signs. Instruct on disease process and s/s of complications to report to MD. Review/verify medication list sent home with the patient at time of discharge  and instruct patient/caregiver as needed. Frequency may be adjusted depending on start of care date.    Notify MD if SBP > 160 or < 90; DBP > 90 or < 50; HR > 120 or < 50; Temp > 101      CONSULTS:    Physical Therapy to evaluate and treat. Evaluate for home safety and equipment needs; Establish/upgrade home exercise program. Perform / instruct on therapeutic exercises, gait training, transfer training, and Range of Motion.  Occupational Therapy to evaluate and treat. Evaluate home environment for safety and equipment needs. Perform/Instruct on transfers, ADL training, ROM, and therapeutic exercises.   to evaluate for community resources/long-range planning.  Aide to provide assistance with personal care, ADLs, and vital signs.    MISCELLANEOUS CARE:  Routine Skin for Bedridden Patients: Instruct patient/caregiver to apply moisture barrier cream to all skin  folds and wet areas in perineal area daily and after baths and all bowel movements.  Diabetic Care:   SN to perform and educate Diabetic management with blood glucose monitoring:, Fingerstick blood sugar AC and HS and Report CBG < 60 or > 350 to physician.  Heart Failure:      SN to instruct on the following:    Instruct on the definition of CHF.   Instruct on the signs/sympoms of CHF to be reported.   Instruct on and monitor daily weights.   Instruct on factors that cause exacerbation.   Instruct on action, dose, schedule, and side effects of medications.   Instruct on diet as prescribed.   Instruct on activity allowed.   Instruct on life-style modifications for life long management of CHF   SN to assess compliance with daily weights, diet, medications, fluid retention,    safety precautions, activities permitted and life-style modifications.   Additional 1-2 SN visits per week as needed for signs and symptoms     of CHF exacerbation.      For Weight Gain > 2-3 lbs in 1 day or 4-6 lbs over 1 week notify PCP and cardiologist.  Home Oxygen:  Oxygen at 5 L/min nasal canula to be used:  Continuously.    WOUND CARE ORDERS  Please pad bridge of nose prior to application of noninvasive ventilation mask    Medications: Review discharge medications with patient and family and provide education.      Current Discharge Medication List      CONTINUE these medications which have CHANGED    Details   acetaminophen (TYLENOL) 500 MG tablet Take 2 tablets (1,000 mg total) by mouth every 8 (eight) hours as needed for Pain.  Qty:  , Refills: 0      albuterol-ipratropium (DUO-NEB) 2.5 mg-0.5 mg/3 mL nebulizer solution Take 3 mLs by nebulization every 6 (six) hours as needed for Wheezing or Shortness of Breath.  Qty: 1 Box, Refills: 0      apixaban (ELIQUIS) 5 mg Tab Take 1 tablet (5 mg total) by mouth 2 (two) times daily.  Qty: 60 tablet, Refills: 0    Associated Diagnoses: Atrial fibrillation with rapid ventricular response       atorvastatin (LIPITOR) 80 MG tablet Take 1 tablet (80 mg total) by mouth every evening.  Qty: 30 tablet, Refills: 0    Comments: 10/21/2020 9:29:24 AM  Associated Diagnoses: Dyslipidemia      docusate sodium (COLACE) 100 MG capsule Take 1 capsule (100 mg total) by mouth 2 (two) times daily.  Qty: 60 capsule, Refills: 0      ergocalciferol (ERGOCALCIFEROL) 50,000 unit Cap Take 1 capsule (50,000 Units total) by mouth every Wednesday.  Qty: 4 capsule, Refills: 0      escitalopram oxalate (LEXAPRO) 10 MG tablet Take 1 tablet (10 mg total) by mouth every evening.  Qty: 30 tablet, Refills: 0      ferrous sulfate 325 (65 FE) MG EC tablet Take 1 tablet (325 mg total) by mouth once daily.  Qty: 30 tablet, Refills: 0      furosemide (LASIX) 40 MG tablet Take 1 tablet (40 mg total) by mouth 2 (two) times daily.  Qty: 60 tablet, Refills: 0      gabapentin (NEURONTIN) 100 MG capsule Take 1 capsule (100 mg total) by mouth 2 (two) times daily.  Qty: 60 capsule, Refills: 0      metoprolol tartrate (LOPRESSOR) 50 MG tablet Take 1 tablet (50 mg total) by mouth 2 (two) times daily.  Qty: 60 tablet, Refills: 0    Associated Diagnoses: Atrial fibrillation with rapid ventricular response      miconazole NITRATE 2 % (MICOTIN) 2 % top powder Apply topically 2 (two) times daily. Apply to breast fold and abdominal folds twice daily.  Qty: 43 g, Refills: 0      polyethylene glycol (GLYCOLAX) 17 gram PwPk Take 17 g by mouth once daily.  Qty: 30 each, Refills: 0         CONTINUE these medications which have NOT CHANGED    Details   blood sugar diagnostic (BLOOD GLUCOSE TEST) Strp Qd testing  Qty: 100 strip, Refills: 3      vitamin renal formula, B-complex-vitamin c-folic acid, (NEPHROCAP) 1 mg Cap Take 1 capsule by mouth once daily.  Qty: 30 each, Refills: 0      walker Misc Prn use please fit pt  Qty: 1 each, Refills: 0    Associated Diagnoses: At risk for falls         STOP taking these medications       ferrous sulfate (FEOSOL) 325 mg (65  mg iron) Tab tablet Comments:   Reason for Stopping:         potassium chloride SA (K-DUR,KLOR-CON) 20 MEQ tablet Comments:   Reason for Stopping:         valACYclovir (VALTREX) 500 MG tablet Comments:   Reason for Stopping:               I certify that this patient is confined to her home and needs intermittent skilled nursing care, physical therapy and occupational therapy.

## 2020-10-26 NOTE — PLAN OF CARE
CM left a message with Zee at [facility] to inquire about auth for trilogy. Pending return call.

## 2020-10-26 NOTE — PLAN OF CARE
Discharge instructions given to pt, verbalizes understanding. VSS. Pt AAOX4, respirations even and unlabored on 5L of O2. No complaints of pain. Pt up to chair a few hours throughout the day. Incontinent of bowel and bladder. Telemetry monitoring maintained this shift. Fall and safety measures maintained this shift. Bed low and locked, side rails up x3, call bell and personal items within reach, will continue to monitor.

## 2020-10-26 NOTE — PT/OT/SLP PROGRESS
"Occupational Therapy      Patient Name:  Patsy Morris   MRN:  [id number]    Patient not seen today secondary to feeling fatigued and "not right".  OT present with pt from 12:43-12:49 providing encouragement and education; however, pt indicated she felt fatigued from earlier activity and requested to hold off.  OT reviewed UB exercises with pt; pt verbalized understanding  . Will follow-up as schedule permits.    CARLOS Motta  10/26/2020  "

## 2020-10-26 NOTE — PROGRESS NOTES
[facility]  Cardiology  Progress Note    Patient Name: Patsy Morris  MRN: [id number]  Admission Date: 10/13/2020  Hospital Length of Stay: 13 days  Code Status: Full Code   Attending Physician: Loi Quiles MD   Primary Care Physician: Luisana Cartagena MD  Expected Discharge Date:   Principal Problem:Acute on chronic respiratory failure with hypoxia and hypercapnia    Subjective:     Brief HPI:    Patsy Morris is a 70 y.o.female with hypertension. She has chronic atrial fibrillation and heart failure with preserved ejection fraction. She has chronic obstructive pulmonary disease being on home oxygen with CO2 retention. She has undergone nephrectomy for renal cell carcinoma and has chronic kidney disease. She was admitted to [facility] in 8/2020 and 9/2020 with heart failure and exacerbations of her chronic obstructive pulmonary disease. She went to therapy at [facility].      She used to be on furosemide 40 mg Q24 however after her last hospitalization she had the does of furosemide increased to 80 mg Q24. The dose was reduced to 40 mg Q24 on 10/8/2020. She became increasingly short of breath and presents fluid overloaded in heart failure as well as an exacerbation of her COPD with high CO2. She was admitted to the ICU.    Hospital Course:    Diuresis.    BIPAP.    Respiratory treatments.    10/15/2020: Echo: Normal left ventricular size and systolic function. EF 60%. Moderately dilated LA. Mildly dilated RV. SPAP 49 mmHg. Small pericardial effusion.    10/16/2020: Apixiban 5 mg Q12 was changed to heparin iv for consideration of bronchoscopy.    10/21/2020: Transferred from ICU to telemetry.    Interval History:     Slowly breathing slightly easier from day to day.    Out of bed. Sitting chair. Too weak to be able to stand by herself.      Review of Systems   Constitution: Positive for malaise/fatigue. Negative for chills and fever.   HENT: Negative for nosebleeds.     Eyes: Negative for vision loss in left eye and vision loss in right eye.   Cardiovascular: Negative for chest pain, leg swelling, orthopnea, palpitations and paroxysmal nocturnal dyspnea.   Respiratory: Negative for cough, hemoptysis, shortness of breath, sputum production and wheezing.    Hematologic/Lymphatic: Negative for bleeding problem.   Skin: Negative for rash.   Musculoskeletal: Negative for myalgias.   Gastrointestinal: Negative for abdominal pain, heartburn, hematemesis, hematochezia, jaundice, melena, nausea and vomiting.   Genitourinary: Negative for hematuria.   Neurological: Positive for weakness. Negative for dizziness, headaches, light-headedness and vertigo.   Psychiatric/Behavioral: Negative for altered mental status. The patient is not nervous/anxious.    Allergic/Immunologic: Negative for persistent infections.       Objective:     Vital Signs (Most Recent):  Temp: 98.3 °F (36.8 °C) (10/26/20 1114)  Pulse: (!) 43 (10/26/20 1114)  Resp: 20 (10/26/20 1114)  BP: (!) 112/56 (10/26/20 1114)  SpO2: (!) 90 % (10/26/20 1114) Vital Signs (24h Range):  Temp:  [96.8 °F (36 °C)-98.4 °F (36.9 °C)] 98.3 °F (36.8 °C)  Pulse:  [] 43  Resp:  [16-22] 20  SpO2:  [90 %-94 %] 90 %  BP: (106-123)/(56-74) 112/56     Weight: 99.4 kg (219 lb 2.2 oz)  Body mass index is 38.82 kg/m².    SpO2: (!) 90 %  O2 Device (Oxygen Therapy): nasal cannula      Intake/Output Summary (Last 24 hours) at 10/26/2020 1200  Last data filed at 10/26/2020 0900  Gross per 24 hour   Intake 720 ml   Output 1400 ml   Net -680 ml       Lines/Drains/Airways     Drain            Female External Urinary Catheter 10/13/20 1359 12 days          Peripheral Intravenous Line                 Midline Catheter Insertion/Assessment  - Double Lumen 10/20/20 1220 Right median cubital vein (antecubital fossa)  5 days                Physical Exam   Constitutional: She is oriented to person, place, and time. She appears well-developed and well-nourished.  She appears ill.   Neck: Carotid bruit is not present.   Cardiovascular: Normal rate, S1 normal and S2 normal. An irregularly irregular rhythm present.   Pulmonary/Chest: She has rhonchi in the right lower field and the left lower field.   Musculoskeletal:      Right ankle: She exhibits no swelling.      Left ankle: She exhibits no swelling.   Neurological: She is alert and oriented to person, place, and time.   Skin: Skin is warm and dry.   Psychiatric: She has a normal mood and affect. Her speech is normal and behavior is normal. Cognition and memory are normal.     Current Medications:     apixaban  5 mg Oral BID    atorvastatin  80 mg Oral QHS    docusate sodium  100 mg Oral BID    escitalopram oxalate  10 mg Oral QHS    ferrous sulfate  325 mg Oral Daily    furosemide  40 mg Oral BID    gabapentin  100 mg Oral BID    metoprolol tartrate  50 mg Oral BID    miconazole NITRATE 2 %   Topical (Top) BID    vitamin renal formula (B-complex-vitamin c-folic acid)  1 capsule Oral Daily     Current Laboratory Results:    Recent Results (from the past 24 hour(s))   Basic metabolic panel    Collection Time: 10/26/20  4:32 AM   Result Value Ref Range    Sodium 140 136 - 145 mmol/L    Potassium 5.1 3.5 - 5.1 mmol/L    Chloride 88 (L) 95 - 110 mmol/L    CO2 41 (HH) 23 - 29 mmol/L    Glucose 91 70 - 110 mg/dL    BUN, Bld 49 (H) 8 - 23 mg/dL    Creatinine 1.6 (H) 0.5 - 1.4 mg/dL    Calcium 9.6 8.7 - 10.5 mg/dL    Anion Gap 11 8 - 16 mmol/L    eGFR if African American 37 (A) >60 mL/min/1.73 m^2    eGFR if non African American 32 (A) >60 mL/min/1.73 m^2   Magnesium    Collection Time: 10/26/20  4:32 AM   Result Value Ref Range    Magnesium 2.6 1.6 - 2.6 mg/dL   Phosphorus    Collection Time: 10/26/20  4:32 AM   Result Value Ref Range    Phosphorus 4.1 2.7 - 4.5 mg/dL   CBC auto differential    Collection Time: 10/26/20  4:32 AM   Result Value Ref Range    WBC 7.16 3.90 - 12.70 K/uL    RBC 3.86 (L) 4.00 - 5.40 M/uL     Hemoglobin 10.6 (L) 12.0 - 16.0 g/dL    Hematocrit 36.5 (L) 37.0 - 48.5 %    Mean Corpuscular Volume 95 82 - 98 fL    Mean Corpuscular Hemoglobin 27.5 27.0 - 31.0 pg    Mean Corpuscular Hemoglobin Conc 29.0 (L) 32.0 - 36.0 g/dL    RDW 14.5 11.5 - 14.5 %    Platelets 145 (L) 150 - 350 K/uL    MPV 12.0 9.2 - 12.9 fL    Immature Granulocytes 0.1 0.0 - 0.5 %    Gran # (ANC) 5.6 1.8 - 7.7 K/uL    Immature Grans (Abs) 0.01 0.00 - 0.04 K/uL    Lymph # 0.6 (L) 1.0 - 4.8 K/uL    Mono # 0.8 0.3 - 1.0 K/uL    Eos # 0.1 0.0 - 0.5 K/uL    Baso # 0.02 0.00 - 0.20 K/uL    nRBC 0 0 /100 WBC    Gran% 78.4 (H) 38.0 - 73.0 %    Lymph% 8.9 (L) 18.0 - 48.0 %    Mono% 10.9 4.0 - 15.0 %    Eosinophil% 1.4 0.0 - 8.0 %    Basophil% 0.3 0.0 - 1.9 %    Differential Method Automated    BNP    Collection Time: 10/26/20  4:32 AM   Result Value Ref Range     (H) 0 - 99 pg/mL     Current Imaging Results:    X-Ray Chest AP Portable   Final Result      No significant change.  There is cardiomegaly, multifocal airspace opacities and bilateral effusions.         Electronically signed by: Ajay Camara MD   Date:    10/20/2020   Time:    08:48      US Lower Extremity Veins Bilateral   Final Result      No evidence of deep venous thrombosis in either lower extremity.         Electronically signed by: Ceasar Carreon MD   Date:    10/15/2020   Time:    21:53      CT Chest Without Contrast   Final Result      The findings highly concerning for extensive diffuse pulmonary infection with bilateral pleural fluid collections right greater than left.         Electronically signed by: Regulo Bassett MD   Date:    10/15/2020   Time:    12:01      X-Ray Chest AP Portable   Final Result      Interval worsening of diffuse bilateral airspace disease and moderate bilateral pleural effusions when compared to 09/09/2020         Electronically signed by: Halina Plascencia   Date:    10/13/2020   Time:    09:40      X-Ray Chest PA And Lateral    (Results Pending)        10/15/2020: Echo:    The left ventricle is normal in size with normal systolic function. The estimated ejection fraction is 60%.  A diastolic pattern consistent with atrial fibrillation observed.  Moderate left atrial enlargement.  Mild right ventricular enlargement.  Normal right ventricular systolic function.  Severe right atrial enlargement.  The aortic valve is mildly sclerotic.  The mitral valve is mildly sclerotic.  Mild mitral regurgitation.  Mild tricuspid regurgitation.  There is mild pulmonary hypertension.  The estimated PA systolic pressure is 49 mmHg.  Intermediate central venous pressure (8 mmHg).  Small circumferential pericardial effusion.  There is a left pleural effusion.      Assessment and Plan:     Problem List:    Active Diagnoses:    Diagnosis Date Noted POA    PRINCIPAL PROBLEM:  Acute on chronic respiratory failure with hypoxia and hypercapnia [J96.21, J96.22] 08/03/2016 Yes    Acute on chronic congestive heart failure [I50.9] 10/21/2020 Yes    Cavitary lesion of lung [J98.4] 10/16/2020 Yes    Atrial fibrillation with rapid ventricular response [I48.91] 09/01/2020 Yes    Debility [R53.81] 08/26/2020 Yes    Encounter for palliative care [Z51.5] 08/25/2020 Not Applicable    Current moderate episode of major depressive disorder without prior episode [F32.1] 04/30/2020 Yes    Acute on chronic diastolic congestive heart failure [I50.33] 05/20/2016 Yes    Obesity hypoventilation syndrome [E66.2] 02/25/2016 Yes    Chronic kidney disease (CKD) stage G4/A1, severely decreased glomerular filtration rate (GFR) between 15-29 mL/min/1.73 square meter and albuminuria creatinine ratio less than 30 mg/g [N18.4]  Yes    Dyslipidemia [E78.5] 02/22/2016 Yes    Essential hypertension [I10] 10/15/2014 Yes      Problems Resolved During this Admission:    Diagnosis Date Noted Date Resolved POA    Acute on chronic congestive heart failure [I50.9] 10/13/2020 10/13/2020 Yes    Pulmonary  nodules/lesions, multiple [R91.8] 03/22/2019 10/14/2020 Yes    Paroxysmal atrial fibrillation [I48.0] 08/03/2016 10/13/2020 Yes    Chronic hypercapnic respiratory failure [J96.12] 03/11/2016 10/13/2020 Yes    Renal carcinoma [C64.9] 03/10/2015 10/13/2020 Yes       Assessment and Plan:     1. Heart Failure, Diastolic, Acute on Chronic              8/24/2020: Echo: Normal left ventricular size and systolic function. Mildly dilated LA.   10/15/2020: Echo: Normal left ventricular size and systolic function. EF 60%. Moderately dilated LA. Mildly dilated RV. SPAP 49 mmHg. Small pericardial effusion.              At NH was on furosemide 40 mg Q24.              10/13/2020: Presented fluid overloaded in HF. Received furosemide 80 mg iv Q12.   10/20/2020: CXR with extensive infiltrates and pleural effusions. .    10/26/2020: . CXR just done.              Does not appear fluid overloaded at present.   On furosemide 40 mg po Q12.   Watch weight and increase dose as needed for weight gain.      2. Atrial Fibrillation              In chronic atrial fibrillation.              On apixiban.              On metoprolol 50 mg Q12.              Rate well controlled mostly  bpm.     3. Chronic Anticoagulation              Been on apixiban 5 mg Q12.   10/16/2020: Apixiban 5 mg Q12 was changed to heparin iv.   On apixiban 5 mg Q12.   No bleeding.    4. Hypertension   On metoprolol 50 mg Q12.   Blood pressure has come down with diuresis.                5. Chronic Kidney Disease, Stage 4              History or renal carcinoma and nephrectomy.              10/13/2020: BUN/crea 27/2.0. CrCl 28.   10/20/2020: BUN/crea 56/1.8. CrCl 32.   10/25/2020: BUN/crea 49/1.6. CrCl 32.      6. Chronic Obstructive Pulmonary Disease              Chronic respiratory failure with CO2 retention.              10/13/2020: 7.23/94/73/39/90% on FiO2 of 40%.   Appears to overall be slowly improving.    Pulmonary following.    7.  Weakness   Needs PT.      VTE Risk Mitigation (From admission, onward)         Ordered     apixaban tablet 5 mg  2 times daily      10/17/20 1152     Place sequential compression device  Until discontinued      10/13/20 1405     IP VTE HIGH RISK PATIENT  Once      10/13/20 1405                Diana Meredith MD  Cardiology  [facility]

## 2020-10-26 NOTE — PLAN OF CARE
Problem: Physical Therapy Goal  Goal: Physical Therapy Goal  Description: Goals to be met by: 11/15/2020    Patient will perform the following to increase strength, improve mobility, and return to prior level of function:    1. Rolling to L and R with SBA  2. Supine <> sit with SBA.  3. Sit EOB x 15 min with SBA for balance.  4. Bed <> chair transfer with SBA and least restrictive assistive device.  5. Added 10/19: Gait x10 ft with CGA and LRAD.    Outcome: Ongoing, Progressing   Pt presents with HOB elevated 5L O2 via NC pretreatment SPO2 86% but increased to 91% before starting activities. Post treatment SPO2 84 % but increased to 91% shortly after. Pt consents to treatment today. Pt performed the following therapy activities:   Sit<>Stand using Rollator and CGA bed in lowered position.   Gait- x 30Ft using Rollator and portable O2.

## 2020-10-27 PROBLEM — I50.9 ACUTE ON CHRONIC CONGESTIVE HEART FAILURE: Status: RESOLVED | Noted: 2020-01-01 | Resolved: 2020-01-01

## 2020-10-27 PROBLEM — D64.9 ANEMIA: Status: ACTIVE | Noted: 2020-01-01

## 2020-10-27 PROBLEM — J98.4 CAVITARY LESION OF LUNG: Status: RESOLVED | Noted: 2020-01-01 | Resolved: 2020-01-01

## 2020-10-27 NOTE — PROGRESS NOTES
[facility]  Cardiology  Progress Note    Patient Name: Patsy Morris  MRN: [id number]  Admission Date: 10/13/2020  Hospital Length of Stay: 14 days  Code Status: Full Code   Attending Physician: Ravi Soria MD   Primary Care Physician: Luisana Cartagena MD  Expected Discharge Date:   Principal Problem:Acute on chronic respiratory failure with hypoxia and hypercapnia    Subjective:     Brief HPI:    Patsy Morris is a 70 y.o.female with hypertension. She has chronic atrial fibrillation and heart failure with preserved ejection fraction. She has chronic obstructive pulmonary disease being on home oxygen with CO2 retention. She has undergone nephrectomy for renal cell carcinoma and has chronic kidney disease. She was admitted to [facility] in 8/2020 and 9/2020 with heart failure and exacerbations of her chronic obstructive pulmonary disease. She went to therapy at [facility].      She used to be on furosemide 40 mg Q24 however after her last hospitalization she had the does of furosemide increased to 80 mg Q24. The dose was reduced to 40 mg Q24 on 10/8/2020. She became increasingly short of breath and presents fluid overloaded in heart failure as well as an exacerbation of her COPD with high CO2. She was admitted to the ICU.    Hospital Course:    Diuresis.    BIPAP.    Respiratory treatments.    10/15/2020: Echo: Normal left ventricular size and systolic function. EF 60%. Moderately dilated LA. Mildly dilated RV. SPAP 49 mmHg. Small pericardial effusion.    10/16/2020: Apixiban 5 mg Q12 was changed to heparin iv for consideration of bronchoscopy.    10/21/2020: Transferred from ICU to telemetry.    Interval History:     10/27/2020: Walked 150 ft with PT.    Slowly breathing slightly easier from day to day.    Slowly stronger.    Review of Systems   Constitution: Positive for malaise/fatigue. Negative for chills and fever.   HENT: Negative for nosebleeds.    Eyes: Negative for  vision loss in left eye and vision loss in right eye.   Cardiovascular: Negative for chest pain, leg swelling, orthopnea, palpitations and paroxysmal nocturnal dyspnea.   Respiratory: Negative for cough, hemoptysis, shortness of breath, sputum production and wheezing.    Hematologic/Lymphatic: Negative for bleeding problem.   Skin: Negative for rash.   Musculoskeletal: Negative for myalgias.   Gastrointestinal: Negative for abdominal pain, heartburn, hematemesis, hematochezia, jaundice, melena, nausea and vomiting.   Genitourinary: Negative for hematuria.   Neurological: Positive for weakness. Negative for dizziness, headaches, light-headedness and vertigo.   Psychiatric/Behavioral: Negative for altered mental status. The patient is not nervous/anxious.    Allergic/Immunologic: Negative for persistent infections.       Objective:     Vital Signs (Most Recent):  Temp: 97.8 °F (36.6 °C) (10/27/20 0723)  Pulse: 67 (10/27/20 0723)  Resp: (!) 23 (10/27/20 0723)  BP: (!) 116/56 (10/27/20 0723)  SpO2: (!) 92 % (10/27/20 0723) Vital Signs (24h Range):  Temp:  [97 °F (36.1 °C)-98.3 °F (36.8 °C)] 97.8 °F (36.6 °C)  Pulse:  [43-88] 67  Resp:  [18-26] 23  SpO2:  [88 %-93 %] 92 %  BP: (105-128)/(56-71) 116/56     Weight: 99.4 kg (219 lb 2.2 oz)  Body mass index is 38.82 kg/m².    SpO2: (!) 92 %  O2 Device (Oxygen Therapy): nasal cannula      Intake/Output Summary (Last 24 hours) at 10/27/2020 1025  Last data filed at 10/27/2020 0600  Gross per 24 hour   Intake 680 ml   Output 450 ml   Net 230 ml       Lines/Drains/Airways     Drain            Female External Urinary Catheter 10/13/20 1359 13 days          Peripheral Intravenous Line                 Midline Catheter Insertion/Assessment  - Double Lumen 10/20/20 1220 Right median cubital vein (antecubital fossa)  6 days                Physical Exam   Constitutional: She is oriented to person, place, and time. She appears well-developed and well-nourished. She appears ill.   Neck:  Carotid bruit is not present.   Cardiovascular: Normal rate, S1 normal and S2 normal. An irregularly irregular rhythm present.   Pulmonary/Chest: She has rhonchi in the right lower field and the left lower field.   Musculoskeletal:      Right ankle: She exhibits no swelling.      Left ankle: She exhibits no swelling.   Neurological: She is alert and oriented to person, place, and time.   Skin: Skin is warm and dry.   Psychiatric: She has a normal mood and affect. Her speech is normal and behavior is normal. Cognition and memory are normal.     Current Medications:     apixaban  5 mg Oral BID    atorvastatin  80 mg Oral QHS    docusate sodium  100 mg Oral BID    escitalopram oxalate  10 mg Oral QHS    ferrous sulfate  325 mg Oral Daily    furosemide  40 mg Oral BID    gabapentin  100 mg Oral BID    metoprolol tartrate  50 mg Oral BID    miconazole NITRATE 2 %   Topical (Top) BID    vitamin renal formula (B-complex-vitamin c-folic acid)  1 capsule Oral Daily     Current Laboratory Results:    Recent Results (from the past 24 hour(s))   Basic metabolic panel    Collection Time: 10/27/20  4:12 AM   Result Value Ref Range    Sodium 142 136 - 145 mmol/L    Potassium 4.9 3.5 - 5.1 mmol/L    Chloride 90 (L) 95 - 110 mmol/L    CO2 43 (HH) 23 - 29 mmol/L    Glucose 96 70 - 110 mg/dL    BUN 51 (H) 8 - 23 mg/dL    Creatinine 1.6 (H) 0.5 - 1.4 mg/dL    Calcium 9.4 8.7 - 10.5 mg/dL    Anion Gap 9 8 - 16 mmol/L    eGFR if African American 37 (A) >60 mL/min/1.73 m^2    eGFR if non African American 32 (A) >60 mL/min/1.73 m^2   Magnesium    Collection Time: 10/27/20  4:12 AM   Result Value Ref Range    Magnesium 2.6 1.6 - 2.6 mg/dL   Phosphorus    Collection Time: 10/27/20  4:12 AM   Result Value Ref Range    Phosphorus 4.0 2.7 - 4.5 mg/dL   CBC auto differential    Collection Time: 10/27/20  4:12 AM   Result Value Ref Range    WBC 7.73 3.90 - 12.70 K/uL    RBC 3.44 (L) 4.00 - 5.40 M/uL    Hemoglobin 9.6 (L) 12.0 - 16.0  g/dL    Hematocrit 31.7 (L) 37.0 - 48.5 %    MCV 92 82 - 98 fL    MCH 27.9 27.0 - 31.0 pg    MCHC 30.3 (L) 32.0 - 36.0 g/dL    RDW 14.6 (H) 11.5 - 14.5 %    Platelets 151 150 - 350 K/uL    MPV 12.1 9.2 - 12.9 fL    Immature Granulocytes 0.3 0.0 - 0.5 %    Gran # (ANC) 6.1 1.8 - 7.7 K/uL    Immature Grans (Abs) 0.02 0.00 - 0.04 K/uL    Lymph # 0.7 (L) 1.0 - 4.8 K/uL    Mono # 0.8 0.3 - 1.0 K/uL    Eos # 0.1 0.0 - 0.5 K/uL    Baso # 0.01 0.00 - 0.20 K/uL    nRBC 0 0 /100 WBC    Gran % 79.1 (H) 38.0 - 73.0 %    Lymph % 8.9 (L) 18.0 - 48.0 %    Mono % 10.0 4.0 - 15.0 %    Eosinophil % 1.6 0.0 - 8.0 %    Basophil % 0.1 0.0 - 1.9 %    Differential Method Automated      Current Imaging Results:    X-Ray Chest AP Portable   Final Result      No significant change.  There is cardiomegaly, multifocal airspace opacities and bilateral effusions.         Electronically signed by: Ajay Camara MD   Date:    10/20/2020   Time:    08:48      US Lower Extremity Veins Bilateral   Final Result      No evidence of deep venous thrombosis in either lower extremity.         Electronically signed by: Ceasar Carreon MD   Date:    10/15/2020   Time:    21:53      CT Chest Without Contrast   Final Result      The findings highly concerning for extensive diffuse pulmonary infection with bilateral pleural fluid collections right greater than left.         Electronically signed by: Regulo Bassett MD   Date:    10/15/2020   Time:    12:01      X-Ray Chest AP Portable   Final Result      Interval worsening of diffuse bilateral airspace disease and moderate bilateral pleural effusions when compared to 09/09/2020         Electronically signed by: Halina Plascencia   Date:    10/13/2020   Time:    09:40      X-Ray Chest PA And Lateral    (Results Pending)       10/15/2020: Echo:    The left ventricle is normal in size with normal systolic function. The estimated ejection fraction is 60%.  A diastolic pattern consistent with atrial fibrillation  observed.  Moderate left atrial enlargement.  Mild right ventricular enlargement.  Normal right ventricular systolic function.  Severe right atrial enlargement.  The aortic valve is mildly sclerotic.  The mitral valve is mildly sclerotic.  Mild mitral regurgitation.  Mild tricuspid regurgitation.  There is mild pulmonary hypertension.  The estimated PA systolic pressure is 49 mmHg.  Intermediate central venous pressure (8 mmHg).  Small circumferential pericardial effusion.  There is a left pleural effusion.      Assessment and Plan:     Problem List:    Active Diagnoses:    Diagnosis Date Noted POA    PRINCIPAL PROBLEM:  Acute on chronic respiratory failure with hypoxia and hypercapnia [J96.21, J96.22] 08/03/2016 Yes    Normocytic anemia [D64.9] 10/27/2020 Yes    Atrial fibrillation with rapid ventricular response [I48.91] 09/01/2020 Yes    Debility [R53.81] 08/26/2020 Yes    Encounter for palliative care [Z51.5] 08/25/2020 Not Applicable    Current moderate episode of major depressive disorder without prior episode [F32.1] 04/30/2020 Yes    Acute on chronic diastolic congestive heart failure [I50.33] 05/20/2016 Yes    Chronic kidney disease (CKD) stage G4/A1, severely decreased glomerular filtration rate (GFR) between 15-29 mL/min/1.73 square meter and albuminuria creatinine ratio less than 30 mg/g [N18.4]  Yes    Dyslipidemia [E78.5] 02/22/2016 Yes    Essential hypertension [I10] 10/15/2014 Yes      Problems Resolved During this Admission:    Diagnosis Date Noted Date Resolved POA    Acute on chronic congestive heart failure [I50.9] 10/21/2020 10/27/2020 Yes    Cavitary lesion of lung [J98.4] 10/16/2020 10/27/2020 Yes    Acute on chronic congestive heart failure [I50.9] 10/13/2020 10/13/2020 Yes    Pulmonary nodules/lesions, multiple [R91.8] 03/22/2019 10/14/2020 Yes    Paroxysmal atrial fibrillation [I48.0] 08/03/2016 10/13/2020 Yes    Chronic hypercapnic respiratory failure [J96.12] 03/11/2016  10/13/2020 Yes    Obesity hypoventilation syndrome [E66.2] 02/25/2016 10/27/2020 Yes    Renal carcinoma [C64.9] 03/10/2015 10/13/2020 Yes       Assessment and Plan:     1. Heart Failure, Diastolic, Acute on Chronic              8/24/2020: Echo: Normal left ventricular size and systolic function. Mildly dilated LA.   10/15/2020: Echo: Normal left ventricular size and systolic function. EF 60%. Moderately dilated LA. Mildly dilated RV. SPAP 49 mmHg. Small pericardial effusion.              At NH was on furosemide 40 mg Q24.              10/13/2020: Presented fluid overloaded in HF. Received furosemide 80 mg iv Q12.   10/20/2020: CXR with extensive infiltrates and pleural effusions. .    10/26/2020: . CXR just done.              Does not appear fluid overloaded at present.   On furosemide 40 mg po Q12.   Watch weight and increase dose as needed for weight gain.      2. Atrial Fibrillation              In chronic atrial fibrillation.              On apixiban.              On metoprolol 50 mg Q12.              Rate well controlled mostly  bpm.     3. Chronic Anticoagulation              Been on apixiban 5 mg Q12.   10/16/2020: Apixiban 5 mg Q12 was changed to heparin iv.   On apixiban 5 mg Q12.   No bleeding.    4. Hypertension   On metoprolol 50 mg Q12.   Blood pressure has come down with diuresis.                5. Chronic Kidney Disease, Stage 4              History or renal carcinoma and nephrectomy.              10/13/2020: BUN/crea 27/2.0. CrCl 28.   10/20/2020: BUN/crea 56/1.8. CrCl 32.   10/25/2020: BUN/crea 49/1.6. CrCl 32.      6. Chronic Obstructive Pulmonary Disease              Chronic respiratory failure with CO2 retention.              10/13/2020: 7.23/94/73/39/90% on FiO2 of 40%.   Appears to overall be slowly improving.    Pulmonary following.    7. Weakness   10/27/2020: Walked 150 ft with PT.   Needs a lot of PT.      VTE Risk Mitigation (From admission, onward)         Ordered      apixaban tablet 5 mg  2 times daily      10/17/20 1152     Place sequential compression device  Until discontinued      10/13/20 1405     IP VTE HIGH RISK PATIENT  Once      10/13/20 1405                Diana Meredith MD  Cardiology  [facility]

## 2020-10-27 NOTE — PLAN OF CARE
Plan of care reviewed with pt, verbalizes understanding. VSS Sats 98% on 5L humidified O2. Pt up to chair majority of the day. Pt incontinent of bowel and bladder. Fall and safety measures maintained this shift. Call bell and personal items within reach, will continue to monitor.

## 2020-10-27 NOTE — ASSESSMENT & PLAN NOTE
"Chronic Respiratory Failure on 3 liters of Oxygen at home - COPD, BHAVANI/OHS with severe restrictive lung disease.  Recent admission from 8/22-8/31 - treated at that time with Afib-RVR with CHF (acute diastolic) and COPD exacerbation (s/p antibiotics and steroids) and transferred to SNF.    Readmitted from 9/1-9/11/2020 with Afib-RVR with CHF and d/c to SNF with Trilogy.  Patient states she has been using her "CPAP" at home.      This Admission she presented to SOB over past 3 days PTA.  Placed on BiPAP and given Lasix 80mg IV x 1 in ED.  Transferred to ICU on admission.  Started initially on Lasix 40mg IV q12 and increased to 80mg q12.  Started on IV Vanc, Flagyl, and Cefepime on 10/15/2020 by Pulmonary-CC given CT Chest concerning for infection - antibiotics stopped 10/18/2020.  Respiratory culture with normal respiratory leonardo on 10/16/2020.  Given cavitary appearance on imaging, Quantiferon gold sent and indeterminate x 2  with Sputum AFB stain negative and  Culture pending x 1 on 10/16/2020.  On BiPAP this morning - Pox 93% on 45% FiO2  I/O of 800/350  Weight 103.4kg on admission and down to 99.4kg.          CXR on 10/13/2020 -  Interval worsening of diffuse bilateral airspace disease and moderate bilateral pleural effusions when compared to 09/09/2020  CT Chest Without on 10/15/2020 - The findings highly concerning for extensive diffuse pulmonary infection with bilateral pleural fluid collections right greater than left.  US of LEs on 10/15/2020 - No evidence of deep venous thrombosis in either lower extremity.     Labs on Admission:  WBC 9.22  COVID-19 rapid negative  Trop negative x 3     PH 7.227/pCO2 94.4/pO2 73  Procal normal  Lactic Acid normal  Quantiferon indeterminate x 2  Sputum AFB stain negative and culture pending x 1     Followed by Pulmonary - "Briefly, it is noted that continues to need NIV with naps; tired this AM  The patient's physical is significant for basilar crackles  The patient's " "working assessments include acute on chronic resp failure.  COnt NIV, diuresis.  Stop ABX. ".      Followed by Cardiology - "Acute on chronic diastolic heart failure.  Continuing to diurese.  Still evidence of volume overload".     Plan:  Continue with Lasix - now 40mg bid  Continue BiPAP as needed - await insurance appeal for home Trilogy.  Strict I&Os  Fluid Restrict to 1.5L for now  DuoNebs    "

## 2020-10-27 NOTE — PT/OT/SLP PROGRESS
Physical Therapy Treatment    Patient Name:  Patsy Morris   MRN:  [id number]    Recommendations:     Discharge Recommendations:  nursing facility, skilled   Discharge Equipment Recommendations: none   Barriers to discharge: Decreased caregiver support and Current functional status     Assessment:     Patsy Morris is a 70 y.o. female admitted with a medical diagnosis of Acute on chronic respiratory failure with hypoxia and hypercapnia.  She presents with the following impairments/functional limitations:  decreased lower extremity function, decreased upper extremity function, decreased safety awareness, gait instability, impaired endurance, impaired self care skills, impaired functional mobilty, impaired balance, impaired cardiopulmonary response to activity, weakness Pt showed improvement with SPO2 levels this session and was able to tolerate treatment with an increase in gait distance.    Rehab Prognosis: Good; patient would benefit from acute skilled PT services to address these deficits and reach maximum level of function.    Recent Surgery: * No surgery found *      Plan:     During this hospitalization, patient to be seen 5 x/week to address the identified rehab impairments via gait training, therapeutic activities, therapeutic exercises, wheelchair management/training and progress toward the following goals:    · Plan of Care Expires:  11/15/20    Subjective     Chief Complaint: Complains of SOB  Patient/Family Comments/goals: None  Pain/Comfort:  · Pain Rating 1: 0/10  · Pain Rating Post-Intervention 1: 0/10      Objective:     Communicated with NS prior to session.  Patient found HOB elevated with peripheral IV, telemetry, oxygen, PureWick upon PT entry to room.     General Precautions: Standard, fall, respiratory   Orthopedic Precautions:N/A   Braces: N/A     Functional Mobility:  · Transfers:     · Sit to Stand:  contact guard assistance with rolling walker  · Gait: 150 Ft using RW and  CGA      AM-PAC 6 CLICK MOBILITY  Turning over in bed (including adjusting bedclothes, sheets and blankets)?: 3  Sitting down on and standing up from a chair with arms (e.g., wheelchair, bedside commode, etc.): 3  Moving from lying on back to sitting on the side of the bed?: 3  Moving to and from a bed to a chair (including a wheelchair)?: 3  Need to walk in hospital room?: 3  Climbing 3-5 steps with a railing?: 2  Basic Mobility Total Score: 17       Therapeutic Activities and Exercises:  Activities performed in bedside chair:  AP, LAQ, Seated marches x 10 ea     Patient left up in chair with all lines intact, call button in reach and NS notified..    GOALS:   Multidisciplinary Problems     Physical Therapy Goals        Problem: Physical Therapy Goal    Goal Priority Disciplines Outcome Goal Variances Interventions   Physical Therapy Goal     PT, PT/OT Ongoing, Progressing     Description: Goals to be met by: 11/15/2020    Patient will perform the following to increase strength, improve mobility, and return to prior level of function:    1. Rolling to L and R with SBA  2. Supine <> sit with SBA.  3. Sit EOB x 15 min with SBA for balance.  4. Bed <> chair transfer with SBA and least restrictive assistive device.  5. Added 10/19: Gait x10 ft with CGA and LRAD.                     Time Tracking:     PT Received On: 10/27/20  PT Start Time: 0940     PT Stop Time: 1020  PT Total Time (min): 40 min     Billable Minutes: Gait Training 25 and Therapeutic Exercise 15    Treatment Type: Treatment  PT/PTA: PTA     PTA Visit Number: 4     EMILIA Mathis  10/27/2020

## 2020-10-27 NOTE — ASSESSMENT & PLAN NOTE
Home Meds:  Lasix 40mg qday (was bid and recently changed as above)  BNP elevated at 577 on last admission and now 968 this admission  See above.     TTE on 8/24/2020 - Mild left atrial enlargement.  · Diastolic pattern consistent with atrial fibrillation observed.  · Normal left ventricular systolic function. The estimated ejection fraction is 58%.  · No wall motion abnormalities.  · Normal right ventricular systolic function.  · Mild right atrial enlargement.  Small posterior pericardial effusion.     Plan:  Lasix 40mg po bid  Follow I&Os and weight

## 2020-10-27 NOTE — PLAN OF CARE
Problem: Physical Therapy Goal  Goal: Physical Therapy Goal  Description: Goals to be met by: 11/15/2020    Patient will perform the following to increase strength, improve mobility, and return to prior level of function:    1. Rolling to L and R with SBA  2. Supine <> sit with SBA.  3. Sit EOB x 15 min with SBA for balance.  4. Bed <> chair transfer with SBA and least restrictive assistive device.  5. Added 10/19: Gait x10 ft with CGA and LRAD.    Outcome: Ongoing, Progressing   Pt presents with HOB elevated and consents to therapy today. Pretreatment SPO2 96% Posttreatment Sp02 98%. Pt performed the following activities:  Sit<>Stand using RW and CGA with bed in lowered position  Gait- x 150 Ft using RW and CGA with one rest break at longterm point. 6L via NC

## 2020-10-27 NOTE — PLAN OF CARE
Pt received on 5L NC humidified SpO2 90-91% with no reported distress. Q6 prn aerosol tx not needed. BIPAP on standby for QHS.Will continue to monitor.

## 2020-10-27 NOTE — PLAN OF CARE
Problem: Occupational Therapy Goal  Goal: Occupational Therapy Goal  Description: Goals to be met by: 10/29/2020    Patient will increase functional independence with ADLs by performing:    UE Dressing with Minimal Assistance.  LE Dressing with Maximum Assistance using adaptive equipment as needed.  Grooming while standing with SBA.  Toileting from bedside commode with CGA for hygiene and clothing management.   Toilet transfer to bedside commode with Minimal Assistance.        Outcome: Ongoing, Progressing     Pt is making progress towards goals.  SNF is recommended upon d/c from acute care to further address deficits and help pt improve overall functional independence.     CARLOS Motta  10/27/2020

## 2020-10-27 NOTE — PLAN OF CARE
VSS, bipap worn overnight. No complaints of pain. Miconazole applied to skin as ordered. Purewick in place. Free of falls, bed locked in lowest position. Will continue to monitor.     Problem: Fall Injury Risk  Goal: Absence of Fall and Fall-Related Injury  Intervention: Identify and Manage Contributors to Fall Injury Risk  Flowsheets (Taken 10/27/2020 0634)  Self-Care Promotion:   BADL personal objects within reach   independence encouraged   BADL personal routines maintained  Medication Review/Management: medications reviewed     Problem: Adult Inpatient Plan of Care  Goal: Plan of Care Review  Outcome: Ongoing, Progressing  Flowsheets (Taken 10/27/2020 0634)  Plan of Care Reviewed With: patient

## 2020-10-27 NOTE — ASSESSMENT & PLAN NOTE
Hgb 10.0 on admission and stable at 9.6 today.  Stable since last admission.  Ferritin 25 on 8/25/2020 and Fe sat 18% on 9/5/2020 - continue outpatient FeSO4 325mg daily  B12 301 and Folate 4.6 - both borderline low - continue outpatient Nephrocaps daily

## 2020-10-27 NOTE — PT/OT/SLP PROGRESS
Occupational Therapy   Treatment    Name: Patsy Morris  MRN: [id number]  Admitting Diagnosis:  Acute on chronic respiratory failure with hypoxia and hypercapnia       Recommendations:     Discharge Recommendations: nursing facility, skilled  Discharge Equipment Recommendations:  none  Barriers to discharge:  (current functional level)    Assessment:     Patsy Morris is a 70 y.o. female with a medical diagnosis of Acute on chronic respiratory failure with hypoxia and hypercapnia.  She presents with mild fatigue. Performance deficits affecting function are impaired endurance, impaired functional mobilty, impaired cardiopulmonary response to activity, impaired self care skills, impaired balance, gait instability, weakness, decreased safety awareness, decreased upper extremity function.  Pt agreeable to participating in therapy upon arrival to room.  Pt tolerated UB exercises well with no signs of distress.  Pt required Total A and RW to clean bottom after urinating on blue pad seated in chair.  Pt able to maintain balance for ~2 minutes during task with SBA and RW.  Once seated in chair pt able to clean frontal area with SBA.      Overall, pt tolerated therapy well.  She is making progress towards goals and would continue to benefit from skilled OT services to address problems listed above and increase independence with ADLs.  SNF is recommended upon d/c from acute care to further address deficits and help pt improve overall functional independence.       Rehab Prognosis:  Good; patient would benefit from acute skilled OT services to address these deficits and reach maximum level of function.       Plan:     Patient to be seen 5 x/week to address the above listed problems via self-care/home management, therapeutic activities, therapeutic exercises  · Plan of Care Expires: 11/14/20  · Plan of Care Reviewed with: patient    Subjective     Pain/Comfort:  · Pain Rating 1: 0/10  · Pain Rating Post-Intervention  1: 0/10    Objective:     Communicated with: RN (Jesus) prior to session.  Patient found up in chair with peripheral IV, telemetry, PureWick, oxygen upon OT entry to room.    General Precautions: Standard, fall, respiratory   Orthopedic Precautions:N/A   Braces: N/A     Occupational Performance:     Bed Mobility:    · Not assessed 2* pt up in chair upon arrival.    Functional Mobility/Transfers:  · Sit <> Stand:  CGA and RW x 2 trial from chair  · Functional Mobility: Pt ambulated ~15 ft in room with CGA and RW.  No instances of LOB noted.    Activities of Daily Living:  · Toileting: Total A and RW for performing andrae care in standing position after urinating on blue pad in chair (Pure Wick leakage).  SBA for cleaning front area while seated in chair.  · Pt stood for ~2 minutes during task with SBA and RW.      [facility] 6 Click ADL: 16    Treatment & Education:  *Pt performed sit <> stand transfer from chair and walked within room to address coordination, endurance, and balance needed for functional mobility.  *Pt stood for ~2 minutes during andrae care with SBA and RW.  Cues provided for positioning of body; endurance and balance addressed.  *Pt performed UB exercises to promote increased endurance needed for ADLs: 2 sets x 10 reps per exercise while seated up in chair  -Shoulder flexion   -Chest press  -Forward circular rows  *POC reviewed with pt      Patient left up in chair with all lines intact, call button in reach and RN (Jesus) notifiedEducation:      GOALS:   Multidisciplinary Problems     Occupational Therapy Goals        Problem: Occupational Therapy Goal    Goal Priority Disciplines Outcome Interventions   Occupational Therapy Goal     OT, PT/OT Ongoing, Progressing    Description: Goals to be met by: 10/29/2020    Patient will increase functional independence with ADLs by performing:    UE Dressing with Minimal Assistance.  LE Dressing with Maximum Assistance using adaptive equipment as needed.  Grooming  while standing with SBA.  Toileting from bedside commode with CGA for hygiene and clothing management.   Toilet transfer to bedside commode with Minimal Assistance.                         Time Tracking:     OT Date of Treatment: 10/27/20  OT Start Time: 1207  OT Stop Time: 1226  OT Total Time (min): 19 min    Billable Minutes:Therapeutic Activity 19    CARLOS Motta  10/27/2020

## 2020-10-27 NOTE — PROGRESS NOTES
"[facility] Hospital Medicine  Progress Note    Patient Name: Patsy Morris  MRN: [id number]  Patient Class: IP- Inpatient   Admission Date: 10/13/2020  Length of Stay: 14 days  Attending Physician: Ravi Soria MD  Primary Care Provider: Luisana Cartagena MD        Subjective:     Principal Problem:Acute on chronic respiratory failure with hypoxia and hypercapnia        HPI:  Per Ravi Soria:    "Per ED:  "This is a 70 y.o. female with history of CHF and COPD who presents via EMS with complaint of shortness of breath that began a few days ago. Per EMS patient had O2 saturation of 72 on 3 liters if home oxygen upon their arrival. Patient states she is on 3 liters of home oxygen at baseline. She denies fever, cough, chest pain, nausea, vomiting, diarrhea, or rhinorrhea. She reports she was recently discharged from the hospital for similar symptoms. She reports compliance with her home medications. She recently became resident of [facility] after last hospitalization. She is a former smoker. She denies undergoing any surgeries".    The patient is a 70 year old female with a PMH significant for of HTN, Chronic Respiratory Failure (COPD and BHAVANI/OHS), HFpEF, Obesity who presented to the ED with complaints of SOB that began about 3 days ago.  Patient is on 3L O2NC at home.  She was seen by her Cardiologist about 4 days prior to admission and told to decrease her Lasix from 40mg bid to qday.  She denies chest pain.  No Fevers.  No cough.  States she has been adherent with her medications.  Patient was recently admitted to [facility] from 9/1-9/11 for Acute on Chronic Respiratory Failure and discharged to SNF.  Patient was placed on BiPAP in ED and admitted to ICU.  Patient feeling better on BiPAP."    Overview/Hospital Course:  Patient is 70-year-old woman with history of hypertension, chronic hypoxic respiratory failure on home oxygen secondary to chronic obstructive pulmonary " disease, chronic diastolic heart failure, chronic atrial fibrillation, and morbid obesity re-admitted to the hospital with decompensated heart failure after patient was home for about a week following recent hospitalization and stayed at skilled nursing facility for physical therapy.  Patient treated with non invasive ventilation intravenous diuretics.  Slowly clinically improving.  Patient also initially treated with antibiotic therapy which has since been discontinued as bacterial pneumonia ruled out on clinical grounds.  Patient does have a cavitary lesion on her chest CT scan but otherwise without signs or symptoms of active pulmonary tuberculosis.  Acid-fast bacillus smears and cultures ordered to rule out active pulmonary tuberculosis.  Patient with one negative acid-fast bacillus smear thus far.  Efforts at obtaining additional smears even with sputum induction with hypertonic saline has not resulted additional sputum.  Discussed with Pulmonary Critical Care service who recommended discontinuing further smears and respiratory isolation.  She continue to slowly improve with diuresis.    Interval History: Patient overall stable overnight.  On Bipap early this morning. No new complaints.  Awaiting Appeal of Insurance for coverage of Trilogy.    Review of Systems   Constitutional: Positive for activity change. Negative for chills and fever.   HENT: Negative for ear pain.    Eyes: Negative for pain.   Respiratory: Negative for cough and shortness of breath.    Cardiovascular: Negative for chest pain, palpitations and leg swelling.   Gastrointestinal: Negative for abdominal pain, constipation, diarrhea, nausea and vomiting.   Endocrine: Negative for polydipsia, polyphagia and polyuria.   Genitourinary: Negative for difficulty urinating and dysuria.   Musculoskeletal: Negative for neck pain.   Skin: Negative for rash.   Neurological: Negative for dizziness and headaches.   Hematological: Does not bruise/bleed  easily.   Psychiatric/Behavioral: Negative for agitation and behavioral problems.     Objective:     Vital Signs (Most Recent):  Temp: 97.8 °F (36.6 °C) (10/27/20 0723)  Pulse: 67 (10/27/20 0723)  Resp: (!) 23 (10/27/20 0723)  BP: (!) 116/56 (10/27/20 0723)  SpO2: (!) 92 % (10/27/20 0723) Vital Signs (24h Range):  Temp:  [97 °F (36.1 °C)-98.3 °F (36.8 °C)] 97.8 °F (36.6 °C)  Pulse:  [43-88] 67  Resp:  [18-26] 23  SpO2:  [88 %-93 %] 92 %  BP: (105-128)/(56-71) 116/56     Weight: 99.4 kg (219 lb 2.2 oz)  Body mass index is 38.82 kg/m².    Intake/Output Summary (Last 24 hours) at 10/27/2020 0800  Last data filed at 10/27/2020 0600  Gross per 24 hour   Intake 800 ml   Output 450 ml   Net 350 ml      Physical Exam  Constitutional:       General: She is not in acute distress.     Appearance: She is obese. She is not toxic-appearing.   HENT:      Head: Normocephalic and atraumatic.      Right Ear: External ear normal.      Left Ear: External ear normal.      Nose: Nose normal.      Mouth/Throat:      Mouth: Mucous membranes are moist.      Pharynx: Oropharynx is clear.   Eyes:      General: No scleral icterus.     Conjunctiva/sclera: Conjunctivae normal.   Neck:      Musculoskeletal: Normal range of motion and neck supple.   Cardiovascular:      Rate and Rhythm: Normal rate. Rhythm irregular.      Pulses: Normal pulses.      Heart sounds: No murmur.   Pulmonary:      Effort: No respiratory distress.      Breath sounds: No wheezing or rales.      Comments:     Abdominal:      General: Abdomen is flat. Bowel sounds are normal. There is no distension.      Palpations: Abdomen is soft.      Tenderness: There is no abdominal tenderness.   Musculoskeletal:      Right lower leg: No edema.      Left lower leg: No edema.   Skin:     General: Skin is warm.      Coloration: Skin is not jaundiced.      Findings: No rash.   Neurological:      General: No focal deficit present.      Mental Status: She is alert. Mental status is at  "baseline.   Psychiatric:         Mood and Affect: Mood normal.         Behavior: Behavior normal.         Significant Labs: All pertinent labs within the past 24 hours have been reviewed.    Significant Imaging: I have reviewed all pertinent imaging results/findings within the past 24 hours.      Assessment/Plan:      * Acute on chronic respiratory failure with hypoxia and hypercapnia  Chronic Respiratory Failure on 3 liters of Oxygen at home - COPD, BHAVANI/OHS with severe restrictive lung disease.  Recent admission from 8/22-8/31 - treated at that time with Afib-RVR with CHF (acute diastolic) and COPD exacerbation (s/p antibiotics and steroids) and transferred to SNF.    Readmitted from 9/1-9/11/2020 with Afib-RVR with CHF and d/c to SNF with Trilogy.  Patient states she has been using her "CPAP" at home.      This Admission she presented to SOB over past 3 days PTA.  Placed on BiPAP and given Lasix 80mg IV x 1 in ED.  Transferred to ICU on admission.  Started initially on Lasix 40mg IV q12 and increased to 80mg q12.  Started on IV Vanc, Flagyl, and Cefepime on 10/15/2020 by Pulmonary-CC given CT Chest concerning for infection - antibiotics stopped 10/18/2020.  Respiratory culture with normal respiratory leonardo on 10/16/2020.  Given cavitary appearance on imaging, Quantiferon gold sent and indeterminate x 2  with Sputum AFB stain negative and  Culture pending x 1 on 10/16/2020.  On BiPAP this morning - Pox 93% on 45% FiO2  I/O of 800/350  Weight 103.4kg on admission and down to 99.4kg.          CXR on 10/13/2020 -  Interval worsening of diffuse bilateral airspace disease and moderate bilateral pleural effusions when compared to 09/09/2020  CT Chest Without on 10/15/2020 - The findings highly concerning for extensive diffuse pulmonary infection with bilateral pleural fluid collections right greater than left.  US of LEs on 10/15/2020 - No evidence of deep venous thrombosis in either lower extremity.     Labs on " "Admission:  WBC 9.22  COVID-19 rapid negative  Trop negative x 3     PH 7.227/pCO2 94.4/pO2 73  Procal normal  Lactic Acid normal  Quantiferon indeterminate x 2  Sputum AFB stain negative and culture pending x 1     Followed by Pulmonary - "Briefly, it is noted that continues to need NIV with naps; tired this AM  The patient's physical is significant for basilar crackles  The patient's working assessments include acute on chronic resp failure.  COnt NIV, diuresis.  Stop ABX. ".      Followed by Cardiology - "Acute on chronic diastolic heart failure.  Continuing to diurese.  Still evidence of volume overload".     Plan:  Continue with Lasix - now 40mg bid  Continue BiPAP as needed - await insurance appeal for home Trilogy.  Strict I&Os  Fluid Restrict to 1.5L for now  DuoNebs      Atrial fibrillation with rapid ventricular response   in ED. Has been well controlled since.  Home Meds:  Metoprolol 50mg bid and Apixaban 5mg bid  Followed by Cardiology, Dr. Lutz - last seen outpatient on 10/8/2020  HR stable.  -Continue current medications    Chronic kidney disease (CKD) stage G4/A1, severely decreased glomerular filtration rate (GFR) between 15-29 mL/min/1.73 square meter and albuminuria creatinine ratio less than 30 mg/g  Creatinine of 2.0 on admission and was 1.9 on discharge 9/11/2020.  Her baseline is around 1.8.  Creatinine 1.6 this morning  -Renally dose medication for CrCl of <30  -Avoid Nephrotoxic medications if able  -Monitor urine output  -Follow closely    Acute on chronic diastolic congestive heart failure  Home Meds:  Lasix 40mg qday (was bid and recently changed as above)  BNP elevated at 577 on last admission and now 968 this admission  See above.     TTE on 8/24/2020 - Mild left atrial enlargement.  · Diastolic pattern consistent with atrial fibrillation observed.  · Normal left ventricular systolic function. The estimated ejection fraction is 58%.  · No wall motion " abnormalities.  · Normal right ventricular systolic function.  · Mild right atrial enlargement.  Small posterior pericardial effusion.     Plan:  Lasix 40mg po bid  Follow I&Os and weight       Normocytic anemia  Hgb 10.0 on admission and stable at 9.6 today.  Stable since last admission.  Ferritin 25 on 8/25/2020 and Fe sat 18% on 9/5/2020 - continue outpatient FeSO4 325mg daily  B12 301 and Folate 4.6 - both borderline low - continue outpatient Nephrocaps daily      Debility  Continue with physical and occupational therapy.  Home Health on discharge    Encounter for palliative care  Followed by Palliative Care      Current moderate episode of major depressive disorder without prior episode  Stable.  Continue SSRI therapy.    Dyslipidemia  Continue with statin therapy.    Essential hypertension  Continue Metoprolol and Lasix.  BP stable.      VTE Risk Mitigation (From admission, onward)         Ordered     apixaban tablet 5 mg  2 times daily      10/17/20 1152     Place sequential compression device  Until discontinued      10/13/20 1405     IP VTE HIGH RISK PATIENT  Once      10/13/20 1405                Discharge Planning   YUDELKA: 10/26/2020     Code Status: Full Code   Is the patient medically ready for discharge?:     Reason for patient still in hospital (select all that apply): Patient trending condition, Treatment and Consult recommendations  Discharge Plan A: Skilled Nursing Facility                  Ravi Soria MD  Department of Hospital Medicine   [facility]

## 2020-10-27 NOTE — ASSESSMENT & PLAN NOTE
Creatinine of 2.0 on admission and was 1.9 on discharge 9/11/2020.  Her baseline is around 1.8.  Creatinine 1.6 this morning  -Renally dose medication for CrCl of <30  -Avoid Nephrotoxic medications if able  -Monitor urine output  -Follow closely

## 2020-10-27 NOTE — SUBJECTIVE & OBJECTIVE
Interval History: Patient overall stable overnight.  On Bipap early this morning. No new complaints.  Awaiting Appeal of Insurance for coverage of Trilogy.    Review of Systems   Constitutional: Positive for activity change. Negative for chills and fever.   HENT: Negative for ear pain.    Eyes: Negative for pain.   Respiratory: Negative for cough and shortness of breath.    Cardiovascular: Negative for chest pain, palpitations and leg swelling.   Gastrointestinal: Negative for abdominal pain, constipation, diarrhea, nausea and vomiting.   Endocrine: Negative for polydipsia, polyphagia and polyuria.   Genitourinary: Negative for difficulty urinating and dysuria.   Musculoskeletal: Negative for neck pain.   Skin: Negative for rash.   Neurological: Negative for dizziness and headaches.   Hematological: Does not bruise/bleed easily.   Psychiatric/Behavioral: Negative for agitation and behavioral problems.     Objective:     Vital Signs (Most Recent):  Temp: 97.8 °F (36.6 °C) (10/27/20 0723)  Pulse: 67 (10/27/20 0723)  Resp: (!) 23 (10/27/20 0723)  BP: (!) 116/56 (10/27/20 0723)  SpO2: (!) 92 % (10/27/20 0723) Vital Signs (24h Range):  Temp:  [97 °F (36.1 °C)-98.3 °F (36.8 °C)] 97.8 °F (36.6 °C)  Pulse:  [43-88] 67  Resp:  [18-26] 23  SpO2:  [88 %-93 %] 92 %  BP: (105-128)/(56-71) 116/56     Weight: 99.4 kg (219 lb 2.2 oz)  Body mass index is 38.82 kg/m².    Intake/Output Summary (Last 24 hours) at 10/27/2020 0800  Last data filed at 10/27/2020 0600  Gross per 24 hour   Intake 800 ml   Output 450 ml   Net 350 ml      Physical Exam  Constitutional:       General: She is not in acute distress.     Appearance: She is obese. She is not toxic-appearing.   HENT:      Head: Normocephalic and atraumatic.      Right Ear: External ear normal.      Left Ear: External ear normal.      Nose: Nose normal.      Mouth/Throat:      Mouth: Mucous membranes are moist.      Pharynx: Oropharynx is clear.   Eyes:      General: No scleral  icterus.     Conjunctiva/sclera: Conjunctivae normal.   Neck:      Musculoskeletal: Normal range of motion and neck supple.   Cardiovascular:      Rate and Rhythm: Normal rate. Rhythm irregular.      Pulses: Normal pulses.      Heart sounds: No murmur.   Pulmonary:      Effort: No respiratory distress.      Breath sounds: No wheezing or rales.      Comments:     Abdominal:      General: Abdomen is flat. Bowel sounds are normal. There is no distension.      Palpations: Abdomen is soft.      Tenderness: There is no abdominal tenderness.   Musculoskeletal:      Right lower leg: No edema.      Left lower leg: No edema.   Skin:     General: Skin is warm.      Coloration: Skin is not jaundiced.      Findings: No rash.   Neurological:      General: No focal deficit present.      Mental Status: She is alert. Mental status is at baseline.   Psychiatric:         Mood and Affect: Mood normal.         Behavior: Behavior normal.         Significant Labs: All pertinent labs within the past 24 hours have been reviewed.    Significant Imaging: I have reviewed all pertinent imaging results/findings within the past 24 hours.

## 2020-10-28 NOTE — ASSESSMENT & PLAN NOTE
"Chronic Respiratory Failure on 3 liters of Oxygen at home - COPD, BHAVANI/OHS with severe restrictive lung disease.  Recent admission from 8/22-8/31 - treated at that time with Afib-RVR with CHF (acute diastolic) and COPD exacerbation (s/p antibiotics and steroids) and transferred to SNF.    Readmitted from 9/1-9/11/2020 with Afib-RVR with CHF and d/c to SNF with Trilogy.  Patient states she has been using her "CPAP" at home.      This Admission she presented to SOB over past 3 days PTA.  Placed on BiPAP and given Lasix 80mg IV x 1 in ED.  Transferred to ICU on admission.  Started initially on Lasix 40mg IV q12 and increased to 80mg q12.  Started on IV Vanc, Flagyl, and Cefepime on 10/15/2020 by Pulmonary-CC given CT Chest concerning for infection - antibiotics stopped 10/18/2020.  Respiratory culture with normal respiratory leonardo on 10/16/2020.  Given cavitary appearance on imaging, Quantiferon gold sent and indeterminate x 2  with Sputum AFB stain negative and  Culture pending x 1 on 10/16/2020.  On BiPAP this morning - Pox 95% on 5L O2 and BiPAP  I/O of 680/1250  Weight 103.4kg on admission and down to 99.4kg.     CXR on 10/13/2020 -  Interval worsening of diffuse bilateral airspace disease and moderate bilateral pleural effusions when compared to 09/09/2020  CT Chest Without on 10/15/2020 - The findings highly concerning for extensive diffuse pulmonary infection with bilateral pleural fluid collections right greater than left.  US of LEs on 10/15/2020 - No evidence of deep venous thrombosis in either lower extremity.  CXR Portable on 10/20/2020 - No significant change.  There is cardiomegaly, multifocal airspace opacities and bilateral effusions.     Labs on Admission:  WBC 9.22  COVID-19 rapid negative  Trop negative x 3     PH 7.227/pCO2 94.4/pO2 73  Procal normal  Lactic Acid normal  Quantiferon indeterminate x 2  Sputum AFB stain negative and culture pending x 1     Followed by Pulmonary - " Tolerating " "NIV qhs well  The patient's physical is significant for bibasilar crackles  The patient's working assessments include acute on chronic hypercapnic resp failure.  Has diuresed well but still some pulm edema on exam.  Continue NIV use while sleeping, diuresis.  Will follow cavitary lung lesion as outpatient ".      Followed by Cardiology - "Does not appear fluid overloaded at present.  On furosemide 40 mg po Q12.  Watch weight and increase dose as needed for weight gain.   ".     Plan:  Continue with Lasix - now 40mg bid  Continue BiPAP at night - insurance approved Trilogy for home use  Strict I&Os  Fluid Restrict to 1.5L   DuoNebs  Repeat CXR today    "

## 2020-10-28 NOTE — ASSESSMENT & PLAN NOTE
Hgb 10.0 on admission and stable at 9.4 today  Stable since last admission.  Ferritin 25 on 8/25/2020 and Fe sat 18% on 9/5/2020 - continue outpatient FeSO4 325mg daily  B12 301 and Folate 4.6 - both borderline low - continue outpatient Nephrocaps daily

## 2020-10-28 NOTE — PT/OT/SLP PROGRESS
Occupational Therapy   Treatment    Name: Patsy Morrsi  MRN: [id number]  Admitting Diagnosis:  Acute on chronic respiratory failure with hypoxia and hypercapnia       Recommendations:     Discharge Recommendations: nursing facility, skilled  Discharge Equipment Recommendations:  none  Barriers to discharge:  (current functional level)    Assessment:     Patsy Morris is a 70 y.o. female with a medical diagnosis of Acute on chronic respiratory failure with hypoxia and hypercapnia.  She presents with . Performance deficits affecting function are weakness, impaired endurance, impaired self care skills, impaired cardiopulmonary response to activity, impaired functional mobilty, gait instability, impaired balance.  Pt agreeable to participating in therapy upon arrival to room.  Pt able to perform multiple grooming tasks with supervision while seated up in chair.  Pt tolerated UB exercises well with no signs of distress.    Pt is making progress towards goals and would continue to benefit from skilled OT services to address problems listed above and increase independence with ADLs.  SNF is recommended upon d/c from acute care to further address deficits and help pt improve overall functional independence.      Rehab Prognosis:  Good; patient would benefit from acute skilled OT services to address these deficits and reach maximum level of function.       Plan:     Patient to be seen 5 x/week to address the above listed problems via self-care/home management, therapeutic activities, therapeutic exercises  · Plan of Care Expires: 11/14/20  · Plan of Care Reviewed with: patient    Subjective     Pain/Comfort:  · Pain Rating 1: 0/10  · Pain Rating Post-Intervention 1: 0/10    Objective:     Communicated with: RN (Johnathan) prior to session.  Patient found up in chair with peripheral IV, PureWick, telemetry, oxygen upon OT entry to room.    General Precautions: Standard, fall, respiratory   Orthopedic Precautions:N/A    Braces: N/A     Occupational Performance:     Bed Mobility:    · Not assessed 2* pt up in chair upon arrival.    Functional Mobility/Transfers:  · Sit <> stand: Pt declined activity this date 2* fatigued from earlier activity  · Functional Mobility: To be assessed in upcoming sessions    Activities of Daily Living:  · Grooming: Setup for washing face, combing hair, and brushing teeth while seated up in chair.  · Upper Body Dressing:  Independent for doffing/donning glasses while seated up in chair.        [facility] 6 Click ADL: 16    Treatment & Education:  *Pt performed multiple grooming tasks as noted above.  *Pt performed UB exercises to provide stretch and promote increased endurance needed for ADLs: 2 sets x 15 reps per exercise seated up in chair  -Arm bike  -Chest press  -Touching top of head then returning hands to side of chair  *POC reviewed with pt    Patient left up in chair with all lines intact, call button in reach, Pure Wick in place, and RN (Johnathan) notifiedEducation:  . Tray table set up in front of pt.     GOALS:   Multidisciplinary Problems     Occupational Therapy Goals        Problem: Occupational Therapy Goal    Goal Priority Disciplines Outcome Interventions   Occupational Therapy Goal     OT, PT/OT Ongoing, Progressing    Description: Goals to be met by: 10/29/2020    Patient will increase functional independence with ADLs by performing:    UE Dressing with Minimal Assistance.  LE Dressing with Maximum Assistance using adaptive equipment as needed.  Grooming while standing with SBA.  Toileting from bedside commode with CGA for hygiene and clothing management.   Toilet transfer to bedside commode with Minimal Assistance.                         Time Tracking:     OT Date of Treatment: 10/28/20  OT Start Time: 0949  OT Stop Time: 1017  OT Total Time (min): 28 min    Billable Minutes:Self Care/Home Management 10  Therapeutic Activity 18    CARLOS Motta  10/28/2020

## 2020-10-28 NOTE — PLAN OF CARE
Problem: Occupational Therapy Goal  Goal: Occupational Therapy Goal  Description: Goals to be met by: 10/29/2020    Patient will increase functional independence with ADLs by performing:    UE Dressing with Minimal Assistance.  LE Dressing with Maximum Assistance using adaptive equipment as needed.  Grooming while standing with SBA.  Toileting from bedside commode with CGA for hygiene and clothing management.   Toilet transfer to bedside commode with Minimal Assistance.        Outcome: Ongoing, Progressing     Pt is making progress towards goals.  SNF is recommended upon d/c from acute care to further address deficits and help pt improve overall functional independence.     CARLOS Motta  10/28/2020

## 2020-10-28 NOTE — PROGRESS NOTES
"[facility] Hospital Medicine  Progress Note    Patient Name: Patsy Morris  MRN: [id number]  Patient Class: IP- Inpatient   Admission Date: 10/13/2020  Length of Stay: 15 days  Attending Physician: Ravi Soria MD  Primary Care Provider: Luisana Cartagena MD        Subjective:     Principal Problem:Acute on chronic respiratory failure with hypoxia and hypercapnia        HPI:  Per Ravi Soria:    "Per ED:  "This is a 70 y.o. female with history of CHF and COPD who presents via EMS with complaint of shortness of breath that began a few days ago. Per EMS patient had O2 saturation of 72 on 3 liters if home oxygen upon their arrival. Patient states she is on 3 liters of home oxygen at baseline. She denies fever, cough, chest pain, nausea, vomiting, diarrhea, or rhinorrhea. She reports she was recently discharged from the hospital for similar symptoms. She reports compliance with her home medications. She recently became resident of [facility] after last hospitalization. She is a former smoker. She denies undergoing any surgeries".    The patient is a 70 year old female with a PMH significant for of HTN, Chronic Respiratory Failure (COPD and BHAVANI/OHS), HFpEF, Obesity who presented to the ED with complaints of SOB that began about 3 days ago.  Patient is on 3L O2NC at home.  She was seen by her Cardiologist about 4 days prior to admission and told to decrease her Lasix from 40mg bid to qday.  She denies chest pain.  No Fevers.  No cough.  States she has been adherent with her medications.  Patient was recently admitted to [facility] from 9/1-9/11 for Acute on Chronic Respiratory Failure and discharged to SNF.  Patient was placed on BiPAP in ED and admitted to ICU.  Patient feeling better on BiPAP."    Overview/Hospital Course:  Patient is 70-year-old woman with history of hypertension, chronic hypoxic respiratory failure on home oxygen secondary to chronic obstructive pulmonary " disease, chronic diastolic heart failure, chronic atrial fibrillation, and morbid obesity re-admitted to the hospital with decompensated heart failure after patient was home for about a week following recent hospitalization and stayed at skilled nursing facility for physical therapy.  Patient treated with non invasive ventilation intravenous diuretics.  Slowly clinically improving.  Patient also initially treated with antibiotic therapy which has since been discontinued as bacterial pneumonia ruled out on clinical grounds.  Patient does have a cavitary lesion on her chest CT scan but otherwise without signs or symptoms of active pulmonary tuberculosis.  Acid-fast bacillus smears and cultures ordered to rule out active pulmonary tuberculosis.  Patient with one negative acid-fast bacillus smear thus far.  Efforts at obtaining additional smears even with sputum induction with hypertonic saline has not resulted additional sputum.  Discussed with Pulmonary Critical Care service who recommended discontinuing further smears and respiratory isolation.  She continue to slowly improve with diuresis.    Interval History: Patient overall stable overnight.  On Bipap early this morning. No new complaints. Trilogy has been approved.    Review of Systems   Constitutional: Positive for activity change. Negative for chills and fever.   HENT: Negative for ear pain.    Eyes: Negative for pain.   Respiratory: Negative for cough and shortness of breath.    Cardiovascular: Negative for chest pain, palpitations and leg swelling.   Gastrointestinal: Negative for abdominal pain, constipation, diarrhea, nausea and vomiting.   Endocrine: Negative for polydipsia, polyphagia and polyuria.   Genitourinary: Negative for difficulty urinating and dysuria.   Musculoskeletal: Negative for neck pain.   Skin: Negative for rash.   Neurological: Negative for dizziness and headaches.   Hematological: Does not bruise/bleed easily.   Psychiatric/Behavioral:  Negative for agitation and behavioral problems.     Objective:     Vital Signs (Most Recent):  Temp: 97.9 °F (36.6 °C) (10/28/20 0836)  Pulse: 79 (10/28/20 0836)  Resp: 20 (10/28/20 0836)  BP: 125/74 (10/28/20 0836)  SpO2: (!) 90 % (10/28/20 0836) Vital Signs (24h Range):  Temp:  [97.5 °F (36.4 °C)-98.2 °F (36.8 °C)] 97.9 °F (36.6 °C)  Pulse:  [] 79  Resp:  [20-22] 20  SpO2:  [90 %-93 %] 90 %  BP: ()/(50-74) 125/74     Weight: 99.4 kg (219 lb 2.2 oz)  Body mass index is 38.82 kg/m².    Intake/Output Summary (Last 24 hours) at 10/28/2020 0916  Last data filed at 10/28/2020 0658  Gross per 24 hour   Intake 560 ml   Output 1250 ml   Net -690 ml      Physical Exam  Constitutional:       General: She is not in acute distress.     Appearance: She is obese. She is not toxic-appearing.   HENT:      Head: Normocephalic and atraumatic.      Right Ear: External ear normal.      Left Ear: External ear normal.      Nose: Nose normal.      Mouth/Throat:      Mouth: Mucous membranes are moist.      Pharynx: Oropharynx is clear.   Eyes:      General: No scleral icterus.     Conjunctiva/sclera: Conjunctivae normal.   Neck:      Musculoskeletal: Normal range of motion and neck supple.   Cardiovascular:      Rate and Rhythm: Normal rate. Rhythm irregular.      Pulses: Normal pulses.      Heart sounds: No murmur.   Pulmonary:      Effort: No respiratory distress.      Breath sounds: No wheezing or rales.      Comments:     Abdominal:      General: Abdomen is flat. Bowel sounds are normal. There is no distension.      Palpations: Abdomen is soft.      Tenderness: There is no abdominal tenderness.   Musculoskeletal:      Right lower leg: No edema.      Left lower leg: No edema.   Skin:     General: Skin is warm.      Coloration: Skin is not jaundiced.      Findings: No rash.   Neurological:      General: No focal deficit present.      Mental Status: She is alert. Mental status is at baseline.   Psychiatric:         Mood and  "Affect: Mood normal.         Behavior: Behavior normal.         Significant Labs: All pertinent labs within the past 24 hours have been reviewed.    Significant Imaging: I have reviewed all pertinent imaging results/findings within the past 24 hours.      Assessment/Plan:      * Acute on chronic respiratory failure with hypoxia and hypercapnia  Chronic Respiratory Failure on 3 liters of Oxygen at home - COPD, BHAVANI/OHS with severe restrictive lung disease.  Recent admission from 8/22-8/31 - treated at that time with Afib-RVR with CHF (acute diastolic) and COPD exacerbation (s/p antibiotics and steroids) and transferred to SNF.    Readmitted from 9/1-9/11/2020 with Afib-RVR with CHF and d/c to SNF with Trilogy.  Patient states she has been using her "CPAP" at home.      This Admission she presented to SOB over past 3 days PTA.  Placed on BiPAP and given Lasix 80mg IV x 1 in ED.  Transferred to ICU on admission.  Started initially on Lasix 40mg IV q12 and increased to 80mg q12.  Started on IV Vanc, Flagyl, and Cefepime on 10/15/2020 by Pulmonary-CC given CT Chest concerning for infection - antibiotics stopped 10/18/2020.  Respiratory culture with normal respiratory leonardo on 10/16/2020.  Given cavitary appearance on imaging, Quantiferon gold sent and indeterminate x 2  with Sputum AFB stain negative and  Culture pending x 1 on 10/16/2020.  On BiPAP this morning - Pox 95% on 5L O2 and BiPAP  I/O of 680/1250  Weight 103.4kg on admission and down to 99.4kg.     CXR on 10/13/2020 -  Interval worsening of diffuse bilateral airspace disease and moderate bilateral pleural effusions when compared to 09/09/2020  CT Chest Without on 10/15/2020 - The findings highly concerning for extensive diffuse pulmonary infection with bilateral pleural fluid collections right greater than left.  US of LEs on 10/15/2020 - No evidence of deep venous thrombosis in either lower extremity.  CXR Portable on 10/20/2020 - No significant change.  There " "is cardiomegaly, multifocal airspace opacities and bilateral effusions.     Labs on Admission:  WBC 9.22  COVID-19 rapid negative  Trop negative x 3     PH 7.227/pCO2 94.4/pO2 73  Procal normal  Lactic Acid normal  Quantiferon indeterminate x 2  Sputum AFB stain negative and culture pending x 1     Followed by Pulmonary - " Tolerating NIV qhs well  The patient's physical is significant for bibasilar crackles  The patient's working assessments include acute on chronic hypercapnic resp failure.  Has diuresed well but still some pulm edema on exam.  Continue NIV use while sleeping, diuresis.  Will follow cavitary lung lesion as outpatient ".      Followed by Cardiology - "Does not appear fluid overloaded at present.  On furosemide 40 mg po Q12.  Watch weight and increase dose as needed for weight gain.   ".     Plan:  Continue with Lasix - now 40mg bid  Continue BiPAP at night - insurance approved Trilogy for home use  Strict I&Os  Fluid Restrict to 1.5L   DuoNebs  Repeat CXR today      Atrial fibrillation with rapid ventricular response   in ED. Has been well controlled since.  Home Meds:  Metoprolol 50mg bid and Apixaban 5mg bid  Followed by Cardiology, Dr. Lutz - last seen outpatient on 10/8/2020  HR stable.  -Continue current medications    Chronic kidney disease (CKD) stage G4/A1, severely decreased glomerular filtration rate (GFR) between 15-29 mL/min/1.73 square meter and albuminuria creatinine ratio less than 30 mg/g  Creatinine of 2.0 on admission and was 1.9 on discharge 9/11/2020.  Her baseline is around 1.8.  Creatinine 1.9 this morning  -Renally dose medication for CrCl of <30  -Avoid Nephrotoxic medications if able  -Monitor urine output  -Follow closely    Acute on chronic diastolic congestive heart failure  Home Meds:  Lasix 40mg qday (was bid and recently changed as above)  BNP elevated at 577 on last admission and now 968 this admission  See above.     TTE on 8/24/2020 - Mild left " atrial enlargement.  · Diastolic pattern consistent with atrial fibrillation observed.  · Normal left ventricular systolic function. The estimated ejection fraction is 58%.  · No wall motion abnormalities.  · Normal right ventricular systolic function.  · Mild right atrial enlargement.  Small posterior pericardial effusion.     Plan:  Lasix 40mg po bid  Follow I&Os and weight       Normocytic anemia  Hgb 10.0 on admission and stable at 9.4 today  Stable since last admission.  Ferritin 25 on 8/25/2020 and Fe sat 18% on 9/5/2020 - continue outpatient FeSO4 325mg daily  B12 301 and Folate 4.6 - both borderline low - continue outpatient Nephrocaps daily      Debility  Continue with physical and occupational therapy.  Home Health on discharge    Encounter for palliative care  Followed by Palliative Care      Current moderate episode of major depressive disorder without prior episode  Stable.  Continue SSRI therapy.    Dyslipidemia  Continue with statin therapy.    Essential hypertension  Continue Metoprolol and Lasix.  BP stable.      VTE Risk Mitigation (From admission, onward)         Ordered     apixaban tablet 5 mg  2 times daily      10/17/20 1152     Place sequential compression device  Until discontinued      10/13/20 1405     IP VTE HIGH RISK PATIENT  Once      10/13/20 1405                Discharge Planning   YUDELKA: 10/26/2020     Code Status: Full Code   Is the patient medically ready for discharge?:     Reason for patient still in hospital (select all that apply): Patient trending condition, Treatment and Pending disposition  Discharge Plan A: Skilled Nursing Facility                  Ravi Soria MD  Department of Hospital Medicine   [facility]

## 2020-10-28 NOTE — PLAN OF CARE
Problem: Physical Therapy Goal  Goal: Physical Therapy Goal  Description: Goals to be met by: 11/15/2020    Patient will perform the following to increase strength, improve mobility, and return to prior level of function:    1. Rolling to L and R with SBA MET 10/28/2020  2. Supine <> sit with SBA. MET 10/28/2020  3. Sit EOB x 15 min with SBA for balance. MET 10/28/2020  4. Bed <> chair transfer with SBA and least restrictive assistive device. MET 10/28/2020  5. Added 10/19: Gait x10 ft with CGA and LRAD. MET 10/28/2020    10/28/2020 1020 by Camelia Negrete PTA  Outcome: Met     Rolling to R side independent, sit to supine SBA for safety. Sit<>stand with RW SBA for safety. Pt ambulated 75ft with RW & SBA with 6LO2 via nasal cannula in tow & SPO2 monitored throughout (see note for details). Pt sat in bedside chair & performed LAQ, HF & AP x12 B LE with cues for technique.

## 2020-10-28 NOTE — PROGRESS NOTES
[facility]  Cardiology  Progress Note    Patient Name: Patsy Morris  MRN: [id number]  Admission Date: 10/13/2020  Hospital Length of Stay: 15 days  Code Status: Full Code   Attending Physician: Ravi Soria MD   Primary Care Physician: Luisana Cartagena MD  Expected Discharge Date:   Principal Problem:Acute on chronic respiratory failure with hypoxia and hypercapnia    Subjective:     Brief HPI:    Patsy Morris is a 70 y.o.female with hypertension. She has chronic atrial fibrillation and heart failure with preserved ejection fraction. She has chronic obstructive pulmonary disease being on home oxygen with CO2 retention. She has undergone nephrectomy for renal cell carcinoma and has chronic kidney disease. She was admitted to [facility] in 8/2020 and 9/2020 with heart failure and exacerbations of her chronic obstructive pulmonary disease. She went to therapy at [facility].      She used to be on furosemide 40 mg Q24 however after her last hospitalization she had the does of furosemide increased to 80 mg Q24. The dose was reduced to 40 mg Q24 on 10/8/2020. She became increasingly short of breath and presents fluid overloaded in heart failure as well as an exacerbation of her COPD with high CO2. She was admitted to the ICU.    Hospital Course:    Diuresis.    BIPAP.    Respiratory treatments.    10/15/2020: Echo: Normal left ventricular size and systolic function. EF 60%. Moderately dilated LA. Mildly dilated RV. SPAP 49 mmHg. Small pericardial effusion.    10/16/2020: Apixiban 5 mg Q12 was changed to heparin iv for consideration of bronchoscopy.    10/21/2020: Transferred from ICU to telemetry.    Interval History:     10/27/2020: Walked 150 ft with PT.    Slowly breathing slightly easier from day to day.    Slowly stronger.    Tells me she has decided that she will go home.      Review of Systems   Constitution: Positive for malaise/fatigue. Negative for chills and fever.    HENT: Negative for nosebleeds.    Eyes: Negative for vision loss in left eye and vision loss in right eye.   Cardiovascular: Positive for dyspnea on exertion. Negative for chest pain, leg swelling, orthopnea, palpitations and paroxysmal nocturnal dyspnea.   Respiratory: Negative for cough, hemoptysis, shortness of breath, sputum production and wheezing.    Hematologic/Lymphatic: Negative for bleeding problem.   Skin: Negative for rash.   Musculoskeletal: Negative for myalgias.   Gastrointestinal: Negative for abdominal pain, heartburn, hematemesis, hematochezia, jaundice, melena, nausea and vomiting.   Genitourinary: Negative for hematuria.   Neurological: Positive for weakness. Negative for dizziness, headaches, light-headedness and vertigo.   Psychiatric/Behavioral: Negative for altered mental status. The patient is not nervous/anxious.    Allergic/Immunologic: Negative for persistent infections.       Objective:     Vital Signs (Most Recent):  Temp: 98.2 °F (36.8 °C) (10/28/20 1113)  Pulse: 76 (10/28/20 1113)  Resp: 16 (10/28/20 1113)  BP: (!) 122/59 (10/28/20 1113)  SpO2: (!) 92 % (10/28/20 1113) Vital Signs (24h Range):  Temp:  [97.5 °F (36.4 °C)-98.2 °F (36.8 °C)] 98.2 °F (36.8 °C)  Pulse:  [] 76  Resp:  [16-20] 16  SpO2:  [90 %-93 %] 92 %  BP: (103-127)/(58-74) 122/59     Weight: 99.4 kg (219 lb 2.2 oz)  Body mass index is 38.82 kg/m².    SpO2: (!) 92 %  O2 Device (Oxygen Therapy): nasal cannula w/ humidification      Intake/Output Summary (Last 24 hours) at 10/28/2020 1345  Last data filed at 10/28/2020 0658  Gross per 24 hour   Intake 320 ml   Output 1250 ml   Net -930 ml       Lines/Drains/Airways     Drain            Female External Urinary Catheter 10/13/20 1359 14 days          Peripheral Intravenous Line                 Midline Catheter Insertion/Assessment  - Double Lumen 10/20/20 1220 Right median cubital vein (antecubital fossa)  8 days                Physical Exam   Constitutional: She is  oriented to person, place, and time. She appears well-developed and well-nourished. She appears ill.   Neck: Carotid bruit is not present.   Cardiovascular: Normal rate, S1 normal and S2 normal. An irregularly irregular rhythm present.   Pulmonary/Chest: She has rhonchi in the right lower field and the left lower field.   Musculoskeletal:      Right ankle: She exhibits no swelling.      Left ankle: She exhibits no swelling.   Neurological: She is alert and oriented to person, place, and time.   Skin: Skin is warm and dry.   Psychiatric: She has a normal mood and affect. Her speech is normal and behavior is normal. Cognition and memory are normal.     Current Medications:     apixaban  5 mg Oral BID    atorvastatin  80 mg Oral QHS    docusate sodium  100 mg Oral BID    escitalopram oxalate  10 mg Oral QHS    ferrous sulfate  325 mg Oral Daily    furosemide  40 mg Oral BID    gabapentin  100 mg Oral BID    metoprolol tartrate  50 mg Oral BID    miconazole NITRATE 2 %   Topical (Top) BID    vitamin renal formula (B-complex-vitamin c-folic acid)  1 capsule Oral Daily     Current Laboratory Results:    Recent Results (from the past 24 hour(s))   Basic metabolic panel    Collection Time: 10/28/20  4:29 AM   Result Value Ref Range    Sodium 142 136 - 145 mmol/L    Potassium 4.9 3.5 - 5.1 mmol/L    Chloride 90 (L) 95 - 110 mmol/L    CO2 43 (HH) 23 - 29 mmol/L    Glucose 97 70 - 110 mg/dL    BUN 53 (H) 8 - 23 mg/dL    Creatinine 1.9 (H) 0.5 - 1.4 mg/dL    Calcium 9.1 8.7 - 10.5 mg/dL    Anion Gap 9 8 - 16 mmol/L    eGFR if African American 30 (A) >60 mL/min/1.73 m^2    eGFR if non African American 26 (A) >60 mL/min/1.73 m^2   Magnesium    Collection Time: 10/28/20  4:29 AM   Result Value Ref Range    Magnesium 2.5 1.6 - 2.6 mg/dL   Phosphorus    Collection Time: 10/28/20  4:29 AM   Result Value Ref Range    Phosphorus 4.6 (H) 2.7 - 4.5 mg/dL   CBC auto differential    Collection Time: 10/28/20  4:29 AM   Result  Value Ref Range    WBC 7.46 3.90 - 12.70 K/uL    RBC 3.37 (L) 4.00 - 5.40 M/uL    Hemoglobin 9.4 (L) 12.0 - 16.0 g/dL    Hematocrit 31.2 (L) 37.0 - 48.5 %    MCV 93 82 - 98 fL    MCH 27.9 27.0 - 31.0 pg    MCHC 30.1 (L) 32.0 - 36.0 g/dL    RDW 14.5 11.5 - 14.5 %    Platelets 152 150 - 350 K/uL    MPV 11.8 9.2 - 12.9 fL    Immature Granulocytes 0.3 0.0 - 0.5 %    Gran # (ANC) 5.8 1.8 - 7.7 K/uL    Immature Grans (Abs) 0.02 0.00 - 0.04 K/uL    Lymph # 0.7 (L) 1.0 - 4.8 K/uL    Mono # 0.8 0.3 - 1.0 K/uL    Eos # 0.1 0.0 - 0.5 K/uL    Baso # 0.01 0.00 - 0.20 K/uL    nRBC 0 0 /100 WBC    Gran % 77.5 (H) 38.0 - 73.0 %    Lymph % 9.9 (L) 18.0 - 48.0 %    Mono % 10.6 4.0 - 15.0 %    Eosinophil % 1.6 0.0 - 8.0 %    Basophil % 0.1 0.0 - 1.9 %    Differential Method Automated      Current Imaging Results:    X-Ray Chest AP Portable   Final Result      Worsening right basilar atelectasis or consolidation.      Right upper lobe consolidation is improved.  There may be a tiny pneumatocele or central cavitation.      Pleural effusions are improved.      Continued follow-up advised.         Electronically signed by: Elsa Alcantar   Date:    10/28/2020   Time:    10:24      X-Ray Chest AP Portable   Final Result      No significant change.  There is cardiomegaly, multifocal airspace opacities and bilateral effusions.         Electronically signed by: Ajay Camara MD   Date:    10/20/2020   Time:    08:48      US Lower Extremity Veins Bilateral   Final Result      No evidence of deep venous thrombosis in either lower extremity.         Electronically signed by: Ceasar Carreon MD   Date:    10/15/2020   Time:    21:53      CT Chest Without Contrast   Final Result      The findings highly concerning for extensive diffuse pulmonary infection with bilateral pleural fluid collections right greater than left.         Electronically signed by: Regulo Bassett MD   Date:    10/15/2020   Time:    12:01      X-Ray Chest AP Portable   Final  Result      Interval worsening of diffuse bilateral airspace disease and moderate bilateral pleural effusions when compared to 09/09/2020         Electronically signed by: Halina Plascencia   Date:    10/13/2020   Time:    09:40      X-Ray Chest PA And Lateral    (Results Pending)       10/15/2020: Echo:    The left ventricle is normal in size with normal systolic function. The estimated ejection fraction is 60%.  A diastolic pattern consistent with atrial fibrillation observed.  Moderate left atrial enlargement.  Mild right ventricular enlargement.  Normal right ventricular systolic function.  Severe right atrial enlargement.  The aortic valve is mildly sclerotic.  The mitral valve is mildly sclerotic.  Mild mitral regurgitation.  Mild tricuspid regurgitation.  There is mild pulmonary hypertension.  The estimated PA systolic pressure is 49 mmHg.  Intermediate central venous pressure (8 mmHg).  Small circumferential pericardial effusion.  There is a left pleural effusion.      Assessment and Plan:     Problem List:    Active Diagnoses:    Diagnosis Date Noted POA    PRINCIPAL PROBLEM:  Acute on chronic respiratory failure with hypoxia and hypercapnia [J96.21, J96.22] 08/03/2016 Yes    Normocytic anemia [D64.9] 10/27/2020 Yes    Atrial fibrillation with rapid ventricular response [I48.91] 09/01/2020 Yes    Debility [R53.81] 08/26/2020 Yes    Encounter for palliative care [Z51.5] 08/25/2020 Not Applicable    Current moderate episode of major depressive disorder without prior episode [F32.1] 04/30/2020 Yes    Acute on chronic diastolic congestive heart failure [I50.33] 05/20/2016 Yes    Chronic kidney disease (CKD) stage G4/A1, severely decreased glomerular filtration rate (GFR) between 15-29 mL/min/1.73 square meter and albuminuria creatinine ratio less than 30 mg/g [N18.4]  Yes    Dyslipidemia [E78.5] 02/22/2016 Yes    Essential hypertension [I10] 10/15/2014 Yes      Problems Resolved During this Admission:     Diagnosis Date Noted Date Resolved POA    Acute on chronic congestive heart failure [I50.9] 10/21/2020 10/27/2020 Yes    Cavitary lesion of lung [J98.4] 10/16/2020 10/27/2020 Yes    Acute on chronic congestive heart failure [I50.9] 10/13/2020 10/13/2020 Yes    Pulmonary nodules/lesions, multiple [R91.8] 03/22/2019 10/14/2020 Yes    Paroxysmal atrial fibrillation [I48.0] 08/03/2016 10/13/2020 Yes    Chronic hypercapnic respiratory failure [J96.12] 03/11/2016 10/13/2020 Yes    Obesity hypoventilation syndrome [E66.2] 02/25/2016 10/27/2020 Yes    Renal carcinoma [C64.9] 03/10/2015 10/13/2020 Yes       Assessment and Plan:     1. Heart Failure, Diastolic, Acute on Chronic              8/24/2020: Echo: Normal left ventricular size and systolic function. Mildly dilated LA.   10/15/2020: Echo: Normal left ventricular size and systolic function. EF 60%. Moderately dilated LA. Mildly dilated RV. SPAP 49 mmHg. Small pericardial effusion.              At NH was on furosemide 40 mg Q24.              10/13/2020: Presented fluid overloaded in HF. Received furosemide 80 mg iv Q12.   10/20/2020: CXR with extensive infiltrates and pleural effusions. .    10/26/2020: .   10/28/2020: CXR improving.              Does not appear fluid overloaded at present.   On furosemide 40 mg po Q12.   Watch weight and increase dose as needed for weight gain.      2. Atrial Fibrillation              In chronic atrial fibrillation.              On apixiban.              On metoprolol 50 mg Q12.              Rate well controlled mostly  bpm.     3. Chronic Anticoagulation              Been on apixiban 5 mg Q12.   10/16/2020: Apixiban 5 mg Q12 was changed to heparin iv.   On apixiban 5 mg Q12.   No bleeding.    4. Hypertension   On metoprolol 50 mg Q12.   Blood pressure has come down with diuresis.                5. Chronic Kidney Disease, Stage 4              History or renal carcinoma and nephrectomy.               10/13/2020: BUN/crea 27/2.0. CrCl 28.   10/20/2020: BUN/crea 56/1.8. CrCl 32.   10/25/2020: BUN/crea 49/1.6. CrCl 32.      6. Chronic Obstructive Pulmonary Disease              Chronic respiratory failure with CO2 retention.              10/13/2020: 7.23/94/73/39/90% on FiO2 of 40%.   Appears to overall be slowly improving.    Pulmonary following.    7. Weakness   10/27/2020: Walked 150 ft with PT.   Needs a lot of PT.    8. Disposition   10/28/2020: Tells me she has decided that she will go to her house.      VTE Risk Mitigation (From admission, onward)         Ordered     apixaban tablet 5 mg  2 times daily      10/17/20 1152     Place sequential compression device  Until discontinued      10/13/20 1405     IP VTE HIGH RISK PATIENT  Once      10/13/20 1405                Diana Meredith MD  Cardiology  [facility]

## 2020-10-28 NOTE — PT/OT/SLP PROGRESS
Physical Therapy Treatment    Patient Name:  Patsy Morris   MRN:  [id number]    Recommendations:     Discharge Recommendations:  nursing facility, skilled   Discharge Equipment Recommendations: none   Barriers to discharge: Decreased caregiver support and current functional level    Assessment:     Patsy Morris is a 70 y.o. female admitted with a medical diagnosis of Acute on chronic respiratory failure with hypoxia and hypercapnia.  She presents with the following impairments/functional limitations:  weakness, impaired endurance, impaired cardiopulmonary response to activity, impaired self care skills, impaired functional mobilty, gait instability, impaired balance .     Rolling to R side independent, sit to supine SBA for safety. Sit<>stand with RW SBA for safety. Pt ambulated 75ft with RW & SBA with 6LO2 via nasal cannula in tow & SPO2 monitored throughout (see note for details). Pt sat in bedside chair & performed LAQ, HF & AP x12 B LE with cues for technique.    Rehab Prognosis: Good; patient would benefit from acute skilled PT services to address these deficits and reach maximum level of function.    Recent Surgery: * No surgery found *      Plan:     During this hospitalization, patient to be seen 5 x/week to address the identified rehab impairments via gait training, therapeutic activities, therapeutic exercises, wheelchair management/training and progress toward the following goals:    · Plan of Care Expires:  11/15/20    Subjective     Chief Complaint: none stated  Patient/Family Comments/goals: pt requesting to sit in chair to eat breakfast  Pain/Comfort:  · Pain Rating 1: 0/10  · Pain Rating Post-Intervention 1: 0/10      Objective:     Communicated with nurse Mann prior to session.  Patient found supine with peripheral IV, PureWick, telemetry, oxygen upon PT entry to room.     General Precautions: Standard, fall, respiratory   Orthopedic Precautions:N/A   Braces:       Functional  Mobility:  · Bed mobility-  Rolling to R side independent, sit to supine SBA for safety.   · Transfers- Sit<>stand with RW SBA for safety.   · Gait- Pt ambulated 75ft with RW & SBA with 6LO2 via nasal cannula in tow & SPO2 monitored throughout. Gait deviations include: decreased samaria, decreased B LE step length, cues for breathing technique, desat with exertion.    SPO2:  After supine to sit: 85% on 5 L O2 via nasal cannula, recovered to 93% with 3 minute seated rest break with cues for breathing  During gait: 85-88% on 6L O2 tank via nasal cannula, cues for breathing throughout.  After gait: 92% seated in bedside chair back to wall 5LO2.    (pt reports high 80s, 86-89, is her normal range during exertion PTA on 3LO2 at home) Pt asymptomatic with desaturations with exertion.      AM-PAC 6 CLICK MOBILITY  Turning over in bed (including adjusting bedclothes, sheets and blankets)?: 4  Sitting down on and standing up from a chair with arms (e.g., wheelchair, bedside commode, etc.): 3  Moving from lying on back to sitting on the side of the bed?: 3  Moving to and from a bed to a chair (including a wheelchair)?: 3  Need to walk in hospital room?: 3  Climbing 3-5 steps with a railing?: 3  Basic Mobility Total Score: 19       Therapeutic Activities and Exercises:  Pt sat in bedside chair & performed LAQ, HF & AP x12 B LE with cues for technique.    Patient left up in chair with all lines intact, call button in reach and nurse Johnathan notified..    GOALS:   Multidisciplinary Problems     Physical Therapy Goals     Not on file          Multidisciplinary Problems (Resolved)        Problem: Physical Therapy Goal    Goal Priority Disciplines Outcome Goal Variances Interventions   Physical Therapy Goal   (Resolved)     PT, PT/OT Met     Description: Goals to be met by: 11/15/2020    Patient will perform the following to increase strength, improve mobility, and return to prior level of function:    1. Rolling to L and R with  SBA  2. Supine <> sit with SBA.  3. Sit EOB x 15 min with SBA for balance.  4. Bed <> chair transfer with SBA and least restrictive assistive device.  5. Added 10/19: Gait x10 ft with CGA and LRAD.                     Time Tracking:     PT Received On: 10/28/20  PT Start Time: 0858     PT Stop Time: 0923  PT Total Time (min): 25 min     Billable Minutes: Gait Training 15 and Therapeutic Exercise 10    Treatment Type: Treatment  PT/PTA: PTA     PTA Visit Number: 5     Camelia Negrete PTA  10/28/2020

## 2020-10-28 NOTE — SUBJECTIVE & OBJECTIVE
Interval History: Patient overall stable overnight.  On Bipap early this morning. No new complaints. Trilogy has been approved.    Review of Systems   Constitutional: Positive for activity change. Negative for chills and fever.   HENT: Negative for ear pain.    Eyes: Negative for pain.   Respiratory: Negative for cough and shortness of breath.    Cardiovascular: Negative for chest pain, palpitations and leg swelling.   Gastrointestinal: Negative for abdominal pain, constipation, diarrhea, nausea and vomiting.   Endocrine: Negative for polydipsia, polyphagia and polyuria.   Genitourinary: Negative for difficulty urinating and dysuria.   Musculoskeletal: Negative for neck pain.   Skin: Negative for rash.   Neurological: Negative for dizziness and headaches.   Hematological: Does not bruise/bleed easily.   Psychiatric/Behavioral: Negative for agitation and behavioral problems.     Objective:     Vital Signs (Most Recent):  Temp: 97.9 °F (36.6 °C) (10/28/20 0836)  Pulse: 79 (10/28/20 0836)  Resp: 20 (10/28/20 0836)  BP: 125/74 (10/28/20 0836)  SpO2: (!) 90 % (10/28/20 0836) Vital Signs (24h Range):  Temp:  [97.5 °F (36.4 °C)-98.2 °F (36.8 °C)] 97.9 °F (36.6 °C)  Pulse:  [] 79  Resp:  [20-22] 20  SpO2:  [90 %-93 %] 90 %  BP: ()/(50-74) 125/74     Weight: 99.4 kg (219 lb 2.2 oz)  Body mass index is 38.82 kg/m².    Intake/Output Summary (Last 24 hours) at 10/28/2020 0916  Last data filed at 10/28/2020 0658  Gross per 24 hour   Intake 560 ml   Output 1250 ml   Net -690 ml      Physical Exam  Constitutional:       General: She is not in acute distress.     Appearance: She is obese. She is not toxic-appearing.   HENT:      Head: Normocephalic and atraumatic.      Right Ear: External ear normal.      Left Ear: External ear normal.      Nose: Nose normal.      Mouth/Throat:      Mouth: Mucous membranes are moist.      Pharynx: Oropharynx is clear.   Eyes:      General: No scleral icterus.     Conjunctiva/sclera:  Conjunctivae normal.   Neck:      Musculoskeletal: Normal range of motion and neck supple.   Cardiovascular:      Rate and Rhythm: Normal rate. Rhythm irregular.      Pulses: Normal pulses.      Heart sounds: No murmur.   Pulmonary:      Effort: No respiratory distress.      Breath sounds: No wheezing or rales.      Comments:     Abdominal:      General: Abdomen is flat. Bowel sounds are normal. There is no distension.      Palpations: Abdomen is soft.      Tenderness: There is no abdominal tenderness.   Musculoskeletal:      Right lower leg: No edema.      Left lower leg: No edema.   Skin:     General: Skin is warm.      Coloration: Skin is not jaundiced.      Findings: No rash.   Neurological:      General: No focal deficit present.      Mental Status: She is alert. Mental status is at baseline.   Psychiatric:         Mood and Affect: Mood normal.         Behavior: Behavior normal.         Significant Labs: All pertinent labs within the past 24 hours have been reviewed.    Significant Imaging: I have reviewed all pertinent imaging results/findings within the past 24 hours.

## 2020-10-28 NOTE — PLAN OF CARE
CM Interim supervisor called [facility]; spoke with Danuta.  Per Danuta, the trilogy ventilator is currently still in medical review for approval.  Pending; awaiting decision.

## 2020-10-28 NOTE — PLAN OF CARE
Ochoa spoke with Zee, RN navigator at [facility], to inquire about Trilolgy. According to Zee it is still in review with their MD, but she expects to hear back today.    CM informed pt that dc would likely be tomorrow, 10/29/20 if Trilogy approved.    Cm to follow for plans and arrangements.

## 2020-10-29 NOTE — PT/OT/SLP PROGRESS
Physical Therapy Treatment    Patient Name:  Patsy Morris   MRN:  [id number]    Recommendations:     Discharge Recommendations:  nursing facility, skilled   Discharge Equipment Recommendations: none   Barriers to discharge: Current functional status    Assessment:     Patsy Morris is a 70 y.o. female admitted with a medical diagnosis of Acute on chronic respiratory failure with hypoxia and hypercapnia.  She presents with the following impairments/functional limitations:  weakness, impaired endurance, impaired self care skills, impaired functional mobilty, gait instability, decreased lower extremity function, impaired cardiopulmonary response to activity, edema.    Pt tolerated treatment fair. Pt had SOB with activity. Pt is progressing toward meeting goals. Pt performed seated ther ex and sit <> stand transfer w/ rollator and SBA. Pt's SpO2 was 90-91% taken in sitting following seated ther ex. Pt ambulated 100ft w/ rollator and SBA. Pt took seated rest break at ~50 ft. Demonstrated improved ambulation. Pt continues to be limited by shortness of breath and poor endurance. PT will continue to follow and progress goals as tolerated. Recommend discharge to SNF.     Rehab Prognosis: Good; patient would benefit from acute skilled PT services to address these deficits and reach maximum level of function.    Recent Surgery: * No surgery found *      Plan:     During this hospitalization, patient to be seen 5 x/week to address the identified rehab impairments via gait training, therapeutic activities, therapeutic exercises and progress toward the following goals:    · Plan of Care Expires:  11/15/20    Subjective     Chief Complaint: Shortness of breath  Patient/Family Comments/goals: No goal stated.   Pain/Comfort:  · Pain Rating 1: 0/10      Objective:     Communicated with RN (Johnathan) prior to session.  Patient found up in chair with oxygen, peripheral IV(5L NC) upon PT entry to room.     General Precautions:  Standard, fall, respiratory   Orthopedic Precautions:N/A   Braces: N/A     Functional Mobility: Gait belt and non-slip socks donned prior to mobility. Surgical mask donned for ambulation in hallway per current infection control policy.  · Transfers:     · Sit to Stand:  stand by assistance with rollator  · Pt performed from recliner and toilet.   · + void and + BM  · Pt was able to perform pericare when reaching in front to clean vaginal area.  · Pt was Total A for pericare following BM.  · Pt was Total A for donning/doffing socks.   · Pt was Mod A for dressing w/ gown.  · Pt appeared to have increased SOB following toileting.  · Gait: Pt ambulated 50ft and 50ft w/ rollator and SBA.   · Pt took seated rest break between trials.   · Pt observed to have forward trunk flexion, decreased samaria, decreased endurance, narrow base of support, and decreased bilateral heel strike/toe off.       AM-PAC 6 CLICK MOBILITY  Turning over in bed (including adjusting bedclothes, sheets and blankets)?: 4  Sitting down on and standing up from a chair with arms (e.g., wheelchair, bedside commode, etc.): 3  Moving from lying on back to sitting on the side of the bed?: 3  Moving to and from a bed to a chair (including a wheelchair)?: 3  Need to walk in hospital room?: 3  Climbing 3-5 steps with a railing?: 3  Basic Mobility Total Score: 19       Therapeutic Activities and Exercises:  Pt performed seated ther ex including bilateral ankle pumps x10, calf raises x10, glute sets x10, LAQ x10, and hip flexion x10. Pt required verbal cues and demonstration for correct exercise performance/technique. Pt's SpO2 was 90-91% taken in sitting following seated ther ex.     PT educated patient re:   · PT plan of care/role of PT  · Fall and safety precautions  · Use of call light (don't get up without assistance)  Pt verbalized understanding     The patient is safe to transfer with RN assist, whiteboard updated.   Patient encouraged to sit up in chair  throughout the day to prevent deconditioning.       Patient left up in chair with all lines intact and call button in reach. New purwick placed at end of session.    GOALS:   Multidisciplinary Problems     Physical Therapy Goals        Problem: Physical Therapy Goal    Goal Priority Disciplines Outcome Goal Variances Interventions   Physical Therapy Goal     PT, PT/OT Ongoing, Progressing     Description: Goals to be met by: 11/15/2020    Patient will perform the following to increase strength, improve mobility, and return to prior level of function:    1. Supine <> sit with modified Independent.  2. Sit <> stand transfer w/ modified Independent and least restrictive assistive device.   3. Bed <> chair transfer with modified Independent and least restrictive assistive device.  4. Ambulate 300ft with supervision and least restrictive assistive device.                      Time Tracking:     PT Received On: 10/29/20  PT Start Time: 1002     PT Stop Time: 1041  PT Total Time (min): 39 min     Billable Minutes: Gait Training 16, Therapeutic Activity 15 and Therapeutic Exercise 8    Treatment Type: Treatment  PT/PTA: PT     PTA Visit Number: 0     SILAS Mccann  10/29/2020
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
## 2020-10-29 NOTE — PROGRESS NOTES
Pt received on BIPAP;SPO2 94%. Placed pt on 5LNC;SPO2 92%. No PRN treatments were needed. Pt was educated and instructed on the use and importance of an IS;pt understood. Pt achieved 250 with fair effort. Will continue to monitor.

## 2020-10-29 NOTE — PT/OT/SLP PROGRESS
Occupational Therapy   Treatment    Name: Patsy Morris  MRN: [id number]  Admitting Diagnosis:  Acute on chronic respiratory failure with hypoxia and hypercapnia       Recommendations:     Discharge Recommendations: nursing facility, skilled  Discharge Equipment Recommendations:  none  Barriers to discharge:  (current functional level)    Assessment:     Patsy Morris is a 70 y.o. female with a medical diagnosis of Acute on chronic respiratory failure with hypoxia and hypercapnia.  She presents with fatigue. Performance deficits affecting function are weakness, impaired endurance, impaired self care skills, impaired functional mobilty, impaired balance, impaired cardiopulmonary response to activity.  Pt agreeable to participating in therapy upon arrival to room.  Pt able to perform grooming tasks with supervision in unsupported position seated at EOB.  Pt able to perform sit <> stand transfer with SBA and RW, and take steps with SBA-CGA and RW.  While performing UB dressing some assist required to pull gown around backside with Min A needed for task.  Pt reported feeling SOB this date, but states it is no more than usual.  No signs of distress observed during session.      She is making progress towards goals and would continue to benefit from skilled OT services to address problems listed above and increase independence with ADLs.  SNF is recommended upon d/c from acute care to further address deficits and help pt improve overall functional independence.          Rehab Prognosis:  Good; patient would benefit from acute skilled OT services to address these deficits and reach maximum level of function.       Plan:     Patient to be seen 5 x/week to address the above listed problems via self-care/home management, therapeutic activities, therapeutic exercises  · Plan of Care Expires: 11/14/20  · Plan of Care Reviewed with: patient    Subjective     Pain/Comfort:  Pain Rating 1: 0/10  Pain Rating  Post-Intervention 1: 0/10    Objective:     Communicated with: RN (Johnathan) prior to session.  Patient found HOB elevated with peripheral IV, PureWick, telemetry, oxygen upon OT entry to room.    General Precautions: Standard, fall, respiratory   Orthopedic Precautions:N/A   Braces: N/A     Occupational Performance:     Bed Mobility:    · Scooting/Bridging: stand by assistance  · Supine <> Sit: stand by assistance     Functional Mobility/Transfers:  · Sit <> Stand: SBA and RW x 1 trial from EOB  · Functional Mobility: Pt walked ~3 ft with SBA-CGA and RW (bed <> chair).  No instances of LOB noted.    Activities of Daily Living:  · Grooming: supervision for washing face and brushing teeth while seated at EOB.  · Upper Body Dressing: minimum assistance for donning gown on backside like robe.  Some assist required to pull around backside.   · Feeding:  Setup for drinking from cup while seated at EOB.      [facility] Click ADL: 16    Treatment & Education:  *Pt performed multiple grooming tasks seated at EOB as noted above.  *Pt performed sit <> stand transfer and took steps from bed <> chair; cues required for positioning of body prior to sitting.  *POC reviewed with pt and purpose of OT in acute care setting reviewed.    Patient left up in chair with all lines intact, call button in reach and RN (Johnathan) and PT (Georgie) notifiedEducation:      GOALS:   Multidisciplinary Problems     Occupational Therapy Goals        Problem: Occupational Therapy Goal    Goal Priority Disciplines Outcome Interventions   Occupational Therapy Goal     OT, PT/OT Ongoing, Progressing    Description: Goals to be met by: 11/5/2020    Patient will increase functional independence with ADLs by performing:    UE Dressing with SBA.  LE Dressing with Maximum Assistance using adaptive equipment as needed.  Grooming while standing with SBA.  Toileting from bedside commode with CGA for hygiene and clothing management.   Toilet transfer to bedside commode with  Minimal Assistance.    Completed Goals:   UE Dressing with Minimal Assistance.                     Time Tracking:     OT Date of Treatment: 10/29/20  OT Start Time: 0924  OT Stop Time: 0951  OT Total Time (min): 27 min    Billable Minutes:Self Care/Home Management 15  Therapeutic Activity 12      CARLOS Motta  10/29/2020

## 2020-10-29 NOTE — ASSESSMENT & PLAN NOTE
"Chronic Respiratory Failure on 3 liters of Oxygen at home - COPD, BHAVANI/OHS with severe restrictive lung disease.  Recent admission from 8/22-8/31 - treated at that time with Afib-RVR with CHF (acute diastolic) and COPD exacerbation (s/p antibiotics and steroids) and transferred to SNF.    Readmitted from 9/1-9/11/2020 with Afib-RVR with CHF and d/c to SNF with Trilogy.  Patient states she has been using her "CPAP" at home.      This Admission she presented to SOB over past 3 days PTA.  Placed on BiPAP and given Lasix 80mg IV x 1 in ED.  Transferred to ICU on admission.  Started initially on Lasix 40mg IV q12 and increased to 80mg q12.  Started on IV Vanc, Flagyl, and Cefepime on 10/15/2020 by Pulmonary-CC given CT Chest concerning for infection - antibiotics stopped 10/18/2020.  Respiratory culture with normal respiratory leonardo on 10/16/2020.  Given cavitary appearance on imaging, Quantiferon gold sent and indeterminate x 2  with Sputum AFB stain negative and  Culture pending x 1 on 10/16/2020.  On BiPAP this morning - Pox 92% on 45% FiO2 and BiPAP  I/O of 900/1100  Weight 103.4kg on admission and down to 99.4kg.     CXR on 10/13/2020 -  Interval worsening of diffuse bilateral airspace disease and moderate bilateral pleural effusions when compared to 09/09/2020  CT Chest Without on 10/15/2020 - The findings highly concerning for extensive diffuse pulmonary infection with bilateral pleural fluid collections right greater than left.  US of LEs on 10/15/2020 - No evidence of deep venous thrombosis in either lower extremity.  CXR Portable on 10/20/2020 - No significant change.  There is cardiomegaly, multifocal airspace opacities and bilateral effusions.  CXR Protable on 10/28/2020 - Worsening right basilar atelectasis or consolidation.  Right upper lobe consolidation is improved.  There may be a tiny pneumatocele or central cavitation.  Pleural effusions are improved.  Continued follow-up advised.     Labs on " "Admission:  WBC 9.22  COVID-19 rapid negative  Trop negative x 3     PH 7.227/pCO2 94.4/pO2 73  Procal normal  Lactic Acid normal  Quantiferon indeterminate x 2  Sputum AFB stain negative and culture pending x 1     Followed by Pulmonary - " Tolerating NIV qhs well  The patient's physical is significant for bibasilar crackles  The patient's working assessments include acute on chronic hypercapnic resp failure.  Has diuresed well but still some pulm edema on exam.  Continue NIV use while sleeping, diuresis.  Will follow cavitary lung lesion as outpatient ".      Followed by Cardiology - "Does not appear fluid overloaded at present.  On furosemide 40 mg po Q12.  Watch weight and increase dose as needed for weight gain  ".     Plan:  Continue with Lasix - now 40mg bid  Continue BiPAP at night - insurance approved Trilogy for home use and awaiting confirmation  Strict I&Os  Fluid Restrict to 1.5L   DuoNebs  Start Incentive Spirometry while awake      "

## 2020-10-29 NOTE — PLAN OF CARE
Problem: Occupational Therapy Goal  Goal: Occupational Therapy Goal  Description: Goals to be met by: 11/5/2020    Patient will increase functional independence with ADLs by performing:    UE Dressing with SBA.  LE Dressing with Maximum Assistance using adaptive equipment as needed.  Grooming while standing with SBA.  Toileting from bedside commode with CGA for hygiene and clothing management.   Toilet transfer to bedside commode with Minimal Assistance.    Completed Goals:   UE Dressing with Minimal Assistance.    10/29/2020 1047 by CARLOS Serrano  Outcome: Ongoing, Progressing        POC remains appropriate with date for goals to be met extended.  Pt met one goal this date and is making progress towards others.  SNF is recommended upon d/c from acute care to further address deficits and help pt improve overall functional independence.     CARLOS Motta  10/29/2020

## 2020-10-29 NOTE — PLAN OF CARE
Problem: Physical Therapy Goal  Goal: Physical Therapy Goal  Description: Goals to be met by: 11/15/2020    Patient will perform the following to increase strength, improve mobility, and return to prior level of function:    1. Supine <> sit with modified Independent.  2. Sit <> stand transfer w/ modified Independent and least restrictive assistive device.   3. Bed <> chair transfer with modified Independent and least restrictive assistive device.  4. Ambulate 300ft with supervision and least restrictive assistive device.     10/29/2020 1123 by SILAS Mccann  Outcome: Ongoing, Progressing  10/29/2020 1120 by SILAS Mccann  Reactivated    Pt tolerated treatment fair. Pt had SOB with activity. Pt is progressing toward meeting goals. Pt performed seated ther ex and sit <> stand transfer w/ rollator and SBA. Pt's SpO2 was 90-91% taken in sitting following seated ther ex.  Pt ambulated 100ft w/ rollator and SBA. Pt took seated rest break at ~50 ft. Demonstrated improved ambulation. Pt continues to be limited by shortness of breath and poor endurance. PT will continue to follow and progress goals as tolerated. Recommend discharge to SNF.

## 2020-10-29 NOTE — PROGRESS NOTES
"[facility] Hospital Medicine  Progress Note    Patient Name: Patsy Morris  MRN: [id number]  Patient Class: IP- Inpatient   Admission Date: 10/13/2020  Length of Stay: 16 days  Attending Physician: Ravi Soria MD  Primary Care Provider: Luisana Cartagena MD        Subjective:     Principal Problem:Acute on chronic respiratory failure with hypoxia and hypercapnia        HPI:  Per Ravi Sroia:    "Per ED:  "This is a 70 y.o. female with history of CHF and COPD who presents via EMS with complaint of shortness of breath that began a few days ago. Per EMS patient had O2 saturation of 72 on 3 liters if home oxygen upon their arrival. Patient states she is on 3 liters of home oxygen at baseline. She denies fever, cough, chest pain, nausea, vomiting, diarrhea, or rhinorrhea. She reports she was recently discharged from the hospital for similar symptoms. She reports compliance with her home medications. She recently became resident of [facility] after last hospitalization. She is a former smoker. She denies undergoing any surgeries".    The patient is a 70 year old female with a PMH significant for of HTN, Chronic Respiratory Failure (COPD and BHAVANI/OHS), HFpEF, Obesity who presented to the ED with complaints of SOB that began about 3 days ago.  Patient is on 3L O2NC at home.  She was seen by her Cardiologist about 4 days prior to admission and told to decrease her Lasix from 40mg bid to qday.  She denies chest pain.  No Fevers.  No cough.  States she has been adherent with her medications.  Patient was recently admitted to [facility] from 9/1-9/11 for Acute on Chronic Respiratory Failure and discharged to SNF.  Patient was placed on BiPAP in ED and admitted to ICU.  Patient feeling better on BiPAP."    Overview/Hospital Course:  Patient is 70-year-old woman with history of hypertension, chronic hypoxic respiratory failure on home oxygen secondary to chronic obstructive pulmonary " disease, chronic diastolic heart failure, chronic atrial fibrillation, and morbid obesity re-admitted to the hospital with decompensated heart failure after patient was home for about a week following recent hospitalization and stayed at skilled nursing facility for physical therapy.  Patient treated with non invasive ventilation intravenous diuretics.  Slowly clinically improving.  Patient also initially treated with antibiotic therapy which has since been discontinued as bacterial pneumonia ruled out on clinical grounds.  Patient does have a cavitary lesion on her chest CT scan but otherwise without signs or symptoms of active pulmonary tuberculosis.  Acid-fast bacillus smears and cultures ordered to rule out active pulmonary tuberculosis.  Patient with one negative acid-fast bacillus smear thus far.  Efforts at obtaining additional smears even with sputum induction with hypertonic saline has not resulted additional sputum.  Discussed with Pulmonary Critical Care service who recommended discontinuing further smears and respiratory isolation.  She continue to slowly improve with diuresis.    Interval History: Patient overall stable overnight.  On Bipap early this morning. No new complaints. Trilogy has been approved, but awaiting for insurance confirmation.  Patient lost power at home, so will not be able to be discharged until power returned.    Review of Systems   Constitutional: Positive for activity change. Negative for chills and fever.   HENT: Negative for ear pain.    Eyes: Negative for pain.   Respiratory: Negative for cough and shortness of breath.    Cardiovascular: Negative for chest pain, palpitations and leg swelling.   Gastrointestinal: Negative for abdominal pain, constipation, diarrhea, nausea and vomiting.   Endocrine: Negative for polydipsia, polyphagia and polyuria.   Genitourinary: Negative for difficulty urinating and dysuria.   Musculoskeletal: Negative for neck pain.   Skin: Negative for  rash.   Neurological: Negative for dizziness and headaches.   Hematological: Does not bruise/bleed easily.   Psychiatric/Behavioral: Negative for agitation and behavioral problems.     Objective:     Vital Signs (Most Recent):  Temp: 97.7 °F (36.5 °C) (10/29/20 0821)  Pulse: 72 (10/29/20 0821)  Resp: 20 (10/29/20 0821)  BP: (!) 113/55 (10/29/20 0821)  SpO2: (!) 92 % (10/29/20 0821) Vital Signs (24h Range):  Temp:  [96.2 °F (35.7 °C)-98.7 °F (37.1 °C)] 97.7 °F (36.5 °C)  Pulse:  [] 72  Resp:  [16-24] 20  SpO2:  [90 %-94 %] 92 %  BP: (101-134)/(55-59) 113/55     Weight: 99.4 kg (219 lb 2.2 oz)  Body mass index is 38.82 kg/m².    Intake/Output Summary (Last 24 hours) at 10/29/2020 0920  Last data filed at 10/28/2020 2002  Gross per 24 hour   Intake 900 ml   Output 500 ml   Net 400 ml      Physical Exam  Constitutional:       General: She is not in acute distress.     Appearance: She is obese. She is not toxic-appearing.   HENT:      Head: Normocephalic and atraumatic.      Right Ear: External ear normal.      Left Ear: External ear normal.      Nose: Nose normal.      Mouth/Throat:      Mouth: Mucous membranes are moist.      Pharynx: Oropharynx is clear.   Eyes:      General: No scleral icterus.     Conjunctiva/sclera: Conjunctivae normal.   Neck:      Musculoskeletal: Normal range of motion and neck supple.   Cardiovascular:      Rate and Rhythm: Normal rate. Rhythm irregular.      Pulses: Normal pulses.      Heart sounds: No murmur.   Pulmonary:      Effort: No respiratory distress.      Breath sounds: No wheezing or rales.      Comments:     Abdominal:      General: Abdomen is flat. Bowel sounds are normal. There is no distension.      Palpations: Abdomen is soft.      Tenderness: There is no abdominal tenderness.   Musculoskeletal:      Right lower leg: No edema.      Left lower leg: No edema.   Skin:     General: Skin is warm.      Coloration: Skin is not jaundiced.      Findings: No rash.   Neurological:  "     General: No focal deficit present.      Mental Status: She is alert. Mental status is at baseline.   Psychiatric:         Mood and Affect: Mood normal.         Behavior: Behavior normal.         Significant Labs: All pertinent labs within the past 24 hours have been reviewed.    Significant Imaging: I have reviewed all pertinent imaging results/findings within the past 24 hours.      Assessment/Plan:      * Acute on chronic respiratory failure with hypoxia and hypercapnia  Chronic Respiratory Failure on 3 liters of Oxygen at home - COPD, BHAVANI/OHS with severe restrictive lung disease.  Recent admission from 8/22-8/31 - treated at that time with Afib-RVR with CHF (acute diastolic) and COPD exacerbation (s/p antibiotics and steroids) and transferred to SNF.    Readmitted from 9/1-9/11/2020 with Afib-RVR with CHF and d/c to SNF with Trilogy.  Patient states she has been using her "CPAP" at home.      This Admission she presented to SOB over past 3 days PTA.  Placed on BiPAP and given Lasix 80mg IV x 1 in ED.  Transferred to ICU on admission.  Started initially on Lasix 40mg IV q12 and increased to 80mg q12.  Started on IV Vanc, Flagyl, and Cefepime on 10/15/2020 by Pulmonary-CC given CT Chest concerning for infection - antibiotics stopped 10/18/2020.  Respiratory culture with normal respiratory leonardo on 10/16/2020.  Given cavitary appearance on imaging, Quantiferon gold sent and indeterminate x 2  with Sputum AFB stain negative and  Culture pending x 1 on 10/16/2020.  On BiPAP this morning - Pox 92% on 45% FiO2 and BiPAP  I/O of 900/1100  Weight 103.4kg on admission and down to 99.4kg.     CXR on 10/13/2020 -  Interval worsening of diffuse bilateral airspace disease and moderate bilateral pleural effusions when compared to 09/09/2020  CT Chest Without on 10/15/2020 - The findings highly concerning for extensive diffuse pulmonary infection with bilateral pleural fluid collections right greater than left.  US of LEs " "on 10/15/2020 - No evidence of deep venous thrombosis in either lower extremity.  CXR Portable on 10/20/2020 - No significant change.  There is cardiomegaly, multifocal airspace opacities and bilateral effusions.  CXR Protable on 10/28/2020 - Worsening right basilar atelectasis or consolidation.  Right upper lobe consolidation is improved.  There may be a tiny pneumatocele or central cavitation.  Pleural effusions are improved.  Continued follow-up advised.     Labs on Admission:  WBC 9.22  COVID-19 rapid negative  Trop negative x 3     PH 7.227/pCO2 94.4/pO2 73  Procal normal  Lactic Acid normal  Quantiferon indeterminate x 2  Sputum AFB stain negative and culture pending x 1     Followed by Pulmonary - " Tolerating NIV qhs well  The patient's physical is significant for bibasilar crackles  The patient's working assessments include acute on chronic hypercapnic resp failure.  Has diuresed well but still some pulm edema on exam.  Continue NIV use while sleeping, diuresis.  Will follow cavitary lung lesion as outpatient ".      Followed by Cardiology - "Does not appear fluid overloaded at present.  On furosemide 40 mg po Q12.  Watch weight and increase dose as needed for weight gain  ".     Plan:  Continue with Lasix - now 40mg bid  Continue BiPAP at night - insurance approved Trilogy for home use and awaiting confirmation  Strict I&Os  Fluid Restrict to 1.5L   DuoNebs  Start Incentive Spirometry while awake        Atrial fibrillation with rapid ventricular response   in ED. Has been well controlled since.  Home Meds:  Metoprolol 50mg bid and Apixaban 5mg bid  Followed by Cardiology, Dr. Lutz - last seen outpatient on 10/8/2020  HR stable.  -Continue current medications    Chronic kidney disease (CKD) stage G4/A1, severely decreased glomerular filtration rate (GFR) between 15-29 mL/min/1.73 square meter and albuminuria creatinine ratio less than 30 mg/g  Creatinine of 2.0 on admission and was 1.9 on " discharge 9/11/2020.  Her baseline is around 1.8.  Creatinine 1.6 this morning  -Renally dose medication for CrCl of <30  -Avoid Nephrotoxic medications if able  -Monitor urine output  -Follow closely    Acute on chronic diastolic congestive heart failure  Home Meds:  Lasix 40mg qday (was bid and recently changed as above)  BNP elevated at 577 on last admission and 968 this admission  See above.     TTE on 8/24/2020 - Mild left atrial enlargement.  · Diastolic pattern consistent with atrial fibrillation observed.  · Normal left ventricular systolic function. The estimated ejection fraction is 58%.  · No wall motion abnormalities.  · Normal right ventricular systolic function.  · Mild right atrial enlargement.  Small posterior pericardial effusion.     Plan:  Lasix 40mg po bid  Follow I&Os and weight       Normocytic anemia  Hgb 10.0 on admission and stable at 9.4 today  Stable since last admission.  Ferritin 25 on 8/25/2020 and Fe sat 18% on 9/5/2020 - continue outpatient FeSO4 325mg daily  B12 301 and Folate 4.6 - both borderline low - continue outpatient Nephrocaps daily      Debility  Continue with physical and occupational therapy.  Home Health on discharge    Encounter for palliative care  Followed by Palliative Care      Current moderate episode of major depressive disorder without prior episode  Stable.  Continue SSRI therapy.    Dyslipidemia  Continue with statin therapy.    Essential hypertension  Continue Metoprolol and Lasix.  BP stable.      VTE Risk Mitigation (From admission, onward)         Ordered     apixaban tablet 5 mg  2 times daily      10/17/20 1152     Place sequential compression device  Until discontinued      10/13/20 1405     IP VTE HIGH RISK PATIENT  Once      10/13/20 1405                Discharge Planning   YUDELKA: 10/26/2020     Code Status: Full Code   Is the patient medically ready for discharge?:     Reason for patient still in hospital (select all that apply): Patient trending  condition, Treatment and Pending disposition  Discharge Plan A: Skilled Nursing Facility                  Ravi Soria MD  Department of Hospital Medicine   [facility]

## 2020-10-29 NOTE — SUBJECTIVE & OBJECTIVE
Interval History: Patient overall stable overnight.  On Bipap early this morning. No new complaints. Trilogy has been approved, but awaiting for insurance confirmation.  Patient lost power at home, so will not be able to be discharged until power returned.    Review of Systems   Constitutional: Positive for activity change. Negative for chills and fever.   HENT: Negative for ear pain.    Eyes: Negative for pain.   Respiratory: Negative for cough and shortness of breath.    Cardiovascular: Negative for chest pain, palpitations and leg swelling.   Gastrointestinal: Negative for abdominal pain, constipation, diarrhea, nausea and vomiting.   Endocrine: Negative for polydipsia, polyphagia and polyuria.   Genitourinary: Negative for difficulty urinating and dysuria.   Musculoskeletal: Negative for neck pain.   Skin: Negative for rash.   Neurological: Negative for dizziness and headaches.   Hematological: Does not bruise/bleed easily.   Psychiatric/Behavioral: Negative for agitation and behavioral problems.     Objective:     Vital Signs (Most Recent):  Temp: 97.7 °F (36.5 °C) (10/29/20 0821)  Pulse: 72 (10/29/20 0821)  Resp: 20 (10/29/20 0821)  BP: (!) 113/55 (10/29/20 0821)  SpO2: (!) 92 % (10/29/20 0821) Vital Signs (24h Range):  Temp:  [96.2 °F (35.7 °C)-98.7 °F (37.1 °C)] 97.7 °F (36.5 °C)  Pulse:  [] 72  Resp:  [16-24] 20  SpO2:  [90 %-94 %] 92 %  BP: (101-134)/(55-59) 113/55     Weight: 99.4 kg (219 lb 2.2 oz)  Body mass index is 38.82 kg/m².    Intake/Output Summary (Last 24 hours) at 10/29/2020 0920  Last data filed at 10/28/2020 2002  Gross per 24 hour   Intake 900 ml   Output 500 ml   Net 400 ml      Physical Exam  Constitutional:       General: She is not in acute distress.     Appearance: She is obese. She is not toxic-appearing.   HENT:      Head: Normocephalic and atraumatic.      Right Ear: External ear normal.      Left Ear: External ear normal.      Nose: Nose normal.      Mouth/Throat:      Mouth:  Mucous membranes are moist.      Pharynx: Oropharynx is clear.   Eyes:      General: No scleral icterus.     Conjunctiva/sclera: Conjunctivae normal.   Neck:      Musculoskeletal: Normal range of motion and neck supple.   Cardiovascular:      Rate and Rhythm: Normal rate. Rhythm irregular.      Pulses: Normal pulses.      Heart sounds: No murmur.   Pulmonary:      Effort: No respiratory distress.      Breath sounds: No wheezing or rales.      Comments:     Abdominal:      General: Abdomen is flat. Bowel sounds are normal. There is no distension.      Palpations: Abdomen is soft.      Tenderness: There is no abdominal tenderness.   Musculoskeletal:      Right lower leg: No edema.      Left lower leg: No edema.   Skin:     General: Skin is warm.      Coloration: Skin is not jaundiced.      Findings: No rash.   Neurological:      General: No focal deficit present.      Mental Status: She is alert. Mental status is at baseline.   Psychiatric:         Mood and Affect: Mood normal.         Behavior: Behavior normal.         Significant Labs: All pertinent labs within the past 24 hours have been reviewed.    Significant Imaging: I have reviewed all pertinent imaging results/findings within the past 24 hours.

## 2020-10-29 NOTE — PROGRESS NOTES
[facility]  Cardiology  Progress Note    Patient Name: Patsy Morris  MRN: [id number]  Admission Date: 10/13/2020  Hospital Length of Stay: 16 days  Code Status: Full Code   Attending Physician: Ravi Soria MD   Primary Care Physician: Luisana Cartagena MD  Expected Discharge Date:   Principal Problem:Acute on chronic respiratory failure with hypoxia and hypercapnia    Subjective:     Brief HPI:    Patsy Morris is a 70 y.o.female with hypertension. She has chronic atrial fibrillation and heart failure with preserved ejection fraction. She has chronic obstructive pulmonary disease being on home oxygen with CO2 retention. She has undergone nephrectomy for renal cell carcinoma and has chronic kidney disease. She was admitted to [facility] in 8/2020 and 9/2020 with heart failure and exacerbations of her chronic obstructive pulmonary disease. She went to therapy at [facility].      She used to be on furosemide 40 mg Q24 however after her last hospitalization she had the does of furosemide increased to 80 mg Q24. The dose was reduced to 40 mg Q24 on 10/8/2020. She became increasingly short of breath and presents fluid overloaded in heart failure as well as an exacerbation of her COPD with high CO2. She was admitted to the ICU.    Hospital Course:    Diuresis.    BIPAP.    Respiratory treatments.    10/15/2020: Echo: Normal left ventricular size and systolic function. EF 60%. Moderately dilated LA. Mildly dilated RV. SPAP 49 mmHg. Small pericardial effusion.    10/16/2020: Apixiban 5 mg Q12 was changed to heparin iv for consideration of bronchoscopy.    10/21/2020: Transferred from ICU to telemetry.    10/27/2020: Walked 150 ft with PT.    Interval History:     Slowly breathing slightly easier from day to day. Slowly stronger.    Looking better.    Tells me she has decided that she will go home.      Review of Systems   Constitution: Negative for chills, fever and  malaise/fatigue.   HENT: Negative for nosebleeds.    Eyes: Negative for vision loss in left eye and vision loss in right eye.   Cardiovascular: Positive for dyspnea on exertion. Negative for chest pain, leg swelling, orthopnea, palpitations and paroxysmal nocturnal dyspnea.   Respiratory: Negative for cough, hemoptysis, shortness of breath, sputum production and wheezing.    Hematologic/Lymphatic: Negative for bleeding problem.   Skin: Negative for rash.   Musculoskeletal: Negative for myalgias.   Gastrointestinal: Negative for abdominal pain, heartburn, hematemesis, hematochezia, jaundice, melena, nausea and vomiting.   Genitourinary: Negative for hematuria.   Neurological: Positive for weakness. Negative for dizziness, headaches, light-headedness and vertigo.   Psychiatric/Behavioral: Negative for altered mental status. The patient is not nervous/anxious.    Allergic/Immunologic: Negative for persistent infections.       Objective:     Vital Signs (Most Recent):  Temp: 97.7 °F (36.5 °C) (10/29/20 0821)  Pulse: 72 (10/29/20 0821)  Resp: 20 (10/29/20 0821)  BP: (!) 113/55 (10/29/20 0821)  SpO2: (!) 92 % (10/29/20 0821) Vital Signs (24h Range):  Temp:  [96.2 °F (35.7 °C)-98.7 °F (37.1 °C)] 97.7 °F (36.5 °C)  Pulse:  [] 72  Resp:  [16-24] 20  SpO2:  [90 %-94 %] 92 %  BP: (101-134)/(55-74) 113/55     Weight: 99.4 kg (219 lb 2.2 oz)  Body mass index is 38.82 kg/m².    SpO2: (!) 92 %  O2 Device (Oxygen Therapy): nasal cannula w/ humidification      Intake/Output Summary (Last 24 hours) at 10/29/2020 0826  Last data filed at 10/28/2020 2002  Gross per 24 hour   Intake 900 ml   Output 1100 ml   Net -200 ml       Lines/Drains/Airways     Drain            Female External Urinary Catheter 10/13/20 1359 15 days          Peripheral Intravenous Line                 Midline Catheter Insertion/Assessment  - Double Lumen 10/20/20 1220 Right median cubital vein (antecubital fossa)  8 days                Physical Exam    Constitutional: She is oriented to person, place, and time. She appears well-developed and well-nourished. She appears ill.   Neck: Carotid bruit is not present.   Cardiovascular: Normal rate, S1 normal and S2 normal. An irregularly irregular rhythm present.   Pulmonary/Chest: She has rhonchi in the right lower field and the left lower field.   Musculoskeletal:      Right ankle: She exhibits no swelling.      Left ankle: She exhibits no swelling.   Neurological: She is alert and oriented to person, place, and time.   Skin: Skin is warm and dry.   Psychiatric: She has a normal mood and affect. Her speech is normal and behavior is normal. Cognition and memory are normal.     Current Medications:     apixaban  5 mg Oral BID    atorvastatin  80 mg Oral QHS    docusate sodium  100 mg Oral BID    escitalopram oxalate  10 mg Oral QHS    ferrous sulfate  325 mg Oral Daily    furosemide  40 mg Oral BID    gabapentin  100 mg Oral BID    metoprolol tartrate  50 mg Oral BID    miconazole NITRATE 2 %   Topical (Top) BID    vitamin renal formula (B-complex-vitamin c-folic acid)  1 capsule Oral Daily     Current Laboratory Results:    Recent Results (from the past 24 hour(s))   Basic metabolic panel    Collection Time: 10/29/20  4:38 AM   Result Value Ref Range    Sodium 142 136 - 145 mmol/L    Potassium 4.9 3.5 - 5.1 mmol/L    Chloride 91 (L) 95 - 110 mmol/L    CO2 42 (HH) 23 - 29 mmol/L    Glucose 101 70 - 110 mg/dL    BUN 52 (H) 8 - 23 mg/dL    Creatinine 1.6 (H) 0.5 - 1.4 mg/dL    Calcium 9.2 8.7 - 10.5 mg/dL    Anion Gap 9 8 - 16 mmol/L    eGFR if African American 37 (A) >60 mL/min/1.73 m^2    eGFR if non African American 32 (A) >60 mL/min/1.73 m^2   Magnesium    Collection Time: 10/29/20  4:38 AM   Result Value Ref Range    Magnesium 2.4 1.6 - 2.6 mg/dL   Phosphorus    Collection Time: 10/29/20  4:38 AM   Result Value Ref Range    Phosphorus 4.5 2.7 - 4.5 mg/dL   CBC auto differential    Collection Time: 10/29/20   4:38 AM   Result Value Ref Range    WBC 7.62 3.90 - 12.70 K/uL    RBC 3.45 (L) 4.00 - 5.40 M/uL    Hemoglobin 9.4 (L) 12.0 - 16.0 g/dL    Hematocrit 31.9 (L) 37.0 - 48.5 %    MCV 93 82 - 98 fL    MCH 27.2 27.0 - 31.0 pg    MCHC 29.5 (L) 32.0 - 36.0 g/dL    RDW 14.6 (H) 11.5 - 14.5 %    Platelets 163 150 - 350 K/uL    MPV 11.6 9.2 - 12.9 fL    Immature Granulocytes 0.4 0.0 - 0.5 %    Gran # (ANC) 6.0 1.8 - 7.7 K/uL    Immature Grans (Abs) 0.03 0.00 - 0.04 K/uL    Lymph # 0.8 (L) 1.0 - 4.8 K/uL    Mono # 0.8 0.3 - 1.0 K/uL    Eos # 0.1 0.0 - 0.5 K/uL    Baso # 0.01 0.00 - 0.20 K/uL    nRBC 0 0 /100 WBC    Gran % 78.1 (H) 38.0 - 73.0 %    Lymph % 9.8 (L) 18.0 - 48.0 %    Mono % 10.0 4.0 - 15.0 %    Eosinophil % 1.6 0.0 - 8.0 %    Basophil % 0.1 0.0 - 1.9 %    Differential Method Automated      Current Imaging Results:    X-Ray Chest AP Portable   Final Result      Worsening right basilar atelectasis or consolidation.      Right upper lobe consolidation is improved.  There may be a tiny pneumatocele or central cavitation.      Pleural effusions are improved.      Continued follow-up advised.         Electronically signed by: Elsa Alcantar   Date:    10/28/2020   Time:    10:24      X-Ray Chest AP Portable   Final Result      No significant change.  There is cardiomegaly, multifocal airspace opacities and bilateral effusions.         Electronically signed by: Ajay Camara MD   Date:    10/20/2020   Time:    08:48      US Lower Extremity Veins Bilateral   Final Result      No evidence of deep venous thrombosis in either lower extremity.         Electronically signed by: eCasar Carreon MD   Date:    10/15/2020   Time:    21:53      CT Chest Without Contrast   Final Result      The findings highly concerning for extensive diffuse pulmonary infection with bilateral pleural fluid collections right greater than left.         Electronically signed by: Regulo Bassett MD   Date:    10/15/2020   Time:    12:01      X-Ray Chest  AP Portable   Final Result      Interval worsening of diffuse bilateral airspace disease and moderate bilateral pleural effusions when compared to 09/09/2020         Electronically signed by: Halina Plascencia   Date:    10/13/2020   Time:    09:40      X-Ray Chest PA And Lateral    (Results Pending)       10/15/2020: Echo:    The left ventricle is normal in size with normal systolic function. The estimated ejection fraction is 60%.  A diastolic pattern consistent with atrial fibrillation observed.  Moderate left atrial enlargement.  Mild right ventricular enlargement.  Normal right ventricular systolic function.  Severe right atrial enlargement.  The aortic valve is mildly sclerotic.  The mitral valve is mildly sclerotic.  Mild mitral regurgitation.  Mild tricuspid regurgitation.  There is mild pulmonary hypertension.  The estimated PA systolic pressure is 49 mmHg.  Intermediate central venous pressure (8 mmHg).  Small circumferential pericardial effusion.  There is a left pleural effusion.      Assessment and Plan:     Problem List:    Active Diagnoses:    Diagnosis Date Noted POA    PRINCIPAL PROBLEM:  Acute on chronic respiratory failure with hypoxia and hypercapnia [J96.21, J96.22] 08/03/2016 Yes    Normocytic anemia [D64.9] 10/27/2020 Yes    Atrial fibrillation with rapid ventricular response [I48.91] 09/01/2020 Yes    Debility [R53.81] 08/26/2020 Yes    Encounter for palliative care [Z51.5] 08/25/2020 Not Applicable    Current moderate episode of major depressive disorder without prior episode [F32.1] 04/30/2020 Yes    Acute on chronic diastolic congestive heart failure [I50.33] 05/20/2016 Yes    Chronic kidney disease (CKD) stage G4/A1, severely decreased glomerular filtration rate (GFR) between 15-29 mL/min/1.73 square meter and albuminuria creatinine ratio less than 30 mg/g [N18.4]  Yes    Dyslipidemia [E78.5] 02/22/2016 Yes    Essential hypertension [I10] 10/15/2014 Yes      Problems Resolved  During this Admission:    Diagnosis Date Noted Date Resolved POA    Acute on chronic congestive heart failure [I50.9] 10/21/2020 10/27/2020 Yes    Cavitary lesion of lung [J98.4] 10/16/2020 10/27/2020 Yes    Acute on chronic congestive heart failure [I50.9] 10/13/2020 10/13/2020 Yes    Pulmonary nodules/lesions, multiple [R91.8] 03/22/2019 10/14/2020 Yes    Paroxysmal atrial fibrillation [I48.0] 08/03/2016 10/13/2020 Yes    Chronic hypercapnic respiratory failure [J96.12] 03/11/2016 10/13/2020 Yes    Obesity hypoventilation syndrome [E66.2] 02/25/2016 10/27/2020 Yes    Renal carcinoma [C64.9] 03/10/2015 10/13/2020 Yes       Assessment and Plan:     1. Heart Failure, Diastolic, Acute on Chronic              8/24/2020: Echo: Normal left ventricular size and systolic function. Mildly dilated LA.   10/15/2020: Echo: Normal left ventricular size and systolic function. EF 60%. Moderately dilated LA. Mildly dilated RV. SPAP 49 mmHg. Small pericardial effusion.              At NH was on furosemide 40 mg Q24.              10/13/2020: Presented fluid overloaded in HF. Received furosemide 80 mg iv Q12.   10/20/2020: CXR with extensive infiltrates and pleural effusions. .    10/26/2020: .   10/28/2020: CXR improving.              Does not appear fluid overloaded at present.   On furosemide 40 mg po Q12.   Watch weight and increase dose as needed for weight gain.      2. Atrial Fibrillation              In chronic atrial fibrillation.              On apixiban.              On metoprolol 50 mg Q12.              Rate well controlled mostly  bpm.     3. Chronic Anticoagulation              Been on apixiban 5 mg Q12.   10/16/2020: Apixiban 5 mg Q12 was changed to heparin iv.   On apixiban 5 mg Q12.   No bleeding.    4. Hypertension   On metoprolol 50 mg Q12 and furosemide 40 mg Q12.   Well controlled.                5. Chronic Kidney Disease, Stage 4              History or renal carcinoma and  nephrectomy.              10/13/2020: BUN/crea 27/2.0. CrCl 28.   10/20/2020: BUN/crea 56/1.8. CrCl 32.   10/25/2020: BUN/crea 49/1.6. CrCl 32.      6. Chronic Obstructive Pulmonary Disease              Chronic respiratory failure with CO2 retention.              10/13/2020: 7.23/94/73/39/90% on FiO2 of 40%.   Appears to overall be slowly improving.    Pulmonary following.    7. Weakness   10/27/2020: Walked 150 ft with PT.   Needs a lot of PT.    8. Disposition   10/28/2020: Tells me she has decided that she will go to her house.      VTE Risk Mitigation (From admission, onward)         Ordered     apixaban tablet 5 mg  2 times daily      10/17/20 1152     Place sequential compression device  Until discontinued      10/13/20 1405     IP VTE HIGH RISK PATIENT  Once      10/13/20 1405                Diana Meredith MD  Cardiology  [facility]

## 2020-10-29 NOTE — ASSESSMENT & PLAN NOTE
Home Meds:  Lasix 40mg qday (was bid and recently changed as above)  BNP elevated at 577 on last admission and 968 this admission  See above.     TTE on 8/24/2020 - Mild left atrial enlargement.  · Diastolic pattern consistent with atrial fibrillation observed.  · Normal left ventricular systolic function. The estimated ejection fraction is 58%.  · No wall motion abnormalities.  · Normal right ventricular systolic function.  · Mild right atrial enlargement.  Small posterior pericardial effusion.     Plan:  Lasix 40mg po bid  Follow I&Os and weight

## 2020-10-30 NOTE — PLAN OF CARE
CM spoke with Amanda at [facility], filling in for Zee. According to [facility] notes, peer to peer completed by Dr. Mortensen, 10/27/20, for trilogy has not yet resulted. CM to contilnue to follow for decision.    Pt states she still has no power at her home. Discharge with trilogy not possible without power.    CM to inform pt and follow for plans.

## 2020-10-30 NOTE — SUBJECTIVE & OBJECTIVE
"Interval History: Patient sitting in recliner chair, reports improvement in SOB.  Reports she does not have power at home.     Medications:  Continuous Infusions:  Scheduled Meds:   apixaban  5 mg Oral BID    atorvastatin  80 mg Oral QHS    docusate sodium  100 mg Oral BID    escitalopram oxalate  10 mg Oral QHS    ferrous sulfate  325 mg Oral Daily    furosemide  40 mg Oral BID    gabapentin  100 mg Oral BID    metoprolol tartrate  50 mg Oral BID    miconazole NITRATE 2 %   Topical (Top) BID    vitamin renal formula (B-complex-vitamin c-folic acid)  1 capsule Oral Daily     PRN Meds:acetaminophen, albuterol-ipratropium, dextrose 50%, dextrose 50%, glucagon (human recombinant), glucose, glucose, melatonin, ondansetron, polyethylene glycol, promethazine (PHENERGAN) IVPB, sodium chloride 0.9%    Objective:     Vital Signs (Most Recent):  Temp: 98.1 °F (36.7 °C) (10/30/20 1114)  Pulse: 74 (10/30/20 1114)  Resp: 20 (10/30/20 1114)  BP: (!) 109/56 (10/30/20 1114)  SpO2: (!) 93 % (10/30/20 1114) Vital Signs (24h Range):  Temp:  [97.7 °F (36.5 °C)-98.5 °F (36.9 °C)] 98.1 °F (36.7 °C)  Pulse:  [] 74  Resp:  [16-23] 20  SpO2:  [90 %-94 %] 93 %  BP: (105-140)/(56-65) 109/56     Weight: 99.4 kg (219 lb 2.2 oz)  Body mass index is 38.82 kg/m².    Admission Weight/BMI & Noted Weight/BMI in EMR:     105.5 kg (232 lb 9.4 oz)/ 41.20 kg/m²Abnormal       Date: Weight in kg (lbs): BMI: Height:   8/20/2020 102.5 kg (226 lb) 37.61 kg/m²    5' 3" (160 cm)   6/27/2018 100.2 kg (221 lb) 39.1 kg/m²    5' 3" (160 cm)   2/23/2016 108.665 kg (239 lb 9 oz)  43.43 kg/m²    5' 3" (160 cm)      Physical Exam  Constitutional:       General: She is not in acute distress.     Appearance: She is well-developed. She is obese. She is ill-appearing (Chronic).   Neck:      Musculoskeletal: Normal range of motion.   Cardiovascular:      Rate and Rhythm: Normal rate and regular rhythm.      Heart sounds: No murmur.   Pulmonary:      Breath " sounds: Normal breath sounds.      Comments: Diminished    Chest:      Chest wall: No tenderness.   Abdominal:      General: Bowel sounds are normal.   Musculoskeletal: Normal range of motion.   Skin:     General: Skin is warm.   Neurological:      Mental Status: She is alert and oriented to person, place, and time.   Psychiatric:         Thought Content: Thought content normal.         Judgment: Judgment normal.         Review of Symptoms    Symptom Assessment (ESAS 0-10 Scale)  Pain:  0  Dyspnea:  4         Bowel Management Plan (BMP):  Yes            Performance Status:  50    ECOG Performance Status Grade:  3 - Confined to bed or chair 50% of waking hours    Living Arrangements:  Lives with family    Psychosocial/Cultural: Patient lives with her brother, who she is primary caregiver for.  Patient has a sister and niece who are able to help her at home if needed.       Advance Care Planning   Advance Directives:   Living Will: No    LaPOST: Yes    Do Not Resuscitate Status: No    Medical Power of : Yes    Agent's Name:  Earlene Dexter   Agent's Contact Number:  [phone]    Decision Making:  Patient answered questions         Significant Labs: All pertinent labs within the past 24 hours have been reviewed.  CBC:   Recent Labs   Lab 10/30/20  0502   WBC 7.30   HGB 9.5*   HCT 32.2*   MCV 93        BMP:  Recent Labs   Lab 10/30/20  0502 10/30/20  0820   GLU 89 92    143   K 5.0 5.2*   CL 91* 91*   CO2 40* 41*   BUN 49* 49*   CREATININE 1.6* 1.5*   CALCIUM 9.5 9.6   MG 2.5  --      LFT:  Lab Results   Component Value Date    AST 16 10/13/2020    ALKPHOS 43 (L) 10/13/2020    BILITOT 0.8 10/13/2020     Albumin:   Albumin   Date Value Ref Range Status   10/13/2020 3.9 3.5 - 5.2 g/dL Final     Protein:   Total Protein   Date Value Ref Range Status   10/13/2020 7.0 6.0 - 8.4 g/dL Final     Lactic acid:   Lab Results   Component Value Date    LACTATE 0.6 10/13/2020      Ref Range & Units 10/13/20  1mo ago   POC Rapid COVID Negative Negative  Negative       Significant Imaging: I have reviewed all pertinent imaging results/findings within the past 24 hours.     I have reviewed the Chest x ray from 10/29/2020     I have reviewed the CT chest from 10/15/2020

## 2020-10-30 NOTE — PROGRESS NOTES
Working with occupational therapy. Dressing in place to bridge of nose. Pt states the nurses and therapists have been keeping the nose padded while she is on the bipap mask. Denies any new skin issues.   Lety Mason RN CWON

## 2020-10-30 NOTE — SUBJECTIVE & OBJECTIVE
Interval History: Patient overall stable overnight, but feeling a little more SOB off BiPAP and restarted.  Patient lost power at home, so will not be able to be discharged until power returned.    Review of Systems   Constitutional: Positive for activity change. Negative for chills and fever.   HENT: Negative for ear pain.    Eyes: Negative for pain.   Respiratory: Positive for shortness of breath. Negative for cough.    Cardiovascular: Negative for chest pain, palpitations and leg swelling.   Gastrointestinal: Negative for abdominal pain, constipation, diarrhea, nausea and vomiting.   Endocrine: Negative for polydipsia, polyphagia and polyuria.   Genitourinary: Negative for difficulty urinating and dysuria.   Musculoskeletal: Negative for neck pain.   Skin: Negative for rash.   Neurological: Negative for dizziness and headaches.   Hematological: Does not bruise/bleed easily.   Psychiatric/Behavioral: Negative for agitation and behavioral problems.     Objective:     Vital Signs (Most Recent):  Temp: 98.1 °F (36.7 °C) (10/30/20 0741)  Pulse: 70 (10/30/20 0741)  Resp: 18 (10/30/20 0741)  BP: (!) 131/58 (10/30/20 0741)  SpO2: (!) 92 % (10/30/20 0741) Vital Signs (24h Range):  Temp:  [97.7 °F (36.5 °C)-98.5 °F (36.9 °C)] 98.1 °F (36.7 °C)  Pulse:  [] 70  Resp:  [16-23] 18  SpO2:  [90 %-94 %] 92 %  BP: (105-140)/(51-65) 131/58     Weight: 99.4 kg (219 lb 2.2 oz)  Body mass index is 38.82 kg/m².    Intake/Output Summary (Last 24 hours) at 10/30/2020 0911  Last data filed at 10/30/2020 0448  Gross per 24 hour   Intake 740 ml   Output 1450 ml   Net -710 ml      Physical Exam  Constitutional:       General: She is not in acute distress.     Appearance: She is obese. She is not toxic-appearing.   HENT:      Head: Normocephalic and atraumatic.      Right Ear: External ear normal.      Left Ear: External ear normal.      Nose: Nose normal.      Mouth/Throat:      Mouth: Mucous membranes are moist.      Pharynx:  Oropharynx is clear.   Eyes:      General: No scleral icterus.     Conjunctiva/sclera: Conjunctivae normal.   Neck:      Musculoskeletal: Normal range of motion and neck supple.   Cardiovascular:      Rate and Rhythm: Normal rate. Rhythm irregular.      Pulses: Normal pulses.      Heart sounds: No murmur.   Pulmonary:      Effort: No respiratory distress.      Breath sounds: No wheezing or rales.      Comments:     Abdominal:      General: Abdomen is flat. Bowel sounds are normal. There is no distension.      Palpations: Abdomen is soft.      Tenderness: There is no abdominal tenderness.   Musculoskeletal:      Right lower leg: No edema.      Left lower leg: No edema.   Skin:     General: Skin is warm.      Coloration: Skin is not jaundiced.      Findings: No rash.   Neurological:      General: No focal deficit present.      Mental Status: She is alert. Mental status is at baseline.   Psychiatric:         Mood and Affect: Mood normal.         Behavior: Behavior normal.         Significant Labs: All pertinent labs within the past 24 hours have been reviewed.    Significant Imaging: I have reviewed all pertinent imaging results/findings within the past 24 hours.

## 2020-10-30 NOTE — PLAN OF CARE
CM left messaged again this am for Zee at [facility] to inquire about auth for Trilogy.     CM to speak with pt malik schwab at her home.    CM to call Michelle at 0900 when they open to inquire about auth for Trilogy.

## 2020-10-30 NOTE — PROGRESS NOTES
"[facility]  Palliative Medicine  Progress Note    Patient Name: Patsy Morris  MRN: [id number]  Admission Date: 10/13/2020  Hospital Length of Stay: 17 days  Code Status: Full Code   Attending Provider: Ravi Soria MD  Consulting Provider: Elsa Bran DNP  Primary Care Physician: Luisana Cartagena MD  Principal Problem:Acute on chronic respiratory failure with hypoxia and hypercapnia    Patient information was obtained from patient and ER records.      Assessment/Plan:     * Acute on chronic respiratory failure with hypoxia and hypercapnia  - Pulmonary following  - Awaiting home trilogy       Debility  - Pt/OT recommending SNF: patient does not want to go to SNF, wants to return home     Encounter for palliative care  -Patient seen on 8/24 and expressed a desire "enjoy life a little more".  Patient defines this by being able to leave her house and go to the mall or the grocery store. Patient reports not being able to do these activities due to worsening mobility.  When discussed the patient hopes and if they were not able to be obtained patient reported this would be an unacceptable outcome for her. Patient expressed her desire to be able to cook again.  Patient expressed her love for cooking.  - Patient fears  if something were to happen to her who would care for her brother who has schizophrenia.    - Patient previously completed LaPOST- DNR.  Then patient voided the LaPOST and completed new LaPOST on 9/4, changing her wishes to Full code  -Re discussed GOC: returning home.  I discussed our concerns regarding her going home and being able to care for herself.  She has family who can assist her in care giving needs.  I also discussed concerns regarding her frequent re admissions and high possibility of symptoms to worsen and what she would want at that point, patient reiterated to remain at home.  When discussed her goals of remaining at home, we discussed if she continues to " "be re admitted and remaining at home is a quality of life measure, then I would advise her to consider hospice at home.  Patient reports "I am not ready for that yet".  Patient desires PT/OT to regain her strength. Patient wishes to proceed with returning home with pall care following her and if symptoms worsen or she has re admissions to then further discuss hospice to assist in maintaining quality of life, symptom management and remaining at home.   - Discussed patients EOL wishes, she stated she would desire resuscitation, but again, wishes to think about this          I will sign off. Please contact us if you have any additional questions.    Subjective:     Chief Complaint:   Chief Complaint   Patient presents with    Shortness of Breath     SOB for several days, upon EMS arrival pt was 72 on 3L at home oxygen. Pt placed on CPAP per EMS.        HPI:   Ms. Morris is a 70 y.o. female with PMH of HTN, asthma, afib, CHF, obesity, BHAVANI, CKD 3/4, COPD, who presented to [facility] due to worsening SOB for a few days.  Patients oxygen saturation was 72 on 3 L of oxygen.  Patient reports she is on oxygen at home (3L NC) and has to sleep in a recliner chair.  She reports worsening SOB with simple activities such as cleaning, showering, and cooking.       Patient does not have any children, she lives with her younger brother who has schizophrenia.  She is his primary caregiver.  Patient has 9 nieces and nephews who she helped raise who she is close to.       I saw the patient on 8/25/2020 prior to being discharged to SNF and then again on 9/4/2020.       Hospital Course:  No notes on file    Interval History: Patient sitting in recliner chair, reports improvement in SOB.  Reports she does not have power at home.     Medications:  Continuous Infusions:  Scheduled Meds:   apixaban  5 mg Oral BID    atorvastatin  80 mg Oral QHS    docusate sodium  100 mg Oral BID    escitalopram oxalate  10 mg Oral QHS    ferrous sulfate  " "325 mg Oral Daily    furosemide  40 mg Oral BID    gabapentin  100 mg Oral BID    metoprolol tartrate  50 mg Oral BID    miconazole NITRATE 2 %   Topical (Top) BID    vitamin renal formula (B-complex-vitamin c-folic acid)  1 capsule Oral Daily     PRN Meds:acetaminophen, albuterol-ipratropium, dextrose 50%, dextrose 50%, glucagon (human recombinant), glucose, glucose, melatonin, ondansetron, polyethylene glycol, promethazine (PHENERGAN) IVPB, sodium chloride 0.9%    Objective:     Vital Signs (Most Recent):  Temp: 98.1 °F (36.7 °C) (10/30/20 1114)  Pulse: 74 (10/30/20 1114)  Resp: 20 (10/30/20 1114)  BP: (!) 109/56 (10/30/20 1114)  SpO2: (!) 93 % (10/30/20 1114) Vital Signs (24h Range):  Temp:  [97.7 °F (36.5 °C)-98.5 °F (36.9 °C)] 98.1 °F (36.7 °C)  Pulse:  [] 74  Resp:  [16-23] 20  SpO2:  [90 %-94 %] 93 %  BP: (105-140)/(56-65) 109/56     Weight: 99.4 kg (219 lb 2.2 oz)  Body mass index is 38.82 kg/m².    Admission Weight/BMI & Noted Weight/BMI in EMR:     105.5 kg (232 lb 9.4 oz)/ 41.20 kg/m²Abnormal       Date: Weight in kg (lbs): BMI: Height:   8/20/2020 102.5 kg (226 lb) 37.61 kg/m²    5' 3" (160 cm)   6/27/2018 100.2 kg (221 lb) 39.1 kg/m²    5' 3" (160 cm)   2/23/2016 108.665 kg (239 lb 9 oz)  43.43 kg/m²    5' 3" (160 cm)      Physical Exam  Constitutional:       General: She is not in acute distress.     Appearance: She is well-developed. She is obese. She is ill-appearing (Chronic).   Neck:      Musculoskeletal: Normal range of motion.   Cardiovascular:      Rate and Rhythm: Normal rate and regular rhythm.      Heart sounds: No murmur.   Pulmonary:      Breath sounds: Normal breath sounds.      Comments: Diminished    Chest:      Chest wall: No tenderness.   Abdominal:      General: Bowel sounds are normal.   Musculoskeletal: Normal range of motion.   Skin:     General: Skin is warm.   Neurological:      Mental Status: She is alert and oriented to person, place, and time.   Psychiatric:         " Thought Content: Thought content normal.         Judgment: Judgment normal.         Review of Symptoms    Symptom Assessment (ESAS 0-10 Scale)  Pain:  0  Dyspnea:  4         Bowel Management Plan (BMP):  Yes            Performance Status:  50    ECOG Performance Status Grade:  3 - Confined to bed or chair 50% of waking hours    Living Arrangements:  Lives with family    Psychosocial/Cultural: Patient lives with her brother, who she is primary caregiver for.  Patient has a sister and niece who are able to help her at home if needed.       Advance Care Planning   Advance Directives:   Living Will: No    LaPOST: Yes    Do Not Resuscitate Status: No    Medical Power of : Yes    Agent's Name:  Earlene Dexter   Agent's Contact Number:  [phone]    Decision Making:  Patient answered questions         Significant Labs: All pertinent labs within the past 24 hours have been reviewed.  CBC:   Recent Labs   Lab 10/30/20  0502   WBC 7.30   HGB 9.5*   HCT 32.2*   MCV 93        BMP:  Recent Labs   Lab 10/30/20  0502 10/30/20  0820   GLU 89 92    143   K 5.0 5.2*   CL 91* 91*   CO2 40* 41*   BUN 49* 49*   CREATININE 1.6* 1.5*   CALCIUM 9.5 9.6   MG 2.5  --      LFT:  Lab Results   Component Value Date    AST 16 10/13/2020    ALKPHOS 43 (L) 10/13/2020    BILITOT 0.8 10/13/2020     Albumin:   Albumin   Date Value Ref Range Status   10/13/2020 3.9 3.5 - 5.2 g/dL Final     Protein:   Total Protein   Date Value Ref Range Status   10/13/2020 7.0 6.0 - 8.4 g/dL Final     Lactic acid:   Lab Results   Component Value Date    LACTATE 0.6 10/13/2020      Ref Range & Units 10/13/20 1mo ago   POC Rapid COVID Negative Negative  Negative       Significant Imaging: I have reviewed all pertinent imaging results/findings within the past 24 hours.     I have reviewed the Chest x ray from 10/29/2020     I have reviewed the CT chest from 10/15/2020    Discussed with Dr Soria and Valery Ambrocio    > 50% of 25 min visit spent  in chart review, face to face discussion of goals of care,  symptom assessment, coordination of care and emotional support.    Elsa Bran DNP  Palliative Medicine  [facility]

## 2020-10-30 NOTE — PLAN OF CARE
Patient on oxygen with documented flow. Will attempt to wean per O2 order protocol.'  Patient on documented AVAPS settings, with alarms set and functioning.    Pt on prn therapy and doesn't require respiratory tx at this time. Pt with no apparent distress noted. Will continue to monitor.

## 2020-10-30 NOTE — PT/OT/SLP PROGRESS
Occupational Therapy  Not Seen Note    Patient Name:  Patsy Morris   MRN:  [id number]    Patient not seen today secondary to Unavailable (medical procedure). Will follow-up later in the day as time and patient availability permit..    Keeley Lee, Billy, LOTR  10/30/2020

## 2020-10-30 NOTE — ASSESSMENT & PLAN NOTE
"-Patient seen on 8/24 and expressed a desire "enjoy life a little more".  Patient defines this by being able to leave her house and go to the mall or the grocery store. Patient reports not being able to do these activities due to worsening mobility.  When discussed the patient hopes and if they were not able to be obtained patient reported this would be an unacceptable outcome for her. Patient expressed her desire to be able to cook again.  Patient expressed her love for cooking.  - Patient fears  if something were to happen to her who would care for her brother who has schizophrenia.    - Patient previously completed LaPOST- DNR.  Then patient voided the LaPOST and completed new LaPOST on 9/4, changing her wishes to Full code  -Re discussed GOC: returning home.  I discussed our concerns regarding her going home and being able to care for herself.  She has family who can assist her in care giving needs.  I also discussed concerns regarding her frequent re admissions and high possibility of symptoms to worsen and what she would want at that point, patient reiterated to remain at home.  When discussed her goals of remaining at home, we discussed if she continues to be re admitted and remaining at home is a quality of life measure, then I would advise her to consider hospice at home.  Patient reports "I am not ready for that yet".  Patient desires PT/OT to regain her strength. Patient wishes to proceed with returning home with pall care following her and if symptoms worsen or she has re admissions to then further discuss hospice to assist in maintaining quality of life, symptom management and remaining at home.   - Discussed patients EOL wishes, she stated she would desire resuscitation, but again, wishes to think about this    "

## 2020-10-30 NOTE — PLAN OF CARE
Per [facility]'s portal patient recently received a BiPAP on 10/1/20 post discharge [facility]. 10/28/20; [facility]'s medical reviewer denied trilogy ventilator ;not medically necessary.  Pending call back from Prudence SONG (appeals personnel).

## 2020-10-30 NOTE — ASSESSMENT & PLAN NOTE
Hgb 10.0 on admission and stable at 9.5 today  Stable since last admission.  Ferritin 25 on 8/25/2020 and Fe sat 18% on 9/5/2020 - continue outpatient FeSO4 325mg daily  B12 301 and Folate 4.6 - both borderline low - continue outpatient Nephrocaps daily

## 2020-10-30 NOTE — PLAN OF CARE
Problem: Occupational Therapy Goal  Goal: Occupational Therapy Goal  Description: Goals to be met by: 11/5/2020    Patient will increase functional independence with ADLs by performing:    UE Dressing with SBA.  LE Dressing with Maximum Assistance using adaptive equipment as needed.  Grooming while standing with SBA.  Toileting from bedside commode with CGA for hygiene and clothing management.   Toilet transfer to bedside commode with Minimal Assistance.    Completed Goals:   UE Dressing with Minimal Assistance.    Outcome: Ongoing, Progressing   Concern over the growing need for help with ADLs at home, limited the patient's willingness to participate in therapy.  She was SPV (set-up) self-feeding, SPV (set-up G/H, and Total A toilet hygiene, with UE endurance exercises included in the session.  Keeley Lee, Billy, LOTR 10/30/2020

## 2020-10-30 NOTE — PLAN OF CARE
Alert and oriented X 4.  VSS on 5L 02, Tolerating diet well. Up in chair most of the day. Pur wich in place to continuous suction. Bed in low position with call light in reach. Voices no need or concerns at present.  Will continue to monitor,

## 2020-10-30 NOTE — PT/OT/SLP PROGRESS
Physical Therapy      Patient Name:  Patsy Morris   MRN:  [id number]    Patient not seen today secondary to patient stating it has been a bad day. Maybe tomorrow. Will follow up on next therapy day.    Aj Blackburn, PTA

## 2020-10-30 NOTE — PROGRESS NOTES
"[facility] Hospital Medicine  Progress Note    Patient Name: Patsy Morris  MRN: [id number]  Patient Class: IP- Inpatient   Admission Date: 10/13/2020  Length of Stay: 17 days  Attending Physician: Ravi Soria MD  Primary Care Provider: Luisana Cartagena MD        Subjective:     Principal Problem:Acute on chronic respiratory failure with hypoxia and hypercapnia        HPI:  Per Ravi Soria:    "Per ED:  "This is a 70 y.o. female with history of CHF and COPD who presents via EMS with complaint of shortness of breath that began a few days ago. Per EMS patient had O2 saturation of 72 on 3 liters if home oxygen upon their arrival. Patient states she is on 3 liters of home oxygen at baseline. She denies fever, cough, chest pain, nausea, vomiting, diarrhea, or rhinorrhea. She reports she was recently discharged from the hospital for similar symptoms. She reports compliance with her home medications. She recently became resident of [facility] after last hospitalization. She is a former smoker. She denies undergoing any surgeries".    The patient is a 70 year old female with a PMH significant for of HTN, Chronic Respiratory Failure (COPD and BHAVANI/OHS), HFpEF, Obesity who presented to the ED with complaints of SOB that began about 3 days ago.  Patient is on 3L O2NC at home.  She was seen by her Cardiologist about 4 days prior to admission and told to decrease her Lasix from 40mg bid to qday.  She denies chest pain.  No Fevers.  No cough.  States she has been adherent with her medications.  Patient was recently admitted to [facility], [place], operated by [facility] from 9/1-9/11 for Acute on Chronic Respiratory Failure and discharged to SNF.  Patient was placed on BiPAP in ED and admitted to ICU.  Patient feeling better on BiPAP."    Overview/Hospital Course:  Patient is 70-year-old woman with history of hypertension, chronic hypoxic respiratory failure on home oxygen secondary to chronic obstructive pulmonary " disease, chronic diastolic heart failure, chronic atrial fibrillation, and morbid obesity re-admitted to the hospital with decompensated heart failure after patient was home for about a week following recent hospitalization and stayed at skilled nursing facility for physical therapy.  Patient treated with non invasive ventilation intravenous diuretics.  Slowly clinically improving.  Patient also initially treated with antibiotic therapy which has since been discontinued as bacterial pneumonia ruled out on clinical grounds.  Patient does have a cavitary lesion on her chest CT scan but otherwise without signs or symptoms of active pulmonary tuberculosis.  Acid-fast bacillus smears and cultures ordered to rule out active pulmonary tuberculosis.  Patient with one negative acid-fast bacillus smear thus far.  Efforts at obtaining additional smears even with sputum induction with hypertonic saline has not resulted additional sputum.  Discussed with Pulmonary Critical Care service who recommended discontinuing further smears and respiratory isolation.  She continue to slowly improve with diuresis.    Interval History: Patient overall stable overnight, but feeling a little more SOB off BiPAP and restarted.  Patient lost power at home, so will not be able to be discharged until power returned.    Review of Systems   Constitutional: Positive for activity change. Negative for chills and fever.   HENT: Negative for ear pain.    Eyes: Negative for pain.   Respiratory: Positive for shortness of breath. Negative for cough.    Cardiovascular: Negative for chest pain, palpitations and leg swelling.   Gastrointestinal: Negative for abdominal pain, constipation, diarrhea, nausea and vomiting.   Endocrine: Negative for polydipsia, polyphagia and polyuria.   Genitourinary: Negative for difficulty urinating and dysuria.   Musculoskeletal: Negative for neck pain.   Skin: Negative for rash.   Neurological: Negative for dizziness and  headaches.   Hematological: Does not bruise/bleed easily.   Psychiatric/Behavioral: Negative for agitation and behavioral problems.     Objective:     Vital Signs (Most Recent):  Temp: 98.1 °F (36.7 °C) (10/30/20 0741)  Pulse: 70 (10/30/20 0741)  Resp: 18 (10/30/20 0741)  BP: (!) 131/58 (10/30/20 0741)  SpO2: (!) 92 % (10/30/20 0741) Vital Signs (24h Range):  Temp:  [97.7 °F (36.5 °C)-98.5 °F (36.9 °C)] 98.1 °F (36.7 °C)  Pulse:  [] 70  Resp:  [16-23] 18  SpO2:  [90 %-94 %] 92 %  BP: (105-140)/(51-65) 131/58     Weight: 99.4 kg (219 lb 2.2 oz)  Body mass index is 38.82 kg/m².    Intake/Output Summary (Last 24 hours) at 10/30/2020 0911  Last data filed at 10/30/2020 0448  Gross per 24 hour   Intake 740 ml   Output 1450 ml   Net -710 ml      Physical Exam  Constitutional:       General: She is not in acute distress.     Appearance: She is obese. She is not toxic-appearing.   HENT:      Head: Normocephalic and atraumatic.      Right Ear: External ear normal.      Left Ear: External ear normal.      Nose: Nose normal.      Mouth/Throat:      Mouth: Mucous membranes are moist.      Pharynx: Oropharynx is clear.   Eyes:      General: No scleral icterus.     Conjunctiva/sclera: Conjunctivae normal.   Neck:      Musculoskeletal: Normal range of motion and neck supple.   Cardiovascular:      Rate and Rhythm: Normal rate. Rhythm irregular.      Pulses: Normal pulses.      Heart sounds: No murmur.   Pulmonary:      Effort: No respiratory distress.      Breath sounds: No wheezing or rales.      Comments:     Abdominal:      General: Abdomen is flat. Bowel sounds are normal. There is no distension.      Palpations: Abdomen is soft.      Tenderness: There is no abdominal tenderness.   Musculoskeletal:      Right lower leg: No edema.      Left lower leg: No edema.   Skin:     General: Skin is warm.      Coloration: Skin is not jaundiced.      Findings: No rash.   Neurological:      General: No focal deficit present.       "Mental Status: She is alert. Mental status is at baseline.   Psychiatric:         Mood and Affect: Mood normal.         Behavior: Behavior normal.         Significant Labs: All pertinent labs within the past 24 hours have been reviewed.    Significant Imaging: I have reviewed all pertinent imaging results/findings within the past 24 hours.      Assessment/Plan:      * Acute on chronic respiratory failure with hypoxia and hypercapnia  Chronic Respiratory Failure on 3 liters of Oxygen at home - COPD, BHAVANI/OHS with severe restrictive lung disease.  Recent admission from 8/22-8/31 - treated at that time with Afib-RVR with CHF (acute diastolic) and COPD exacerbation (s/p antibiotics and steroids) and transferred to SNF.    Readmitted from 9/1-9/11/2020 with Afib-RVR with CHF and d/c to SNF with Trilogy.  Patient states she has been using her "CPAP" at home.      This Admission she presented to SOB over past 3 days PTA.  Placed on BiPAP and given Lasix 80mg IV x 1 in ED.  Transferred to ICU on admission.  Started initially on Lasix 40mg IV q12 and increased to 80mg q12.  Started on IV Vanc, Flagyl, and Cefepime on 10/15/2020 by Pulmonary-CC given CT Chest concerning for infection - antibiotics stopped 10/18/2020.  Respiratory culture with normal respiratory leonardo on 10/16/2020.  Given cavitary appearance on imaging, Quantiferon gold sent and indeterminate x 2  with Sputum AFB stain negative and  Culture pending x 1 on 10/16/2020.  On BiPAP this morning - Pox 90% on 45% FiO2 and BiPAP  I/O of 740/1450 - net cumulative output of 1930ml since admission  Weight 103.4kg on admission and down to 99.4kg.  More SOB this morning off BiPAP so resumed.  No significant crackles or wheezing on exam.     CXR on 10/13/2020 -  Interval worsening of diffuse bilateral airspace disease and moderate bilateral pleural effusions when compared to 09/09/2020  CT Chest Without on 10/15/2020 - The findings highly concerning for extensive diffuse " "pulmonary infection with bilateral pleural fluid collections right greater than left.  US of LEs on 10/15/2020 - No evidence of deep venous thrombosis in either lower extremity.  CXR Portable on 10/20/2020 - No significant change.  There is cardiomegaly, multifocal airspace opacities and bilateral effusions.  CXR Protable on 10/28/2020 - Worsening right basilar atelectasis or consolidation.  Right upper lobe consolidation is improved.  There may be a tiny pneumatocele or central cavitation.  Pleural effusions are improved.  Continued follow-up advised.     Labs on Admission:  WBC 9.22  COVID-19 rapid negative  Trop negative x 3     PH 7.227/pCO2 94.4/pO2 73  Procal normal  Lactic Acid normal  Quantiferon indeterminate x 2  Sputum AFB stain negative and culture pending x 1     Followed by Pulmonary - " Tolerating NIV qhs well  The patient's physical is significant for bibasilar crackles  The patient's working assessments include acute on chronic hypercapnic resp failure.  Has diuresed well but still some pulm edema on exam.  Continue NIV use while sleeping, diuresis.  Will follow cavitary lung lesion as outpatient ".      Followed by Cardiology - "Does not appear fluid overloaded at present.  On furosemide 40 mg po Q12.  Watch weight and increase dose as needed for weight gain  ".     Plan:  Repeat CXR and BNP  Check weight today  Continue with Lasix - now 40mg bid - consider increase if needed.  Continue BiPAP at night - insurance approved Trilogy for home use and awaiting confirmation  Strict I&Os  Fluid Restrict to 1.5L   DuoNebs  Start Incentive Spirometry while awake        Atrial fibrillation with rapid ventricular response   in ED. Has been well controlled since.  Home Meds:  Metoprolol 50mg bid and Apixaban 5mg bid  Followed by Cardiology, Dr. Lutz - last seen outpatient on 10/8/2020  HR stable.  -Continue current medications    Chronic kidney disease (CKD) stage G4/A1, severely decreased " glomerular filtration rate (GFR) between 15-29 mL/min/1.73 square meter and albuminuria creatinine ratio less than 30 mg/g  Creatinine of 2.0 on admission and was 1.9 on discharge 9/11/2020.  Her baseline is around 1.8.  Creatinine 1.6 this morning  -Renally dose medication for CrCl of <30  -Avoid Nephrotoxic medications if able  -Monitor urine output  -Follow closely    Acute on chronic diastolic congestive heart failure  Home Meds:  Lasix 40mg qday (was bid and recently changed as above)  BNP elevated at 577 on last admission and 968 this admission  See above.     TTE on 8/24/2020 - Mild left atrial enlargement.  · Diastolic pattern consistent with atrial fibrillation observed.  · Normal left ventricular systolic function. The estimated ejection fraction is 58%.  · No wall motion abnormalities.  · Normal right ventricular systolic function.  · Mild right atrial enlargement.  Small posterior pericardial effusion.     Plan:  Lasix 40mg po bid  Follow I&Os and weight       Normocytic anemia  Hgb 10.0 on admission and stable at 9.5 today  Stable since last admission.  Ferritin 25 on 8/25/2020 and Fe sat 18% on 9/5/2020 - continue outpatient FeSO4 325mg daily  B12 301 and Folate 4.6 - both borderline low - continue outpatient Nephrocaps daily      Debility  Continue with physical and occupational therapy.  Home Health on discharge    Encounter for palliative care  Followed by Palliative Care      Current moderate episode of major depressive disorder without prior episode  Stable.  Continue SSRI therapy.    Dyslipidemia  Continue with statin therapy.    Essential hypertension  Continue Metoprolol and Lasix.  BP stable.      VTE Risk Mitigation (From admission, onward)         Ordered     apixaban tablet 5 mg  2 times daily      10/17/20 1152     Place sequential compression device  Until discontinued      10/13/20 1405     IP VTE HIGH RISK PATIENT  Once      10/13/20 1405                Discharge Planning   YUDELKA:  10/26/2020     Code Status: Full Code   Is the patient medically ready for discharge?:     Reason for patient still in hospital (select all that apply): Patient trending condition and Treatment  Discharge Plan A: Skilled Nursing Facility                  Ravi Soria MD  Department of Hospital Medicine   [facility]

## 2020-10-30 NOTE — PT/OT/SLP PROGRESS
Occupational Therapy   Treatment    Name: Patsy Morris  MRN: [id number]  Admitting Diagnosis:  Acute on chronic respiratory failure with hypoxia and hypercapnia       Recommendations:     Discharge Recommendations: nursing facility, skilled  Discharge Equipment Recommendations:  none  Barriers to discharge:  Decreased caregiver support    Assessment:     Patsy Morris is a 70 y.o. female with a medical diagnosis of Acute on chronic respiratory failure with hypoxia and hypercapnia.  She presents with concern about needing increased assist with ADLs at home.. Performance deficits affecting function are weakness, impaired endurance, impaired self care skills, impaired functional mobilty, gait instability, impaired balance, decreased lower extremity function, decreased safety awareness, edema, impaired cardiopulmonary response to activity.   She was SPV (set-up) self-feeding, SPV (set-up G/H, and Total A toilet hygiene, with UE endurance exercises included in the session.  Continuation of OT treatment is needed to maximize patient function while in the hospital.    Rehab Prognosis:  Good; patient would benefit from acute skilled OT services to address these deficits and reach maximum level of function.       Plan:     Patient to be seen 5 x/week to address the above listed problems via self-care/home management, therapeutic activities, therapeutic exercises  · Plan of Care Expires: 11/14/20  · Plan of Care Reviewed with: patient    Subjective     Pain/Comfort:  · Pain Rating 1: 0/10  · Pain Rating Post-Intervention 1: 0/10    Objective:     Communicated with: patient and her nurse prior to session.  Patient found up in chair with peripheral IV, oxygen, telemetry upon OT entry to room.  Pt seen on the second attempt.  She initially declined treatment reporting anxiety over increased need for assist at home and worry that her family were unable or unwilling to help.  However, she agreed to chair level activity  with encouragement.    General Precautions: Standard, fall, respiratory   Orthopedic Precautions:N/A   Braces: N/A     Occupational Performance:     Bed Mobility:    · None     Functional Mobility/Transfers:       SPV sit><stand at chair during toilet hygiene    Activities of Daily Living:  · SPV (set-up) self-feeding seated in chair  · Declined UBD  · SPV G/H (wash hands) seated in chair  · Total A toilet hygiene (she had urinated seated in chair) with clean-up don standing with RW at chair     [facility] 6 Click ADL: 16    Treatment & Education:  UE Exercise: BUE endurance exercises (3 sets of 3 exercises-10 reps of each) with a brief rest break after each set.    Patient left up in chair with all lines intact, call button in reach, nurse and PCT notified Education:      GOALS:   Multidisciplinary Problems     Occupational Therapy Goals        Problem: Occupational Therapy Goal    Goal Priority Disciplines Outcome Interventions   Occupational Therapy Goal     OT, PT/OT Ongoing, Progressing    Description: Goals to be met by: 11/5/2020    Patient will increase functional independence with ADLs by performing:    UE Dressing with SBA.  LE Dressing with Maximum Assistance using adaptive equipment as needed.  Grooming while standing with SBA.  Toileting from bedside commode with CGA for hygiene and clothing management.   Toilet transfer to bedside commode with Minimal Assistance.    Completed Goals:   UE Dressing with Minimal Assistance.                     Time Tracking:     OT Date of Treatment: 10/30/20  OT Start Time: 1350  OT Stop Time: 1426  OT Total Time (min): 36 min    Billable Minutes:Self Care/Home Management 20  Therapeutic Exercise 16    Billy Hebert, LOTR  10/30/2020

## 2020-10-30 NOTE — PROGRESS NOTES
[facility]  Cardiology  Progress Note    Patient Name: Patsy Morris  MRN: [id number]  Admission Date: 10/13/2020  Hospital Length of Stay: 17 days  Code Status: Full Code   Attending Physician: Ravi Soria MD   Primary Care Physician: Luisana Cartagena MD  Expected Discharge Date:   Principal Problem:Acute on chronic respiratory failure with hypoxia and hypercapnia    Subjective:     Brief HPI:    Patsy Morris is a 70 y.o.female with hypertension. She has chronic atrial fibrillation and heart failure with preserved ejection fraction. She has chronic obstructive pulmonary disease being on home oxygen with CO2 retention. She has undergone nephrectomy for renal cell carcinoma and has chronic kidney disease. She was admitted to [facility] in 8/2020 and 9/2020 with heart failure and exacerbations of her chronic obstructive pulmonary disease. She went to therapy at [facility].      She used to be on furosemide 40 mg Q24 however after her last hospitalization she had the does of furosemide increased to 80 mg Q24. The dose was reduced to 40 mg Q24 on 10/8/2020. She became increasingly short of breath and presents fluid overloaded in heart failure as well as an exacerbation of her COPD with high CO2. She was admitted to the ICU.    Hospital Course:    Diuresis.    BIPAP.    Respiratory treatments.    10/15/2020: Echo: Normal left ventricular size and systolic function. EF 60%. Moderately dilated LA. Mildly dilated RV. SPAP 49 mmHg. Small pericardial effusion.    10/16/2020: Apixiban 5 mg Q12 was changed to heparin iv for consideration of bronchoscopy.    10/21/2020: Transferred from ICU to telemetry.    10/27/2020: Walked 150 ft with PT.    Interval History:     She has been slowly breathing slightly easier from day to day. Also slowly stronger.    Today she is coughing and more SOB.    Told me she has decided she will go home.      Review of Systems   Constitution: Negative for  chills, fever and malaise/fatigue.   HENT: Negative for nosebleeds.    Eyes: Negative for vision loss in left eye and vision loss in right eye.   Cardiovascular: Positive for dyspnea on exertion. Negative for chest pain, leg swelling, orthopnea, palpitations and paroxysmal nocturnal dyspnea.   Respiratory: Positive for shortness of breath and sputum production. Negative for cough, hemoptysis and wheezing.    Hematologic/Lymphatic: Negative for bleeding problem.   Skin: Negative for rash.   Musculoskeletal: Negative for myalgias.   Gastrointestinal: Negative for abdominal pain, heartburn, hematemesis, hematochezia, jaundice, melena, nausea and vomiting.   Genitourinary: Negative for hematuria.   Neurological: Positive for weakness. Negative for dizziness, headaches, light-headedness and vertigo.   Psychiatric/Behavioral: Negative for altered mental status. The patient is not nervous/anxious.    Allergic/Immunologic: Negative for persistent infections.       Objective:     Vital Signs (Most Recent):  Temp: 98.1 °F (36.7 °C) (10/30/20 0741)  Pulse: 70 (10/30/20 0741)  Resp: 18 (10/30/20 0741)  BP: (!) 131/58 (10/30/20 0741)  SpO2: (!) 92 % (10/30/20 0741) Vital Signs (24h Range):  Temp:  [97.7 °F (36.5 °C)-98.5 °F (36.9 °C)] 98.1 °F (36.7 °C)  Pulse:  [] 70  Resp:  [16-23] 18  SpO2:  [90 %-94 %] 92 %  BP: (105-140)/(51-65) 131/58     Weight: 99.4 kg (219 lb 2.2 oz)  Body mass index is 38.82 kg/m².    SpO2: (!) 92 %  O2 Device (Oxygen Therapy): BiPAP(avaps mode)      Intake/Output Summary (Last 24 hours) at 10/30/2020 0757  Last data filed at 10/30/2020 0448  Gross per 24 hour   Intake 740 ml   Output 1450 ml   Net -710 ml       Lines/Drains/Airways     Drain            Female External Urinary Catheter 10/13/20 1359 16 days          Peripheral Intravenous Line                 Midline Catheter Insertion/Assessment  - Double Lumen 10/20/20 1220 Right median cubital vein (antecubital fossa)  9 days                 Physical Exam   Constitutional: She is oriented to person, place, and time. She appears well-developed and well-nourished. She appears ill. She appears distressed.   Neck: Carotid bruit is not present.   Cardiovascular: Normal rate, S1 normal and S2 normal. An irregularly irregular rhythm present.   Pulmonary/Chest: She has rhonchi in the right lower field and the left lower field.   Musculoskeletal:      Right ankle: She exhibits no swelling.      Left ankle: She exhibits no swelling.   Neurological: She is alert and oriented to person, place, and time.   Skin: Skin is warm and dry.   Psychiatric: She has a normal mood and affect. Her speech is normal and behavior is normal. Cognition and memory are normal.     Current Medications:     apixaban  5 mg Oral BID    atorvastatin  80 mg Oral QHS    docusate sodium  100 mg Oral BID    escitalopram oxalate  10 mg Oral QHS    ferrous sulfate  325 mg Oral Daily    furosemide  40 mg Oral BID    gabapentin  100 mg Oral BID    metoprolol tartrate  50 mg Oral BID    miconazole NITRATE 2 %   Topical (Top) BID    vitamin renal formula (B-complex-vitamin c-folic acid)  1 capsule Oral Daily     Current Laboratory Results:    Recent Results (from the past 24 hour(s))   Basic metabolic panel    Collection Time: 10/30/20  5:02 AM   Result Value Ref Range    Sodium 142 136 - 145 mmol/L    Potassium 5.0 3.5 - 5.1 mmol/L    Chloride 91 (L) 95 - 110 mmol/L    CO2 40 (H) 23 - 29 mmol/L    Glucose 89 70 - 110 mg/dL    BUN 49 (H) 8 - 23 mg/dL    Creatinine 1.6 (H) 0.5 - 1.4 mg/dL    Calcium 9.5 8.7 - 10.5 mg/dL    Anion Gap 11 8 - 16 mmol/L    eGFR if African American 37 (A) >60 mL/min/1.73 m^2    eGFR if non African American 32 (A) >60 mL/min/1.73 m^2   Magnesium    Collection Time: 10/30/20  5:02 AM   Result Value Ref Range    Magnesium 2.5 1.6 - 2.6 mg/dL   Phosphorus    Collection Time: 10/30/20  5:02 AM   Result Value Ref Range    Phosphorus 4.1 2.7 - 4.5 mg/dL   CBC  auto differential    Collection Time: 10/30/20  5:02 AM   Result Value Ref Range    WBC 7.30 3.90 - 12.70 K/uL    RBC 3.45 (L) 4.00 - 5.40 M/uL    Hemoglobin 9.5 (L) 12.0 - 16.0 g/dL    Hematocrit 32.2 (L) 37.0 - 48.5 %    MCV 93 82 - 98 fL    MCH 27.5 27.0 - 31.0 pg    MCHC 29.5 (L) 32.0 - 36.0 g/dL    RDW 14.6 (H) 11.5 - 14.5 %    Platelets 173 150 - 350 K/uL    MPV 12.0 9.2 - 12.9 fL    Immature Granulocytes 0.3 0.0 - 0.5 %    Gran # (ANC) 5.9 1.8 - 7.7 K/uL    Immature Grans (Abs) 0.02 0.00 - 0.04 K/uL    Lymph # 0.7 (L) 1.0 - 4.8 K/uL    Mono # 0.6 0.3 - 1.0 K/uL    Eos # 0.1 0.0 - 0.5 K/uL    Baso # 0.02 0.00 - 0.20 K/uL    nRBC 0 0 /100 WBC    Gran % 80.2 (H) 38.0 - 73.0 %    Lymph % 9.2 (L) 18.0 - 48.0 %    Mono % 8.6 4.0 - 15.0 %    Eosinophil % 1.4 0.0 - 8.0 %    Basophil % 0.3 0.0 - 1.9 %    Differential Method Automated      Current Imaging Results:    X-Ray Chest AP Portable   Final Result      Worsening right basilar atelectasis or consolidation.      Right upper lobe consolidation is improved.  There may be a tiny pneumatocele or central cavitation.      Pleural effusions are improved.      Continued follow-up advised.         Electronically signed by: Elsa Alcantar   Date:    10/28/2020   Time:    10:24      X-Ray Chest AP Portable   Final Result      No significant change.  There is cardiomegaly, multifocal airspace opacities and bilateral effusions.         Electronically signed by: Ajay Camara MD   Date:    10/20/2020   Time:    08:48      US Lower Extremity Veins Bilateral   Final Result      No evidence of deep venous thrombosis in either lower extremity.         Electronically signed by: Ceasar Carreon MD   Date:    10/15/2020   Time:    21:53      CT Chest Without Contrast   Final Result      The findings highly concerning for extensive diffuse pulmonary infection with bilateral pleural fluid collections right greater than left.         Electronically signed by: Regulo Bassett MD    Date:    10/15/2020   Time:    12:01      X-Ray Chest AP Portable   Final Result      Interval worsening of diffuse bilateral airspace disease and moderate bilateral pleural effusions when compared to 09/09/2020         Electronically signed by: Halina Plascencia   Date:    10/13/2020   Time:    09:40      X-Ray Chest PA And Lateral    (Results Pending)       10/15/2020: Echo:    The left ventricle is normal in size with normal systolic function. The estimated ejection fraction is 60%.  A diastolic pattern consistent with atrial fibrillation observed.  Moderate left atrial enlargement.  Mild right ventricular enlargement.  Normal right ventricular systolic function.  Severe right atrial enlargement.  The aortic valve is mildly sclerotic.  The mitral valve is mildly sclerotic.  Mild mitral regurgitation.  Mild tricuspid regurgitation.  There is mild pulmonary hypertension.  The estimated PA systolic pressure is 49 mmHg.  Intermediate central venous pressure (8 mmHg).  Small circumferential pericardial effusion.  There is a left pleural effusion.      Assessment and Plan:     Problem List:    Active Diagnoses:    Diagnosis Date Noted POA    PRINCIPAL PROBLEM:  Acute on chronic respiratory failure with hypoxia and hypercapnia [J96.21, J96.22] 08/03/2016 Yes    Normocytic anemia [D64.9] 10/27/2020 Yes    Atrial fibrillation with rapid ventricular response [I48.91] 09/01/2020 Yes    Debility [R53.81] 08/26/2020 Yes    Encounter for palliative care [Z51.5] 08/25/2020 Not Applicable    Current moderate episode of major depressive disorder without prior episode [F32.1] 04/30/2020 Yes    Acute on chronic diastolic congestive heart failure [I50.33] 05/20/2016 Yes    Chronic kidney disease (CKD) stage G4/A1, severely decreased glomerular filtration rate (GFR) between 15-29 mL/min/1.73 square meter and albuminuria creatinine ratio less than 30 mg/g [N18.4]  Yes    Dyslipidemia [E78.5] 02/22/2016 Yes    Essential  hypertension [I10] 10/15/2014 Yes      Problems Resolved During this Admission:    Diagnosis Date Noted Date Resolved POA    Acute on chronic congestive heart failure [I50.9] 10/21/2020 10/27/2020 Yes    Cavitary lesion of lung [J98.4] 10/16/2020 10/27/2020 Yes    Acute on chronic congestive heart failure [I50.9] 10/13/2020 10/13/2020 Yes    Pulmonary nodules/lesions, multiple [R91.8] 03/22/2019 10/14/2020 Yes    Paroxysmal atrial fibrillation [I48.0] 08/03/2016 10/13/2020 Yes    Chronic hypercapnic respiratory failure [J96.12] 03/11/2016 10/13/2020 Yes    Obesity hypoventilation syndrome [E66.2] 02/25/2016 10/27/2020 Yes    Renal carcinoma [C64.9] 03/10/2015 10/13/2020 Yes       Assessment and Plan:     1. Heart Failure, Diastolic, Acute on Chronic              8/24/2020: Echo: Normal left ventricular size and systolic function. Mildly dilated LA.   10/15/2020: Echo: Normal left ventricular size and systolic function. EF 60%. Moderately dilated LA. Mildly dilated RV. SPAP 49 mmHg. Small pericardial effusion.              At NH was on furosemide 40 mg Q24.              10/13/2020: Presented fluid overloaded in HF. Received furosemide 80 mg iv Q12.   10/20/2020: CXR with extensive infiltrates and pleural effusions. .    10/26/2020: .   10/28/2020: CXR improving.              Does not appear fluid overloaded at present.   On furosemide 40 mg po Q12.   Watch weight and increase dose as needed for weight gain.      2. Atrial Fibrillation              In chronic atrial fibrillation.              On apixiban.              On metoprolol 50 mg Q12.              Rate well controlled mostly  bpm.     3. Chronic Anticoagulation              Been on apixiban 5 mg Q12.   10/16/2020: Apixiban 5 mg Q12 was changed to heparin iv.   On apixiban 5 mg Q12.   No bleeding.    4. Hypertension   On metoprolol 50 mg Q12 and furosemide 40 mg Q12.   Well controlled.                5. Chronic Kidney Disease, Stage  4              History or renal carcinoma and nephrectomy.              10/13/2020: BUN/crea 27/2.0. CrCl 28.   10/20/2020: BUN/crea 56/1.8. CrCl 32.   10/25/2020: BUN/crea 49/1.6. CrCl 32.      6. Chronic Obstructive Pulmonary Disease              Chronic respiratory failure with CO2 retention.              10/13/2020: 7.23/94/73/39/90% on FiO2 of 40%.   Been overall slowly improving.      7. Weakness   10/27/2020: Walked 150 ft with PT.   Needs a lot of PT.    8. Disposition   10/28/2020: Tells me she has decided that she will go to her house.      VTE Risk Mitigation (From admission, onward)         Ordered     apixaban tablet 5 mg  2 times daily      10/17/20 1152     Place sequential compression device  Until discontinued      10/13/20 1405     IP VTE HIGH RISK PATIENT  Once      10/13/20 1405                Diana Meredith MD  Cardiology  [facility]

## 2020-10-30 NOTE — ASSESSMENT & PLAN NOTE
"Chronic Respiratory Failure on 3 liters of Oxygen at home - COPD, BHAVANI/OHS with severe restrictive lung disease.  Recent admission from 8/22-8/31 - treated at that time with Afib-RVR with CHF (acute diastolic) and COPD exacerbation (s/p antibiotics and steroids) and transferred to SNF.    Readmitted from 9/1-9/11/2020 with Afib-RVR with CHF and d/c to SNF with Trilogy.  Patient states she has been using her "CPAP" at home.      This Admission she presented to SOB over past 3 days PTA.  Placed on BiPAP and given Lasix 80mg IV x 1 in ED.  Transferred to ICU on admission.  Started initially on Lasix 40mg IV q12 and increased to 80mg q12.  Started on IV Vanc, Flagyl, and Cefepime on 10/15/2020 by Pulmonary-CC given CT Chest concerning for infection - antibiotics stopped 10/18/2020.  Respiratory culture with normal respiratory leonardo on 10/16/2020.  Given cavitary appearance on imaging, Quantiferon gold sent and indeterminate x 2  with Sputum AFB stain negative and  Culture pending x 1 on 10/16/2020.  On BiPAP this morning - Pox 90% on 45% FiO2 and BiPAP  I/O of 740/1450 - net cumulative output of 1930ml since admission  Weight 103.4kg on admission and down to 99.4kg.  More SOB this morning off BiPAP so resumed.  No significant crackles or wheezing on exam.     CXR on 10/13/2020 -  Interval worsening of diffuse bilateral airspace disease and moderate bilateral pleural effusions when compared to 09/09/2020  CT Chest Without on 10/15/2020 - The findings highly concerning for extensive diffuse pulmonary infection with bilateral pleural fluid collections right greater than left.  US of LEs on 10/15/2020 - No evidence of deep venous thrombosis in either lower extremity.  CXR Portable on 10/20/2020 - No significant change.  There is cardiomegaly, multifocal airspace opacities and bilateral effusions.  CXR Protable on 10/28/2020 - Worsening right basilar atelectasis or consolidation.  Right upper lobe consolidation is improved.  " "There may be a tiny pneumatocele or central cavitation.  Pleural effusions are improved.  Continued follow-up advised.     Labs on Admission:  WBC 9.22  COVID-19 rapid negative  Trop negative x 3     PH 7.227/pCO2 94.4/pO2 73  Procal normal  Lactic Acid normal  Quantiferon indeterminate x 2  Sputum AFB stain negative and culture pending x 1     Followed by Pulmonary - " Tolerating NIV qhs well  The patient's physical is significant for bibasilar crackles  The patient's working assessments include acute on chronic hypercapnic resp failure.  Has diuresed well but still some pulm edema on exam.  Continue NIV use while sleeping, diuresis.  Will follow cavitary lung lesion as outpatient ".      Followed by Cardiology - "Does not appear fluid overloaded at present.  On furosemide 40 mg po Q12.  Watch weight and increase dose as needed for weight gain  ".     Plan:  Repeat CXR and BNP  Check weight today  Continue with Lasix - now 40mg bid - consider increase if needed.  Continue BiPAP at night - insurance approved Trilogy for home use and awaiting confirmation  Strict I&Os  Fluid Restrict to 1.5L   DuoNebs  Start Incentive Spirometry while awake      "

## 2020-10-30 NOTE — PLAN OF CARE
Pt in bed, no complaints of pain, @ beginning of shift pt very drowsy and aroused to voice, pt remained on bipap throughout shift and arousability increased, pt at baseline now, purewick in place draining urine, AAO x 4, no injuries or falls during shift, no PRN medications given, bed in low locked position, call light in reach, hourly rounds complete, will continue to assess

## 2020-10-31 NOTE — PLAN OF CARE
POC reviewed with patient. AAOx4 and VS stable on 5L O2 NC. No complaints of pain or SOB throughout shift. SOB observed following any exertion put forth. External catheter in place for voiding. Positions self with 1 assist; frequent weight shift encouraged. Pt prefers sitting in chair but weight shift assistance offered and encouraged frequently throughout shift. Instructed to call for help ambulating. Dressing to bridge of nose remains clean dry and intact. No injuries, falls, or trauma occurred during shift. Purposeful rounding completed. Chair wheels locked and call light within reach. Will continue to monitor.

## 2020-10-31 NOTE — ASSESSMENT & PLAN NOTE
"Chronic Respiratory Failure on 3 liters of Oxygen at home - COPD, BHAVANI/OHS with severe restrictive lung disease.  Recent admission from 8/22-8/31 - treated at that time with Afib-RVR with CHF (acute diastolic) and COPD exacerbation (s/p antibiotics and steroids) and transferred to SNF.    Readmitted from 9/1-9/11/2020 with Afib-RVR with CHF and d/c to SNF with Trilogy.  Patient states she has been using her "CPAP" at home.      This Admission she presented to SOB over past 3 days PTA.  Placed on BiPAP and given Lasix 80mg IV x 1 in ED.  Transferred to ICU on admission.  Started initially on Lasix 40mg IV q12 and increased to 80mg q12.  Started on IV Vanc, Flagyl, and Cefepime on 10/15/2020 by Pulmonary-CC given CT Chest concerning for infection - antibiotics stopped 10/18/2020.  Respiratory culture with normal respiratory leonardo on 10/16/2020.  Given cavitary appearance on imaging, Quantiferon gold sent and indeterminate x 2  with Sputum AFB stain negative and  Culture pending x 1 on 10/16/2020.  Transferred out of ICU on 10/21/2020.  Awaiting Trilogy for home.   More short of breath on 10/30/2020 - CXR overall stable from 2 days ago, but BNP in 400s from 200s - given an additional dose of Lasix 40mg IV with improvement in symptoms.    On NC this morning - Pox 94% on 5L NC  I/O not well recorded today - net cumulative output of 1930ml since admission  Weight 103.4kg on admission and down to 97.5kg (this morning).  Feeling better this morning.  Less crackles on exam and improved LE edema.     CXR on 10/13/2020 -  Interval worsening of diffuse bilateral airspace disease and moderate bilateral pleural effusions when compared to 09/09/2020  CT Chest Without on 10/15/2020 - The findings highly concerning for extensive diffuse pulmonary infection with bilateral pleural fluid collections right greater than left.  US of LEs on 10/15/2020 - No evidence of deep venous thrombosis in either lower extremity.  CXR Portable on " "10/20/2020 - No significant change.  There is cardiomegaly, multifocal airspace opacities and bilateral effusions.  CXR Protable on 10/28/2020 - Worsening right basilar atelectasis or consolidation.  Right upper lobe consolidation is improved.  There may be a tiny pneumatocele or central cavitation.  Pleural effusions are improved.  Continued follow-up advised.  CXR on 10/30/2020 - Stable right upper lobe consolidation.  Findings remain concerning for pneumonia with small cavitation as described previously.  Improved aeration right lung base.  Small pleural effusions, decreased in volume on the right.     Labs on Admission:  WBC 9.22  COVID-19 rapid negative  Trop negative x 3     PH 7.227/pCO2 94.4/pO2 73  Procal normal  Lactic Acid normal  Quantiferon indeterminate x 2  Sputum AFB stain negative and culture pending x 1     Followed by Pulmonary - " Tolerating NIV qhs well  The patient's physical is significant for bibasilar crackles  The patient's working assessments include acute on chronic hypercapnic resp failure.  Has diuresed well but still some pulm edema on exam.  Continue NIV use while sleeping, diuresis.  Will follow cavitary lung lesion as outpatient ".      Followed by Cardiology - "Does not appear fluid overloaded at present.  On furosemide 40 mg po Q12.  Watch weight and increase dose as needed for weight gain  ".     Plan:  Continue with Lasix 40mg bid  Continue BiPAP at night - insurance verbally approved Trilogy (appeal done by Pulmonary) for home use and awaiting confirmation  Strict I&Os  Fluid Restrict to 1.5L   DuoNebs  Start Incentive Spirometry while awake      "

## 2020-10-31 NOTE — PROGRESS NOTES
"[facility] Hospital Medicine  Progress Note    Patient Name: Patsy Morris  MRN: [id number]  Patient Class: IP- Inpatient   Admission Date: 10/13/2020  Length of Stay: 18 days  Attending Physician: Ravi Soria MD  Primary Care Provider: Luisana Cartagena MD        Subjective:     Principal Problem:Acute on chronic respiratory failure with hypoxia and hypercapnia        HPI:  Per Ravi Soria:    "Per ED:  "This is a 70 y.o. female with history of CHF and COPD who presents via EMS with complaint of shortness of breath that began a few days ago. Per EMS patient had O2 saturation of 72 on 3 liters if home oxygen upon their arrival. Patient states she is on 3 liters of home oxygen at baseline. She denies fever, cough, chest pain, nausea, vomiting, diarrhea, or rhinorrhea. She reports she was recently discharged from the hospital for similar symptoms. She reports compliance with her home medications. She recently became resident of [facility] after last hospitalization. She is a former smoker. She denies undergoing any surgeries".    The patient is a 70 year old female with a PMH significant for of HTN, Chronic Respiratory Failure (COPD and BHAVANI/OHS), HFpEF, Obesity who presented to the ED with complaints of SOB that began about 3 days ago.  Patient is on 3L O2NC at home.  She was seen by her Cardiologist about 4 days prior to admission and told to decrease her Lasix from 40mg bid to qday.  She denies chest pain.  No Fevers.  No cough.  States she has been adherent with her medications.  Patient was recently admitted to [facility] from 9/1-9/11 for Acute on Chronic Respiratory Failure and discharged to SNF.  Patient was placed on BiPAP in ED and admitted to ICU.  Patient feeling better on BiPAP."    Overview/Hospital Course:  Patient is 70-year-old woman with history of hypertension, chronic hypoxic respiratory failure on home oxygen secondary to chronic obstructive pulmonary " disease, chronic diastolic heart failure, chronic atrial fibrillation, and morbid obesity re-admitted to the hospital with decompensated heart failure after patient was home for about a week following recent hospitalization and stayed at skilled nursing facility for physical therapy.  Patient treated with non invasive ventilation intravenous diuretics.  Slowly clinically improving.  Patient also initially treated with antibiotic therapy which has since been discontinued as bacterial pneumonia ruled out on clinical grounds.  Patient does have a cavitary lesion on her chest CT scan but otherwise without signs or symptoms of active pulmonary tuberculosis.  Acid-fast bacillus smears and cultures ordered to rule out active pulmonary tuberculosis.  Patient with one negative acid-fast bacillus smear thus far.  Efforts at obtaining additional smears even with sputum induction with hypertonic saline has not resulted additional sputum.  Discussed with Pulmonary Critical Care service who recommended discontinuing further smears and respiratory isolation.  She continue to slowly improve with diuresis.    Interval History: Patient overall stable overnight.  Feeling better after an additional dose of Lasix yesterday.  Patient still has no power at home.  Still waiting for final approval of Trilogy.    Review of Systems   Constitutional: Positive for activity change. Negative for chills and fever.   HENT: Negative for ear pain.    Eyes: Negative for pain.   Respiratory: Positive for shortness of breath. Negative for cough.    Cardiovascular: Negative for chest pain, palpitations and leg swelling.   Gastrointestinal: Negative for abdominal pain, constipation, diarrhea, nausea and vomiting.   Endocrine: Negative for polydipsia, polyphagia and polyuria.   Genitourinary: Negative for difficulty urinating and dysuria.   Musculoskeletal: Negative for neck pain.   Skin: Negative for rash.   Neurological: Negative for dizziness and  headaches.   Hematological: Does not bruise/bleed easily.   Psychiatric/Behavioral: Negative for agitation and behavioral problems.     Objective:     Vital Signs (Most Recent):  Temp: 97.7 °F (36.5 °C) (10/31/20 0809)  Pulse: 88 (10/31/20 0809)  Resp: (!) 24 (10/31/20 0809)  BP: 118/63 (10/31/20 0809)  SpO2: 95 % (10/31/20 0809) Vital Signs (24h Range):  Temp:  [97.7 °F (36.5 °C)-99 °F (37.2 °C)] 97.7 °F (36.5 °C)  Pulse:  [74-98] 88  Resp:  [16-24] 24  SpO2:  [93 %-95 %] 95 %  BP: ()/(51-70) 118/63     Weight: 97.5 kg (214 lb 15.2 oz)  Body mass index is 38.08 kg/m².    Intake/Output Summary (Last 24 hours) at 10/31/2020 0814  Last data filed at 10/31/2020 0600  Gross per 24 hour   Intake 840 ml   Output 900 ml   Net -60 ml      Physical Exam  Constitutional:       General: She is not in acute distress.     Appearance: She is obese. She is not toxic-appearing.   HENT:      Head: Normocephalic and atraumatic.      Right Ear: External ear normal.      Left Ear: External ear normal.      Nose: Nose normal.      Comments: NC in place     Mouth/Throat:      Mouth: Mucous membranes are moist.      Pharynx: Oropharynx is clear.   Eyes:      General: No scleral icterus.     Conjunctiva/sclera: Conjunctivae normal.   Neck:      Musculoskeletal: Normal range of motion and neck supple.   Cardiovascular:      Rate and Rhythm: Normal rate. Rhythm irregular.      Pulses: Normal pulses.      Heart sounds: No murmur.   Pulmonary:      Effort: No respiratory distress.      Breath sounds: Rales (mild bilateral lower lung fields) present. No wheezing.      Comments:     Abdominal:      General: Abdomen is flat. Bowel sounds are normal. There is no distension.      Palpations: Abdomen is soft.      Tenderness: There is no abdominal tenderness.   Musculoskeletal:      Right lower leg: Edema present.      Left lower leg: Edema present.      Comments: Trace to 1+   Skin:     General: Skin is warm.      Coloration: Skin is not  "jaundiced.      Findings: No rash.   Neurological:      General: No focal deficit present.      Mental Status: She is alert. Mental status is at baseline.   Psychiatric:         Mood and Affect: Mood normal.         Behavior: Behavior normal.         Significant Labs: All pertinent labs within the past 24 hours have been reviewed.    Significant Imaging: I have reviewed all pertinent imaging results/findings within the past 24 hours.      Assessment/Plan:      * Acute on chronic respiratory failure with hypoxia and hypercapnia  Chronic Respiratory Failure on 3 liters of Oxygen at home - COPD, BHAVANI/OHS with severe restrictive lung disease.  Recent admission from 8/22-8/31 - treated at that time with Afib-RVR with CHF (acute diastolic) and COPD exacerbation (s/p antibiotics and steroids) and transferred to SNF.    Readmitted from 9/1-9/11/2020 with Afib-RVR with CHF and d/c to SNF with Trilogy.  Patient states she has been using her "CPAP" at home.      This Admission she presented to SOB over past 3 days PTA.  Placed on BiPAP and given Lasix 80mg IV x 1 in ED.  Transferred to ICU on admission.  Started initially on Lasix 40mg IV q12 and increased to 80mg q12.  Started on IV Vanc, Flagyl, and Cefepime on 10/15/2020 by Pulmonary-CC given CT Chest concerning for infection - antibiotics stopped 10/18/2020.  Respiratory culture with normal respiratory leonardo on 10/16/2020.  Given cavitary appearance on imaging, Quantiferon gold sent and indeterminate x 2  with Sputum AFB stain negative and  Culture pending x 1 on 10/16/2020.  Transferred out of ICU on 10/21/2020.  Awaiting Trilogy for home.   More short of breath on 10/30/2020 - CXR overall stable from 2 days ago, but BNP in 400s from 200s - given an additional dose of Lasix 40mg IV with improvement in symptoms.    On NC this morning - Pox 94% on 5L NC  I/O not well recorded today - net cumulative output of 1930ml since admission  Weight 103.4kg on admission and down to " "97.5kg (this morning).  Feeling better this morning.  Less crackles on exam and improved LE edema.     CXR on 10/13/2020 -  Interval worsening of diffuse bilateral airspace disease and moderate bilateral pleural effusions when compared to 09/09/2020  CT Chest Without on 10/15/2020 - The findings highly concerning for extensive diffuse pulmonary infection with bilateral pleural fluid collections right greater than left.  US of LEs on 10/15/2020 - No evidence of deep venous thrombosis in either lower extremity.  CXR Portable on 10/20/2020 - No significant change.  There is cardiomegaly, multifocal airspace opacities and bilateral effusions.  CXR Protable on 10/28/2020 - Worsening right basilar atelectasis or consolidation.  Right upper lobe consolidation is improved.  There may be a tiny pneumatocele or central cavitation.  Pleural effusions are improved.  Continued follow-up advised.  CXR on 10/30/2020 - Stable right upper lobe consolidation.  Findings remain concerning for pneumonia with small cavitation as described previously.  Improved aeration right lung base.  Small pleural effusions, decreased in volume on the right.     Labs on Admission:  WBC 9.22  COVID-19 rapid negative  Trop negative x 3     PH 7.227/pCO2 94.4/pO2 73  Procal normal  Lactic Acid normal  Quantiferon indeterminate x 2  Sputum AFB stain negative and culture pending x 1     Followed by Pulmonary - " Tolerating NIV qhs well  The patient's physical is significant for bibasilar crackles  The patient's working assessments include acute on chronic hypercapnic resp failure.  Has diuresed well but still some pulm edema on exam.  Continue NIV use while sleeping, diuresis.  Will follow cavitary lung lesion as outpatient ".      Followed by Cardiology - "Does not appear fluid overloaded at present.  On furosemide 40 mg po Q12.  Watch weight and increase dose as needed for weight gain  ".     Plan:  Continue with Lasix 40mg bid  Continue BiPAP at " night - insurance verbally approved Trilogy (appeal done by Pulmonary) for home use and awaiting confirmation  Strict I&Os  Fluid Restrict to 1.5L   DuoNebs  Start Incentive Spirometry while awake        Atrial fibrillation with rapid ventricular response   in ED. Has been well controlled since.  Home Meds:  Metoprolol 50mg bid and Apixaban 5mg bid  Followed by Cardiology, Dr. Lutz - last seen outpatient on 10/8/2020  HR stable.  -Continue current medications    Chronic kidney disease (CKD) stage G4/A1, severely decreased glomerular filtration rate (GFR) between 15-29 mL/min/1.73 square meter and albuminuria creatinine ratio less than 30 mg/g  Creatinine of 2.0 on admission and was 1.9 on discharge 9/11/2020.  Her baseline is around 1.8.  Creatinine 1.8 this morning  K+ a little high at 5.2.  -Renally dose medication for CrCl of <30  -Avoid Nephrotoxic medications if able  -Low K+ diet  -Monitor urine output  -Follow closely    Acute on chronic diastolic congestive heart failure  Home Meds:  Lasix 40mg qday (was bid and recently changed as above)  See above.     TTE on 8/24/2020 - Mild left atrial enlargement.  · Diastolic pattern consistent with atrial fibrillation observed.  · Normal left ventricular systolic function. The estimated ejection fraction is 58%.  · No wall motion abnormalities.  · Normal right ventricular systolic function.  · Mild right atrial enlargement.  Small posterior pericardial effusion.          Normocytic anemia  Hgb 10.0 on admission and stable at 9.5 yesterday  Stable since last admission.  Ferritin 25 on 8/25/2020 and Fe sat 18% on 9/5/2020 - continue outpatient FeSO4 325mg daily  B12 301 and Folate 4.6 - both borderline low - continue outpatient Nephrocaps daily      Debility  Continue with physical and occupational therapy.  Home Health on discharge    Encounter for palliative care  Followed by Palliative Care      Current moderate episode of major depressive disorder without  prior episode  Stable.  Continue SSRI therapy.    Dyslipidemia  Continue with statin therapy.    Essential hypertension  Continue Metoprolol and Lasix.  BP stable.      VTE Risk Mitigation (From admission, onward)         Ordered     apixaban tablet 5 mg  2 times daily      10/17/20 1152     Place sequential compression device  Until discontinued      10/13/20 1405     IP VTE HIGH RISK PATIENT  Once      10/13/20 1405                Discharge Planning   YUDELKA: 10/26/2020     Code Status: Full Code   Is the patient medically ready for discharge?:     Reason for patient still in hospital (select all that apply): Patient trending condition, Treatment and Pending disposition  Discharge Plan A: Skilled Nursing Facility                  Ravi Soria MD  Department of Hospital Medicine   [facility]

## 2020-10-31 NOTE — ASSESSMENT & PLAN NOTE
Creatinine of 2.0 on admission and was 1.9 on discharge 9/11/2020.  Her baseline is around 1.8.  Creatinine 1.8 this morning  K+ a little high at 5.2.  -Renally dose medication for CrCl of <30  -Avoid Nephrotoxic medications if able  -Low K+ diet  -Monitor urine output  -Follow closely

## 2020-10-31 NOTE — PLAN OF CARE
Pt is AAOx4.  Midline remains saline locked, no redness or swelling at site.  Pt remains on Oxygen, pt has no c/o of SOB this shift.  CPAP not in use this shift.  Pure wick in place.  Pt remains in chair most of shift, pt did not want to lay in bed.  Turned as needed.  VSS, in NAD, pt remains afebrile.  Pt remaisn free from injury.  Bed in low position, wheels locked, call light within reach.  Pt verbalized understanding of POC.  Open communication facilitated.

## 2020-10-31 NOTE — ASSESSMENT & PLAN NOTE
Hgb 10.0 on admission and stable at 9.5 yesterday  Stable since last admission.  Ferritin 25 on 8/25/2020 and Fe sat 18% on 9/5/2020 - continue outpatient FeSO4 325mg daily  B12 301 and Folate 4.6 - both borderline low - continue outpatient Nephrocaps daily

## 2020-10-31 NOTE — NURSING
Notified ERGAN Briseno, of witnessed fall that occurred. Pt states she did not hit her head and that she landed on her right knee. Pain to right knee is a 8/10. Bruising noted to that knee. VSS on 5L oxygen per NC. Pt transferred to chair.Call light within reach. REGAN Briseno to come by shortly. No new orders at this time.

## 2020-10-31 NOTE — SUBJECTIVE & OBJECTIVE
Interval History: Patient overall stable overnight.  Feeling better after an additional dose of Lasix yesterday.  Patient still has no power at home.  Still waiting for final approval of [facility].    Review of Systems   Constitutional: Positive for activity change. Negative for chills and fever.   HENT: Negative for ear pain.    Eyes: Negative for pain.   Respiratory: Positive for shortness of breath. Negative for cough.    Cardiovascular: Negative for chest pain, palpitations and leg swelling.   Gastrointestinal: Negative for abdominal pain, constipation, diarrhea, nausea and vomiting.   Endocrine: Negative for polydipsia, polyphagia and polyuria.   Genitourinary: Negative for difficulty urinating and dysuria.   Musculoskeletal: Negative for neck pain.   Skin: Negative for rash.   Neurological: Negative for dizziness and headaches.   Hematological: Does not bruise/bleed easily.   Psychiatric/Behavioral: Negative for agitation and behavioral problems.     Objective:     Vital Signs (Most Recent):  Temp: 97.7 °F (36.5 °C) (10/31/20 0809)  Pulse: 88 (10/31/20 0809)  Resp: (!) 24 (10/31/20 0809)  BP: 118/63 (10/31/20 0809)  SpO2: 95 % (10/31/20 0809) Vital Signs (24h Range):  Temp:  [97.7 °F (36.5 °C)-99 °F (37.2 °C)] 97.7 °F (36.5 °C)  Pulse:  [74-98] 88  Resp:  [16-24] 24  SpO2:  [93 %-95 %] 95 %  BP: ()/(51-70) 118/63     Weight: 97.5 kg (214 lb 15.2 oz)  Body mass index is 38.08 kg/m².    Intake/Output Summary (Last 24 hours) at 10/31/2020 0814  Last data filed at 10/31/2020 0600  Gross per 24 hour   Intake 840 ml   Output 900 ml   Net -60 ml      Physical Exam  Constitutional:       General: She is not in acute distress.     Appearance: She is obese. She is not toxic-appearing.   HENT:      Head: Normocephalic and atraumatic.      Right Ear: External ear normal.      Left Ear: External ear normal.      Nose: Nose normal.      Comments: NC in place     Mouth/Throat:      Mouth: Mucous membranes are moist.       Pharynx: Oropharynx is clear.   Eyes:      General: No scleral icterus.     Conjunctiva/sclera: Conjunctivae normal.   Neck:      Musculoskeletal: Normal range of motion and neck supple.   Cardiovascular:      Rate and Rhythm: Normal rate. Rhythm irregular.      Pulses: Normal pulses.      Heart sounds: No murmur.   Pulmonary:      Effort: No respiratory distress.      Breath sounds: Rales (mild bilateral lower lung fields) present. No wheezing.      Comments:     Abdominal:      General: Abdomen is flat. Bowel sounds are normal. There is no distension.      Palpations: Abdomen is soft.      Tenderness: There is no abdominal tenderness.   Musculoskeletal:      Right lower leg: Edema present.      Left lower leg: Edema present.      Comments: Trace to 1+   Skin:     General: Skin is warm.      Coloration: Skin is not jaundiced.      Findings: No rash.   Neurological:      General: No focal deficit present.      Mental Status: She is alert. Mental status is at baseline.   Psychiatric:         Mood and Affect: Mood normal.         Behavior: Behavior normal.         Significant Labs: All pertinent labs within the past 24 hours have been reviewed.    Significant Imaging: I have reviewed all pertinent imaging results/findings within the past 24 hours.

## 2020-10-31 NOTE — PROGRESS NOTES
"    Cardiology Progress Note    10/31/2020  12:55 PM    Subjective/Interim:      No issues overnight.  BP trending lower this am.       Objective:   24-hour Vitals:  Temp:  [97.7 °F (36.5 °C)-99 °F (37.2 °C)] 98.5 °F (36.9 °C)  Pulse:  [72-98] 72  Resp:  [16-24] 20  SpO2:  [93 %-95 %] 94 %  BP: ()/(51-70) 85/53    Physical Examination:  Vitals: BP (!) 85/53 (BP Location: Left arm, Patient Position: Sitting)   Pulse 72   Temp 98.5 °F (36.9 °C) (Oral)   Resp 20   Ht 5' 3" (1.6 m)   Wt 97.5 kg (214 lb 15.2 oz)   LMP  (LMP Unknown)   SpO2 (!) 94%   BMI 38.08 kg/m²     Physical Exam   Constitutional: She is oriented to person, place, and time. She appears well-developed and well-nourished. She appears ill. She appears distressed.   Neck: Carotid bruit is not present.   Cardiovascular: Normal rate, S1 normal and S2 normal. An irregularly irregular rhythm present.   Pulmonary/Chest: She has rhonchi in the right lower field and the left lower field.   Musculoskeletal:      Right ankle: She exhibits no swelling.      Left ankle: She exhibits no swelling.   Neurological: She is alert and oriented to person, place, and time.   Skin: Skin is warm and dry.   Psychiatric: She has a normal mood and affect. Her speech is normal and behavior is normal. Cognition and memory are normal.       Laboratory:  Trended Lab Data:  Recent Labs   Lab 10/28/20  0429 10/29/20  0438 10/30/20  0502   WBC 7.46 7.62 7.30   HGB 9.4* 9.4* 9.5*   HCT 31.2* 31.9* 32.2*    163 173       Recent Labs   Lab 10/28/20  0429 10/29/20  0438 10/30/20  0502 10/30/20  0820 10/31/20  0507    142 142 143 141   K 4.9 4.9 5.0 5.2* 5.2*   CL 90* 91* 91* 91* 89*   CO2 43* 42* 40* 41* 40*   BUN 53* 52* 49* 49* 52*   GLU 97 101 89 92 110   CALCIUM 9.1 9.2 9.5 9.6 9.4   MG 2.5 2.4 2.5  --  2.5   PHOS 4.6* 4.5 4.1  --   --        No results for input(s): PROT, ALBUMIN, BILITOT, AST, ALT, ALKPHOS in the last 168 hours.    No results for input(s): " PROTIME, PTT, INR in the last 168 hours.    Cardiac:   Recent Labs   Lab 10/26/20  0432 10/30/20  0950   * 456*       FLP:   Lab Results   Component Value Date    CHOL 119 (L) 06/19/2020    HDL 39 (L) 06/19/2020    LDLCALC 61.2 (L) 06/19/2020    TRIG 94 06/19/2020    CHOLHDL 32.8 06/19/2020     DM:   Lab Results   Component Value Date    HGBA1C 5.6 08/23/2020    HGBA1C 5.6 06/19/2020    HGBA1C 5.8 (H) 03/19/2019    LDLCALC 61.2 (L) 06/19/2020    CREATININE 1.8 (H) 10/31/2020     Thyroid:   Lab Results   Component Value Date    TSH 1.279 08/22/2020     Anemia:   Lab Results   Component Value Date    IRON 44 09/06/2020    TIBC 246 (L) 09/06/2020    FERRITIN 25 12/04/2018    UVXQGWTN83 301 09/06/2020    FOLATE 4.6 09/06/2020     Urinalysis:   Lab Results   Component Value Date    COLORU Yellow 08/25/2020    SPECGRAV 1.015 08/25/2020    NITRITE Negative 08/25/2020    KETONESU Negative 08/25/2020    UROBILINOGEN Negative 08/25/2020     @      Other Results:  EKG (my interpretation):    TELEMETRY:  Not on telemetry    Echo:    Radiology:  Ct Chest Without Contrast    Result Date: 10/15/2020  EXAMINATION: CT CHEST WITHOUT CONTRAST CLINICAL HISTORY: Lung nodule, 6-8mm, follow up exam;eval right pulmonary nodule; TECHNIQUE: Low dose axial images, sagittal and coronal reformations were obtained from the thoracic inlet to the lung bases. Contrast was not administered. FINDINGS: The soft tissues and vascular structures at the base the neck are significant for a 2.1 cm right-sided thyroid hypodensity unchanged compared to previous chest CT dated 06/11/2018.  The mediastinum including the heart and great vessels is significant for calcification of the aorta and aortic annulus.  The main pulmonary artery is enlarged measuring 3.4 cm consistent with but not specific for pulmonary arterial hypertension.  There is moderate thickening of the adrenal glands bilaterally which is unchanged compared to previous CT.  The osseous  structures demonstrate degenerative change. The trachea is patent and free of any intraluminal filling defects.  The lungs are significant for a large amount of diffuse scattered ground-glass opacity, interlobular septal thickening, and consolidation most pronounced within the lower lobes.  A portion of the consolidative change within the right upper lobe is cavitary.  There are bilateral pleural fluid collections right greater than left.     The findings highly concerning for extensive diffuse pulmonary infection with bilateral pleural fluid collections right greater than left. Electronically signed by: Regulo Bassett MD Date:    10/15/2020 Time:    12:01    X-ray Chest Ap Portable    Result Date: 10/30/2020  EXAMINATION: XR CHEST AP PORTABLE CLINICAL HISTORY: short of breathe; TECHNIQUE: Single frontal view of the chest was performed. COMPARISON: 10/28/2020 and 10/13/2020 FINDINGS: Right upper extremity catheter tip projects over the axillary vessels. The lungs are well inflated.  Right upper lobe consolidation with subtle internal lucency as described on the prior study is not significantly changed.  Persistent opacities at the lung bases, mildly improved on the right.  Small pleural effusions, mildly decreased in volume on the right.  I see no new consolidation.  Cardiac silhouette is normal in size.     Stable right upper lobe consolidation.  Findings remain concerning for pneumonia with small cavitation as described previously. Improved aeration right lung base.  Small pleural effusions, decreased in volume on the right. Electronically signed by: Elsa Alcantar Date:    10/30/2020 Time:    11:47    X-ray Chest Ap Portable    Result Date: 10/28/2020  EXAMINATION: XR CHEST AP PORTABLE CLINICAL HISTORY: follow up on pneumonia and edema; TECHNIQUE: Single frontal view of the chest was performed. COMPARISON: 10/20/2020 FINDINGS: The lungs are well inflated.  Right upper lobe consolidation is improved.  There may be a  small cavitation or pneumatocele within remaining consolidation measuring 18 mm.  Interval progression of patchy consolidation at the right lung base.  Left retrocardiac opacities have the appearance of atelectasis.  Small pleural effusions persist, though are improved.  Cardiac silhouette is stable in size.     Worsening right basilar atelectasis or consolidation. Right upper lobe consolidation is improved.  There may be a tiny pneumatocele or central cavitation. Pleural effusions are improved. Continued follow-up advised. Electronically signed by: Elsa Alcantar Date:    10/28/2020 Time:    10:24    X-ray Chest Ap Portable    Result Date: 10/20/2020  EXAMINATION: XR CHEST AP PORTABLE CLINICAL HISTORY: Heart failure; TECHNIQUE: Single frontal view of the chest was performed. COMPARISON: Radiograph 10/13/2020, CT 10/15/2020 FINDINGS: Cardiac silhouette is enlarged but unchanged.  Multifocal airspace opacities bilaterally.  Small bilateral effusions, more so on the right.  No acute osseous process.     No significant change.  There is cardiomegaly, multifocal airspace opacities and bilateral effusions. Electronically signed by: Ajay Camara MD Date:    10/20/2020 Time:    08:48    X-ray Chest Ap Portable    Result Date: 10/13/2020  EXAMINATION: XR CHEST AP PORTABLE CLINICAL HISTORY: CHF; TECHNIQUE: Single frontal view of the chest was performed. COMPARISON: 09/09/2020 FINDINGS: There is diffuse bilateral airspace disease, which is slightly worsened when compared to 09/09/2020.  Moderate bilateral pleural effusions have also worsened.  The visualized cardiomediastinal silhouette is unchanged.  The bones and soft tissues are unremarkable.     Interval worsening of diffuse bilateral airspace disease and moderate bilateral pleural effusions when compared to 09/09/2020 Electronically signed by: Halina Plascencia Date:    10/13/2020 Time:    09:40    Us Lower Extremity Veins Bilateral    Result Date:  10/15/2020  EXAMINATION: US LOWER EXTREMITY VEINS BILATERAL CLINICAL HISTORY: hypoxic respiratory failure evaluate for dvt.; TECHNIQUE: Duplex and color flow Doppler and dynamic compression was performed of the bilateral lower extremity veins was performed. COMPARISON: 08/22/2020 FINDINGS: Right thigh veins: The common femoral, femoral, popliteal, upper greater saphenous, and deep femoral veins are patent and free of thrombus. The veins are normally compressible and have normal phasic flow and augmentation response. Right calf veins: The visualized calf veins are patent. Left thigh veins: The common femoral, femoral, popliteal, upper greater saphenous, and deep femoral veins are patent and free of thrombus. The veins are normally compressible and have normal phasic flow and augmentation response. Left calf veins: The visualized calf veins are patent. Miscellaneous: None     No evidence of deep venous thrombosis in either lower extremity. Electronically signed by: Ceasar Carreon MD Date:    10/15/2020 Time:    21:53        Current Medications:     Infusions:       Scheduled:   apixaban  5 mg Oral BID    atorvastatin  80 mg Oral QHS    docusate sodium  100 mg Oral BID    escitalopram oxalate  10 mg Oral QHS    ferrous sulfate  325 mg Oral Daily    furosemide  40 mg Oral BID    gabapentin  100 mg Oral BID    metoprolol tartrate  25 mg Oral BID    miconazole NITRATE 2 %   Topical (Top) BID    vitamin renal formula (B-complex-vitamin c-folic acid)  1 capsule Oral Daily        PRN:  acetaminophen, albuterol-ipratropium, dextrose 50%, dextrose 50%, glucagon (human recombinant), glucose, glucose, melatonin, ondansetron, polyethylene glycol, promethazine (PHENERGAN) IVPB, sodium chloride 0.9%     Assessment and Plan:     Patsy Morris is a 70 y.o.female with  1. Heart Failure, Diastolic, Acute on Chronic              8/24/2020: Echo: Normal left ventricular size and systolic function. Mildly dilated LA.               10/15/2020: Echo: Normal left ventricular size and systolic function. EF 60%. Moderately dilated LA. Mildly dilated RV. SPAP 49 mmHg. Small pericardial effusion.              At NH was on furosemide 40 mg Q24.              10/13/2020: Presented fluid overloaded in HF. Received furosemide 80 mg iv Q12.              10/20/2020: CXR with extensive infiltrates and pleural effusions. .               10/26/2020: .              10/28/2020: CXR improving.              Does not appear fluid overloaded at present.              On furosemide 40 mg po Q12. BUN and Cr slightly increased today to 52/1.8.  Will watch closely.              Watch weight and increase dose as needed for weight gain.                2. Atrial Fibrillation              In chronic atrial fibrillation.              On apixiban.              On metoprolol 50 mg Q12.              Rate well controlled mostly  bpm.     3. Chronic Anticoagulation              Been on apixiban 5 mg Q12.              10/16/2020: Apixiban 5 mg Q12 was changed to heparin iv.              On apixiban 5 mg Q12.              No bleeding.     4. Hypertension              On metoprolol 50 mg Q12 and furosemide 40 mg Q12.              BP is lower this am so will decrease to 25mg BID with holding parameters..                5. Chronic Kidney Disease, Stage 4              History or renal carcinoma and nephrectomy.              10/13/2020: BUN/crea 27/2.0. CrCl 28.              10/20/2020: BUN/crea 56/1.8. CrCl 32.              10/25/2020: BUN/crea 49/1.6. CrCl 32.      6. Chronic Obstructive Pulmonary Disease              Chronic respiratory failure with CO2 retention.              10/13/2020: 7.23/94/73/39/90% on FiO2 of 40%.              Been overall slowly improving.                 7. Weakness              10/27/2020: Walked 150 ft with PT.              Needs a lot of PT.     8. Disposition              10/28/2020: Tells me she has decided that she will go  to her house.         Dread Ferrer MD        Disclaimer: This document was created using voice recognition software ([email or web address]). Although it may be edited, this document may contain errors related to incorrect recognition of the spoken word. Please call the physician if clarification is needed.

## 2020-10-31 NOTE — ASSESSMENT & PLAN NOTE
Home Meds:  Lasix 40mg qday (was bid and recently changed as above)  See above.     TTE on 8/24/2020 - Mild left atrial enlargement.  · Diastolic pattern consistent with atrial fibrillation observed.  · Normal left ventricular systolic function. The estimated ejection fraction is 58%.  · No wall motion abnormalities.  · Normal right ventricular systolic function.  · Mild right atrial enlargement.  Small posterior pericardial effusion.

## 2020-11-01 PROBLEM — R06.02 SHORTNESS OF BREATH: Status: ACTIVE | Noted: 2020-01-01

## 2020-11-01 NOTE — PROGRESS NOTES
"[facility] Hospital Medicine  Progress Note    Patient Name: Patsy Morris  MRN: [id number]  Patient Class: IP- Inpatient   Admission Date: 10/13/2020  Length of Stay: 19 days  Attending Physician: Ravi Soria MD  Primary Care Provider: Luisana Cartagena MD        Subjective:     Principal Problem:Acute on chronic respiratory failure with hypoxia and hypercapnia        HPI:  Per Ravi Soria:    "Per ED:  "This is a 70 y.o. female with history of CHF and COPD who presents via EMS with complaint of shortness of breath that began a few days ago. Per EMS patient had O2 saturation of 72 on 3 liters if home oxygen upon their arrival. Patient states she is on 3 liters of home oxygen at baseline. She denies fever, cough, chest pain, nausea, vomiting, diarrhea, or rhinorrhea. She reports she was recently discharged from the hospital for similar symptoms. She reports compliance with her home medications. She recently became resident of [facility] after last hospitalization. She is a former smoker. She denies undergoing any surgeries".    The patient is a 70 year old female with a PMH significant for of HTN, Chronic Respiratory Failure (COPD and BHAVANI/OHS), HFpEF, Obesity who presented to the ED with complaints of SOB that began about 3 days ago.  Patient is on 3L O2NC at home.  She was seen by her Cardiologist about 4 days prior to admission and told to decrease her Lasix from 40mg bid to qday.  She denies chest pain.  No Fevers.  No cough.  States she has been adherent with her medications.  Patient was recently admitted to [facility] from 9/1-9/11 for Acute on Chronic Respiratory Failure and discharged to SNF.  Patient was placed on BiPAP in ED and admitted to ICU.  Patient feeling better on BiPAP."    Overview/Hospital Course:  Patient is 70-year-old woman with history of hypertension, chronic hypoxic respiratory failure on home oxygen secondary to chronic obstructive pulmonary " disease, chronic diastolic heart failure, chronic atrial fibrillation, and morbid obesity re-admitted to the hospital with decompensated heart failure after patient was home for about a week following recent hospitalization and stayed at skilled nursing facility for physical therapy.  Patient treated with non invasive ventilation intravenous diuretics.  Slowly clinically improving.  Patient also initially treated with antibiotic therapy which has since been discontinued as bacterial pneumonia ruled out on clinical grounds.  Patient does have a cavitary lesion on her chest CT scan but otherwise without signs or symptoms of active pulmonary tuberculosis.  Acid-fast bacillus smears and cultures ordered to rule out active pulmonary tuberculosis.  Patient with one negative acid-fast bacillus smear thus far.  Efforts at obtaining additional smears even with sputum induction with hypertonic saline has not resulted additional sputum.  Discussed with Pulmonary Critical Care service who recommended discontinuing further smears and respiratory isolation.  She continued to slowly improve with diuresis.  Patient was transferred out of ICU on 10/21/2020.  Had recurrent SOB on 10/30/2020 and given an additional dose of IV Lasix with improvement.  Awaiting for Trilogy home ventilator to be approved and plan is home with Home Health.      Interval History: Patient overall stable overnight.  Has been desating more on her previous 5L NC and requiring BiPAP more during the day.  Patient now has power at home, however she is scared of returning home and may want to consider an LTAC.  Still waiting for final approval of Trilogy.    Review of Systems   Constitutional: Positive for activity change. Negative for chills and fever.   HENT: Negative for ear pain.    Eyes: Negative for pain.   Respiratory: Positive for shortness of breath. Negative for cough.    Cardiovascular: Negative for chest pain, palpitations and leg swelling.    Gastrointestinal: Negative for abdominal pain, constipation, diarrhea, nausea and vomiting.   Endocrine: Negative for polydipsia, polyphagia and polyuria.   Genitourinary: Negative for difficulty urinating and dysuria.   Musculoskeletal: Negative for neck pain.   Skin: Negative for rash.   Neurological: Negative for dizziness and headaches.   Hematological: Does not bruise/bleed easily.   Psychiatric/Behavioral: Negative for agitation and behavioral problems. The patient is nervous/anxious.      Objective:     Vital Signs (Most Recent):  Temp: 96.7 °F (35.9 °C) (11/01/20 0752)  Pulse: 103 (11/01/20 0838)  Resp: (!) 26 (11/01/20 0838)  BP: (!) 126/58 (11/01/20 0752)  SpO2: (!) 88 %(pt off BIPAP to eat breakfast & get cleaned up) (11/01/20 0838) Vital Signs (24h Range):  Temp:  [96.7 °F (35.9 °C)-98.8 °F (37.1 °C)] 96.7 °F (35.9 °C)  Pulse:  [] 103  Resp:  [16-29] 26  SpO2:  [83 %-94 %] 88 %  BP: ()/(53-63) 126/58     Weight: 97.5 kg (214 lb 15.2 oz)  Body mass index is 38.08 kg/m².    Intake/Output Summary (Last 24 hours) at 11/1/2020 1036  Last data filed at 11/1/2020 0600  Gross per 24 hour   Intake 630 ml   Output 300 ml   Net 330 ml      Physical Exam  Constitutional:       General: She is not in acute distress.     Appearance: She is obese. She is not toxic-appearing.   HENT:      Head: Normocephalic and atraumatic.      Right Ear: External ear normal.      Left Ear: External ear normal.      Nose: Nose normal.      Comments: NC in place     Mouth/Throat:      Mouth: Mucous membranes are moist.      Pharynx: Oropharynx is clear.   Eyes:      General: No scleral icterus.     Conjunctiva/sclera: Conjunctivae normal.   Neck:      Musculoskeletal: Normal range of motion and neck supple.   Cardiovascular:      Rate and Rhythm: Normal rate. Rhythm irregular.      Pulses: Normal pulses.      Heart sounds: No murmur.   Pulmonary:      Effort: No respiratory distress.      Breath sounds: Rales (mild  bilateral lower lung fields) present. No wheezing.      Comments: Decreased BS but with crackles in bases.  No wheezing heard.    Abdominal:      General: Abdomen is flat. Bowel sounds are normal. There is no distension.      Palpations: Abdomen is soft.      Tenderness: There is no abdominal tenderness.   Musculoskeletal:      Right lower leg: Edema present.      Left lower leg: Edema present.      Comments: Trace to 1+   Skin:     General: Skin is warm.      Coloration: Skin is not jaundiced.      Findings: No rash.   Neurological:      General: No focal deficit present.      Mental Status: She is alert. Mental status is at baseline.   Psychiatric:         Mood and Affect: Mood normal.         Behavior: Behavior normal.         Significant Labs: All pertinent labs within the past 24 hours have been reviewed.    Significant Imaging: I have reviewed all pertinent imaging results/findings within the past 24 hours.      Assessment/Plan:      * Acute on chronic respiratory failure with hypoxia and hypercapnia  Chronic Respiratory Failure on 3 liters of Oxygen at home - COPD, BHAVANI/OHS with severe restrictive lung disease.      She presented with SOB 3 days PTA.  Placed on BiPAP and given Lasix 80mg IV x 1 in ED.  Transferred to ICU on admission.  Started initially on Lasix 40mg IV q12 and increased to 80mg q12.  Started on IV Vanc, Flagyl, and Cefepime on 10/15/2020 by Pulmonary-CC given CT Chest concerning for infection - antibiotics stopped 10/18/2020.  Respiratory culture with normal respiratory leonardo on 10/16/2020.  Given cavitary appearance on imaging, Quantiferon gold sent and indeterminate x 2  with Sputum AFB stain negative and  Culture pending x 1 on 10/16/2020.  Transferred out of ICU on 10/21/2020.  Awaiting Trilogy for home.   More short of breath on 10/30/2020 - CXR overall stable from 2 days ago, but BNP in 400s from 200s - given an additional dose of Lasix 40mg IV with improvement in symptoms.    I/O not  well recorded today - net cumulative output of 1930ml since admission  Weight 103.4kg on admission and down to 97.5kg on 10/31/2020.  On 11/1/2020, she is desating off BiPAP, although she does not complain of increased SOB.  Lung exam with crackles, but no wheezing heard.       CXR on 10/13/2020 -  Interval worsening of diffuse bilateral airspace disease and moderate bilateral pleural effusions when compared to 09/09/2020  CT Chest Without on 10/15/2020 - The findings highly concerning for extensive diffuse pulmonary infection with bilateral pleural fluid collections right greater than left.  US of LEs on 10/15/2020 - No evidence of deep venous thrombosis in either lower extremity.  CXR Portable on 10/20/2020 - No significant change.  There is cardiomegaly, multifocal airspace opacities and bilateral effusions.  CXR Protable on 10/28/2020 - Worsening right basilar atelectasis or consolidation.  Right upper lobe consolidation is improved.  There may be a tiny pneumatocele or central cavitation.  Pleural effusions are improved.  Continued follow-up advised.  CXR on 10/30/2020 - Stable right upper lobe consolidation.  Findings remain concerning for pneumonia with small cavitation as described previously.  Improved aeration right lung base.  Small pleural effusions, decreased in volume on the right.     Labs on Admission:  WBC 9.22  COVID-19 rapid negative  Trop negative x 3     PH 7.227/pCO2 94.4/pO2 73  Procal normal  Lactic Acid normal  Quantiferon indeterminate x 2  Sputum AFB stain negative and culture pending x 1     Plan:  Change Lasix back to 40mg IV bid  Change DuoNebs to standing q6  ABG  Continue BiPAP at night - insurance verbally approved Trilogy (appeal done by Pulmonary) for home use and awaiting confirmation  Strict I&Os  Fluid Restrict to 1.5L   Consider empiric steroids and re-consulting Pulmonary        Atrial fibrillation with rapid ventricular response   in ED. Has been well controlled  since.  Home Meds:  Metoprolol 25mg bid and Apixaban 5mg bid  Followed by Cardiology, Dr. Lutz - last seen outpatient on 10/8/2020  HR stable.  -Continue current medications - Metoprolol decreased to 25mg bid on 10/31/2020    Chronic kidney disease (CKD) stage G4/A1, severely decreased glomerular filtration rate (GFR) between 15-29 mL/min/1.73 square meter and albuminuria creatinine ratio less than 30 mg/g  Creatinine of 2.0 on admission and was 1.9 on discharge 9/11/2020.  Her baseline is around 1.8.  Creatinine stable at 1.8 this morning  -Renally dose medication for CrCl of <30  -Avoid Nephrotoxic medications if able  -Low K+ diet  -Monitor urine output  -Follow closely    Acute on chronic diastolic congestive heart failure  Home Meds:  Lasix 40mg qday (was bid and recently changed as above)  See above.     TTE on 8/24/2020 - Mild left atrial enlargement.  · Diastolic pattern consistent with atrial fibrillation observed.  · Normal left ventricular systolic function. The estimated ejection fraction is 58%.  · No wall motion abnormalities.  · Normal right ventricular systolic function.  · Mild right atrial enlargement.  Small posterior pericardial effusion.          Normocytic anemia  Hgb 10.0 on admission and stable at 9.5 today  Stable since last admission.  Ferritin 25 on 8/25/2020 and Fe sat 18% on 9/5/2020 - continue outpatient FeSO4 325mg daily  B12 301 and Folate 4.6 - both borderline low - continue outpatient Nephrocaps daily      Debility  Continue with physical and occupational therapy.  Home Health on discharge    Encounter for palliative care  Followed by Palliative Care      Current moderate episode of major depressive disorder without prior episode  Stable.  Continue SSRI therapy.    Dyslipidemia  Continue with statin therapy.    Essential hypertension  Continue Metoprolol and Lasix.  BP stable.      VTE Risk Mitigation (From admission, onward)         Ordered     apixaban tablet 5 mg  2 times daily       10/17/20 1152     Place sequential compression device  Until discontinued      10/13/20 1405     IP VTE HIGH RISK PATIENT  Once      10/13/20 1405                Discharge Planning   YUDELKA: 10/26/2020     Code Status: Full Code   Is the patient medically ready for discharge?:     Reason for patient still in hospital (select all that apply): Patient trending condition and Treatment  Discharge Plan A: Skilled Nursing Facility                  Ravi Soria MD  Department of Hospital Medicine   [facility]

## 2020-11-01 NOTE — RESPIRATORY THERAPY
Pt Sats decreased on NC while eating. RN notified RT. Pt placed back on BIPAP @ 1017. Dr Soria notified about pt status. ABG & Q6 nebs ordered. ABG done & results given to Dr Soria. Pt will be transferred to ICU.

## 2020-11-01 NOTE — SIGNIFICANT EVENT
Received call that Patsy Morris 70 y.o. female had a fall this evening. There was no LOC, no head injury. Immediately after the fall she walked to bed. I did examine the patient, and who stated they had no pain specifically in the head, neck, back, upper joints and lower joints.  Patsy had no cervical, thoracic, lumbar, scalp, shoulders, elbows, wrist, hips, knees and ankle tenderness. She states that after taking tylenol her pain was back at baseline.

## 2020-11-01 NOTE — PROGRESS NOTES
"    Cardiology Progress Note    11/1/2020  9:34 AM    Subjective/Interim:      No complaints.  On CPAP.  BP improved after decreasing her metoprolol yesterday.     Objective:   24-hour Vitals:  Temp:  [96.7 °F (35.9 °C)-98.8 °F (37.1 °C)] 96.7 °F (35.9 °C)  Pulse:  [] 103  Resp:  [16-29] 26  SpO2:  [83 %-94 %] 88 %  BP: ()/(53-63) 126/58    Physical Examination:  Vitals: BP (!) 126/58 (BP Location: Left arm, Patient Position: Lying)   Pulse 103   Temp 96.7 °F (35.9 °C) (Axillary)   Resp (!) 26   Ht 5' 3" (1.6 m)   Wt 97.5 kg (214 lb 15.2 oz)   LMP  (LMP Unknown)   SpO2 (!) 88% Comment: pt off BIPAP to eat breakfast & get cleaned up  BMI 38.08 kg/m²     Physical Exam   Constitutional: She is oriented to person, place, and time. She appears well-developed and well-nourished. She appears ill. She appears distressed.   On CPAP   Neck: Carotid bruit is not present.   Cardiovascular: Normal rate, S1 normal and S2 normal. An irregularly irregular rhythm present.   Pulmonary/Chest: She has rhonchi in the right lower field and the left lower field.   Musculoskeletal:      Right ankle: She exhibits no swelling.      Left ankle: She exhibits no swelling.   Neurological: She is alert and oriented to person, place, and time.   Skin: Skin is warm and dry.   Psychiatric: She has a normal mood and affect. Her speech is normal and behavior is normal. Cognition and memory are normal.       Laboratory:  Trended Lab Data:  Recent Labs   Lab 10/29/20  0438 10/30/20  0502 11/01/20  0627   WBC 7.62 7.30 8.97   HGB 9.4* 9.5* 9.5*   HCT 31.9* 32.2* 31.8*    173 163       Recent Labs   Lab 10/28/20  0429 10/29/20  0438 10/30/20  0502 10/30/20  0820 10/31/20  0507 11/01/20  0505    142 142 143 141 141   K 4.9 4.9 5.0 5.2* 5.2* 5.1   CL 90* 91* 91* 91* 89* 90*   CO2 43* 42* 40* 41* 40* 38*   BUN 53* 52* 49* 49* 52* 52*   GLU 97 101 89 92 110 94   CALCIUM 9.1 9.2 9.5 9.6 9.4 9.3   MG 2.5 2.4 2.5  --  2.5 2.4 "   PHOS 4.6* 4.5 4.1  --   --   --        No results for input(s): PROT, ALBUMIN, BILITOT, AST, ALT, ALKPHOS in the last 168 hours.    No results for input(s): PROTIME, PTT, INR in the last 168 hours.    Cardiac:   Recent Labs   Lab 10/26/20  0432 10/30/20  0950   * 456*       FLP:   Lab Results   Component Value Date    CHOL 119 (L) 06/19/2020    HDL 39 (L) 06/19/2020    LDLCALC 61.2 (L) 06/19/2020    TRIG 94 06/19/2020    CHOLHDL 32.8 06/19/2020     DM:   Lab Results   Component Value Date    HGBA1C 5.6 08/23/2020    HGBA1C 5.6 06/19/2020    HGBA1C 5.8 (H) 03/19/2019    LDLCALC 61.2 (L) 06/19/2020    CREATININE 1.8 (H) 11/01/2020     Thyroid:   Lab Results   Component Value Date    TSH 1.279 08/22/2020     Anemia:   Lab Results   Component Value Date    IRON 44 09/06/2020    TIBC 246 (L) 09/06/2020    FERRITIN 25 12/04/2018    IOOWQQYW46 301 09/06/2020    FOLATE 4.6 09/06/2020     Urinalysis:   Lab Results   Component Value Date    COLORU Yellow 08/25/2020    SPECGRAV 1.015 08/25/2020    NITRITE Negative 08/25/2020    KETONESU Negative 08/25/2020    UROBILINOGEN Negative 08/25/2020     @      Other Results:  EKG (my interpretation):    TELEMETRY:  Not on telemetry    Echo:    Radiology:  Ct Chest Without Contrast    Result Date: 10/15/2020  EXAMINATION: CT CHEST WITHOUT CONTRAST CLINICAL HISTORY: Lung nodule, 6-8mm, follow up exam;eval right pulmonary nodule; TECHNIQUE: Low dose axial images, sagittal and coronal reformations were obtained from the thoracic inlet to the lung bases. Contrast was not administered. FINDINGS: The soft tissues and vascular structures at the base the neck are significant for a 2.1 cm right-sided thyroid hypodensity unchanged compared to previous chest CT dated 06/11/2018.  The mediastinum including the heart and great vessels is significant for calcification of the aorta and aortic annulus.  The main pulmonary artery is enlarged measuring 3.4 cm consistent with but not specific  for pulmonary arterial hypertension.  There is moderate thickening of the adrenal glands bilaterally which is unchanged compared to previous CT.  The osseous structures demonstrate degenerative change. The trachea is patent and free of any intraluminal filling defects.  The lungs are significant for a large amount of diffuse scattered ground-glass opacity, interlobular septal thickening, and consolidation most pronounced within the lower lobes.  A portion of the consolidative change within the right upper lobe is cavitary.  There are bilateral pleural fluid collections right greater than left.     The findings highly concerning for extensive diffuse pulmonary infection with bilateral pleural fluid collections right greater than left. Electronically signed by: Regulo Bassett MD Date:    10/15/2020 Time:    12:01    X-ray Chest Ap Portable    Result Date: 10/30/2020  EXAMINATION: XR CHEST AP PORTABLE CLINICAL HISTORY: short of breathe; TECHNIQUE: Single frontal view of the chest was performed. COMPARISON: 10/28/2020 and 10/13/2020 FINDINGS: Right upper extremity catheter tip projects over the axillary vessels. The lungs are well inflated.  Right upper lobe consolidation with subtle internal lucency as described on the prior study is not significantly changed.  Persistent opacities at the lung bases, mildly improved on the right.  Small pleural effusions, mildly decreased in volume on the right.  I see no new consolidation.  Cardiac silhouette is normal in size.     Stable right upper lobe consolidation.  Findings remain concerning for pneumonia with small cavitation as described previously. Improved aeration right lung base.  Small pleural effusions, decreased in volume on the right. Electronically signed by: Elsa Alcantar Date:    10/30/2020 Time:    11:47    X-ray Chest Ap Portable    Result Date: 10/28/2020  EXAMINATION: XR CHEST AP PORTABLE CLINICAL HISTORY: follow up on pneumonia and edema; TECHNIQUE: Single  frontal view of the chest was performed. COMPARISON: 10/20/2020 FINDINGS: The lungs are well inflated.  Right upper lobe consolidation is improved.  There may be a small cavitation or pneumatocele within remaining consolidation measuring 18 mm.  Interval progression of patchy consolidation at the right lung base.  Left retrocardiac opacities have the appearance of atelectasis.  Small pleural effusions persist, though are improved.  Cardiac silhouette is stable in size.     Worsening right basilar atelectasis or consolidation. Right upper lobe consolidation is improved.  There may be a tiny pneumatocele or central cavitation. Pleural effusions are improved. Continued follow-up advised. Electronically signed by: Elsa Alcantar Date:    10/28/2020 Time:    10:24    X-ray Chest Ap Portable    Result Date: 10/20/2020  EXAMINATION: XR CHEST AP PORTABLE CLINICAL HISTORY: Heart failure; TECHNIQUE: Single frontal view of the chest was performed. COMPARISON: Radiograph 10/13/2020, CT 10/15/2020 FINDINGS: Cardiac silhouette is enlarged but unchanged.  Multifocal airspace opacities bilaterally.  Small bilateral effusions, more so on the right.  No acute osseous process.     No significant change.  There is cardiomegaly, multifocal airspace opacities and bilateral effusions. Electronically signed by: Ajay Camara MD Date:    10/20/2020 Time:    08:48    X-ray Chest Ap Portable    Result Date: 10/13/2020  EXAMINATION: XR CHEST AP PORTABLE CLINICAL HISTORY: CHF; TECHNIQUE: Single frontal view of the chest was performed. COMPARISON: 09/09/2020 FINDINGS: There is diffuse bilateral airspace disease, which is slightly worsened when compared to 09/09/2020.  Moderate bilateral pleural effusions have also worsened.  The visualized cardiomediastinal silhouette is unchanged.  The bones and soft tissues are unremarkable.     Interval worsening of diffuse bilateral airspace disease and moderate bilateral pleural effusions when compared  to 09/09/2020 Electronically signed by: Halina Plascencia Date:    10/13/2020 Time:    09:40    Us Lower Extremity Veins Bilateral    Result Date: 10/15/2020  EXAMINATION: US LOWER EXTREMITY VEINS BILATERAL CLINICAL HISTORY: hypoxic respiratory failure evaluate for dvt.; TECHNIQUE: Duplex and color flow Doppler and dynamic compression was performed of the bilateral lower extremity veins was performed. COMPARISON: 08/22/2020 FINDINGS: Right thigh veins: The common femoral, femoral, popliteal, upper greater saphenous, and deep femoral veins are patent and free of thrombus. The veins are normally compressible and have normal phasic flow and augmentation response. Right calf veins: The visualized calf veins are patent. Left thigh veins: The common femoral, femoral, popliteal, upper greater saphenous, and deep femoral veins are patent and free of thrombus. The veins are normally compressible and have normal phasic flow and augmentation response. Left calf veins: The visualized calf veins are patent. Miscellaneous: None     No evidence of deep venous thrombosis in either lower extremity. Electronically signed by: Ceasar Carreon MD Date:    10/15/2020 Time:    21:53        Current Medications:     Infusions:       Scheduled:   apixaban  5 mg Oral BID    atorvastatin  80 mg Oral QHS    docusate sodium  100 mg Oral BID    escitalopram oxalate  10 mg Oral QHS    ferrous sulfate  325 mg Oral Daily    furosemide  40 mg Oral BID    gabapentin  100 mg Oral BID    metoprolol tartrate  25 mg Oral BID    miconazole NITRATE 2 %   Topical (Top) BID    vitamin renal formula (B-complex-vitamin c-folic acid)  1 capsule Oral Daily        PRN:  acetaminophen, albuterol-ipratropium, dextrose 50%, dextrose 50%, glucagon (human recombinant), glucose, glucose, melatonin, ondansetron, polyethylene glycol, promethazine (PHENERGAN) IVPB, sodium chloride 0.9%     Assessment and Plan:     Patsy Morris is a 70 y.o.female  with  1. Heart Failure, Diastolic, Acute on Chronic              8/24/2020: Echo: Normal left ventricular size and systolic function. Mildly dilated LA.              10/15/2020: Echo: Normal left ventricular size and systolic function. EF 60%. Moderately dilated LA. Mildly dilated RV. SPAP 49 mmHg. Small pericardial effusion.              At NH was on furosemide 40 mg Q24.              10/13/2020: Presented fluid overloaded in HF. Received furosemide 80 mg iv Q12.              10/20/2020: CXR with extensive infiltrates and pleural effusions. .               10/26/2020: .              10/28/2020: CXR improving.              Does not appear fluid overloaded at present.              On furosemide 40 mg po Q12. BUN and Cr unchanged today at 52/1.8.  Will watch closely.              Watch weight and increase dose as needed for weight gain.                2. Atrial Fibrillation              In chronic atrial fibrillation.              On apixiban.              On metoprolol 50 mg Q12.              Rate well controlled mostly  bpm.     3. Chronic Anticoagulation              Been on apixiban 5 mg Q12.              10/16/2020: Apixiban 5 mg Q12 was changed to heparin iv.              On apixiban 5 mg Q12.              No bleeding.     4. Hypertension              On metoprolol 50 mg Q12 and furosemide 40 mg Q12.              BP is stable this am after decreasing metoprolol to 25mg BID with holding parameters..                5. Chronic Kidney Disease, Stage 4              History or renal carcinoma and nephrectomy.              10/13/2020: BUN/crea 27/2.0. CrCl 28.              10/20/2020: BUN/crea 56/1.8. CrCl 32.              10/25/2020: BUN/crea 49/1.6. CrCl 32.      6. Chronic Obstructive Pulmonary Disease              Chronic respiratory failure with CO2 retention.              10/13/2020: 7.23/94/73/39/90% on FiO2 of 40%.              Been overall slowly improving.                 7.  Weakness              10/27/2020: Walked 150 ft with PT.              Needs a lot of PT.     8. Disposition              10/28/2020: Tells me she has decided that she will go to her house.         Dread Ferrer MD        Disclaimer: This document was created using voice recognition software ([employer]). Although it may be edited, this document may contain errors related to incorrect recognition of the spoken word. Please call the physician if clarification is needed.

## 2020-11-01 NOTE — PLAN OF CARE
Today required daytime AVAPS intermittently and early application of nighttime therapy to support saturation and respirations.

## 2020-11-01 NOTE — ASSESSMENT & PLAN NOTE
Creatinine of 2.0 on admission and was 1.9 on discharge 9/11/2020.  Her baseline is around 1.8.  Creatinine stable at 1.8 this morning  -Renally dose medication for CrCl of <30  -Avoid Nephrotoxic medications if able  -Low K+ diet  -Monitor urine output  -Follow closely

## 2020-11-01 NOTE — PLAN OF CARE
Pt received on BIPAP. Pt asked to come off to eat breakfast & get cleaned up. Placed on 5L. Sats 86-88%. RN notified. No distress noted. Will continue to monitor.

## 2020-11-01 NOTE — PLAN OF CARE
Pt is AAOx4.  Pt remained in bed this shift, bed alarm placed.  Mildine to RUE remains saline locked, no redness or swelling at site.  CPAP remains in place.  Pure Wick in use.  VSS, in NAD, pt remains afebrile.  Pt remains free from injury.  Bed in low position, wheels locked, call light within reach.  Pt verbalized understanding of POC.  Open communication facilitated.

## 2020-11-01 NOTE — CARE UPDATE
Artificial Intelligence Notification      Admit Date: 10/13/2020  LOS: 19  Code Status: Full Code   Date of Consult: 2020  : 1950  Age: 70 y.o.  Weight:   Wt Readings from Last 1 Encounters:   10/31/20 97.5 kg (214 lb 15.2 oz)     Sex: female  Bed: [place] [address] A:   MRN: [id number]  Attending Physician: Ravi Soria MD  Primary Service: Networked reference to record PCT   Time AI Alert Received: Notified by Dr. Valentine at 11:10AM  Time at Bedside: Patient seen prior to notification at 10:15AM           Patient found stable and back on BiPAP.  See Progress Note.      Vital Signs (Most Recent):  Temp: 96.7 °F (35.9 °C) (20)  Pulse: 103 (20)  Resp: (!) 26 (20)  BP: (!) 126/58 (20)  SpO2: (!) 88 %(pt off BIPAP to eat breakfast & get cleaned up) (20) Vital Signs (24h Range):  Temp:  [96.7 °F (35.9 °C)-98.8 °F (37.1 °C)] 96.7 °F (35.9 °C)  Pulse:  [] 103  Resp:  [16-29] 26  SpO2:  [83 %-94 %] 88 %  BP: ()/(53-63) 126/58         This encounter was triggered by an Artificial Intelligence Notification.

## 2020-11-01 NOTE — SUBJECTIVE & OBJECTIVE
Interval History: Patient overall stable overnight.  Has been desating more on her previous 5L NC and requiring BiPAP more during the day.  Patient now has power at home, however she is scared of returning home and may want to consider an LTAC.  [facility].    Review of Systems   Constitutional: Positive for activity change. Negative for chills and fever.   HENT: Negative for ear pain.    Eyes: Negative for pain.   Respiratory: Positive for shortness of breath. Negative for cough.    Cardiovascular: Negative for chest pain, palpitations and leg swelling.   Gastrointestinal: Negative for abdominal pain, constipation, diarrhea, nausea and vomiting.   Endocrine: Negative for polydipsia, polyphagia and polyuria.   Genitourinary: Negative for difficulty urinating and dysuria.   Musculoskeletal: Negative for neck pain.   Skin: Negative for rash.   Neurological: Negative for dizziness and headaches.   Hematological: Does not bruise/bleed easily.   Psychiatric/Behavioral: Negative for agitation and behavioral problems. The patient is nervous/anxious.      Objective:     Vital Signs (Most Recent):  Temp: 96.7 °F (35.9 °C) (11/01/20 0752)  Pulse: 103 (11/01/20 0838)  Resp: (!) 26 (11/01/20 0838)  BP: (!) 126/58 (11/01/20 0752)  SpO2: (!) 88 %(pt off BIPAP to eat breakfast & get cleaned up) (11/01/20 0838) Vital Signs (24h Range):  Temp:  [96.7 °F (35.9 °C)-98.8 °F (37.1 °C)] 96.7 °F (35.9 °C)  Pulse:  [] 103  Resp:  [16-29] 26  SpO2:  [83 %-94 %] 88 %  BP: ()/(53-63) 126/58     Weight: 97.5 kg (214 lb 15.2 oz)  Body mass index is 38.08 kg/m².    Intake/Output Summary (Last 24 hours) at 11/1/2020 1036  Last data filed at 11/1/2020 0600  Gross per 24 hour   Intake 630 ml   Output 300 ml   Net 330 ml      Physical Exam  Constitutional:       General: She is not in acute distress.     Appearance: She is obese. She is not toxic-appearing.   HENT:      Head: Normocephalic and atraumatic.       Right Ear: External ear normal.      Left Ear: External ear normal.      Nose: Nose normal.      Comments: NC in place     Mouth/Throat:      Mouth: Mucous membranes are moist.      Pharynx: Oropharynx is clear.   Eyes:      General: No scleral icterus.     Conjunctiva/sclera: Conjunctivae normal.   Neck:      Musculoskeletal: Normal range of motion and neck supple.   Cardiovascular:      Rate and Rhythm: Normal rate. Rhythm irregular.      Pulses: Normal pulses.      Heart sounds: No murmur.   Pulmonary:      Effort: No respiratory distress.      Breath sounds: Rales (mild bilateral lower lung fields) present. No wheezing.      Comments: Decreased BS but with crackles in bases.  No wheezing heard.    Abdominal:      General: Abdomen is flat. Bowel sounds are normal. There is no distension.      Palpations: Abdomen is soft.      Tenderness: There is no abdominal tenderness.   Musculoskeletal:      Right lower leg: Edema present.      Left lower leg: Edema present.      Comments: Trace to 1+   Skin:     General: Skin is warm.      Coloration: Skin is not jaundiced.      Findings: No rash.   Neurological:      General: No focal deficit present.      Mental Status: She is alert. Mental status is at baseline.   Psychiatric:         Mood and Affect: Mood normal.         Behavior: Behavior normal.         Significant Labs: All pertinent labs within the past 24 hours have been reviewed.    Significant Imaging: I have reviewed all pertinent imaging results/findings within the past 24 hours.

## 2020-11-01 NOTE — ASSESSMENT & PLAN NOTE
in ED. Has been well controlled since.  Home Meds:  Metoprolol 25mg bid and Apixaban 5mg bid  Followed by Cardiology, Dr. Lutz - last seen outpatient on 10/8/2020  HR stable.  -Continue current medications - Metoprolol decreased to 25mg bid on 10/31/2020

## 2020-11-01 NOTE — PLAN OF CARE
Pt transferred to ICU via BIPAP with RT.  Received in ICU.  Pt awake and sating well.  Will continue to monitor pt.

## 2020-11-01 NOTE — PROGRESS NOTES
RN notified that pt fall occurred. On entry to room pt holding on to walker with R knee touching ground and PCT, Lalita, at side. Pt lowered to ground safely by team. VS obtained immediately which remain stable at 5L O2 NC. Bruise noted below R kneecap and pt rates pain at 8/10. No skin tear noted. Pt denies hitting head or any other body part. Lalita, PCT witnessed fall while provided incontinence care for pt. REGAN Conn notified immediately by LAURIE Portillo. Instructed to apply heat or ice if needed. Pt then lifted safely back into recliner by team with use of gait belt as well. Fall risk band applied, red socks reapplied, and yellow fall sign changed to red fall risk sign outside of door. Pt instructed to call for help transferring/ambulating. Chair wheels locked and call light within reach. Family member Earlene and Earlene's daughter, Vicky, notified of occurrence; verbalized understanding and deny any further questions regarding situation. Per provider, will be by soon to see pt herself. Will continue to monitor pt closely.

## 2020-11-02 PROBLEM — A41.9 SEVERE SEPSIS: Status: RESOLVED | Noted: 2020-01-01 | Resolved: 2020-01-01

## 2020-11-02 PROBLEM — J98.4 CAVITARY LESION OF LUNG: Status: RESOLVED | Noted: 2020-01-01 | Resolved: 2020-01-01

## 2020-11-02 PROBLEM — A41.9 SEVERE SEPSIS: Status: ACTIVE | Noted: 2020-01-01

## 2020-11-02 PROBLEM — J90 PLEURAL EFFUSION: Status: RESOLVED | Noted: 2020-01-01 | Resolved: 2020-01-01

## 2020-11-02 PROBLEM — J90 PLEURAL EFFUSION: Status: ACTIVE | Noted: 2020-01-01

## 2020-11-02 PROBLEM — Z99.11 ON MECHANICALLY ASSISTED VENTILATION: Status: ACTIVE | Noted: 2020-01-01

## 2020-11-02 PROBLEM — R65.20 SEVERE SEPSIS: Status: RESOLVED | Noted: 2020-01-01 | Resolved: 2020-01-01

## 2020-11-02 PROBLEM — R06.02 SHORTNESS OF BREATH: Status: RESOLVED | Noted: 2020-01-01 | Resolved: 2020-01-01

## 2020-11-02 PROBLEM — Z99.11 ON MECHANICALLY ASSISTED VENTILATION: Status: RESOLVED | Noted: 2020-01-01 | Resolved: 2020-01-01

## 2020-11-02 PROBLEM — R65.20 SEVERE SEPSIS: Status: ACTIVE | Noted: 2020-01-01

## 2020-11-02 NOTE — ASSESSMENT & PLAN NOTE
- continue diuresis; fluid overload likely contributes to hypercapnia in setting of OHS/BHAVANI, small airway disease with severe restriction.   -CT showing cavitary lesion with surrounding consolidation and GGO concerning for infectious vs malignant process, with significant ggo throughout the lung likely secondary to continued pulmonary edema.     - would continue lasix BID for goal negative fluid balance, she has bilateral pleural effusions and pitting edema, likely contributing significantly to severity of hypoxia and ventilatory impairment

## 2020-11-02 NOTE — PROGRESS NOTES
Patient intubated with 7.5 ET tube and placed on ventilator at charted settings. Will draw ABG in 30 minutes. ET tube secured, AMBU bad at bedside.

## 2020-11-02 NOTE — ASSESSMENT & PLAN NOTE
- new fever and worsening lung infiltrates/hypoxia, concerning for newly developed PNA as reason for most recent decompensation, though does have normal WBC and marginally elevated procalcitonin   - would continue broad spectrum abx with vancomycin/cefepime for now  - send endotracheal sputum aspirate for culture  - f/u blood cultures

## 2020-11-02 NOTE — ASSESSMENT & PLAN NOTE
- requiring intubation on 11/1  - likely multifactorial related to volume overload + HF exacerbation + BHAVANI/OHS + possible new infectious PNA + ? Malignancy?  - continue diuresis  - no wheezing on exam, will stop steroids  - continue broad spectrum abx

## 2020-11-02 NOTE — ASSESSMENT & PLAN NOTE
"Chief Complaint   Patient presents with     Prenatal Care       Initial /60 (BP Location: Right arm, Patient Position: Chair, Cuff Size: Adult Regular)   Pulse 74   Wt 59.6 kg (131 lb 8 oz)   LMP 2019 (Approximate)   BMI 23.29 kg/m   Estimated body mass index is 23.29 kg/m  as calculated from the following:    Height as of 19: 1.6 m (5' 3\").    Weight as of this encounter: 59.6 kg (131 lb 8 oz).  BP completed using cuff size: regular        PHQ-9 score:    PHQ-9 SCORE 10/14/2019   PHQ-9 Total Score -   PHQ-9 Total Score MyChart 5 (Mild depression)   PHQ-9 Total Score 5     Natalie Perez, [facility]  2019    " Continue with statin therapy.

## 2020-11-02 NOTE — PLAN OF CARE
Pt on mechanical vent with settings as documented.  Fio2 weaned per sats.  Txs silver with jeremiah.  ETT secured.  Will continue to monitor pt.

## 2020-11-02 NOTE — NURSING
milagro lucio informed regarding pt mental status, very drowsy and  responds only to pain. Instructed to repeat abg. Okay to hold due po meds as per milagro lucio. Will continue to monitor.

## 2020-11-02 NOTE — ASSESSMENT & PLAN NOTE
- bilateral, R>>L  - likely due to volume overload however possibly due to infection vs malignancy? Miranda in setting of cavitary lesion on right  - will consider diagnostic right thoracentesis on Wednesday if significant effusion still present and once off anticoagulation x 2 days.   - continue diuresis

## 2020-11-02 NOTE — PROGRESS NOTES
"[facility] Hospital Medicine  Progress Note    Patient Name: Patsy Morris  MRN: [id number]  Patient Class: IP- Inpatient   Admission Date: 10/13/2020  Length of Stay: 20 days  Attending Physician: Ravi Soria MD  Primary Care Provider: Luisana Cartagena MD        Subjective:     Principal Problem:Acute on chronic respiratory failure with hypoxia and hypercapnia        HPI:  Per Ravi Soria:    "Per ED:  "This is a 70 y.o. female with history of CHF and COPD who presents via EMS with complaint of shortness of breath that began a few days ago. Per EMS patient had O2 saturation of 72 on 3 liters if home oxygen upon their arrival. Patient states she is on 3 liters of home oxygen at baseline. She denies fever, cough, chest pain, nausea, vomiting, diarrhea, or rhinorrhea. She reports she was recently discharged from the hospital for similar symptoms. She reports compliance with her home medications. She recently became resident of [facility] after last hospitalization. She is a former smoker. She denies undergoing any surgeries".    The patient is a 70 year old female with a PMH significant for of HTN, Chronic Respiratory Failure (COPD and BHAVANI/OHS), HFpEF, Obesity who presented to the ED with complaints of SOB that began about 3 days ago.  Patient is on 3L O2NC at home.  She was seen by her Cardiologist about 4 days prior to admission and told to decrease her Lasix from 40mg bid to qday.  She denies chest pain.  No Fevers.  No cough.  States she has been adherent with her medications.  Patient was recently admitted to [facility] from 9/1-9/11 for Acute on Chronic Respiratory Failure and discharged to SNF.  Patient was placed on BiPAP in ED and admitted to ICU.  Patient feeling better on BiPAP."    Overview/Hospital Course:  Patient is 70-year-old woman with history of hypertension, chronic hypoxic respiratory failure on home oxygen secondary to chronic obstructive pulmonary " disease, chronic diastolic heart failure, chronic atrial fibrillation, and morbid obesity re-admitted to the hospital with decompensated heart failure after patient was home for about a week following recent hospitalization and stayed at skilled nursing facility for physical therapy.  Patient treated with non invasive ventilation intravenous diuretics.  Slowly clinically improving.  Patient also initially treated with antibiotic therapy which has since been discontinued as bacterial pneumonia ruled out on clinical grounds.  Patient does have a cavitary lesion on her chest CT scan but otherwise without signs or symptoms of active pulmonary tuberculosis.  Acid-fast bacillus smears and cultures ordered to rule out active pulmonary tuberculosis.  Patient with one negative acid-fast bacillus smear thus far.  Efforts at obtaining additional smears even with sputum induction with hypertonic saline has not resulted additional sputum.  Discussed with Pulmonary Critical Care service who recommended discontinuing further smears and respiratory isolation.  She continued to slowly improve with diuresis.  Patient was transferred out of ICU on 10/21/2020.  She became more hypoxic on 11/1/2020 and ABG with pH of 7.284 and pCO2 of 104.2 on BiPAP.  Transferred to ICU on 11/1/2020 on continuous BiPAP.   She decompensated overnight and was intubated.  She ws febrile to 100.8.  Started on IV steroid and IV antibiotics (Vanc and Cefepime).   Pulmonary was consulted.    Interval History: Patient transferred to ICU yesterday due to worsening hypoxia.  Decompensated overnight and was intubated.  She is sedated this morning on ventilator.    Review of Systems   Unable to perform ROS: Intubated     Objective:     Vital Signs (Most Recent):  Temp: 99.1 °F (37.3 °C) (11/02/20 1105)  Pulse: (!) 114 (11/02/20 1300)  Resp: 20 (11/02/20 1300)  BP: (!) 119/57 (11/02/20 1300)  SpO2: (!) 93 % (11/02/20 1300) Vital Signs (24h Range):  Temp:  [97.8 °F  (36.6 °C)-100.8 °F (38.2 °C)] 99.1 °F (37.3 °C)  Pulse:  [] 114  Resp:  [8-31] 20  SpO2:  [74 %-100 %] 93 %  BP: ()/() 119/57  Arterial Line BP: ()/(46-64) 98/48     Weight: 97.6 kg (215 lb 2.7 oz)  Body mass index is 38.12 kg/m².    Intake/Output Summary (Last 24 hours) at 11/2/2020 1338  Last data filed at 11/2/2020 1100  Gross per 24 hour   Intake 1749.67 ml   Output 1650 ml   Net 99.67 ml      Physical Exam  Constitutional:       General: She is not in acute distress.     Appearance: She is obese. She is ill-appearing. She is not toxic-appearing.   HENT:      Head: Normocephalic and atraumatic.      Right Ear: External ear normal.      Left Ear: External ear normal.      Nose: Nose normal.      Comments: NC in place     Mouth/Throat:      Mouth: Mucous membranes are moist.      Pharynx: Oropharynx is clear.      Comments: Intubated  Eyes:      General: No scleral icterus.     Conjunctiva/sclera: Conjunctivae normal.   Neck:      Musculoskeletal: Normal range of motion and neck supple.   Cardiovascular:      Rate and Rhythm: Tachycardia present. Rhythm irregular.      Pulses: Normal pulses.      Heart sounds: No murmur.   Pulmonary:      Effort: No respiratory distress.      Breath sounds: Rales (mild bilateral lower lung fields) present. No wheezing.      Comments: Decreased BS but with crackles in bases.  No wheezing heard.    Abdominal:      General: Abdomen is flat. Bowel sounds are normal. There is no distension.      Palpations: Abdomen is soft.      Tenderness: There is no abdominal tenderness.   Musculoskeletal:      Right lower leg: Edema present.      Left lower leg: Edema present.      Comments: Trace to 1+   Skin:     General: Skin is warm.      Coloration: Skin is not jaundiced.      Findings: No rash.   Neurological:      Mental Status: She is alert.      Comments: intubated   Psychiatric:      Comments: intubated         Significant Labs: All pertinent labs within the past  24 hours have been reviewed.    Significant Imaging: I have reviewed all pertinent imaging results/findings within the past 24 hours.      Assessment/Plan:      * Acute on chronic respiratory failure with hypoxia and hypercapnia  Chronic Respiratory Failure on 3 liters of Oxygen at home - COPD, BHAVANI/OHS with severe restrictive lung disease.      She presented with SOB 3 days PTA.  Placed on BiPAP and given Lasix 80mg IV x 1 in ED.  Transferred to ICU on admission.  Started initially on Lasix 40mg IV q12 and increased to 80mg q12.  Started on IV Vanc, Flagyl, and Cefepime on 10/15/2020 by Pulmonary-CC given CT Chest concerning for infection - antibiotics stopped 10/18/2020.  Respiratory culture with normal respiratory leonardo on 10/16/2020.  Given cavitary appearance on imaging, Quantiferon gold sent and indeterminate x 2  with Sputum AFB stain negative and  Culture pending x 1 on 10/16/2020.  Transferred out of ICU on 10/21/2020.  Awaiting Trilogy for home.   More short of breath on 10/30/2020 - CXR overall stable from 2 days ago, but BNP in 400s from 200s - given an additional dose of Lasix 40mg IV with improvement in symptoms.    She became more hypoxic on 11/1/2020 and ABG with pH of 7.284 and pCO2 of 104.2 on BiPAP.  Transferred to ICU on 11/1/2020 on continuous BiPAP.   She decompensated overnight and was intubated.  She was febrile to 100.8.  Started on IV steroid and IV antibiotics (Vanc and Cefepime).   Pulmonary was consulted     CXR on 11/2/2020 - Interval progression of abnormal intrathoracic findings, increasing bilateral pulmonary opacities, as discussed above.  ET tube noted, the tip above the yamilka.  Enteric tube noted, the distal tip extends subdiaphragmatic below the field of view however the side hole is likely along the distal esophagus, advancement recommended.     Plan:  Pulmonary Consult - follow up on recommendations  Continue Lasix 20mg IV q12  DuoNebs to standing q6  Continue Steroids  Follow  up on cultures  Strict I&Os          Atrial fibrillation with rapid ventricular response   in ED. Has been well controlled since.   HR in 140s this morning.  Home Meds:  Metoprolol 25mg bid and Apixaban 5mg bid  Followed by Cardiology, Dr. Lutz - last seen outpatient on 10/8/2020  Followed by Dr. Meredith  -PO Metoprolol changed to IV  -Continue Apixaban    Chronic kidney disease (CKD) stage G4/A1, severely decreased glomerular filtration rate (GFR) between 15-29 mL/min/1.73 square meter and albuminuria creatinine ratio less than 30 mg/g  Creatinine of 2.0 on admission and was 1.9 on discharge 9/11/2020.  Her baseline is around 1.8.  Creatinine slightly elevated at 2.0 from 1.8 yesterday  K+ 5.3  -Renally dose medication for CrCl of <30  -Avoid Nephrotoxic medications if able  -Low K+ diet  -Monitor urine output  -Follow closely  -Consider Lokelma and Renal consult    Acute on chronic diastolic congestive heart failure  Home Meds:  Lasix 40mg qday (was bid and recently changed as above)  See above.     TTE on 8/24/2020 - Mild left atrial enlargement.  · Diastolic pattern consistent with atrial fibrillation observed.  · Normal left ventricular systolic function. The estimated ejection fraction is 58%.  · No wall motion abnormalities.  · Normal right ventricular systolic function.  · Mild right atrial enlargement.  Small posterior pericardial effusion.          Normocytic anemia  Hgb 10.0 on admission and dropped to 8.8 today from 9.5.  Ferritin 25 on 8/25/2020 and Fe sat 18% on 9/5/2020 - continue outpatient FeSO4 325mg daily  B12 301 and Folate 4.6 - both borderline low - continue outpatient Nephrocaps daily  Follow closely      Debility  Continue with physical and occupational therapy.  Home Health on discharge    Encounter for palliative care  Followed by Palliative Care      Current moderate episode of major depressive disorder without prior episode  Stable.  Continue SSRI  therapy.    Dyslipidemia  Continue with statin therapy.    Essential hypertension  Continue Metoprolol and Lasix.  BP stable.      VTE Risk Mitigation (From admission, onward)         Ordered     apixaban tablet 5 mg  2 times daily      10/17/20 1152     Place sequential compression device  Until discontinued      10/13/20 1405     IP VTE HIGH RISK PATIENT  Once      10/13/20 1405                Discharge Planning   YUDELKA: 10/26/2020     Code Status: Full Code   Is the patient medically ready for discharge?:     Reason for patient still in hospital (select all that apply): Patient unstable, Patient trending condition, Treatment and Consult recommendations  Discharge Plan A: Skilled Nursing Facility                  Ravi Soria MD  Department of Hospital Medicine   [facility]

## 2020-11-02 NOTE — EICU
Called by RN for increased lethargy on BIPAP.  , 129/57, sats 95% on AVAPS 450/EPAP8 Pmin 10 Pmax 30 ivi737% RR 24 Getting appropriate Min Vent [id number] liters/min.  Plan to repeat ABG.  D/w RN     22.00  ABG with worsening respiratory acidosis, pt was found to have the bipap mask partially off before abg was sent. Per RN and RT report she is now more arousable.  Plan: increase  TV to 500, RR to 30, monitor for mask position, repeat abg in 40 min. If no improvement, if no improvement, will intubate.  D/w RN and RT    23.00   Repeat abg with worsening respiratory acidosis. Proceed with intubation.  D/w RT     12.38 am  Intubated now, respiratory acidosis has improved.   It hard to sort out the exact reason for her deterioration, given significant worsening of b/l opacities on cxr will treat for volume overload, worsening PNA, and given worsening respiratory acidosis, for COPD exacerbation.  Continue lasix bid, add solumedrol 80 mg iv bid, completed ab course for cavitary pneumonia, check procal, was on zosyn/vanco ill 10/17,start meropenem now, primary team to decide the need to continue ab in am.   Was  given pm lasix but not metoprolol, dose was decreased yesterday for hypotension. HR now 140 a.fib, /60 metoprolol iv.  D/w RN

## 2020-11-02 NOTE — ASSESSMENT & PLAN NOTE
Chronic Respiratory Failure on 3 liters of Oxygen at home - COPD, BHAVANI/OHS with severe restrictive lung disease.      She presented with SOB 3 days PTA.  Placed on BiPAP and given Lasix 80mg IV x 1 in ED.  Transferred to ICU on admission.  Started initially on Lasix 40mg IV q12 and increased to 80mg q12.  Started on IV Vanc, Flagyl, and Cefepime on 10/15/2020 by Pulmonary-CC given CT Chest concerning for infection - antibiotics stopped 10/18/2020.  Respiratory culture with normal respiratory leonardo on 10/16/2020.  Given cavitary appearance on imaging, Quantiferon gold sent and indeterminate x 2  with Sputum AFB stain negative and  Culture pending x 1 on 10/16/2020.  Transferred out of ICU on 10/21/2020.  Awaiting Trilogy for home.   More short of breath on 10/30/2020 - CXR overall stable from 2 days ago, but BNP in 400s from 200s - given an additional dose of Lasix 40mg IV with improvement in symptoms.    She became more hypoxic on 11/1/2020 and ABG with pH of 7.284 and pCO2 of 104.2 on BiPAP.  Transferred to ICU on 11/1/2020 on continuous BiPAP.   She decompensated overnight and was intubated.  She was febrile to 100.8.  Started on IV steroid and IV antibiotics (Vanc and Cefepime).   Pulmonary was consulted     CXR on 11/2/2020 - Interval progression of abnormal intrathoracic findings, increasing bilateral pulmonary opacities, as discussed above.  ET tube noted, the tip above the yamilka.  Enteric tube noted, the distal tip extends subdiaphragmatic below the field of view however the side hole is likely along the distal esophagus, advancement recommended.     Plan:  Pulmonary Consult - follow up on recommendations  Continue Lasix 20mg IV q12  DuoNebs to standing q6  Continue Steroids  Follow up on cultures  Strict I&Os

## 2020-11-02 NOTE — SUBJECTIVE & OBJECTIVE
Interval History: Patient transferred to ICU yesterday due to worsening hypoxia.  Decompensated overnight and was intubated.  She is sedated this morning on ventilator.    Review of Systems   Unable to perform ROS: Intubated     Objective:     Vital Signs (Most Recent):  Temp: 99.1 °F (37.3 °C) (11/02/20 1105)  Pulse: (!) 114 (11/02/20 1300)  Resp: 20 (11/02/20 1300)  BP: (!) 119/57 (11/02/20 1300)  SpO2: (!) 93 % (11/02/20 1300) Vital Signs (24h Range):  Temp:  [97.8 °F (36.6 °C)-100.8 °F (38.2 °C)] 99.1 °F (37.3 °C)  Pulse:  [] 114  Resp:  [8-31] 20  SpO2:  [74 %-100 %] 93 %  BP: ()/() 119/57  Arterial Line BP: ()/(46-64) 98/48     Weight: 97.6 kg (215 lb 2.7 oz)  Body mass index is 38.12 kg/m².    Intake/Output Summary (Last 24 hours) at 11/2/2020 1338  Last data filed at 11/2/2020 1100  Gross per 24 hour   Intake 1749.67 ml   Output 1650 ml   Net 99.67 ml      Physical Exam  Constitutional:       General: She is not in acute distress.     Appearance: She is obese. She is ill-appearing. She is not toxic-appearing.   HENT:      Head: Normocephalic and atraumatic.      Right Ear: External ear normal.      Left Ear: External ear normal.      Nose: Nose normal.      Comments: NC in place     Mouth/Throat:      Mouth: Mucous membranes are moist.      Pharynx: Oropharynx is clear.      Comments: Intubated  Eyes:      General: No scleral icterus.     Conjunctiva/sclera: Conjunctivae normal.   Neck:      Musculoskeletal: Normal range of motion and neck supple.   Cardiovascular:      Rate and Rhythm: Tachycardia present. Rhythm irregular.      Pulses: Normal pulses.      Heart sounds: No murmur.   Pulmonary:      Effort: No respiratory distress.      Breath sounds: Rales (mild bilateral lower lung fields) present. No wheezing.      Comments: Decreased BS but with crackles in bases.  No wheezing heard.    Abdominal:      General: Abdomen is flat. Bowel sounds are normal. There is no distension.       Palpations: Abdomen is soft.      Tenderness: There is no abdominal tenderness.   Musculoskeletal:      Right lower leg: Edema present.      Left lower leg: Edema present.      Comments: Trace to 1+   Skin:     General: Skin is warm.      Coloration: Skin is not jaundiced.      Findings: No rash.   Neurological:      Mental Status: She is alert.      Comments: intubated   Psychiatric:      Comments: intubated         Significant Labs: All pertinent labs within the past 24 hours have been reviewed.    Significant Imaging: I have reviewed all pertinent imaging results/findings within the past 24 hours.

## 2020-11-02 NOTE — HOSPITAL COURSE
Ms. Patsy Morris is a 69 yo AAF with PMHx chronic diastolic heart failure, mixed obstructive/severe restrictive lung disease (FVC 0.86/31%), bilateral pulmoanry nodules, BHAVANI, OHS, CKD,  h/o RCC s/p rt nephrectomy in 2005 who initially presented to [facility] on 10/18 with acute on chronic hypoxic/hypercapnic respiratory failure due to acute on chronic diastolic heart failure exacerbation and not having home BIPAP. She had been complaint with her medications, most recently had seen cardiology 10/8 at which time her lasix had decreased. She had had 3-4 hospitalizations since July for acute on chronic HF/hypercapnic respiratory failure and had been discharged to SNF which she had stayed for ~ 20 days. Had been home about 1 week prior to this presentation.     Initially admitted to ICU on 10/18 for NIPPV. Following diuresis and bipap, patient gradually improved over several days. Was stepped down to floor on 10/21. Had remained hospitalized for continued diuresis and awaiting home ventilator approval via insurance/case management. Had been on abx briefly 10/15-10/17 due to concern for infectious process. On 10/30, had increasing SOB and hypoxia that initially imrpoved with additional IV diuresis. Then on 11/1, patient had increasing oxygen requirement and placed on bipap and transferred to ICU. Overnight, despite attempts at adjusting NIPPV, patient became increasingly hypercapnic and altered thus was intubated ~ 11:30pm 11/1.  Additionally, developed new fever on 11/2 and was started on broad spectrum abx with vanco/cefepime. Has been receiving oral lasix 40 bid, until yesterday with 40 IV lasix. Remains slightly positive fluid balance over past several days.

## 2020-11-02 NOTE — PROGRESS NOTES
Wound care consult for labial skin tears possibly secondary to long term use of pur wick device  70-year-old woman with history of hypertension, chronic hypoxic respiratory failure on home oxygen secondary to chronic obstructive pulmonary disease, chronic diastolic heart failure, chronic atrial fibrillation, and morbid obesity re-admitted to the hospital with decompensated heart failure after patient was home for about a week following recent hospitalization and stayed at skilled nursing facility for physical therapy.  Patient does have a cavitary lesion on her chest CT scan but otherwise without signs or symptoms of active pulmonary tuberculosis.  Discussed with Pulmonary Critical Care service who recommended discontinuing further smears and respiratory isolation.  She continued to slowly improve with diuresis.  Patient was transferred out of ICU on 10/21/2020.  Had recurrent SOB on 10/30/2020 and given an additional dose of IV Lasix with improvement.  Awaiting for [facility] home ventilator to be approved and plan is home with Home Health  New consult regarding medical device related injury on left labia.  Patient incontinent and had purewick device in place, now has indwelling ovalles catheter.  Recommend after daily and prn cleansing apply criticaid paste (black top) to labia.

## 2020-11-02 NOTE — ANESTHESIA PROCEDURE NOTES
Ad Hoc Intubation  Performed by: Joel Bustamante MD  Authorized by: Joel Bustamante MD     Indications:  Respiratory distress  Diagnosis:  Resp failure  Patient Location:  ICU  Timeout:  11/1/2020 11:39 PM  Procedure End Time:  11/1/2020 11:39 PM  Staff:     patient identified, IV checked, monitors and equipment checked, timeout performed and anesthesia consent      Anesthesiologist Present: Yes    Intubation:     Induction:  Intravenous    Intubated:  Postinduction    Mask Ventilation:  Easy mask    Attempts:  1    Attempted By:  Staff anesthesiologist    Method of Intubation:  Direct    Blade:  Koehler 3    Laryngeal View Grade: Grade I - full view of chords      Difficult Airway Encountered?: No      Complications:  None    Airway Device:  Oral endotracheal tube    Airway Device Size:  7.5    Style/Cuff Inflation:  Cuffed    Tube secured:  22    Secured at:  The lips    Placement Verified By:  Colorimetric ETCO2 device    Complicating Factors:  None    Findings Post-Intubation:  BS equal bilateral

## 2020-11-02 NOTE — PROGRESS NOTES
[facility]  Pulmonology  Progress Note    Patient Name: Patsy Morris  MRN: [id number]  Admission Date: 10/13/2020  Hospital Length of Stay: 20 days  Code Status: Full Code  Attending Provider: Ravi Soria MD  Primary Care Provider: Luisana Cartagena MD   Principal Problem: Acute on chronic respiratory failure with hypoxia and hypercapnia    Hospital Course:   Ms. Patsy Morris is a 71 yo AAF with PMHx chronic diastolic heart failure, mixed obstructive/severe restrictive lung disease (FVC 0.86/31%), bilateral pulmoanry nodules, BHAVANI, OHS, CKD,  h/o RCC s/p rt nephrectomy in 2005 who initially presented to [facility] on 10/18 with acute on chronic hypoxic/hypercapnic respiratory failure due to acute on chronic diastolic heart failure exacerbation and not having home BIPAP. She had been complaint with her medications, most recently had seen cardiology 10/8 at which time her lasix had decreased. She had had 3-4 hospitalizations since July for acute on chronic HF/hypercapnic respiratory failure and had been discharged to SNF which she had stayed for ~ 20 days. Had been home about 1 week prior to this presentation.      Initially admitted to ICU on 10/18 for NIPPV. Following diuresis and bipap, patient gradually improved over several days. Was stepped down to floor on 10/21. Had remained hospitalized for continued diuresis and awaiting home ventilator approval via insurance/case management. Had been on abx briefly 10/15-10/17 due to concern for infectious process. On 10/30, had increasing SOB and hypoxia that initially imrpoved with additional IV diuresis. Then on 11/1, patient had increasing oxygen requirement and placed on bipap and transferred to ICU. Overnight, despite attempts at adjusting NIPPV, patient became increasingly hypercapnic and altered thus was intubated ~ 11:30pm 11/1.  Additionally, developed new fever on 11/2 and was started on broad spectrum abx with vanco/cefepime.  Has been receiving oral lasix 40 bid, until yesterday with 40 IV lasix. Remains slightly positive fluid balance over past several days.     Subjective:       Interval History:  Intubated overnight due to worsening hypercapnia and encephalopathy. Sedated. New fever to 100.8.     Objective:     Vital Signs (Most Recent):  Temp: 99.1 °F (37.3 °C) (11/02/20 1105)  Pulse: (!) 114 (11/02/20 1300)  Resp: 20 (11/02/20 1300)  BP: (!) 119/57 (11/02/20 1300)  SpO2: (!) 93 % (11/02/20 1300) Vital Signs (24h Range):  Temp:  [97.8 °F (36.6 °C)-100.8 °F (38.2 °C)] 99.1 °F (37.3 °C)  Pulse:  [] 114  Resp:  [8-31] 20  SpO2:  [74 %-100 %] 93 %  BP: ()/() 119/57  Arterial Line BP: ()/(46-64) 98/48     Weight: 97.6 kg (215 lb 2.7 oz)  Body mass index is 38.12 kg/m².      Intake/Output Summary (Last 24 hours) at 11/2/2020 1331  Last data filed at 11/2/2020 1100  Gross per 24 hour   Intake 1749.67 ml   Output 1650 ml   Net 99.67 ml       Physical Exam  Constitutional:       General: She is not in acute distress.     Comments: Intubated and sedated     HENT:      Head: Normocephalic and atraumatic.   Eyes:      Conjunctiva/sclera: Conjunctivae normal.      Pupils: Pupils are equal, round, and reactive to light.   Neck:      Musculoskeletal: Normal range of motion.      Comments: Thick neck    Cardiovascular:      Rate and Rhythm: Tachycardia present. Rhythm irregular.      Pulses: Normal pulses.      Heart sounds: Normal heart sounds.   Pulmonary:      Comments: Coarse breath sounds due to ventilator, diminished sounds at bilateral bases, no wheezing  Abdominal:      General: Bowel sounds are normal. There is no distension.      Palpations: Abdomen is soft.      Tenderness: There is no abdominal tenderness.   Musculoskeletal:         General: Swelling (1-2+ bilateral lower extremities) present.   Skin:     General: Skin is warm and dry.   Neurological:      Comments: Intubated and sedated           Vents:  Vent  Mode: A/C (11/02/20 1249)  Ventilator Initiated: Yes (11/01/20 2338)  Set Rate: 20 BPM (11/02/20 1249)  Vt Set: 420 mL (11/02/20 1249)  Pressure Support: 0 cmH20 (11/02/20 1249)  PEEP/CPAP: 7 cmH20 (11/02/20 1249)  Oxygen Concentration (%): 40 (11/02/20 1300)  Peak Airway Pressure: 29 cmH2O (11/02/20 1249)  Plateau Pressure: 0 cmH20 (11/02/20 1249)  Total Ve: 8.76 mL (11/02/20 1249)  F/VT Ratio<105 (RSBI): (!) 45.66 (11/02/20 1249)    Lines/Drains/Airways     Drain            Female External Urinary Catheter 10/13/20 1359 20 days         NG/OG Tube 11/02/20 0100 Center mouth less than 1 day         Urethral Catheter 11/02/20 0100 less than 1 day          Airway               Airway Anesthesia 11/01/20 less than 1 day          Peripheral Intravenous Line                 Midline Catheter Insertion/Assessment  - Double Lumen 10/20/20 1220 Right median cubital vein (antecubital fossa)  13 days                Significant Labs:    CBC/Anemia Profile:  Recent Labs   Lab 11/01/20  0627 11/02/20  0204   WBC 8.97 8.79   HGB 9.5* 8.8*   HCT 31.8* 29.1*    174   MCV 93 91   RDW 14.5 14.5        Chemistries:  Recent Labs   Lab 11/01/20  0505 11/02/20  0204    141   K 5.1 5.3*   CL 90* 88*   CO2 38* 40*   BUN 52* 58*   CREATININE 1.8* 2.0*   CALCIUM 9.3 9.0   MG 2.4 2.2       All pertinent labs within the past 24 hours have been reviewed.    Significant Imaging:  CXR: I have reviewed all pertinent results/findings within the past 24 hours and my personal findings are:  increased airspace disease and RUL cavitation with bilateral pleural effusions R>>L.    Assessment/Plan:     * Acute on chronic respiratory failure with hypoxia and hypercapnia  - requiring intubation on 11/1  - likely multifactorial related to volume overload + HF exacerbation + BHAVANI/OHS + possible new infectious PNA + ? Malignancy?  - continue diuresis  - no wheezing on exam, will stop steroids  - continue broad spectrum abx    Pleural effusion  -  bilateral, R>>L  - likely due to volume overload however possibly due to infection vs malignancy? Miranda in setting of cavitary lesion on right  - will consider diagnostic right thoracentesis on Wednesday if significant effusion still present and once off anticoagulation x 2 days.   - continue diuresis     On mechanically assisted ventilation  - due to acute on chronic hypoxic/hypercapnic respiratory failure + volume overload with bilateral pleural effusions + HF + possible new PNA  - currenrly on slightly > 8cc/kg PBW TV  - will decrease TV to 390 for 6-8cc/kg TV PBW, lung protective ventilation  - abx as below  - GI ppx with famotidine now that intubated  - start tube feeds for nutrition     Cavitary lesion of lung  Patient's CT concerning for slow process as lesion in same posterior upper lobe lung fields noted on previous CT in 2018 as well as bialteral pulmonary nodules. In setting of significant smoking history, malignancy is high on differential. However slow infectious process such as NTM/pulmonary MAC is possible as well, also possibility of superimprosed bacterial infection  - HIV negative  - Quantiferon gold indeterminate x 2  - AFB stain negative, smear pending x 1  - f/u endotracheal sputum culture  - now that intubated,discussed with family, likely plan for bronchoscopy on Wednesday if remains stable  - hold eliquis now  - can continue heparin for ppx    Atrial fibrillation with rapid ventricular response  Continue rate control as likely contributing to diastolic dysfunction; currently in a flutter  - continue anticoag  - hold eliquis for now for bronchoscopy on Wednesday  - heparin for ppx for now    Acute on chronic diastolic congestive heart failure  - continue diuresis; fluid overload likely contributes to hypercapnia in setting of OHS/BHAVANI, small airway disease with severe restriction.   -CT showing cavitary lesion with surrounding consolidation and GGO concerning for infectious vs malignant process,  with significant ggo throughout the lung likely secondary to continued pulmonary edema.     - would continue lasix BID for goal negative fluid balance, she has bilateral pleural effusions and pitting edema, likely contributing significantly to severity of hypoxia and ventilatory impairment      Severe sepsis-resolved as of 11/2/2020  - new fever and worsening lung infiltrates/hypoxia, concerning for newly developed PNA as reason for most recent decompensation, though does have normal WBC and marginally elevated procalcitonin   - would continue broad spectrum abx with vancomycin/cefepime for now  - send endotracheal sputum aspirate for culture  - f/u blood cultures    Obstructive sleep apnea-resolved as of 11/2/2020  - needs home trilogy  -  working on this           Fadumo Bryant MD  Pulmonology  [facility]

## 2020-11-02 NOTE — ASSESSMENT & PLAN NOTE
Creatinine of 2.0 on admission and was 1.9 on discharge 9/11/2020.  Her baseline is around 1.8.  Creatinine slightly elevated at 2.0 from 1.8 yesterday  K+ 5.3  -Renally dose medication for CrCl of <30  -Avoid Nephrotoxic medications if able  -Low K+ diet  -Monitor urine output  -Follow closely  -Consider Lokelma and Renal consult

## 2020-11-02 NOTE — ASSESSMENT & PLAN NOTE
Continue rate control as likely contributing to diastolic dysfunction; currently in a flutter  - continue anticoag  - hold eliquis for now for bronchoscopy on Wednesday  - heparin for ppx for now

## 2020-11-02 NOTE — PT/OT/SLP PROGRESS
Physical Therapy  Not Seen    Patient Name:  Patsy Morris   MRN:  [id number]    Patient not seen today secondary to Other (Comment)(Patient intubated after transfer to ICU; MOVE screen fail this date.). Will follow-up tomorrow, 11/3.    Sanjana Burgos, PT

## 2020-11-02 NOTE — ASSESSMENT & PLAN NOTE
- due to acute on chronic hypoxic/hypercapnic respiratory failure + volume overload with bilateral pleural effusions + HF + possible new PNA  - currenrly on slightly > 8cc/kg PBW TV  - will decrease TV to 390 for 6-8cc/kg TV PBW, lung protective ventilation  - abx as below  - GI ppx with famotidine now that intubated  - start tube feeds for nutrition

## 2020-11-02 NOTE — ASSESSMENT & PLAN NOTE
Hgb 10.0 on admission and dropped to 8.8 today from 9.5.  Ferritin 25 on 8/25/2020 and Fe sat 18% on 9/5/2020 - continue outpatient FeSO4 325mg daily  B12 301 and Folate 4.6 - both borderline low - continue outpatient Nephrocaps daily  Follow closely

## 2020-11-02 NOTE — PT/OT/SLP PROGRESS
Occupational Therapy      Patient Name:  Patsy Morris   MRN:  [id number]    Patient not seen today secondary to intubation after ICU transfer; failed MOVE screen this day. Will follow-up when appropriate.    Keren Agrawal, CHELSEA  11/2/2020

## 2020-11-02 NOTE — ASSESSMENT & PLAN NOTE
in ED. Has been well controlled since.   HR in 140s this morning.  Home Meds:  Metoprolol 25mg bid and Apixaban 5mg bid  Followed by Cardiology, Dr. Lutz - last seen outpatient on 10/8/2020  Followed by Dr. Meredith  -PO Metoprolol changed to IV  -Continue Apixaban

## 2020-11-02 NOTE — PROGRESS NOTES
Pharmacokinetic Initial Assessment: IV Vancomycin    Assessment/Plan:    Initiate intravenous vancomycin with loading dose of 2000 mg once with subsequent doses when random concentrations are less than 20 mcg/mL  Desired empiric serum trough concentration is 10 to 20 mcg/mL  Draw vancomycin random level on 11/3/20 at 0430.  Pharmacy will continue to follow and monitor vancomycin.      Please contact pharmacy at extension [phone] with any questions regarding this assessment.     Thank you for the consult,   Ahsan Dunaway       Patient brief summary:  Patsy Morris is a 70 y.o. female initiated on antimicrobial therapy with IV Vancomycin for treatment of suspected  Pneumonia    Drug Allergies:   Review of patient's allergies indicates:  No Known Allergies    Actual Body Weight:   97.6 kg    Renal Function:   Estimated Creatinine Clearance: 28.3 mL/min (A) (based on SCr of 2 mg/dL (H)).,     Dialysis Method (if applicable):  N/A    CBC (last 72 hours):  Recent Labs   Lab Result Units 11/01/20  0627 11/02/20  0204   WBC K/uL 8.97 8.79   Hemoglobin g/dL 9.5* 8.8*   Hematocrit % 31.8* 29.1*   Platelets K/uL 163 174   Gran % % 81.1* 86.5*   Lymph % % 7.6* 6.1*   Mono % % 9.9 6.4   Eosinophil % % 1.1 0.6   Basophil % % 0.1 0.2   Differential Method  Automated Automated       Metabolic Panel (last 72 hours):  Recent Labs   Lab Result Units 10/30/20  0820 10/31/20  0507 11/01/20  0505 11/02/20  0204   Sodium mmol/L 143 141 141 141   Potassium mmol/L 5.2* 5.2* 5.1 5.3*   Chloride mmol/L 91* 89* 90* 88*   CO2 mmol/L 41* 40* 38* 40*   Glucose mg/dL 92 110 94 111*   BUN mg/dL 49* 52* 52* 58*   Creatinine mg/dL 1.5* 1.8* 1.8* 2.0*   Magnesium mg/dL  --  2.5 2.4 2.2       Drug levels (last 3 results):  No results for input(s): VANCOMYCINRA, VANCOMYCINPE, VANCOMYCINTR in the last 72 hours.    Microbiologic Results:  Microbiology Results (last 7 days)       Procedure Component Value Units Date/Time    Blood culture  [[id number]]     Order Status: Sent Specimen: Blood     Blood culture [[id number]]     Order Status: Sent Specimen: Blood     Culture, Respiratory with Gram Stain [[id number]]     Order Status: No result Specimen: Respiratory from Sputum, Expectorated

## 2020-11-02 NOTE — PROGRESS NOTES
[facility]  Cardiology  Progress Note    Patient Name: Patsy Morris  MRN: [id number]  Admission Date: 10/13/2020  Hospital Length of Stay: 20 days  Code Status: Full Code   Attending Physician: Ravi Soria MD   Primary Care Physician: Luisana Cartagena MD  Expected Discharge Date:   Principal Problem:Acute on chronic respiratory failure with hypoxia and hypercapnia    Subjective:     Brief HPI:    Patsy Morris is a 70 y.o.female with hypertension. She has chronic atrial fibrillation and heart failure with preserved ejection fraction. She has chronic obstructive pulmonary disease being on home oxygen with CO2 retention. She has undergone nephrectomy for renal cell carcinoma and has chronic kidney disease. She was admitted to [facility] in 2020 and 2020 with heart failure and exacerbations of her chronic obstructive pulmonary disease. She went to therapy at [facility].      She used to be on furosemide 40 mg Q24 however after her last hospitalization she had the does of furosemide increased to 80 mg Q24. The dose was reduced to 40 mg Q24 on 10/8/2020. She became increasingly short of breath and presents fluid overloaded in heart failure as well as an exacerbation of her COPD with high CO2. She was admitted to the ICU.    Hospital Course:    Diuresis.    BIPAP.    Respiratory treatments.    10/15/2020: Echo: Normal left ventricular size and systolic function. EF 60%. Moderately dilated LA. Mildly dilated RV. SPAP 49 mmHg. Small pericardial effusion.    10/16/2020: Apixiban 5 mg Q12 was changed to heparin iv for consideration of bronchoscopy.    10/21/2020: Transferred from ICU to telemetry.    10/27/2020: Walked 150 ft with PT.    2020: AB.21/125/78/90%/FIO2 45%    2020: Respiratory failure. Intubated in ICU.    Interval History:     Slowly worsening respiratory status and on 2020 she was transferred to ICU and intubated.    CXR with extensive  infiltrates.      Review of Systems   Unable to perform ROS: intubated       Objective:     Vital Signs (Most Recent):  Temp: 100.1 °F (37.8 °C) (11/02/20 0705)  Pulse: (!) 132 (11/02/20 0900)  Resp: 20 (11/02/20 0900)  BP: 130/62 (11/02/20 0900)  SpO2: (!) 93 % (11/02/20 0900) Vital Signs (24h Range):  Temp:  [96.4 °F (35.8 °C)-100.8 °F (38.2 °C)] 100.1 °F (37.8 °C)  Pulse:  [] 132  Resp:  [8-31] 20  SpO2:  [74 %-100 %] 93 %  BP: ()/() 130/62  Arterial Line BP: (104-128)/(46-64) 110/48     Weight: 97.6 kg (215 lb 2.7 oz)  Body mass index is 38.12 kg/m².    SpO2: (!) 93 %  O2 Device (Oxygen Therapy): ventilator      Intake/Output Summary (Last 24 hours) at 11/2/2020 0910  Last data filed at 11/2/2020 0634  Gross per 24 hour   Intake 1149.67 ml   Output 1050 ml   Net 99.67 ml       Lines/Drains/Airways     Drain            Female External Urinary Catheter 10/13/20 1359 19 days         NG/OG Tube 11/02/20 0100 Center mouth less than 1 day         Urethral Catheter 11/02/20 0100 less than 1 day          Airway               Airway Anesthesia 11/01/20 less than 1 day          Peripheral Intravenous Line                 Midline Catheter Insertion/Assessment  - Double Lumen 10/20/20 1220 Right median cubital vein (antecubital fossa)  12 days                Physical Exam   Constitutional: She appears well-developed and well-nourished. She appears ill. She is sedated and intubated.   Neck: Carotid bruit is not present.   Cardiovascular: S1 normal and S2 normal. An irregularly irregular rhythm present. Tachycardia present.   Pulmonary/Chest: She is intubated. She has rhonchi in the right lower field and the left lower field.   Abdominal: Soft. Normal appearance.   Musculoskeletal:      Right ankle: She exhibits no swelling.      Left ankle: She exhibits no swelling.   Neurological: She is unresponsive.   Skin: Skin is warm and dry.     Current Medications:     albuterol-ipratropium  3 mL Nebulization Q6H     apixaban  5 mg Oral BID    atorvastatin  80 mg Oral QHS    ceFEPime (MAXIPIME) IVPB  1 g Intravenous Q12H    docusate sodium  100 mg Oral BID    escitalopram oxalate  10 mg Oral QHS    ferrous sulfate  325 mg Oral Daily    furosemide (LASIX) IV  40 mg Intravenous Q12H    gabapentin  100 mg Oral BID    methylPREDNISolone sodium succinate  80 mg Intravenous BID    metoprolol  5 mg Intravenous Q8H    metoprolol tartrate  25 mg Oral BID    miconazole NITRATE 2 %   Topical (Top) BID    vancomycin (VANCOCIN) IVPB  2,000 mg Intravenous Once    vitamin renal formula (B-complex-vitamin c-folic acid)  1 capsule Oral Daily     Current Laboratory Results:    Recent Results (from the past 24 hour(s))   ISTAT PROCEDURE    Collection Time: 11/01/20  1:04 PM   Result Value Ref Range    POC PH 7.284 (LL) 7.35 - 7.45    POC PCO2 104.2 (HH) 35 - 45 mmHg    POC PO2 69 (L) 80 - 100 mmHg    POC HCO3 49.4 (H) 24 - 28 mmol/L    POC BE 23 -2 to 2 mmol/L    POC SATURATED O2 89 (L) 95 - 100 %    Rate 18     Sample ARTERIAL     Site LR     Allens Test Pass     DelSys CPAP/BiPAP     Mode AVAPS     Vt 400     PiP 23     FiO2 45    POCT glucose    Collection Time: 11/01/20  3:30 PM   Result Value Ref Range    POCT Glucose 111 (H) 70 - 110 mg/dL   ISTAT PROCEDURE    Collection Time: 11/01/20  9:19 PM   Result Value Ref Range    POC PH 7.210 (LL) 7.35 - 7.45    POC PCO2 125.4 (HH) 35 - 45 mmHg    POC PO2 78 (L) 80 - 100 mmHg    POC HCO3 50.2 (H) 24 - 28 mmol/L    POC BE 22 -2 to 2 mmol/L    POC SATURATED O2 90 (L) 95 - 100 %    Rate 24     Sample ARTERIAL     Site RR     Allens Test Pass     DelSys CPAP/BiPAP     Mode AVAPS     Vt 450     PiP 17     FiO2 45    ISTAT PROCEDURE    Collection Time: 11/02/20 12:20 AM   Result Value Ref Range    POC PH 7.409 7.35 - 7.45    POC PCO2 74.3 (HH) 35 - 45 mmHg    POC PO2 69 (L) 80 - 100 mmHg    POC HCO3 47.0 (H) 24 - 28 mmol/L    POC BE 22 -2 to 2 mmol/L    POC SATURATED O2 92 (L) 95 - 100 %     Rate 20     Sample ARTERIAL     Site RR     Allens Test Pass     DelSys Adult Vent     Mode AC/PRVC     Vt 420     PEEP 7     PiP 34     FiO2 50     Min Vol 8.9     Sp02 90    Procalcitonin    Collection Time: 11/02/20  1:07 AM   Result Value Ref Range    Procalcitonin 0.28 (H) <0.25 ng/mL   Basic metabolic panel    Collection Time: 11/02/20  2:04 AM   Result Value Ref Range    Sodium 141 136 - 145 mmol/L    Potassium 5.3 (H) 3.5 - 5.1 mmol/L    Chloride 88 (L) 95 - 110 mmol/L    CO2 40 (H) 23 - 29 mmol/L    Glucose 111 (H) 70 - 110 mg/dL    BUN 58 (H) 8 - 23 mg/dL    Creatinine 2.0 (H) 0.5 - 1.4 mg/dL    Calcium 9.0 8.7 - 10.5 mg/dL    Anion Gap 13 8 - 16 mmol/L    eGFR if African American 28 (A) >60 mL/min/1.73 m^2    eGFR if non African American 24 (A) >60 mL/min/1.73 m^2   Magnesium    Collection Time: 11/02/20  2:04 AM   Result Value Ref Range    Magnesium 2.2 1.6 - 2.6 mg/dL   Brain natriuretic peptide    Collection Time: 11/02/20  2:04 AM   Result Value Ref Range     (H) 0 - 99 pg/mL   CBC auto differential    Collection Time: 11/02/20  2:04 AM   Result Value Ref Range    WBC 8.79 3.90 - 12.70 K/uL    RBC 3.21 (L) 4.00 - 5.40 M/uL    Hemoglobin 8.8 (L) 12.0 - 16.0 g/dL    Hematocrit 29.1 (L) 37.0 - 48.5 %    MCV 91 82 - 98 fL    MCH 27.4 27.0 - 31.0 pg    MCHC 30.2 (L) 32.0 - 36.0 g/dL    RDW 14.5 11.5 - 14.5 %    Platelets 174 150 - 350 K/uL    MPV 11.2 9.2 - 12.9 fL    Immature Granulocytes 0.2 0.0 - 0.5 %    Gran # (ANC) 7.6 1.8 - 7.7 K/uL    Immature Grans (Abs) 0.02 0.00 - 0.04 K/uL    Lymph # 0.5 (L) 1.0 - 4.8 K/uL    Mono # 0.6 0.3 - 1.0 K/uL    Eos # 0.1 0.0 - 0.5 K/uL    Baso # 0.02 0.00 - 0.20 K/uL    nRBC 0 0 /100 WBC    Gran % 86.5 (H) 38.0 - 73.0 %    Lymph % 6.1 (L) 18.0 - 48.0 %    Mono % 6.4 4.0 - 15.0 %    Eosinophil % 0.6 0.0 - 8.0 %    Basophil % 0.2 0.0 - 1.9 %    Differential Method Automated      Current Imaging Results:    XR Non-Rad Performed NG/Gastric Tube Check   Final  Result      Tip of nasogastric tube in the proximal stomach.         Electronically signed by: Charlotte Howell   Date:    11/02/2020   Time:    00:25      X-Ray Chest 1 View   Final Result   Abnormal      Interval progression of abnormal intrathoracic findings, increasing bilateral pulmonary opacities, as discussed above.      ET tube noted, the tip above the yamilka.      Enteric tube noted, the distal tip extends subdiaphragmatic below the field of view however the side hole is likely along the distal esophagus, advancement recommended.      This report was flagged in Epic as abnormal.         Electronically signed by: Hammad Ayala   Date:    11/02/2020   Time:    00:09      X-Ray Chest AP Portable   Final Result      Stable right upper lobe consolidation.  Findings remain concerning for pneumonia with small cavitation as described previously.      Improved aeration right lung base.  Small pleural effusions, decreased in volume on the right.         Electronically signed by: Elsa Alcantar   Date:    10/30/2020   Time:    11:47      X-Ray Chest AP Portable   Final Result      Worsening right basilar atelectasis or consolidation.      Right upper lobe consolidation is improved.  There may be a tiny pneumatocele or central cavitation.      Pleural effusions are improved.      Continued follow-up advised.         Electronically signed by: Elsa Alcantar   Date:    10/28/2020   Time:    10:24      X-Ray Chest AP Portable   Final Result      No significant change.  There is cardiomegaly, multifocal airspace opacities and bilateral effusions.         Electronically signed by: Ajay Camara MD   Date:    10/20/2020   Time:    08:48      US Lower Extremity Veins Bilateral   Final Result      No evidence of deep venous thrombosis in either lower extremity.         Electronically signed by: Ceasar Carreon MD   Date:    10/15/2020   Time:    21:53      CT Chest Without Contrast   Final Result      The findings highly  concerning for extensive diffuse pulmonary infection with bilateral pleural fluid collections right greater than left.         Electronically signed by: Regulo Bassett MD   Date:    10/15/2020   Time:    12:01      X-Ray Chest AP Portable   Final Result      Interval worsening of diffuse bilateral airspace disease and moderate bilateral pleural effusions when compared to 09/09/2020         Electronically signed by: Halina Plascencia   Date:    10/13/2020   Time:    09:40          10/15/2020: Echo:    The left ventricle is normal in size with normal systolic function. The estimated ejection fraction is 60%.  A diastolic pattern consistent with atrial fibrillation observed.  Moderate left atrial enlargement.  Mild right ventricular enlargement.  Normal right ventricular systolic function.  Severe right atrial enlargement.  The aortic valve is mildly sclerotic.  The mitral valve is mildly sclerotic.  Mild mitral regurgitation.  Mild tricuspid regurgitation.  There is mild pulmonary hypertension.  The estimated PA systolic pressure is 49 mmHg.  Intermediate central venous pressure (8 mmHg).  Small circumferential pericardial effusion.  There is a left pleural effusion.      Assessment and Plan:     Problem List:    Active Diagnoses:    Diagnosis Date Noted POA    PRINCIPAL PROBLEM:  Acute on chronic respiratory failure with hypoxia and hypercapnia [J96.21, J96.22] 08/03/2016 Yes    Shortness of breath [R06.02] 11/01/2020 Yes    Normocytic anemia [D64.9] 10/27/2020 Yes    Atrial fibrillation with rapid ventricular response [I48.91] 09/01/2020 Yes    Debility [R53.81] 08/26/2020 Yes    Encounter for palliative care [Z51.5] 08/25/2020 Not Applicable    Current moderate episode of major depressive disorder without prior episode [F32.1] 04/30/2020 Yes    Acute on chronic diastolic congestive heart failure [I50.33] 05/20/2016 Yes    Chronic kidney disease (CKD) stage G4/A1, severely decreased glomerular filtration rate  (GFR) between 15-29 mL/min/1.73 square meter and albuminuria creatinine ratio less than 30 mg/g [N18.4]  Yes    Dyslipidemia [E78.5] 02/22/2016 Yes    Essential hypertension [I10] 10/15/2014 Yes      Problems Resolved During this Admission:    Diagnosis Date Noted Date Resolved POA    Acute on chronic congestive heart failure [I50.9] 10/21/2020 10/27/2020 Yes    Cavitary lesion of lung [J98.4] 10/16/2020 10/27/2020 Yes    Acute on chronic congestive heart failure [I50.9] 10/13/2020 10/13/2020 Yes    Pulmonary nodules/lesions, multiple [R91.8] 03/22/2019 10/14/2020 Yes    Paroxysmal atrial fibrillation [I48.0] 08/03/2016 10/13/2020 Yes    Chronic hypercapnic respiratory failure [J96.12] 03/11/2016 10/13/2020 Yes    Obesity hypoventilation syndrome [E66.2] 02/25/2016 10/27/2020 Yes    Renal carcinoma [C64.9] 03/10/2015 10/13/2020 Yes       Assessment and Plan:     1. Heart Failure, Diastolic, Chronic              8/24/2020: Echo: Normal left ventricular size and systolic function. Mildly dilated LA.   10/15/2020: Echo: Normal left ventricular size and systolic function. EF 60%. Moderately dilated LA. Mildly dilated RV. SPAP 49 mmHg. Small pericardial effusion.              At NH was on furosemide 40 mg Q24.              10/13/2020: Presented fluid overloaded in HF. Received furosemide 80 mg iv Q12.   10/20/2020: CXR with extensive infiltrates and pleural effusions. .    10/26/2020: .   10/28/2020: CXR improving.   Was on furosemide 40 mg po Q12.   On furosemide 40 mg iv Q12.              Does not appear fluid overloaded.   Will change furosemide to 20 mg iv Q12.     2. Atrial Fibrillation              In chronic atrial fibrillation.              On apixiban.              Was on metoprolol 50 mg Q12.              Rate was well controlled mostly  bpm.   On metoprolol 25 mg Q12.   Will add metoprolol 5 mg iv Q4 PRN for VRR >110 bpm.     3. Chronic Anticoagulation              Been on  apixiban 5 mg Q12.   10/16/2020: Apixiban 5 mg Q12 was changed to heparin iv.   On apixiban 5 mg Q12.   No bleeding.    4. Hypertension   On metoprolol 25 mg Q12 and furosemide 40 mg iv Q12.   Well controlled.                5. Chronic Kidney Disease, Stage 4              History or renal carcinoma and nephrectomy.              10/13/2020: BUN/crea 27/2.0. CrCl 28.   10/20/2020: BUN/crea 56/1.8. CrCl 32.   10/25/2020: BUN/crea 49/1.6. CrCl 32.    2020: BUN/crea 58/2.0. CrCl 24.     6. Chronic Obstructive Pulmonary Disease              Chronic respiratory failure with CO2 retention.              10/13/2020: 7.23/94/73/39/90% on FiO2 of 40%.   Was overall slowly improving.    2020: AB.21/125/78/90%/FIO2 45%.   Now intubated.     7. Weakness   10/27/2020: Walked 150 ft with PT.      VTE Risk Mitigation (From admission, onward)         Ordered     apixaban tablet 5 mg  2 times daily      10/17/20 1152     Place sequential compression device  Until discontinued      10/13/20 1405     IP VTE HIGH RISK PATIENT  Once      10/13/20 1405                Diana Meredith MD  Cardiology  [facility]

## 2020-11-02 NOTE — ASSESSMENT & PLAN NOTE
Patient's CT concerning for slow process as lesion in same posterior upper lobe lung fields noted on previous CT in 2018 as well as bialteral pulmonary nodules. In setting of significant smoking history, malignancy is high on differential. However slow infectious process such as NTM/pulmonary MAC is possible as well, also possibility of superimprosed bacterial infection  - HIV negative  - Quantiferon gold indeterminate x 2  - AFB stain negative, smear pending x 1  - f/u endotracheal sputum culture  - now that intubated,discussed with family, likely plan for bronchoscopy on Wednesday if remains stable  - hold eliquis now  - can continue heparin for ppx

## 2020-11-02 NOTE — SUBJECTIVE & OBJECTIVE
Interval History:  Intubated overnight due to worsening hypercapnia and encephalopathy. Sedated. New fever to 100.8.     Objective:     Vital Signs (Most Recent):  Temp: 99.1 °F (37.3 °C) (11/02/20 1105)  Pulse: (!) 114 (11/02/20 1300)  Resp: 20 (11/02/20 1300)  BP: (!) 119/57 (11/02/20 1300)  SpO2: (!) 93 % (11/02/20 1300) Vital Signs (24h Range):  Temp:  [97.8 °F (36.6 °C)-100.8 °F (38.2 °C)] 99.1 °F (37.3 °C)  Pulse:  [] 114  Resp:  [8-31] 20  SpO2:  [74 %-100 %] 93 %  BP: ()/() 119/57  Arterial Line BP: ()/(46-64) 98/48     Weight: 97.6 kg (215 lb 2.7 oz)  Body mass index is 38.12 kg/m².      Intake/Output Summary (Last 24 hours) at 11/2/2020 1331  Last data filed at 11/2/2020 1100  Gross per 24 hour   Intake 1749.67 ml   Output 1650 ml   Net 99.67 ml       Physical Exam  Constitutional:       General: She is not in acute distress.     Comments: Intubated and sedated     HENT:      Head: Normocephalic and atraumatic.   Eyes:      Conjunctiva/sclera: Conjunctivae normal.      Pupils: Pupils are equal, round, and reactive to light.   Neck:      Musculoskeletal: Normal range of motion.      Comments: Thick neck    Cardiovascular:      Rate and Rhythm: Tachycardia present. Rhythm irregular.      Pulses: Normal pulses.      Heart sounds: Normal heart sounds.   Pulmonary:      Comments: Coarse breath sounds due to ventilator, diminished sounds at bilateral bases, no wheezing  Abdominal:      General: Bowel sounds are normal. There is no distension.      Palpations: Abdomen is soft.      Tenderness: There is no abdominal tenderness.   Musculoskeletal:         General: Swelling (1-2+ bilateral lower extremities) present.   Skin:     General: Skin is warm and dry.   Neurological:      Comments: Intubated and sedated           Vents:  Vent Mode: A/C (11/02/20 1249)  Ventilator Initiated: Yes (11/01/20 2226)  Set Rate: 20 BPM (11/02/20 1249)  Vt Set: 420 mL (11/02/20 1249)  Pressure Support: 0 cmH20  (11/02/20 1249)  PEEP/CPAP: 7 cmH20 (11/02/20 1249)  Oxygen Concentration (%): 40 (11/02/20 1300)  Peak Airway Pressure: 29 cmH2O (11/02/20 1249)  Plateau Pressure: 0 cmH20 (11/02/20 1249)  Total Ve: 8.76 mL (11/02/20 1249)  F/VT Ratio<105 (RSBI): (!) 45.66 (11/02/20 1249)    Lines/Drains/Airways     Drain            Female External Urinary Catheter 10/13/20 1359 20 days         NG/OG Tube 11/02/20 0100 Center mouth less than 1 day         Urethral Catheter 11/02/20 0100 less than 1 day          Airway               Airway Anesthesia 11/01/20 less than 1 day          Peripheral Intravenous Line                 Midline Catheter Insertion/Assessment  - Double Lumen 10/20/20 1220 Right median cubital vein (antecubital fossa)  13 days                Significant Labs:    CBC/Anemia Profile:  Recent Labs   Lab 11/01/20  0627 11/02/20  0204   WBC 8.97 8.79   HGB 9.5* 8.8*   HCT 31.8* 29.1*    174   MCV 93 91   RDW 14.5 14.5        Chemistries:  Recent Labs   Lab 11/01/20  0505 11/02/20  0204    141   K 5.1 5.3*   CL 90* 88*   CO2 38* 40*   BUN 52* 58*   CREATININE 1.8* 2.0*   CALCIUM 9.3 9.0   MG 2.4 2.2       All pertinent labs within the past 24 hours have been reviewed.    Significant Imaging:  CXR: I have reviewed all pertinent results/findings within the past 24 hours and my personal findings are:  increased airspace disease and RUL cavitation with bilateral pleural effusions R>>L.

## 2020-11-02 NOTE — NURSING
Pt intubated last night due to worsening abg and lethargy. Arterial line also placed due to hard to get a blood pressure. Started on propofol initially but pt still agitated, fentanyl drip added. Tmax ovenight 100.8. a.flutter/afib at the start of the shift, converted to Sinus Tachy after intubation. 12L ekg done. HR on 140's most of the time, will go down to 120's after iv metoprolol push. eicu md aware. Shin cath and OGT inserted. Will continue to monitor.

## 2020-11-03 PROBLEM — N17.9 ACUTE RENAL FAILURE SUPERIMPOSED ON STAGE 4 CHRONIC KIDNEY DISEASE: Status: ACTIVE | Noted: 2020-01-01

## 2020-11-03 PROBLEM — N18.9 ACUTE KIDNEY INJURY SUPERIMPOSED ON CHRONIC KIDNEY DISEASE: Status: ACTIVE | Noted: 2020-01-01

## 2020-11-03 PROBLEM — Z51.5 ENCOUNTER FOR PALLIATIVE CARE: Status: RESOLVED | Noted: 2020-01-01 | Resolved: 2020-01-01

## 2020-11-03 PROBLEM — N18.4 ACUTE RENAL FAILURE SUPERIMPOSED ON STAGE 4 CHRONIC KIDNEY DISEASE: Status: ACTIVE | Noted: 2020-01-01

## 2020-11-03 PROBLEM — J90 PLEURAL EFFUSION: Status: RESOLVED | Noted: 2020-01-01 | Resolved: 2020-01-01

## 2020-11-03 PROBLEM — F32.1 CURRENT MODERATE EPISODE OF MAJOR DEPRESSIVE DISORDER WITHOUT PRIOR EPISODE: Status: RESOLVED | Noted: 2020-01-01 | Resolved: 2020-01-01

## 2020-11-03 PROBLEM — Z99.11 ON MECHANICALLY ASSISTED VENTILATION: Status: RESOLVED | Noted: 2020-01-01 | Resolved: 2020-01-01

## 2020-11-03 PROBLEM — D64.9 NORMOCYTIC ANEMIA: Status: RESOLVED | Noted: 2020-01-01 | Resolved: 2020-01-01

## 2020-11-03 NOTE — ASSESSMENT & PLAN NOTE
Serum creatinine trending up.  Patient with good urine output.  Holding diuretics.  No indication for dialysis now.  Continue to closely monitor.

## 2020-11-03 NOTE — SUBJECTIVE & OBJECTIVE
Interval History: No acute events overnight. Last fever yesterday morning. Good UOP yesterday with diuretics. Following commands on SAT this morning however failed SBT.     Objective:     Vital Signs (Most Recent):  Temp: 99.3 °F (37.4 °C) (11/03/20 0705)  Pulse: 66 (11/03/20 1045)  Resp: 19 (11/03/20 1045)  BP: (!) 112/54 (11/03/20 1045)  SpO2: (!) 93 % (11/03/20 1045) Vital Signs (24h Range):  Temp:  [98.9 °F (37.2 °C)-99.9 °F (37.7 °C)] 99.3 °F (37.4 °C)  Pulse:  [] 66  Resp:  [19-25] 19  SpO2:  [86 %-95 %] 93 %  BP: ()/(50-71) 112/54  Arterial Line BP: ()/(44-62) 96/50     Weight: 97.6 kg (215 lb 2.7 oz)  Body mass index is 38.12 kg/m².      Intake/Output Summary (Last 24 hours) at 11/3/2020 1111  Last data filed at 11/3/2020 0800  Gross per 24 hour   Intake 488.35 ml   Output 910 ml   Net -421.65 ml       Physical Exam  Constitutional:       General: She is not in acute distress.     Comments: Intubated and sedated     HENT:      Head: Normocephalic and atraumatic.   Eyes:      Conjunctiva/sclera: Conjunctivae normal.      Pupils: Pupils are equal, round, and reactive to light.   Neck:      Musculoskeletal: Normal range of motion.      Comments: Thick neck    Cardiovascular:      Rate and Rhythm: Normal rate. Rhythm irregular.      Pulses: Normal pulses.      Heart sounds: Normal heart sounds.   Pulmonary:      Comments: Coarse breath sounds due to ventilator, diminished sounds at bilateral bases, no wheezing  Abdominal:      General: Bowel sounds are normal. There is no distension.      Palpations: Abdomen is soft.      Tenderness: There is no abdominal tenderness.   Musculoskeletal:         General: Swelling (1+ bilateral lower extremities, slightly improved today) present.   Skin:     General: Skin is warm and dry.   Neurological:      Comments: Intubated and sedated           Vents:  Vent Mode: A/C (11/03/20 0907)  Ventilator Initiated: Yes (11/01/20 2708)  Set Rate: 20 BPM (11/03/20  0907)  Vt Set: 390 mL (11/03/20 0907)  Pressure Support: 0 cmH20 (11/03/20 0907)  PEEP/CPAP: 7 cmH20 (11/03/20 0907)  Oxygen Concentration (%): 45 (11/03/20 1045)  Peak Airway Pressure: 37 cmH2O (11/03/20 0907)  Plateau Pressure: 0 cmH20 (11/03/20 0907)  Total Ve: 8.26 mL (11/03/20 0907)  F/VT Ratio<105 (RSBI): (!) 48.08 (11/03/20 0907)    Lines/Drains/Airways     Drain                 NG/OG Tube 11/02/20 0100 Center mouth 1 day         Urethral Catheter 11/02/20 0100 1 day          Airway               Airway Anesthesia 11/01/20 1 day          Arterial Line            Arterial Line 11/01/20 Right Radial 2 days          Peripheral Intravenous Line                 Midline Catheter Insertion/Assessment  - Double Lumen 10/20/20 1220 Right median cubital vein (antecubital fossa)  13 days                Significant Labs:    CBC/Anemia Profile:  Recent Labs   Lab 11/02/20  0204   WBC 8.79   HGB 8.8*   HCT 29.1*      MCV 91   RDW 14.5        Chemistries:  Recent Labs   Lab 11/02/20  0204 11/03/20  0350    140   K 5.3* 4.1   CL 88* 90*   CO2 40* 36*   BUN 58* 59*   CREATININE 2.0* 2.3*   CALCIUM 9.0 8.6*   MG 2.2 2.0       All pertinent labs within the past 24 hours have been reviewed.    Significant Imaging:  I have reviewed all pertinent imaging results/findings within the past 24 hours.

## 2020-11-03 NOTE — PT/OT/SLP PROGRESS
Occupational Therapy      Patient Name:  Patsy Morris   MRN:  [id number]    Patient not seen today secondary to Other (Comment)(Failed MOVE screen.). Will follow-up on 11/4/20.    CARLOS Mendez  11/3/2020

## 2020-11-03 NOTE — ASSESSMENT & PLAN NOTE
- requiring intubation on 11/1  - likely multifactorial related to volume overload + HF exacerbation + BHAVANI/OHS + possible new infectious PNA + ? Malignancy?  - no wheezing on exam,  Steroids stopped 11/2  - continue broad spectrum abx  - holding diuresis today given CLIFFORD

## 2020-11-03 NOTE — ASSESSMENT & PLAN NOTE
Active pulmonary tuberculosis unlikely and patient with one negative acid-fast bacillus smear.  Additional efforts at obtaining sputum for additional smears not successful despite using hypertonic saline.  Plan for bronchoscopy.

## 2020-11-03 NOTE — ASSESSMENT & PLAN NOTE
- possible due to ATN  - pt with baseline CKD in addition to 1 kidney due to prior nephrectomy  - still volume overloaded on exam with pleural effusions and EMERALD but will hold diuretics today due to increase in creatinine  - monitor UOP, strict I/Os  - possibly need nephrology consult pending trajectory

## 2020-11-03 NOTE — PROGRESS NOTES
[facility]  Pulmonology  Progress Note    Patient Name: Patsy Morris  MRN: [id number]  Admission Date: 10/13/2020  Hospital Length of Stay: 21 days  Code Status: Full Code  Attending Provider: Loi Quiles MD  Primary Care Provider: Luisana Cartagena MD   Principal Problem: Acute on chronic respiratory failure with hypoxia and hypercapnia    Subjective:     Interval History: No acute events overnight. Last fever yesterday morning. Good UOP yesterday with diuretics. Following commands on SAT this morning however failed SBT.     Objective:     Vital Signs (Most Recent):  Temp: 99.3 °F (37.4 °C) (11/03/20 0705)  Pulse: 66 (11/03/20 1045)  Resp: 19 (11/03/20 1045)  BP: (!) 112/54 (11/03/20 1045)  SpO2: (!) 93 % (11/03/20 1045) Vital Signs (24h Range):  Temp:  [98.9 °F (37.2 °C)-99.9 °F (37.7 °C)] 99.3 °F (37.4 °C)  Pulse:  [] 66  Resp:  [19-25] 19  SpO2:  [86 %-95 %] 93 %  BP: ()/(50-71) 112/54  Arterial Line BP: ()/(44-62) 96/50     Weight: 97.6 kg (215 lb 2.7 oz)  Body mass index is 38.12 kg/m².      Intake/Output Summary (Last 24 hours) at 11/3/2020 1111  Last data filed at 11/3/2020 0800  Gross per 24 hour   Intake 488.35 ml   Output 910 ml   Net -421.65 ml       Physical Exam  Constitutional:       General: She is not in acute distress.     Comments: Intubated and sedated     HENT:      Head: Normocephalic and atraumatic.   Eyes:      Conjunctiva/sclera: Conjunctivae normal.      Pupils: Pupils are equal, round, and reactive to light.   Neck:      Musculoskeletal: Normal range of motion.      Comments: Thick neck    Cardiovascular:      Rate and Rhythm: Normal rate. Rhythm irregular.      Pulses: Normal pulses.      Heart sounds: Normal heart sounds.   Pulmonary:      Comments: Coarse breath sounds due to ventilator, diminished sounds at bilateral bases, no wheezing  Abdominal:      General: Bowel sounds are normal. There is no distension.      Palpations: Abdomen is  soft.      Tenderness: There is no abdominal tenderness.   Musculoskeletal:         General: Swelling (1+ bilateral lower extremities, slightly improved today) present.   Skin:     General: Skin is warm and dry.   Neurological:      Comments: Intubated and sedated           Vents:  Vent Mode: A/C (11/03/20 0907)  Ventilator Initiated: Yes (11/01/20 2338)  Set Rate: 20 BPM (11/03/20 0907)  Vt Set: 390 mL (11/03/20 0907)  Pressure Support: 0 cmH20 (11/03/20 0907)  PEEP/CPAP: 7 cmH20 (11/03/20 0907)  Oxygen Concentration (%): 45 (11/03/20 1045)  Peak Airway Pressure: 37 cmH2O (11/03/20 0907)  Plateau Pressure: 0 cmH20 (11/03/20 0907)  Total Ve: 8.26 mL (11/03/20 0907)  F/VT Ratio<105 (RSBI): (!) 48.08 (11/03/20 0907)    Lines/Drains/Airways     Drain                 NG/OG Tube 11/02/20 0100 Center mouth 1 day         Urethral Catheter 11/02/20 0100 1 day          Airway               Airway Anesthesia 11/01/20 1 day          Arterial Line            Arterial Line 11/01/20 Right Radial 2 days          Peripheral Intravenous Line                 Midline Catheter Insertion/Assessment  - Double Lumen 10/20/20 1220 Right median cubital vein (antecubital fossa)  13 days                Significant Labs:    CBC/Anemia Profile:  Recent Labs   Lab 11/02/20  0204   WBC 8.79   HGB 8.8*   HCT 29.1*      MCV 91   RDW 14.5        Chemistries:  Recent Labs   Lab 11/02/20  0204 11/03/20  0350    140   K 5.3* 4.1   CL 88* 90*   CO2 40* 36*   BUN 58* 59*   CREATININE 2.0* 2.3*   CALCIUM 9.0 8.6*   MG 2.2 2.0       All pertinent labs within the past 24 hours have been reviewed.    Significant Imaging:  I have reviewed all pertinent imaging results/findings within the past 24 hours.    Assessment/Plan:     * Acute on chronic respiratory failure with hypoxia and hypercapnia  - requiring intubation on 11/1  - likely multifactorial related to volume overload + HF exacerbation + BHAVANI/OHS + possible new infectious PNA + ?  Malignancy?  - no wheezing on exam,  Steroids stopped 11/2  - continue broad spectrum abx  - holding diuresis today given CLIFFORD    Acute kidney injury superimposed on chronic kidney disease  - possible due to ATN  - pt with baseline CKD in addition to 1 kidney due to prior nephrectomy  - still volume overloaded on exam with pleural effusions and EMERALD but will hold diuretics today due to increase in creatinine  - monitor UOP, strict I/Os  - possibly need nephrology consult pending trajectory     Pleural effusion  - bilateral, R>>L  - likely due to volume overload however possibly due to infection vs malignancy? Miranda in setting of cavitary lesion on right  - will consider diagnostic right thoracentesis on Wednesday if significant effusion still present and once off anticoagulation x 2 days.   - bedside US today with persistent small-moderate sized effusion on right, smaller on left, free flowing appearance of fluid  - diuresis being held today due to clifford     On mechanically assisted ventilation  - due to acute on chronic hypoxic/hypercapnic respiratory failure + volume overload with bilateral pleural effusions + HF + possible new PNA  - continue 6-8cc/kg TV PBW, lung protective ventilation, currently at 390cc  - slight increase in FiO2 compared to yesterday  - abx as below  - GI ppx with famotidine     Cavitary lesion of lung  Patient's CT concerning for slow process as lesion in same posterior upper lobe lung fields noted on previous CT in 2018 as well as bialteral pulmonary nodules. In setting of significant smoking history, malignancy is high on differential. However slow infectious process such as NTM/pulmonary MAC is possible as well, also possibility of superimprosed bacterial infection  - HIV negative  - Quantiferon gold indeterminate x 2  - AFB stain negative, smear pending x 1  - f/u endotracheal sputum culture  - holding eliquis now, last dose AM 11/2  - can continue heparin for ppx  - plan for bronchoscopy with  BAL tomorrow morning, will obtain consent from her sister/mPOA   - repeat rapid covid for pre-procedure screening  - hold tube feeds at midnight    Atrial fibrillation with rapid ventricular response  Continue rate control as likely contributing to diastolic dysfunction  - hold eliquis for now for bronchoscopy on Wednesday  - heparin for ppx for now  - currently in flutter with variable block, HR controlled    Acute on chronic diastolic congestive heart failure  - continue diuresis; fluid overload likely contributes to hypercapnia in setting of OHS/BHAVANI, small airway disease with severe restriction.   -CT showing cavitary lesion with surrounding consolidation and GGO concerning for infectious vs malignant process, with significant ggo throughout the lung likely secondary to continued pulmonary edema.     - will hold lasix today due to bump in creatinine           Fadumo Bryant MD  Pulmonology  [facility]

## 2020-11-03 NOTE — ASSESSMENT & PLAN NOTE
Continue rate control as likely contributing to diastolic dysfunction  - hold eliquis for now for bronchoscopy on Wednesday  - heparin for ppx for now  - currently in flutter with variable block, HR controlled

## 2020-11-03 NOTE — ASSESSMENT & PLAN NOTE
Patient with advanced lung disease secondary to chronic obstructive pulmonary disease, obstructive sleep apnea, obesity hypoventilation syndrome, and severe restrictive lung disease now with recent decompensation resulting in intubation due to possible pneumonia.  Minimally elevated procalcitonin level.  Continue antibiotic therapy.  Weaning from ventilator per Pulmonary Critical Care.

## 2020-11-03 NOTE — PROGRESS NOTES
Pharmacokinetic Assessment Follow Up: IV Vancomycin    Vancomycin serum concentration assessment(s):    The random level was drawn correctly and can be used to guide therapy at this time. The measurement is above the desired definitive target range of 10 to 20 mcg/mL.    Vancomycin Regimen Plan:    Re-dose when the random level is less than 20 mcg/mL, next level to be drawn at 1600 on 11/3/2020    Drug levels (last 3 results):  Recent Labs   Lab Result Units 11/02/20  1243 11/03/20  0350   Vancomycin, Random ug/mL 31.2 23.1       Pharmacy will continue to follow and monitor vancomycin.    Please contact pharmacy at [phone] for questions regarding this assessment.    Thank you for the consult,   Sara Yung       Patient brief summary:  Patsy Morris is a 70 y.o. female initiated on antimicrobial therapy with IV Vancomycin for treatment of  pneumonia    The patient's current regimen is pulse dosing    Drug Allergies:   Review of patient's allergies indicates:  No Known Allergies    Actual Body Weight:   97.6 kg    Renal Function:   Estimated Creatinine Clearance: 24.6 mL/min (A) (based on SCr of 2.3 mg/dL (H)).,     Dialysis Method (if applicable):  N/A    CBC (last 72 hours):  Recent Labs   Lab Result Units 11/01/20  0627 11/02/20  0204   WBC K/uL 8.97 8.79   Hemoglobin g/dL 9.5* 8.8*   Hematocrit % 31.8* 29.1*   Platelets K/uL 163 174   Gran % % 81.1* 86.5*   Lymph % % 7.6* 6.1*   Mono % % 9.9 6.4   Eosinophil % % 1.1 0.6   Basophil % % 0.1 0.2   Differential Method  Automated Automated       Metabolic Panel (last 72 hours):  Recent Labs   Lab Result Units 11/01/20  0505 11/02/20  0204 11/03/20  0350   Sodium mmol/L 141 141 140   Potassium mmol/L 5.1 5.3* 4.1   Chloride mmol/L 90* 88* 90*   CO2 mmol/L 38* 40* 36*   Glucose mg/dL 94 111* 104   BUN mg/dL 52* 58* 59*   Creatinine mg/dL 1.8* 2.0* 2.3*   Magnesium mg/dL 2.4 2.2 2.0       Vancomycin Administrations:  vancomycin given in the last 96 hours                      vancomycin 2 g in dextrose 5 % 500 mL IVPB (mg) 2,000 mg New Bag 11/02/20 0813                    Microbiologic Results:  Microbiology Results (last 7 days)       Procedure Component Value Units Date/Time    Blood culture [[id number]] Collected: 11/02/20 0740    Order Status: Completed Specimen: Blood Updated: 11/03/20 0115     Blood Culture, Routine No Growth to date    Blood culture [[id number]] Collected: 11/02/20 0740    Order Status: Completed Specimen: Blood Updated: 11/03/20 0115     Blood Culture, Routine No Growth to date    Culture, Respiratory with Gram Stain [[id number]] Collected: 11/02/20 1148    Order Status: Completed Specimen: Respiratory from Tracheal Aspirate Updated: 11/02/20 2236     Gram Stain (Respiratory) Rare WBC's     Gram Stain (Respiratory) No organisms seen    Culture, Respiratory with Gram Stain [[id number]]     Order Status: Canceled Specimen: Respiratory from Sputum, Expectorated

## 2020-11-03 NOTE — ASSESSMENT & PLAN NOTE
- bilateral, R>>L  - likely due to volume overload however possibly due to infection vs malignancy? Imranda in setting of cavitary lesion on right  - will consider diagnostic right thoracentesis on Wednesday if significant effusion still present and once off anticoagulation x 2 days.   - bedside US today with persistent small-moderate sized effusion on right, smaller on left, free flowing appearance of fluid  - diuresis being held today due to yaz

## 2020-11-03 NOTE — SUBJECTIVE & OBJECTIVE
Interval History:  No acute events overnight.    Review of Systems   Unable to perform ROS: Intubated     Objective:     Vital Signs (Most Recent):  Temp: 99.3 °F (37.4 °C) (11/03/20 0705)  Pulse: 70 (11/03/20 0815)  Resp: 20 (11/03/20 0815)  BP: (!) 116/57 (11/03/20 0815)  SpO2: (!) 93 % (11/03/20 0815) Vital Signs (24h Range):  Temp:  [98.9 °F (37.2 °C)-99.9 °F (37.7 °C)] 99.3 °F (37.4 °C)  Pulse:  [] 70  Resp:  [19-25] 20  SpO2:  [86 %-95 %] 93 %  BP: ()/(50-72) 116/57  Arterial Line BP: ()/(44-62) 96/48     Weight: 97.6 kg (215 lb 2.7 oz)  Body mass index is 38.12 kg/m².    Intake/Output Summary (Last 24 hours) at 11/3/2020 0827  Last data filed at 11/3/2020 0604  Gross per 24 hour   Intake 498.35 ml   Output 1510 ml   Net -1011.65 ml      Physical Exam  Constitutional:       General: She is not in acute distress.     Comments: Intubated and sedated   HENT:      Head: Normocephalic and atraumatic.   Eyes:      Conjunctiva/sclera: Conjunctivae normal.      Pupils: Pupils are equal, round, and reactive to light.   Neck:      Musculoskeletal: Normal range of motion.   Cardiovascular:      Rate and Rhythm: Tachycardia present. Rhythm irregular.      Pulses: Normal pulses.      Heart sounds: Normal heart sounds.   Pulmonary:      Comments: Coarse breath sounds due to ventilator, diminished sounds at bilateral bases, no wheezing  Abdominal:      General: Bowel sounds are normal. There is no distension.      Palpations: Abdomen is soft.      Tenderness: There is no abdominal tenderness.   Musculoskeletal:         General: Swelling present.   Skin:     General: Skin is warm and dry.   Neurological:      Comments: Intubated and sedated           Significant Labs: All pertinent labs within the past 24 hours have been reviewed.    Significant Imaging: I have reviewed all pertinent imaging results/findings within the past 24 hours.

## 2020-11-03 NOTE — PLAN OF CARE
Pt remains intubated and sedated. Afebrile. HR remains in A-flutter w/ bradycardic episodes in the 40's-50's, MD aware.  Aleida in place. Turned q2. Remained free from falls or injuries. No distress noted.

## 2020-11-03 NOTE — ASSESSMENT & PLAN NOTE
Respiratory status initially improved with aggressive diuresis.  Serum creatinine trending upward and exam not that remarkable for significant volume overload at this time.  Holding diuretic therapy.  Continue to closely monitor volume status.

## 2020-11-03 NOTE — PT/OT/SLP PROGRESS
Physical Therapy      Patient Name:  Patsy Morris   MRN:  [id number]    Patient not seen today secondary to Other (Comment)(Failed MOVE Screen). Will follow-up tomorrow.    Georgie Rosales, PT

## 2020-11-03 NOTE — ASSESSMENT & PLAN NOTE
- due to acute on chronic hypoxic/hypercapnic respiratory failure + volume overload with bilateral pleural effusions + HF + possible new PNA  - continue 6-8cc/kg TV PBW, lung protective ventilation, currently at 390cc  - slight increase in FiO2 compared to yesterday  - abx as below  - GI ppx with famotidine

## 2020-11-03 NOTE — ASSESSMENT & PLAN NOTE
- continue diuresis; fluid overload likely contributes to hypercapnia in setting of OHS/BHAVANI, small airway disease with severe restriction.   -CT showing cavitary lesion with surrounding consolidation and GGO concerning for infectious vs malignant process, with significant ggo throughout the lung likely secondary to continued pulmonary edema.     - will hold lasix today due to bump in creatinine

## 2020-11-03 NOTE — PROGRESS NOTES
"[facility] Medicine  Progress Note    Patient Name: Patsy Morris  MRN: [id number]  Patient Class: IP- Inpatient   Admission Date: 10/13/2020  Length of Stay: 21 days  Attending Physician: Loi Quiles MD  Primary Care Provider: Luisana Cartagena MD        Subjective:     Principal Problem:Acute on chronic respiratory failure with hypoxia and hypercapnia        HPI:  Per Ravi Soria:    "Per ED:  "This is a 70 y.o. female with history of CHF and COPD who presents via EMS with complaint of shortness of breath that began a few days ago. Per EMS patient had O2 saturation of 72 on 3 liters if home oxygen upon their arrival. Patient states she is on 3 liters of home oxygen at baseline. She denies fever, cough, chest pain, nausea, vomiting, diarrhea, or rhinorrhea. She reports she was recently discharged from the hospital for similar symptoms. She reports compliance with her home medications. She recently became resident of [facility] after last hospitalization. She is a former smoker. She denies undergoing any surgeries".    The patient is a 70 year old female with a PMH significant for of HTN, Chronic Respiratory Failure (COPD and BHAVANI/OHS), HFpEF, Obesity who presented to the ED with complaints of SOB that began about 3 days ago.  Patient is on 3L O2NC at home.  She was seen by her Cardiologist about 4 days prior to admission and told to decrease her Lasix from 40mg bid to qday.  She denies chest pain.  No Fevers.  No cough.  States she has been adherent with her medications.  Patient was recently admitted to [facility] from 9/1-9/11 for Acute on Chronic Respiratory Failure and discharged to SNF.  Patient was placed on BiPAP in ED and admitted to ICU.  Patient feeling better on BiPAP."    Overview/Hospital Course:  Patient is 70-year-old woman with history of hypertension, chronic hypoxic respiratory failure on home oxygen secondary to chronic obstructive pulmonary " disease, chronic diastolic heart failure, chronic atrial fibrillation, and morbid obesity re-admitted to the hospital on 10/13/2020 with decompensated heart failure after patient was home for about a week following recent hospitalization and stayed at skilled nursing facility for physical therapy.  Patient treated with non-invasive ventilation intravenous diuretics.  Patient was transferred out of the intensive care unit on 10/21/2020.      Patient continue to improve on the floor until she developed worsening hypoxic respiratory failure.  Patient was transferred to the intensive care unit on 11/1/2020 on placed on continuous BiPAP.  She decompensated further and was intubated on 11/1/2020.  Patient started on intravenous antibiotics on 11/2/2020 to treat hospital-acquired pneumonia.    Interval History:  No acute events overnight.    Review of Systems   Unable to perform ROS: Intubated     Objective:     Vital Signs (Most Recent):  Temp: 99.3 °F (37.4 °C) (11/03/20 0705)  Pulse: 70 (11/03/20 0815)  Resp: 20 (11/03/20 0815)  BP: (!) 116/57 (11/03/20 0815)  SpO2: (!) 93 % (11/03/20 0815) Vital Signs (24h Range):  Temp:  [98.9 °F (37.2 °C)-99.9 °F (37.7 °C)] 99.3 °F (37.4 °C)  Pulse:  [] 70  Resp:  [19-25] 20  SpO2:  [86 %-95 %] 93 %  BP: ()/(50-72) 116/57  Arterial Line BP: ()/(44-62) 96/48     Weight: 97.6 kg (215 lb 2.7 oz)  Body mass index is 38.12 kg/m².    Intake/Output Summary (Last 24 hours) at 11/3/2020 0827  Last data filed at 11/3/2020 0604  Gross per 24 hour   Intake 498.35 ml   Output 1510 ml   Net -1011.65 ml      Physical Exam  Constitutional:       General: She is not in acute distress.     Comments: Intubated and sedated   HENT:      Head: Normocephalic and atraumatic.   Eyes:      Conjunctiva/sclera: Conjunctivae normal.      Pupils: Pupils are equal, round, and reactive to light.   Neck:      Musculoskeletal: Normal range of motion.   Cardiovascular:      Rate and Rhythm:  Tachycardia present. Rhythm irregular.      Pulses: Normal pulses.      Heart sounds: Normal heart sounds.   Pulmonary:      Comments: Coarse breath sounds due to ventilator, diminished sounds at bilateral bases, no wheezing  Abdominal:      General: Bowel sounds are normal. There is no distension.      Palpations: Abdomen is soft.      Tenderness: There is no abdominal tenderness.   Musculoskeletal:         General: Swelling present.   Skin:     General: Skin is warm and dry.   Neurological:      Comments: Intubated and sedated           Significant Labs: All pertinent labs within the past 24 hours have been reviewed.    Significant Imaging: I have reviewed all pertinent imaging results/findings within the past 24 hours.      Assessment/Plan:      * Acute on chronic respiratory failure with hypoxia and hypercapnia  Patient with advanced lung disease secondary to chronic obstructive pulmonary disease, obstructive sleep apnea, obesity hypoventilation syndrome, and severe restrictive lung disease now with recent decompensation resulting in intubation due to possible pneumonia.  Minimally elevated procalcitonin level.  Continue antibiotic therapy.  Weaning from ventilator per Pulmonary Critical Care.    Cavitary lesion of lung  Active pulmonary tuberculosis unlikely and patient with one negative acid-fast bacillus smear.  Additional efforts at obtaining sputum for additional smears not successful despite using hypertonic saline.  Plan for bronchoscopy.    Acute on chronic diastolic congestive heart failure  Respiratory status initially improved with aggressive diuresis.  Serum creatinine trending upward and exam not that remarkable for significant volume overload at this time.  Holding diuretic therapy.  Continue to closely monitor volume status.    Atrial fibrillation with rapid ventricular response  Continue with metoprolol.  Holding anticoagulation for anticipated bronchoscopy.    Essential hypertension  Reasonably  controlled with current regimen.  Will continue with current regimen and continue to monitor.    Chronic kidney disease (CKD) stage G4/A1, severely decreased glomerular filtration rate (GFR) between 15-29 mL/min/1.73 square meter and albuminuria creatinine ratio less than 30 mg/g  Serum creatinine trending up.  Patient with good urine output.  Holding diuretics.  No indication for dialysis now.  Continue to closely monitor.    Dyslipidemia  Continue with statin therapy.    Debility  Consult physical and occupational therapy for range of motion exercises.    VTE Risk Mitigation (From admission, onward)         Ordered     heparin (porcine) injection 5,000 Units  Every 12 hours      11/02/20 1551     Place sequential compression device  Until discontinued      10/13/20 1405     IP VTE HIGH RISK PATIENT  Once      10/13/20 1405              Loi Quiles MD  Department of Hospital Medicine   [facility]

## 2020-11-03 NOTE — ASSESSMENT & PLAN NOTE
Patient's CT concerning for slow process as lesion in same posterior upper lobe lung fields noted on previous CT in 2018 as well as bialteral pulmonary nodules. In setting of significant smoking history, malignancy is high on differential. However slow infectious process such as NTM/pulmonary MAC is possible as well, also possibility of superimprosed bacterial infection  - HIV negative  - Quantiferon gold indeterminate x 2  - AFB stain negative, smear pending x 1  - f/u endotracheal sputum culture  - holding eliquis now, last dose AM 11/2  - can continue heparin for ppx  - plan for bronchoscopy with BAL tomorrow morning, will obtain consent from her sister/mPOA   - repeat rapid covid for pre-procedure screening  - hold tube feeds at midnight

## 2020-11-03 NOTE — PROGRESS NOTES
[facility]  Cardiology  Progress Note    Patient Name: Patsy Morris  MRN: [id number]  Admission Date: 10/13/2020  Hospital Length of Stay: 21 days  Code Status: Full Code   Attending Physician: Loi Quiles MD   Primary Care Physician: Luisana Cartagena MD  Expected Discharge Date:   Principal Problem:Acute on chronic respiratory failure with hypoxia and hypercapnia    Subjective:     Brief HPI:    Patsy Morris is a 70 y.o.female with hypertension. She has chronic atrial fibrillation and heart failure with preserved ejection fraction. She has chronic obstructive pulmonary disease being on home oxygen with CO2 retention. She has undergone nephrectomy for renal cell carcinoma and has chronic kidney disease. She was admitted to [facility] in 2020 and 2020 with heart failure and exacerbations of her chronic obstructive pulmonary disease. She went to therapy at [facility].      She used to be on furosemide 40 mg Q24 however after her last hospitalization she had the does of furosemide increased to 80 mg Q24. The dose was reduced to 40 mg Q24 on 10/8/2020. She became increasingly short of breath and presents fluid overloaded in heart failure as well as an exacerbation of her COPD with high CO2. She was admitted to the ICU.    Hospital Course:    Diuresis.    BIPAP.    Respiratory treatments.    10/15/2020: Echo: Normal left ventricular size and systolic function. EF 60%. Moderately dilated LA. Mildly dilated RV. SPAP 49 mmHg. Small pericardial effusion.    10/16/2020: Apixiban 5 mg Q12 was changed to heparin iv for consideration of bronchoscopy.    10/21/2020: Transferred from ICU to telemetry.    10/27/2020: Walked 150 ft with PT.    2020: AB.21/125/78/90%/FIO2 45%    2020: Respiratory failure. Intubated in ICU.    Interval History:     Remains on mechanical ventilation.    CXR with extensive infiltrates.      Review of Systems   Unable to perform ROS:  intubated       Objective:     Vital Signs (Most Recent):  Temp: 99.3 °F (37.4 °C) (11/03/20 0705)  Pulse: 96 (11/03/20 0800)  Resp: 20 (11/03/20 0800)  BP: (!) 115/59 (11/03/20 0800)  SpO2: (!) 93 % (11/03/20 0800) Vital Signs (24h Range):  Temp:  [98.9 °F (37.2 °C)-99.9 °F (37.7 °C)] 99.3 °F (37.4 °C)  Pulse:  [] 96  Resp:  [19-25] 20  SpO2:  [86 %-95 %] 93 %  BP: ()/(50-72) 115/59  Arterial Line BP: ()/(44-62) 98/54     Weight: 97.6 kg (215 lb 2.7 oz)  Body mass index is 38.12 kg/m².    SpO2: (!) 93 %  O2 Device (Oxygen Therapy): ventilator      Intake/Output Summary (Last 24 hours) at 11/3/2020 0815  Last data filed at 11/3/2020 0604  Gross per 24 hour   Intake 498.35 ml   Output 1510 ml   Net -1011.65 ml       Lines/Drains/Airways     Drain                 NG/OG Tube 11/02/20 0100 Center mouth 1 day         Urethral Catheter 11/02/20 0100 1 day          Airway               Airway Anesthesia 11/01/20 1 day          Arterial Line            Arterial Line 11/01/20 Right Radial 2 days          Peripheral Intravenous Line                 Midline Catheter Insertion/Assessment  - Double Lumen 10/20/20 1220 Right median cubital vein (antecubital fossa)  13 days                Physical Exam   Constitutional: She appears well-developed and well-nourished. She appears ill. She is sedated and intubated.   Neck: Carotid bruit is not present.   Cardiovascular: Normal rate, S1 normal and S2 normal. An irregularly irregular rhythm present.   Pulmonary/Chest: She is intubated. She has rhonchi in the right lower field and the left lower field.   Abdominal: Soft. Normal appearance.   Musculoskeletal:      Right ankle: She exhibits no swelling.      Left ankle: She exhibits no swelling.   Neurological: She is unresponsive.   Skin: Skin is warm and dry.     Current Medications:     atorvastatin  80 mg Oral QHS    ceFEPime (MAXIPIME) IVPB  1 g Intravenous Q12H    docusate sodium  100 mg Oral BID     escitalopram oxalate  10 mg Oral QHS    famotidine  20 mg Per NG tube Daily    ferrous sulfate  325 mg Oral Daily    gabapentin  100 mg Oral BID    heparin (porcine)  5,000 Units Subcutaneous Q12H    metoprolol tartrate  25 mg Per NG tube BID    miconazole NITRATE 2 %   Topical (Top) BID    mupirocin   Nasal BID    vitamin renal formula (B-complex-vitamin c-folic acid)  1 capsule Oral Daily     Current Laboratory Results:    Recent Results (from the past 24 hour(s))   Culture, Respiratory with Gram Stain    Collection Time: 11/02/20 11:48 AM    Specimen: Tracheal Aspirate; Respiratory   Result Value Ref Range    Gram Stain (Respiratory) Rare WBC's     Gram Stain (Respiratory) No organisms seen    Vancomycin, Random    Collection Time: 11/02/20 12:43 PM   Result Value Ref Range    Vancomycin, Random 31.2 Not established ug/mL   Basic metabolic panel    Collection Time: 11/03/20  3:50 AM   Result Value Ref Range    Sodium 140 136 - 145 mmol/L    Potassium 4.1 3.5 - 5.1 mmol/L    Chloride 90 (L) 95 - 110 mmol/L    CO2 36 (H) 23 - 29 mmol/L    Glucose 104 70 - 110 mg/dL    BUN 59 (H) 8 - 23 mg/dL    Creatinine 2.3 (H) 0.5 - 1.4 mg/dL    Calcium 8.6 (L) 8.7 - 10.5 mg/dL    Anion Gap 14 8 - 16 mmol/L    eGFR if African American 24 (A) >60 mL/min/1.73 m^2    eGFR if non African American 21 (A) >60 mL/min/1.73 m^2   Magnesium    Collection Time: 11/03/20  3:50 AM   Result Value Ref Range    Magnesium 2.0 1.6 - 2.6 mg/dL   Vancomycin, Random    Collection Time: 11/03/20  3:50 AM   Result Value Ref Range    Vancomycin, Random 23.1 Not established ug/mL     Current Imaging Results:    XR Non-Rad Performed NG/Gastric Tube Check   Final Result      Tip of nasogastric tube in the proximal stomach.         Electronically signed by: Charlotte Howell   Date:    11/02/2020   Time:    00:25      X-Ray Chest 1 View   Final Result   Abnormal      Interval progression of abnormal intrathoracic findings, increasing bilateral  pulmonary opacities, as discussed above.      ET tube noted, the tip above the yamilka.      Enteric tube noted, the distal tip extends subdiaphragmatic below the field of view however the side hole is likely along the distal esophagus, advancement recommended.      This report was flagged in Epic as abnormal.         Electronically signed by: Hammad Ayala   Date:    11/02/2020   Time:    00:09      X-Ray Chest AP Portable   Final Result      Stable right upper lobe consolidation.  Findings remain concerning for pneumonia with small cavitation as described previously.      Improved aeration right lung base.  Small pleural effusions, decreased in volume on the right.         Electronically signed by: Elsa Alcantar   Date:    10/30/2020   Time:    11:47      X-Ray Chest AP Portable   Final Result      Worsening right basilar atelectasis or consolidation.      Right upper lobe consolidation is improved.  There may be a tiny pneumatocele or central cavitation.      Pleural effusions are improved.      Continued follow-up advised.         Electronically signed by: Elsa Alcantar   Date:    10/28/2020   Time:    10:24      X-Ray Chest AP Portable   Final Result      No significant change.  There is cardiomegaly, multifocal airspace opacities and bilateral effusions.         Electronically signed by: Ajay Camara MD   Date:    10/20/2020   Time:    08:48      US Lower Extremity Veins Bilateral   Final Result      No evidence of deep venous thrombosis in either lower extremity.         Electronically signed by: Ceasar Carreon MD   Date:    10/15/2020   Time:    21:53      CT Chest Without Contrast   Final Result      The findings highly concerning for extensive diffuse pulmonary infection with bilateral pleural fluid collections right greater than left.         Electronically signed by: Regulo Bassett MD   Date:    10/15/2020   Time:    12:01      X-Ray Chest AP Portable   Final Result      Interval worsening of diffuse  bilateral airspace disease and moderate bilateral pleural effusions when compared to 09/09/2020         Electronically signed by: Halina Plascencia   Date:    10/13/2020   Time:    09:40          10/15/2020: Echo:    The left ventricle is normal in size with normal systolic function. The estimated ejection fraction is 60%.  A diastolic pattern consistent with atrial fibrillation observed.  Moderate left atrial enlargement.  Mild right ventricular enlargement.  Normal right ventricular systolic function.  Severe right atrial enlargement.  The aortic valve is mildly sclerotic.  The mitral valve is mildly sclerotic.  Mild mitral regurgitation.  Mild tricuspid regurgitation.  There is mild pulmonary hypertension.  The estimated PA systolic pressure is 49 mmHg.  Intermediate central venous pressure (8 mmHg).  Small circumferential pericardial effusion.  There is a left pleural effusion.      Assessment and Plan:     Problem List:    Active Diagnoses:    Diagnosis Date Noted POA    PRINCIPAL PROBLEM:  Acute on chronic respiratory failure with hypoxia and hypercapnia [J96.21, J96.22] 08/03/2016 Yes    On mechanically assisted ventilation [Z99.11] 11/02/2020 Not Applicable    Pleural effusion [J90] 11/02/2020 Yes    Normocytic anemia [D64.9] 10/27/2020 Yes    Cavitary lesion of lung [J98.4] 10/16/2020 Yes    Atrial fibrillation with rapid ventricular response [I48.91] 09/01/2020 Yes    Debility [R53.81] 08/26/2020 Yes    Encounter for palliative care [Z51.5] 08/25/2020 Not Applicable    Current moderate episode of major depressive disorder without prior episode [F32.1] 04/30/2020 Yes    Acute on chronic diastolic congestive heart failure [I50.33] 05/20/2016 Yes    Chronic kidney disease (CKD) stage G4/A1, severely decreased glomerular filtration rate (GFR) between 15-29 mL/min/1.73 square meter and albuminuria creatinine ratio less than 30 mg/g [N18.4]  Yes    Dyslipidemia [E78.5] 02/22/2016 Yes    Essential  hypertension [I10] 10/15/2014 Yes      Problems Resolved During this Admission:    Diagnosis Date Noted Date Resolved POA    Severe sepsis [A41.9, R65.20] 11/02/2020 11/02/2020 Unknown    Shortness of breath [R06.02] 11/01/2020 11/02/2020 Yes    Acute on chronic congestive heart failure [I50.9] 10/21/2020 10/27/2020 Yes    Acute on chronic congestive heart failure [I50.9] 10/13/2020 10/13/2020 Yes    Pulmonary nodules/lesions, multiple [R91.8] 03/22/2019 10/14/2020 Yes    Obstructive sleep apnea [G47.33]  11/02/2020 Yes    Paroxysmal atrial fibrillation [I48.0] 08/03/2016 10/13/2020 Yes    Chronic hypercapnic respiratory failure [J96.12] 03/11/2016 10/13/2020 Yes    Obesity hypoventilation syndrome [E66.2] 02/25/2016 10/27/2020 Yes    Renal carcinoma [C64.9] 03/10/2015 10/13/2020 Yes       Assessment and Plan:     1. Heart Failure, Diastolic, Chronic              8/24/2020: Echo: Normal left ventricular size and systolic function. Mildly dilated LA.   10/15/2020: Echo: Normal left ventricular size and systolic function. EF 60%. Moderately dilated LA. Mildly dilated RV. SPAP 49 mmHg. Small pericardial effusion.              At NH was on furosemide 40 mg Q24.              10/13/2020: Presented fluid overloaded in HF. Received furosemide 80 mg iv Q12.   10/20/2020: CXR with extensive infiltrates and pleural effusions. .    10/26/2020: .   10/28/2020: CXR was improving.   Was on furosemide 40 mg po Q12.   On furosemide 20 mg iv Q12.              Does not appear fluid overloaded.      2. Atrial Fibrillation              In chronic atrial fibrillation.              On apixiban.              Was on metoprolol 50 mg Q12.              Rate was well controlled mostly  bpm.   On metoprolol 25 mg Q12.   On metoprolol 5 mg iv Q4 PRN for VRR >110 bpm.     3. Chronic Anticoagulation              Been on apixiban 5 mg Q12.   10/16/2020: Apixiban 5 mg Q12 was changed to heparin iv.   Been on apixiban 5  mg Q12.   No bleeding.   On heparin 5,000 sc Q12 for plans of bronchoscopy.    4. Hypertension   On metoprolol 25 mg Q12 and furosemide 20 mg iv Q12.   Well controlled.                5. Chronic Kidney Disease, Stage 4              History or renal carcinoma and nephrectomy.              10/13/2020: BUN/crea 27/2.0. CrCl 28.   10/20/2020: BUN/crea 56/1.8. CrCl 32.   10/25/2020: BUN/crea 49/1.6. CrCl 32.    2020: BUN/crea 58/2.0. CrCl 24.     6. Chronic Obstructive Pulmonary Disease              Chronic respiratory failure with CO2 retention.              10/13/2020: 7.23/94/73/39/90% on FiO2 of 40%.   Was overall slowly improving.    2020: AB.21/125/78/90%/FIO2 45%.   Remains intubated.     7. Weakness   10/27/2020: Walked 150 ft with PT.      VTE Risk Mitigation (From admission, onward)         Ordered     heparin (porcine) injection 5,000 Units  Every 12 hours      20 1551     Place sequential compression device  Until discontinued      10/13/20 1405     IP VTE HIGH RISK PATIENT  Once      10/13/20 1405                Diana Meredith MD  Cardiology  [facility]

## 2020-11-03 NOTE — NURSING
Pt remains free from fall and injuries. Remains sedated with propofol and fentanyl. Open eyes at times but does not follow commands. Still on afib / aflutter with episodes of bradycardia 40's - 50's. Due metoprolol hold last night. Will continue to monitor.

## 2020-11-03 NOTE — PROGRESS NOTES
Pt received intubated and on the ventilator this shift. No changes were made. Will continue to monitor.

## 2020-11-03 NOTE — PLAN OF CARE
Consent obtained from sister (Shelly) Earlene Dexter over telephone for bronchoscopy, thoracentesis, and chest tube placement planned for tomorrow 11/4. Consent signed and witnessed by patient's Nurse Zee.     Fadumo Bryant MD  Pulmonary / Critical Care Fellow  11/03/2020  1:04 PM

## 2020-11-04 NOTE — PLAN OF CARE
Low BP readings (arterial), on Propofol and Fentanyl infusions. Informed eICU MD. D/c'd sedations and changed to Precedex drip as ordered. Gradually, BP improved. On NPO post midnight in preparation for Bronchoscopy in AM. Bed bath done. Provided rest and comfort.

## 2020-11-04 NOTE — ASSESSMENT & PLAN NOTE
Continue rate control as likely contributing to diastolic dysfunction  - hold eliquis for now for bronchoscopy today  - heparin for ppx for now  - currently in flutter with variable block, HR controlled

## 2020-11-04 NOTE — PROGRESS NOTES
Pharmacokinetic Assessment Follow Up: IV Vancomycin    Vancomycin serum concentration assessment(s):    The random level was drawn correctly and can be used to guide therapy at this time. The measurement is within the desired definitive target range of 10 to 20 mcg/mL.    Vancomycin Regimen Plan:    Give one time dose of Vancomycin 750mg IVPB.  Patient has CLIFFORD.   Re-dose when the random level is less than 20 mcg/mL, next level to be drawn at 830 on 11/4    Drug levels (last 3 results):  Recent Labs   Lab Result Units 11/02/20  1243 11/03/20  0350 11/03/20  1539   Vancomycin, Random ug/mL 31.2 23.1 18.9       Pharmacy will continue to follow and monitor vancomycin.    Please contact pharmacy at extension [phone] for questions regarding this assessment.    Thank you for the consult,   Bertha Kemp       Patient brief summary:  Patsy Morris is a 70 y.o. female initiated on antimicrobial therapy with IV Vancomycin for treatment of skin & soft tissue infection    The patient's current regimen is pulse dosing.    Drug Allergies:   Review of patient's allergies indicates:  No Known Allergies    Actual Body Weight:   97.6kg    Renal Function:   Estimated Creatinine Clearance: 24.6 mL/min (A) (based on SCr of 2.3 mg/dL (H)).,     Dialysis Method (if applicable):  N/A    CBC (last 72 hours):  Recent Labs   Lab Result Units 11/01/20  0627 11/02/20  0204   WBC K/uL 8.97 8.79   Hemoglobin g/dL 9.5* 8.8*   Hematocrit % 31.8* 29.1*   Platelets K/uL 163 174   Gran % % 81.1* 86.5*   Lymph % % 7.6* 6.1*   Mono % % 9.9 6.4   Eosinophil % % 1.1 0.6   Basophil % % 0.1 0.2   Differential Method  Automated Automated       Metabolic Panel (last 72 hours):  Recent Labs   Lab Result Units 11/01/20  0505 11/02/20  0204 11/03/20  0350   Sodium mmol/L 141 141 140   Potassium mmol/L 5.1 5.3* 4.1   Chloride mmol/L 90* 88* 90*   CO2 mmol/L 38* 40* 36*   Glucose mg/dL 94 111* 104   BUN mg/dL 52* 58* 59*   Creatinine mg/dL 1.8* 2.0* 2.3*    Magnesium mg/dL 2.4 2.2 2.0       Vancomycin Administrations:  vancomycin given in the last 96 hours                     vancomycin 2 g in dextrose 5 % 500 mL IVPB (mg) 2,000 mg New Bag 11/02/20 0813                    Microbiologic Results:  Microbiology Results (last 7 days)       Procedure Component Value Units Date/Time    Blood culture [[id number]] Collected: 11/02/20 0740    Order Status: Completed Specimen: Blood Updated: 11/03/20 1822     Blood Culture, Routine No Growth to date      No Growth to date    Blood culture [[id number]] Collected: 11/02/20 0740    Order Status: Completed Specimen: Blood Updated: 11/03/20 1822     Blood Culture, Routine No Growth to date      No Growth to date    Culture, Respiratory with Gram Stain [[id number]] Collected: 11/02/20 1148    Order Status: Completed Specimen: Respiratory from Tracheal Aspirate Updated: 11/03/20 0907     Respiratory Culture No Growth     Gram Stain (Respiratory) Rare WBC's     Gram Stain (Respiratory) No organisms seen    Culture, Respiratory with Gram Stain [[id number]]     Order Status: Canceled Specimen: Respiratory from Sputum, Expectorated

## 2020-11-04 NOTE — INTERVAL H&P NOTE
The patient has been examined and the H&P has been reviewed:    I concur with the findings and changes have been noted since the H&P was written: patient intubated on 11/1 for worsening hypoxic/hypercapnic respiratory failure, thought to be due to worsening volume overload and possible superimposed pneumonia. Now with CLIFFORD on CKD as well. Overnight with some hypotension due to sedation which is now improved after switching from propofol and fentanyl to precedex. Consent obtained from sister Shelly Dexter yesterday for bronchoscopy today. She has been NPO since midnight. Her eliquis has been held, last dose was the morning of 11/2.    ASA 3-4  Mallampati: currently intubated    Active Hospital Problems    Diagnosis  POA    *Acute on chronic respiratory failure with hypoxia and hypercapnia [J96.21, J96.22]  Yes    Acute kidney injury superimposed on chronic kidney disease [N17.9, N18.9]  No    On mechanically assisted ventilation [Z99.11]  Not Applicable    Pleural effusion [J90]  Yes    Cavitary lesion of lung [J98.4]  Yes    Atrial fibrillation with rapid ventricular response [I48.91]  Yes    Debility [R53.81]  Yes    Acute on chronic diastolic congestive heart failure [I50.33]  Yes    Chronic kidney disease (CKD) stage G4/A1, severely decreased glomerular filtration rate (GFR) between 15-29 mL/min/1.73 square meter and albuminuria creatinine ratio less than 30 mg/g [N18.4]  Yes    Dyslipidemia [E78.5]  Yes    Essential hypertension [I10]  Yes      Resolved Hospital Problems    Diagnosis Date Resolved POA    Severe sepsis [A41.9, R65.20] 11/02/2020 Unknown    Shortness of breath [R06.02] 11/02/2020 Yes    Normocytic anemia [D64.9] 11/03/2020 Yes    Acute on chronic congestive heart failure [I50.9] 10/27/2020 Yes    Acute on chronic congestive heart failure [I50.9] 10/13/2020 Yes    Encounter for palliative care [Z51.5] 11/03/2020 Not Applicable    Current moderate episode of major depressive  disorder without prior episode [F32.1] 11/03/2020 Yes    Pulmonary nodules/lesions, multiple [R91.8] 10/14/2020 Yes    Obstructive sleep apnea [G47.33] 11/02/2020 Yes    Paroxysmal atrial fibrillation [I48.0] 10/13/2020 Yes    Chronic hypercapnic respiratory failure [J96.12] 10/13/2020 Yes    Obesity hypoventilation syndrome [E66.2] 10/27/2020 Yes    Renal carcinoma [C64.9] 10/13/2020 Yes

## 2020-11-04 NOTE — PROGRESS NOTES
Pharmacokinetic Assessment Follow Up: IV Vancomycin    Vancomycin serum concentration assessment(s):    The random level was drawn correctly and can be used to guide therapy at this time. The measurement is within } the desired definitive target range of 10 to 20 mcg/mL.level was drawn at 09:00, the level just posted at 16:39. Level will be in range since its now 7.5hours after pulling level.    Vancomycin Regimen Plan:  Vancomycin 750mg once. Draw random level 11/5@1800.    Drug levels (last 3 results):  Recent Labs   Lab Result Units 11/03/20  0350 11/03/20  1539 11/04/20  0904   Vancomycin, Random ug/mL 23.1 18.9 20.4       Pharmacy will continue to follow and monitor vancomycin.    Please contact pharmacy at extension [phone] for questions regarding this assessment.    Thank you for the consult,   Babita Edward       Patient brief summary:  Patsy Morris is a 70 y.o. female initiated on antimicrobial therapy with IV Vancomycin for treatment of skin & soft tissue infection    The patient's current regimen is dose by levels .    Drug Allergies:   Review of patient's allergies indicates:  No Known Allergies    Actual Body Weight:   97.6 kg    Renal Function:   Estimated Creatinine Clearance: 22.6 mL/min (A) (based on SCr of 2.5 mg/dL (H)).,     Dialysis Method (if applicable):  N/A    CBC (last 72 hours):  Recent Labs   Lab Result Units 11/02/20 0204 11/04/20 0441   WBC K/uL 8.79 8.71   Hemoglobin g/dL 8.8* 9.0*   Hematocrit % 29.1* 27.8*   Platelets K/uL 174 154   Gran % % 86.5* 78.7*   Lymph % % 6.1* 10.9*   Mono % % 6.4 8.6   Eosinophil % % 0.6 1.4   Basophil % % 0.2 0.1   Differential Method  Automated Automated       Metabolic Panel (last 72 hours):  Recent Labs   Lab Result Units 11/02/20 0204 11/03/20  0350 11/04/20  0441   Sodium mmol/L 141 140 139   Potassium mmol/L 5.3* 4.1 3.7   Chloride mmol/L 88* 90* 93*   CO2 mmol/L 40* 36* 36*   Glucose mg/dL 111* 104 146*   BUN mg/dL 58* 59* 60*    Creatinine mg/dL 2.0* 2.3* 2.5*   Magnesium mg/dL 2.2 2.0 2.2       Vancomycin Administrations:  vancomycin given in the last 96 hours                   vancomycin 750 mg in dextrose 5 % 250 mL IVPB (ready to mix system) (mg) 750 mg New Bag 11/03/20 1938    vancomycin 2 g in dextrose 5 % 500 mL IVPB (mg) 2,000 mg New Bag 11/02/20 0813                Microbiologic Results:  Microbiology Results (last 7 days)     Procedure Component Value Units Date/Time    Culture, Respiratory [[id number]] Collected: 11/04/20 1028    Order Status: Sent Specimen: Respiratory from Bronchial Wash Updated: 11/04/20 1703    AFB Culture & Smear [[id number]] Collected: 11/04/20 1029    Order Status: Sent Specimen: Body Fluid from Lung, RUL Updated: 11/04/20 1703    Direct AFB stain [[id number]] Collected: 11/04/20 1029    Order Status: Sent Specimen: Body Fluid from Lung, RUL Updated: 11/04/20 1703    Fungus culture [[id number]] Collected: 11/04/20 1029    Order Status: Sent Specimen: Body Fluid from Lung, RUL Updated: 11/04/20 1703    Fungus culture [[id number]] Collected: 11/04/20 1202    Order Status: Sent Specimen: Body Fluid from Pleural Fluid Updated: 11/04/20 1253    Culture, Body Fluid - Bactec [[id number]] Collected: 11/04/20 1202    Order Status: Sent Specimen: Body Fluid from Pleural Fluid Updated: 11/04/20 1202    AFB culture (includes stain) [[id number]] Collected: 11/04/20 1202    Order Status: Sent Specimen: Body Fluid from Pleural Fluid Updated: 11/04/20 1202    Culture, Respiratory with Gram Stain [[id number]] Collected: 11/02/20 1148    Order Status: Completed Specimen: Respiratory from Tracheal Aspirate Updated: 11/04/20 1037     Respiratory Culture Normal respiratory leonardo      No S aureus or Pseudomonas isolated.     Gram Stain (Respiratory) Rare WBC's     Gram Stain (Respiratory) No organisms seen    Gram stain [[id number]] Collected: 11/04/20 1029    Order Status: Canceled Specimen: Body Fluid from Lung, RUL     Blood  culture [[id number]] Collected: 11/02/20 0740    Order Status: Completed Specimen: Blood Updated: 11/03/20 1822     Blood Culture, Routine No Growth to date      No Growth to date    Blood culture [[id number]] Collected: 11/02/20 0740    Order Status: Completed Specimen: Blood Updated: 11/03/20 1822     Blood Culture, Routine No Growth to date      No Growth to date    Culture, Respiratory with Gram Stain [[id number]]     Order Status: Canceled Specimen: Respiratory from Sputum, Expectorated

## 2020-11-04 NOTE — PLAN OF CARE
CM did not meet with pt today, as she had two procedures, bronchoscopy, and chest tube placement.    Pt remains intubated and sedated.    Dispo uncertain at this time.    Trilogy denied again after peer to peer with .     ANNE to follow for plans and arrangemetns

## 2020-11-04 NOTE — RESPIRATORY THERAPY
0735:  Patient remains on  with settings as documented.  All alarms on and audible.  Ambu and mask at bedside.  ETT moved to center and secure.  Vent plugged into red outlet with wheels in locked position.  Patient responds to voice and is restrained..  Oral care done at this time.  Bronch cart outside room and ready for use.  Will continue to monitor.    1000:  BAL of RUL done per Dr Bryant and Dr Casillas.  Specimen labeled and sent to lab.  FiO2 increased to 100% during procedure and back down to 45% afterwards.

## 2020-11-04 NOTE — PROCEDURES
[facility]  Bronchoscopy   Procedure Note    SUMMARY     Date of Procedure: 11/4/2020    Procedure: Bronchoscopy, Diagnostic  Bronchoalveolar lavage, BAL    Provider: Fadumo Bryant MD    Assisting Provider: Harjeet Casillas MD    Pre-Procedure Diagnosis: cavitary lung lesion, acute on chronic hypoxic respiratory failure, pneumonia    Post-Procedure Diagnosis: same    Indication: Patient with cavitary lung lesion on right with acute on chronic hypoxic respiratory failure, bilateral lung infiltrates concerning for pneumonia. Bronch with BAL performed to send for microbiology and cytology given recent clinical decline and no positive culture data.     Anesthesia: propofol continuous infusion    Description of the Findings of the Procedure:     Patient's medical power of  (Earlene Dexter) was consented for the procedure with all risks and benefits of the procedure explained in detail as the patient is intubated and sedated.  She was given the opportunity to ask questions and all concerns were answered. Patient remained intubated for the procedure and was receiving continuous infusion of propofol for sedation. 2mL of 1% lidocaine was instilled into the endotracheal tube. Then, the bronchocope was inserted into the main airway via the endotracheal tube. An additional 2cc of 1% lidocaine was used topically on the yamilka. An anatomical survey was done of the right mainstem bronchus and the right subsegmental bronchi. Mucosa of the yamilka,and right mainstem bronchus and subsegmental airways were normal appearing without obvious endobronchial lesions. Some thin, yellowish secretions were present that cleared easily with suctioning.  A bronchialalveolar lavage was then performed using 120 cc of normal saline instilled into the airways then aspirated back.    BAL RUL:   120cc normal saline instilled  16cc BAL fluid from RUL returned      Complications: None; patient tolerated the procedure  well.    Estimated Blood Loss (EBL): Minimal           Implants: none    Specimens: Sent serosanguinous fluid       Condition: Fair        Disposition: ICU - intubated and hemodynamically stable.    Fadumo Bryant MD  Pulmonary / Critical Care Fellow  11/04/2020  12:42 PM        Procedure performed by Fadumo Bryant MD as outlined above.  I was present at the bedside for the entire procedure.    Harjeet Casillas MD  Pulmonary/Critical Care Medicine

## 2020-11-04 NOTE — SUBJECTIVE & OBJECTIVE
Interval History: Overnight agitated, pulling at lines, had some episodes of hypotension thought to be due to sedation. BP improved after changing from fentanyl/propofol to precedex. Remains afebrile and on stable ventilator settings. Lasix held yesterday due to CLIFFORD but still having good UOP, net negative ~500cc.     Objective:     Vital Signs (Most Recent):  Temp: 97.9 °F (36.6 °C) (11/04/20 0702)  Pulse: 70 (11/04/20 0735)  Resp: 20 (11/04/20 0735)  BP: 133/61 (11/04/20 0735)  SpO2: 95 % (11/04/20 0735) Vital Signs (24h Range):  Temp:  [97.9 °F (36.6 °C)-99.5 °F (37.5 °C)] 97.9 °F (36.6 °C)  Pulse:  [46-86] 70  Resp:  [19-35] 20  SpO2:  [92 %-97 %] 95 %  BP: ()/(51-96) 133/61  Arterial Line BP: ()/(36-60) 124/54     Weight: 97.6 kg (215 lb 2.7 oz)  Body mass index is 38.12 kg/m².      Intake/Output Summary (Last 24 hours) at 11/4/2020 0831  Last data filed at 11/4/2020 0700  Gross per 24 hour   Intake 1232.92 ml   Output 1740 ml   Net -507.08 ml       Physical Exam  Constitutional:       General: She is not in acute distress.     Comments: Intubated and sedated     HENT:      Head: Normocephalic and atraumatic.   Eyes:      Conjunctiva/sclera: Conjunctivae normal.      Pupils: Pupils are equal, round, and reactive to light.   Neck:      Musculoskeletal: Normal range of motion.      Comments: Thick neck    Cardiovascular:      Rate and Rhythm: Normal rate. Rhythm irregular.      Pulses: Normal pulses.      Heart sounds: Normal heart sounds.   Pulmonary:      Comments: Coarse breath sounds due to ventilator, diminished sounds at bilateral bases, no wheezing  Abdominal:      General: Bowel sounds are normal. There is no distension.      Palpations: Abdomen is soft.      Tenderness: There is no abdominal tenderness.   Musculoskeletal:         General: Swelling (1+ bilateral lower extremities, slightly improved today) present.   Skin:     General: Skin is warm and dry.   Neurological:      Comments:  Intubated and sedated           Vents:  Vent Mode: A/C (11/04/20 0735)  Ventilator Initiated: Yes (11/01/20 2338)  Set Rate: 20 BPM (11/04/20 0735)  Vt Set: 390 mL (11/04/20 0735)  Pressure Support: 0 cmH20 (11/04/20 0735)  PEEP/CPAP: 7 cmH20 (11/04/20 0735)  Oxygen Concentration (%): 45 (11/04/20 0735)  Peak Airway Pressure: 34 cmH2O (11/04/20 0735)  Plateau Pressure: 0 cmH20 (11/04/20 0735)  Total Ve: 8.18 mL (11/04/20 0735)  F/VT Ratio<105 (RSBI): (!) 45.56 (11/04/20 0735)    Lines/Drains/Airways     Drain                 NG/OG Tube 11/02/20 0100 Center mouth 2 days         Urethral Catheter 11/02/20 0100 2 days          Airway               Airway Anesthesia 11/01/20 2 days          Arterial Line            Arterial Line 11/01/20 Right Radial 3 days          Peripheral Intravenous Line                 Midline Catheter Insertion/Assessment  - Double Lumen 10/20/20 1220 Right median cubital vein (antecubital fossa)  14 days                Significant Labs:    CBC/Anemia Profile:  Recent Labs   Lab 11/04/20 0441   WBC 8.71   HGB 9.0*   HCT 27.8*      MCV 85   RDW 15.6*        Chemistries:  Recent Labs   Lab 11/03/20  0350 11/04/20  0441    139   K 4.1 3.7   CL 90* 93*   CO2 36* 36*   BUN 59* 60*   CREATININE 2.3* 2.5*   CALCIUM 8.6* 8.7   MG 2.0 2.2       All pertinent labs within the past 24 hours have been reviewed.    Significant Imaging:  no imaging over past 24 hours.

## 2020-11-04 NOTE — PROCEDURES
"Patsy Morris is a 70 y.o. female patient.    Temp: 98.4 °F (36.9 °C) (20 1153)  Pulse: 72 (20 1153)  Resp: 20 (20 1153)  BP: (!) 112/56 (20 0900)  SpO2: 99 % (20 1153)  Weight: 97.6 kg (215 lb 2.7 oz) (20 0334)  Height: 5' 3" (160 cm) (20 1624)  Mallampati Scale: (intubated)  ASA Classification: Class 3    Chest Tube Insertion    Date/Time: 2020 11:54 AM  Location procedure was performed: [facility] ICU  Performed by: Fadumo Bryant MD  Authorized by: Fadumo Bryant MD   Assisting provider: Harjeet Casillas MD  Post-operative diagnosis: same  Pre-operative diagnosis: pleural effusion  Consent Done: Yes  Consent: Written consent obtained.  Risks and benefits: risks, benefits and alternatives were discussed  Consent given by: power of   Patient understanding: patient states understanding of the procedure being performed  Patient consent: the patient's understanding of the procedure matches consent given  Procedure consent: procedure consent matches procedure scheduled  Relevant documents: relevant documents present and verified  Test results: test results available and properly labeled  Site marked: the operative site was marked  Imaging studies: imaging studies available  Required items: required blood products, implants, devices, and special equipment available  Patient identity confirmed:  and MRN  Time out: Immediately prior to procedure a "time out" was called to verify the correct patient, procedure, equipment, support staff and site/side marked as required.  Indications: pleural effusion  Patient sedated: yes  Sedatives: propofol  Anesthesia: local infiltration    Anesthesia:  Local Anesthetic: lidocaine 1% without epinephrine (5)  Anesthetic total: 5 mL  Preparation: skin prepped with ChloraPrep  Placement location: right lateral  Scalpel size: 11  Tube size (Citizen of [place]): 14.  Ultrasound guidance: used to locate pocket of fluid.  Tension pneumothorax heard: " no  Tube connected to: initially to suction, now clamped after drainage of 650cc.  Drainage characteristics: serosanguinous and yellow  Drainage amount: 650 ml  Suture material: 0 silk  Dressing: 4x4 sterile gauze  Post-insertion x-ray comments: tube in correct position, low lying over right hemidiaphragm  Patient tolerance: Patient tolerated the procedure well with no immediate complications  Technical procedures used: seldinger technique, technically difficult procedure due to body habitus with skin folds as well dependent and posterior location of fluid collection. successful placement after 2 attempts  Complications: No  Estimated blood loss (mL): 4  Specimens: Yes (sent for microbiology, cytology, and appropriate chemitries)  Implants: Yes (14-Turk[place] jesica pleural catheter)  Comments:     Procedure performed by Fadumo Bryant MD as outlined above.  I was present at the bedside for the entire procedure.    Harjeet Casillas MD  Pulmonary/Critical Care Medicine          Fadumo Bryant  11/4/2020

## 2020-11-04 NOTE — ASSESSMENT & PLAN NOTE
- bilateral, R>>L  - bedside US today with persistent small-moderate sized effusion on right, smaller on left, free flowing appearance of fluid  - pleural effusion most likely related to heart failure (bilateral though R>>L, free flowing appearance) however given cavitary lesion on right,  infectious vs malignant on differential. Given her baseline severe restrictive lung disease, any additional impingement on that worsens her ventilatory abilities. She would likely benefit from drainage of that fluid with hopes it will improve her respiratory dynamics and ability to be liberated from ventilator   - possible right side thoracentesis vs chest tube placement today, consent obtained from family  - diuresis being held today due to yaz

## 2020-11-04 NOTE — EICU
BP72/37 ,HR 55-70/min    On camera intubated and sedated on propofol 25 mcg/kg/min,fentanyl 125 mcg/hour  Easily arousable    UO 610ML/12 hours    Plan:  Discontinue propofol and fentanyl  Initiate dexmedetomidine infusion to keep RASS 0  Monitor hourly Urine output and if less than 0.5ml/kg/hour will attempt fluid bolus

## 2020-11-04 NOTE — EICU
Bilateral soft wrist restrains ordered for 24 hours as patient at risk of self harm as she is pulling on lines and tubes

## 2020-11-04 NOTE — PROGRESS NOTES
Pharmacist Renal Dose Adjustment Note    Patsy Morris is a 70 y.o. female being treated with the medication cefepime    Patient Data:    Vital Signs (Most Recent):  Temp: 97.9 °F (36.6 °C) (11/04/20 0702)  Pulse: 70 (11/04/20 0735)  Resp: 20 (11/04/20 0735)  BP: 133/61 (11/04/20 0735)  SpO2: 95 % (11/04/20 0735) Vital Signs (72h Range):  Temp:  [96.4 °F (35.8 °C)-100.8 °F (38.2 °C)]   Pulse:  []   Resp:  [8-35]   BP: ()/()   SpO2:  [74 %-100 %]   Arterial Line BP: ()/(36-64)      Recent Labs   Lab 11/02/20  0204 11/03/20  0350 11/04/20  0441   CREATININE 2.0* 2.3* 2.5*     Serum creatinine: 2.5 mg/dL (H) 11/04/20 0441  Estimated creatinine clearance: 22.6 mL/min (A)    Medication:cefepime dose: 1g frequency 12 will be changed to medication:cefepime dose:1g frequency:q24h    Pharmacist's Name: Bertha Kemp  Pharmacist's Extension: [phone]

## 2020-11-04 NOTE — PROGRESS NOTES
[facility]  Pulmonology  Progress Note    Patient Name: Patsy Morris  MRN: [id number]  Admission Date: 10/13/2020  Hospital Length of Stay: 22 days  Code Status: Full Code  Attending Provider: Loi Quiles MD  Primary Care Provider: Luisana Cartagena MD   Principal Problem: Acute on chronic respiratory failure with hypoxia and hypercapnia    Subjective:     Interval History: Overnight agitated, pulling at lines, had some episodes of hypotension thought to be due to sedation. BP improved after changing from fentanyl/propofol to precedex. Remains afebrile and on stable ventilator settings. Lasix held yesterday due to CLIFFORD but still having good UOP, net negative ~500cc.     Objective:     Vital Signs (Most Recent):  Temp: 97.9 °F (36.6 °C) (11/04/20 0702)  Pulse: 70 (11/04/20 0735)  Resp: 20 (11/04/20 0735)  BP: 133/61 (11/04/20 0735)  SpO2: 95 % (11/04/20 0735) Vital Signs (24h Range):  Temp:  [97.9 °F (36.6 °C)-99.5 °F (37.5 °C)] 97.9 °F (36.6 °C)  Pulse:  [46-86] 70  Resp:  [19-35] 20  SpO2:  [92 %-97 %] 95 %  BP: ()/(51-96) 133/61  Arterial Line BP: ()/(36-60) 124/54     Weight: 97.6 kg (215 lb 2.7 oz)  Body mass index is 38.12 kg/m².      Intake/Output Summary (Last 24 hours) at 11/4/2020 0831  Last data filed at 11/4/2020 0700  Gross per 24 hour   Intake 1232.92 ml   Output 1740 ml   Net -507.08 ml       Physical Exam  Constitutional:       General: She is not in acute distress.     Comments: Intubated and sedated     HENT:      Head: Normocephalic and atraumatic.   Eyes:      Conjunctiva/sclera: Conjunctivae normal.      Pupils: Pupils are equal, round, and reactive to light.   Neck:      Musculoskeletal: Normal range of motion.      Comments: Thick neck    Cardiovascular:      Rate and Rhythm: Normal rate. Rhythm irregular.      Pulses: Normal pulses.      Heart sounds: Normal heart sounds.   Pulmonary:      Comments: Coarse breath sounds due to ventilator, diminished  sounds at bilateral bases, no wheezing  Abdominal:      General: Bowel sounds are normal. There is no distension.      Palpations: Abdomen is soft.      Tenderness: There is no abdominal tenderness.   Musculoskeletal:         General: Swelling (1+ bilateral lower extremities, slightly improved today) present.   Skin:     General: Skin is warm and dry.   Neurological:      Comments: Intubated and sedated           Vents:  Vent Mode: A/C (11/04/20 0735)  Ventilator Initiated: Yes (11/01/20 2338)  Set Rate: 20 BPM (11/04/20 0735)  Vt Set: 390 mL (11/04/20 0735)  Pressure Support: 0 cmH20 (11/04/20 0735)  PEEP/CPAP: 7 cmH20 (11/04/20 0735)  Oxygen Concentration (%): 45 (11/04/20 0735)  Peak Airway Pressure: 34 cmH2O (11/04/20 0735)  Plateau Pressure: 0 cmH20 (11/04/20 0735)  Total Ve: 8.18 mL (11/04/20 0735)  F/VT Ratio<105 (RSBI): (!) 45.56 (11/04/20 0735)    Lines/Drains/Airways     Drain                 NG/OG Tube 11/02/20 0100 Center mouth 2 days         Urethral Catheter 11/02/20 0100 2 days          Airway               Airway Anesthesia 11/01/20 2 days          Arterial Line            Arterial Line 11/01/20 Right Radial 3 days          Peripheral Intravenous Line                 Midline Catheter Insertion/Assessment  - Double Lumen 10/20/20 1220 Right median cubital vein (antecubital fossa)  14 days                Significant Labs:    CBC/Anemia Profile:  Recent Labs   Lab 11/04/20  0441   WBC 8.71   HGB 9.0*   HCT 27.8*      MCV 85   RDW 15.6*        Chemistries:  Recent Labs   Lab 11/03/20  0350 11/04/20  0441    139   K 4.1 3.7   CL 90* 93*   CO2 36* 36*   BUN 59* 60*   CREATININE 2.3* 2.5*   CALCIUM 8.6* 8.7   MG 2.0 2.2       All pertinent labs within the past 24 hours have been reviewed.    Significant Imaging:  no imaging over past 24 hours.     Assessment/Plan:     * Acute on chronic respiratory failure with hypoxia and hypercapnia  - requiring intubation on 11/1  - likely multifactorial  related to volume overload + HF exacerbation + BHAVANI/OHS + possible new infectious PNA + ? Malignancy?  - no wheezing on exam,  Steroids stopped 11/2  - continue broad spectrum abx  - endotracheal aspirate NGTD, f/u cultures  - continuing to hold diuresis today given CLIFFORD    Acute kidney injury superimposed on chronic kidney disease  - possible due to ATN  - pt with baseline CKD in addition to having only one kidney due to prior nephrectomy  - still volume overloaded on exam with pleural effusions and EMERALD but diuretics held  due to increase in creatinine  - monitor UOP, strict I/Os  - possibly need nephrology consult pending trajectory   - ensure MAP > 65 so no hypotensive episodes worsen renal perfusion    Pleural effusion  - bilateral, R>>L  - bedside US today with persistent small-moderate sized effusion on right, smaller on left, free flowing appearance of fluid  - pleural effusion most likely related to heart failure (bilateral though R>>L, free flowing appearance) however given cavitary lesion on right,  infectious vs malignant on differential. Given her baseline severe restrictive lung disease, any additional impingement on that worsens her ventilatory abilities. She would likely benefit from drainage of that fluid with hopes it will improve her respiratory dynamics and ability to be liberated from ventilator   - possible right side thoracentesis vs chest tube placement today, consent obtained from family  - diuresis being held today due to clifford     On mechanically assisted ventilation  - due to acute on chronic hypoxic/hypercapnic respiratory failure + volume overload with bilateral pleural effusions + HF + possible new PNA  - continue 6-8cc/kg TV PBW, lung protective ventilation, currently at 390cc  - stable FiO2 requirements compared to yesterday  - failed SBT yesterday, will attempt SBT after bronchoscopy today  - daily SAT/SBT  - abx as above  - GI ppx with famotidine     Cavitary lesion of lung  Patient's  CT concerning for slow process as lesion in same posterior upper lobe lung fields noted on previous CT in 2018 as well as bialteral pulmonary nodules. In setting of significant smoking history, malignancy is high on differential. However slow infectious process such as NTM/pulmonary MAC is possible as well, also possibility of superimprosed bacterial infection  - HIV negative  - Quantiferon gold indeterminate x 2  - AFB stain negative, smear pending x 1  - f/u endotracheal sputum culture  - plan for bronchoscopy with BAL today, consent obtained from her sister/mPOA   - covid negative 11/3      Atrial fibrillation with rapid ventricular response  Continue rate control as likely contributing to diastolic dysfunction  - hold eliquis for now for bronchoscopy today  - heparin for ppx for now  - currently in flutter with variable block, HR controlled    Acute on chronic diastolic congestive heart failure  - continue diuresis; fluid overload likely contributes to hypercapnia in setting of OHS/BHAVANI, small airway disease with severe restriction.   -CT showing cavitary lesion with surrounding consolidation and GGO concerning for infectious vs malignant process, with significant ggo throughout the lung likely secondary to continued pulmonary edema.     - will hold lasix today due to bump in creatinine      PPx: heparin  GI: on tube feeds, held at midnight for bronchoscopy       Fadumo Bryant MD  Pulmonology  [facility]

## 2020-11-04 NOTE — ASSESSMENT & PLAN NOTE
- requiring intubation on 11/1  - likely multifactorial related to volume overload + HF exacerbation + BHAVANI/OHS + possible new infectious PNA + ? Malignancy?  - no wheezing on exam,  Steroids stopped 11/2  - continue broad spectrum abx  - endotracheal aspirate NGTD, f/u cultures  - continuing to hold diuresis today given CLIFFORD

## 2020-11-04 NOTE — ASSESSMENT & PLAN NOTE
- possible due to ATN  - pt with baseline CKD in addition to having only one kidney due to prior nephrectomy  - still volume overloaded on exam with pleural effusions and EMERALD but diuretics held  due to increase in creatinine  - monitor UOP, strict I/Os  - possibly need nephrology consult pending trajectory   - ensure MAP > 65 so no hypotensive episodes worsen renal perfusion

## 2020-11-04 NOTE — PROGRESS NOTES
[facility]  Cardiology  Progress Note    Patient Name: Patsy Morris  MRN: [id number]  Admission Date: 10/13/2020  Hospital Length of Stay: 22 days  Code Status: Full Code   Attending Physician: Loi Quiles MD   Primary Care Physician: Luisana Cartagena MD  Expected Discharge Date:   Principal Problem:Acute on chronic respiratory failure with hypoxia and hypercapnia    Subjective:     Brief HPI:    Patsy Morris is a 70 y.o.female with hypertension. She has chronic atrial fibrillation and heart failure with preserved ejection fraction. She has chronic obstructive pulmonary disease being on home oxygen with CO2 retention. She has undergone nephrectomy for renal cell carcinoma and has chronic kidney disease. She was admitted to [facility] in 2020 and 2020 with heart failure and exacerbations of her chronic obstructive pulmonary disease. She went to therapy at [facility].      She used to be on furosemide 40 mg Q24 however after her last hospitalization she had the does of furosemide increased to 80 mg Q24. The dose was reduced to 40 mg Q24 on 10/8/2020. She became increasingly short of breath and presents fluid overloaded in heart failure as well as an exacerbation of her COPD with high CO2. She was admitted to the ICU.    Hospital Course:    Diuresis.    BIPAP.    Respiratory treatments.    10/15/2020: Echo: Normal left ventricular size and systolic function. EF 60%. Moderately dilated LA. Mildly dilated RV. SPAP 49 mmHg. Small pericardial effusion.    10/16/2020: Apixiban 5 mg Q12 was changed to heparin iv for consideration of bronchoscopy.    10/21/2020: Transferred from ICU to telemetry.    10/27/2020: Walked 150 ft with PT.    2020: AB.21/125/78/90%/FIO2 45%    2020: Respiratory failure. Intubated in ICU.    Interval History:     Remains on mechanical ventilation.    CXR with extensive infiltrates.    Plan is bronchoscopy today.      Review of Systems    Unable to perform ROS: intubated       Objective:     Vital Signs (Most Recent):  Temp: 97.9 °F (36.6 °C) (11/04/20 0702)  Pulse: 70 (11/04/20 0735)  Resp: 20 (11/04/20 0735)  BP: 133/61 (11/04/20 0735)  SpO2: 95 % (11/04/20 0735) Vital Signs (24h Range):  Temp:  [97.9 °F (36.6 °C)-99.5 °F (37.5 °C)] 97.9 °F (36.6 °C)  Pulse:  [46-86] 70  Resp:  [19-35] 20  SpO2:  [92 %-97 %] 95 %  BP: ()/(51-96) 133/61  Arterial Line BP: ()/(36-60) 124/54     Weight: 97.6 kg (215 lb 2.7 oz)  Body mass index is 38.12 kg/m².    SpO2: 95 %  O2 Device (Oxygen Therapy): ventilator      Intake/Output Summary (Last 24 hours) at 11/4/2020 0816  Last data filed at 11/4/2020 0700  Gross per 24 hour   Intake 1232.92 ml   Output 1740 ml   Net -507.08 ml       Lines/Drains/Airways     Drain                 NG/OG Tube 11/02/20 0100 Center mouth 2 days         Urethral Catheter 11/02/20 0100 2 days          Airway               Airway Anesthesia 11/01/20 2 days          Arterial Line            Arterial Line 11/01/20 Right Radial 3 days          Peripheral Intravenous Line                 Midline Catheter Insertion/Assessment  - Double Lumen 10/20/20 1220 Right median cubital vein (antecubital fossa)  14 days                Physical Exam   Constitutional: She appears well-developed and well-nourished. She appears ill. She is sedated and intubated.   Neck: Carotid bruit is not present.   Cardiovascular: Normal rate, S1 normal and S2 normal. An irregularly irregular rhythm present.   Pulmonary/Chest: She is intubated. She has rhonchi in the right lower field and the left lower field.   Abdominal: Soft. Normal appearance.   Musculoskeletal:      Right ankle: She exhibits no swelling.      Left ankle: She exhibits no swelling.   Neurological: She is unresponsive.   Skin: Skin is warm and dry.     Current Medications:     atorvastatin  80 mg Oral QHS    ceFEPime (MAXIPIME) IVPB  1 g Intravenous Q12H    docusate sodium  100 mg Oral BID     escitalopram oxalate  10 mg Oral QHS    famotidine  20 mg Per NG tube Daily    ferrous sulfate  325 mg Oral Daily    gabapentin  100 mg Oral BID    heparin (porcine)  5,000 Units Subcutaneous Q12H    metoprolol tartrate  25 mg Per NG tube BID    miconazole NITRATE 2 %   Topical (Top) BID    mupirocin   Nasal BID    vitamin renal formula (B-complex-vitamin c-folic acid)  1 capsule Oral Daily     Current Laboratory Results:    Recent Results (from the past 24 hour(s))   COVID-19 Rapid Screening    Collection Time: 11/03/20 10:45 AM   Result Value Ref Range    SARS-CoV-2 RNA, Amplification, Qual Negative Negative   Vancomycin, Random    Collection Time: 11/03/20  3:39 PM   Result Value Ref Range    Vancomycin, Random 18.9 Not established ug/mL   Basic metabolic panel    Collection Time: 11/04/20  4:41 AM   Result Value Ref Range    Sodium 139 136 - 145 mmol/L    Potassium 3.7 3.5 - 5.1 mmol/L    Chloride 93 (L) 95 - 110 mmol/L    CO2 36 (H) 23 - 29 mmol/L    Glucose 146 (H) 70 - 110 mg/dL    BUN 60 (H) 8 - 23 mg/dL    Creatinine 2.5 (H) 0.5 - 1.4 mg/dL    Calcium 8.7 8.7 - 10.5 mg/dL    Anion Gap 10 8 - 16 mmol/L    eGFR if African American 21 (A) >60 mL/min/1.73 m^2    eGFR if non African American 19 (A) >60 mL/min/1.73 m^2   Magnesium    Collection Time: 11/04/20  4:41 AM   Result Value Ref Range    Magnesium 2.2 1.6 - 2.6 mg/dL   CBC auto differential    Collection Time: 11/04/20  4:41 AM   Result Value Ref Range    WBC 8.71 3.90 - 12.70 K/uL    RBC 3.27 (L) 4.00 - 5.40 M/uL    Hemoglobin 9.0 (L) 12.0 - 16.0 g/dL    Hematocrit 27.8 (L) 37.0 - 48.5 %    MCV 85 82 - 98 fL    MCH 27.5 27.0 - 31.0 pg    MCHC 32.4 32.0 - 36.0 g/dL    RDW 15.6 (H) 11.5 - 14.5 %    Platelets 154 150 - 350 K/uL    MPV 11.4 9.2 - 12.9 fL    Immature Granulocytes 0.3 0.0 - 0.5 %    Gran # (ANC) 6.9 1.8 - 7.7 K/uL    Immature Grans (Abs) 0.03 0.00 - 0.04 K/uL    Lymph # 1.0 1.0 - 4.8 K/uL    Mono # 0.8 0.3 - 1.0 K/uL    Eos # 0.1  0.0 - 0.5 K/uL    Baso # 0.01 0.00 - 0.20 K/uL    nRBC 0 0 /100 WBC    Gran % 78.7 (H) 38.0 - 73.0 %    Lymph % 10.9 (L) 18.0 - 48.0 %    Mono % 8.6 4.0 - 15.0 %    Eosinophil % 1.4 0.0 - 8.0 %    Basophil % 0.1 0.0 - 1.9 %    Differential Method Automated      Current Imaging Results:    XR Non-Rad Performed NG/Gastric Tube Check   Final Result      Tip of nasogastric tube in the proximal stomach.         Electronically signed by: Charlotte Howell   Date:    11/02/2020   Time:    00:25      X-Ray Chest 1 View   Final Result   Abnormal      Interval progression of abnormal intrathoracic findings, increasing bilateral pulmonary opacities, as discussed above.      ET tube noted, the tip above the yamilka.      Enteric tube noted, the distal tip extends subdiaphragmatic below the field of view however the side hole is likely along the distal esophagus, advancement recommended.      This report was flagged in Epic as abnormal.         Electronically signed by: Hammad Ayala   Date:    11/02/2020   Time:    00:09      X-Ray Chest AP Portable   Final Result      Stable right upper lobe consolidation.  Findings remain concerning for pneumonia with small cavitation as described previously.      Improved aeration right lung base.  Small pleural effusions, decreased in volume on the right.         Electronically signed by: Elsa Alcantar   Date:    10/30/2020   Time:    11:47      X-Ray Chest AP Portable   Final Result      Worsening right basilar atelectasis or consolidation.      Right upper lobe consolidation is improved.  There may be a tiny pneumatocele or central cavitation.      Pleural effusions are improved.      Continued follow-up advised.         Electronically signed by: Elsa Alcantar   Date:    10/28/2020   Time:    10:24      X-Ray Chest AP Portable   Final Result      No significant change.  There is cardiomegaly, multifocal airspace opacities and bilateral effusions.         Electronically signed by: Ajay  MD Jax   Date:    10/20/2020   Time:    08:48      US Lower Extremity Veins Bilateral   Final Result      No evidence of deep venous thrombosis in either lower extremity.         Electronically signed by: Ceasar Carreon MD   Date:    10/15/2020   Time:    21:53      CT Chest Without Contrast   Final Result      The findings highly concerning for extensive diffuse pulmonary infection with bilateral pleural fluid collections right greater than left.         Electronically signed by: Regulo Bassett MD   Date:    10/15/2020   Time:    12:01      X-Ray Chest AP Portable   Final Result      Interval worsening of diffuse bilateral airspace disease and moderate bilateral pleural effusions when compared to 09/09/2020         Electronically signed by: Halina Plascencia   Date:    10/13/2020   Time:    09:40          10/15/2020: Echo:    The left ventricle is normal in size with normal systolic function. The estimated ejection fraction is 60%.  A diastolic pattern consistent with atrial fibrillation observed.  Moderate left atrial enlargement.  Mild right ventricular enlargement.  Normal right ventricular systolic function.  Severe right atrial enlargement.  The aortic valve is mildly sclerotic.  The mitral valve is mildly sclerotic.  Mild mitral regurgitation.  Mild tricuspid regurgitation.  There is mild pulmonary hypertension.  The estimated PA systolic pressure is 49 mmHg.  Intermediate central venous pressure (8 mmHg).  Small circumferential pericardial effusion.  There is a left pleural effusion.      Assessment and Plan:     Problem List:    Active Diagnoses:    Diagnosis Date Noted POA    PRINCIPAL PROBLEM:  Acute on chronic respiratory failure with hypoxia and hypercapnia [J96.21, J96.22] 08/03/2016 Yes    Acute kidney injury superimposed on chronic kidney disease [N17.9, N18.9] 11/03/2020 No    On mechanically assisted ventilation [Z99.11] 11/02/2020 Not Applicable    Pleural effusion [J90] 11/02/2020 Yes     Cavitary lesion of lung [J98.4] 10/16/2020 Yes    Atrial fibrillation with rapid ventricular response [I48.91] 09/01/2020 Yes    Debility [R53.81] 08/26/2020 Yes    Acute on chronic diastolic congestive heart failure [I50.33] 05/20/2016 Yes    Chronic kidney disease (CKD) stage G4/A1, severely decreased glomerular filtration rate (GFR) between 15-29 mL/min/1.73 square meter and albuminuria creatinine ratio less than 30 mg/g [N18.4]  Yes    Dyslipidemia [E78.5] 02/22/2016 Yes    Essential hypertension [I10] 10/15/2014 Yes      Problems Resolved During this Admission:    Diagnosis Date Noted Date Resolved POA    Severe sepsis [A41.9, R65.20] 11/02/2020 11/02/2020 Unknown    Shortness of breath [R06.02] 11/01/2020 11/02/2020 Yes    Normocytic anemia [D64.9] 10/27/2020 11/03/2020 Yes    Acute on chronic congestive heart failure [I50.9] 10/21/2020 10/27/2020 Yes    Acute on chronic congestive heart failure [I50.9] 10/13/2020 10/13/2020 Yes    Encounter for palliative care [Z51.5] 08/25/2020 11/03/2020 Not Applicable    Current moderate episode of major depressive disorder without prior episode [F32.1] 04/30/2020 11/03/2020 Yes    Pulmonary nodules/lesions, multiple [R91.8] 03/22/2019 10/14/2020 Yes    Obstructive sleep apnea [G47.33]  11/02/2020 Yes    Paroxysmal atrial fibrillation [I48.0] 08/03/2016 10/13/2020 Yes    Chronic hypercapnic respiratory failure [J96.12] 03/11/2016 10/13/2020 Yes    Obesity hypoventilation syndrome [E66.2] 02/25/2016 10/27/2020 Yes    Renal carcinoma [C64.9] 03/10/2015 10/13/2020 Yes       Assessment and Plan:     1. Heart Failure, Diastolic, Chronic              8/24/2020: Echo: Normal left ventricular size and systolic function. Mildly dilated LA.   10/15/2020: Echo: Normal left ventricular size and systolic function. EF 60%. Moderately dilated LA. Mildly dilated RV. SPAP 49 mmHg. Small pericardial effusion.              At NH was on furosemide 40 mg Q24.               10/13/2020: Presented fluid overloaded in HF. Received furosemide 80 mg iv Q12.   10/20/2020: CXR with extensive infiltrates and pleural effusions. .    10/26/2020: .   10/28/2020: CXR was improving.   Was on furosemide 40 mg po Q12.   Furosemide on hold.              Does not appear fluid overloaded.      2. Atrial Fibrillation              In chronic atrial fibrillation.              On apixiban.              Was on metoprolol 50 mg Q12.              Rate was well controlled mostly  bpm.   On metoprolol 25 mg Q12.   On metoprolol 5 mg iv Q4 PRN for VRR >110 bpm.     3. Chronic Anticoagulation              Been on apixiban 5 mg Q12.   10/16/2020: Apixiban 5 mg Q12 was changed to heparin iv.   Been on apixiban 5 mg Q12.   No bleeding.   On heparin 5,000 sc Q12 for plans of bronchoscopy.    4. Hypertension   On metoprolol 25 mg Q12.   Well controlled.                5. Chronic Kidney Disease, Stage 4              History or renal carcinoma and nephrectomy.              10/13/2020: BUN/crea 27/2.0. CrCl 28.   10/20/2020: BUN/crea 56/1.8. CrCl 32.   10/25/2020: BUN/crea 49/1.6. CrCl 32.    2020: BUN/crea 58/2.0. CrCl 24.   2020: BUN/crea 60/2.5. CrCl 19.     6. Chronic Obstructive Pulmonary Disease              Chronic respiratory failure with CO2 retention.              10/13/2020: 7.23/94/73/39/90% on FiO2 of 40%.   Was overall slowly improving.    2020: AB.21/125/78/90%/FIO2 45%.   Remains intubated.   Plan is bronchoscopy.     7. Weakness   10/27/2020: Walked 150 ft with PT.      VTE Risk Mitigation (From admission, onward)         Ordered     heparin (porcine) injection 5,000 Units  Every 12 hours      20 1551     Place sequential compression device  Until discontinued      10/13/20 1405     IP VTE HIGH RISK PATIENT  Once      10/13/20 1405                Diana Meredith MD  Cardiology  [facility]

## 2020-11-04 NOTE — PROGRESS NOTES
Assumed care of patient.  0955: KENNETH Denton, MD Sonja and MD Annette at bedside for bronchoscopy procedure. EICU used for timeout  1025: End of Bronchoscopy. VSS and NAD. All vent settings returned to pre procedure limits..  1100: Rt. Chest tube insertion procedure started with MD Sonja and MD Annette at bedside.  1125: Rt chest tube placement completed. Chest tube hooked to suction. No other symptoms noted. X ray at bedside to confirm placement.  1400: Tube Feedings restarted at 20 ml/hr. TF tolerated well.   1800: PT stable. VSS and NAD.

## 2020-11-04 NOTE — ASSESSMENT & PLAN NOTE
- due to acute on chronic hypoxic/hypercapnic respiratory failure + volume overload with bilateral pleural effusions + HF + possible new PNA  - continue 6-8cc/kg TV PBW, lung protective ventilation, currently at 390cc  - stable FiO2 requirements compared to yesterday  - failed SBT yesterday, will attempt SBT after bronchoscopy today  - daily SAT/SBT  - abx as above  - GI ppx with famotidine

## 2020-11-04 NOTE — PT/OT/SLP PROGRESS
Physical Therapy  Not Seen    Patient Name:  Patsy Morris   MRN:  [id number]    Patient not seen today secondary to Unavailable (Comment)(Procedure at bedside). Will follow-up tomorrow, 11/5.    Sanjana Burgos, PT

## 2020-11-04 NOTE — ASSESSMENT & PLAN NOTE
Patient's CT concerning for slow process as lesion in same posterior upper lobe lung fields noted on previous CT in 2018 as well as bialteral pulmonary nodules. In setting of significant smoking history, malignancy is high on differential. However slow infectious process such as NTM/pulmonary MAC is possible as well, also possibility of superimprosed bacterial infection  - HIV negative  - Quantiferon gold indeterminate x 2  - AFB stain negative, smear pending x 1  - f/u endotracheal sputum culture  - plan for bronchoscopy with BAL today, consent obtained from her sister/mPOA   - covid negative 11/3

## 2020-11-05 PROBLEM — Z99.11 ON MECHANICALLY ASSISTED VENTILATION: Status: RESOLVED | Noted: 2020-01-01 | Resolved: 2020-01-01

## 2020-11-05 PROBLEM — J90 PLEURAL EFFUSION: Status: RESOLVED | Noted: 2020-01-01 | Resolved: 2020-01-01

## 2020-11-05 NOTE — ASSESSMENT & PLAN NOTE
- with baseline severe restrictive/obstuctive lung disease (FVC ~30% on PFTs in 2016).   - requiring intubation on 11/1  - likely multifactorial related to volume overload + HF exacerbation + BHAVANI/OHS + possible new infectious PNA + ? Malignancy?  - no wheezing on exam,  Steroids stopped 11/2  - endotracheal aspirate NGTD, f/u cultures  - f/u BAL  - will stop vancomycin today, continue cefepime for now  - continuing to hold diuresis today given CLIFFORD however she continues to have good UOP  - overall, her baseline severe ventilatory deficit will be largest factor prohibiting her ability to be extubated. must optimize all other components (fluid overload, weaning sedation, treating PNA)  if has chance of being liberated from ventilator

## 2020-11-05 NOTE — PROGRESS NOTES
[facility]  Pulmonology  Progress Note    Patient Name: Patsy Morris  MRN: [id number]  Admission Date: 10/13/2020  Hospital Length of Stay: 23 days  Code Status: Full Code  Attending Provider: Loi Quiles MD  Primary Care Provider: Luisana Cartagena MD   Principal Problem: Acute on chronic respiratory failure with hypoxia and hypercapnia    Subjective:     Interval History: No acute events overnight. Had chest tube placed yesterday with total 810 cc output since placement. Attempting to wean sedation today and place on pressure support ventilation. She is still requiring good deal of pressure support to maintain adequate tidal volumes. She denies pain. UOP good, total net negative 1.5L over past 24 hours.    Objective:     Vital Signs (Most Recent):  Temp: 99.1 °F (37.3 °C) (11/05/20 0705)  Pulse: (!) 50 (11/05/20 1100)  Resp: 20 (11/05/20 1100)  BP: (!) 120/55 (11/05/20 1000)  SpO2: 99 % (11/05/20 1100) Vital Signs (24h Range):  Temp:  [98.4 °F (36.9 °C)-100 °F (37.8 °C)] 99.1 °F (37.3 °C)  Pulse:  [48-80] 50  Resp:  [19-34] 20  SpO2:  [83 %-100 %] 99 %  BP: ()/(41-71) 120/55  Arterial Line BP: ()/(40-72) 102/44     Weight: 97.6 kg (215 lb 2.7 oz)  Body mass index is 38.12 kg/m².      Intake/Output Summary (Last 24 hours) at 11/5/2020 1141  Last data filed at 11/5/2020 0558  Gross per 24 hour   Intake 2085.52 ml   Output 2170 ml   Net -84.48 ml       Physical Exam  Constitutional:       General: She is not in acute distress.     Comments: Intubated and sedated     HENT:      Head: Normocephalic and atraumatic.   Eyes:      Conjunctiva/sclera: Conjunctivae normal.      Pupils: Pupils are equal, round, and reactive to light.   Neck:      Musculoskeletal: Normal range of motion.      Comments: Thick neck    Cardiovascular:      Rate and Rhythm: Normal rate. Rhythm irregular.      Pulses: Normal pulses.      Heart sounds: Normal heart sounds.   Pulmonary:      Comments: Coarse  breath sounds due to ventilator, diminished sounds at bilateral bases, no wheezing  Abdominal:      General: Bowel sounds are normal. There is no distension.      Palpations: Abdomen is soft.      Tenderness: There is no abdominal tenderness.   Musculoskeletal:         General: Swelling (1+ bilateral lower extremities, slightly improved today) present.   Skin:     General: Skin is warm and dry.   Neurological:      Mental Status: She is alert.      Comments: Off sedation she is awake and alert, following commands, moving all extremities         Vents:  Vent Mode: (S) Spont (11/05/20 0847)  Ventilator Initiated: Yes (11/01/20 2338)  Set Rate: 0 BPM (11/05/20 0847)  Vt Set: 390 mL (11/05/20 0847)  Pressure Support: (S) 15 cmH20 (11/05/20 0849)  PEEP/CPAP: (S) 8 cmH20 (11/05/20 0855)  Oxygen Concentration (%): 40 (11/05/20 1100)  Peak Airway Pressure: 15 cmH2O (11/05/20 0847)  Plateau Pressure: 0 cmH20 (11/05/20 0847)  Total Ve: 5.22 mL (11/05/20 0847)  F/VT Ratio<105 (RSBI): 159.79 (11/05/20 0847)    Lines/Drains/Airways     Drain                 NG/OG Tube 11/02/20 0100 Center mouth 3 days         Urethral Catheter 11/02/20 0100 3 days         Chest Tube 11/04/20 1132 1 Right Fourth intercostal space 1 day          Airway               Airway Anesthesia 11/01/20 3 days          Arterial Line            Arterial Line 11/01/20 Right Radial 4 days          Peripheral Intravenous Line                 Midline Catheter Insertion/Assessment  - Double Lumen 10/20/20 1220 Right median cubital vein (antecubital fossa)  16 days         Peripheral IV - Single Lumen 11/05/20 0630 20 G Anterior;Left Forearm less than 1 day                Significant Labs:    CBC/Anemia Profile:  Recent Labs   Lab 11/04/20 0441 11/05/20 0426   WBC 8.71 13.70*   HGB 9.0* 9.4*   HCT 27.8* 29.0*    199   MCV 85 85   RDW 15.6* 15.8*        Chemistries:  Recent Labs   Lab 11/04/20  0441 11/05/20 0426    142   K 3.7 3.2*   CL 93* 98   CO2  36* 30*   BUN 60* 54*   CREATININE 2.5* 2.2*   CALCIUM 8.7 8.6*   ALBUMIN  --  2.6*   PROT  --  6.0   BILITOT  --  0.7   ALKPHOS  --  43*   ALT  --  21   AST  --  27   MG 2.2 2.2   PHOS  --  3.8       All pertinent labs within the past 24 hours have been reviewed.    Significant Imaging:  I have reviewed and interpreted all pertinent imaging results/findings within the past 24 hours.    Assessment/Plan:     * Acute on chronic respiratory failure with hypoxia and hypercapnia  - with baseline severe restrictive/obstuctive lung disease (FVC ~30% on PFTs in 2016).   - requiring intubation on 11/1  - likely multifactorial related to volume overload + HF exacerbation + BHAVANI/OHS + possible new infectious PNA + ? Malignancy?  - no wheezing on exam,  Steroids stopped 11/2  - endotracheal aspirate NGTD, f/u cultures  - f/u BAL  - will stop vancomycin today, continue cefepime for now  - continuing to hold diuresis today given CLIFFORD however she continues to have good UOP  - overall, her baseline severe ventilatory deficit will be largest factor prohibiting her ability to be extubated. must optimize all other components (fluid overload, weaning sedation, treating PNA)  if has chance of being liberated from ventilator      Acute kidney injury superimposed on chronic kidney disease  - possible due to ATN  - pt with baseline CKD in addition to having only one kidney due to prior nephrectomy  - still volume overloaded on exam with pleural effusions and EMERALD but diuretics held  due to increase in creatinine  - monitor UOP, strict I/Os  - Cr improving today with good UOP, improving with negative fluid balance  - ensure MAP > 65 so no hypotensive episodes worsen renal perfusion    Pleural effusion  - bilateral, R>>L  - s/p chest tube placement on 11/4 given her baseline severe restrictive lung disease, any additional impingement on that worsens her ventilatory abilities thus hopeful that drainage of fluid will improve her respiratory  dynamics and ability to be liberated from ventilator   - initial pleural fluid studies borderline exudative, more likely effusion related to heart failure however given cavitary lesion on right,  infectious vs malignant on differential.   - f/u culture and cytology  - can keep chest tube to water seal today     On mechanically assisted ventilation  - due to acute on chronic hypoxic/hypercapnic respiratory failure + volume overload with bilateral pleural effusions + HF + possible new PNA  - daily SAT/SBT  - abx as above, net negative fluid balance optimal  - GI ppx with famotidine   - stable FiO2 requirements compared to yesterday  - weaning sedation today, is tolerating pressure support ventilation this time with 15/8 @ 40%  - she is failing any attempts to wean further pressures support thus she is not ready to be extubated  - will continue PS ventilation as long as tolerates it.   - if tires out, can return to AC/VC with /rate 20 and PEEP/FiO2 per protocol  - overall, her baseline severe restrictive lung disease (FVC ~ 30%) will be largest factor prohibiting her ability to be extubated. must optimize all other reversible components (fluid overload, weaning sedation, treating PNA)  if has chance of being liberated from ventilator    Cavitary lesion of lung  Patient's CT concerning for slow process as lesion in same posterior upper lobe lung fields noted on previous CT in 2018 as well as bialteral pulmonary nodules. In setting of significant smoking history, malignancy is high on differential. However slow infectious process such as NTM/pulmonary MAC is possible as well, also possibility of superimprosed bacterial infection  - HIV negative  - Quantiferon gold indeterminate x 2  - AFB stain negative, smear pending x 1  - f/u endotracheal sputum culture  - s/p bronch with BAL on 11/4  - f/u culture and cytology      Atrial fibrillation with rapid ventricular response  - currently in flutter with variable block,  HR controlled  - HR intermittently in the 40s, will hold metoprolol today  - eliquis still on hold due to dynamic renal function  - if renal function remains stable, will restart eliquis tomorrow  - heparin for ppx for now      Acute on chronic diastolic congestive heart failure  - continue diuresis; fluid overload likely contributes to hypercapnia in setting of OHS/BHAVANI, small airway disease with severe restriction.   -CT showing cavitary lesion with surrounding consolidation and GGO concerning for infectious vs malignant process, with significant ggo throughout the lung likely secondary to continued pulmonary edema.   - diuretics still onhold due to CLIFFORD but renal function improving and UOP good.              Fadumo Bryant MD  Pulmonology  [facility]

## 2020-11-05 NOTE — PROGRESS NOTES
Wound care consult for new partial thickness skin concern to gluteal celft above perianal area.   70-year-old woman with history of hypertension, chronic hypoxic respiratory failure on home oxygen secondary to chronic obstructive pulmonary disease, chronic diastolic heart failure, chronic atrial fibrillation, and morbid obesity re-admitted to the hospital with decompensated heart failure after patient was home for about a week following recent hospitalization and stayed at skilled nursing facility for physical therapy.  Patient does have a cavitary lesion on her chest CT scan but otherwise without signs or symptoms of active pulmonary tuberculosis.  Discussed with Pulmonary Critical Care service who recommended discontinuing further smears and respiratory isolation.  She continued to slowly improve with diuresis.  Patient was transferred out of ICU on 10/21/2020.  Had recurrent SOB on 10/30/2020 and given an additional dose of IV Lasix with improvement.  Awaiting for [facility] home ventilator to be approved and plan is home with Home Health  Red partial thickness skin ulcer to gluteal cleft just above the perianal area   Wound may be difficult to dress and maintain with sacral foam. Cleaned fecal incontinence and applied dressing but will leave a conditional order if unable to maintain the dressing to change to Triad hydrophilic wound dressing.                 11/05/20 1500   Cognitive   Level of Consciousness (AVPU) alert   Respiratory   Rhythm/Pattern, Respiratory assisted mechanically   Expansion/Accessory Muscles/Retractions no retractions   Cough Frequency with stimulation   Cough Type congested;loose;productive   Secretions Amount copious   Secretions Color yellow;white   Secretions Characteristics thick   Suction Method tracheal   IHI Ventilator Associated Pneumonia Bundle   Head of Bed Elevated  HOB 30   Oral Care Suction toothette;Mouth suctioned;Mouth swabbed;with mouthwash   Additional VAP Prevention  Documentation Clean equipment maintained   Breath Sounds   All Lung Fields Breath Sounds crackles, coarse   Oxygen Therapy   Oxygen Concentration (%) 40   O2 Device (Oxygen Therapy) ventilator   VAP Prevention Measures   VAP Prevention Bundle HOB elevation maintained;oral care regularly provided;vent circuit breaks minimized        Altered Skin Integrity 11/05/20 1330 medial Gluteal cleft Partial thickness tissue loss. Shallow open ulcer with a red or pink wound bed, without slough. Intact or Open/Ruptured Serum-filled blister.   Date First Assessed/Time First Assessed: 11/05/20 1330   Altered Skin Integrity Present on Admission: suspected hospital acquired  Orientation: medial  Location: Gluteal cleft  Is this injury device related?: No  Description of Altered Skin Integrity:...   Wound Image     Description of Altered Skin Integrity Partial thickness tissue loss. Shallow open ulcer with a red or pink wound bed, without slough. Intact or Open/Ruptured Serum-filled blister.   Dressing Appearance Clean;Intact   Drainage Amount None   Drainage Characteristics/Odor No odor   Appearance Red;Moist   Tissue loss description Partial thickness   Red (%), Wound Tissue Color 100 %   Periwound Area Intact   Wound Edges Open   Wound Length (cm) 1.4 cm   Wound Width (cm) 0.4 cm   Wound Depth (cm) 0.1 cm   Wound Volume (cm^3) 0.06 cm^3   Wound Surface Area (cm^2) 0.56 cm^2   Care Cleansed with:;Wound cleanser   Dressing Applied;Foam     Lety Mason RN CWON

## 2020-11-05 NOTE — EICU
"Intervention Initiated From:  Bedside via phone call    Mazin Communicated with Bedside Nurse regarding:  Medication--Bedside RN reports pt "looks uncomfortable", propofol IV gtt in progress, precedex on hold, fentanyl discontinued.  Requesting eMD to evaluate sedation.    Doctor Notified:  Yes   Dr France via Donovan      "

## 2020-11-05 NOTE — ASSESSMENT & PLAN NOTE
Patient with advanced lung disease secondary to chronic obstructive pulmonary disease, obstructive sleep apnea, obesity hypoventilation syndrome, and severe restrictive lung disease now with recent decompensation resulting in intubation due to possible pneumonia.  Minimally elevated procalcitonin level.  Continue antibiotic therapy.  Weaning from ventilator per Pulmonary Critical Care.  Plan on bronchoscopy today.

## 2020-11-05 NOTE — RESPIRATORY THERAPY
0753:  Patient remains on vent with settings as documented.  All alarms on and audible.  Patient sedated and restrained.  Vent plugged into emergency outlet with wheels in locked position.  Oral care done.  ETT moved to left side at this time and secure at 24cm.  Ambu and mask at bedside.  Will continue to monitor.    0847:  SBT started by Dr Bryant

## 2020-11-05 NOTE — SUBJECTIVE & OBJECTIVE
Interval History:  No acute events overnight.    Review of Systems   Unable to perform ROS: Intubated     Objective:     Vital Signs (Most Recent):  Temp: 99.6 °F (37.6 °C) (11/04/20 1910)  Pulse: 68 (11/04/20 2030)  Resp: 20 (11/04/20 2053)  BP: (!) 99/53 (11/04/20 2030)  SpO2: 96 % (11/04/20 2030) Vital Signs (24h Range):  Temp:  [97.9 °F (36.6 °C)-99.6 °F (37.6 °C)] 99.6 °F (37.6 °C)  Pulse:  [46-78] 68  Resp:  [19-35] 20  SpO2:  [92 %-100 %] 96 %  BP: ()/(52-96) 99/53  Arterial Line BP: ()/(46-82) 116/50     Weight: 97.6 kg (215 lb 2.7 oz)  Body mass index is 38.12 kg/m².    Intake/Output Summary (Last 24 hours) at 11/4/2020 2057  Last data filed at 11/4/2020 2018  Gross per 24 hour   Intake 1763.87 ml   Output 2800 ml   Net -1036.13 ml      Physical Exam  Constitutional:       General: She is not in acute distress.     Comments: Intubated and sedated   HENT:      Head: Normocephalic and atraumatic.   Eyes:      Conjunctiva/sclera: Conjunctivae normal.      Pupils: Pupils are equal, round, and reactive to light.   Neck:      Musculoskeletal: Normal range of motion.   Cardiovascular:      Rate and Rhythm: Tachycardia present. Rhythm irregular.      Pulses: Normal pulses.      Heart sounds: Normal heart sounds.   Pulmonary:      Comments: Coarse breath sounds due to ventilator, diminished sounds at bilateral bases, no wheezing  Abdominal:      General: Bowel sounds are normal. There is no distension.      Palpations: Abdomen is soft.      Tenderness: There is no abdominal tenderness.   Musculoskeletal:         General: Swelling present.   Skin:     General: Skin is warm and dry.   Neurological:      Comments: Intubated and sedated           Significant Labs: All pertinent labs within the past 24 hours have been reviewed.    Significant Imaging: I have reviewed all pertinent imaging results/findings within the past 24 hours.

## 2020-11-05 NOTE — PT/OT/SLP PROGRESS
"Occupational Therapy      Patient Name:  Patsy Morris   MRN:  [id number]    Attempted to see pt at 11:35.  Pt intubated, but alert and able to follow commands.  Through yes and no questioning and using thumbs up and thumbs down to communicate it was determined that pt felt like there was "a lot going on" and declined participation in therapy today.  OT and PT suggested bed level activity; however, pt requested to hold off until tomorrow.  Will follow up tomorrow 11/6/20.    CARLOS Motta  11/5/2020  "

## 2020-11-05 NOTE — ASSESSMENT & PLAN NOTE
- bilateral, R>>L  - s/p chest tube placement on 11/4 given her baseline severe restrictive lung disease, any additional impingement on that worsens her ventilatory abilities thus hopeful that drainage of fluid will improve her respiratory dynamics and ability to be liberated from ventilator   - initial pleural fluid studies borderline exudative, more likely effusion related to heart failure however given cavitary lesion on right,  infectious vs malignant on differential.   - f/u culture and cytology  - can keep chest tube to water seal today

## 2020-11-05 NOTE — ASSESSMENT & PLAN NOTE
- due to acute on chronic hypoxic/hypercapnic respiratory failure + volume overload with bilateral pleural effusions + HF + possible new PNA  - daily SAT/SBT  - abx as above, net negative fluid balance optimal  - GI ppx with famotidine   - stable FiO2 requirements compared to yesterday  - weaning sedation today, is tolerating pressure support ventilation this time with 15/8 @ 40%  - she is failing any attempts to wean further pressures support thus she is not ready to be extubated  - will continue PS ventilation as long as tolerates it.   - if tires out, can return to AC/VC with /rate 20 and PEEP/FiO2 per protocol  - overall, her baseline severe restrictive lung disease (FVC ~ 30%) will be largest factor prohibiting her ability to be extubated. must optimize all other reversible components (fluid overload, weaning sedation, treating PNA)  if has chance of being liberated from ventilator

## 2020-11-05 NOTE — SUBJECTIVE & OBJECTIVE
Interval History: No acute events overnight. Had chest tube placed yesterday with total 810 cc output since placement. Attempting to wean sedation today and place on pressure support ventilation. She is still requiring good deal of pressure support to maintain adequate tidal volumes. She denies pain. UOP good, total net negative 1.5L over past 24 hours.    Objective:     Vital Signs (Most Recent):  Temp: 99.1 °F (37.3 °C) (11/05/20 0705)  Pulse: (!) 50 (11/05/20 1100)  Resp: 20 (11/05/20 1100)  BP: (!) 120/55 (11/05/20 1000)  SpO2: 99 % (11/05/20 1100) Vital Signs (24h Range):  Temp:  [98.4 °F (36.9 °C)-100 °F (37.8 °C)] 99.1 °F (37.3 °C)  Pulse:  [48-80] 50  Resp:  [19-34] 20  SpO2:  [83 %-100 %] 99 %  BP: ()/(41-71) 120/55  Arterial Line BP: ()/(40-72) 102/44     Weight: 97.6 kg (215 lb 2.7 oz)  Body mass index is 38.12 kg/m².      Intake/Output Summary (Last 24 hours) at 11/5/2020 1141  Last data filed at 11/5/2020 0558  Gross per 24 hour   Intake 2085.52 ml   Output 2170 ml   Net -84.48 ml       Physical Exam  Constitutional:       General: She is not in acute distress.     Comments: Intubated and sedated     HENT:      Head: Normocephalic and atraumatic.   Eyes:      Conjunctiva/sclera: Conjunctivae normal.      Pupils: Pupils are equal, round, and reactive to light.   Neck:      Musculoskeletal: Normal range of motion.      Comments: Thick neck    Cardiovascular:      Rate and Rhythm: Normal rate. Rhythm irregular.      Pulses: Normal pulses.      Heart sounds: Normal heart sounds.   Pulmonary:      Comments: Coarse breath sounds due to ventilator, diminished sounds at bilateral bases, no wheezing  Abdominal:      General: Bowel sounds are normal. There is no distension.      Palpations: Abdomen is soft.      Tenderness: There is no abdominal tenderness.   Musculoskeletal:         General: Swelling (1+ bilateral lower extremities, slightly improved today) present.   Skin:     General: Skin is warm  and dry.   Neurological:      Mental Status: She is alert.      Comments: Off sedation she is awake and alert, following commands, moving all extremities         Vents:  Vent Mode: (S) Spont (11/05/20 0847)  Ventilator Initiated: Yes (11/01/20 2338)  Set Rate: 0 BPM (11/05/20 0847)  Vt Set: 390 mL (11/05/20 0847)  Pressure Support: (S) 15 cmH20 (11/05/20 0849)  PEEP/CPAP: (S) 8 cmH20 (11/05/20 0855)  Oxygen Concentration (%): 40 (11/05/20 1100)  Peak Airway Pressure: 15 cmH2O (11/05/20 0847)  Plateau Pressure: 0 cmH20 (11/05/20 0847)  Total Ve: 5.22 mL (11/05/20 0847)  F/VT Ratio<105 (RSBI): 159.79 (11/05/20 0847)    Lines/Drains/Airways     Drain                 NG/OG Tube 11/02/20 0100 Center mouth 3 days         Urethral Catheter 11/02/20 0100 3 days         Chest Tube 11/04/20 1132 1 Right Fourth intercostal space 1 day          Airway               Airway Anesthesia 11/01/20 3 days          Arterial Line            Arterial Line 11/01/20 Right Radial 4 days          Peripheral Intravenous Line                 Midline Catheter Insertion/Assessment  - Double Lumen 10/20/20 1220 Right median cubital vein (antecubital fossa)  16 days         Peripheral IV - Single Lumen 11/05/20 0630 20 G Anterior;Left Forearm less than 1 day                Significant Labs:    CBC/Anemia Profile:  Recent Labs   Lab 11/04/20 0441 11/05/20 0426   WBC 8.71 13.70*   HGB 9.0* 9.4*   HCT 27.8* 29.0*    199   MCV 85 85   RDW 15.6* 15.8*        Chemistries:  Recent Labs   Lab 11/04/20 0441 11/05/20 0426    142   K 3.7 3.2*   CL 93* 98   CO2 36* 30*   BUN 60* 54*   CREATININE 2.5* 2.2*   CALCIUM 8.7 8.6*   ALBUMIN  --  2.6*   PROT  --  6.0   BILITOT  --  0.7   ALKPHOS  --  43*   ALT  --  21   AST  --  27   MG 2.2 2.2   PHOS  --  3.8       All pertinent labs within the past 24 hours have been reviewed.    Significant Imaging:  I have reviewed and interpreted all pertinent imaging results/findings within the past 24 hours.

## 2020-11-05 NOTE — PROGRESS NOTES
"[facility] Medicine  Progress Note    Patient Name: Patsy Morris  MRN: [id number]  Patient Class: IP- Inpatient   Admission Date: 10/13/2020  Length of Stay: 23 days  Attending Physician: Loi Quiles MD  Primary Care Provider: Luisana Cartagena MD        Subjective:     Principal Problem:Acute on chronic respiratory failure with hypoxia and hypercapnia        HPI:  Per Ravi Soria:    "Per ED:  "This is a 70 y.o. female with history of CHF and COPD who presents via EMS with complaint of shortness of breath that began a few days ago. Per EMS patient had O2 saturation of 72 on 3 liters if home oxygen upon their arrival. Patient states she is on 3 liters of home oxygen at baseline. She denies fever, cough, chest pain, nausea, vomiting, diarrhea, or rhinorrhea. She reports she was recently discharged from the hospital for similar symptoms. She reports compliance with her home medications. She recently became resident of [facility] after last hospitalization. She is a former smoker. She denies undergoing any surgeries".    The patient is a 70 year old female with a PMH significant for of HTN, Chronic Respiratory Failure (COPD and BHAVANI/OHS), HFpEF, Obesity who presented to the ED with complaints of SOB that began about 3 days ago.  Patient is on 3L O2NC at home.  She was seen by her Cardiologist about 4 days prior to admission and told to decrease her Lasix from 40mg bid to qday.  She denies chest pain.  No Fevers.  No cough.  States she has been adherent with her medications.  Patient was recently admitted to [facility] from 9/1-9/11 for Acute on Chronic Respiratory Failure and discharged to SNF.  Patient was placed on BiPAP in ED and admitted to ICU.  Patient feeling better on BiPAP."    Overview/Hospital Course:  Patient is 70-year-old woman with history of hypertension, chronic hypoxic respiratory failure on home oxygen secondary to chronic obstructive pulmonary " disease, chronic diastolic heart failure, chronic atrial fibrillation, and morbid obesity re-admitted to the hospital on 10/13/2020 with decompensated heart failure after patient was home for about a week following recent hospitalization and stayed at skilled nursing facility for physical therapy.  Patient treated with non-invasive ventilation intravenous diuretics.  Patient was transferred out of the intensive care unit on 10/21/2020.      Patient continue to improve on the floor until she developed worsening hypoxic respiratory failure.  Patient was transferred to the intensive care unit on 11/1/2020 on placed on continuous BiPAP.  She decompensated further and was intubated on 11/1/2020.  Patient started on intravenous antibiotics on 11/2/2020 to treat hospital-acquired pneumonia.  Patient had chest tube placed to drain pleural effusion on the right side.  Patient underwent bronchoscopy yesterday.    Interval History:  No acute events overnight.    Review of Systems   Unable to perform ROS: Intubated     Objective:     Vital Signs (Most Recent):  Temp: 99.1 °F (37.3 °C) (11/05/20 0705)  Pulse: 62 (11/05/20 1000)  Resp: (!) 22 (11/05/20 1000)  BP: (!) 120/55 (11/05/20 1000)  SpO2: 98 % (11/05/20 1000) Vital Signs (24h Range):  Temp:  [98.4 °F (36.9 °C)-100 °F (37.8 °C)] 99.1 °F (37.3 °C)  Pulse:  [48-80] 62  Resp:  [19-34] 22  SpO2:  [83 %-100 %] 98 %  BP: ()/(41-78) 120/55  Arterial Line BP: ()/(40-74) 114/58     Weight: 97.6 kg (215 lb 2.7 oz)  Body mass index is 38.12 kg/m².    Intake/Output Summary (Last 24 hours) at 11/5/2020 1128  Last data filed at 11/5/2020 0558  Gross per 24 hour   Intake 2085.52 ml   Output 2400 ml   Net -314.48 ml      Physical Exam  Constitutional:       General: She is not in acute distress.     Comments: Intubated but awake and following commands this morning.   HENT:      Head: Normocephalic and atraumatic.   Eyes:      Conjunctiva/sclera: Conjunctivae normal.       Pupils: Pupils are equal, round, and reactive to light.   Neck:      Musculoskeletal: Normal range of motion.   Cardiovascular:      Rate and Rhythm: Tachycardia present. Rhythm irregular.      Pulses: Normal pulses.      Heart sounds: Normal heart sounds.   Pulmonary:      Comments: Coarse breath sounds due to ventilator, diminished sounds at bilateral bases, no wheezing.  Chest tube on the right side.  Abdominal:      General: Bowel sounds are normal. There is no distension.      Palpations: Abdomen is soft.      Tenderness: There is no abdominal tenderness.   Musculoskeletal:         General: Swelling present.   Skin:     General: Skin is warm and dry.   Neurological:      Comments: Intubated, following commands, moving all extremities.         Significant Labs: All pertinent labs within the past 24 hours have been reviewed.    Significant Imaging: I have reviewed all pertinent imaging results/findings within the past 24 hours.      Assessment/Plan:      * Acute on chronic respiratory failure with hypoxia and hypercapnia  Patient with advanced lung disease secondary to chronic obstructive pulmonary disease, obstructive sleep apnea, obesity hypoventilation syndrome, and severe restrictive lung disease now with recent decompensation resulting in intubation due to possible pneumonia.  Minimally elevated procalcitonin level.  Continue antibiotic therapy.  Status post bronchoscopy and placement of the right chest tube yesterday.  No acid-fast bacillus seen from sample from bronchial wash.  Gram stain unremarkable.  Additional cultures pending.  Continue weaning efforts from the ventilator.    Cavitary lesion of lung  Active pulmonary tuberculosis unlikely and patient with one negative acid-fast bacillus smear.  Additional efforts at obtaining sputum for additional smears not successful despite using hypertonic saline.  Plan for bronchoscopy.    Acute on chronic diastolic congestive heart failure  Respiratory status  initially improved with aggressive diuresis.  Serum creatinine trending upward and further diuretic therapy on hold.  Serum creatinine stable/improving now.  Continue to closely monitor volume status.    Atrial fibrillation with rapid ventricular response  Continue with metoprolol.  Holding anticoagulation for anticipated bronchoscopy.    Essential hypertension  Reasonably controlled with current regimen.  Will continue with current regimen and continue to monitor.    Chronic kidney disease (CKD) stage G4/A1, severely decreased glomerular filtration rate (GFR) between 15-29 mL/min/1.73 square meter and albuminuria creatinine ratio less than 30 mg/g  Serum creatinine stabilizing with holding diuretics.  No indication for dialysis now.  Continue to closely monitor.    Dyslipidemia  Continue with statin therapy.    Debility  Consult physical and occupational therapy for range of motion exercises.    VTE Risk Mitigation (From admission, onward)         Ordered     heparin (porcine) injection 5,000 Units  Every 12 hours      11/02/20 1551     Place sequential compression device  Until discontinued      10/13/20 1405     IP VTE HIGH RISK PATIENT  Once      10/13/20 1405                Loi Quiles MD  Department of Hospital Medicine   [facility]

## 2020-11-05 NOTE — PLAN OF CARE
High Peak pressure on ventilator, widely awake, was coughing vigorously, moderate to large amount ET and oral secretions obtained. Titrated propofol infusion and PRN fentanyl IV push was given. Blood pressure dropped to SBP 70-80's. Titrated Propofol infusion down and restarted varinder drip. MAP >65 mmHg was maintained thereafter.     NPO after 2 am, for possible extubation in AM. BUN/Crea 54/2.2, K+ 3.2 informed Moonlighter. Urine output 700 cc and 100 cc chest tube drain for 12 hours.

## 2020-11-05 NOTE — ASSESSMENT & PLAN NOTE
- continue diuresis; fluid overload likely contributes to hypercapnia in setting of OHS/BHAVANI, small airway disease with severe restriction.   -CT showing cavitary lesion with surrounding consolidation and GGO concerning for infectious vs malignant process, with significant ggo throughout the lung likely secondary to continued pulmonary edema.   - diuretics still onhold due to CLIFFORD but renal function improving and UOP good.

## 2020-11-05 NOTE — ASSESSMENT & PLAN NOTE
Serum creatinine stabilizing with holding diuretics.  No indication for dialysis now.  Continue to closely monitor.

## 2020-11-05 NOTE — PROGRESS NOTES
"[facility] Medicine  Progress Note    Patient Name: Patsy Morris  MRN: [id number]  Patient Class: IP- Inpatient   Admission Date: 10/13/2020  Length of Stay: 22 days  Attending Physician: Loi Quiles MD  Primary Care Provider: Luisana Cartagena MD        Subjective:     Principal Problem:Acute on chronic respiratory failure with hypoxia and hypercapnia        HPI:  Per Ravi Soria:    "Per ED:  "This is a 70 y.o. female with history of CHF and COPD who presents via EMS with complaint of shortness of breath that began a few days ago. Per EMS patient had O2 saturation of 72 on 3 liters if home oxygen upon their arrival. Patient states she is on 3 liters of home oxygen at baseline. She denies fever, cough, chest pain, nausea, vomiting, diarrhea, or rhinorrhea. She reports she was recently discharged from the hospital for similar symptoms. She reports compliance with her home medications. She recently became resident of [facility] after last hospitalization. She is a former smoker. She denies undergoing any surgeries".    The patient is a 70 year old female with a PMH significant for of HTN, Chronic Respiratory Failure (COPD and BHAVANI/OHS), HFpEF, Obesity who presented to the ED with complaints of SOB that began about 3 days ago.  Patient is on 3L O2NC at home.  She was seen by her Cardiologist about 4 days prior to admission and told to decrease her Lasix from 40mg bid to qday.  She denies chest pain.  No Fevers.  No cough.  States she has been adherent with her medications.  Patient was recently admitted to [facility] from 9/1-9/11 for Acute on Chronic Respiratory Failure and discharged to SNF.  Patient was placed on BiPAP in ED and admitted to ICU.  Patient feeling better on BiPAP."    Overview/Hospital Course:  Patient is 70-year-old woman with history of hypertension, chronic hypoxic respiratory failure on home oxygen secondary to chronic obstructive pulmonary " disease, chronic diastolic heart failure, chronic atrial fibrillation, and morbid obesity re-admitted to the hospital on 10/13/2020 with decompensated heart failure after patient was home for about a week following recent hospitalization and stayed at skilled nursing facility for physical therapy.  Patient treated with non-invasive ventilation intravenous diuretics.  Patient was transferred out of the intensive care unit on 10/21/2020.      Patient continue to improve on the floor until she developed worsening hypoxic respiratory failure.  Patient was transferred to the intensive care unit on 11/1/2020 on placed on continuous BiPAP.  She decompensated further and was intubated on 11/1/2020.  Patient started on intravenous antibiotics on 11/2/2020 to treat hospital-acquired pneumonia.    Interval History:  No acute events overnight.    Review of Systems   Unable to perform ROS: Intubated     Objective:     Vital Signs (Most Recent):  Temp: 99.6 °F (37.6 °C) (11/04/20 1910)  Pulse: 68 (11/04/20 2030)  Resp: 20 (11/04/20 2053)  BP: (!) 99/53 (11/04/20 2030)  SpO2: 96 % (11/04/20 2030) Vital Signs (24h Range):  Temp:  [97.9 °F (36.6 °C)-99.6 °F (37.6 °C)] 99.6 °F (37.6 °C)  Pulse:  [46-78] 68  Resp:  [19-35] 20  SpO2:  [92 %-100 %] 96 %  BP: ()/(52-96) 99/53  Arterial Line BP: ()/(46-82) 116/50     Weight: 97.6 kg (215 lb 2.7 oz)  Body mass index is 38.12 kg/m².    Intake/Output Summary (Last 24 hours) at 11/4/2020 2057  Last data filed at 11/4/2020 2018  Gross per 24 hour   Intake 1763.87 ml   Output 2800 ml   Net -1036.13 ml      Physical Exam  Constitutional:       General: She is not in acute distress.     Comments: Intubated and sedated   HENT:      Head: Normocephalic and atraumatic.   Eyes:      Conjunctiva/sclera: Conjunctivae normal.      Pupils: Pupils are equal, round, and reactive to light.   Neck:      Musculoskeletal: Normal range of motion.   Cardiovascular:      Rate and Rhythm: Tachycardia  present. Rhythm irregular.      Pulses: Normal pulses.      Heart sounds: Normal heart sounds.   Pulmonary:      Comments: Coarse breath sounds due to ventilator, diminished sounds at bilateral bases, no wheezing  Abdominal:      General: Bowel sounds are normal. There is no distension.      Palpations: Abdomen is soft.      Tenderness: There is no abdominal tenderness.   Musculoskeletal:         General: Swelling present.   Skin:     General: Skin is warm and dry.   Neurological:      Comments: Intubated and sedated           Significant Labs: All pertinent labs within the past 24 hours have been reviewed.    Significant Imaging: I have reviewed all pertinent imaging results/findings within the past 24 hours.      Assessment/Plan:      * Acute on chronic respiratory failure with hypoxia and hypercapnia  Patient with advanced lung disease secondary to chronic obstructive pulmonary disease, obstructive sleep apnea, obesity hypoventilation syndrome, and severe restrictive lung disease now with recent decompensation resulting in intubation due to possible pneumonia.  Minimally elevated procalcitonin level.  Continue antibiotic therapy.  Weaning from ventilator per Pulmonary Critical Care.  Plan on bronchoscopy today.    Cavitary lesion of lung  Active pulmonary tuberculosis unlikely and patient with one negative acid-fast bacillus smear.  Additional efforts at obtaining sputum for additional smears not successful despite using hypertonic saline.  Plan for bronchoscopy.    Acute on chronic diastolic congestive heart failure  Respiratory status initially improved with aggressive diuresis.  Serum creatinine trending upward and exam not that remarkable for significant volume overload at this time.  Holding diuretic therapy.  Continue to closely monitor volume status.    Atrial fibrillation with rapid ventricular response  Continue with metoprolol.  Holding anticoagulation for anticipated bronchoscopy.    Essential  hypertension  Reasonably controlled with current regimen.  Will continue with current regimen and continue to monitor.    Chronic kidney disease (CKD) stage G4/A1, severely decreased glomerular filtration rate (GFR) between 15-29 mL/min/1.73 square meter and albuminuria creatinine ratio less than 30 mg/g  Serum creatinine trending up.  Patient with good urine output.  Holding diuretics.  No indication for dialysis now.  Continue to closely monitor.    Dyslipidemia  Continue with statin therapy.    Debility  Consult physical and occupational therapy for range of motion exercises.    Pleural effusion        On mechanically assisted ventilation          VTE Risk Mitigation (From admission, onward)         Ordered     heparin (porcine) injection 5,000 Units  Every 12 hours      11/02/20 1551     Place sequential compression device  Until discontinued      10/13/20 1405     IP VTE HIGH RISK PATIENT  Once      10/13/20 1405                  Loi Quiles MD  Department of Hospital Medicine   [facility]

## 2020-11-05 NOTE — ASSESSMENT & PLAN NOTE
Patient's CT concerning for slow process as lesion in same posterior upper lobe lung fields noted on previous CT in 2018 as well as bialteral pulmonary nodules. In setting of significant smoking history, malignancy is high on differential. However slow infectious process such as NTM/pulmonary MAC is possible as well, also possibility of superimprosed bacterial infection  - HIV negative  - Quantiferon gold indeterminate x 2  - AFB stain negative, smear pending x 1  - f/u endotracheal sputum culture  - s/p bronch with BAL on 11/4  - f/u culture and cytology

## 2020-11-05 NOTE — PROGRESS NOTES
Mild agitation reported  Add on fentanyl prn as ordered  Has chest tube now ? Causing discomfort   On video seems synchronous on vent and sedated  D/w RN

## 2020-11-05 NOTE — PROGRESS NOTES
[facility]  Cardiology  Progress Note    Patient Name: Patsy Morris  MRN: [id number]  Admission Date: 10/13/2020  Hospital Length of Stay: 23 days  Code Status: Full Code   Attending Physician: Loi Quiles MD   Primary Care Physician: Luisana Cartagena MD  Expected Discharge Date:   Principal Problem:Acute on chronic respiratory failure with hypoxia and hypercapnia    Subjective:     Brief HPI:    Patsy Morris is a 70 y.o.female with hypertension. She has chronic atrial fibrillation and heart failure with preserved ejection fraction. She has chronic obstructive pulmonary disease being on home oxygen with CO2 retention. She has undergone nephrectomy for renal cell carcinoma and has chronic kidney disease. She was admitted to [facility] in 2020 and 2020 with heart failure and exacerbations of her chronic obstructive pulmonary disease. She went to therapy at [facility].      She used to be on furosemide 40 mg Q24 however after her last hospitalization she had the does of furosemide increased to 80 mg Q24. The dose was reduced to 40 mg Q24 on 10/8/2020. She became increasingly short of breath and presents fluid overloaded in heart failure as well as an exacerbation of her COPD with high CO2. She was admitted to the ICU.    Hospital Course:    Diuresis.    BIPAP.    Respiratory treatments.    10/15/2020: Echo: Normal left ventricular size and systolic function. EF 60%. Moderately dilated LA. Mildly dilated RV. SPAP 49 mmHg. Small pericardial effusion.    10/16/2020: Apixiban 5 mg Q12 was changed to heparin iv for consideration of bronchoscopy.    10/21/2020: Transferred from ICU to telemetry.    10/27/2020: Walked 150 ft with PT.    2020: AB.21/125/78/90%/FIO2 45%    2020: Respiratory failure. Intubated in ICU.    2020: Bronchoscopy.    2020: Right sided chest tube. Thoracentesis 650 ml. Hazy fluid with WBC of 349.    Interval History:     Remains  on mechanical ventilation.      Review of Systems   Unable to perform ROS: intubated       Objective:     Vital Signs (Most Recent):  Temp: 99.1 °F (37.3 °C) (11/05/20 0705)  Pulse: (!) 50 (11/05/20 0753)  Resp: 20 (11/05/20 0753)  BP: (!) 104/55 (11/05/20 0645)  SpO2: 97 % (11/05/20 0753) Vital Signs (24h Range):  Temp:  [98.4 °F (36.9 °C)-100 °F (37.8 °C)] 99.1 °F (37.3 °C)  Pulse:  [46-80] 50  Resp:  [19-34] 20  SpO2:  [83 %-100 %] 97 %  BP: ()/(41-78) 104/55  Arterial Line BP: ()/(40-82) 118/60     Weight: 97.6 kg (215 lb 2.7 oz)  Body mass index is 38.12 kg/m².    SpO2: 97 %  O2 Device (Oxygen Therapy): ventilator      Intake/Output Summary (Last 24 hours) at 11/5/2020 0924  Last data filed at 11/5/2020 0558  Gross per 24 hour   Intake 2085.52 ml   Output 2400 ml   Net -314.48 ml       Lines/Drains/Airways     Drain                 NG/OG Tube 11/02/20 0100 Center mouth 3 days         Urethral Catheter 11/02/20 0100 3 days         Chest Tube 11/04/20 1132 1 Right Fourth intercostal space less than 1 day          Airway               Airway Anesthesia 11/01/20 3 days          Arterial Line            Arterial Line 11/01/20 Right Radial 4 days          Peripheral Intravenous Line                 Midline Catheter Insertion/Assessment  - Double Lumen 10/20/20 1220 Right median cubital vein (antecubital fossa)  15 days         Peripheral IV - Single Lumen 11/05/20 0630 20 G Anterior;Left Forearm less than 1 day                Physical Exam   Constitutional: She appears well-developed and well-nourished. She appears ill. She is sedated and intubated.   Neck: Carotid bruit is not present.   Cardiovascular: Normal rate, S1 normal and S2 normal. An irregularly irregular rhythm present.   Pulmonary/Chest: She is intubated. She has rhonchi in the right lower field and the left lower field.   Chest tube on the right side.   Abdominal: Soft. Normal appearance.   Musculoskeletal:      Right ankle: She exhibits no  swelling.      Left ankle: She exhibits no swelling.   Neurological: She is unresponsive.   Skin: Skin is warm and dry.     Current Medications:     atorvastatin  80 mg Oral QHS    ceFEPime (MAXIPIME) IVPB  1 g Intravenous Q24H    docusate sodium  100 mg Oral BID    escitalopram oxalate  10 mg Oral QHS    famotidine  20 mg Per NG tube Daily    ferrous sulfate  325 mg Oral Daily    gabapentin  100 mg Oral BID    heparin (porcine)  5,000 Units Subcutaneous Q12H    metoprolol tartrate  25 mg Per NG tube BID    miconazole NITRATE 2 %   Topical (Top) BID    mupirocin   Nasal BID    vitamin renal formula (B-complex-vitamin c-folic acid)  1 capsule Oral Daily     Current Laboratory Results:    Recent Results (from the past 24 hour(s))   Culture, Respiratory    Collection Time: 11/04/20 10:28 AM    Specimen: Bronchial Wash; Respiratory   Result Value Ref Range    Gram Stain (Respiratory) Rare WBC's     Gram Stain (Respiratory) No organisms seen    Direct AFB stain    Collection Time: 11/04/20 10:29 AM    Specimen: Lung, RUL; Body Fluid   Result Value Ref Range    Direct Acid Fast No acid fast bacilli seen.    Protein, Peritoneal, Pleural Fluid or OBI Drainage, In-House Thoracentesis Fluid    Collection Time: 11/04/20 12:04 PM   Result Value Ref Range    Body Fluid Source, Total Protein Thoracentesis Fluid     Body Fluid, Protein 2.8 Not established g/dL   LDH, Peritoneal, Pleural Fluid or OBI Drainage, In-House Thoracentesis Fluid    Collection Time: 11/04/20 12:04 PM   Result Value Ref Range    Body Fluid Source, LDH Thoracentesis Fluid     LD, Fluid 175 Not established U/L   Glucose, Peritoneal, Pleural Fluid or OBI Drainage, In-House Thoracentesis Fluid    Collection Time: 11/04/20 12:04 PM   Result Value Ref Range    Body Fluid Source, Glucose Thoracentesis Fluid     Glucose, Fluid 141 Not established mg/dL   Albumin, Peritoneal, Pleural Fluid or OBI Drainage, In-House Thoracentesis Fluid    Collection Time:  11/04/20 12:04 PM   Result Value Ref Range    Body Fluid Source, Albumin Thoracentesis Fluid     Body Fluid, Albumin 1.9 See text g/dL   WBC & Diff,Body Fluid Pleural Fluid, Right    Collection Time: 11/04/20 12:04 PM   Result Value Ref Range    Body Fluid Type Pleural Fluid, Right     Fluid Appearance Hazy     Fluid Color Yellow     WBC, Body Fluid 349 /cu mm    Segs, Fluid 22 %    Lymphs, Fluid 50 %    Monocytes/Macrophages, Fluid 25 %    Mesothelial Cells, Fluid 3 %   Basic metabolic panel    Collection Time: 11/05/20  4:26 AM   Result Value Ref Range    Sodium 142 136 - 145 mmol/L    Potassium 3.2 (L) 3.5 - 5.1 mmol/L    Chloride 98 95 - 110 mmol/L    CO2 30 (H) 23 - 29 mmol/L    Glucose 112 (H) 70 - 110 mg/dL    BUN 54 (H) 8 - 23 mg/dL    Creatinine 2.2 (H) 0.5 - 1.4 mg/dL    Calcium 8.6 (L) 8.7 - 10.5 mg/dL    Anion Gap 14 8 - 16 mmol/L    eGFR if African American 25 (A) >60 mL/min/1.73 m^2    eGFR if non African American 22 (A) >60 mL/min/1.73 m^2   Magnesium    Collection Time: 11/05/20  4:26 AM   Result Value Ref Range    Magnesium 2.2 1.6 - 2.6 mg/dL   Lactate dehydrogenase    Collection Time: 11/05/20  4:26 AM   Result Value Ref Range     (H) 110 - 260 U/L   Hepatic function panel    Collection Time: 11/05/20  4:26 AM   Result Value Ref Range    Total Protein 6.0 6.0 - 8.4 g/dL    Albumin 2.6 (L) 3.5 - 5.2 g/dL    Total Bilirubin 0.7 0.1 - 1.0 mg/dL    Bilirubin, Direct 0.4 (H) 0.1 - 0.3 mg/dL    AST 27 10 - 40 U/L    ALT 21 10 - 44 U/L    Alkaline Phosphatase 43 (L) 55 - 135 U/L   Phosphorus    Collection Time: 11/05/20  4:26 AM   Result Value Ref Range    Phosphorus 3.8 2.7 - 4.5 mg/dL   CBC auto differential    Collection Time: 11/05/20  4:26 AM   Result Value Ref Range    WBC 13.70 (H) 3.90 - 12.70 K/uL    RBC 3.41 (L) 4.00 - 5.40 M/uL    Hemoglobin 9.4 (L) 12.0 - 16.0 g/dL    Hematocrit 29.0 (L) 37.0 - 48.5 %    MCV 85 82 - 98 fL    MCH 27.6 27.0 - 31.0 pg    MCHC 32.4 32.0 - 36.0 g/dL    RDW  15.8 (H) 11.5 - 14.5 %    Platelets 199 150 - 350 K/uL    MPV 11.0 9.2 - 12.9 fL    Immature Granulocytes 0.4 0.0 - 0.5 %    Gran # (ANC) 10.3 (H) 1.8 - 7.7 K/uL    Immature Grans (Abs) 0.06 (H) 0.00 - 0.04 K/uL    Lymph # 1.5 1.0 - 4.8 K/uL    Mono # 1.6 (H) 0.3 - 1.0 K/uL    Eos # 0.3 0.0 - 0.5 K/uL    Baso # 0.02 0.00 - 0.20 K/uL    nRBC 0 0 /100 WBC    Gran % 75.1 (H) 38.0 - 73.0 %    Lymph % 10.6 (L) 18.0 - 48.0 %    Mono % 11.8 4.0 - 15.0 %    Eosinophil % 2.0 0.0 - 8.0 %    Basophil % 0.1 0.0 - 1.9 %    Differential Method Automated      Current Imaging Results:    X-Ray Chest 1 View   Final Result      Since November 4, 2020, slightly increased diffuse bilateral airspace opacities, particularly in the upper lung zones.         Electronically signed by: Bala Perez MD   Date:    11/05/2020   Time:    07:42      X-Ray Chest 1 View   Final Result      ET tube, enteric tube, and right-sided pigtail chest tube appear in satisfactory position.      Decreasing pulmonary vascular congestion with improving bilateral parahilar and basilar consolidation.      Decreasing consolidation within the mid to right upper lung.      Improved aeration within the right lung base likely due to decreased right pleural effusion.         Electronically signed by: Regulo Solis MD   Date:    11/04/2020   Time:    12:32      XR Non-Rad Performed NG/Gastric Tube Check   Final Result      Tip of nasogastric tube in the proximal stomach.         Electronically signed by: Charlotte Howell   Date:    11/02/2020   Time:    00:25      X-Ray Chest 1 View   Final Result   Abnormal      Interval progression of abnormal intrathoracic findings, increasing bilateral pulmonary opacities, as discussed above.      ET tube noted, the tip above the yamilka.      Enteric tube noted, the distal tip extends subdiaphragmatic below the field of view however the side hole is likely along the distal esophagus, advancement recommended.      This report was  flagged in Epic as abnormal.         Electronically signed by: Hammad Ayala   Date:    11/02/2020   Time:    00:09      X-Ray Chest AP Portable   Final Result      Stable right upper lobe consolidation.  Findings remain concerning for pneumonia with small cavitation as described previously.      Improved aeration right lung base.  Small pleural effusions, decreased in volume on the right.         Electronically signed by: Elsa Alcantar   Date:    10/30/2020   Time:    11:47      X-Ray Chest AP Portable   Final Result      Worsening right basilar atelectasis or consolidation.      Right upper lobe consolidation is improved.  There may be a tiny pneumatocele or central cavitation.      Pleural effusions are improved.      Continued follow-up advised.         Electronically signed by: Elas Alcantar   Date:    10/28/2020   Time:    10:24      X-Ray Chest AP Portable   Final Result      No significant change.  There is cardiomegaly, multifocal airspace opacities and bilateral effusions.         Electronically signed by: Ajay Camara MD   Date:    10/20/2020   Time:    08:48      US Lower Extremity Veins Bilateral   Final Result      No evidence of deep venous thrombosis in either lower extremity.         Electronically signed by: Ceasar Carreon MD   Date:    10/15/2020   Time:    21:53      CT Chest Without Contrast   Final Result      The findings highly concerning for extensive diffuse pulmonary infection with bilateral pleural fluid collections right greater than left.         Electronically signed by: Regulo Bassett MD   Date:    10/15/2020   Time:    12:01      X-Ray Chest AP Portable   Final Result      Interval worsening of diffuse bilateral airspace disease and moderate bilateral pleural effusions when compared to 09/09/2020         Electronically signed by: Halina Plascencia   Date:    10/13/2020   Time:    09:40          10/15/2020: Echo:    The left ventricle is normal in size with normal systolic  function. The estimated ejection fraction is 60%.  A diastolic pattern consistent with atrial fibrillation observed.  Moderate left atrial enlargement.  Mild right ventricular enlargement.  Normal right ventricular systolic function.  Severe right atrial enlargement.  The aortic valve is mildly sclerotic.  The mitral valve is mildly sclerotic.  Mild mitral regurgitation.  Mild tricuspid regurgitation.  There is mild pulmonary hypertension.  The estimated PA systolic pressure is 49 mmHg.  Intermediate central venous pressure (8 mmHg).  Small circumferential pericardial effusion.  There is a left pleural effusion.      Assessment and Plan:     Problem List:    Active Diagnoses:    Diagnosis Date Noted POA    PRINCIPAL PROBLEM:  Acute on chronic respiratory failure with hypoxia and hypercapnia [J96.21, J96.22] 08/03/2016 Yes    Acute kidney injury superimposed on chronic kidney disease [N17.9, N18.9] 11/03/2020 No    On mechanically assisted ventilation [Z99.11] 11/02/2020 Not Applicable    Pleural effusion [J90] 11/02/2020 Yes    Cavitary lesion of lung [J98.4] 10/16/2020 Yes    Atrial fibrillation with rapid ventricular response [I48.91] 09/01/2020 Yes    Debility [R53.81] 08/26/2020 Yes    Acute on chronic diastolic congestive heart failure [I50.33] 05/20/2016 Yes    Chronic kidney disease (CKD) stage G4/A1, severely decreased glomerular filtration rate (GFR) between 15-29 mL/min/1.73 square meter and albuminuria creatinine ratio less than 30 mg/g [N18.4]  Yes    Dyslipidemia [E78.5] 02/22/2016 Yes    Essential hypertension [I10] 10/15/2014 Yes      Problems Resolved During this Admission:    Diagnosis Date Noted Date Resolved POA    Severe sepsis [A41.9, R65.20] 11/02/2020 11/02/2020 Unknown    Shortness of breath [R06.02] 11/01/2020 11/02/2020 Yes    Normocytic anemia [D64.9] 10/27/2020 11/03/2020 Yes    Acute on chronic congestive heart failure [I50.9] 10/21/2020 10/27/2020 Yes    Acute on chronic  congestive heart failure [I50.9] 10/13/2020 10/13/2020 Yes    Encounter for palliative care [Z51.5] 08/25/2020 11/03/2020 Not Applicable    Current moderate episode of major depressive disorder without prior episode [F32.1] 04/30/2020 11/03/2020 Yes    Pulmonary nodules/lesions, multiple [R91.8] 03/22/2019 10/14/2020 Yes    Obstructive sleep apnea [G47.33]  11/02/2020 Yes    Paroxysmal atrial fibrillation [I48.0] 08/03/2016 10/13/2020 Yes    Chronic hypercapnic respiratory failure [J96.12] 03/11/2016 10/13/2020 Yes    Obesity hypoventilation syndrome [E66.2] 02/25/2016 10/27/2020 Yes    Renal carcinoma [C64.9] 03/10/2015 10/13/2020 Yes       Assessment and Plan:     1. Heart Failure, Diastolic, Chronic              8/24/2020: Echo: Normal left ventricular size and systolic function. Mildly dilated LA.   10/15/2020: Echo: Normal left ventricular size and systolic function. EF 60%. Moderately dilated LA. Mildly dilated RV. SPAP 49 mmHg. Small pericardial effusion.              At NH was on furosemide 40 mg Q24.              10/13/2020: Presented fluid overloaded in HF. Received furosemide 80 mg iv Q12.   10/20/2020: CXR with extensive infiltrates and pleural effusions. .    10/26/2020: .   10/28/2020: CXR was improving.   Was on furosemide 40 mg po Q12.   Furosemide on hold.              Does not appear fluid overloaded.      2. Atrial Fibrillation              In chronic atrial fibrillation.              On apixiban.              Was on metoprolol 50 mg Q12.              Rate was well controlled mostly  bpm.   On metoprolol 25 mg Q12.   On metoprolol 5 mg iv Q4 PRN for VRR >110 bpm.     3. Chronic Anticoagulation              Been on apixiban 5 mg Q12.   10/16/2020: Apixiban 5 mg Q12 was changed to heparin iv.   Been on apixiban 5 mg Q12.   No bleeding.   On heparin 5,000 sc Q12 for bronchoscopy and with chest tube.    4. Hypertension   On metoprolol 25 mg Q12.   Well controlled.                 5. Chronic Kidney Disease, Stage 4              History or renal carcinoma and nephrectomy.              10/13/2020: BUN/crea 27/2.0. CrCl 28.   10/20/2020: BUN/crea 56/1.8. CrCl 32.   10/25/2020: BUN/crea 49/1.6. CrCl 32.    2020: BUN/crea 58/2.0. CrCl 24.   2020: BUN/crea 60/2.5. CrCl 19.   2020: BUN/crea 54/2.2. CrCl 22.     6. Chronic Obstructive Pulmonary Disease/Respiratory Failure/Pleural Effusion              Chronic respiratory failure with CO2 retention.              10/13/2020: 7.23/94/73/39/90% on FiO2 of 40%.   Was overall slowly improving.    2020: AB.21/125/78/90%/FIO2 45%.   2020: Bronchoscopy.   2020: Right sided chest tube. Thoracentesis 650 ml. Hazy fluid with WBC of 349.   Remains intubated.     7. Weakness   10/27/2020: Walked 150 ft with PT.   In ICU.      VTE Risk Mitigation (From admission, onward)         Ordered     heparin (porcine) injection 5,000 Units  Every 12 hours      20 1551     Place sequential compression device  Until discontinued      10/13/20 1405     IP VTE HIGH RISK PATIENT  Once      10/13/20 1405                Diana Meredith MD  Cardiology  [facility]

## 2020-11-05 NOTE — ASSESSMENT & PLAN NOTE
Respiratory status initially improved with aggressive diuresis.  Serum creatinine trending upward and further diuretic therapy on hold.  Serum creatinine stable/improving now.  Continue to closely monitor volume status.

## 2020-11-05 NOTE — SUBJECTIVE & OBJECTIVE
Interval History:  No acute events overnight.    Review of Systems   Unable to perform ROS: Intubated     Objective:     Vital Signs (Most Recent):  Temp: 99.1 °F (37.3 °C) (11/05/20 0705)  Pulse: 62 (11/05/20 1000)  Resp: (!) 22 (11/05/20 1000)  BP: (!) 120/55 (11/05/20 1000)  SpO2: 98 % (11/05/20 1000) Vital Signs (24h Range):  Temp:  [98.4 °F (36.9 °C)-100 °F (37.8 °C)] 99.1 °F (37.3 °C)  Pulse:  [48-80] 62  Resp:  [19-34] 22  SpO2:  [83 %-100 %] 98 %  BP: ()/(41-78) 120/55  Arterial Line BP: ()/(40-74) 114/58     Weight: 97.6 kg (215 lb 2.7 oz)  Body mass index is 38.12 kg/m².    Intake/Output Summary (Last 24 hours) at 11/5/2020 1128  Last data filed at 11/5/2020 0558  Gross per 24 hour   Intake 2085.52 ml   Output 2400 ml   Net -314.48 ml      Physical Exam  Constitutional:       General: She is not in acute distress.     Comments: Intubated but awake and following commands this morning.   HENT:      Head: Normocephalic and atraumatic.   Eyes:      Conjunctiva/sclera: Conjunctivae normal.      Pupils: Pupils are equal, round, and reactive to light.   Neck:      Musculoskeletal: Normal range of motion.   Cardiovascular:      Rate and Rhythm: Tachycardia present. Rhythm irregular.      Pulses: Normal pulses.      Heart sounds: Normal heart sounds.   Pulmonary:      Comments: Coarse breath sounds due to ventilator, diminished sounds at bilateral bases, no wheezing.  Chest tube on the right side.  Abdominal:      General: Bowel sounds are normal. There is no distension.      Palpations: Abdomen is soft.      Tenderness: There is no abdominal tenderness.   Musculoskeletal:         General: Swelling present.   Skin:     General: Skin is warm and dry.   Neurological:      Comments: Intubated, following commands, moving all extremities.         Significant Labs: All pertinent labs within the past 24 hours have been reviewed.    Significant Imaging: I have reviewed all pertinent imaging results/findings  within the past 24 hours.

## 2020-11-05 NOTE — ASSESSMENT & PLAN NOTE
Patient with advanced lung disease secondary to chronic obstructive pulmonary disease, obstructive sleep apnea, obesity hypoventilation syndrome, and severe restrictive lung disease now with recent decompensation resulting in intubation due to possible pneumonia.  Minimally elevated procalcitonin level.  Continue antibiotic therapy.  Status post bronchoscopy and placement of the right chest tube yesterday.  No acid-fast bacillus seen from sample from bronchial wash.  Gram stain unremarkable.  Additional cultures pending.  Continue weaning efforts from the ventilator.

## 2020-11-05 NOTE — PT/OT/SLP PROGRESS
Physical Therapy  Not Seen    Patient Name:  Patsy Morris   MRN:  [id number]    Patient not seen today secondary to Patient unwilling to participate - intubated but awake and following commands. Via yes/no questions pt communicates that there is a lot going on at this time and defers PT including supine therEx. Will follow-up tomorrow, 11/6.    Sanjana Burgos, PT

## 2020-11-05 NOTE — ASSESSMENT & PLAN NOTE
- possible due to ATN  - pt with baseline CKD in addition to having only one kidney due to prior nephrectomy  - still volume overloaded on exam with pleural effusions and EMERALD but diuretics held  due to increase in creatinine  - monitor UOP, strict I/Os  - Cr improving today with good UOP, improving with negative fluid balance  - ensure MAP > 65 so no hypotensive episodes worsen renal perfusion

## 2020-11-06 PROBLEM — Z99.11 ON MECHANICALLY ASSISTED VENTILATION: Status: RESOLVED | Noted: 2020-01-01 | Resolved: 2020-01-01

## 2020-11-06 PROBLEM — J90 PLEURAL EFFUSION: Status: RESOLVED | Noted: 2020-01-01 | Resolved: 2020-01-01

## 2020-11-06 NOTE — SUBJECTIVE & OBJECTIVE
Interval History:  No acute events overnight.    Review of Systems   Unable to perform ROS: Intubated     Objective:     Vital Signs (Most Recent):  Temp: 98.6 °F (37 °C) (11/06/20 0705)  Pulse: (!) 142 (11/06/20 0952)  Resp: (!) 22 (11/06/20 0900)  BP: (!) 101/56 (11/06/20 0800)  SpO2: 99 % (11/06/20 0952) Vital Signs (24h Range):  Temp:  [98.6 °F (37 °C)-99.4 °F (37.4 °C)] 98.6 °F (37 °C)  Pulse:  [] 142  Resp:  [15-32] 22  SpO2:  [93 %-100 %] 99 %  BP: ()/(51-88) 101/56  Arterial Line BP: ()/(42-60) 100/60     Weight: 97.6 kg (215 lb 2.7 oz)  Body mass index is 38.12 kg/m².    Intake/Output Summary (Last 24 hours) at 11/6/2020 1034  Last data filed at 11/6/2020 0600  Gross per 24 hour   Intake 571.15 ml   Output 1680 ml   Net -1108.85 ml      Physical Exam  Constitutional:       General: She is not in acute distress.     Comments: Intubated but awake and following commands this morning.   HENT:      Head: Normocephalic and atraumatic.   Eyes:      Conjunctiva/sclera: Conjunctivae normal.      Pupils: Pupils are equal, round, and reactive to light.   Neck:      Musculoskeletal: Normal range of motion.   Cardiovascular:      Rate and Rhythm: Tachycardia present. Rhythm irregular.      Pulses: Normal pulses.      Heart sounds: Normal heart sounds.   Pulmonary:      Comments: Coarse breath sounds due to ventilator, diminished sounds at bilateral bases, no wheezing.  Chest tube on the right side.  Abdominal:      General: Bowel sounds are normal. There is no distension.      Palpations: Abdomen is soft.      Tenderness: There is no abdominal tenderness.   Musculoskeletal:         General: Swelling present.   Skin:     General: Skin is warm and dry.   Neurological:      Comments: Intubated, following commands, moving all extremities.         Significant Labs: All pertinent labs within the past 24 hours have been reviewed.    Significant Imaging: I have reviewed all pertinent imaging results/findings  within the past 24 hours.

## 2020-11-06 NOTE — ASSESSMENT & PLAN NOTE
Active pulmonary tuberculosis unlikely and patient with one negative acid-fast bacillus smear.  Additional efforts at obtaining sputum for additional smears not successful despite using hypertonic saline.  Status post bronchoscopy with negative acid fast bacillus smear.

## 2020-11-06 NOTE — PLAN OF CARE
Problem: Physical Therapy Goal  Goal: Physical Therapy Goal  Description: Goals to be met by: 11/15/2020    Patient will perform the following to increase strength, improve mobility, and return to prior level of function:    1. Supine <> sit with min A.  2. Sit <> stand transfer w/ min A and least restrictive assistive device.   3. Bed <> chair transfer with min A and least restrictive assistive device.    Outcome: Ongoing, Progressing    Pt transferred to ICU and was extubated this morning. Re-eval performed. Pt tolerated re-evaluation well with no adverse reactions. Re-eval performed at bed level per RN's advisement. Pt with decreased functional mobility as compared to original evaluation. Pt will benefit from skilled PT services to address impairments and functional limitations. Recommend discharge to SNF.

## 2020-11-06 NOTE — SUBJECTIVE & OBJECTIVE
Interval History: no acute events overnight. Yesterday, tolerated PS 15/8 all day, placed back on AC/VC with sedation overnight to rest. This morning, she's the most alert and well appearing I've seen yet. Placed on PS 12/5, then 10/5 and tolerating well, ABG on PS with good gas exchange. Will plan to extubate to bipap. Chest tube with 230cc serous output over past 24 hours. UOP remains good with net negative fluid balance without diuretics, creatinine improving.     Objective:     Vital Signs (Most Recent):  Temp: 98.6 °F (37 °C) (11/06/20 0705)  Pulse: (!) 142 (11/06/20 0952)  Resp: (!) 22 (11/06/20 0900)  BP: (!) 101/56 (11/06/20 0800)  SpO2: 99 % (11/06/20 0952) Vital Signs (24h Range):  Temp:  [98.6 °F (37 °C)-99.4 °F (37.4 °C)] 98.6 °F (37 °C)  Pulse:  [] 142  Resp:  [15-32] 22  SpO2:  [93 %-100 %] 99 %  BP: ()/(51-88) 101/56  Arterial Line BP: ()/(42-60) 100/60     Weight: 97.6 kg (215 lb 2.7 oz)  Body mass index is 38.12 kg/m².      Intake/Output Summary (Last 24 hours) at 11/6/2020 1033  Last data filed at 11/6/2020 0600  Gross per 24 hour   Intake 571.15 ml   Output 1680 ml   Net -1108.85 ml       Physical Exam  Vitals signs and nursing note reviewed.   Constitutional:       General: She is not in acute distress.     Comments: Awake interactive, following all commands   HENT:      Head: Normocephalic and atraumatic.   Eyes:      Conjunctiva/sclera: Conjunctivae normal.      Pupils: Pupils are equal, round, and reactive to light.   Neck:      Musculoskeletal: Normal range of motion.      Comments: Thick neck    Cardiovascular:      Rate and Rhythm: Normal rate. Rhythm irregular.      Pulses: Normal pulses.      Heart sounds: Normal heart sounds.   Pulmonary:      Comments: Coarse breath sounds due to ventilator, diminished sounds at bilateral bases, no wheezing  Abdominal:      General: Bowel sounds are normal. There is no distension.      Palpations: Abdomen is soft.      Tenderness:  There is no abdominal tenderness.   Musculoskeletal:         General: Swelling (1+ bilateral lower extremities, slightly improved today) present.   Skin:     General: Skin is warm and dry.   Neurological:      Mental Status: She is alert.      Comments: Off sedation she is awake and alert, following commands, moving all extremities         Vents:  Vent Mode: Spont (11/06/20 0952)  Ventilator Initiated: Yes (11/01/20 2338)  Set Rate: 0 BPM (11/06/20 0952)  Vt Set: 390 mL (11/06/20 0952)  Pressure Support: 12 cmH20 (11/06/20 0952)  PEEP/CPAP: 5 cmH20 (11/06/20 0952)  Oxygen Concentration (%): 30 (11/06/20 0952)  Peak Airway Pressure: 17 cmH2O (11/06/20 0952)  Plateau Pressure: 0 cmH20 (11/06/20 0952)  Total Ve: 6.15 mL (11/06/20 0952)  F/VT Ratio<105 (RSBI): (!) 49.3 (11/06/20 0755)    Lines/Drains/Airways     Drain                 NG/OG Tube 11/02/20 0100 Center mouth 4 days         Urethral Catheter 11/02/20 0100 4 days         Chest Tube 11/04/20 1132 1 Right Fourth intercostal space 1 day          Airway               Airway Anesthesia 11/01/20 4 days          Arterial Line            Arterial Line 11/01/20 Right Radial 5 days          Peripheral Intravenous Line                 Midline Catheter Insertion/Assessment  - Double Lumen 10/20/20 1220 Right median cubital vein (antecubital fossa)  16 days         Peripheral IV - Single Lumen 11/05/20 0630 20 G Anterior;Left Forearm 1 day                Significant Labs:    CBC/Anemia Profile:  Recent Labs   Lab 11/05/20 0426   WBC 13.70*   HGB 9.4*   HCT 29.0*      MCV 85   RDW 15.8*        Chemistries:  Recent Labs   Lab 11/05/20 0426 11/06/20  0355    143   K 3.2* 3.0*   CL 98 101   CO2 30* 29   BUN 54* 47*   CREATININE 2.2* 2.0*   CALCIUM 8.6* 8.4*   ALBUMIN 2.6*  --    PROT 6.0  --    BILITOT 0.7  --    ALKPHOS 43*  --    ALT 21  --    AST 27  --    MG 2.2 2.3   PHOS 3.8 3.6       All pertinent labs within the past 24 hours have been  reviewed.    Significant Imaging:  I have reviewed and interpreted all pertinent imaging results/findings within the past 24 hours.

## 2020-11-06 NOTE — PLAN OF CARE
Problem: Occupational Therapy Goal  Goal: Occupational Therapy Goal  Description: Goals to be met by: 11/13/2020    Patient will increase functional independence with ADLs by performing:    UE Dressing with Min A.  LE Dressing with Maximum Assistance using adaptive equipment as needed.  Grooming while seated with SBA.  Toileting from bedside commode with CGA for hygiene and clothing management.   Toilet transfer to bedside commode with Minimal Assistance.  Pt will tolerate sitting EOB for 15 minutes with SBA for balance.        Outcome: Ongoing, Progressing     OT re-evaluation performed and goals updated to reflect current functional status.  Pt extubated earlier this date and on BIPAP during session.  SNF is recommended upon d/c from acute care to further address deficits and help pt improve overall functional independence.     CARLOS Motta  11/6/2020

## 2020-11-06 NOTE — PROGRESS NOTES
[facility]  Cardiology  Progress Note    Patient Name: Patsy Morris  MRN: [id number]  Admission Date: 10/13/2020  Hospital Length of Stay: 24 days  Code Status: Full Code   Attending Physician: Loi Quiles MD   Primary Care Physician: Luisana Cartagena MD  Expected Discharge Date:   Principal Problem:Acute on chronic respiratory failure with hypoxia and hypercapnia    Subjective:     Brief HPI:    Patsy Morris is a 70 y.o.female with hypertension. She has chronic atrial fibrillation and heart failure with preserved ejection fraction. She has chronic obstructive pulmonary disease being on home oxygen with CO2 retention. She has undergone nephrectomy for renal cell carcinoma and has chronic kidney disease. She was admitted to [facility] in 2020 and 2020 with heart failure and exacerbations of her chronic obstructive pulmonary disease. She went to therapy at [facility].      She used to be on furosemide 40 mg Q24 however after her last hospitalization she had the does of furosemide increased to 80 mg Q24. The dose was reduced to 40 mg Q24 on 10/8/2020. She became increasingly short of breath and presents fluid overloaded in heart failure as well as an exacerbation of her COPD with high CO2. She was admitted to the ICU.    Hospital Course:    Diuresis.    BIPAP.    Respiratory treatments.    10/15/2020: Echo: Normal left ventricular size and systolic function. EF 60%. Moderately dilated LA. Mildly dilated RV. SPAP 49 mmHg. Small pericardial effusion.    10/16/2020: Apixiban 5 mg Q12 was changed to heparin iv for consideration of bronchoscopy.    10/21/2020: Transferred from ICU to telemetry.    10/27/2020: Walked 150 ft with PT.    2020: AB.21/125/78/90%/FIO2 45%    2020: Respiratory failure. Intubated in ICU.    2020: Bronchoscopy.    2020: Right sided chest tube. Thoracentesis 650 ml. Hazy fluid with WBC of 349.    Interval History:    VRR in  50's - now off metoprolol.     Remains on mechanical ventilation.      Review of Systems   Unable to perform ROS: intubated       Objective:     Vital Signs (Most Recent):  Temp: 99 °F (37.2 °C) (11/06/20 0325)  Pulse: 76 (11/06/20 0555)  Resp: 20 (11/06/20 0555)  BP: (!) 101/54 (11/06/20 0555)  SpO2: 99 % (11/06/20 0555) Vital Signs (24h Range):  Temp:  [99 °F (37.2 °C)-99.4 °F (37.4 °C)] 99 °F (37.2 °C)  Pulse:  [] 76  Resp:  [15-32] 20  SpO2:  [93 %-100 %] 99 %  BP: ()/(51-88) 101/54  Arterial Line BP: ()/(42-64) 120/46     Weight: 97.6 kg (215 lb 2.7 oz)  Body mass index is 38.12 kg/m².    SpO2: 99 %  O2 Device (Oxygen Therapy): ventilator      Intake/Output Summary (Last 24 hours) at 11/6/2020 0733  Last data filed at 11/6/2020 0600  Gross per 24 hour   Intake 571.15 ml   Output 1830 ml   Net -1258.85 ml       Lines/Drains/Airways     Drain                 NG/OG Tube 11/02/20 0100 Center mouth 4 days         Urethral Catheter 11/02/20 0100 4 days         Chest Tube 11/04/20 1132 1 Right Fourth intercostal space 1 day          Airway               Airway Anesthesia 11/01/20 4 days          Arterial Line            Arterial Line 11/01/20 Right Radial 5 days          Peripheral Intravenous Line                 Midline Catheter Insertion/Assessment  - Double Lumen 10/20/20 1220 Right median cubital vein (antecubital fossa)  16 days         Peripheral IV - Single Lumen 11/05/20 0630 20 G Anterior;Left Forearm 1 day                Physical Exam   Constitutional: She appears well-developed and well-nourished. She appears ill. She is sedated and intubated.   Neck: Carotid bruit is not present.   Cardiovascular: Normal rate, S1 normal and S2 normal. An irregularly irregular rhythm present.   Pulmonary/Chest: She is intubated. She has rhonchi in the right lower field and the left lower field.   Chest tube on the right side.   Abdominal: Soft. Normal appearance.   Musculoskeletal:      Right ankle: She  exhibits no swelling.      Left ankle: She exhibits no swelling.   Neurological: She is unresponsive.   Skin: Skin is warm and dry.     Current Medications:     atorvastatin  80 mg Oral QHS    ceFEPime (MAXIPIME) IVPB  1 g Intravenous Q24H    docusate  100 mg Per NG tube BID    escitalopram oxalate  10 mg Oral QHS    famotidine  20 mg Per NG tube Daily    ferrous sulfate  325 mg Oral Daily    gabapentin  100 mg Oral BID    heparin (porcine)  5,000 Units Subcutaneous Q12H    miconazole NITRATE 2 %   Topical (Top) BID    mupirocin   Nasal BID    potassium chloride in water  10 mEq Intravenous Q1H    vitamin renal formula (B-complex-vitamin c-folic acid)  1 capsule Oral Daily     Current Laboratory Results:    Recent Results (from the past 24 hour(s))   Vancomycin, random    Collection Time: 11/05/20  7:45 PM   Result Value Ref Range    Vancomycin, Random 17.2 Not established ug/mL   Basic metabolic panel    Collection Time: 11/06/20  3:55 AM   Result Value Ref Range    Sodium 143 136 - 145 mmol/L    Potassium 3.0 (L) 3.5 - 5.1 mmol/L    Chloride 101 95 - 110 mmol/L    CO2 29 23 - 29 mmol/L    Glucose 111 (H) 70 - 110 mg/dL    BUN 47 (H) 8 - 23 mg/dL    Creatinine 2.0 (H) 0.5 - 1.4 mg/dL    Calcium 8.4 (L) 8.7 - 10.5 mg/dL    Anion Gap 13 8 - 16 mmol/L    eGFR if African American 28 (A) >60 mL/min/1.73 m^2    eGFR if non African American 24 (A) >60 mL/min/1.73 m^2   Magnesium    Collection Time: 11/06/20  3:55 AM   Result Value Ref Range    Magnesium 2.3 1.6 - 2.6 mg/dL   Phosphorus    Collection Time: 11/06/20  3:55 AM   Result Value Ref Range    Phosphorus 3.6 2.7 - 4.5 mg/dL     Current Imaging Results:    X-Ray Chest 1 View   Final Result      Since November 4, 2020, slightly increased diffuse bilateral airspace opacities, particularly in the upper lung zones.         Electronically signed by: Bala Perez MD   Date:    11/05/2020   Time:    07:42      X-Ray Chest 1 View   Final Result      ET tube,  enteric tube, and right-sided pigtail chest tube appear in satisfactory position.      Decreasing pulmonary vascular congestion with improving bilateral parahilar and basilar consolidation.      Decreasing consolidation within the mid to right upper lung.      Improved aeration within the right lung base likely due to decreased right pleural effusion.         Electronically signed by: Regulo Solis MD   Date:    11/04/2020   Time:    12:32      XR Non-Rad Performed NG/Gastric Tube Check   Final Result      Tip of nasogastric tube in the proximal stomach.         Electronically signed by: Charlotte Howell   Date:    11/02/2020   Time:    00:25      X-Ray Chest 1 View   Final Result   Abnormal      Interval progression of abnormal intrathoracic findings, increasing bilateral pulmonary opacities, as discussed above.      ET tube noted, the tip above the yamilka.      Enteric tube noted, the distal tip extends subdiaphragmatic below the field of view however the side hole is likely along the distal esophagus, advancement recommended.      This report was flagged in Epic as abnormal.         Electronically signed by: Hammad Ayala   Date:    11/02/2020   Time:    00:09      X-Ray Chest AP Portable   Final Result      Stable right upper lobe consolidation.  Findings remain concerning for pneumonia with small cavitation as described previously.      Improved aeration right lung base.  Small pleural effusions, decreased in volume on the right.         Electronically signed by: Elsa Alcantar   Date:    10/30/2020   Time:    11:47      X-Ray Chest AP Portable   Final Result      Worsening right basilar atelectasis or consolidation.      Right upper lobe consolidation is improved.  There may be a tiny pneumatocele or central cavitation.      Pleural effusions are improved.      Continued follow-up advised.         Electronically signed by: Elsa Alcantar   Date:    10/28/2020   Time:    10:24      X-Ray Chest AP Portable    Final Result      No significant change.  There is cardiomegaly, multifocal airspace opacities and bilateral effusions.         Electronically signed by: Ajay Camara MD   Date:    10/20/2020   Time:    08:48      US Lower Extremity Veins Bilateral   Final Result      No evidence of deep venous thrombosis in either lower extremity.         Electronically signed by: Ceasar Carreon MD   Date:    10/15/2020   Time:    21:53      CT Chest Without Contrast   Final Result      The findings highly concerning for extensive diffuse pulmonary infection with bilateral pleural fluid collections right greater than left.         Electronically signed by: Regulo Bassett MD   Date:    10/15/2020   Time:    12:01      X-Ray Chest AP Portable   Final Result      Interval worsening of diffuse bilateral airspace disease and moderate bilateral pleural effusions when compared to 09/09/2020         Electronically signed by: Halina Plascencia   Date:    10/13/2020   Time:    09:40          10/15/2020: Echo:    The left ventricle is normal in size with normal systolic function. The estimated ejection fraction is 60%.  A diastolic pattern consistent with atrial fibrillation observed.  Moderate left atrial enlargement.  Mild right ventricular enlargement.  Normal right ventricular systolic function.  Severe right atrial enlargement.  The aortic valve is mildly sclerotic.  The mitral valve is mildly sclerotic.  Mild mitral regurgitation.  Mild tricuspid regurgitation.  There is mild pulmonary hypertension.  The estimated PA systolic pressure is 49 mmHg.  Intermediate central venous pressure (8 mmHg).  Small circumferential pericardial effusion.  There is a left pleural effusion.      Assessment and Plan:     Problem List:    Active Diagnoses:    Diagnosis Date Noted POA    PRINCIPAL PROBLEM:  Acute on chronic respiratory failure with hypoxia and hypercapnia [J96.21, J96.22] 08/03/2016 Yes    Acute kidney injury superimposed on chronic  kidney disease [N17.9, N18.9] 11/03/2020 No    On mechanically assisted ventilation [Z99.11] 11/02/2020 Not Applicable    Pleural effusion [J90] 11/02/2020 Yes    Cavitary lesion of lung [J98.4] 10/16/2020 Yes    Atrial fibrillation with rapid ventricular response [I48.91] 09/01/2020 Yes    Debility [R53.81] 08/26/2020 Yes    Acute on chronic diastolic congestive heart failure [I50.33] 05/20/2016 Yes    Chronic kidney disease (CKD) stage G4/A1, severely decreased glomerular filtration rate (GFR) between 15-29 mL/min/1.73 square meter and albuminuria creatinine ratio less than 30 mg/g [N18.4]  Yes    Dyslipidemia [E78.5] 02/22/2016 Yes    Essential hypertension [I10] 10/15/2014 Yes      Problems Resolved During this Admission:    Diagnosis Date Noted Date Resolved POA    Severe sepsis [A41.9, R65.20] 11/02/2020 11/02/2020 Unknown    Shortness of breath [R06.02] 11/01/2020 11/02/2020 Yes    Normocytic anemia [D64.9] 10/27/2020 11/03/2020 Yes    Acute on chronic congestive heart failure [I50.9] 10/21/2020 10/27/2020 Yes    Acute on chronic congestive heart failure [I50.9] 10/13/2020 10/13/2020 Yes    Encounter for palliative care [Z51.5] 08/25/2020 11/03/2020 Not Applicable    Current moderate episode of major depressive disorder without prior episode [F32.1] 04/30/2020 11/03/2020 Yes    Pulmonary nodules/lesions, multiple [R91.8] 03/22/2019 10/14/2020 Yes    Obstructive sleep apnea [G47.33]  11/02/2020 Yes    Paroxysmal atrial fibrillation [I48.0] 08/03/2016 10/13/2020 Yes    Chronic hypercapnic respiratory failure [J96.12] 03/11/2016 10/13/2020 Yes    Obesity hypoventilation syndrome [E66.2] 02/25/2016 10/27/2020 Yes    Renal carcinoma [C64.9] 03/10/2015 10/13/2020 Yes       Assessment and Plan:     1. Heart Failure, Diastolic, Chronic              8/24/2020: Echo: Normal left ventricular size and systolic function. Mildly dilated LA.   10/15/2020: Echo: Normal left ventricular size and systolic  function. EF 60%. Moderately dilated LA. Mildly dilated RV. SPAP 49 mmHg. Small pericardial effusion.              At NH was on furosemide 40 mg Q24.              10/13/2020: Presented fluid overloaded in HF. Received furosemide 80 mg iv Q12.   10/20/2020: CXR with extensive infiltrates and pleural effusions. .    10/26/2020: .   10/28/2020: CXR was improving.   Was on furosemide 40 mg po Q12.   Furosemide on hold.              Does not appear fluid overloaded.      2. Atrial Fibrillation              In chronic atrial fibrillation.              On apixiban.              Used to be on metoprolol 50 mg Q12.              Rate was well controlled mostly  bpm.   Now off metoprolol and VRR 50-70 bpm.     3. Chronic Anticoagulation              Been on apixiban 5 mg Q12.   10/16/2020: Apixiban 5 mg Q12 was changed to heparin iv.   Been on apixiban 5 mg Q12.   No bleeding.   On heparin 5,000 sc Q12 after bronchoscopy and with chest tube in place.    4. Hypertension   On no antihypertensives.   Well controlled.                5. Chronic Kidney Disease, Stage 4              History or renal carcinoma and nephrectomy.              10/13/2020: BUN/crea 27/2.0. CrCl 28.   10/20/2020: BUN/crea 56/1.8. CrCl 32.   10/25/2020: BUN/crea 49/1.6. CrCl 32.    2020: BUN/crea 58/2.0. CrCl 24.   2020: BUN/crea 60/2.5. CrCl 19.   2020: BUN/crea 54/2.2. CrCl 22.     6. Chronic Obstructive Pulmonary Disease/Respiratory Failure/Pleural Effusion              Chronic respiratory failure with CO2 retention.              10/13/2020: 7.23/94/73/39/90% on FiO2 of 40%.   Was overall slowly improving.    2020: AB.21/125/78/90%/FIO2 45%.   2020: Bronchoscopy.   2020: Right sided chest tube. Thoracentesis 650 ml. Hazy fluid with WBC of 349.   Remains intubated.     7. Weakness   10/27/2020: Walked 150 ft with PT.   Currently intubated in ICU.      VTE Risk Mitigation (From admission, onward)          Ordered     heparin (porcine) injection 5,000 Units  Every 12 hours      11/02/20 1551     Place sequential compression device  Until discontinued      10/13/20 1405     IP VTE HIGH RISK PATIENT  Once      10/13/20 1405                Diana Meredith MD  Cardiology  [facility]

## 2020-11-06 NOTE — ASSESSMENT & PLAN NOTE
- with baseline severe restrictive/obstuctive lung disease (FVC ~30% on PFTs in 2016).   - requiring intubation on 11/1  - likely multifactorial related to volume overload + HF exacerbation + BHAVANI/OHS + possible new infectious PNA + ? Malignancy?  - no wheezing on exam,  Steroids stopped 11/2  - endotracheal aspirate NGTD, f/u cultures  - f/u BAL, cultures NGTD  - vanco stopped 11/5  - continue cefepime for now, today day 6 of abx  - continuing to hold diuresis today given CLIFFORD however she continues to have good UOP and is improving with negative fluid balance  - overall, her baseline severe ventilatory defect will be largest limitation for her moving forward, this morning with optimization of volume status and abx for possible PNA, she is best she has looked yet. ABG on PS looks great. Will plan to extubate to NIPPV this morning. Discussed with both patient and her mPOA that she is very high risk for decompensation and possible need for return to ventilator. They are in understanding. At this time, would plan to reintubate if fails NIPPV. Per report, appears she has previously expressed that she would not want prolonged invasive life support if she would not be able to survive off life support. Will continue ongoing goals of care discussion with patient and family; once extubated, will be able to further delineate patient's wishes when she is able to communicate more effectively.

## 2020-11-06 NOTE — ASSESSMENT & PLAN NOTE
- due to acute on chronic hypoxic/hypercapnic respiratory failure + volume overload with bilateral pleural effusions + HF + possible new PNA  - abx as above, net negative fluid balance optimal  - GI ppx with famotidine   - this morning, best she has looked yet, on PS and tolerating well.   - will extubate to bipap 16/5 today   - see goals of care discussion below

## 2020-11-06 NOTE — PROGRESS NOTES
Pt received intubated and on the ventilator this AM. Pt placed on SBT @ 0834;pt did well. ABG was done @ 0942;results were shown to Dr. rByant. Extubated @ 1020 to BIPAP 16/5 30%. Pt reports no SOB nor does pt appears to be in any distress. No other changes were made at this time. Will continue to monitor.

## 2020-11-06 NOTE — EICU
Bilateral soft wrist restrains ordered for 24 hours as patient at risk of self harm as she was pulling on lines and tubes

## 2020-11-06 NOTE — PROGRESS NOTES
"[facility] Medicine  Progress Note    Patient Name: Patsy Morris  MRN: [id number]  Patient Class: IP- Inpatient   Admission Date: 10/13/2020  Length of Stay: 24 days  Attending Physician: Loi Quiles MD  Primary Care Provider: Luisana Cartagena MD        Subjective:     Principal Problem:Acute on chronic respiratory failure with hypoxia and hypercapnia        HPI:  Per Ravi Soria:    "Per ED:  "This is a 70 y.o. female with history of CHF and COPD who presents via EMS with complaint of shortness of breath that began a few days ago. Per EMS patient had O2 saturation of 72 on 3 liters if home oxygen upon their arrival. Patient states she is on 3 liters of home oxygen at baseline. She denies fever, cough, chest pain, nausea, vomiting, diarrhea, or rhinorrhea. She reports she was recently discharged from the hospital for similar symptoms. She reports compliance with her home medications. She recently became resident of [facility] after last hospitalization. She is a former smoker. She denies undergoing any surgeries".    The patient is a 70 year old female with a PMH significant for of HTN, Chronic Respiratory Failure (COPD and BHAVANI/OHS), HFpEF, Obesity who presented to the ED with complaints of SOB that began about 3 days ago.  Patient is on 3L O2NC at home.  She was seen by her Cardiologist about 4 days prior to admission and told to decrease her Lasix from 40mg bid to qday.  She denies chest pain.  No Fevers.  No cough.  States she has been adherent with her medications.  Patient was recently admitted to [facility] from 9/1-9/11 for Acute on Chronic Respiratory Failure and discharged to SNF.  Patient was placed on BiPAP in ED and admitted to ICU.  Patient feeling better on BiPAP."    Overview/Hospital Course:  Patient is 70-year-old woman with history of hypertension, chronic hypoxic respiratory failure on home oxygen secondary to chronic obstructive pulmonary " disease, chronic diastolic heart failure, chronic atrial fibrillation, and morbid obesity re-admitted to the hospital on 10/13/2020 with decompensated heart failure after patient was home for about a week following recent hospitalization and stayed at skilled nursing facility for physical therapy.  Patient treated with non-invasive ventilation intravenous diuretics.  Patient was transferred out of the intensive care unit on 10/21/2020.      Patient continue to improve on the floor until she developed worsening hypoxic respiratory failure.  Patient was transferred to the intensive care unit on 11/1/2020 on placed on continuous BiPAP.  She decompensated further and was intubated on 11/1/2020.  Patient started on intravenous antibiotics on 11/2/2020 to treat hospital-acquired pneumonia.  Patient had chest tube placed to drain pleural effusion on the right side.  Patient underwent bronchoscopy 11/4/2020.    Interval History:  No acute events overnight.    Review of Systems   Unable to perform ROS: Intubated     Objective:     Vital Signs (Most Recent):  Temp: 98.6 °F (37 °C) (11/06/20 0705)  Pulse: (!) 142 (11/06/20 0952)  Resp: (!) 22 (11/06/20 0900)  BP: (!) 101/56 (11/06/20 0800)  SpO2: 99 % (11/06/20 0952) Vital Signs (24h Range):  Temp:  [98.6 °F (37 °C)-99.4 °F (37.4 °C)] 98.6 °F (37 °C)  Pulse:  [] 142  Resp:  [15-32] 22  SpO2:  [93 %-100 %] 99 %  BP: ()/(51-88) 101/56  Arterial Line BP: ()/(42-60) 100/60     Weight: 97.6 kg (215 lb 2.7 oz)  Body mass index is 38.12 kg/m².    Intake/Output Summary (Last 24 hours) at 11/6/2020 1034  Last data filed at 11/6/2020 0600  Gross per 24 hour   Intake 571.15 ml   Output 1680 ml   Net -1108.85 ml      Physical Exam  Constitutional:       General: She is not in acute distress.     Comments: Intubated but awake and following commands this morning.   HENT:      Head: Normocephalic and atraumatic.   Eyes:      Conjunctiva/sclera: Conjunctivae normal.       Pupils: Pupils are equal, round, and reactive to light.   Neck:      Musculoskeletal: Normal range of motion.   Cardiovascular:      Rate and Rhythm: Tachycardia present. Rhythm irregular.      Pulses: Normal pulses.      Heart sounds: Normal heart sounds.   Pulmonary:      Comments: Coarse breath sounds due to ventilator, diminished sounds at bilateral bases, no wheezing.  Chest tube on the right side.  Abdominal:      General: Bowel sounds are normal. There is no distension.      Palpations: Abdomen is soft.      Tenderness: There is no abdominal tenderness.   Musculoskeletal:         General: Swelling present.   Skin:     General: Skin is warm and dry.   Neurological:      Comments: Intubated, following commands, moving all extremities.         Significant Labs: All pertinent labs within the past 24 hours have been reviewed.    Significant Imaging: I have reviewed all pertinent imaging results/findings within the past 24 hours.      Assessment/Plan:      * Acute on chronic respiratory failure with hypoxia and hypercapnia  Patient with advanced lung disease secondary to chronic obstructive pulmonary disease, obstructive sleep apnea, obesity hypoventilation syndrome, and severe restrictive lung disease now with recent decompensation resulting in intubation due to possible pneumonia.  Minimally elevated procalcitonin level.  Continue antibiotic therapy.  Status post bronchoscopy and placement of the right chest tube yesterday.  Large volume output from chest tube.  No acid-fast bacillus seen from sample from bronchial wash.  Gram stain unremarkable.  Additional cultures pending.  Continue weaning efforts from the ventilator.    Cavitary lesion of lung  Active pulmonary tuberculosis unlikely and patient with one negative acid-fast bacillus smear.  Additional efforts at obtaining sputum for additional smears not successful despite using hypertonic saline.  Status post bronchoscopy with negative acid fast bacillus  smear.    Acute on chronic diastolic congestive heart failure  Respiratory status initially improved with aggressive diuresis.  Serum creatinine trending upward and further diuretic therapy on hold.  Serum creatinine stable/improving now.  Continue to closely monitor volume status.    Atrial fibrillation with rapid ventricular response  Continue with metoprolol.  Holding anticoagulation for bronchoscopy.  Plan to restart soon.    Essential hypertension  Reasonably controlled with current regimen.  Will continue with current regimen and continue to monitor.    Chronic kidney disease (CKD) stage G4/A1, severely decreased glomerular filtration rate (GFR) between 15-29 mL/min/1.73 square meter and albuminuria creatinine ratio less than 30 mg/g  Serum creatinine stabilizing with holding diuretics.  No indication for dialysis now.  Continue to closely monitor.    Dyslipidemia  Continue with statin therapy.    Debility  Consult physical and occupational therapy for range of motion exercises.    VTE Risk Mitigation (From admission, onward)         Ordered     heparin (porcine) injection 5,000 Units  Every 12 hours      11/02/20 1551     Place sequential compression device  Until discontinued      10/13/20 1405     IP VTE HIGH RISK PATIENT  Once      10/13/20 1405                Loi Quiles MD  Department of Hospital Medicine   [facility]

## 2020-11-06 NOTE — PT/OT/SLP RE-EVAL
Occupational Therapy   Re-evaluation    Name: Patsy Morris  MRN: [id number]  Admitting Diagnosis:  Acute on chronic respiratory failure with hypoxia and hypercapnia      Recommendations:     Discharge Recommendations: nursing facility, skilled  Discharge Equipment Recommendations:  (TBD at next level of care)  Barriers to discharge:  Decreased caregiver support    Assessment:     Patsy Morris is a 70 y.o. female with a medical diagnosis of Acute on chronic respiratory failure with hypoxia and hypercapnia.  She presents with fatigue.  Performance deficits affecting function are weakness, impaired endurance, impaired self care skills, impaired functional mobilty, gait instability, impaired balance, decreased upper extremity function, decreased lower extremity function, impaired cardiopulmonary response to activity.  Pt extubated ~1 hour prior to session and appropriate for bed level activity only this date per RN.  Pt demonstrates strength and ROM in (B) UE needed for ADLs that is WFL, and is able to simulate grooming task with SBA while supine with HOB elevated.      Prior to t/f to ICU on 11/1 pt had been making steady progress with therapy.  She would continue to benefit from skilled OT services to address problems listed above and increase independence with ADLs.  SNF is recommended upon d/c from acute care to further address deficits and help pt improve overall functional independence.          Rehab Prognosis:  Good; patient would benefit from acute skilled OT services to address these deficits and reach maximum level of function.       Plan:     Patient to be seen 5 x/week to address the above listed problems via self-care/home management, therapeutic activities, therapeutic exercises  · Plan of Care Expires: 11/14/20  · Plan of Care Reviewed with: patient    Subjective     Chief Complaint: Fatigue  Patient/Family stated goals: Resume PLOF  Communicated with: RN (Dania) prior to  session.  Pain/Comfort:  · Pain Rating 1: 0/10  · Pain Rating Post-Intervention 1: 0/10    Objective:     Communicated with: RN (Dania) prior to session.  Patient found HOB elevated with: arterial line, BIPAP, blood pressure cuff, peripheral IV, telemetry, PICC line, ovalles catheter, chest tube, pulse ox (continuous) upon OT entry to room.    General Precautions: Standard, fall, respiratory   Orthopedic Precautions:N/A   Braces: N/A     Occupational Performance:    Bed Mobility:    · Not assessed 2* pt extubated 1 hour prior and appropriate for bed level activity.  Will assess in upcoming sessions.    Functional Mobility/Transfers:  · Sit <> Stand: Not assessed 2* pt appropriate for bed level activity only this date.  · Functional Mobility: To be assessed in upcoming sessions    Activities of Daily Living:  · Grooming:  SBA for simulation of washing face while supine with HOB elevated.    Cognitive/Visual Perceptual:  Cognitive/Psychosocial Skills:    -       Oriented to: Person, Place, Time and Situation   -       Follows Commands/attention:Follows multistep  commands  -       Communication: clear/fluent  -       Memory: No Deficits noted  -       Safety awareness/insight to disability: intact   -       Mood/Affect/Coping skills/emotional control: Cooperative and Pleasant    Physical Exam:  Postural examination/scapula alignment: -       No postural abnormalities identified  Skin integrity: Visible skin intact  Edema:  None noted  Sensation: -       Intact  Motor Planning: -       WFL  Dominant hand: right  Upper Extremity Range of Motion:    -       Right Upper Extremity: WFL  -       Left Upper Extremity: WFL  Upper Extremity Strength:   -       Right Upper Extremity: WFL; grossly 4/5 all muscle groups  -       Left Upper Extremity: WFL; grossly 4/5 all muscle groups   Strength: 5/5 both hands  Fine Motor Coordination: Intact  Gross motor coordination: To be assessed in upcoming sessions    [facility] 6 Click:  [facility]  Total Score: 13    Treatment & Education:Education:    *Pt educated on role of OT and POC discussed    Patient left HOB elevated with all lines intact, call button in reach and RN (Dania) notified    GOALS:   Multidisciplinary Problems     Occupational Therapy Goals        Problem: Occupational Therapy Goal    Goal Priority Disciplines Outcome Interventions   Occupational Therapy Goal     OT, PT/OT Ongoing, Progressing    Description: Goals to be met by: 11/13/2020    Patient will increase functional independence with ADLs by performing:    UE Dressing with Min A.  LE Dressing with Maximum Assistance using adaptive equipment as needed.  Grooming while seated with SBA.  Toileting from bedside commode with CGA for hygiene and clothing management.   Toilet transfer to bedside commode with Minimal Assistance.  Pt will tolerate sitting EOB for 15 minutes with SBA for balance.                         History:     Past Medical History:   Diagnosis Date    Arthritis     Asthma     Atrial fibrillation     Atrial flutter     Cerumen impaction     CHF (congestive heart failure)     CKD (chronic kidney disease), stage III     Colon polyps 2017    COPD exacerbation     Encounter for blood transfusion     HEARING LOSS     Herpes genitalis     Hypertension     Renal carcinoma 3/10/2015    Thyroid nodule 6/8/2017       Past Surgical History:   Procedure Laterality Date    BRAIN SURGERY      COLONOSCOPY N/A 6/23/2017    Procedure: COLONOSCOPY;  Surgeon: Shane Sharma MD;  Location: [facility] ([address] [place] [address]);  Service: Endoscopy;  Laterality: N/A;  2nd floor case ; on 3L home O2       per Dr Leopold (anesthesia)-Based on her history of:  Chronic respiratory failure with oxygen use, HDEZ, CHF, A-Fib, BHAVANI, it was determined that she should have her Colonoscopy scheduled on the 2nd Floor       ok to hold Eliquis 2 days prior to    EYE SURGERY      HYSTERECTOMY      kidney mass resection Right     renal carcinoma       Time  Tracking:     OT Date of Treatment: 11/06/20  OT Start Time: 1146  OT Stop Time: 1157  OT Total Time (min): 11 min    Billable Minutes:Re-austen 11    CARLOS Motta  11/6/2020

## 2020-11-06 NOTE — NURSING
Dr King called to notify -142/min A flutter Dr Bryant at bedside 2 doses of Lopressor % mg 15 min apart about 40 mins ago. PT extubated and doing well on Bipap.

## 2020-11-06 NOTE — ASSESSMENT & PLAN NOTE
- Patient with BMI >35 with PaCO2 consistently >52 with restriction on PFTs.   - extubate directly to bipap  - will need to continue bipap nightly and with all naps.

## 2020-11-06 NOTE — PLAN OF CARE
Patient received on mechanical vent, settings as documented. Sats 99%. Oral care done. Pt. in no distress, will continue to monitor.

## 2020-11-06 NOTE — PROGRESS NOTES
Repeat ABG done @ 1513;results were reported to Dr. Bryant. No changes were made. Will continue to monitor.

## 2020-11-06 NOTE — PLAN OF CARE
Pt remained intubated, in nad throughout shift. Sedation turned on at beginning of shift for patient asynchrony with vent and agitation, but able to be weaned off in am. TF to NG tube turned off at 0600 in anticipation of extubation. Thick, clear secretions still appreciated with inline suction. Pt c/o pain during shift-prn med given to patient. Midline in place to RUE, art line to r wrist. Shin in place with statlock, draining clear yellow. Low potassium replaced this am. Will continue to monitor.

## 2020-11-06 NOTE — PROGRESS NOTES
[facility]  Pulmonology  Progress Note    Patient Name: Patsy Morris  MRN: [id number]  Admission Date: 10/13/2020  Hospital Length of Stay: 24 days  Code Status: Full Code  Attending Provider: Loi Quiles MD  Primary Care Provider: Luisana Cartagena MD   Principal Problem: Acute on chronic respiratory failure with hypoxia and hypercapnia    Subjective:     Interval History: no acute events overnight. Yesterday, tolerated PS 15/8 all day, placed back on AC/VC with sedation overnight to rest. This morning, she's the most alert and well appearing I've seen yet. Placed on PS 12/5, then 10/5 and tolerating well, ABG on PS with good gas exchange. Will plan to extubate to bipap. Chest tube with 230cc serous output over past 24 hours. UOP remains good with net negative fluid balance without diuretics, creatinine improving.     Objective:     Vital Signs (Most Recent):  Temp: 98.6 °F (37 °C) (11/06/20 0705)  Pulse: (!) 142 (11/06/20 0952)  Resp: (!) 22 (11/06/20 0900)  BP: (!) 101/56 (11/06/20 0800)  SpO2: 99 % (11/06/20 0952) Vital Signs (24h Range):  Temp:  [98.6 °F (37 °C)-99.4 °F (37.4 °C)] 98.6 °F (37 °C)  Pulse:  [] 142  Resp:  [15-32] 22  SpO2:  [93 %-100 %] 99 %  BP: ()/(51-88) 101/56  Arterial Line BP: ()/(42-60) 100/60     Weight: 97.6 kg (215 lb 2.7 oz)  Body mass index is 38.12 kg/m².      Intake/Output Summary (Last 24 hours) at 11/6/2020 1033  Last data filed at 11/6/2020 0600  Gross per 24 hour   Intake 571.15 ml   Output 1680 ml   Net -1108.85 ml       Physical Exam  Vitals signs and nursing note reviewed.   Constitutional:       General: She is not in acute distress.     Comments: Awake interactive, following all commands   HENT:      Head: Normocephalic and atraumatic.   Eyes:      Conjunctiva/sclera: Conjunctivae normal.      Pupils: Pupils are equal, round, and reactive to light.   Neck:      Musculoskeletal: Normal range of motion.      Comments: Thick  neck    Cardiovascular:      Rate and Rhythm: Normal rate. Rhythm irregular.      Pulses: Normal pulses.      Heart sounds: Normal heart sounds.   Pulmonary:      Comments: Coarse breath sounds due to ventilator, diminished sounds at bilateral bases, no wheezing  Abdominal:      General: Bowel sounds are normal. There is no distension.      Palpations: Abdomen is soft.      Tenderness: There is no abdominal tenderness.   Musculoskeletal:         General: Swelling (1+ bilateral lower extremities, slightly improved today) present.   Skin:     General: Skin is warm and dry.   Neurological:      Mental Status: She is alert.      Comments: Off sedation she is awake and alert, following commands, moving all extremities         Vents:  Vent Mode: Spont (11/06/20 0952)  Ventilator Initiated: Yes (11/01/20 2338)  Set Rate: 0 BPM (11/06/20 0952)  Vt Set: 390 mL (11/06/20 0952)  Pressure Support: 12 cmH20 (11/06/20 0952)  PEEP/CPAP: 5 cmH20 (11/06/20 0952)  Oxygen Concentration (%): 30 (11/06/20 0952)  Peak Airway Pressure: 17 cmH2O (11/06/20 0952)  Plateau Pressure: 0 cmH20 (11/06/20 0952)  Total Ve: 6.15 mL (11/06/20 0952)  F/VT Ratio<105 (RSBI): (!) 49.3 (11/06/20 0755)    Lines/Drains/Airways     Drain                 NG/OG Tube 11/02/20 0100 Center mouth 4 days         Urethral Catheter 11/02/20 0100 4 days         Chest Tube 11/04/20 1132 1 Right Fourth intercostal space 1 day          Airway               Airway Anesthesia 11/01/20 4 days          Arterial Line            Arterial Line 11/01/20 Right Radial 5 days          Peripheral Intravenous Line                 Midline Catheter Insertion/Assessment  - Double Lumen 10/20/20 1220 Right median cubital vein (antecubital fossa)  16 days         Peripheral IV - Single Lumen 11/05/20 0630 20 G Anterior;Left Forearm 1 day                Significant Labs:    CBC/Anemia Profile:  Recent Labs   Lab 11/05/20  0426   WBC 13.70*   HGB 9.4*   HCT 29.0*      MCV 85   RDW  15.8*        Chemistries:  Recent Labs   Lab 11/05/20  0426 11/06/20  0355    143   K 3.2* 3.0*   CL 98 101   CO2 30* 29   BUN 54* 47*   CREATININE 2.2* 2.0*   CALCIUM 8.6* 8.4*   ALBUMIN 2.6*  --    PROT 6.0  --    BILITOT 0.7  --    ALKPHOS 43*  --    ALT 21  --    AST 27  --    MG 2.2 2.3   PHOS 3.8 3.6       All pertinent labs within the past 24 hours have been reviewed.    Significant Imaging:  I have reviewed and interpreted all pertinent imaging results/findings within the past 24 hours.    Assessment/Plan:     * Acute on chronic respiratory failure with hypoxia and hypercapnia  - with baseline severe restrictive/obstuctive lung disease (FVC ~30% on PFTs in 2016).   - requiring intubation on 11/1  - likely multifactorial related to volume overload + HF exacerbation + BHAVANI/OHS + possible new infectious PNA + ? Malignancy?  - no wheezing on exam,  Steroids stopped 11/2  - endotracheal aspirate NGTD, f/u cultures  - f/u BAL, cultures NGTD  - vanco stopped 11/5  - continue cefepime for now, today day 6 of abx  - continuing to hold diuresis today given CLIFFORD however she continues to have good UOP and is improving with negative fluid balance  - overall, her baseline severe ventilatory defect will be largest limitation for her moving forward, this morning with optimization of volume status and abx for possible PNA, she is best she has looked yet. ABG on PS looks great. Will plan to extubate to NIPPV this morning. Discussed with both patient and her mPOA that she is very high risk for decompensation and possible need for return to ventilator. They are in understanding. At this time, would plan to reintubate if fails NIPPV. Per report, appears she has previously expressed that she would not want prolonged invasive life support if she would not be able to survive off life support. Will continue ongoing goals of care discussion with patient and family; once extubated, will be able to further delineate patient's  wishes when she is able to communicate more effectively.    Acute kidney injury superimposed on chronic kidney disease  - possible due to ATN  - pt with baseline CKD in addition to having only one kidney due to prior nephrectomy  - still volume overloaded on exam with pleural effusions and EMERALD but diuretics held  due to increase in creatinine  - monitor UOP, strict I/Os  - Cr improving over past 2 days  - good UOP despite diuretics on hold   - improving with negative fluid balance  - ensure MAP > 65 so no hypotensive episodes worsen renal perfusion    Pleural effusion  - bilateral, R>>L  - s/p chest tube placement on 11/4 given her baseline severe restrictive lung disease, any additional impingement on that worsens her ventilatory abilities thus hopeful that drainage of fluid will improve her respiratory dynamics   - initial pleural fluid studies borderline exudative, more likely effusion related to heart failure however given cavitary lesion on right,  infectious vs malignant on differential.   - f/u culture and cytology  -  With 230 output serous fluid over last 24 hours  - continue chest tube to water seal    On mechanically assisted ventilation  - due to acute on chronic hypoxic/hypercapnic respiratory failure + volume overload with bilateral pleural effusions + HF + possible new PNA  - abx as above, net negative fluid balance optimal  - GI ppx with famotidine   - this morning, best she has looked yet, on PS and tolerating well.   - will extubate to bipap 16/5 today   - see goals of care discussion below    Cavitary lesion of lung  Patient's CT concerning for slow process as lesion in same posterior upper lobe lung fields noted on previous CT in 2018 as well as bialteral pulmonary nodules. In setting of significant smoking history, malignancy is high on differential. However slow infectious process such as NTM/pulmonary MAC is possible as well, also possibility of superimprosed bacterial infection  - HIV  negative  - Quantiferon gold indeterminate x 2  - AFB stain negative, smear pending x 1  - f/u endotracheal sputum culture  - s/p bronch with BAL on 11/4, f/u AFB  - f/u culture and cytology      Atrial fibrillation with rapid ventricular response  - currently in flutter with variable block  - with bradycardia to 40s yesterday so metoprolol was held  - returned to RVR today, requiring a few pushes IV metoprolol  - will restart PO metoprolol this evening  - eliquis previously on hold due to dynamic renal function and bronchoscopy, will restart tomorrow if all remains stable.   - heparin for ppx for now      Acute on chronic diastolic congestive heart failure  - continue diuresis; fluid overload likely contributes to hypercapnia in setting of OHS/BHAVANI, small airway disease with severe restriction.   -CT showing cavitary lesion with surrounding consolidation and GGO concerning for infectious vs malignant process, with significant ggo throughout the lung likely secondary to continued pulmonary edema.   - diuretics still onhold due to CLIFFORD but renal function improving and UOP good.   - continues to improve with negative fluid balance      Obesity hypoventilation syndrome  - Patient with BMI >35 with PaCO2 consistently >52 with restriction on PFTs.   - extubate directly to bipap  - will need to continue bipap nightly and with all naps.        PPX: heparin      GOC Discussion:  Overall, her baseline severe ventilatory defect will be largest limitation for her moving forward. This morning with optimization of volume status and abx for possible PNA, she is best she has looked yet. Extubating to NIPPV this morning. Discussed with both patient and her mPOA that she is very high risk for decompensation and possible need for return to ventilator. They are in understanding. At this time, would plan to reintubate if fails NIPPV. Per report, appears she has previously expressed that she would not want prolonged invasive life support  if she would not be able to survive off of it. Will continue ongoing goals of care discussion with patient and family; once extubated, will be able to further delineate patient's wishes when she is able to communicate more effectively.         Fadumo Bryant MD  Pulmonology  [facility], [place], operated by [facility]

## 2020-11-06 NOTE — PT/OT/SLP RE-EVAL
Physical Therapy Re-evaluation    Patient Name:  Patsy Morris   MRN:  [id number]    Recommendations:     Discharge Recommendations:  nursing facility, skilled   Discharge Equipment Recommendations: (Defer to next level of care)   Barriers to discharge: Decreased caregiver support and current functional status    Assessment:     Patsy Morris is a 70 y.o. female admitted with a medical diagnosis of Acute on chronic respiratory failure with hypoxia and hypercapnia. PMH includes HTN, CKD, CHF, and COPD.  She presents with the following impairments/functional limitations:  weakness, impaired endurance, impaired self care skills, impaired functional mobilty, gait instability, impaired balance, decreased upper extremity function, decreased lower extremity function, impaired cardiopulmonary response to activity.    Pt transferred to ICU and was extubated this morning. Re-eval performed. Pt tolerated re-evaluation well with no adverse reactions. Re-eval performed at bed level per RN's advisement. Pt with decreased functional mobility as compared to original evaluation. Pt will benefit from skilled PT services to address impairments and functional limitations. Recommend discharge to SNF.     Rehab Prognosis:  Fair; patient would benefit from acute skilled PT services to address these deficits and reach maximum level of function.      Recent Surgery: * No surgery found *      Plan:     During this hospitalization, patient to be seen 5 x/week to address the above listed problems via therapeutic activities, therapeutic exercises  · Plan of Care Expires:  12/06/20   Plan of Care Reviewed with: patient    Subjective     Communicated with RN prior to session.  Patient found HOB elevated with arterial line, BIPAP, blood pressure cuff, peripheral IV, telemetry, PICC line, ovalles catheter, chest tube, pulse ox (continuous) upon PT entry to room, agreeable to evaluation.      Chief Complaint: No complaint stated.   Patient  comments/goals: No goal stated.  Pain/Comfort:  · Pain Rating 1: 0/10  · Pain Rating Post-Intervention 1: 0/10    Patients cultural, spiritual, Quaker conflicts given the current situation: no      Objective:     Patient found with: arterial line, BIPAP, blood pressure cuff, peripheral IV, telemetry, PICC line, ovalles catheter, chest tube, pulse ox (continuous)     General Precautions: Standard, fall, respiratory   Orthopedic Precautions:N/A   Braces: N/A     Exams:  · Cognition:   · Patient is oriented to person, location, time, and situation.  · Pt follows approximately 90% of single commands.    · Mood: Pleasant and cooperative.  · Safety Awareness: Decreased  · Musculoskeletal:  · Posture:  Forward head, rounded shoulder.   · LE ROM/Strength:   · R ROM:   · Ankle: WNL  · Knee: WNL  · Hip: WFL  · L ROM: WNL  · R Strength:   · Hip flexion: 3+/5  · Hip Abduction: 4-/5  · Hip Adduction: 4-/5  · Knee extension: 4-/5  · Dorsiflexion: 4-/5   · L Strength:   · Hip flexion: 4-/5  · Hip Abduction: 4-/5  · Hip Adduction: 4-/5  · Knee extension: 4-/5  · Dorsiflexion: 5/5   · Neuromuscular:  · Sensation: Intact to light touch bilateral LEs.   · Tone/Reflexes: No impairments identified with functional mobility. No formal testing performed.   · Coordination: Unable to perform testing.  · Balance: Unable to perform balance testing.   · Visual-vestibular: No impairments identified with functional mobility. No formal testing performed.  · Integument: Visible skin intact  · Cardiopulmonary:  · Vital signs:    BP HR (bpm) SpO2   Pre-treatment 100/66 122  98%   Post treatment 98/56 136 96%     · Pt's BPM was 138 during evaluation.  · Pt's FiO2 was 30%.     AM-PAC 6 CLICK MOBILITY  Total Score:11     Therapeutic Activities and Exercises:  PT educated patient re:   · PT plan of care/role of PT  · Fall and safety precautions  · Use of call light (don't get up without assistance)  Pt verbalized understanding       Patient left HOB  elevated with all lines intact and call button in reach.    GOALS:   Multidisciplinary Problems     Physical Therapy Goals        Problem: Physical Therapy Goal    Goal Priority Disciplines Outcome Goal Variances Interventions   Physical Therapy Goal     PT, PT/OT Ongoing, Progressing     Description: Goals to be met by: 11/15/2020    Patient will perform the following to increase strength, improve mobility, and return to prior level of function:    1. Supine <> sit with min A.  2. Sit <> stand transfer w/ min A and least restrictive assistive device.   3. Bed <> chair transfer with min A and least restrictive assistive device.                       History:     Past Medical History:   Diagnosis Date    Arthritis     Asthma     Atrial fibrillation     Atrial flutter     Cerumen impaction     CHF (congestive heart failure)     CKD (chronic kidney disease), stage III     Colon polyps 2017    COPD exacerbation     Encounter for blood transfusion     HEARING LOSS     Herpes genitalis     Hypertension     Renal carcinoma 3/10/2015    Thyroid nodule 6/8/2017       Past Surgical History:   Procedure Laterality Date    BRAIN SURGERY      COLONOSCOPY N/A 6/23/2017    Procedure: COLONOSCOPY;  Surgeon: Shane Sharma MD;  Location: [facility] ([address] [place] [address]);  Service: Endoscopy;  Laterality: N/A;  2nd floor case ; on 3L home O2       per Dr Leopold (anesthesia)-Based on her history of:  Chronic respiratory failure with oxygen use, HDEZ, CHF, A-Fib, BHAVANI, it was determined that she should have her Colonoscopy scheduled on the 2nd Floor       ok to hold Eliquis 2 days prior to    EYE SURGERY      HYSTERECTOMY      kidney mass resection Right     renal carcinoma       Time Tracking:     PT Received On: 11/06/20  PT Start Time: 1144     PT Stop Time: 1156  PT Total Time (min): 12 min     Overlap with OT for portions of session due to complex nature of patient and for safety with mobility to decrease fall risk for patient  and caregiver injury requiring two skilled therapists to provide interventions.     Billable Minutes: Re-eval 12      Roz Glass, SILAS  11/06/2020

## 2020-11-06 NOTE — ASSESSMENT & PLAN NOTE
- currently in flutter with variable block  - with bradycardia to 40s yesterday so metoprolol was held  - returned to RVR today, requiring a few pushes IV metoprolol  - will restart PO metoprolol this evening  - eliquis previously on hold due to dynamic renal function and bronchoscopy, will restart tomorrow if all remains stable.   - heparin for ppx for now

## 2020-11-06 NOTE — ASSESSMENT & PLAN NOTE
- bilateral, R>>L  - s/p chest tube placement on 11/4 given her baseline severe restrictive lung disease, any additional impingement on that worsens her ventilatory abilities thus hopeful that drainage of fluid will improve her respiratory dynamics   - initial pleural fluid studies borderline exudative, more likely effusion related to heart failure however given cavitary lesion on right,  infectious vs malignant on differential.   - f/u culture and cytology  -  With 230 output serous fluid over last 24 hours  - continue chest tube to water seal

## 2020-11-06 NOTE — ASSESSMENT & PLAN NOTE
- continue diuresis; fluid overload likely contributes to hypercapnia in setting of OHS/BHAVANI, small airway disease with severe restriction.   -CT showing cavitary lesion with surrounding consolidation and GGO concerning for infectious vs malignant process, with significant ggo throughout the lung likely secondary to continued pulmonary edema.   - diuretics still onhold due to CLIFFORD but renal function improving and UOP good.   - continues to improve with negative fluid balance

## 2020-11-06 NOTE — ASSESSMENT & PLAN NOTE
Patient with advanced lung disease secondary to chronic obstructive pulmonary disease, obstructive sleep apnea, obesity hypoventilation syndrome, and severe restrictive lung disease now with recent decompensation resulting in intubation due to possible pneumonia.  Minimally elevated procalcitonin level.  Continue antibiotic therapy.  Status post bronchoscopy and placement of the right chest tube yesterday.  Large volume output from chest tube.  No acid-fast bacillus seen from sample from bronchial wash.  Gram stain unremarkable.  Additional cultures pending.  Continue weaning efforts from the ventilator.

## 2020-11-06 NOTE — ASSESSMENT & PLAN NOTE
Patient's CT concerning for slow process as lesion in same posterior upper lobe lung fields noted on previous CT in 2018 as well as bialteral pulmonary nodules. In setting of significant smoking history, malignancy is high on differential. However slow infectious process such as NTM/pulmonary MAC is possible as well, also possibility of superimprosed bacterial infection  - HIV negative  - Quantiferon gold indeterminate x 2  - AFB stain negative, smear pending x 1  - f/u endotracheal sputum culture  - s/p bronch with BAL on 11/4, f/u AFB  - f/u culture and cytology

## 2020-11-06 NOTE — ASSESSMENT & PLAN NOTE
- possible due to ATN  - pt with baseline CKD in addition to having only one kidney due to prior nephrectomy  - still volume overloaded on exam with pleural effusions and EMERALD but diuretics held  due to increase in creatinine  - monitor UOP, strict I/Os  - Cr improving over past 2 days  - good UOP despite diuretics on hold   - improving with negative fluid balance  - ensure MAP > 65 so no hypotensive episodes worsen renal perfusion

## 2020-11-06 NOTE — PROGRESS NOTES
Repeat ABG @ 1451 due to pt being very lethargic;results were reported to Dr. Bryant. Increased IPAP to 20;repeat ABG in 40 minutes.

## 2020-11-07 NOTE — ASSESSMENT & PLAN NOTE
- with baseline severe restrictive/obstuctive lung disease (FVC ~30% on PFTs in 2016).   - requiring intubation on 11/1  - likely multifactorial related to volume overload + HF exacerbation + BHAVANI/OHS + possible new infectious PNA + ? Malignancy?  - no wheezing on exam,  Steroids stopped 11/2  - endotracheal aspirate NGTD, f/u cultures  - f/u BAL, cultures NGTD  - vanco stopped 11/5  - can d/c cefepime after today's dose (7  Days total with negative cultures)   - continuing to hold diuresis today given CLIFFORD however she continues to have good UOP and is improving with negative fluid balance  Dr. Bryant has had extensive conversations with the family about her tenuous respiratory status. They are in understanding. At this time, would plan to reintubate if fails NIPPV. Per report, appears she has previously expressed that she would not want prolonged invasive life support if she would not be able to survive off life support. Will continue ongoing goals of care discussion with patient and family and the patient.

## 2020-11-07 NOTE — ASSESSMENT & PLAN NOTE
-rate controlled with metoprolol   - eliquis can be restarted at this point.   - heparin for ppx for now

## 2020-11-07 NOTE — PROGRESS NOTES
Pt received on the BIPAP this AM. Placed ppt on 3LNC;SPO2 remains normal. No changes were made. Will continue to monitor.

## 2020-11-07 NOTE — PLAN OF CARE
Pt remains stable on bipap throughout the night, slept well. R art line to radial with armboard. R side chest tube in place, draining yellow. Shin in place draining clear/yellow urine. Pt refused bath, turned q2h. RUE midline in place. Will continue to monitor pt.

## 2020-11-07 NOTE — ASSESSMENT & PLAN NOTE
- Patient with BMI >35 with PaCO2 consistently >52 with restriction on PFTs.   - can use nasal canula during the day and bipap PRN as well as nightly and with any naps  - will need to continue bipap nightly and with all naps.

## 2020-11-07 NOTE — PROGRESS NOTES
[facility]    Admission date:  10/13/2020     Assessment  1.  Permanent atrial fibrillation  Rate was low overnight and metoprolol was held    2.  Chronic diastolic heart failure  Compensated    3.  Essential hypertension  Controlled    Plan and Discussion  Will add hold parameters to metoprolol and change to long-acting form in case a doses held.  Otherwise, continue current cardiac treatment    Subjective  Awake alert.  No complaints.    Medications  Current Facility-Administered Medications   Medication Dose Route Frequency Provider Last Rate Last Dose    acetaminophen tablet 1,000 mg  1,000 mg Oral Q8H PRN Ravi Soria MD   1,000 mg at 10/31/20 2125    apixaban tablet 5 mg  5 mg Oral BID Fadumo Bryant MD   5 mg at 11/07/20 0907    atorvastatin tablet 80 mg  80 mg Oral QHS Ravi Soria MD   80 mg at 11/06/20 2037    cefepime in dextrose 5 % 1 gram/50 mL IVPB 1 g  1 g Intravenous Q24H Ravi Soria  mL/hr at 11/07/20 0540 1 g at 11/07/20 0540    dextrose 50% injection 12.5 g  12.5 g Intravenous PRN Ravi Soria MD        dextrose 50% injection 25 g  25 g Intravenous PRN Ravi Soria MD        docusate 50 mg/5 mL liquid 100 mg  100 mg Per NG tube BID Loi Quiles MD   100 mg at 11/07/20 0906    escitalopram oxalate tablet 10 mg  10 mg Oral QHS Rvai Soria MD   10 mg at 11/06/20 2037    famotidine tablet 20 mg  20 mg Oral Daily Loi Quiles MD        fentaNYL injection 25 mcg  25 mcg Intravenous Q2H PRN Niyah Prado MD   25 mcg at 11/06/20 0355    ferrous sulfate EC tablet 325 mg  325 mg Oral Daily Ravi Soria MD   325 mg at 11/07/20 0906    gabapentin capsule 100 mg  100 mg Oral BID Ravi Soria MD   100 mg at 11/07/20 0907    glucagon (human recombinant) injection 1 mg  1 mg Intramuscular PRN Ravi Soria MD        glucose chewable tablet 16 g  16 g Oral PRN Ravi Soria MD        glucose chewable tablet 24 g   24 g Oral PRN Ravi Soria MD        lorazepam (ATIVAN) injection 2 mg  2 mg Intravenous Q2H PRN Niyah Prado MD   2 mg at 11/02/20 0106    melatonin tablet 6 mg  6 mg Oral Nightly PRN Ravi Soria MD   6 mg at 11/06/20 2037    metoprolol injection 5 mg  5 mg Intravenous Q4H PRN Diana Meredith MD        metoprolol tartrate (LOPRESSOR) tablet 25 mg  25 mg Oral BID Fadumo Brynat MD   25 mg at 11/07/20 0906    miconazole NITRATE 2 % top powder   Topical (Top) BID Ravi Soria MD        mupirocin 2 % ointment   Nasal BID Loi Quiles MD        ondansetron injection 4 mg  4 mg Intravenous Q8H PRN Ravi Soria MD   4 mg at 11/01/20 1838    phenylephrine (NARENDRA-SYNEPHRINE) 20 mg in sodium chloride 0.9% 250 mL infusion  0.5 mcg/kg/min Intravenous Continuous Fadumo Bryant MD   Stopped at 11/05/20 1230    polyethylene glycol packet 17 g  17 g Oral Daily PRN Ravi Soria MD        promethazine (PHENERGAN) 25 mg in dextrose 5 % 50 mL IVPB  25 mg Intravenous Q6H PRN Ravi Soria MD        sodium chloride 0.9% flush 10 mL  10 mL Intravenous PRN Ravi Soria MD   10 mL at 10/28/20 2021    vitamin renal formula (B-complex-vitamin c-folic acid) 1 mg per capsule 1 capsule  1 capsule Oral Daily Ravi Soria MD   1 capsule at 11/07/20 0906        Physical Exam  Temp:  [97.9 °F (36.6 °C)-98.4 °F (36.9 °C)]   Pulse:  []   Resp:  [11-37]   BP: ()/(46-72)   SpO2:  [72 %-99 %]   Arterial Line BP: ()/(42-60)    Wt Readings from Last 3 Encounters:   11/02/20 97.6 kg (215 lb 2.7 oz)   10/08/20 103.9 kg (229 lb 0.9 oz)   09/02/20 103.6 kg (228 lb 6.3 oz)     Physical Exam   Constitutional:  Non-toxic appearance. No distress.   Eyes: Conjunctivae are normal.   Neck: No JVD present.   Cardiovascular: Normal rate, S1 normal and S2 normal. An irregularly irregular rhythm present. Exam reveals no S3 and no S4.   No murmur heard.  Pulses:       Carotid pulses are 2+ on  the right side and 2+ on the left side.       Radial pulses are 2+ on the right side and 2+ on the left side.   Pulmonary/Chest: No accessory muscle usage. No respiratory distress. She has no decreased breath sounds. She has no wheezes. She has rhonchi. She has no rales.   Abdominal: Soft. Bowel sounds are normal. She exhibits no pulsatile liver and no pulsatile midline mass. There is no hepatosplenomegaly. There is no abdominal tenderness.   Musculoskeletal:         General: No edema.   Neurological: She is alert.   Skin: She is not diaphoretic. No pallor.       Telemetry  Atrial fibrillation, slow at times    Labs  Recent Results (from the past 24 hour(s))   ISTAT PROCEDURE    Collection Time: 11/06/20  9:42 AM   Result Value Ref Range    POC PH 7.400 7.35 - 7.45    POC PCO2 51.2 (H) 35 - 45 mmHg    POC PO2 84 80 - 100 mmHg    POC HCO3 31.7 (H) 24 - 28 mmol/L    POC BE 7 -2 to 2 mmol/L    POC SATURATED O2 96 95 - 100 %    Sample ARTERIAL     Site Mikey/UAC     Allens Test N/A     DelSys Adult Vent     Mode PSV     PEEP 5     PS 12     FiO2 30     Spont Rate 21     Min Vol 6.8     Sp02 96     Vol 300    ISTAT PROCEDURE    Collection Time: 11/06/20  2:51 PM   Result Value Ref Range    POC PH 7.308 (L) 7.35 - 7.45    POC PCO2 61.5 (HH) 35 - 45 mmHg    POC PO2 91 80 - 100 mmHg    POC HCO3 30.9 (H) 24 - 28 mmol/L    POC BE 5 -2 to 2 mmol/L    POC SATURATED O2 96 95 - 100 %    Rate 18     Sample ARTERIAL     Site Mikey/UAC     Allens Test N/A     DelSys CPAP/BiPAP     Mode BiPAP     FiO2 98     Spont Rate 24     Min Vol 8.8     Sp02 30     IP 16     EP 5    ISTAT PROCEDURE    Collection Time: 11/06/20  3:47 PM   Result Value Ref Range    POC PH 7.363 7.35 - 7.45    POC PCO2 56.1 (HH) 35 - 45 mmHg    POC PO2 88 80 - 100 mmHg    POC HCO3 31.9 (H) 24 - 28 mmol/L    POC BE 6 -2 to 2 mmol/L    POC SATURATED O2 96 95 - 100 %    Rate 20     Sample ARTERIAL     Site Mikey/UAC     Allens Test N/A     DelSys CPAP/BiPAP     Mode  BiPAP     FiO2 30     Spont Rate 21     Min Vol 13.6     Sp02 100     IP 20     EP 5    Basic metabolic panel    Collection Time: 11/07/20  4:05 AM   Result Value Ref Range    Sodium 142 136 - 145 mmol/L    Potassium 3.5 3.5 - 5.1 mmol/L    Chloride 102 95 - 110 mmol/L    CO2 30 (H) 23 - 29 mmol/L    Glucose 89 70 - 110 mg/dL    BUN 47 (H) 8 - 23 mg/dL    Creatinine 1.9 (H) 0.5 - 1.4 mg/dL    Calcium 8.5 (L) 8.7 - 10.5 mg/dL    Anion Gap 10 8 - 16 mmol/L    eGFR if African American 30 (A) >60 mL/min/1.73 m^2    eGFR if non African American 26 (A) >60 mL/min/1.73 m^2   Magnesium    Collection Time: 11/07/20  4:05 AM   Result Value Ref Range    Magnesium 2.5 1.6 - 2.6 mg/dL   Phosphorus    Collection Time: 11/07/20  4:05 AM   Result Value Ref Range    Phosphorus 4.8 (H) 2.7 - 4.5 mg/dL          Dat Jacobo MD

## 2020-11-07 NOTE — PROGRESS NOTES
[facility], [place], operated by [facility]  Pulmonology  Progress Note    Patient Name: Patsy Morris  MRN: [id number]  Admission Date: 10/13/2020  Hospital Length of Stay: 25 days  Code Status: Full Code  Attending Provider: Loi Quiles MD  Primary Care Provider: Luisana Cartagena MD   Principal Problem: Acute on chronic respiratory failure with hypoxia and hypercapnia    Subjective:     Interval History: Extubated yesterday (11/6) to NIV and has done well overnight. She is awake and alert this am and her only complaint is that she is hungry and thirsty.     Objective:     Vital Signs (Most Recent):  Temp: 98.4 °F (36.9 °C) (11/07/20 0800)  Pulse: 80 (11/07/20 0906)  Resp: (!) 32 (11/07/20 0845)  BP: 123/60 (11/07/20 0800)  SpO2: (!) 92 % (11/07/20 0845) Vital Signs (24h Range):  Temp:  [97.9 °F (36.6 °C)-98.4 °F (36.9 °C)] 98.4 °F (36.9 °C)  Pulse:  [] 80  Resp:  [11-37] 32  SpO2:  [72 %-99 %] 92 %  BP: ()/(46-72) 123/60  Arterial Line BP: ()/(42-60) 114/60     Weight: 97.6 kg (215 lb 2.7 oz)  Body mass index is 38.12 kg/m².      Intake/Output Summary (Last 24 hours) at 11/7/2020 0939  Last data filed at 11/7/2020 0547  Gross per 24 hour   Intake 430 ml   Output 1200 ml   Net -770 ml       Physical Exam  Vitals signs and nursing note reviewed.   Constitutional:       General: She is not in acute distress.     Comments: Awake interactive, following all commands   HENT:      Head: Normocephalic and atraumatic.   Eyes:      Conjunctiva/sclera: Conjunctivae normal.      Pupils: Pupils are equal, round, and reactive to light.   Neck:      Musculoskeletal: Normal range of motion.      Comments: Thick neck    Cardiovascular:      Rate and Rhythm: Normal rate. Rhythm irregular.      Pulses: Normal pulses.      Heart sounds: Normal heart sounds.   Pulmonary:      Comments: On 2 L NC doing well, minimal crackles with R chest tube in place   Abdominal:      General: Bowel sounds are normal. There is no  distension.      Palpations: Abdomen is soft.      Tenderness: There is no abdominal tenderness.   Musculoskeletal:         General: Swelling (mild bilateral lower extremities) present.   Skin:     General: Skin is warm and dry.   Neurological:      Mental Status: She is alert.      Comments: Awake, alert, oriented x 4         Vents:  Vent Mode: Spont (11/06/20 0952)  Ventilator Initiated: Yes (11/01/20 2338)  Set Rate: 0 BPM (11/06/20 0952)  Vt Set: 390 mL (11/06/20 0952)  Pressure Support: 12 cmH20 (11/06/20 0952)  PEEP/CPAP: 5 cmH20 (11/06/20 0952)  Oxygen Concentration (%): 30 (11/07/20 0812)  Peak Airway Pressure: 17 cmH2O (11/06/20 0952)  Plateau Pressure: 0 cmH20 (11/06/20 0952)  Total Ve: 6.15 mL (11/06/20 0952)  F/VT Ratio<105 (RSBI): (!) 49.3 (11/06/20 0755)    Lines/Drains/Airways     Drain                 Urethral Catheter 11/02/20 0100 5 days         Chest Tube 11/04/20 1132 1 Right Fourth intercostal space 2 days          Arterial Line            Arterial Line 11/01/20 Right Radial 6 days          Peripheral Intravenous Line                 Midline Catheter Insertion/Assessment  - Double Lumen 10/20/20 1220 Right median cubital vein (antecubital fossa)  17 days         Peripheral IV - Single Lumen 11/05/20 0630 20 G Anterior;Left Forearm 2 days                Significant Labs:    CBC/Anemia Profile:  No results for input(s): WBC, HGB, HCT, PLT, MCV, RDW, IRON, FERRITIN, RETIC, FOLATE, [id number], OCCULTBLOOD in the last 48 hours.     Chemistries:  Recent Labs   Lab 11/06/20  0355 11/07/20  0405    142   K 3.0* 3.5    102   CO2 29 30*   BUN 47* 47*   CREATININE 2.0* 1.9*   CALCIUM 8.4* 8.5*   MG 2.3 2.5   PHOS 3.6 4.8*       All pertinent labs within the past 24 hours have been reviewed.    Significant Imaging:  I have reviewed and interpreted all pertinent imaging results/findings within the past 24 hours.    Assessment/Plan:     * Acute on chronic respiratory failure with hypoxia and  hypercapnia  - with baseline severe restrictive/obstuctive lung disease (FVC ~30% on PFTs in 2016).   - requiring intubation on 11/1  - likely multifactorial related to volume overload + HF exacerbation + BHAVANI/OHS + possible new infectious PNA + ? Malignancy?  - no wheezing on exam,  Steroids stopped 11/2  - endotracheal aspirate NGTD, f/u cultures  - f/u BAL, cultures NGTD  - vanco stopped 11/5  - can d/c cefepime after today's dose (7  Days total with negative cultures)   - continuing to hold diuresis today given CLIFFORD however she continues to have good UOP and is improving with negative fluid balance  Dr. Bryant has had extensive conversations with the family about her tenuous respiratory status. They are in understanding. At this time, would plan to reintubate if fails NIPPV. Per report, appears she has previously expressed that she would not want prolonged invasive life support if she would not be able to survive off life support. Will continue ongoing goals of care discussion with patient and family and the patient.     Acute kidney injury superimposed on chronic kidney disease  - possible due to ATN  - pt with baseline CKD in addition to having only one kidney due to prior nephrectomy  - still volume overloaded on exam with pleural effusions and EMERALD but diuretics held  due to increase in creatinine  - monitor UOP, strict I/Os  - Cr improving over past 2 days  - good UOP despite diuretics on hold   - improving with negative fluid balance  - ensure MAP > 65 so no hypotensive episodes worsen renal perfusion    Pleural effusion  - bilateral, R>>L  - s/p chest tube placement on 11/4 given her baseline severe restrictive lung disease, any additional impingement on that worsens her ventilatory abilities thus hopeful that drainage of fluid will improve her respiratory dynamics   - initial pleural fluid studies borderline exudative, more likely effusion related to heart failure however given cavitary lesion on right,   infectious vs malignant on differential.   - f/u culture and cytology  -  Will remove chest tube today given her liberation from mechanical ventilation.     Cavitary lesion of lung  Patient's CT concerning for slow process as lesion in same posterior upper lobe lung fields noted on previous CT in 2018 as well as bialteral pulmonary nodules. In setting of significant smoking history, malignancy is high on differential. However slow infectious process such as NTM/pulmonary MAC is possible as well, also possibility of superimprosed bacterial infection  - HIV negative  - Quantiferon gold indeterminate x 2  - AFB stain negative, smear pending x 1  - f/u endotracheal sputum culture  - s/p bronch with BAL on 11/4, f/u AFB  - f/u culture and cytology      Atrial fibrillation with rapid ventricular response  -rate controlled with metoprolol   - eliquis can be restarted at this point.   - heparin for ppx for now      Obesity hypoventilation syndrome  - Patient with BMI >35 with PaCO2 consistently >52 with restriction on PFTs.   - can use nasal canula during the day and bipap PRN as well as nightly and with any naps  - will need to continue bipap nightly and with all naps.             Gloria Jenkins MD  Pulmonology  [facility]

## 2020-11-07 NOTE — ASSESSMENT & PLAN NOTE
- bilateral, R>>L  - s/p chest tube placement on 11/4 given her baseline severe restrictive lung disease, any additional impingement on that worsens her ventilatory abilities thus hopeful that drainage of fluid will improve her respiratory dynamics   - initial pleural fluid studies borderline exudative, more likely effusion related to heart failure however given cavitary lesion on right,  infectious vs malignant on differential.   - f/u culture and cytology  -  Will remove chest tube today given her liberation from mechanical ventilation.

## 2020-11-08 PROBLEM — Z99.11 ON MECHANICALLY ASSISTED VENTILATION: Status: RESOLVED | Noted: 2020-01-01 | Resolved: 2020-01-01

## 2020-11-08 NOTE — SUBJECTIVE & OBJECTIVE
Interval History:  No acute events overnight.  Spends most time on BiPAP.    Review of Systems   Constitutional: Negative for chills and fever.   Respiratory: Positive for shortness of breath. Negative for wheezing.    Cardiovascular: Negative for chest pain.   Gastrointestinal: Negative for abdominal distention, abdominal pain, constipation, diarrhea, nausea and vomiting.   Genitourinary: Negative for dysuria and frequency.   Musculoskeletal: Negative for arthralgias and myalgias.   Neurological: Negative for light-headedness.   Psychiatric/Behavioral: Negative for agitation and confusion.     Objective:     Vital Signs (Most Recent):  Temp: 97.9 °F (36.6 °C) (11/08/20 1505)  Pulse: (!) 130 (11/08/20 1705)  Resp: (!) 31 (11/08/20 1705)  BP: 98/73 (11/08/20 1705)  SpO2: 96 % (11/08/20 1705) Vital Signs (24h Range):  Temp:  [97.9 °F (36.6 °C)-98.1 °F (36.7 °C)] 97.9 °F (36.6 °C)  Pulse:  [] 130  Resp:  [20-34] 31  SpO2:  [92 %-100 %] 96 %  BP: ()/(53-83) 98/73  Arterial Line BP: ()/(48-78) 88/56     Weight: 97.6 kg (215 lb 2.7 oz)  Body mass index is 38.12 kg/m².    Intake/Output Summary (Last 24 hours) at 11/8/2020 1742  Last data filed at 11/8/2020 1110  Gross per 24 hour   Intake 50 ml   Output 825 ml   Net -775 ml      Physical Exam  Constitutional:       General: She is not in acute distress.  HENT:      Head: Normocephalic and atraumatic.   Eyes:      Conjunctiva/sclera: Conjunctivae normal.      Pupils: Pupils are equal, round, and reactive to light.   Neck:      Musculoskeletal: Normal range of motion.   Cardiovascular:      Rate and Rhythm: Tachycardia present. Rhythm irregular.      Pulses: Normal pulses.      Heart sounds: Normal heart sounds.   Pulmonary:      Comments: Chest tube in the right side with decreasing output.  Breathing reasonably comfortably while on BiPAP this morning.  Abdominal:      General: Bowel sounds are normal. There is no distension.      Palpations: Abdomen is  soft.      Tenderness: There is no abdominal tenderness.   Musculoskeletal:         General: Swelling present.   Skin:     General: Skin is warm and dry.   Neurological:      General: No focal deficit present.      Mental Status: She is oriented to person, place, and time. Mental status is at baseline.         Significant Labs: All pertinent labs within the past 24 hours have been reviewed.    Significant Imaging: I have reviewed all pertinent imaging results/findings within the past 24 hours.

## 2020-11-08 NOTE — PROGRESS NOTES
Patient free from falls and injury, free from any new skin breakdown. Denies pain. Chest tube dc this shift per Dr. Jenkins. Incision CDI, no subcutaneous emphysema noted. Turned q2h to prevent skin breakdown, positioned off wounds. Partial bath given and gown changed. VSS this shift. Art line tubing and fluids changed. O2 sats were 93-94% this morning and afternoon on 3L NC. No distress noted. Patient now taking a nap on BIPAP 30%, Tolerated oral fluids and cardiac diet. Bedside nursing swallow eval performed, no evidence of aspiration. Coughing up copious amounts of thick mucous. Instrucuted patient on IS usage and observed use. Patient pulls 500 10 x per hour. VSS and HR stable this shift. Remains in Aflutter with rate controlled. Restarted on Eliquis per MD this shift. Will continue to monitor.

## 2020-11-08 NOTE — PROGRESS NOTES
"[facility] Medicine  Progress Note    Patient Name: Patsy Morris  MRN: [id number]  Patient Class: IP- Inpatient   Admission Date: 10/13/2020  Length of Stay: 25 days  Attending Physician: Loi Quiles MD  Primary Care Provider: Luisana Cartagena MD        Subjective:     Principal Problem:Acute on chronic respiratory failure with hypoxia and hypercapnia        HPI:  Per Ravi Soria:    "Per ED:  "This is a 70 y.o. female with history of CHF and COPD who presents via EMS with complaint of shortness of breath that began a few days ago. Per EMS patient had O2 saturation of 72 on 3 liters if home oxygen upon their arrival. Patient states she is on 3 liters of home oxygen at baseline. She denies fever, cough, chest pain, nausea, vomiting, diarrhea, or rhinorrhea. She reports she was recently discharged from the hospital for similar symptoms. She reports compliance with her home medications. She recently became resident of [facility] after last hospitalization. She is a former smoker. She denies undergoing any surgeries".    The patient is a 70 year old female with a PMH significant for of HTN, Chronic Respiratory Failure (COPD and BHAVANI/OHS), HFpEF, Obesity who presented to the ED with complaints of SOB that began about 3 days ago.  Patient is on 3L O2NC at home.  She was seen by her Cardiologist about 4 days prior to admission and told to decrease her Lasix from 40mg bid to qday.  She denies chest pain.  No Fevers.  No cough.  States she has been adherent with her medications.  Patient was recently admitted to [facility] from 9/1-9/11 for Acute on Chronic Respiratory Failure and discharged to SNF.  Patient was placed on BiPAP in ED and admitted to ICU.  Patient feeling better on BiPAP."    Overview/Hospital Course:  Patient is 70-year-old woman with history of hypertension, chronic hypoxic respiratory failure on home oxygen secondary to chronic obstructive pulmonary " disease, chronic diastolic heart failure, chronic atrial fibrillation, and morbid obesity re-admitted to the hospital on 10/13/2020 with decompensated heart failure after patient was home for about a week following recent hospitalization and stayed at skilled nursing facility for physical therapy.  Patient treated with non-invasive ventilation intravenous diuretics.  Patient was transferred out of the intensive care unit on 10/21/2020.      Patient continue to improve on the floor until she developed worsening hypoxic respiratory failure.  Patient was transferred to the intensive care unit on 11/1/2020 on placed on continuous BiPAP.  She decompensated further and was intubated on 11/1/2020.  Patient started on intravenous antibiotics on 11/2/2020 to treat hospital-acquired pneumonia.  Patient had chest tube placed to drain pleural effusion on the right side.  Patient underwent bronchoscopy 11/4/2020.    Interval History:  Extubated yesterday late afternoon.  Chest tube output down.  Patient has basically been on continuous BiPAP since extubation.    Review of Systems   Constitutional: Negative for chills and fever.   Respiratory: Positive for shortness of breath. Negative for wheezing.    Cardiovascular: Negative for chest pain.   Gastrointestinal: Negative for abdominal distention, abdominal pain, constipation, diarrhea, nausea and vomiting.   Genitourinary: Negative for dysuria and frequency.   Musculoskeletal: Negative for arthralgias and myalgias.   Neurological: Negative for light-headedness.   Psychiatric/Behavioral: Negative for agitation and confusion.     Objective:     Vital Signs (Most Recent):  Temp: 98.2 °F (36.8 °C) (11/07/20 1524)  Pulse: 92 (11/07/20 1655)  Resp: (!) 24 (11/07/20 1655)  BP: 112/62 (11/07/20 1524)  SpO2: 95 % (11/07/20 1655) Vital Signs (24h Range):  Temp:  [97.9 °F (36.6 °C)-98.4 °F (36.9 °C)] 98.2 °F (36.8 °C)  Pulse:  [] 92  Resp:  [11-39] 24  SpO2:  [87 %-98 %] 95 %  BP:  ()/(46-72) 112/62  Arterial Line BP: ()/(42-64) 100/54     Weight: 97.6 kg (215 lb 2.7 oz)  Body mass index is 38.12 kg/m².    Intake/Output Summary (Last 24 hours) at 11/7/2020 1826  Last data filed at 11/7/2020 1700  Gross per 24 hour   Intake 650 ml   Output 1580 ml   Net -930 ml      Physical Exam  Constitutional:       General: She is not in acute distress.  HENT:      Head: Normocephalic and atraumatic.   Eyes:      Conjunctiva/sclera: Conjunctivae normal.      Pupils: Pupils are equal, round, and reactive to light.   Neck:      Musculoskeletal: Normal range of motion.   Cardiovascular:      Rate and Rhythm: Tachycardia present. Rhythm irregular.      Pulses: Normal pulses.      Heart sounds: Normal heart sounds.   Pulmonary:      Comments: Chest tube in the right side with decreasing output.  Breathing reasonably comfortably while on BiPAP this morning.  Abdominal:      General: Bowel sounds are normal. There is no distension.      Palpations: Abdomen is soft.      Tenderness: There is no abdominal tenderness.   Musculoskeletal:         General: Swelling present.   Skin:     General: Skin is warm and dry.   Neurological:      General: No focal deficit present.      Mental Status: She is oriented to person, place, and time. Mental status is at baseline.         Significant Labs: All pertinent labs within the past 24 hours have been reviewed.    Significant Imaging: I have reviewed all pertinent imaging results/findings within the past 24 hours.      Assessment/Plan:      * Acute on chronic respiratory failure with hypoxia and hypercapnia  Patient with advanced lung disease secondary to chronic obstructive pulmonary disease, obstructive sleep apnea, obesity hypoventilation syndrome, and severe restrictive lung disease now with recent decompensation resulting in intubation due to possible pneumonia.  Minimally elevated procalcitonin level.  Continue antibiotic therapy.  Status post bronchoscopy and  placement of the right chest tube.  Chest tube output decreasing.  No acid-fast bacillus seen from sample from bronchial wash.  Gram stain unremarkable.  Additional cultures pending.  Extubated yesterday to BiPAP.  Patient needing continuous BiPAP.    Cavitary lesion of lung  Active pulmonary tuberculosis unlikely and patient with one negative acid-fast bacillus smear.  Additional efforts at obtaining sputum for additional smears not successful despite using hypertonic saline.  Status post bronchoscopy with negative acid fast bacillus smear.    Acute on chronic diastolic congestive heart failure  Respiratory status initially improved with aggressive diuresis.  Serum creatinine trending upward and further diuretic therapy on hold.  Serum creatinine stable/improving now.  Continue to closely monitor volume status.    Atrial fibrillation with rapid ventricular response  Continue with metoprolol.  Holding anticoagulation for bronchoscopy.  Plan to restart soon.    Essential hypertension  Reasonably controlled with current regimen.  Will continue with current regimen and continue to monitor.    Chronic kidney disease (CKD) stage G4/A1, severely decreased glomerular filtration rate (GFR) between 15-29 mL/min/1.73 square meter and albuminuria creatinine ratio less than 30 mg/g  Stable.    Dyslipidemia  Continue with statin therapy.    Obesity hypoventilation syndrome        Debility  Consult physical and occupational therapy for range of motion exercises.    Pleural effusion        On mechanically assisted ventilation          VTE Risk Mitigation (From admission, onward)         Ordered     apixaban tablet 5 mg  2 times daily      11/06/20 1039     Place sequential compression device  Until discontinued      10/13/20 1405     IP VTE HIGH RISK PATIENT  Once      10/13/20 1405                Loi Quiles MD  Department of Hospital Medicine   [facility]

## 2020-11-08 NOTE — SUBJECTIVE & OBJECTIVE
Interval History:  NIV overnight and has done well. She continues to diurese well. No other acute events.     Objective:     Vital Signs (Most Recent):  Temp: 98 °F (36.7 °C) (11/08/20 0710)  Pulse: (!) 124 (11/08/20 0917)  Resp: (!) 24 (11/08/20 0917)  BP: 112/67 (11/08/20 0900)  SpO2: 95 % (11/08/20 0917) Vital Signs (24h Range):  Temp:  [97.9 °F (36.6 °C)-98.2 °F (36.8 °C)] 98 °F (36.7 °C)  Pulse:  [] 124  Resp:  [20-39] 24  SpO2:  [91 %-98 %] 95 %  BP: ()/(50-83) 112/67  Arterial Line BP: ()/(48-78) 112/50     Weight: 97.6 kg (215 lb 2.7 oz)  Body mass index is 38.12 kg/m².      Intake/Output Summary (Last 24 hours) at 11/8/2020 1004  Last data filed at 11/8/2020 0545  Gross per 24 hour   Intake 650 ml   Output 1320 ml   Net -670 ml       Physical Exam  Vitals signs and nursing note reviewed.   Constitutional:       General: She is not in acute distress.     Comments: Awake interactive, following all commands   HENT:      Head: Normocephalic and atraumatic.   Eyes:      Conjunctiva/sclera: Conjunctivae normal.      Pupils: Pupils are equal, round, and reactive to light.   Neck:      Musculoskeletal: Normal range of motion.      Comments: Thick neck    Cardiovascular:      Rate and Rhythm: Normal rate. Rhythm irregular.      Pulses: Normal pulses.      Heart sounds: Normal heart sounds.   Pulmonary:      Comments: Minimal bilateral basilar crackles.   Abdominal:      General: Bowel sounds are normal. There is no distension.      Palpations: Abdomen is soft.      Tenderness: There is no abdominal tenderness.   Musculoskeletal:         General: Swelling (mild bilateral lower extremities) present.   Skin:     General: Skin is warm and dry.   Neurological:      Mental Status: She is alert.      Comments: Awake, alert, oriented x 4         Vents:  Vent Mode: Spont (11/06/20 0952)  Ventilator Initiated: Yes (11/01/20 0358)  Set Rate: 0 BPM (11/06/20 0952)  Vt Set: 390 mL (11/06/20 0952)  Pressure  Support: 12 cmH20 (11/06/20 0952)  PEEP/CPAP: 5 cmH20 (11/06/20 0952)  Oxygen Concentration (%): 30 (11/08/20 0359)  Peak Airway Pressure: 17 cmH2O (11/06/20 0952)  Plateau Pressure: 0 cmH20 (11/06/20 0952)  Total Ve: 6.15 mL (11/06/20 0952)  F/VT Ratio<105 (RSBI): (!) 49.3 (11/06/20 0755)    Lines/Drains/Airways     Drain                 Urethral Catheter 11/02/20 0100 6 days          Arterial Line            Arterial Line 11/01/20 Right Radial 7 days          Peripheral Intravenous Line                 Midline Catheter Insertion/Assessment  - Double Lumen 10/20/20 1220 Right median cubital vein (antecubital fossa)  18 days         Peripheral IV - Single Lumen 11/05/20 0630 20 G Anterior;Left Forearm 3 days                Significant Labs:    CBC/Anemia Profile:  No results for input(s): WBC, HGB, HCT, PLT, MCV, RDW, IRON, FERRITIN, RETIC, FOLATE, [id number], OCCULTBLOOD in the last 48 hours.     Chemistries:  Recent Labs   Lab 11/07/20  0405 11/08/20  0348    139   K 3.5 3.5    101   CO2 30* 29   BUN 47* 46*   CREATININE 1.9* 1.9*   CALCIUM 8.5* 8.2*   MG 2.5 2.3   PHOS 4.8* 4.6*       All pertinent labs within the past 24 hours have been reviewed.    Significant Imaging:  I have reviewed and interpreted all pertinent imaging results/findings within the past 24 hours.

## 2020-11-08 NOTE — PROGRESS NOTES
Pt received on BIPAP this AM. Placed on 3LNC;SPO2 normal. No SOB reported. Pt placed on BIPAP throughout the day as pt naps. No changes were made. Will continue to monitor.

## 2020-11-08 NOTE — PROGRESS NOTES
[facility]    Admission date:  10/13/2020     Assessment  1.  Permanent atrial fibrillation  Rate high this morning but overall controlled.       2.  Chronic diastolic heart failure  Compensated     3.  Essential hypertension  Controlled    Plan and Discussion  Continue current cardiac management.    Subjective  No complaints.  Uneventful night.    Medications  Current Facility-Administered Medications   Medication Dose Route Frequency Provider Last Rate Last Dose    acetaminophen tablet 1,000 mg  1,000 mg Oral Q8H PRN Ravi Soria MD   1,000 mg at 11/07/20 2236    apixaban tablet 5 mg  5 mg Oral BID Fadumo Bryant MD   5 mg at 11/08/20 0853    atorvastatin tablet 80 mg  80 mg Oral QHS Ravi Soria MD   80 mg at 11/07/20 2005    cefepime in dextrose 5 % 1 gram/50 mL IVPB 1 g  1 g Intravenous Q24H Ravi Soria  mL/hr at 11/08/20 0545 1 g at 11/08/20 0545    dextrose 50% injection 12.5 g  12.5 g Intravenous PRN Ravi Soria MD        dextrose 50% injection 25 g  25 g Intravenous PRN Ravi Soria MD        docusate sodium capsule 100 mg  100 mg Oral BID Loi Quiles MD   100 mg at 11/08/20 0853    escitalopram oxalate tablet 10 mg  10 mg Oral QHS Ravi Soria MD   10 mg at 11/07/20 2005    famotidine tablet 20 mg  20 mg Oral Daily Loi Quiles MD   20 mg at 11/08/20 0853    fentaNYL injection 25 mcg  25 mcg Intravenous Q2H PRN Niyah Prado MD   25 mcg at 11/06/20 0355    ferrous sulfate EC tablet 325 mg  325 mg Oral Daily Ravi Soria MD   325 mg at 11/08/20 0853    gabapentin capsule 100 mg  100 mg Oral BID Ravi Soria MD   100 mg at 11/08/20 0853    glucagon (human recombinant) injection 1 mg  1 mg Intramuscular PRN Ravi Soria MD        glucose chewable tablet 16 g  16 g Oral PRN Ravi Soria MD        glucose chewable tablet 24 g  24 g Oral PRN Ravi Soria MD        lorazepam (ATIVAN) injection 2 mg  2 mg  Intravenous Q2H PRN Niyah Prado MD   2 mg at 11/02/20 0106    melatonin tablet 6 mg  6 mg Oral Nightly PRN Ravi Soria MD   6 mg at 11/07/20 2235    metoprolol injection 5 mg  5 mg Intravenous Q4H PRN Diana Meredith MD        metoprolol succinate (TOPROL-XL) 24 hr tablet 25 mg  25 mg Oral BID Dat Jacobo MD   25 mg at 11/08/20 0853    miconazole NITRATE 2 % top powder   Topical (Top) BID Ravi Soria MD        ondansetron injection 4 mg  4 mg Intravenous Q8H PRN Ravi Soria MD   4 mg at 11/01/20 1838    phenylephrine (NARENDRA-SYNEPHRINE) 20 mg in sodium chloride 0.9% 250 mL infusion  0.5 mcg/kg/min Intravenous Continuous Fadumo Bryant MD   Stopped at 11/05/20 1230    polyethylene glycol packet 17 g  17 g Oral Daily PRN Ravi Soria MD        promethazine (PHENERGAN) 25 mg in dextrose 5 % 50 mL IVPB  25 mg Intravenous Q6H PRN Ravi Soria MD        sodium chloride 0.9% flush 10 mL  10 mL Intravenous PRN Ravi Soria MD   10 mL at 10/28/20 2021    vitamin renal formula (B-complex-vitamin c-folic acid) 1 mg per capsule 1 capsule  1 capsule Oral Daily Ravi Soria MD   1 capsule at 11/08/20 0853        Physical Exam  Temp:  [97.9 °F (36.6 °C)-98.2 °F (36.8 °C)]   Pulse:  []   Resp:  [20-39]   BP: ()/(50-83)   SpO2:  [91 %-98 %]   Arterial Line BP: ()/(48-78)    Wt Readings from Last 3 Encounters:   11/02/20 97.6 kg (215 lb 2.7 oz)   10/08/20 103.9 kg (229 lb 0.9 oz)   09/02/20 103.6 kg (228 lb 6.3 oz)     Physical Exam   Constitutional:  Non-toxic appearance. No distress.   Eyes: Conjunctivae are normal.   Neck: No JVD present.   Cardiovascular: Normal rate, S1 normal and S2 normal. An irregularly irregular rhythm present. Exam reveals no S3 and no S4.   No murmur heard.  Pulses:       Carotid pulses are 2+ on the right side and 2+ on the left side.       Radial pulses are 2+ on the right side and 2+ on the left side.   Pulmonary/Chest: No  accessory muscle usage. No respiratory distress. She has no decreased breath sounds. She has no wheezes. She has rhonchi. She has no rales.   Abdominal: Soft. Bowel sounds are normal. She exhibits no pulsatile liver and no pulsatile midline mass. There is no hepatosplenomegaly. There is no abdominal tenderness.   Musculoskeletal:         General: No edema.   Neurological: She is alert.   Skin: She is not diaphoretic. No pallor.       Telemetry  Atrial fibrillation    Labs  Recent Results (from the past 24 hour(s))   Basic metabolic panel    Collection Time: 11/08/20  3:48 AM   Result Value Ref Range    Sodium 139 136 - 145 mmol/L    Potassium 3.5 3.5 - 5.1 mmol/L    Chloride 101 95 - 110 mmol/L    CO2 29 23 - 29 mmol/L    Glucose 85 70 - 110 mg/dL    BUN 46 (H) 8 - 23 mg/dL    Creatinine 1.9 (H) 0.5 - 1.4 mg/dL    Calcium 8.2 (L) 8.7 - 10.5 mg/dL    Anion Gap 9 8 - 16 mmol/L    eGFR if African American 30 (A) >60 mL/min/1.73 m^2    eGFR if non African American 26 (A) >60 mL/min/1.73 m^2   Magnesium    Collection Time: 11/08/20  3:48 AM   Result Value Ref Range    Magnesium 2.3 1.6 - 2.6 mg/dL   Phosphorus    Collection Time: 11/08/20  3:48 AM   Result Value Ref Range    Phosphorus 4.6 (H) 2.7 - 4.5 mg/dL             Dat Jacobo MD

## 2020-11-08 NOTE — ASSESSMENT & PLAN NOTE
Patient with advanced lung disease secondary to chronic obstructive pulmonary disease, obstructive sleep apnea, obesity hypoventilation syndrome, and severe restrictive lung disease now with recent decompensation resulting in intubation due to possible pneumonia.  Minimally elevated procalcitonin level.  Continue antibiotic therapy.  Status post bronchoscopy and placement of the right chest tube.  Chest tube output decreasing.  No acid-fast bacillus seen from sample from bronchial wash.  Gram stain unremarkable.  Additional cultures pending.  Extubated 11/7/2020 to BiPAP.  Patient needing continuous BiPAP.  Attempt to increase time off BiPAP as tolerated.

## 2020-11-08 NOTE — ASSESSMENT & PLAN NOTE
- with baseline severe restrictive/obstuctive lung disease (FVC ~30% on PFTs in 2016).   - requiring intubation on 11/1  - likely multifactorial related to volume overload + HF exacerbation + BHAVANI/OHS + possible new infectious PNA + ? Malignancy?  - no wheezing on exam,  Steroids stopped 11/2  - endotracheal aspirate NGTD, f/u cultures  - f/u BAL, cultures NGTD  - vanco stopped 11/5, cefepime stopped on 11/7 (completed 7 days)   - continuing to hold diuresis today given CLIFFORD however she continues to have good UOP and is improving with negative fluid balance  Dr. Bryant has had extensive conversations with the family about her tenuous respiratory status. They are in understanding. At this time, would plan to reintubate if fails NIPPV. Per report, appears she has previously expressed that she would not want prolonged invasive life support if she would not be able to survive off life support. Will continue ongoing goals of care discussion with patient and family and the patient.

## 2020-11-08 NOTE — PROGRESS NOTES
"[facility] Medicine  Progress Note    Patient Name: Patsy Morris  MRN: [id number]  Patient Class: IP- Inpatient   Admission Date: 10/13/2020  Length of Stay: 26 days  Attending Physician: Loi Quiles MD  Primary Care Provider: Luisana Cartagena MD        Subjective:     Principal Problem:Acute on chronic respiratory failure with hypoxia and hypercapnia        HPI:  Per Ravi Soria:    "Per ED:  "This is a 70 y.o. female with history of CHF and COPD who presents via EMS with complaint of shortness of breath that began a few days ago. Per EMS patient had O2 saturation of 72 on 3 liters if home oxygen upon their arrival. Patient states she is on 3 liters of home oxygen at baseline. She denies fever, cough, chest pain, nausea, vomiting, diarrhea, or rhinorrhea. She reports she was recently discharged from the hospital for similar symptoms. She reports compliance with her home medications. She recently became resident of [facility] after last hospitalization. She is a former smoker. She denies undergoing any surgeries".    The patient is a 70 year old female with a PMH significant for of HTN, Chronic Respiratory Failure (COPD and BHAVANI/OHS), HFpEF, Obesity who presented to the ED with complaints of SOB that began about 3 days ago.  Patient is on 3L O2NC at home.  She was seen by her Cardiologist about 4 days prior to admission and told to decrease her Lasix from 40mg bid to qday.  She denies chest pain.  No Fevers.  No cough.  States she has been adherent with her medications.  Patient was recently admitted to [facility] from 9/1-9/11 for Acute on Chronic Respiratory Failure and discharged to SNF.  Patient was placed on BiPAP in ED and admitted to ICU.  Patient feeling better on BiPAP."    Overview/Hospital Course:  Patient is 70-year-old woman with history of hypertension, chronic hypoxic respiratory failure on home oxygen secondary to chronic obstructive pulmonary " disease, chronic diastolic heart failure, chronic atrial fibrillation, and morbid obesity re-admitted to the hospital on 10/13/2020 with decompensated heart failure after patient was home for about a week following recent hospitalization and stayed at skilled nursing facility for physical therapy.  Patient treated with non-invasive ventilation intravenous diuretics.  Patient was transferred out of the intensive care unit on 10/21/2020.      Patient continue to improve on the floor until she developed worsening hypoxic respiratory failure.  Patient was transferred to the intensive care unit on 11/1/2020 on placed on continuous BiPAP.  She decompensated further and was intubated on 11/1/2020.  Patient started on intravenous antibiotics on 11/2/2020 to treat hospital-acquired pneumonia.  Patient had chest tube placed to drain pleural effusion on the right side.  Patient underwent bronchoscopy 11/4/2020.  Patient successfully extubated on 11/6/2020.  Patient has been on BiPAP for most of the time since extubation.    Interval History:  No acute events overnight.  Spends most time on BiPAP.    Review of Systems   Constitutional: Negative for chills and fever.   Respiratory: Positive for shortness of breath. Negative for wheezing.    Cardiovascular: Negative for chest pain.   Gastrointestinal: Negative for abdominal distention, abdominal pain, constipation, diarrhea, nausea and vomiting.   Genitourinary: Negative for dysuria and frequency.   Musculoskeletal: Negative for arthralgias and myalgias.   Neurological: Negative for light-headedness.   Psychiatric/Behavioral: Negative for agitation and confusion.     Objective:     Vital Signs (Most Recent):  Temp: 97.9 °F (36.6 °C) (11/08/20 1505)  Pulse: (!) 130 (11/08/20 1705)  Resp: (!) 31 (11/08/20 1705)  BP: 98/73 (11/08/20 1705)  SpO2: 96 % (11/08/20 1705) Vital Signs (24h Range):  Temp:  [97.9 °F (36.6 °C)-98.1 °F (36.7 °C)] 97.9 °F (36.6 °C)  Pulse:  [] 130  Resp:   [20-34] 31  SpO2:  [92 %-100 %] 96 %  BP: ()/(53-83) 98/73  Arterial Line BP: ()/(48-78) 88/56     Weight: 97.6 kg (215 lb 2.7 oz)  Body mass index is 38.12 kg/m².    Intake/Output Summary (Last 24 hours) at 11/8/2020 1742  Last data filed at 11/8/2020 1110  Gross per 24 hour   Intake 50 ml   Output 825 ml   Net -775 ml      Physical Exam  Constitutional:       General: She is not in acute distress.  HENT:      Head: Normocephalic and atraumatic.   Eyes:      Conjunctiva/sclera: Conjunctivae normal.      Pupils: Pupils are equal, round, and reactive to light.   Neck:      Musculoskeletal: Normal range of motion.   Cardiovascular:      Rate and Rhythm: Tachycardia present. Rhythm irregular.      Pulses: Normal pulses.      Heart sounds: Normal heart sounds.   Pulmonary:      Comments: Chest tube in the right side with decreasing output.  Breathing reasonably comfortably while on BiPAP this morning.  Abdominal:      General: Bowel sounds are normal. There is no distension.      Palpations: Abdomen is soft.      Tenderness: There is no abdominal tenderness.   Musculoskeletal:         General: Swelling present.   Skin:     General: Skin is warm and dry.   Neurological:      General: No focal deficit present.      Mental Status: She is oriented to person, place, and time. Mental status is at baseline.         Significant Labs: All pertinent labs within the past 24 hours have been reviewed.    Significant Imaging: I have reviewed all pertinent imaging results/findings within the past 24 hours.      Assessment/Plan:      * Acute on chronic respiratory failure with hypoxia and hypercapnia  Patient with advanced lung disease secondary to chronic obstructive pulmonary disease, obstructive sleep apnea, obesity hypoventilation syndrome, and severe restrictive lung disease now with recent decompensation resulting in intubation due to possible pneumonia.  Minimally elevated procalcitonin level.  Continue antibiotic  therapy.  Status post bronchoscopy and placement of the right chest tube.  Chest tube output decreasing.  No acid-fast bacillus seen from sample from bronchial wash.  Gram stain unremarkable.  Additional cultures pending.  Extubated 11/7/2020 to BiPAP.  Patient needing continuous BiPAP.  Attempt to increase time off BiPAP as tolerated.    Cavitary lesion of lung  Active pulmonary tuberculosis unlikely and patient with one negative acid-fast bacillus smear.  Additional efforts at obtaining sputum for additional smears not successful despite using hypertonic saline.  Status post bronchoscopy with negative acid fast bacillus smear.    Acute on chronic diastolic congestive heart failure  Respiratory status initially improved with aggressive diuresis.  Serum creatinine trending upward and further diuretic therapy on hold.  Serum creatinine stable/improving now.  Continue to closely monitor volume status.    Atrial fibrillation with rapid ventricular response  Continue with metoprolol.  Holding anticoagulation for bronchoscopy.  Plan to restart soon.    Essential hypertension  Reasonably controlled with current regimen.  Will continue with current regimen and continue to monitor.    Chronic kidney disease (CKD) stage G4/A1, severely decreased glomerular filtration rate (GFR) between 15-29 mL/min/1.73 square meter and albuminuria creatinine ratio less than 30 mg/g  Stable.    Dyslipidemia  Continue with statin therapy.    Obesity hypoventilation syndrome        Debility  Consult physical and occupational therapy for range of motion exercises.    Pleural effusion        VTE Risk Mitigation (From admission, onward)         Ordered     apixaban tablet 5 mg  2 times daily      11/06/20 1039     Place sequential compression device  Until discontinued      10/13/20 1405     IP VTE HIGH RISK PATIENT  Once      10/13/20 1405              Loi Quiles MD  Department of Hospital Medicine   [facility]

## 2020-11-08 NOTE — PLAN OF CARE
Pt had uneventful night, remained stable on bipap while sleeping. Pt tolerated O2 @4L via NC while eating dinner and awake, sats remained >93%. Shin in place draining clear yellow. Chest tube insertion site dressing c/d/I. R radial nathalie in place, positional for adequate calibration. Pt denies pain at this time. Will continue to monitor pt.

## 2020-11-08 NOTE — ASSESSMENT & PLAN NOTE
- bilateral, R>>L  - s/p chest tube placement on 11/4 given her baseline severe restrictive lung disease, any additional impingement on that worsens her ventilatory abilities thus hopeful that drainage of fluid will improve her respiratory dynamics   - initial pleural fluid studies borderline exudative, more likely effusion related to heart failure however given cavitary lesion on right,  infectious vs malignant on differential.   - f/u culture and cytology  -  Chest tube removed 11/7

## 2020-11-08 NOTE — ASSESSMENT & PLAN NOTE
- possible due to ATN  - pt with baseline CKD in addition to having only one kidney due to prior nephrectomy  - still volume overloaded on exam- improving daily   - monitor UOP, strict I/Os  - good UOP   - improving with negative fluid balance  - ensure MAP > 65 so no hypotensive episodes worsen renal perfusion

## 2020-11-08 NOTE — PROGRESS NOTES
[facility]  Pulmonology  Progress Note    Patient Name: Patsy Morris  MRN: [id number]  Admission Date: 10/13/2020  Hospital Length of Stay: 26 days  Code Status: Full Code  Attending Provider: Loi Quiles MD  Primary Care Provider: Luisana Cartagena MD   Principal Problem: Acute on chronic respiratory failure with hypoxia and hypercapnia    Subjective:     Interval History:  NIV overnight and has done well. She continues to diurese well. No other acute events.     Objective:     Vital Signs (Most Recent):  Temp: 98 °F (36.7 °C) (11/08/20 0710)  Pulse: (!) 124 (11/08/20 0917)  Resp: (!) 24 (11/08/20 0917)  BP: 112/67 (11/08/20 0900)  SpO2: 95 % (11/08/20 0917) Vital Signs (24h Range):  Temp:  [97.9 °F (36.6 °C)-98.2 °F (36.8 °C)] 98 °F (36.7 °C)  Pulse:  [] 124  Resp:  [20-39] 24  SpO2:  [91 %-98 %] 95 %  BP: ()/(50-83) 112/67  Arterial Line BP: ()/(48-78) 112/50     Weight: 97.6 kg (215 lb 2.7 oz)  Body mass index is 38.12 kg/m².      Intake/Output Summary (Last 24 hours) at 11/8/2020 1004  Last data filed at 11/8/2020 0545  Gross per 24 hour   Intake 650 ml   Output 1320 ml   Net -670 ml       Physical Exam  Vitals signs and nursing note reviewed.   Constitutional:       General: She is not in acute distress.     Comments: Awake interactive, following all commands   HENT:      Head: Normocephalic and atraumatic.   Eyes:      Conjunctiva/sclera: Conjunctivae normal.      Pupils: Pupils are equal, round, and reactive to light.   Neck:      Musculoskeletal: Normal range of motion.      Comments: Thick neck    Cardiovascular:      Rate and Rhythm: Normal rate. Rhythm irregular.      Pulses: Normal pulses.      Heart sounds: Normal heart sounds.   Pulmonary:      Comments: Minimal bilateral basilar crackles.   Abdominal:      General: Bowel sounds are normal. There is no distension.      Palpations: Abdomen is soft.      Tenderness: There is no abdominal tenderness.    Musculoskeletal:         General: Swelling (mild bilateral lower extremities) present.   Skin:     General: Skin is warm and dry.   Neurological:      Mental Status: She is alert.      Comments: Awake, alert, oriented x 4         Vents:  Vent Mode: Spont (11/06/20 0952)  Ventilator Initiated: Yes (11/01/20 2338)  Set Rate: 0 BPM (11/06/20 0952)  Vt Set: 390 mL (11/06/20 0952)  Pressure Support: 12 cmH20 (11/06/20 0952)  PEEP/CPAP: 5 cmH20 (11/06/20 0952)  Oxygen Concentration (%): 30 (11/08/20 0359)  Peak Airway Pressure: 17 cmH2O (11/06/20 0952)  Plateau Pressure: 0 cmH20 (11/06/20 0952)  Total Ve: 6.15 mL (11/06/20 0952)  F/VT Ratio<105 (RSBI): (!) 49.3 (11/06/20 0755)    Lines/Drains/Airways     Drain                 Urethral Catheter 11/02/20 0100 6 days          Arterial Line            Arterial Line 11/01/20 Right Radial 7 days          Peripheral Intravenous Line                 Midline Catheter Insertion/Assessment  - Double Lumen 10/20/20 1220 Right median cubital vein (antecubital fossa)  18 days         Peripheral IV - Single Lumen 11/05/20 0630 20 G Anterior;Left Forearm 3 days                Significant Labs:    CBC/Anemia Profile:  No results for input(s): WBC, HGB, HCT, PLT, MCV, RDW, IRON, FERRITIN, RETIC, FOLATE, [id number], OCCULTBLOOD in the last 48 hours.     Chemistries:  Recent Labs   Lab 11/07/20  0405 11/08/20  0348    139   K 3.5 3.5    101   CO2 30* 29   BUN 47* 46*   CREATININE 1.9* 1.9*   CALCIUM 8.5* 8.2*   MG 2.5 2.3   PHOS 4.8* 4.6*       All pertinent labs within the past 24 hours have been reviewed.    Significant Imaging:  I have reviewed and interpreted all pertinent imaging results/findings within the past 24 hours.    Assessment/Plan:     * Acute on chronic respiratory failure with hypoxia and hypercapnia  - with baseline severe restrictive/obstuctive lung disease (FVC ~30% on PFTs in 2016).   - requiring intubation on 11/1  - likely multifactorial related to volume  overload + HF exacerbation + BHAVANI/OHS + possible new infectious PNA + ? Malignancy?  - no wheezing on exam,  Steroids stopped 11/2  - endotracheal aspirate NGTD, f/u cultures  - f/u BAL, cultures NGTD  - vanco stopped 11/5, cefepime stopped on 11/7 (completed 7 days)   - continuing to hold diuresis today given CLIFFORD however she continues to have good UOP and is improving with negative fluid balance  Dr. Bryant has had extensive conversations with the family about her tenuous respiratory status. They are in understanding. At this time, would plan to reintubate if fails NIPPV. Per report, appears she has previously expressed that she would not want prolonged invasive life support if she would not be able to survive off life support. Will continue ongoing goals of care discussion with patient and family and the patient.     Acute kidney injury superimposed on chronic kidney disease  - possible due to ATN  - pt with baseline CKD in addition to having only one kidney due to prior nephrectomy  - still volume overloaded on exam- improving daily   - monitor UOP, strict I/Os  - good UOP   - improving with negative fluid balance  - ensure MAP > 65 so no hypotensive episodes worsen renal perfusion    Pleural effusion  - bilateral, R>>L  - s/p chest tube placement on 11/4 given her baseline severe restrictive lung disease, any additional impingement on that worsens her ventilatory abilities thus hopeful that drainage of fluid will improve her respiratory dynamics   - initial pleural fluid studies borderline exudative, more likely effusion related to heart failure however given cavitary lesion on right,  infectious vs malignant on differential.   - f/u culture and cytology  -  Chest tube removed 11/7    Cavitary lesion of lung  Patient's CT concerning for slow process as lesion in same posterior upper lobe lung fields noted on previous CT in 2018 as well as bialteral pulmonary nodules. In setting of significant smoking history,  malignancy is high on differential. However slow infectious process such as NTM/pulmonary MAC is possible as well, also possibility of superimprosed bacterial infection  - HIV negative  - Quantiferon gold indeterminate x 2  - AFB stain negative, smear pending x 1  - f/u endotracheal sputum culture  - s/p bronch with BAL on 11/4, f/u AFB  - f/u culture and cytology      Atrial fibrillation with rapid ventricular response  -rate controlled with metoprolol   - eliquis can be restarted at this point.   - heparin for ppx for now      Obesity hypoventilation syndrome  - Patient with BMI >35 with PaCO2 consistently >52 with restriction on PFTs.   - can use nasal canula during the day and bipap PRN as well as nightly and with any naps  - will need to continue bipap nightly and with all naps.             Gloria Jenkins MD  Pulmonology  [facility]

## 2020-11-08 NOTE — SUBJECTIVE & OBJECTIVE
Interval History:  Extubated yesterday late afternoon.  Chest tube output down.  Patient has basically been on continuous BiPAP since extubation.    Review of Systems   Constitutional: Negative for chills and fever.   Respiratory: Positive for shortness of breath. Negative for wheezing.    Cardiovascular: Negative for chest pain.   Gastrointestinal: Negative for abdominal distention, abdominal pain, constipation, diarrhea, nausea and vomiting.   Genitourinary: Negative for dysuria and frequency.   Musculoskeletal: Negative for arthralgias and myalgias.   Neurological: Negative for light-headedness.   Psychiatric/Behavioral: Negative for agitation and confusion.     Objective:     Vital Signs (Most Recent):  Temp: 98.2 °F (36.8 °C) (11/07/20 1524)  Pulse: 92 (11/07/20 1655)  Resp: (!) 24 (11/07/20 1655)  BP: 112/62 (11/07/20 1524)  SpO2: 95 % (11/07/20 1655) Vital Signs (24h Range):  Temp:  [97.9 °F (36.6 °C)-98.4 °F (36.9 °C)] 98.2 °F (36.8 °C)  Pulse:  [] 92  Resp:  [11-39] 24  SpO2:  [87 %-98 %] 95 %  BP: ()/(46-72) 112/62  Arterial Line BP: ()/(42-64) 100/54     Weight: 97.6 kg (215 lb 2.7 oz)  Body mass index is 38.12 kg/m².    Intake/Output Summary (Last 24 hours) at 11/7/2020 1826  Last data filed at 11/7/2020 1700  Gross per 24 hour   Intake 650 ml   Output 1580 ml   Net -930 ml      Physical Exam  Constitutional:       General: She is not in acute distress.  HENT:      Head: Normocephalic and atraumatic.   Eyes:      Conjunctiva/sclera: Conjunctivae normal.      Pupils: Pupils are equal, round, and reactive to light.   Neck:      Musculoskeletal: Normal range of motion.   Cardiovascular:      Rate and Rhythm: Tachycardia present. Rhythm irregular.      Pulses: Normal pulses.      Heart sounds: Normal heart sounds.   Pulmonary:      Comments: Chest tube in the right side with decreasing output.  Breathing reasonably comfortably while on BiPAP this morning.  Abdominal:      General: Bowel  sounds are normal. There is no distension.      Palpations: Abdomen is soft.      Tenderness: There is no abdominal tenderness.   Musculoskeletal:         General: Swelling present.   Skin:     General: Skin is warm and dry.   Neurological:      General: No focal deficit present.      Mental Status: She is oriented to person, place, and time. Mental status is at baseline.         Significant Labs: All pertinent labs within the past 24 hours have been reviewed.    Significant Imaging: I have reviewed all pertinent imaging results/findings within the past 24 hours.

## 2020-11-08 NOTE — ASSESSMENT & PLAN NOTE
Patient with advanced lung disease secondary to chronic obstructive pulmonary disease, obstructive sleep apnea, obesity hypoventilation syndrome, and severe restrictive lung disease now with recent decompensation resulting in intubation due to possible pneumonia.  Minimally elevated procalcitonin level.  Continue antibiotic therapy.  Status post bronchoscopy and placement of the right chest tube.  Chest tube output decreasing.  No acid-fast bacillus seen from sample from bronchial wash.  Gram stain unremarkable.  Additional cultures pending.  Extubated yesterday to BiPAP.  Patient needing continuous BiPAP.

## 2020-11-09 NOTE — SUBJECTIVE & OBJECTIVE
Interval History: On NC, frustrated due to relying  on others and not being able to walk with PT.     Past Medical History:   Diagnosis Date    Arthritis     Asthma     Atrial fibrillation     Atrial flutter     Cerumen impaction     CHF (congestive heart failure)     CKD (chronic kidney disease), stage III     Colon polyps 2017    COPD exacerbation     Encounter for blood transfusion     HEARING LOSS     Herpes genitalis     Hypertension     Renal carcinoma 3/10/2015    Thyroid nodule 6/8/2017       Past Surgical History:   Procedure Laterality Date    BRAIN SURGERY      COLONOSCOPY N/A 6/23/2017    Procedure: COLONOSCOPY;  Surgeon: Shane Sharma MD;  Location: [facility] (2ND FLR);  Service: Endoscopy;  Laterality: N/A;  2nd floor case ; on 3L home O2       per Dr Leopold (anesthesia)-Based on her history of:  Chronic respiratory failure with oxygen use, HDEZ, CHF, A-Fib, BHAVANI, it was determined that she should have her Colonoscopy scheduled on the 2nd Floor       ok to hold Eliquis 2 days prior to    EYE SURGERY      HYSTERECTOMY      kidney mass resection Right     renal carcinoma       Review of patient's allergies indicates:  No Known Allergies    Medications:  Continuous Infusions:  Scheduled Meds:   apixaban  5 mg Oral BID    atorvastatin  80 mg Oral QHS    ceFEPime (MAXIPIME) IVPB  1 g Intravenous Q24H    docusate sodium  100 mg Oral BID    escitalopram oxalate  10 mg Oral QHS    ferrous sulfate  325 mg Oral Daily    furosemide  40 mg Oral BID    gabapentin  100 mg Oral BID    metoprolol succinate  25 mg Oral BID    [START ON 11/10/2020] metoprolol succinate  50 mg Oral Daily    miconazole NITRATE 2 %   Topical (Top) BID    vitamin renal formula (B-complex-vitamin c-folic acid)  1 capsule Oral Daily     PRN Meds:acetaminophen, dextrose 50%, dextrose 50%, diphenhydrAMINE, glucagon (human recombinant), glucose, glucose, melatonin, metoprolol, ondansetron, polyethylene glycol,  promethazine (PHENERGAN) IVPB, sodium chloride 0.9%    Family History     Problem Relation (Age of Onset)    Cancer Father    Hypertension Mother    Thyroid disease Mother        Tobacco Use    Smoking status: Current Every Day Smoker     Packs/day: 1.00     Years: 25.00     Pack years: 25.00     Types: Cigarettes    Smokeless tobacco: Never Used   Substance and Sexual Activity    Alcohol use: No     Alcohol/week: 0.0 standard drinks    Drug use: No    Sexual activity: Never     Birth control/protection: None       Review of Systems   Constitutional: Positive for fatigue. Negative for activity change, appetite change and fever.   HENT: Negative for sinus pressure and sore throat.    Eyes: Negative for pain and visual disturbance.   Respiratory: Positive for shortness of breath. Negative for cough and chest tightness.    Cardiovascular: Negative for chest pain and palpitations.   Gastrointestinal: Negative for abdominal pain, constipation, diarrhea and nausea.   Musculoskeletal: Negative for gait problem and myalgias.   Neurological: Negative for dizziness, seizures, syncope, weakness, light-headedness and headaches.   Psychiatric/Behavioral: Negative for agitation, behavioral problems and hallucinations. The patient is not nervous/anxious.      Objective:     Vital Signs (Most Recent):  Temp: 98.7 °F (37.1 °C) (11/09/20 1505)  Pulse: (!) 116 (11/09/20 1500)  Resp: (!) 27 (11/09/20 1500)  BP: (!) 107/57 (11/09/20 0500)  SpO2: 97 % (11/09/20 1500) Vital Signs (24h Range):  Temp:  [98 °F (36.7 °C)-98.7 °F (37.1 °C)] 98.7 °F (37.1 °C)  Pulse:  [] 116  Resp:  [20-39] 27  SpO2:  [81 %-99 %] 97 %  BP: ()/(52-78) 107/57  Arterial Line BP: ()/(54-74) 100/74     Weight: 97.6 kg (215 lb 2.7 oz)  Body mass index is 38.12 kg/m².    Physical Exam  Constitutional:       General: She is not in acute distress.     Appearance: She is obese. She is ill-appearing (Chronic).      Interventions: Nasal cannula in  place.   Eyes:      General:         Right eye: No discharge.         Left eye: No discharge.   Neck:      Musculoskeletal: Normal range of motion.   Cardiovascular:      Rate and Rhythm: Regular rhythm. Tachycardia present.      Heart sounds: Normal heart sounds. No murmur.   Pulmonary:      Effort: Pulmonary effort is normal. Tachypnea present.   Chest:      Chest wall: No tenderness.   Abdominal:      General: There is no distension.      Palpations: Abdomen is soft.      Tenderness: There is no abdominal tenderness.   Musculoskeletal:      Right lower leg: Edema present.      Left lower leg: Edema present.   Skin:     General: Skin is warm.   Neurological:      Mental Status: She is alert and oriented to person, place, and time.   Psychiatric:         Behavior: Behavior is cooperative.         Thought Content: Thought content normal.         Judgment: Judgment normal.         Review of Symptoms    Symptom Assessment (ESAS 0-10 Scale)  Pain:  0  Dyspnea:  5  Anxiety:  2  Anorexia:  3  Fatigue:  6  Restlessness:  0               Performance Status:  50    ECOG Performance Status Grade:  3 - Confined to bed or chair 50% of waking hours    Living Arrangements:  Lives with family    Psychosocial/Cultural: Patient lives with her brother, who she is primary caregiver for.  Patient has a sister and niece who are able to help her at home if needed.       Advance Care Planning   Advance Directives:   Living Will: No    LaPOST: Yes    Do Not Resuscitate Status: No    Medical Power of : Yes    Agent's Name:  Earlene Dexter   Agent's Contact Number:  [phone]    Decision Making:  Patient answered questions and Family answered questions         Significant Labs: All pertinent labs within the past 24 hours have been reviewed.  CBC:   Recent Labs   Lab 11/05/20  0426   WBC 13.70*   HGB 9.4*   HCT 29.0*   MCV 85        BMP:  Recent Labs   Lab 11/09/20  0352   GLU 96      K 4.0      CO2 28   BUN  40*   CREATININE 1.9*   CALCIUM 8.4*   MG 2.2     LFT:  Lab Results   Component Value Date    AST 27 11/05/2020    ALKPHOS 43 (L) 11/05/2020    BILITOT 0.7 11/05/2020     Albumin:   Albumin   Date Value Ref Range Status   11/05/2020 2.6 (L) 3.5 - 5.2 g/dL Final     Protein:   Total Protein   Date Value Ref Range Status   11/05/2020 6.0 6.0 - 8.4 g/dL Final     Lactic acid:   Lab Results   Component Value Date    LACTATE 0.6 10/13/2020       Significant Imaging: I have reviewed all pertinent imaging results/findings within the past 24 hours.     I have reviewed the Chest x ray from 11/4/2020    I have reviewed the CT chest from 10/15/2020

## 2020-11-09 NOTE — PLAN OF CARE
Received pt on BiPAP.  Tolerates 3L nasal cannula when awake. VSS. Afebrile. Shin in place. Chest tube site c/d/i. Remained free from falls or injuries. No distress noted.

## 2020-11-09 NOTE — PROGRESS NOTES
"[facility]  Palliative Medicine  Progress Note    Patient Name: Patsy Morris  MRN: [id number]  Admission Date: 10/13/2020  Hospital Length of Stay: 27 days  Code Status: Full Code   Attending Provider: Loi Quiles MD  Consulting Provider: Elsa Bran DNP  Primary Care Physician: Luisana Cartagena MD  Principal Problem:Acute on chronic respiratory failure with hypoxia and hypercapnia    Patient information was obtained from patient, past medical records and ER records.      Assessment/Plan:     * Acute on chronic respiratory failure with hypoxia and hypercapnia  - Pulmonary following  - Awaiting home trilogy       Debility  - Pt/OT recommending SNF  - Patient does not desire to go to SNF.  Discussed concern about going home.  Discussed weakness due to intubation.  Patient reluctant to go to SNF, wants to go home.  Reports family will help her at home.       Encounter for palliative care  -Patient seen on 8/24 and expressed a desire "enjoy life a little more".  Patient defines this by being able to leave her house and go to the mall or the grocery store. Patient reports not being able to do these activities due to worsening mobility.  When discussed the patient hopes and if they were not able to be obtained patient reported this would be an unacceptable outcome for her. Patient expressed her desire to be able to cook again.  Patient expressed her love for cooking.  - Patient fears  if something were to happen to her who would care for her brother who has schizophrenia.    - Patient previously completed LaPOST- DNR.  Then patient voided the LaPOST and completed new LaPOST on 9/4, changing her wishes to Full code  -Re discussed GOC: returning home. Ms. Morris is frustrated because she is relying on others to assist her and she was not able to participate with PT as much as she would have liked.  We discussed the possibility of needing facility placement to regain strength.  She is " "completely reluctant.  She wants to go home and says she has family that can provide assistance all day, she will be alone at night.  I discussed my concern that she will continue to require frequent hospitalizations and if that would be acceptable to her, she stated "long term no".  I then discussed the need for trilogy and the barriers in obtaining trilogy.  Her cousin was at the bedside.  We discussed her change in GOC to prolonging life and being a FULL code.  When I first met Ms. Morris she was a DNR and was adamant that she would not want her life to be prolonged if she was dependent on family.  We discussed pall care support at home.  Ms. Morris stated "I am not ready to die".  We discussed hospice, which we have discussed several times before.  Her cousin questioned the difference, hospice and pall care explained.  If Ms. Morris did pursue hospice at home, I believe hospice would provide her with a trilogy, however, she is reluctant to hospice support because she believes it for actively dying patients.  Ms. Morris wishes to see her progress with therapy.  Will re address.  Her cousin left the facility to discuss with Ms. Morris's sister further.      Acute on chronic diastolic congestive heart failure  -intubated on 11/1, extubated 11/6 to BIPAP  - Patient on NC        I will follow-up with patient. Please contact us if you have any additional questions.    Subjective:     Chief Complaint:   Chief Complaint   Patient presents with    Shortness of Breath     SOB for several days, upon EMS arrival pt was 72 on 3L at home oxygen. Pt placed on CPAP per EMS.        HPI:   Ms. Morris is a 70 y.o. female with PMH of HTN, asthma, afib, CHF, obesity, BHAVANI, CKD 3/4, COPD, who presented to [facility] ED due to worsening SOB for a few days.  Patients oxygen saturation was 72 on 3 L of oxygen.  Patient reports she is on oxygen at home (3L NC) and has to sleep in a recliner chair.  She reports worsening SOB with simple " activities such as cleaning, showering, and cooking.       Patient does not have any children, she lives with her younger brother who has schizophrenia.  She is his primary caregiver.  Patient has 9 nieces and nephews who she helped raise who she is close to.       I saw the patient on 8/25/2020 prior to being discharged to SNF and then again on 9/4/2020.       Hospital Course:  No notes on file    Interval History: On NC, frustrated due to relying  on others and not being able to walk with PT.     Past Medical History:   Diagnosis Date    Arthritis     Asthma     Atrial fibrillation     Atrial flutter     Cerumen impaction     CHF (congestive heart failure)     CKD (chronic kidney disease), stage III     Colon polyps 2017    COPD exacerbation     Encounter for blood transfusion     HEARING LOSS     Herpes genitalis     Hypertension     Renal carcinoma 3/10/2015    Thyroid nodule 6/8/2017       Past Surgical History:   Procedure Laterality Date    BRAIN SURGERY      COLONOSCOPY N/A 6/23/2017    Procedure: COLONOSCOPY;  Surgeon: Shane Sharma MD;  Location: [facility] ([address] [place] [address]);  Service: Endoscopy;  Laterality: N/A;  2nd floor case ; on 3L home O2       per Dr Leopold (anesthesia)-Based on her history of:  Chronic respiratory failure with oxygen use, HDEZ, CHF, A-Fib, BHAVANI, it was determined that she should have her Colonoscopy scheduled on the 2nd Floor       ok to hold Eliquis 2 days prior to    EYE SURGERY      HYSTERECTOMY      kidney mass resection Right     renal carcinoma       Review of patient's allergies indicates:  No Known Allergies    Medications:  Continuous Infusions:  Scheduled Meds:   apixaban  5 mg Oral BID    atorvastatin  80 mg Oral QHS    ceFEPime (MAXIPIME) IVPB  1 g Intravenous Q24H    docusate sodium  100 mg Oral BID    escitalopram oxalate  10 mg Oral QHS    ferrous sulfate  325 mg Oral Daily    furosemide  40 mg Oral BID    gabapentin  100 mg Oral BID    metoprolol  succinate  25 mg Oral BID    [START ON 11/10/2020] metoprolol succinate  50 mg Oral Daily    miconazole NITRATE 2 %   Topical (Top) BID    vitamin renal formula (B-complex-vitamin c-folic acid)  1 capsule Oral Daily     PRN Meds:acetaminophen, dextrose 50%, dextrose 50%, diphenhydrAMINE, glucagon (human recombinant), glucose, glucose, melatonin, metoprolol, ondansetron, polyethylene glycol, promethazine (PHENERGAN) IVPB, sodium chloride 0.9%    Family History     Problem Relation (Age of Onset)    Cancer Father    Hypertension Mother    Thyroid disease Mother        Tobacco Use    Smoking status: Current Every Day Smoker     Packs/day: 1.00     Years: 25.00     Pack years: 25.00     Types: Cigarettes    Smokeless tobacco: Never Used   Substance and Sexual Activity    Alcohol use: No     Alcohol/week: 0.0 standard drinks    Drug use: No    Sexual activity: Never     Birth control/protection: None       Review of Systems   Constitutional: Positive for fatigue. Negative for activity change, appetite change and fever.   HENT: Negative for sinus pressure and sore throat.    Eyes: Negative for pain and visual disturbance.   Respiratory: Positive for shortness of breath. Negative for cough and chest tightness.    Cardiovascular: Negative for chest pain and palpitations.   Gastrointestinal: Negative for abdominal pain, constipation, diarrhea and nausea.   Musculoskeletal: Negative for gait problem and myalgias.   Neurological: Negative for dizziness, seizures, syncope, weakness, light-headedness and headaches.   Psychiatric/Behavioral: Negative for agitation, behavioral problems and hallucinations. The patient is not nervous/anxious.      Objective:     Vital Signs (Most Recent):  Temp: 98.7 °F (37.1 °C) (11/09/20 1505)  Pulse: (!) 116 (11/09/20 1500)  Resp: (!) 27 (11/09/20 1500)  BP: (!) 107/57 (11/09/20 0500)  SpO2: 97 % (11/09/20 1500) Vital Signs (24h Range):  Temp:  [98 °F (36.7 °C)-98.7 °F (37.1 °C)] 98.7 °F  (37.1 °C)  Pulse:  [] 116  Resp:  [20-39] 27  SpO2:  [81 %-99 %] 97 %  BP: ()/(52-78) 107/57  Arterial Line BP: ()/(54-74) 100/74     Weight: 97.6 kg (215 lb 2.7 oz)  Body mass index is 38.12 kg/m².    Physical Exam  Constitutional:       General: She is not in acute distress.     Appearance: She is obese. She is ill-appearing (Chronic).      Interventions: Nasal cannula in place.   Eyes:      General:         Right eye: No discharge.         Left eye: No discharge.   Neck:      Musculoskeletal: Normal range of motion.   Cardiovascular:      Rate and Rhythm: Regular rhythm. Tachycardia present.      Heart sounds: Normal heart sounds. No murmur.   Pulmonary:      Effort: Pulmonary effort is normal. Tachypnea present.   Chest:      Chest wall: No tenderness.   Abdominal:      General: There is no distension.      Palpations: Abdomen is soft.      Tenderness: There is no abdominal tenderness.   Musculoskeletal:      Right lower leg: Edema present.      Left lower leg: Edema present.   Skin:     General: Skin is warm.   Neurological:      Mental Status: She is alert and oriented to person, place, and time.   Psychiatric:         Behavior: Behavior is cooperative.         Thought Content: Thought content normal.         Judgment: Judgment normal.         Review of Symptoms    Symptom Assessment (ESAS 0-10 Scale)  Pain:  0  Dyspnea:  5  Anxiety:  2  Anorexia:  3  Fatigue:  6  Restlessness:  0               Performance Status:  50    ECOG Performance Status Grade:  3 - Confined to bed or chair 50% of waking hours    Living Arrangements:  Lives with family    Psychosocial/Cultural: Patient lives with her brother, who she is primary caregiver for.  Patient has a sister and niece who are able to help her at home if needed.       Advance Care Planning   Advance Directives:   Living Will: No    LaPOST: Yes    Do Not Resuscitate Status: No    Medical Power of : Yes    Agent's Name:  Earlene Dexter    Agent's Contact Number:  [phone]    Decision Making:  Patient answered questions and Family answered questions         Significant Labs: All pertinent labs within the past 24 hours have been reviewed.  CBC:   Recent Labs   Lab 11/05/20  0426   WBC 13.70*   HGB 9.4*   HCT 29.0*   MCV 85        BMP:  Recent Labs   Lab 11/09/20  0352   GLU 96      K 4.0      CO2 28   BUN 40*   CREATININE 1.9*   CALCIUM 8.4*   MG 2.2     LFT:  Lab Results   Component Value Date    AST 27 11/05/2020    ALKPHOS 43 (L) 11/05/2020    BILITOT 0.7 11/05/2020     Albumin:   Albumin   Date Value Ref Range Status   11/05/2020 2.6 (L) 3.5 - 5.2 g/dL Final     Protein:   Total Protein   Date Value Ref Range Status   11/05/2020 6.0 6.0 - 8.4 g/dL Final     Lactic acid:   Lab Results   Component Value Date    LACTATE 0.6 10/13/2020       Significant Imaging: I have reviewed all pertinent imaging results/findings within the past 24 hours.     I have reviewed the Chest x ray from 11/4/2020    I have reviewed the CT chest from 10/15/2020    Discussed with Dr. Quiles and Valery Ambrocio.    > 50% of 40 min visit spent in chart review, face to face discussion of goals of care,  symptom assessment, coordination of care and emotional support.    Elsa Bran, HOLLIE  Palliative Medicine  [facility]

## 2020-11-09 NOTE — ASSESSMENT & PLAN NOTE
- bilateral, R>>L  - s/p chest tube placement on 11/4 given her baseline severe restrictive lung disease, any additional impingement on that worsens her ventilatory abilities thus hopeful that drainage of fluid will improve her respiratory dynamics   - initial pleural fluid studies borderline exudative, more likely effusion related to heart failure however given cavitary lesion on right,  infectious vs malignant on differential.   - f/u culture and cytology  -  Chest tube removed 11/7  - repeat CXR today pending

## 2020-11-09 NOTE — PLAN OF CARE
Problem: Occupational Therapy Goal  Goal: Occupational Therapy Goal  Description: Goals to be met by: 11/13/2020    Patient will increase functional independence with ADLs by performing:    UE Dressing with Min A.  LE Dressing with Maximum Assistance using adaptive equipment as needed.  Grooming while seated with SBA.  Toileting from bedside commode with CGA for hygiene and clothing management.   Toilet transfer to bedside commode with Minimal Assistance.  Pt will tolerate sitting EOB for 15 minutes with SBA for balance.        Outcome: Ongoing, Progressing     Pt is making progress towards goals.  SNF is recommended upon d/c from acute care to further address deficits and help pt improve overall functional independence.     CARLOS Motta  11/9/2020

## 2020-11-09 NOTE — ASSESSMENT & PLAN NOTE
"-Patient seen on 8/24 and expressed a desire "enjoy life a little more".  Patient defines this by being able to leave her house and go to the mall or the grocery store. Patient reports not being able to do these activities due to worsening mobility.  When discussed the patient hopes and if they were not able to be obtained patient reported this would be an unacceptable outcome for her. Patient expressed her desire to be able to cook again.  Patient expressed her love for cooking.  - Patient fears  if something were to happen to her who would care for her brother who has schizophrenia.    - Patient previously completed LaPOST- DNR.  Then patient voided the LaPOST and completed new LaPOST on 9/4, changing her wishes to Full code  -Re discussed GOC: returning home. Ms. Morris is frustrated because she is relying on others to assist her and she was not able to participate with PT as much as she would have liked.  We discussed the possibility of needing facility placement to regain strength.  She is completely reluctant.  She wants to go home and says she has family that can provide assistance all day, she will be alone at night.  I discussed my concern that she will continue to require frequent hospitalizations and if that would be acceptable to her, she stated "long term no".  I then discussed the need for trilogy and the barriers in obtaining trilogy.  Her cousin was the bedside.  We discussed her change in GOC to prolonging life and continuing to come back and forth to the hospital.  When I first met Ms. Morris she was a DNR and was adamant that she would not want her life to be prolonged if she was dependent on family.  We discussed pall care support at home.  Ms. Morris stated "I am not ready to die".  We discussed hospice, which we have discussed several times before.  Her cousin questioned the difference, hospice and pall care explained.  If Ms. Morris did pursue hospice at home, I believe hospice would provide " her with a trilogy, however, she is reluctant to hospice support because she believes it for actively dying patients.  Ms. Morris wishes to see her progress with therapy.  Will re address.  Her cousin left the facility to discuss with Ms. Morris's sister further.

## 2020-11-09 NOTE — ASSESSMENT & PLAN NOTE
Contributing Nutrition Diagnosis  Altered nutrition related lab values    Related to (etiology):   Kidney dysfuntion    Signs and Symptoms (as evidenced by):   Renal Labs    Interventions (treatment strategy):  Collaboration with other providers  Renal Diet  Commercial Beverage- John BID    Nutrition Diagnosis Status:   New

## 2020-11-09 NOTE — PROGRESS NOTES
"  [facility]  Adult Nutrition  Progress Note    SUMMARY     Recommendations    1. Recommend Renal diet.     2. Recommend John BID.     3. Weekly weights.    Goals: 1. >75% of EEN, EPN by RD follow up.  Nutrition Goal Status: new  Communication of RD Recs: (POC)    Reason for Assessment    Reason For Assessment: length of stay  Diagnosis: (Acute on chronic respiratory failure with hypoxia and hypercapnia)  Relevant Medical History: HTN, CHF, COPD, CKD 3  Interdisciplinary Rounds: did not attend  General Information Comments:   20- Pt presented with SOB. Pt extubated on 20. Now on Cardiac diet, PO intake of 75% of meals.  lbs, no significant change in weight. Altered skin integrity to gluteal cleft and wound to labia. NFPE 20- pt nourished.   Nutrition Discharge Planning: Adequate nutrition to meet needs- Via Renal diet.    Nutrition Risk Screen    Nutrition Risk Screen: no indicators present    Nutrition/Diet History    Spiritual, Cultural Beliefs, Tenriism Practices, Values that Affect Care: no    Anthropometrics    Temp: 98.3 °F (36.8 °C)  Height Method: Stated  Height: 5' 3" (160 cm)  Height (inches): 63 in  Weight Method: Bed Scale  Weight: 97.6 kg (215 lb 2.7 oz)  Weight (lb): 215.17 lb  Ideal Body Weight (IBW), Female: 115 lb  % Ideal Body Weight, Female (lb): 187.1 %  BMI (Calculated): 38.1  BMI Grade: 35 - 39.9 - obesity - grade II  Usual Body Weight (UBW), k.6 kg  % Usual Body Weight: 99.19  % Weight Change From Usual Weight: -1.02 %     Lab/Procedures/Meds    Pertinent Labs Reviewed: reviewed  Pertinent Labs Comments: BUN 40, Cr 1.9  Pertinent Medications Reviewed: reviewed  Pertinent Medications Comments: Furosemide, Metoprolol    Estimated/Assessed Needs    Weight Used For Calorie Calculations: 97.6 kg (215 lb 2.7 oz)  Energy Calorie Requirements (kcal): 2244 kcals/day(23kcals/kg- CKD)  Energy Need Method: Kcal/kg  Protein Requirements: 78.24 g/day(0.8 " g/kg- CKD)  Weight Used For Protein Calculations: 97.6 kg (215 lb 2.7 oz)  Fluid Requirements (mL): 1mL/kcal or per MD  Estimated Fluid Requirement Method: RDA Method  RDA Method (mL): 2244     Nutrition Prescription Ordered    Current Diet Order: Cardiac    Evaluation of Received Nutrient/Fluid Intake    Energy Calories Required: meeting needs  Protein Required: meeting needs  Fluid Required: meeting needs  % Intake of Estimated Energy Needs: 75 - 100 %  % Meal Intake: 75 - 100 %    Nutrition Risk    Level of Risk/Frequency of Follow-up: (1xweekly)     Assessment and Plan    Chronic kidney disease (CKD) stage G4/A1, severely decreased glomerular filtration rate (GFR) between 15-29 mL/min/1.73 square meter and albuminuria creatinine ratio less than 30 mg/g  Contributing Nutrition Diagnosis  Altered nutrition related lab values    Related to (etiology):   Kidney dysfuntion    Signs and Symptoms (as evidenced by):   Renal Labs    Interventions (treatment strategy):  Collaboration with other providers  Renal Diet  [employer]- John BID    Nutrition Diagnosis Status:   New    Monitor and Evaluation    Food and Nutrient Intake: food and beverage intake  Food and Nutrient Adminstration: diet order  Knowledge/Beliefs/Attitudes: food and nutrition knowledge/skill  Physical Activity and Function: nutrition-related ADLs and IADLs  Anthropometric Measurements: weight  Biochemical Data, Medical Tests and Procedures: lipid profile, electrolyte and renal panel  Nutrition-Focused Physical Findings: overall appearance     Malnutrition Assessment     Skin (Micronutrient): (Everardo Score: 16)   NFPE 11.9.20- pt nourished.      Nutrition Follow-Up    RD Follow-up?: Yes

## 2020-11-09 NOTE — ASSESSMENT & PLAN NOTE
- with baseline severe restrictive/obstuctive lung disease (FVC ~30% on PFTs in 2016).   - requiring intubation on 11/1, extubated 11/6 to BIPAP  - likely multifactorial related to volume overload + HF exacerbation + BHAVANI/OHS + possible new infectious PNA + ? Malignancy?  - no wheezing on exam,  Steroids stopped 11/2  - endotracheal aspirate NGTD, f/u cultures  - f/u BAL, cultures NGTD  - vanco stopped 11/5  - remains on cefepime, last dose tomorrow  - diuretics resumed  - goal for net negative fluid balance now that she is tolerating PO intake.   - FiO2 requirement down to her home 2LNC, toleraing more time off bipap  - continues to improve  - PT/OT today, needs to improve mobility

## 2020-11-09 NOTE — PLAN OF CARE
Problem: Physical Therapy Goal  Goal: Physical Therapy Goal  Description: Goals to be met by: 11/15/2020    Patient will perform the following to increase strength, improve mobility, and return to prior level of function:    1. Supine <> sit with CGA.  2. Sit <> stand transfer w/ CGA and least restrictive assistive device.   3. Bed <> chair transfer with CGA and least restrictive assistive device.  4. Gait > 150 ft with CGA and least restrictive assistive device.    Outcome: Ongoing, Progressing    Pt tolerated treatment fair. Pt is progressing toward meeting goals. Pt performed seated ther ex, supine > sit transfer w/ Mod A, sit > supine w/ Max A, sit <> stand transfer 4x w/ RW and Min A- Mod A, and scooting edge of bed 3x w/ CGA. Pt expressed feelings of dizziness upon transferring from supine > sit. Feelings of dizziness resolved after prolonged time in sitting. Pt sat EOB ~20 mins. Pt had significant left lateral lean and required Mod A-Max A for balance. Pt expressed desire to ambulate, but was unable to ambulate today due to fatigue. Demonstrated improved tolerance to activity and bed mobility. Pt continues to be limited by shortness of breath and weakness. PT will continue to follow and progress goals as tolerated. Recommend discharge to SNF.

## 2020-11-09 NOTE — PROGRESS NOTES
[facility]  Cardiology  Progress Note    Patient Name: Patsy Morris  MRN: [id number]  Admission Date: 10/13/2020  Hospital Length of Stay: 27 days  Code Status: Full Code   Attending Physician: Loi Quiles MD   Primary Care Physician: Luisana Cartagena MD  Expected Discharge Date:   Principal Problem:Acute on chronic respiratory failure with hypoxia and hypercapnia    Subjective:     Brief HPI:    Patsy Morris is a 70 y.o.female with hypertension. She has chronic atrial fibrillation and heart failure with preserved ejection fraction. She has chronic obstructive pulmonary disease being on home oxygen with CO2 retention. She has undergone nephrectomy for renal cell carcinoma and has chronic kidney disease. She was admitted to [facility] in 2020 and 2020 with heart failure and exacerbations of her chronic obstructive pulmonary disease. She went to therapy at [facility].      She used to be on furosemide 40 mg Q24 however after her last hospitalization she had the does of furosemide increased to 80 mg Q24. The dose was reduced to 40 mg Q24 on 10/8/2020. She became increasingly short of breath and presents fluid overloaded in heart failure as well as an exacerbation of her COPD with high CO2. She was admitted to the ICU.    Hospital Course:    Diuresis.    BIPAP.    Respiratory treatments.    10/15/2020: Echo: Normal left ventricular size and systolic function. EF 60%. Moderately dilated LA. Mildly dilated RV. SPAP 49 mmHg. Small pericardial effusion.    10/16/2020: Apixiban 5 mg Q12 was changed to heparin iv for consideration of bronchoscopy.    10/21/2020: Transferred from ICU to telemetry.    10/27/2020: Walked 150 ft with PT.    2020: AB.21/125/78/90%/FIO2 45%    2020: Respiratory failure. Intubated in ICU.    2020: Bronchoscopy.    2020: Right sided chest tube. Thoracentesis 650 ml. Hazy fluid with WBC of 349.    2020:  Extubated.    Interval History:    Feeling fair. Still in ICU. Very variable VRR.       Review of Systems   Constitution: Positive for malaise/fatigue.   Cardiovascular: Negative for chest pain, irregular heartbeat, orthopnea and syncope.   Respiratory: Positive for shortness of breath. Negative for wheezing.    Gastrointestinal: Negative for abdominal pain.       Objective:     Vital Signs (Most Recent):  Temp: 98.3 °F (36.8 °C) (11/09/20 0305)  Pulse: 102 (11/09/20 0740)  Resp: (!) 27 (11/09/20 0740)  BP: (!) 107/57 (11/09/20 0500)  SpO2: 98 % (11/09/20 0740) Vital Signs (24h Range):  Temp:  [97.9 °F (36.6 °C)-98.3 °F (36.8 °C)] 98.3 °F (36.8 °C)  Pulse:  [] 102  Resp:  [20-34] 27  SpO2:  [92 %-100 %] 98 %  BP: ()/(52-79) 107/57  Arterial Line BP: ()/(50-66) 94/56     Weight: 97.6 kg (215 lb 2.7 oz)  Body mass index is 38.12 kg/m².    SpO2: 98 %  O2 Device (Oxygen Therapy): nasal cannula      Intake/Output Summary (Last 24 hours) at 11/9/2020 0746  Last data filed at 11/9/2020 0600  Gross per 24 hour   Intake 50 ml   Output 1750 ml   Net -1700 ml       Lines/Drains/Airways     Drain                 Urethral Catheter 11/02/20 0100 7 days          Arterial Line            Arterial Line 11/01/20 Right Radial 8 days          Peripheral Intravenous Line                 Midline Catheter Insertion/Assessment  - Double Lumen 10/20/20 1220 Right median cubital vein (antecubital fossa)  19 days         Peripheral IV - Single Lumen 11/05/20 0630 20 G Anterior;Left Forearm 4 days                Physical Exam   Constitutional: She appears well-developed and well-nourished. She appears ill. No distress.   Neck: Carotid bruit is not present.   Cardiovascular: S1 normal and S2 normal. An irregularly irregular rhythm present. Tachycardia present.   Pulmonary/Chest: She has decreased breath sounds in the right lower field. She has rhonchi in the right lower field and the left lower field.   Chest tube on the right  side.   Abdominal: Soft. Normal appearance.   Musculoskeletal:      Right ankle: She exhibits no swelling.      Left ankle: She exhibits no swelling.   Skin: Skin is warm and dry.     Current Medications:     apixaban  5 mg Oral BID    atorvastatin  80 mg Oral QHS    ceFEPime (MAXIPIME) IVPB  1 g Intravenous Q24H    docusate sodium  100 mg Oral BID    escitalopram oxalate  10 mg Oral QHS    famotidine  20 mg Oral Daily    ferrous sulfate  325 mg Oral Daily    gabapentin  100 mg Oral BID    metoprolol succinate  25 mg Oral BID    miconazole NITRATE 2 %   Topical (Top) BID    vitamin renal formula (B-complex-vitamin c-folic acid)  1 capsule Oral Daily     Current Laboratory Results:    Recent Results (from the past 24 hour(s))   Basic metabolic panel    Collection Time: 11/09/20  3:52 AM   Result Value Ref Range    Sodium 140 136 - 145 mmol/L    Potassium 4.0 3.5 - 5.1 mmol/L    Chloride 102 95 - 110 mmol/L    CO2 28 23 - 29 mmol/L    Glucose 96 70 - 110 mg/dL    BUN 40 (H) 8 - 23 mg/dL    Creatinine 1.9 (H) 0.5 - 1.4 mg/dL    Calcium 8.4 (L) 8.7 - 10.5 mg/dL    Anion Gap 10 8 - 16 mmol/L    eGFR if African American 30 (A) >60 mL/min/1.73 m^2    eGFR if non African American 26 (A) >60 mL/min/1.73 m^2   Magnesium    Collection Time: 11/09/20  3:52 AM   Result Value Ref Range    Magnesium 2.2 1.6 - 2.6 mg/dL   Phosphorus    Collection Time: 11/09/20  3:52 AM   Result Value Ref Range    Phosphorus 3.9 2.7 - 4.5 mg/dL     Current Imaging Results:    X-Ray Chest 1 View   Final Result      Since November 4, 2020, slightly increased diffuse bilateral airspace opacities, particularly in the upper lung zones.         Electronically signed by: Bala Perez MD   Date:    11/05/2020   Time:    07:42      X-Ray Chest 1 View   Final Result      ET tube, enteric tube, and right-sided pigtail chest tube appear in satisfactory position.      Decreasing pulmonary vascular congestion with improving bilateral parahilar and  basilar consolidation.      Decreasing consolidation within the mid to right upper lung.      Improved aeration within the right lung base likely due to decreased right pleural effusion.         Electronically signed by: Regulo Solis MD   Date:    11/04/2020   Time:    12:32      XR Non-Rad Performed NG/Gastric Tube Check   Final Result      Tip of nasogastric tube in the proximal stomach.         Electronically signed by: Charlotte Howell   Date:    11/02/2020   Time:    00:25      X-Ray Chest 1 View   Final Result   Abnormal      Interval progression of abnormal intrathoracic findings, increasing bilateral pulmonary opacities, as discussed above.      ET tube noted, the tip above the yamilka.      Enteric tube noted, the distal tip extends subdiaphragmatic below the field of view however the side hole is likely along the distal esophagus, advancement recommended.      This report was flagged in Epic as abnormal.         Electronically signed by: Hammad Ayala   Date:    11/02/2020   Time:    00:09      X-Ray Chest AP Portable   Final Result      Stable right upper lobe consolidation.  Findings remain concerning for pneumonia with small cavitation as described previously.      Improved aeration right lung base.  Small pleural effusions, decreased in volume on the right.         Electronically signed by: Elsa Alcantar   Date:    10/30/2020   Time:    11:47      X-Ray Chest AP Portable   Final Result      Worsening right basilar atelectasis or consolidation.      Right upper lobe consolidation is improved.  There may be a tiny pneumatocele or central cavitation.      Pleural effusions are improved.      Continued follow-up advised.         Electronically signed by: Elsa Alcantar   Date:    10/28/2020   Time:    10:24      X-Ray Chest AP Portable   Final Result      No significant change.  There is cardiomegaly, multifocal airspace opacities and bilateral effusions.         Electronically signed by: Ajay Camara  MD   Date:    10/20/2020   Time:    08:48      US Lower Extremity Veins Bilateral   Final Result      No evidence of deep venous thrombosis in either lower extremity.         Electronically signed by: Ceasar Carreon MD   Date:    10/15/2020   Time:    21:53      CT Chest Without Contrast   Final Result      The findings highly concerning for extensive diffuse pulmonary infection with bilateral pleural fluid collections right greater than left.         Electronically signed by: Regulo Bassett MD   Date:    10/15/2020   Time:    12:01      X-Ray Chest AP Portable   Final Result      Interval worsening of diffuse bilateral airspace disease and moderate bilateral pleural effusions when compared to 09/09/2020         Electronically signed by: Halina Plascencia   Date:    10/13/2020   Time:    09:40          10/15/2020: Echo:    The left ventricle is normal in size with normal systolic function. The estimated ejection fraction is 60%.  A diastolic pattern consistent with atrial fibrillation observed.  Moderate left atrial enlargement.  Mild right ventricular enlargement.  Normal right ventricular systolic function.  Severe right atrial enlargement.  The aortic valve is mildly sclerotic.  The mitral valve is mildly sclerotic.  Mild mitral regurgitation.  Mild tricuspid regurgitation.  There is mild pulmonary hypertension.  The estimated PA systolic pressure is 49 mmHg.  Intermediate central venous pressure (8 mmHg).  Small circumferential pericardial effusion.  There is a left pleural effusion.      Assessment and Plan:     Problem List:    Active Diagnoses:    Diagnosis Date Noted POA    PRINCIPAL PROBLEM:  Acute on chronic respiratory failure with hypoxia and hypercapnia [J96.21, J96.22] 08/03/2016 Yes    Acute kidney injury superimposed on chronic kidney disease [N17.9, N18.9] 11/03/2020 No    Pleural effusion [J90] 11/02/2020 Yes    Cavitary lesion of lung [J98.4] 10/16/2020 Yes    Atrial fibrillation with rapid  ventricular response [I48.91] 09/01/2020 Yes    Debility [R53.81] 08/26/2020 Yes    Acute on chronic diastolic congestive heart failure [I50.33] 05/20/2016 Yes    Obesity hypoventilation syndrome [E66.2] 02/25/2016 Yes    Chronic kidney disease (CKD) stage G4/A1, severely decreased glomerular filtration rate (GFR) between 15-29 mL/min/1.73 square meter and albuminuria creatinine ratio less than 30 mg/g [N18.4]  Yes    Dyslipidemia [E78.5] 02/22/2016 Yes    Essential hypertension [I10] 10/15/2014 Yes      Problems Resolved During this Admission:    Diagnosis Date Noted Date Resolved POA    On mechanically assisted ventilation [Z99.11] 11/02/2020 11/08/2020 Not Applicable    Severe sepsis [A41.9, R65.20] 11/02/2020 11/02/2020 Unknown    Shortness of breath [R06.02] 11/01/2020 11/02/2020 Yes    Normocytic anemia [D64.9] 10/27/2020 11/03/2020 Yes    Acute on chronic congestive heart failure [I50.9] 10/21/2020 10/27/2020 Yes    Acute on chronic congestive heart failure [I50.9] 10/13/2020 10/13/2020 Yes    Encounter for palliative care [Z51.5] 08/25/2020 11/03/2020 Not Applicable    Current moderate episode of major depressive disorder without prior episode [F32.1] 04/30/2020 11/03/2020 Yes    Pulmonary nodules/lesions, multiple [R91.8] 03/22/2019 10/14/2020 Yes    Obstructive sleep apnea [G47.33]  11/02/2020 Yes    Paroxysmal atrial fibrillation [I48.0] 08/03/2016 10/13/2020 Yes    Chronic hypercapnic respiratory failure [J96.12] 03/11/2016 10/13/2020 Yes    Renal carcinoma [C64.9] 03/10/2015 10/13/2020 Yes       Assessment and Plan:     1. Heart Failure, Diastolic, Chronic              8/24/2020: Echo: Normal left ventricular size and systolic function. Mildly dilated LA.   10/15/2020: Echo: Normal left ventricular size and systolic function. EF 60%. Moderately dilated LA. Mildly dilated RV. SPAP 49 mmHg. Small pericardial effusion.              At NH was on furosemide 40 mg Q24.               10/13/2020: Presented fluid overloaded in HF. Received furosemide 80 mg iv Q12.   10/20/2020: CXR with extensive infiltrates and pleural effusions. .    10/26/2020: .   10/28/2020: CXR was improving.   Was on furosemide 40 mg po Q12.   Will resume furosemide 40 mg Q12.              Does not appear fluid overloaded.      2. Atrial Fibrillation              In chronic atrial fibrillation.              On apixiban.              Used to be on metoprolol 50 mg Q12.              Rate was well controlled mostly  bpm.   On metoprolol succinate 25 mg Q12.   VRR very variable,   Will change metoprolol to 50 mg Q24 in am.     3. Chronic Anticoagulation              Been on apixiban 5 mg Q12.   10/16/2020: Apixiban 5 mg Q12 was changed to heparin iv.   On apixiban 5 mg Q12.   No bleeding.    4. Hypertension   On no antihypertensives.   Well controlled.                5. Chronic Kidney Disease, Stage 4              History or renal carcinoma and nephrectomy.              10/13/2020: BUN/crea 27/2.0. CrCl 28.   10/20/2020: BUN/crea 56/1.8. CrCl 32.   10/25/2020: BUN/crea 49/1.6. CrCl 32.    2020: BUN/crea 58/2.0. CrCl 24.   2020: BUN/crea 60/2.5. CrCl 19.   2020: BUN/crea 54/2.2. CrCl 22.   2020: BUN/crea 40/1.9. CrCl 26.     6. Chronic Obstructive Pulmonary Disease/Respiratory Failure/Pleural Effusion              Chronic respiratory failure with CO2 retention.              10/13/2020: 7.23/94/73/39/90% on FiO2 of 40%.   Was overall slowly improving.    2020: AB.21/125/78/90%/FIO2 45%.   2020: Bronchoscopy.   2020: Right sided chest tube. Thoracentesis 650 ml. Hazy fluid with WBC of 349.    2020: Extubated.     7. Weakness   10/27/2020: Walked 150 ft with PT.   Weak.      VTE Risk Mitigation (From admission, onward)         Ordered     apixaban tablet 5 mg  2 times daily      20 1039     Place sequential compression device  Until discontinued      10/13/20  1405     IP VTE HIGH RISK PATIENT  Once      10/13/20 1409                Diana Meredith MD  Cardiology  [facility]

## 2020-11-09 NOTE — PT/OT/SLP PROGRESS
Occupational Therapy   Treatment    Name: Patsy Morris  MRN: [id number]  Admitting Diagnosis:  Acute on chronic respiratory failure with hypoxia and hypercapnia       Recommendations:     Discharge Recommendations: nursing facility, skilled  Discharge Equipment Recommendations:  (TBD at next level of care)  Barriers to discharge:  Decreased caregiver support    Assessment:     Patsy Morris is a 70 y.o. female with a medical diagnosis of Acute on chronic respiratory failure with hypoxia and hypercapnia.  She presents with mild fatigue. Performance deficits affecting function are weakness, impaired endurance, impaired self care skills, impaired functional mobilty, gait instability, impaired balance, decreased upper extremity function, decreased lower extremity function, impaired cardiopulmonary response to activity.  Pt agreeable and motivated to work with therapy upon arrival to room.  Pt tolerated sitting EOB for ~20 minutes with significantly impaired balance and left lateral lean observed requiring Mod A-Max A for majority of time.  Pt able to don/doff robe on backside with Mod A.  Pt performed multiple sit <> stands from EOB with RW requiring Mod A for 3 and Min A for 1.      Overall, pt tolerated therapy well and displayed increased activity tolerance as compared to previous session.  During portions of session pt's SpO2 briefly dropped to 83%, but within seconds pt recovered and returned to 90%.  She is motivated to return to PLOF, and would benefit from skilled OT services to address problems listed above and increase independence with ADLs. SNF is recommended upon d/c from acute care to further address deficits and help pt improve overall functional independence.       Rehab Prognosis:  Good; patient would benefit from acute skilled OT services to address these deficits and reach maximum level of function.       Plan:     Patient to be seen 5 x/week to address the above listed problems via  self-care/home management, therapeutic activities, therapeutic exercises  · Plan of Care Expires: 11/14/20  · Plan of Care Reviewed with: patient    Subjective     Pain/Comfort:  · Pain Rating 1: 0/10  · Pain Rating Post-Intervention 1: 0/10    Objective:     Communicated with: RN (Dania) prior to session.  Patient found HOB elevated with blood pressure cuff, peripheral IV, telemetry, PICC line, ovalles catheter upon OT entry to room.  Cousin present during session.    General Precautions: Standard, fall, respiratory   Orthopedic Precautions:N/A   Braces: N/A     Occupational Performance:     Bed Mobility:    · Supine <> Sit: Mod A-Max A  · Scooting:    · -Min A towards EOB while seated  · -CGA laterally to left with pt able to clear bottom and partially stand x 3 trials  · -Total A supine towards HOB via draw sheet with assist of 3  · Sit <> Supine: Max A    Functional Mobility/Transfers:  · Sit <> Stand:  · Mod A, RW, and assist of 2 x 3 trials from EOB  · Min A, RW, and assist of 2 x 1 trial from EOB  · Difficulty achieving full upright position during transfers observed  · Cues provided for positioning of body  · Functional Mobility: Pt took ~2 side steps to left towards HOB with Mod A and RW.      Activities of Daily Living:  · Grooming: Max A to maintain balance while cleaning nose with kleenex seated at EOB  · Upper Body Dressing: Mod A for donning/doffing robe while seated at EOB; Max A required for balance.      [facility] 6 Click ADL: 13    Treatment & Education:  *Pt sat EOB for ~20 minutes with Mod A-Max A required to maintain upright position; significant left lateral lean observed.  Continuous cues provided to help pt adjust posture and promote stability; fair follow through.  *Pt performed UB dressing and grooming task as noted above  *Pt performed UB exercise to provide stretch and promote increased endurance needed for ADLs: sitting at EOB   -Touching top of head: 1 set x 10 reps  -Bicep curls: 1 set x 15  reps on each side  *Pt performed sit <> stand transfer x 4 trials as noted above; pt stood for ~15 seconds each trial  *Pt performed 3 scoots while seated at EOB clearing bottom from bed and partially standing  *POC and purpose of OT in acute care setting reviewed with pt    Patient left HOB elevated with all lines intact, call button in reach, RN (Dania) notified and cousin presentEducation:    *Clean blue pads and smooth sheet placed underneath pt    GOALS:   Multidisciplinary Problems     Occupational Therapy Goals        Problem: Occupational Therapy Goal    Goal Priority Disciplines Outcome Interventions   Occupational Therapy Goal     OT, PT/OT Ongoing, Progressing    Description: Goals to be met by: 11/13/2020    Patient will increase functional independence with ADLs by performing:    UE Dressing with Min A.  LE Dressing with Maximum Assistance using adaptive equipment as needed.  Grooming while seated with SBA.  Toileting from bedside commode with CGA for hygiene and clothing management.   Toilet transfer to bedside commode with Minimal Assistance.  Pt will tolerate sitting EOB for 15 minutes with SBA for balance.                         Time Tracking:     OT Date of Treatment: 11/09/20  OT Start Time: 1314  OT Stop Time: 1344  OT Total Time (min): 30 min    Billable Minutes:Self Care/Home Management 8  Neuromuscular Re-education 15   *Co-Tx with PT    CARLOS Motta  11/9/2020

## 2020-11-09 NOTE — PHYSICIAN QUERY
PT Name: Patsy Morris  MR #: [id number]     Diagnosis Clarification      CDS/: Doretha Jaramillo               Contact information: [email or web address]      This form is a permanent document in the medical record.     Query Date: November 9, 2020    Dear Provider,  By submitting this query, we are merely seeking further clarification of documentation.  Please utilize your independent clinical judgment when addressing the question(s) below.     The medical record contains the following:    Supporting Clinical Information Location in Medical Record   Severe sepsis-resolved as of 11/2/2020  - new fever and worsening lung infiltrates/hypoxia, concerning for newly developed PNA as reason for most recent decompensation, though does have normal WBC and marginally elevated procalcitonin   - would continue broad spectrum abx with vancomycin/cefepime for now  - send endotracheal sputum aspirate for culture  - f/u blood cultures    12.38 am  Intubated now, respiratory acidosis has improved.   It hard to sort out the exact reason for her deterioration, given significant worsening of b/l opacities on cxr will treat for volume overload, worsening PNA, and given worsening respiratory acidosis, for COPD exacerbation.  Continue lasix bid, add solumedrol 80 mg iv bid, completed ab course for cavitary pneumonia, check procal, was on zosyn/vanco ill 10/17,start meropenem now, primary team to decide the need to continue ab in am.   Was  given pm lasix but not metoprolol, dose was decreased yesterday for hypotension. HR now 140 a.fib, /60 metoprolol iv    She became more hypoxic on 11/1/2020 and ABG with pH of 7.284 and pCO2 of 104.2 on BiPAP.  Transferred to ICU on 11/1/2020 on continuous BiPAP.   She decompensated overnight and was intubated.  She was febrile to 100.8.  Started on IV steroid and IV antibiotics (Vanc and Cefepime).   Pulmonary was consulted    Pulmonary note 11/2                     eICU note - Pulmonary  11/1                              note 11/2          Please clarify if the __Severe Sepsis__ diagnosis has been:    [  ] Ruled In   [  ] Ruled In, Now Resolved   [ X ] Ruled Out   [  ] Other/Clarification of findings (please specify)_______________    [   ] Clinically undetermined       Present on admission (POA) status:   [   ] Yes (Y)                          [  ] Clinically Undetermined (W)  [   ] No (N)                            [   ] Documentation insufficient to determine if condition is POA (U)       Please document in your progress notes daily for the duration of treatment, until resolved, and include in your discharge summary.

## 2020-11-09 NOTE — PROGRESS NOTES
[facility]  Pulmonology  Progress Note    Patient Name: Patsy Morris  MRN: [id number]  Admission Date: 10/13/2020  Hospital Length of Stay: 27 days  Code Status: Full Code  Attending Provider: Loi Quiles MD  Primary Care Provider: Luisana Cartagena MD   Principal Problem: Acute on chronic respiratory failure with hypoxia and hypercapnia    Subjective:     Interval History: No acute events overnight. Chest tube removed yesterday. Patient reports feeling well this morning. Had a few, several hour breaks off bipap yesterday and did well, wore her bipap with naps and while sleeping. Breathing comfortably this morning on her 2LNC which is her home requirement. Denies fevers, chills, cough, congestion, or chest pain.     Objective:     Vital Signs (Most Recent):  Temp: 98.3 °F (36.8 °C) (11/09/20 0305)  Pulse: 102 (11/09/20 0740)  Resp: (!) 27 (11/09/20 0740)  BP: (!) 107/57 (11/09/20 0500)  SpO2: 98 % (11/09/20 0740) Vital Signs (24h Range):  Temp:  [97.9 °F (36.6 °C)-98.3 °F (36.8 °C)] 98.3 °F (36.8 °C)  Pulse:  [] 102  Resp:  [20-34] 27  SpO2:  [92 %-100 %] 98 %  BP: ()/(52-79) 107/57  Arterial Line BP: ()/(54-66) 94/56     Weight: 97.6 kg (215 lb 2.7 oz)  Body mass index is 38.12 kg/m².      Intake/Output Summary (Last 24 hours) at 11/9/2020 1028  Last data filed at 11/9/2020 0600  Gross per 24 hour   Intake 50 ml   Output 1750 ml   Net -1700 ml       Physical Exam  Vitals signs and nursing note reviewed.   Constitutional:       General: She is not in acute distress.     Comments: Awake interactive, following all commands   HENT:      Head: Normocephalic and atraumatic.   Eyes:      Conjunctiva/sclera: Conjunctivae normal.      Pupils: Pupils are equal, round, and reactive to light.   Neck:      Musculoskeletal: Normal range of motion.      Comments: Thick neck    Cardiovascular:      Rate and Rhythm: Normal rate. Rhythm irregular.      Pulses: Normal pulses.       Heart sounds: Normal heart sounds.   Pulmonary:      Comments: Minimal bilateral basilar crackles.   Abdominal:      General: Bowel sounds are normal. There is no distension.      Palpations: Abdomen is soft.      Tenderness: There is no abdominal tenderness.   Musculoskeletal:         General: Swelling (mild bilateral lower extremities) present.   Skin:     General: Skin is warm and dry.   Neurological:      General: No focal deficit present.      Mental Status: She is alert and oriented to person, place, and time.      Cranial Nerves: No cranial nerve deficit.      Motor: No weakness.      Comments: Awake, alert, oriented x 4   Psychiatric:         Mood and Affect: Mood normal.         Thought Content: Thought content normal.         Vents:  Vent Mode: Spont (11/06/20 0952)  Ventilator Initiated: Yes (11/01/20 2338)  Set Rate: 0 BPM (11/06/20 0952)  Vt Set: 390 mL (11/06/20 0952)  Pressure Support: 12 cmH20 (11/06/20 0952)  PEEP/CPAP: 5 cmH20 (11/06/20 0952)  Oxygen Concentration (%): 30 (11/09/20 0705)  Peak Airway Pressure: 17 cmH2O (11/06/20 0952)  Plateau Pressure: 0 cmH20 (11/06/20 0952)  Total Ve: 6.15 mL (11/06/20 0952)  F/VT Ratio<105 (RSBI): (!) 49.3 (11/06/20 0755)    Lines/Drains/Airways     Drain                 Urethral Catheter 11/02/20 0100 7 days          Arterial Line            Arterial Line 11/01/20 Right Radial 8 days          Peripheral Intravenous Line                 Midline Catheter Insertion/Assessment  - Double Lumen 10/20/20 1220 Right median cubital vein (antecubital fossa)  19 days         Peripheral IV - Single Lumen 11/05/20 0630 20 G Anterior;Left Forearm 4 days                Significant Labs:    CBC/Anemia Profile:  No results for input(s): WBC, HGB, HCT, PLT, MCV, RDW, IRON, FERRITIN, RETIC, FOLATE, [id number], OCCULTBLOOD in the last 48 hours.     Chemistries:  Recent Labs   Lab 11/08/20  0348 11/09/20  0352    140   K 3.5 4.0    102   CO2 29 28   BUN 46* 40*    CREATININE 1.9* 1.9*   CALCIUM 8.2* 8.4*   MG 2.3 2.2   PHOS 4.6* 3.9       All pertinent labs within the past 24 hours have been reviewed.    Significant Imaging:  I have reviewed and interpreted all pertinent imaging results/findings within the past 24 hours.    Assessment/Plan:     * Acute on chronic respiratory failure with hypoxia and hypercapnia  - with baseline severe restrictive/obstuctive lung disease (FVC ~30% on PFTs in 2016).   - requiring intubation on 11/1, extubated 11/6 to BIPAP  - likely multifactorial related to volume overload + HF exacerbation + BHAVANI/OHS + possible new infectious PNA + ? Malignancy?  - no wheezing on exam,  Steroids stopped 11/2  - endotracheal aspirate NGTD, f/u cultures  - f/u BAL, cultures NGTD  - vanco stopped 11/5  - remains on cefepime, last dose tomorrow  - diuretics resumed  - goal for net negative fluid balance now that she is tolerating PO intake.   - FiO2 requirement down to her home 2LNC, toleraing more time off bipap  - continues to improve  - PT/OT today, needs to improve mobility     Pleural effusion  - bilateral, R>>L  - s/p chest tube placement on 11/4 given her baseline severe restrictive lung disease, any additional impingement on that worsens her ventilatory abilities thus hopeful that drainage of fluid will improve her respiratory dynamics   - initial pleural fluid studies borderline exudative, more likely effusion related to heart failure however given cavitary lesion on right,  infectious vs malignant on differential.   - f/u culture and cytology  -  Chest tube removed 11/7  - repeat CXR today pending    Cavitary lesion of lung  Patient's CT concerning for slow process as lesion in same posterior upper lobe lung fields noted on previous CT in 2018 as well as bialteral pulmonary nodules. In setting of significant smoking history, malignancy is high on differential. However slow infectious process such as NTM/pulmonary MAC is possible as well, also  possibility of superimprosed bacterial infection  - HIV negative  - Quantiferon gold indeterminate x 2  - AFB stain negative, smear pending x 1  - f/u endotracheal sputum culture  - s/p bronch with BAL on 11/4, f/u AFB  - f/u culture and cytology, still pending      Atrial fibrillation with rapid ventricular response  -rate controlled with metoprolol   - continue tejal eliquis      Obstructive sleep apnea  - needs home trilogy  - CM working on this  - continue NIPPV at night and with all naps      Acute on chronic diastolic congestive heart failure  - continue diuresis; fluid overload likely contributes to hypercapnia in setting of OHS/BHAVANI, small airway disease with severe restriction.   -CT showing cavitary lesion with surrounding consolidation and GGO concerning for infectious vs malignant process, with significant ggo throughout the lung likely secondary to continued pulmonary edema.   - diuretics resumed now that CLIFFORD improved  - salt restrict  - continues to improve with negative fluid balance      Obesity hypoventilation syndrome  - Patient with BMI >35 with PaCO2 consistently >52 with restriction on PFTs.   - can use nasal canula during the day and bipap PRN as well as nightly and with any naps  - will need to continue bipap nightly and with all naps.        Plan for today: remove arterial line and ovalles catheter, consult PT/OT, encourage out of bed in chair if tolerates, tolerating more time off bipap today    PPx:   - yaritza Bryant MD  Pulmonology  [facility]

## 2020-11-09 NOTE — ASSESSMENT & PLAN NOTE
Patient's CT concerning for slow process as lesion in same posterior upper lobe lung fields noted on previous CT in 2018 as well as bialteral pulmonary nodules. In setting of significant smoking history, malignancy is high on differential. However slow infectious process such as NTM/pulmonary MAC is possible as well, also possibility of superimprosed bacterial infection  - HIV negative  - Quantiferon gold indeterminate x 2  - AFB stain negative, smear pending x 1  - f/u endotracheal sputum culture  - s/p bronch with BAL on 11/4, f/u AFB  - f/u culture and cytology, still pending

## 2020-11-09 NOTE — ASSESSMENT & PLAN NOTE
- Pt/OT recommending SNF  - Patient does not desire to go to SNF.  Discussed concern about going home.  Discussed weakness due to intubation.  Patient reluctant to go to SNF, wants to go home.  Reports family will help her at home.

## 2020-11-09 NOTE — ASSESSMENT & PLAN NOTE
- continue diuresis; fluid overload likely contributes to hypercapnia in setting of OHS/BHAVANI, small airway disease with severe restriction.   -CT showing cavitary lesion with surrounding consolidation and GGO concerning for infectious vs malignant process, with significant ggo throughout the lung likely secondary to continued pulmonary edema.   - diuretics resumed now that CLIFFORD improved  - salt restrict  - continues to improve with negative fluid balance

## 2020-11-09 NOTE — SUBJECTIVE & OBJECTIVE
Interval History: No acute events overnight. Chest tube removed yesterday. Patient reports feeling well this morning. Had a few, several hour breaks off bipap yesterday and did well, wore her bipap with naps and while sleeping. Breathing comfortably this morning on her 2LNC which is her home requirement. Denies fevers, chills, cough, congestion, or chest pain.     Objective:     Vital Signs (Most Recent):  Temp: 98.3 °F (36.8 °C) (11/09/20 0305)  Pulse: 102 (11/09/20 0740)  Resp: (!) 27 (11/09/20 0740)  BP: (!) 107/57 (11/09/20 0500)  SpO2: 98 % (11/09/20 0740) Vital Signs (24h Range):  Temp:  [97.9 °F (36.6 °C)-98.3 °F (36.8 °C)] 98.3 °F (36.8 °C)  Pulse:  [] 102  Resp:  [20-34] 27  SpO2:  [92 %-100 %] 98 %  BP: ()/(52-79) 107/57  Arterial Line BP: ()/(54-66) 94/56     Weight: 97.6 kg (215 lb 2.7 oz)  Body mass index is 38.12 kg/m².      Intake/Output Summary (Last 24 hours) at 11/9/2020 1028  Last data filed at 11/9/2020 0600  Gross per 24 hour   Intake 50 ml   Output 1750 ml   Net -1700 ml       Physical Exam  Vitals signs and nursing note reviewed.   Constitutional:       General: She is not in acute distress.     Comments: Awake interactive, following all commands   HENT:      Head: Normocephalic and atraumatic.   Eyes:      Conjunctiva/sclera: Conjunctivae normal.      Pupils: Pupils are equal, round, and reactive to light.   Neck:      Musculoskeletal: Normal range of motion.      Comments: Thick neck    Cardiovascular:      Rate and Rhythm: Normal rate. Rhythm irregular.      Pulses: Normal pulses.      Heart sounds: Normal heart sounds.   Pulmonary:      Comments: Minimal bilateral basilar crackles.   Abdominal:      General: Bowel sounds are normal. There is no distension.      Palpations: Abdomen is soft.      Tenderness: There is no abdominal tenderness.   Musculoskeletal:         General: Swelling (mild bilateral lower extremities) present.   Skin:     General: Skin is warm and dry.    Neurological:      General: No focal deficit present.      Mental Status: She is alert and oriented to person, place, and time.      Cranial Nerves: No cranial nerve deficit.      Motor: No weakness.      Comments: Awake, alert, oriented x 4   Psychiatric:         Mood and Affect: Mood normal.         Thought Content: Thought content normal.         Vents:  Vent Mode: Spont (11/06/20 0952)  Ventilator Initiated: Yes (11/01/20 2338)  Set Rate: 0 BPM (11/06/20 0952)  Vt Set: 390 mL (11/06/20 0952)  Pressure Support: 12 cmH20 (11/06/20 0952)  PEEP/CPAP: 5 cmH20 (11/06/20 0952)  Oxygen Concentration (%): 30 (11/09/20 0705)  Peak Airway Pressure: 17 cmH2O (11/06/20 0952)  Plateau Pressure: 0 cmH20 (11/06/20 0952)  Total Ve: 6.15 mL (11/06/20 0952)  F/VT Ratio<105 (RSBI): (!) 49.3 (11/06/20 0755)    Lines/Drains/Airways     Drain                 Urethral Catheter 11/02/20 0100 7 days          Arterial Line            Arterial Line 11/01/20 Right Radial 8 days          Peripheral Intravenous Line                 Midline Catheter Insertion/Assessment  - Double Lumen 10/20/20 1220 Right median cubital vein (antecubital fossa)  19 days         Peripheral IV - Single Lumen 11/05/20 0630 20 G Anterior;Left Forearm 4 days                Significant Labs:    CBC/Anemia Profile:  No results for input(s): WBC, HGB, HCT, PLT, MCV, RDW, IRON, FERRITIN, RETIC, FOLATE, [id number], OCCULTBLOOD in the last 48 hours.     Chemistries:  Recent Labs   Lab 11/08/20  0348 11/09/20  0352    140   K 3.5 4.0    102   CO2 29 28   BUN 46* 40*   CREATININE 1.9* 1.9*   CALCIUM 8.2* 8.4*   MG 2.3 2.2   PHOS 4.6* 3.9       All pertinent labs within the past 24 hours have been reviewed.    Significant Imaging:  I have reviewed and interpreted all pertinent imaging results/findings within the past 24 hours.

## 2020-11-09 NOTE — PLAN OF CARE
Patient on oxygen with documented flow. Will attempt to wean per O2 order protocol.  Pt doing IS @ 500  L. Will cont to encourage deep breathing and coughing. No apparent respiratory distress noted and will cont to monitor    Patient on documented BiPAP settings, with alarms set and functioning.

## 2020-11-09 NOTE — PLAN OF CARE
During routine rounds, assessed pt for spiritual distress, and reminded pt  of spiritual care available.  While providing reflective listening and compassionate presence, pt concerns were explored.  Mild distress was reported and presented regarding her inability to live independently post-discharge.  This was indicated by pt affect and feedback.  Spiritual care also included pt providing spiritual counseling.   Gratitude for spiritual wellness check was reported by pt.   Pt reported and presented with relational, giacomo, and emotional support.  Follow-up was offered upon request, and is indicated;   will provide further spiritual care after today, as soon as scheduling permits.

## 2020-11-09 NOTE — PLAN OF CARE
Recommendations    1. Recommend Renal diet.     2. Recommend John BID.     3. Weekly weights.    Goals: 1. >75% of EEN, EPN by RD follow up.  Nutrition Goal Status: new  Communication of RD Recs: (POC)

## 2020-11-09 NOTE — PLAN OF CARE
Pt remains stable free from falls during shift. Pt tolerates 3L O2 via NC while awake and bipap prn for sleeping. Shin in place and draining clear/yellow urine. Vss. Will continue to monitor.

## 2020-11-09 NOTE — PT/OT/SLP PROGRESS
Physical Therapy Treatment    Patient Name:  Patsy Morris   MRN:  [id number]    Recommendations:     Discharge Recommendations:  nursing facility, skilled   Discharge Equipment Recommendations: (Defer to next level of care)   Barriers to discharge: Current functional status    Assessment:     Patsy Morris is a 70 y.o. female admitted with a medical diagnosis of Acute on chronic respiratory failure with hypoxia and hypercapnia.  She presents with the following impairments/functional limitations:  weakness, impaired endurance, impaired self care skills, impaired functional mobilty, gait instability, impaired balance, decreased lower extremity function, impaired cardiopulmonary response to activity.    Pt tolerated treatment fair. Pt is progressing toward meeting goals. Pt performed seated ther ex, supine > sit transfer w/ Mod A, sit > supine w/ Max A, sit <> stand transfer 4x w/ RW and Min A- Mod A, and scooting edge of bed 3x w/ CGA. Pt expressed feelings of dizziness upon transferring from supine > sit. Feelings of dizziness resolved after prolonged time in sitting. Pt sat EOB ~20 mins. Pt had significant left lateral lean and required Mod A-Max A for balance. Pt expressed desire to ambulate, but was unable to ambulate today due to fatigue. Demonstrated improved tolerance to activity and bed mobility. Pt continues to be limited by shortness of breath and weakness. PT will continue to follow and progress goals as tolerated. Recommend discharge to SNF.     Rehab Prognosis: Fair; patient would benefit from acute skilled PT services to address these deficits and reach maximum level of function.    Recent Surgery: * No surgery found *      Plan:     During this hospitalization, patient to be seen 5 x/week to address the identified rehab impairments via gait training, therapeutic activities, therapeutic exercises and progress toward the following goals:    · Plan of Care Expires:  12/06/20    Subjective  "    Chief Complaint: Pt reports she is "full of pain", but no pain rating given.  Patient/Family Comments/goals: Pt eager to ambulate and expressed some frustration when she was unable to do so. PT/OT reminder patient of recent successes such as sitting EOB and standing.   Pain/Comfort:  · Pain Rating 1: (Pt reports she is "full of pain", but no rating given.)  · Pain Rating Post-Intervention 1: (No change in pain reported)      Objective:     Communicated with RN (Dania) prior to session.  Patient found supine with blood pressure cuff, ovalles catheter, oxygen, telemetry, PICC line, peripheral IV, pulse ox (continuous)(2L NC) upon PT entry to room.     General Precautions: Standard, fall, respiratory   Orthopedic Precautions:N/A   Braces: N/A     Functional Mobility:  · Bed Mobility:     · Supine to Sit: moderate assistance  · Pt required assistance w/ management of trunk and LEs.   · Pt sat EOB ~20 minutes during session.   · Pt with pronounced left lateral lean requiring Mod A-Max A to maintain sitting in midline.  · Sit to Supine: maximal assistance  · Pt required increased assistance w/ bilateral LEs when returning to supine.  · Transfers:     · Sit to Stand:  minimum assistance and moderate assistance with rolling walker   · Pt performed 4 trials of sit <> stand.  · Pt required Mod A for 3 trials and Min A for 1 trial.  · Pt with forward trunk flexion and narrow MUNIR in standing.  · Gait: Pt attempted to take side steps, but unable to ambulate today due to fatigue.      AM-PAC 6 CLICK MOBILITY  Turning over in bed (including adjusting bedclothes, sheets and blankets)?: 3  Sitting down on and standing up from a chair with arms (e.g., wheelchair, bedside commode, etc.): 2  Moving from lying on back to sitting on the side of the bed?: 2  Moving to and from a bed to a chair (including a wheelchair)?: 2  Need to walk in hospital room?: 2  Climbing 3-5 steps with a railing?: 1  Basic Mobility Total Score: " 12       Therapeutic Activities and Exercises:   Pt performed bilateral LAQ x15.     Patient left supine with all lines intact, call button in reach, RN notified and family member (cousin) present..    GOALS:   Multidisciplinary Problems     Physical Therapy Goals        Problem: Physical Therapy Goal    Goal Priority Disciplines Outcome Goal Variances Interventions   Physical Therapy Goal     PT, PT/OT Ongoing, Progressing     Description: Goals to be met by: 11/15/2020    Patient will perform the following to increase strength, improve mobility, and return to prior level of function:    1. Supine <> sit with CGA.  2. Sit <> stand transfer w/ CGA and least restrictive assistive device.   3. Bed <> chair transfer with CGA and least restrictive assistive device.  4. Gait > 150 ft with CGA and least restrictive assistive device.                       Time Tracking:     PT Received On: 11/09/20  PT Start Time: 1314     PT Stop Time: 1346  PT Total Time (min): 32 min     Overlap with OT for portions of session due to complex nature of patient and for safety with mobility to decrease fall risk for patient and caregiver injury requiring two skilled therapists to provide interventions.     Billable Minutes: Therapeutic Activity 24 and Therapeutic Exercise 8    Treatment Type: Treatment  PT/PTA: PT     PTA Visit Number: 0     SILAS Mccann  11/09/2020

## 2020-11-10 PROBLEM — J98.4 MIXED RESTRICTIVE AND OBSTRUCTIVE LUNG DISEASE: Chronic | Status: RESOLVED | Noted: 2020-01-01 | Resolved: 2020-01-01

## 2020-11-10 PROBLEM — J43.9 MIXED RESTRICTIVE AND OBSTRUCTIVE LUNG DISEASE: Chronic | Status: ACTIVE | Noted: 2020-01-01

## 2020-11-10 PROBLEM — N18.9 ACUTE KIDNEY INJURY SUPERIMPOSED ON CHRONIC KIDNEY DISEASE: Status: RESOLVED | Noted: 2020-01-01 | Resolved: 2020-01-01

## 2020-11-10 PROBLEM — J90 PLEURAL EFFUSION: Status: RESOLVED | Noted: 2020-01-01 | Resolved: 2020-01-01

## 2020-11-10 PROBLEM — J43.9 MIXED RESTRICTIVE AND OBSTRUCTIVE LUNG DISEASE: Chronic | Status: RESOLVED | Noted: 2020-01-01 | Resolved: 2020-01-01

## 2020-11-10 PROBLEM — N17.9 ACUTE KIDNEY INJURY SUPERIMPOSED ON CHRONIC KIDNEY DISEASE: Status: RESOLVED | Noted: 2020-01-01 | Resolved: 2020-01-01

## 2020-11-10 PROBLEM — J98.4 CAVITARY LESION OF LUNG: Status: RESOLVED | Noted: 2020-01-01 | Resolved: 2020-01-01

## 2020-11-10 PROBLEM — J98.4 MIXED RESTRICTIVE AND OBSTRUCTIVE LUNG DISEASE: Chronic | Status: ACTIVE | Noted: 2020-01-01

## 2020-11-10 NOTE — PLAN OF CARE
Problem: Physical Therapy Goal  Goal: Physical Therapy Goal  Description: Goals to be met by: 11/15/2020    Patient will perform the following to increase strength, improve mobility, and return to prior level of function:    1. Supine <> sit with CGA.  2. Sit <> stand transfer w/ CGA and least restrictive assistive device.   3. Bed <> chair transfer with CGA and least restrictive assistive device.  4. Gait > 150 ft with CGA and least restrictive assistive device.      Outcome: Ongoing, Progressing   Pt presented supine in bed, motivated to participate in PT.  Bed Mobility training supine>sit with CGA with instruction and TCs for sequencing and hand placement. Scooting to EOB with CGA. Sitting balance training sitting EOB  X 10 requiring occasional min A to correct for posterior lean, Frequent tactile cues for maintaining midline.  Sit>stand transfer training pushing up from B hands on RW x 7 trials, pt tolerated static stand for ~30s to min A.  Sides stepping ~ 2 ft to HOB with CGA, instruction for walker management. Gait training with RW x  3 trials x 4ft forward and backward with min A for walker management.  Pt return to sit EOB with CGA, to supine with max A at LEs

## 2020-11-10 NOTE — PROGRESS NOTES
"[facility] Hospital Medicine  Progress Note    Patient Name: Patsy Morris  MRN: [id number]  Patient Class: IP- Inpatient   Admission Date: 10/13/2020  Length of Stay: 28 days  Attending Physician: Ravi Soria MD  Primary Care Provider: Luisana Cartagena MD        Subjective:     Principal Problem:Acute on chronic respiratory failure with hypoxia and hypercapnia        HPI:  Per Ravi Soria:    "Per ED:  "This is a 70 y.o. female with history of CHF and COPD who presents via EMS with complaint of shortness of breath that began a few days ago. Per EMS patient had O2 saturation of 72 on 3 liters if home oxygen upon their arrival. Patient states she is on 3 liters of home oxygen at baseline. She denies fever, cough, chest pain, nausea, vomiting, diarrhea, or rhinorrhea. She reports she was recently discharged from the hospital for similar symptoms. She reports compliance with her home medications. She recently became resident of [facility] after last hospitalization. She is a former smoker. She denies undergoing any surgeries".    The patient is a 70 year old female with a PMH significant for of HTN, Chronic Respiratory Failure (COPD and BHAVANI/OHS), HFpEF, Obesity who presented to the ED with complaints of SOB that began about 3 days ago.  Patient is on 3L O2NC at home.  She was seen by her Cardiologist about 4 days prior to admission and told to decrease her Lasix from 40mg bid to qday.  She denies chest pain.  No Fevers.  No cough.  States she has been adherent with her medications.  Patient was recently admitted to [facility] from 9/1-9/11 for Acute on Chronic Respiratory Failure and discharged to SNF.  Patient was placed on BiPAP in ED and admitted to ICU.  Patient feeling better on BiPAP."    Overview/Hospital Course:  Patient is 70-year-old woman with history of hypertension, chronic hypoxic respiratory failure on home oxygen secondary to chronic obstructive pulmonary " disease, chronic diastolic heart failure, chronic atrial fibrillation, and morbid obesity re-admitted to the hospital on 10/13/2020 with decompensated heart failure after patient was home for about a week following recent hospitalization and stayed at skilled nursing facility for physical therapy.  Patient treated with non-invasive ventilation intravenous diuretics.  Patient was transferred out of the intensive care unit on 10/21/2020.      Patient continue to improve on the floor until she developed worsening hypoxic respiratory failure.  Patient was transferred to the intensive care unit on 11/1/2020 on placed on continuous BiPAP.  She decompensated further and was intubated on 11/1/2020.  Patient started on intravenous antibiotics on 11/2/2020 to treat hospital-acquired pneumonia.  Patient had chest tube placed to drain pleural effusion on the right side.  Patient underwent bronchoscopy 11/4/2020.  Patient successfully extubated on 11/6/2020.  Patient has been on BiPAP for most of the time since extubation.    Interval History:  No acute events overnight.  Spends most time on BiPAP.  Patient on NC this morning.      Review of Systems   Constitutional: Negative for chills and fever.   Respiratory: Positive for shortness of breath. Negative for wheezing.    Cardiovascular: Negative for chest pain.   Gastrointestinal: Negative for abdominal distention, abdominal pain, constipation, diarrhea, nausea and vomiting.   Genitourinary: Negative for dysuria and frequency.   Musculoskeletal: Negative for arthralgias and myalgias.   Neurological: Negative for light-headedness.   Psychiatric/Behavioral: Negative for agitation and confusion.     Objective:     Vital Signs (Most Recent):  Temp: 98.6 °F (37 °C) (11/10/20 0705)  Pulse: 94 (11/10/20 1200)  Resp: (!) 30 (11/10/20 1200)  BP: (!) 107/56 (11/10/20 1100)  SpO2: (!) 88 % (11/10/20 1100) Vital Signs (24h Range):  Temp:  [97.8 °F (36.6 °C)-98.7 °F (37.1 °C)] 98.6 °F (37  °C)  Pulse:  [] 94  Resp:  [16-30] 30  SpO2:  [84 %-99 %] 88 %  BP: ()/(53-67) 107/56     Weight: 97.6 kg (215 lb 2.7 oz)  Body mass index is 38.12 kg/m².    Intake/Output Summary (Last 24 hours) at 11/10/2020 1412  Last data filed at 11/10/2020 0900  Gross per 24 hour   Intake 1370 ml   Output 2300 ml   Net -930 ml      Physical Exam  Constitutional:       General: She is not in acute distress.  HENT:      Head: Normocephalic and atraumatic.   Eyes:      Conjunctiva/sclera: Conjunctivae normal.      Pupils: Pupils are equal, round, and reactive to light.   Neck:      Musculoskeletal: Normal range of motion.   Cardiovascular:      Rate and Rhythm: Normal rate. Rhythm irregular.      Pulses: Normal pulses.      Heart sounds: Normal heart sounds.   Pulmonary:      Comments: Breathing reasonably comfortably while on NC this morning.  Abdominal:      General: Bowel sounds are normal. There is no distension.      Palpations: Abdomen is soft.      Tenderness: There is no abdominal tenderness.   Musculoskeletal:         General: Swelling present.   Skin:     General: Skin is warm and dry.   Neurological:      General: No focal deficit present.      Mental Status: She is oriented to person, place, and time. Mental status is at baseline.         Significant Labs: All pertinent labs within the past 24 hours have been reviewed.    Significant Imaging: I have reviewed all pertinent imaging results/findings within the past 24 hours.      Assessment/Plan:      * Acute on chronic respiratory failure with hypoxia and hypercapnia  Chronic Respiratory Failure on 3 liters of Oxygen at home - COPD, BHAVANI/OHS with severe restrictive lung disease.  She presented with SOB 3 days PTA.  Placed on BiPAP and given Lasix 80mg IV x 1 in ED.  Transferred to ICU on admission.  Started initially on Lasix 40mg IV q12 and increased to 80mg q12.  Started on IV Vanc, Flagyl, and Cefepime on 10/15/2020 by Pulmonary-CC given CT Chest  concerning for infection - antibiotics stopped 10/18/2020.  Respiratory culture with normal respiratory leonardo on 10/16/2020.  Given cavitary appearance on imaging, Quantiferon gold sent and indeterminate x 2  with Sputum AFB stain negative and  Culture pending x 1 on 10/16/2020.  Transferred out of ICU on 10/21/2020.  Awaiting Trilogy for home.   More short of breath on 10/30/2020 - CXR overall stable from 2 days ago, but BNP in 400s from 200s - given an additional dose of Lasix 40mg IV with improvement in symptoms.    She became more hypoxic on 11/1/2020 and ABG with pH of 7.284 and pCO2 of 104.2 on BiPAP.  Transferred to ICU on 11/1/2020 on continuous BiPAP.   She decompensated overnight and was intubated. She was febrile to 100.8. Started on IV steroid and IV antibiotics (Vanc and Cefepime).   Pulmonary was consulted.  Minimally elevated procalcitonin level.  Status post bronchoscopy and placement of the right chest tube.  Chest tube output decreasing.  No acid-fast bacillus seen from sample from bronchial wash.  Gram stain unremarkable.  Additional cultures pending.  Extubated 11/7/2020 to BiPAP.  Chest tube removed.  Patient needing continuous BiPAP with short breaks.  Attempt to increase time off BiPAP as tolerated.  Patient now agrees to Home Hospice, so she may now be able to get a Trilogy.     CXR on 11/2/2020 - Interval progression of abnormal intrathoracic findings, increasing bilateral pulmonary opacities, as discussed above.  ET tube noted, the tip above the yamilka.  Enteric tube noted, the distal tip extends subdiaphragmatic below the field of view however the side hole is likely along the distal esophagus, advancement recommended.     Plan:  Follow up on Pulmonary and Cardiology recommendations  Continue Lasix 40mg po q12  DuoNebs   Follow up on cultures  Strict I&Os      Atrial fibrillation with rapid ventricular response  Continue with metoprolol.  Restarted on apixaban.    Chronic kidney disease  (CKD) stage G4/A1, severely decreased glomerular filtration rate (GFR) between 15-29 mL/min/1.73 square meter and albuminuria creatinine ratio less than 30 mg/g  Stable.    Acute on chronic diastolic congestive heart failure  Respiratory status initially improved with aggressive diuresis.  Serum creatinine trending upward and further diuretic therapy on hold.  Serum creatinine stable/improving now.  Continue to closely monitor volume status.    Debility  Consult physical and occupational therapy for range of motion exercises.    Encounter for palliative care  Followed by Palliative Care      Dyslipidemia  Continue with statin therapy.    Essential hypertension  Reasonably controlled with current regimen.  Will continue with current regimen and continue to monitor.      VTE Risk Mitigation (From admission, onward)         Ordered     apixaban tablet 5 mg  2 times daily      11/06/20 1039     Place sequential compression device  Until discontinued      10/13/20 1405     IP VTE HIGH RISK PATIENT  Once      10/13/20 1405                Discharge Planning   YUDELKA: 10/26/2020     Code Status: Full Code   Is the patient medically ready for discharge?:     Reason for patient still in hospital (select all that apply): Patient trending condition, Treatment, Consult recommendations and Pending disposition  Discharge Plan A: Skilled Nursing Facility                  Ravi Soria MD  Department of Hospital Medicine   [facility]

## 2020-11-10 NOTE — ASSESSMENT & PLAN NOTE
Chronic Respiratory Failure on 3 liters of Oxygen at home - COPD, BHAVANI/OHS with severe restrictive lung disease.  She presented with SOB 3 days PTA.  Placed on BiPAP and given Lasix 80mg IV x 1 in ED.  Transferred to ICU on admission.  Started initially on Lasix 40mg IV q12 and increased to 80mg q12.  Started on IV Vanc, Flagyl, and Cefepime on 10/15/2020 by Pulmonary-CC given CT Chest concerning for infection - antibiotics stopped 10/18/2020.  Respiratory culture with normal respiratory leonardo on 10/16/2020.  Given cavitary appearance on imaging, Quantiferon gold sent and indeterminate x 2  with Sputum AFB stain negative and  Culture pending x 1 on 10/16/2020.  Transferred out of ICU on 10/21/2020.  Awaiting Trilogy for home.   More short of breath on 10/30/2020 - CXR overall stable from 2 days ago, but BNP in 400s from 200s - given an additional dose of Lasix 40mg IV with improvement in symptoms.    She became more hypoxic on 11/1/2020 and ABG with pH of 7.284 and pCO2 of 104.2 on BiPAP.  Transferred to ICU on 11/1/2020 on continuous BiPAP.   She decompensated overnight and was intubated. She was febrile to 100.8. Started on IV steroid and IV antibiotics (Vanc and Cefepime).   Pulmonary was consulted.  Minimally elevated procalcitonin level.  Status post bronchoscopy and placement of the right chest tube.  Chest tube output decreasing.  No acid-fast bacillus seen from sample from bronchial wash.  Gram stain unremarkable.  Additional cultures pending.  Extubated 11/7/2020 to BiPAP.  Chest tube removed.  Patient needing continuous BiPAP with short breaks.  Attempt to increase time off BiPAP as tolerated.  Patient now agrees to Home Hospice, so she may now be able to get a Trilogy.     CXR on 11/2/2020 - Interval progression of abnormal intrathoracic findings, increasing bilateral pulmonary opacities, as discussed above.  ET tube noted, the tip above the yamilka.  Enteric tube noted, the distal tip extends  subdiaphragmatic below the field of view however the side hole is likely along the distal esophagus, advancement recommended.     Plan:  Follow up on Pulmonary and Cardiology recommendations  Continue Lasix 40mg po q12  DuoNebs   Follow up on cultures  Strict I&Os

## 2020-11-10 NOTE — TELEPHONE ENCOUNTER
Left message on patient voicemail, informing her that I'm contacting her in regards to scheduling her a appointment with one of our providers. I advised patient that if she wants to schedule a appointment or if she has any questions or concerns, she may contact the office. Office number has been provided.

## 2020-11-10 NOTE — SUBJECTIVE & OBJECTIVE
Interval History:  No acute events overnight.  Spends most time on BiPAP.  Chest tube removed.  Review of Systems   Constitutional: Negative for chills and fever.   Respiratory: Positive for shortness of breath. Negative for wheezing.    Cardiovascular: Negative for chest pain.   Gastrointestinal: Negative for abdominal distention, abdominal pain, constipation, diarrhea, nausea and vomiting.   Genitourinary: Negative for dysuria and frequency.   Musculoskeletal: Negative for arthralgias and myalgias.   Neurological: Negative for light-headedness.   Psychiatric/Behavioral: Negative for agitation and confusion.     Objective:     Vital Signs (Most Recent):  Temp: 98.5 °F (36.9 °C) (11/09/20 1902)  Pulse: 96 (11/09/20 2102)  Resp: 20 (11/09/20 2102)  BP: 120/66 (11/09/20 2102)  SpO2: 96 % (11/09/20 2102) Vital Signs (24h Range):  Temp:  [98.2 °F (36.8 °C)-98.7 °F (37.1 °C)] 98.5 °F (36.9 °C)  Pulse:  [] 96  Resp:  [19-39] 20  SpO2:  [81 %-99 %] 96 %  BP: ()/(52-78) 120/66  Arterial Line BP: ()/(54-76) 96/62     Weight: 97.6 kg (215 lb 2.7 oz)  Body mass index is 38.12 kg/m².    Intake/Output Summary (Last 24 hours) at 11/9/2020 2140  Last data filed at 11/9/2020 2100  Gross per 24 hour   Intake 1130 ml   Output 2150 ml   Net -1020 ml      Physical Exam  Constitutional:       General: She is not in acute distress.  HENT:      Head: Normocephalic and atraumatic.   Eyes:      Conjunctiva/sclera: Conjunctivae normal.      Pupils: Pupils are equal, round, and reactive to light.   Neck:      Musculoskeletal: Normal range of motion.   Cardiovascular:      Rate and Rhythm: Tachycardia present. Rhythm irregular.      Pulses: Normal pulses.      Heart sounds: Normal heart sounds.   Pulmonary:      Comments: Breathing reasonably comfortably while on BiPAP this morning.  Abdominal:      General: Bowel sounds are normal. There is no distension.      Palpations: Abdomen is soft.      Tenderness: There is no  abdominal tenderness.   Musculoskeletal:         General: Swelling present.   Skin:     General: Skin is warm and dry.   Neurological:      General: No focal deficit present.      Mental Status: She is oriented to person, place, and time. Mental status is at baseline.         Significant Labs: All pertinent labs within the past 24 hours have been reviewed.    Significant Imaging: I have reviewed all pertinent imaging results/findings within the past 24 hours.

## 2020-11-10 NOTE — PLAN OF CARE
Patient received on Bipap, settings as documented. Sats 96%. Pt. in no distress, will continue to monitor.

## 2020-11-10 NOTE — PLAN OF CARE
[facility]  Department of Hospital Medicine  [address] [place] [address]  [phone] after hours  [phone] fax    HOSPICE  ORDERS    11/12/2020    Admit to Hospice:  Home Service     Diagnoses:   Active Hospital Problems    Diagnosis  POA    *Acute on chronic respiratory failure with hypoxia and hypercapnia [J96.21, J96.22]  Yes     Priority: 1 - High    Atrial fibrillation with rapid ventricular response [I48.91]  Yes     Priority: 2     Chronic kidney disease (CKD) stage G4/A1, severely decreased glomerular filtration rate (GFR) between 15-29 mL/min/1.73 square meter and albuminuria creatinine ratio less than 30 mg/g [N18.4]  Yes     Priority: 3     Acute on chronic diastolic congestive heart failure [I50.33]  Yes     Priority: 4     Debility [R53.81]  Yes    Encounter for palliative care [Z51.5]  Not Applicable    Dyslipidemia [E78.5]  Yes    Essential hypertension [I10]  Yes      Resolved Hospital Problems    Diagnosis Date Resolved POA    Paroxysmal atrial fibrillation [I48.0] 10/13/2020 Yes     Priority: 2     Chronic hypercapnic respiratory failure [J96.12] 10/13/2020 Yes     Priority: 2     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, 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] 11/10/2020 Yes     Chronic    Acute kidney injury superimposed on chronic kidney disease [N17.9, N18.9] 11/10/2020 No    On mechanically assisted ventilation [Z99.11] 11/08/2020 Not Applicable    Pleural effusion [J90] 11/10/2020 Yes    Severe sepsis [A41.9, R65.20] 11/02/2020 No    Shortness of breath [R06.02] 11/02/2020 Yes    Normocytic anemia [D64.9] 11/03/2020 Yes    Acute on chronic congestive heart failure [I50.9] 10/27/2020 Yes    Cavitary lesion of lung [J98.4] 11/10/2020 Yes    Acute on chronic congestive heart failure [I50.9] 10/13/2020 Yes    Current moderate episode of major depressive disorder without prior episode [F32.1] 11/03/2020 Yes    Pulmonary nodules/lesions,  multiple [R91.8] 10/14/2020 Yes    Obstructive sleep apnea [G47.33] 11/10/2020 Yes    Obesity hypoventilation syndrome [E66.2] 11/10/2020 Yes    Renal carcinoma [C64.9] 10/13/2020 Yes       Hospice Qualifying Diagnoses:        Patient has a life expectancy < 6 months due to:  1) Primary Hospice Diagnosis: Chronic Respiratory failure  2) Comorbid Conditions Contributing to Decline:  BHAVANI, CHF, Afib, HTN, Obesity    Vital Signs: Routine per Hospice Protocol.    Code Status:  Full Code    Allergies: Review of patient's allergies indicates:  No Known Allergies    Diet: Cardiac    Activities: As tolerated    Nursing: Per Hospice Routine.    Routine Skin for Bedridden Patients: Apply moisture barrier cream to all skin folds and wet areas in perineal area daily and after baths and all bowel movements.    Oxygen: 3L O2NC when off Ventilator.    Trilogy Home Ventilator Settings - Per Pulmonary - AVAPS MODE, , RR 18, PEEP 8, FIO2 40%, Max pressure 36, min pressure 18.    Transport by Ambulance only    Medications:      Patsy Morris   Home Medication Instructions CHEO:[id number]    Printed on:11/10/20 7725   Medication Information                      acetaminophen (TYLENOL) 500 MG tablet  Take 2 tablets (1,000 mg total) by mouth every 8 (eight) hours as needed for Pain.             albuterol-ipratropium (DUO-NEB) 2.5 mg-0.5 mg/3 mL nebulizer solution  Take 3 mLs by nebulization every 6 (six) hours as needed for Wheezing or Shortness of Breath.             apixaban (ELIQUIS) 5 mg Tab  Take 1 tablet (5 mg total) by mouth 2 (two) times daily.             atorvastatin (LIPITOR) 80 MG tablet  Take 1 tablet (80 mg total) by mouth every evening.             blood sugar diagnostic (BLOOD GLUCOSE TEST) Strp  Qd testing             docusate sodium (COLACE) 100 MG capsule  Take 1 capsule (100 mg total) by mouth 2 (two) times daily.             ergocalciferol (ERGOCALCIFEROL) 50,000 unit Cap  Take 1 capsule (50,000 Units  total) by mouth every Wednesday.             escitalopram oxalate (LEXAPRO) 10 MG tablet  Take 1 tablet (10 mg total) by mouth every evening.             ferrous sulfate 325 (65 FE) MG EC tablet  Take 1 tablet (325 mg total) by mouth once daily.             furosemide (LASIX) 40 MG tablet  Take 1 tablet (40 mg total) by mouth 2 (two) times daily.             gabapentin (NEURONTIN) 100 MG capsule  Take 1 capsule (100 mg total) by mouth 2 (two) times daily.             metoprolol succinate (TOPROL-XL) 50 MG 24 hr tablet  Take 1 tablet (50 mg total) by mouth once daily.             miconazole NITRATE 2 % (MICOTIN) 2 % top powder  Apply topically 2 (two) times daily. Apply to breast fold and abdominal folds twice daily.             polyethylene glycol (GLYCOLAX) 17 gram/dose powder  Take 17 g by mouth once daily.             vitamin renal formula, B-complex-vitamin c-folic acid, (NEPHROCAP) 1 mg Cap  Take 1 capsule by mouth once daily.             walker Misc  Prn use please fit pt                   Future Orders:  Hospice Medical Director may dictate new orders for comfortable care measures & sign death certificate.        _________________________________  Ravi Soria MD  11/12/2020

## 2020-11-10 NOTE — PROGRESS NOTES
"[facility] Hospital Medicine  Progress Note    Patient Name: Patsy Morris  MRN: [id number]  Patient Class: IP- Inpatient   Admission Date: 10/13/2020  Length of Stay: 27 days  Attending Physician: Loi Quiles MD  Primary Care Provider: Luisana Cartagena MD        Subjective:     Principal Problem:Acute on chronic respiratory failure with hypoxia and hypercapnia        HPI:  Per Ravi Soria:    "Per ED:  "This is a 70 y.o. female with history of CHF and COPD who presents via EMS with complaint of shortness of breath that began a few days ago. Per EMS patient had O2 saturation of 72 on 3 liters if home oxygen upon their arrival. Patient states she is on 3 liters of home oxygen at baseline. She denies fever, cough, chest pain, nausea, vomiting, diarrhea, or rhinorrhea. She reports she was recently discharged from the hospital for similar symptoms. She reports compliance with her home medications. She recently became resident of [facility] after last hospitalization. She is a former smoker. She denies undergoing any surgeries".    The patient is a 70 year old female with a PMH significant for of HTN, Chronic Respiratory Failure (COPD and BHAVANI/OHS), HFpEF, Obesity who presented to the ED with complaints of SOB that began about 3 days ago.  Patient is on 3L O2NC at home.  She was seen by her Cardiologist about 4 days prior to admission and told to decrease her Lasix from 40mg bid to qday.  She denies chest pain.  No Fevers.  No cough.  States she has been adherent with her medications.  Patient was recently admitted to [facility] from 9/1-9/11 for Acute on Chronic Respiratory Failure and discharged to SNF.  Patient was placed on BiPAP in ED and admitted to ICU.  Patient feeling better on BiPAP."    Overview/Hospital Course:  Patient is 70-year-old woman with history of hypertension, chronic hypoxic respiratory failure on home oxygen secondary to chronic obstructive pulmonary " disease, chronic diastolic heart failure, chronic atrial fibrillation, and morbid obesity re-admitted to the hospital on 10/13/2020 with decompensated heart failure after patient was home for about a week following recent hospitalization and stayed at skilled nursing facility for physical therapy.  Patient treated with non-invasive ventilation intravenous diuretics.  Patient was transferred out of the intensive care unit on 10/21/2020.      Patient continue to improve on the floor until she developed worsening hypoxic respiratory failure.  Patient was transferred to the intensive care unit on 11/1/2020 on placed on continuous BiPAP.  She decompensated further and was intubated on 11/1/2020.  Patient started on intravenous antibiotics on 11/2/2020 to treat hospital-acquired pneumonia.  Patient had chest tube placed to drain pleural effusion on the right side.  Patient underwent bronchoscopy 11/4/2020.  Patient successfully extubated on 11/6/2020.  Patient has been on BiPAP for most of the time since extubation.    Interval History:  No acute events overnight.  Spends most time on BiPAP.  Chest tube removed.  Review of Systems   Constitutional: Negative for chills and fever.   Respiratory: Positive for shortness of breath. Negative for wheezing.    Cardiovascular: Negative for chest pain.   Gastrointestinal: Negative for abdominal distention, abdominal pain, constipation, diarrhea, nausea and vomiting.   Genitourinary: Negative for dysuria and frequency.   Musculoskeletal: Negative for arthralgias and myalgias.   Neurological: Negative for light-headedness.   Psychiatric/Behavioral: Negative for agitation and confusion.     Objective:     Vital Signs (Most Recent):  Temp: 98.5 °F (36.9 °C) (11/09/20 1902)  Pulse: 96 (11/09/20 2102)  Resp: 20 (11/09/20 2102)  BP: 120/66 (11/09/20 2102)  SpO2: 96 % (11/09/20 2102) Vital Signs (24h Range):  Temp:  [98.2 °F (36.8 °C)-98.7 °F (37.1 °C)] 98.5 °F (36.9 °C)  Pulse:  []  96  Resp:  [19-39] 20  SpO2:  [81 %-99 %] 96 %  BP: ()/(52-78) 120/66  Arterial Line BP: ()/(54-76) 96/62     Weight: 97.6 kg (215 lb 2.7 oz)  Body mass index is 38.12 kg/m².    Intake/Output Summary (Last 24 hours) at 11/9/2020 2140  Last data filed at 11/9/2020 2100  Gross per 24 hour   Intake 1130 ml   Output 2150 ml   Net -1020 ml      Physical Exam  Constitutional:       General: She is not in acute distress.  HENT:      Head: Normocephalic and atraumatic.   Eyes:      Conjunctiva/sclera: Conjunctivae normal.      Pupils: Pupils are equal, round, and reactive to light.   Neck:      Musculoskeletal: Normal range of motion.   Cardiovascular:      Rate and Rhythm: Tachycardia present. Rhythm irregular.      Pulses: Normal pulses.      Heart sounds: Normal heart sounds.   Pulmonary:      Comments: Breathing reasonably comfortably while on BiPAP this morning.  Abdominal:      General: Bowel sounds are normal. There is no distension.      Palpations: Abdomen is soft.      Tenderness: There is no abdominal tenderness.   Musculoskeletal:         General: Swelling present.   Skin:     General: Skin is warm and dry.   Neurological:      General: No focal deficit present.      Mental Status: She is oriented to person, place, and time. Mental status is at baseline.         Significant Labs: All pertinent labs within the past 24 hours have been reviewed.    Significant Imaging: I have reviewed all pertinent imaging results/findings within the past 24 hours.      Assessment/Plan:      * Acute on chronic respiratory failure with hypoxia and hypercapnia  Patient with advanced lung disease secondary to chronic obstructive pulmonary disease, obstructive sleep apnea, obesity hypoventilation syndrome, and severe restrictive lung disease now with recent decompensation resulting in intubation due to possible pneumonia.  Minimally elevated procalcitonin level.  Continue antibiotic therapy.  Status post bronchoscopy and  placement of the right chest tube.  Chest tube output decreasing.  No acid-fast bacillus seen from sample from bronchial wash.  Gram stain unremarkable.  Additional cultures pending.  Extubated 11/7/2020 to BiPAP.  Chest tube removed.  Patient needing continuous BiPAP.  Attempt to increase time off BiPAP as tolerated.    Cavitary lesion of lung  Active pulmonary tuberculosis unlikely and patient with one negative acid-fast bacillus smear.  Additional efforts at obtaining sputum for additional smears not successful despite using hypertonic saline.  Status post bronchoscopy with negative acid fast bacillus smear.    Acute on chronic diastolic congestive heart failure  Respiratory status initially improved with aggressive diuresis.  Serum creatinine trending upward and further diuretic therapy on hold.  Serum creatinine stable/improving now.  Continue to closely monitor volume status.    Atrial fibrillation with rapid ventricular response  Continue with metoprolol.  Restarted on apixaban.    Essential hypertension  Reasonably controlled with current regimen.  Will continue with current regimen and continue to monitor.    Chronic kidney disease (CKD) stage G4/A1, severely decreased glomerular filtration rate (GFR) between 15-29 mL/min/1.73 square meter and albuminuria creatinine ratio less than 30 mg/g  Stable.    Dyslipidemia  Continue with statin therapy.    Obesity hypoventilation syndrome        Debility  Consult physical and occupational therapy for range of motion exercises.    Pleural effusion        Encounter for palliative care  Followed by Palliative Care      VTE Risk Mitigation (From admission, onward)         Ordered     apixaban tablet 5 mg  2 times daily      11/06/20 1039     Place sequential compression device  Until discontinued      10/13/20 1405     IP VTE HIGH RISK PATIENT  Once      10/13/20 1405                  Loi Quiles MD  Department of Hospital Medicine   [facility]

## 2020-11-10 NOTE — ASSESSMENT & PLAN NOTE
Patient's CT concerning for slow process as lesion in same posterior upper lobe lung fields noted on previous CT in 2018 as well as bialteral pulmonary nodules. In setting of significant smoking history, malignancy is high on differential. However slow infectious process such as NTM/pulmonary MAC is possible as well, also possibility of superimprosed bacterial infection  - HIV negative  - Quantiferon gold indeterminate x 2  - AFB stain negative, smear pending x 1  - s/p bronch with BAL on 11/4  - BAL cytology negative for malignant cells, cultures are negative  - f/u AFB from BAL  - patient aware of abnormal lung lesion and that needs to be followed; will need follow up with outpatient pulmonary 3-4 weeks following discharge, our clinic is arranging this.

## 2020-11-10 NOTE — ASSESSMENT & PLAN NOTE
- patient with known severe mixed restrictive/obstructive lung disease, with FVC 31% in 2016 and chronic hypoxic respiratory failure. 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 Likely with some underlying interstitial process (spectrum of smoking related ILD? RB-ILD? DIP? NSIP?) given the severity of her impairment in addtion to underlying obstructive lung disease with significant smoking history, however etiology of her underlying lung pathology is unknown. Additionally, superimposed pulmonary edema clouds the dx as well.   - counseled extensively on smoking cessation, she has quit about 3 weeks ago, encouraged cessation.   - follow up in pulmonary clinic 3-4 weeks following discharge

## 2020-11-10 NOTE — SUBJECTIVE & OBJECTIVE
Interval History: No acute events overnight. She reports feeling very well today. Attempted to work with PT/OT yesterday but was very weak, she is optimistic to work with them again today. Remained off bipap majority of day yesterday, only wore once while taking nap and then while sleeping overnight. States breathing is very good. Denies fevers, chills, nausea, vomiting.     Objective:     Vital Signs (Most Recent):  Temp: 98.6 °F (37 °C) (11/10/20 0705)  Pulse: 84 (11/10/20 0900)  Resp: (!) 22 (11/10/20 0900)  BP: 106/61 (11/10/20 0900)  SpO2: (!) 94 % (11/10/20 0900) Vital Signs (24h Range):  Temp:  [97.8 °F (36.6 °C)-98.7 °F (37.1 °C)] 98.6 °F (37 °C)  Pulse:  [] 84  Resp:  [16-39] 22  SpO2:  [84 %-99 %] 94 %  BP: ()/(52-67) 106/61     Weight: 97.6 kg (215 lb 2.7 oz)  Body mass index is 38.12 kg/m².      Intake/Output Summary (Last 24 hours) at 11/10/2020 1145  Last data filed at 11/10/2020 0900  Gross per 24 hour   Intake 1730 ml   Output 2300 ml   Net -570 ml       Physical Exam  Vitals signs and nursing note reviewed.   Constitutional:       General: She is not in acute distress.     Comments: Awake interactive, following all commands   HENT:      Head: Normocephalic and atraumatic.   Eyes:      Conjunctiva/sclera: Conjunctivae normal.      Pupils: Pupils are equal, round, and reactive to light.   Neck:      Musculoskeletal: Normal range of motion.      Comments: Thick neck    Cardiovascular:      Rate and Rhythm: Normal rate. Rhythm irregular.      Pulses: Normal pulses.      Heart sounds: Normal heart sounds.   Pulmonary:      Comments: Minimal bilateral basilar crackles.   Abdominal:      General: Bowel sounds are normal. There is no distension.      Palpations: Abdomen is soft.      Tenderness: There is no abdominal tenderness.   Musculoskeletal:         General: Swelling (mild bilateral lower extremities) present.   Skin:     General: Skin is warm and dry.   Neurological:      General: No  focal deficit present.      Mental Status: She is alert and oriented to person, place, and time.      Cranial Nerves: No cranial nerve deficit.      Motor: No weakness.      Comments: Awake, alert, oriented x 4   Psychiatric:         Mood and Affect: Mood normal.         Thought Content: Thought content normal.         Vents:  Vent Mode: Spont (11/06/20 0952)  Ventilator Initiated: Yes (11/01/20 2338)  Set Rate: 0 BPM (11/06/20 0952)  Vt Set: 390 mL (11/06/20 0952)  Pressure Support: 12 cmH20 (11/06/20 0952)  PEEP/CPAP: 5 cmH20 (11/06/20 0952)  Oxygen Concentration (%): 30 (11/10/20 0400)  Peak Airway Pressure: 17 cmH2O (11/06/20 0952)  Plateau Pressure: 0 cmH20 (11/06/20 0952)  Total Ve: 6.15 mL (11/06/20 0952)  F/VT Ratio<105 (RSBI): (!) 49.3 (11/06/20 0755)    Lines/Drains/Airways     Peripheral Intravenous Line                 Midline Catheter Insertion/Assessment  - Double Lumen 10/20/20 1220 Right median cubital vein (antecubital fossa)  21 days         Peripheral IV - Single Lumen 11/05/20 0630 20 G Anterior;Left Forearm 5 days                Significant Labs:    CBC/Anemia Profile:  Recent Labs   Lab 11/10/20  0406   WBC 16.43*   HGB 8.1*   HCT 23.2*   *   MCV 96   RDW 21.8*        Chemistries:  Recent Labs   Lab 11/09/20  0352 11/10/20  0406    142   K 4.0 3.4*    104   CO2 28 28   BUN 40* 31*   CREATININE 1.9* 0.8   CALCIUM 8.4* 9.8   MG 2.2 2.2   PHOS 3.9 2.8       All pertinent labs within the past 24 hours have been reviewed.    Significant Imaging:  I have reviewed and interpreted all pertinent imaging results/findings within the past 24 hours.

## 2020-11-10 NOTE — SUBJECTIVE & OBJECTIVE
Interval History:  No acute events overnight.  Spends most time on BiPAP.  Patient on NC this morning.      Review of Systems   Constitutional: Negative for chills and fever.   Respiratory: Positive for shortness of breath. Negative for wheezing.    Cardiovascular: Negative for chest pain.   Gastrointestinal: Negative for abdominal distention, abdominal pain, constipation, diarrhea, nausea and vomiting.   Genitourinary: Negative for dysuria and frequency.   Musculoskeletal: Negative for arthralgias and myalgias.   Neurological: Negative for light-headedness.   Psychiatric/Behavioral: Negative for agitation and confusion.     Objective:     Vital Signs (Most Recent):  Temp: 98.6 °F (37 °C) (11/10/20 0705)  Pulse: 94 (11/10/20 1200)  Resp: (!) 30 (11/10/20 1200)  BP: (!) 107/56 (11/10/20 1100)  SpO2: (!) 88 % (11/10/20 1100) Vital Signs (24h Range):  Temp:  [97.8 °F (36.6 °C)-98.7 °F (37.1 °C)] 98.6 °F (37 °C)  Pulse:  [] 94  Resp:  [16-30] 30  SpO2:  [84 %-99 %] 88 %  BP: ()/(53-67) 107/56     Weight: 97.6 kg (215 lb 2.7 oz)  Body mass index is 38.12 kg/m².    Intake/Output Summary (Last 24 hours) at 11/10/2020 1412  Last data filed at 11/10/2020 0900  Gross per 24 hour   Intake 1370 ml   Output 2300 ml   Net -930 ml      Physical Exam  Constitutional:       General: She is not in acute distress.  HENT:      Head: Normocephalic and atraumatic.   Eyes:      Conjunctiva/sclera: Conjunctivae normal.      Pupils: Pupils are equal, round, and reactive to light.   Neck:      Musculoskeletal: Normal range of motion.   Cardiovascular:      Rate and Rhythm: Normal rate. Rhythm irregular.      Pulses: Normal pulses.      Heart sounds: Normal heart sounds.   Pulmonary:      Comments: Breathing reasonably comfortably while on NC this morning.  Abdominal:      General: Bowel sounds are normal. There is no distension.      Palpations: Abdomen is soft.      Tenderness: There is no abdominal tenderness.    Musculoskeletal:         General: Swelling present.   Skin:     General: Skin is warm and dry.   Neurological:      General: No focal deficit present.      Mental Status: She is oriented to person, place, and time. Mental status is at baseline.         Significant Labs: All pertinent labs within the past 24 hours have been reviewed.    Significant Imaging: I have reviewed all pertinent imaging results/findings within the past 24 hours.

## 2020-11-10 NOTE — PLAN OF CARE
Problem: Occupational Therapy Goal  Goal: Occupational Therapy Goal  Description: Goals to be met by: 11/13/2020    Patient will increase functional independence with ADLs by performing:    UE Dressing with Min A.  LE Dressing with Maximum Assistance using adaptive equipment as needed.  Grooming while seated with SBA.  Toileting from bedside commode with CGA for hygiene and clothing management.   Toilet transfer to bedside commode with Minimal Assistance.  Pt will tolerate sitting EOB for 15 minutes with SBA for balance.        Outcome: Ongoing, Progressing     Pt is making progress towards goals.  SNF is recommended upon d/c from acute care to further address deficits and help pt improve overall functional independence.     CARLOS Motta  11/10/2020

## 2020-11-10 NOTE — ASSESSMENT & PLAN NOTE
- Patient with BMI >35 with PaCO2 consistently >52 with restriction on PFTs.   - can use nasal canula during the day and bipap PRN as well as nightly and with any naps  - will need to continue NIPPV nightly and with all naps.

## 2020-11-10 NOTE — PT/OT/SLP PROGRESS
Occupational Therapy   Treatment    Name: Patsy Morris  MRN: [id number]  Admitting Diagnosis:  Acute on chronic respiratory failure with hypoxia and hypercapnia       Recommendations:     Discharge Recommendations: nursing facility, skilled  Discharge Equipment Recommendations:  none  Barriers to discharge:  Decreased caregiver support    Assessment:     Patsy Morris is a 70 y.o. female with a medical diagnosis of Acute on chronic respiratory failure with hypoxia and hypercapnia.  She presents with pleasant affect. Performance deficits affecting function are weakness, impaired endurance, impaired self care skills, impaired functional mobilty, gait instability, impaired balance, decreased lower extremity function, decreased upper extremity function, impaired cardiopulmonary response to activity.  Pt agreeable to participating in therapy upon arrival to room.  Pt demonstrated improvement with sit <> stand transfer performing with Min A and RW.  Pt also able to partially complete grooming task in standing position with CGA and RW.  Half way through task pt required seated rest break.  Pt able to take steps forward and back with CGA and RW.  Pt reports fatiguing quickly, but participated fully in all tasks.      Overall, pt tolerated therapy well and SpO2 remained stable during activity.  Pt is motivated and making progress towards goals.  She would continue to benefit from skilled OT services to address problems listed above and increase independence with ADLs.  SNF is recommended upon d/c from acute care to further address deficits and help pt improve overall functional independence.       Rehab Prognosis:  Good; patient would benefit from acute skilled OT services to address these deficits and reach maximum level of function.       Plan:     Patient to be seen 5 x/week to address the above listed problems via self-care/home management, therapeutic activities, therapeutic exercises  · Plan of Care  Expires: 11/14/20  · Plan of Care Reviewed with: patient    Subjective     Pain/Comfort:  · Pain Rating 1: 0/10  · Pain Rating Post-Intervention 1: 0/10    Objective:     Communicated with: RN (Dania) prior to session.  Patient found HOB elevated with blood pressure cuff, peripheral IV, pulse ox (continuous), telemetry, PICC line, PureWick upon OT entry to room.    General Precautions: Standard, fall, respiratory   Orthopedic Precautions:N/A   Braces: N/A     Occupational Performance:     Bed Mobility:    · Supine <> sit: CGA  · Scooting: CGA  · Sit <> Supine: Max A    Functional Mobility/Transfers:  · Sit <> Stand: Min A and RW x 7 trials from EOB.    · Pt appeared more fatigued during certain trials.    · Majority of trials required assist of 2; some required support on both sides.  · Functional Mobility: Pt walked 4 ft x 3 trials with Min A and RW.    · Pt took small steps; cues required to push into RW with (B) UE to achieve full upright position  · No instances of LOB noted    Activities of Daily Living:  · Feeding:  Set up while supine with HOB elevated  · Grooming: CGA for washing face while standing with RW.  Pt partially completed task, then required seated rest break 2* fatigue      [facility] 6 Click ADL: 14    Treatment & Education:  *Pt performed multiple sit <> stands from EOB to promote increased endurance and strength needed for functional mobility and ADLs.  Pt stood for ~15-20 seconds each trial  *Pt practiced marching in place; increased support required on both sides while weight shifting  *Pt took steps in room to address balance, coordination, and endurance needed for functional mobility  *Pt partially stood to laterally scoot towards EOB x 2 trials  *Pt performed grooming task as noted above.  *POC and purpose of OT in acute care setting reviewed    Patient left HOB elevated with all lines intact, call button in reach and RN (Dania) presentEducation:    *Clean blue pads and smooth sheet placed  underneath pt    GOALS:   Multidisciplinary Problems     Occupational Therapy Goals        Problem: Occupational Therapy Goal    Goal Priority Disciplines Outcome Interventions   Occupational Therapy Goal     OT, PT/OT Ongoing, Progressing    Description: Goals to be met by: 11/13/2020    Patient will increase functional independence with ADLs by performing:    UE Dressing with Min A.  LE Dressing with Maximum Assistance using adaptive equipment as needed.  Grooming while seated with SBA.  Toileting from bedside commode with CGA for hygiene and clothing management.   Toilet transfer to bedside commode with Minimal Assistance.  Pt will tolerate sitting EOB for 15 minutes with SBA for balance.                         Time Tracking:     OT Date of Treatment: 11/10/20  OT Start Time: 1220  OT Stop Time: 1251  OT Total Time (min): 31 min    Billable Minutes:Therapeutic Activity 23   *Co-Tx with PT    CARLOS Motta  11/10/2020

## 2020-11-10 NOTE — ASSESSMENT & PLAN NOTE
- continue diuresis; fluid overload likely contributes to hypercapnia in setting of OHS/BHAVANI, small airway disease with severe restriction.   -CT showing cavitary lesion with surrounding consolidation and GGO concerning for infectious vs malignant process, with significant ggo throughout the lung likely secondary to continued pulmonary edema.   - back on home diuretic dose with adequate UOP  - salt restrict  - continues to improve with negative fluid balance

## 2020-11-10 NOTE — ASSESSMENT & PLAN NOTE
- with baseline severe restrictive/obstuctive lung disease (FVC ~30% on PFTs in 2016) due to unknown underlying lung disease  - requiring intubation on 11/1, extubated 11/6 to BIPAP  - likely multifactorial related to volume overload + HF exacerbation + BHAVANI/OHS + possible new infectious PNA + ? Malignancy?  - endotracheal aspirate NGTD, f/u cultures  -BAL, cultures NGTD  - vanco stopped 11/5  - completed course of cefepime   - diuretics resumed, Fio2 requirement improved with negative fluid balance  - goal for net negative fluid balance now that she is tolerating PO intake and on home diuretics   - FiO2 requirement down to her home 2LNC  - continues to improve  - PT/OT today, needs to improve mobility, encourage out of bed and in chair during day if tolerates

## 2020-11-10 NOTE — PROGRESS NOTES
[facility]  Pulmonology  Progress Note    Patient Name: Patsy Morris  MRN: [id number]  Admission Date: 10/13/2020  Hospital Length of Stay: 28 days  Code Status: Full Code  Attending Provider: Ravi Soria MD  Primary Care Provider: Luisana Cartagena MD   Principal Problem: Acute on chronic respiratory failure with hypoxia and hypercapnia    Subjective:     Interval History: No acute events overnight. She reports feeling very well today. Attempted to work with PT/OT yesterday but was very weak, she is optimistic to work with them again today. Remained off bipap majority of day yesterday, only wore once while taking nap and then while sleeping overnight. States breathing is very good. Denies fevers, chills, nausea, vomiting.     Objective:     Vital Signs (Most Recent):  Temp: 98.6 °F (37 °C) (11/10/20 0705)  Pulse: 84 (11/10/20 0900)  Resp: (!) 22 (11/10/20 0900)  BP: 106/61 (11/10/20 0900)  SpO2: (!) 94 % (11/10/20 0900) Vital Signs (24h Range):  Temp:  [97.8 °F (36.6 °C)-98.7 °F (37.1 °C)] 98.6 °F (37 °C)  Pulse:  [] 84  Resp:  [16-39] 22  SpO2:  [84 %-99 %] 94 %  BP: ()/(52-67) 106/61     Weight: 97.6 kg (215 lb 2.7 oz)  Body mass index is 38.12 kg/m².      Intake/Output Summary (Last 24 hours) at 11/10/2020 1145  Last data filed at 11/10/2020 0900  Gross per 24 hour   Intake 1730 ml   Output 2300 ml   Net -570 ml       Physical Exam  Vitals signs and nursing note reviewed.   Constitutional:       General: She is not in acute distress.     Comments: Awake interactive, following all commands   HENT:      Head: Normocephalic and atraumatic.   Eyes:      Conjunctiva/sclera: Conjunctivae normal.      Pupils: Pupils are equal, round, and reactive to light.   Neck:      Musculoskeletal: Normal range of motion.      Comments: Thick neck    Cardiovascular:      Rate and Rhythm: Normal rate. Rhythm irregular.      Pulses: Normal pulses.      Heart sounds: Normal heart sounds.    Pulmonary:      Comments: Minimal bilateral basilar crackles.   Abdominal:      General: Bowel sounds are normal. There is no distension.      Palpations: Abdomen is soft.      Tenderness: There is no abdominal tenderness.   Musculoskeletal:         General: Swelling (mild bilateral lower extremities) present.   Skin:     General: Skin is warm and dry.   Neurological:      General: No focal deficit present.      Mental Status: She is alert and oriented to person, place, and time.      Cranial Nerves: No cranial nerve deficit.      Motor: No weakness.      Comments: Awake, alert, oriented x 4   Psychiatric:         Mood and Affect: Mood normal.         Thought Content: Thought content normal.         Vents:  Vent Mode: Spont (11/06/20 0952)  Ventilator Initiated: Yes (11/01/20 2338)  Set Rate: 0 BPM (11/06/20 0952)  Vt Set: 390 mL (11/06/20 0952)  Pressure Support: 12 cmH20 (11/06/20 0952)  PEEP/CPAP: 5 cmH20 (11/06/20 0952)  Oxygen Concentration (%): 30 (11/10/20 0400)  Peak Airway Pressure: 17 cmH2O (11/06/20 0952)  Plateau Pressure: 0 cmH20 (11/06/20 0952)  Total Ve: 6.15 mL (11/06/20 0952)  F/VT Ratio<105 (RSBI): (!) 49.3 (11/06/20 0755)    Lines/Drains/Airways     Peripheral Intravenous Line                 Midline Catheter Insertion/Assessment  - Double Lumen 10/20/20 1220 Right median cubital vein (antecubital fossa)  21 days         Peripheral IV - Single Lumen 11/05/20 0630 20 G Anterior;Left Forearm 5 days                Significant Labs:    CBC/Anemia Profile:  Recent Labs   Lab 11/10/20  0406   WBC 16.43*   HGB 8.1*   HCT 23.2*   *   MCV 96   RDW 21.8*        Chemistries:  Recent Labs   Lab 11/09/20  0352 11/10/20  0406    142   K 4.0 3.4*    104   CO2 28 28   BUN 40* 31*   CREATININE 1.9* 0.8   CALCIUM 8.4* 9.8   MG 2.2 2.2   PHOS 3.9 2.8       All pertinent labs within the past 24 hours have been reviewed.    Significant Imaging:  I have reviewed and interpreted all pertinent  imaging results/findings within the past 24 hours.    Assessment/Plan:     * Acute on chronic respiratory failure with hypoxia and hypercapnia  - with baseline severe restrictive/obstuctive lung disease (FVC ~30% on PFTs in 2016) due to unknown underlying lung disease  - requiring intubation on 11/1, extubated 11/6 to BIPAP  - likely multifactorial related to volume overload + HF exacerbation + BHAVANI/OHS + possible new infectious PNA + ? Malignancy?  - endotracheal aspirate NGTD, f/u cultures  -BAL, cultures NGTD  - vanco stopped 11/5  - completed course of cefepime   - diuretics resumed, Fio2 requirement improved with negative fluid balance  - goal for net negative fluid balance now that she is tolerating PO intake and on home diuretics   - FiO2 requirement down to her home 2LNC  - continues to improve  - PT/OT today, needs to improve mobility, encourage out of bed and in chair during day if tolerates    Mixed restrictive and obstructive lung disease  - patient with known severe mixed restrictive/obstructive lung disease, with FVC 31% in 2016 and chronic hypoxic respiratory failure. 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 Likely with some underlying interstitial process (spectrum of smoking related ILD? RB-ILD? DIP? NSIP?) given the severity of her impairment in addtion to underlying obstructive lung disease with significant smoking history, however etiology of her underlying lung pathology is unknown. Additionally, superimposed pulmonary edema clouds the dx as well.   - counseled extensively on smoking cessation, she has quit about 3 weeks ago, encouraged cessation.   - follow up in pulmonary clinic 3-4 weeks following discharge    Pleural effusion  - bilateral, R>>L  - s/p chest tube placement on 11/4 given her baseline severe restrictive lung disease, any additional  impingement on that worsens her ventilatory abilities thus hopeful that drainage of fluid will improve her respiratory dynamics   - pleural fluid studies borderline exudative. likely effusion related to heart failure however given cavitary lesion on right,  infectious vs malignant on differential.   - Chest tube removed 11/7  - cytology from pleural fluid negative for malignant cells, culture negative    Cavitary lesion of lung  Patient's CT concerning for slow process as lesion in same posterior upper lobe lung fields noted on previous CT in 2018 as well as bialteral pulmonary nodules. In setting of significant smoking history, malignancy is high on differential. However slow infectious process such as NTM/pulmonary MAC is possible as well, also possibility of superimprosed bacterial infection  - HIV negative  - Quantiferon gold indeterminate x 2  - AFB stain negative, smear pending x 1  - s/p bronch with BAL on 11/4  - BAL cytology negative for malignant cells, cultures are negative  - f/u AFB from BAL  - patient aware of abnormal lung lesion and that needs to be followed; will need follow up with outpatient pulmonary 3-4 weeks following discharge, our clinic is arranging this.     Atrial fibrillation with rapid ventricular response  - HR more steady over past day  - rate controlled with metoprolol   - continue tejal eliquis      Obstructive sleep apnea  - needs home NIPPV  - CM working on this  - continue NIPPV at night and with all naps      Acute on chronic diastolic congestive heart failure  - continue diuresis; fluid overload likely contributes to hypercapnia in setting of OHS/BHAVANI, small airway disease with severe restriction.   -CT showing cavitary lesion with surrounding consolidation and GGO concerning for infectious vs malignant process, with significant ggo throughout the lung likely secondary to continued pulmonary edema.   - back on home diuretic dose with adequate UOP  - salt restrict  - continues to  improve with negative fluid balance      Obesity hypoventilation syndrome  - Patient with BMI >35 with PaCO2 consistently >52 with restriction on PFTs.   - can use nasal canula during the day and bipap PRN as well as nightly and with any naps  - will need to continue NIPPV nightly and with all naps.           Fadumo Bryant MD  Pulmonology  [facility]

## 2020-11-10 NOTE — PROGRESS NOTES
[facility]  Cardiology  Progress Note    Patient Name: Patsy Morris  MRN: [id number]  Admission Date: 10/13/2020  Hospital Length of Stay: 28 days  Code Status: Full Code   Attending Physician: Ravi Soria MD   Primary Care Physician: Luisana Cartagena MD  Expected Discharge Date:   Principal Problem:Acute on chronic respiratory failure with hypoxia and hypercapnia    Subjective:     Brief HPI:    Patsy Morris is a 70 y.o.female with hypertension. She has chronic atrial fibrillation and heart failure with preserved ejection fraction. She has chronic obstructive pulmonary disease being on home oxygen with CO2 retention. She has undergone nephrectomy for renal cell carcinoma and has chronic kidney disease. She was admitted to [facility] in 2020 and 2020 with heart failure and exacerbations of her chronic obstructive pulmonary disease. She went to therapy at [facility].      She used to be on furosemide 40 mg Q24 however after her last hospitalization she had the does of furosemide increased to 80 mg Q24. The dose was reduced to 40 mg Q24 on 10/8/2020. She became increasingly short of breath and presents fluid overloaded in heart failure as well as an exacerbation of her COPD with high CO2. She was admitted to the ICU.    Hospital Course:    Diuresis.    BIPAP.    Respiratory treatments.    10/15/2020: Echo: Normal left ventricular size and systolic function. EF 60%. Moderately dilated LA. Mildly dilated RV. SPAP 49 mmHg. Small pericardial effusion.    10/16/2020: Apixiban 5 mg Q12 was changed to heparin iv for consideration of bronchoscopy.    10/21/2020: Transferred from ICU to telemetry.    10/27/2020: Walked 150 ft with PT.    2020: AB.21/125/78/90%/FIO2 45%    2020: Respiratory failure. Intubated in ICU.    2020: Bronchoscopy.    2020: Right sided chest tube. Thoracentesis 650 ml. Hazy fluid with WBC of 349.    2020:  Extubated.    Interval History:    Feeling fair. Still in ICU. Variable VRR. Falls asleep forgetting to ask for BIPAP.        Review of Systems   Constitution: Positive for malaise/fatigue.   Cardiovascular: Negative for chest pain, irregular heartbeat, orthopnea and syncope.   Respiratory: Positive for shortness of breath. Negative for wheezing.    Gastrointestinal: Negative for abdominal pain.       Objective:     Vital Signs (Most Recent):  Temp: 98.6 °F (37 °C) (11/10/20 0705)  Pulse: 102 (11/10/20 0800)  Resp: (!) 28 (11/10/20 0800)  BP: 114/66 (11/10/20 0800)  SpO2: (!) 93 % (11/10/20 0800) Vital Signs (24h Range):  Temp:  [97.8 °F (36.6 °C)-98.7 °F (37.1 °C)] 98.6 °F (37 °C)  Pulse:  [] 102  Resp:  [16-39] 28  SpO2:  [81 %-99 %] 93 %  BP: ()/(52-67) 114/66  Arterial Line BP: ()/(62-74) 96/62     Weight: 97.6 kg (215 lb 2.7 oz)  Body mass index is 38.12 kg/m².    SpO2: (!) 93 %  O2 Device (Oxygen Therapy): nasal cannula      Intake/Output Summary (Last 24 hours) at 11/10/2020 0925  Last data filed at 11/10/2020 0634  Gross per 24 hour   Intake 1370 ml   Output 2300 ml   Net -930 ml       Lines/Drains/Airways     Peripheral Intravenous Line                 Midline Catheter Insertion/Assessment  - Double Lumen 10/20/20 1220 Right median cubital vein (antecubital fossa)  20 days         Peripheral IV - Single Lumen 11/05/20 0630 20 G Anterior;Left Forearm 5 days                Physical Exam   Constitutional: She appears well-developed and well-nourished. She appears ill. No distress.   Neck: Carotid bruit is not present.   Cardiovascular: S1 normal and S2 normal. An irregularly irregular rhythm present. Tachycardia present.   Pulmonary/Chest: She has decreased breath sounds in the right lower field. She has rhonchi in the right lower field and the left lower field.   Chest tube on the right side.   Abdominal: Soft. Normal appearance.   Musculoskeletal:      Right ankle: She exhibits no swelling.       Left ankle: She exhibits no swelling.   Skin: Skin is warm and dry.     Current Medications:     apixaban  5 mg Oral BID    atorvastatin  80 mg Oral QHS    docusate sodium  100 mg Oral BID    escitalopram oxalate  10 mg Oral QHS    ferrous sulfate  325 mg Oral Daily    furosemide  40 mg Oral BID    gabapentin  100 mg Oral BID    metoprolol succinate  50 mg Oral Daily    miconazole NITRATE 2 %   Topical (Top) BID    vitamin renal formula (B-complex-vitamin c-folic acid)  1 capsule Oral Daily     Current Laboratory Results:    Recent Results (from the past 24 hour(s))   Basic metabolic panel    Collection Time: 11/10/20  4:06 AM   Result Value Ref Range    Sodium 142 136 - 145 mmol/L    Potassium 3.4 (L) 3.5 - 5.1 mmol/L    Chloride 104 95 - 110 mmol/L    CO2 28 23 - 29 mmol/L    Glucose 141 (H) 70 - 110 mg/dL    BUN 31 (H) 8 - 23 mg/dL    Creatinine 0.8 0.5 - 1.4 mg/dL    Calcium 9.8 8.7 - 10.5 mg/dL    Anion Gap 10 8 - 16 mmol/L    eGFR if African American >60 >60 mL/min/1.73 m^2    eGFR if non African American >60 >60 mL/min/1.73 m^2   Magnesium    Collection Time: 11/10/20  4:06 AM   Result Value Ref Range    Magnesium 2.2 1.6 - 2.6 mg/dL   Phosphorus    Collection Time: 11/10/20  4:06 AM   Result Value Ref Range    Phosphorus 2.8 2.7 - 4.5 mg/dL   BNP    Collection Time: 11/10/20  4:06 AM   Result Value Ref Range     (H) 0 - 99 pg/mL   CBC Without Differential    Collection Time: 11/10/20  4:06 AM   Result Value Ref Range    WBC 16.43 (H) 3.90 - 12.70 K/uL    RBC 2.42 (L) 4.00 - 5.40 M/uL    Hemoglobin 8.1 (L) 12.0 - 16.0 g/dL    Hematocrit 23.2 (L) 37.0 - 48.5 %    MCV 96 82 - 98 fL    MCH 33.5 (H) 27.0 - 31.0 pg    MCHC 34.9 32.0 - 36.0 g/dL    RDW 21.8 (H) 11.5 - 14.5 %    Platelets 418 (H) 150 - 350 K/uL    MPV 10.2 9.2 - 12.9 fL   Platelet Review    Collection Time: 11/10/20  4:06 AM   Result Value Ref Range    Platelet Estimate Appears normal      Current Imaging Results:    X-Ray  Chest AP Portable   Final Result      Removal of support tubes and catheters.      No change in the level of aeration of the lung parenchyma.         Electronically signed by: Devang Juarez   Date:    11/10/2020   Time:    07:26      X-Ray Chest 1 View   Final Result      Since November 4, 2020, slightly increased diffuse bilateral airspace opacities, particularly in the upper lung zones.         Electronically signed by: Bala Perez MD   Date:    11/05/2020   Time:    07:42      X-Ray Chest 1 View   Final Result      ET tube, enteric tube, and right-sided pigtail chest tube appear in satisfactory position.      Decreasing pulmonary vascular congestion with improving bilateral parahilar and basilar consolidation.      Decreasing consolidation within the mid to right upper lung.      Improved aeration within the right lung base likely due to decreased right pleural effusion.         Electronically signed by: Regulo Solis MD   Date:    11/04/2020   Time:    12:32      XR Non-Rad Performed NG/Gastric Tube Check   Final Result      Tip of nasogastric tube in the proximal stomach.         Electronically signed by: Charlotte Howell   Date:    11/02/2020   Time:    00:25      X-Ray Chest 1 View   Final Result   Abnormal      Interval progression of abnormal intrathoracic findings, increasing bilateral pulmonary opacities, as discussed above.      ET tube noted, the tip above the yamilka.      Enteric tube noted, the distal tip extends subdiaphragmatic below the field of view however the side hole is likely along the distal esophagus, advancement recommended.      This report was flagged in Epic as abnormal.         Electronically signed by: Hammad Ayala   Date:    11/02/2020   Time:    00:09      X-Ray Chest AP Portable   Final Result      Stable right upper lobe consolidation.  Findings remain concerning for pneumonia with small cavitation as described previously.      Improved aeration right lung base.  Small  pleural effusions, decreased in volume on the right.         Electronically signed by: Elsa Alcantar   Date:    10/30/2020   Time:    11:47      X-Ray Chest AP Portable   Final Result      Worsening right basilar atelectasis or consolidation.      Right upper lobe consolidation is improved.  There may be a tiny pneumatocele or central cavitation.      Pleural effusions are improved.      Continued follow-up advised.         Electronically signed by: Elsa Alcantar   Date:    10/28/2020   Time:    10:24      X-Ray Chest AP Portable   Final Result      No significant change.  There is cardiomegaly, multifocal airspace opacities and bilateral effusions.         Electronically signed by: Ajay Camara MD   Date:    10/20/2020   Time:    08:48      US Lower Extremity Veins Bilateral   Final Result      No evidence of deep venous thrombosis in either lower extremity.         Electronically signed by: Ceasar Carreon MD   Date:    10/15/2020   Time:    21:53      CT Chest Without Contrast   Final Result      The findings highly concerning for extensive diffuse pulmonary infection with bilateral pleural fluid collections right greater than left.         Electronically signed by: Regulo Bassett MD   Date:    10/15/2020   Time:    12:01      X-Ray Chest AP Portable   Final Result      Interval worsening of diffuse bilateral airspace disease and moderate bilateral pleural effusions when compared to 09/09/2020         Electronically signed by: Halina Plascencia   Date:    10/13/2020   Time:    09:40          10/15/2020: Echo:    The left ventricle is normal in size with normal systolic function. The estimated ejection fraction is 60%.  A diastolic pattern consistent with atrial fibrillation observed.  Moderate left atrial enlargement.  Mild right ventricular enlargement.  Normal right ventricular systolic function.  Severe right atrial enlargement.  The aortic valve is mildly sclerotic.  The mitral valve is mildly  sclerotic.  Mild mitral regurgitation.  Mild tricuspid regurgitation.  There is mild pulmonary hypertension.  The estimated PA systolic pressure is 49 mmHg.  Intermediate central venous pressure (8 mmHg).  Small circumferential pericardial effusion.  There is a left pleural effusion.      Assessment and Plan:     Problem List:    Active Diagnoses:    Diagnosis Date Noted POA    PRINCIPAL PROBLEM:  Acute on chronic respiratory failure with hypoxia and hypercapnia [J96.21, J96.22] 08/03/2016 Yes    Acute kidney injury superimposed on chronic kidney disease [N17.9, N18.9] 11/03/2020 No    Pleural effusion [J90] 11/02/2020 Yes    Cavitary lesion of lung [J98.4] 10/16/2020 Yes    Atrial fibrillation with rapid ventricular response [I48.91] 09/01/2020 Yes    Debility [R53.81] 08/26/2020 Yes    Encounter for palliative care [Z51.5] 08/25/2020 Not Applicable    Obstructive sleep apnea [G47.33]  Yes    Acute on chronic diastolic congestive heart failure [I50.33] 05/20/2016 Yes    Obesity hypoventilation syndrome [E66.2] 02/25/2016 Yes    Chronic kidney disease (CKD) stage G4/A1, severely decreased glomerular filtration rate (GFR) between 15-29 mL/min/1.73 square meter and albuminuria creatinine ratio less than 30 mg/g [N18.4]  Yes    Dyslipidemia [E78.5] 02/22/2016 Yes    Essential hypertension [I10] 10/15/2014 Yes      Problems Resolved During this Admission:    Diagnosis Date Noted Date Resolved POA    On mechanically assisted ventilation [Z99.11] 11/02/2020 11/08/2020 Not Applicable    Severe sepsis [A41.9, R65.20] 11/02/2020 11/02/2020 Unknown    Shortness of breath [R06.02] 11/01/2020 11/02/2020 Yes    Normocytic anemia [D64.9] 10/27/2020 11/03/2020 Yes    Acute on chronic congestive heart failure [I50.9] 10/21/2020 10/27/2020 Yes    Acute on chronic congestive heart failure [I50.9] 10/13/2020 10/13/2020 Yes    Current moderate episode of major depressive disorder without prior episode [F32.1]  04/30/2020 11/03/2020 Yes    Pulmonary nodules/lesions, multiple [R91.8] 03/22/2019 10/14/2020 Yes    Paroxysmal atrial fibrillation [I48.0] 08/03/2016 10/13/2020 Yes    Chronic hypercapnic respiratory failure [J96.12] 03/11/2016 10/13/2020 Yes    Renal carcinoma [C64.9] 03/10/2015 10/13/2020 Yes       Assessment and Plan:     1. Heart Failure, Diastolic, Chronic              8/24/2020: Echo: Normal left ventricular size and systolic function. Mildly dilated LA.   10/15/2020: Echo: Normal left ventricular size and systolic function. EF 60%. Moderately dilated LA. Mildly dilated RV. SPAP 49 mmHg. Small pericardial effusion.              At NH was on furosemide 40 mg Q24.              10/13/2020: Presented fluid overloaded in HF. Received furosemide 80 mg iv Q12.   10/20/2020: CXR with extensive infiltrates and pleural effusions. .    10/26/2020: .   11/10/2020: .   On furosemide 40 mg Q12.   Does not appear fluid overloaded.      2. Atrial Fibrillation              In chronic atrial fibrillation.              On apixiban.              Used to be on metoprolol 50 mg Q12.              Rate was well controlled mostly  bpm.   On metoprolol succinate 50 mg Q24.   VRR very variable.     3. Chronic Anticoagulation              Been on apixiban 5 mg Q12.   10/16/2020: Apixiban 5 mg Q12 was changed to heparin iv.   On apixiban 5 mg Q12.   No bleeding.    4. Hypertension   On no antihypertensives.   Well controlled.                5. Chronic Kidney Disease, Stage 4              History or renal carcinoma and nephrectomy.              10/13/2020: BUN/crea 27/2.0. CrCl 28.   10/20/2020: BUN/crea 56/1.8. CrCl 32.   10/25/2020: BUN/crea 49/1.6. CrCl 32.    11/2/2020: BUN/crea 58/2.0. CrCl 24.   11/4/2020: BUN/crea 60/2.5. CrCl 19.   11/5/2020: BUN/crea 54/2.2. CrCl 22.   11/9/2020: BUN/crea 40/1.9. CrCl 26.     6. Chronic Obstructive Pulmonary Disease/Respiratory Failure/Pleural Effusion               Chronic respiratory failure with CO2 retention.              10/13/2020: 7.23/94/73/39/90% on FiO2 of 40%.   Was overall slowly improving.    2020: AB.21/125/78/90%/FIO2 45%.   2020: Bronchoscopy.   2020: Right sided chest tube. Thoracentesis 650 ml. Hazy fluid with WBC of 349.   2020: Extubated.   11/10/2020: Extensive infiltrates.     7. Weakness   10/27/2020: Walked 150 ft with PT.   Weak.    8. Anemia   11/10/2020: H/H 8.1/23.2%.      VTE Risk Mitigation (From admission, onward)         Ordered     apixaban tablet 5 mg  2 times daily      20 1039     Place sequential compression device  Until discontinued      10/13/20 1405     IP VTE HIGH RISK PATIENT  Once      10/13/20 1405                Diana Meredith MD  Cardiology  [facility]

## 2020-11-10 NOTE — ASSESSMENT & PLAN NOTE
Patient with advanced lung disease secondary to chronic obstructive pulmonary disease, obstructive sleep apnea, obesity hypoventilation syndrome, and severe restrictive lung disease now with recent decompensation resulting in intubation due to possible pneumonia.  Minimally elevated procalcitonin level.  Continue antibiotic therapy.  Status post bronchoscopy and placement of the right chest tube.  Chest tube output decreasing.  No acid-fast bacillus seen from sample from bronchial wash.  Gram stain unremarkable.  Additional cultures pending.  Extubated 11/7/2020 to BiPAP.  Chest tube removed.  Patient needing continuous BiPAP.  Attempt to increase time off BiPAP as tolerated.

## 2020-11-10 NOTE — PLAN OF CARE
Dr Soria informed about need for orders.   11/10/20 1640   Post-Acute Status   Post-Acute Authorization Hospice   Hospice Status Awaiting Orders for Hospice

## 2020-11-10 NOTE — PT/OT/SLP PROGRESS
Physical Therapy Treatment    Patient Name:  Patsy Morris   MRN:  [id number]    Recommendations:     Discharge Recommendations:  nursing facility, skilled   Discharge Equipment Recommendations: none   Barriers to discharge: medical status    Assessment:     Patsy Morris is a 70 y.o. female admitted with a medical diagnosis of Acute on chronic respiratory failure with hypoxia and hypercapnia.  She presents with the following impairments/functional limitations:  weakness, impaired endurance, impaired self care skills, impaired functional mobilty, gait instability, impaired balance, decreased upper extremity function, decreased lower extremity function, impaired cardiopulmonary response to activity .. Pt continues with functional mobility deficits and should benefit from continuing current PT POC to maximize I and safety decrease risk of further decline of injury.    Rehab Prognosis: Good; patient would benefit from acute skilled PT services to address these deficits and reach maximum level of function.    Recent Surgery: * No surgery found *      Plan:     During this hospitalization, patient to be seen 5 x/week to address the identified rehab impairments via gait training, therapeutic activities, therapeutic exercises and progress toward the following goals:    · Plan of Care Expires:  12/06/20    Subjective     Chief Complaint: inability to walk  Patient/Family Comments/goals: to d/c home vs SNF  Pain/Comfort:  · Pain Rating 1: 0/10  · Pain Rating Post-Intervention 1: 0/10      Objective:     Communicated with nurseDania prior to session.  Patient found supine with blood pressure cuff, peripheral IV, telemetry, PICC line, ovalles catheter upon PT entry to room.     General Precautions: Standard, respiratory, fall   Orthopedic Precautions:N/A   Braces: N/A     Functional Mobility:  · Bed Mobility:     · Supine to Sit: contact guard assistance  · Sit to Supine: maximal assistance  · Transfers:     · Sit to  Stand:  minimum assistance with rolling walker  · Gait: with RW x  3 trials x 4ft forward and backward with min A for walker management, with ~ 1 min rest period between trials.       AM-PAC 6 CLICK MOBILITY  Turning over in bed (including adjusting bedclothes, sheets and blankets)?: 3  Sitting down on and standing up from a chair with arms (e.g., wheelchair, bedside commode, etc.): 2  Moving from lying on back to sitting on the side of the bed?: 3  Moving to and from a bed to a chair (including a wheelchair)?: 3  Need to walk in hospital room?: 1  Climbing 3-5 steps with a railing?: 1  Basic Mobility Total Score: 13       Therapeutic Activities and Exercises:   Pt presented supine in bed, motivated to participate in PT.  Bed Mobility training supine>sit with CGA with instruction and TCs for sequencing and hand placement. Scooting to EOB with CGA. Sitting balance training sitting EOB  X 10 requiring occasional min A to correct for posterior lean, Frequent tactile cues for maintaining midline.  Sit>stand transfer training pushing up from B hands on RW x 7 trials with min A,from ICU bed which is significantly higher than standard bed or chair. pt tolerated static stand for ~30s to min A.  Sides stepping ~ 2 ft to HOB with CGA, instruction for walker management. Gait training with RW x  3 trials x 4ft forward and backward with min A for walker management, with ~ 1 min rest period between trials.  Pt return to sit EOB with CGA, to supine with max A at LEs    Patient left supine with all lines intact, call button in reach, nurse notified and nurse present..    GOALS:   Multidisciplinary Problems     Physical Therapy Goals        Problem: Physical Therapy Goal    Goal Priority Disciplines Outcome Goal Variances Interventions   Physical Therapy Goal     PT, PT/OT Ongoing, Progressing     Description: Goals to be met by: 11/15/2020    Patient will perform the following to increase strength, improve mobility, and return to  prior level of function:    1. Supine <> sit with CGA.  2. Sit <> stand transfer w/ CGA and least restrictive assistive device.   3. Bed <> chair transfer with CGA and least restrictive assistive device.  4. Gait > 150 ft with CGA and least restrictive assistive device.                       Time Tracking:     PT Received On: 11/10/20  PT Start Time: 1221     PT Stop Time: 1251  PT Total Time (min): 30 min     Billable Minutes: Gait Training 12 and Neuromuscular Re-education 13   Overlap with OT for portions of session due to complex nature of patient and for safety with mobility to decrease fall risk for patient and caregiver injury requiring two skilled therapists to provide interventions.    Treatment Type: Treatment  PT/PTA: PT     PTA Visit Number: 0     Ramon Buenrostro, TA  11/10/2020

## 2020-11-10 NOTE — ASSESSMENT & PLAN NOTE
- bilateral, R>>L  - s/p chest tube placement on 11/4 given her baseline severe restrictive lung disease, any additional impingement on that worsens her ventilatory abilities thus hopeful that drainage of fluid will improve her respiratory dynamics   - pleural fluid studies borderline exudative. likely effusion related to heart failure however given cavitary lesion on right,  infectious vs malignant on differential.   - Chest tube removed 11/7  - cytology from pleural fluid negative for malignant cells, culture negative   Hpi Title: Evaluation of Skin Lesions How Severe Are Your Spot(S)?: mild Have Your Spot(S) Been Treated In The Past?: has not been treated Additional History: \\n\\nPt c/o growths on breast and back for eval

## 2020-11-11 NOTE — PT/OT/SLP PROGRESS
Physical Therapy Treatment    Patient Name:  Patsy Morris   MRN:  [id number]    Recommendations:     Discharge Recommendations:  nursing facility, skilled   Discharge Equipment Recommendations: none   Barriers to discharge: None    Assessment:     Patsy Morris is a 70 y.o. female admitted with a medical diagnosis of Acute on chronic respiratory failure with hypoxia and hypercapnia.  She presents with the following impairments/functional limitations:  weakness, impaired endurance, impaired self care skills, impaired functional mobilty, gait instability, impaired balance, decreased upper extremity function, decreased lower extremity function, impaired cardiopulmonary response to activity. Pt continues with functional mobility deficits and should benefit from continuing current PT POC to maximize I and safety decrease risk of further decline of injury.    Rehab Prognosis: Good; patient would benefit from acute skilled PT services to address these deficits and reach maximum level of function.    Recent Surgery: * No surgery found *      Plan:     During this hospitalization, patient to be seen 5 x/week to address the identified rehab impairments via gait training, therapeutic activities, therapeutic exercises and progress toward the following goals:    · Plan of Care Expires:  12/06/20    Subjective     Chief Complaint: inability to walk  Patient/Family Comments/goals: to walk  Pain/Comfort:  · Pain Rating 1: 0/10  · Pain Rating Post-Intervention 1: 0/10      Objective:     Communicated with nurseZee prior to session.  Patient found supine with blood pressure cuff, peripheral IV, pulse ox (continuous), telemetry, PureWick upon PT entry to room.     General Precautions: Standard, fall, respiratory   Orthopedic Precautions:N/A   Braces: N/A     Functional Mobility:  · Bed Mobility:     · Supine to Sit: moderate assistance  · Sit to Supine: dependence  · Transfers:     · Sit to Stand:  moderate  assistance with rolling walker  · Gait: unable      AM-PAC 6 CLICK MOBILITY  Turning over in bed (including adjusting bedclothes, sheets and blankets)?: 3  Sitting down on and standing up from a chair with arms (e.g., wheelchair, bedside commode, etc.): 2  Moving from lying on back to sitting on the side of the bed?: 2  Moving to and from a bed to a chair (including a wheelchair)?: 2  Need to walk in hospital room?: 1  Climbing 3-5 steps with a railing?: 1  Basic Mobility Total Score: 11       Therapeutic Activities and Exercises:   Pt presented supine in bed asleep with BIPAP.  Nurse cleared pt for removal of BIBAP and placement of NC @ 3L/min .  Bed mobility supine>sit EOB with mod A, pt able to move Les and trunk towards EOB, but required MOD A to bring trunk up right.  Sitting balance training at EOB x ~ 10 min, pt with L lean requiring min A to return to midline, pt did attain static sitting with UE support and SBA x ~ 5min.  Sit>stand x 3 trials with mod A, verbal instruction and min A to bring trunk upright form flexed position. Pt able to maintain static standing ~1 min at each trial.  Gait was attempted at each standing trial without success, do to pt's need to return to sitting.  Pt returned to supine dependently with 2 staff.  Pt scooted to HOB with max A of 2 with pt pushing with B Les.    Patient left supine with all lines intact, call button in reach, nurse, Zee notified and CHELSEA Bronson present..    GOALS:   Multidisciplinary Problems     Physical Therapy Goals        Problem: Physical Therapy Goal    Goal Priority Disciplines Outcome Goal Variances Interventions   Physical Therapy Goal     PT, PT/OT Ongoing, Progressing     Description: Goals to be met by: 11/15/2020    Patient will perform the following to increase strength, improve mobility, and return to prior level of function:    1. Supine <> sit with CGA.  2. Sit <> stand transfer w/ CGA and least restrictive assistive device.   3. Bed <> chair  transfer with CGA and least restrictive assistive device.  4. Gait > 150 ft with CGA and least restrictive assistive device.                       Time Tracking:     PT Received On: 11/11/20  PT Start Time: 1354     PT Stop Time: 1425  PT Total Time (min): 31 min     Billable Minutes: Therapeutic Activity 31    Treatment Type: Treatment  PT/PTA: PT     PTA Visit Number: 0     Ramon Buenrostro, PT  11/11/2020

## 2020-11-11 NOTE — SUBJECTIVE & OBJECTIVE
Interval History: On NC.  Reports fatigue    Medications:  Continuous Infusions:  Scheduled Meds:   apixaban  5 mg Oral BID    atorvastatin  80 mg Oral QHS    docusate sodium  100 mg Oral BID    escitalopram oxalate  10 mg Oral QHS    ferrous sulfate  325 mg Oral Daily    furosemide  40 mg Oral BID    gabapentin  100 mg Oral BID    metoprolol succinate  50 mg Oral Daily    miconazole NITRATE 2 %   Topical (Top) BID    vitamin renal formula (B-complex-vitamin c-folic acid)  1 capsule Oral Daily     PRN Meds:acetaminophen, dextrose 50%, dextrose 50%, diphenhydrAMINE, glucagon (human recombinant), glucose, glucose, melatonin, metoprolol, ondansetron, polyethylene glycol, promethazine (PHENERGAN) IVPB, sodium chloride 0.9%    Objective:     Vital Signs (Most Recent):  Temp: 98.4 °F (36.9 °C) (11/11/20 1130)  Pulse: 100 (11/11/20 1310)  Resp: (!) 27 (11/11/20 1310)  BP: 132/65 (11/11/20 1300)  SpO2: (!) 89 % (11/11/20 1310) Vital Signs (24h Range):  Temp:  [98.4 °F (36.9 °C)-99.4 °F (37.4 °C)] 98.4 °F (36.9 °C)  Pulse:  [] 100  Resp:  [19-36] 27  SpO2:  [89 %-99 %] 89 %  BP: ()/(54-97) 132/65     Weight: 97.6 kg (215 lb 2.7 oz)  Body mass index is 38.12 kg/m².    Physical Exam  Constitutional:       General: She is not in acute distress.     Appearance: She is well-developed. She is obese. She is ill-appearing (chronically ).      Interventions: Nasal cannula in place.   Neck:      Musculoskeletal: Normal range of motion.   Cardiovascular:      Rate and Rhythm: Normal rate and regular rhythm.      Heart sounds: No murmur.   Pulmonary:      Effort: Tachypnea present.      Breath sounds: Normal breath sounds.   Chest:      Chest wall: No tenderness.   Abdominal:      General: Bowel sounds are normal.   Musculoskeletal: Normal range of motion.      Right lower leg: Edema present.      Left lower leg: Edema present.   Skin:     General: Skin is warm.   Neurological:      Mental Status: She is alert and  oriented to person, place, and time.   Psychiatric:         Thought Content: Thought content normal.         Judgment: Judgment normal.         Review of Symptoms    Symptom Assessment (ESAS 0-10 Scale)  Pain:  0  Dyspnea:  4  Anxiety:  0  Nausea:  0  Depression:  0  Anorexia:  2  Fatigue:  5               Performance Status:  40    ECOG Performance Status Grade:  3 - Confined to bed or chair 50% of waking hours    Living Arrangements:  Lives with family    Psychosocial/Cultural: Patient lives with her brother, who she is primary caregiver for.  Patient has a sister and niece who are able to help her at home if needed.       Advance Care Planning   Advance Directives:   LaPOST: Yes    Do Not Resuscitate Status: No    Medical Power of : Yes    Agent's Name:  Earlene Dexter   Agent's Contact Number:  [phone]    Decision Making:  Patient answered questions         Significant Labs: All pertinent labs within the past 24 hours have been reviewed.  CBC:   Recent Labs   Lab 11/11/20 0523   WBC 6.85   HGB 8.6*   HCT 28.0*   MCV 88        BMP:  Recent Labs   Lab 11/11/20 0523         K 4.5      CO2 31*   BUN 37*   CREATININE 1.7*   CALCIUM 9.0   MG 1.9     LFT:  Lab Results   Component Value Date    AST 27 11/05/2020    ALKPHOS 43 (L) 11/05/2020    BILITOT 0.7 11/05/2020     Albumin:   Albumin   Date Value Ref Range Status   11/05/2020 2.6 (L) 3.5 - 5.2 g/dL Final     Protein:   Total Protein   Date Value Ref Range Status   11/05/2020 6.0 6.0 - 8.4 g/dL Final     Lactic acid:   Lab Results   Component Value Date    LACTATE 0.6 10/13/2020       Significant Imaging: I have reviewed all pertinent imaging results/findings within the past 24 hours.     I have reviewed the Chest x ray from 11/9/2020     I have reviewed the CT chest from 10/15/2020

## 2020-11-11 NOTE — PT/OT/SLP PROGRESS
Occupational Therapy   Treatment    Name: Patsy Morris  MRN: [id number]  Admitting Diagnosis:  Acute on chronic respiratory failure with hypoxia and hypercapnia       Recommendations:     Discharge Recommendations: nursing facility, skilled ( per chart, pt is discharging to home with [facility])  Discharge Equipment Recommendations:  (TBD by next level of care)  Barriers to discharge:  Decreased caregiver support(Pt's current functional status)    Assessment:     Patsy Morris is a 70 y.o. female with a medical diagnosis of Acute on chronic respiratory failure with hypoxia and hypercapnia.  She presents lying in ICU bed wearing BiPAP and agreeable to OT tx session. Performance deficits affecting function are weakness, impaired endurance, impaired self care skills, impaired functional mobilty, gait instability, impaired balance, decreased lower extremity function, decreased safety awareness, impaired cardiopulmonary response to activity, decreased upper extremity function, decreased ROM, impaired fine motor.     Rehab Prognosis:  Fair; patient would benefit from acute skilled OT services to address these deficits and reach maximum level of function.       Plan:     Patient to be seen 5 x/week to address the above listed problems via self-care/home management, therapeutic activities, therapeutic exercises  · Plan of Care Expires: 11/14/20  · Plan of Care Reviewed with: patient(cousin)    Subjective     Pain/Comfort:  · Pain Rating 1: 0/10  · Pain Rating Post-Intervention 1: 0/10    Objective:     Communicated with: nurse, Zee, prior to session.  Patient found HOB elevated with blood pressure cuff, bed alarm, BIPAP, PureWick, peripheral IV, pulse ox (continuous), telemetry upon OT entry to room.    General Precautions: Standard, fall, respiratory   Orthopedic Precautions:N/A   Braces: N/A     Occupational Performance:     Bed Mobility:    · Sit to supine: Total A x 2   · Scooting up to HOB: Total A x  2    Functional Mobility/Transfers:  · Functional Mobility: See PT noted; pt stood 3 times during tx session but unable to come to full upright position and had her right knee flexed at all times.  Pt leaning and needing significant assistance to sit EOB in midline due to heaving lateral leaning    Activities of Daily Living:  Grooming: Brushing teeth - set up; Combing hair: Min A  [facility] gown as a robe sitting EOB: Max A  LB Dressing: Total A  Toileting: pt using Pure Wick    [facility] 6 Click ADL: 14    Treatment & Education:  Role of OT, ADLs, ADL mobility, safety, caregiver education, upright positioning sitting up in bed for feeding and improved respiratory function, discharge recs    Patient left HOB elevated with all lines intact, call button in reach, bed alarm on, nurse notified, cousin present and Pure WickEducation:      GOALS:   Multidisciplinary Problems     Occupational Therapy Goals        Problem: Occupational Therapy Goal    Goal Priority Disciplines Outcome Interventions   Occupational Therapy Goal     OT, PT/OT Ongoing, Progressing    Description: Goals to be met by: 11/13/2020    Patient will increase functional independence with ADLs by performing:    UE Dressing with Min A.  LE Dressing with Maximum Assistance using adaptive equipment as needed.  Grooming while seated with SBA.  Toileting from bedside commode with CGA for hygiene and clothing management.   Toilet transfer to bedside commode with Minimal Assistance.  Pt will tolerate sitting EOB for 15 minutes with SBA for balance.                         Time Tracking:     OT Date of Treatment: 11/11/20  OT Start Time: 1353  OT Stop Time: 1454  OT Total Time (min): 61 min     Overlap with PT for portions of session due to complex nature of pt and need for increased safety.  Two skilled therapists needed during functional mobility to decrease patient fall risk and decrease risk of caregiver injury.        Billable Minutes:Self Care/Home  Management 30  Therapeutic Activity 20    CARLOS Mendez  11/11/2020

## 2020-11-11 NOTE — ASSESSMENT & PLAN NOTE
Chronic Respiratory Failure on 3 liters of Oxygen at home - COPD, BHAVANI/OHS with severe restrictive lung disease.  She presented with SOB 3 days PTA.  Placed on BiPAP and given Lasix 80mg IV x 1 in ED.  Transferred to ICU on admission.  Started initially on Lasix 40mg IV q12 and increased to 80mg q12.  Started on IV Vanc, Flagyl, and Cefepime on 10/15/2020 by Pulmonary-CC given CT Chest concerning for infection - antibiotics stopped 10/18/2020.  Respiratory culture with normal respiratory leonardo on 10/16/2020.  Given cavitary appearance on imaging, Quantiferon gold sent and indeterminate x 2  with Sputum AFB stain negative and  Culture pending x 1 on 10/16/2020.  Transferred out of ICU on 10/21/2020.  Awaiting Trilogy for home.   More short of breath on 10/30/2020 - CXR overall stable from 2 days ago, but BNP in 400s from 200s - given an additional dose of Lasix 40mg IV with improvement in symptoms.    She became more hypoxic on 11/1/2020 and ABG with pH of 7.284 and pCO2 of 104.2 on BiPAP.  Transferred to ICU on 11/1/2020 on continuous BiPAP.   She decompensated overnight and was intubated. She was febrile to 100.8. Started on IV steroid and IV antibiotics (Vanc and Cefepime).   Pulmonary was consulted.  Minimally elevated procalcitonin level.  Status post bronchoscopy and placement of the right chest tube.  Chest tube output decreasing.  No acid-fast bacillus seen from sample from bronchial wash.  Gram stain unremarkable.  Additional cultures pending.  Extubated 11/7/2020 to BiPAP.  Chest tube removed.  Patient needing continuous BiPAP with short breaks.  Attempt to increase time off BiPAP as tolerated.  Patient now agrees to Home Hospice, so she may now be able to get a Trilogy.      Plan:  Follow up on Pulmonary and Cardiology recommendations  Continue Lasix 40mg po q12  DuoNebs   Follow up on cultures  Strict I&Os

## 2020-11-11 NOTE — PLAN OF CARE
Patient has a dx of acute on chronic respiratory failure. AAOx4, on 3 L o2 n/c when awake, on Bipap FiO2 30% when sleeping, afebrile, in Atrial flutter. Chronic back pain relieved with prn Tylenol 1 g PO x 1. Itching managed with prn Benadryl 25 mg PO x 1. Melatonin 6 mg PO x 1 at qHS for insomnia. Patient has remained in bed during this shift with purewick in place, and did have a small liquid incontinent BM last night.

## 2020-11-11 NOTE — ASSESSMENT & PLAN NOTE
"-Patient seen on 8/24 and expressed a desire "enjoy life a little more".  Patient defines this by being able to leave her house and go to the mall or the grocery store. Patient reports not being able to do these activities due to worsening mobility.  When discussed the patient hopes and if they were not able to be obtained patient reported this would be an unacceptable outcome for her. Patient expressed her desire to be able to cook again.  Patient expressed her love for cooking.  - Patient fears  if something were to happen to her who would care for her brother who has schizophrenia.    - Patient previously completed LaPOST- DNR.  Then patient voided the LaPOST and completed new LaPOST on 9/4, changing her wishes to Full code  -Re discussed GOC: returning home. Ms. Morris is frustrated because she is relying on others to assist her and she was not able to participate with PT as much as she would have liked.  We discussed the possibility of needing facility placement to regain strength.  She is completely reluctant.  She wants to go home and says she has family that can provide assistance all day, she will be alone at night.  Ms. Morris met with hospice navigator yesterday and desires hospice at home.  We discussed hospice support at home focusing on quality of life/ symptom management.  Ms. Morris is worried about the "major transition" home, she is also worried about the support her family will provide.  Her family has been very involved in her care recently.  We discussed the love she has for her family. She wants to be at home surrounded by her family and friends.  She stated "I knew this day would come I just did not know it would be this soon". Emotional support provided. Ms. Morris wishes to be discharged tomorrow to "get my thoughts together"  - We addressed code status, she wishes to remain full code and continue to think about her wishes.  Needs to be re addressed when discharged home   - Will sign off.  " Please call for any questions or concerns

## 2020-11-11 NOTE — PROGRESS NOTES
[facility]  Cardiology  Progress Note    Patient Name: Patsy Morris  MRN: [id number]  Admission Date: 10/13/2020  Hospital Length of Stay: 29 days  Code Status: Full Code   Attending Physician: Ravi Soria MD   Primary Care Physician: Luisana Cartagena MD  Expected Discharge Date:   Principal Problem:Acute on chronic respiratory failure with hypoxia and hypercapnia    Subjective:     Brief HPI:    Patsy Morris is a 70 y.o.female with hypertension. She has chronic atrial fibrillation and heart failure with preserved ejection fraction. She has chronic obstructive pulmonary disease being on home oxygen with CO2 retention. She has undergone nephrectomy for renal cell carcinoma and has chronic kidney disease. She was admitted to [facility] in 2020 and 2020 with heart failure and exacerbations of her chronic obstructive pulmonary disease. She went to therapy at [facility].      She used to be on furosemide 40 mg Q24 however after her last hospitalization she had the does of furosemide increased to 80 mg Q24. The dose was reduced to 40 mg Q24 on 10/8/2020. She became increasingly short of breath and presents fluid overloaded in heart failure as well as an exacerbation of her COPD with high CO2. She was admitted to the ICU.    Hospital Course:    Diuresis.    BIPAP.    Respiratory treatments.    10/15/2020: Echo: Normal left ventricular size and systolic function. EF 60%. Moderately dilated LA. Mildly dilated RV. SPAP 49 mmHg. Small pericardial effusion.    10/16/2020: Apixiban 5 mg Q12 was changed to heparin iv for consideration of bronchoscopy.    10/21/2020: Transferred from ICU to telemetry.    10/27/2020: Walked 150 ft with PT.    2020: AB.21/125/78/90%/FIO2 45%    2020: Respiratory failure. Intubated in ICU.    2020: Bronchoscopy.    2020: Right sided chest tube. Thoracentesis 650 ml. Hazy fluid with WBC of 349.    2020:  Extubated.    Interval History:    Feeling fair. Still in ICU. Variable VRR. Falls asleep forgetting to ask for BIPAP.        Review of Systems   Constitution: Positive for malaise/fatigue.   Cardiovascular: Negative for chest pain, irregular heartbeat, orthopnea and syncope.   Respiratory: Positive for shortness of breath. Negative for wheezing.    Gastrointestinal: Negative for abdominal pain.   Psychiatric/Behavioral: Positive for memory loss.       Objective:     Vital Signs (Most Recent):  Temp: 98.4 °F (36.9 °C) (11/11/20 0710)  Pulse: 110 (11/11/20 0800)  Resp: (!) 28 (11/11/20 0800)  BP: 113/75 (11/11/20 0800)  SpO2: 97 % (11/11/20 0800) Vital Signs (24h Range):  Temp:  [98.4 °F (36.9 °C)-99.4 °F (37.4 °C)] 98.4 °F (36.9 °C)  Pulse:  [] 110  Resp:  [20-36] 28  SpO2:  [88 %-98 %] 97 %  BP: ()/(54-82) 113/75     Weight: 97.6 kg (215 lb 2.7 oz)  Body mass index is 38.12 kg/m².    SpO2: 97 %  O2 Device (Oxygen Therapy): nasal cannula      Intake/Output Summary (Last 24 hours) at 11/11/2020 0816  Last data filed at 11/11/2020 0710  Gross per 24 hour   Intake 810 ml   Output 2350 ml   Net -1540 ml       Lines/Drains/Airways     Drain            Female External Urinary Catheter 11/10/20 1800 less than 1 day          Peripheral Intravenous Line                 Midline Catheter Insertion/Assessment  - Double Lumen 10/20/20 1220 Right median cubital vein (antecubital fossa)  21 days         Peripheral IV - Single Lumen 11/05/20 0630 20 G Anterior;Left Forearm 6 days                Physical Exam   Constitutional: She appears well-developed and well-nourished. She appears ill. No distress.   Neck: Carotid bruit is not present.   Cardiovascular: Normal rate, S1 normal and S2 normal. An irregularly irregular rhythm present.   Pulmonary/Chest: She has decreased breath sounds in the right lower field. She has rhonchi in the right lower field and the left lower field.   Chest tube on the right side.   Abdominal:  Soft. Normal appearance.   Musculoskeletal:      Right ankle: She exhibits no swelling.      Left ankle: She exhibits no swelling.   Skin: Skin is warm and dry.     Current Medications:     apixaban  5 mg Oral BID    atorvastatin  80 mg Oral QHS    docusate sodium  100 mg Oral BID    escitalopram oxalate  10 mg Oral QHS    ferrous sulfate  325 mg Oral Daily    furosemide  40 mg Oral BID    gabapentin  100 mg Oral BID    metoprolol succinate  50 mg Oral Daily    miconazole NITRATE 2 %   Topical (Top) BID    vitamin renal formula (B-complex-vitamin c-folic acid)  1 capsule Oral Daily     Current Laboratory Results:    Recent Results (from the past 24 hour(s))   Basic Metabolic Panel    Collection Time: 11/11/20  5:23 AM   Result Value Ref Range    Sodium 142 136 - 145 mmol/L    Potassium 4.5 3.5 - 5.1 mmol/L    Chloride 101 95 - 110 mmol/L    CO2 31 (H) 23 - 29 mmol/L    Glucose 103 70 - 110 mg/dL    BUN 37 (H) 8 - 23 mg/dL    Creatinine 1.7 (H) 0.5 - 1.4 mg/dL    Calcium 9.0 8.7 - 10.5 mg/dL    Anion Gap 10 8 - 16 mmol/L    eGFR if African American 34 (A) >60 mL/min/1.73 m^2    eGFR if non African American 30 (A) >60 mL/min/1.73 m^2   CBC Auto Differential    Collection Time: 11/11/20  5:23 AM   Result Value Ref Range    WBC 6.85 3.90 - 12.70 K/uL    RBC 3.20 (L) 4.00 - 5.40 M/uL    Hemoglobin 8.6 (L) 12.0 - 16.0 g/dL    Hematocrit 28.0 (L) 37.0 - 48.5 %    MCV 88 82 - 98 fL    MCH 26.9 (L) 27.0 - 31.0 pg    MCHC 30.7 (L) 32.0 - 36.0 g/dL    RDW 14.6 (H) 11.5 - 14.5 %    Platelets 217 150 - 350 K/uL    MPV 11.2 9.2 - 12.9 fL    Immature Granulocytes 0.3 0.0 - 0.5 %    Gran # (ANC) 4.9 1.8 - 7.7 K/uL    Immature Grans (Abs) 0.02 0.00 - 0.04 K/uL    Lymph # 0.9 (L) 1.0 - 4.8 K/uL    Mono # 0.8 0.3 - 1.0 K/uL    Eos # 0.3 0.0 - 0.5 K/uL    Baso # 0.01 0.00 - 0.20 K/uL    nRBC 0 0 /100 WBC    Gran % 71.2 38.0 - 73.0 %    Lymph % 13.1 (L) 18.0 - 48.0 %    Mono % 11.5 4.0 - 15.0 %    Eosinophil % 3.8 0.0 - 8.0 %     Basophil % 0.1 0.0 - 1.9 %    Platelet Estimate Clumped (A)     Poly Occasional     Basophilic Stippling Occasional     Smudge Cells Present     Differential Method Automated    Magnesium    Collection Time: 11/11/20  5:23 AM   Result Value Ref Range    Magnesium 1.9 1.6 - 2.6 mg/dL   Phosphorus    Collection Time: 11/11/20  5:23 AM   Result Value Ref Range    Phosphorus 4.0 2.7 - 4.5 mg/dL     Current Imaging Results:    X-Ray Chest AP Portable   Final Result      Removal of support tubes and catheters.      No change in the level of aeration of the lung parenchyma.         Electronically signed by: Devang Juarez   Date:    11/10/2020   Time:    07:26      X-Ray Chest 1 View   Final Result      Since November 4, 2020, slightly increased diffuse bilateral airspace opacities, particularly in the upper lung zones.         Electronically signed by: Bala Perez MD   Date:    11/05/2020   Time:    07:42      X-Ray Chest 1 View   Final Result      ET tube, enteric tube, and right-sided pigtail chest tube appear in satisfactory position.      Decreasing pulmonary vascular congestion with improving bilateral parahilar and basilar consolidation.      Decreasing consolidation within the mid to right upper lung.      Improved aeration within the right lung base likely due to decreased right pleural effusion.         Electronically signed by: Regulo Solis MD   Date:    11/04/2020   Time:    12:32      XR Non-Rad Performed NG/Gastric Tube Check   Final Result      Tip of nasogastric tube in the proximal stomach.         Electronically signed by: Charlotte Howell   Date:    11/02/2020   Time:    00:25      X-Ray Chest 1 View   Final Result   Abnormal      Interval progression of abnormal intrathoracic findings, increasing bilateral pulmonary opacities, as discussed above.      ET tube noted, the tip above the yamilka.      Enteric tube noted, the distal tip extends subdiaphragmatic below the field of view however the side  hole is likely along the distal esophagus, advancement recommended.      This report was flagged in Epic as abnormal.         Electronically signed by: Hammad Ayala   Date:    11/02/2020   Time:    00:09      X-Ray Chest AP Portable   Final Result      Stable right upper lobe consolidation.  Findings remain concerning for pneumonia with small cavitation as described previously.      Improved aeration right lung base.  Small pleural effusions, decreased in volume on the right.         Electronically signed by: Elsa Alcantar   Date:    10/30/2020   Time:    11:47      X-Ray Chest AP Portable   Final Result      Worsening right basilar atelectasis or consolidation.      Right upper lobe consolidation is improved.  There may be a tiny pneumatocele or central cavitation.      Pleural effusions are improved.      Continued follow-up advised.         Electronically signed by: Elsa Alcantar   Date:    10/28/2020   Time:    10:24      X-Ray Chest AP Portable   Final Result      No significant change.  There is cardiomegaly, multifocal airspace opacities and bilateral effusions.         Electronically signed by: Ajay Camara MD   Date:    10/20/2020   Time:    08:48      US Lower Extremity Veins Bilateral   Final Result      No evidence of deep venous thrombosis in either lower extremity.         Electronically signed by: Ceasar Carreon MD   Date:    10/15/2020   Time:    21:53      CT Chest Without Contrast   Final Result      The findings highly concerning for extensive diffuse pulmonary infection with bilateral pleural fluid collections right greater than left.         Electronically signed by: Regulo Bassett MD   Date:    10/15/2020   Time:    12:01      X-Ray Chest AP Portable   Final Result      Interval worsening of diffuse bilateral airspace disease and moderate bilateral pleural effusions when compared to 09/09/2020         Electronically signed by: Halina Plascencia   Date:    10/13/2020   Time:    09:40           10/15/2020: Echo:    The left ventricle is normal in size with normal systolic function. The estimated ejection fraction is 60%.  A diastolic pattern consistent with atrial fibrillation observed.  Moderate left atrial enlargement.  Mild right ventricular enlargement.  Normal right ventricular systolic function.  Severe right atrial enlargement.  The aortic valve is mildly sclerotic.  The mitral valve is mildly sclerotic.  Mild mitral regurgitation.  Mild tricuspid regurgitation.  There is mild pulmonary hypertension.  The estimated PA systolic pressure is 49 mmHg.  Intermediate central venous pressure (8 mmHg).  Small circumferential pericardial effusion.  There is a left pleural effusion.      Assessment and Plan:     Problem List:    Active Diagnoses:    Diagnosis Date Noted POA    PRINCIPAL PROBLEM:  Acute on chronic respiratory failure with hypoxia and hypercapnia [J96.21, J96.22] 08/03/2016 Yes    Atrial fibrillation with rapid ventricular response [I48.91] 09/01/2020 Yes    Debility [R53.81] 08/26/2020 Yes    Encounter for palliative care [Z51.5] 08/25/2020 Not Applicable    Acute on chronic diastolic congestive heart failure [I50.33] 05/20/2016 Yes    Chronic kidney disease (CKD) stage G4/A1, severely decreased glomerular filtration rate (GFR) between 15-29 mL/min/1.73 square meter and albuminuria creatinine ratio less than 30 mg/g [N18.4]  Yes    Dyslipidemia [E78.5] 02/22/2016 Yes    Essential hypertension [I10] 10/15/2014 Yes      Problems Resolved During this Admission:    Diagnosis Date Noted Date Resolved POA    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, 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] 11/10/2020 11/10/2020 Yes     Chronic    Acute kidney injury superimposed on chronic kidney disease [N17.9, N18.9] 11/03/2020 11/10/2020 No    On mechanically assisted ventilation [Z99.11] 11/02/2020 11/08/2020 Not Applicable    Pleural effusion [J90] 11/02/2020 11/10/2020 Yes    Severe sepsis [A41.9, R65.20]  11/02/2020 11/02/2020 No    Shortness of breath [R06.02] 11/01/2020 11/02/2020 Yes    Normocytic anemia [D64.9] 10/27/2020 11/03/2020 Yes    Acute on chronic congestive heart failure [I50.9] 10/21/2020 10/27/2020 Yes    Cavitary lesion of lung [J98.4] 10/16/2020 11/10/2020 Yes    Acute on chronic congestive heart failure [I50.9] 10/13/2020 10/13/2020 Yes    Current moderate episode of major depressive disorder without prior episode [F32.1] 04/30/2020 11/03/2020 Yes    Pulmonary nodules/lesions, multiple [R91.8] 03/22/2019 10/14/2020 Yes    Obstructive sleep apnea [G47.33]  11/10/2020 Yes    Paroxysmal atrial fibrillation [I48.0] 08/03/2016 10/13/2020 Yes    Chronic hypercapnic respiratory failure [J96.12] 03/11/2016 10/13/2020 Yes    Obesity hypoventilation syndrome [E66.2] 02/25/2016 11/10/2020 Yes    Renal carcinoma [C64.9] 03/10/2015 10/13/2020 Yes       Assessment and Plan:     1. Heart Failure, Diastolic, Chronic              8/24/2020: Echo: Normal left ventricular size and systolic function. Mildly dilated LA.   10/15/2020: Echo: Normal left ventricular size and systolic function. EF 60%. Moderately dilated LA. Mildly dilated RV. SPAP 49 mmHg. Small pericardial effusion.              At NH was on furosemide 40 mg Q24.              10/13/2020: Presented fluid overloaded in HF. Received furosemide 80 mg iv Q12.   10/20/2020: CXR with extensive infiltrates and pleural effusions. .    10/26/2020: .   11/10/2020: .   On furosemide 40 mg Q12.   Does not appear fluid overloaded.      2. Atrial Fibrillation              In chronic atrial fibrillation.              On apixiban.              Used to be on metoprolol 50 mg Q12.              Rate was well controlled mostly  bpm.   On metoprolol succinate 50 mg Q24.   VRR very variable.     3. Chronic Anticoagulation              Been on apixiban 5 mg Q12.   10/16/2020: Apixiban 5 mg Q12 was changed to heparin iv.   On apixiban 5 mg  Q12.   No bleeding.    4. Hypertension   On no antihypertensives.   Well controlled.                5. Chronic Kidney Disease, Stage 4              History or renal carcinoma and nephrectomy.              10/13/2020: BUN/crea 27/2.0. CrCl 28.   10/20/2020: BUN/crea 56/1.8. CrCl 32.   10/25/2020: BUN/crea 49/1.6. CrCl 32.    2020: BUN/crea 58/2.0. CrCl 24.   2020: BUN/crea 60/2.5. CrCl 19.   2020: BUN/crea 54/2.2. CrCl 22.   2020: BUN/crea 40/1.9. CrCl 26.     6. Chronic Obstructive Pulmonary Disease/Respiratory Failure/Pleural Effusion              Chronic respiratory failure with CO2 retention.              10/13/2020: 7.23/94/73/39/90% on FiO2 of 40%.   Was overall slowly improving.    2020: AB.21/125/78/90%/FIO2 45%.   2020: Bronchoscopy.   2020: Right sided chest tube. Thoracentesis 650 ml. Hazy fluid with WBC of 349.   2020: Extubated.   11/10/2020: Extensive infiltrates.     7. Weakness   10/27/2020: Walked 150 ft with PT.   Weak.      VTE Risk Mitigation (From admission, onward)         Ordered     apixaban tablet 5 mg  2 times daily      20 1039     Place sequential compression device  Until discontinued      10/13/20 1405     IP VTE HIGH RISK PATIENT  Once      10/13/20 1405                Diana Meredith MD  Cardiology  [facility]

## 2020-11-11 NOTE — PROGRESS NOTES
"[facility]  Palliative Medicine  Progress Note    Patient Name: Patsy Morris  MRN: [id number]  Admission Date: 10/13/2020  Hospital Length of Stay: 29 days  Code Status: Full Code   Attending Provider: Ravi Soria MD  Consulting Provider: Elsa Bran DNP  Primary Care Physician: Luisana Cartaegna MD  Principal Problem:Acute on chronic respiratory failure with hypoxia and hypercapnia    Patient information was obtained from patient, past medical records and ER records.      Assessment/Plan:     * Acute on chronic respiratory failure with hypoxia and hypercapnia  - Pulmonary following  - Awaiting home trilogy       Debility  - Pt/OT recommending SNF  - Patient does not desire to go to SNF      Encounter for palliative care  -Patient seen on 8/24 and expressed a desire "enjoy life a little more".  Patient defines this by being able to leave her house and go to the mall or the grocery store. Patient reports not being able to do these activities due to worsening mobility.  When discussed the patient hopes and if they were not able to be obtained patient reported this would be an unacceptable outcome for her. Patient expressed her desire to be able to cook again.  Patient expressed her love for cooking.  - Patient fears  if something were to happen to her who would care for her brother who has schizophrenia.    - Patient previously completed LaPOST- DNR.  Then patient voided the LaPOST and completed new LaPOST on 9/4, changing her wishes to Full code  -Re discussed GOC: returning home. Ms. Morris is frustrated because she is relying on others to assist her and she was not able to participate with PT as much as she would have liked.  We discussed the possibility of needing facility placement to regain strength.  She is completely reluctant.  She wants to go home and says she has family that can provide assistance all day, she will be alone at night.  Ms. Morris met with hospice " "navigator yesterday and desires hospice at home.  We discussed hospice support at home focusing on quality of life/ symptom management.  Ms. Morris is worried about the "major transition" home, she is also worried about the support her family will provide.  Her family has been very involved in her care recently.  We discussed the love she has for her family. She wants to be at home surrounded by her family and friends.  She stated "I knew this day would come I just did not know it would be this soon". Emotional support provided. Ms. Morris wishes to be discharged tomorrow to "get my thoughts together"  - We addressed code status, she wishes to remain full code and continue to think about her wishes.  Needs to be re addressed when discharged home   - Will sign off.  Please call for any questions or concerns     Acute on chronic diastolic congestive heart failure  -intubated on 11/1, extubated 11/6 to BIPAP  - Patient on NC        I will sign off. Please contact us if you have any additional questions.    Subjective:     Chief Complaint:   Chief Complaint   Patient presents with    Shortness of Breath     SOB for several days, upon EMS arrival pt was 72 on 3L at home oxygen. Pt placed on CPAP per EMS.        HPI:   Ms. Morris is a 70 y.o. female with PMH of HTN, asthma, afib, CHF, obesity, BHAVANI, CKD 3/4, COPD, who presented to [facility] ED due to worsening SOB for a few days.  Patients oxygen saturation was 72 on 3 L of oxygen.  Patient reports she is on oxygen at home (3L NC) and has to sleep in a recliner chair.  She reports worsening SOB with simple activities such as cleaning, showering, and cooking.       Patient does not have any children, she lives with her younger brother who has schizophrenia.  She is his primary caregiver.  Patient has 9 nieces and nephews who she helped raise who she is close to.       I saw the patient on 8/25/2020 prior to being discharged to SNF and then again on 9/4/2020.       Hospital " Course:  No notes on file    Interval History: On NC.  Reports fatigue    Medications:  Continuous Infusions:  Scheduled Meds:   apixaban  5 mg Oral BID    atorvastatin  80 mg Oral QHS    docusate sodium  100 mg Oral BID    escitalopram oxalate  10 mg Oral QHS    ferrous sulfate  325 mg Oral Daily    furosemide  40 mg Oral BID    gabapentin  100 mg Oral BID    metoprolol succinate  50 mg Oral Daily    miconazole NITRATE 2 %   Topical (Top) BID    vitamin renal formula (B-complex-vitamin c-folic acid)  1 capsule Oral Daily     PRN Meds:acetaminophen, dextrose 50%, dextrose 50%, diphenhydrAMINE, glucagon (human recombinant), glucose, glucose, melatonin, metoprolol, ondansetron, polyethylene glycol, promethazine (PHENERGAN) IVPB, sodium chloride 0.9%    Objective:     Vital Signs (Most Recent):  Temp: 98.4 °F (36.9 °C) (11/11/20 1130)  Pulse: 100 (11/11/20 1310)  Resp: (!) 27 (11/11/20 1310)  BP: 132/65 (11/11/20 1300)  SpO2: (!) 89 % (11/11/20 1310) Vital Signs (24h Range):  Temp:  [98.4 °F (36.9 °C)-99.4 °F (37.4 °C)] 98.4 °F (36.9 °C)  Pulse:  [] 100  Resp:  [19-36] 27  SpO2:  [89 %-99 %] 89 %  BP: ()/(54-97) 132/65     Weight: 97.6 kg (215 lb 2.7 oz)  Body mass index is 38.12 kg/m².    Physical Exam  Constitutional:       General: She is not in acute distress.     Appearance: She is well-developed. She is obese. She is ill-appearing (chronically ).      Interventions: Nasal cannula in place.   Neck:      Musculoskeletal: Normal range of motion.   Cardiovascular:      Rate and Rhythm: Normal rate and regular rhythm.      Heart sounds: No murmur.   Pulmonary:      Effort: Tachypnea present.      Breath sounds: Normal breath sounds.   Chest:      Chest wall: No tenderness.   Abdominal:      General: Bowel sounds are normal.   Musculoskeletal: Normal range of motion.      Right lower leg: Edema present.      Left lower leg: Edema present.   Skin:     General: Skin is warm.   Neurological:       Mental Status: She is alert and oriented to person, place, and time.   Psychiatric:         Thought Content: Thought content normal.         Judgment: Judgment normal.         Review of Symptoms    Symptom Assessment (ESAS 0-10 Scale)  Pain:  0  Dyspnea:  4  Anxiety:  0  Nausea:  0  Depression:  0  Anorexia:  2  Fatigue:  5               Performance Status:  40    ECOG Performance Status Grade:  3 - Confined to bed or chair 50% of waking hours    Living Arrangements:  Lives with family    Psychosocial/Cultural: Patient lives with her brother, who she is primary caregiver for.  Patient has a sister and niece who are able to help her at home if needed.       Advance Care Planning   Advance Directives:   LaPOST: Yes    Do Not Resuscitate Status: No    Medical Power of : Yes    Agent's Name:  Earlene Dexter   Agent's Contact Number:  [phone]    Decision Making:  Patient answered questions         Significant Labs: All pertinent labs within the past 24 hours have been reviewed.  CBC:   Recent Labs   Lab 11/11/20 0523   WBC 6.85   HGB 8.6*   HCT 28.0*   MCV 88        BMP:  Recent Labs   Lab 11/11/20 0523         K 4.5      CO2 31*   BUN 37*   CREATININE 1.7*   CALCIUM 9.0   MG 1.9     LFT:  Lab Results   Component Value Date    AST 27 11/05/2020    ALKPHOS 43 (L) 11/05/2020    BILITOT 0.7 11/05/2020     Albumin:   Albumin   Date Value Ref Range Status   11/05/2020 2.6 (L) 3.5 - 5.2 g/dL Final     Protein:   Total Protein   Date Value Ref Range Status   11/05/2020 6.0 6.0 - 8.4 g/dL Final     Lactic acid:   Lab Results   Component Value Date    LACTATE 0.6 10/13/2020       Significant Imaging: I have reviewed all pertinent imaging results/findings within the past 24 hours.     I have reviewed the Chest x ray from 11/9/2020     I have reviewed the CT chest from 10/15/2020       Discussed with Dr. Soria and Valery Ambrocio.      > 50% of 40 min visit spent in chart review, face to  face discussion of goals of care,  symptom assessment, coordination of care and emotional support.    Elsa Bran DNP  Palliative Medicine  [facility]

## 2020-11-11 NOTE — PLAN OF CARE
Problem: Occupational Therapy Goal  Goal: Occupational Therapy Goal  Description: Goals to be met by: 11/13/2020    Patient will increase functional independence with ADLs by performing:    UE Dressing with Min A.  LE Dressing with Maximum Assistance using adaptive equipment as needed.  Grooming while seated with SBA.  Toileting from bedside commode with CGA for hygiene and clothing management.   Toilet transfer to bedside commode with Minimal Assistance.  Pt will tolerate sitting EOB for 15 minutes with SBA for balance.      Outcome: Ongoing, Progressing   Progressing slowly towards goals.  Pt to benefit from continued acute care OT services to increase independence in self-care/functional transfers and to decrease caregiver burden.  Continue POC.   Pt is supposed to be discharging to home with [facility]osp[facility].  Pt's family will need to be trained in how to assist pt at home with self care and functional mobility/bed mobility.

## 2020-11-11 NOTE — PLAN OF CARE
Problem: Physical Therapy Goal  Goal: Physical Therapy Goal  Description: Goals to be met by: 11/15/2020    Patient will perform the following to increase strength, improve mobility, and return to prior level of function:    1. Supine <> sit with CGA.  2. Sit <> stand transfer w/ CGA and least restrictive assistive device.   3. Bed <> chair transfer with CGA and least restrictive assistive device.  4. Gait > 150 ft with CGA and least restrictive assistive device.      Outcome: Ongoing, Progressing  Pt presented supine in bed asleep with BIPAP.  Nurse cleared pt for removal of BIBAP and placement of NC @ 3L/min .  Bed mobility supine>sit EOB with mod A, pt able to move Les and trunk towards EOB, but required MOD A to bring trunk up right.  Sitting balance training at EOB x ~ 10 min, pt with L lean requiring min A to return to midline, pt did attain static sitting with UE support and SBA x ~ 5min.  Sit>stand x 3 trials with mod A, verbal instruction and min A to bring trunk upright form flexed position. Pt able to maintain static standing ~1 min at each trial.  Gait was attempted at each standing trial without success, do to pt's need to return to sitting.  Pt returned to supine dependently with 2 staff.  Pt scooted to HOB with max A of 2 with pt pushing with B Les.

## 2020-11-11 NOTE — ASSESSMENT & PLAN NOTE
SBP a little low.  Currently on Metoprolol XL 50mg and Lasix 40mg bid.  Will continue with current regimen and continue to monitor.

## 2020-11-11 NOTE — PROGRESS NOTES
"[facility] Hospital Medicine  Progress Note    Patient Name: Patsy Morris  MRN: [id number]  Patient Class: IP- Inpatient   Admission Date: 10/13/2020  Length of Stay: 29 days  Attending Physician: Ravi Soria MD  Primary Care Provider: Luisana Cartagena MD        Subjective:     Principal Problem:Acute on chronic respiratory failure with hypoxia and hypercapnia        HPI:  Per Ravi Soria:    "Per ED:  "This is a 70 y.o. female with history of CHF and COPD who presents via EMS with complaint of shortness of breath that began a few days ago. Per EMS patient had O2 saturation of 72 on 3 liters if home oxygen upon their arrival. Patient states she is on 3 liters of home oxygen at baseline. She denies fever, cough, chest pain, nausea, vomiting, diarrhea, or rhinorrhea. She reports she was recently discharged from the hospital for similar symptoms. She reports compliance with her home medications. She recently became resident of [facility] after last hospitalization. She is a former smoker. She denies undergoing any surgeries".    The patient is a 70 year old female with a PMH significant for of HTN, Chronic Respiratory Failure (COPD and BHAVANI/OHS), HFpEF, Obesity who presented to the ED with complaints of SOB that began about 3 days ago.  Patient is on 3L O2NC at home.  She was seen by her Cardiologist about 4 days prior to admission and told to decrease her Lasix from 40mg bid to qday.  She denies chest pain.  No Fevers.  No cough.  States she has been adherent with her medications.  Patient was recently admitted to [facility] from 9/1-9/11 for Acute on Chronic Respiratory Failure and discharged to SNF.  Patient was placed on BiPAP in ED and admitted to ICU.  Patient feeling better on BiPAP."    Overview/Hospital Course:  Patient is 70-year-old woman with history of hypertension, chronic hypoxic respiratory failure on home oxygen secondary to chronic obstructive pulmonary " disease, chronic diastolic heart failure, chronic atrial fibrillation, and morbid obesity re-admitted to the hospital on 10/13/2020 with decompensated heart failure after patient was home for about a week following recent hospitalization and stayed at skilled nursing facility for physical therapy.  Patient treated with non-invasive ventilation intravenous diuretics.  Patient was transferred out of the intensive care unit on 10/21/2020.      Patient continue to improve on the floor until she developed worsening hypoxic respiratory failure.  Patient was transferred to the intensive care unit on 11/1/2020 on placed on continuous BiPAP.  She decompensated further and was intubated on 11/1/2020.  Patient started on intravenous antibiotics on 11/2/2020 to treat hospital-acquired pneumonia.  Patient had chest tube placed to drain pleural effusion on the right side.  Patient underwent bronchoscopy 11/4/2020.  Patient successfully extubated on 11/6/2020.  Patient has been on BiPAP for most of the time since extubation.    Interval History:  No acute events overnight.  Spends most time on BiPAP.  No new complaints this morning.  Patient will now go home with Home Hosp[facility], but remains Full Code.  Trilogy approved from Hospice.    Review of Systems   Constitutional: Negative for chills and fever.   Respiratory: Positive for shortness of breath. Negative for wheezing.    Cardiovascular: Negative for chest pain.   Gastrointestinal: Negative for abdominal distention, abdominal pain, constipation, diarrhea, nausea and vomiting.   Genitourinary: Negative for dysuria and frequency.   Musculoskeletal: Negative for arthralgias and myalgias.   Neurological: Negative for light-headedness.   Psychiatric/Behavioral: Negative for agitation and confusion.     Objective:     Vital Signs (Most Recent):  Temp: 98.4 °F (36.9 °C) (11/11/20 1130)  Pulse: 100 (11/11/20 1310)  Resp: (!) 27 (11/11/20 1310)  BP: 132/65 (11/11/20 1300)  SpO2: (!) 89 %  (11/11/20 1310) Vital Signs (24h Range):  Temp:  [98.4 °F (36.9 °C)-99.4 °F (37.4 °C)] 98.4 °F (36.9 °C)  Pulse:  [] 100  Resp:  [19-36] 27  SpO2:  [89 %-99 %] 89 %  BP: ()/(54-97) 132/65     Weight: 97.6 kg (215 lb 2.7 oz)  Body mass index is 38.12 kg/m².    Intake/Output Summary (Last 24 hours) at 11/11/2020 1314  Last data filed at 11/11/2020 1110  Gross per 24 hour   Intake 850 ml   Output 2250 ml   Net -1400 ml      Physical Exam  Constitutional:       General: She is not in acute distress.  HENT:      Head: Normocephalic and atraumatic.   Eyes:      Conjunctiva/sclera: Conjunctivae normal.      Pupils: Pupils are equal, round, and reactive to light.   Neck:      Musculoskeletal: Normal range of motion.   Cardiovascular:      Rate and Rhythm: Normal rate. Rhythm irregular.      Pulses: Normal pulses.      Heart sounds: Normal heart sounds.   Pulmonary:      Comments: Breathing reasonably comfortably while on BiPAP this morning.  Abdominal:      General: Bowel sounds are normal. There is no distension.      Palpations: Abdomen is soft.      Tenderness: There is no abdominal tenderness.   Musculoskeletal:         General: Swelling present.   Skin:     General: Skin is warm and dry.   Neurological:      General: No focal deficit present.      Mental Status: She is oriented to person, place, and time. Mental status is at baseline.         Significant Labs: All pertinent labs within the past 24 hours have been reviewed.    Significant Imaging: I have reviewed all pertinent imaging results/findings within the past 24 hours.      Assessment/Plan:      * Acute on chronic respiratory failure with hypoxia and hypercapnia  Chronic Respiratory Failure on 3 liters of Oxygen at home - COPD, BHAVANI/OHS with severe restrictive lung disease.  She presented with SOB 3 days PTA.  Placed on BiPAP and given Lasix 80mg IV x 1 in ED.  Transferred to ICU on admission.  Started initially on Lasix 40mg IV q12 and increased to  80mg q12.  Started on IV Vanc, Flagyl, and Cefepime on 10/15/2020 by Pulmonary-CC given CT Chest concerning for infection - antibiotics stopped 10/18/2020.  Respiratory culture with normal respiratory leonardo on 10/16/2020.  Given cavitary appearance on imaging, Quantiferon gold sent and indeterminate x 2  with Sputum AFB stain negative and  Culture pending x 1 on 10/16/2020.  Transferred out of ICU on 10/21/2020.  Awaiting Trilogy for home.   More short of breath on 10/30/2020 - CXR overall stable from 2 days ago, but BNP in 400s from 200s - given an additional dose of Lasix 40mg IV with improvement in symptoms.    She became more hypoxic on 11/1/2020 and ABG with pH of 7.284 and pCO2 of 104.2 on BiPAP.  Transferred to ICU on 11/1/2020 on continuous BiPAP.   She decompensated overnight and was intubated. She was febrile to 100.8. Started on IV steroid and IV antibiotics (Vanc and Cefepime).   Pulmonary was consulted.  Minimally elevated procalcitonin level.  Status post bronchoscopy and placement of the right chest tube.  Chest tube output decreasing.  No acid-fast bacillus seen from sample from bronchial wash.  Gram stain unremarkable.  Additional cultures pending.  Extubated 11/7/2020 to BiPAP.  Chest tube removed.  Patient needing continuous BiPAP with short breaks.  Attempt to increase time off BiPAP as tolerated.  Patient now agrees to Home Hospice, so she may now be able to get a Trilogy.      Plan:  Follow up on Pulmonary and Cardiology recommendations  Continue Lasix 40mg po q12  DuoNebs   Follow up on cultures  Strict I&Os      Atrial fibrillation with rapid ventricular response  Continue with Metoprolol.  Restarted on apixaban.    Chronic kidney disease (CKD) stage G4/A1, severely decreased glomerular filtration rate (GFR) between 15-29 mL/min/1.73 square meter and albuminuria creatinine ratio less than 30 mg/g  Stable.    Acute on chronic diastolic congestive heart failure  See above    Debility  Consult  physical and occupational therapy for range of motion exercises.    Encounter for palliative care  Followed by Palliative Care      Dyslipidemia  Continue with statin therapy.    Essential hypertension  SBP a little low.  Currently on Metoprolol XL 50mg and Lasix 40mg bid.  Will continue with current regimen and continue to monitor.      VTE Risk Mitigation (From admission, onward)         Ordered     apixaban tablet 5 mg  2 times daily      11/06/20 1039     Place sequential compression device  Until discontinued      10/13/20 1405     IP VTE HIGH RISK PATIENT  Once      10/13/20 1405                Discharge Planning   YUDELKA: 10/26/2020     Code Status: Full Code   Is the patient medically ready for discharge?:     Reason for patient still in hospital (select all that apply): Patient trending condition, Treatment and Pending disposition  Discharge Plan A: Skilled Nursing Facility                  Ravi Soria MD  Department of Hospital Medicine   [facility]

## 2020-11-12 NOTE — PLAN OF CARE
Patient has a dx of acute on chronic respiratory failure. AAOx4, on 3 L o2 n/c when awake, on Bipap FiO2 30% when sleeping, afebrile, in Atrial flutter. Chronic back pain relieved with prn Tylenol 1 g PO x 1. Itching managed with prn Benadryl 25 mg PO x 1. Melatonin 6 mg PO x 1 at qHS for insomnia. Patient has remained in bed during this shift with purewick in place.

## 2020-11-12 NOTE — PLAN OF CARE
Problem: Occupational Therapy Goal  Goal: Occupational Therapy Goal  Description: Goals to be met by: 11/13/2020    Patient will increase functional independence with ADLs by performing:    UE Dressing with Min A.  LE Dressing with Maximum Assistance using adaptive equipment as needed.  Grooming while seated with SBA.  MET 11/12/2020  Toileting from bedside commode with CGA for hygiene and clothing management.   Toilet transfer to bedside commode with Minimal Assistance.  Pt will tolerate sitting EOB for 15 minutes with SBA for balance.        Outcome: Ongoing, Progressing     Pt met one goal this date and is making progress towards goals.  SNF is recommended upon d/c from acute care to further address deficits and help pt improve overall functional independence.     CARLOS Motta  11/12/2020

## 2020-11-12 NOTE — PT/OT/SLP PROGRESS
Physical Therapy Treatment    Patient Name:  Patsy Morris   MRN:  [id number]    Recommendations:     Discharge Recommendations:  nursing facility, skilled , via ambulance  Discharge Equipment Recommendations: none   Barriers to discharge: None    Assessment:     Patsy Morris is a 70 y.o. female admitted with a medical diagnosis of Acute on chronic respiratory failure with hypoxia and hypercapnia.  She presents with the following impairments/functional limitations:  weakness, impaired endurance, impaired self care skills, impaired functional mobilty, gait instability, impaired balance, decreased upper extremity function, decreased lower extremity function, impaired cardiopulmonary response to activity . Pt continues with functional mobility deficits and should benefit from continuing current PT POC to maximize I and safety decrease risk of further decline of injury.    Rehab Prognosis: Good; patient would benefit from acute skilled PT services to address these deficits and reach maximum level of function.    Recent Surgery: * No surgery found *      Plan:     During this hospitalization, patient to be seen 5 x/week to address the identified rehab impairments via gait training, therapeutic activities, therapeutic exercises, neuromuscular re-education and progress toward the following goals:    · Plan of Care Expires:  12/06/20    Subjective     Chief Complaint: pt w/o specific c/o  Patient/Family Comments/goals: to walk  Pain/Comfort:  · Pain Rating 1: 0/10  · Pain Rating Post-Intervention 1: 0/10      Objective:     Communicated with nurse, Zee prior to session.  Patient found supine with blood pressure cuff, peripheral IV, pulse ox (continuous), telemetry, SCD, PureWick upon PT entry to room.     General Precautions: Standard, fall, respiratory   Orthopedic Precautions:N/A   Braces: N/A     Functional Mobility:  · Bed Mobility:     · Supine to Sit: contact guard assistance  · Sit to Supine: minimum  assistance  · Transfers: Sit> stand from BSChair to RW x 4 trials; two trials  with CGA, 2 trials pt able to clear buttocks but unable to achieve full standing.     · Gait:  x 12 ft with RW and CGA with cuing for step length and posture. Gait terminated due to fatigue      AM-PAC 6 CLICK MOBILITY  Turning over in bed (including adjusting bedclothes, sheets and blankets)?: 3  Sitting down on and standing up from a chair with arms (e.g., wheelchair, bedside commode, etc.): 3  Moving from lying on back to sitting on the side of the bed?: 3  Moving to and from a bed to a chair (including a wheelchair)?: 2  Need to walk in hospital room?: 2  Climbing 3-5 steps with a railing?: 2  Basic Mobility Total Score: 15       Therapeutic Activities and Exercises:   Pt presented supine in bed agreeable to PT.  Bed mobility supine>sit EOB with CGA with instruction for hand placement.  Sit>stand to RW x 2 trials with instruction for hand placement and wt shift. Sit> stand from BSChair to RW x 4 trials; two trials  with CGA, 2 trials pt able to clear buttocks but unable to achieve full standing.  Gait training x 12 ft with RW and CGA with cuing for step length and posture. Gait terminated due to fatigue. Chair following for safety. Attempt stand pivot BSChair to EOB with RW unsuccessful.  Pt perform stand pivot w/o AD, HHA and mod A.  To supine with min A at LEs.  Roll L and R with SBA cuing     Patient left supine with all lines intact, call button in reach, bed alarm on and nurseZee notified..    GOALS:   Multidisciplinary Problems     Physical Therapy Goals        Problem: Physical Therapy Goal    Goal Priority Disciplines Outcome Goal Variances Interventions   Physical Therapy Goal     PT, PT/OT Ongoing, Progressing     Description: Goals to be met by: 11/15/2020    Patient will perform the following to increase strength, improve mobility, and return to prior level of function:    1. Supine <> sit with CGA.  2. Sit <> stand  transfer w/ CGA and least restrictive assistive device.   3. Bed <> chair transfer with CGA and least restrictive assistive device.  4. Gait > 150 ft with CGA and least restrictive assistive device.                       Time Tracking:     PT Received On: 11/12/20  PT Start Time: 1055     PT Stop Time: 1153  PT Total Time (min): 58 min     Billable Minutes: Gait Training 23 and Therapeutic Activity 15    Treatment Type: Treatment  PT/PTA: PT     PTA Visit Number: 0     Ramon Buenrostro, PT  11/12/2020

## 2020-11-12 NOTE — PLAN OF CARE
Patient in no apparent distress. Patient received on BIPAP with settings as documented. Sat's  96-99 % on 30% BIPAP . Will continue to monitor.

## 2020-11-12 NOTE — DISCHARGE SUMMARY
"[facility]  Discharge Summary      Patient Name: Patsy Morris  MRN: [id number]  Admission Date: 10/13/2020  Hospital Length of Stay: 30 days  Discharge Date and Time:  11/12/2020 12:09 PM  Attending Physician: Ravi Soria MD   Discharging Provider: Ravi Soria MD  Primary Care Provider: Luisana Cartagena MD      HPI:   Per Ravi Soria:    "Per ED:  "This is a 70 y.o. female with history of CHF and COPD who presents via EMS with complaint of shortness of breath that began a few days ago. Per EMS patient had O2 saturation of 72 on 3 liters if home oxygen upon their arrival. Patient states she is on 3 liters of home oxygen at baseline. She denies fever, cough, chest pain, nausea, vomiting, diarrhea, or rhinorrhea. She reports she was recently discharged from the hospital for similar symptoms. She reports compliance with her home medications. She recently became resident of [facility] after last hospitalization. She is a former smoker. She denies undergoing any surgeries".    The patient is a 70 year old female with a PMH significant for of HTN, Chronic Respiratory Failure (COPD and BHAVANI/OHS), HFpEF, Obesity who presented to the ED with complaints of SOB that began about 3 days ago.  Patient is on 3L O2NC at home.  She was seen by her Cardiologist about 4 days prior to admission and told to decrease her Lasix from 40mg bid to qday.  She denies chest pain.  No Fevers.  No cough.  States she has been adherent with her medications.  Patient was recently admitted to [facility] from 9/1-9/11 for Acute on Chronic Respiratory Failure and discharged to SNF.  Patient was placed on BiPAP in ED and admitted to ICU.  Patient feeling better on BiPAP."    * No surgery found *      Hospital Course:   Patient is 70-year-old woman with history of hypertension, chronic hypoxic respiratory failure on home oxygen secondary to chronic obstructive pulmonary disease, chronic " diastolic heart failure, chronic atrial fibrillation, and morbid obesity re-admitted to the hospital on 10/13/2020 with decompensated heart failure after patient was home for about a week following recent hospitalization and stayed at skilled nursing facility for physical therapy.  Patient treated with non-invasive ventilation intravenous diuretics.  Patient was transferred out of the intensive care unit on 10/21/2020.      Patient continue to improve on the floor until she developed worsening hypoxic respiratory failure.  Patient was transferred to the intensive care unit on 11/1/2020 on placed on continuous BiPAP.  She decompensated further and was intubated on 11/1/2020.  Patient started on intravenous antibiotics on 11/2/2020 to treat hospital-acquired pneumonia.  Patient had chest tube placed to drain pleural effusion on the right side.  Patient underwent bronchoscopy 11/4/2020.  Patient successfully extubated on 11/6/2020.  Patient has been on BiPAP for most of the time since extubation.  See below.     Consults:   Consults (From admission, onward)        Status Ordering Provider     Inpatient consult to Cardiology  Once     Provider:  Diana Meredith MD    Completed JIA TAPIA     Inpatient consult to Palliative Care  Once     Provider:  Elsa Bran DNP    Completed BRODIE MERRITT     Inpatient consult to Pulmonary Critical Care  Once     Provider:  (Not yet assigned)    Completed JIA TAPIA     Pharmacy to renally dose medication  Once     Provider:  (Not yet assigned)    Acknowledged JIA TAPIA          * Acute on chronic respiratory failure with hypoxia and hypercapnia  Chronic Respiratory Failure on 3 liters of Oxygen at home - COPD, BHAVANI/OHS with severe restrictive lung disease.  She presented with SOB 3 days PTA.  Placed on BiPAP and given Lasix 80mg IV x 1 in ED.  Transferred to ICU on admission.  Started initially on Lasix 40mg IV q12 and increased to 80mg q12.   Started on IV Vanc, Flagyl, and Cefepime on 10/15/2020 by Pulmonary-CC given CT Chest concerning for infection - antibiotics stopped 10/18/2020.  Respiratory culture with normal respiratory leonardo on 10/16/2020.  Given cavitary appearance on imaging, Quantiferon gold sent and indeterminate x 2  with Sputum AFB stain negative and  Culture pending x 1 on 10/16/2020.  Transferred out of ICU on 10/21/2020.  Awaiting Trilogy for home.   More short of breath on 10/30/2020 - CXR overall stable from 2 days ago, but BNP in 400s from 200s - given an additional dose of Lasix 40mg IV with improvement in symptoms.    She became more hypoxic on 11/1/2020 and ABG with pH of 7.284 and pCO2 of 104.2 on BiPAP.  Transferred to ICU on 11/1/2020 on continuous BiPAP.   She decompensated overnight and was intubated. She was febrile to 100.8. Started on IV steroid and IV antibiotics (Vanc and Cefepime).   Pulmonary was consulted.  Minimally elevated procalcitonin level.  Status post bronchoscopy and placement of the right chest tube.  Chest tube output decreasing.  No acid-fast bacillus seen from sample from bronchial wash.  Gram stain unremarkable.  Additional cultures pending.  Extubated 11/7/2020 to BiPAP.  Chest tube removed.  Patient needing continuous BiPAP with short breaks.  Attempt to increase time off BiPAP as tolerated.  Patient now agrees to Home Hospice, so sheis now able to get a Trilogy.     Plan:  Follow up with Pulmonary and Cardiology on discharge  Continue Lasix 40mg po q12  DuoNebs   D/c to Home Hospice today      Atrial fibrillation with rapid ventricular response  Continue with Metoprolol.  Restarted on apixaban.    Chronic kidney disease (CKD) stage G4/A1, severely decreased glomerular filtration rate (GFR) between 15-29 mL/min/1.73 square meter and albuminuria creatinine ratio less than 30 mg/g  Stable.  Creatinine 1.7 today    Acute on chronic diastolic congestive heart failure  See above    Debility  Consult  physical and occupational therapy for range of motion exercises.    Encounter for palliative care  Followed by Palliative Care      Dyslipidemia  Continue with statin therapy.    Essential hypertension  SBP a little low.  Currently on Metoprolol XL 50mg and Lasix 40mg bid.  Will continue with current regimen and continue to monitor.      Final Active Diagnoses:    Diagnosis Date Noted POA    PRINCIPAL PROBLEM:  Acute on chronic respiratory failure with hypoxia and hypercapnia [J96.21, J96.22] 08/03/2016 Yes    Atrial fibrillation with rapid ventricular response [I48.91] 09/01/2020 Yes    Chronic kidney disease (CKD) stage G4/A1, severely decreased glomerular filtration rate (GFR) between 15-29 mL/min/1.73 square meter and albuminuria creatinine ratio less than 30 mg/g [N18.4]  Yes    Acute on chronic diastolic congestive heart failure [I50.33] 05/20/2016 Yes    Debility [R53.81] 08/26/2020 Yes    Encounter for palliative care [Z51.5] 08/25/2020 Not Applicable    Dyslipidemia [E78.5] 02/22/2016 Yes    Essential hypertension [I10] 10/15/2014 Yes      Problems Resolved During this Admission:    Diagnosis Date Noted Date Resolved POA    Paroxysmal atrial fibrillation [I48.0] 08/03/2016 10/13/2020 Yes    Chronic hypercapnic respiratory failure [J96.12] 03/11/2016 10/13/2020 Yes    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, 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] 11/10/2020 11/10/2020 Yes     Chronic    Acute kidney injury superimposed on chronic kidney disease [N17.9, N18.9] 11/03/2020 11/10/2020 No    On mechanically assisted ventilation [Z99.11] 11/02/2020 11/08/2020 Not Applicable    Pleural effusion [J90] 11/02/2020 11/10/2020 Yes    Severe sepsis [A41.9, R65.20] 11/02/2020 11/02/2020 No    Shortness of breath [R06.02] 11/01/2020 11/02/2020 Yes    Normocytic anemia [D64.9] 10/27/2020 11/03/2020 Yes    Acute on chronic congestive heart failure [I50.9] 10/21/2020 10/27/2020 Yes    Cavitary lesion of lung [J98.4]  "10/16/2020 11/10/2020 Yes    Acute on chronic congestive heart failure [I50.9] 10/13/2020 10/13/2020 Yes    Current moderate episode of major depressive disorder without prior episode [F32.1] 04/30/2020 11/03/2020 Yes    Pulmonary nodules/lesions, multiple [R91.8] 03/22/2019 10/14/2020 Yes    Obstructive sleep apnea [G47.33]  11/10/2020 Yes    Obesity hypoventilation syndrome [E66.2] 02/25/2016 11/10/2020 Yes    Renal carcinoma [C64.9] 03/10/2015 10/13/2020 Yes       Discharged Condition: stable    Disposition: Home Hospice    Follow Up:  Follow-up Information     Schedule an appointment as soon as possible for a visit with Luisana Cartagena MD.    Specialty: Family Medicine  Why: Re-establish outpatient care management of multiple medical comorbidities.  Contact information:  [address]  [address]  [facility] [id number] [phone]             Diana Meredith MD. Schedule an appointment as soon as possible for a visit in 2 weeks.    Specialties: Cardiology, INTERVENTIONAL CARDIOLOGY  Why: Management of heart failure  Contact information:  [address]  [address]  [facility] [id number] [phone]             Schedule an appointment as soon as possible for a visit with АНДРЕЙ Rosas MD.    Specialty: Pulmonary Disease  Why: Interstitial lung disease  Contact information:  [address]  [facility] [id number] [phone]             [facility] .    Contact information:  Address: [address] [address]  Phone: [phone]               Patient Instructions:      VENTILATOR FOR HOME USE   Order Comments: Trilogy - AVAPS MODE, , RR 18, PEEP 8, FIO2 40%, Max pressure 36, min pressure 18.     Order Specific Question Answer Comments   Height: 5' 3" (1.6 m)    Weight: 103 kg (227 lb)    Does patient have medical equipment at home? oxygen    Length of need (1-99 months): 99    Interface needed: Full face mask    Mode: AVAPS    Rate: 20    Tidal Volume 400  "   PEEP - EPAP 8.0 CM/H2O    Pressure support - IPAP 36 minimum 18   FiO2% 40    Humidifier needed? Yes      Ambulatory referral/consult to Ophthalmology   Standing Status: Future   Referral Priority: Urgent Referral Type: Consultation   Referral Reason: Specialty Services Required   Requested Specialty: Ophthalmology   Number of Visits Requested: 1     Diet diabetic   Order Comments: Low sodium (less than 2 grams per day)     Call MD for:  temperature >100.4     Call MD for:  persistent nausea and vomiting or diarrhea     Call MD for:  severe uncontrolled pain     Call MD for:  redness, tenderness, or signs of infection (pain, swelling, redness, odor or green/yellow discharge around incision site)     Call MD for:  difficulty breathing or increased cough     Call MD for:  severe persistent headache     Call MD for:  worsening rash     Call MD for:  persistent dizziness, light-headedness, or visual disturbances     Call MD for:  increased confusion or weakness     Activity as tolerated       Significant Diagnostic Studies: Labs:   BMP:   Recent Labs   Lab 11/11/20 0523 11/12/20 0414    98    140   K 4.5 4.2    100   CO2 31* 33*   BUN 37* 36*   CREATININE 1.7* 1.7*   CALCIUM 9.0 8.9   MG 1.9 1.9    and CBC   Recent Labs   Lab 11/11/20 0523   WBC 6.85   HGB 8.6*   HCT 28.0*          Pending Diagnostic Studies:     Procedure Component Value Units Date/Time    Basic metabolic panel [[id number]] Collected: 11/02/20 0204    Order Status: Sent Lab Status: In process Updated: 11/02/20 0206    Specimen: Blood          Medications:  Reconciled Home Medications:      Medication List      START taking these medications    apixaban 5 mg Tab  Commonly known as: ELIQUIS  Take 1 tablet (5 mg total) by mouth 2 (two) times daily.     docusate sodium 100 MG capsule  Commonly known as: COLACE  Take 1 capsule (100 mg total) by mouth 2 (two) times daily.     ergocalciferol 50,000 unit Cap  Commonly known as:  ERGOCALCIFEROL  Take 1 capsule (50,000 Units total) by mouth every Wednesday.     escitalopram oxalate 10 MG tablet  Commonly known as: LEXAPRO  Take 1 tablet (10 mg total) by mouth every evening.     furosemide 40 MG tablet  Commonly known as: LASIX  Take 1 tablet (40 mg total) by mouth 2 (two) times daily.     gabapentin 100 MG capsule  Commonly known as: NEURONTIN  Take 1 capsule (100 mg total) by mouth 2 (two) times daily.     metoprolol succinate 50 MG 24 hr tablet  Commonly known as: TOPROL-XL  Take 1 tablet (50 mg total) by mouth once daily.     polyethylene glycol 17 gram/dose powder  Commonly known as: GLYCOLAX  Take 17 g by mouth once daily.  Replaces: polyethylene glycol 17 gram Pwpk        CHANGE how you take these medications    acetaminophen 500 MG tablet  Commonly known as: TYLENOL  Take 2 tablets (1,000 mg total) by mouth every 8 (eight) hours as needed for Pain.  What changed: when to take this     ferrous sulfate 325 (65 FE) MG EC tablet  Take 1 tablet (325 mg total) by mouth once daily.  What changed: Another medication with the same name was removed. Continue taking this medication, and follow the directions you see here.     miconazole NITRATE 2 % 2 % top powder  Commonly known as: MICOTIN  Apply topically 2 (two) times daily. Apply to breast fold and abdominal folds twice daily.  What changed: additional instructions        CONTINUE taking these medications    albuterol-ipratropium 2.5 mg-0.5 mg/3 mL nebulizer solution  Commonly known as: DUO-NEB  Take 3 mLs by nebulization every 6 (six) hours as needed for Wheezing or Shortness of Breath.     atorvastatin 80 MG tablet  Commonly known as: LIPITOR  Take 1 tablet (80 mg total) by mouth every evening.     blood sugar diagnostic Strp  Commonly known as: BLOOD GLUCOSE TEST  Qd testing     vitamin renal formula (B-complex-vitamin c-folic acid) 1 mg Cap  Commonly known as: NEPHROCAP  Take 1 capsule by mouth once daily.     walker Misc  Prn use please  fit pt        STOP taking these medications    metoprolol tartrate 50 MG tablet  Commonly known as: LOPRESSOR     polyethylene glycol 17 gram Pwpk  Commonly known as: GLYCOLAX  Replaced by: polyethylene glycol 17 gram/dose powder     potassium chloride SA 20 MEQ tablet  Commonly known as: K-DURKLOR-CON     valACYclovir 500 MG tablet  Commonly known as: VALTREX            Indwelling Lines/Drains at time of discharge:   Lines/Drains/Airways     Drain            Female External Urinary Catheter 11/10/20 1800 1 day                Time spent on the discharge of patient: 35 minutes  Patient was seen and examined on the date of discharge and determined to be suitable for discharge.         Ravi Soria MD  Department of Hospital Medicine  [facility]

## 2020-11-12 NOTE — ASSESSMENT & PLAN NOTE
Chronic Respiratory Failure on 3 liters of Oxygen at home - COPD, BHAVANI/OHS with severe restrictive lung disease.  She presented with SOB 3 days PTA.  Placed on BiPAP and given Lasix 80mg IV x 1 in ED.  Transferred to ICU on admission.  Started initially on Lasix 40mg IV q12 and increased to 80mg q12.  Started on IV Vanc, Flagyl, and Cefepime on 10/15/2020 by Pulmonary-CC given CT Chest concerning for infection - antibiotics stopped 10/18/2020.  Respiratory culture with normal respiratory leonardo on 10/16/2020.  Given cavitary appearance on imaging, Quantiferon gold sent and indeterminate x 2  with Sputum AFB stain negative and  Culture pending x 1 on 10/16/2020.  Transferred out of ICU on 10/21/2020.  Awaiting Trilogy for home.   More short of breath on 10/30/2020 - CXR overall stable from 2 days ago, but BNP in 400s from 200s - given an additional dose of Lasix 40mg IV with improvement in symptoms.    She became more hypoxic on 11/1/2020 and ABG with pH of 7.284 and pCO2 of 104.2 on BiPAP.  Transferred to ICU on 11/1/2020 on continuous BiPAP.   She decompensated overnight and was intubated. She was febrile to 100.8. Started on IV steroid and IV antibiotics (Vanc and Cefepime).   Pulmonary was consulted.  Minimally elevated procalcitonin level.  Status post bronchoscopy and placement of the right chest tube.  Chest tube output decreasing.  No acid-fast bacillus seen from sample from bronchial wash.  Gram stain unremarkable.  Additional cultures pending.  Extubated 11/7/2020 to BiPAP.  Chest tube removed.  Patient needing continuous BiPAP with short breaks.  Attempt to increase time off BiPAP as tolerated.  Patient now agrees to Home Hospice, so sheis now able to get a Trilogy.     Plan:  Follow up with Pulmonary and Cardiology on discharge  Continue Lasix 40mg po q12  DuoNebs   D/c to Home Hospice today

## 2020-11-12 NOTE — PLAN OF CARE
Problem: Physical Therapy Goal  Goal: Physical Therapy Goal  Description: Goals to be met by: 11/15/2020    Patient will perform the following to increase strength, improve mobility, and return to prior level of function:    1. Supine <> sit with CGA.  2. Sit <> stand transfer w/ CGA and least restrictive assistive device.   3. Bed <> chair transfer with CGA and least restrictive assistive device.  4. Gait > 150 ft with CGA and least restrictive assistive device.      Outcome: Ongoing, Progressing   Pt presented supine in bed agreeable to PT.  Bed mobility supine>sit EOB with CGA with instruction for hand placement.  Sit>stand to RW x 2 trials with instruction for hand placement and wt shift. Sit> stand from BSChair to RW x 4 trials; two trials  with CGA, 2 trials pt able to clear buttocks but unable to achieve full standing.  Gait training x 12 ft with RW and CGA with cuing for step length and posture. Gait terminated due to fatigue. Chair following for safety. Attempt stand pivot BSChair to EOB with RW unsuccessful.  Pt perform stand pivot w/o AD, HHA and mod A.  To supine with min A at LEs.  Roll L and R with SBA cuing for hand placement.

## 2020-11-12 NOTE — PLAN OF CARE
CM met with pt for final discharge planning assessment. Pt discharging home today with home hospice.    Pt arrangements complete with [facility], all dme arrangements complete.  Pt has trilogy set up at bedside and Michelle therapist will come to pt's home to review when pt arrives home.    Transportation arranged with [facility] transportation, PHN auth [id number], sent with order.    Family are in agreement with plans.    Pt ready for discharge from CM perspective.   11/12/20 1244   Final Note   Assessment Type Final Discharge Note   Anticipated Discharge Disposition HospiceHome   What phone number can be called within the next 1-3 days to see how you are doing after discharge? [phone]   Hospital Follow Up  Appt(s) scheduled? Yes   Discharge plans and expectations educations in teach back method with documentation complete? Yes

## 2020-11-12 NOTE — PT/OT/SLP PROGRESS
Occupational Therapy   Treatment    Name: Patsy Morris  MRN: [id number]  Admitting Diagnosis:  Acute on chronic respiratory failure with hypoxia and hypercapnia       Recommendations:     Discharge Recommendations: nursing facility, skilled  Discharge Equipment Recommendations:  none  Barriers to discharge:  Decreased caregiver support(pt's current functional status)    Assessment:     Patsy Morris is a 70 y.o. female with a medical diagnosis of Acute on chronic respiratory failure with hypoxia and hypercapnia.  She presents with no pain. Performance deficits affecting function are weakness, impaired endurance, impaired self care skills, impaired functional mobilty, gait instability, impaired balance, decreased upper extremity function, decreased lower extremity function, impaired cardiopulmonary response to activity.  Pt agreeable to participating in therapy upon arrival to room.  She demonstrated improvement with activity tolerance, and was able to ambulate 12 ft with increased time, CGA, RW, and chair follow.  Some rest breaks required with pt able to recover and continue; however, towards end of session pt appeared more fatigued and was unable to complete grooming task in standing.  While seated at EOB pt able to maintain upright seated position with SBA and don gown with Mod A.      Pt is making progress towards goals and would continue to benefit from skilled OT services to address problems listed above and increase independence with ADLs.  SNF is recommended upon d/c from acute care to further address deficits and help pt improve overall functional independence.          Rehab Prognosis:  Good; patient would benefit from acute skilled OT services to address these deficits and reach maximum level of function.       Plan:     Patient to be seen 5 x/week to address the above listed problems via self-care/home management, therapeutic activities, therapeutic exercises  · Plan of Care Expires:  11/14/20  · Plan of Care Reviewed with: patient    Subjective     Pain/Comfort:  · Pain Rating 1: 0/10  · Pain Rating Post-Intervention 1: 0/10    Objective:     Communicated with: RN (Zee) prior to session.  Patient found HOB elevated with blood pressure cuff, peripheral IV, pulse ox (continuous), telemetry, SCD, PureWick upon OT entry to room.  Cousin present during session.    General Precautions: Standard, fall, respiratory   Orthopedic Precautions:N/A   Braces: N/A     Occupational Performance:     Bed Mobility:    · Supine <> Sit: CGA  · Scooting:  CGA towards EOB; SBA towards HOB  · Sit <> supine: Min A for LE management     Functional Mobility/Transfers:  · Sit <> Stand:   · CGA and RW x 1 trial from EOB  · Min A x 1 trial from EOB  · CGA and RW x 4 trials from chair; cues required for positioning of hands on chair each trial.  During 2/4 trials pt immediately returned to seated position and was unable to achieve full upright position.  · Functional Mobility: Pt ambulated 12 ft with CGA and RW with chair follow.    · Pt required cues to push through arms to achieve full upright position; fair follow through with pt remaining in hunched over position  · Pt walked slowly with difficulty advancing fully LE as time progressed; required 2 rest breaks.   · Stand Pivot:  Min A with assist of 2 for sequencing and positioning from chair <> bed.    Activities of Daily Living:  · Grooming: stand by assistance for combing hair while seated in chair.  Pt unable to perform grooming task in standing this date 2* fatigue.  · Upper Body Dressing: Mod A for donning gown while seated at EOB.  Pt required assist to pull around backside and place LUE through sleeve.  · Toileting:  Total A for andrae care while supine in bed after episode of urinary incontinence.        [facility] 6 Click ADL: 15    Treatment & Education:  *Pt sat EOB for ~5 minutes with SBA required to maintain upright position.  Pt performed UB dressing as noted  above.  *Pt ambulated within room to address coordination, endurance, and balance needed for functional mobility.  Chair follow and cues for positioning required.  *Pt performed multiple sit <> stand transfers from chair; cues required for positioning of hands on chair and for sequencing.  *Pt performed grooming task while seated in chair.  *Pt performed multiple trials of rolling to allow for blue pads and clean sheets to be placed.    *POC reviewed with pt    Patient left HOB elevated with all lines intact, new Pure Wick in place, and call button in reachEducation:  .  Lunch tray set up in front of pt.  Cousin present and RN (Zee) notified.   *Pt with instance of urinary incontinence at end of session.  Clean blue pads and sheets placed.      GOALS:   Multidisciplinary Problems     Occupational Therapy Goals        Problem: Occupational Therapy Goal    Goal Priority Disciplines Outcome Interventions   Occupational Therapy Goal     OT, PT/OT Ongoing, Progressing    Description: Goals to be met by: 11/13/2020    Patient will increase functional independence with ADLs by performing:    UE Dressing with Min A.  LE Dressing with Maximum Assistance using adaptive equipment as needed.  Grooming while seated with SBA.  MET 11/12/2020  Toileting from bedside commode with CGA for hygiene and clothing management.   Toilet transfer to bedside commode with Minimal Assistance.  Pt will tolerate sitting EOB for 15 minutes with SBA for balance.                         Time Tracking:     OT Date of Treatment: 11/12/20  OT Start Time: 1055  OT Stop Time: 1153  OT Total Time (min): 58 min    Billable Minutes:Self Care/Home Management 10  Therapeutic Activity 24   *Co-Tx with PT    CARLOS Motta  11/12/2020

## 2020-11-12 NOTE — PLAN OF CARE
AAOx4. Afebrile. HR remains in Aflutter. Tolerating 3L nasal cannula while awake. On Bipap while sleeping. BM x1. Tolerating diet. Adequate UO. Remained free from falls or injuries. Call light within reach. Will call for assistance. No distress noted. Will continue to monitor.

## 2020-11-12 NOTE — PROGRESS NOTES
[facility]  Cardiology  Progress Note    Patient Name: Patsy Morris  MRN: [id number]  Admission Date: 10/13/2020  Hospital Length of Stay: 30 days  Code Status: Full Code   Attending Physician: Ravi Soria MD   Primary Care Physician: Luisana Cartagena MD  Expected Discharge Date:   Principal Problem:Acute on chronic respiratory failure with hypoxia and hypercapnia    Subjective:     Brief HPI:    Patsy Morris is a 70 y.o.female with hypertension. She has chronic atrial fibrillation and heart failure with preserved ejection fraction. She has chronic obstructive pulmonary disease being on home oxygen with CO2 retention. She has undergone nephrectomy for renal cell carcinoma and has chronic kidney disease. She was admitted to [facility] in 2020 and 2020 with heart failure and exacerbations of her chronic obstructive pulmonary disease. She went to therapy at [facility].      She used to be on furosemide 40 mg Q24 however after her last hospitalization she had the does of furosemide increased to 80 mg Q24. The dose was reduced to 40 mg Q24 on 10/8/2020. She became increasingly short of breath and presents fluid overloaded in heart failure as well as an exacerbation of her COPD with high CO2. She was admitted to the ICU.    Hospital Course:    Diuresis.    BIPAP.    Respiratory treatments.    10/15/2020: Echo: Normal left ventricular size and systolic function. EF 60%. Moderately dilated LA. Mildly dilated RV. SPAP 49 mmHg. Small pericardial effusion.    10/16/2020: Apixiban 5 mg Q12 was changed to heparin iv for consideration of bronchoscopy.    10/21/2020: Transferred from ICU to telemetry.    10/27/2020: Walked 150 ft with PT.    2020: AB.21/125/78/90%/FIO2 45%    2020: Respiratory failure. Intubated in ICU.    2020: Bronchoscopy.    2020: Right sided chest tube. Thoracentesis 650 ml. Hazy fluid with WBC of 349.    2020:  Extubated.    Interval History:    Feeling fair. Still in ICU. Variable VRR.     May go to home with hospice today.       Review of Systems   Constitution: Positive for malaise/fatigue. Negative for fever.   Cardiovascular: Negative for chest pain, irregular heartbeat, orthopnea and syncope.   Respiratory: Positive for shortness of breath. Negative for wheezing.    Gastrointestinal: Negative for abdominal pain.   Psychiatric/Behavioral: Positive for memory loss.       Objective:     Vital Signs (Most Recent):  Temp: 98.1 °F (36.7 °C) (11/12/20 0335)  Pulse: 66 (11/12/20 0700)  Resp: 19 (11/12/20 0700)  BP: (!) 98/54 (11/12/20 0700)  SpO2: 97 % (11/12/20 0700) Vital Signs (24h Range):  Temp:  [98.1 °F (36.7 °C)-98.8 °F (37.1 °C)] 98.1 °F (36.7 °C)  Pulse:  [] 66  Resp:  [14-33] 19  SpO2:  [89 %-99 %] 97 %  BP: ()/(54-97) 98/54     Weight: 86 kg (189 lb 9.5 oz)  Body mass index is 33.59 kg/m².    SpO2: 97 %  O2 Device (Oxygen Therapy): BiPAP      Intake/Output Summary (Last 24 hours) at 11/12/2020 0756  Last data filed at 11/12/2020 0500  Gross per 24 hour   Intake 580 ml   Output 1500 ml   Net -920 ml       Lines/Drains/Airways     Drain            Female External Urinary Catheter 11/10/20 1800 1 day          Peripheral Intravenous Line                 Midline Catheter Insertion/Assessment  - Double Lumen 10/20/20 1220 Right median cubital vein (antecubital fossa)  22 days         Peripheral IV - Single Lumen 11/05/20 0630 20 G Anterior;Left Forearm 7 days                Physical Exam   Constitutional: She appears well-developed and well-nourished. She appears ill. No distress.   Neck: Carotid bruit is not present.   Cardiovascular: Normal rate, S1 normal and S2 normal. An irregularly irregular rhythm present.   Pulmonary/Chest: She has decreased breath sounds in the right lower field. She has rhonchi in the right lower field and the left lower field.   Chest tube on the right side.   Abdominal: Soft.  Normal appearance.   Musculoskeletal:      Right ankle: She exhibits no swelling.      Left ankle: She exhibits no swelling.   Skin: Skin is warm and dry.     Current Medications:     apixaban  5 mg Oral BID    atorvastatin  80 mg Oral QHS    docusate sodium  100 mg Oral BID    escitalopram oxalate  10 mg Oral QHS    ferrous sulfate  325 mg Oral Daily    furosemide  40 mg Oral BID    gabapentin  100 mg Oral BID    metoprolol succinate  50 mg Oral Daily    miconazole NITRATE 2 %   Topical (Top) BID    vitamin renal formula (B-complex-vitamin c-folic acid)  1 capsule Oral Daily     Current Laboratory Results:    Recent Results (from the past 24 hour(s))   Basic metabolic panel    Collection Time: 11/12/20  4:14 AM   Result Value Ref Range    Sodium 140 136 - 145 mmol/L    Potassium 4.2 3.5 - 5.1 mmol/L    Chloride 100 95 - 110 mmol/L    CO2 33 (H) 23 - 29 mmol/L    Glucose 98 70 - 110 mg/dL    BUN 36 (H) 8 - 23 mg/dL    Creatinine 1.7 (H) 0.5 - 1.4 mg/dL    Calcium 8.9 8.7 - 10.5 mg/dL    Anion Gap 7 (L) 8 - 16 mmol/L    eGFR if African American 34 (A) >60 mL/min/1.73 m^2    eGFR if non African American 30 (A) >60 mL/min/1.73 m^2   Magnesium    Collection Time: 11/12/20  4:14 AM   Result Value Ref Range    Magnesium 1.9 1.6 - 2.6 mg/dL   Phosphorus    Collection Time: 11/12/20  4:14 AM   Result Value Ref Range    Phosphorus 3.9 2.7 - 4.5 mg/dL     Current Imaging Results:    X-Ray Chest AP Portable   Final Result      Removal of support tubes and catheters.      No change in the level of aeration of the lung parenchyma.         Electronically signed by: Devang Juarez   Date:    11/10/2020   Time:    07:26      X-Ray Chest 1 View   Final Result      Since November 4, 2020, slightly increased diffuse bilateral airspace opacities, particularly in the upper lung zones.         Electronically signed by: Bala Perez MD   Date:    11/05/2020   Time:    07:42      X-Ray Chest 1 View   Final Result      ET tube,  enteric tube, and right-sided pigtail chest tube appear in satisfactory position.      Decreasing pulmonary vascular congestion with improving bilateral parahilar and basilar consolidation.      Decreasing consolidation within the mid to right upper lung.      Improved aeration within the right lung base likely due to decreased right pleural effusion.         Electronically signed by: Regulo Solis MD   Date:    11/04/2020   Time:    12:32      XR Non-Rad Performed NG/Gastric Tube Check   Final Result      Tip of nasogastric tube in the proximal stomach.         Electronically signed by: Charlotte Howell   Date:    11/02/2020   Time:    00:25      X-Ray Chest 1 View   Final Result   Abnormal      Interval progression of abnormal intrathoracic findings, increasing bilateral pulmonary opacities, as discussed above.      ET tube noted, the tip above the yamilka.      Enteric tube noted, the distal tip extends subdiaphragmatic below the field of view however the side hole is likely along the distal esophagus, advancement recommended.      This report was flagged in Epic as abnormal.         Electronically signed by: Hammad Ayala   Date:    11/02/2020   Time:    00:09      X-Ray Chest AP Portable   Final Result      Stable right upper lobe consolidation.  Findings remain concerning for pneumonia with small cavitation as described previously.      Improved aeration right lung base.  Small pleural effusions, decreased in volume on the right.         Electronically signed by: Elsa Alcantar   Date:    10/30/2020   Time:    11:47      X-Ray Chest AP Portable   Final Result      Worsening right basilar atelectasis or consolidation.      Right upper lobe consolidation is improved.  There may be a tiny pneumatocele or central cavitation.      Pleural effusions are improved.      Continued follow-up advised.         Electronically signed by: Elsa Alcantar   Date:    10/28/2020   Time:    10:24      X-Ray Chest AP Portable    Final Result      No significant change.  There is cardiomegaly, multifocal airspace opacities and bilateral effusions.         Electronically signed by: Ajay Camara MD   Date:    10/20/2020   Time:    08:48      US Lower Extremity Veins Bilateral   Final Result      No evidence of deep venous thrombosis in either lower extremity.         Electronically signed by: Ceasar Carreon MD   Date:    10/15/2020   Time:    21:53      CT Chest Without Contrast   Final Result      The findings highly concerning for extensive diffuse pulmonary infection with bilateral pleural fluid collections right greater than left.         Electronically signed by: Regulo Bassett MD   Date:    10/15/2020   Time:    12:01      X-Ray Chest AP Portable   Final Result      Interval worsening of diffuse bilateral airspace disease and moderate bilateral pleural effusions when compared to 09/09/2020         Electronically signed by: Halina Plascencia   Date:    10/13/2020   Time:    09:40          10/15/2020: Echo:    The left ventricle is normal in size with normal systolic function. The estimated ejection fraction is 60%.  A diastolic pattern consistent with atrial fibrillation observed.  Moderate left atrial enlargement.  Mild right ventricular enlargement.  Normal right ventricular systolic function.  Severe right atrial enlargement.  The aortic valve is mildly sclerotic.  The mitral valve is mildly sclerotic.  Mild mitral regurgitation.  Mild tricuspid regurgitation.  There is mild pulmonary hypertension.  The estimated PA systolic pressure is 49 mmHg.  Intermediate central venous pressure (8 mmHg).  Small circumferential pericardial effusion.  There is a left pleural effusion.      Assessment and Plan:     Problem List:    Active Diagnoses:    Diagnosis Date Noted POA    PRINCIPAL PROBLEM:  Acute on chronic respiratory failure with hypoxia and hypercapnia [J96.21, J96.22] 08/03/2016 Yes    Atrial fibrillation with rapid ventricular  response [I48.91] 09/01/2020 Yes    Debility [R53.81] 08/26/2020 Yes    Encounter for palliative care [Z51.5] 08/25/2020 Not Applicable    Acute on chronic diastolic congestive heart failure [I50.33] 05/20/2016 Yes    Chronic kidney disease (CKD) stage G4/A1, severely decreased glomerular filtration rate (GFR) between 15-29 mL/min/1.73 square meter and albuminuria creatinine ratio less than 30 mg/g [N18.4]  Yes    Dyslipidemia [E78.5] 02/22/2016 Yes    Essential hypertension [I10] 10/15/2014 Yes      Problems Resolved During this Admission:    Diagnosis Date Noted Date Resolved POA    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, 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] 11/10/2020 11/10/2020 Yes     Chronic    Acute kidney injury superimposed on chronic kidney disease [N17.9, N18.9] 11/03/2020 11/10/2020 No    On mechanically assisted ventilation [Z99.11] 11/02/2020 11/08/2020 Not Applicable    Pleural effusion [J90] 11/02/2020 11/10/2020 Yes    Severe sepsis [A41.9, R65.20] 11/02/2020 11/02/2020 No    Shortness of breath [R06.02] 11/01/2020 11/02/2020 Yes    Normocytic anemia [D64.9] 10/27/2020 11/03/2020 Yes    Acute on chronic congestive heart failure [I50.9] 10/21/2020 10/27/2020 Yes    Cavitary lesion of lung [J98.4] 10/16/2020 11/10/2020 Yes    Acute on chronic congestive heart failure [I50.9] 10/13/2020 10/13/2020 Yes    Current moderate episode of major depressive disorder without prior episode [F32.1] 04/30/2020 11/03/2020 Yes    Pulmonary nodules/lesions, multiple [R91.8] 03/22/2019 10/14/2020 Yes    Obstructive sleep apnea [G47.33]  11/10/2020 Yes    Paroxysmal atrial fibrillation [I48.0] 08/03/2016 10/13/2020 Yes    Chronic hypercapnic respiratory failure [J96.12] 03/11/2016 10/13/2020 Yes    Obesity hypoventilation syndrome [E66.2] 02/25/2016 11/10/2020 Yes    Renal carcinoma [C64.9] 03/10/2015 10/13/2020 Yes       Assessment and Plan:     1. Heart Failure, Diastolic, Chronic              8/24/2020: Echo:  Normal left ventricular size and systolic function. Mildly dilated LA.   10/15/2020: Echo: Normal left ventricular size and systolic function. EF 60%. Moderately dilated LA. Mildly dilated RV. SPAP 49 mmHg. Small pericardial effusion.              At NH was on furosemide 40 mg Q24.              10/13/2020: Presented fluid overloaded in HF. Received furosemide 80 mg iv Q12.   10/20/2020: CXR with extensive infiltrates and pleural effusions. .    10/26/2020: .   11/10/2020: .   On furosemide 40 mg Q12.   Does not appear fluid overloaded.      2. Atrial Fibrillation              In chronic atrial fibrillation.              On apixiban.              Used to be on metoprolol 50 mg Q12.              Rate was well controlled mostly  bpm.   On metoprolol succinate 50 mg Q24.   VRR very variable.     3. Chronic Anticoagulation              Been on apixiban 5 mg Q12.   10/16/2020: Apixiban 5 mg Q12 was changed to heparin iv.   On apixiban 5 mg Q12.   No bleeding.    4. Hypertension   On no antihypertensives.   Well controlled.                5. Chronic Kidney Disease, Stage 4              History or renal carcinoma and nephrectomy.              10/13/2020: BUN/crea 27/2.0. CrCl 28.   10/20/2020: BUN/crea 56/1.8. CrCl 32.   10/25/2020: BUN/crea 49/1.6. CrCl 32.    2020: BUN/crea 58/2.0. CrCl 24.   2020: BUN/crea 60/2.5. CrCl 19.   2020: BUN/crea 54/2.2. CrCl 22.   2020: BUN/crea 40/1.9. CrCl 26.     6. Chronic Obstructive Pulmonary Disease/Respiratory Failure/Pleural Effusion              Chronic respiratory failure with CO2 retention.              10/13/2020: 7.23/94/73/39/90% on FiO2 of 40%.   Was overall slowly improving.    2020: AB.21/125/78/90%/FIO2 45%.   2020: Bronchoscopy.   2020: Right sided chest tube. Thoracentesis 650 ml. Hazy fluid with WBC of 349.   2020: Extubated.   11/10/2020: Extensive infiltrates.     7. Weakness   10/27/2020: Walked 150  ft with PT.   Weak.    8. Disposition   11/12/2020: May go to hospice at home.      VTE Risk Mitigation (From admission, onward)         Ordered     apixaban tablet 5 mg  2 times daily      11/06/20 1039     Place sequential compression device  Until discontinued      10/13/20 1405     IP VTE HIGH RISK PATIENT  Once      10/13/20 1405                Diana Meredith MD  Cardiology  [facility]

## 2020-11-20 NOTE — TELEPHONE ENCOUNTER
----- Message from Iqra Pickard sent at 9/25/2019 10:25 AM CDT -----  Contact: Self   Name of Who is Calling: SARAH DIOP GAVIN [[id number]]      What is the request in detail: pt would like a update on supplies that were being ordered for her. Please contact to further discuss and advise.      Can the clinic reply by [facility]: n      What Number to [facility] Back [facility]: [phone]   Problem: Skin Integrity:  Goal: Will show no infection signs and symptoms  Description: Will show no infection signs and symptoms  Outcome: Ongoing

## 2020-12-18 LAB
ACID FAST MOD KINY STN SPEC: NORMAL
MYCOBACTERIUM SPEC QL CULT: NORMAL

## 2021-01-06 LAB
ACID FAST MOD KINY STN SPEC: NORMAL
MYCOBACTERIUM SPEC QL CULT: NORMAL

## 2022-02-23 DIAGNOSIS — N18.9 CHRONIC KIDNEY DISEASE, UNSPECIFIED CKD STAGE: ICD-10-CM

## 2022-05-31 NOTE — ASSESSMENT & PLAN NOTE
- currently in flutter with variable block, HR controlled  - HR intermittently in the 40s, will hold metoprolol today  - eliquis still on hold due to dynamic renal function  - if renal function remains stable, will restart eliquis tomorrow  - heparin for ppx for now     Detail Level: Zone Detail Level: Detailed

## 2022-10-05 NOTE — PLAN OF CARE
Physician Progress Note      PATIENT:               Orestes Arguello  CSN #:                  [id number]  :                       1983  ADMIT DATE:       10/2/2022 12:10 PM  [address] [place] DATE:  RESPONDING  PROVIDER #:        Nicky Alberto MD          QUERY TEXT:    Patient admitted with seizure . Noted documentation of ESRD per h&p and pn's   and MARYAN per Nephrology consult . If possible, please document in progress notes and discharge summary if you   are evaluating and /or treating any of the following: The medical record reflects the following:  Risk Factors: septic emboli  Clinical Indicators: Documentation per h&p and pn's of End-stage renal disease   on hemodialysis. Per Nephrology consult MARYAN  suspect Vancomycin toxicity   /ATN/ infectious GN. Monitor closely for renal recovery . Will get BX . On   admission bun-7, creat-2.0 gfr-27  , repeat bun-10, creat-2.1,  Treatment: Nephrology consult, monitor labs    Thank you, UlRodrigo Renteria [address] Work RN CDS CRCR CCDS  [email or web address]  Options provided:  -- ESRD  confirmed and MARYAN ruled out  -- MARYAN  confirmed and ESRD  ruled out  -- Other - I will add my own diagnosis  -- Disagree - Not applicable / Not valid  -- Disagree - Clinically unable to determine / Unknown  -- Refer to Clinical Documentation Reviewer    PROVIDER RESPONSE TEXT:    After study, MARYAN confirmed and ESRD ruled out. Query created by:  Alberta Savage on 10/5/2022 6:45 AM      Electronically signed by:  Nicky Alberto MD 10/5/2022 10:08 AM Pt received resting in bed, AAO x4, NAD. Pt complains that she cannot breath with BiPap in place. Reviewed with pt the importance of the BiPap and reassured that VS look good. Pt complains of mild pain and inability to sleep. PRN medications administered, see EMAR for details. Pt has slept well throughout night. Purewick in place, 650 ml of yellow urine collected in canister. After PM dose of metop pt's HR has been better controlled, although still irregular. Blood gases improving, results called to MD per RT. Safety measures in place, will continue to monitor.

## 2023-02-02 ENCOUNTER — PATIENT OUTREACH (OUTPATIENT)
Dept: ADMINISTRATIVE | Facility: HOSPITAL | Age: 73
End: 2023-02-02
Payer: MEDICARE

## 2023-09-25 NOTE — ASSESSMENT & PLAN NOTE
Continue rate control as likely contributing to diastolic dysfunction; currently in a flutter  - continue anticoag   Norco as directed if needed for pain  Do not take this medication and tramadol together, did not take this medication the same time is gabapentin   Medication is a narcotic will make you drowsy.    Follow-up with the rheumatologist as directed  Return for any concerns

## 2023-10-06 NOTE — ASSESSMENT & PLAN NOTE
No history of diabetes per records.  A1C in 6/2020 was 5.6  Glucose 145 on admission    Plan:  -Follow POCT Glucose qAC and HS for now and cover with SSI if needed      used

## 2024-05-02 NOTE — PLAN OF CARE
CM spoke to pt's niece, Vicky Orozco [phone], to explain that pt needs a trilogy when discharged, she currently has a bipap which was approved for a month only and is scheduled for pick-up 11/5/20.    Order for trilogy sent to [facility] and to [facility] for auth today.    CM to follow-up with [facility] and [facility].    Vicky is in agreement with plan.   Previously Declined (within the last year)

## 2024-07-22 NOTE — SUBJECTIVE & OBJECTIVE
Interval History:  No acute events overnight.  Spends most time on BiPAP.  No new complaints this morning.  Patient will now go home with [facility], but remains Full Code.  Trilogy approved from Hospice.    Review of Systems   Constitutional: Negative for chills and fever.   Respiratory: Positive for shortness of breath. Negative for wheezing.    Cardiovascular: Negative for chest pain.   Gastrointestinal: Negative for abdominal distention, abdominal pain, constipation, diarrhea, nausea and vomiting.   Genitourinary: Negative for dysuria and frequency.   Musculoskeletal: Negative for arthralgias and myalgias.   Neurological: Negative for light-headedness.   Psychiatric/Behavioral: Negative for agitation and confusion.     Objective:     Vital Signs (Most Recent):  Temp: 98.4 °F (36.9 °C) (11/11/20 1130)  Pulse: 100 (11/11/20 1310)  Resp: (!) 27 (11/11/20 1310)  BP: 132/65 (11/11/20 1300)  SpO2: (!) 89 % (11/11/20 1310) Vital Signs (24h Range):  Temp:  [98.4 °F (36.9 °C)-99.4 °F (37.4 °C)] 98.4 °F (36.9 °C)  Pulse:  [] 100  Resp:  [19-36] 27  SpO2:  [89 %-99 %] 89 %  BP: ()/(54-97) 132/65     Weight: 97.6 kg (215 lb 2.7 oz)  Body mass index is 38.12 kg/m².    Intake/Output Summary (Last 24 hours) at 11/11/2020 1314  Last data filed at 11/11/2020 1110  Gross per 24 hour   Intake 850 ml   Output 2250 ml   Net -1400 ml      Physical Exam  Constitutional:       General: She is not in acute distress.  HENT:      Head: Normocephalic and atraumatic.   Eyes:      Conjunctiva/sclera: Conjunctivae normal.      Pupils: Pupils are equal, round, and reactive to light.   Neck:      Musculoskeletal: Normal range of motion.   Cardiovascular:      Rate and Rhythm: Normal rate. Rhythm irregular.      Pulses: Normal pulses.      Heart sounds: Normal heart sounds.   Pulmonary:      Comments: Breathing reasonably comfortably while on BiPAP this morning.  Abdominal:      General: Bowel sounds are normal. There is no  distension.      Palpations: Abdomen is soft.      Tenderness: There is no abdominal tenderness.   Musculoskeletal:         General: Swelling present.   Skin:     General: Skin is warm and dry.   Neurological:      General: No focal deficit present.      Mental Status: She is oriented to person, place, and time. Mental status is at baseline.         Significant Labs: All pertinent labs within the past 24 hours have been reviewed.    Significant Imaging: I have reviewed all pertinent imaging results/findings within the past 24 hours.   Detail Level: Simple Detail Level: Detailed